# Patient Record
Sex: FEMALE | Race: WHITE | NOT HISPANIC OR LATINO
[De-identification: names, ages, dates, MRNs, and addresses within clinical notes are randomized per-mention and may not be internally consistent; named-entity substitution may affect disease eponyms.]

---

## 2017-01-19 ENCOUNTER — APPOINTMENT (OUTPATIENT)
Dept: ORTHOPEDIC SURGERY | Facility: CLINIC | Age: 72
End: 2017-01-19

## 2017-01-31 ENCOUNTER — APPOINTMENT (OUTPATIENT)
Dept: ORTHOPEDIC SURGERY | Facility: CLINIC | Age: 72
End: 2017-01-31

## 2017-01-31 VITALS — HEIGHT: 67 IN | BODY MASS INDEX: 37.2 KG/M2 | WEIGHT: 237 LBS

## 2017-01-31 DIAGNOSIS — M54.16 RADICULOPATHY, LUMBAR REGION: ICD-10-CM

## 2017-01-31 DIAGNOSIS — M48.07 SPINAL STENOSIS, LUMBOSACRAL REGION: ICD-10-CM

## 2017-01-31 DIAGNOSIS — M47.816 SPONDYLOSIS W/OUT MYELOPATHY OR RADICULOPATHY, LUMBAR REGION: ICD-10-CM

## 2017-02-02 ENCOUNTER — MEDICATION RENEWAL (OUTPATIENT)
Age: 72
End: 2017-02-02

## 2017-03-13 ENCOUNTER — NON-APPOINTMENT (OUTPATIENT)
Age: 72
End: 2017-03-13

## 2017-03-13 ENCOUNTER — APPOINTMENT (OUTPATIENT)
Dept: CARDIOLOGY | Facility: CLINIC | Age: 72
End: 2017-03-13

## 2017-03-13 VITALS
WEIGHT: 235 LBS | OXYGEN SATURATION: 94 % | DIASTOLIC BLOOD PRESSURE: 66 MMHG | SYSTOLIC BLOOD PRESSURE: 122 MMHG | HEART RATE: 65 BPM | HEIGHT: 67 IN | BODY MASS INDEX: 36.88 KG/M2 | TEMPERATURE: 98.2 F

## 2017-06-14 ENCOUNTER — APPOINTMENT (OUTPATIENT)
Dept: CARDIOLOGY | Facility: CLINIC | Age: 72
End: 2017-06-14

## 2017-06-14 ENCOUNTER — NON-APPOINTMENT (OUTPATIENT)
Age: 72
End: 2017-06-14

## 2017-06-14 VITALS
BODY MASS INDEX: 36.34 KG/M2 | SYSTOLIC BLOOD PRESSURE: 148 MMHG | WEIGHT: 232 LBS | DIASTOLIC BLOOD PRESSURE: 64 MMHG | HEART RATE: 68 BPM | OXYGEN SATURATION: 96 %

## 2017-06-18 LAB
ALBUMIN SERPL ELPH-MCNC: 3.7 G/DL
ALP BLD-CCNC: 85 U/L
ALT SERPL-CCNC: 7 U/L
ANION GAP SERPL CALC-SCNC: 14 MMOL/L
AST SERPL-CCNC: 14 U/L
BASOPHILS # BLD AUTO: 0.03 K/UL
BASOPHILS NFR BLD AUTO: 0.4 %
BILIRUB SERPL-MCNC: 0.5 MG/DL
BUN SERPL-MCNC: 14 MG/DL
CALCIUM SERPL-MCNC: 9.2 MG/DL
CHLORIDE SERPL-SCNC: 104 MMOL/L
CHOLEST SERPL-MCNC: 118 MG/DL
CHOLEST/HDLC SERPL: 2.1 RATIO
CO2 SERPL-SCNC: 21 MMOL/L
CREAT SERPL-MCNC: 0.82 MG/DL
EOSINOPHIL # BLD AUTO: 0.27 K/UL
EOSINOPHIL NFR BLD AUTO: 3.2 %
GLUCOSE SERPL-MCNC: 130 MG/DL
HCT VFR BLD CALC: 35.2 %
HDLC SERPL-MCNC: 57 MG/DL
HGB BLD-MCNC: 11.4 G/DL
IMM GRANULOCYTES NFR BLD AUTO: 0.1 %
LDLC SERPL CALC-MCNC: 50 MG/DL
LYMPHOCYTES # BLD AUTO: 2.22 K/UL
LYMPHOCYTES NFR BLD AUTO: 26.1 %
MAN DIFF?: NORMAL
MCHC RBC-ENTMCNC: 27.7 PG
MCHC RBC-ENTMCNC: 32.4 GM/DL
MCV RBC AUTO: 85.6 FL
MONOCYTES # BLD AUTO: 0.67 K/UL
MONOCYTES NFR BLD AUTO: 7.9 %
NEUTROPHILS # BLD AUTO: 5.3 K/UL
NEUTROPHILS NFR BLD AUTO: 62.3 %
PLATELET # BLD AUTO: 229 K/UL
POTASSIUM SERPL-SCNC: 4.5 MMOL/L
PROT SERPL-MCNC: 6.7 G/DL
RBC # BLD: 4.11 M/UL
RBC # FLD: 18.5 %
SODIUM SERPL-SCNC: 139 MMOL/L
TRIGL SERPL-MCNC: 56 MG/DL
WBC # FLD AUTO: 8.5 K/UL

## 2017-07-27 ENCOUNTER — APPOINTMENT (OUTPATIENT)
Dept: ORTHOPEDIC SURGERY | Facility: CLINIC | Age: 72
End: 2017-07-27
Payer: MEDICARE

## 2017-07-27 ENCOUNTER — RX RENEWAL (OUTPATIENT)
Age: 72
End: 2017-07-27

## 2017-07-27 VITALS
BODY MASS INDEX: 36.41 KG/M2 | HEIGHT: 67 IN | HEART RATE: 57 BPM | DIASTOLIC BLOOD PRESSURE: 66 MMHG | WEIGHT: 232 LBS | SYSTOLIC BLOOD PRESSURE: 152 MMHG

## 2017-07-27 DIAGNOSIS — M21.42 FLAT FOOT [PES PLANUS] (ACQUIRED), RIGHT FOOT: ICD-10-CM

## 2017-07-27 DIAGNOSIS — M19.072 PRIMARY OSTEOARTHRITIS, LEFT ANKLE AND FOOT: ICD-10-CM

## 2017-07-27 DIAGNOSIS — M21.41 FLAT FOOT [PES PLANUS] (ACQUIRED), RIGHT FOOT: ICD-10-CM

## 2017-07-27 PROCEDURE — 73564 X-RAY EXAM KNEE 4 OR MORE: CPT | Mod: RT

## 2017-07-27 PROCEDURE — 73630 X-RAY EXAM OF FOOT: CPT | Mod: LT

## 2017-07-27 PROCEDURE — 99213 OFFICE O/P EST LOW 20 MIN: CPT

## 2017-09-22 ENCOUNTER — APPOINTMENT (OUTPATIENT)
Dept: ORTHOPEDIC SURGERY | Facility: CLINIC | Age: 72
End: 2017-09-22
Payer: MEDICARE

## 2017-09-22 VITALS
BODY MASS INDEX: 36.1 KG/M2 | WEIGHT: 230 LBS | DIASTOLIC BLOOD PRESSURE: 43 MMHG | SYSTOLIC BLOOD PRESSURE: 78 MMHG | HEIGHT: 67 IN

## 2017-09-22 PROCEDURE — 73562 X-RAY EXAM OF KNEE 3: CPT | Mod: LT

## 2017-09-22 PROCEDURE — 20610 DRAIN/INJ JOINT/BURSA W/O US: CPT | Mod: LT

## 2017-09-22 PROCEDURE — 99214 OFFICE O/P EST MOD 30 MIN: CPT | Mod: 25

## 2017-11-03 ENCOUNTER — NON-APPOINTMENT (OUTPATIENT)
Age: 72
End: 2017-11-03

## 2017-11-03 ENCOUNTER — APPOINTMENT (OUTPATIENT)
Dept: CARDIOLOGY | Facility: CLINIC | Age: 72
End: 2017-11-03
Payer: MEDICARE

## 2017-11-03 VITALS
WEIGHT: 226 LBS | BODY MASS INDEX: 35.47 KG/M2 | HEART RATE: 62 BPM | OXYGEN SATURATION: 99 % | TEMPERATURE: 97.6 F | DIASTOLIC BLOOD PRESSURE: 86 MMHG | SYSTOLIC BLOOD PRESSURE: 122 MMHG | HEIGHT: 67 IN

## 2017-11-03 DIAGNOSIS — R61 GENERALIZED HYPERHIDROSIS: ICD-10-CM

## 2017-11-03 PROCEDURE — 93000 ELECTROCARDIOGRAM COMPLETE: CPT

## 2017-11-03 PROCEDURE — 99215 OFFICE O/P EST HI 40 MIN: CPT

## 2017-11-03 RX ORDER — LEVOTHYROXINE SODIUM 0.15 MG/1
150 TABLET ORAL
Qty: 90 | Refills: 0 | Status: DISCONTINUED | COMMUNITY
Start: 2017-10-31

## 2017-11-29 ENCOUNTER — MEDICATION RENEWAL (OUTPATIENT)
Age: 72
End: 2017-11-29

## 2017-12-15 ENCOUNTER — RX RENEWAL (OUTPATIENT)
Age: 72
End: 2017-12-15

## 2018-01-15 ENCOUNTER — RX RENEWAL (OUTPATIENT)
Age: 73
End: 2018-01-15

## 2018-03-05 ENCOUNTER — NON-APPOINTMENT (OUTPATIENT)
Age: 73
End: 2018-03-05

## 2018-03-05 ENCOUNTER — APPOINTMENT (OUTPATIENT)
Dept: CARDIOLOGY | Facility: CLINIC | Age: 73
End: 2018-03-05
Payer: MEDICARE

## 2018-03-05 VITALS
TEMPERATURE: 98.3 F | DIASTOLIC BLOOD PRESSURE: 76 MMHG | BODY MASS INDEX: 35.47 KG/M2 | OXYGEN SATURATION: 95 % | HEART RATE: 71 BPM | SYSTOLIC BLOOD PRESSURE: 165 MMHG | WEIGHT: 226 LBS | HEIGHT: 67 IN

## 2018-03-05 PROCEDURE — 93000 ELECTROCARDIOGRAM COMPLETE: CPT

## 2018-03-05 PROCEDURE — 99215 OFFICE O/P EST HI 40 MIN: CPT

## 2018-03-23 ENCOUNTER — APPOINTMENT (OUTPATIENT)
Dept: ORTHOPEDIC SURGERY | Facility: CLINIC | Age: 73
End: 2018-03-23
Payer: MEDICARE

## 2018-03-23 VITALS — WEIGHT: 225 LBS | HEIGHT: 67 IN | BODY MASS INDEX: 35.31 KG/M2

## 2018-03-23 DIAGNOSIS — M25.561 PAIN IN RIGHT KNEE: ICD-10-CM

## 2018-03-23 DIAGNOSIS — M79.672 PAIN IN LEFT FOOT: ICD-10-CM

## 2018-03-23 DIAGNOSIS — M25.562 PAIN IN RIGHT KNEE: ICD-10-CM

## 2018-03-23 PROCEDURE — 73630 X-RAY EXAM OF FOOT: CPT | Mod: LT

## 2018-03-23 PROCEDURE — 73562 X-RAY EXAM OF KNEE 3: CPT | Mod: RT

## 2018-03-23 PROCEDURE — 99214 OFFICE O/P EST MOD 30 MIN: CPT | Mod: 25

## 2018-03-23 PROCEDURE — 20610 DRAIN/INJ JOINT/BURSA W/O US: CPT | Mod: RT

## 2018-03-28 PROBLEM — M79.672 ACUTE FOOT PAIN, LEFT: Status: ACTIVE | Noted: 2018-03-28

## 2018-07-02 ENCOUNTER — NON-APPOINTMENT (OUTPATIENT)
Age: 73
End: 2018-07-02

## 2018-07-02 ENCOUNTER — APPOINTMENT (OUTPATIENT)
Dept: CARDIOLOGY | Facility: CLINIC | Age: 73
End: 2018-07-02
Payer: MEDICARE

## 2018-07-02 VITALS
WEIGHT: 205 LBS | HEIGHT: 67 IN | SYSTOLIC BLOOD PRESSURE: 138 MMHG | HEART RATE: 54 BPM | DIASTOLIC BLOOD PRESSURE: 72 MMHG | TEMPERATURE: 97.8 F | OXYGEN SATURATION: 96 % | BODY MASS INDEX: 32.18 KG/M2

## 2018-07-02 DIAGNOSIS — R00.2 PALPITATIONS: ICD-10-CM

## 2018-07-02 PROCEDURE — 99215 OFFICE O/P EST HI 40 MIN: CPT

## 2018-07-02 PROCEDURE — 93000 ELECTROCARDIOGRAM COMPLETE: CPT

## 2018-07-02 RX ORDER — CLARITHROMYCIN 500 MG/1
500 TABLET, FILM COATED ORAL
Qty: 10 | Refills: 0 | Status: DISCONTINUED | COMMUNITY
Start: 2018-03-19

## 2018-07-02 RX ORDER — LOSARTAN POTASSIUM 50 MG/1
50 TABLET, FILM COATED ORAL DAILY
Qty: 90 | Refills: 3 | Status: COMPLETED | COMMUNITY
Start: 2018-07-02

## 2018-07-02 RX ORDER — CLINDAMYCIN HYDROCHLORIDE 150 MG/1
150 CAPSULE ORAL
Qty: 28 | Refills: 0 | Status: DISCONTINUED | COMMUNITY
Start: 2018-03-30

## 2018-08-14 ENCOUNTER — MEDICATION RENEWAL (OUTPATIENT)
Age: 73
End: 2018-08-14

## 2018-08-14 ENCOUNTER — RX RENEWAL (OUTPATIENT)
Age: 73
End: 2018-08-14

## 2018-08-16 ENCOUNTER — MEDICATION RENEWAL (OUTPATIENT)
Age: 73
End: 2018-08-16

## 2018-10-18 ENCOUNTER — APPOINTMENT (OUTPATIENT)
Dept: ORTHOPEDIC SURGERY | Facility: CLINIC | Age: 73
End: 2018-10-18
Payer: MEDICARE

## 2018-10-18 VITALS
WEIGHT: 205 LBS | BODY MASS INDEX: 32.18 KG/M2 | DIASTOLIC BLOOD PRESSURE: 75 MMHG | HEART RATE: 60 BPM | SYSTOLIC BLOOD PRESSURE: 188 MMHG | HEIGHT: 67 IN

## 2018-10-18 PROCEDURE — 99215 OFFICE O/P EST HI 40 MIN: CPT

## 2018-10-18 PROCEDURE — 73562 X-RAY EXAM OF KNEE 3: CPT | Mod: RT

## 2018-11-06 ENCOUNTER — NON-APPOINTMENT (OUTPATIENT)
Age: 73
End: 2018-11-06

## 2018-11-06 ENCOUNTER — APPOINTMENT (OUTPATIENT)
Dept: CARDIOLOGY | Facility: CLINIC | Age: 73
End: 2018-11-06
Payer: MEDICARE

## 2018-11-06 VITALS
SYSTOLIC BLOOD PRESSURE: 162 MMHG | OXYGEN SATURATION: 93 % | HEART RATE: 68 BPM | WEIGHT: 204 LBS | BODY MASS INDEX: 31.95 KG/M2 | DIASTOLIC BLOOD PRESSURE: 72 MMHG

## 2018-11-06 PROCEDURE — 93000 ELECTROCARDIOGRAM COMPLETE: CPT

## 2018-11-06 PROCEDURE — 99215 OFFICE O/P EST HI 40 MIN: CPT

## 2018-11-06 NOTE — DISCUSSION/SUMMARY
[FreeTextEntry1] : She is a 73-year-old, obese woman, with chronic hypertension, coronary artery disease with PCI in 2004, who now is trying to quit smoking, with worsening dyspnea.\par Plan for echo and nuclear stress to define the source of her dyspnea.\par No anginal symptoms.\par Continue asa and plavix and Statin for secondary prevention. Plavix while smoking.\par Encouraged  smoke free lifestyle.\par

## 2018-11-06 NOTE — PHYSICAL EXAM
[General Appearance - Well Developed] : well developed [General Appearance - In No Acute Distress] : no acute distress [Normal Conjunctiva] : the conjunctiva exhibited no abnormalities [No Oral Pallor] : no oral pallor [No Oral Cyanosis] : no oral cyanosis [Normal Jugular Venous A Waves Present] : normal jugular venous A waves present [Normal Jugular Venous V Waves Present] : normal jugular venous V waves present [No Jugular Venous Ling A Waves] : no jugular venous ling A waves [Respiration, Rhythm And Depth] : normal respiratory rhythm and effort [Auscultation Breath Sounds / Voice Sounds] : lungs were clear to auscultation bilaterally [Heart Sounds] : normal S1 and S2 [Murmurs] : no murmurs present [Arterial Pulses Normal] : the arterial pulses were normal [Regular] : the rhythm was regular [Abdomen Soft] : soft [Abdomen Mass (___ Cm)] : no abdominal mass palpated [FreeTextEntry1] : walks with cane [Nail Clubbing] : no clubbing of the fingernails [Cyanosis, Localized] : no localized cyanosis [Petechial Hemorrhages (___cm)] : no petechial hemorrhages [Skin Color & Pigmentation] : normal skin color and pigmentation [] : no rash [No Venous Stasis] : no venous stasis [Skin Lesions] : no skin lesions [No Skin Ulcers] : no skin ulcer [No Xanthoma] : no  xanthoma was observed [Oriented To Time, Place, And Person] : oriented to person, place, and time [Affect] : the affect was normal [Mood] : the mood was normal [No Anxiety] : not feeling anxious

## 2018-11-06 NOTE — REVIEW OF SYSTEMS
[Recent Weight Gain (___ Lbs)] : recent [unfilled] ~Ulb weight gain [Shortness Of Breath] : shortness of breath [Dyspnea on exertion] : dyspnea during exertion [Chest Pain] : no chest pain [Lower Ext Edema] : lower extremity edema [Leg Claudication] : no intermittent leg claudication [Palpitations] : no palpitations [Wheezing] : wheezing [Muscle Cramps] : muscle cramps [Negative] : Heme/Lymph

## 2018-11-06 NOTE — HISTORY OF PRESENT ILLNESS
[FreeTextEntry1] : Reports exertional dyspnea after 2 blocks or 2 flights of stairs.\par No chest pain.\par  Exertion is limited by arthritis in her right knee. \par

## 2018-11-06 NOTE — REASON FOR VISIT
[Coronary Artery Disease] : coronary artery disease [Hyperlipidemia] : hyperlipidemia [Hypertension] : hypertension

## 2018-11-16 ENCOUNTER — APPOINTMENT (OUTPATIENT)
Dept: CARDIOLOGY | Facility: CLINIC | Age: 73
End: 2018-11-16
Payer: MEDICARE

## 2018-11-16 PROCEDURE — 93306 TTE W/DOPPLER COMPLETE: CPT

## 2018-11-19 ENCOUNTER — OUTPATIENT (OUTPATIENT)
Dept: OUTPATIENT SERVICES | Facility: HOSPITAL | Age: 73
LOS: 1 days | End: 2018-11-19
Payer: MEDICARE

## 2018-11-19 VITALS
DIASTOLIC BLOOD PRESSURE: 73 MMHG | HEART RATE: 43 BPM | HEIGHT: 67 IN | OXYGEN SATURATION: 98 % | WEIGHT: 201.06 LBS | SYSTOLIC BLOOD PRESSURE: 124 MMHG | RESPIRATION RATE: 18 BRPM | TEMPERATURE: 97 F

## 2018-11-19 DIAGNOSIS — Z96.659 PRESENCE OF UNSPECIFIED ARTIFICIAL KNEE JOINT: Chronic | ICD-10-CM

## 2018-11-19 DIAGNOSIS — M17.11 UNILATERAL PRIMARY OSTEOARTHRITIS, RIGHT KNEE: ICD-10-CM

## 2018-11-19 DIAGNOSIS — Z95.5 PRESENCE OF CORONARY ANGIOPLASTY IMPLANT AND GRAFT: ICD-10-CM

## 2018-11-19 DIAGNOSIS — M19.90 UNSPECIFIED OSTEOARTHRITIS, UNSPECIFIED SITE: ICD-10-CM

## 2018-11-19 DIAGNOSIS — Z96.7 PRESENCE OF OTHER BONE AND TENDON IMPLANTS: Chronic | ICD-10-CM

## 2018-11-19 DIAGNOSIS — G47.33 OBSTRUCTIVE SLEEP APNEA (ADULT) (PEDIATRIC): ICD-10-CM

## 2018-11-19 DIAGNOSIS — Z98.890 OTHER SPECIFIED POSTPROCEDURAL STATES: Chronic | ICD-10-CM

## 2018-11-19 DIAGNOSIS — Z01.818 ENCOUNTER FOR OTHER PREPROCEDURAL EXAMINATION: ICD-10-CM

## 2018-11-19 DIAGNOSIS — Z90.49 ACQUIRED ABSENCE OF OTHER SPECIFIED PARTS OF DIGESTIVE TRACT: Chronic | ICD-10-CM

## 2018-11-19 DIAGNOSIS — Z95.5 PRESENCE OF CORONARY ANGIOPLASTY IMPLANT AND GRAFT: Chronic | ICD-10-CM

## 2018-11-19 LAB
ANION GAP SERPL CALC-SCNC: 11 MMOL/L — SIGNIFICANT CHANGE UP (ref 5–17)
BLD GP AB SCN SERPL QL: NEGATIVE — SIGNIFICANT CHANGE UP
BUN SERPL-MCNC: 15 MG/DL — SIGNIFICANT CHANGE UP (ref 7–23)
CALCIUM SERPL-MCNC: 9 MG/DL — SIGNIFICANT CHANGE UP (ref 8.4–10.5)
CHLORIDE SERPL-SCNC: 105 MMOL/L — SIGNIFICANT CHANGE UP (ref 96–108)
CO2 SERPL-SCNC: 25 MMOL/L — SIGNIFICANT CHANGE UP (ref 22–31)
CREAT SERPL-MCNC: 0.76 MG/DL — SIGNIFICANT CHANGE UP (ref 0.5–1.3)
GLUCOSE SERPL-MCNC: 95 MG/DL — SIGNIFICANT CHANGE UP (ref 70–99)
HBA1C BLD-MCNC: 5.7 % — HIGH (ref 4–5.6)
HCT VFR BLD CALC: 40.1 % — SIGNIFICANT CHANGE UP (ref 34.5–45)
HGB BLD-MCNC: 12.9 G/DL — SIGNIFICANT CHANGE UP (ref 11.5–15.5)
MCHC RBC-ENTMCNC: 30.1 PG — SIGNIFICANT CHANGE UP (ref 27–34)
MCHC RBC-ENTMCNC: 32.2 GM/DL — SIGNIFICANT CHANGE UP (ref 32–36)
MCV RBC AUTO: 93.7 FL — SIGNIFICANT CHANGE UP (ref 80–100)
MRSA PCR RESULT.: SIGNIFICANT CHANGE UP
PLATELET # BLD AUTO: 273 K/UL — SIGNIFICANT CHANGE UP (ref 150–400)
POTASSIUM SERPL-MCNC: 4.5 MMOL/L — SIGNIFICANT CHANGE UP (ref 3.5–5.3)
POTASSIUM SERPL-SCNC: 4.5 MMOL/L — SIGNIFICANT CHANGE UP (ref 3.5–5.3)
RBC # BLD: 4.28 M/UL — SIGNIFICANT CHANGE UP (ref 3.8–5.2)
RBC # FLD: 14.9 % — HIGH (ref 10.3–14.5)
RH IG SCN BLD-IMP: POSITIVE — SIGNIFICANT CHANGE UP
S AUREUS DNA NOSE QL NAA+PROBE: SIGNIFICANT CHANGE UP
SODIUM SERPL-SCNC: 141 MMOL/L — SIGNIFICANT CHANGE UP (ref 135–145)
WBC # BLD: 10.33 K/UL — SIGNIFICANT CHANGE UP (ref 3.8–10.5)
WBC # FLD AUTO: 10.33 K/UL — SIGNIFICANT CHANGE UP (ref 3.8–10.5)

## 2018-11-19 PROCEDURE — 83036 HEMOGLOBIN GLYCOSYLATED A1C: CPT

## 2018-11-19 PROCEDURE — G0463: CPT

## 2018-11-19 PROCEDURE — 86900 BLOOD TYPING SEROLOGIC ABO: CPT

## 2018-11-19 PROCEDURE — 87640 STAPH A DNA AMP PROBE: CPT

## 2018-11-19 PROCEDURE — 86850 RBC ANTIBODY SCREEN: CPT

## 2018-11-19 PROCEDURE — 85027 COMPLETE CBC AUTOMATED: CPT

## 2018-11-19 PROCEDURE — 86901 BLOOD TYPING SEROLOGIC RH(D): CPT

## 2018-11-19 PROCEDURE — 87641 MR-STAPH DNA AMP PROBE: CPT

## 2018-11-19 PROCEDURE — 80048 BASIC METABOLIC PNL TOTAL CA: CPT

## 2018-11-19 NOTE — H&P PST ADULT - HISTORY OF PRESENT ILLNESS
72 y/o F PMH CAD, S/P PCI coronary artery stents 2005, osteoarthritis, S/P 74 y/o F PMH CAD, S/P PCI coronary artery stents 2004, EF 60% on echo 11/2018,  osteoarthritis, S/P left total knee replacement, c/o right knee pain for the past 5 years.  Presents today for right total knee replacement.

## 2018-11-19 NOTE — H&P PST ADULT - PSH
No significant past surgical history S/P appendectomy    S/P knee replacement  left  S/P laparotomy  due to adhesions  S/P ORIF (open reduction internal fixation) fracture  left hip  Stented coronary artery  2004

## 2018-11-19 NOTE — H&P PST ADULT - PMH
Hypertension    Hypothyroid CAD (coronary artery disease)    Hypertension    Hypothyroid    CROW (obstructive sleep apnea)    Osteoarthritis

## 2018-11-20 ENCOUNTER — APPOINTMENT (OUTPATIENT)
Dept: CARDIOLOGY | Facility: CLINIC | Age: 73
End: 2018-11-20
Payer: MEDICARE

## 2018-11-20 PROCEDURE — A9500: CPT

## 2018-11-20 PROCEDURE — 93015 CV STRESS TEST SUPVJ I&R: CPT

## 2018-11-20 PROCEDURE — 78452 HT MUSCLE IMAGE SPECT MULT: CPT

## 2018-12-04 PROBLEM — I25.10 ATHEROSCLEROTIC HEART DISEASE OF NATIVE CORONARY ARTERY WITHOUT ANGINA PECTORIS: Chronic | Status: ACTIVE | Noted: 2018-11-19

## 2018-12-24 ENCOUNTER — OUTPATIENT (OUTPATIENT)
Dept: OUTPATIENT SERVICES | Facility: HOSPITAL | Age: 73
LOS: 1 days | End: 2018-12-24
Payer: MEDICARE

## 2018-12-24 VITALS
HEIGHT: 67 IN | WEIGHT: 210.1 LBS | HEART RATE: 57 BPM | TEMPERATURE: 98 F | DIASTOLIC BLOOD PRESSURE: 78 MMHG | OXYGEN SATURATION: 99 % | SYSTOLIC BLOOD PRESSURE: 133 MMHG | RESPIRATION RATE: 18 BRPM

## 2018-12-24 DIAGNOSIS — Z29.9 ENCOUNTER FOR PROPHYLACTIC MEASURES, UNSPECIFIED: ICD-10-CM

## 2018-12-24 DIAGNOSIS — M17.11 UNILATERAL PRIMARY OSTEOARTHRITIS, RIGHT KNEE: ICD-10-CM

## 2018-12-24 DIAGNOSIS — G47.33 OBSTRUCTIVE SLEEP APNEA (ADULT) (PEDIATRIC): ICD-10-CM

## 2018-12-24 DIAGNOSIS — Z90.49 ACQUIRED ABSENCE OF OTHER SPECIFIED PARTS OF DIGESTIVE TRACT: Chronic | ICD-10-CM

## 2018-12-24 DIAGNOSIS — Z98.890 OTHER SPECIFIED POSTPROCEDURAL STATES: Chronic | ICD-10-CM

## 2018-12-24 DIAGNOSIS — Z96.7 PRESENCE OF OTHER BONE AND TENDON IMPLANTS: Chronic | ICD-10-CM

## 2018-12-24 DIAGNOSIS — I25.10 ATHEROSCLEROTIC HEART DISEASE OF NATIVE CORONARY ARTERY WITHOUT ANGINA PECTORIS: ICD-10-CM

## 2018-12-24 DIAGNOSIS — Z01.818 ENCOUNTER FOR OTHER PREPROCEDURAL EXAMINATION: ICD-10-CM

## 2018-12-24 DIAGNOSIS — Z95.5 PRESENCE OF CORONARY ANGIOPLASTY IMPLANT AND GRAFT: Chronic | ICD-10-CM

## 2018-12-24 DIAGNOSIS — Z96.659 PRESENCE OF UNSPECIFIED ARTIFICIAL KNEE JOINT: Chronic | ICD-10-CM

## 2018-12-24 DIAGNOSIS — M19.90 UNSPECIFIED OSTEOARTHRITIS, UNSPECIFIED SITE: ICD-10-CM

## 2018-12-24 DIAGNOSIS — I10 ESSENTIAL (PRIMARY) HYPERTENSION: ICD-10-CM

## 2018-12-24 DIAGNOSIS — N84.0 POLYP OF CORPUS UTERI: ICD-10-CM

## 2018-12-24 LAB
ANION GAP SERPL CALC-SCNC: 12 MMOL/L — SIGNIFICANT CHANGE UP (ref 5–17)
BLD GP AB SCN SERPL QL: NEGATIVE — SIGNIFICANT CHANGE UP
BUN SERPL-MCNC: 15 MG/DL — SIGNIFICANT CHANGE UP (ref 7–23)
CALCIUM SERPL-MCNC: 9.2 MG/DL — SIGNIFICANT CHANGE UP (ref 8.4–10.5)
CHLORIDE SERPL-SCNC: 103 MMOL/L — SIGNIFICANT CHANGE UP (ref 96–108)
CO2 SERPL-SCNC: 26 MMOL/L — SIGNIFICANT CHANGE UP (ref 22–31)
CREAT SERPL-MCNC: 0.74 MG/DL — SIGNIFICANT CHANGE UP (ref 0.5–1.3)
GLUCOSE SERPL-MCNC: 96 MG/DL — SIGNIFICANT CHANGE UP (ref 70–99)
HBA1C BLD-MCNC: 5.6 % — SIGNIFICANT CHANGE UP (ref 4–5.6)
HCT VFR BLD CALC: 40.3 % — SIGNIFICANT CHANGE UP (ref 34.5–45)
HGB BLD-MCNC: 12.8 G/DL — SIGNIFICANT CHANGE UP (ref 11.5–15.5)
MCHC RBC-ENTMCNC: 30.2 PG — SIGNIFICANT CHANGE UP (ref 27–34)
MCHC RBC-ENTMCNC: 31.8 GM/DL — LOW (ref 32–36)
MCV RBC AUTO: 95 FL — SIGNIFICANT CHANGE UP (ref 80–100)
PLATELET # BLD AUTO: 233 K/UL — SIGNIFICANT CHANGE UP (ref 150–400)
POTASSIUM SERPL-MCNC: 4.6 MMOL/L — SIGNIFICANT CHANGE UP (ref 3.5–5.3)
POTASSIUM SERPL-SCNC: 4.6 MMOL/L — SIGNIFICANT CHANGE UP (ref 3.5–5.3)
RBC # BLD: 4.24 M/UL — SIGNIFICANT CHANGE UP (ref 3.8–5.2)
RBC # FLD: 15.4 % — HIGH (ref 10.3–14.5)
RH IG SCN BLD-IMP: POSITIVE — SIGNIFICANT CHANGE UP
SODIUM SERPL-SCNC: 141 MMOL/L — SIGNIFICANT CHANGE UP (ref 135–145)
WBC # BLD: 9.24 K/UL — SIGNIFICANT CHANGE UP (ref 3.8–10.5)
WBC # FLD AUTO: 9.24 K/UL — SIGNIFICANT CHANGE UP (ref 3.8–10.5)

## 2018-12-24 PROCEDURE — 83036 HEMOGLOBIN GLYCOSYLATED A1C: CPT

## 2018-12-24 PROCEDURE — 80048 BASIC METABOLIC PNL TOTAL CA: CPT

## 2018-12-24 PROCEDURE — 86901 BLOOD TYPING SEROLOGIC RH(D): CPT

## 2018-12-24 PROCEDURE — 86850 RBC ANTIBODY SCREEN: CPT

## 2018-12-24 PROCEDURE — G0463: CPT

## 2018-12-24 PROCEDURE — 85027 COMPLETE CBC AUTOMATED: CPT

## 2018-12-24 PROCEDURE — 86900 BLOOD TYPING SEROLOGIC ABO: CPT

## 2018-12-24 RX ORDER — SODIUM CHLORIDE 9 MG/ML
3 INJECTION INTRAMUSCULAR; INTRAVENOUS; SUBCUTANEOUS EVERY 8 HOURS
Qty: 0 | Refills: 0 | Status: DISCONTINUED | OUTPATIENT
Start: 2019-01-04 | End: 2019-01-19

## 2018-12-24 RX ORDER — CELECOXIB 200 MG/1
200 CAPSULE ORAL ONCE
Qty: 0 | Refills: 0 | Status: DISCONTINUED | OUTPATIENT
Start: 2019-01-04 | End: 2019-01-19

## 2018-12-24 RX ORDER — LIDOCAINE HCL 20 MG/ML
0.2 VIAL (ML) INJECTION ONCE
Qty: 0 | Refills: 0 | Status: DISCONTINUED | OUTPATIENT
Start: 2019-01-04 | End: 2019-01-19

## 2018-12-24 NOTE — H&P PST ADULT - PSH
S/P appendectomy    S/P knee replacement  left 2000  S/P laparotomy  due to adhesions  S/P ORIF (open reduction internal fixation) fracture  left hip 1962  Stented coronary artery  2004

## 2018-12-24 NOTE — H&P PST ADULT - PMH
CAD (coronary artery disease)    History of MI (myocardial infarction)  h/o previous Mi in 2004 prompted PTCA  with stenting x 2 vessels   last stress/ echo 2004  Hypertension    Hypothyroid    CROW (obstructive sleep apnea)  CPAP  Osteoarthritis CAD (coronary artery disease)    History of MI (myocardial infarction)  h/o previous Mi in 2004 prompted PTCA  with stenting x 2 vessels   last stress/ echo 2004  Hypertension    Hypothyroid    CROW (obstructive sleep apnea)  CPAP  Osteoarthritis  knees, back CAD (coronary artery disease)    History of MI (myocardial infarction)  h/o previous Mi in 2004 prompted PTCA  with stenting x 2 vessels   last stress/ echo 2004  Hypertension    Hypothyroid    CROW (obstructive sleep apnea)  CPAP  Osteoarthritis  knees, back  Polyp of corpus uteri

## 2018-12-24 NOTE — H&P PST ADULT - PROBLEM SELECTOR PLAN 2
on plavix., will hold x 7 days prior to surgery   will continue aspirin rosario-op  will be evaluated by her cardiologist on 12/27/2018 for upcoming surgery  recent stress/echo report on chart

## 2018-12-24 NOTE — H&P PST ADULT - ACTIVITY
able to go up four steps to get inside the house ADL's, able to walk up four steps to house, cooking, arthritis

## 2018-12-24 NOTE — H&P PST ADULT - ASSESSMENT
CAPRINI SCORE [CLOT]    AGE RELATED RISK FACTORS                                                       MOBILITY RELATED FACTORS  [ ] Age 41-60 years                                            (1 Point)                  [ ] Bed rest                                                        (1 Point)  [2 ] Age: 61-74 years                                           (2 Points)                 [ ] Plaster cast                                                   (2 Points)  [ ] Age= 75 years                                              (3 Points)                 [ ] Bed bound for more than 72 hours                 (2 Points)    DISEASE RELATED RISK FACTORS                                               GENDER SPECIFIC FACTORS  [ ] Edema in the lower extremities                       (1 Point)                  [ ] Pregnancy                                                     (1 Point)  [ ] Varicose veins                                               (1 Point)                  [ ] Post-partum < 6 weeks                                   (1 Point)             [1 ] BMI > 25 Kg/m2                                            (1 Point)                  [ ] Hormonal therapy  or oral contraception          (1 Point)                 [ ] Sepsis (in the previous month)                        (1 Point)                  [ ] History of pregnancy complications                 (1 point)  [ ] Pneumonia or serious lung disease                                               [ ] Unexplained or recurrent                     (1 Point)           (in the previous month)                               (1 Point)  [ ] Abnormal pulmonary function test                     (1 Point)                 SURGERY RELATED RISK FACTORS  [ ] Acute myocardial infarction                              (1 Point)                 [ ]  Section                                             (1 Point)  [ ] Congestive heart failure (in the previous month)  (1 Point)               [ ] Minor surgery                                                  (1 Point)   [ ] Inflammatory bowel disease                             (1 Point)                 [ ] Arthroscopic surgery                                        (2 Points)  [ ] Central venous access                                      (2 Points)                [ ] General surgery lasting more than 45 minutes   (2 Points)       [ ] Stroke (in the previous month)                          (5 Points)               [ 5] Elective arthroplasty                                         (5 Points)                                                                                                                                               HEMATOLOGY RELATED FACTORS                                                 TRAUMA RELATED RISK FACTORS  [ ] Prior episodes of VTE                                     (3 Points)                [ ] Fracture of the hip, pelvis, or leg                       (5 Points)  [ ] Positive family history for VTE                         (3 Points)                 [ ] Acute spinal cord injury (in the previous month)  (5 Points)  [ ] Prothrombin 34132 A                                     (3 Points)                 [ ] Paralysis  (less than 1 month)                             (5 Points)  [ ] Factor V Leiden                                             (3 Points)                  [ ] Multiple Trauma within 1 month                        (5 Points)  [ ] Lupus anticoagulants                                     (3 Points)                                                           [ ] Anticardiolipin antibodies                               (3 Points)                                                       [ ] High homocysteine in the blood                      (3 Points)                                             [ ] Other congenital or acquired thrombophilia      (3 Points)                                                [ ] Heparin induced thrombocytopenia                  (3 Points)                                          Total Score [ 8 ]

## 2018-12-24 NOTE — H&P PST ADULT - HISTORY OF PRESENT ILLNESS
73 year old female with h/o CAD s/p PCI with coronary stenting x2 (2004), HTN, Hyperlipidemia, Hypothyroidism, CROW (CPAP), obesity, is being seen for scheduled for right total knee replacement on 1/14/2019. Patient was originally scheduled for December 14,2018 and cancelled for family emergency.  Patient is also scheduled for D&C on 1/4/2019.  patient is on Plavix and was advised by the surgeon and cardiologist to hold it for 7 days prior to knee surgery, last dose on 1/7/2019, but  also scheduled for D&c on 1/4/2019, will talk to her cardiologist regarding holding Plavix.

## 2018-12-24 NOTE — H&P PST ADULT - ATTENDING COMMENTS
1/4/19 Gyn Att  72 yo with cervical dysplasia, NAINA 2.  In for treatment via LEEP/Cone Bx.  R/B of surgical excision of abnormal cells reviewed and pt elected to proceed.  Plan hold Plavix prior to this surgery and then prior to her upcoming knee surgery.   JEMAL Hendrix MD

## 2018-12-25 PROBLEM — M19.90 UNSPECIFIED OSTEOARTHRITIS, UNSPECIFIED SITE: Chronic | Status: ACTIVE | Noted: 2018-11-19

## 2018-12-31 ENCOUNTER — NON-APPOINTMENT (OUTPATIENT)
Age: 73
End: 2018-12-31

## 2018-12-31 ENCOUNTER — APPOINTMENT (OUTPATIENT)
Dept: CARDIOLOGY | Facility: CLINIC | Age: 73
End: 2018-12-31
Payer: MEDICARE

## 2018-12-31 VITALS
WEIGHT: 210 LBS | HEIGHT: 67 IN | SYSTOLIC BLOOD PRESSURE: 148 MMHG | OXYGEN SATURATION: 96 % | HEART RATE: 55 BPM | TEMPERATURE: 97.6 F | DIASTOLIC BLOOD PRESSURE: 72 MMHG | BODY MASS INDEX: 32.96 KG/M2

## 2018-12-31 PROCEDURE — 93000 ELECTROCARDIOGRAM COMPLETE: CPT

## 2018-12-31 PROCEDURE — 99215 OFFICE O/P EST HI 40 MIN: CPT

## 2018-12-31 NOTE — HISTORY OF PRESENT ILLNESS
[FreeTextEntry1] : She was seen today for preoperative cardiovascular evaluation.\par She is a 73-year-old with a history of atherosclerosis based on abnormal stress test that showed prior infarction without any ischemia. She is a social smoker and is scheduled for a D&C for uterine polyps.\par She has stable exertional dyspnea without angina.\par She has no orthopnea PND or change in her leg edema.\par Her medications have been stable.\par She is planning to undergo treatment for her knee arthritis in the near future as well.\par recent stress test showed an old inferior wall infarction without ischemia.Echocardiogram showed a normal ejction fraction.\par

## 2018-12-31 NOTE — DISCUSSION/SUMMARY
[FreeTextEntry1] : She is a 73-year-old, obese woman, with chronic hypertension, coronary artery disease with PCI in 2004, who now is Scheduled to undergo a D&C.\par She has no new anginal symptoms or signs of ischemia on her recent stress test.\par Her ECG shows stable bradycardia that is unchanged from prior.\par Her ejection fraction is normal.\par She may discontinue Plavix for 7 days prior to her planned surgery and restart thereafter.\par She should continue her current dose of aspirin and statin.\par She may proceed with the planned surgery.\par

## 2018-12-31 NOTE — PHYSICAL EXAM
[General Appearance - Well Developed] : well developed [General Appearance - In No Acute Distress] : no acute distress [Normal Conjunctiva] : the conjunctiva exhibited no abnormalities [No Oral Pallor] : no oral pallor [No Oral Cyanosis] : no oral cyanosis [Normal Jugular Venous A Waves Present] : normal jugular venous A waves present [Normal Jugular Venous V Waves Present] : normal jugular venous V waves present [No Jugular Venous Ling A Waves] : no jugular venous ling A waves [Respiration, Rhythm And Depth] : normal respiratory rhythm and effort [Auscultation Breath Sounds / Voice Sounds] : lungs were clear to auscultation bilaterally [Heart Sounds] : normal S1 and S2 [Murmurs] : no murmurs present [Arterial Pulses Normal] : the arterial pulses were normal [Regular] : the rhythm was regular [Abdomen Soft] : soft [Abdomen Mass (___ Cm)] : no abdominal mass palpated [Nail Clubbing] : no clubbing of the fingernails [Cyanosis, Localized] : no localized cyanosis [Petechial Hemorrhages (___cm)] : no petechial hemorrhages [Skin Color & Pigmentation] : normal skin color and pigmentation [] : no rash [No Venous Stasis] : no venous stasis [Skin Lesions] : no skin lesions [No Skin Ulcers] : no skin ulcer [No Xanthoma] : no  xanthoma was observed [Oriented To Time, Place, And Person] : oriented to person, place, and time [Affect] : the affect was normal [Mood] : the mood was normal [No Anxiety] : not feeling anxious [FreeTextEntry1] : walks with cane

## 2018-12-31 NOTE — REVIEW OF SYSTEMS
[Recent Weight Gain (___ Lbs)] : recent [unfilled] ~Ulb weight gain [Shortness Of Breath] : shortness of breath [Dyspnea on exertion] : dyspnea during exertion [Lower Ext Edema] : lower extremity edema [Wheezing] : wheezing [Muscle Cramps] : muscle cramps [Negative] : Heme/Lymph [Chest Pain] : no chest pain [Leg Claudication] : no intermittent leg claudication [Palpitations] : no palpitations

## 2019-01-03 ENCOUNTER — TRANSCRIPTION ENCOUNTER (OUTPATIENT)
Age: 74
End: 2019-01-03

## 2019-01-04 ENCOUNTER — OUTPATIENT (OUTPATIENT)
Dept: OUTPATIENT SERVICES | Facility: HOSPITAL | Age: 74
LOS: 1 days | End: 2019-01-04
Payer: MEDICARE

## 2019-01-04 ENCOUNTER — RESULT REVIEW (OUTPATIENT)
Age: 74
End: 2019-01-04

## 2019-01-04 VITALS
OXYGEN SATURATION: 96 % | DIASTOLIC BLOOD PRESSURE: 74 MMHG | RESPIRATION RATE: 16 BRPM | SYSTOLIC BLOOD PRESSURE: 166 MMHG | HEART RATE: 60 BPM

## 2019-01-04 VITALS
SYSTOLIC BLOOD PRESSURE: 174 MMHG | WEIGHT: 210.1 LBS | HEIGHT: 67 IN | OXYGEN SATURATION: 99 % | HEART RATE: 57 BPM | TEMPERATURE: 98 F | DIASTOLIC BLOOD PRESSURE: 78 MMHG | RESPIRATION RATE: 18 BRPM

## 2019-01-04 DIAGNOSIS — Z96.7 PRESENCE OF OTHER BONE AND TENDON IMPLANTS: Chronic | ICD-10-CM

## 2019-01-04 DIAGNOSIS — Z95.5 PRESENCE OF CORONARY ANGIOPLASTY IMPLANT AND GRAFT: Chronic | ICD-10-CM

## 2019-01-04 DIAGNOSIS — Z90.49 ACQUIRED ABSENCE OF OTHER SPECIFIED PARTS OF DIGESTIVE TRACT: Chronic | ICD-10-CM

## 2019-01-04 DIAGNOSIS — Z96.659 PRESENCE OF UNSPECIFIED ARTIFICIAL KNEE JOINT: Chronic | ICD-10-CM

## 2019-01-04 DIAGNOSIS — Z98.890 OTHER SPECIFIED POSTPROCEDURAL STATES: Chronic | ICD-10-CM

## 2019-01-04 DIAGNOSIS — N87.9 DYSPLASIA OF CERVIX UTERI, UNSPECIFIED: ICD-10-CM

## 2019-01-04 PROCEDURE — 57522 CONIZATION OF CERVIX: CPT

## 2019-01-04 PROCEDURE — 88305 TISSUE EXAM BY PATHOLOGIST: CPT | Mod: 26

## 2019-01-04 PROCEDURE — 88305 TISSUE EXAM BY PATHOLOGIST: CPT

## 2019-01-04 PROCEDURE — 88360 TUMOR IMMUNOHISTOCHEM/MANUAL: CPT

## 2019-01-04 PROCEDURE — 88360 TUMOR IMMUNOHISTOCHEM/MANUAL: CPT | Mod: 26

## 2019-01-04 PROCEDURE — 88341 IMHCHEM/IMCYTCHM EA ADD ANTB: CPT

## 2019-01-04 PROCEDURE — 88341 IMHCHEM/IMCYTCHM EA ADD ANTB: CPT | Mod: 26,59

## 2019-01-04 PROCEDURE — 88342 IMHCHEM/IMCYTCHM 1ST ANTB: CPT | Mod: 26,59

## 2019-01-04 PROCEDURE — 88307 TISSUE EXAM BY PATHOLOGIST: CPT | Mod: 26

## 2019-01-04 PROCEDURE — 88307 TISSUE EXAM BY PATHOLOGIST: CPT

## 2019-01-04 PROCEDURE — 88342 IMHCHEM/IMCYTCHM 1ST ANTB: CPT

## 2019-01-04 RX ORDER — CELECOXIB 200 MG/1
200 CAPSULE ORAL ONCE
Qty: 0 | Refills: 0 | Status: COMPLETED | OUTPATIENT
Start: 2019-01-04 | End: 2019-01-04

## 2019-01-04 RX ORDER — OXYCODONE HYDROCHLORIDE 5 MG/1
5 TABLET ORAL ONCE
Qty: 0 | Refills: 0 | Status: DISCONTINUED | OUTPATIENT
Start: 2019-01-04 | End: 2019-01-04

## 2019-01-04 RX ORDER — ASPIRIN/CALCIUM CARB/MAGNESIUM 324 MG
81 TABLET ORAL
Qty: 0 | Refills: 0 | COMMUNITY

## 2019-01-04 RX ORDER — CLOPIDOGREL BISULFATE 75 MG/1
75 TABLET, FILM COATED ORAL
Qty: 0 | Refills: 0 | COMMUNITY

## 2019-01-04 RX ORDER — METOPROLOL TARTRATE 50 MG
1 TABLET ORAL
Qty: 0 | Refills: 0 | COMMUNITY

## 2019-01-04 RX ORDER — ONDANSETRON 8 MG/1
4 TABLET, FILM COATED ORAL ONCE
Qty: 0 | Refills: 0 | Status: DISCONTINUED | OUTPATIENT
Start: 2019-01-04 | End: 2019-01-19

## 2019-01-04 RX ORDER — SODIUM CHLORIDE 9 MG/ML
1000 INJECTION, SOLUTION INTRAVENOUS
Qty: 0 | Refills: 0 | Status: DISCONTINUED | OUTPATIENT
Start: 2019-01-04 | End: 2019-01-19

## 2019-01-04 RX ORDER — LOSARTAN POTASSIUM 100 MG/1
50 TABLET, FILM COATED ORAL
Qty: 0 | Refills: 0 | COMMUNITY

## 2019-01-04 RX ORDER — DICLOFENAC SODIUM 30 MG/G
1 GEL TOPICAL
Qty: 0 | Refills: 0 | COMMUNITY

## 2019-01-04 RX ORDER — CELECOXIB 200 MG/1
1 CAPSULE ORAL
Qty: 0 | Refills: 0 | COMMUNITY

## 2019-01-04 RX ORDER — ACETAMINOPHEN 500 MG
1000 TABLET ORAL ONCE
Qty: 0 | Refills: 0 | Status: COMPLETED | OUTPATIENT
Start: 2019-01-04 | End: 2019-01-04

## 2019-01-04 RX ORDER — LEVOTHYROXINE SODIUM 125 MCG
150 TABLET ORAL
Qty: 0 | Refills: 0 | COMMUNITY

## 2019-01-04 RX ORDER — ROSUVASTATIN CALCIUM 5 MG/1
1 TABLET ORAL
Qty: 0 | Refills: 0 | COMMUNITY

## 2019-01-04 RX ADMIN — Medication 1000 MILLIGRAM(S): at 09:43

## 2019-01-04 NOTE — ASU DISCHARGE PLAN (ADULT/PEDIATRIC). - MEDICATION SUMMARY - MEDICATIONS TO TAKE
I will START or STAY ON the medications listed below when I get home from the hospital:    ibuprofen 200 mg oral tablet  -- 2 tab(s) by mouth every 6 hours  -- Indication: For postop pain    Tylenol Extra Strength  -- 2 tab(s) by mouth every 6 hours  -- Indication: For postop pain

## 2019-01-04 NOTE — BRIEF OPERATIVE NOTE - PROCEDURE
<<-----Click on this checkbox to enter Procedure LEEP conization, cervix  01/04/2019    Active  LGAINEY

## 2019-01-04 NOTE — ASU DISCHARGE PLAN (ADULT/PEDIATRIC). - NOTIFY
Unable to Urinate/Persistent Nausea and Vomiting/GYN Fever>100.4/Inability to Tolerate Liquids or Foods/Numbness, color, or temperature change to extremity/Bleeding that does not stop/Pain not relieved by Medications

## 2019-01-04 NOTE — ASU PATIENT PROFILE, ADULT - ABILITY TO HEAR (WITH HEARING AID OR HEARING APPLIANCE IF NORMALLY USED):
Mildly to Moderately Impaired: difficulty hearing in some environments or speaker may need to increase volume or speak distinctly right/Mildly to Moderately Impaired: difficulty hearing in some environments or speaker may need to increase volume or speak distinctly

## 2019-01-04 NOTE — BRIEF OPERATIVE NOTE - OPERATION/FINDINGS
EUA: stenotic vaginal vault, small anteverted uterus, small cervix flush with vaginal wall, no adnexal masses palpable b/l  After application of acetic acid a erythematous discoloration was noted at 11o'clock extending into the os

## 2019-01-04 NOTE — ASU PREOP CHECKLIST - MEDICAL/PEDIATRIC CLEARANCE ON MEDICAL RECORD
Spoke with patient and looking for pump for erectile dysfunction. Pt transferred to urology to make an appointment.    on chart

## 2019-01-04 NOTE — ASU PATIENT PROFILE, ADULT - PMH
CAD (coronary artery disease)    History of MI (myocardial infarction)  h/o previous Mi in 2004 prompted PTCA  with stenting x 2 vessels   last stress/ echo 2004  Hypertension    Hypothyroid    CROW (obstructive sleep apnea)  CPAP  Osteoarthritis  knees, back  Polyp of corpus uteri

## 2019-01-10 LAB — SURGICAL PATHOLOGY STUDY: SIGNIFICANT CHANGE UP

## 2019-01-21 ENCOUNTER — RX RENEWAL (OUTPATIENT)
Age: 74
End: 2019-01-21

## 2019-02-22 ENCOUNTER — APPOINTMENT (OUTPATIENT)
Dept: CARDIOLOGY | Facility: CLINIC | Age: 74
End: 2019-02-22

## 2019-02-28 ENCOUNTER — RESULT REVIEW (OUTPATIENT)
Age: 74
End: 2019-02-28

## 2019-02-28 ENCOUNTER — OUTPATIENT (OUTPATIENT)
Dept: OUTPATIENT SERVICES | Facility: HOSPITAL | Age: 74
LOS: 1 days | End: 2019-02-28
Payer: MEDICARE

## 2019-02-28 DIAGNOSIS — Z98.890 OTHER SPECIFIED POSTPROCEDURAL STATES: Chronic | ICD-10-CM

## 2019-02-28 DIAGNOSIS — Z96.659 PRESENCE OF UNSPECIFIED ARTIFICIAL KNEE JOINT: Chronic | ICD-10-CM

## 2019-02-28 DIAGNOSIS — Z90.49 ACQUIRED ABSENCE OF OTHER SPECIFIED PARTS OF DIGESTIVE TRACT: Chronic | ICD-10-CM

## 2019-02-28 DIAGNOSIS — R87.611 ATYPICAL SQUAMOUS CELLS CANNOT EXCLUDE HIGH GRADE SQUAMOUS INTRAEPITHELIAL LESION ON CYTOLOGIC SMEAR OF CERVIX (ASC-H): ICD-10-CM

## 2019-02-28 DIAGNOSIS — Z95.5 PRESENCE OF CORONARY ANGIOPLASTY IMPLANT AND GRAFT: Chronic | ICD-10-CM

## 2019-02-28 DIAGNOSIS — Z96.7 PRESENCE OF OTHER BONE AND TENDON IMPLANTS: Chronic | ICD-10-CM

## 2019-02-28 PROCEDURE — 88321 CONSLTJ&REPRT SLD PREP ELSWR: CPT

## 2019-03-05 LAB — SURGICAL PATHOLOGY STUDY: SIGNIFICANT CHANGE UP

## 2019-03-11 ENCOUNTER — OUTPATIENT (OUTPATIENT)
Dept: OUTPATIENT SERVICES | Facility: HOSPITAL | Age: 74
LOS: 1 days | End: 2019-03-11
Payer: MEDICARE

## 2019-03-11 VITALS
DIASTOLIC BLOOD PRESSURE: 56 MMHG | TEMPERATURE: 97 F | HEART RATE: 58 BPM | HEIGHT: 63 IN | WEIGHT: 203.05 LBS | SYSTOLIC BLOOD PRESSURE: 125 MMHG | RESPIRATION RATE: 17 BRPM

## 2019-03-11 DIAGNOSIS — Z96.659 PRESENCE OF UNSPECIFIED ARTIFICIAL KNEE JOINT: Chronic | ICD-10-CM

## 2019-03-11 DIAGNOSIS — I25.10 ATHEROSCLEROTIC HEART DISEASE OF NATIVE CORONARY ARTERY WITHOUT ANGINA PECTORIS: ICD-10-CM

## 2019-03-11 DIAGNOSIS — M17.11 UNILATERAL PRIMARY OSTEOARTHRITIS, RIGHT KNEE: ICD-10-CM

## 2019-03-11 DIAGNOSIS — Z98.890 OTHER SPECIFIED POSTPROCEDURAL STATES: Chronic | ICD-10-CM

## 2019-03-11 DIAGNOSIS — Z90.49 ACQUIRED ABSENCE OF OTHER SPECIFIED PARTS OF DIGESTIVE TRACT: Chronic | ICD-10-CM

## 2019-03-11 DIAGNOSIS — G47.33 OBSTRUCTIVE SLEEP APNEA (ADULT) (PEDIATRIC): ICD-10-CM

## 2019-03-11 DIAGNOSIS — Z95.5 PRESENCE OF CORONARY ANGIOPLASTY IMPLANT AND GRAFT: Chronic | ICD-10-CM

## 2019-03-11 DIAGNOSIS — Z01.84 ENCOUNTER FOR ANTIBODY RESPONSE EXAMINATION: ICD-10-CM

## 2019-03-11 DIAGNOSIS — Z96.7 PRESENCE OF OTHER BONE AND TENDON IMPLANTS: Chronic | ICD-10-CM

## 2019-03-11 PROCEDURE — G0463: CPT

## 2019-03-11 PROCEDURE — 86901 BLOOD TYPING SEROLOGIC RH(D): CPT

## 2019-03-11 PROCEDURE — 80048 BASIC METABOLIC PNL TOTAL CA: CPT

## 2019-03-11 PROCEDURE — 85027 COMPLETE CBC AUTOMATED: CPT

## 2019-03-11 PROCEDURE — 83036 HEMOGLOBIN GLYCOSYLATED A1C: CPT

## 2019-03-11 PROCEDURE — 87640 STAPH A DNA AMP PROBE: CPT

## 2019-03-11 PROCEDURE — 86850 RBC ANTIBODY SCREEN: CPT

## 2019-03-11 PROCEDURE — 86900 BLOOD TYPING SEROLOGIC ABO: CPT

## 2019-03-11 RX ORDER — IBUPROFEN 200 MG
2 TABLET ORAL
Qty: 0 | Refills: 0 | COMMUNITY

## 2019-03-11 RX ORDER — ACETAMINOPHEN 500 MG
2 TABLET ORAL
Qty: 0 | Refills: 0 | COMMUNITY

## 2019-03-11 NOTE — H&P PST ADULT - PROBLEM SELECTOR PLAN 3
hx of cardiac  stents 2004 on Plavix and Aspirin as per protocol to stop Plavix 7 days prior stay on aspirin. Pt and daughter instructed to confirm plan with surgeon and cardiologist.

## 2019-03-11 NOTE — H&P PST ADULT - ASSESSMENT
CAPRINI SCORE [CLOT]    AGE RELATED RISK FACTORS                                                       MOBILITY RELATED FACTORS  [ ] Age 41-60 years                                            (1 Point)                  [ ] Bed rest                                                        (1 Point)  [ x] Age: 61-74 years                                           (2 Points)                 [ ] Plaster cast                                                   (2 Points)  [ ] Age= 75 years                                              (3 Points)                 [ ] Bed bound for more than 72 hours                 (2 Points)    DISEASE RELATED RISK FACTORS                                               GENDER SPECIFIC FACTORS  [ ] Edema in the lower extremities                       (1 Point)                  [ ] Pregnancy                                                     (1 Point)  [ ] Varicose veins                                               (1 Point)                  [ ] Post-partum < 6 weeks                                   (1 Point)             [x ] BMI > 25 Kg/m2                                            (1 Point)                  [ ] Hormonal therapy  or oral contraception          (1 Point)                 [ ] Sepsis (in the previous month)                        (1 Point)                  [ ] History of pregnancy complications                 (1 point)  [ ] Pneumonia or serious lung disease                                               [ ] Unexplained or recurrent                     (1 Point)           (in the previous month)                               (1 Point)  [ ] Abnormal pulmonary function test                     (1 Point)                 SURGERY RELATED RISK FACTORS  [ ] Acute myocardial infarction                              (1 Point)                 [ ]  Section                                             (1 Point)  [ ] Congestive heart failure (in the previous month)  (1 Point)               [ ] Minor surgery                                                  (1 Point)   [ ] Inflammatory bowel disease                             (1 Point)                 [ ] Arthroscopic surgery                                        (2 Points)  [ ] Central venous access                                      (2 Points)                [ ] General surgery lasting more than 45 minutes   (2 Points)       [ ] Stroke (in the previous month)                          (5 Points)               [ x] Elective arthroplasty                                         (5 Points)                                                                                                                                               HEMATOLOGY RELATED FACTORS                                                 TRAUMA RELATED RISK FACTORS  [ ] Prior episodes of VTE                                     (3 Points)                 [ ] Fracture of the hip, pelvis, or leg                       (5 Points)  [ ] Positive family history for VTE                         (3 Points)                 [ ] Acute spinal cord injury (in the previous month)  (5 Points)  [ ] Prothrombin 89021 A                                     (3 Points)                 [ ] Paralysis  (less than 1 month)                             (5 Points)  [ ] Factor V Leiden                                             (3 Points)                  [ ] Multiple Trauma within 1 month                        (5 Points)  [ ] Lupus anticoagulants                                     (3 Points)                                                           [ ] Anticardiolipin antibodies                               (3 Points)                                                       [ ] High homocysteine in the blood                      (3 Points)                                             [ ] Other congenital or acquired thrombophilia      (3 Points)                                                [ ] Heparin induced thrombocytopenia                  (3 Points)                                          Total Score [       8   ]  The Caprini score indicates that this patient is at high risk for a VTE event (score = 6).    Ssurgical patients in this group will benefit from both pharmacologic prophylaxis and intermittent compression devices.    The surgical team will determine the balance between VTE risk and bleeding risk, and other clinical considerations.

## 2019-03-11 NOTE — H&P PST ADULT - PSH
H/O dilation and curettage  2/2019  benign polyp  S/P appendectomy  30 plus years  S/P knee replacement  left 2000  S/P laparotomy  due to adhesions  30 years ago  S/P ORIF (open reduction internal fixation) fracture  left hip 1962  Stented coronary artery  2004

## 2019-03-11 NOTE — H&P PST ADULT - PMH
Bilateral hearing loss, unspecified hearing loss type  bilateral aids  CAD (coronary artery disease)    Heart murmur  dx in childhood  History of MI (myocardial infarction)  h/o previous Mi in 2004 prompted PTCA  with stenting x 2 vessels   last stress/ echo 2019  Hypertension    Hypothyroid    CROW (obstructive sleep apnea)  CPAP  Osteoarthritis  knees, back  Polyp of corpus uteri Bilateral hearing loss, unspecified hearing loss type  bilateral aids  CAD (coronary artery disease)    Heart murmur  dx in childhood  History of MI (myocardial infarction)  h/o previous Mi in 2004 prompted PTCA  with stenting x 2 vessels   last stress/ echo 2019  Hypertension    Hypothyroid    Obesity    CROW (obstructive sleep apnea)  CPAP  Osteoarthritis  knees, back  Polyp of corpus uteri Bilateral hearing loss, unspecified hearing loss type  bilateral aids  CAD (coronary artery disease)    Heart murmur  dx in childhood  History of MI (myocardial infarction)  h/o previous MI in 2004 prompted PTCA  with stenting x 2 vessels   last stress/ echo 2019  Hypertension    Hypothyroid    Obesity    CROW (obstructive sleep apnea)  CPAP  Osteoarthritis  knees, back  Polyp of corpus uteri

## 2019-03-11 NOTE — H&P PST ADULT - HISTORY OF PRESENT ILLNESS
73 year old female with hx or right knee pain work up need surgery scheduled 3/26  CAD hx of cardiac stents 2004 on Plavix and Aspirin  Pt instructed to stop Plavix 7 days prior and stay on aspirin.  Pt and daughter instructed to confirm plan with surgeon and cardiologist (both offices closed )

## 2019-03-11 NOTE — H&P PST ADULT - OCCUPATION
Problem: Patient Care Overview  Goal: Plan of Care/Patient Progress Review  Outcome: No Change  D: Tmax 99.5. Sinus rhythm. Normotensive. CMV settings unchanged at 30%.  I/A: Versed weaned and turned off at 2200 last evening. Did open eyes briefly this morning to voice and stimuli. Withdraws to pain. Not following commands. Mag replaced this am. 500cc watery stool out of rectal tube. Adequate UOP. CVP 6-8. 1/2 NS maintenance at 50cc/hr.   P: Continue to monitor and assess pt. Consult Neuro crit with any new changes/concerns.        retired

## 2019-03-12 PROBLEM — I25.2 OLD MYOCARDIAL INFARCTION: Chronic | Status: ACTIVE | Noted: 2018-12-24

## 2019-03-13 ENCOUNTER — NON-APPOINTMENT (OUTPATIENT)
Age: 74
End: 2019-03-13

## 2019-03-13 ENCOUNTER — APPOINTMENT (OUTPATIENT)
Dept: CARDIOLOGY | Facility: CLINIC | Age: 74
End: 2019-03-13
Payer: MEDICARE

## 2019-03-13 VITALS
HEART RATE: 54 BPM | SYSTOLIC BLOOD PRESSURE: 160 MMHG | WEIGHT: 204 LBS | DIASTOLIC BLOOD PRESSURE: 80 MMHG | OXYGEN SATURATION: 99 % | BODY MASS INDEX: 31.95 KG/M2

## 2019-03-13 DIAGNOSIS — M25.561 PAIN IN RIGHT KNEE: ICD-10-CM

## 2019-03-13 PROCEDURE — 99215 OFFICE O/P EST HI 40 MIN: CPT

## 2019-03-13 PROCEDURE — 93000 ELECTROCARDIOGRAM COMPLETE: CPT

## 2019-03-13 NOTE — DISCUSSION/SUMMARY
[FreeTextEntry1] : She is a 73-year-old, obese woman, with chronic hypertension, coronary artery disease with PCI in 2004, who now is Scheduled to undergo a right knee replacement.\par She has no new anginal symptoms or signs of ischemia on her recent stress test.\par Her ECG shows stable bradycardia that is unchanged from prior.\par Her ejection fraction is normal.\par BP likely elevated due to pain. continue Losartan, metoprolol, Imdur, lasix.\par She may discontinue Plavix for 7 days prior to her planned surgery and restart thereafter.\par She should continue her current dose of aspirin and statin.\par She may proceed with the planned surgery.\par

## 2019-03-13 NOTE — HISTORY OF PRESENT ILLNESS
[FreeTextEntry1] : She was seen today for preoperative cardiovascular evaluation prior to Right knee replacement.\par She is a 73-year-old with a history of atherosclerosis based on abnormal stress test that showed prior infarction without any ischemia. She is a social smoker and is scheduled for a right knee replacement.\par She has stable exertional dyspnea without angina.\par She has no orthopnea PND or change in her leg edema.\par Her medications have been stable.\par Stress test 11/2018: recent stress test showed an old inferior wall infarction without ischemia.Echocardiogram showed a normal ejection fraction.\par

## 2019-03-18 NOTE — H&P PST ADULT - RX
Outpatient Physical Therapy Ortho Treatment Note  [facility]     Patient Name: Carlie Brennan  : 1961  MRN: [id number]  Today's Date: 3/18/2019      Visit Date: 2019    Visit Dx:    ICD-10-CM ICD-9-CM   1. Left shoulder pain, unspecified chronicity M25.512 719.41       Patient Active Problem List   Diagnosis   • Essential hypertension   • Gastroesophageal reflux disease without esophagitis   • Mitral valve prolapse   • Rosacea   • Hyperlipidemia   • Health care maintenance   • History of adenomatous polyp of colon   • Meniere's disease of right ear   • Polycythemia        Past Medical History:   Diagnosis Date   • GERD (gastroesophageal reflux disease)    • Hypertension    • Iron deficiency anemia 2018   • Pregnancy        • Rosacea    • Vertigo         Past Surgical History:   Procedure Laterality Date   • ABDOMINOPLASTY     •  SECTION      x 2   • COLONOSCOPY N/A 3/12/2018    Procedure: COLONOSCOPY INTO CECUM AND NORMAL TI WITH HOT SNARE POLYPECTOMY;  Surgeon: Lincoln Chino MD;  Location: [facility] ENDOSCOPY;  Service: Gastroenterology   • LASIK                         PT Assessment/Plan     Row Name 19 1612          PT Assessment    Assessment Comments  Pt continues to report decreased pain levels, however continues with limitations with ROM and scapular strength. Pt requires cues for scap retraction at times, however improved mechanics noted with elevation activities.   -CN        PT Plan    PT Plan Comments  Contine with scapular girdle strengthening and consider PNF patterns next visit.   -CN       User Key  (r) = Recorded By, (t) = Taken By, (c) = Cosigned By    Initials Name Provider Type    Sumaya Irizarry, PT Physical Therapist              Exercises     Row Name 19 1500             Subjective Comments    Subjective Comments  The soreness is better overall, but I feel like it is taking a long time to heal.   -CN         Subjective Pain     Able to rate subjective pain?  yes  -CN      Pre-Treatment Pain Level  1  -CN         Total Minutes    51848 - PT Therapeutic Exercise Minutes  30  -CN      89631 - PT Manual Therapy Minutes  10  -CN         Exercise 1    Exercise Name 1  Supine B UE flexion with band at wrist  -CN      Cueing 1  Verbal;Demo  -CN      Reps 1  15  -CN      Additional Comments  RTB  -CN         Exercise 2    Exercise Name 2  SL ER  -CN      Cueing 2  Verbal;Tactile  -CN      Sets 2  2  -CN      Reps 2  10  -CN      Additional Comments  1#  -CN         Exercise 3    Exercise Name 3  SL abduction  -CN      Cueing 3  Demo  -CN      Reps 3  10  -CN      Additional Comments  cues to retract scapula  -CN         Exercise 4    Exercise Name 4  SL scapular clock  -CN      Cueing 4  Demo  -CN      Reps 4  10 ea  -CN      Additional Comments  --  -CN         Exercise 5    Exercise Name 5  pulleys flex, ER  -CN      Reps 5  10  -CN      Time 5  5 seconds  -CN      Additional Comments  ea  -CN         Exercise 7    Exercise Name 7  shoulder rows TB standing  -CN      Sets 7  2  -CN      Reps 7  10  -CN      Additional Comments  BTB  -CN         Exercise 8    Exercise Name 8  shoulder ext TB standing  -CN      Sets 8  2  -CN      Reps 8  10  -CN      Additional Comments  BTB  -CN         Exercise 9    Exercise Name 9  shoulder ER TB standing palms up  -CN      Reps 9  15  -CN      Additional Comments  RTB  -CN         Exercise 11    Exercise Name 11  wallslides flex B standing  -CN      Reps 11  10  -CN      Time 11  5 seconds  -CN         Exercise 14    Exercise Name 14  Supine punches  -CN      Cueing 14  Demo  -CN      Reps 14  15  -CN      Additional Comments  3#  -CN         Exercise 16    Exercise Name 16  Supine star  -CN      Cueing 16  Demo  -CN      Reps 16  10 ea  -CN      Additional Comments  RTB  -CN         Exercise 17    Exercise Name 17  Supine small circles  -CN      Cueing 17  Demo  -CN      Reps 17  10 CW and CCW  -CN         User Key  (r) = Recorded By, (t) = Taken By, (c) = Cosigned By    Initials Name Provider Type    Sumaya Irizarry, PT Physical Therapist                        Manual Rx (last 36 hours)      Manual Treatments     Row Name 03/18/19 1500             Total Minutes    01581 - PT Manual Therapy Minutes  10  -CN         Manual Rx 1    Manual Rx 1 Location  left shoulder  -CN      Manual Rx 1 Type  PROM all planes, gentle post and inf joint mobs  -CN        User Key  (r) = Recorded By, (t) = Taken By, (c) = Cosigned By    Initials Name Provider Type    Sumaya Irizarry, PT Physical Therapist          PT OP Goals     Row Name 03/18/19 1600          PT Short Term Goals    STG Date to Achieve  03/21/19  -CN     STG 1  Pt will be independent and verbalized good understanding of initial HEP to improve ability to manage pain, as well as improve strength and ROM  -CN     STG 1 Progress  Met  -CN     STG 2  Pt will improve L shoulder flexion to at least 145 deg to improve ability to reach overhead.   -CN     STG 2 Progress  Progressing  -CN     STG 2 Progress Comments  Continuing to progress ROM and scapular strengthening as tolerated.   -CN        Long Term Goals    LTG Date to Achieve  04/18/19  -CN     LTG 1  Pt will be independent and verbalized good understanding of advanced HEP to improve ability to manage pain, as well as improve strength and ROM.  -CN     LTG 1 Progress  Ongoing  -CN     LTG 2  Pt will improve L shoulder flexion to at least 160 deg to improve ability to reach overhead.   -CN     LTG 2 Progress  Ongoing  -CN     LTG 3  Pt will report </=3/10 pain in L shoulder with overhead activities to improve participation in ADLs.  -CN     LTG 3 Progress  Progressing  -CN     LTG 4  Pt will improve DASH score to at least </=20% perceived disability to show overall improved quality of life.  -CN     LTG 4 Progress  Ongoing  -CN       User Key  (r) = Recorded By, (t) = Taken By, (c) = Cosigned By     Initials Name Provider Type    Sumaya Irizarry, PT Physical Therapist          Therapy Education  Given: HEP, Symptoms/condition management, Pain management, Mobility training, Other (comment)  Program: Progressed  How Provided: Verbal, Demonstration  Provided to: Patient  Level of Understanding: Teach back education performed, Demonstrated, Verbalized              Time Calculation:   Start Time: 1530  Stop Time: 1610  Time Calculation (min): 40 min  Therapy Suggested Charges     Code   Minutes Charges    26841 (CPT®) Hc Pt Neuromusc Re Education Ea 15 Min      10668 (CPT®) Hc Pt Ther Proc Ea 15 Min 30 2    72484 (CPT®) Hc Gait Training Ea 15 Min      56400 (CPT®) Hc Pt Therapeutic Act Ea 15 Min      11503 (CPT®) Hc Pt Manual Therapy Ea 15 Min 10 1    44795 (CPT®) Hc Pt Ther Massage- Per 15 Min      32808 (CPT®) Hc Pt Iontophoresis Ea 15 Min      85663 (CPT®) Hc Pt Elec Stim Ea-Per 15 Min      86557 (CPT®) Hc Pt Ultrasound Ea 15 Min      25053 (CPT®) Hc Pt Self Care/Mgmt/Train Ea 15 Min      60053 (CPT®) Hc Pt Prosthetic (S) Train Initial Encounter, Each 15 Min      48533 (CPT®) Hc Orthotic(S) Mgmt/Train Initial Encounter, Each 15min      44758 (CPT®) Hc Pt Aquatic Therapy Ea 15 Min      85152 (CPT®) Hc Pt Orthotic(S)/Prosthetic(S) Encounter, Each 15 Min       (CPT®) Hc Pt Electrical Stim Unattended      Total  40 3        Therapy Charges for Today     Code Description Service Date Service Provider Modifiers Qty    80932898847 HC PT THER PROC EA 15 MIN 3/18/2019 Sumaya Hernandez, PT GP 2    98114007254 HC PT MANUAL THERAPY EA 15 MIN 3/18/2019 Sumaya Hernandez, PT GP 1                    Sumaya Hernandez PT  3/18/2019      CPAP does not use

## 2019-03-26 ENCOUNTER — APPOINTMENT (OUTPATIENT)
Dept: ORTHOPEDIC SURGERY | Facility: HOSPITAL | Age: 74
End: 2019-03-26

## 2019-05-01 ENCOUNTER — NON-APPOINTMENT (OUTPATIENT)
Age: 74
End: 2019-05-01

## 2019-05-01 ENCOUNTER — APPOINTMENT (OUTPATIENT)
Dept: CARDIOLOGY | Facility: CLINIC | Age: 74
End: 2019-05-01
Payer: MEDICARE

## 2019-05-01 VITALS
DIASTOLIC BLOOD PRESSURE: 60 MMHG | BODY MASS INDEX: 31.48 KG/M2 | OXYGEN SATURATION: 98 % | SYSTOLIC BLOOD PRESSURE: 140 MMHG | WEIGHT: 201 LBS | HEART RATE: 59 BPM

## 2019-05-01 DIAGNOSIS — E78.5 HYPERLIPIDEMIA, UNSPECIFIED: ICD-10-CM

## 2019-05-01 PROBLEM — R01.1 CARDIAC MURMUR, UNSPECIFIED: Chronic | Status: ACTIVE | Noted: 2019-03-11

## 2019-05-01 PROBLEM — H91.93 UNSPECIFIED HEARING LOSS, BILATERAL: Chronic | Status: ACTIVE | Noted: 2019-03-11

## 2019-05-01 PROBLEM — E66.9 OBESITY, UNSPECIFIED: Chronic | Status: ACTIVE | Noted: 2019-03-11

## 2019-05-01 PROCEDURE — 93000 ELECTROCARDIOGRAM COMPLETE: CPT

## 2019-05-01 PROCEDURE — 99215 OFFICE O/P EST HI 40 MIN: CPT

## 2019-05-01 NOTE — PHYSICAL EXAM
[General Appearance - Well Developed] : well developed [General Appearance - In No Acute Distress] : no acute distress [No Oral Pallor] : no oral pallor [Normal Conjunctiva] : the conjunctiva exhibited no abnormalities [No Oral Cyanosis] : no oral cyanosis [Normal Jugular Venous A Waves Present] : normal jugular venous A waves present [No Jugular Venous Ling A Waves] : no jugular venous ling A waves [Normal Jugular Venous V Waves Present] : normal jugular venous V waves present [Auscultation Breath Sounds / Voice Sounds] : lungs were clear to auscultation bilaterally [Respiration, Rhythm And Depth] : normal respiratory rhythm and effort [Heart Sounds] : normal S1 and S2 [Regular] : the rhythm was regular [Murmurs] : no murmurs present [Arterial Pulses Normal] : the arterial pulses were normal [Abdomen Soft] : soft [Nail Clubbing] : no clubbing of the fingernails [FreeTextEntry1] : walks with cane [Abdomen Mass (___ Cm)] : no abdominal mass palpated [Petechial Hemorrhages (___cm)] : no petechial hemorrhages [Cyanosis, Localized] : no localized cyanosis [Skin Color & Pigmentation] : normal skin color and pigmentation [No Venous Stasis] : no venous stasis [Skin Lesions] : no skin lesions [] : no rash [No Xanthoma] : no  xanthoma was observed [No Skin Ulcers] : no skin ulcer [Mood] : the mood was normal [Oriented To Time, Place, And Person] : oriented to person, place, and time [Affect] : the affect was normal [No Anxiety] : not feeling anxious

## 2019-05-01 NOTE — HISTORY OF PRESENT ILLNESS
[FreeTextEntry1] : She feels fine and has pushed off the knee surgery.\par Taking all meds as before.\par \par Prior:\par She is a 73-year-old with a history of atherosclerosis based on abnormal stress test that showed prior infarction without any ischemia. She is a social smoker and is scheduled for a right knee replacement.\par She has stable exertional dyspnea without angina.\par She has no orthopnea PND or change in her leg edema.\par Her medications have been stable.\par Stress test 11/2018: recent stress test showed an old inferior wall infarction without ischemia.Echocardiogram showed a normal ejection fraction.\par

## 2019-05-01 NOTE — DISCUSSION/SUMMARY
[FreeTextEntry1] : She is a 73-year-old, obese woman, with chronic hypertension, coronary artery disease with PCI in 2004,  \par She has no anginal symptoms or signs of ischemia on her recent stress test.\par Her ECG shows stable bradycardia that is unchanged from prior.\par Her ejection fraction is normal.\par BP much better. continue Losartan, metoprolol, Imdur, lasix.\par  \par She should continue her current dose of aspirin and statin.\par  \par

## 2019-05-01 NOTE — REVIEW OF SYSTEMS
[Recent Weight Gain (___ Lbs)] : recent [unfilled] ~Ulb weight gain [Shortness Of Breath] : shortness of breath [Dyspnea on exertion] : dyspnea during exertion [Chest Pain] : no chest pain [Lower Ext Edema] : lower extremity edema [Leg Claudication] : no intermittent leg claudication [Palpitations] : no palpitations [Muscle Cramps] : muscle cramps [Wheezing] : wheezing [Negative] : Heme/Lymph

## 2019-05-16 NOTE — H&P PST ADULT - PAIN SCALE PREFERRED, PROFILE
Post Acute Skilled Nursing Home Initial Visit Note     Date of Service: 5/9/2019  Location seen at: [facility]  Subacute / Skilled Need: Rehabilitation    PCP: Suhail Wilcox MD   Patient Care Team:  Suhail Wilcox MD as PCP - General (Internal Medicine)  Raghu Salazar MD as Pulmonary Medicine (Internal Medicine - Pulmonary Disease)  German Gomez MD as Vascular Surgeon (Vascular Surgery)  Raghu Arnold MD as ENT/Otolaryngolgy (ENT/Otolaryngology)  Seen by COOPER Knott today    Lela Lowry is a 66 year old female presenting to Post Acute Skilled Nursing for: weakness secondary to fall  History of Present Illness:   Patient admitted with moderate weakness x several weeks and fall. Patient tripped while trying to  Cheerios that was spilled while attending to her dog. Patient using walker for mobility and transfers and wheel chair for prolonged distances.     Diabetes, uncontrolled. Hypertension, stable. Major Depressive Disorder, stable.     HISTORY  Past Medical History:   Diagnosis Date   • Carotid artery disease (CMS/HCC) 2016   • Chronic pain     Back   • CVA (cerebral vascular accident) (CMS/HCC) 2000    occipital , visual    • Depressive disorder     seasonal affective disorder, [facility]   • Essential (primary) hypertension    • Fracture    • Hyperlipidemia    • Hypothyroidism    • Intermittent asthma    • Jaw fracture (CMS/HCC) 1975   • LVH (left ventricular hypertrophy)    • Meralgia paresthetica of right side 03/2019   • Obesity    • Obesity hypoventilation syndrome (CMS/HCC)    • Obsessive compulsive disorder     Primarily hoarding behaviour   • 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 She  has never used smokeless tobacco. She reports that she drinks alcohol. She reports that she does not use drugs.  Past Surgical History:   Procedure Laterality Date   • Abdomen surgery     • Appendectomy     • Exc parotid total preserv facial nerve Right 03/2018   • Fracture surgery Right     Jaw    • Joint replacement Left     Knee replacement   • Temporomandibular joint surgery Right 1990    x3   • Thromboendart w/w0 graft carotid  neck incs Left 6-27-16    left; bovine patch - kappes   • Total knee arthroplasty Left    • Vascular surgery      carotid endartectomy     Family History   Problem Relation Age of Onset   • Heart disease Father         CHF   • Macular degeneration Mother    • Heart disease Mother         CHF, pacer   • Stroke Paternal Grandfather    • Obesity Brother    • Sudden death Brother         ?sepsis   • Alcohol Abuse Brother    • Psychiatry Brother      History     Not marked as reviewed during this visit.        PROBLEM LIST:  Patient Active Problem List   Diagnosis   • Obesity, Class III, BMI 40-49.9 (morbid obesity) (CMS/HCC)   • Fatty liver   • Vitamin D deficiency   • Mass of parotid gland   • Chronic back pain   • PTSD (post-traumatic stress disorder)   • Primary insomnia   • MDD (major depressive disorder), recurrent episode, moderate (CMS/HCC)   • Obsessive-compulsive disorder   • Personal history of colonic polyps   • Generalized hyperhidrosis   • Fibroids   • Hyperlipemia   • Hypertension   • Hypothyroidism   • Essential hypertension with goal blood pressure less than 140/90   • Bilateral carotid artery stenosis   • Obesity hypoventilation syndrome, PCO2 51, PO2 63 on ra at rest   • Diabetes mellitus type 2 without retinopathy (CMS/HCC)   • MING, AHI 18 on CPAP auto 11-20      ADVANCE DIRECTIVES:  Lela Lowry     ADVANCE DIRECTIVE TYPE     Value   User Date/Time Recorded    Living Will (Declaration for Physicians)  Marybeth Monroy 5/11/2016 08:09:32          [facility] DECISION MAKER FOR  PATIENT     Value   User Date/Time Recorded    Self  Lydia Louis 3/10/2016 14:55:09          CUSTODY/VISITATION RESTRICTIONS?     Value   User Date/Time Recorded    No  Adilia Hernandez RN 3/7/2018 15:08:37          DO YOU HAVE AN AD?     Value   User Date/Time Recorded    Yes  Marybeth Monroy 5/11/2016 08:09:32    No  Lydia Louis 3/10/2016 14:55:09          INFO/ASSIST TO CREATE AD     Value   User Date/Time Recorded    <blank>  Marybeth Monroy 5/11/2016 08:09:32    Yes, information provided  Lydia Louis 3/10/2016 14:55:09          IS THE ADVANCE DIRECTIVE COMPLETE?     Value   User Date/Time Recorded    Advance Directive currently on file  Marybeth Monroy 5/11/2016 08:09:32          LIVING WILL DATE RECEIVED     Value   User Date/Time Recorded    5/11/2016  Marybeth Monroy 5/11/2016 08:09:32          LOCATION OF ADVANCE DIRECTIVES     Value   User Date/Time Recorded    Scanned into medical record  Marybeth Monroy 5/11/2016 08:09:32          LW DATE SIGNED     Value   User Date/Time Recorded    4/29/2016  Marybeth Monroy 5/11/2016 08:09:32              Power of  Status:  Not Activated  Code Status:  No resuscitation   Goals of Care: Receiving usual and customary care.   Advanced Directive Forms: discussed and on file       DEPRESSION SCREENING:  PHQ 2/9 Test Results  0: Not at all  1: Several days  2: More than half the days  3: Nearly every day      DEPRESSION ASSESSMENT/PLAN:  Start and/or continue medication.  See orders for details.     ALLERGIES:  Allergies as of 05/09/2019 - Reviewed 05/05/2019   Allergen Reaction Noted   • Mold   (environmental) ANAPHYLAXIS 03/07/2018   • Statins Other (See Comments) 02/14/2018   • Dust Other (See Comments) 03/07/2018   • Metformin hcl DIARRHEA 01/28/2011   • Niacin Other (See Comments) 04/25/2017   • Penicillin v RASH 09/26/2009   • Smoke no more Cough 03/07/2018       CURRENT MEDICATIONS:   Current Outpatient Medications   Medication Sig Dispense Refill   • insulin aspart  (NOVOLOG) 100 UNIT/ML sliding scale injection Inject 10 Units into the skin as directed. 10 units three times daily 10 mL 12   • insulin glargine (TOUJEO SOLOSTAR) 300 UNIT/ML pen-injector Inject 30 Units into the skin nightly. 72 mL 1   • lisinopril (ZESTRIL) 10 MG tablet Take 1 tablet by mouth daily. Do not start before May 8, 2019.     • acetaminophen (TYLENOL) 500 MG tablet Take 1,000 mg by mouth every 6 hours as needed for Pain. Dose: 2 tabs (=1000mg)     • metoPROLOL tartrate (LOPRESSOR) 50 MG tablet TAKE 1 TABLET BY MOUTH EVERY 12 HOURS 180 tablet 3   • apixaban (ELIQUIS) 5 MG Tab Take 1 tablet by mouth every 12 hours. 60 tablet 3   • venlafaxine XR (EFFEXOR XR) 150 MG 24 hr capsule Take 300 mg by mouth nightly. 2 caps (=300mg)     • aspirin 81 MG tablet Take 81 mg by mouth nightly.      • pregabalin (LYRICA) 150 MG capsule Take 1 capsule by mouth 2 times daily. 60 capsule 5   • atorvastatin (LIPITOR) 40 MG tablet Take 1 tablet by mouth daily. 90 tablet 3   • cyclobenzaprine (FLEXERIL) 10 MG tablet Take 1 tablet by mouth 3 times daily as needed for Muscle spasms. 30 tablet 5   • albuterol 108 (90 Base) MCG/ACT inhaler Inhale 2 puffs into the lungs every 4 hours as needed for Shortness of Breath or Wheezing. 1 Inhaler 11   • zolpidem (AMBIEN) 5 MG tablet Take 2.5 mg by mouth nightly as needed for Sleep. Dose: 1/2 tab (=2.5mg)     • levothyroxine (SYNTHROID, LEVOTHROID) 50 MCG tablet Take 1 tablet by mouth daily. 30 tablet 5   • acetaminophen (TYLENOL) 500 MG tablet Take 1,000 mg by mouth nightly. 2 tabs (=1,000mg)     • cyproheptadine (PERIACTIN) 4 MG tablet Take 8 mg by mouth nightly. Dose: 2 tabs (=8mg)     • cetirizine (ZYRTEC) 10 MG tablet Take 10 mg by mouth daily as needed for Allergies.        No current facility-administered medications for this visit.        BASELINE FUNCTIONAL STATUS:  Independent and Walker    CURRENT FUNCTIONAL STATUS:  Weight bearing as tolerated (WBAT)    DIET:  Consistency:  Mechanically altered   Type: DM diet  Appetite: Normal      REVIEW OF SYSTEMS: In addition to HPI  Constitutional: no fevers  Cardiovascular: Denies chest pain, no edema  Respiratory: Denies shortness of breath  GI: Denies abdominal pain, nausea, no constipation  : Denies urinary frequency   Musculoskeleton: Denies pain, reports weakness  SKIN: Denies rashes  NEURO: reports weakness  PSYCH: denies depression, no anxiety    PHYSICAL EXAM  Constitutional:   @VS@  Well developed, well groomed  Skin: Warm, dry, and intact. No lesions, no nodules on palpation  Head: Normocephalic. Atraumatic.   ENMT: on inspection no lesions on ears or nose and oral mucosa pink and moist  CV: RRR.  No edema of bilateral lower extremities.   Resp: Breath sounds clear in all fields. No accessory muscles.  GI: soft, nontender, nondistended. Normoactive bowel sounds. No hernia  MSK: muscle strength 3/5 bilateral upper extremities  Neuro:  Thoughts coherent. Gross sensory and motor system intact  PSYCH: Alert and oriented to person, place, and time, appropriate affect and mood    LABS:  5/5/2019  1:15 PM - Patrick, Lab In Misys     Component Value Ref Range & Units Status Collected Lab   Sodium 143  135 - 145 mmol/L Final 05/05/2019 12:20 PM ACL   Potassium 3.4  3.4 - 5.1 mmol/L Final 05/05/2019 12:20 PM ACL   Chloride 103  98 - 107 mmol/L Final 05/05/2019 12:20 PM ACL   Carbon Dioxide 33High   21 - 32 mmol/L Final 05/05/2019 12:20 PM ACL   Anion Gap 10  10 - 20 mmol/L Final 05/05/2019 12:20 PM ACL   Glucose 61Low   65 - 99 mg/dL Final 05/05/2019 12:20 PM ACL   BUN 29High   6 - 20 mg/dL Final 05/05/2019 12:20 PM ACL   Creatinine 0.81  0.51 - 0.95 mg/dL Final 05/05/2019 12:20 PM ACL   GFR Estimate,  88   Final 05/05/2019 12:20 PM ACL   Comment:   eGFR 60 - 89 mL/min/1.73m2 = Mild decrease in kidney function.   GFR Estimate, Non  76   Final 05/05/2019 12:20 PM ACL   Comment:   eGFR 60 - 89 mL/min/1.73m2 = Mild  decrease in kidney function.   BUN/Creatinine Ratio 36High   7 - 25 Final 05/05/2019 12:20 PM ACL   CALCIUM 9.6  8.4 - 10.2 mg/dL Final 05/05/2019 12:20 PM ACL   TOTAL BILIRUBIN 0.5  0.2 - 1.0 mg/dL Final 05/05/2019 12:20 PM ACL   AST/SGOT 15  <38 Units/L Final 05/05/2019 12:20 PM ACL   ALT/SGPT 24  <64 Units/L Final 05/05/2019 12:20 PM ACL   ALK PHOSPHATASE 96  45 - 117 Units/L Final 05/05/2019 12:20 PM ACL   TOTAL PROTEIN 8.6High   6.4 - 8.2 g/dL Final 05/05/2019 12:20 PM ACL   Albumin 3.6  3.6 - 5.1 g/dL Final 05/05/2019 12:20 PM ACL   GLOBULIN 5.0High   2.0 - 4.0 g/dL Final 05/05/2019 12:20 PM ACL   A/G Ratio, Serum 0.7Low   1.0 - 2.4 Final 05/05/2019 12:20 PM ACL     5/5/2019 12:35 PM - Patrick, Lab In Misys     Component Value Ref Range & Units Status Collected Lab   WBC 8.8  4.2 - 11.0 K/mcL Final 05/05/2019 12:20 PM ACL   RBC 5.02  4.00 - 5.20 mil/mcL Final 05/05/2019 12:20 PM ACL   HGB 14.3  12.0 - 15.5 g/dL Final 05/05/2019 12:20 PM ACL   HCT 45.6  36.0 - 46.5 % Final 05/05/2019 12:20 PM ACL   MCV 90.8  78.0 - 100.0 fl Final 05/05/2019 12:20 PM ACL   MCH 28.5  26.0 - 34.0 pg Final 05/05/2019 12:20 PM ACL   MCHC 31.4Low   32.0 - 36.5 g/dL Final 05/05/2019 12:20 PM ACL   RDW-CV 14.2  11.0 - 15.0 % Final 05/05/2019 12:20 PM ACL     140 - 450 K/mcL Final 05/05/2019 12:20 PM ACL   NRBC 0  0 /100 WBC Final 05/05/2019 12:20 PM ACL   DIFF TYPE   Final 05/05/2019 12:20 PM ACL   AUTOMATED DIFFERENTIAL    Neutrophil 63  % Final 05/05/2019 12:20 PM ACL   LYMPH 25  % Final 05/05/2019 12:20 PM ACL   MONO 8  % Final 05/05/2019 12:20 PM ACL   EOSIN 2  % Final 05/05/2019 12:20 PM ACL   BASO 1  % Final 05/05/2019 12:20 PM ACL   Percent Immature Granuloctyes 1  % Final 05/05/2019 12:20 PM ACL   Absolute Neutrophil 5.7  1.8 - 7.7 K/mcL Final 05/05/2019 12:20 PM ACL   Absolute Lymph 2.2  1.0 - 4.0 K/mcL Final 05/05/2019 12:20 PM ACL   Absolute Mono 0.7  0.3 - 0.9 K/mcL Final 05/05/2019 12:20 PM ACL   Absolute Eos 0.2  0.1 -  0.5 K/mcL Final 05/05/2019 12:20 PM ACL   Absolute Baso 0.1  0.0 - 0.3 K/mcL Final 05/05/2019 12:20 PM ACL   Absolute Immature Granulocytes 0.1  0 - 0.2 K/mcl Final 05/05/2019 12:20 PM ACL   Testing Performed By     Lab - Abbreviation Name Director Address Valid Date Range   [facility] [address]  [place] [address] 11/18/13 1312-Present         ASSESSMENT AND PLAN  1. Weakness generalized  Secondary to functional decline, fall, continue PT/OT    2. Diabetes mellitus type 2 without retinopathy (CMS/HCC)  Uncontrolled, A1C Hgb 8.3, blood sugars elevated, continue accu checks, joukdulce solostar, novolog    3. Essential hypertension  Controlled, continue metoprolol, lisinopril    4. MDD (major depressive disorder), recurrent episode, moderate (CMS/HCC)  Stable, no suicidal ideations, continue supportive measures, venlafaxine, lyrica    FOLLOW UP APPOINTMENTS:  [facility]     DISCHARGE PLANNING: Plan to return home with dog. Patient is very attached to dog who is currently in a shelter. Shelter informed patient that they can only keep animal for 1 week. Attempts made to get shelter for pet until patient is discharged home.     Discussed with: RN / Nursing, Patient and SW    Total time spent is more than 35 minutes, with more than 50% of the time spent in coordination of care, counseling, review of records and discussion of plan of care with the patient /staff /family.   numerical 0-10

## 2019-07-09 ENCOUNTER — RX RENEWAL (OUTPATIENT)
Age: 74
End: 2019-07-09

## 2019-07-18 ENCOUNTER — APPOINTMENT (OUTPATIENT)
Dept: UROGYNECOLOGY | Facility: CLINIC | Age: 74
End: 2019-07-18
Payer: MEDICARE

## 2019-07-18 VITALS
OXYGEN SATURATION: 99 % | BODY MASS INDEX: 33.32 KG/M2 | WEIGHT: 200 LBS | HEIGHT: 65 IN | HEART RATE: 62 BPM | DIASTOLIC BLOOD PRESSURE: 72 MMHG | SYSTOLIC BLOOD PRESSURE: 138 MMHG

## 2019-07-18 DIAGNOSIS — N95.2 POSTMENOPAUSAL ATROPHIC VAGINITIS: ICD-10-CM

## 2019-07-18 LAB
BILIRUB UR QL STRIP: NORMAL
CLARITY UR: CLEAR
COLLECTION METHOD: NORMAL
GLUCOSE UR-MCNC: NORMAL
HCG UR QL: 1 EU/DL
HGB UR QL STRIP.AUTO: NORMAL
KETONES UR-MCNC: NORMAL
LEUKOCYTE ESTERASE UR QL STRIP: NORMAL
NITRITE UR QL STRIP: NORMAL
PH UR STRIP: 7.5
PROT UR STRIP-MCNC: NORMAL
SP GR UR STRIP: 1.01

## 2019-07-18 PROCEDURE — 99204 OFFICE O/P NEW MOD 45 MIN: CPT | Mod: 25

## 2019-07-18 PROCEDURE — 81003 URINALYSIS AUTO W/O SCOPE: CPT | Mod: QW

## 2019-07-18 PROCEDURE — 51725 SIMPLE CYSTOMETROGRAM: CPT

## 2019-07-18 NOTE — HISTORY OF PRESENT ILLNESS
[Stress Incontinence] : frequent [Urge Incontinence Of Urine] : frequent [Unable To Restrain Bowel Movement] : frequent [] : years ago [FreeTextEntry1] : 72yo with mixed urinary incontinence. Incontinence is severe - using pull-up diappers. Planning for knee surgery \par \par PSH: cardiac stent, knee replacement, hip replacement, appendectomy \par \par REVIEW OF SYSTEMS  is negative except as indicated on questionnaire which was reviewed. \par \par Examination\par \par Constitutional:  No acute distress, well developed well nourished good hygiene\par Neuro/psych:  Orientated x2,   normal memory\par Respiratory:   no cough, no dypsnea\par Neck:    Normal appearance,   symmetrical\par Abdomen:NO MASSES, TENDERNESS, HERNIA\par Occult blood: Not performed\par Skin warm and dry,  No rash,  No lesions\par Musculoskeletal:  Normal gait, No involuntary movements\par \par EXTERNAL GENITALIA:  ATROPHY NOTED\par Urethra: normal \par VAGINA: atrophy \par Bladder: No tenderness / masses\par Adnexa: No abnormality\par Rectal Mucosa: normal\par Anal Sphincter :  no abnormality, mucosa smooth.\par Peroneal Body: Normal \par Cervix & Uterus: normal, uterine limited due to body habitus\par \par \par PROLAPSE ASSESSMENT   NO PROLAPSE\par \par Post Void Residual Volume 10-20mL\par \par Cough Stress test: supine empty, Supine reduced:   pos,   pos\par STRONG EMPTY COUGH TEST\par \par \par PROCEDURE: SIMPLE CYSTOMETRY\par \par In the supine position, the urethra was prepped with betadine. A 16 [place] Helen[place] catheter was gently passed into the bladder with sterile technique and secured in place. Sterile water was infused by gravity via an irrigation syringe. Patient sensation, change in flow rate, and capacity were observed: \par \par Catheterized PVR: 20mL\par \par First Sensation, First Urgency (cc): 50\par \par Increased Urgency, significant urgency (cc):  150, 250mL\par \par Pain will filling: none\par \par Sensory Urgency: Significant\par \par Maximal capacity (cc): 300mL\par \par \par Involuntary detrusor contractions: equivocal\par \par Specific diagnosis for etiology of incontinence id not distinctly clear from simple testing\par \par \par \par Mirna has significant YOLANDA. We discussed options for treatment including observation, PFT, pessary and sling. She would like to have a sling. She is undergoing surgery for her knee in the next few months. She will get back to us when she is able to schedule surgery.\par \par We discussed less invasive options than a sling but her urinary incontinence is in volume and the use with which she had a positive cough stress test. For that reason we are moving towards suburethral sling and has declined the less invasive options.\par \par She has numerous comorbidities and will obtain cardiac and medical clearance which is already seeking for her knee surgery. Her  is ill at present she has to work and cannot schedule the surgery. She will work on urodynamics cystoscopy and preoperative consents and let us know when she's ready to schedule surgery\par \par \par

## 2019-08-08 ENCOUNTER — APPOINTMENT (OUTPATIENT)
Dept: UROGYNECOLOGY | Facility: CLINIC | Age: 74
End: 2019-08-08
Payer: MEDICARE

## 2019-08-08 ENCOUNTER — OUTPATIENT (OUTPATIENT)
Dept: OUTPATIENT SERVICES | Facility: HOSPITAL | Age: 74
LOS: 1 days | End: 2019-08-08
Payer: MEDICARE

## 2019-08-08 DIAGNOSIS — N39.3 STRESS INCONTINENCE (FEMALE) (MALE): ICD-10-CM

## 2019-08-08 DIAGNOSIS — Z98.890 OTHER SPECIFIED POSTPROCEDURAL STATES: Chronic | ICD-10-CM

## 2019-08-08 DIAGNOSIS — Z95.5 PRESENCE OF CORONARY ANGIOPLASTY IMPLANT AND GRAFT: Chronic | ICD-10-CM

## 2019-08-08 DIAGNOSIS — R39.11 HESITANCY OF MICTURITION: ICD-10-CM

## 2019-08-08 DIAGNOSIS — Z01.818 ENCOUNTER FOR OTHER PREPROCEDURAL EXAMINATION: ICD-10-CM

## 2019-08-08 DIAGNOSIS — Z96.659 PRESENCE OF UNSPECIFIED ARTIFICIAL KNEE JOINT: Chronic | ICD-10-CM

## 2019-08-08 DIAGNOSIS — Z90.49 ACQUIRED ABSENCE OF OTHER SPECIFIED PARTS OF DIGESTIVE TRACT: Chronic | ICD-10-CM

## 2019-08-08 DIAGNOSIS — Z96.7 PRESENCE OF OTHER BONE AND TENDON IMPLANTS: Chronic | ICD-10-CM

## 2019-08-08 DIAGNOSIS — N32.81 OVERACTIVE BLADDER: ICD-10-CM

## 2019-08-08 PROCEDURE — 51797 INTRAABDOMINAL PRESSURE TEST: CPT

## 2019-08-08 PROCEDURE — 51729 CYSTOMETROGRAM W/VP&UP: CPT

## 2019-08-08 PROCEDURE — 51784 ANAL/URINARY MUSCLE STUDY: CPT

## 2019-08-08 PROCEDURE — 51797 INTRAABDOMINAL PRESSURE TEST: CPT | Mod: 26

## 2019-08-08 PROCEDURE — 51729 CYSTOMETROGRAM W/VP&UP: CPT | Mod: 26

## 2019-08-08 PROCEDURE — 51784 ANAL/URINARY MUSCLE STUDY: CPT | Mod: 26

## 2019-08-14 ENCOUNTER — OUTPATIENT (OUTPATIENT)
Dept: OUTPATIENT SERVICES | Facility: HOSPITAL | Age: 74
LOS: 1 days | End: 2019-08-14
Payer: MEDICARE

## 2019-08-14 ENCOUNTER — APPOINTMENT (OUTPATIENT)
Dept: UROGYNECOLOGY | Facility: CLINIC | Age: 74
End: 2019-08-14
Payer: MEDICARE

## 2019-08-14 DIAGNOSIS — Z96.659 PRESENCE OF UNSPECIFIED ARTIFICIAL KNEE JOINT: Chronic | ICD-10-CM

## 2019-08-14 DIAGNOSIS — Z95.5 PRESENCE OF CORONARY ANGIOPLASTY IMPLANT AND GRAFT: Chronic | ICD-10-CM

## 2019-08-14 DIAGNOSIS — Z96.7 PRESENCE OF OTHER BONE AND TENDON IMPLANTS: Chronic | ICD-10-CM

## 2019-08-14 DIAGNOSIS — Z90.49 ACQUIRED ABSENCE OF OTHER SPECIFIED PARTS OF DIGESTIVE TRACT: Chronic | ICD-10-CM

## 2019-08-14 DIAGNOSIS — N39.46 MIXED INCONTINENCE: ICD-10-CM

## 2019-08-14 DIAGNOSIS — Z01.818 ENCOUNTER FOR OTHER PREPROCEDURAL EXAMINATION: ICD-10-CM

## 2019-08-14 DIAGNOSIS — Z87.898 PERSONAL HISTORY OF OTHER SPECIFIED CONDITIONS: ICD-10-CM

## 2019-08-14 DIAGNOSIS — Z98.890 OTHER SPECIFIED POSTPROCEDURAL STATES: Chronic | ICD-10-CM

## 2019-08-14 PROCEDURE — 52000 CYSTOURETHROSCOPY: CPT

## 2019-08-14 NOTE — HISTORY OF PRESENT ILLNESS
[FreeTextEntry1] : 73-year-old woman with mixed urinary incontinence. This is been demonstrated urodynamically. She came today to discuss surgery versus bladder surgery first in July 2 of cord better control of her bladder. We did discuss the overactive bladder component and it may be necessary to treat prior to surgery she would like to begin the surgical booking process. This benefits adverse reactions of the sling were reviewed and the overactive bladder considerations which may not be improved and can be exacerbated were also reviewed.\par \par She returned for further preoperative discussion and for management of overactive bladder. We'll begin the surgical scheduling process. Cystoscopy and urodynamics already been completed\par \par Counseling was greater than 50 percent of encounter which included  26   minute face to face time for direct counseling.\par

## 2019-09-03 ENCOUNTER — RX RENEWAL (OUTPATIENT)
Age: 74
End: 2019-09-03

## 2019-09-04 ENCOUNTER — NON-APPOINTMENT (OUTPATIENT)
Age: 74
End: 2019-09-04

## 2019-09-04 ENCOUNTER — APPOINTMENT (OUTPATIENT)
Dept: CARDIOLOGY | Facility: CLINIC | Age: 74
End: 2019-09-04
Payer: MEDICARE

## 2019-09-04 VITALS
BODY MASS INDEX: 31.78 KG/M2 | DIASTOLIC BLOOD PRESSURE: 70 MMHG | OXYGEN SATURATION: 96 % | SYSTOLIC BLOOD PRESSURE: 140 MMHG | WEIGHT: 191 LBS | HEART RATE: 58 BPM

## 2019-09-04 DIAGNOSIS — R35.0 FREQUENCY OF MICTURITION: ICD-10-CM

## 2019-09-04 DIAGNOSIS — Z01.810 ENCOUNTER FOR PREPROCEDURAL CARDIOVASCULAR EXAMINATION: ICD-10-CM

## 2019-09-04 PROCEDURE — 99215 OFFICE O/P EST HI 40 MIN: CPT

## 2019-09-04 PROCEDURE — 93000 ELECTROCARDIOGRAM COMPLETE: CPT

## 2019-09-04 NOTE — DISCUSSION/SUMMARY
[FreeTextEntry1] : She is a 73-year-old, obese woman, with chronic hypertension, coronary artery disease with PCI in 2004, She has no anginal symptoms or signs of ischemia on her recent stress test.\par Her ECG shows stable bradycardia that is unchanged from prior.\par Her ejection fraction is normal.\par BP well controlled. Continue Losartan, metoprolol, Imdur, lasix.\par She is a good candidate for the planned surgery and no further testing is required.\par She should continue her current dose of aspirin and statin.\par She may stop her Plavix for one week prior to the surgery.\par Care plan was reviewed with her and her daughter.\par

## 2019-09-04 NOTE — HISTORY OF PRESENT ILLNESS
[FreeTextEntry1] : She is planning a  surgery for incontinence.\par She is a 73-year-old with a history of atherosclerosis based on abnormal stress test that showed prior infarction without any ischemia. She is a social smoker and is scheduled for a  surgery for incontinence.\par She has stable exertional dyspnea without angina.\par She has no orthopnea PND or change in her leg edema.\par Her medications have been stable.\par Stress test 11/2018: recent stress test showed an old inferior wall infarction without ischemia. Echocardiogram showed a normal ejection fraction.\par

## 2019-09-04 NOTE — REVIEW OF SYSTEMS
[Recent Weight Gain (___ Lbs)] : recent [unfilled] ~Ulb weight gain [Shortness Of Breath] : shortness of breath [Dyspnea on exertion] : dyspnea during exertion [Lower Ext Edema] : lower extremity edema [Wheezing] : wheezing [Muscle Cramps] : muscle cramps [Negative] : Heme/Lymph [Leg Claudication] : no intermittent leg claudication [Chest Pain] : no chest pain [Palpitations] : no palpitations

## 2019-09-05 ENCOUNTER — OUTPATIENT (OUTPATIENT)
Dept: OUTPATIENT SERVICES | Facility: HOSPITAL | Age: 74
LOS: 1 days | End: 2019-09-05
Payer: MEDICARE

## 2019-09-05 ENCOUNTER — APPOINTMENT (OUTPATIENT)
Dept: UROGYNECOLOGY | Facility: CLINIC | Age: 74
End: 2019-09-05
Payer: MEDICARE

## 2019-09-05 VITALS
SYSTOLIC BLOOD PRESSURE: 127 MMHG | RESPIRATION RATE: 20 BRPM | HEART RATE: 54 BPM | DIASTOLIC BLOOD PRESSURE: 67 MMHG | HEIGHT: 67 IN | OXYGEN SATURATION: 98 % | WEIGHT: 194.01 LBS | TEMPERATURE: 98 F

## 2019-09-05 DIAGNOSIS — Z98.890 OTHER SPECIFIED POSTPROCEDURAL STATES: Chronic | ICD-10-CM

## 2019-09-05 DIAGNOSIS — Z95.5 PRESENCE OF CORONARY ANGIOPLASTY IMPLANT AND GRAFT: Chronic | ICD-10-CM

## 2019-09-05 DIAGNOSIS — N39.46 MIXED INCONTINENCE: ICD-10-CM

## 2019-09-05 DIAGNOSIS — Z96.7 PRESENCE OF OTHER BONE AND TENDON IMPLANTS: Chronic | ICD-10-CM

## 2019-09-05 DIAGNOSIS — Z90.49 ACQUIRED ABSENCE OF OTHER SPECIFIED PARTS OF DIGESTIVE TRACT: Chronic | ICD-10-CM

## 2019-09-05 DIAGNOSIS — Z01.818 ENCOUNTER FOR OTHER PREPROCEDURAL EXAMINATION: ICD-10-CM

## 2019-09-05 DIAGNOSIS — N39.3 STRESS INCONTINENCE (FEMALE) (MALE): ICD-10-CM

## 2019-09-05 DIAGNOSIS — Z95.5 PRESENCE OF CORONARY ANGIOPLASTY IMPLANT AND GRAFT: ICD-10-CM

## 2019-09-05 DIAGNOSIS — N81.11 CYSTOCELE, MIDLINE: ICD-10-CM

## 2019-09-05 DIAGNOSIS — Z96.659 PRESENCE OF UNSPECIFIED ARTIFICIAL KNEE JOINT: Chronic | ICD-10-CM

## 2019-09-05 LAB
ANION GAP SERPL CALC-SCNC: 13 MMOL/L — SIGNIFICANT CHANGE UP (ref 5–17)
BUN SERPL-MCNC: 12 MG/DL — SIGNIFICANT CHANGE UP (ref 7–23)
CALCIUM SERPL-MCNC: 9.3 MG/DL — SIGNIFICANT CHANGE UP (ref 8.4–10.5)
CHLORIDE SERPL-SCNC: 100 MMOL/L — SIGNIFICANT CHANGE UP (ref 96–108)
CO2 SERPL-SCNC: 23 MMOL/L — SIGNIFICANT CHANGE UP (ref 22–31)
CREAT SERPL-MCNC: 0.69 MG/DL — SIGNIFICANT CHANGE UP (ref 0.5–1.3)
GLUCOSE SERPL-MCNC: 122 MG/DL — HIGH (ref 70–99)
HCT VFR BLD CALC: 41.2 % — SIGNIFICANT CHANGE UP (ref 34.5–45)
HGB BLD-MCNC: 13.2 G/DL — SIGNIFICANT CHANGE UP (ref 11.5–15.5)
MCHC RBC-ENTMCNC: 30.6 PG — SIGNIFICANT CHANGE UP (ref 27–34)
MCHC RBC-ENTMCNC: 32 GM/DL — SIGNIFICANT CHANGE UP (ref 32–36)
MCV RBC AUTO: 95.4 FL — SIGNIFICANT CHANGE UP (ref 80–100)
PLATELET # BLD AUTO: 264 K/UL — SIGNIFICANT CHANGE UP (ref 150–400)
POTASSIUM SERPL-MCNC: 4.4 MMOL/L — SIGNIFICANT CHANGE UP (ref 3.5–5.3)
POTASSIUM SERPL-SCNC: 4.4 MMOL/L — SIGNIFICANT CHANGE UP (ref 3.5–5.3)
RBC # BLD: 4.32 M/UL — SIGNIFICANT CHANGE UP (ref 3.8–5.2)
RBC # FLD: 14.3 % — SIGNIFICANT CHANGE UP (ref 10.3–14.5)
SODIUM SERPL-SCNC: 136 MMOL/L — SIGNIFICANT CHANGE UP (ref 135–145)
WBC # BLD: 9.87 K/UL — SIGNIFICANT CHANGE UP (ref 3.8–10.5)
WBC # FLD AUTO: 9.87 K/UL — SIGNIFICANT CHANGE UP (ref 3.8–10.5)

## 2019-09-05 PROCEDURE — 99214 OFFICE O/P EST MOD 30 MIN: CPT

## 2019-09-05 PROCEDURE — 85027 COMPLETE CBC AUTOMATED: CPT

## 2019-09-05 PROCEDURE — 80048 BASIC METABOLIC PNL TOTAL CA: CPT

## 2019-09-05 PROCEDURE — G0463: CPT

## 2019-09-05 RX ORDER — SODIUM CHLORIDE 9 MG/ML
3 INJECTION INTRAMUSCULAR; INTRAVENOUS; SUBCUTANEOUS EVERY 8 HOURS
Refills: 0 | Status: DISCONTINUED | OUTPATIENT
Start: 2019-09-18 | End: 2019-10-05

## 2019-09-05 RX ORDER — LIDOCAINE HCL 20 MG/ML
0.2 VIAL (ML) INJECTION ONCE
Refills: 0 | Status: DISCONTINUED | OUTPATIENT
Start: 2019-09-18 | End: 2019-10-05

## 2019-09-05 NOTE — H&P PST ADULT - NSICDXPASTMEDICALHX_GEN_ALL_CORE_FT
PAST MEDICAL HISTORY:  Bilateral hearing loss, unspecified hearing loss type bilateral aids    CAD (coronary artery disease)     Heart murmur dx in childhood    History of MI (myocardial infarction) h/o previous MI in 2004 prompted PTCA  with stenting x 2 vessels   last stress/ echo 2019    Hypertension     Hypothyroid     Obesity     CROW (obstructive sleep apnea) non complaint on CPAP    Osteoarthritis knees, back PAST MEDICAL HISTORY:  Bilateral hearing loss, unspecified hearing loss type bilateral aids    CAD (coronary artery disease)     Heart murmur dx in childhood    History of MI (myocardial infarction) h/o previous MI in 2004 prompted PTCA  with stenting x 2 vessels   last stress/ echo 2019    Hypertension     Hypothyroid     Mixed stress and urge urinary incontinence     Obesity     CROW (obstructive sleep apnea) non complaint on CPAP    Osteoarthritis knees, back    Overactive bladder

## 2019-09-05 NOTE — H&P PST ADULT - NSICDXPROBLEM_GEN_ALL_CORE_FT
PROBLEM DIAGNOSES  Problem: Stress incontinence  Assessment and Plan: Midurethral sling , Cystoscopy, Anterior Colporrhaphy on 9/18/19.       Problem: Stented coronary artery  Assessment and Plan: Stented coronary artery - to stay on aspirin until DOS, Last dose of Plavix - 9/11/19.

## 2019-09-05 NOTE — HISTORY OF PRESENT ILLNESS
[FreeTextEntry1] : This patient has mixed incontinence and is here today for her final decision for surgery and preoperative counseling. She has mixed urinary incontinence and we have discussed the overactive bladder component and she is aware this can remain the same exacerbated or a slight chance of improvement. She understands the procedure is not directed to take care of that symptom.\par \par After extensive counseling regarding surgical and non surgical options, the patient has elected for a sling with possible anterior vaginal repair.\par \par We reviewed risks to the procedure including, but not limited to: bleeding, infection, pain, urinary retention requiring an indwelling catheter, persistence or recurrence of stress incontinence, worsening of stress incontinence symptoms, failure of procedure to alleviate symptoms, development or worsening of overactive bladder or urge incontinence, voiding dysfunction, dyspareunia. We also extensively reviewed the risk of  injury to the bladder, ureters, urethra, vagina, rectum, and bowel. We also discussed the risk of sling mesh exposure, fistula formation, neuropathy, erosion, pain, and need for reoperation.  Risks were explained in layman's terms. She expressed understanding of these risks and continues to desire the planned surgical procedure. We reviewed her hospital stay as well as preoperative and postoperative instructions. She is aware that she must be NPO after midnight prior to the surgery. Consent was signed in the office and all questions were answered. \par \par

## 2019-09-05 NOTE — H&P PST ADULT - HISTORY OF PRESENT ILLNESS
73 year old female with h/o CAD s/p PCI with coronary stenting x2 (2004), HTN, Hyperlipidemia, Hypothyroidism, CROW (CPAP), obesity, is being seen for scheduled for right total knee replacement on 1/14/2019. Patient was originally scheduled for December 14,2018 and cancelled for family emergency.  Patient is also scheduled for D&C on 1/4/2019.  patient is on Plavix and was advised by the surgeon and cardiologist to hold it for 7 days prior to knee surgery, last dose on 1/7/2019, but  also scheduled for D&c on 1/4/2019, will talk to her cardiologist regarding holding Plavix. 73 yr old female with history of CAD , Stented coronary artery (2004) x 2, Hypothyroidism, CROW non complaint on CPAP, Severe OA especially right knee, with urinary incontinence & urgency for several years, Now coming in for Midurethral sling , Cystoscopy, Anterior Colporrhaphy on 9/18/19.

## 2019-09-16 PROBLEM — N39.46 MIXED INCONTINENCE: Chronic | Status: ACTIVE | Noted: 2019-09-05

## 2019-09-16 PROBLEM — G47.33 OBSTRUCTIVE SLEEP APNEA (ADULT) (PEDIATRIC): Chronic | Status: ACTIVE | Noted: 2018-11-19

## 2019-09-16 PROBLEM — N32.81 OVERACTIVE BLADDER: Chronic | Status: ACTIVE | Noted: 2019-09-05

## 2019-09-17 ENCOUNTER — TRANSCRIPTION ENCOUNTER (OUTPATIENT)
Age: 74
End: 2019-09-17

## 2019-09-18 ENCOUNTER — OUTPATIENT (OUTPATIENT)
Dept: OUTPATIENT SERVICES | Facility: HOSPITAL | Age: 74
LOS: 1 days | End: 2019-09-18
Payer: MEDICARE

## 2019-09-18 ENCOUNTER — APPOINTMENT (OUTPATIENT)
Dept: UROGYNECOLOGY | Facility: HOSPITAL | Age: 74
End: 2019-09-18

## 2019-09-18 ENCOUNTER — MOBILE ON CALL (OUTPATIENT)
Age: 74
End: 2019-09-18

## 2019-09-18 VITALS
TEMPERATURE: 98 F | SYSTOLIC BLOOD PRESSURE: 111 MMHG | RESPIRATION RATE: 16 BRPM | HEIGHT: 67 IN | WEIGHT: 194.01 LBS | DIASTOLIC BLOOD PRESSURE: 67 MMHG | HEART RATE: 49 BPM | OXYGEN SATURATION: 97 %

## 2019-09-18 VITALS
SYSTOLIC BLOOD PRESSURE: 122 MMHG | OXYGEN SATURATION: 99 % | HEART RATE: 56 BPM | DIASTOLIC BLOOD PRESSURE: 70 MMHG | RESPIRATION RATE: 14 BRPM

## 2019-09-18 DIAGNOSIS — Z98.890 OTHER SPECIFIED POSTPROCEDURAL STATES: Chronic | ICD-10-CM

## 2019-09-18 DIAGNOSIS — Z90.49 ACQUIRED ABSENCE OF OTHER SPECIFIED PARTS OF DIGESTIVE TRACT: Chronic | ICD-10-CM

## 2019-09-18 DIAGNOSIS — Z96.659 PRESENCE OF UNSPECIFIED ARTIFICIAL KNEE JOINT: Chronic | ICD-10-CM

## 2019-09-18 DIAGNOSIS — Z96.7 PRESENCE OF OTHER BONE AND TENDON IMPLANTS: Chronic | ICD-10-CM

## 2019-09-18 DIAGNOSIS — Z95.5 PRESENCE OF CORONARY ANGIOPLASTY IMPLANT AND GRAFT: Chronic | ICD-10-CM

## 2019-09-18 DIAGNOSIS — N81.11 CYSTOCELE, MIDLINE: ICD-10-CM

## 2019-09-18 DIAGNOSIS — N39.3 STRESS INCONTINENCE (FEMALE) (MALE): ICD-10-CM

## 2019-09-18 PROCEDURE — 57240 ANTERIOR COLPORRHAPHY: CPT

## 2019-09-18 PROCEDURE — 57288 REPAIR BLADDER DEFECT: CPT

## 2019-09-18 PROCEDURE — C1771: CPT

## 2019-09-18 RX ORDER — OXYCODONE HYDROCHLORIDE 5 MG/1
5 TABLET ORAL ONCE
Refills: 0 | Status: DISCONTINUED | OUTPATIENT
Start: 2019-09-18 | End: 2019-09-18

## 2019-09-18 RX ORDER — METRONIDAZOLE 500 MG
500 TABLET ORAL ONCE
Refills: 0 | Status: COMPLETED | OUTPATIENT
Start: 2019-09-18 | End: 2019-09-18

## 2019-09-18 RX ORDER — FAMOTIDINE 10 MG/ML
20 INJECTION INTRAVENOUS ONCE
Refills: 0 | Status: COMPLETED | OUTPATIENT
Start: 2019-09-18 | End: 2019-09-18

## 2019-09-18 RX ORDER — SODIUM CHLORIDE 9 MG/ML
1000 INJECTION, SOLUTION INTRAVENOUS
Refills: 0 | Status: DISCONTINUED | OUTPATIENT
Start: 2019-09-18 | End: 2019-10-05

## 2019-09-18 RX ORDER — GENTAMICIN SULFATE 40 MG/ML
440 VIAL (ML) INJECTION ONCE
Refills: 0 | Status: COMPLETED | OUTPATIENT
Start: 2019-09-18 | End: 2019-09-18

## 2019-09-18 RX ORDER — ONDANSETRON 8 MG/1
4 TABLET, FILM COATED ORAL ONCE
Refills: 0 | Status: DISCONTINUED | OUTPATIENT
Start: 2019-09-18 | End: 2019-10-05

## 2019-09-18 RX ORDER — HYDROMORPHONE HYDROCHLORIDE 2 MG/ML
0.25 INJECTION INTRAMUSCULAR; INTRAVENOUS; SUBCUTANEOUS
Refills: 0 | Status: DISCONTINUED | OUTPATIENT
Start: 2019-09-18 | End: 2019-09-18

## 2019-09-18 RX ADMIN — FAMOTIDINE 20 MILLIGRAM(S): 10 INJECTION INTRAVENOUS at 08:55

## 2019-09-18 NOTE — BRIEF OPERATIVE NOTE - OPERATION/FINDINGS
On cystoscopy, normal bladder mucosa. No injury, mesh, foreign material seen in bladder. Bilateral ureteral orifices patent and efflux of clear yellow urine visualized.

## 2019-09-18 NOTE — ASU PATIENT PROFILE, ADULT - PMH
Bilateral hearing loss, unspecified hearing loss type  bilateral aids  CAD (coronary artery disease)    Heart murmur  dx in childhood  History of MI (myocardial infarction)  h/o previous MI in 2004 prompted PTCA  with stenting x 2 vessels   last stress/ echo 2019  Hypertension    Hypothyroid    Mixed stress and urge urinary incontinence    Obesity    CROW (obstructive sleep apnea)  non complaint on CPAP  Osteoarthritis  knees, back  Overactive bladder

## 2019-09-18 NOTE — ASU DISCHARGE PLAN (ADULT/PEDIATRIC) - CALL YOUR DOCTOR IF YOU HAVE ANY OF THE FOLLOWING:
Pain not relieved by Medications/Fever greater than (need to indicate Fahrenheit or Celsius)/Wound/Surgical Site with redness, or foul smelling discharge or pus/Unable to urinate/Bleeding that does not stop/Inability to tolerate liquids or foods

## 2019-09-18 NOTE — ASU PREOP CHECKLIST - HEIGHT IN INCHES
[facility] [place] IMAGING - DIAGNOSTIC RADIOLOGY  [address] [address]  [phone]    08/22/17    Regarding Patient:  Royal Hightower  [address] [id number]    To Whom It Concerns:    This is to certify Royal Hightower was evaluated at [facility] IMAGING - DIAGNOSTIC RADIOLOGY on 8/22/2017.  He was provided with the following work recommendations:     May return to work on 8/24/2017 without restrictions       Please call us at [phone]  if you have any questions.  We will be happy to assist you.        TIANA Milligan    
7

## 2019-09-18 NOTE — ASU DISCHARGE PLAN (ADULT/PEDIATRIC) - CARE PROVIDER_API CALL
Deacon Bell (MD)  Female Pelvic MedReconst Surg; Obstetrics and Gynecology  [address]  [place] [address]  Phone: [phone]  Fax: [phone]  Follow Up Time:

## 2019-09-18 NOTE — BRIEF OPERATIVE NOTE - NSICDXBRIEFPROCEDURE_GEN_ALL_CORE_FT
PROCEDURES:  Cystoscopy 18-Sep-2019 12:45:14  Viji Florez  Creation, midurethral sling, retropubic approach 18-Sep-2019 12:45:06  Viji Florez

## 2019-09-27 ENCOUNTER — MEDICATION RENEWAL (OUTPATIENT)
Age: 74
End: 2019-09-27

## 2019-10-03 ENCOUNTER — APPOINTMENT (OUTPATIENT)
Dept: UROGYNECOLOGY | Facility: CLINIC | Age: 74
End: 2019-10-03
Payer: MEDICARE

## 2019-10-03 PROCEDURE — 99024 POSTOP FOLLOW-UP VISIT: CPT

## 2019-10-04 NOTE — DISCUSSION/SUMMARY
[Post-Op instructions given. Pt/family verbalizes understanding] : post-operative instructions were provided to the patient/family who verbalize understanding [Risks/Benefits discussed. Pt/family verbalizes understanding] : risks and benefits of the procedure were discussed with the patient/family who verbalize understanding [FreeTextEntry1] : \par x2 weeks post op exam -Benign, healing well. s/p 9/18/19 BAL Sling and Anterior repair\par f/u x8-10weeks POA or sooner PRN\par Instructed pt to call the office if any problems or concerns and she verbalized understanding.\par

## 2019-10-04 NOTE — OBJECTIVE
[Soft and Nontender] : soft and nontender [Clean, Dry, Intact] : Clean, Dry, Intact [Good Support] : Good support [Healing well] : healing well [No Masses or Tenderness] : no masses or tenderness [FreeTextEntry3] : sutures intact in vault, scant yellow discharge, no erthema, no bleeding, no mesh noted

## 2019-10-04 NOTE — SUBJECTIVE
[FreeTextEntry1] : WN/WD, NAD, A&Ox3, Pt is happy with surgery results [FreeTextEntry8] : Tylenol and Motrin PRN [FreeTextEntry7] : lower back pain, but admits she had cleaned & moved "20+ rifles" out of recently  brother's house [FreeTextEntry6] : no c/o changes [FreeTextEntry3] : no c/o changes; Nl gait [FreeTextEntry4] : REgular daily BM's, denies constipation [FreeTextEntry5] : Feels empty after voids, Denies any urinary incontinence [FreeTextEntry2] : no c/o changes

## 2019-11-18 ENCOUNTER — CLINICAL ADVICE (OUTPATIENT)
Age: 74
End: 2019-11-18

## 2019-11-26 LAB — BACTERIA UR CULT: NORMAL

## 2019-11-27 ENCOUNTER — APPOINTMENT (OUTPATIENT)
Dept: UROGYNECOLOGY | Facility: CLINIC | Age: 74
End: 2019-11-27
Payer: MEDICARE

## 2019-11-27 ENCOUNTER — APPOINTMENT (OUTPATIENT)
Dept: CARDIOLOGY | Facility: CLINIC | Age: 74
End: 2019-11-27
Payer: MEDICARE

## 2019-11-27 ENCOUNTER — NON-APPOINTMENT (OUTPATIENT)
Age: 74
End: 2019-11-27

## 2019-11-27 VITALS
BODY MASS INDEX: 32.12 KG/M2 | DIASTOLIC BLOOD PRESSURE: 70 MMHG | SYSTOLIC BLOOD PRESSURE: 122 MMHG | HEART RATE: 97 BPM | OXYGEN SATURATION: 98 % | WEIGHT: 193 LBS

## 2019-11-27 DIAGNOSIS — Z98.890 OTHER SPECIFIED POSTPROCEDURAL STATES: ICD-10-CM

## 2019-11-27 PROCEDURE — 93000 ELECTROCARDIOGRAM COMPLETE: CPT

## 2019-11-27 PROCEDURE — 99024 POSTOP FOLLOW-UP VISIT: CPT

## 2019-11-27 PROCEDURE — 93306 TTE W/DOPPLER COMPLETE: CPT

## 2019-11-27 PROCEDURE — 99214 OFFICE O/P EST MOD 30 MIN: CPT

## 2019-11-27 RX ORDER — CELECOXIB 100 MG/1
100 CAPSULE ORAL TWICE DAILY
Qty: 30 | Refills: 0 | Status: DISCONTINUED | COMMUNITY
Start: 2018-10-18 | End: 2019-11-27

## 2019-11-27 NOTE — SUBJECTIVE
[FreeTextEntry1] : overall doing very well [FreeTextEntry8] : none new [FreeTextEntry7] : none [FreeTextEntry6] : normal [FreeTextEntry5] : denies any dysuria, incomplete emptying or YOLANDA [FreeTextEntry4] : occasional constipation, taking coalce [FreeTextEntry3] : normal [FreeTextEntry2] : normal

## 2019-11-27 NOTE — OBJECTIVE
[Soft and Nontender] : soft and nontender [Clean, Dry, Intact] : Clean, Dry, Intact [Good Support] : Good support [Healing well] : healing well [No Masses or Tenderness] : no masses or tenderness [FreeTextEntry3] : no evidence of mesh erosion noted

## 2019-11-27 NOTE — HISTORY OF PRESENT ILLNESS
[FreeTextEntry1] : She was seen in her internist office and noted to be in atrial fibrillation.  We discussed this and given her risk factor profile suggested Eliquis 5 mg twice daily.\par She continues to be sad regarding the death of her brother.  She reports taking her medications as directed and has no chest pains or shortness of breath.\par She continues to smoke socially.\par \par She has stable exertional dyspnea without angina.\par She has no orthopnea PND or change in her leg edema.\par Her medications have been stable.\par Stress test 11/2018: recent stress test showed an old inferior wall infarction without ischemia. Echocardiogram showed a normal ejection fraction.\par

## 2019-11-27 NOTE — DISCUSSION/SUMMARY
[FreeTextEntry1] : She is a 74-year-old, obese woman, with chronic hypertension, coronary artery disease with PCI in 2004, now with atrial fibrillation with a moderate ventricular response.  Her heart rate is well controlled on metoprolol and I have suggested and reviewed the indication for Eliquis with her.\par She appears to be tolerating the medication and no changes have been made.\par She was instructed to discontinue aspirin and clopidogrel.\par \par

## 2019-11-27 NOTE — DISCUSSION/SUMMARY
[Post-Op instructions given. Pt/family verbalizes understanding] : post-operative instructions were provided to the patient/family who verbalize understanding [FreeTextEntry1] : Activity clearance discussed.  Resume care with primary GYN and f/u here as needed.  Instructed to call with any questions or concerns and she verbalizes understanding.\par

## 2020-01-01 NOTE — ASU PREOP CHECKLIST - TYPE OF SOLUTION
Interval HPI / Overnight events:   Male Single liveborn infant delivered vaginally born at 36 weeks gestation, now 1d old. No acute events overnight. Feeding and voiding appropriately; no BM yet after 24HOL. Parents unsure about flatus but states tolerating PO with just minimal spit-ups of milk/formula.    Physical Exam:     Current Weight: Daily     Daily Weight Gm: 2380 (19 Jun 2020 21:00)  Birth Weight: 2550 grams  Percent Change From Birth: -3%    Vital signs stable    Physical exam  General: swaddled, quiet in crib, AGA  Head: Anterior and posterior fontanels open and flat  Eyes:  Orbits+ b/l; no scleral icterus  Ears: patent bilaterally, no deformities  Nose: nares clinically patent  Mouth/Throat: no cleft lip or palate, no lesions  Neck: no masses, intact clavicles  Cardiovascular: +S1,S2, no murmurs, 2+ femoral pulses bilaterally  Respiratory: no retractions, Lungs clear to auscultation bilaterally  Abdomen: soft, non-distended, + BS, no masses, no organomegaly, umbilical cord stump attached  Genitourinary: normal external male genitalia, uncircumcised; testes descended bilaterally; anus clinically patent  Back: spine straight, no sacral dimple or tags  Extremities: moving all extremities, negative Ortolani/Shabazz, +post-axial 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   Risk zone: low risk  Tx threshold: 14.1mg/dL due to GA
heplock

## 2020-01-09 ENCOUNTER — APPOINTMENT (OUTPATIENT)
Dept: CARDIOLOGY | Facility: CLINIC | Age: 75
End: 2020-01-09
Payer: MEDICARE

## 2020-01-09 ENCOUNTER — NON-APPOINTMENT (OUTPATIENT)
Age: 75
End: 2020-01-09

## 2020-01-09 VITALS
HEART RATE: 87 BPM | DIASTOLIC BLOOD PRESSURE: 76 MMHG | SYSTOLIC BLOOD PRESSURE: 128 MMHG | BODY MASS INDEX: 31.65 KG/M2 | OXYGEN SATURATION: 97 % | HEIGHT: 65 IN | WEIGHT: 190 LBS

## 2020-01-09 PROCEDURE — 93000 ELECTROCARDIOGRAM COMPLETE: CPT

## 2020-01-09 PROCEDURE — 99214 OFFICE O/P EST MOD 30 MIN: CPT

## 2020-01-09 NOTE — DISCUSSION/SUMMARY
[FreeTextEntry1] : She is a 74-year-old, obese woman, with chronic hypertension, coronary artery disease with PCI in 2004, now with atrial fibrillation with a moderate ventricular response.  Her heart rate is well controlled on metoprolol.\par Continue Eliquis for stroke risk reduction.\par She appears to be tolerating the medication and no changes have been made.\par BP is perfect.\par Stop smoking.\par

## 2020-01-09 NOTE — HISTORY OF PRESENT ILLNESS
[FreeTextEntry1] : Tolerating Eliquis.\par No bleeding or bruising.\par Still smokes.\par Recent URI.\par

## 2020-01-09 NOTE — PHYSICAL EXAM
[General Appearance - Well Developed] : well developed [General Appearance - In No Acute Distress] : no acute distress [Normal Conjunctiva] : the conjunctiva exhibited no abnormalities [No Oral Pallor] : no oral pallor [Normal Jugular Venous A Waves Present] : normal jugular venous A waves present [No Oral Cyanosis] : no oral cyanosis [Normal Jugular Venous V Waves Present] : normal jugular venous V waves present [No Jugular Venous Ling A Waves] : no jugular venous ling A waves [Auscultation Breath Sounds / Voice Sounds] : lungs were clear to auscultation bilaterally [Respiration, Rhythm And Depth] : normal respiratory rhythm and effort [Heart Sounds] : normal S1 and S2 [Murmurs] : no murmurs present [Regular] : the rhythm was regular [Arterial Pulses Normal] : the arterial pulses were normal [Abdomen Soft] : soft [Abdomen Mass (___ Cm)] : no abdominal mass palpated [FreeTextEntry1] : walks with cane [Petechial Hemorrhages (___cm)] : no petechial hemorrhages [Cyanosis, Localized] : no localized cyanosis [Nail Clubbing] : no clubbing of the fingernails [Skin Color & Pigmentation] : normal skin color and pigmentation [No Venous Stasis] : no venous stasis [] : no rash [No Skin Ulcers] : no skin ulcer [Skin Lesions] : no skin lesions [Oriented To Time, Place, And Person] : oriented to person, place, and time [No Xanthoma] : no  xanthoma was observed [Mood] : the mood was normal [Affect] : the affect was normal [No Anxiety] : not feeling anxious

## 2020-01-09 NOTE — REVIEW OF SYSTEMS
[Recent Weight Gain (___ Lbs)] : recent [unfilled] ~Ulb weight gain [Dyspnea on exertion] : dyspnea during exertion [Shortness Of Breath] : shortness of breath [Lower Ext Edema] : lower extremity edema [Chest Pain] : no chest pain [Palpitations] : no palpitations [Leg Claudication] : no intermittent leg claudication [Muscle Cramps] : muscle cramps [Wheezing] : wheezing [Negative] : Endocrine

## 2020-04-07 ENCOUNTER — APPOINTMENT (OUTPATIENT)
Dept: CARDIOLOGY | Facility: CLINIC | Age: 75
End: 2020-04-07
Payer: MEDICARE

## 2020-04-07 ENCOUNTER — NON-APPOINTMENT (OUTPATIENT)
Age: 75
End: 2020-04-07

## 2020-04-07 VITALS
SYSTOLIC BLOOD PRESSURE: 118 MMHG | RESPIRATION RATE: 17 BRPM | DIASTOLIC BLOOD PRESSURE: 84 MMHG | HEIGHT: 65 IN | BODY MASS INDEX: 31.32 KG/M2 | TEMPERATURE: 98.1 F | HEART RATE: 90 BPM | WEIGHT: 188 LBS | OXYGEN SATURATION: 97 %

## 2020-04-07 PROCEDURE — 99214 OFFICE O/P EST MOD 30 MIN: CPT

## 2020-04-07 PROCEDURE — 93000 ELECTROCARDIOGRAM COMPLETE: CPT

## 2020-04-07 NOTE — HISTORY OF PRESENT ILLNESS
[FreeTextEntry1] : SHe has been staying home due to COVID.\par Occasional shopping.\par Feels well. No fever or chills.\par Tolerating Eliquis. No bleeding. Occasionally forgets at night dose.\par Still smokes, but less.\par

## 2020-04-07 NOTE — DISCUSSION/SUMMARY
[FreeTextEntry1] : She is a 74-year-old, obese woman, with chronic hypertension, coronary artery disease with PCI in 2004, now with atrial fibrillation with a moderate ventricular response.  \par Her heart rate is not well controlled now. will increase Metoprolol to 25 mg BID.\par Reduce Losartan to 25 mg daily.\par Continue Eliquis for stroke risk reduction.\par Stop smoking.\par Continue weight loss efforts.\par COVID safety reviewed.\par See me in ~ 1 month.

## 2020-05-12 ENCOUNTER — NON-APPOINTMENT (OUTPATIENT)
Age: 75
End: 2020-05-12

## 2020-05-12 ENCOUNTER — APPOINTMENT (OUTPATIENT)
Dept: CARDIOLOGY | Facility: CLINIC | Age: 75
End: 2020-05-12
Payer: MEDICARE

## 2020-05-12 VITALS
WEIGHT: 189 LBS | BODY MASS INDEX: 31.45 KG/M2 | OXYGEN SATURATION: 99 % | SYSTOLIC BLOOD PRESSURE: 140 MMHG | TEMPERATURE: 97.4 F | HEART RATE: 91 BPM | DIASTOLIC BLOOD PRESSURE: 94 MMHG

## 2020-05-12 PROCEDURE — 99214 OFFICE O/P EST MOD 30 MIN: CPT

## 2020-05-12 PROCEDURE — 93000 ELECTROCARDIOGRAM COMPLETE: CPT

## 2020-05-12 NOTE — PHYSICAL EXAM
[General Appearance - Well Developed] : well developed [General Appearance - In No Acute Distress] : no acute distress [Normal Conjunctiva] : the conjunctiva exhibited no abnormalities [No Oral Pallor] : no oral pallor [No Oral Cyanosis] : no oral cyanosis [Normal Jugular Venous A Waves Present] : normal jugular venous A waves present [Normal Jugular Venous V Waves Present] : normal jugular venous V waves present [No Jugular Venous Ling A Waves] : no jugular venous ling A waves [Auscultation Breath Sounds / Voice Sounds] : lungs were clear to auscultation bilaterally [Respiration, Rhythm And Depth] : normal respiratory rhythm and effort [Heart Sounds] : normal S1 and S2 [Murmurs] : no murmurs present [Regular] : the rhythm was regular [Arterial Pulses Normal] : the arterial pulses were normal [Abdomen Soft] : soft [Abdomen Mass (___ Cm)] : no abdominal mass palpated [FreeTextEntry1] : walks with cane [Cyanosis, Localized] : no localized cyanosis [Petechial Hemorrhages (___cm)] : no petechial hemorrhages [Nail Clubbing] : no clubbing of the fingernails [Skin Color & Pigmentation] : normal skin color and pigmentation [] : no rash [No Venous Stasis] : no venous stasis [Skin Lesions] : no skin lesions [No Skin Ulcers] : no skin ulcer [No Xanthoma] : no  xanthoma was observed [Oriented To Time, Place, And Person] : oriented to person, place, and time [Mood] : the mood was normal [Affect] : the affect was normal [No Anxiety] : not feeling anxious

## 2020-05-12 NOTE — DISCUSSION/SUMMARY
[FreeTextEntry1] : She is a 74-year-old, obese woman, with chronic hypertension, coronary artery disease with PCI in 2004, With chronic atrial fibrillation with a moderate ventricular response.  \par Her heart rate is now well controlled now. \par Continue Metoprolol 25 mg BID.\par Reduce Losartan to 25 mg daily.\par Continue Eliquis for stroke risk reduction.\par Continue smoking cessation.\par Continue weight loss efforts.\par COVID safety reviewed again.\par See me in 2 month.

## 2020-05-12 NOTE — HISTORY OF PRESENT ILLNESS
[FreeTextEntry1] : SHe has been staying home due to COVID.\par Occasional shopping. No pain.\par Feels well. No fever or chills.\par Tolerating Eliquis. No bleeding. \par  smokes much less.\par

## 2020-05-12 NOTE — REVIEW OF SYSTEMS
[Recent Weight Gain (___ Lbs)] : recent [unfilled] ~Ulb weight gain [Shortness Of Breath] : shortness of breath [Dyspnea on exertion] : dyspnea during exertion [Chest Pain] : no chest pain [Lower Ext Edema] : lower extremity edema [Leg Claudication] : no intermittent leg claudication [Wheezing] : wheezing [Muscle Cramps] : muscle cramps [Palpitations] : no palpitations [Negative] : Heme/Lymph

## 2020-05-20 ENCOUNTER — APPOINTMENT (OUTPATIENT)
Dept: ORTHOPEDIC SURGERY | Facility: CLINIC | Age: 75
End: 2020-05-20
Payer: MEDICARE

## 2020-05-20 VITALS — TEMPERATURE: 96.3 F

## 2020-05-20 PROCEDURE — 99214 OFFICE O/P EST MOD 30 MIN: CPT | Mod: 25

## 2020-05-20 PROCEDURE — 20610 DRAIN/INJ JOINT/BURSA W/O US: CPT | Mod: RT

## 2020-05-20 PROCEDURE — 73564 X-RAY EXAM KNEE 4 OR MORE: CPT | Mod: 50

## 2020-05-20 NOTE — PHYSICAL EXAM
[de-identified] : Patient is well nourished, well-developed, in no acute distress, with appropriate mood and affect. The patient is oriented to time, place, and person. Respirations are even and unlabored. Gait evaluation does reveal a limp. There is no inguinal adenopathy. Bilateral limbs are well-perfused, without skin lesions, shows a grossly normal motor and sensory examination. The right knee motion is significantly reduced and does cause significant pain. The right knee moves from 20-95 degrees. The knee is stable within that range-of-motion to AP and ML stress. The alignment of the knee is 10 degrees valgus. Muscle strength is normal. Pedal pulses are palpable. Hip examination was negative. The left knee motion is significantly reduced and is painless and the left knee moves from 0-110 degrees. The knee is stable within that range-of-motion to AP and ML stress. The alignment of the knee is neutral.  Well-healed midline surgical scar.  Muscle strength is normal. Pedal pulses are palpable. Hip examination was negative. [de-identified] : Long standing knee, AP knee, lateral knee, and patellar views of the right knee were ordered and taken in the office and demonstrate degenerative joint disease of the knee with joint space narrowing, osteophyte formation, and subchondral sclerosis.\par \par AP, lateral, sunrise knee x-rays of the left knee were ordered and obtained in the office and demonstrate satisfactory position and alignment of the components are present. No signs of loosening are seen.

## 2020-05-20 NOTE — HISTORY OF PRESENT ILLNESS
[de-identified] : This is very nice 74-year-old female experiencing chronic right knee pain, which is severe in intensity. The pain substantially limits activities of daily living. Walking tolerance is reduced. Medication and activity modification have been minimally effective for a period lasting greater than three months in duration.  She previously tried in right knee intra-articular cortisone injection which did not help.  She ambulates with an assistive device like a cane and walker.  Due to the severity of osteoarthritis and level of pain, physical therapy is contraindicated. Pain and restriction of function are intolerable at this time. The patient denies any radiation of the pain to the feet and it is not associated with numbness, tingling, or weakness.  Of note she has a history of a left total knee arthroplasty by Dr. Brady in 2001.  This was complicated by staph infection which required irrigation debridement.

## 2020-05-20 NOTE — DISCUSSION/SUMMARY
[de-identified] : Doing well approximately 19 years status post left total knee arthroplasty.  Primary complaint today is right knee osteoarthritis.  The patient is not an appropriate candidate for surgical intervention at this time. An extensive discussion was conducted on the natural history of the disease and the variety of surgical and non-surgical options available to the patient including, but not limited to non-steroidal anti-inflammatory medications, steroid injections, physical therapy, maintenance of ideal body weight, and reduction of activity.  She was also not interested in surgery.  Today we performed a first Euflexxa injection of the series at her request.  \par \par Recommendation: Trial of Visco supplementation. We'll obtain preauthorization prior to injection therapy.\par \par **NB: Medication was Issued under circumstances where the provider (Dr. Jonathan Kay) reasonably determined that it would be impractical for the patient to obtain substances prescribed by electronic prescription (e-prescribe) given need for pre-authorization, and such delay would adversely impact the patient's medical condition. An exception letter will be mailed to the [facility] during the authorization process.**\par \par Injection: Right knee joint.\par Indication: Osteoarthritis.\par \par A discussion was had with the patient regarding this procedure and all questions were answered. All risks, benefits and alternatives were discussed. These included but were not limited to bleeding, infection, and allergic reaction. Alcohol was used to clean the skin, and betadine was used to sterilize and prep the area in the anteromedial aspect of the knee. Ethyl chloride spray was then used as a topical anesthetic. A 22-gauge needle was used to inject 2 cc of Euflexxa into the knee with ease. A sterile bandage was then applied. The patient tolerated the procedure well and there were no complications. \par \par Lot #: [id number]\par Exp: 2021-01-04\par \par The first of three Euflexxa injections was given today under sterile conditions into the right knee joint without complication (see procedure note). I again discussed the role of activity modification/icing following the injection to treat any local irritation from the injection. \par \par I will have the patient followup for the next injection in approximately 1 week.\par \par \par \par 
- - -

## 2020-05-20 NOTE — REASON FOR VISIT
[Initial Visit] : an initial visit for [Artificial Knee Joint] : artificial knee joint [Osteoarthritis, Knee] : osteoarthritis, knee [Family Member] : family member

## 2020-05-20 NOTE — REVIEW OF SYSTEMS
[Joint Pain] : joint pain [Arthralgia] : arthralgia [Joint Stiffness] : joint stiffness [Negative] : Psychiatric

## 2020-05-27 ENCOUNTER — APPOINTMENT (OUTPATIENT)
Dept: ORTHOPEDIC SURGERY | Facility: CLINIC | Age: 75
End: 2020-05-27
Payer: MEDICARE

## 2020-05-27 VITALS — TEMPERATURE: 97.1 F

## 2020-05-27 PROCEDURE — 20610 DRAIN/INJ JOINT/BURSA W/O US: CPT | Mod: RT

## 2020-05-27 NOTE — PROCEDURE
[de-identified] : Primary complaint today is right knee osteoarthritis. \par \par Injection: Right knee joint.\par Indication: Osteoarthritis.\par \par A discussion was had with the patient regarding this procedure and all questions were answered. All risks, benefits and alternatives were discussed. These included but were not limited to bleeding, infection, and allergic reaction. Alcohol was used to clean the skin, and betadine was used to sterilize and prep the area in the anteromedial aspect of the knee. Ethyl chloride spray was then used as a topical anesthetic. A 22-gauge needle was used to inject 2 cc of Euflexxa into the knee with ease. A sterile bandage was then applied. The patient tolerated the procedure well and there were no complications. \par \par Lot #: [id number]\par Exp: 2021-01-04\par \par The 2nd of three Euflexxa injections was given today under sterile conditions into the right knee joint without complication (see procedure note). I again discussed the role of activity modification/icing following the injection to treat any local irritation from the injection. \par \par I will have the patient followup for the next injection in approximately 1 week.\par \par

## 2020-06-03 ENCOUNTER — APPOINTMENT (OUTPATIENT)
Dept: ORTHOPEDIC SURGERY | Facility: CLINIC | Age: 75
End: 2020-06-03
Payer: MEDICARE

## 2020-06-03 VITALS — TEMPERATURE: 96.9 F

## 2020-06-03 PROCEDURE — 20610 DRAIN/INJ JOINT/BURSA W/O US: CPT | Mod: RT

## 2020-06-03 NOTE — PROCEDURE
[de-identified] : Primary complaint today is right knee osteoarthritis. \par \par Injection: Right knee joint.\par Indication: Osteoarthritis.\par \par A discussion was had with the patient regarding this procedure and all questions were answered. All risks, benefits and alternatives were discussed. These included but were not limited to bleeding, infection, and allergic reaction. Alcohol was used to clean the skin, and betadine was used to sterilize and prep the area in the anteromedial aspect of the knee. Ethyl chloride spray was then used as a topical anesthetic. A 22-gauge needle was used to inject 2 cc of Euflexxa into the knee with ease. A sterile bandage was then applied. The patient tolerated the procedure well and there were no complications. \par \par Lot #: [id number]\par Exp: 2021-01-04\par \par The last of three Euflexxa injections was given today under sterile conditions into the right knee joint without complication (see procedure note). I again discussed the role of activity modification/icing following the injection to treat any local irritation from the injection. \par \par \par

## 2020-07-05 ENCOUNTER — RX RENEWAL (OUTPATIENT)
Age: 75
End: 2020-07-05

## 2020-07-13 ENCOUNTER — APPOINTMENT (OUTPATIENT)
Dept: CARDIOLOGY | Facility: CLINIC | Age: 75
End: 2020-07-13
Payer: MEDICARE

## 2020-07-13 ENCOUNTER — NON-APPOINTMENT (OUTPATIENT)
Age: 75
End: 2020-07-13

## 2020-07-13 VITALS
TEMPERATURE: 98 F | BODY MASS INDEX: 31.78 KG/M2 | WEIGHT: 191 LBS | OXYGEN SATURATION: 97 % | SYSTOLIC BLOOD PRESSURE: 128 MMHG | HEART RATE: 88 BPM | DIASTOLIC BLOOD PRESSURE: 74 MMHG

## 2020-07-13 PROCEDURE — 93000 ELECTROCARDIOGRAM COMPLETE: CPT

## 2020-07-13 PROCEDURE — 99214 OFFICE O/P EST MOD 30 MIN: CPT

## 2020-07-13 NOTE — HISTORY OF PRESENT ILLNESS
[FreeTextEntry1] : SHe has been staying home due to COVID.\par Still not going out much.\par Planning to sell her home in [facility].\par Feels well. No fever or chills.\par Tolerating Eliquis. No bleeding (except for her tooth extraction)\par  smokes, but less.\par

## 2020-07-13 NOTE — DISCUSSION/SUMMARY
[FreeTextEntry1] : She is a 74-year-old, obese woman, with chronic hypertension, coronary artery disease with PCI in 2004, With chronic atrial fibrillation with a moderate ventricular response.  \par Her heart rate is now well controlled now. Continue Metoprolol 25 mg BID.\par BP is excellent continue Losartan to 25 mg daily.\par Continue Eliquis for stroke risk reduction.\par Continue smoking cessation if possible.\par Continue weight loss efforts.\par Labs as above.\par See me in 3 month.

## 2020-07-13 NOTE — PHYSICAL EXAM
[General Appearance - Well Developed] : well developed [General Appearance - In No Acute Distress] : no acute distress [Normal Conjunctiva] : the conjunctiva exhibited no abnormalities [No Oral Pallor] : no oral pallor [No Oral Cyanosis] : no oral cyanosis [Normal Jugular Venous A Waves Present] : normal jugular venous A waves present [Normal Jugular Venous V Waves Present] : normal jugular venous V waves present [No Jugular Venous Ling A Waves] : no jugular venous ling A waves [Respiration, Rhythm And Depth] : normal respiratory rhythm and effort [Auscultation Breath Sounds / Voice Sounds] : lungs were clear to auscultation bilaterally [Heart Sounds] : normal S1 and S2 [Murmurs] : no murmurs present [Arterial Pulses Normal] : the arterial pulses were normal [Regular] : the rhythm was regular [Abdomen Soft] : soft [Abdomen Mass (___ Cm)] : no abdominal mass palpated [FreeTextEntry1] : walks with cane [Nail Clubbing] : no clubbing of the fingernails [Cyanosis, Localized] : no localized cyanosis [Petechial Hemorrhages (___cm)] : no petechial hemorrhages [] : no rash [Skin Color & Pigmentation] : normal skin color and pigmentation [No Venous Stasis] : no venous stasis [Skin Lesions] : no skin lesions [No Skin Ulcers] : no skin ulcer [No Xanthoma] : no  xanthoma was observed [Oriented To Time, Place, And Person] : oriented to person, place, and time [Affect] : the affect was normal [Mood] : the mood was normal [No Anxiety] : not feeling anxious

## 2020-07-15 NOTE — PRE-ANESTHESIA EVALUATION ADULT - NSPREOPDXFT_GEN_ALL_CORE
Condition:: Unwanted fat
N87.9, cervical dysplasia
Please Describe Your Condition:: is being seen for unwanted superficial fat, abdomen and lower back. Patient here for TruSculptID treatment #2. There are no contraindications to treatment.

## 2020-07-16 LAB
ALBUMIN SERPL ELPH-MCNC: 4.1 G/DL
ALP BLD-CCNC: 97 U/L
ALT SERPL-CCNC: 12 U/L
ANION GAP SERPL CALC-SCNC: 11 MMOL/L
AST SERPL-CCNC: 20 U/L
BASOPHILS # BLD AUTO: 0.06 K/UL
BASOPHILS NFR BLD AUTO: 0.6 %
BILIRUB SERPL-MCNC: 0.7 MG/DL
BUN SERPL-MCNC: 15 MG/DL
CALCIUM SERPL-MCNC: 9.2 MG/DL
CHLORIDE SERPL-SCNC: 105 MMOL/L
CHOLEST SERPL-MCNC: 115 MG/DL
CHOLEST/HDLC SERPL: 2.1 RATIO
CO2 SERPL-SCNC: 27 MMOL/L
CREAT SERPL-MCNC: 1.04 MG/DL
EOSINOPHIL # BLD AUTO: 0.06 K/UL
EOSINOPHIL NFR BLD AUTO: 0.6 %
GLUCOSE SERPL-MCNC: 88 MG/DL
HCT VFR BLD CALC: 45.6 %
HDLC SERPL-MCNC: 55 MG/DL
HGB BLD-MCNC: 14.2 G/DL
IMM GRANULOCYTES NFR BLD AUTO: 0.3 %
LDLC SERPL CALC-MCNC: 49 MG/DL
LYMPHOCYTES # BLD AUTO: 1.73 K/UL
LYMPHOCYTES NFR BLD AUTO: 17.7 %
MAN DIFF?: NORMAL
MCHC RBC-ENTMCNC: 30.6 PG
MCHC RBC-ENTMCNC: 31.1 GM/DL
MCV RBC AUTO: 98.3 FL
MONOCYTES # BLD AUTO: 0.66 K/UL
MONOCYTES NFR BLD AUTO: 6.7 %
NEUTROPHILS # BLD AUTO: 7.25 K/UL
NEUTROPHILS NFR BLD AUTO: 74.1 %
NT-PROBNP SERPL-MCNC: 1331 PG/ML
PLATELET # BLD AUTO: 284 K/UL
POTASSIUM SERPL-SCNC: 4.5 MMOL/L
PROT SERPL-MCNC: 7.1 G/DL
RBC # BLD: 4.64 M/UL
RBC # FLD: 14.7 %
SODIUM SERPL-SCNC: 142 MMOL/L
TRIGL SERPL-MCNC: 58 MG/DL
TSH SERPL-ACNC: 1.12 UIU/ML
WBC # FLD AUTO: 9.79 K/UL

## 2020-10-15 ENCOUNTER — APPOINTMENT (OUTPATIENT)
Dept: CARDIOLOGY | Facility: CLINIC | Age: 75
End: 2020-10-15
Payer: MEDICARE

## 2020-10-15 ENCOUNTER — NON-APPOINTMENT (OUTPATIENT)
Age: 75
End: 2020-10-15

## 2020-10-15 ENCOUNTER — TRANSCRIPTION ENCOUNTER (OUTPATIENT)
Age: 75
End: 2020-10-15

## 2020-10-15 VITALS
OXYGEN SATURATION: 98 % | TEMPERATURE: 97.1 F | WEIGHT: 209 LBS | SYSTOLIC BLOOD PRESSURE: 140 MMHG | BODY MASS INDEX: 34.78 KG/M2 | DIASTOLIC BLOOD PRESSURE: 80 MMHG | HEART RATE: 81 BPM

## 2020-10-15 PROCEDURE — 99214 OFFICE O/P EST MOD 30 MIN: CPT

## 2020-10-15 PROCEDURE — 93000 ELECTROCARDIOGRAM COMPLETE: CPT

## 2020-10-15 NOTE — PHYSICAL EXAM
[General Appearance - In No Acute Distress] : no acute distress [General Appearance - Well Developed] : well developed [No Oral Pallor] : no oral pallor [Normal Conjunctiva] : the conjunctiva exhibited no abnormalities [Normal Jugular Venous A Waves Present] : normal jugular venous A waves present [No Oral Cyanosis] : no oral cyanosis [Normal Jugular Venous V Waves Present] : normal jugular venous V waves present [No Jugular Venous Ling A Waves] : no jugular venous ling A waves [Respiration, Rhythm And Depth] : normal respiratory rhythm and effort [Auscultation Breath Sounds / Voice Sounds] : lungs were clear to auscultation bilaterally [Murmurs] : no murmurs present [Heart Sounds] : normal S1 and S2 [Arterial Pulses Normal] : the arterial pulses were normal [Regular] : the rhythm was regular [Abdomen Soft] : soft [Abdomen Mass (___ Cm)] : no abdominal mass palpated [FreeTextEntry1] : walks with cane [Nail Clubbing] : no clubbing of the fingernails [Cyanosis, Localized] : no localized cyanosis [Petechial Hemorrhages (___cm)] : no petechial hemorrhages [] : no rash [No Venous Stasis] : no venous stasis [Skin Color & Pigmentation] : normal skin color and pigmentation [Skin Lesions] : no skin lesions [No Skin Ulcers] : no skin ulcer [No Xanthoma] : no  xanthoma was observed [Oriented To Time, Place, And Person] : oriented to person, place, and time [Affect] : the affect was normal [Mood] : the mood was normal [No Anxiety] : not feeling anxious

## 2020-10-15 NOTE — REVIEW OF SYSTEMS
[Recent Weight Gain (___ Lbs)] : recent [unfilled] ~Ulb weight gain [Shortness Of Breath] : shortness of breath [Dyspnea on exertion] : dyspnea during exertion [Chest Pain] : no chest pain [Lower Ext Edema] : lower extremity edema [Leg Claudication] : no intermittent leg claudication [Wheezing] : wheezing [Palpitations] : no palpitations [Muscle Cramps] : muscle cramps [Negative] : Heme/Lymph

## 2020-11-03 ENCOUNTER — APPOINTMENT (OUTPATIENT)
Dept: CARDIOLOGY | Facility: CLINIC | Age: 75
End: 2020-11-03
Payer: MEDICARE

## 2020-11-03 PROCEDURE — 93306 TTE W/DOPPLER COMPLETE: CPT

## 2020-11-24 DIAGNOSIS — R07.89 OTHER CHEST PAIN: ICD-10-CM

## 2020-11-30 ENCOUNTER — APPOINTMENT (OUTPATIENT)
Dept: CARDIOLOGY | Facility: CLINIC | Age: 75
End: 2020-11-30
Payer: MEDICARE

## 2020-11-30 PROCEDURE — 93015 CV STRESS TEST SUPVJ I&R: CPT

## 2020-11-30 PROCEDURE — A9500: CPT

## 2020-11-30 PROCEDURE — 78452 HT MUSCLE IMAGE SPECT MULT: CPT

## 2020-12-15 ENCOUNTER — APPOINTMENT (OUTPATIENT)
Dept: ORTHOPEDIC SURGERY | Facility: CLINIC | Age: 75
End: 2020-12-15
Payer: MEDICARE

## 2020-12-15 VITALS — TEMPERATURE: 96 F

## 2020-12-15 PROCEDURE — 99214 OFFICE O/P EST MOD 30 MIN: CPT | Mod: 25

## 2020-12-15 PROCEDURE — 20610 DRAIN/INJ JOINT/BURSA W/O US: CPT | Mod: RT

## 2020-12-15 NOTE — HISTORY OF PRESENT ILLNESS
[de-identified] : This is a  75 year old  female experiencing pain in the right knee which is severe in intensity and has been going on for years . The pain substantially limits activities of daily living. Walking tolerance is reduced. Medication and activity modification have been minimally effective for a period lasting greater than three months in duration. Assistive devices and external support were not deemed by the patient to be helpful in improving their function. Due to the severity of osteoarthritis and level of pain, physical therapy is contraindicated. Pain and restriction of function are intolerable at this time. The patient denies any radiation of the pain to the feet and it is not associated with numbness, tingling, or weakness.  Prior hyaluronic acid injections were helpful improving the patient's pain.

## 2020-12-15 NOTE — DISCUSSION/SUMMARY
[de-identified] : This patient is right knee osteoarthritis.  The patient is not an appropriate candidate for surgical intervention at this time. An extensive discussion was conducted on the natural history of the disease and the variety of surgical and non-surgical options available to the patient including, but not limited to non-steroidal anti-inflammatory medications, steroid injections, physical therapy, maintenance of ideal body weight, and reduction of activity.  She would like to initiate another course of Euflexxa injections.  The patient will schedule an appointment as needed.\par \par Recommendation: Trial of Visco supplementation. We'll obtain preauthorization prior to injection therapy.\par \par **NB: Medication was Issued under circumstances where the provider (Dr. Jonathan Kay) reasonably determined that it would be impractical for the patient to obtain substances prescribed by electronic prescription (e-prescribe) given need for pre-authorization, and such delay would adversely impact the patient's medical condition. An exception letter will be mailed to the [facility] during the authorization process.**\par \par Injection: right knee joint. \par Indication: Osteoarthritis. \par \par A discussion was had with the patient regarding this procedure and all questions were answered. All risks, benefits and alternatives were discussed. These included but were not limited to bleeding, infection, and allergic reaction. Alcohol was used to clean the skin, and betadine was used to sterilize and prep the area in the anteromedial aspect of the knee. Ethyl chloride spray was then used as a topical anesthetic. A 22-gauge needle was used to inject 2 cc of Euflexxa into the knee with ease. A sterile bandage was then applied. The patient tolerated the procedure well and there were no complications. \par \par Lot #:  [id number]\par Exp: 05-\par \par The first of three Euflexxa injections was given today under sterile conditions into the right knee joint without complication (see procedure note). I again discussed the role of activity modification/icing following the injection to treat any local irritation from the injection. \par \par

## 2020-12-15 NOTE — PHYSICAL EXAM
[de-identified] : Well developed, well nourished in no apparent distress, awake, alert and orientated to person, place and time. with appropriate mood and affect. \par Respirations are even and unlabored. Gait evaluation does reveal a limp. There is no inguinal adenopathy. \par The affected limb is well-perfused, without skin lesions, shows a grossly normal motor and sensory examination. \par Knee motion is significantly reduced and does cause significant pain. ROM of the knee is  degrees. \par The knee is stable within that range-of-motion to AP and ML stress. \par The alignment of the knee is varus. \par Muscle strength is normal. Pedal pulses are palpable.\par

## 2020-12-22 ENCOUNTER — APPOINTMENT (OUTPATIENT)
Dept: ORTHOPEDIC SURGERY | Facility: CLINIC | Age: 75
End: 2020-12-22
Payer: MEDICARE

## 2020-12-22 VITALS — TEMPERATURE: 96.2 F

## 2020-12-22 PROCEDURE — 20610 DRAIN/INJ JOINT/BURSA W/O US: CPT | Mod: RT

## 2020-12-22 NOTE — PROCEDURE
[de-identified] : Injection: right knee joint. \par Indication: Osteoarthritis. \par \par A discussion was had with the patient regarding this procedure and all questions were answered. All risks, benefits and alternatives were discussed. These included but were not limited to bleeding, infection, and allergic reaction. Alcohol was used to clean the skin, and betadine was used to sterilize and prep the area in the anteromedial aspect of the knee. Ethyl chloride spray was then used as a topical anesthetic. A 22-gauge needle was used to inject 2 cc of Euflexxa into the knee with ease. A sterile bandage was then applied. The patient tolerated the procedure well and there were no complications. \par \par Lot #: [id number]\par Exp: 05-\par \par The 2nd of three Euflexxa injections was given today under sterile conditions into the right knee joint without complication (see procedure note). I again discussed the role of activity modification/icing following the injection to treat any local irritation from the injection. \par \par

## 2020-12-29 ENCOUNTER — APPOINTMENT (OUTPATIENT)
Dept: ORTHOPEDIC SURGERY | Facility: CLINIC | Age: 75
End: 2020-12-29
Payer: MEDICARE

## 2020-12-29 VITALS — TEMPERATURE: 95 F

## 2020-12-29 PROCEDURE — 20610 DRAIN/INJ JOINT/BURSA W/O US: CPT | Mod: RT

## 2020-12-29 NOTE — PROCEDURE
[de-identified] : Injection: right knee joint. \par Indication: Osteoarthritis. \par \par A discussion was had with the patient regarding this procedure and all questions were answered. All risks, benefits and alternatives were discussed. These included but were not limited to bleeding, infection, and allergic reaction. Alcohol was used to clean the skin, and betadine was used to sterilize and prep the area in the anteromedial aspect of the knee. Ethyl chloride spray was then used as a topical anesthetic. A 22-gauge needle was used to inject 2 cc of Euflexxa into the knee with ease. A sterile bandage was then applied. The patient tolerated the procedure well and there were no complications. \par \par Lot #: [id number]\par Exp: 05-\par \par The 3rd of three Euflexxa injections was given today under sterile conditions into the right knee joint without complication (see procedure note). I again discussed the role of activity modification/icing following the injection to treat any local irritation from the injection. \par \par Follow-up in 6 months.

## 2021-01-15 ENCOUNTER — NON-APPOINTMENT (OUTPATIENT)
Age: 76
End: 2021-01-15

## 2021-01-15 ENCOUNTER — APPOINTMENT (OUTPATIENT)
Dept: CARDIOLOGY | Facility: CLINIC | Age: 76
End: 2021-01-15
Payer: MEDICARE

## 2021-01-15 VITALS
DIASTOLIC BLOOD PRESSURE: 84 MMHG | OXYGEN SATURATION: 96 % | BODY MASS INDEX: 36.44 KG/M2 | TEMPERATURE: 97.3 F | WEIGHT: 219 LBS | HEART RATE: 99 BPM | SYSTOLIC BLOOD PRESSURE: 150 MMHG

## 2021-01-15 PROCEDURE — 99214 OFFICE O/P EST MOD 30 MIN: CPT

## 2021-01-15 PROCEDURE — 93000 ELECTROCARDIOGRAM COMPLETE: CPT

## 2021-01-15 NOTE — DISCUSSION/SUMMARY
[FreeTextEntry1] : She is a 75 year-old, obese woman, with chronic hypertension, coronary artery disease with PCI in 2004, With chronic atrial fibrillation with a moderate ventricular response.  \par Her heart rate is now well controlled now. \par Increase Metoprolol 50 mg BID for HR control\par BP is excellent continue Losartan to 25 mg daily.\par Continue Eliquis for stroke risk reduction.\par Continue smoking cessation if possible.\par Echo was consistent with worsening diastolic HF... Weight gain isnt helping either.\par \par See me in 1 month.

## 2021-01-15 NOTE — HISTORY OF PRESENT ILLNESS
[FreeTextEntry1] : She continues to have knee pain. Had a shot.\par Some scratching on her skin.\par Some finger tinghin.\par Dyspnea worse, using the pump more.\par Still smoking.\par \par Tolerating Eliquis. No bleeding.\par Gaining weight.\par

## 2021-01-22 NOTE — DISCUSSION/SUMMARY
Requested medication(s) are due for refill today: Yes  Patient has already received a courtesy refill: No  Other reason request has been forwarded to provider:
[FreeTextEntry1] : She is a 74-year-old, obese woman, with chronic hypertension, coronary artery disease with PCI in 2004, With chronic atrial fibrillation with a moderate ventricular response.  \par Her heart rate is now well controlled now. Continue Metoprolol 25 mg BID.\par BP is excellent continue Losartan to 25 mg daily.\par Continue Eliquis for stroke risk reduction.\par Continue smoking cessation if possible.\par Continue weight loss efforts to improve dyspnea.\par Echo in Nov.\par See me in 3 month.

## 2021-02-09 ENCOUNTER — RX RENEWAL (OUTPATIENT)
Age: 76
End: 2021-02-09

## 2021-03-08 ENCOUNTER — APPOINTMENT (OUTPATIENT)
Dept: CARDIOLOGY | Facility: CLINIC | Age: 76
End: 2021-03-08
Payer: MEDICARE

## 2021-03-08 ENCOUNTER — NON-APPOINTMENT (OUTPATIENT)
Age: 76
End: 2021-03-08

## 2021-03-08 VITALS
RESPIRATION RATE: 16 BRPM | BODY MASS INDEX: 36.49 KG/M2 | HEIGHT: 65 IN | OXYGEN SATURATION: 98 % | DIASTOLIC BLOOD PRESSURE: 84 MMHG | WEIGHT: 219 LBS | HEART RATE: 98 BPM | TEMPERATURE: 97 F | SYSTOLIC BLOOD PRESSURE: 140 MMHG

## 2021-03-08 PROCEDURE — 99214 OFFICE O/P EST MOD 30 MIN: CPT

## 2021-03-08 PROCEDURE — 93000 ELECTROCARDIOGRAM COMPLETE: CPT

## 2021-03-08 NOTE — DISCUSSION/SUMMARY
[FreeTextEntry1] : She is a 75 year-old, obese woman, with chronic hypertension, coronary artery disease with PCI in 2004, With chronic atrial fibrillation with a moderate ventricular response.  \par Stable HF with preserved EF.\par Her heart rate is now well controlled now. \par Continue Metoprolol 50 mg BID for HR control\par BP is okay, continue Losartan to 25 mg daily.\par Continue Eliquis bid for stroke risk reduction.\par Continue smoking cessation if possible.\par  \par See me in 2 month.

## 2021-03-08 NOTE — HISTORY OF PRESENT ILLNESS
[FreeTextEntry1] : She notes some fatigue.\par GUTHRIE after about thirty feet. Also told she is wheezing. Improved with albuterol pump.\par s/p colonoscopy. Some polyps.\par restarted Eliquis 2 days after.\par \par Still smoking, but less....\par \par Tolerating Eliquis. No bleeding.\par second dose of vaccine this week.

## 2021-03-22 NOTE — H&P PST ADULT - WEIGHT IN LBS
GI Daily Progress Note    Pt seen in follow up for GI bleed.     Subjective:  He is sitting up in bed in no acute distress.   Denies nausea, vomiting, abdominal pain  Feeds turned off yesterday due to abdominal distention, currently resolved.  Recv'd 1 bottle mag citrate overnight due to \"maroon\" stool.  2 units PRBCs transfused.       Objective:  Vital signs in last 24 hours:  Temp:  [96.8 °F (36 °C)-98.1 °F (36.7 °C)] 97 °F (36.1 °C)  Heart Rate:  [] 98  Resp:  [10-34] 25  BP: ()/(33-69) 164/58  FiO2 (%):  [30 %] 30 %    Physical Exam:   Physical Exam  Vitals signs reviewed.   Constitutional:       General: He is not in acute distress.     Appearance: He is ill-appearing.   HENT:      Head: Normocephalic.      Mouth/Throat:      Mouth: Mucous membranes are moist.      Pharynx: Oropharynx is clear.   Eyes:      General: No scleral icterus.  Neck:      Musculoskeletal: Neck supple.   Cardiovascular:      Rate and Rhythm: Normal rate and regular rhythm.      Heart sounds: Normal heart sounds.   Pulmonary:      Breath sounds: Wheezing present.      Comments: Trach to vent  Abdominal:      General: Bowel sounds are normal. There is no distension.      Palpations: Abdomen is soft. There is no mass.      Tenderness: There is no abdominal tenderness. There is no guarding.      Hernia: No hernia is present.      Comments: PEG flushed and aspirated. No blood or coffee ground return.  Flexiseal with dark brown stool   Musculoskeletal: Normal range of motion.   Skin:     General: Skin is warm and dry.      Coloration: Skin is pale. Skin is not jaundiced.   Neurological:      General: No focal deficit present.      Mental Status: He is alert.   Psychiatric:         Mood and Affect: Mood normal.         Behavior: Behavior normal.            Results:  Recent Results (from the past 24 hour(s))   GLUCOSE, BEDSIDE - POINT OF CARE    Collection Time: 03/21/21 11:22 AM   Result Value Ref Range    GLUCOSE, BEDSIDE - POINT  OF CARE 153 (H) 70 - 99 mg/dL   Hemoglobin    Collection Time: 03/21/21  2:55 PM   Result Value Ref Range    HGB 8.0 (L) 13.0 - 17.0 g/dL   SPUTUM, BACTERIAL CULTURE WITH GRAM STAIN    Collection Time: 03/21/21  4:59 PM    Specimen: Sputum   Result Value Ref Range    CULTURE WITH GRAM STAIN, SPUTUM Culture in progress.     Gram Stain       Adequate quality specimen. Acute inflammation with moderate/many neutrophils. Mixed bacteria with no predominant type.   GLUCOSE, BEDSIDE - POINT OF CARE    Collection Time: 03/21/21  5:01 PM   Result Value Ref Range    GLUCOSE, BEDSIDE - POINT OF CARE 134 (H) 70 - 99 mg/dL   Hemoglobin    Collection Time: 03/21/21  9:56 PM   Result Value Ref Range    HGB 6.0 (LL) 13.0 - 17.0 g/dL   Hemoglobin and Hematocrit    Collection Time: 03/21/21 10:56 PM   Result Value Ref Range    HGB 5.6 (LL) 13.0 - 17.0 g/dL    HCT 18.8 (L) 39.0 - 51.0 %   Prepare Red Blood Cells: 1 Units    Collection Time: 03/21/21 11:20 PM   Result Value Ref Range    UNIT BLOOD TYPE O Pos     ISBT BLOOD TYPE 5100     BLOOD EXPIRATION DATE 20210426235900     UNIT NUMBER [id number]     DISPENSE STATUS Issued     PRODUCT ID Red Blood Cells     PRODUCT CODE [id number]     PRODUCT DESCRIPTION RBC AS-1 LR     CROSSMATCH RESULT Compatible     ISSUE DATE/TIME [id number]    GLUCOSE, BEDSIDE - POINT OF CARE    Collection Time: 03/22/21 12:15 AM   Result Value Ref Range    GLUCOSE, BEDSIDE - POINT OF CARE 226 (H) 70 - 99 mg/dL   Prepare Red Blood Cells: 1 Units    Collection Time: 03/22/21  2:56 AM   Result Value Ref Range    UNIT BLOOD TYPE O Pos     ISBT BLOOD TYPE 5100     BLOOD EXPIRATION DATE 20210325235900     UNIT NUMBER [id number]     DISPENSE STATUS Issued     PRODUCT ID Red Blood Cells     PRODUCT CODE [id number]     PRODUCT DESCRIPTION RBC AS-3 LR     CROSSMATCH RESULT Compatible     ISSUE DATE/TIME [id number]    GLUCOSE, BEDSIDE - POINT OF CARE    Collection Time: 03/22/21  6:14 AM   Result Value Ref Range     GLUCOSE, BEDSIDE - POINT OF CARE 153 (H) 70 - 99 mg/dL   Comprehensive Metabolic Panel    Collection Time: 03/22/21  6:33 AM   Result Value Ref Range    Fasting Status      Sodium 150 (H) 135 - 145 mmol/L    Potassium 4.6 3.4 - 5.1 mmol/L    Chloride 119 (H) 98 - 107 mmol/L    Carbon Dioxide 26 21 - 32 mmol/L    Anion Gap 10 10 - 20 mmol/L    Glucose 149 (H) 65 - 99 mg/dL    BUN 76 (H) 6 - 20 mg/dL    Creatinine 1.54 (H) 0.67 - 1.17 mg/dL    Glomerular Filtration Rate 41 (L) >90 mL/min/1.73m2    BUN/ Creatinine Ratio 49 (H) 7 - 25    Calcium 8.9 8.4 - 10.2 mg/dL    Bilirubin, Total 0.5 0.2 - 1.0 mg/dL    GOT/AST 24 <=37 Units/L    GPT/ALT 20 <64 Units/L    Alkaline Phosphatase 63 45 - 117 Units/L    Albumin 2.3 (L) 3.6 - 5.1 g/dL    Protein, Total 5.5 (L) 6.4 - 8.2 g/dL    Globulin 3.2 2.0 - 4.0 g/dL    A/G Ratio 0.7 (L) 1.0 - 2.4   CBC with Automated Differential (performable only)    Collection Time: 03/22/21  6:33 AM   Result Value Ref Range    WBC 14.7 (H) 4.2 - 11.0 K/mcL    RBC 2.86 (L) 4.50 - 5.90 mil/mcL    HGB 8.6 (L) 13.0 - 17.0 g/dL    HCT 26.7 (L) 39.0 - 51.0 %    MCV 93.4 78.0 - 100.0 fl    MCH 30.1 26.0 - 34.0 pg    MCHC 32.2 32.0 - 36.5 g/dL    RDW-CV 16.0 (H) 11.0 - 15.0 %    RDW-SD 54.1 (H) 39.0 - 50.0 fL     140 - 450 K/mcL    NRBC 0 <=0 /100 WBC    Neutrophil, Percent 87 %    Lymphocytes, Percent 5 %    Mono, Percent 5 %    Eosinophils, Percent 0 %    Basophils, Percent 0 %    Immature Granulocytes 3 %    Absolute Neutrophils 12.8 (H) 1.8 - 7.7 K/mcL    Absolute Lymphocytes 0.7 (L) 1.0 - 4.0 K/mcL    Absolute Monocytes 0.8 0.3 - 0.9 K/mcL    Absolute Eosinophils  0.0 0.0 - 0.5 K/mcL    Absolute Basophils 0.1 0.0 - 0.3 K/mcL    Absolute Immmature Granulocytes 0.4 (H) 0.0 - 0.2 K/mcL       Imaging:  XR CHEST AP OR PA 1 VIEW   Final Result   Impression:    No significant interval change. Support devices as above.      Electronically Signed by: WHITLEY MCDONOUGH MD    Signed on: 3/21/2021  10:03 AM          XR CHEST AP OR PA 1 VIEW   Final Result   FINDINGS/IMPRESSION:        Tracheostomy tube, PICC line catheter, cardiomegaly, vascular stent,   prosthetic valve, evidence of prior CABG and mild pulmonary vascular   congestion are grossly stable. Small left pleural effusion and left basilar   opacity. No gross pneumothorax. Surgical clips in the lower neck.   Degenerative changes in the shoulder joints and thoracic spine.      Electronically Signed by: MELIZA VASQUEZ M.D.    Signed on: 3/21/2021 6:37 AM          XR CHEST AP OR PA 1 VIEW   Final Result   FINDINGS/IMPRESSION:        A right-sided PICC line catheter is terminating in the SVC in place.  The   tracheostomy tube is in place.  A right vascular stent is again noted in   place.  Cardiomegaly, evidence of prior CABG and prosthetic valve again   noted.  Mild pulmonary vascular congestion.  Probable small left pleural   effusion and left basilar opacity is stable.  No gross pneumothorax.    Surgical clips in the lower chest.  EKG leads overlying the chest.    Degenerative changes in the shoulder joints.      Electronically Signed by: MELIZA VASQUEZ M.D.    Signed on: 3/20/2021 10:08 AM          CTA HEAD AND NECK W CONTRAST LEVEL 1   Final Result      1.   No large vessel occlusion.      2.   Severe short segment stenosis of the proximal right inferior M2 middle   cerebral artery segment and inferior M2 insular branch.  In comparison to   prior CTA examination 03/04/2021, the degree of stenosis has increased   which may be technical in etiology.      3.   Perfusion study demonstrates elevated Tmax within the left   temporoparietal lobe which correlates to area of grey-white differentiation   loss on noncontrast CT, suggestive of completed infarct with   underestimation of core infarct by RAPID perfusion calculation.  No   definite evidence of perfusion mismatch abnormality.      4.   Left carotid endarterectomy with luminal irregularity and severe    stenosis (80-90%) of the proximal left internal carotid artery.  Irregular   0.3 x 0.2 cm outpouching at the proximal left internal carotid artery,   suspicious for pseudoaneurysm.      5.   Right carotid endarterectomy with irregular 0.5 x 0.4 cm outpouching   at the distal common carotid artery, suspicious for pseudoaneurysm.  Right   internal carotid artery remains patent.      6.   Severe stenosis at the origins of the vertebral arteries.      7.   Moderate to severe stenosis of the right paraclinoid internal carotid   artery.      Findings were communicated by telephone to Dr. NIKOLAI MILLAN by Dr. Inman (3/19/2021 7:32 AM).         Electronically Signed by: GERALDO INMAN MD    Signed on: 3/19/2021 8:05 AM          CT CEREBRAL PERFUSION W CONTRAST LEVEL 1   Final Result      1.   No large vessel occlusion.      2.   Severe short segment stenosis of the proximal right inferior M2 middle   cerebral artery segment and inferior M2 insular branch.  In comparison to   prior CTA examination 03/04/2021, the degree of stenosis has increased   which may be technical in etiology.      3.   Perfusion study demonstrates elevated Tmax within the left   temporoparietal lobe which correlates to area of grey-white differentiation   loss on noncontrast CT, suggestive of completed infarct with   underestimation of core infarct by RAPID perfusion calculation.  No   definite evidence of perfusion mismatch abnormality.      4.   Left carotid endarterectomy with luminal irregularity and severe   stenosis (80-90%) of the proximal left internal carotid artery.  Irregular   0.3 x 0.2 cm outpouching at the proximal left internal carotid artery,   suspicious for pseudoaneurysm.      5.   Right carotid endarterectomy with irregular 0.5 x 0.4 cm outpouching   at the distal common carotid artery, suspicious for pseudoaneurysm.  Right   internal carotid artery remains patent.      6.   Severe stenosis at the origins of the  vertebral arteries.      7.   Moderate to severe stenosis of the right paraclinoid internal carotid   artery.      Findings were communicated by telephone to Dr. NIKOLAI MILLAN by Dr. Nguyen (3/19/2021 7:32 AM).         Electronically Signed by: GERALDO NGUYEN MD    Signed on: 3/19/2021 8:05 AM          CT HEAD LEVEL 1   Final Result      1.   No acute intracranial hemorrhage.      2.   Gray-white differentiation loss in the right temporal lobe and right   frontal lobe at the level of the precentral gyrus, compatible with evolving   subacute infarcts which were demonstrated on prior CT 03/05/2021.  No new   areas of grey-white differentiation loss are identified.      Findings were communicated by telephone to KIRAN MILLAN by Dr. Nguyen (3/19/2021 7:15 AM).      Electronically Signed by: GERALDO NGUYEN MD    Signed on: 3/19/2021 7:18 AM          EGD   Final Result      XR CHEST AP OR PA 1 VIEW   Final Result   Impression:    Right PICC line tip terminates in the lower SVC.   Stable small left pleural effusion with left lower lobe patchy airspace   opacities likely representing atelectasis.      Electronically Signed by: WHITLEY MCDONOUGH MD    Signed on: 3/18/2021 9:27 AM          XR CHEST AP OR PA 1 VIEW   Final Result      1.   Status post left chest tube removal. No pneumothorax.   2.   Unchanged small left pleural effusion with associated   atelectasis/airspace disease.   3.   Additional lines and tubes are stable.      Electronically Signed by: SHELLEY TENA MD    Signed on: 3/17/2021 1:54 PM          XR CHEST AP OR PA 1 VIEW   Final Result   Impression:        Support lines and tubes are in unchanged position.        Small left pleural effusion and bibasilar atelectasis has not significantly   changed.      Electronically Signed by: JESUS MANUEL WALLACE M.D.    Signed on: 3/17/2021 10:24 AM          XR ABDOMEN 1 VIEW   Final Result   NG tube in appropriate position.      Electronically  Signed by: JALEN GORDON M.D.   Signed on: 03/17/2021 01:09 AM      XR CHEST AP OR PA 1 VIEW   Final Result      1.   Support lines and tubes in stable position.      2.   Small left pleural effusion and left basilar airspace passively,   stable from prior examination.      Electronically Signed by: GERALDO INMAN MD    Signed on: 3/16/2021 2:48 PM          XR CHEST AP OR PA 1 VIEW   Final Result   FINDINGS/IMPRESSION:      Since the examination of the preceding day, there has been apparent distal   repositioning of a feeding tube, the distal end of which is identified in   the projection of the expected location of the stomach.      Also noted has been the suspected interval development of peripheral left   mid lung infiltrate.      There is a tracheostomy tube.  There is a peripheral right-sided central   venous catheter.  There are mid-sternal sutures and mediastinal clips from   a previous aortocoronary bypass.  There is evidence of a previous TAVR.    There are metallic surgical clips in the projections of the bilateral   cervical regions.  There are degenerative changes of the thoracic spine.    The heart size is normal.  There is tortuosity and calcification of the   thoracic aorta.      There is no demonstrable pneumothorax.      Electronically Signed by: BISI VELÁZQUEZ MD    Signed on: 3/15/2021 1:04 PM          XR ABDOMEN 1 VIEW   Final Result   FINDINGS/IMPRESSION:      The feeding tube tip projects in the gastric fundus.  It is advanced   several centimeters from its prior position at the GE junction.          Electronically Signed by: VIVIENNE GODINEZ    Signed on: 3/14/2021 3:03 PM          XR CHEST PA OR AP 1 VIEW   Final Result   FINDINGS/IMPRESSION:      There is blunting of the left costophrenic angle, suggestive of the   presence of a small residual left pleural effusion, which appears similar   to the findings observed on the examination of the previous day.      There are tracheostomy,  feeding and left chest tubes.  There is a   peripheral right-sided central venous catheter.  There is an apparent   metallic right subclavian and brachiocephalic vascular stent.  There is   evidence of previous TAVR.  There are mid-sternal sutures and mediastinal   clips from a previous aortocoronary bypass.  There are degenerative changes   of the thoracic spine and bilateral acromioclavicular joints.  The heart   size is normal.  There is tortuosity and calcification of the thoracic   aorta.      There is no definite demonstrable pneumothorax.      Electronically Signed by: BISI VELÁZQUEZ MD    Signed on: 3/14/2021 9:03 AM          XR ABDOMEN 1 VIEW   Final Result   1.   Enteric feeding tube is at a minimally past the gastroesophageal   junction, serially withdrawing.  Advancement is recommended.   2.   Borderline size loops of bowel upper abdomen.  Possible mild ileus.   3.   Chest findings as detailed.      Electronically Signed by: SUNITA RASCON MD    Signed on: 3/14/2021 8:52 AM          XR CHEST AP OR PA 1 VIEW   Final Result   1.   Support tubes as detailed.     2.   Postop change at the cardiac silhouette is detailed.  No cardiac   decompensation.     3.   Minimal left pleural effusion, stable.  Mild bibasilar haziness versus   stranding.        Electronically Signed by: SUNITA RASCON MD    Signed on: 3/13/2021 12:52 PM          XR ABDOMEN 1 VIEW   Final Result   Dobbhoff tube in satisfactory position with nonobstructive   bowel gas pattern.      Electronically Signed by: AYESHA VARMA M.D    Signed on: 3/12/2021 6:31 PM          US VASC EXTREMITY UPPER VENOUS DUPLEX   Final Result   No evidence of acute DVT involving the submitted images of the bilateral   upper extremities with nonvisualization of the left mid cephalic vein.        Electronically Signed by: PATRICK MOTA DO    Signed on: 3/12/2021 4:01 PM          XR CHEST PA OR AP 1 VIEW   Final Result   Impression:    No  significant interval change.     Dobbhoff tube tip terminates at the GE junction.  Recommend advancing.      Electronically Signed by: WHITLEY MCDONOUGH MD    Signed on: 3/12/2021 9:28 AM          XR CHEST PA OR AP 1 VIEW   Final Result   Impression:    Removal of the endotracheal tube and placement of a tracheostomy cannula   which overlies the upper trachea.      Unchanged bibasilar patchy consolidations with a small left pleural   effusion.  Findings may be due to infection and/or aspiration.      Electronically Signed by: WHITLEY MCDONOUGH MD    Signed on: 3/11/2021 3:56 PM          XR CHEST PA OR AP 1 VIEW   Final Result   No pneumothorax is seen.      Electronically Signed by: DOMINGO FRANCO M.D.   Signed on: 03/11/2021 04:34 AM      XR CHEST PA OR AP 1 VIEW   Final Result   Mild central congestion.  Perihilar opacities.  Bibasilar opacity.  Left   pleural effusion.  No pneumothorax is seen.      Electronically Signed by: ANAIS ROTH MD    Signed on: 3/10/2021 8:56 AM          XR CHEST PA OR AP 1 VIEW   Final Result   Impression:    Stable exam as above. Support devices as above.      Electronically Signed by: NOLA BARRIOS MD    Signed on: 3/9/2021 10:22 AM          XR ABDOMEN 1 VIEW   Final Result   FINDINGS/IMPRESSION: Limited single view of the abdomen obtained.   Esophogastric tube in place midline in the chest and projecting below the   diaphragm, with tip projecting over the body of the stomach. No bowel   dilatation or evidence of obstruction.  Consolidative opacity at the lower   lungs bilaterally.  Probable small left pleural effusion.      Electronically Signed by: FRANK HERNANDEZ MD    Signed on: 3/9/2021 6:21 AM          XR ABDOMEN 1 VIEW   Final Result   Enteric catheter tip is 6 cm below the gastroesophageal junction.      Electronically Signed by: BENITEZ GARCIA M.D.   Signed on: 03/09/2021 01:28 AM      XR CHEST AP OR PA 1 VIEW   Final Result   FINDINGS/IMPRESSION:        The previously  noted large left pneumothorax has near completely resolved   with reinflation of the left lung. There is a left chest tube with the tip   in the left apex. Small left pleural effusion and left basilar opacity   likely infiltrate or atelectasis.       The previously noted Dobbhoff tube has been removed. The endotracheal tube   is 3 cm above lindsay in place. There is a vascular stent in place.       Cardiomegaly, evidence of prior CABG and prosthetic valve again noted.   Minimal linear opacities in the right lower lobe likely atelectasis.   Degenerative changes in the shoulder joints and thoracic spine. Post   surgical changes in the lower neck.      Electronically Signed by: MELIZA VASQUZE M.D.    Signed on: 3/8/2021 5:51 PM          XR CHEST AP OR PA 1 VIEW   Final Result   1.    100% LEFT PNEUMOTHORAX   2.    MALPOSITION DOBBHOFF FEEDING TUBE WITHIN THE LEFT LUNG.  FEEDING TUBE   SHOULD BE REMOVED   3.    ANTON BRAUN NOTIFIED OF THE FINDINGS AT 4:35 PM BY DR. HADLEY   RADIOLOGIST          Electronically Signed by: ALLYSON HADLEY MD    Signed on: 3/8/2021 4:43 PM          XR ABDOMEN 1 VIEW   Final Result   1.    MALPOSITION OF FEEDING TUBE IN THE LEFT LUNG.  FEEDING TUBE SHOULD BE   REMOVED   2.    LEFT PNEUMOTHORAX   3.    GASEOUS DISTENTION OF THE LARGE BOWEL   4.    NOTIFICATION OF THE FINDINGS TO ANTON BRAUN UPON REVIEW OF THE CHEST   RADIOGRAPH AT 4:35 PM BY DR. HADLEY RADIOLOGIST      Electronically Signed by: ALLYSON HADLEY MD    Signed on: 3/8/2021 4:50 PM          XR CHEST AP OR PA 1 VIEW   Final Result   Mild central pulmonary venous congestion.  Improved aeration of the lung bases bilaterally.      Electronically Signed by: BLU COLON M.D.   Signed on: 03/08/2021 05:59 AM      XR CHEST PA OR AP 1 VIEW   Final Result   Postoperative changes as discussed above.      Electronically Signed by: ROXANE BURNS M.D.   Signed on: 03/07/2021 06:28 AM      XR CHEST PA OR AP 1 VIEW   Final Result   Grossly  stable chest radiograph.      Electronically Signed by: ANAIS ROTH MD    Signed on: 3/6/2021 10:30 AM          XR CHEST PA OR AP 1 VIEW   Final Result   FINDINGS / IMPRESSION:       The endotracheal tube ends in the mid thoracic trachea and appears   appropriately positioned. The feeding tube can be followed to the stomach.   Post cardiac surgery changes are again noted. There are hazy opacities in   bilateral lower lungs which do not appear significantly changed since the   prior study. There may be a tiny left pleural effusion. No significant   pneumothorax is seen.      Electronically Signed by: TOMMY WOOTEN MD    Signed on: 3/5/2021 1:20 PM          XR ABDOMEN 1 VIEW   Final Result      Interval advancement of the nasogastric tube is now ends in the gastric   pylorus or gastroduodenal junction.      Electronically Signed by: TOMMY WOOTEN MD    Signed on: 3/5/2021 1:22 PM          XR CHEST PA OR AP 1 VIEW   Final Result   Impression:   1.  Interval placement of nasogastric tube.  Stable other support lines and   tubes.   2.  Stable chest findings as above      Electronically Signed by: GERALDO WOODRUFF MD    Signed on: 3/5/2021 11:00 AM          XR ABDOMEN 1 VIEW   Final Result   FINDINGS/IMPRESSION:        The distal portion of the feeding tube is in the left upper abdomen within   the stomach in place.  Nonspecific bowel gas pattern.      Electronically Signed by: MELIZA VASQUEZ M.D.    Signed on: 3/5/2021 11:58 AM          CT HEAD WO CONTRAST   Final Result      1.   Increasing areas of grey-white differentiation loss within the right   frontoparietal lobe with involvement of the perirolandic cortex, most   compatible with an evolving acute infarct.  No evidence of hemorrhagic   conversion.      2.   Stable subtle asymmetric cortical hyperattenuation within the right   cerebral hemisphere which likely represents contrast staining related to   prior procedure.  Mild petechial hemorrhage or underlying subarachnoid    hemorrhage is not excluded but less likely.      Electronically Signed by: GERALDO INMAN MD    Signed on: 3/5/2021 9:43 AM          XR CHEST PA OR AP 1 VIEW   Final Result   FINDINGS/IMPRESSION:      There is relative accentuation of the upper lobe pulmonary vascularity,   suggestive of element of slight residual cardiac decompensation, volume   overload, etc., which appears similar to the findings observed on the   examination of the previous day.      Also again noted are residual superimposed bibasilar   infiltrative/atelectatic changes.      There is an endotracheal tube.  There is a temporary left-sided transvenous   pacemaker.  There is metallic right brachiocephalic vascular stent.  There   are mid-sternal sutures and mediastinal clips from a previous aortocoronary   bypass.  There is evidence of a TAVR.  There are degenerative changes of   the thoracic spine and bilateral acromioclavicular joints.  The heart size   is normal.  There is tortuosity and calcification of the thoracic aorta      There is a suggestion of slight bilateral hyperaeration.      There is no demonstrable pneumothorax.      Electronically Signed by: BISI VELÁZQUEZ MD    Signed on: 3/5/2021 8:54 AM          CT HEAD WO CONTRAST   Final Result      1.   Unchanged ill-defined hyperattenuation with loss of gray-white matter   differentiation involving the right frontal, parietal and temporal lobes,   favored to represent contrast staining of an acute to subacute infarct in   the right middle cerebral artery territory. Superimposed petechial and/or   subarachnoid hemorrhage cannot be excluded. Persistent right cerebral   sulcal effacement. Follow-up is recommended.   2.   Unchanged focal hypoattenuation in the right periatrial white matter.      Electronically Signed by: SHELLEY TENA MD    Signed on: 3/4/2021 2:34 PM          XR CHEST PA OR AP 1 VIEW   Final Result   FINDINGS/IMPRESSION:      There is relative accentuation of the upper  lobe pulmonary vascularity,   consistent with the appearance of slight residual cardiac decompensation,   volume overload, etc., which appears similar to the findings observed on   the examination of the previous day.      Also again noted are suspected residual superimposed left basilar   infiltrative/atelectatic changes.        There is a temporary left-sided transvenous pacemaker.  There is evidence   of previous TAVR.  There is a metallic right brachiocephalic vascular   stent.  There are midsternal sutures and mediastinal clips from a previous   aortocoronary bypass.  There are metallic surgical clips in the projections   of the bilateral cervical and right axillary regions.  There are   degenerative change the thoracic spine and bilateral clavicular joints.    The heart size is normal.  There is calcification of the thoracic aorta.      There is apparent slight bilateral hyperaeration.      There is no demonstrable pneumothorax.      Electronically Signed by: BISI VELÁZQUEZ MD    Signed on: 3/4/2021 9:01 AM          CT CEREBRAL PERFUSION W CONTRAST LEVEL 1   Final Result   Addendum 1 of 1   Findings were discussed with Herman Harrington RN at 3/4/2021 7:04 AM CST.      Electronically Signed by: YUNIOR GARDNER M.D.   Signed on: 03/04/2021 07:04 AM      Final      CTA HEAD AND NECK W CONTRAST LEVEL 1   Final Result   Addendum 1 of 1   Findings were discussed with Herman Harrington RN at 3/4/2021 7:03 AM CST.      Electronically Signed by: YUNIOR GARDNER M.D.   Signed on: 03/04/2021 07:03 AM      Final      CT HEAD LEVEL 1   Final Result   Addendum 1 of 1   THIS REPORT CONTAINS FINDINGS THAT MAY BE CRITICAL TO PATIENT CARE. The    findings were verbally communicated via telephone with BRIANNA NEWTON    at 4:46 AM CST on 3/4/2021. The findings were acknowledged and understood.      Electronically Signed by: YUNIOR GARDNER M.D.   Signed on: 03/04/2021 04:46 AM      Final      CTA CHEST ABDOMEN PELVIS W  CONTRAST   Final Result       1.  No evidence of arterial injury or rupture involving the brachiocephalic   artery, right common carotid artery or right subclavian artery.  No   evidence of active arterial hemorrhage.   2.  Bilateral small hemothoraces are less dense compared with 0840 hours   today but their volume is stable.  No pneumothorax.  Small left pleural   lipoma is stable.   3.  Mild mediastinal adenopathy, etiology uncertain.  Reactive or   neoplastic etiologies are possible.   4.  3.4 cm x 3.6 cm infrarenal aortic aneurysm.      Electronically Signed by: VIVIENNE GODINEZ    Signed on: 3/3/2021 1:29 PM          CT CHEST ABDOMEN PELVIS WO CONTRAST   Final Result   1.    No large hematoma or volume of hemorrhage is identified.   2.    Small hemopericardium status post recent TAVR replacement.     3.    Increased retrocarinal lymphadenopathy with similar appearance of   other mediastinal lymphadenopathy which is nonspecific.  PET/CT is advised   as cannot exclude malignancy.   4.   Stable thickening of the left adrenal gland which previously measured   4 Hounsfield units, possibly underlying adrenal adenoma.   5.   Contrast were obtained of the kidneys suggestive of nephropathy,   possibly contrast-induced nephropathy.   6.   Similar appearance of a heavily calcified 3.6 cm fusiform infrarenal   abdominal aortic aneurysm.   7.   5 mm right lower lobe subpleural nodule similar to recent prior exam.    Follow-up is advised in six months to assess for stability.   8.   Other findings discussed above.         Electronically Signed by: SEKOU GIBBS MD    Signed on: 3/3/2021 10:29 AM          XR CHEST PA OR AP 1 VIEW   Final Result   FINDINGS/IMPRESSION:   Normal heart size status post TAVR.  ET tube tip projects at T4.  Prior   CABG.  New stents placed from the aorta into the innominate and right   subclavian arteries.  Subsegmental atelectasis and small effusion are   present along left diaphragm.  Elsewhere  the lungs are clear.  No   pneumothorax.  Intact osseous structures for age.      Electronically Signed by: VIVIENNE GODINEZ    Signed on: 3/3/2021 8:54 AM          Cath/PV Case   Final Result      XR CHEST AP OR PA 1 VIEW   Final Result   No acute cardiopulmonary abnormality.    Radiographic findings of obstructive lung disease.      Electronically Signed by: MELISSA FRENCH M.D.    Signed on: 3/2/2021 8:19 AM          XR CHEST PA OR AP 1 VIEW    (Results Pending)   XR CHEST PA OR AP 1 VIEW    (Results Pending)       Assessment & Plan:  1. GI bleed  - S/p EGD with PEG placement 3/18    - Hgb 5.6 > 8.6 s/p 2u PRBCs  - Platelets 169  - WBC 8.9 > 14.7  - BUN:Creatinine Ratio 26 > 49     - On plavix, restarted yesterday  - On pepcid    - Dark brown stool via flexi  - No return on aspiration of g-tube    - Keep NPO x meds  - Mag citrate 300ml via peg x 1  - EGD today, followed by colonoscopy if EGD negative - pending negative rapid covid test  - Continue pepcid  - Further recs to follow endoscopy    Case discussed with Dr. Sue who is my collaborating physician for this encounter.    Kena Montes CNP  3/22/2021 8:38 AM   210.1

## 2021-05-03 ENCOUNTER — APPOINTMENT (OUTPATIENT)
Dept: ORTHOPEDIC SURGERY | Facility: CLINIC | Age: 76
End: 2021-05-03
Payer: MEDICARE

## 2021-05-03 DIAGNOSIS — G56.02 CARPAL TUNNEL SYNDROME, LEFT UPPER LIMB: ICD-10-CM

## 2021-05-03 PROCEDURE — 99214 OFFICE O/P EST MOD 30 MIN: CPT | Mod: 25

## 2021-05-03 PROCEDURE — 20526 THER INJECTION CARP TUNNEL: CPT | Mod: LT

## 2021-05-03 PROCEDURE — 73110 X-RAY EXAM OF WRIST: CPT | Mod: LT

## 2021-05-03 NOTE — ADDENDUM
[FreeTextEntry1] : I, Lidia Martinez wrote this note acting as a scribe for Dr. John Hopper on May 03, 2021.\par \par

## 2021-05-03 NOTE — HISTORY OF PRESENT ILLNESS
[Right] : right hand dominant [FreeTextEntry1] : Pt is a 74 y/o female c/o left wrist pain x 4 weeks.  She hurt her wrist when she was lifting a heavy window approximately 1 month ago.  She had swelling at that time.  She went to [facility] where she was told that she has arthritis in the wrist.  She does not have the xrays available today.  She continues to have pain in the wrist with "certain movements".  The pain is in the ulnar portion.  She also c/o numbness and tingling in her left hand.  She states that it was not present prior to the injury 1 month ago.  It is intermittent.  She cannot identify exacerbating factors.  It does not wake her from sleep.

## 2021-05-03 NOTE — PHYSICAL EXAM
[de-identified] : Patient is WDWN, alert, and in no acute distress. Breathing is unlabored. She is grossly oriented to person, place, \par and time.\par \par Left Hand/Wrist:\par Edema present\par ROM is full\par Phalen's Test: Positive\par Tinel's Test: Positive [de-identified] : AP, lateral and oblique views of the left wrist were obtained and revealed minor calcification of the radial artery. Additionally, the XR revealed arthritis at the basal joint.

## 2021-05-03 NOTE — DISCUSSION/SUMMARY
[FreeTextEntry1] : Discussed a length the nature of the patient's condition with the patient and her daughter. They were advised that the nature of her injury as detailed above, exacerbated possible carpal tunnel symptoms.\par \par The patient wishes to proceed with a cortisone injection at this time (1). The skin was prepped with alcohol and sprayed with  Ethyl Chloride. An injection of 0.5 cc 1% Lidocaine without epinephrine, 0.25 cc Kenalog 40 mg, and 0.25 cc  Dexamethasone was administered into the left carpal tunnel. The patient tolerated the procedure well. Apply ice.   \par \par Advised to wear carpal tunnel brace. \par \par Patient can continue activities as tolerated. All questions answered, understanding verbalized. Patient in agreement with plan of care.

## 2021-05-03 NOTE — END OF VISIT
[FreeTextEntry3] : All medical record entries made by the Scribe were at my,  Dr. John Hopper MD., direction and personally dictated by me on 05/03/2021. I have personally reviewed the chart and agree that the record accurately reflects my personal performance of the history, physical exam, assessment and plan.\par \par

## 2021-05-10 ENCOUNTER — APPOINTMENT (OUTPATIENT)
Dept: CARDIOLOGY | Facility: CLINIC | Age: 76
End: 2021-05-10
Payer: MEDICARE

## 2021-05-11 ENCOUNTER — APPOINTMENT (OUTPATIENT)
Dept: CARDIOLOGY | Facility: CLINIC | Age: 76
End: 2021-05-11
Payer: MEDICARE

## 2021-05-11 ENCOUNTER — NON-APPOINTMENT (OUTPATIENT)
Age: 76
End: 2021-05-11

## 2021-05-11 VITALS
TEMPERATURE: 96.5 F | BODY MASS INDEX: 37.11 KG/M2 | OXYGEN SATURATION: 95 % | HEART RATE: 117 BPM | SYSTOLIC BLOOD PRESSURE: 130 MMHG | DIASTOLIC BLOOD PRESSURE: 84 MMHG | WEIGHT: 223 LBS

## 2021-05-11 PROCEDURE — 99214 OFFICE O/P EST MOD 30 MIN: CPT

## 2021-05-11 PROCEDURE — 93000 ELECTROCARDIOGRAM COMPLETE: CPT

## 2021-06-14 ENCOUNTER — RX RENEWAL (OUTPATIENT)
Age: 76
End: 2021-06-14

## 2021-06-14 ENCOUNTER — APPOINTMENT (OUTPATIENT)
Dept: ORTHOPEDIC SURGERY | Facility: CLINIC | Age: 76
End: 2021-06-14
Payer: MEDICARE

## 2021-06-14 VITALS — WEIGHT: 223 LBS | BODY MASS INDEX: 37.15 KG/M2 | HEIGHT: 65 IN

## 2021-06-14 DIAGNOSIS — Z00.00 ENCOUNTER FOR GENERAL ADULT MEDICAL EXAMINATION W/OUT ABNORMAL FINDINGS: ICD-10-CM

## 2021-06-14 PROCEDURE — 99213 OFFICE O/P EST LOW 20 MIN: CPT

## 2021-06-14 PROCEDURE — 73564 X-RAY EXAM KNEE 4 OR MORE: CPT | Mod: RT

## 2021-06-14 RX ORDER — UMECLIDINIUM BROMIDE AND VILANTEROL TRIFENATATE 62.5; 25 UG/1; UG/1
62.5-25 POWDER RESPIRATORY (INHALATION)
Qty: 60 | Refills: 0 | Status: ACTIVE | COMMUNITY
Start: 2020-06-04

## 2021-06-14 RX ORDER — ALBUTEROL SULFATE 90 UG/1
108 (90 BASE) AEROSOL, METERED RESPIRATORY (INHALATION)
Qty: 18 | Refills: 0 | Status: ACTIVE | COMMUNITY
Start: 2021-01-13

## 2021-06-14 NOTE — DISCUSSION/SUMMARY
[de-identified] : The patient has severe right knee osteoarthritis. They are not an appropriate candidate for surgical intervention at this time due to her smoking Hx and BMI.  An extensive discussion was conducted on the natural history of the disease and the variety of surgical and non-surgical options available to the patient including, but not limited to non-steroidal anti-inflammatory medications, steroid injections, physical therapy, maintenance of ideal body weight, and reduction of activity. The patient will schedule an appointment as needed. \par \par She will see us at the [facility] office to start gel injections again. She is [employer] and will not need pre-auth.

## 2021-06-14 NOTE — PHYSICAL EXAM
[de-identified] : Well developed, well nourished in no apparent distress, awake, alert and orientated to person, place and time. with appropriate mood and affect. \par Respirations are even and unlabored. Gait evaluation does reveal a limp. There is no inguinal adenopathy. \par The affected limb is well-perfused, without skin lesions, shows a grossly normal motor and sensory examination. \par Knee motion is significantly reduced and does cause significant pain. ROM of the knee is  degrees. 5-10 degrees of varus\par The knee is stable within that range-of-motion to AP and ML stress. \par Muscle strength is normal. Pedal pulses are palpable.  [de-identified] : Long standing  knee, AP knee, lateral knee, and patellar views of the right knee were ordered and taken in the office and demonstrate severe degenerative joint disease of the knee with joint space narrowing, osteophyte formation, and subchondral sclerosis.

## 2021-06-14 NOTE — HISTORY OF PRESENT ILLNESS
[de-identified] : This is a very nice  75 year old  female experiencing worsening pain in the right knee which is severe in intensity and has been going on for at least 3 months now. She has tried cortisone and that only works for 2 weeks, She does get good relief form gel, She is a smoker.  The pain substantially limits activities of daily living. Walking tolerance is reduced. Medication and activity modification have been minimally effective for a period lasting greater than three months in duration. Assistive devices and external support were not deemed by the patient to be helpful in improving their function. Due to the severity of osteoarthritis and level of pain, physical therapy is contraindicated. Pain and restriction of function are intolerable at this time. The patient denies any radiation of the pain to the feet and it is not associated with numbness, tingling, or weakness.

## 2021-06-29 ENCOUNTER — APPOINTMENT (OUTPATIENT)
Dept: ORTHOPEDIC SURGERY | Facility: CLINIC | Age: 76
End: 2021-06-29
Payer: MEDICARE

## 2021-06-29 PROCEDURE — 20610 DRAIN/INJ JOINT/BURSA W/O US: CPT | Mod: RT

## 2021-06-29 NOTE — PROCEDURE
[de-identified] : Injection: right knee joint. \par \par Indication: Osteoarthritis. \par \par A discussion was had with the patient regarding this procedure and all questions were answered. All risks, benefits and alternatives were discussed. These included but were not limited to bleeding, infection, and allergic reaction. Alcohol was used to clean the skin, and betadine was used to sterilize and prep the area in the anterior-medial aspect of the knee. Ethyl chloride spray was then used as a topical anesthetic. A 22-gauge needle was used to inject 2 cc of Euflexxa into the knee with ease. A sterile bandage was then applied. The patient tolerated the procedure well and there were no complications. \par \par Lot #:  [id number]\par Exp: 04-\par \par The INJECTION # one of three Euflexxa injections was given today under sterile conditions into the knee joint without complication (see procedure note). I again discussed the role of activity modification/icing following the injection to treat any local irritation from the injection. \par \par

## 2021-07-06 ENCOUNTER — APPOINTMENT (OUTPATIENT)
Dept: ORTHOPEDIC SURGERY | Facility: CLINIC | Age: 76
End: 2021-07-06
Payer: MEDICARE

## 2021-07-06 PROCEDURE — 20610 DRAIN/INJ JOINT/BURSA W/O US: CPT | Mod: RT

## 2021-07-06 NOTE — PROCEDURE
[de-identified] : Injection: right knee joint. \par \par Indication: Osteoarthritis. \par \par A discussion was had with the patient regarding this procedure and all questions were answered. All risks, benefits and alternatives were discussed. These included but were not limited to bleeding, infection, and allergic reaction. Alcohol was used to clean the skin, and betadine was used to sterilize and prep the area in the anterior-medial aspect of the knee. Ethyl chloride spray was then used as a topical anesthetic. A 22-gauge needle was used to inject 2 cc of Euflexxa into the knee with ease. A sterile bandage was then applied. The patient tolerated the procedure well and there were no complications. \par \par Lot #:  [id number]\par Exp: 04-\par \par The INJECTION # two of three Euflexxa injections was given today under sterile conditions into the knee joint without complication (see procedure note). I again discussed the role of activity modification/icing following the injection to treat any local irritation from the injection. \par \par

## 2021-07-07 ENCOUNTER — RESULT CHARGE (OUTPATIENT)
Age: 76
End: 2021-07-07

## 2021-07-08 NOTE — DISCUSSION/SUMMARY
[FreeTextEntry1] : She is a 75 year old, obese woman, with chronic hypertension, coronary artery disease with PCI in 2004, and chronic atrial fibrillation. Today her heart rate is elevated, 140s per ECG.\par Advised to restart Metoprolol 50mg BID today for rate control. \par Stable HF with preserved EF.\par \par BP is okay, continue Losartan to 25 mg daily.\par Restart Eliquis BID for stroke risk reduction today.\par Advised to restart furosemide today, refilled. \par Education about current medications, indications and directions for proper use provided and reinforced. \par Continue smoking cessation if possible.\par \par Follow up in 3 months.

## 2021-07-08 NOTE — CARDIOLOGY SUMMARY
[___] : [unfilled] [Inf Wall Defect] : inferior wall defect [LVEF ___%] : LVEF [unfilled]% [de-identified] : 5/11/2021. atrial fibrillation rate 140s.

## 2021-07-08 NOTE — PHYSICAL EXAM
[General Appearance - Well Developed] : well developed [General Appearance - In No Acute Distress] : no acute distress [Normal Conjunctiva] : the conjunctiva exhibited no abnormalities [No Oral Pallor] : no oral pallor [No Oral Cyanosis] : no oral cyanosis [Normal Jugular Venous A Waves Present] : normal jugular venous A waves present [Normal Jugular Venous V Waves Present] : normal jugular venous V waves present [No Jugular Venous Ling A Waves] : no jugular venous ling A waves [Respiration, Rhythm And Depth] : normal respiratory rhythm and effort [Heart Sounds] : normal S1 and S2 [Murmurs] : no murmurs present [Arterial Pulses Normal] : the arterial pulses were normal [Regular] : the rhythm was regular [Abdomen Soft] : soft [Abdomen Mass (___ Cm)] : no abdominal mass palpated [Nail Clubbing] : no clubbing of the fingernails [Cyanosis, Localized] : no localized cyanosis [Petechial Hemorrhages (___cm)] : no petechial hemorrhages [Skin Color & Pigmentation] : normal skin color and pigmentation [] : no rash [No Venous Stasis] : no venous stasis [Skin Lesions] : no skin lesions [No Skin Ulcers] : no skin ulcer [No Xanthoma] : no  xanthoma was observed [Oriented To Time, Place, And Person] : oriented to person, place, and time [Affect] : the affect was normal [Mood] : the mood was normal [No Anxiety] : not feeling anxious [FreeTextEntry1] : walks with cane

## 2021-07-13 ENCOUNTER — APPOINTMENT (OUTPATIENT)
Dept: ORTHOPEDIC SURGERY | Facility: CLINIC | Age: 76
End: 2021-07-13
Payer: MEDICARE

## 2021-07-13 PROCEDURE — 20610 DRAIN/INJ JOINT/BURSA W/O US: CPT | Mod: RT

## 2021-07-13 NOTE — PROCEDURE
[de-identified] : Injection: right knee joint. \par \par Indication: Osteoarthritis. \par \par A discussion was had with the patient regarding this procedure and all questions were answered. All risks, benefits and alternatives were discussed. These included but were not limited to bleeding, infection, and allergic reaction. Alcohol was used to clean the skin, and betadine was used to sterilize and prep the area in the anterior-medial aspect of the knee. Ethyl chloride spray was then used as a topical anesthetic. A 22-gauge needle was used to inject 2 cc of Euflexxa into the knee with ease. A sterile bandage was then applied. The patient tolerated the procedure well and there were no complications. \par \par Lot #:  [id number]\par Exp: 04-\par \par The INJECTION # 3 of three Euflexxa injections was given today under sterile conditions into the knee joint without complication (see procedure note). I again discussed the role of activity modification/icing following the injection to treat any local irritation from the injection. \par \par

## 2021-08-30 ENCOUNTER — APPOINTMENT (OUTPATIENT)
Dept: CARDIOLOGY | Facility: CLINIC | Age: 76
End: 2021-08-30

## 2021-09-03 ENCOUNTER — APPOINTMENT (OUTPATIENT)
Dept: CARDIOLOGY | Facility: CLINIC | Age: 76
End: 2021-09-03
Payer: MEDICARE

## 2021-09-03 ENCOUNTER — NON-APPOINTMENT (OUTPATIENT)
Age: 76
End: 2021-09-03

## 2021-09-03 VITALS
HEART RATE: 125 BPM | DIASTOLIC BLOOD PRESSURE: 88 MMHG | SYSTOLIC BLOOD PRESSURE: 140 MMHG | WEIGHT: 231 LBS | OXYGEN SATURATION: 93 % | BODY MASS INDEX: 38.44 KG/M2

## 2021-09-03 VITALS — SYSTOLIC BLOOD PRESSURE: 136 MMHG | DIASTOLIC BLOOD PRESSURE: 82 MMHG

## 2021-09-03 PROCEDURE — 99214 OFFICE O/P EST MOD 30 MIN: CPT

## 2021-09-03 PROCEDURE — 93000 ELECTROCARDIOGRAM COMPLETE: CPT

## 2021-09-03 NOTE — DISCUSSION/SUMMARY
[FreeTextEntry1] : She is a 75 year old, obese woman, with chronic hypertension, coronary artery disease with PCI in 2004, and chronic atrial fibrillation. Today her heart rate is slightly elevated, 100s per ECG.\par Continue Metoprolol but at the 50mg BID dose. for rate control. \par Stable HF with preserved EF.\par BP is okay, continue Losartan to 25 mg daily.\par Continue Eliquis BID for stroke risk reduction today.\par \par Continue smoking cessation if possible.\par \par Follow up in 3 months.

## 2021-09-03 NOTE — CARDIOLOGY SUMMARY
[___] : [unfilled] [Inf Wall Defect] : inferior wall defect [LVEF ___%] : LVEF [unfilled]% [de-identified] : 5/11/2021. atrial fibrillation rate 140s.

## 2021-09-03 NOTE — HISTORY OF PRESENT ILLNESS
[FreeTextEntry1] : Feeling well in general. Did not yet have dental implants. Waiting on dentist...\par GUTHRIE slightly improved. \par Having some dizziness at night while in bed. Feels better after sitting up for a few minutes. Happens 3 x month.\par Fell and hurt her right knee. Saw ortho. Brace to right ankle. Some swelling, but she says its unchanged. 40 Lasix qd. \par On Eliquis- no bruising or bleeding.\par \par \par Prior:\par Feeling ok. Had tooth extraction yesterday, off Eliquis for procedure. \par Some GUTHRIE. Has fatigue. Has gained some weight, likely water as she is out of her furosemide pills.\par She had stopped her metoprolol 2 days ago because she was concerned it would make her bleed during the dental procedure. \par \par Covid vaccinated. \par Still smoking.\par \par

## 2021-09-20 RX ORDER — CLINDAMYCIN HYDROCHLORIDE 300 MG/1
300 CAPSULE ORAL
Qty: 21 | Refills: 0 | Status: DISCONTINUED | COMMUNITY
Start: 2021-05-10 | End: 2021-09-20

## 2021-12-06 ENCOUNTER — APPOINTMENT (OUTPATIENT)
Dept: CARDIOLOGY | Facility: CLINIC | Age: 76
End: 2021-12-06
Payer: MEDICARE

## 2021-12-06 ENCOUNTER — NON-APPOINTMENT (OUTPATIENT)
Age: 76
End: 2021-12-06

## 2021-12-06 VITALS
HEART RATE: 99 BPM | OXYGEN SATURATION: 96 % | WEIGHT: 233 LBS | BODY MASS INDEX: 38.77 KG/M2 | SYSTOLIC BLOOD PRESSURE: 130 MMHG | DIASTOLIC BLOOD PRESSURE: 100 MMHG

## 2021-12-06 DIAGNOSIS — R60.0 LOCALIZED EDEMA: ICD-10-CM

## 2021-12-06 DIAGNOSIS — R06.89 OTHER ABNORMALITIES OF BREATHING: ICD-10-CM

## 2021-12-06 PROCEDURE — 99215 OFFICE O/P EST HI 40 MIN: CPT

## 2021-12-06 PROCEDURE — 93000 ELECTROCARDIOGRAM COMPLETE: CPT

## 2021-12-06 NOTE — HISTORY OF PRESENT ILLNESS
[FreeTextEntry1] : Feel about a month ago. Left knee pain with swelling. Has not yet seen a physician for her injury.\par Having some morning shortness of breath and increased GUTHRIE. Now has new inhalers per Dr. Butler. \par Not sleeping well. No orthopnea or PND.  \par Accompanied by her daughter. Now using a wheelchair.\par \par \par Prior:\par Feeling well in general. Did not yet have dental implants. Waiting on dentist...\par GUTHRIE slightly improved. \par Having some dizziness at night while in bed. Feels better after sitting up for a few minutes. Happens 3 x month.\par Fell and hurt her right knee. Saw ortho. Brace to right ankle. Some swelling, but she says its unchanged. 40 Lasix qd. \par On Eliquis- no bruising or bleeding.\par \par \par Prior:\par Feeling ok. Had tooth extraction yesterday, off Eliquis for procedure. \par Some GUTHRIE. Has fatigue. Has gained some weight, likely water as she is out of her furosemide pills.\par She had stopped her metoprolol 2 days ago because she was concerned it would make her bleed during the dental procedure. \par \par Covid vaccinated. \par Still smoking.\par \par

## 2021-12-06 NOTE — CARDIOLOGY SUMMARY
[___] : [unfilled] [Inf Wall Defect] : inferior wall defect [LVEF ___%] : LVEF [unfilled]% [de-identified] : 5/11/2021. atrial fibrillation rate 140s.

## 2021-12-06 NOTE — DISCUSSION/SUMMARY
[FreeTextEntry1] : She is a 75 year old, obese woman, with chronic hypertension, coronary artery disease with PCI in 2004, and chronic atrial fibrillation.  \par Bilateral leg edema likeley related to being sedentary and not being mobile.\par DAILY lasix !!!! Labs to r/o CHF.\par Continue Metoprolol but at the 50mg BID dose. for rate control.  BP is okay, continue Losartan to 25 mg daily.\par Continue Eliquis BID for stroke risk reduction today. Doubt DVT, but will get an ultrasound to exclude.\par \par Continue smoking cessation if possible.\par \par Follow up in 1 months.
(0) understands/communicates without difficulty

## 2021-12-07 LAB
ALBUMIN SERPL ELPH-MCNC: 3.6 G/DL
ALP BLD-CCNC: 99 U/L
ALT SERPL-CCNC: 19 U/L
ANION GAP SERPL CALC-SCNC: 14 MMOL/L
AST SERPL-CCNC: 21 U/L
BASOPHILS # BLD AUTO: 0.06 K/UL
BASOPHILS NFR BLD AUTO: 0.6 %
BILIRUB SERPL-MCNC: 0.6 MG/DL
BUN SERPL-MCNC: 19 MG/DL
CALCIUM SERPL-MCNC: 8.5 MG/DL
CHLORIDE SERPL-SCNC: 104 MMOL/L
CO2 SERPL-SCNC: 22 MMOL/L
CREAT SERPL-MCNC: 0.85 MG/DL
DEPRECATED D DIMER PPP IA-ACNC: <150 NG/ML DDU
EOSINOPHIL # BLD AUTO: 0.25 K/UL
EOSINOPHIL NFR BLD AUTO: 2.5 %
GLUCOSE SERPL-MCNC: 96 MG/DL
HCT VFR BLD CALC: 44.6 %
HGB BLD-MCNC: 14.2 G/DL
IMM GRANULOCYTES NFR BLD AUTO: 0.4 %
LYMPHOCYTES # BLD AUTO: 2.19 K/UL
LYMPHOCYTES NFR BLD AUTO: 22 %
MAN DIFF?: NORMAL
MCHC RBC-ENTMCNC: 31.7 PG
MCHC RBC-ENTMCNC: 31.8 GM/DL
MCV RBC AUTO: 99.6 FL
MONOCYTES # BLD AUTO: 1.01 K/UL
MONOCYTES NFR BLD AUTO: 10.2 %
NEUTROPHILS # BLD AUTO: 6.39 K/UL
NEUTROPHILS NFR BLD AUTO: 64.3 %
NT-PROBNP SERPL-MCNC: 1584 PG/ML
PLATELET # BLD AUTO: 203 K/UL
POTASSIUM SERPL-SCNC: 4.3 MMOL/L
PROT SERPL-MCNC: 6.4 G/DL
RBC # BLD: 4.48 M/UL
RBC # FLD: 16.4 %
SODIUM SERPL-SCNC: 139 MMOL/L
WBC # FLD AUTO: 9.94 K/UL

## 2021-12-10 ENCOUNTER — RX RENEWAL (OUTPATIENT)
Age: 76
End: 2021-12-10

## 2021-12-18 ENCOUNTER — TRANSCRIPTION ENCOUNTER (OUTPATIENT)
Age: 76
End: 2021-12-18

## 2021-12-18 ENCOUNTER — RX RENEWAL (OUTPATIENT)
Age: 76
End: 2021-12-18

## 2021-12-20 ENCOUNTER — OUTPATIENT (OUTPATIENT)
Dept: OUTPATIENT SERVICES | Facility: HOSPITAL | Age: 76
LOS: 1 days | End: 2021-12-20
Payer: MEDICARE

## 2021-12-20 ENCOUNTER — APPOINTMENT (OUTPATIENT)
Dept: ULTRASOUND IMAGING | Facility: HOSPITAL | Age: 76
End: 2021-12-20

## 2021-12-20 DIAGNOSIS — Z00.8 ENCOUNTER FOR OTHER GENERAL EXAMINATION: ICD-10-CM

## 2021-12-20 DIAGNOSIS — Z95.5 PRESENCE OF CORONARY ANGIOPLASTY IMPLANT AND GRAFT: Chronic | ICD-10-CM

## 2021-12-20 DIAGNOSIS — Z96.659 PRESENCE OF UNSPECIFIED ARTIFICIAL KNEE JOINT: Chronic | ICD-10-CM

## 2021-12-20 DIAGNOSIS — Z98.890 OTHER SPECIFIED POSTPROCEDURAL STATES: Chronic | ICD-10-CM

## 2021-12-20 DIAGNOSIS — Z96.7 PRESENCE OF OTHER BONE AND TENDON IMPLANTS: Chronic | ICD-10-CM

## 2021-12-20 DIAGNOSIS — Z90.49 ACQUIRED ABSENCE OF OTHER SPECIFIED PARTS OF DIGESTIVE TRACT: Chronic | ICD-10-CM

## 2021-12-20 PROCEDURE — 93970 EXTREMITY STUDY: CPT

## 2021-12-20 PROCEDURE — 93970 EXTREMITY STUDY: CPT | Mod: 26

## 2021-12-28 ENCOUNTER — APPOINTMENT (OUTPATIENT)
Dept: ORTHOPEDIC SURGERY | Facility: CLINIC | Age: 76
End: 2021-12-28
Payer: MEDICARE

## 2021-12-28 DIAGNOSIS — Z96.652 PRESENCE OF LEFT ARTIFICIAL KNEE JOINT: ICD-10-CM

## 2021-12-28 PROCEDURE — 20610 DRAIN/INJ JOINT/BURSA W/O US: CPT | Mod: RT

## 2021-12-28 PROCEDURE — 73564 X-RAY EXAM KNEE 4 OR MORE: CPT | Mod: 50

## 2021-12-28 PROCEDURE — 99214 OFFICE O/P EST MOD 30 MIN: CPT | Mod: 25

## 2021-12-28 RX ORDER — OXYCODONE AND ACETAMINOPHEN 5; 325 MG/1; MG/1
5-325 TABLET ORAL
Qty: 10 | Refills: 0 | Status: ACTIVE | COMMUNITY
Start: 2021-09-29

## 2021-12-28 NOTE — DISCUSSION/SUMMARY
[de-identified] : The patient has severe right knee osteoarthritis.  She also has a well-functioning left total knee arthroplasty.  There is edema in the left lower extremity which she is following up with her primary care doctor for.  They are not an appropriate candidate for surgical intervention at this time due to her smoking Hx and BMI.  An extensive discussion was conducted on the natural history of the disease and the variety of surgical and non-surgical options available to the patient including, but not limited to non-steroidal anti-inflammatory medications, steroid injections, physical therapy, maintenance of ideal body weight, and reduction of activity.  Today we performed a right knee intra-articular cortisone injection.  She can follow-up in 1 month to initiate another course of hyaluronic acid injections at is too soon today to initiate that course again.  The patient will schedule an appointment as needed. \par \par Informed consent for the right knee injection was obtained. All questions were answered. A time out was performed. The right knee was prepped and draped in sterile fashion. Using sterile technique, the right knee was injected with 2cc of Kenalog, 4cc of 1% lidocaine, 4cc of 0.25% marcaine using a 21-gauge needle. A sterile dressing was applied. Post injection instructions were reviewed. The patient tolerated the procedure well.\par

## 2021-12-28 NOTE — PHYSICAL EXAM
[de-identified] : Patient is well nourished, well-developed, in no acute distress, with appropriate mood and affect. The patient is oriented to time, place, and person. Respirations are even and unlabored. Gait evaluation does reveal a limp. There is no inguinal adenopathy. Bilateral limbs are well-perfused, without skin lesions, shows a grossly normal motor and sensory examination. The right knee motion is significantly reduced and does cause significant pain. The right knee moves from 20-95 degrees. The knee is stable within that range-of-motion to AP and ML stress. The alignment of the knee is 5 degrees varus. Muscle strength is normal. Pedal pulses are palpable. Hip examination was negative. The left knee motion is painless and the left knee moves from 0-110 degrees. The knee is stable within that range-of-motion to AP and ML stress. The alignment of the knee is neutral.  Well-healed midline surgical scar.  1+ edema noted in the left lower extremity.  Muscle strength is normal. Pedal pulses are palpable. Hip examination was negative. [de-identified] : Long standing  knee, AP knee, lateral knee, and patellar views of the right knee were ordered and taken in the office and demonstrate severe degenerative joint disease of the knee with joint space narrowing, osteophyte formation, and subchondral sclerosis. \par \par AP, lateral, sunrise knee x-rays of the left knee were ordered and obtained in the office and demonstrate satisfactory position and alignment of the components are present. No signs of loosening are seen.

## 2022-01-17 ENCOUNTER — APPOINTMENT (OUTPATIENT)
Dept: CARDIOLOGY | Facility: CLINIC | Age: 77
End: 2022-01-17
Payer: MEDICARE

## 2022-01-17 ENCOUNTER — NON-APPOINTMENT (OUTPATIENT)
Age: 77
End: 2022-01-17

## 2022-01-17 VITALS — HEART RATE: 116 BPM | OXYGEN SATURATION: 92 % | DIASTOLIC BLOOD PRESSURE: 100 MMHG | SYSTOLIC BLOOD PRESSURE: 130 MMHG

## 2022-01-17 DIAGNOSIS — R05.9 COUGH, UNSPECIFIED: ICD-10-CM

## 2022-01-17 PROCEDURE — 99213 OFFICE O/P EST LOW 20 MIN: CPT

## 2022-01-17 NOTE — CARDIOLOGY SUMMARY
[de-identified] : 5/11/2021. atrial fibrillation rate 140s. [___] : [unfilled] [Inf Wall Defect] : inferior wall defect [LVEF ___%] : LVEF [unfilled]%

## 2022-01-17 NOTE — HISTORY OF PRESENT ILLNESS
[FreeTextEntry1] : She is accompanied by her daughter. She has been coughing for a few days.\par Her daughter had a cough since last Tuesday. Her COVID tests were negative.\par The  patient continues coughing. No changes to he medications.\par \par \par Prior:\par Feel about a month ago. Left knee pain with swelling. Has not yet seen a physician for her injury.\par Having some morning shortness of breath and increased GUTHRIE. Now has new inhalers per Dr. Butler. \par Not sleeping well. No orthopnea or PND.  \par Accompanied by her daughter. Now using a wheelchair.\par \par \par Prior:\par Feeling well in general. Did not yet have dental implants. Waiting on dentist...\par GUTHRIE slightly improved. \par Having some dizziness at night while in bed. Feels better after sitting up for a few minutes. Happens 3 x month.\par Fell and hurt her right knee. Saw ortho. Brace to right ankle. Some swelling, but she says its unchanged. 40 Lasix qd. \par On Eliquis- no bruising or bleeding.\par \par \par Prior:\par Feeling ok. Had tooth extraction yesterday, off Eliquis for procedure. \par Some GUTHRIE. Has fatigue. Has gained some weight, likely water as she is out of her furosemide pills.\par She had stopped her metoprolol 2 days ago because she was concerned it would make her bleed during the dental procedure. \par \par Covid vaccinated. \par Still smoking.\par \par

## 2022-01-17 NOTE — PHYSICAL EXAM
[General Appearance - Well Developed] : well developed [General Appearance - In No Acute Distress] : no acute distress [Normal Conjunctiva] : the conjunctiva exhibited no abnormalities [No Oral Pallor] : no oral pallor [No Oral Cyanosis] : no oral cyanosis [Normal Jugular Venous A Waves Present] : normal jugular venous A waves present [Normal Jugular Venous V Waves Present] : normal jugular venous V waves present [No Jugular Venous Ling A Waves] : no jugular venous ling A waves [Respiration, Rhythm And Depth] : normal respiratory rhythm and effort [Heart Sounds] : normal S1 and S2 [Murmurs] : no murmurs present [Arterial Pulses Normal] : the arterial pulses were normal [Regular] : the rhythm was regular [Abdomen Soft] : soft [Abdomen Mass (___ Cm)] : no abdominal mass palpated [FreeTextEntry1] : walks with cane [Nail Clubbing] : no clubbing of the fingernails [Cyanosis, Localized] : no localized cyanosis [Petechial Hemorrhages (___cm)] : no petechial hemorrhages [Skin Color & Pigmentation] : normal skin color and pigmentation [] : no rash [No Venous Stasis] : no venous stasis [Skin Lesions] : no skin lesions [No Skin Ulcers] : no skin ulcer [No Xanthoma] : no  xanthoma was observed [Oriented To Time, Place, And Person] : oriented to person, place, and time [Affect] : the affect was normal [Mood] : the mood was normal [No Anxiety] : not feeling anxious

## 2022-01-17 NOTE — DISCUSSION/SUMMARY
[FreeTextEntry1] : She is a 76 year old, obese woman, with chronic hypertension, coronary artery disease with PCI in 2004, and chronic atrial fibrillation.  Now with a hacking cough.\par Concern for viral bronchitis. Called Dr. Butler. He will swab her and see her today or tomorrow.\par BP today poorly measured due to coughing.\par For now: Continue Metoprolol but at the 50mg BID dose. for rate control.  BP is okay, continue Losartan to 25 mg daily.\par Continue Eliquis BID for stroke risk reduction today.  \par  \par \par Follow up in 1.5 months.

## 2022-01-23 ENCOUNTER — TRANSCRIPTION ENCOUNTER (OUTPATIENT)
Age: 77
End: 2022-01-23

## 2022-01-27 ENCOUNTER — APPOINTMENT (OUTPATIENT)
Dept: SURGERY | Facility: CLINIC | Age: 77
End: 2022-01-27
Payer: MEDICARE

## 2022-01-27 VITALS
BODY MASS INDEX: 37.67 KG/M2 | RESPIRATION RATE: 17 BRPM | SYSTOLIC BLOOD PRESSURE: 130 MMHG | DIASTOLIC BLOOD PRESSURE: 81 MMHG | TEMPERATURE: 98 F | WEIGHT: 240 LBS | HEIGHT: 67 IN | HEART RATE: 121 BPM | OXYGEN SATURATION: 98 %

## 2022-01-27 DIAGNOSIS — F17.200 NICOTINE DEPENDENCE, UNSPECIFIED, UNCOMPLICATED: ICD-10-CM

## 2022-01-27 DIAGNOSIS — S30.1XXA CONTUSION OF ABDOMINAL WALL, INITIAL ENCOUNTER: ICD-10-CM

## 2022-01-27 PROCEDURE — 99204 OFFICE O/P NEW MOD 45 MIN: CPT

## 2022-01-27 RX ORDER — DOXYCYCLINE HYCLATE 100 MG/1
100 CAPSULE ORAL
Qty: 20 | Refills: 0 | Status: DISCONTINUED | COMMUNITY
Start: 2021-12-18 | End: 2022-01-27

## 2022-01-27 RX ORDER — DOXYCYCLINE 100 MG/1
100 CAPSULE ORAL
Qty: 20 | Refills: 0 | Status: DISCONTINUED | COMMUNITY
Start: 2021-03-27 | End: 2022-01-27

## 2022-01-27 RX ORDER — METHYLPREDNISOLONE 4 MG/1
4 TABLET ORAL
Qty: 21 | Refills: 0 | Status: DISCONTINUED | COMMUNITY
Start: 2021-01-27 | End: 2022-01-27

## 2022-01-27 RX ORDER — CELECOXIB 100 MG/1
100 CAPSULE ORAL TWICE DAILY
Qty: 28 | Refills: 0 | Status: DISCONTINUED | COMMUNITY
Start: 2018-12-10 | End: 2022-01-27

## 2022-01-27 RX ORDER — HYALURONATE SODIUM 20 MG/2 ML
20 SYRINGE (ML) INTRAARTICULAR
Qty: 1 | Refills: 0 | Status: DISCONTINUED | COMMUNITY
Start: 2020-05-20 | End: 2022-01-27

## 2022-01-27 RX ORDER — PREDNISONE 10 MG/1
10 TABLET ORAL
Qty: 100 | Refills: 0 | Status: DISCONTINUED | COMMUNITY
Start: 2021-11-23 | End: 2022-01-27

## 2022-01-27 RX ORDER — CLARITHROMYCIN 500 MG/1
500 TABLET, FILM COATED ORAL
Qty: 10 | Refills: 0 | Status: DISCONTINUED | COMMUNITY
Start: 2019-09-27 | End: 2022-01-27

## 2022-01-27 RX ORDER — CHLORHEXIDINE GLUCONATE, 0.12% ORAL RINSE 1.2 MG/ML
0.12 SOLUTION DENTAL
Qty: 473 | Refills: 0 | Status: DISCONTINUED | COMMUNITY
Start: 2021-09-29 | End: 2022-01-27

## 2022-01-27 RX ORDER — HYALURONATE SODIUM 20 MG/2 ML
20 SYRINGE (ML) INTRAARTICULAR DAILY
Qty: 1 | Refills: 0 | Status: DISCONTINUED | COMMUNITY
Start: 2020-12-15 | End: 2022-01-27

## 2022-01-27 RX ORDER — SODIUM SULFATE, POTASSIUM SULFATE, MAGNESIUM SULFATE 17.5; 3.13; 1.6 G/ML; G/ML; G/ML
17.5-3.13-1.6 SOLUTION, CONCENTRATE ORAL
Qty: 354 | Refills: 0 | Status: DISCONTINUED | COMMUNITY
Start: 2021-03-01 | End: 2022-01-27

## 2022-01-27 NOTE — PHYSICAL EXAM
[Normal Breath Sounds] : Normal breath sounds [Normal Heart Sounds] : normal heart sounds [No Rash or Lesion] : No rash or lesion [Alert] : alert [Oriented to Person] : oriented to person [Oriented to Place] : oriented to place [Oriented to Time] : oriented to time [Calm] : calm [No HSM] : no hepatosplenomegaly [Abdomen Tenderness] : ~T ~M No abdominal tenderness [de-identified] : WNL-in no acute distress [de-identified] : WNL- cranial nerves 2-12 intact [de-identified] : There is an obvious abdominal wall hematoma in the right lower quadrant just to the right of the midline with extensive ecchymosis extending inferiorly down to the pubis and around to the right flank. [de-identified] : ambulates with cane

## 2022-01-27 NOTE — ASSESSMENT
[FreeTextEntry1] : I discussed with the patient and her daughter that this hematoma is secondary to the falls that the patient has had and that she is on Eliquis.  I have also told the patient and the daughter that I am unable to open the CD from [facility] but that I will take it to [facility] and have them loaded for me so I can evaluate exactly where this hematoma is.  I told the family that in view of its been 3 weeks that this is the normal hematoma and ecchymosis 1 would expect and that it may take months for this to get better.  Once I have reviewed the images I will call the family and let them know if there is anything new.  In any event I want to see the patient back here in approximately 1 month.

## 2022-01-27 NOTE — HISTORY OF PRESENT ILLNESS
[de-identified] : Mirna is a 76 year old female here for consultation. Today pt reports RLQ pain 5/10 when she is examined but if left alone has no discomofrt.  . 3 hard BMs weekly. Small appetite, no nausea, no vomiting. Denies fever and chills. Pt reports fall 3 months ago, tripped over object and another fall 3 weeks ago. Pt on Eliquis for cardiac stent placed in 2004. Pt ambulates with cane, lives with her daughter, she is here today for evaluation of an extensive ecchymosis from an abdominal wall hematoma which has been documented on a CT scan done at [facility].  I have tried to load those images on my computer here in the office but I am unable to.

## 2022-01-27 NOTE — CONSULT LETTER
[Dear  ___] : Dear  [unfilled], [Consult Letter:] : I had the pleasure of evaluating your patient, [unfilled]. [Please see my note below.] : Please see my note below. [Consult Closing:] : Thank you very much for allowing me to participate in the care of this patient.  If you have any questions, please do not hesitate to contact me. [Sincerely,] : Sincerely, [FreeTextEntry3] : I have reviewed all the documentation for this encounter with the patient and have edited where appropriate\par \par Dr. Rohit Centeno

## 2022-01-31 ENCOUNTER — NON-APPOINTMENT (OUTPATIENT)
Age: 77
End: 2022-01-31

## 2022-02-17 ENCOUNTER — APPOINTMENT (OUTPATIENT)
Dept: ORTHOPEDIC SURGERY | Facility: CLINIC | Age: 77
End: 2022-02-17
Payer: MEDICARE

## 2022-02-17 PROCEDURE — 99214 OFFICE O/P EST MOD 30 MIN: CPT | Mod: 25

## 2022-02-17 PROCEDURE — 20610 DRAIN/INJ JOINT/BURSA W/O US: CPT | Mod: RT

## 2022-02-17 RX ORDER — HYALURONATE SODIUM 20 MG/2 ML
20 SYRINGE (ML) INTRAARTICULAR
Qty: 1 | Refills: 0 | Status: ACTIVE | COMMUNITY
Start: 2022-02-17

## 2022-02-17 NOTE — DISCUSSION/SUMMARY
[de-identified] : The patient has severe right knee osteoarthritis.  She also has a well-functioning left total knee arthroplasty.  There is edema in the left lower extremity which she is following up with her primary care doctor for.  They are not an appropriate candidate for surgical intervention at this time due to her smoking Hx and BMI.  An extensive discussion was conducted on the natural history of the disease and the variety of surgical and non-surgical options available to the patient including, but not limited to non-steroidal anti-inflammatory medications, steroid injections, physical therapy, maintenance of ideal body weight, and reduction of activity.  Today we performed a right knee intra-articular Euflexxa injection.   The patient will schedule an appointment as needed. \par \par Recommendation: Trial of Visco supplementation. We'll obtain preauthorization prior to injection therapy.\par \par **NB: Medication was Issued under circumstances where the provider (Dr. Jonathan Kay) reasonably determined that it would be impractical for the patient to obtain substances prescribed by electronic prescription (e-prescribe) given need for pre-authorization, and such delay would adversely impact the patient's medical condition. An exception letter will be mailed to the [facility] during the authorization process.**\par \par \par \par Injection: Right knee joint.\par Indication: Osteoarthritis.\par \par A discussion was had with the patient regarding this procedure and all questions were answered. All risks, benefits and alternatives were discussed. These included but were not limited to bleeding, infection, and allergic reaction. Alcohol was used to clean the skin, and betadine was used to sterilize and prep the area in the anteromedial aspect of the knee. Ethyl chloride spray was then used as a topical anesthetic. A 22-gauge needle was used to inject 2 cc of Euflexxa into the knee with ease. A sterile bandage was then applied. The patient tolerated the procedure well and there were no complications. \par \par Lot #: [id number]\par Exp: 2022-11-13\par \par The first of three Euflexxa injections was given today under sterile conditions into the right knee joint without complication (see procedure note). I again discussed the role of activity modification/icing following the injection to treat any local irritation from the injection. \par \par I will have the patient followup for the next injection in approximately 1 week.\par

## 2022-02-17 NOTE — PHYSICAL EXAM
[de-identified] : Patient is well nourished, well-developed, in no acute distress, with appropriate mood and affect. The patient is oriented to time, place, and person. Respirations are even and unlabored. Gait evaluation does reveal a limp. There is no inguinal adenopathy. Bilateral limbs are well-perfused, without skin lesions, shows a grossly normal motor and sensory examination. The right knee motion is significantly reduced and does cause significant pain. The right knee moves from 20-95 degrees. The knee is stable within that range-of-motion to AP and ML stress. The alignment of the knee is 5 degrees varus. Muscle strength is normal. Pedal pulses are palpable. Hip examination was negative. The left knee motion is painless and the left knee moves from 0-110 degrees. The knee is stable within that range-of-motion to AP and ML stress. The alignment of the knee is neutral.  Well-healed midline surgical scar.  1+ edema noted in the left lower extremity.  Muscle strength is normal. Pedal pulses are palpable. Hip examination was negative.

## 2022-02-17 NOTE — HISTORY OF PRESENT ILLNESS
[de-identified] : This is a very nice 76 year old  female experiencing worsening pain in the right knee which is severe in intensity and has been going on for at least 1 year now.  She has known right knee osteoarthritis.  She has tried cortisone which has worked well in the past as does Euflexxa.  She does get good relief form gel, She is a smoker.  The pain substantially limits activities of daily living. Walking tolerance is reduced. Medication and activity modification have been minimally effective for a period lasting greater than three months in duration. Assistive devices and external support were not deemed by the patient to be helpful in improving their function. Due to the severity of osteoarthritis and level of pain, physical therapy is contraindicated. Pain and restriction of function are intolerable at this time. The patient denies any radiation of the pain to the feet and it is not associated with numbness, tingling, or weakness. \par Also here because she fell on her left knee replacement approximately 1 month ago and has had persistent swelling of the ankle since.  She is seeing her medical doctor for this.  She was already ruled out for DVT she tells me.  No fevers or chills.

## 2022-02-22 ENCOUNTER — APPOINTMENT (OUTPATIENT)
Dept: SURGERY | Facility: CLINIC | Age: 77
End: 2022-02-22

## 2022-02-22 ENCOUNTER — APPOINTMENT (OUTPATIENT)
Dept: ORTHOPEDIC SURGERY | Facility: CLINIC | Age: 77
End: 2022-02-22

## 2022-02-28 ENCOUNTER — APPOINTMENT (OUTPATIENT)
Dept: CARDIOLOGY | Facility: CLINIC | Age: 77
End: 2022-02-28
Payer: MEDICARE

## 2022-02-28 ENCOUNTER — NON-APPOINTMENT (OUTPATIENT)
Age: 77
End: 2022-02-28

## 2022-02-28 VITALS — WEIGHT: 229 LBS | BODY MASS INDEX: 35.87 KG/M2 | HEART RATE: 95 BPM | OXYGEN SATURATION: 98 %

## 2022-02-28 VITALS — SYSTOLIC BLOOD PRESSURE: 130 MMHG | DIASTOLIC BLOOD PRESSURE: 90 MMHG

## 2022-02-28 PROCEDURE — 93000 ELECTROCARDIOGRAM COMPLETE: CPT

## 2022-02-28 PROCEDURE — 99214 OFFICE O/P EST MOD 30 MIN: CPT

## 2022-02-28 NOTE — PHYSICAL EXAM
[General Appearance - Well Developed] : well developed [General Appearance - In No Acute Distress] : no acute distress [Normal Conjunctiva] : the conjunctiva exhibited no abnormalities [No Oral Pallor] : no oral pallor [No Oral Cyanosis] : no oral cyanosis [Normal Jugular Venous A Waves Present] : normal jugular venous A waves present [Normal Jugular Venous V Waves Present] : normal jugular venous V waves present [No Jugular Venous Ling A Waves] : no jugular venous ling A waves [Respiration, Rhythm And Depth] : normal respiratory rhythm and effort [Heart Sounds] : normal S1 and S2 [Murmurs] : no murmurs present [Arterial Pulses Normal] : the arterial pulses were normal [Regular] : the rhythm was regular [Abdomen Soft] : soft [Abdomen Mass (___ Cm)] : no abdominal mass palpated [FreeTextEntry1] : walks with cane [Nail Clubbing] : no clubbing of the fingernails [Cyanosis, Localized] : no localized cyanosis [Petechial Hemorrhages (___cm)] : no petechial hemorrhages [Skin Color & Pigmentation] : normal skin color and pigmentation [No Venous Stasis] : no venous stasis [] : no rash [Skin Lesions] : no skin lesions [No Skin Ulcers] : no skin ulcer [No Xanthoma] : no  xanthoma was observed [Oriented To Time, Place, And Person] : oriented to person, place, and time [Affect] : the affect was normal [Mood] : the mood was normal [No Anxiety] : not feeling anxious

## 2022-02-28 NOTE — CARDIOLOGY SUMMARY
[de-identified] : 5/11/2021. atrial fibrillation rate 140s. [___] : [unfilled] [Inf Wall Defect] : inferior wall defect [LVEF ___%] : LVEF [unfilled]%

## 2022-02-28 NOTE — HISTORY OF PRESENT ILLNESS
[FreeTextEntry1] : Took lasix 40 bid for 10 days. Now 20 qd\par \par \par \par Prior;\par She is accompanied by her daughter. She has been coughing for a few days.\par Her daughter had a cough since last Tuesday. Her COVID tests were negative.\par The  patient continues coughing. No changes to he medications.\par \par \par Prior:\par Feel about a month ago. Left knee pain with swelling. Has not yet seen a physician for her injury.\par Having some morning shortness of breath and increased GUTHRIE. Now has new inhalers per Dr. Butler. \par Not sleeping well. No orthopnea or PND.  \par Accompanied by her daughter. Now using a wheelchair.\par \par \par Prior:\par Feeling well in general. Did not yet have dental implants. Waiting on dentist...\par GUTHRIE slightly improved. \par Having some dizziness at night while in bed. Feels better after sitting up for a few minutes. Happens 3 x month.\par Fell and hurt her right knee. Saw ortho. Brace to right ankle. Some swelling, but she says its unchanged. 40 Lasix qd. \par On Eliquis- no bruising or bleeding.\par \par \par Prior:\par Feeling ok. Had tooth extraction yesterday, off Eliquis for procedure. \par Some GUTHRIE. Has fatigue. Has gained some weight, likely water as she is out of her furosemide pills.\par She had stopped her metoprolol 2 days ago because she was concerned it would make her bleed during the dental procedure. \par \par Covid vaccinated. \par Still smoking.\par \par

## 2022-02-28 NOTE — DISCUSSION/SUMMARY
[FreeTextEntry1] : She is a 76 year old, obese woman, with chronic hypertension, coronary artery disease with PCI in 2004, and chronic atrial fibrillation.  Now with HFpEF improving on lasix\par Go back to lasix 40 bid. discussed orthostatic protection with her.\par \par Labs in 2 weeks.\par no change to other meds.\par  \par For now: Continue Metoprolol but at the 50mg BID dose. for rate control.  BP is okay, continue Losartan to 25 mg daily.\par Continue Eliquis BID for stroke risk reduction today.  \par  \par \par Follow up in 6 weeks.

## 2022-03-02 ENCOUNTER — APPOINTMENT (OUTPATIENT)
Dept: ORTHOPEDIC SURGERY | Facility: CLINIC | Age: 77
End: 2022-03-02
Payer: MEDICARE

## 2022-03-02 PROCEDURE — 20610 DRAIN/INJ JOINT/BURSA W/O US: CPT | Mod: RT

## 2022-03-02 NOTE — PROCEDURE
[de-identified] : Injection: Right knee joint.\par Indication: Osteoarthritis.\par \par A discussion was had with the patient regarding this procedure and all questions were answered. All risks, benefits and alternatives were discussed. These included but were not limited to bleeding, infection, and allergic reaction. Alcohol was used to clean the skin, and betadine was used to sterilize and prep the area in the anteromedial aspect of the knee. Ethyl chloride spray was then used as a topical anesthetic. A 22-gauge needle was used to inject 2 cc of Euflexxa into the knee with ease. A sterile bandage was then applied. The patient tolerated the procedure well and there were no complications. \par \par Lot #: [id number]\par Exp: 2022-11-13\par \par The 2  of three Euflexxa injections was given today under sterile conditions into the right knee joint without complication (see procedure note). I again discussed the role of activity modification/icing following the injection to treat any local irritation from the injection. \par \par I will have the patient followup for the next injection in approximately 1 week.

## 2022-03-10 ENCOUNTER — APPOINTMENT (OUTPATIENT)
Dept: ORTHOPEDIC SURGERY | Facility: CLINIC | Age: 77
End: 2022-03-10
Payer: MEDICARE

## 2022-03-10 PROCEDURE — 20610 DRAIN/INJ JOINT/BURSA W/O US: CPT | Mod: RT

## 2022-03-10 NOTE — PROCEDURE

## 2022-03-14 ENCOUNTER — LABORATORY RESULT (OUTPATIENT)
Age: 77
End: 2022-03-14

## 2022-03-23 ENCOUNTER — NON-APPOINTMENT (OUTPATIENT)
Age: 77
End: 2022-03-23

## 2022-04-25 ENCOUNTER — APPOINTMENT (OUTPATIENT)
Dept: CARDIOLOGY | Facility: CLINIC | Age: 77
End: 2022-04-25
Payer: MEDICARE

## 2022-04-25 ENCOUNTER — NON-APPOINTMENT (OUTPATIENT)
Age: 77
End: 2022-04-25

## 2022-04-25 VITALS
SYSTOLIC BLOOD PRESSURE: 128 MMHG | BODY MASS INDEX: 34.77 KG/M2 | DIASTOLIC BLOOD PRESSURE: 90 MMHG | OXYGEN SATURATION: 98 % | HEART RATE: 92 BPM | WEIGHT: 222 LBS

## 2022-04-25 PROCEDURE — 93000 ELECTROCARDIOGRAM COMPLETE: CPT

## 2022-04-25 PROCEDURE — 99214 OFFICE O/P EST MOD 30 MIN: CPT

## 2022-04-25 NOTE — CARDIOLOGY SUMMARY
[de-identified] : 5/11/2021. atrial fibrillation rate 140s. [___] : [unfilled] [Inf Wall Defect] : inferior wall defect [LVEF ___%] : LVEF [unfilled]%

## 2022-04-25 NOTE — HISTORY OF PRESENT ILLNESS
[FreeTextEntry1] : Has been taking lasix inconsistently.\par Less dizziness when getting out of bed.\par \par Prior:\par Took lasix 40 bid for 10 days. Now 20 qd\par \par Prior;\par She is accompanied by her daughter. She has been coughing for a few days.\par Her daughter had a cough since last Tuesday. Her COVID tests were negative.\par The  patient continues coughing. No changes to he medications.\par \par \par Prior:\par Feel about a month ago. Left knee pain with swelling. Has not yet seen a physician for her injury.\par Having some morning shortness of breath and increased GUTHRIE. Now has new inhalers per Dr. Butler. \par Not sleeping well. No orthopnea or PND.  \par Accompanied by her daughter. Now using a wheelchair.\par \par \par Prior:\par Feeling well in general. Did not yet have dental implants. Waiting on dentist...\par GUTHRIE slightly improved. \par Having some dizziness at night while in bed. Feels better after sitting up for a few minutes. Happens 3 x month.\par Fell and hurt her right knee. Saw ortho. Brace to right ankle. Some swelling, but she says its unchanged. 40 Lasix qd. \par On Eliquis- no bruising or bleeding.\par \par \par Prior:\par Feeling ok. Had tooth extraction yesterday, off Eliquis for procedure. \par Some GUTHRIE. Has fatigue. Has gained some weight, likely water as she is out of her furosemide pills.\par She had stopped her metoprolol 2 days ago because she was concerned it would make her bleed during the dental procedure. \par \par Covid vaccinated. \par Still smoking.\par \par

## 2022-04-25 NOTE — DISCUSSION/SUMMARY
[FreeTextEntry1] : She is a 76 year old, obese woman, with chronic hypertension, coronary artery disease with PCI in 2004, and chronic atrial fibrillation.  Now with HFpEF improving on lasix, but not consistently.\par Torsemide 20 mg qd for consistency sake.\par Labs in 2 weeks.\par no change to other meds.\par  \par AF:  Continue Metoprolol but at the 50mg BID dose. for rate control.  Continue Eliquis BID for stroke risk reduction today.  \par HTN:  BP is okay, continue Losartan to 25 mg daily.\par \par Follow up in 6 weeks.

## 2022-05-05 NOTE — PRE-ANESTHESIA EVALUATION ADULT - RX
no lesions,  no deformities,  no traumatic injuries,  no significant scars are present,  chest wall non-tender,  no masses present, breathing is unlabored without accessory muscle use, normal breath sounds cpap

## 2022-05-11 LAB
ANION GAP SERPL CALC-SCNC: 13 MMOL/L
BUN SERPL-MCNC: 19 MG/DL
CALCIUM SERPL-MCNC: 9.6 MG/DL
CHLORIDE SERPL-SCNC: 104 MMOL/L
CO2 SERPL-SCNC: 27 MMOL/L
CREAT SERPL-MCNC: 0.93 MG/DL
EGFR: 64 ML/MIN/1.73M2
GLUCOSE SERPL-MCNC: 99 MG/DL
NT-PROBNP SERPL-MCNC: 1099 PG/ML
POTASSIUM SERPL-SCNC: 4.4 MMOL/L
SODIUM SERPL-SCNC: 145 MMOL/L

## 2022-05-18 ENCOUNTER — RX RENEWAL (OUTPATIENT)
Age: 77
End: 2022-05-18

## 2022-06-09 ENCOUNTER — RX RENEWAL (OUTPATIENT)
Age: 77
End: 2022-06-09

## 2022-06-27 ENCOUNTER — NON-APPOINTMENT (OUTPATIENT)
Age: 77
End: 2022-06-27

## 2022-06-27 ENCOUNTER — APPOINTMENT (OUTPATIENT)
Dept: CARDIOLOGY | Facility: CLINIC | Age: 77
End: 2022-06-27

## 2022-06-27 VITALS
BODY MASS INDEX: 35.31 KG/M2 | SYSTOLIC BLOOD PRESSURE: 122 MMHG | OXYGEN SATURATION: 95 % | DIASTOLIC BLOOD PRESSURE: 80 MMHG | WEIGHT: 225 LBS | HEART RATE: 95 BPM | HEIGHT: 67 IN

## 2022-06-27 DIAGNOSIS — I11.9 HYPERTENSIVE HEART DISEASE W/OUT HEART FAILURE: ICD-10-CM

## 2022-06-27 PROCEDURE — 99214 OFFICE O/P EST MOD 30 MIN: CPT

## 2022-06-27 PROCEDURE — 93000 ELECTROCARDIOGRAM COMPLETE: CPT

## 2022-06-27 NOTE — HISTORY OF PRESENT ILLNESS
[FreeTextEntry1] : Still not taking lasix consistently.\par \par Prior:\par Has been taking lasix inconsistently.\par Less dizziness when getting out of bed.\par \par Prior:\par Took lasix 40 bid for 10 days. Now 20 qd\par \par Prior;\par She is accompanied by her daughter. She has been coughing for a few days.\par Her daughter had a cough since last Tuesday. Her COVID tests were negative.\par The  patient continues coughing. No changes to he medications.\par \par \par Prior:\par Feel about a month ago. Left knee pain with swelling. Has not yet seen a physician for her injury.\par Having some morning shortness of breath and increased GUTHRIE. Now has new inhalers per Dr. Butler. \par Not sleeping well. No orthopnea or PND.  \par Accompanied by her daughter. Now using a wheelchair.\par \par \par Prior:\par Feeling well in general. Did not yet have dental implants. Waiting on dentist...\par GUTHRIE slightly improved. \par Having some dizziness at night while in bed. Feels better after sitting up for a few minutes. Happens 3 x month.\par Fell and hurt her right knee. Saw ortho. Brace to right ankle. Some swelling, but she says its unchanged. 40 Lasix qd. \par On Eliquis- no bruising or bleeding.\par \par \par Prior:\par Feeling ok. Had tooth extraction yesterday, off Eliquis for procedure. \par Some GUTHRIE. Has fatigue. Has gained some weight, likely water as she is out of her furosemide pills.\par She had stopped her metoprolol 2 days ago because she was concerned it would make her bleed during the dental procedure. \par \par Covid vaccinated. \par Still smoking.\par \par

## 2022-06-27 NOTE — REASON FOR VISIT
[FreeTextEntry1] : She is here for follow up for her atrial fibrillation. Pt reports sore throat since this AM, states she has environmental allergies. Pt reports trouble swallowing, currently eating with no difficulty. No SOB present. In no apparent distress. Pt with itchy rash to L upper arm.

## 2022-06-27 NOTE — CARDIOLOGY SUMMARY
[de-identified] : 5/11/2021. atrial fibrillation rate 140s. [___] : [unfilled] [Inf Wall Defect] : inferior wall defect [LVEF ___%] : LVEF [unfilled]%

## 2022-06-27 NOTE — DISCUSSION/SUMMARY
[FreeTextEntry1] : She is a 76 year old, obese woman, with chronic hypertension, coronary artery disease with PCI in 2004, and chronic atrial fibrillation.  Now with HFpEF improving on Torsemide 20 mg qd. \par Stable HF on exam.\par  \par AF:  Continue Metoprolol but at the 50mg BID dose. for rate control.  Continue Eliquis BID for stroke risk reduction today.  \par HTN:  BP is okay, continue Losartan to 25 mg daily.\par \par Follow up in 12 weeks.

## 2022-06-27 NOTE — CARDIOLOGY SUMMARY
[de-identified] : 5/11/2021. atrial fibrillation rate 140s. [___] : [unfilled] [Inf Wall Defect] : inferior wall defect [LVEF ___%] : LVEF [unfilled]%

## 2022-09-13 NOTE — H&P PST ADULT - BP NONINVASIVE DIASTOLIC (MM HG)
This note was not shared with the patient due to this is a psychotherapy note   Assessment/Plan:      Diagnoses and all orders for this visit:    Adjustment disorder, unspecified type          Subjective:     Patient ID: Brett Guerra is a [de-identified] y o  male who presented for his follow-up outpatient counseling session with undersigned therapist   Meena Noel reports Kayleigh Og was recently in the hospital due to another UTI  Meena Noel discussed his struggles being the only care giver for Kayleigh Og but continues to refuse the idea of a home health aid  Meena Noel has been struggling to sleep but won't take medication for it  Meena Noel has been feeling overall stable and continues to care for Kayleigh Og  The undersigned therapist assisted Meena Noel process her feelings about caring for Kayleigh Og  Meena Noel denies suicidal intent, gesture or ideation at this time  The undersigned therapist provided Meena Noel with 45 minutes of psychotherapy  Review of Systems   Psychiatric/Behavioral: Negative  Objective:     Physical Exam  Psychiatric:         Attention and Perception: Attention and perception normal          Mood and Affect: Mood and affect normal          Speech: Speech normal          Behavior: Behavior normal  Behavior is cooperative  Thought Content:  Thought content normal          Cognition and Memory: Cognition and memory normal          Judgment: Judgment normal 
73

## 2022-11-07 NOTE — BRIEF OPERATIVE NOTE - ASSISTANT(S)
[facility] - THORACIC SURGERY      Patient:  Laura Cedeno  YOB: 1946  Date of Visit: 22    Dear Dr. Soto,    Thank you for the opportunity to follow-up Laura Cedeno. The patient was seen by me on 2022: Follow-up lung cancer screening. Attached you will find a copy of my note from the patient's visit.    If you have specific questions, please do not hesitate to call me.      Sincerely,          Jeremy Victor MD            Consultation Note:    Jeremy Victor MD  2022  9:09 AM  Sign when Signing Visit  [facility] - Thoracic Surgery   Follow Up Note         2022 9:07 AM   Patient: Laura Cedeno   MRN: [id number]   : 1946     Vitals:  Blood pressure (!) 146/83, pulse 86, temperature 98.8 °F (37.1 °C), temperature source Temporal, height 5' 1\" (1.549 m), weight 63.2 kg (139 lb 6.4 oz), SpO2 98 %.    Reason for visit  Follow-up lung cancer screen    Referring Provider:  Patient Care Team:  Damaris Gibson DO as PCP - General (Family Practice)  Farhan Snow MD (Cardiovascular Disease)  Tom Chang MD as Referring Provider (Specialist)    Allergies:  ALLERGIES:   Allergen Reactions   • Codeine CARDIAC DISTURBANCES and SHORTNESS OF BREATH   • No Name Available Other (See Comments)     Codeine Sulfate TABS  got shallow breathing, no rash, no er visit and no trouble breathing  states breathing affected       Presenting Meds:  Current Outpatient Medications   Medication Sig Dispense Refill   • ezetimibe (ZETIA) 10 MG tablet TAKE 1 TABLET AT BEDTIME 90 tablet 2   • desvenlafaxine (PRISTIQ) 100 MG 24 hr tablet Take 200 mg by mouth daily. 180 tablet 1   • traZODone (DESYREL) 100 MG tablet Take 0.5-1 tablets by mouth nightly. 90 tablet 1   • gabapentin (NEURONTIN) 300 MG capsule Take 1 tablet twice a day as needed for anxiety, insomnia. 180 capsule 1   • losartan (COZAAR) 50 MG tablet TAKE 1 TABLET  Joy Mcbride TWICE A  tablet 3   • sumatriptan (IMITREX) 100 MG tablet Take 1 tablet by mouth as needed for Migraine. Take 1 tablet by mouth at onset of migraine. May repeat after 2 hours if needed. 12 tablet 1   • aspirin (Aspirin Childrens) 81 MG chewable tablet Chew 1 tablet by mouth daily. 100 tablet 3   • amLODIPine (NORVASC) 2.5 MG tablet Take 1 tablet by mouth daily. 90 tablet 3   • Calcium Carbonate Antacid (CALCIUM CARBONATE PO) Take 1 tablet by mouth daily.     • atorvastatin (LIPITOR) 10 MG tablet Take 1 tablet by mouth daily. 90 tablet 3   • ZINC SULFATE PO Take by mouth as needed.     • SUMAtriptan (IMITREX STATDOSE) 6 MG/0.5ML auto-injector INJECT 0.5ML SUBCUTANEOUSLYAS NEEDED FOR MIGRAINE 3 mL 3   • Multiple Vitamins-Minerals (MULTIVITAL) tablet Take by mouth daily.     • cholecalciferol (VITAMIN D3) 1000 UNITS tablet Take 1,000 Units by mouth daily.       No current facility-administered medications for this visit.       History of present illness:  Ms. Cedeno is a 76-year-old woman who recently found that her ex- has lung cancer that was found late. She therefore with a long tobacco history of her own stopping 10 years ago decided to proceed with lung cancer screening after discussion with Dr. Mccall.      Formal tobacco history is 40 pack years she quit February 2011     Patient was evaluated by me for lung cancer screening initially in April 2014. Left upper lobe nodule was identified at that visit and therefore a followup CT scan was recommended in 6 months. In November of 2014 the left upper lobe nodule was stable, however there was a new cluster of nodules in the right middle lobe. She therefore was recommended to proceed with a follow up CT scan in another 6 months. This CT scan in June 2015 revealed that the small cluster of nodules in the right midlung had resolved however there was a small area of nodularity in the anterior medial aspect of the right middle lobe. The left upper  lobe nodule had remained stable. After discussing her CT scan at tumor conference, it was decided to follow up with her in one year with a repeat CT scan of the chest.     Her repeat CT scan in June 2016 revealed some small nodules but the recommendation at that time was to follow-up in 1 year. She underwent a follow-up CT scan in late July 2017 which revealed a new right upper lobe nodularity. The other nodule in the left lung was fairly stable in light of this increased size and its configuration was felt that this may represent an inflammatory nodularity. She was recommended to undergo follow-up CAT scan in 6 weeks this was accomplished the first week of September 2017 and revealed persistence of the right upper lobe nodule.      PET scan was recommended at that time and showed mild uptake in the nodule and some uptake in his psoas muscle     Subsequent MRI of the lumbar spine shows a small psoas abscess which is clearly not coming from the lumbar spine she has little associated lumbar disc disease.     In light of the PET uptake and she was recommended to undergo a CT-guided biopsy of the right upper lobe nodule which was accomplished in October 2017. Pathology revealed blastomycosis. Patient has since undergone 1 year of treatment with itraconazole per infectious disease she has recently completed her antibiotic course     MRI of the lumbar spine in May 2018 showed resolution of the psoas abscess     Patient is seeing me today for continued follow-up lung cancer screening and reevaluation of the right upper lobe nodule     Current symptoms:+++++++++++++    IMAGING: Recent follow-up low-dose chest CT again shows the right upper lobe nodule with calcification this has been stable, she also has 2 small nodules in the left upper lobe which are also stable.  She has mild emphysema    She has moderate coronary calcification    Formal CT report      Patient Name: KATELYNN SILVEIRA  Patient ID: [id number]  Birth Date:  1946 Sex: F    Exam Date: 2022 07:39 AM Accession Number: [id number]    Procedure Code/Description: [id number] / CT LUNG CANCER SCREENING LOW DOSE WO CONTRAST  Ordering Physician: PRABHAKAR DOLL    Reason For Exam : Follow-up lung cancer screen      LOW DOSE SCREENING CT LUNG SCREENING WITHOUT INTRAVENOUS CONTRAST    HISTORY: Follow-up lung cancer screen  Lung screen smoker. 40 pack year smoking history    COMPARISON: 11/3/2021.    TECHNIQUE: Low radiation dose axial CT images of the chest were obtained.  Multi-planer reformatted images were included. The standard [facility] LDCT lung screening protocol was followed. 3-D  axial MIPS reformatted images were generated at the CT scanner, as more  sensitive sequence for detection of pulmonary nodule.    FINDINGS:    1. LUNG NODULES: Stable partially calcified 13 mm right upper lobe nodule.  Stable 4 mm left upper lobe nodule. Stable 2 mm nodule in the left upper  lobe (series 4 image 44). Stable endobronchial plugging and bronchiectasis  in the right middle lobe.    2. EMPHYSEMA: Mild.    3. CORONARY ARTERY CALCIFICATION:    *0 = None, 1 = Mild, 2 = Moderate, 3 = Severe*    Left Main: 0  Left Anterior Descendin  Left Circumflex: 2  Right Coronary: One  Semi-quantitative total calcium score 0-12 scale = 5    Due to low dose screening technique this study is not considered a  dedicated calcium score exam. However, per convention, a semi-quantitative  score of 4 or greater should be viewed as a possible risk factor of  coronary artery disease.    4. LYMPH NODES: Evaluation for adenopathy is slightly limited without IV  contrast. No lymphadenopathy is noted.    5. PLEURAL EFFUSION: There are no pleural effusions.    6. OTHER: Slightly increased linear opacities in the right middle lobe.  This is likely due to endobronchial plugging/atelectasis. There is no  pneumothorax. Mediastinal tracheobronchial structures and other  great  vessels appear within normal limits, Evaluation is limited without  contrast. No pericardial effusion or thickening is identified. Left renal  cortical cyst. Gallbladder surgically absent. No osseous destructive lesion  is noted.    IMPRESSION:    Lung RADS category: 2-benign appearance or behavior. Recommend continued  annual screening with low-dose CT in 12 months      Electronically Signed by: DEBORAH BARROW M.D.  Signed on: 2022 8:55 AM           Review of Systems:  Review of Systems   Constitutional: Negative for chills and unexpected weight change.        Occasional night sweats   HENT:        Mild rhinorrhea   Eyes: Negative.    Respiratory: Negative.         Occasional shortness of breath with activity   Cardiovascular: Negative.    Gastrointestinal: Negative.    Endocrine: Negative.    Musculoskeletal: Positive for myalgias.        Occasional midthoracic back pain which radiates to the front    Lower extremity myalgias episodic   Neurological: Negative.    Hematological: Negative.        History:    Past Medical History:   Diagnosis Date   • Anxiety    • Back pain    • Depressive disorder    • Essential (primary) hypertension    • Hyperlipoproteinemia        Family History   Problem Relation Age of Onset   • COPD Mother    • Diabetes Mother    • Migraine Mother    • Heart disease Father    • Thyroid Father    • Cancer, Skin Father    • Stroke/TIA Father    • Other Brother         pulmonary embolism        Social History     Tobacco Use   • Smoking status: Former Smoker     Packs/day: 1.00     Years: 40.00     Pack years: 40.00     Quit date: 2/15/2011     Years since quittin.7   • Smokeless tobacco: Former User   Vaping Use   • Vaping Use: never used   Substance Use Topics   • Alcohol use: Yes     Comment: occasional   • Drug use: Never        Past Surgical History:   Procedure Laterality Date   • Breast biopsy     • Cholecystectomy     • Colonoscopy     • Hysterectomy     • Removal  gallbladder            Physical Exam:  Physical Exam  Constitutional:       Appearance: She is well-developed.   HENT:      Head: Normocephalic and atraumatic.   Neck:      Thyroid: No thyromegaly.      Trachea: No tracheal deviation.   Cardiovascular:      Rate and Rhythm: Normal rate and regular rhythm.      Heart sounds: Normal heart sounds. No murmur heard.    No friction rub.   Pulmonary:      Effort: Pulmonary effort is normal. No respiratory distress.      Breath sounds: Normal breath sounds. No wheezing or rales.   Chest:      Chest wall: No tenderness.   Abdominal:      General: There is no distension.      Tenderness: There is no abdominal tenderness.   Musculoskeletal:         General: No tenderness or deformity. Normal range of motion.   Lymphadenopathy:      Cervical: No cervical adenopathy.   Skin:     General: Skin is warm and dry.      Findings: No rash.   Neurological:      Mental Status: She is alert and oriented to person, place, and time.      Coordination: Coordination normal.         Assessment / Plan  Diagnoses and all orders for this visit:  Cigarette nicotine dependence in remission  -     CT LUNG CANCER SCREENING LOW DOSE WO CONTRAST; Future  Lung nodules  -     CT LUNG CANCER SCREENING LOW DOSE WO CONTRAST; Future  Encounter for screening for lung cancer  -     CT LUNG CANCER SCREENING LOW DOSE WO CONTRAST; Future  History of blastomycosis  -     CT LUNG CANCER SCREENING LOW DOSE WO CONTRAST; Future     Patient is a 76-year-old woman with a significant tobacco history who quit smoking 11 years ago    She returns for routine follow-up low-dose CAT scan which again shows no new concerning nodules    The right upper lobe 13 mm nodule is clearly the sequela of her blastomycosis infection since there is central calcification    I reviewed the CAT scan with her in detail    Patient has moderate coronary calcification and follows with cardiology    Current plan is 2 more years of low-dose CAT  scans for lung cancer screening that will be more than 13 years since discontinuing smoking    Thank you for the opportunity to help care for her      Jeremy Victor MD   Main Office:    [facility]  [address] [address]    P: [phone]  F: [phone]    Avinash Alvarado MD [facility]  MD Adam Butts MD, PA-C Hector Martinez, PA-C Megan Jung-Zimmerman, RN Isabel Reyes, MA Gabriella Castillo, PSR    Nurse Navigators  Ceci Garcia RN    Surgical Scheduler  Madai Copeland    Clinical Supervisor  Marcela Menjivar    Director   Shavonne Del Rosario    Practice Manager:  Russell Long    [facility] Locations:  [facility]

## 2022-11-18 ENCOUNTER — APPOINTMENT (OUTPATIENT)
Dept: CARDIOLOGY | Facility: CLINIC | Age: 77
End: 2022-11-18

## 2022-11-18 ENCOUNTER — NON-APPOINTMENT (OUTPATIENT)
Age: 77
End: 2022-11-18

## 2022-11-18 VITALS
WEIGHT: 224 LBS | HEIGHT: 67 IN | BODY MASS INDEX: 35.16 KG/M2 | DIASTOLIC BLOOD PRESSURE: 88 MMHG | HEART RATE: 91 BPM | SYSTOLIC BLOOD PRESSURE: 128 MMHG | OXYGEN SATURATION: 96 % | RESPIRATION RATE: 16 BRPM

## 2022-11-18 PROCEDURE — 93000 ELECTROCARDIOGRAM COMPLETE: CPT

## 2022-11-18 PROCEDURE — 99214 OFFICE O/P EST MOD 30 MIN: CPT

## 2022-11-27 NOTE — HISTORY OF PRESENT ILLNESS
[FreeTextEntry1] : smoking 6 cigs/day.\par Not taking lasix consistently. Accompanied by her daughter.\par notes waking up with wheezing....somewhat better with albuterol. GUTHRIE is prominent too.\par \par Prior:\par Still not taking lasix consistently.\par \par Prior:\par Has been taking lasix inconsistently.\par Less dizziness when getting out of bed.\par \par Prior:\par Took lasix 40 bid for 10 days. Now 20 qd\par \par Prior;\par She is accompanied by her daughter. She has been coughing for a few days.\par Her daughter had a cough since last Tuesday. Her COVID tests were negative.\par The  patient continues coughing. No changes to he medications.\par \par \par Prior:\par Feel about a month ago. Left knee pain with swelling. Has not yet seen a physician for her injury.\par Having some morning shortness of breath and increased GUTHRIE. Now has new inhalers per Dr. Butler. \par Not sleeping well. No orthopnea or PND.  \par Accompanied by her daughter. Now using a wheelchair.\par \par \par Prior:\par Feeling well in general. Did not yet have dental implants. Waiting on dentist...\par GUTHRIE slightly improved. \par Having some dizziness at night while in bed. Feels better after sitting up for a few minutes. Happens 3 x month.\par Fell and hurt her right knee. Saw ortho. Brace to right ankle. Some swelling, but she says its unchanged. 40 Lasix qd. \par On Eliquis- no bruising or bleeding.\par \par \par Prior:\par Feeling ok. Had tooth extraction yesterday, off Eliquis for procedure. \par Some GUTHRIE. Has fatigue. Has gained some weight, likely water as she is out of her furosemide pills.\par She had stopped her metoprolol 2 days ago because she was concerned it would make her bleed during the dental procedure. \par \par Covid vaccinated. \par Still smoking.\par \par

## 2022-11-27 NOTE — CARDIOLOGY SUMMARY
[___] : [unfilled] [Inf Wall Defect] : inferior wall defect [LVEF ___%] : LVEF [unfilled]% [de-identified] : 5/11/2021. atrial fibrillation rate 140s.

## 2022-11-27 NOTE — DISCUSSION/SUMMARY
[EKG obtained to assist in diagnosis and management of assessed problem(s)] : EKG obtained to assist in diagnosis and management of assessed problem(s) [FreeTextEntry1] : She is a 77 year old, obese smoker with chronic hypertension, coronary artery disease with PCI in 2004, and chronic atrial fibrillation.  \par Chronic HFpEF Will improve with daily diuretics.  \par Dyspnea likely related to COPD as well.\par AF: Unchanged. Continue Metoprolol but at the 50mg BID dose. for rate control.  Continue Eliquis BID for stroke risk reduction today.  \par HTN:  BP is okay, continue Losartan to 25 mg daily.\par Smoking cessation reviewed.\par Labs in 2 weeks and call with daily weights.

## 2022-12-06 ENCOUNTER — RX RENEWAL (OUTPATIENT)
Age: 77
End: 2022-12-06

## 2022-12-30 ENCOUNTER — APPOINTMENT (OUTPATIENT)
Dept: CARDIOLOGY | Facility: CLINIC | Age: 77
End: 2022-12-30
Payer: MEDICARE

## 2022-12-30 ENCOUNTER — NON-APPOINTMENT (OUTPATIENT)
Age: 77
End: 2022-12-30

## 2022-12-30 ENCOUNTER — LABORATORY RESULT (OUTPATIENT)
Age: 77
End: 2022-12-30

## 2022-12-30 VITALS
OXYGEN SATURATION: 92 % | DIASTOLIC BLOOD PRESSURE: 88 MMHG | HEIGHT: 67 IN | HEART RATE: 84 BPM | WEIGHT: 232 LBS | SYSTOLIC BLOOD PRESSURE: 136 MMHG | BODY MASS INDEX: 36.41 KG/M2

## 2022-12-30 PROCEDURE — 93000 ELECTROCARDIOGRAM COMPLETE: CPT

## 2022-12-30 PROCEDURE — 99214 OFFICE O/P EST MOD 30 MIN: CPT

## 2022-12-30 NOTE — CARDIOLOGY SUMMARY
[___] : [unfilled] [Inf Wall Defect] : inferior wall defect [LVEF ___%] : LVEF [unfilled]% [de-identified] : 5/11/2021. atrial fibrillation rate 140s.

## 2022-12-30 NOTE — HISTORY OF PRESENT ILLNESS
[FreeTextEntry1] : Dear Ekaterina,\par She sought urgent evaluation because of worsening breathing over the past few weeks.\par She is accompanied by her daughter who describes that she notices significant wheezing, particular when getting up in the morning that happens with any amount of walking.  She denies orthopnea or PND.\par Her leg swelling is persistent.\par It is unclear how often she is taking the diuretic, though she reports 2 or 3 times a week.\par Smoking persists.\par \par Prior:\par Smoking 6 cigs/day.\par Not taking lasix consistently. Accompanied by her daughter.\par notes waking up with wheezing....somewhat better with albuterol. GUTHRIE is prominent too.\par \par Prior:\par Still not taking lasix consistently.\par \par Prior:\par Has been taking lasix inconsistently.\par Less dizziness when getting out of bed.\par \par Prior:\par Took lasix 40 bid for 10 days. Now 20 qd\par \par Prior;\par She is accompanied by her daughter. She has been coughing for a few days.\par Her daughter had a cough since last Tuesday. Her COVID tests were negative.\par The  patient continues coughing. No changes to he medications.\par \par \par Prior:\par Feel about a month ago. Left knee pain with swelling. Has not yet seen a physician for her injury.\par Having some morning shortness of breath and increased GUTHRIE. Now has new inhalers per Dr. Butler. \par Not sleeping well. No orthopnea or PND.  \par Accompanied by her daughter. Now using a wheelchair.\par \par \par Prior:\par Feeling well in general. Did not yet have dental implants. Waiting on dentist...\par GUTHRIE slightly improved. \par Having some dizziness at night while in bed. Feels better after sitting up for a few minutes. Happens 3 x month.\par Fell and hurt her right knee. Saw ortho. Brace to right ankle. Some swelling, but she says its unchanged. 40 Lasix qd. \par On Eliquis- no bruising or bleeding.\par \par \par Prior:\par Feeling ok. Had tooth extraction yesterday, off Eliquis for procedure. \par Some GUTHRIE. Has fatigue. Has gained some weight, likely water as she is out of her furosemide pills.\par She had stopped her metoprolol 2 days ago because she was concerned it would make her bleed during the dental procedure. \par \par Covid vaccinated. \par Still smoking.\par \par

## 2022-12-30 NOTE — DISCUSSION/SUMMARY
[FreeTextEntry1] : She is a 77 year old, obese smoker with chronic hypertension, coronary artery disease with PCI in 2004, and chronic atrial fibrillation.\par Her symptoms of wheezing with minimal exertion are either COPD versus acute decompensated heart failure.\par Her symptoms are more consistent with COPD, but but 8 pound weight gain over the last month and a half is suspicious for acute on chronic heart failure.  Therefore I have instructed her to take her diuretic daily through the weekend to see if this improves her symptoms.\par I will review her case with her pulmonologist to see if further treatment of her asthmatic bronchitis is appropriate.\par AF: Heart rate is at the upper limits of normal.  Continue Metoprolol but at the 50mg BID dose. for rate control.  I stressed the need to continue Eliquis twice daily for stroke risk reduction.\par HTN:  BP is okay, continue Losartan to 25 mg daily.\par Smoking cessation suggested again\par Labs today.  Delete that routine follow-up or if new symptoms arise [EKG obtained to assist in diagnosis and management of assessed problem(s)] : EKG obtained to assist in diagnosis and management of assessed problem(s)

## 2023-01-03 LAB
ALBUMIN SERPL ELPH-MCNC: 3.9 G/DL
ALP BLD-CCNC: 104 U/L
ALT SERPL-CCNC: 12 U/L
ANION GAP SERPL CALC-SCNC: 12 MMOL/L
AST SERPL-CCNC: 18 U/L
BASOPHILS # BLD AUTO: 0.08 K/UL
BASOPHILS NFR BLD AUTO: 1.2 %
BILIRUB SERPL-MCNC: 1 MG/DL
BUN SERPL-MCNC: 12 MG/DL
CALCIUM SERPL-MCNC: 8.9 MG/DL
CHLORIDE SERPL-SCNC: 106 MMOL/L
CHOLEST SERPL-MCNC: 112 MG/DL
CO2 SERPL-SCNC: 22 MMOL/L
CREAT SERPL-MCNC: 0.87 MG/DL
EGFR: 69 ML/MIN/1.73M2
EOSINOPHIL # BLD AUTO: 0.18 K/UL
EOSINOPHIL NFR BLD AUTO: 2.7 %
GLUCOSE SERPL-MCNC: 88 MG/DL
HCT VFR BLD CALC: 38.5 %
HDLC SERPL-MCNC: 57 MG/DL
HGB BLD-MCNC: 12 G/DL
IMM GRANULOCYTES NFR BLD AUTO: 0.3 %
LDLC SERPL CALC-MCNC: 44 MG/DL
LYMPHOCYTES # BLD AUTO: 1.44 K/UL
LYMPHOCYTES NFR BLD AUTO: 21.3 %
MAN DIFF?: NORMAL
MCHC RBC-ENTMCNC: 31.2 GM/DL
MCHC RBC-ENTMCNC: 31.5 PG
MCV RBC AUTO: 101 FL
MONOCYTES # BLD AUTO: 0.75 K/UL
MONOCYTES NFR BLD AUTO: 11.1 %
NEUTROPHILS # BLD AUTO: 4.29 K/UL
NEUTROPHILS NFR BLD AUTO: 63.4 %
NONHDLC SERPL-MCNC: 55 MG/DL
NT-PROBNP SERPL-MCNC: 2197 PG/ML
PLATELET # BLD AUTO: 168 K/UL
POTASSIUM SERPL-SCNC: 4 MMOL/L
PROT SERPL-MCNC: 6.7 G/DL
RBC # BLD: 3.81 M/UL
RBC # FLD: 16.1 %
SODIUM SERPL-SCNC: 140 MMOL/L
TRIGL SERPL-MCNC: 53 MG/DL
TSH SERPL-ACNC: 5.87 UIU/ML
WBC # FLD AUTO: 6.76 K/UL

## 2023-01-05 RX ORDER — PREDNISONE 10 MG/1
10 TABLET ORAL
Qty: 1 | Refills: 0 | Status: ACTIVE | COMMUNITY
Start: 2023-01-05 | End: 1900-01-01

## 2023-03-31 ENCOUNTER — NON-APPOINTMENT (OUTPATIENT)
Age: 78
End: 2023-03-31

## 2023-03-31 ENCOUNTER — APPOINTMENT (OUTPATIENT)
Dept: CARDIOLOGY | Facility: CLINIC | Age: 78
End: 2023-03-31
Payer: MEDICARE

## 2023-03-31 VITALS
SYSTOLIC BLOOD PRESSURE: 140 MMHG | DIASTOLIC BLOOD PRESSURE: 98 MMHG | WEIGHT: 218 LBS | HEART RATE: 74 BPM | BODY MASS INDEX: 34.14 KG/M2 | OXYGEN SATURATION: 95 %

## 2023-03-31 PROCEDURE — 93000 ELECTROCARDIOGRAM COMPLETE: CPT

## 2023-03-31 PROCEDURE — 99214 OFFICE O/P EST MOD 30 MIN: CPT

## 2023-04-04 NOTE — DISCUSSION/SUMMARY
[FreeTextEntry1] : She is a 77 year old, obese smoker with chronic hypertension, coronary artery disease with PCI in 2004, and chronic atrial fibrillation.\par COPD improved. Continue your medications per Dr. Butler.\par AF: Heart rate is normal.  Continue Metoprolol but at the 50mg BID dose for rate control. Continue Eliquis twice daily for stroke risk reduction.\par HTN:  BP is a bit elevated, but her weight loss was applauded. continue Losartan to 25 mg daily.\par Smoking cessation again suggested.\par   [EKG obtained to assist in diagnosis and management of assessed problem(s)] : EKG obtained to assist in diagnosis and management of assessed problem(s)

## 2023-04-04 NOTE — HISTORY OF PRESENT ILLNESS
[FreeTextEntry1] : She is feeling better now, off Prednisone\par No orthopnea. Leg edema improved. \par Still smoking.\par Accompanied by her daughter.\par \par Prior:\par Dear Ekaterina,\par She sought urgent evaluation because of worsening breathing over the past few weeks.\par She is accompanied by her daughter who describes that she notices significant wheezing, particular when getting up in the morning that happens with any amount of walking.  She denies orthopnea or PND.\par Her leg swelling is persistent.\par It is unclear how often she is taking the diuretic, though she reports 2 or 3 times a week.\par Smoking persists.\par \par Prior:\par Smoking 6 cigs/day.\par Not taking lasix consistently. Accompanied by her daughter.\par notes waking up with wheezing....somewhat better with albuterol. GUTHRIE is prominent too.\par \par Prior:\par Still not taking lasix consistently.\par \par Prior:\par Has been taking lasix inconsistently.\par Less dizziness when getting out of bed.\par \par Prior:\par Took lasix 40 bid for 10 days. Now 20 qd\par \par Prior;\par She is accompanied by her daughter. She has been coughing for a few days.\par Her daughter had a cough since last Tuesday. Her COVID tests were negative.\par The  patient continues coughing. No changes to he medications.\par \par \par Prior:\par Feel about a month ago. Left knee pain with swelling. Has not yet seen a physician for her injury.\par Having some morning shortness of breath and increased GUTHRIE. Now has new inhalers per Dr. Butler. \par Not sleeping well. No orthopnea or PND.  \par Accompanied by her daughter. Now using a wheelchair.\par \par \par Prior:\par Feeling well in general. Did not yet have dental implants. Waiting on dentist...\par GUTHRIE slightly improved. \par Having some dizziness at night while in bed. Feels better after sitting up for a few minutes. Happens 3 x month.\par Fell and hurt her right knee. Saw ortho. Brace to right ankle. Some swelling, but she says its unchanged. 40 Lasix qd. \par On Eliquis- no bruising or bleeding.\par \par \par Prior:\par Feeling ok. Had tooth extraction yesterday, off Eliquis for procedure. \par Some GUTHRIE. Has fatigue. Has gained some weight, likely water as she is out of her furosemide pills.\par She had stopped her metoprolol 2 days ago because she was concerned it would make her bleed during the dental procedure. \par \par Covid vaccinated. \par Still smoking.\par \par

## 2023-04-04 NOTE — CARDIOLOGY SUMMARY
[___] : [unfilled] [Inf Wall Defect] : inferior wall defect [LVEF ___%] : LVEF [unfilled]% [de-identified] : 5/11/2021. atrial fibrillation rate 140s.

## 2023-06-23 ENCOUNTER — APPOINTMENT (OUTPATIENT)
Dept: ORTHOPEDIC SURGERY | Facility: CLINIC | Age: 78
End: 2023-06-23
Payer: MEDICARE

## 2023-06-23 VITALS — WEIGHT: 210.63 LBS | BODY MASS INDEX: 33.06 KG/M2 | HEIGHT: 67 IN

## 2023-06-23 DIAGNOSIS — M17.11 UNILATERAL PRIMARY OSTEOARTHRITIS, RIGHT KNEE: ICD-10-CM

## 2023-06-23 PROCEDURE — 99214 OFFICE O/P EST MOD 30 MIN: CPT | Mod: 25

## 2023-06-23 PROCEDURE — 73564 X-RAY EXAM KNEE 4 OR MORE: CPT | Mod: RT

## 2023-06-23 PROCEDURE — 20610 DRAIN/INJ JOINT/BURSA W/O US: CPT | Mod: RT

## 2023-06-23 NOTE — PHYSICAL EXAM
[de-identified] : Well developed, well nourished in no apparent distress, awake, alert and orientated to person, place and time. with appropriate mood and affect. \par Respirations are even and unlabored. Gait evaluation does reveal a limp. There is no inguinal adenopathy. \par The affected limb is well-perfused, without skin lesions, shows a grossly normal motor and sensory examination. \par Knee motion does cause pain. ROM of the knee is 0-115 degrees.  5 degrees varus\par The knee is stable within that range-of-motion to AP and ML stress. \par Muscle strength is normal. Pedal pulses are palpable.  [de-identified] : Long standing  knee, AP knee, lateral knee, tunnel and patellar views of the right knee were ordered and taken in the office and demonstrate degenerative joint disease of the knee with joint space narrowing, osteophyte formation, and subchondral sclerosis.

## 2023-06-23 NOTE — DISCUSSION/SUMMARY
[de-identified] : The patient has severe right knee osteoarthritis and well functioning left TKA.. They are not an appropriate candidate for surgical intervention at this time. An extensive discussion was conducted on the natural history of the disease and the variety of surgical and non-surgical options available to the patient including, but not limited to non-steroidal anti-inflammatory medications, steroid injections, physical therapy, maintenance of ideal body weight, and reduction of activity. I recommended a prescription of Mobic but the patient would prefer to use OTC NSAIDs at this time. The patient will schedule an appointment as needed. \par \par Informed consent for the right knee injection was obtained. All questions were answered. A time out was performed. The  knee was prepped and draped in sterile fashion. Using sterile technique, 80mg of Kenalog, 4cc of 1% lidocaine, 4cc of 0.25% marcaine using a 21-gauge needle. A sterile dressing was applied. Post injection instructions were reviewed. The patient tolerated the procedure well.

## 2023-06-23 NOTE — HISTORY OF PRESENT ILLNESS
[de-identified] : This is a very nice  77 year old  female experiencing worsening pain in the right knee which is severe in intensity and has been going on for at least 3 months now. The pain substantially limits activities of daily living. Walking tolerance is reduced. Medication and activity modification have been minimally effective for a period lasting greater than three months in duration. Assistive devices and external support were not deemed by the patient to be helpful in improving their function.  The patient denies any radiation of the pain to the feet and it is not associated with numbness, tingling, or weakness. \par \par She is doing well with her left TKA

## 2023-07-21 ENCOUNTER — LABORATORY RESULT (OUTPATIENT)
Age: 78
End: 2023-07-21

## 2023-07-21 ENCOUNTER — NON-APPOINTMENT (OUTPATIENT)
Age: 78
End: 2023-07-21

## 2023-07-21 ENCOUNTER — APPOINTMENT (OUTPATIENT)
Dept: CARDIOLOGY | Facility: CLINIC | Age: 78
End: 2023-07-21
Payer: MEDICARE

## 2023-07-21 VITALS
HEART RATE: 73 BPM | HEIGHT: 67 IN | WEIGHT: 208 LBS | DIASTOLIC BLOOD PRESSURE: 90 MMHG | OXYGEN SATURATION: 98 % | SYSTOLIC BLOOD PRESSURE: 130 MMHG | BODY MASS INDEX: 32.65 KG/M2

## 2023-07-21 PROCEDURE — 99214 OFFICE O/P EST MOD 30 MIN: CPT

## 2023-07-21 PROCEDURE — 93000 ELECTROCARDIOGRAM COMPLETE: CPT

## 2023-07-21 NOTE — CARDIOLOGY SUMMARY
[de-identified] : 5/11/2021. atrial fibrillation rate 140s. [___] : [unfilled] [Inf Wall Defect] : inferior wall defect [LVEF ___%] : LVEF [unfilled]%

## 2023-07-21 NOTE — DISCUSSION/SUMMARY
[FreeTextEntry1] : She is a 77 year old, obese smoker with chronic hypertension, coronary artery disease with PCI in 2004, and chronic atrial fibrillation.\par ECG: AF with moderate ventricular response.\par COPD improving. Continue your medications per Dr. Butler.\par AF: In AF with a normal heart rate.  Continue Metoprolol but at the 50mg BID dose for rate control. Continue Eliquis twice daily for stroke risk reduction.\par HTN:  BP is borderline. Again,  her weight loss was applauded. continue Losartan to 25 mg daily.\par Exercise suggested.\par   [EKG obtained to assist in diagnosis and management of assessed problem(s)] : EKG obtained to assist in diagnosis and management of assessed problem(s)

## 2023-07-21 NOTE — HISTORY OF PRESENT ILLNESS
[FreeTextEntry1] : Accompanied by her daughter.\par Some worsening confusion. Knee pain is prominent.\par leg edema has improved.\par \par Prior:\par She is feeling better now, off Prednisone\par No orthopnea. Leg edema improved. \par Still smoking.\par Accompanied by her daughter.\par \par Prior:\par Dear Ekaterina,\par She sought urgent evaluation because of worsening breathing over the past few weeks.\par She is accompanied by her daughter who describes that she notices significant wheezing, particular when getting up in the morning that happens with any amount of walking.  She denies orthopnea or PND.\par Her leg swelling is persistent.\par It is unclear how often she is taking the diuretic, though she reports 2 or 3 times a week.\par Smoking persists.\par \par Prior:\par Smoking 6 cigs/day.\par Not taking lasix consistently. Accompanied by her daughter.\par notes waking up with wheezing....somewhat better with albuterol. GUTHRIE is prominent too.\par \par Prior:\par Still not taking lasix consistently.\par \par Prior:\par Has been taking lasix inconsistently.\par Less dizziness when getting out of bed.\par \par Prior:\par Took lasix 40 bid for 10 days. Now 20 qd\par \par Prior;\par She is accompanied by her daughter. She has been coughing for a few days.\par Her daughter had a cough since last Tuesday. Her COVID tests were negative.\par The  patient continues coughing. No changes to he medications.\par \par \par Prior:\par Feel about a month ago. Left knee pain with swelling. Has not yet seen a physician for her injury.\par Having some morning shortness of breath and increased GUTHRIE. Now has new inhalers per Dr. Butler. \par Not sleeping well. No orthopnea or PND.  \par Accompanied by her daughter. Now using a wheelchair.\par \par \par Prior:\par Feeling well in general. Did not yet have dental implants. Waiting on dentist...\par GUTHRIE slightly improved. \par Having some dizziness at night while in bed. Feels better after sitting up for a few minutes. Happens 3 x month.\par Fell and hurt her right knee. Saw ortho. Brace to right ankle. Some swelling, but she says its unchanged. 40 Lasix qd. \par On Eliquis- no bruising or bleeding.\par \par \par Prior:\par Feeling ok. Had tooth extraction yesterday, off Eliquis for procedure. \par Some GUTHRIE. Has fatigue. Has gained some weight, likely water as she is out of her furosemide pills.\par She had stopped her metoprolol 2 days ago because she was concerned it would make her bleed during the dental procedure. \par \par Covid vaccinated. \par Still smoking.\par \par

## 2023-07-25 LAB
ALBUMIN SERPL ELPH-MCNC: 4.3 G/DL
ALP BLD-CCNC: 81 U/L
ALT SERPL-CCNC: 20 U/L
ANION GAP SERPL CALC-SCNC: 14 MMOL/L
AST SERPL-CCNC: 25 U/L
BILIRUB SERPL-MCNC: 0.8 MG/DL
BUN SERPL-MCNC: 18 MG/DL
CALCIUM SERPL-MCNC: 9.3 MG/DL
CHLORIDE SERPL-SCNC: 99 MMOL/L
CHOLEST SERPL-MCNC: 169 MG/DL
CO2 SERPL-SCNC: 25 MMOL/L
CREAT SERPL-MCNC: 1.35 MG/DL
EGFR: 41 ML/MIN/1.73M2
ESTIMATED AVERAGE GLUCOSE: 126 MG/DL
GLUCOSE SERPL-MCNC: 81 MG/DL
HBA1C MFR BLD HPLC: 6 %
HDLC SERPL-MCNC: 67 MG/DL
LDLC SERPL CALC-MCNC: 86 MG/DL
NONHDLC SERPL-MCNC: 102 MG/DL
NT-PROBNP SERPL-MCNC: 1996 PG/ML
POTASSIUM SERPL-SCNC: 4.9 MMOL/L
PROT SERPL-MCNC: 7.1 G/DL
SODIUM SERPL-SCNC: 138 MMOL/L
TRIGL SERPL-MCNC: 87 MG/DL
TSH SERPL-ACNC: 36.4 UIU/ML

## 2023-08-13 ENCOUNTER — NON-APPOINTMENT (OUTPATIENT)
Age: 78
End: 2023-08-13

## 2023-09-22 ENCOUNTER — APPOINTMENT (OUTPATIENT)
Dept: ORTHOPEDIC SURGERY | Facility: CLINIC | Age: 78
End: 2023-09-22
Payer: MEDICARE

## 2023-09-22 VITALS — HEIGHT: 67 IN | WEIGHT: 213 LBS | BODY MASS INDEX: 33.43 KG/M2

## 2023-09-22 PROCEDURE — 99214 OFFICE O/P EST MOD 30 MIN: CPT | Mod: 25

## 2023-09-22 PROCEDURE — 73564 X-RAY EXAM KNEE 4 OR MORE: CPT | Mod: RT

## 2023-09-22 PROCEDURE — 20610 DRAIN/INJ JOINT/BURSA W/O US: CPT | Mod: RT

## 2023-11-10 ENCOUNTER — NON-APPOINTMENT (OUTPATIENT)
Age: 78
End: 2023-11-10

## 2023-11-10 ENCOUNTER — APPOINTMENT (OUTPATIENT)
Dept: CARDIOLOGY | Facility: CLINIC | Age: 78
End: 2023-11-10
Payer: MEDICARE

## 2023-11-10 ENCOUNTER — LABORATORY RESULT (OUTPATIENT)
Age: 78
End: 2023-11-10

## 2023-11-10 VITALS — HEART RATE: 85 BPM | OXYGEN SATURATION: 97 % | SYSTOLIC BLOOD PRESSURE: 140 MMHG | DIASTOLIC BLOOD PRESSURE: 80 MMHG

## 2023-11-10 DIAGNOSIS — I25.10 ATHEROSCLEROTIC HEART DISEASE OF NATIVE CORONARY ARTERY W/OUT ANGINA PECTORIS: ICD-10-CM

## 2023-11-10 DIAGNOSIS — I50.30 UNSPECIFIED DIASTOLIC (CONGESTIVE) HEART FAILURE: ICD-10-CM

## 2023-11-10 DIAGNOSIS — I48.91 UNSPECIFIED ATRIAL FIBRILLATION: ICD-10-CM

## 2023-11-10 PROCEDURE — 99214 OFFICE O/P EST MOD 30 MIN: CPT

## 2023-11-10 PROCEDURE — 93000 ELECTROCARDIOGRAM COMPLETE: CPT

## 2023-11-13 LAB
ALBUMIN SERPL ELPH-MCNC: 3.9 G/DL
ALP BLD-CCNC: 110 U/L
ALT SERPL-CCNC: 14 U/L
ANION GAP SERPL CALC-SCNC: 13 MMOL/L
AST SERPL-CCNC: 24 U/L
BILIRUB SERPL-MCNC: 0.6 MG/DL
BUN SERPL-MCNC: 12 MG/DL
CALCIUM SERPL-MCNC: 9.2 MG/DL
CHLORIDE SERPL-SCNC: 104 MMOL/L
CHOLEST SERPL-MCNC: 193 MG/DL
CO2 SERPL-SCNC: 23 MMOL/L
CREAT SERPL-MCNC: 0.72 MG/DL
EGFR: 86 ML/MIN/1.73M2
ESTIMATED AVERAGE GLUCOSE: 120 MG/DL
GLUCOSE SERPL-MCNC: 81 MG/DL
HBA1C MFR BLD HPLC: 5.8 %
HCT VFR BLD CALC: 43.4 %
HDLC SERPL-MCNC: 61 MG/DL
HGB BLD-MCNC: 14 G/DL
LDLC SERPL CALC-MCNC: 116 MG/DL
MCHC RBC-ENTMCNC: 32.3 GM/DL
MCHC RBC-ENTMCNC: 32.6 PG
MCV RBC AUTO: 101.2 FL
NONHDLC SERPL-MCNC: 131 MG/DL
NT-PROBNP SERPL-MCNC: 1486 PG/ML
PLATELET # BLD AUTO: 260 K/UL
POTASSIUM SERPL-SCNC: 4.2 MMOL/L
PROT SERPL-MCNC: 6.9 G/DL
RBC # BLD: 4.29 M/UL
RBC # FLD: 14.3 %
SODIUM SERPL-SCNC: 141 MMOL/L
TRIGL SERPL-MCNC: 87 MG/DL
TSH SERPL-ACNC: 29.3 UIU/ML
WBC # FLD AUTO: 7.71 K/UL

## 2023-11-28 RX ORDER — TORSEMIDE 20 MG/1
20 TABLET ORAL DAILY
Qty: 90 | Refills: 2 | Status: ACTIVE | COMMUNITY
Start: 2022-04-25 | End: 1900-01-01

## 2023-11-28 RX ORDER — APIXABAN 5 MG/1
5 TABLET, FILM COATED ORAL
Qty: 180 | Refills: 3 | Status: ACTIVE | COMMUNITY
Start: 1900-01-01 | End: 1900-01-01

## 2024-01-05 ENCOUNTER — APPOINTMENT (OUTPATIENT)
Dept: ORTHOPEDIC SURGERY | Facility: CLINIC | Age: 79
End: 2024-01-05
Payer: MEDICARE

## 2024-01-05 VITALS — WEIGHT: 213 LBS | BODY MASS INDEX: 33.43 KG/M2 | HEIGHT: 67 IN

## 2024-01-05 DIAGNOSIS — M17.11 UNILATERAL PRIMARY OSTEOARTHRITIS, RIGHT KNEE: ICD-10-CM

## 2024-01-05 PROCEDURE — 20610 DRAIN/INJ JOINT/BURSA W/O US: CPT | Mod: RT

## 2024-01-05 PROCEDURE — 73564 X-RAY EXAM KNEE 4 OR MORE: CPT | Mod: RT

## 2024-01-05 PROCEDURE — 99214 OFFICE O/P EST MOD 30 MIN: CPT | Mod: 25

## 2024-01-05 NOTE — HISTORY OF PRESENT ILLNESS
[de-identified] : This is a very nice 78 year old  female experiencing worsening pain in the right knee which is severe in intensity and has been going on for at least 6 months now. She has knokwn right knee oa. The pain substantially limits activities of daily living. Walking tolerance is reduced. Medication and activity modification have been minimally effective for a period lasting greater than three months in duration. Assistive devices and external support were not deemed by the patient to be helpful in improving their function.  The patient denies any radiation of the pain to the feet and it is not associated with numbness, tingling, or weakness.   She is doing well with her left TKA

## 2024-01-05 NOTE — PHYSICAL EXAM
[de-identified] : Well developed, well nourished in no apparent distress, awake, alert and orientated to person, place and time. with appropriate mood and affect. \par  Respirations are even and unlabored. Gait evaluation does reveal a limp. There is no inguinal adenopathy. \par  The affected limb is well-perfused, without skin lesions, shows a grossly normal motor and sensory examination. \par  Knee motion does cause pain. ROM of the knee is 0-115 degrees.  5 degrees varus\par  The knee is stable within that range-of-motion to AP and ML stress. \par  Muscle strength is normal. Pedal pulses are palpable.  [de-identified] : Long standing knee, AP knee, lateral knee, and patellar views of the right knee were ordered and taken in the office and demonstrate degenerative joint disease of the knee with joint space narrowing, osteophyte formation, and subchondral sclerosis.

## 2024-01-05 NOTE — DISCUSSION/SUMMARY
[de-identified] : The patient has severe right knee osteoarthritis. An extensive discussion was conducted on the natural history of the disease and the variety of surgical and non-surgical options available to the patient including, but not limited to non-steroidal anti-inflammatory medications, steroid injections, physical therapy, maintenance of ideal body weight, and reduction of activity. I recommended a prescription of Mobic but the patient would prefer to use OTC NSAIDs at this time. The patient will schedule an appointment as needed.   Informed consent for the right knee injection was obtained. All questions were answered. A time out was performed. The  knee was prepped and draped in sterile fashion. Using sterile technique, 80mg of Kenalog, 4cc of 1% lidocaine, 4cc of 0.25% marcaine using a 21-gauge needle. A sterile dressing was applied. Post injection instructions were reviewed. The patient tolerated the procedure well.

## 2024-02-15 ENCOUNTER — INPATIENT (INPATIENT)
Facility: HOSPITAL | Age: 79
LOS: 22 days | Discharge: NURSING, REHAB OR LONG-TERM CARE | DRG: 329 | End: 2024-03-09
Attending: SURGERY | Admitting: GENERAL ACUTE CARE HOSPITAL
Payer: MEDICARE

## 2024-02-15 VITALS
RESPIRATION RATE: 16 BRPM | OXYGEN SATURATION: 99 % | HEIGHT: 64 IN | SYSTOLIC BLOOD PRESSURE: 137 MMHG | HEART RATE: 95 BPM | TEMPERATURE: 97 F | DIASTOLIC BLOOD PRESSURE: 84 MMHG | WEIGHT: 197.98 LBS

## 2024-02-15 DIAGNOSIS — Z98.890 OTHER SPECIFIED POSTPROCEDURAL STATES: Chronic | ICD-10-CM

## 2024-02-15 DIAGNOSIS — Z90.49 ACQUIRED ABSENCE OF OTHER SPECIFIED PARTS OF DIGESTIVE TRACT: Chronic | ICD-10-CM

## 2024-02-15 DIAGNOSIS — Z95.5 PRESENCE OF CORONARY ANGIOPLASTY IMPLANT AND GRAFT: Chronic | ICD-10-CM

## 2024-02-15 DIAGNOSIS — Z96.7 PRESENCE OF OTHER BONE AND TENDON IMPLANTS: Chronic | ICD-10-CM

## 2024-02-15 DIAGNOSIS — Z96.659 PRESENCE OF UNSPECIFIED ARTIFICIAL KNEE JOINT: Chronic | ICD-10-CM

## 2024-02-15 LAB
ALBUMIN SERPL ELPH-MCNC: 4 G/DL — SIGNIFICANT CHANGE UP (ref 3.3–5)
ALP SERPL-CCNC: 84 U/L — SIGNIFICANT CHANGE UP (ref 40–120)
ALT FLD-CCNC: 9 U/L — LOW (ref 10–45)
ANION GAP SERPL CALC-SCNC: 17 MMOL/L — SIGNIFICANT CHANGE UP (ref 5–17)
APTT BLD: 41.6 SEC — HIGH (ref 24.5–35.6)
AST SERPL-CCNC: 18 U/L — SIGNIFICANT CHANGE UP (ref 10–40)
BASE EXCESS BLDV CALC-SCNC: 7.5 MMOL/L — HIGH (ref -2–3)
BASOPHILS # BLD AUTO: 0.03 K/UL — SIGNIFICANT CHANGE UP (ref 0–0.2)
BASOPHILS NFR BLD AUTO: 0.2 % — SIGNIFICANT CHANGE UP (ref 0–2)
BILIRUB SERPL-MCNC: 0.9 MG/DL — SIGNIFICANT CHANGE UP (ref 0.2–1.2)
BUN SERPL-MCNC: 22 MG/DL — SIGNIFICANT CHANGE UP (ref 7–23)
CA-I SERPL-SCNC: 1.13 MMOL/L — LOW (ref 1.15–1.33)
CALCIUM SERPL-MCNC: 9.7 MG/DL — SIGNIFICANT CHANGE UP (ref 8.4–10.5)
CHLORIDE BLDV-SCNC: 93 MMOL/L — LOW (ref 96–108)
CHLORIDE SERPL-SCNC: 91 MMOL/L — LOW (ref 96–108)
CO2 BLDV-SCNC: 34 MMOL/L — HIGH (ref 22–26)
CO2 SERPL-SCNC: 27 MMOL/L — SIGNIFICANT CHANGE UP (ref 22–31)
CREAT SERPL-MCNC: 1.29 MG/DL — SIGNIFICANT CHANGE UP (ref 0.5–1.3)
EGFR: 43 ML/MIN/1.73M2 — LOW
EOSINOPHIL # BLD AUTO: 0.06 K/UL — SIGNIFICANT CHANGE UP (ref 0–0.5)
EOSINOPHIL NFR BLD AUTO: 0.5 % — SIGNIFICANT CHANGE UP (ref 0–6)
FLUAV AG NPH QL: SIGNIFICANT CHANGE UP
FLUBV AG NPH QL: SIGNIFICANT CHANGE UP
GAS PNL BLDV: 132 MMOL/L — LOW (ref 136–145)
GAS PNL BLDV: SIGNIFICANT CHANGE UP
GLUCOSE BLDV-MCNC: 122 MG/DL — HIGH (ref 70–99)
GLUCOSE SERPL-MCNC: 103 MG/DL — HIGH (ref 70–99)
HCO3 BLDV-SCNC: 33 MMOL/L — HIGH (ref 22–29)
HCT VFR BLD CALC: 43.7 % — SIGNIFICANT CHANGE UP (ref 34.5–45)
HCT VFR BLDA CALC: 42 % — SIGNIFICANT CHANGE UP (ref 34.5–46.5)
HGB BLD CALC-MCNC: 13.9 G/DL — SIGNIFICANT CHANGE UP (ref 11.7–16.1)
HGB BLD-MCNC: 14.8 G/DL — SIGNIFICANT CHANGE UP (ref 11.5–15.5)
IMM GRANULOCYTES NFR BLD AUTO: 0.6 % — SIGNIFICANT CHANGE UP (ref 0–0.9)
INR BLD: 2.28 RATIO — HIGH (ref 0.85–1.18)
LACTATE BLDV-MCNC: 1.5 MMOL/L — SIGNIFICANT CHANGE UP (ref 0.5–2)
LYMPHOCYTES # BLD AUTO: 1.52 K/UL — SIGNIFICANT CHANGE UP (ref 1–3.3)
LYMPHOCYTES # BLD AUTO: 12 % — LOW (ref 13–44)
MAGNESIUM SERPL-MCNC: 1.6 MG/DL — SIGNIFICANT CHANGE UP (ref 1.6–2.6)
MCHC RBC-ENTMCNC: 32.2 PG — SIGNIFICANT CHANGE UP (ref 27–34)
MCHC RBC-ENTMCNC: 33.9 GM/DL — SIGNIFICANT CHANGE UP (ref 32–36)
MCV RBC AUTO: 95 FL — SIGNIFICANT CHANGE UP (ref 80–100)
MONOCYTES # BLD AUTO: 1.4 K/UL — HIGH (ref 0–0.9)
MONOCYTES NFR BLD AUTO: 11 % — SIGNIFICANT CHANGE UP (ref 2–14)
NEUTROPHILS # BLD AUTO: 9.62 K/UL — HIGH (ref 1.8–7.4)
NEUTROPHILS NFR BLD AUTO: 75.7 % — SIGNIFICANT CHANGE UP (ref 43–77)
NRBC # BLD: 0 /100 WBCS — SIGNIFICANT CHANGE UP (ref 0–0)
NT-PROBNP SERPL-SCNC: 1686 PG/ML — HIGH (ref 0–300)
PCO2 BLDV: 47 MMHG — HIGH (ref 39–42)
PH BLDV: 7.45 — HIGH (ref 7.32–7.43)
PLATELET # BLD AUTO: 220 K/UL — SIGNIFICANT CHANGE UP (ref 150–400)
PO2 BLDV: 30 MMHG — SIGNIFICANT CHANGE UP (ref 25–45)
POTASSIUM BLDV-SCNC: 2.8 MMOL/L — CRITICAL LOW (ref 3.5–5.1)
POTASSIUM SERPL-MCNC: 2.8 MMOL/L — CRITICAL LOW (ref 3.5–5.3)
POTASSIUM SERPL-SCNC: 2.8 MMOL/L — CRITICAL LOW (ref 3.5–5.3)
PROT SERPL-MCNC: 7.2 G/DL — SIGNIFICANT CHANGE UP (ref 6–8.3)
PROTHROM AB SERPL-ACNC: 24.5 SEC — HIGH (ref 9.5–13)
RBC # BLD: 4.6 M/UL — SIGNIFICANT CHANGE UP (ref 3.8–5.2)
RBC # FLD: 13.4 % — SIGNIFICANT CHANGE UP (ref 10.3–14.5)
RSV RNA NPH QL NAA+NON-PROBE: SIGNIFICANT CHANGE UP
SAO2 % BLDV: 51.7 % — LOW (ref 67–88)
SARS-COV-2 RNA SPEC QL NAA+PROBE: SIGNIFICANT CHANGE UP
SODIUM SERPL-SCNC: 135 MMOL/L — SIGNIFICANT CHANGE UP (ref 135–145)
TROPONIN T, HIGH SENSITIVITY RESULT: 41 NG/L — SIGNIFICANT CHANGE UP (ref 0–51)
WBC # BLD: 12.7 K/UL — HIGH (ref 3.8–10.5)
WBC # FLD AUTO: 12.7 K/UL — HIGH (ref 3.8–10.5)

## 2024-02-15 PROCEDURE — 99285 EMERGENCY DEPT VISIT HI MDM: CPT | Mod: GC

## 2024-02-15 PROCEDURE — 71045 X-RAY EXAM CHEST 1 VIEW: CPT | Mod: 26

## 2024-02-15 RX ORDER — SODIUM CHLORIDE 9 MG/ML
1000 INJECTION INTRAMUSCULAR; INTRAVENOUS; SUBCUTANEOUS ONCE
Refills: 0 | Status: COMPLETED | OUTPATIENT
Start: 2024-02-15 | End: 2024-02-15

## 2024-02-15 RX ORDER — POTASSIUM CHLORIDE 20 MEQ
10 PACKET (EA) ORAL
Refills: 0 | Status: COMPLETED | OUTPATIENT
Start: 2024-02-15 | End: 2024-02-16

## 2024-02-15 RX ORDER — POTASSIUM CHLORIDE 20 MEQ
40 PACKET (EA) ORAL ONCE
Refills: 0 | Status: COMPLETED | OUTPATIENT
Start: 2024-02-15 | End: 2024-02-15

## 2024-02-15 RX ADMIN — Medication 40 MILLIEQUIVALENT(S): at 23:03

## 2024-02-15 RX ADMIN — SODIUM CHLORIDE 1000 MILLILITER(S): 9 INJECTION INTRAMUSCULAR; INTRAVENOUS; SUBCUTANEOUS at 20:46

## 2024-02-15 RX ADMIN — Medication 100 MILLIEQUIVALENT(S): at 23:03

## 2024-02-15 NOTE — ED PROVIDER NOTE - CARE PLAN
1 Principal Discharge DX:	Syncope   Principal Discharge DX:	Syncope  Secondary Diagnosis:	Hypokalemia

## 2024-02-15 NOTE — ED ADULT NURSE NOTE - NSFALLUNIVINTERV_ED_ALL_ED
Bed/Stretcher in lowest position, wheels locked, appropriate side rails in place/Call bell, personal items and telephone in reach/Instruct patient to call for assistance before getting out of bed/chair/stretcher/Non-slip footwear applied when patient is off stretcher/[place] to call system/Physically safe environment - no spills, clutter or unnecessary equipment/Purposeful proactive rounding/Room/bathroom lighting operational, light cord in reach

## 2024-02-15 NOTE — ED ADULT TRIAGE NOTE - HEIGHT IN FEET
Pt arrives to ED from [facility] with a chief complaint of medical screening exam. Pt states she was about to have her surgery done to amputate the right second toe due to ulcer and infection in the bone when she was telling the nurse she hasn't been feeling well and her blood sugar has been elevated. Pt states its due to the infection but staff stated she needed to come to the ED and probably be given IV antibiotics and be admitted. Pt rates her pain as a out of 10.    5

## 2024-02-15 NOTE — ED PROVIDER NOTE - NSICDXPASTMEDICALHX_GEN_ALL_CORE_FT
PAST MEDICAL HISTORY:  Bilateral hearing loss, unspecified hearing loss type bilateral aids    CAD (coronary artery disease)     Heart murmur dx in childhood    History of MI (myocardial infarction) h/o previous MI in 2004 prompted PTCA  with stenting x 2 vessels   last stress/ echo 2019    Hypertension     Hypothyroid     Mixed stress and urge urinary incontinence     Obesity     CROW (obstructive sleep apnea) non complaint on CPAP    Osteoarthritis knees, back    Overactive bladder

## 2024-02-15 NOTE — ED PROVIDER NOTE - PROGRESS NOTE DETAILS
Felice Mccain MD PGY-2: ebenezer no answer, left message Felice Mccain MD PGY-2: hospitalist called back stated to admit to [facility] Felice Mccain MD PGY-2: hospitalist paged

## 2024-02-15 NOTE — ED ADULT NURSE REASSESSMENT NOTE - NS ED NURSE REASSESS COMMENT FT1
Received report from DANIEL Swift RN. Patient presenting following syncopal episode at PCP. Patient without complaints at this time. Notes no pain at present. Denies head strike. AOx4 and speaking coherently. Pt placed in position of comfort. Pt educated on call bell system and provided call bell. Bed in lowest position, wheels locked, appropriate side rails raised. Pt denies needs at this time.

## 2024-02-15 NOTE — ED PROVIDER NOTE - ATTENDING CONTRIBUTION TO CARE
Patient is a 78-year-old female with a history of hypertension, hyperlipidemia, COPD, atrial fibrillation on Eliquis who is here with her daughter today after having a syncopal episode while at her doctor's office earlier today.  Per daughter, patient was sitting on the examination table and daughter went to help her lay down when patient suddenly went limp and her eyes rolled back and her tongue went to 1 side and patient had a little bit of shaking for a few seconds.  Patient immediately regained consciousness.  She was not confused afterwards.  Per daughter, patient was sweaty. There was no tongue biting or urinary incontinence.  Patient does not remember the event.  Per daughter, patient has had decreased p.o. intake and has been generally very weak in the past few days.  She states that her mother is not sleeping.  No known sick contacts.  No recent fevers, chills, cough.  Patient has no complaints of chest pain or shortness of breath.    PCP: Dr. Ken Butler  Cardiology: Dr. Jay Lisker    VS noted  Gen. elderly, non-toxic  HEENT: EOMI, mmm  Lungs: CTAB/L no C/ W /R   CVS: RRR   Abd; Soft non tender, non distended   Ext: no edema  Skin: no rash  Neuro: awake, alert, face symmetrical, strength equal, clear speech  a/p: syncope - EKG reviewed and is afib with RVR. Plan for labs for metabolic derangement, urinalysis to r/o UTI. Patient will likely need admission.   - Cat FLOOD

## 2024-02-15 NOTE — ED PROVIDER NOTE - CLINICAL SUMMARY MEDICAL DECISION MAKING FREE TEXT BOX
78-year-old female with PMH A-fib, COPD, HTN, HLD presents for syncope.  Patient has been eating or drinking or sleeping as much in the past day.  Exam shows normal neuroexam, no focal neurologic deficits, cranial nerves II to XII grossly intact, normal S1-S2 and A-fib, lungs clear to auscultation bilaterally, mild epigastric tenderness.  5 out of 5 strength in upper and lower extremities bilaterally.,  No tongue lacerations or bites.  Will obtain labs, EKG, troponin, CK, lactate, give IV fluids, check orthostatics.  Dispo pending results.  Patient is medium risk syncope score pending Trop.

## 2024-02-15 NOTE — ED PROVIDER NOTE - OBJECTIVE STATEMENT
78-year-old female with PMH COPD, A-fib  On Eliquis and Plavix, COPD, HTN, HLD presents for syncope from PCPs office earlier today.  Patient was at normal checkup for blood work and was sitting on the table.  Daughter at bedside states that patient suddenly went limp while she was sitting on the table, her eyes rolled back, and her tongue turned to 1 side and this lasted for couple of seconds and then the patient regained consciousness.  Patient had no postictal period.  No tongue biting.  Patient does not remember these events.  Daughter at bedside states that patient has not been eating as much for the past day or 2 and has not been sleeping at all.  Denies recent fevers, chills, cough, congestion, rhinorrhea, chest pain, shortness of breath, abdominal pain, nausea, vomiting, dysuria, constipation, diarrhea.  Does not know if patient has had prior episodes of syncope.    PCP/pulm [place] [facility]  Cards - Jay Lisker

## 2024-02-15 NOTE — ED ADULT NURSE NOTE - OBJECTIVE STATEMENT
Pt is a 78 yr old female with pmh of afib on eliquis, copd coming from doctor's office for syncope. Per patient's daughter pt was at primary/pulmonologist office when she had a syncopal episode- lasted a few seconds only and was witnessed by the daughter. Daughter Caity states her mother has not been eating well over the past few days/has been experiencing insomnia at night. Pt arrived to the ED without complaints- denies ever having chest pain even prior the syncopal episode. Pt states she has been feeling generally well- but daughter has noticed a change in her general health. Pt is a/ox 3- vitals stable- pt ambulatory.

## 2024-02-15 NOTE — ED PROVIDER NOTE - NSICDXPASTSURGICALHX_GEN_ALL_CORE_FT
PAST SURGICAL HISTORY:  H/O dilation and curettage 2/2019 Benign polyp    S/P appendectomy 30 plus years    S/P knee replacement left 2000    S/P laparotomy due to adhesions, 30 years ago    S/P ORIF (open reduction internal fixation) fracture left hip 1962    Stented coronary artery 2004 X 2 STENTS

## 2024-02-16 DIAGNOSIS — R55 SYNCOPE AND COLLAPSE: ICD-10-CM

## 2024-02-16 LAB
ANION GAP SERPL CALC-SCNC: 12 MMOL/L — SIGNIFICANT CHANGE UP (ref 5–17)
ANION GAP SERPL CALC-SCNC: 17 MMOL/L — SIGNIFICANT CHANGE UP (ref 5–17)
APPEARANCE UR: CLEAR — SIGNIFICANT CHANGE UP
BACTERIA # UR AUTO: NEGATIVE /HPF — SIGNIFICANT CHANGE UP
BILIRUB UR-MCNC: NEGATIVE — SIGNIFICANT CHANGE UP
BUN SERPL-MCNC: 22 MG/DL — SIGNIFICANT CHANGE UP (ref 7–23)
BUN SERPL-MCNC: 22 MG/DL — SIGNIFICANT CHANGE UP (ref 7–23)
CALCIUM SERPL-MCNC: 8.7 MG/DL — SIGNIFICANT CHANGE UP (ref 8.4–10.5)
CALCIUM SERPL-MCNC: 9.3 MG/DL — SIGNIFICANT CHANGE UP (ref 8.4–10.5)
CAST: 2 /LPF — SIGNIFICANT CHANGE UP (ref 0–4)
CHLORIDE SERPL-SCNC: 95 MMOL/L — LOW (ref 96–108)
CHLORIDE SERPL-SCNC: 99 MMOL/L — SIGNIFICANT CHANGE UP (ref 96–108)
CO2 SERPL-SCNC: 22 MMOL/L — SIGNIFICANT CHANGE UP (ref 22–31)
CO2 SERPL-SCNC: 28 MMOL/L — SIGNIFICANT CHANGE UP (ref 22–31)
COLOR SPEC: YELLOW — SIGNIFICANT CHANGE UP
CREAT SERPL-MCNC: 1.21 MG/DL — SIGNIFICANT CHANGE UP (ref 0.5–1.3)
CREAT SERPL-MCNC: 1.33 MG/DL — HIGH (ref 0.5–1.3)
DIFF PNL FLD: ABNORMAL
EGFR: 41 ML/MIN/1.73M2 — LOW
EGFR: 46 ML/MIN/1.73M2 — LOW
GLUCOSE BLDC GLUCOMTR-MCNC: 116 MG/DL — HIGH (ref 70–99)
GLUCOSE BLDC GLUCOMTR-MCNC: 82 MG/DL — SIGNIFICANT CHANGE UP (ref 70–99)
GLUCOSE SERPL-MCNC: 101 MG/DL — HIGH (ref 70–99)
GLUCOSE SERPL-MCNC: 89 MG/DL — SIGNIFICANT CHANGE UP (ref 70–99)
GLUCOSE UR QL: NEGATIVE MG/DL — SIGNIFICANT CHANGE UP
HCT VFR BLD CALC: 40.3 % — SIGNIFICANT CHANGE UP (ref 34.5–45)
HGB BLD-MCNC: 13.3 G/DL — SIGNIFICANT CHANGE UP (ref 11.5–15.5)
INR BLD: 2.18 RATIO — HIGH (ref 0.85–1.18)
KETONES UR-MCNC: NEGATIVE MG/DL — SIGNIFICANT CHANGE UP
LEUKOCYTE ESTERASE UR-ACNC: NEGATIVE — SIGNIFICANT CHANGE UP
MAGNESIUM SERPL-MCNC: 1.6 MG/DL — SIGNIFICANT CHANGE UP (ref 1.6–2.6)
MCHC RBC-ENTMCNC: 32 PG — SIGNIFICANT CHANGE UP (ref 27–34)
MCHC RBC-ENTMCNC: 33 GM/DL — SIGNIFICANT CHANGE UP (ref 32–36)
MCV RBC AUTO: 96.9 FL — SIGNIFICANT CHANGE UP (ref 80–100)
NITRITE UR-MCNC: NEGATIVE — SIGNIFICANT CHANGE UP
NRBC # BLD: 0 /100 WBCS — SIGNIFICANT CHANGE UP (ref 0–0)
PH UR: 5.5 — SIGNIFICANT CHANGE UP (ref 5–8)
PLATELET # BLD AUTO: 196 K/UL — SIGNIFICANT CHANGE UP (ref 150–400)
POTASSIUM SERPL-MCNC: 3.7 MMOL/L — SIGNIFICANT CHANGE UP (ref 3.5–5.3)
POTASSIUM SERPL-MCNC: 3.8 MMOL/L — SIGNIFICANT CHANGE UP (ref 3.5–5.3)
POTASSIUM SERPL-SCNC: 3.7 MMOL/L — SIGNIFICANT CHANGE UP (ref 3.5–5.3)
POTASSIUM SERPL-SCNC: 3.8 MMOL/L — SIGNIFICANT CHANGE UP (ref 3.5–5.3)
PROT UR-MCNC: NEGATIVE MG/DL — SIGNIFICANT CHANGE UP
PROTHROM AB SERPL-ACNC: 22.4 SEC — HIGH (ref 9.5–13)
RBC # BLD: 4.16 M/UL — SIGNIFICANT CHANGE UP (ref 3.8–5.2)
RBC # FLD: 13.4 % — SIGNIFICANT CHANGE UP (ref 10.3–14.5)
RBC CASTS # UR COMP ASSIST: 3 /HPF — SIGNIFICANT CHANGE UP (ref 0–4)
REVIEW: SIGNIFICANT CHANGE UP
SODIUM SERPL-SCNC: 135 MMOL/L — SIGNIFICANT CHANGE UP (ref 135–145)
SODIUM SERPL-SCNC: 138 MMOL/L — SIGNIFICANT CHANGE UP (ref 135–145)
SP GR SPEC: 1.01 — SIGNIFICANT CHANGE UP (ref 1–1.03)
SQUAMOUS # UR AUTO: 2 /HPF — SIGNIFICANT CHANGE UP (ref 0–5)
TROPONIN T, HIGH SENSITIVITY RESULT: 37 NG/L — SIGNIFICANT CHANGE UP (ref 0–51)
UROBILINOGEN FLD QL: 0.2 MG/DL — SIGNIFICANT CHANGE UP (ref 0.2–1)
WBC # BLD: 10.35 K/UL — SIGNIFICANT CHANGE UP (ref 3.8–10.5)
WBC # FLD AUTO: 10.35 K/UL — SIGNIFICANT CHANGE UP (ref 3.8–10.5)
WBC UR QL: 2 /HPF — SIGNIFICANT CHANGE UP (ref 0–5)

## 2024-02-16 PROCEDURE — 70450 CT HEAD/BRAIN W/O DYE: CPT | Mod: 26

## 2024-02-16 PROCEDURE — 74019 RADEX ABDOMEN 2 VIEWS: CPT | Mod: 26

## 2024-02-16 RX ORDER — METOPROLOL TARTRATE 50 MG
1 TABLET ORAL
Qty: 0 | Refills: 0 | DISCHARGE

## 2024-02-16 RX ORDER — ISOSORBIDE MONONITRATE 60 MG/1
1 TABLET, EXTENDED RELEASE ORAL
Qty: 0 | Refills: 0 | DISCHARGE

## 2024-02-16 RX ORDER — APIXABAN 2.5 MG/1
5 TABLET, FILM COATED ORAL EVERY 12 HOURS
Refills: 0 | Status: DISCONTINUED | OUTPATIENT
Start: 2024-02-16 | End: 2024-02-16

## 2024-02-16 RX ORDER — LEVOTHYROXINE SODIUM 125 MCG
1 TABLET ORAL
Qty: 0 | Refills: 0 | DISCHARGE

## 2024-02-16 RX ORDER — LOSARTAN POTASSIUM 100 MG/1
1 TABLET, FILM COATED ORAL
Qty: 0 | Refills: 0 | DISCHARGE

## 2024-02-16 RX ORDER — ASPIRIN/CALCIUM CARB/MAGNESIUM 324 MG
1 TABLET ORAL
Qty: 0 | Refills: 0 | DISCHARGE

## 2024-02-16 RX ORDER — MAGNESIUM SULFATE 500 MG/ML
1 VIAL (ML) INJECTION ONCE
Refills: 0 | Status: COMPLETED | OUTPATIENT
Start: 2024-02-16 | End: 2024-02-16

## 2024-02-16 RX ORDER — NICOTINE POLACRILEX 2 MG
1 GUM BUCCAL DAILY
Refills: 0 | Status: DISCONTINUED | OUTPATIENT
Start: 2024-02-16 | End: 2024-02-22

## 2024-02-16 RX ORDER — LEVOTHYROXINE SODIUM 125 MCG
175 TABLET ORAL DAILY
Refills: 0 | Status: DISCONTINUED | OUTPATIENT
Start: 2024-02-16 | End: 2024-02-22

## 2024-02-16 RX ORDER — CLOPIDOGREL BISULFATE 75 MG/1
1 TABLET, FILM COATED ORAL
Qty: 0 | Refills: 0 | DISCHARGE

## 2024-02-16 RX ORDER — ATORVASTATIN CALCIUM 80 MG/1
80 TABLET, FILM COATED ORAL AT BEDTIME
Refills: 0 | Status: DISCONTINUED | OUTPATIENT
Start: 2024-02-16 | End: 2024-02-22

## 2024-02-16 RX ORDER — DEXTROSE 50 % IN WATER 50 %
25 SYRINGE (ML) INTRAVENOUS ONCE
Refills: 0 | Status: COMPLETED | OUTPATIENT
Start: 2024-02-16 | End: 2024-02-16

## 2024-02-16 RX ORDER — SODIUM CHLORIDE 9 MG/ML
500 INJECTION INTRAMUSCULAR; INTRAVENOUS; SUBCUTANEOUS
Refills: 0 | Status: DISCONTINUED | OUTPATIENT
Start: 2024-02-16 | End: 2024-02-16

## 2024-02-16 RX ORDER — SODIUM CHLORIDE 9 MG/ML
500 INJECTION, SOLUTION INTRAVENOUS
Refills: 0 | Status: DISCONTINUED | OUTPATIENT
Start: 2024-02-16 | End: 2024-02-17

## 2024-02-16 RX ADMIN — Medication 100 MILLIEQUIVALENT(S): at 01:19

## 2024-02-16 RX ADMIN — SODIUM CHLORIDE 65 MILLILITER(S): 9 INJECTION, SOLUTION INTRAVENOUS at 16:09

## 2024-02-16 RX ADMIN — Medication 10 MILLIEQUIVALENT(S): at 00:33

## 2024-02-16 RX ADMIN — ATORVASTATIN CALCIUM 80 MILLIGRAM(S): 80 TABLET, FILM COATED ORAL at 23:24

## 2024-02-16 RX ADMIN — Medication 100 GRAM(S): at 12:15

## 2024-02-16 RX ADMIN — Medication 10 MILLIEQUIVALENT(S): at 02:20

## 2024-02-16 RX ADMIN — Medication 25 MILLILITER(S): at 15:52

## 2024-02-16 RX ADMIN — Medication 1 PATCH: at 12:13

## 2024-02-16 RX ADMIN — SODIUM CHLORIDE 1000 MILLILITER(S): 9 INJECTION INTRAMUSCULAR; INTRAVENOUS; SUBCUTANEOUS at 00:33

## 2024-02-16 RX ADMIN — Medication 100 MILLIEQUIVALENT(S): at 00:12

## 2024-02-16 RX ADMIN — Medication 1 PATCH: at 21:05

## 2024-02-16 NOTE — H&P ADULT - HISTORY OF PRESENT ILLNESS
[de-identified] : Reviewed x-rays Explained the importance of ice and rest.    Stretching exercises shown, Explained the importance of Range of Motion before strength.  discussed with patient to procced with walking to exercise the muscles.    f/u PNR  78-year-old female with PMH COPD, A-fib  On Eliquis and Plavix, COPD, HTN, HLD presents for syncope from PCPs office earlier today.  Patient was at normal checkup for blood work and was sitting on the table.  Daughter at bedside states that patient suddenly went limp while she was sitting on the table, her eyes rolled back, and her tongue turned to 1 side and this lasted for couple of seconds and then the patient regained consciousness.  Patient had no postictal period.  No tongue biting.  Patient does not remember these events.  Daughter at bedside states that patient has not been eating as much for the past day or 2 and has not been sleeping at all.  Denies recent fevers, chills, cough, congestion, rhinorrhea, chest pain, shortness of breath, abdominal pain, nausea, vomiting, dysuria, constipation, diarrhea.  Does not know if patient has had prior episodes of syncope 78-year-old female with PMH current smoker , COPD, A-fib  On Eliquis and Plavix, HTN, HLD presents for syncope from PCPs office yesterday.     Patient was at normal checkup for blood work and was sitting on the table.  Daughter at bedside states that patient suddenly went limp while she was sitting on the table, her eyes rolled back, and her tongue turned to 1 side and this lasted for couple of seconds and then the patient regained consciousness.  Patient had no postictal period.  No tongue biting.  Patient does not remember these events.   pt too lethargic  , though [place]   knows she is in NS   denies any sx   but tenderneness on abd exam     called daughter : she thinks she might have taken double dose   pt seems to go few days without sleep and then sleeps all day   oldest sister just passed away in oct   questionable underlying dementia   vomitting episode two days ago    c/o abd pain   no fever   no chills   no urinary complaints

## 2024-02-16 NOTE — CONSULT NOTE ADULT - SUBJECTIVE AND OBJECTIVE BOX
[facility]), [facility]        Patient is a 78y old  Female who presents with a chief complaint of   Excerpt from H&P,"     78-year-old female with PMH COPD, A-fib  On Eliquis and Plavix, COPD, HTN, HLD presents for syncope from PCPs office earlier today.  Patient was at normal checkup for blood work and was sitting on the table.  Daughter at bedside states that patient suddenly went limp while she was sitting on the table, her eyes rolled back, and her tongue turned to 1 side and this lasted for couple of seconds and then the patient regained consciousness.  Patient had no postictal period.  No tongue biting.  Patient does not remember these events.  Daughter at bedside states that patient has not been eating as much for the past day or 2 and has not been sleeping at all.  Denies recent fevers, chills, cough, congestion, rhinorrhea, chest pain, shortness of breath, abdominal pain, nausea, vomiting, dysuria, constipation, diarrhea.  Does not know if patient has had prior episodes of syncope (2024 09:19)           *****PAST MEDICAL / Surgical  HISTORY:  PAST MEDICAL & SURGICAL HISTORY:  Hypertension      Hypothyroid      Osteoarthritis  knees, back      CAD (coronary artery disease)      CROW (obstructive sleep apnea)  non complaint on CPAP      History of MI (myocardial infarction)  h/o previous MI in  prompted PTCA  with stenting x 2 vessels   last stress/ echo       Heart murmur  dx in childhood      Bilateral hearing loss, unspecified hearing loss type  bilateral aids      Obesity      Mixed stress and urge urinary incontinence      Overactive bladder      S/P ORIF (open reduction internal fixation) fracture  left hip       S/P appendectomy  30 plus years      S/P knee replacement  left       Stented coronary artery  2004 X 2 STENTS      S/P laparotomy  due to adhesions, 30 years ago      H/O dilation and curettage  2019 Benign polyp               *****FAMILY HISTORY:  FAMILY HISTORY:           *****SOCIAL HISTORY:  Alcohol: None  Smoking: None         *****ALLERGIES:   Allergies    penicillin (Hives)    Intolerances             *****MEDICATIONS: current medication reviewed and documented.   MEDICATIONS  (STANDING):    MEDICATIONS  (PRN):           *****REVIEW OF SYSTEM:  GEN: no fever, no chills, no pain  RESP: no SOB, no cough, no sputum  CVS: no chest pain, no palpitations, no edema  GI: no abdominal pain, no nausea, no vomiting, no constipation, no diarrhea  : no dysurea, no frequency, no hematurea  Neuro: no headache, no dizziness  PSYCH: no anxiety, no depression  Derm : no itching, no rash         *****VITAL SIGNS:  T(C): 36.7 (24 @ 08:51), Max: 36.7 (24 @ 08:51)  HR: 95 (24 @ 08:51) (95 - 127)  BP: 92/53 (24 @ 08:51) (92/53 - 137/84)  RR: 18 (24 @ 08:51) (13 - 18)  SpO2: 96% (24 @ 08:51) (94% - 100%)  Wt(kg): --           *****PHYSICAL EXAM:   Alert oriented x 3   Attention comprehension are fair. Able to name, repeat, read without any difficulty.   Able to follow 3 step commands.     EOMI fundi not visualized,  VFF to confrontration  No facial asymmetry   Tongue is midline   Palate elevates symmetrically   Moving all 4 ext symmetrically no pronator drift   Reflexes are symmetric throughout   sensation is grossly symmetric  Gait : not assessed.  B/L down going toes               *****LAB AND IMAGIN.3   10.35 )-----------( 196      ( 2024 07:58 )             40.3               02-    138  |  99  |  22  ----------------------------<  89  3.8   |  22  |  1.21    Ca    8.7      2024 07:58  Mg     1.6     -    TPro  7.2  /  Alb  4.0  /  TBili  0.9  /  DBili  x   /  AST  18  /  ALT  9<L>  /  AlkPhos  84  02-15    PT/INR - ( 2024 07:58 )   PT: 22.4 sec;   INR: 2.18 ratio         PTT - ( 15 Feb 2024 20:48 )  PTT:41.6 sec            CARDIAC MARKERS ( 15 Feb 2024 21:52 )  x     / x     / 107 U/L / x     / x                  Urinalysis Basic - ( 2024 07:58 )    Color: x / Appearance: x / SG: x / pH: x  Gluc: 89 mg/dL / Ketone: x  / Bili: x / Urobili: x   Blood: x / Protein: x / Nitrite: x   Leuk Esterase: x / RBC: x / WBC x   Sq Epi: x / Non Sq Epi: x / Bacteria: x        [All pertinent recent Imaging reports reviewed]         *****A S S E S S M E N T   A N D   P L A N :   Excerpt from H&P,"     78-year-old female with PMH COPD, A-fib  On Eliquis and Plavix, COPD, HTN, HLD presents for syncope from PCPs office earlier today.  Patient was at normal checkup for blood work and was sitting on the table.  Daughter at bedside states that patient suddenly went limp while she was sitting on the table, her eyes rolled back, and her tongue turned to 1 side and this lasted for couple of seconds and then the patient regained consciousness.  Patient had no postictal period.  No tongue biting.  Patient does not remember these events.  Daughter at bedside states that patient has not been eating as much for the past day or 2 and has not been sleeping at all.  Denies recent fevers, chills, cough, congestion, rhinorrhea, chest pain, shortness of breath, abdominal pain, nausea, vomiting, dysuria, constipation, diarrhea.  Does not know if patient has had prior episodes of syncope (2024 09:19)         Problem/Recommendations 1:        Problem/Recommendations 2:         pt at risk for developing delirium, therefore please institute the following preventative measures if possible          - initiating early mobilization          -minimizing "tethers" - IV, oxygen, catheters, etc          -avoiding   sedatives          -maintaining hydration/nutrition          -avoid anticholinergics - diphenhydramine, etc          -pain control          -sleep wake cycle regulation; avoid day time somnolence           -supportive environment    ___________________________  Will follow with you.  Thank you,  Elodia Mackey MD  Diplomate of the American Board of Neurology and Psychiatry.  Diplomate of the American Board of Vascular Neurology.   [facility]   Ph: [phone]    Differential diagnosis and plan of care discussed with patient after the evaluation.   Advanced care planning options discussed.   Pain assessed and judicious use of narcotics when appropriate was discussed.  Importance of Fall prevention discussed.  Counseling on Smoking and Alcohol cessation was offered when appropriate.  Counseling on Diet, exercise, and medication compliance was done.   83 minutes spent on the total encounter;  more than 50 % of the visit was spent on counseling  and or coordinating care by the attending physician.    Thank you for allowing me to participate in the care of this valerio patient. Please do not hesitate to call me if you have any questions.     This and subsequent notes  will  inherently be subject to errors including those of syntax and substitutions which may escape proofreading. In such instances original meaning may be extrapolated by contextual derivation.    [facility]), [facility]        Patient is a 78y old  Female who presents with a chief complaint of   Excerpt from H&P,"     78-year-old female with PMH COPD, A-fib  On Eliquis and Plavix, COPD, HTN, HLD presents for syncope from PCPs office earlier today.  Patient was at normal checkup for blood work and was sitting on the table.  Daughter at bedside states that patient suddenly went limp while she was sitting on the table, her eyes rolled back, and her tongue turned to 1 side and this lasted for couple of seconds and then the patient regained consciousness.  Patient had no postictal period.  No tongue biting.  Patient does not remember these events.  Daughter at bedside states that patient has not been eating as much for the past day or 2 and has not been sleeping at all.  Denies recent fevers, chills, cough, congestion, rhinorrhea, chest pain, shortness of breath, abdominal pain, nausea, vomiting, dysuria, constipation, diarrhea.  Does not know if patient has had prior episodes of syncope            *****PAST MEDICAL / Surgical  HISTORY:  PAST MEDICAL & SURGICAL HISTORY:  Hypertension      Hypothyroid      Osteoarthritis  knees, back      CAD (coronary artery disease)      CROW (obstructive sleep apnea)  non complaint on CPAP      History of MI (myocardial infarction)  h/o previous MI in  prompted PTCA  with stenting x 2 vessels   last stress/ echo       Heart murmur  dx in childhood      Bilateral hearing loss, unspecified hearing loss type  bilateral aids      Obesity      Mixed stress and urge urinary incontinence      Overactive bladder      S/P ORIF (open reduction internal fixation) fracture  left hip       S/P appendectomy  30 plus years      S/P knee replacement  left       Stented coronary artery  2004 X 2 STENTS      S/P laparotomy  due to adhesions, 30 years ago      H/O dilation and curettage  2019 Benign polyp               *****FAMILY HISTORY:  FAMILY HISTORY:           *****SOCIAL HISTORY:  Alcohol: None  Smoking: None         *****ALLERGIES:   Allergies    penicillin (Hives)    Intolerances             *****MEDICATIONS: current medication reviewed and documented.   MEDICATIONS  (STANDING):    MEDICATIONS  (PRN):           *****REVIEW OF SYSTEM:  GEN: no fever, no chills, no pain  RESP: no SOB, no cough, no sputum  CVS: no chest pain, no palpitations, no edema  GI: no abdominal pain, no nausea, no vomiting, no constipation, no diarrhea  : no dysurea, no frequency, no hematurea  Neuro: no headache, no dizziness  PSYCH: no anxiety, no depression  Derm : no itching, no rash         *****VITAL SIGNS:  T(C): 36.7 (24 @ 08:51), Max: 36.7 (24 @ 08:51)  HR: 95 (24 @ 08:51) (95 - 127)  BP: 92/53 (24 @ 08:51) (92/53 - 137/84)  RR: 18 (24 @ 08:51) (13 - 18)  SpO2: 96% (24 @ 08:51) (94% - 100%)  Wt(kg): --           *****PHYSICAL EXAM:   Alert not cooperative im cold leave me alone   Able to follow 1  step commands intermittently      EOMI fundi not visualized,   blinks to threat   No facial asymmetry       Moving all 4 ext symmetrically no pronator drift      sensation is grossly symmetric  Gait : not assessed.  B/L down going toes               *****LAB AND IMAGIN.3   10.35 )-----------( 196      ( 2024 07:58 )             40.3               02-16    138  |  99  |  22  ----------------------------<  89  3.8   |  22  |  1.21    Ca    8.7      2024 07:58  Mg     1.6     02-16    TPro  7.2  /  Alb  4.0  /  TBili  0.9  /  DBili  x   /  AST  18  /  ALT  9<L>  /  AlkPhos  84  02-15    PT/INR - ( 2024 07:58 )   PT: 22.4 sec;   INR: 2.18 ratio         PTT - ( 15 Feb 2024 20:48 )  PTT:41.6 sec            CARDIAC MARKERS ( 15 Feb 2024 21:52 )  x     / x     / 107 U/L / x     / x                  Urinalysis Basic - ( 2024 07:58 )    Color: x / Appearance: x / SG: x / pH: x  Gluc: 89 mg/dL / Ketone: x  / Bili: x / Urobili: x   Blood: x / Protein: x / Nitrite: x   Leuk Esterase: x / RBC: x / WBC x   Sq Epi: x / Non Sq Epi: x / Bacteria: x        [All pertinent recent Imaging reports reviewed]         *****A S S E S S M E N T   A N D   P L A N :   Excerpt from H&P,"     78-year-old female with PMH COPD, A-fib  On Eliquis and Plavix, COPD, HTN, HLD presents for syncope from PCPs office earlier today.  Patient was at normal checkup for blood work and was sitting on the table.  Daughter at bedside states that patient suddenly went limp while she was sitting on the table, her eyes rolled back, and her tongue turned to 1 side and this lasted for couple of seconds and then the patient regained consciousness.  Patient had no postictal period.  No tongue biting.  Patient does not remember these events.  Daughter at bedside states that patient has not been eating as much for the past day or 2 and has not been sleeping at all.  Denies recent fevers, chills, cough, congestion, rhinorrhea, chest pain, shortness of breath, abdominal pain, nausea, vomiting, dysuria, constipation, diarrhea.  Does not know if patient has had prior episodes of syncope (2024 09:19)         Problem/Recommendations 1:ams likely dementia delirium exacerbated by acute med illness   ct head neg   reg sleep wake cycle melatonin   thiamine   depakote bid for agitation                 pt at risk for developing delirium, therefore please institute the following preventative measures if possible          - initiating early mobilization          -minimizing "tethers" - IV, oxygen, catheters, etc          -avoiding   sedatives          -maintaining hydration/nutrition          -avoid anticholinergics - diphenhydramine, etc          -pain control          -sleep wake cycle regulation; avoid day time somnolence           -supportive environment    ___________________________  Will follow with you.  Thank you,  Elodia Mackey MD  Diplomate of the American Board of Neurology and Psychiatry.  Diplomate of the American Board of Vascular Neurology.   [facility] ([facility]), [facility]   Ph: [phone]    Differential diagnosis and plan of care discussed with patient after the evaluation.   Advanced care planning options discussed.   Pain assessed and judicious use of narcotics when appropriate was discussed.  Importance of Fall prevention discussed.  Counseling on Smoking and Alcohol cessation was offered when appropriate.  Counseling on Diet, exercise, and medication compliance was done.   83 minutes spent on the total encounter;  more than 50 % of the visit was spent on counseling  and or coordinating care by the attending physician.    Thank you for allowing me to participate in the care of this valerio patient. Please do not hesitate to call me if you have any questions.     This and subsequent notes  will  inherently be subject to errors including those of syntax and substitutions which may escape proofreading. In such instances original meaning may be extrapolated by contextual derivation.

## 2024-02-16 NOTE — H&P ADULT - ASSESSMENT
78-year-old female with PMH current smoker , COPD, A-fib  On Eliquis and Plavix, HTN, HLD presents for syncope from PCPs office yesterday.     Patient was at normal checkup for blood work and was sitting on the table.  Daughter at bedside states that patient suddenly went limp while she was sitting on the table, her eyes rolled back, and her tongue turned to 1 side and this lasted for couple of seconds and then the patient regained consciousness.  Patient had no postictal period.  No tongue biting.  Patient does not remember these events.   pt too lethargic  , though [place]   knows she is in NS   denies any sx   but tenderneness on abd exam     called daughter : she thinks she might have taken double dose   pt seems to go few days without sleep and then sleeps all day   oldest sister just passed away in oct   questionable underlying dementia   vomitting episode two days ago    c/o abd pain   no fever   no chills   no urinary complaints     Sycnope :  neuro checks   ct head   EEG  neuro input   tte   if ct head neg for bleed then cont eliquis     afib cardio input     lehtargy : multifactorial   check orthostatics     abd pain : check UA   npo for now   check Xray and consider     HTN:  hypotensive in ed   IVF     discussed with derek ACP :   she said she never got a "straight answer from mother " i n past   at this time full code

## 2024-02-16 NOTE — ED ADULT NURSE REASSESSMENT NOTE - NS ED NURSE REASSESS COMMENT FT1
Pt on CCM (afib). Pt had 6 beats wide complex on tele around 3:40am.  Pt denies cp, sob, medical complaints. Pt AOx3. MAR contacted. No further RN interventions needed at this time.

## 2024-02-17 LAB
ALBUMIN SERPL ELPH-MCNC: 3.2 G/DL — LOW (ref 3.3–5)
ALP SERPL-CCNC: 70 U/L — SIGNIFICANT CHANGE UP (ref 40–120)
ALT FLD-CCNC: 9 U/L — LOW (ref 10–45)
ANION GAP SERPL CALC-SCNC: 12 MMOL/L — SIGNIFICANT CHANGE UP (ref 5–17)
AST SERPL-CCNC: 21 U/L — SIGNIFICANT CHANGE UP (ref 10–40)
BASOPHILS # BLD AUTO: 0.04 K/UL — SIGNIFICANT CHANGE UP (ref 0–0.2)
BASOPHILS NFR BLD AUTO: 0.4 % — SIGNIFICANT CHANGE UP (ref 0–2)
BILIRUB SERPL-MCNC: 0.8 MG/DL — SIGNIFICANT CHANGE UP (ref 0.2–1.2)
BUN SERPL-MCNC: 15 MG/DL — SIGNIFICANT CHANGE UP (ref 7–23)
CALCIUM SERPL-MCNC: 9 MG/DL — SIGNIFICANT CHANGE UP (ref 8.4–10.5)
CHLORIDE SERPL-SCNC: 103 MMOL/L — SIGNIFICANT CHANGE UP (ref 96–108)
CO2 SERPL-SCNC: 25 MMOL/L — SIGNIFICANT CHANGE UP (ref 22–31)
CREAT SERPL-MCNC: 0.98 MG/DL — SIGNIFICANT CHANGE UP (ref 0.5–1.3)
CULTURE RESULTS: NO GROWTH — SIGNIFICANT CHANGE UP
EGFR: 59 ML/MIN/1.73M2 — LOW
EOSINOPHIL # BLD AUTO: 0.02 K/UL — SIGNIFICANT CHANGE UP (ref 0–0.5)
EOSINOPHIL NFR BLD AUTO: 0.2 % — SIGNIFICANT CHANGE UP (ref 0–6)
FOLATE SERPL-MCNC: 16.5 NG/ML — SIGNIFICANT CHANGE UP
GLUCOSE BLDC GLUCOMTR-MCNC: 122 MG/DL — HIGH (ref 70–99)
GLUCOSE BLDC GLUCOMTR-MCNC: 131 MG/DL — HIGH (ref 70–99)
GLUCOSE SERPL-MCNC: 126 MG/DL — HIGH (ref 70–99)
HCT VFR BLD CALC: 41.5 % — SIGNIFICANT CHANGE UP (ref 34.5–45)
HCV AB S/CO SERPL IA: 0.14 S/CO — SIGNIFICANT CHANGE UP (ref 0–0.99)
HCV AB SERPL-IMP: SIGNIFICANT CHANGE UP
HGB BLD-MCNC: 13.6 G/DL — SIGNIFICANT CHANGE UP (ref 11.5–15.5)
IMM GRANULOCYTES NFR BLD AUTO: 0.3 % — SIGNIFICANT CHANGE UP (ref 0–0.9)
LYMPHOCYTES # BLD AUTO: 0.82 K/UL — LOW (ref 1–3.3)
LYMPHOCYTES # BLD AUTO: 8.4 % — LOW (ref 13–44)
MCHC RBC-ENTMCNC: 31.3 PG — SIGNIFICANT CHANGE UP (ref 27–34)
MCHC RBC-ENTMCNC: 32.8 GM/DL — SIGNIFICANT CHANGE UP (ref 32–36)
MCV RBC AUTO: 95.6 FL — SIGNIFICANT CHANGE UP (ref 80–100)
MONOCYTES # BLD AUTO: 0.78 K/UL — SIGNIFICANT CHANGE UP (ref 0–0.9)
MONOCYTES NFR BLD AUTO: 8 % — SIGNIFICANT CHANGE UP (ref 2–14)
NEUTROPHILS # BLD AUTO: 8.1 K/UL — HIGH (ref 1.8–7.4)
NEUTROPHILS NFR BLD AUTO: 82.7 % — HIGH (ref 43–77)
NRBC # BLD: 0 /100 WBCS — SIGNIFICANT CHANGE UP (ref 0–0)
PLATELET # BLD AUTO: 193 K/UL — SIGNIFICANT CHANGE UP (ref 150–400)
POTASSIUM SERPL-MCNC: 3.3 MMOL/L — LOW (ref 3.5–5.3)
POTASSIUM SERPL-SCNC: 3.3 MMOL/L — LOW (ref 3.5–5.3)
PROT SERPL-MCNC: 6.1 G/DL — SIGNIFICANT CHANGE UP (ref 6–8.3)
RBC # BLD: 4.34 M/UL — SIGNIFICANT CHANGE UP (ref 3.8–5.2)
RBC # FLD: 13.2 % — SIGNIFICANT CHANGE UP (ref 10.3–14.5)
SODIUM SERPL-SCNC: 140 MMOL/L — SIGNIFICANT CHANGE UP (ref 135–145)
SPECIMEN SOURCE: SIGNIFICANT CHANGE UP
TSH SERPL-MCNC: 0.14 UIU/ML — LOW (ref 0.27–4.2)
VIT B12 SERPL-MCNC: 546 PG/ML — SIGNIFICANT CHANGE UP (ref 232–1245)
WBC # BLD: 9.79 K/UL — SIGNIFICANT CHANGE UP (ref 3.8–10.5)
WBC # FLD AUTO: 9.79 K/UL — SIGNIFICANT CHANGE UP (ref 3.8–10.5)

## 2024-02-17 RX ORDER — APIXABAN 2.5 MG/1
5 TABLET, FILM COATED ORAL
Refills: 0 | Status: DISCONTINUED | OUTPATIENT
Start: 2024-02-17 | End: 2024-02-22

## 2024-02-17 RX ORDER — CHLORHEXIDINE GLUCONATE 213 G/1000ML
1 SOLUTION TOPICAL DAILY
Refills: 0 | Status: DISCONTINUED | OUTPATIENT
Start: 2024-02-17 | End: 2024-02-22

## 2024-02-17 RX ORDER — POTASSIUM CHLORIDE 20 MEQ
40 PACKET (EA) ORAL ONCE
Refills: 0 | Status: COMPLETED | OUTPATIENT
Start: 2024-02-17 | End: 2024-02-17

## 2024-02-17 RX ORDER — LIDOCAINE 4 G/100G
1 CREAM TOPICAL EVERY 24 HOURS
Refills: 0 | Status: DISCONTINUED | OUTPATIENT
Start: 2024-02-17 | End: 2024-02-22

## 2024-02-17 RX ADMIN — CHLORHEXIDINE GLUCONATE 1 APPLICATION(S): 213 SOLUTION TOPICAL at 11:10

## 2024-02-17 RX ADMIN — LIDOCAINE 1 PATCH: 4 CREAM TOPICAL at 19:21

## 2024-02-17 RX ADMIN — APIXABAN 5 MILLIGRAM(S): 2.5 TABLET, FILM COATED ORAL at 21:37

## 2024-02-17 RX ADMIN — ATORVASTATIN CALCIUM 80 MILLIGRAM(S): 80 TABLET, FILM COATED ORAL at 21:37

## 2024-02-17 RX ADMIN — SODIUM CHLORIDE 65 MILLILITER(S): 9 INJECTION, SOLUTION INTRAVENOUS at 06:33

## 2024-02-17 RX ADMIN — Medication 1 PATCH: at 11:10

## 2024-02-17 RX ADMIN — Medication 40 MILLIEQUIVALENT(S): at 08:35

## 2024-02-17 RX ADMIN — Medication 1 PATCH: at 06:30

## 2024-02-17 RX ADMIN — LIDOCAINE 1 PATCH: 4 CREAM TOPICAL at 15:42

## 2024-02-17 RX ADMIN — Medication 1 PATCH: at 11:05

## 2024-02-17 RX ADMIN — Medication 1 PATCH: at 19:21

## 2024-02-17 RX ADMIN — Medication 175 MICROGRAM(S): at 06:30

## 2024-02-17 RX ADMIN — APIXABAN 5 MILLIGRAM(S): 2.5 TABLET, FILM COATED ORAL at 10:09

## 2024-02-17 NOTE — PATIENT PROFILE ADULT - FALL HARM RISK - HARM RISK INTERVENTIONS

## 2024-02-17 NOTE — PROGRESS NOTE ADULT - ASSESSMENT
78-year-old female with PMH current smoker , COPD, A-fib  On Eliquis and Plavix, HTN, HLD presents for syncope from PCPs office yesterday.     Patient was at normal checkup for blood work and was sitting on the table.  Daughter at bedside states that patient suddenly went limp while she was sitting on the table, her eyes rolled back, and her tongue turned to 1 side and this lasted for couple of seconds and then the patient regained consciousness.  Patient had no postictal period.  No tongue biting.  Patient does not remember these events.   pt too lethargic  , though [place]   knows she is in NS   denies any sx   but tenderneness on abd exam     called daughter : she thinks she might have taken double dose   pt seems to go few days without sleep and then sleeps all day   oldest sister just passed away in oct   questionable underlying dementia   vomitting episode two days ago    c/o abd pain   no fever   no chills   no urinary complaints     Sycnope :  neuro checks   ct head : negative   EEG: P  neuro input  noted .. no agitation   TTE       afib on AC   cont tele       lehtargy /encephalopathy : CT head negative   improved and seems at baseline   EEG pending       abd pain : check UA : negative   asx at this time     HTN:  hypotensive in ed..brandee sec to medication error ( pt might have taken double dose)   imrpoved     discussed with derek PENN :   she said she never got a "straight answer from mother " i n past   at this time full code

## 2024-02-17 NOTE — PROGRESS NOTE ADULT - SUBJECTIVE AND OBJECTIVE BOX
Date of service: 02-17-24 @ 23:57      Patient is a 78y old  Female who presents with a chief complaint of                                                              INTERVAL HPI/OVERNIGHT EVENTS:    REVIEW OF SYSTEMS:     CONSTITUTIONAL: No weakness, fevers or chills  EYES/ENT: No visual changes , no ear ache   NECK: No pain or stiffness  RESPIRATORY: No cough, wheezing,  No shortness of breath  CARDIOVASCULAR: No chest pain or palpitations  GASTROINTESTINAL: No abdominal pain  . No nausea, vomiting, or hematemesis; No diarrhea or constipation. No melena or hematochezia.  GENITOURINARY: No dysuria, frequency or hematuria  NEUROLOGICAL: No numbness or weakness  SKIN: No itching, burning, rashes, or lesions                                                                                                                                                                                                                                                                                 Medications:  MEDICATIONS  (STANDING):  apixaban 5 milliGRAM(s) Oral two times a day  atorvastatin 80 milliGRAM(s) Oral at bedtime  chlorhexidine 2% Cloths 1 Application(s) Topical daily  levothyroxine 175 MICROGram(s) Oral daily  nicotine -  14 mG/24Hr(s) Patch 1 Patch Transdermal daily    MEDICATIONS  (PRN):  lidocaine   4% Patch 1 Patch Transdermal every 24 hours PRN pain       Allergies    penicillin (Hives)    Intolerances      Vital Signs Last 24 Hrs  T(C): 36.7 (17 Feb 2024 21:04), Max: 37.1 (17 Feb 2024 04:31)  T(F): 98.1 (17 Feb 2024 21:04), Max: 98.8 (17 Feb 2024 04:31)  HR: 88 (17 Feb 2024 21:04) (71 - 101)  BP: 112/73 (17 Feb 2024 21:04) (112/73 - 129/80)  BP(mean): --  RR: 18 (17 Feb 2024 21:04) (18 - 18)  SpO2: 95% (17 Feb 2024 21:04) (93% - 96%)    Parameters below as of 17 Feb 2024 21:04  Patient On (Oxygen Delivery Method): room air      CAPILLARY BLOOD GLUCOSE      POCT Blood Glucose.: 122 mg/dL (17 Feb 2024 05:34)  POCT Blood Glucose.: 131 mg/dL (17 Feb 2024 00:27)      02-16 @ 07:01  -  02-17 @ 07:00  --------------------------------------------------------  IN: 815 mL / OUT: 850 mL / NET: -35 mL    02-17 @ 07:01  - 02-17 @ 23:57  --------------------------------------------------------  IN: 0 mL / OUT: 300 mL / NET: -300 mL      Physical Exam:    Daily     Daily   General:  Well appearing, NAD, not cachetic  HEENT:  Nonicteric, PERRLA  CV:  RRR, S1S2   Lungs:  CTA B/L, no wheezes, rales, rhonchi  Abdomen:  Soft, non-tender, no distended, positive BS  Extremities:  2+ pulses, no c/c, no edema  Skin:  Warm and dry, no rashes  :  No amaya  Neuro:  AAOx3, non-focal, grossly intact                                                                                                                                                                                                                                                                                                LABS:                               13.6   9.79  )-----------( 193      ( 17 Feb 2024 06:21 )             41.5                      02-17    140  |  103  |  15  ----------------------------<  126<H>  3.3<L>   |  25  |  0.98    Ca    9.0      17 Feb 2024 06:21  Mg     1.6     02-16    TPro  6.1  /  Alb  3.2<L>  /  TBili  0.8  /  DBili  x   /  AST  21  /  ALT  9<L>  /  AlkPhos  70  02-17                       RADIOLOGY & ADDITIONAL TESTS         I personally reviewed: [  ]EKG   [  ]CXR    [  ] CT      A/P:         Discussed with :     Simon consultants' Notes   Time spent :

## 2024-02-17 NOTE — CHART NOTE - NSCHARTNOTEFT_GEN_A_CORE
Patient's home medication (documented as Eliquis 5mg BID) on hold until Head CT resulted - r/o bleed.  Head CT resulted: "IMPRESSION: No acute intracranial hemorrhage, mass effect, or shift of   the midline structures.  Mild chronic white matter microvascular type changes."  Patient seen and examined.  Awake and alert - orientated x 3  No complaints.  Remembers events ("I fainted at my doctor's office")  RN reports bedside swallow eval with no signs/symptoms of aspiration.  - Dr Duran updated on Head CT report - Eliquis resumed as directed at this time  - Neuro checks ordered  - NPO discontinued - Regular diet ordered  - Orthostatic Vital signs ordered  - Remains on telemetry

## 2024-02-17 NOTE — PATIENT PROFILE ADULT - NSPROHMSYMPCOND_GEN_A_NUR
For Your Well Being: Upper Respiratory Infections - Adult    Colds and upper respiratory infections often last 7 to 14 days. They are caused by viruses. Antibiotics are not an effective treatment for viruses.    Symptoms of upper respiratory infections or colds include:  Sneezing  Cough  Runny nose  Sore throat  Loss of appetite  General body aches  Fatigue  Fever    Your symptoms may be managed at home in this way:  Rest at home if there is a fever.  Drink plenty of liquids.  For fever, headache, sore throat and body aches, take acetaminophen (Tylenol) or ibuprofen (Advil/Nuprin) as directed.  For sore throat, try warm salt water gargles, throat lozenges and cold fluids.  For stuffy nose, a room humidifier or an over-the-counter nasal decongestant (pseudoephedrine 30mg, 1 tablet every 6 hours as needed) may be helpful. Follow label directions.  Do not smoke or be exposed to cigarette/pipe smoke.  If cough becomes bothersome, begin Mucinex DM, 1-2 tablets every 12 hours as needed.  Cover coughs and wash hands frequently for infection control.  Tea, honey, humidifier    Fever  Take your temperature every 4 hours during the fever.  Normal temperatures are:   -oral 98.6   -rectal 99.6   -underarm 97.6 degrees F (Fahrenheit)(least accurate)  You can treat your fever with Tylenol or ibuprofen (Advil/Nuprin/Motrin) when your temperature is:   - oral 101 degrees F or higher   - rectal 102 degrees F or higher   - underarm 100 degrees or higher  Follow the instructions on the label.  Acetaminophen (Tylenol) rectal suppositories can be used for people who are vomiting.  Dress lightly to allow body heat to escape. If shivering, cover with a light blanket until shivering stops. Maintain normal room temperature.  Take frequent amounts of cool liquids.           internal medicine/pulmonologist/cardiovascular

## 2024-02-18 DIAGNOSIS — I48.19 OTHER PERSISTENT ATRIAL FIBRILLATION: ICD-10-CM

## 2024-02-18 DIAGNOSIS — I10 ESSENTIAL (PRIMARY) HYPERTENSION: ICD-10-CM

## 2024-02-18 DIAGNOSIS — R55 SYNCOPE AND COLLAPSE: ICD-10-CM

## 2024-02-18 DIAGNOSIS — I25.10 ATHEROSCLEROTIC HEART DISEASE OF NATIVE CORONARY ARTERY WITHOUT ANGINA PECTORIS: ICD-10-CM

## 2024-02-18 LAB
ANION GAP SERPL CALC-SCNC: 13 MMOL/L — SIGNIFICANT CHANGE UP (ref 5–17)
BUN SERPL-MCNC: 12 MG/DL — SIGNIFICANT CHANGE UP (ref 7–23)
CALCIUM SERPL-MCNC: 9.2 MG/DL — SIGNIFICANT CHANGE UP (ref 8.4–10.5)
CHLORIDE SERPL-SCNC: 102 MMOL/L — SIGNIFICANT CHANGE UP (ref 96–108)
CO2 SERPL-SCNC: 24 MMOL/L — SIGNIFICANT CHANGE UP (ref 22–31)
CREAT SERPL-MCNC: 0.93 MG/DL — SIGNIFICANT CHANGE UP (ref 0.5–1.3)
CULTURE RESULTS: ABNORMAL
EGFR: 63 ML/MIN/1.73M2 — SIGNIFICANT CHANGE UP
GLUCOSE SERPL-MCNC: 90 MG/DL — SIGNIFICANT CHANGE UP (ref 70–99)
MRSA PCR RESULT.: SIGNIFICANT CHANGE UP
POTASSIUM SERPL-MCNC: 3.6 MMOL/L — SIGNIFICANT CHANGE UP (ref 3.5–5.3)
POTASSIUM SERPL-SCNC: 3.6 MMOL/L — SIGNIFICANT CHANGE UP (ref 3.5–5.3)
S AUREUS DNA NOSE QL NAA+PROBE: SIGNIFICANT CHANGE UP
SODIUM SERPL-SCNC: 139 MMOL/L — SIGNIFICANT CHANGE UP (ref 135–145)
SPECIMEN SOURCE: SIGNIFICANT CHANGE UP

## 2024-02-18 RX ORDER — METOPROLOL TARTRATE 50 MG
2.5 TABLET ORAL ONCE
Refills: 0 | Status: COMPLETED | OUTPATIENT
Start: 2024-02-18 | End: 2024-02-18

## 2024-02-18 RX ORDER — ACETAMINOPHEN 500 MG
1000 TABLET ORAL ONCE
Refills: 0 | Status: COMPLETED | OUTPATIENT
Start: 2024-02-18 | End: 2024-02-18

## 2024-02-18 RX ADMIN — ATORVASTATIN CALCIUM 80 MILLIGRAM(S): 80 TABLET, FILM COATED ORAL at 21:08

## 2024-02-18 RX ADMIN — CHLORHEXIDINE GLUCONATE 1 APPLICATION(S): 213 SOLUTION TOPICAL at 11:25

## 2024-02-18 RX ADMIN — LIDOCAINE 1 PATCH: 4 CREAM TOPICAL at 03:45

## 2024-02-18 RX ADMIN — Medication 1 PATCH: at 11:35

## 2024-02-18 RX ADMIN — Medication 400 MILLIGRAM(S): at 07:03

## 2024-02-18 RX ADMIN — Medication 1 PATCH: at 19:30

## 2024-02-18 RX ADMIN — Medication 1000 MILLIGRAM(S): at 08:03

## 2024-02-18 RX ADMIN — Medication 175 MICROGRAM(S): at 05:19

## 2024-02-18 RX ADMIN — Medication 2.5 MILLIGRAM(S): at 07:03

## 2024-02-18 RX ADMIN — APIXABAN 5 MILLIGRAM(S): 2.5 TABLET, FILM COATED ORAL at 17:29

## 2024-02-18 RX ADMIN — Medication 1 PATCH: at 07:57

## 2024-02-18 RX ADMIN — Medication 1 PATCH: at 11:24

## 2024-02-18 RX ADMIN — APIXABAN 5 MILLIGRAM(S): 2.5 TABLET, FILM COATED ORAL at 05:19

## 2024-02-18 NOTE — CONSULT NOTE ADULT - ASSESSMENT
78-year-old female with PMH current smoker , COPD, A-fib  On Eliquis and Plavix, HTN, HLD presents for syncope from PCPs office   Patient was at normal checkup for blood work and was sitting on the table.  Daughter at bedside states that patient suddenly went limp while she was sitting on the table, her eyes rolled back, and her tongue turned to 1 side and this lasted for couple of seconds and then the patient regained consciousness.  Patient had no postictal period.  No tongue biting.  Patient does not remember these events.

## 2024-02-18 NOTE — CONSULT NOTE ADULT - SUBJECTIVE AND OBJECTIVE BOX
CHIEF COMPLAINT:  Sees Dr. Eliu Butler for cardiology care   HISTORY OF PRESENT ILLNESS:  78-year-old female with PMH current smoker , COPD, A-fib  On Eliquis and Plavix, HTN, HLD presents for syncope from PCPs office yesterday.     Patient was at normal checkup for blood work and was sitting on the table.  Daughter at bedside states that patient suddenly went limp while she was sitting on the table, her eyes rolled back, and her tongue turned to 1 side and this lasted for couple of seconds and then the patient regained consciousness.  Patient had no postictal period.  No tongue biting.  Patient does not remember these events.   pt too lethargic  , though ar[place]   knows she is in NS   denies any sx   but tenderneness on abd exam     called daughter : she thinks she might have taken double dose   pt seems to go few days without sleep and then sleeps all day   oldest sister just passed away in oct   questionable underlying dementia   vomitting episode two days ago    c/o abd pain   no fever   no chills   no urinary complaints   Afib on Tele   NO chest pain or shortness of breath       PAST MEDICAL & SURGICAL HISTORY:  Hypertension      Hypothyroid      Osteoarthritis  knees, back      CAD (coronary artery disease)      CROW (obstructive sleep apnea)  non complaint on CPAP      History of MI (myocardial infarction)  h/o previous MI in 2004 prompted PTCA  with stenting x 2 vessels   last stress/ echo 2019      Heart murmur  dx in childhood      Bilateral hearing loss, unspecified hearing loss type  bilateral aids      Obesity      Mixed stress and urge urinary incontinence      Overactive bladder      S/P ORIF (open reduction internal fixation) fracture  left hip 1962      S/P appendectomy  30 plus years      S/P knee replacement  left 2000      Stented coronary artery  2004 X 2 STENTS      S/P laparotomy  due to adhesions, 30 years ago      H/O dilation and curettage  2/2019 Benign polyp              MEDICATIONS:  apixaban 5 milliGRAM(s) Oral two times a day            atorvastatin 80 milliGRAM(s) Oral at bedtime  levothyroxine 175 MICROGram(s) Oral daily    chlorhexidine 2% Cloths 1 Application(s) Topical daily  lidocaine   4% Patch 1 Patch Transdermal every 24 hours PRN      FAMILY HISTORY:      SOCIAL HISTORY:    [ ] Non-smoker  [ ] Smoker  [ ] Alcohol    Allergies    penicillin (Hives)    Intolerances    	    REVIEW OF SYSTEMS:  CONSTITUTIONAL: No fever, weight loss, or fatigue  EYES: No eye pain, visual disturbances, or discharge  ENMT:  No difficulty hearing, tinnitus, vertigo; No sinus or throat pain  NECK: No pain or stiffness  RESPIRATORY: No cough, wheezing, chills or hemoptysis; No Shortness of Breath  CARDIOVASCULAR: No chest pain, palpitations, passing out, dizziness, or leg swelling  GASTROINTESTINAL: No abdominal or epigastric pain. No nausea, vomiting, or hematemesis; No diarrhea or constipation. No melena or hematochezia.  GENITOURINARY: No dysuria, frequency, hematuria, or incontinence  NEUROLOGICAL: No headaches, memory loss, loss of strength, numbness, or tremors  SKIN: No itching, burning, rashes, or lesions   LYMPH Nodes: No enlarged glands  ENDOCRINE: No heat or cold intolerance; No hair loss  MUSCULOSKELETAL: No joint pain or swelling; No muscle, back, or extremity pain  PSYCHIATRIC: No depression, anxiety, mood swings, or difficulty sleeping  HEME/LYMPH: No easy bruising, or bleeding gums  ALLERY AND IMMUNOLOGIC: No hives or eczema	    [ ] All others negative	  [ ] Unable to obtain    PHYSICAL EXAM:  T(C): 36.9 (02-18-24 @ 05:15), Max: 36.9 (02-18-24 @ 05:15)  HR: 112 (02-18-24 @ 07:01) (88 - 112)  BP: 138/80 (02-18-24 @ 07:01) (112/73 - 157/96)  RR: 18 (02-18-24 @ 05:15) (18 - 18)  SpO2: 95% (02-18-24 @ 05:15) (93% - 95%)  Wt(kg): --  I&O's Summary    17 Feb 2024 07:01  -  18 Feb 2024 07:00  --------------------------------------------------------  IN: 0 mL / OUT: 300 mL / NET: -300 mL        Appearance: Normal	  HEENT:   Normal oral mucosa, PERRL, EOMI	  Lymphatic: No lymphadenopathy  Cardiovascular: Irregular S1 S2, No JVD, No murmurs, No edema  Respiratory: Lungs clear to auscultation	  Psychiatry: A & O x 3, Mood & affect appropriate  Gastrointestinal:  Soft, Non-tender, + BS	  Skin: No rashes, No ecchymoses, No cyanosis	  Neurologic: Non-focal  Extremities: Normal range of motion, No clubbing, cyanosis or edema  Vascular: Peripheral pulses palpable 2+ bilaterally    TELEMETRY: 	Afib 70s, PACs    ECG:  	  RADIOLOGY:  c< from: CT Head No Cont (02.16.24 @ 21:35) >    ACC: [id number] EXAM:  CT BRAIN   ORDERED BY: JOÃO ZEPEDA     PROCEDURE DATE:  02/16/2024          INTERPRETATION:  .    CLINICAL INFORMATION: Confusion for the past 4 months. Syncope.    TECHNIQUE: Multiple axial CT images of the head were obtained without   contrast. Sagittal and coronal reconstructed images were acquired from   the source data.    COMPARISON: No prior CT studies of the brain are available for comparison   at this institution.    FINDINGS: There is no acute intracranial hemorrhage, mass effect, shift   of the midline structures, herniation, extra-axial fluid collection, or   hydrocephalus.    There is diffuse cerebral volume loss with prominence of the sulci,   fissures, and cisternal spaces which is normal for the patient's age.   Ventriculomegaly is seen. There is mild deep and periventricular white   matter hypoattenuation statistically compatible with microvascular   changes given calcific atherosclerotic disease of the intracranial   arteries.    The paranasalsinuses and tympanomastoid cavities are clear. The   calvarium is intact. The imaged orbits are unremarkable.    IMPRESSION: No acute intracranial hemorrhage, mass effect, or shift of   the midline structures.    Mild chronic white matter microvascular type changes.    --- End of Report ---            CARMENCITA DOHERTY MD; Attending Radiologist  This document has been electronically signed. Feb 16 2024  9:52PM    < end of copied text >    OTHER: 	  	  LABS:	 	    CARDIAC MARKERS:          Troponin T, High Sensitivity Result: 41: * Troponin T, High Sensitivity Result: 37: *                         13.6   9.79  )-----------( 193      ( 17 Feb 2024 06:21 )             41.5     02-18    139  |  102  |  12  ----------------------------<  90  3.6   |  24  |  0.93    Ca    9.2      18 Feb 2024 07:30    TPro  6.1  /  Alb  3.2<L>  /  TBili  0.8  /  DBili  x   /  AST  21  /  ALT  9<L>  /  AlkPhos  70  02-17    proBNP:   Lipid Profile:   HgA1c:   TSH:

## 2024-02-18 NOTE — PROGRESS NOTE ADULT - SUBJECTIVE AND OBJECTIVE BOX
covering for dr sol    Patient is a 78y old  Female who presents with a chief complaint of                                                              INTERVAL HPI/OVERNIGHT EVENTS:    REVIEW OF SYSTEMS:     CONSTITUTIONAL: No weakness, fevers or chills  EYES/ENT: No visual changes , no ear ache   NECK: No pain or stiffness  RESPIRATORY: No cough, wheezing,  No shortness of breath  CARDIOVASCULAR: No chest pain or palpitations  GASTROINTESTINAL: No abdominal pain  . No nausea, vomiting, or hematemesis; No diarrhea or constipation. No melena or hematochezia.  GENITOURINARY: No dysuria, frequency or hematuria  NEUROLOGICAL: No numbness or weakness  SKIN: No itching, burning, rashes, or lesions                                                                                                                                                                                                                                                                                 Medications:  MEDICATIONS  (STANDING):  apixaban 5 milliGRAM(s) Oral two times a day  atorvastatin 80 milliGRAM(s) Oral at bedtime  chlorhexidine 2% Cloths 1 Application(s) Topical daily  levothyroxine 175 MICROGram(s) Oral daily  nicotine -  14 mG/24Hr(s) Patch 1 Patch Transdermal daily    MEDICATIONS  (PRN):  lidocaine   4% Patch 1 Patch Transdermal every 24 hours PRN pain       Allergies    penicillin (Hives)    Intolerances       Vital Signs Last 24 Hrs  T(C): 36.7 (18 Feb 2024 12:06), Max: 36.9 (18 Feb 2024 05:15)  T(F): 98 (18 Feb 2024 12:06), Max: 98.4 (18 Feb 2024 05:15)  HR: 98 (18 Feb 2024 12:06) (88 - 112)  BP: 135/82 (18 Feb 2024 12:06) (112/73 - 157/96)  BP(mean): --  RR: 18 (18 Feb 2024 12:06) (18 - 18)  SpO2: 95% (18 Feb 2024 12:06) (95% - 95%)    Parameters below as of 18 Feb 2024 12:06  Patient On (Oxygen Delivery Method): room air        Physical Exam:            General:  Well appearing, NAD, not cachetic  HEENT:  Nonicteric, PERRLA  CV:  RRR, S1S2   Lungs:  CTA B/L, no wheezes, rales, rhonchi  Abdomen:  Soft, non-tender, no distended, positive BS  Extremities:  2+ pulses, no c/c, no edema  Skin:  Warm and dry, no rashes  :  No amaya  Neuro:  AAOx3, non-focal, grossly intact                                                                                                                                                                                                                                                                                                LABS:                                                    13.6   9.79  )-----------( 193      ( 17 Feb 2024 06:21 )             41.5   02-18    139  |  102  |  12  ----------------------------<  90  3.6   |  24  |  0.93    Ca    9.2      18 Feb 2024 07:30    TPro  6.1  /  Alb  3.2<L>  /  TBili  0.8  /  DBili  x   /  AST  21  /  ALT  9<L>  /  AlkPhos  70  02-17       RADIOLOGY & ADDITIONAL TESTS         I personally reviewed: [  ]EKG   [  ]CXR    [  ] CT      A/P:         Discussed with :     Simon consultants' Notes   Time spent :

## 2024-02-18 NOTE — PROGRESS NOTE ADULT - ASSESSMENT
78-year-old female with PMH current smoker , COPD, A-fib  On Eliquis and Plavix, HTN, HLD presents for syncope from PCPs office yesterday.     Patient was at normal checkup for blood work and was sitting on the table.  Daughter at bedside states that patient suddenly went limp while she was sitting on the table, her eyes rolled back, and her tongue turned to 1 side and this lasted for couple of seconds and then the patient regained consciousness.  Patient had no postictal period.  No tongue biting.  Patient does not remember these events.   pt too lethargic  , though arousable  knows she is in NS   denies any sx   but tenderneness on abd exam     called daughter : she thinks she might have taken double dose   pt seems to go few days without sleep and then sleeps all day   oldest sister just passed away in oct   questionable underlying dementia   vomitting episode two days ago    c/o abd pain   no fever   no chills   no urinary complaints     Sycnope :  neuro checks   ct head : negative   EEG: P  neuro input  noted .. no agitation   cards consulted  TTE  pending  check orthostatics      afib on AC   cont tele       lehtargy /encephalopathy : CT head negative   improved and seems at baseline   EEG pending       abd pain : check UA : negative   asx at this time     HTN:  hypotensive in ed..brandee sec to medication error ( pt might have taken double dose)   imrpoved     discussed with derek PENN :   she said she never got a "straight answer from mother " i n past   at this time full code

## 2024-02-18 NOTE — CHART NOTE - NSCHARTNOTEFT_GEN_A_CORE
HPI:  78-year-old female with PMH current smoker , COPD, A-fib  On Eliquis and Plavix, HTN, HLD presents for syncope from PCPs office yesterday.   Afib RVR -150, asymptomatic     >  >  >  >    Emelin Reyes Monegro, NP   Medicine Department   Spectralink [id number] HPI:  78-year-old female with PMH current smoker , COPD, A-fib  On Eliquis and Plavix, HTN, HLD presents for syncope from PCPs office yesterday.   Afib RVR -150 on telemetry, pt asymptomatic   >Metoprolol 2.5mg X1 dose given   >IV Tylenol x 1 dose given for LE pain   >Will c/w telemetry   >F/u am labs   >Consider low dose BB   >C/w AC   Case discussed w/ Dr. Duran.  Pt endorse to day ACP.     Vital Signs Last 24 Hrs  T(C): 36.9 (18 Feb 2024 05:15), Max: 36.9 (18 Feb 2024 05:15)  T(F): 98.4 (18 Feb 2024 05:15), Max: 98.4 (18 Feb 2024 05:15)  HR: 112 (18 Feb 2024 07:01) (88 - 112)  BP: 138/80 (18 Feb 2024 07:01) (112/73 - 157/96)  BP(mean): --  RR: 18 (18 Feb 2024 05:15) (18 - 18)  SpO2: 95% (18 Feb 2024 05:15) (93% - 95%)    Parameters below as of 18 Feb 2024 05:15  Patient On (Oxygen Delivery Method): room air      Emelin Reyes Monegro, NP   Medicine Department   [facility] [id number]

## 2024-02-19 LAB
ANION GAP SERPL CALC-SCNC: 13 MMOL/L — SIGNIFICANT CHANGE UP (ref 5–17)
BUN SERPL-MCNC: 11 MG/DL — SIGNIFICANT CHANGE UP (ref 7–23)
CALCIUM SERPL-MCNC: 9.1 MG/DL — SIGNIFICANT CHANGE UP (ref 8.4–10.5)
CHLORIDE SERPL-SCNC: 101 MMOL/L — SIGNIFICANT CHANGE UP (ref 96–108)
CO2 SERPL-SCNC: 21 MMOL/L — LOW (ref 22–31)
CREAT SERPL-MCNC: 0.87 MG/DL — SIGNIFICANT CHANGE UP (ref 0.5–1.3)
EGFR: 69 ML/MIN/1.73M2 — SIGNIFICANT CHANGE UP
GLUCOSE SERPL-MCNC: 99 MG/DL — SIGNIFICANT CHANGE UP (ref 70–99)
HCT VFR BLD CALC: 40.7 % — SIGNIFICANT CHANGE UP (ref 34.5–45)
HGB BLD-MCNC: 13.4 G/DL — SIGNIFICANT CHANGE UP (ref 11.5–15.5)
MAGNESIUM SERPL-MCNC: 1.8 MG/DL — SIGNIFICANT CHANGE UP (ref 1.6–2.6)
MCHC RBC-ENTMCNC: 31.7 PG — SIGNIFICANT CHANGE UP (ref 27–34)
MCHC RBC-ENTMCNC: 32.9 GM/DL — SIGNIFICANT CHANGE UP (ref 32–36)
MCV RBC AUTO: 96.2 FL — SIGNIFICANT CHANGE UP (ref 80–100)
NRBC # BLD: 0 /100 WBCS — SIGNIFICANT CHANGE UP (ref 0–0)
PLATELET # BLD AUTO: 179 K/UL — SIGNIFICANT CHANGE UP (ref 150–400)
POTASSIUM SERPL-MCNC: 3.4 MMOL/L — LOW (ref 3.5–5.3)
POTASSIUM SERPL-SCNC: 3.4 MMOL/L — LOW (ref 3.5–5.3)
RBC # BLD: 4.23 M/UL — SIGNIFICANT CHANGE UP (ref 3.8–5.2)
RBC # FLD: 13.1 % — SIGNIFICANT CHANGE UP (ref 10.3–14.5)
SODIUM SERPL-SCNC: 135 MMOL/L — SIGNIFICANT CHANGE UP (ref 135–145)
WBC # BLD: 11.16 K/UL — HIGH (ref 3.8–10.5)
WBC # FLD AUTO: 11.16 K/UL — HIGH (ref 3.8–10.5)

## 2024-02-19 RX ORDER — METOPROLOL TARTRATE 50 MG
25 TABLET ORAL EVERY 8 HOURS
Refills: 0 | Status: DISCONTINUED | OUTPATIENT
Start: 2024-02-19 | End: 2024-02-22

## 2024-02-19 RX ORDER — ACETAMINOPHEN 500 MG
1000 TABLET ORAL ONCE
Refills: 0 | Status: COMPLETED | OUTPATIENT
Start: 2024-02-19 | End: 2024-02-19

## 2024-02-19 RX ORDER — METOPROLOL TARTRATE 50 MG
2.5 TABLET ORAL ONCE
Refills: 0 | Status: COMPLETED | OUTPATIENT
Start: 2024-02-19 | End: 2024-02-19

## 2024-02-19 RX ORDER — METOPROLOL TARTRATE 50 MG
5 TABLET ORAL ONCE
Refills: 0 | Status: COMPLETED | OUTPATIENT
Start: 2024-02-19 | End: 2024-02-19

## 2024-02-19 RX ADMIN — Medication 25 MILLIGRAM(S): at 14:22

## 2024-02-19 RX ADMIN — Medication 2.5 MILLIGRAM(S): at 02:58

## 2024-02-19 RX ADMIN — Medication 1 PATCH: at 12:27

## 2024-02-19 RX ADMIN — Medication 5 MILLIGRAM(S): at 05:36

## 2024-02-19 RX ADMIN — Medication 1 PATCH: at 11:20

## 2024-02-19 RX ADMIN — APIXABAN 5 MILLIGRAM(S): 2.5 TABLET, FILM COATED ORAL at 05:37

## 2024-02-19 RX ADMIN — Medication 25 MILLIGRAM(S): at 21:50

## 2024-02-19 RX ADMIN — CHLORHEXIDINE GLUCONATE 1 APPLICATION(S): 213 SOLUTION TOPICAL at 12:38

## 2024-02-19 RX ADMIN — APIXABAN 5 MILLIGRAM(S): 2.5 TABLET, FILM COATED ORAL at 21:50

## 2024-02-19 RX ADMIN — Medication 1 PATCH: at 12:35

## 2024-02-19 RX ADMIN — Medication 400 MILLIGRAM(S): at 02:50

## 2024-02-19 RX ADMIN — Medication 1000 MILLIGRAM(S): at 03:17

## 2024-02-19 RX ADMIN — ATORVASTATIN CALCIUM 80 MILLIGRAM(S): 80 TABLET, FILM COATED ORAL at 21:50

## 2024-02-19 RX ADMIN — Medication 2.5 MILLIGRAM(S): at 00:19

## 2024-02-19 RX ADMIN — Medication 175 MICROGRAM(S): at 05:37

## 2024-02-19 RX ADMIN — Medication 25 MILLIGRAM(S): at 08:33

## 2024-02-19 NOTE — PROGRESS NOTE ADULT - ASSESSMENT
78-year-old female with PMH current smoker , COPD, A-fib  On Eliquis and Plavix, HTN, HLD presents for syncope from PCPs office yesterday.     Patient was at normal checkup for blood work and was sitting on the table.  Daughter at bedside states that patient suddenly went limp while she was sitting on the table, her eyes rolled back, and her tongue turned to 1 side and this lasted for couple of seconds and then the patient regained consciousness.  Patient had no postictal period.  No tongue biting.  Patient does not remember these events.   pt too lethargic  , though arousable  knows she is in NS   denies any sx   but tenderneness on abd exam     called daughter : she thinks she might have taken double dose   pt seems to go few days without sleep and then sleeps all day   oldest sister just passed away in oct   questionable underlying dementia   vomitting episode two days ago    c/o abd pain   no fever   no chills   no urinary complaints     Sycnope :  neuro checks   ct head : negative   EEG: P  neuro input  noted .. no agitation   TTE  pending  pt with intermittent chest discomfort.. no recent ischemic ballesteros .. d.w  op cardiology ..will fu echo and consider   d/w    check orthostatics: negative   pt intermittely lethargic : check ABG and overnight O2 sat      afib on AC   cont tele   afib with rVR   was on op BB toprol xl 50   will cont current bb          lehtargy /encephalopathy : CT head negative   improved and seems at baseline   EEG pending       abd pain : check UA : negative   asx at this time     HTN:  hypotensive in ed..brandee sec to medication error ( pt might have taken double dose)   imrpoved     discussed with derek ACP :   she said she never got a "straight answer from mother " i n past   at this time full code

## 2024-02-19 NOTE — PROGRESS NOTE ADULT - SUBJECTIVE AND OBJECTIVE BOX
Subjective: Patient seen and examined. No new events except as noted.   PAF RVR s/p IV lopressor   REVIEW OF SYSTEMS:    CONSTITUTIONAL: +weakness, fevers or chills  EYES/ENT: No visual changes;  No vertigo or throat pain   NECK: No pain or stiffness  RESPIRATORY: No cough, wheezing, hemoptysis; No shortness of breath  CARDIOVASCULAR: No chest pain or palpitations  GASTROINTESTINAL: No abdominal or epigastric pain. No nausea, vomiting, or hematemesis; No diarrhea or constipation. No melena or hematochezia.  GENITOURINARY: No dysuria, frequency or hematuria  NEUROLOGICAL: No numbness or weakness  SKIN: No itching, burning, rashes, or lesions   All other review of systems is negative unless indicated above.    MEDICATIONS:  MEDICATIONS  (STANDING):  apixaban 5 milliGRAM(s) Oral two times a day  atorvastatin 80 milliGRAM(s) Oral at bedtime  chlorhexidine 2% Cloths 1 Application(s) Topical daily  levothyroxine 175 MICROGram(s) Oral daily  metoprolol tartrate 25 milliGRAM(s) Oral every 8 hours  nicotine -  14 mG/24Hr(s) Patch 1 Patch Transdermal daily      PHYSICAL EXAM:  T(C): 36.6 (02-19-24 @ 05:18), Max: 36.7 (02-18-24 @ 12:06)  HR: 114 (02-19-24 @ 08:25) (85 - 125)  BP: 137/79 (02-19-24 @ 08:25) (126/85 - 142/70)  RR: 20 (02-19-24 @ 08:25) (16 - 20)  SpO2: 98% (02-19-24 @ 08:25) (95% - 98%)  Wt(kg): --  I&O's Summary        Appearance: Normal	  HEENT:   Normal oral mucosa, PERRL, EOMI	  Lymphatic: No lymphadenopathy , no edema  Cardiovascular: Irregular S1 S2, No JVD, No murmurs , Peripheral pulses palpable 2+ bilaterally  Respiratory: Lungs clear to auscultation, normal effort 	  Gastrointestinal:  Soft, Non-tender, + BS	  Skin: No rashes, No ecchymoses, No cyanosis, warm to touch  Musculoskeletal: Normal range of motion, normal strength  Psychiatry:  Mood & affect appropriate  Ext: No edema      LABS:    CARDIAC MARKERS:                                13.4   11.16 )-----------( 179      ( 19 Feb 2024 07:29 )             40.7     02-19    135  |  101  |  11  ----------------------------<  99  3.4<L>   |  21<L>  |  0.87    Ca    9.1      19 Feb 2024 07:29  Mg     1.8     02-19      proBNP:   Lipid Profile:   HgA1c:   TSH:     Negative          TELEMETRY: 	PAF RVR    ECG:  	  RADIOLOGY:   DIAGNOSTIC TESTING:  [ ] Echocardiogram:  [ ]  Catheterization:  [ ] Stress Test:    OTHER:

## 2024-02-19 NOTE — PROGRESS NOTE ADULT - SUBJECTIVE AND OBJECTIVE BOX
[facility]      Seen earlier today [Please note that date of entry above  is the actual  DATE OF SERVICE] No new neurological symptoms reported. Remains stable neurologically.   - Today, patient is without complaints.          *****MEDICATIONS: Current medication reviewed and documented.    MEDICATIONS  (STANDING):  apixaban 5 milliGRAM(s) Oral two times a day  atorvastatin 80 milliGRAM(s) Oral at bedtime  chlorhexidine 2% Cloths 1 Application(s) Topical daily  levothyroxine 175 MICROGram(s) Oral daily  metoprolol tartrate 25 milliGRAM(s) Oral every 8 hours  nicotine -  14 mG/24Hr(s) Patch 1 Patch Transdermal daily    MEDICATIONS  (PRN):  lidocaine   4% Patch 1 Patch Transdermal every 24 hours PRN pain          ***** VITAL SIGNS:   Vital Signs Last 24 Hrs      I&O's Summary    2024 07:01  -  2024 14:21  --------------------------------------------------------  IN: 120 mL / OUT: 0 mL / NET: 120 mL             *****PHYSICAL EXAM:   alert oriented 1 leave me alone not cooeprative   attention comprehension are limited a  Able to name, repeat.   EOmi fundi not visualized   no nystagmus VFF to confrontation  Tongue is midline  Palate elevates symmetrically   Moving all 4 ext spontaneously no drift appreciated    Gait not assessed.            *****LAB AND IMAGIN.4   11.16 )-----------( 179      ( 2024 07:29 )             40.7                   135  |  101  |  11  ----------------------------<  99  3.4<L>   |  21<L>  |  0.87    Ca    9.1      2024 07:29  Mg     1.8                              Urinalysis Basic - ( 2024 07:29 )    Color: x / Appearance: x / SG: x / pH: x  Gluc: 99 mg/dL / Ketone: x  / Bili: x / Urobili: x   Blood: x / Protein: x / Nitrite: x   Leuk Esterase: x / RBC: x / WBC x   Sq Epi: x / Non Sq Epi: x / Bacteria: x      [All pertinent recent Imaging/Reports reviewed]            *****A S S E S S M E N T   A N D   P L A N :   78-year-old female with PMH COPD, A-fib  On Eliquis and Plavix, COPD, HTN, HLD presents for syncope from PCPs office earlier today.  Patient was at normal checkup for blood work and was sitting on the table.  Daughter at bedside states that patient suddenly went limp while she was sitting on the table, her eyes rolled back, and her tongue turned to 1 side and this lasted for couple of seconds and then the patient regained consciousness.  Patient had no postictal period.  No tongue biting.  Patient does not remember these events.  Daughter at bedside states that patient has not been eating as much for the past day or 2 and has not been sleeping at all.  Denies recent fevers, chills, cough, congestion, rhinorrhea, chest pain, shortness of breath, abdominal pain, nausea, vomiting, dysuria, constipation, diarrhea.  Does not know if patient has had prior episodes of syncope (2024 09:19)         Problem/Recommendations 1:ams likely dementia delirium exacerbated by acute med illness   ct head neg   reg sleep wake cycle melatonin   thiamine   depakote bid for agitation prn                 pt at risk for developing delirium, therefore please institute the following preventative measures if possible          - initiating early mobilization          -minimizing "tethers" - IV, oxygen, catheters, etc          -avoiding   sedatives          -maintaining hydration/nutrition          -avoid anticholinergics - diphenhydramine, etc          -pain control          -sleep wake cycle regulation; avoid day time somnolence           -supportive environment       Thank you for allowing me to participate in the care of this patient. Will continue to follow patient periodically. Please do not hesitate to call me if you have any  questions or if there has been a change in patients neurological status     ________________  Elodia Mackey MD  [facility]  [phone]      33 minutes spent on total encounter; more than 50 % of the visit was  spent counseling about plan of care, compliance to diet/exercise and medication regimen and or  coordinating care by the attending physician.      It is advised that stroke patients follow up with NP Jenna Cherelle @ [phone] in 1- 2 weeks.   Others please follow up with Dr. Michael Nissenbaum [phone]

## 2024-02-19 NOTE — PROGRESS NOTE ADULT - SUBJECTIVE AND OBJECTIVE BOX
Date of service: 02-19-24 @ 16:17      Patient is a 78y old  Female who presents with a chief complaint of                                                              INTERVAL HPI/OVERNIGHT EVENTS:    REVIEW OF SYSTEMS:     CONSTITUTIONAL: No weakness, fevers or chills  EYES/ENT: No visual changes , no ear ache   NECK: No pain or stiffness  RESPIRATORY: No cough, wheezing,  No shortness of breath  CARDIOVASCULAR: chest discomfort yesterday  GASTROINTESTINAL: No abdominal pain  . No nausea, vomiting, or hematemesis; No diarrhea or constipation. No melena or hematochezia.  GENITOURINARY: No dysuria, frequency or hematuria  NEUROLOGICAL: No numbness or weakness                                                                                                                                                                                                                                                                                Medications:  MEDICATIONS  (STANDING):  apixaban 5 milliGRAM(s) Oral two times a day  atorvastatin 80 milliGRAM(s) Oral at bedtime  chlorhexidine 2% Cloths 1 Application(s) Topical daily  levothyroxine 175 MICROGram(s) Oral daily  metoprolol tartrate 25 milliGRAM(s) Oral every 8 hours  nicotine -  14 mG/24Hr(s) Patch 1 Patch Transdermal daily    MEDICATIONS  (PRN):  lidocaine   4% Patch 1 Patch Transdermal every 24 hours PRN pain       Allergies    penicillin (Hives)    Intolerances      Vital Signs Last 24 Hrs  T(C): 36.3 (19 Feb 2024 11:20), Max: 36.6 (18 Feb 2024 22:16)  T(F): 97.3 (19 Feb 2024 11:20), Max: 97.9 (18 Feb 2024 22:16)  HR: 93 (19 Feb 2024 14:20) (85 - 125)  BP: 126/82 (19 Feb 2024 14:20) (126/82 - 142/70)  BP(mean): 104 (19 Feb 2024 02:58) (104 - 104)  RR: 18 (19 Feb 2024 11:20) (16 - 20)  SpO2: 99% (19 Feb 2024 11:20) (95% - 99%)    Parameters below as of 19 Feb 2024 11:20  Patient On (Oxygen Delivery Method): room air      CAPILLARY BLOOD GLUCOSE          02-19 @ 07:01  -  02-19 @ 16:17  --------------------------------------------------------  IN: 120 mL / OUT: 0 mL / NET: 120 mL      Physical Exam:    Daily     Daily   General:  Well appearing, NAD, not cachetic  HEENT:  Nonicteric, PERRLA  CV:  RRR, S1S2   Lungs:  CTA B/L, no wheezes, rales, rhonchi  Abdomen:  Soft, non-tender, no distended, positive BS  Extremities: no edema                                                                                                                                                                                                                                                                                         LABS:                               13.4   11.16 )-----------( 179      ( 19 Feb 2024 07:29 )             40.7                      02-19    135  |  101  |  11  ----------------------------<  99  3.4<L>   |  21<L>  |  0.87    Ca    9.1      19 Feb 2024 07:29  Mg     1.8     02-19                         RADIOLOGY & ADDITIONAL TESTS         I personally reviewed: [  ]EKG   [  ]CXR    [  ] CT      A/P:         Discussed with :     Simon consultants' Notes   Time spent :

## 2024-02-19 NOTE — CHART NOTE - NSCHARTNOTEFT_GEN_A_CORE
HPI:  78-year-old female with PMH current smoker , COPD, A-fib  On Eliquis and Plavix, HTN, HLD presents for syncope from PCPs office.      Pt w/ multiple episodes of Afib RVR, -150, asymptomatic.  S/p Metoprolol IVP.  This am HR .  Case discussed with Dr. Mendiola, recs to start Metoprolol 25mg BID.  Will c/w AC and telemetry.   Pt endorsed to day ACP this am.    Vital Signs Last 24 Hrs  T(C): 36.6 (19 Feb 2024 05:18), Max: 36.7 (18 Feb 2024 12:06)  T(F): 97.9 (19 Feb 2024 05:18), Max: 98 (18 Feb 2024 12:06)  HR: 109 (19 Feb 2024 05:18) (85 - 125)  BP: 126/85 (19 Feb 2024 05:18) (126/85 - 142/70)  BP(mean): 104 (19 Feb 2024 02:58) (104 - 104)  RR: 18 (19 Feb 2024 05:18) (16 - 18)  SpO2: 95% (19 Feb 2024 05:18) (95% - 97%)    Parameters below as of 19 Feb 2024 05:18  Patient On (Oxygen Delivery Method): room air    Emelin Reyes Monegro, NP   Medicine Department   [facility] [id number]

## 2024-02-20 LAB
ANION GAP SERPL CALC-SCNC: 11 MMOL/L — SIGNIFICANT CHANGE UP (ref 5–17)
BASE EXCESS BLDA CALC-SCNC: 1.7 MMOL/L — SIGNIFICANT CHANGE UP (ref -2–3)
BUN SERPL-MCNC: 10 MG/DL — SIGNIFICANT CHANGE UP (ref 7–23)
CALCIUM SERPL-MCNC: 9.4 MG/DL — SIGNIFICANT CHANGE UP (ref 8.4–10.5)
CHLORIDE SERPL-SCNC: 105 MMOL/L — SIGNIFICANT CHANGE UP (ref 96–108)
CO2 BLDA-SCNC: 29 MMOL/L — HIGH (ref 19–24)
CO2 SERPL-SCNC: 25 MMOL/L — SIGNIFICANT CHANGE UP (ref 22–31)
CREAT SERPL-MCNC: 0.87 MG/DL — SIGNIFICANT CHANGE UP (ref 0.5–1.3)
EGFR: 69 ML/MIN/1.73M2 — SIGNIFICANT CHANGE UP
GLUCOSE SERPL-MCNC: 82 MG/DL — SIGNIFICANT CHANGE UP (ref 70–99)
HCO3 BLDA-SCNC: 28 MMOL/L — SIGNIFICANT CHANGE UP (ref 21–28)
HCT VFR BLD CALC: 42 % — SIGNIFICANT CHANGE UP (ref 34.5–45)
HGB BLD-MCNC: 13.9 G/DL — SIGNIFICANT CHANGE UP (ref 11.5–15.5)
MCHC RBC-ENTMCNC: 32.1 PG — SIGNIFICANT CHANGE UP (ref 27–34)
MCHC RBC-ENTMCNC: 33.1 GM/DL — SIGNIFICANT CHANGE UP (ref 32–36)
MCV RBC AUTO: 97 FL — SIGNIFICANT CHANGE UP (ref 80–100)
NRBC # BLD: 0 /100 WBCS — SIGNIFICANT CHANGE UP (ref 0–0)
PCO2 BLDA: 48 MMHG — HIGH (ref 32–45)
PH BLDA: 7.37 — SIGNIFICANT CHANGE UP (ref 7.35–7.45)
PLATELET # BLD AUTO: 185 K/UL — SIGNIFICANT CHANGE UP (ref 150–400)
PO2 BLDA: 40 MMHG — CRITICAL LOW (ref 83–108)
POTASSIUM SERPL-MCNC: 3.4 MMOL/L — LOW (ref 3.5–5.3)
POTASSIUM SERPL-SCNC: 3.4 MMOL/L — LOW (ref 3.5–5.3)
RBC # BLD: 4.33 M/UL — SIGNIFICANT CHANGE UP (ref 3.8–5.2)
RBC # FLD: 13.1 % — SIGNIFICANT CHANGE UP (ref 10.3–14.5)
SAO2 % BLDA: 71.5 % — LOW (ref 94–98)
SODIUM SERPL-SCNC: 141 MMOL/L — SIGNIFICANT CHANGE UP (ref 135–145)
WBC # BLD: 8.77 K/UL — SIGNIFICANT CHANGE UP (ref 3.8–10.5)
WBC # FLD AUTO: 8.77 K/UL — SIGNIFICANT CHANGE UP (ref 3.8–10.5)

## 2024-02-20 PROCEDURE — 95816 EEG AWAKE AND DROWSY: CPT | Mod: 26

## 2024-02-20 RX ORDER — POTASSIUM CHLORIDE 20 MEQ
40 PACKET (EA) ORAL DAILY
Refills: 0 | Status: DISCONTINUED | OUTPATIENT
Start: 2024-02-20 | End: 2024-02-22

## 2024-02-20 RX ADMIN — APIXABAN 5 MILLIGRAM(S): 2.5 TABLET, FILM COATED ORAL at 16:11

## 2024-02-20 RX ADMIN — Medication 1 PATCH: at 12:38

## 2024-02-20 RX ADMIN — Medication 1 PATCH: at 13:24

## 2024-02-20 RX ADMIN — Medication 25 MILLIGRAM(S): at 21:45

## 2024-02-20 RX ADMIN — Medication 175 MICROGRAM(S): at 05:20

## 2024-02-20 RX ADMIN — ATORVASTATIN CALCIUM 80 MILLIGRAM(S): 80 TABLET, FILM COATED ORAL at 21:45

## 2024-02-20 RX ADMIN — Medication 25 MILLIGRAM(S): at 05:20

## 2024-02-20 RX ADMIN — APIXABAN 5 MILLIGRAM(S): 2.5 TABLET, FILM COATED ORAL at 05:19

## 2024-02-20 RX ADMIN — Medication 40 MILLIEQUIVALENT(S): at 14:33

## 2024-02-20 RX ADMIN — Medication 25 MILLIGRAM(S): at 13:24

## 2024-02-20 RX ADMIN — CHLORHEXIDINE GLUCONATE 1 APPLICATION(S): 213 SOLUTION TOPICAL at 13:23

## 2024-02-20 RX ADMIN — Medication 1 PATCH: at 07:55

## 2024-02-20 NOTE — PROGRESS NOTE ADULT - SUBJECTIVE AND OBJECTIVE BOX
Subjective: Patient seen and examined. No new events except as noted.     REVIEW OF SYSTEMS:    CONSTITUTIONAL: No weakness, fevers or chills  EYES/ENT: No visual changes;  No vertigo or throat pain   NECK: No pain or stiffness  RESPIRATORY: No cough, wheezing, hemoptysis; No shortness of breath  CARDIOVASCULAR: No chest pain or palpitations  GASTROINTESTINAL: No abdominal or epigastric pain. No nausea, vomiting, or hematemesis; No diarrhea or constipation. No melena or hematochezia.  GENITOURINARY: No dysuria, frequency or hematuria  NEUROLOGICAL: No numbness or weakness  SKIN: No itching, burning, rashes, or lesions   All other review of systems is negative unless indicated above.    MEDICATIONS:  MEDICATIONS  (STANDING):  apixaban 5 milliGRAM(s) Oral two times a day  atorvastatin 80 milliGRAM(s) Oral at bedtime  chlorhexidine 2% Cloths 1 Application(s) Topical daily  levothyroxine 175 MICROGram(s) Oral daily  metoprolol tartrate 25 milliGRAM(s) Oral every 8 hours  nicotine -  14 mG/24Hr(s) Patch 1 Patch Transdermal daily      PHYSICAL EXAM:  T(C): 36.8 (02-20-24 @ 07:00), Max: 36.8 (02-20-24 @ 07:00)  HR: 89 (02-20-24 @ 07:00) (82 - 95)  BP: 120/78 (02-20-24 @ 07:00) (119/76 - 135/87)  RR: 18 (02-20-24 @ 07:00) (18 - 18)  SpO2: 98% (02-20-24 @ 07:00) (97% - 99%)  Wt(kg): --  I&O's Summary    19 Feb 2024 07:01  -  20 Feb 2024 07:00  --------------------------------------------------------  IN: 120 mL / OUT: 0 mL / NET: 120 mL    20 Feb 2024 07:01  -  20 Feb 2024 10:40  --------------------------------------------------------  IN: 240 mL / OUT: 0 mL / NET: 240 mL          Appearance: Normal	  HEENT:   Normal oral mucosa, PERRL, EOMI	  Lymphatic: No lymphadenopathy , no edema  Cardiovascular: Irregular  S1 S2, No JVD, No murmurs , Peripheral pulses palpable 2+ bilaterally  Respiratory: Lungs clear to auscultation, normal effort 	  Gastrointestinal:  Soft, Non-tender, + BS	  Skin: No rashes, No ecchymoses, No cyanosis, warm to touch  Musculoskeletal: Normal range of motion, normal strength  Psychiatry:  Mood & affect appropriate  Ext: No edema      LABS:    CARDIAC MARKERS:                                13.9   8.77  )-----------( 185      ( 20 Feb 2024 06:35 )             42.0     02-20    141  |  105  |  10  ----------------------------<  82  3.4<L>   |  25  |  0.87    Ca    9.4      20 Feb 2024 06:35  Mg     1.8     02-19          TELEMETRY: AF	    ECG:  	  RADIOLOGY:   DIAGNOSTIC TESTING:  [ ] Echocardiogram:    [ ]  Catheterization:  [ ] Stress Test:    OTHER:

## 2024-02-20 NOTE — PROGRESS NOTE ADULT - SUBJECTIVE AND OBJECTIVE BOX
[facility]      Seen earlier today [Please note that date of entry above  is the actual  DATE OF SERVICE] No new neurological symptoms reported. Remains stable neurologically.   - Today, patient is without complaints.          *****MEDICATIONS: Current medication reviewed and documented.    MEDICATIONS  (STANDING):  apixaban 5 milliGRAM(s) Oral two times a day  atorvastatin 80 milliGRAM(s) Oral at bedtime  chlorhexidine 2% Cloths 1 Application(s) Topical daily  levothyroxine 175 MICROGram(s) Oral daily  metoprolol tartrate 25 milliGRAM(s) Oral every 8 hours  nicotine -  14 mG/24Hr(s) Patch 1 Patch Transdermal daily    MEDICATIONS  (PRN):  lidocaine   4% Patch 1 Patch Transdermal every 24 hours PRN pain          ***** VITAL SIGNS:   Vital Signs Last 24 Hrs      I&O's Summary    2024 07:01  -  2024 07:00  --------------------------------------------------------  IN: 120 mL / OUT: 0 mL / NET: 120 mL    2024 07:01  -  2024 13:07  --------------------------------------------------------  IN: 240 mL / OUT: 0 mL / NET: 240 mL             *****PHYSICAL EXAM:   alert oriented x 3 attention comprehension are fair.  Able to name, repeat.   EOmi fundi not visualized   no nystagmus VFF to confrontation  Tongue is midline  Palate elevates symmetrically   Moving all 4 ext spontaneously no drift appreciated    Gait not assessed.            *****LAB AND IMAGIN.9   8.77  )-----------( 185      ( 2024 06:35 )             42.0               02-20    141  |  105  |  10  ----------------------------<  82  3.4<L>   |  25  |  0.87    Ca    9.4      2024 06:35  Mg     1.8     02-                       ABG - ( 2024 06:40 )  pH, Arterial: 7.37  pH, Blood: x     /  pCO2: 48    /  pO2: 40    / HCO3: 28    / Base Excess: 1.7   /  SaO2: 71.5              Urinalysis Basic - ( 2024 06:35 )    Color: x / Appearance: x / SG: x / pH: x  Gluc: 82 mg/dL / Ketone: x  / Bili: x / Urobili: x   Blood: x / Protein: x / Nitrite: x   Leuk Esterase: x / RBC: x / WBC x   Sq Epi: x / Non Sq Epi: x / Bacteria: x      [All pertinent recent Imaging/Reports reviewed]            *****A S S E S S M E N T   A N D   P L A N :  78-year-old female with PMH COPD, A-fib  On Eliquis and Plavix, COPD, HTN, HLD presents for syncope from PCPs office earlier today.  Patient was at normal checkup for blood work and was sitting on the table.  Daughter at bedside states that patient suddenly went limp while she was sitting on the table, her eyes rolled back, and her tongue turned to 1 side and this lasted for couple of seconds and then the patient regained consciousness.  Patient had no postictal period.  No tongue biting.  Patient does not remember these events.  Daughter at bedside states that patient has not been eating as much for the past day or 2 and has not been sleeping at all.  Denies recent fevers, chills, cough, congestion, rhinorrhea, chest pain, shortness of breath, abdominal pain, nausea, vomiting, dysuria, constipation, diarrhea.  Does not know if patient has had prior episodes of syncope (2024 09:19)         Problem/Recommendations 1:ams likely dementia delirium exacerbated by acute med illness   ct head neg   reg sleep wake cycle melatonin   thiamine   depakote bid for agitation prn                 pt at risk for developing delirium, therefore please institute the following preventative measures if possible          - initiating early mobilization          -minimizing "tethers" - IV, oxygen, catheters, etc          -avoiding   sedatives          -maintaining hydration/nutrition          -avoid anticholinergics - diphenhydramine, etc          -pain control          -sleep wake cycle regulation; avoid day time somnolence           -supportive environment     Thank you for allowing me to participate in the care of this patient. Will continue to follow patient periodically. Please do not hesitate to call me if you have any  questions or if there has been a change in patients neurological status     ________________  Elodia Mackey MD  [facility] ([facility]  [phone]      33 minutes spent on total encounter; more than 50 % of the visit was  spent counseling about plan of care, compliance to diet/exercise and medication regimen and or  coordinating care by the attending physician.      It is advised that stroke patients follow up with ARNOLDO Villarreal @ [phone] in 1- 2 weeks.   Others please follow up with Dr. Michael Nissenbaum [phone]

## 2024-02-20 NOTE — PROGRESS NOTE ADULT - SUBJECTIVE AND OBJECTIVE BOX
Date of service: 02-20-24 @ 22:20      Patient is a 78y old  Female who presents with a chief complaint of                                                              INTERVAL HPI/OVERNIGHT EVENTS:    REVIEW OF SYSTEMS:     CONSTITUTIONAL: No weakness, fevers or chills  EYES/ENT: No visual changes , no ear ache   NECK: No pain or stiffness  RESPIRATORY: No cough, wheezing,  No shortness of breath  CARDIOVASCULAR: No chest pain or palpitations  GASTROINTESTINAL: No abdominal pain  . No nausea, vomiting, or hematemesis; No diarrhea or constipation. No melena or hematochezia.  GENITOURINARY: No dysuria, frequency or hematuria  NEUROLOGICAL: No numbness or weakness  SKIN: No itching, burning, rashes, or lesions                                                                                                                                                                                                                                                                                 Medications:  MEDICATIONS  (STANDING):  apixaban 5 milliGRAM(s) Oral two times a day  atorvastatin 80 milliGRAM(s) Oral at bedtime  chlorhexidine 2% Cloths 1 Application(s) Topical daily  levothyroxine 175 MICROGram(s) Oral daily  metoprolol tartrate 25 milliGRAM(s) Oral every 8 hours  nicotine -  14 mG/24Hr(s) Patch 1 Patch Transdermal daily  potassium chloride    Tablet ER 40 milliEquivalent(s) Oral daily    MEDICATIONS  (PRN):  lidocaine   4% Patch 1 Patch Transdermal every 24 hours PRN pain       Allergies    penicillin (Hives)    Intolerances      Vital Signs Last 24 Hrs  T(C): 36.4 (20 Feb 2024 21:15), Max: 36.8 (20 Feb 2024 07:00)  T(F): 97.6 (20 Feb 2024 21:15), Max: 98.2 (20 Feb 2024 07:00)  HR: 88 (20 Feb 2024 21:15) (82 - 89)  BP: 109/70 (20 Feb 2024 21:15) (109/70 - 141/91)  BP(mean): --  RR: 18 (20 Feb 2024 21:15) (18 - 18)  SpO2: 97% (20 Feb 2024 21:15) (97% - 98%)    Parameters below as of 20 Feb 2024 21:15  Patient On (Oxygen Delivery Method): room air      CAPILLARY BLOOD GLUCOSE          02-19 @ 07:01  -  02-20 @ 07:00  --------------------------------------------------------  IN: 120 mL / OUT: 0 mL / NET: 120 mL    02-20 @ 07:01  -  02-20 @ 22:20  --------------------------------------------------------  IN: 1220 mL / OUT: 800 mL / NET: 420 mL      Physical Exam:    Daily     Daily   General:  NAD   HEENT:  Nonicteric, PERRLA  CV:  RRR, S1S2   Lungs:  CTA B/L, no wheezes, rales, rhonchi  Abdomen:  Soft, non-tender, no distended, positive BS  Extremities: no edema   Neuro: NF                                                                                                                                                                                                                                                                                                LABS:                               13.9   8.77  )-----------( 185      ( 20 Feb 2024 06:35 )             42.0                      02-20    141  |  105  |  10  ----------------------------<  82  3.4<L>   |  25  |  0.87    Ca    9.4      20 Feb 2024 06:35  Mg     1.8     02-19                         RADIOLOGY & ADDITIONAL TESTS         I personally reviewed: [  ]EKG   [  ]CXR    [  ] CT      A/P:         Discussed with :     [facility] consultants' Notes   Time spent :

## 2024-02-20 NOTE — PROVIDER CONTACT NOTE (CRITICAL VALUE NOTIFICATION) - DATE AND TIME:
[facility] per patient.     GYN Exam: January 2016 with  [facility] - [place] 20-Feb-2024 06:50 20-Feb-2024 06:56

## 2024-02-20 NOTE — PROGRESS NOTE ADULT - ASSESSMENT
78-year-old female with PMH current smoker , COPD, A-fib  On Eliquis and Plavix, HTN, HLD presents for syncope from PCPs office yesterday.     Patient was at normal checkup for blood work and was sitting on the table.  Daughter at bedside states that patient suddenly went limp while she was sitting on the table, her eyes rolled back, and her tongue turned to 1 side and this lasted for couple of seconds and then the patient regained consciousness.  Patient had no postictal period.  No tongue biting.  Patient does not remember these events.   pt too lethargic  , though arousable  knows she is in NS   denies any sx   but tenderneness on abd exam     called daughter : she thinks she might have taken double dose   pt seems to go few days without sleep and then sleeps all day   oldest sister just passed away in oct   questionable underlying dementia   vomitting episode two days ago    c/o abd pain   no fever   no chills   no urinary complaints     Sycnope :  neuro checks   ct head : negative   EEG: P  neuro input  noted .. no agitation   TTE  pending  pt with intermittent chest discomfort.. no recent ischemic ballesteros .. d.w  op cardiology ..will fu echo and consider   d/w    check orthostatics: negative   pt intermittently lethargic : check ABG : noted ..? venous ..    and overnight O2 sat  pul input     afib on AC   cont tele   afib with rVR   was on op BB toprol xl 50   will cont current bb      lehtargy /encephalopathy : CT head negative   improved and seems at baseline   EEG pending       abd pain : check UA : negative   asx at this time     HTN:  hypotensive in ed..brandee sec to medication error ( pt might have taken double dose)   imrpoved     PT /OOB     discussed with derek PENN :   she said she never got a "straight answer from mother " i n past   at this time full code

## 2024-02-21 ENCOUNTER — TRANSCRIPTION ENCOUNTER (OUTPATIENT)
Age: 79
End: 2024-02-21

## 2024-02-21 ENCOUNTER — RESULT REVIEW (OUTPATIENT)
Age: 79
End: 2024-02-21

## 2024-02-21 LAB
ALBUMIN SERPL ELPH-MCNC: 3.9 G/DL — SIGNIFICANT CHANGE UP (ref 3.3–5)
ALP SERPL-CCNC: 88 U/L — SIGNIFICANT CHANGE UP (ref 40–120)
ALT FLD-CCNC: 10 U/L — SIGNIFICANT CHANGE UP (ref 10–45)
ANION GAP SERPL CALC-SCNC: 10 MMOL/L — SIGNIFICANT CHANGE UP (ref 5–17)
ANION GAP SERPL CALC-SCNC: 12 MMOL/L — SIGNIFICANT CHANGE UP (ref 5–17)
AST SERPL-CCNC: 17 U/L — SIGNIFICANT CHANGE UP (ref 10–40)
BASE EXCESS BLDA CALC-SCNC: 2.3 MMOL/L — SIGNIFICANT CHANGE UP (ref -2–3)
BASOPHILS # BLD AUTO: 0.06 K/UL — SIGNIFICANT CHANGE UP (ref 0–0.2)
BASOPHILS NFR BLD AUTO: 0.4 % — SIGNIFICANT CHANGE UP (ref 0–2)
BILIRUB SERPL-MCNC: 0.7 MG/DL — SIGNIFICANT CHANGE UP (ref 0.2–1.2)
BUN SERPL-MCNC: 11 MG/DL — SIGNIFICANT CHANGE UP (ref 7–23)
BUN SERPL-MCNC: 12 MG/DL — SIGNIFICANT CHANGE UP (ref 7–23)
CALCIUM SERPL-MCNC: 9.2 MG/DL — SIGNIFICANT CHANGE UP (ref 8.4–10.5)
CALCIUM SERPL-MCNC: 9.5 MG/DL — SIGNIFICANT CHANGE UP (ref 8.4–10.5)
CHLORIDE SERPL-SCNC: 102 MMOL/L — SIGNIFICANT CHANGE UP (ref 96–108)
CHLORIDE SERPL-SCNC: 107 MMOL/L — SIGNIFICANT CHANGE UP (ref 96–108)
CK MB CFR SERPL CALC: 2 NG/ML — SIGNIFICANT CHANGE UP (ref 0–3.8)
CK SERPL-CCNC: 78 U/L — SIGNIFICANT CHANGE UP (ref 25–170)
CO2 BLDA-SCNC: 28 MMOL/L — HIGH (ref 19–24)
CO2 SERPL-SCNC: 25 MMOL/L — SIGNIFICANT CHANGE UP (ref 22–31)
CO2 SERPL-SCNC: 26 MMOL/L — SIGNIFICANT CHANGE UP (ref 22–31)
CREAT SERPL-MCNC: 0.91 MG/DL — SIGNIFICANT CHANGE UP (ref 0.5–1.3)
CREAT SERPL-MCNC: 0.97 MG/DL — SIGNIFICANT CHANGE UP (ref 0.5–1.3)
EGFR: 60 ML/MIN/1.73M2 — SIGNIFICANT CHANGE UP
EGFR: 65 ML/MIN/1.73M2 — SIGNIFICANT CHANGE UP
EOSINOPHIL # BLD AUTO: 0.14 K/UL — SIGNIFICANT CHANGE UP (ref 0–0.5)
EOSINOPHIL NFR BLD AUTO: 1 % — SIGNIFICANT CHANGE UP (ref 0–6)
GAS PNL BLDA: SIGNIFICANT CHANGE UP
GLUCOSE BLDC GLUCOMTR-MCNC: 137 MG/DL — HIGH (ref 70–99)
GLUCOSE SERPL-MCNC: 101 MG/DL — HIGH (ref 70–99)
GLUCOSE SERPL-MCNC: 147 MG/DL — HIGH (ref 70–99)
HCO3 BLDA-SCNC: 26 MMOL/L — SIGNIFICANT CHANGE UP (ref 21–28)
HCT VFR BLD CALC: 46.9 % — HIGH (ref 34.5–45)
HGB BLD-MCNC: 15.3 G/DL — SIGNIFICANT CHANGE UP (ref 11.5–15.5)
IMM GRANULOCYTES NFR BLD AUTO: 0.4 % — SIGNIFICANT CHANGE UP (ref 0–0.9)
LACTATE SERPL-SCNC: 3 MMOL/L — HIGH (ref 0.5–2)
LYMPHOCYTES # BLD AUTO: 1.44 K/UL — SIGNIFICANT CHANGE UP (ref 1–3.3)
LYMPHOCYTES # BLD AUTO: 10.8 % — LOW (ref 13–44)
MAGNESIUM SERPL-MCNC: 1.8 MG/DL — SIGNIFICANT CHANGE UP (ref 1.6–2.6)
MCHC RBC-ENTMCNC: 31.9 PG — SIGNIFICANT CHANGE UP (ref 27–34)
MCHC RBC-ENTMCNC: 32.6 GM/DL — SIGNIFICANT CHANGE UP (ref 32–36)
MCV RBC AUTO: 97.7 FL — SIGNIFICANT CHANGE UP (ref 80–100)
MONOCYTES # BLD AUTO: 1.08 K/UL — HIGH (ref 0–0.9)
MONOCYTES NFR BLD AUTO: 8.1 % — SIGNIFICANT CHANGE UP (ref 2–14)
NEUTROPHILS # BLD AUTO: 10.58 K/UL — HIGH (ref 1.8–7.4)
NEUTROPHILS NFR BLD AUTO: 79.3 % — HIGH (ref 43–77)
NRBC # BLD: 0 /100 WBCS — SIGNIFICANT CHANGE UP (ref 0–0)
PCO2 BLDA: 39 MMHG — SIGNIFICANT CHANGE UP (ref 32–45)
PH BLDA: 7.44 — SIGNIFICANT CHANGE UP (ref 7.35–7.45)
PHOSPHATE SERPL-MCNC: 3.3 MG/DL — SIGNIFICANT CHANGE UP (ref 2.5–4.5)
PLATELET # BLD AUTO: 245 K/UL — SIGNIFICANT CHANGE UP (ref 150–400)
PO2 BLDA: 91 MMHG — SIGNIFICANT CHANGE UP (ref 83–108)
POTASSIUM SERPL-MCNC: 4.1 MMOL/L — SIGNIFICANT CHANGE UP (ref 3.5–5.3)
POTASSIUM SERPL-MCNC: 4.6 MMOL/L — SIGNIFICANT CHANGE UP (ref 3.5–5.3)
POTASSIUM SERPL-SCNC: 4.1 MMOL/L — SIGNIFICANT CHANGE UP (ref 3.5–5.3)
POTASSIUM SERPL-SCNC: 4.6 MMOL/L — SIGNIFICANT CHANGE UP (ref 3.5–5.3)
PROT SERPL-MCNC: 7.3 G/DL — SIGNIFICANT CHANGE UP (ref 6–8.3)
RBC # BLD: 4.8 M/UL — SIGNIFICANT CHANGE UP (ref 3.8–5.2)
RBC # FLD: 13.1 % — SIGNIFICANT CHANGE UP (ref 10.3–14.5)
SAO2 % BLDA: 98.6 % — HIGH (ref 94–98)
SODIUM SERPL-SCNC: 140 MMOL/L — SIGNIFICANT CHANGE UP (ref 135–145)
SODIUM SERPL-SCNC: 142 MMOL/L — SIGNIFICANT CHANGE UP (ref 135–145)
TROPONIN T, HIGH SENSITIVITY RESULT: 25 NG/L — SIGNIFICANT CHANGE UP (ref 0–51)
WBC # BLD: 13.36 K/UL — HIGH (ref 3.8–10.5)
WBC # FLD AUTO: 13.36 K/UL — HIGH (ref 3.8–10.5)

## 2024-02-21 PROCEDURE — 93306 TTE W/DOPPLER COMPLETE: CPT | Mod: 26

## 2024-02-21 PROCEDURE — 74018 RADEX ABDOMEN 1 VIEW: CPT | Mod: 26

## 2024-02-21 PROCEDURE — 93010 ELECTROCARDIOGRAM REPORT: CPT

## 2024-02-21 RX ORDER — SODIUM CHLORIDE 9 MG/ML
1000 INJECTION INTRAMUSCULAR; INTRAVENOUS; SUBCUTANEOUS
Refills: 0 | Status: DISCONTINUED | OUTPATIENT
Start: 2024-02-21 | End: 2024-02-22

## 2024-02-21 RX ORDER — SODIUM CHLORIDE 9 MG/ML
1000 INJECTION INTRAMUSCULAR; INTRAVENOUS; SUBCUTANEOUS
Refills: 0 | Status: DISCONTINUED | OUTPATIENT
Start: 2024-02-21 | End: 2024-02-21

## 2024-02-21 RX ORDER — METOCLOPRAMIDE HCL 10 MG
10 TABLET ORAL ONCE
Refills: 0 | Status: COMPLETED | OUTPATIENT
Start: 2024-02-21 | End: 2024-02-21

## 2024-02-21 RX ORDER — ONDANSETRON 8 MG/1
4 TABLET, FILM COATED ORAL ONCE
Refills: 0 | Status: COMPLETED | OUTPATIENT
Start: 2024-02-21 | End: 2024-02-21

## 2024-02-21 RX ADMIN — Medication 175 MICROGRAM(S): at 05:48

## 2024-02-21 RX ADMIN — Medication 40 MILLIEQUIVALENT(S): at 13:19

## 2024-02-21 RX ADMIN — Medication 1 PATCH: at 13:20

## 2024-02-21 RX ADMIN — Medication 25 MILLIGRAM(S): at 05:48

## 2024-02-21 RX ADMIN — Medication 1 PATCH: at 19:35

## 2024-02-21 RX ADMIN — SODIUM CHLORIDE 100 MILLILITER(S): 9 INJECTION INTRAMUSCULAR; INTRAVENOUS; SUBCUTANEOUS at 22:17

## 2024-02-21 RX ADMIN — CHLORHEXIDINE GLUCONATE 1 APPLICATION(S): 213 SOLUTION TOPICAL at 13:18

## 2024-02-21 RX ADMIN — Medication 1 PATCH: at 13:18

## 2024-02-21 RX ADMIN — Medication 25 MILLIGRAM(S): at 13:21

## 2024-02-21 RX ADMIN — ONDANSETRON 4 MILLIGRAM(S): 8 TABLET, FILM COATED ORAL at 18:14

## 2024-02-21 RX ADMIN — APIXABAN 5 MILLIGRAM(S): 2.5 TABLET, FILM COATED ORAL at 05:48

## 2024-02-21 NOTE — PHYSICAL THERAPY INITIAL EVALUATION ADULT - ADDITIONAL COMMENTS
Pt lives with daughter in an apt with 3 steps to enter and then first floor set up. PTA pt independent with functional mobility inside house and uses a cane for community ambulation. Pt owns cane.

## 2024-02-21 NOTE — PROGRESS NOTE ADULT - ASSESSMENT
78-year-old female with PMH current smoker , COPD, A-fib  On Eliquis and Plavix, HTN, HLD presents for syncope from PCPs office yesterday.     Patient was at normal checkup for blood work and was sitting on the table.  Daughter at bedside states that patient suddenly went limp while she was sitting on the table, her eyes rolled back, and her tongue turned to 1 side and this lasted for couple of seconds and then the patient regained consciousness.  Patient had no postictal period.  No tongue biting.  Patient does not remember these events.   pt too lethargic  , though arousable  knows she is in NS   denies any sx   but tenderneness on abd exam     called daughter : she thinks she might have taken double dose   pt seems to go few days without sleep and then sleeps all day   oldest sister just passed away in oct   questionable underlying dementia   vomitting episode two days ago    c/o abd pain   no fever   no chills   no urinary complaints     Sycnope :  neuro checks   ct head : negative   EEG: P  neuro input  noted .. no agitation   TTE  pending  pt with intermittent chest discomfort.. no recent ischemic ballesteros .. d.w   op cardiology ..will fu echo and consider ischemia favian   d/w    check orthostatics: negative    and overnight O2 sat      Nausea /vomitting /abd discomoft :   Xray no obstrutctive pattern   check CT   check lft   will call GI   npo except meds     afib on AC   cont tele   afib with rVR   was on op BB toprol xl 50   will cont current bb      lehtargy /encephalopathy : CT head negative   improved and seems at baseline   EEG pending : negative         HTN:  hypotensive in ed..brandee sec to medication error ( pt might have taken double dose)   imrpoved     PT /OOB     discussed with derek PENN :   she said she never got a "straight answer from mother " i n past   at this time full code

## 2024-02-21 NOTE — PHYSICAL THERAPY INITIAL EVALUATION ADULT - PERTINENT HX OF CURRENT PROBLEM, REHAB EVAL
78-year-old female with PMH current smoker , COPD, A-fib  On Eliquis and Plavix, HTN, HLD presents for syncope from PCPs office yesterday. Patient was at normal checkup for blood work and was sitting on the table.  Daughter at bedside states that patient suddenly went limp while she was sitting on the table, her eyes rolled back, and her tongue turned to 1 side and this lasted for couple of seconds and then the patient regained consciousness.  Patient had no postictal period.  No tongue biting.  Patient does not remember these events. pt too lethargic  , though ar[place] knows she is in NS, denies any sx but tenderness on abd exam. Called daughter : she thinks she might have taken double dose pt seems to go few days without sleep and then sleeps all day oldest sister just passed away in oct questionable underlying dementia vomiting episode two days ago.  Hosp Course: 2/15 XR CHEST: Clear lungs. 2/16 XR ABDOMEN: Nonobstructive bowel gas pattern. 2/16 CT BRAIN:  No acute intracranial hemorrhage, mass effect, or shift of the midline structures. Mild chronic white matter microvascular type changes.

## 2024-02-21 NOTE — PROGRESS NOTE ADULT - SUBJECTIVE AND OBJECTIVE BOX
Date of service: 02-21-24 @ 18:57      Patient is a 78y old  Female who presents with a chief complaint of                                                              INTERVAL HPI/OVERNIGHT EVENTS:    REVIEW OF SYSTEMS:     CONSTITUTIONAL: No weakness, fevers or chills  RESPIRATORY: No cough, wheezing,  No shortness of breath  CARDIOVASCULAR: No chest pain or palpitations  GASTROINTESTINAL: No abdominal pain  . No nausea, vomiting, or hematemesis; No diarrhea or constipation. No melena or hematochezia.  GENITOURINARY: No dysuria, frequency or hematuria  NEUROLOGICAL: No numbness or weakness                                                                                                                                                                                                                                                                                  Medications:  MEDICATIONS  (STANDING):  apixaban 5 milliGRAM(s) Oral two times a day  atorvastatin 80 milliGRAM(s) Oral at bedtime  chlorhexidine 2% Cloths 1 Application(s) Topical daily  levothyroxine 175 MICROGram(s) Oral daily  metoprolol tartrate 25 milliGRAM(s) Oral every 8 hours  nicotine -  14 mG/24Hr(s) Patch 1 Patch Transdermal daily  potassium chloride    Tablet ER 40 milliEquivalent(s) Oral daily    MEDICATIONS  (PRN):  lidocaine   4% Patch 1 Patch Transdermal every 24 hours PRN pain       Allergies    penicillin (Hives)    Intolerances      Vital Signs Last 24 Hrs  T(C): 36.3 (21 Feb 2024 18:08), Max: 36.7 (21 Feb 2024 05:44)  T(F): 97.4 (21 Feb 2024 18:08), Max: 98 (21 Feb 2024 05:44)  HR: 96 (21 Feb 2024 18:08) (75 - 96)  BP: 143/80 (21 Feb 2024 18:08) (100/67 - 145/84)  BP(mean): --  RR: 18 (21 Feb 2024 18:08) (18 - 18)  SpO2: 100% (21 Feb 2024 18:08) (96% - 100%)    Parameters below as of 21 Feb 2024 18:08  Patient On (Oxygen Delivery Method): room air      CAPILLARY BLOOD GLUCOSE      POCT Blood Glucose.: 137 mg/dL (21 Feb 2024 18:41)      02-20 @ 07:01  -  02-21 @ 07:00  --------------------------------------------------------  IN: 1220 mL / OUT: 800 mL / NET: 420 mL    02-21 @ 07:01  -  02-21 @ 18:57  --------------------------------------------------------  IN: 480 mL / OUT: 0 mL / NET: 480 mL      Physical Exam:    Daily     Daily   General:  Well appearing, NAD, not cachetic  HEENT:  Nonicteric, PERRLA  CV:  RRR, S1S2   Lungs:  CTA B/L, no wheezes, rales, rhonchi  Abdomen:  Soft, mild tenderness   Extremities:  No edema                                                                                                                                                                                                                                                                                        LABS:                               13.9   8.77  )-----------( 185      ( 20 Feb 2024 06:35 )             42.0                      02-21    142  |  107  |  11  ----------------------------<  101<H>  4.1   |  25  |  0.91    Ca    9.2      21 Feb 2024 06:18                         RADIOLOGY & ADDITIONAL TESTS         I personally reviewed: [  ]EKG   [  ]CXR    [  ] CT      A/P:         Discussed with :     [facility] consultants' Notes   Time spent :

## 2024-02-21 NOTE — PHYSICAL THERAPY INITIAL EVALUATION ADULT - DIAGNOSIS, PT EVAL
5/21/21 Called pt daughter and left voicemail stating next available appointment. Contact number and name left.   Decreased functional mobility/capacity secondary to impairments listed below

## 2024-02-21 NOTE — CHART NOTE - NSCHARTNOTEFT_GEN_A_CORE
Medicine PA Note     Notified by RN pt's lactate 3.0.   Labs reviewed, leukocytosis noted, pt afebrile, VSS.   Pt seen and examined at bedside, non-toxic appearing, NAD, A&O x1-2. RN reports pt w/1 episode of non-bloody, emesis w/ food, but pt refuses she vomited. Pt states abdominal pain-unchanged from her admission, denies chills, headache, dizziness, SOB and CP.       Vital Signs Last 24 Hrs  T(C): 36.3 (02-21-24 @ 20:08), Max: 36.7 (02-21-24 @ 05:44)  T(F): 97.3 (02-21-24 @ 20:08), Max: 98 (02-21-24 @ 05:44)  HR: 90 (02-21-24 @ 20:08) (75 - 96)  BP: 131/84 (02-21-24 @ 20:08) (100/67 - 145/84)  BP(mean): --  RR: 18 (02-21-24 @ 20:08) (18 - 18)  SpO2: 100% (02-21-24 @ 20:08) (96% - 100%)  Daily     Daily   I&O's Summary    20 Feb 2024 07:01  -  21 Feb 2024 07:00  --------------------------------------------------------  IN: 1220 mL / OUT: 800 mL / NET: 420 mL    21 Feb 2024 07:01  -  21 Feb 2024 23:20  --------------------------------------------------------  IN: 720 mL / OUT: 0 mL / NET: 720 mL      CAPILLARY BLOOD GLUCOSE                          15.3   13.36 )-----------( 245      ( 21 Feb 2024 19:42 )             46.9     02-21    140  |  102  |  12  ----------------------------<  147<H>  4.6   |  26  |  0.97    Ca    9.5      21 Feb 2024 19:42  Phos  3.3     02-21  Mg     1.8     02-21    TPro  7.3  /  Alb  3.9  /  TBili  0.7  /  DBili  x   /  AST  17  /  ALT  10  /  AlkPhos  88  02-21      CARDIAC MARKERS ( 21 Feb 2024 19:42 )  x     / x     / 78 U/L / x     / 2.0 ng/mL      ABG - ( 21 Feb 2024 05:44 )  pH, Arterial: 7.44  pH, Blood: x     /  pCO2: 39    /  pO2: 91    / HCO3: 26    / Base Excess: 2.3   /  SaO2: 98.6        Radiology:  < from: Xray Abdomen 2 View PORTABLE -Urgent (Xray Abdomen 2 View PORTABLE -Urgent .) (02.16.24 @ 13:15) >    FINDINGS:  The bowel gas pattern is nondilated. There is no obstruction. There is no   fecal impaction. There is no free intraperitoneal air.  IMPRESSION:  Nonobstructive bowel gas pattern.      PHYSICAL EXAM:  GENERAL: NAD, well-developed  HEAD:  Atraumatic, Normocephalic  EYES: EOMI, PERRLA, conjunctiva and sclera clear  NECK: Supple, No JVD  CHEST/LUNG: Clear to auscultation bilaterally; No wheeze  HEART: Regular rate and rhythm;   ABDOMEN: Soft, Nontender, Nondistended; Bowel sounds present.   EXTREMITIES:  2+ Peripheral Pulses, No clubbing, cyanosis, or edema  PSYCH: AAOx1-2  NEUROLOGY: non-focal  SKIN: No rashes or lesions    Assessment/Plan: HPI:  78-year-old female with PMH current smoker , COPD, A-fib  On Eliquis and Plavix, HTN, HLD presents for syncope from PCPs office yesterday.     Patient was at normal checkup for blood work and was sitting on the table.  Daughter at bedside states that patient suddenly went limp while she was sitting on the table, her eyes rolled back, and her tongue turned to 1 side and this lasted for couple of seconds and then the patient regained consciousness.  Patient had no postictal period.  No tongue biting.  Patient does not remember these events.   pt too lethargic  , though arouab[place]abe   knows she is in NS   denies any sx   but tenderneness on abd exam     called daughter : she thinks she might have taken double dose   pt seems to go few days without sleep and then sleeps all day   oldest sister just passed away in oct   questionable underlying dementia   vomitting episode two days ago    c/o abd pain   no fever   no chills   no urinary complaints  (16 Feb 2024 09:19) Medicine PA Note     Notified by RN pt's lactate 3.0.   Labs reviewed, leukocytosis noted, pt afebrile, VSS.   Pt seen and examined at bedside, non-toxic appearing, NAD, A&O x1-2. RN reports pt w/1 episode of non-bloody, non-bilious emesis w/ food, but pt refuses she vomited. Pt states abdominal pain-unchanged from her admission, denies chills, headache, dizziness, SOB and CP.       Vital Signs Last 24 Hrs  T(C): 36.3 (02-21-24 @ 20:08), Max: 36.7 (02-21-24 @ 05:44)  T(F): 97.3 (02-21-24 @ 20:08), Max: 98 (02-21-24 @ 05:44)  HR: 90 (02-21-24 @ 20:08) (75 - 96)  BP: 131/84 (02-21-24 @ 20:08) (100/67 - 145/84)  BP(mean): --  RR: 18 (02-21-24 @ 20:08) (18 - 18)  SpO2: 100% (02-21-24 @ 20:08) (96% - 100%)  Daily     Daily   I&O's Summary    20 Feb 2024 07:01  -  21 Feb 2024 07:00  --------------------------------------------------------  IN: 1220 mL / OUT: 800 mL / NET: 420 mL    21 Feb 2024 07:01  -  21 Feb 2024 23:20  --------------------------------------------------------  IN: 720 mL / OUT: 0 mL / NET: 720 mL      CAPILLARY BLOOD GLUCOSE                          15.3   13.36 )-----------( 245      ( 21 Feb 2024 19:42 )             46.9     02-21    140  |  102  |  12  ----------------------------<  147<H>  4.6   |  26  |  0.97    Ca    9.5      21 Feb 2024 19:42  Phos  3.3     02-21  Mg     1.8     02-21    TPro  7.3  /  Alb  3.9  /  TBili  0.7  /  DBili  x   /  AST  17  /  ALT  10  /  AlkPhos  88  02-21      CARDIAC MARKERS ( 21 Feb 2024 19:42 )  x     / x     / 78 U/L / x     / 2.0 ng/mL      ABG - ( 21 Feb 2024 05:44 )  pH, Arterial: 7.44  pH, Blood: x     /  pCO2: 39    /  pO2: 91    / HCO3: 26    / Base Excess: 2.3   /  SaO2: 98.6        Radiology:  < from: Xray Abdomen 2 View PORTABLE -Urgent (Xray Abdomen 2 View PORTABLE -Urgent .) (02.16.24 @ 13:15) >    FINDINGS:  The bowel gas pattern is nondilated. There is no obstruction. There is no   fecal impaction. There is no free intraperitoneal air.  IMPRESSION:  Nonobstructive bowel gas pattern.      PHYSICAL EXAM:  GENERAL: NAD, well-developed  HEAD:  Atraumatic, Normocephalic  EYES: EOMI, PERRLA, conjunctiva and sclera clear  NECK: Supple, No JVD  CHEST/LUNG: Clear to auscultation bilaterally; No wheeze  HEART: Regular rate and rhythm;   ABDOMEN: Soft, Nontender, Nondistended; Bowel sounds present.   EXTREMITIES:  2+ Peripheral Pulses, No clubbing, cyanosis, or edema  PSYCH: AAOx1-2  NEUROLOGY: non-focal  SKIN: No rashes or lesions    Assessment/Plan: HPI:  78-year-old female with PMH current smoker , COPD, A-fib  On Eliquis and Plavix, HTN, HLD presents for syncope from PCPs office yesterday.   Patient was at normal checkup for blood work and was sitting on the table.  Daughter at bedside states that patient suddenly went limp while she was sitting on the table, her eyes rolled back, and her tongue turned to 1 side and this lasted for couple of seconds and then the patient regained consciousness.  Patient had no postictal period.  No tongue biting.  Patient does not remember these events.     Now pt with lactate 3.0 and 1/episode of non-bloody, non-bilious emesis. Labs reviewed, leukocytosis noted, pt afebrile, VSS. Pt states abdominal pain-unchanged from her admission, denies chills, headache, dizziness, SOB and CP.   Per discussion with , if pt develops a fever overnight, culture and place on Zosyn empirically.     Plan:  -continue to monitor  -Reglan IV x1 ordered  -IVF over 10 hours ordered  -Bladder Scan q6 hrs ordered  -CT Abd/Pelvis called x2 to expedite scan. Medicine PA Note     Notified by RN pt's lactate 3.0.   Labs reviewed, leukocytosis noted, pt afebrile, VSS.   Pt seen and examined at bedside, non-toxic appearing, NAD, A&O x1-2. RN reports pt w/1 episode of non-bloody, non-bilious emesis w/ food, but pt refuses she vomited. Pt states abdominal pain-unchanged from her admission, denies chills, headache, dizziness, SOB and CP, dysuria.       Vital Signs Last 24 Hrs  T(C): 36.3 (02-21-24 @ 20:08), Max: 36.7 (02-21-24 @ 05:44)  T(F): 97.3 (02-21-24 @ 20:08), Max: 98 (02-21-24 @ 05:44)  HR: 90 (02-21-24 @ 20:08) (75 - 96)  BP: 131/84 (02-21-24 @ 20:08) (100/67 - 145/84)  BP(mean): --  RR: 18 (02-21-24 @ 20:08) (18 - 18)  SpO2: 100% (02-21-24 @ 20:08) (96% - 100%)  Daily     Daily   I&O's Summary    20 Feb 2024 07:01  -  21 Feb 2024 07:00  --------------------------------------------------------  IN: 1220 mL / OUT: 800 mL / NET: 420 mL    21 Feb 2024 07:01  -  21 Feb 2024 23:20  --------------------------------------------------------  IN: 720 mL / OUT: 0 mL / NET: 720 mL      CAPILLARY BLOOD GLUCOSE                          15.3   13.36 )-----------( 245      ( 21 Feb 2024 19:42 )             46.9     02-21    140  |  102  |  12  ----------------------------<  147<H>  4.6   |  26  |  0.97    Ca    9.5      21 Feb 2024 19:42  Phos  3.3     02-21  Mg     1.8     02-21    TPro  7.3  /  Alb  3.9  /  TBili  0.7  /  DBili  x   /  AST  17  /  ALT  10  /  AlkPhos  88  02-21      CARDIAC MARKERS ( 21 Feb 2024 19:42 )  x     / x     / 78 U/L / x     / 2.0 ng/mL      ABG - ( 21 Feb 2024 05:44 )  pH, Arterial: 7.44  pH, Blood: x     /  pCO2: 39    /  pO2: 91    / HCO3: 26    / Base Excess: 2.3   /  SaO2: 98.6        Radiology:  < from: Xray Abdomen 2 View PORTABLE -Urgent (Xray Abdomen 2 View PORTABLE -Urgent .) (02.16.24 @ 13:15) >    FINDINGS:  The bowel gas pattern is nondilated. There is no obstruction. There is no   fecal impaction. There is no free intraperitoneal air.  IMPRESSION:  Nonobstructive bowel gas pattern.      PHYSICAL EXAM:  GENERAL: NAD, well-developed  HEAD:  Atraumatic, Normocephalic  EYES: EOMI, PERRLA, conjunctiva and sclera clear  NECK: Supple, No JVD  CHEST/LUNG: Clear to auscultation bilaterally; No wheeze  HEART: Regular rate and rhythm;   ABDOMEN: Soft, Nontender, Nondistended; Bowel sounds present.   EXTREMITIES:  2+ Peripheral Pulses, No clubbing, cyanosis, or edema  PSYCH: AAOx1-2  NEUROLOGY: non-focal  SKIN: No rashes or lesions    Assessment/Plan: HPI:  78-year-old female with PMH current smoker , COPD, A-fib  On Eliquis and Plavix, HTN, HLD presents for syncope from PCPs office yesterday.   Patient was at normal checkup for blood work and was sitting on the table.  Daughter at bedside states that patient suddenly went limp while she was sitting on the table, her eyes rolled back, and her tongue turned to 1 side and this lasted for couple of seconds and then the patient regained consciousness.  Patient had no postictal period.  No tongue biting.  Patient does not remember these events.     Now pt with lactate 3.0 and 1/episode of non-bloody, non-bilious emesis. Labs reviewed, leukocytosis noted, pt afebrile, VSS. Pt states abdominal pain-unchanged from her admission, denies chills, headache, dizziness, SOB, CP, dysuria.   Per discussion with , if pt develops a fever overnight, culture and place on Zosyn empirically.     Plan:  -continue to monitor  -Reglan IV x1 ordered  -IVF over 10 hours ordered  -Bladder Scan q6 hrs ordered  -CT Abd/Pelvis called x2 to expedite scan.    [facility]   #[id number]

## 2024-02-21 NOTE — PROGRESS NOTE ADULT - SUBJECTIVE AND OBJECTIVE BOX
[facility]      Seen earlier today [Please note that date of entry above  is the actual  DATE OF SERVICE] No new neurological symptoms reported. Remains stable neurologically.   - Today, patient is without complaints.          *****MEDICATIONS: Current medication reviewed and documented.    MEDICATIONS  (STANDING):  apixaban 5 milliGRAM(s) Oral two times a day  atorvastatin 80 milliGRAM(s) Oral at bedtime  chlorhexidine 2% Cloths 1 Application(s) Topical daily  levothyroxine 175 MICROGram(s) Oral daily  metoprolol tartrate 25 milliGRAM(s) Oral every 8 hours  nicotine -  14 mG/24Hr(s) Patch 1 Patch Transdermal daily  potassium chloride    Tablet ER 40 milliEquivalent(s) Oral daily    MEDICATIONS  (PRN):  lidocaine   4% Patch 1 Patch Transdermal every 24 hours PRN pain          ***** VITAL SIGNS:   Vital Signs Last 24 Hrs      I&O's Summary    2024 07:01  -  2024 07:00  --------------------------------------------------------  IN: 1220 mL / OUT: 800 mL / NET: 420 mL    2024 07:01  -  2024 12:14  --------------------------------------------------------  IN: 240 mL / OUT: 0 mL / NET: 240 mL             *****PHYSICAL EXAM:   alert oriented x 2  attention comprehension are limited   EOmi fundi not visualized   no nystagmus VFF to confrontation  Tongue is midline  Palate elevates symmetrically   Moving all 4 ext spontaneously no drift appreciated    Gait not assessed.            *****LAB AND IMAGIN.9   8.77  )-----------( 185      ( 2024 06:35 )             42.0               02-21    142  |  107  |  11  ----------------------------<  101<H>  4.1   |  25  |  0.91    Ca    9.2      2024 06:18                       ABG - ( 2024 05:44 )  pH, Arterial: 7.44  pH, Blood: x     /  pCO2: 39    /  pO2: 91    / HCO3: 26    / Base Excess: 2.3   /  SaO2: 98.6              Urinalysis Basic - ( 2024 06:18 )    Color: x / Appearance: x / SG: x / pH: x  Gluc: 101 mg/dL / Ketone: x  / Bili: x / Urobili: x   Blood: x / Protein: x / Nitrite: x   Leuk Esterase: x / RBC: x / WBC x   Sq Epi: x / Non Sq Epi: x / Bacteria: x      [All pertinent recent Imaging/Reports reviewed]            *****A S S E S S M E N T   A N D   P L A N :  78-year-old female with PMH COPD, A-fib  On Eliquis and Plavix, COPD, HTN, HLD presents for syncope from PCPs office earlier today.  Patient was at normal checkup for blood work and was sitting on the table.  Daughter at bedside states that patient suddenly went limp while she was sitting on the table, her eyes rolled back, and her tongue turned to 1 side and this lasted for couple of seconds and then the patient regained consciousness.  Patient had no postictal period.  No tongue biting.  Patient does not remember these events.  Daughter at bedside states that patient has not been eating as much for the past day or 2 and has not been sleeping at all.  Denies recent fevers, chills, cough, congestion, rhinorrhea, chest pain, shortness of breath, abdominal pain, nausea, vomiting, dysuria, constipation, diarrhea.  Does not know if patient has had prior episodes of syncope (2024 09:19)         Problem/Recommendations 1:ams likely dementia delirium exacerbated by acute med illness   ct head neg   reg sleep wake cycle melatonin   thiamine   depakote bid for agitation prn                 pt at risk for developing delirium, therefore please institute the following preventative measures if possible          - initiating early mobilization          -minimizing "tethers" - IV, oxygen, catheters, etc          -avoiding   sedatives          -maintaining hydration/nutrition          -avoid anticholinergics - diphenhydramine, etc          -pain control          -sleep wake cycle regulation; avoid day time somnolence           -supportive environment     Thank you for allowing me to participate in the care of this patient. Will continue to follow patient periodically. Please do not hesitate to call me if you have any  questions or if there has been a change in patients neurological status        Thank you for allowing me to participate in the care of this patient. Will continue to follow patient periodically. Please do not hesitate to call me if you have any  questions or if there has been a change in patients neurological status     ________________  Elodia Mackey MD  [facility] [place] [facility] ([place])[facility]  [phone]      33 minutes spent on total encounter; more than 50 % of the visit was  spent counseling about plan of care, compliance to diet/exercise and medication regimen and or  coordinating care by the attending physician.      It is advised that stroke patients follow up with ARNOLDO Villarreal @ [phone] in 1- 2 weeks.   Others please follow up with Dr. Michael Nissenbaum [phone]

## 2024-02-21 NOTE — PHYSICAL THERAPY INITIAL EVALUATION ADULT - NSPTDISCHREC_GEN_A_CORE
If pt returns home, pt will need home PT, rolling walker, transport chair, assistance for all functional mobility/ADLs at all time/Sub-acute Rehab

## 2024-02-21 NOTE — CHART NOTE - NSCHARTNOTEFT_GEN_A_CORE
Patient with new onset nausea and vomiting. Patient seen and examined, appears uncomfortable reports new onset nausea, emesis x 3. States associated lower abdominal pain, sharp in quality, and new onset lightheadedness Last BM yesterday, reports flatulence today. States PMHx appendectomy and ? h/o SBO. Denies fever/chills, CP, SOB, diarrhea, headache, visual changes or any other complaints at this time.     Vital Signs Last 24 Hrs  T(C): 36.3 (21 Feb 2024 18:08), Max: 36.7 (21 Feb 2024 05:44)  T(F): 97.4 (21 Feb 2024 18:08), Max: 98 (21 Feb 2024 05:44)  HR: 96 (21 Feb 2024 18:08) (75 - 96)  BP: 143/80 (21 Feb 2024 18:08) (100/67 - 145/84)  BP(mean): --  RR: 18 (21 Feb 2024 18:08) (18 - 18)  SpO2: 100% (21 Feb 2024 18:08) (96% - 100%)    Parameters below as of 21 Feb 2024 18:08  Patient On (Oxygen Delivery Method): room air    A/P: 78-year-old female with PMH current smoker , COPD, A-fib  On Eliquis and Plavix, HTN, HLD presents for syncope from PCPs office. Patient was at normal checkup for blood work and was sitting on the table.  Daughter at bedside states that patient suddenly went limp while she was sitting on the table, her eyes rolled back, and her tongue turned to 1 side and this lasted for couple of seconds and then the patient regained consciousness.  Patient had no postictal period.  No tongue biting.  Patient does not remember these events. EEG negative. Now with new onset nausea, vomiting and bilateral lower abdominal pain. Abdomen soft, + R > L tenderness lower quadrants. Otherwise exam unremarkable. VSS. Discussed with Dr. Duran, plan as below.     > ECG ordered, reviewed by attending. No ST changes, low suspicion for cardiac etiology. QTc < 500, Zofran ordered.  > Labs ordered, including cardiac enzymes.  > Abdominal XR ordered, expedited.   > Continue to monitor closely.     Keyla Rehan PA-C    Addendum #1: XR images in PACS reviewed with attending. Patient states unable to tolerate PO contrast at this time, plan CT A/P ordered. Patient status and events signed out to night ACP for continued management and care. Patient with new onset nausea and vomiting. Patient seen and examined, appears uncomfortable reports new onset nausea, emesis x 3. States associated lower abdominal pain, sharp in quality, and new onset lightheadedness Last BM yesterday, reports flatulence today. States PMHx appendectomy and ? h/o SBO. Denies fever/chills, CP, SOB, diarrhea, headache, visual changes or any other complaints at this time.     Vital Signs Last 24 Hrs  T(C): 36.3 (21 Feb 2024 18:08), Max: 36.7 (21 Feb 2024 05:44)  T(F): 97.4 (21 Feb 2024 18:08), Max: 98 (21 Feb 2024 05:44)  HR: 96 (21 Feb 2024 18:08) (75 - 96)  BP: 143/80 (21 Feb 2024 18:08) (100/67 - 145/84)  BP(mean): --  RR: 18 (21 Feb 2024 18:08) (18 - 18)  SpO2: 100% (21 Feb 2024 18:08) (96% - 100%)    Parameters below as of 21 Feb 2024 18:08  Patient On (Oxygen Delivery Method): room air    A/P: 78-year-old female with PMH current smoker , COPD, A-fib  On Eliquis and Plavix, HTN, HLD presents for syncope from PCPs office. Patient was at normal checkup for blood work and was sitting on the table.  Daughter at bedside states that patient suddenly went limp while she was sitting on the table, her eyes rolled back, and her tongue turned to 1 side and this lasted for couple of seconds and then the patient regained consciousness.  Patient had no postictal period.  No tongue biting.  Patient does not remember these events. EEG negative. Now with new onset nausea, vomiting and bilateral lower abdominal pain. Abdomen soft, + R > L tenderness lower quadrants. Otherwise exam unremarkable. VSS. Discussed with Dr. Duran, plan as below.     > ECG ordered, reviewed by attending. No ST changes, low suspicion for cardiac etiology. QTc < 500, Zofran ordered.  > Labs ordered, including cardiac enzymes.  > Abdominal XR ordered, expedited.   > Continue to monitor closely.     Keyla Rehan PA-C    Addendum #1: XR images in PACS reviewed with attending, patient states unable to tolerate PO contrast at this time, CT A/P non contrast ordered. Patient status and events signed out to night ACP for continued management and care. Patient with new onset nausea and vomiting. Patient seen and examined, appears uncomfortable reports new onset nausea, emesis x 3. States associated lower abdominal pain, sharp in quality, and new onset lightheadedness Last BM yesterday, reports flatulence today. States PMHx appendectomy and ? h/o SBO. Denies fever/chills, CP, SOB, diarrhea, headache, visual changes or any other complaints at this time.     Vital Signs Last 24 Hrs  T(C): 36.3 (21 Feb 2024 18:08), Max: 36.7 (21 Feb 2024 05:44)  T(F): 97.4 (21 Feb 2024 18:08), Max: 98 (21 Feb 2024 05:44)  HR: 96 (21 Feb 2024 18:08) (75 - 96)  BP: 143/80 (21 Feb 2024 18:08) (100/67 - 145/84)  BP(mean): --  RR: 18 (21 Feb 2024 18:08) (18 - 18)  SpO2: 100% (21 Feb 2024 18:08) (96% - 100%)    Parameters below as of 21 Feb 2024 18:08  Patient On (Oxygen Delivery Method): room air    A/P: 78-year-old female with PMH current smoker , COPD, A-fib  On Eliquis and Plavix, HTN, HLD presents for syncope from PCPs office. Patient was at normal checkup for blood work and was sitting on the table.  Daughter at bedside states that patient suddenly went limp while she was sitting on the table, her eyes rolled back, and her tongue turned to 1 side and this lasted for couple of seconds and then the patient regained consciousness.  Patient had no postictal period.  No tongue biting.  Patient does not remember these events. EEG negative. Now with new onset nausea, vomiting and bilateral lower abdominal pain. Abdomen soft, + R > L tenderness lower quadrants. Otherwise exam unremarkable. VSS. Discussed with Dr. Duran, plan as below.     > ECG ordered, reviewed by attending. No ST changes, low suspicion for cardiac etiology. QTc < 500, Zofran ordered.  > POCT blood glucose WNL.  > Labs ordered, including cardiac enzymes.  > Abdominal XR ordered, expedited.   > Continue to monitor closely.     Keyla Van PA-C    Addendum #1: XR images in PACS reviewed with attending, patient states unable to tolerate PO contrast at this time, CT A/P IV contrast ordered. Patient status and events signed out to night ACP for continued management and care.

## 2024-02-21 NOTE — PROGRESS NOTE ADULT - SUBJECTIVE AND OBJECTIVE BOX
Subjective: Patient seen and examined. No new events except as noted.     REVIEW OF SYSTEMS:    CONSTITUTIONAL: + weakness, fevers or chills  EYES/ENT: No visual changes;  No vertigo or throat pain   NECK: No pain or stiffness  RESPIRATORY: No cough, wheezing, hemoptysis; No shortness of breath  CARDIOVASCULAR: No chest pain or palpitations  GASTROINTESTINAL: No abdominal or epigastric pain. No nausea, vomiting, or hematemesis; No diarrhea or constipation. No melena or hematochezia.  GENITOURINARY: No dysuria, frequency or hematuria  NEUROLOGICAL: No numbness or weakness  SKIN: No itching, burning, rashes, or lesions   All other review of systems is negative unless indicated above.    MEDICATIONS:  MEDICATIONS  (STANDING):  apixaban 5 milliGRAM(s) Oral two times a day  atorvastatin 80 milliGRAM(s) Oral at bedtime  chlorhexidine 2% Cloths 1 Application(s) Topical daily  levothyroxine 175 MICROGram(s) Oral daily  metoprolol tartrate 25 milliGRAM(s) Oral every 8 hours  nicotine -  14 mG/24Hr(s) Patch 1 Patch Transdermal daily  potassium chloride    Tablet ER 40 milliEquivalent(s) Oral daily      PHYSICAL EXAM:  T(C): 36.7 (24 @ 05:44), Max: 36.7 (24 @ 15:00)  HR: 89 (24 @ 05:44) (75 - 89)  BP: 132/83 (24 @ 05:44) (109/70 - 141/91)  RR: 18 (24 @ 05:44) (18 - 18)  SpO2: 97% (24 @ 05:44) (97% - 98%)  Wt(kg): --  I&O's Summary    2024 07:  -  2024 07:00  --------------------------------------------------------  IN: 1220 mL / OUT: 800 mL / NET: 420 mL    2024 07:  -  2024 09:44  --------------------------------------------------------  IN: 240 mL / OUT: 0 mL / NET: 240 mL          Appearance: Normal	  HEENT:   Normal oral mucosa, PERRL, EOMI	  Lymphatic: No lymphadenopathy , no edema  Cardiovascular: Irregular S1 S2, No JVD, No murmurs , Peripheral pulses palpable 2+ bilaterally  Respiratory: Lungs clear to auscultation, normal effort 	  Gastrointestinal:  Soft, Non-tender, + BS	  Skin: No rashes, No ecchymoses, No cyanosis, warm to touch  Musculoskeletal: Normal range of motion, normal strength  Psychiatry:  Mood & affect appropriate  Ext: No edema      LABS:    CARDIAC MARKERS:                                13.9   8.77  )-----------( 185      ( 2024 06:35 )             42.0     02-21    142  |  107  |  11  ----------------------------<  101<H>  4.1   |  25  |  0.91    Ca    9.2      2024 06:18      proBNP:   Lipid Profile:   HgA1c:   TSH:             TELEMETRY: 	  AF  ECG:  	  RADIOLOGY:   DIAGNOSTIC TESTING:  [ ] Echocardiogram:    [ ]  Catheterization:  [ ] Stress Test:    OTHER: 	  eEEG REPORT:   EEG Report:  · EEG Report	     REPORT OF ROUTINE EEG WITH VIDEO    [facility]: [address] [facility], [address] [place] [address], Phone: [phone]  [facility]: [address] [place] [address], Phone: [phone]  Office: [address] [place] [address], Phone: [phone]    Patient Name: Mirna Gooden    Age: 78 year  : 1945    EEG #: [id number]  Study Date: 2024   Start Time: 9:48:04 AM     Study Duration: 21.8  		    Technical Information:					  On Instrument: [id number]  Placement and Labeling of Electrodes:  The EEG was performed utilizing 20 channels referential EEG connections (coronal over temporal over parasagittal montage) using all standard 10-20 electrode placements with EKG.  Recording was at a sampling rate of 256 samples per second per channel.  Time synchronized digital video recording was done simultaneously with EEG recording.  A low light infrared camera was used for low light recording.  Kee and seizure detection algorithms were utilized.  CSA Technical Component:  Quantitative EEG analysis using a separate Compressed Spectral Array (CSA) software package was conducted in real-time and run at bedside after set up by the technician, digitally displaying the power of electrographic frequencies included in the 1-30Hz band using a graded color map.  This data was reviewed and interpreted independently, and is reported in a separate section below.    History:  78 year old Female with history of syncope is here to rule out seizures      Medication  Synthroid    Lipitor    Lopressor      Study Interpretation:    FINDINGS:  The background was continuous, spontaneously variable and reactive.  During wakefulness, the posteriorly dominant rhythm consisted of symmetric, well modulated up to 10-11 Hz activity, with an amplitude to 30 uV, that attenuated to eye opening.  Low amplitude central beta was noted in wakefulness.    Background Slowing:  Generalized slowing: none was present.  Focal slowing: none was present.    Sleep Background:  -Drowsiness was characterized by fragmentation, attenuation, and slowing of the background activity.    -Stage II sleep transients were not recorded.    Non-epileptiform activity:  None.    Epileptiform Activity:   No epileptiform discharges were present.    Events:  No clinical events were recorded.  No seizures were recorded.    Activation Procedures:   -Hyperventilation was not performed.    -Photic stimulation was performed and did not elicit any abnormalities.      Artifacts:  Intermittent myogenic and movement artifacts were noted.    ECG:  The heart rate on single channel ECG was predominantly irregular rhythm.    EEG Classification / Summary:    Normal EEG in the awake, and drowsy states.    Clinical Impression:    Normal EEG in the awake, and drowsy states.    No epileptiform pattern or seizure recorded.    Irregular heart rhythm noted on EKG    ________________________________________         ________________________	  ELICEO Garcia  Attending Physician, [facility]    ------------------------------------  EEG Reading Room: [phone]  On Call Service After Hours: [phone]      		            Electronic Signatures:  Bart Horne)  (Signed 2024 10:54)  	Authored: EEG REPORT      Last Updated: 2024 10:54 by Bart Horne)

## 2024-02-22 ENCOUNTER — RESULT REVIEW (OUTPATIENT)
Age: 79
End: 2024-02-22

## 2024-02-22 DIAGNOSIS — K55.9 VASCULAR DISORDER OF INTESTINE, UNSPECIFIED: ICD-10-CM

## 2024-02-22 LAB
ALBUMIN SERPL ELPH-MCNC: 3.2 G/DL — LOW (ref 3.3–5)
ALBUMIN SERPL ELPH-MCNC: 3.5 G/DL — SIGNIFICANT CHANGE UP (ref 3.3–5)
ALBUMIN SERPL ELPH-MCNC: 4.1 G/DL — SIGNIFICANT CHANGE UP (ref 3.3–5)
ALP SERPL-CCNC: 69 U/L — SIGNIFICANT CHANGE UP (ref 40–120)
ALP SERPL-CCNC: 77 U/L — SIGNIFICANT CHANGE UP (ref 40–120)
ALP SERPL-CCNC: 90 U/L — SIGNIFICANT CHANGE UP (ref 40–120)
ALT FLD-CCNC: 10 U/L — SIGNIFICANT CHANGE UP (ref 10–45)
ALT FLD-CCNC: 7 U/L — LOW (ref 10–45)
ALT FLD-CCNC: 8 U/L — LOW (ref 10–45)
ANION GAP SERPL CALC-SCNC: 12 MMOL/L — SIGNIFICANT CHANGE UP (ref 5–17)
ANION GAP SERPL CALC-SCNC: 14 MMOL/L — SIGNIFICANT CHANGE UP (ref 5–17)
ANION GAP SERPL CALC-SCNC: 15 MMOL/L — SIGNIFICANT CHANGE UP (ref 5–17)
APTT BLD: 29.6 SEC — SIGNIFICANT CHANGE UP (ref 24.5–35.6)
APTT BLD: 34.2 SEC — SIGNIFICANT CHANGE UP (ref 24.5–35.6)
AST SERPL-CCNC: 13 U/L — SIGNIFICANT CHANGE UP (ref 10–40)
AST SERPL-CCNC: 15 U/L — SIGNIFICANT CHANGE UP (ref 10–40)
AST SERPL-CCNC: 17 U/L — SIGNIFICANT CHANGE UP (ref 10–40)
BASE EXCESS BLDV CALC-SCNC: -0.9 MMOL/L — SIGNIFICANT CHANGE UP (ref -2–3)
BILIRUB SERPL-MCNC: 0.8 MG/DL — SIGNIFICANT CHANGE UP (ref 0.2–1.2)
BILIRUB SERPL-MCNC: 0.8 MG/DL — SIGNIFICANT CHANGE UP (ref 0.2–1.2)
BILIRUB SERPL-MCNC: 1 MG/DL — SIGNIFICANT CHANGE UP (ref 0.2–1.2)
BLD GP AB SCN SERPL QL: NEGATIVE — SIGNIFICANT CHANGE UP
BUN SERPL-MCNC: 13 MG/DL — SIGNIFICANT CHANGE UP (ref 7–23)
BUN SERPL-MCNC: 13 MG/DL — SIGNIFICANT CHANGE UP (ref 7–23)
BUN SERPL-MCNC: 14 MG/DL — SIGNIFICANT CHANGE UP (ref 7–23)
CA-I SERPL-SCNC: 1.19 MMOL/L — SIGNIFICANT CHANGE UP (ref 1.15–1.33)
CALCIUM SERPL-MCNC: 8.5 MG/DL — SIGNIFICANT CHANGE UP (ref 8.4–10.5)
CALCIUM SERPL-MCNC: 9.4 MG/DL — SIGNIFICANT CHANGE UP (ref 8.4–10.5)
CALCIUM SERPL-MCNC: 9.8 MG/DL — SIGNIFICANT CHANGE UP (ref 8.4–10.5)
CHLORIDE BLDV-SCNC: 103 MMOL/L — SIGNIFICANT CHANGE UP (ref 96–108)
CHLORIDE SERPL-SCNC: 102 MMOL/L — SIGNIFICANT CHANGE UP (ref 96–108)
CHLORIDE SERPL-SCNC: 105 MMOL/L — SIGNIFICANT CHANGE UP (ref 96–108)
CHLORIDE SERPL-SCNC: 106 MMOL/L — SIGNIFICANT CHANGE UP (ref 96–108)
CO2 BLDV-SCNC: 27 MMOL/L — HIGH (ref 22–26)
CO2 SERPL-SCNC: 21 MMOL/L — LOW (ref 22–31)
CO2 SERPL-SCNC: 22 MMOL/L — SIGNIFICANT CHANGE UP (ref 22–31)
CO2 SERPL-SCNC: 22 MMOL/L — SIGNIFICANT CHANGE UP (ref 22–31)
CREAT SERPL-MCNC: 0.78 MG/DL — SIGNIFICANT CHANGE UP (ref 0.5–1.3)
CREAT SERPL-MCNC: 0.86 MG/DL — SIGNIFICANT CHANGE UP (ref 0.5–1.3)
CREAT SERPL-MCNC: 0.94 MG/DL — SIGNIFICANT CHANGE UP (ref 0.5–1.3)
EGFR: 62 ML/MIN/1.73M2 — SIGNIFICANT CHANGE UP
EGFR: 70 ML/MIN/1.73M2 — SIGNIFICANT CHANGE UP
EGFR: 78 ML/MIN/1.73M2 — SIGNIFICANT CHANGE UP
GAS PNL BLDA: SIGNIFICANT CHANGE UP
GAS PNL BLDV: 138 MMOL/L — SIGNIFICANT CHANGE UP (ref 136–145)
GAS PNL BLDV: SIGNIFICANT CHANGE UP
GLUCOSE BLDV-MCNC: 120 MG/DL — HIGH (ref 70–99)
GLUCOSE SERPL-MCNC: 139 MG/DL — HIGH (ref 70–99)
GLUCOSE SERPL-MCNC: 152 MG/DL — HIGH (ref 70–99)
GLUCOSE SERPL-MCNC: 161 MG/DL — HIGH (ref 70–99)
HCO3 BLDV-SCNC: 25 MMOL/L — SIGNIFICANT CHANGE UP (ref 22–29)
HCT VFR BLD CALC: 41.9 % — SIGNIFICANT CHANGE UP (ref 34.5–45)
HCT VFR BLD CALC: 44 % — SIGNIFICANT CHANGE UP (ref 34.5–45)
HCT VFR BLD CALC: 49.2 % — HIGH (ref 34.5–45)
HCT VFR BLDA CALC: 47 % — HIGH (ref 34.5–46.5)
HGB BLD CALC-MCNC: 15.6 G/DL — SIGNIFICANT CHANGE UP (ref 11.7–16.1)
HGB BLD-MCNC: 13.8 G/DL — SIGNIFICANT CHANGE UP (ref 11.5–15.5)
HGB BLD-MCNC: 14.7 G/DL — SIGNIFICANT CHANGE UP (ref 11.5–15.5)
HGB BLD-MCNC: 15.8 G/DL — HIGH (ref 11.5–15.5)
INR BLD: 1.87 RATIO — HIGH (ref 0.85–1.18)
INR BLD: 2.06 RATIO — HIGH (ref 0.85–1.18)
LACTATE BLDV-MCNC: 2.9 MMOL/L — HIGH (ref 0.5–2)
LACTATE SERPL-SCNC: 3.4 MMOL/L — HIGH (ref 0.5–2)
LACTATE SERPL-SCNC: 3.6 MMOL/L — HIGH (ref 0.5–2)
MAGNESIUM SERPL-MCNC: 1.6 MG/DL — SIGNIFICANT CHANGE UP (ref 1.6–2.6)
MAGNESIUM SERPL-MCNC: 1.8 MG/DL — SIGNIFICANT CHANGE UP (ref 1.6–2.6)
MAGNESIUM SERPL-MCNC: 1.8 MG/DL — SIGNIFICANT CHANGE UP (ref 1.6–2.6)
MCHC RBC-ENTMCNC: 31.2 PG — SIGNIFICANT CHANGE UP (ref 27–34)
MCHC RBC-ENTMCNC: 31.7 PG — SIGNIFICANT CHANGE UP (ref 27–34)
MCHC RBC-ENTMCNC: 32.1 GM/DL — SIGNIFICANT CHANGE UP (ref 32–36)
MCHC RBC-ENTMCNC: 32.1 PG — SIGNIFICANT CHANGE UP (ref 27–34)
MCHC RBC-ENTMCNC: 32.9 GM/DL — SIGNIFICANT CHANGE UP (ref 32–36)
MCHC RBC-ENTMCNC: 33.4 GM/DL — SIGNIFICANT CHANGE UP (ref 32–36)
MCV RBC AUTO: 96.1 FL — SIGNIFICANT CHANGE UP (ref 80–100)
MCV RBC AUTO: 96.3 FL — SIGNIFICANT CHANGE UP (ref 80–100)
MCV RBC AUTO: 97.2 FL — SIGNIFICANT CHANGE UP (ref 80–100)
NRBC # BLD: 0 /100 WBCS — SIGNIFICANT CHANGE UP (ref 0–0)
PCO2 BLDV: 47 MMHG — HIGH (ref 39–42)
PH BLDV: 7.34 — SIGNIFICANT CHANGE UP (ref 7.32–7.43)
PHOSPHATE SERPL-MCNC: 3.1 MG/DL — SIGNIFICANT CHANGE UP (ref 2.5–4.5)
PHOSPHATE SERPL-MCNC: 3.8 MG/DL — SIGNIFICANT CHANGE UP (ref 2.5–4.5)
PLATELET # BLD AUTO: 215 K/UL — SIGNIFICANT CHANGE UP (ref 150–400)
PLATELET # BLD AUTO: 236 K/UL — SIGNIFICANT CHANGE UP (ref 150–400)
PLATELET # BLD AUTO: 241 K/UL — SIGNIFICANT CHANGE UP (ref 150–400)
PO2 BLDV: 36 MMHG — SIGNIFICANT CHANGE UP (ref 25–45)
POTASSIUM BLDV-SCNC: 4.4 MMOL/L — SIGNIFICANT CHANGE UP (ref 3.5–5.1)
POTASSIUM SERPL-MCNC: 4.3 MMOL/L — SIGNIFICANT CHANGE UP (ref 3.5–5.3)
POTASSIUM SERPL-MCNC: 4.5 MMOL/L — SIGNIFICANT CHANGE UP (ref 3.5–5.3)
POTASSIUM SERPL-MCNC: 5 MMOL/L — SIGNIFICANT CHANGE UP (ref 3.5–5.3)
POTASSIUM SERPL-SCNC: 4.3 MMOL/L — SIGNIFICANT CHANGE UP (ref 3.5–5.3)
POTASSIUM SERPL-SCNC: 4.5 MMOL/L — SIGNIFICANT CHANGE UP (ref 3.5–5.3)
POTASSIUM SERPL-SCNC: 5 MMOL/L — SIGNIFICANT CHANGE UP (ref 3.5–5.3)
PROT SERPL-MCNC: 6.2 G/DL — SIGNIFICANT CHANGE UP (ref 6–8.3)
PROT SERPL-MCNC: 6.9 G/DL — SIGNIFICANT CHANGE UP (ref 6–8.3)
PROT SERPL-MCNC: 7.8 G/DL — SIGNIFICANT CHANGE UP (ref 6–8.3)
PROTHROM AB SERPL-ACNC: 19.3 SEC — HIGH (ref 9.5–13)
PROTHROM AB SERPL-ACNC: 21.2 SEC — HIGH (ref 9.5–13)
RBC # BLD: 4.35 M/UL — SIGNIFICANT CHANGE UP (ref 3.8–5.2)
RBC # BLD: 4.58 M/UL — SIGNIFICANT CHANGE UP (ref 3.8–5.2)
RBC # BLD: 5.06 M/UL — SIGNIFICANT CHANGE UP (ref 3.8–5.2)
RBC # FLD: 13.2 % — SIGNIFICANT CHANGE UP (ref 10.3–14.5)
RBC # FLD: 13.2 % — SIGNIFICANT CHANGE UP (ref 10.3–14.5)
RBC # FLD: 13.3 % — SIGNIFICANT CHANGE UP (ref 10.3–14.5)
RH IG SCN BLD-IMP: POSITIVE — SIGNIFICANT CHANGE UP
SAO2 % BLDV: 59.1 % — LOW (ref 67–88)
SODIUM SERPL-SCNC: 139 MMOL/L — SIGNIFICANT CHANGE UP (ref 135–145)
SODIUM SERPL-SCNC: 139 MMOL/L — SIGNIFICANT CHANGE UP (ref 135–145)
SODIUM SERPL-SCNC: 141 MMOL/L — SIGNIFICANT CHANGE UP (ref 135–145)
WBC # BLD: 17.62 K/UL — HIGH (ref 3.8–10.5)
WBC # BLD: 22.22 K/UL — HIGH (ref 3.8–10.5)
WBC # BLD: 22.53 K/UL — HIGH (ref 3.8–10.5)
WBC # FLD AUTO: 17.62 K/UL — HIGH (ref 3.8–10.5)
WBC # FLD AUTO: 22.22 K/UL — HIGH (ref 3.8–10.5)
WBC # FLD AUTO: 22.53 K/UL — HIGH (ref 3.8–10.5)

## 2024-02-22 PROCEDURE — 44125 REMOVAL OF SMALL INTESTINE: CPT

## 2024-02-22 PROCEDURE — 88307 TISSUE EXAM BY PATHOLOGIST: CPT | Mod: 26

## 2024-02-22 PROCEDURE — 76937 US GUIDE VASCULAR ACCESS: CPT | Mod: 26

## 2024-02-22 PROCEDURE — 97606 NEG PRS WND THER DME>50 SQCM: CPT

## 2024-02-22 PROCEDURE — 99291 CRITICAL CARE FIRST HOUR: CPT | Mod: 24

## 2024-02-22 PROCEDURE — 74177 CT ABD & PELVIS W/CONTRAST: CPT | Mod: 26

## 2024-02-22 PROCEDURE — 71045 X-RAY EXAM CHEST 1 VIEW: CPT | Mod: 26

## 2024-02-22 PROCEDURE — 75625 CONTRAST EXAM ABDOMINL AORTA: CPT | Mod: 26

## 2024-02-22 PROCEDURE — 99223 1ST HOSP IP/OBS HIGH 75: CPT | Mod: 57

## 2024-02-22 PROCEDURE — 36200 PLACE CATHETER IN AORTA: CPT | Mod: RT

## 2024-02-22 DEVICE — DEVICE CLOSURE 5F MYNX GRIP MUST ORDER MIN OF 10 EA: Type: IMPLANTABLE DEVICE | Status: FUNCTIONAL

## 2024-02-22 DEVICE — STAPLER COVIDIEN TRI-STAPLE 60MM PURPLE RELOAD: Type: IMPLANTABLE DEVICE | Status: FUNCTIONAL

## 2024-02-22 DEVICE — CATH OMNI FLSH 0.035IN 5FRX65: Type: IMPLANTABLE DEVICE | Status: FUNCTIONAL

## 2024-02-22 DEVICE — SHEATH INTRODUCER TERUMO PINNACLE PERIPHERAL 5FR X 10CM: Type: IMPLANTABLE DEVICE | Status: FUNCTIONAL

## 2024-02-22 DEVICE — INTRODUCER SET MICROPUNCTURE ACCESS 21G X 7CM: Type: IMPLANTABLE DEVICE | Status: FUNCTIONAL

## 2024-02-22 DEVICE — KIT A-LINE 1LUM 20G X 12CM SAFE KIT: Type: IMPLANTABLE DEVICE | Status: FUNCTIONAL

## 2024-02-22 RX ORDER — METOPROLOL TARTRATE 50 MG
5 TABLET ORAL EVERY 6 HOURS
Refills: 0 | Status: DISCONTINUED | OUTPATIENT
Start: 2024-02-22 | End: 2024-02-22

## 2024-02-22 RX ORDER — HYDROMORPHONE HYDROCHLORIDE 2 MG/ML
0.5 INJECTION INTRAMUSCULAR; INTRAVENOUS; SUBCUTANEOUS
Refills: 0 | Status: DISCONTINUED | OUTPATIENT
Start: 2024-02-22 | End: 2024-02-23

## 2024-02-22 RX ORDER — CHLORHEXIDINE GLUCONATE 213 G/1000ML
1 SOLUTION TOPICAL
Refills: 0 | Status: DISCONTINUED | OUTPATIENT
Start: 2024-02-22 | End: 2024-03-09

## 2024-02-22 RX ORDER — METOPROLOL TARTRATE 50 MG
5 TABLET ORAL EVERY 6 HOURS
Refills: 0 | Status: DISCONTINUED | OUTPATIENT
Start: 2024-02-23 | End: 2024-02-23

## 2024-02-22 RX ORDER — PIPERACILLIN AND TAZOBACTAM 4; .5 G/20ML; G/20ML
3.38 INJECTION, POWDER, LYOPHILIZED, FOR SOLUTION INTRAVENOUS EVERY 8 HOURS
Refills: 0 | Status: DISCONTINUED | OUTPATIENT
Start: 2024-02-23 | End: 2024-02-23

## 2024-02-22 RX ORDER — DIGOXIN 250 MCG
250 TABLET ORAL ONCE
Refills: 0 | Status: COMPLETED | OUTPATIENT
Start: 2024-02-22 | End: 2024-02-22

## 2024-02-22 RX ORDER — PIPERACILLIN AND TAZOBACTAM 4; .5 G/20ML; G/20ML
3.38 INJECTION, POWDER, LYOPHILIZED, FOR SOLUTION INTRAVENOUS ONCE
Refills: 0 | Status: DISCONTINUED | OUTPATIENT
Start: 2024-02-22 | End: 2024-02-22

## 2024-02-22 RX ORDER — PIPERACILLIN AND TAZOBACTAM 4; .5 G/20ML; G/20ML
3.38 INJECTION, POWDER, LYOPHILIZED, FOR SOLUTION INTRAVENOUS EVERY 8 HOURS
Refills: 0 | Status: DISCONTINUED | OUTPATIENT
Start: 2024-02-22 | End: 2024-02-22

## 2024-02-22 RX ORDER — SODIUM CHLORIDE 9 MG/ML
1000 INJECTION, SOLUTION INTRAVENOUS
Refills: 0 | Status: DISCONTINUED | OUTPATIENT
Start: 2024-02-22 | End: 2024-02-23

## 2024-02-22 RX ORDER — LEVOTHYROXINE SODIUM 125 MCG
120 TABLET ORAL AT BEDTIME
Refills: 0 | Status: DISCONTINUED | OUTPATIENT
Start: 2024-02-22 | End: 2024-02-24

## 2024-02-22 RX ORDER — DEXMEDETOMIDINE HYDROCHLORIDE IN 0.9% SODIUM CHLORIDE 4 UG/ML
0.3 INJECTION INTRAVENOUS
Qty: 200 | Refills: 0 | Status: DISCONTINUED | OUTPATIENT
Start: 2024-02-22 | End: 2024-02-22

## 2024-02-22 RX ORDER — ACETAMINOPHEN 500 MG
1000 TABLET ORAL EVERY 6 HOURS
Refills: 0 | Status: DISCONTINUED | OUTPATIENT
Start: 2024-02-22 | End: 2024-02-24

## 2024-02-22 RX ORDER — SODIUM CHLORIDE 9 MG/ML
1000 INJECTION INTRAMUSCULAR; INTRAVENOUS; SUBCUTANEOUS ONCE
Refills: 0 | Status: COMPLETED | OUTPATIENT
Start: 2024-02-22 | End: 2024-02-22

## 2024-02-22 RX ORDER — DIGOXIN 250 MCG
250 TABLET ORAL ONCE
Refills: 0 | Status: DISCONTINUED | OUTPATIENT
Start: 2024-02-22 | End: 2024-02-22

## 2024-02-22 RX ORDER — MAGNESIUM SULFATE 500 MG/ML
2 VIAL (ML) INJECTION ONCE
Refills: 0 | Status: COMPLETED | OUTPATIENT
Start: 2024-02-22 | End: 2024-02-22

## 2024-02-22 RX ORDER — INSULIN LISPRO 100/ML
VIAL (ML) SUBCUTANEOUS EVERY 6 HOURS
Refills: 0 | Status: DISCONTINUED | OUTPATIENT
Start: 2024-02-22 | End: 2024-02-24

## 2024-02-22 RX ORDER — METOPROLOL TARTRATE 50 MG
5 TABLET ORAL ONCE
Refills: 0 | Status: COMPLETED | OUTPATIENT
Start: 2024-02-22 | End: 2024-02-22

## 2024-02-22 RX ORDER — PIPERACILLIN AND TAZOBACTAM 4; .5 G/20ML; G/20ML
3.38 INJECTION, POWDER, LYOPHILIZED, FOR SOLUTION INTRAVENOUS ONCE
Refills: 0 | Status: COMPLETED | OUTPATIENT
Start: 2024-02-22 | End: 2024-02-22

## 2024-02-22 RX ORDER — PANTOPRAZOLE SODIUM 20 MG/1
40 TABLET, DELAYED RELEASE ORAL EVERY 24 HOURS
Refills: 0 | Status: DISCONTINUED | OUTPATIENT
Start: 2024-02-22 | End: 2024-02-23

## 2024-02-22 RX ORDER — PIPERACILLIN AND TAZOBACTAM 4; .5 G/20ML; G/20ML
3.38 INJECTION, POWDER, LYOPHILIZED, FOR SOLUTION INTRAVENOUS ONCE
Refills: 0 | Status: COMPLETED | OUTPATIENT
Start: 2024-02-23 | End: 2024-02-23

## 2024-02-22 RX ORDER — CHLORHEXIDINE GLUCONATE 213 G/1000ML
15 SOLUTION TOPICAL EVERY 12 HOURS
Refills: 0 | Status: DISCONTINUED | OUTPATIENT
Start: 2024-02-22 | End: 2024-02-23

## 2024-02-22 RX ORDER — HEPARIN SODIUM 5000 [USP'U]/ML
1600 INJECTION INTRAVENOUS; SUBCUTANEOUS
Qty: 25000 | Refills: 0 | Status: DISCONTINUED | OUTPATIENT
Start: 2024-02-22 | End: 2024-02-23

## 2024-02-22 RX ADMIN — HYDROMORPHONE HYDROCHLORIDE 0.5 MILLIGRAM(S): 2 INJECTION INTRAMUSCULAR; INTRAVENOUS; SUBCUTANEOUS at 22:25

## 2024-02-22 RX ADMIN — Medication 25 MILLIGRAM(S): at 06:14

## 2024-02-22 RX ADMIN — Medication 250 MICROGRAM(S): at 12:13

## 2024-02-22 RX ADMIN — Medication 5 MILLIGRAM(S): at 12:06

## 2024-02-22 RX ADMIN — SODIUM CHLORIDE 1000 MILLILITER(S): 9 INJECTION INTRAMUSCULAR; INTRAVENOUS; SUBCUTANEOUS at 10:50

## 2024-02-22 RX ADMIN — SODIUM CHLORIDE 100 MILLILITER(S): 9 INJECTION, SOLUTION INTRAVENOUS at 20:28

## 2024-02-22 RX ADMIN — Medication 1 PATCH: at 12:31

## 2024-02-22 RX ADMIN — Medication 175 MICROGRAM(S): at 06:13

## 2024-02-22 RX ADMIN — Medication 25 GRAM(S): at 22:06

## 2024-02-22 RX ADMIN — CHLORHEXIDINE GLUCONATE 1 APPLICATION(S): 213 SOLUTION TOPICAL at 12:37

## 2024-02-22 RX ADMIN — PIPERACILLIN AND TAZOBACTAM 200 GRAM(S): 4; .5 INJECTION, POWDER, LYOPHILIZED, FOR SOLUTION INTRAVENOUS at 22:06

## 2024-02-22 RX ADMIN — Medication 1 PATCH: at 12:35

## 2024-02-22 RX ADMIN — HYDROMORPHONE HYDROCHLORIDE 0.5 MILLIGRAM(S): 2 INJECTION INTRAMUSCULAR; INTRAVENOUS; SUBCUTANEOUS at 22:10

## 2024-02-22 RX ADMIN — DEXMEDETOMIDINE HYDROCHLORIDE IN 0.9% SODIUM CHLORIDE 6.66 MICROGRAM(S)/KG/HR: 4 INJECTION INTRAVENOUS at 20:29

## 2024-02-22 RX ADMIN — APIXABAN 5 MILLIGRAM(S): 2.5 TABLET, FILM COATED ORAL at 06:14

## 2024-02-22 RX ADMIN — Medication 5 MILLIGRAM(S): at 20:10

## 2024-02-22 RX ADMIN — Medication 1 PATCH: at 19:15

## 2024-02-22 RX ADMIN — Medication 400 MILLIGRAM(S): at 20:23

## 2024-02-22 RX ADMIN — Medication 120 MICROGRAM(S): at 22:07

## 2024-02-22 RX ADMIN — PIPERACILLIN AND TAZOBACTAM 200 GRAM(S): 4; .5 INJECTION, POWDER, LYOPHILIZED, FOR SOLUTION INTRAVENOUS at 12:10

## 2024-02-22 RX ADMIN — HEPARIN SODIUM 16 UNIT(S)/HR: 5000 INJECTION INTRAVENOUS; SUBCUTANEOUS at 20:23

## 2024-02-22 RX ADMIN — Medication 10 MILLIGRAM(S): at 00:09

## 2024-02-22 RX ADMIN — Medication 1 PATCH: at 08:31

## 2024-02-22 RX ADMIN — SODIUM CHLORIDE 100 MILLILITER(S): 9 INJECTION INTRAMUSCULAR; INTRAVENOUS; SUBCUTANEOUS at 00:00

## 2024-02-22 RX ADMIN — Medication 1000 MILLIGRAM(S): at 20:53

## 2024-02-22 RX ADMIN — Medication 5 MILLIGRAM(S): at 22:25

## 2024-02-22 NOTE — CONSULT NOTE ADULT - SUBJECTIVE AND OBJECTIVE BOX
HISTORY OF PRESENT ILLNESS:  LEFTY AKBAR is a 78y Female pmh of current smoker, COPD, afib on eliquis/plavix, HTN, HLD who presented to [facility] 2/15/2024 for syncope. Patient reportedly went limp while having blood drawn at PCP office, eyes rolled back, and tongue deviated to the side for a few seconds before regaining consciousness. Patient arrived in ED lethargic but was easily arousable and was AOx4. Patient endorsed abd on exam, no other complaints.  CTH neg, admitted to medicine for EEG/syncope w/u. Had episodes of afib RVR on floor 2/19, responded to IV lopressor. GI consulted for abdominal pain 2/22. There were reports of episodes of abdominal pain and vomiting a few days ago according to the family. CT AP notable for ischemic/necrotic small bowel within the pelvis and severe atherosclerotic disesase of SMA. Patient taken to OR for laparoscopic abd exploration, converted to open exlap for likely dead bowel. 20cm terminal ileum removed, left in discont w/ abthera. Plan to RTOR in 48hrs. SICU c/s for ventilator management and for hemodynamic monitoring.    PAST MEDICAL HISTORY: Hypertension    Hypothyroid    Osteoarthritis    CAD (coronary artery disease)    CROW (obstructive sleep apnea)    History of MI (myocardial infarction)    Polyp of corpus uteri    Heart murmur    Bilateral hearing loss, unspecified hearing loss type    Obesity (BMI 35.0-39.9 without comorbidity)    Obesity    Mixed stress and urge urinary incontinence    Overactive bladder        PAST SURGICAL HISTORY: No significant past surgical history    S/P ORIF (open reduction internal fixation) fracture    S/P appendectomy    S/P knee replacement    Stented coronary artery    S/P laparotomy    H/O dilation and curettage        FAMILY HISTORY:     SOCIAL HISTORY:    CODE STATUS:     HOME MEDICATIONS:    ALLERGIES: penicillin (Hives)      VITAL SIGNS:  ICU Vital Signs Last 24 Hrs  T(C): 35.9 (22 Feb 2024 18:55), Max: 37.1 (22 Feb 2024 08:13)  T(F): 96.6 (22 Feb 2024 18:55), Max: 98.8 (22 Feb 2024 08:13)  HR: 83 (22 Feb 2024 18:55) (75 - 112)  BP: 139/67 (22 Feb 2024 18:55) (127/65 - 151/83)  BP(mean): 96 (22 Feb 2024 18:55) (96 - 104)  ABP: 128/65 (22 Feb 2024 18:55) (128/65 - 128/65)  ABP(mean): 92 (22 Feb 2024 18:55) (92 - 92)  RR: 14 (22 Feb 2024 18:55) (14 - 20)  SpO2: 100% (22 Feb 2024 18:55) (94% - 100%)    O2 Parameters below as of 22 Feb 2024 18:55  Patient On (Oxygen Delivery Method): ventilator    O2 Concentration (%): 40        NEURO  Exam: sedated on precedex  acetaminophen   IVPB .. 1000 milliGRAM(s) IV Intermittent every 6 hours PRN Mild Pain (1 - 3)  dexMEDEtomidine Infusion 0.3 MICROgram(s)/kG/Hr IV Continuous <Continuous>  HYDROmorphone  Injectable 0.5 milliGRAM(s) IV Push every 3 hours PRN Breakthrough pain      RESPIRATORY  Mechanical Ventilation: Mode: AC/ CMV (Assist Control/ Continuous Mandatory Ventilation), RR (machine): 14, RR (patient): 13, TV (machine): 450, FiO2: 40, PEEP: 5, ITime: 0.9, MAP: 7.7, PIP: 19  ABG - ( 22 Feb 2024 19:30 )  pH: 7.36  /  pCO2: 42    /  pO2: 133   / HCO3: 24    / Base Excess: -1.8  /  SaO2: 99.2    Lactate: x                Exam: lungs CTA b/l      CARDIOVASCULAR  VBG - ( 22 Feb 2024 11:45 )  pH: 7.34  /  pCO2: 47    /  pO2: 36    / HCO3: 25    / Base Excess: -0.9  /  SaO2: 59.1   Lactate: 2.9              Exam: normal S1/S2 w/o murmurs or rubs  Cardiac Rhythm: afib, rate 100-110      GI/NUTRITION  Exam: midline incision open/covered abthera placement, sealed with tegaderm, draining serosang  Diet: NPO w/ OGT  pantoprazole  Injectable 40 milliGRAM(s) IV Push every 24 hours      GENITOURINARY/RENAL  lactated ringers. 1000 milliLiter(s) IV Continuous <Continuous>      02-21 @ 07:01  -  02-22 @ 07:00  --------------------------------------------------------  IN:    Oral Fluid: 720 mL  Total IN: 720 mL    OUT:  Total OUT: 0 mL    Total NET: 720 mL        Weight (kg): 88.8 (02-22 @ 18:55)  02-22    141  |  105  |  14  ----------------------------<  152<H>  4.5   |  22  |  0.86    Ca    9.4      22 Feb 2024 13:19  Phos  3.1     02-22  Mg     1.6     02-22    TPro  6.9  /  Alb  3.5  /  TBili  0.8  /  DBili  x   /  AST  13  /  ALT  8<L>  /  AlkPhos  77  02-22    [ ] Melvin catheter, indication: urine output monitoring in critically ill patient    HEMATOLOGIC  [ ] VTE Prophylaxis:  heparin  Infusion 1600 Unit(s)/Hr IV Continuous <Continuous>                          13.8   22.22 )-----------( 215      ( 22 Feb 2024 19:43 )             41.9     PT/INR - ( 22 Feb 2024 11:45 )   PT: 19.3 sec;   INR: 1.87 ratio         PTT - ( 22 Feb 2024 11:45 )  PTT:29.6 sec  Transfusion: [ ] PRBC	[ ] Platelets	[ ] FFP	[ ] Cryoprecipitate      INFECTIOUS DISEASES    RECENT CULTURES:      ENDOCRINE    CAPILLARY BLOOD GLUCOSE          PATIENT CARE ACCESS DEVICES:  [x ] Peripheral IV  [ ] Central Venous Line	[ ] R	[ ] L	[ ] IJ	[ ] Fem	[ ] SC	Placed:   [ x] Arterial Line		[x ] R brachial	[ ] L	[ ] Fem	[ ] Rad	[ ] Ax	Placed: 2/22 OR  [ ] PICC:					[ ] Mediport  [x ] Urinary Catheter, Date Placed:   [x] Necessity of urinary, arterial, and venous catheters discussed    OTHER MEDICATIONS: chlorhexidine 0.12% Liquid 15 milliLiter(s) Oral Mucosa every 12 hours  chlorhexidine 2% Cloths 1 Application(s) Topical <User Schedule>      IMAGING STUDIES: HISTORY OF PRESENT ILLNESS:  LEFTY AKBAR is a 78y Female pmh of current smoker, COPD, afib on eliquis/plavix, HTN, HLD who presented to [facility] 2/15/2024 for syncope. Patient reportedly went limp while having blood drawn at PCP office, eyes rolled back, and tongue deviated to the side for a few seconds before regaining consciousness. Patient arrived in ED lethargic but was easily arousable and was AOx4. Patient endorsed abd on exam, no other complaints.  CTH neg, admitted to medicine for EEG/syncope w/u. Had episodes of afib RVR on floor 2/19, responded to IV lopressor. GI consulted for abdominal pain 2/22. There were reports of episodes of abdominal pain and vomiting a few days ago according to the family. CT AP notable for ischemic/necrotic small bowel within the pelvis and severe atherosclerotic disesase of SMA. Patient taken to OR for laparoscopic abd exploration, converted to open exlap for likely dead bowel. 20cm terminal ileum removed, left in discont w/ abthera. Angiogram R fem done intraop w/ findings of celiac, SMA, SWATHI w/o flow, collateral perfusion confirmed. Plan to RTOR in 48hrs. SICU c/s for ventilator management and for hemodynamic monitoring.    PAST MEDICAL HISTORY: Hypertension    Hypothyroid    Osteoarthritis    CAD (coronary artery disease)    CROW (obstructive sleep apnea)    History of MI (myocardial infarction)    Polyp of corpus uteri    Heart murmur    Bilateral hearing loss, unspecified hearing loss type    Obesity (BMI 35.0-39.9 without comorbidity)    Obesity    Mixed stress and urge urinary incontinence    Overactive bladder        PAST SURGICAL HISTORY: No significant past surgical history    S/P ORIF (open reduction internal fixation) fracture    S/P appendectomy    S/P knee replacement    Stented coronary artery    S/P laparotomy    H/O dilation and curettage        FAMILY HISTORY:     SOCIAL HISTORY:    CODE STATUS:     HOME MEDICATIONS:    ALLERGIES: penicillin (Hives)      VITAL SIGNS:  ICU Vital Signs Last 24 Hrs  T(C): 35.9 (22 Feb 2024 18:55), Max: 37.1 (22 Feb 2024 08:13)  T(F): 96.6 (22 Feb 2024 18:55), Max: 98.8 (22 Feb 2024 08:13)  HR: 83 (22 Feb 2024 18:55) (75 - 112)  BP: 139/67 (22 Feb 2024 18:55) (127/65 - 151/83)  BP(mean): 96 (22 Feb 2024 18:55) (96 - 104)  ABP: 128/65 (22 Feb 2024 18:55) (128/65 - 128/65)  ABP(mean): 92 (22 Feb 2024 18:55) (92 - 92)  RR: 14 (22 Feb 2024 18:55) (14 - 20)  SpO2: 100% (22 Feb 2024 18:55) (94% - 100%)    O2 Parameters below as of 22 Feb 2024 18:55  Patient On (Oxygen Delivery Method): ventilator    O2 Concentration (%): 40        NEURO  Exam: sedated on precedex  acetaminophen   IVPB .. 1000 milliGRAM(s) IV Intermittent every 6 hours PRN Mild Pain (1 - 3)  dexMEDEtomidine Infusion 0.3 MICROgram(s)/kG/Hr IV Continuous <Continuous>  HYDROmorphone  Injectable 0.5 milliGRAM(s) IV Push every 3 hours PRN Breakthrough pain      RESPIRATORY  Mechanical Ventilation: Mode: AC/ CMV (Assist Control/ Continuous Mandatory Ventilation), RR (machine): 14, RR (patient): 13, TV (machine): 450, FiO2: 40, PEEP: 5, ITime: 0.9, MAP: 7.7, PIP: 19  ABG - ( 22 Feb 2024 19:30 )  pH: 7.36  /  pCO2: 42    /  pO2: 133   / HCO3: 24    / Base Excess: -1.8  /  SaO2: 99.2    Lactate: x                Exam: lungs CTA b/l      CARDIOVASCULAR  VBG - ( 22 Feb 2024 11:45 )  pH: 7.34  /  pCO2: 47    /  pO2: 36    / HCO3: 25    / Base Excess: -0.9  /  SaO2: 59.1   Lactate: 2.9              Exam: normal S1/S2 w/o murmurs or rubs  Cardiac Rhythm: afib, rate 100-110      GI/NUTRITION  Exam: midline incision open/covered abthera placement, sealed with tegaderm, draining serosang  Diet: NPO w/ OGT  pantoprazole  Injectable 40 milliGRAM(s) IV Push every 24 hours      GENITOURINARY/RENAL  lactated ringers. 1000 milliLiter(s) IV Continuous <Continuous>      02-21 @ 07:01  -  02-22 @ 07:00  --------------------------------------------------------  IN:    Oral Fluid: 720 mL  Total IN: 720 mL    OUT:  Total OUT: 0 mL    Total NET: 720 mL        Weight (kg): 88.8 (02-22 @ 18:55)  02-22    141  |  105  |  14  ----------------------------<  152<H>  4.5   |  22  |  0.86    Ca    9.4      22 Feb 2024 13:19  Phos  3.1     02-22  Mg     1.6     02-22    TPro  6.9  /  Alb  3.5  /  TBili  0.8  /  DBili  x   /  AST  13  /  ALT  8<L>  /  AlkPhos  77  02-22    [ ] Melvin catheter, indication: urine output monitoring in critically ill patient    HEMATOLOGIC  [ ] VTE Prophylaxis:  heparin  Infusion 1600 Unit(s)/Hr IV Continuous <Continuous>                          13.8   22.22 )-----------( 215      ( 22 Feb 2024 19:43 )             41.9     PT/INR - ( 22 Feb 2024 11:45 )   PT: 19.3 sec;   INR: 1.87 ratio         PTT - ( 22 Feb 2024 11:45 )  PTT:29.6 sec  Transfusion: [ ] PRBC	[ ] Platelets	[ ] FFP	[ ] Cryoprecipitate      INFECTIOUS DISEASES    RECENT CULTURES:      ENDOCRINE    CAPILLARY BLOOD GLUCOSE          PATIENT CARE ACCESS DEVICES:  [x ] Peripheral IV  [ ] Central Venous Line	[ ] R	[ ] L	[ ] IJ	[ ] Fem	[ ] SC	Placed:   [ x] Arterial Line		[x ] R brachial	[ ] L	[ ] Fem	[ ] Rad	[ ] Ax	Placed: 2/22 OR  [ ] PICC:					[ ] Mediport  [x ] Urinary Catheter, Date Placed:   [x] Necessity of urinary, arterial, and venous catheters discussed    OTHER MEDICATIONS: chlorhexidine 0.12% Liquid 15 milliLiter(s) Oral Mucosa every 12 hours  chlorhexidine 2% Cloths 1 Application(s) Topical <User Schedule>      IMAGING STUDIES:

## 2024-02-22 NOTE — CONSULT NOTE ADULT - CRITICAL CARE ATTENDING COMMENT
Patient arrived intubated with acute respiratory insufficiency after laparotomy for mesenteric ischemia and bowel resection  maintained ventilatory support  initially very sedate post operatively  mental status starting to clear  has temporary abdominal closure  arrived on heparin drip

## 2024-02-22 NOTE — PRE-ANESTHESIA EVALUATION ADULT - NSANTHPMHFT_GEN_ALL_CORE
COPD not on home O2  Current every day snoker (0.5 pack/day  Afib with history of RVR  CAD s/p 2 cardiac stents  HTN   CROW not on CPAP  Vomiting in past 24 hours

## 2024-02-22 NOTE — PRE-ANESTHESIA EVALUATION ADULT - NSANTHADDINFOFT_GEN_ALL_CORE
Explained to patient and daughter of possible risk of aspiration on induction of anesthesia given recent episode of emesis. Due to emergent nature of case, decided to proceed despite vomiting.

## 2024-02-22 NOTE — BRIEF OPERATIVE NOTE - OPERATION/FINDINGS
Diagnostic laparoscopy, ischemic bowel noted in RLQ just proximal to TI. Converted to laparotomy. 20cm of terminal ileum resected and bowel left in discontinuity Diagnostic laparoscopy, ischemic bowel noted in RLQ just proximal to TI. Converted to laparotomy. 20cm of terminal ileum resected and bowel left in discontinuity. Abthera placed

## 2024-02-22 NOTE — CONSULT NOTE ADULT - SUBJECTIVE AND OBJECTIVE BOX
VASCULAR SURGERY CONSULT NOTE    HPI:    In the ED, patient was afebrile, VSS, WBC   CT shows     PMH/PSH: Hypertension    Hypothyroid    Osteoarthritis    CAD (coronary artery disease)    CROW (obstructive sleep apnea)    History of MI (myocardial infarction)    Heart murmur    Bilateral hearing loss, unspecified hearing loss type    Obesity    Mixed stress and urge urinary incontinence    Overactive bladder    S/P ORIF (open reduction internal fixation) fracture    S/P appendectomy    S/P knee replacement    Stented coronary artery    S/P laparotomy    H/O dilation and curettage        MEDS:    ALLERGIES: NKDA    REVIEW OF SYSTEMS: All ROS negative except as per HPI.  ____________________________________________    VITALS:T(C): 37.1, Max: 37.1 (02-22)  T(F): 98.8, Max: 98.8 (02-22)  HR: 106 (75 - 112)  BP: 127/65 (127/65 - 151/83)  BP(mean): 104 (104 - 104)  ABP: --  ABP(mean): --  RR: 20 (18 - 20)  SpO2: 95% (94% - 100%)  CVP(mm Hg): --  CVP(cm H2O): --  room air            PHYSICAL EXAM:  General: AAOx3, no acute distress.  Respiratory: breathing comfortably, no increased WOB   Abdomen: soft, nontender, nondistended, no rebound, no guarding  Extremities: Moves all four.   ____________________________________________    LABS:                      14.7  22.53 )-----------( 236    ( 22 Feb 2024 14:39 )             44.0  141  |  105  |  14  ----------------------------<  152<H>    (02-22)  4.5   |  22  |  0.86          Ca    9.4      02-22  Mg    1.6  Phos  3.1        LIVER FUNCTIONS - ( 22 Feb 2024 13:19 )  Alb: 3.5 g/dL / Pro: 6.9 g/dL / ALK PHOS: 77 U/L / ALT: 8 U/L / AST: 13 U/L / GGT: x      PT/INR - ( 22 Feb 2024 11:45 )   PT: 19.3 sec;   INR: 1.87 ratio         PTT - ( 22 Feb 2024 11:45 )  PTT:29.6 secCARDIAC MARKERS ( 21 Feb 2024 19:42 )  x / x / 78 U/L / x / 2.0 ng/mL  Urinalysis Basic - ( 22 Feb 2024 13:19 )    Color: x / Appearance: x / SG: x / pH: x  Gluc: 152 mg/dL / Ketone: x  / Bili: x / Urobili: x   Blood: x / Protein: x / Nitrite: x   Leuk Esterase: x / RBC: x / WBC x   Sq Epi: x / Non Sq Epi: x / Bacteria: x      ____________________________________________    RADIOLOGY & ADDITIONAL STUDIES:    < from: CT Abdomen and Pelvis w/ IV Cont (02.22.24 @ 09:33) >  PROCEDURE:  CT of the Abdomen and Pelvis was performed.  Sagittal and coronal reformats were performed.    FINDINGS:  LOWER CHEST: Cardiomegaly. Coronary artery calcifications.    LIVER: Within normal limits.  BILE DUCTS: Normal caliber.  GALLBLADDER: Within normal limits.  SPLEEN: Within normal limits.  PANCREAS: Within normal limits.  ADRENALS: A 4 cm left adrenal mass is unchanged.  KIDNEYS/URETERS: No hydronephrosis. Left renal cyst.    BLADDER: Within normal limits.  REPRODUCTIVE ORGANS: Uterus and adnexa within normal limits.    BOWEL: Nonenhancement of a few small bowel loops within the pelvis,   consistent with ischemic/necrotic bowel. Diffuse small bowel dilatation,   compatible with ileus.  PERITONEUM: No ascites.  VESSELS: Severe atherosclerotic disease. Severe calcified plaque of the   SMA which is either occluded or severely stenosed. Severe stenosis versus   occlusion of the proximal SWATHI. Severe stenosis of the celiac artery.   Suspicion for tiny portal venous gas within the liver.  RETROPERITONEUM/LYMPH NODES: No lymphadenopathy.  ABDOMINAL WALL: Within normal limits.  BONES: Degenerative changes of the spine.    IMPRESSION:  *  Ischemic/necrotic small bowel within the pelvis. Stat surgical   consultation recommended.  *  Severe atherosclerotic disease of mesenteric vasculature as above.    < end of copied text >   VASCULAR SURGERY CONSULT NOTE    HPI: 78-year-old female with PMH current smoker , COPD, A-fib  On Eliquis and Plavix, HTN, HLD admitted for syncope work-up. Patient had increase abdominal pain, associated with nausea and multiple episodes of emesis. Labs significant for rising lactate, 3 to 3.6. CT imaging shows portal venous gas, pneumatosis of SB, with significant calcifications of SMA, celiac plexus and proximal SWATHI.     Vascular surgery consulted for evaluation of mesenteric stent vs. bypass options i/s/o mesenteric ischemia      PMH/PSH: Hypertension    Hypothyroid    Osteoarthritis    CAD (coronary artery disease)    CROW (obstructive sleep apnea)    History of MI (myocardial infarction)    Heart murmur    Bilateral hearing loss, unspecified hearing loss type    Obesity    Mixed stress and urge urinary incontinence    Overactive bladder    S/P ORIF (open reduction internal fixation) fracture    S/P appendectomy    S/P knee replacement    Stented coronary artery    S/P laparotomy    H/O dilation and curettage        MEDS:    ALLERGIES: NKDA    REVIEW OF SYSTEMS: All ROS negative except as per HPI.  ____________________________________________    VITALS:T(C): 37.1, Max: 37.1 (02-22)  T(F): 98.8, Max: 98.8 (02-22)  HR: 106 (75 - 112)  BP: 127/65 (127/65 - 151/83)  BP(mean): 104 (104 - 104)  ABP: --  ABP(mean): --  RR: 20 (18 - 20)  SpO2: 95% (94% - 100%)  CVP(mm Hg): --  CVP(cm H2O): --  room air            PHYSICAL EXAM:  General: AAOx3, no acute distress.  Respiratory: breathing comfortably, no increased WOB   Abdomen: soft, TTP out of proportion to exam, nondistended, no rebound, no guarding  Extremities: Moves all four.   ____________________________________________    LABS:                      14.7  22.53 )-----------( 236    ( 22 Feb 2024 14:39 )             44.0  141  |  105  |  14  ----------------------------<  152<H>    (02-22)  4.5   |  22  |  0.86          Ca    9.4      02-22  Mg    1.6  Phos  3.1        LIVER FUNCTIONS - ( 22 Feb 2024 13:19 )  Alb: 3.5 g/dL / Pro: 6.9 g/dL / ALK PHOS: 77 U/L / ALT: 8 U/L / AST: 13 U/L / GGT: x      PT/INR - ( 22 Feb 2024 11:45 )   PT: 19.3 sec;   INR: 1.87 ratio         PTT - ( 22 Feb 2024 11:45 )  PTT:29.6 secCARDIAC MARKERS ( 21 Feb 2024 19:42 )  x / x / 78 U/L / x / 2.0 ng/mL  Urinalysis Basic - ( 22 Feb 2024 13:19 )    Color: x / Appearance: x / SG: x / pH: x  Gluc: 152 mg/dL / Ketone: x  / Bili: x / Urobili: x   Blood: x / Protein: x / Nitrite: x   Leuk Esterase: x / RBC: x / WBC x   Sq Epi: x / Non Sq Epi: x / Bacteria: x      ____________________________________________    RADIOLOGY & ADDITIONAL STUDIES:    < from: CT Abdomen and Pelvis w/ IV Cont (02.22.24 @ 09:33) >  PROCEDURE:  CT of the Abdomen and Pelvis was performed.  Sagittal and coronal reformats were performed.    FINDINGS:  LOWER CHEST: Cardiomegaly. Coronary artery calcifications.    LIVER: Within normal limits.  BILE DUCTS: Normal caliber.  GALLBLADDER: Within normal limits.  SPLEEN: Within normal limits.  PANCREAS: Within normal limits.  ADRENALS: A 4 cm left adrenal mass is unchanged.  KIDNEYS/URETERS: No hydronephrosis. Left renal cyst.    BLADDER: Within normal limits.  REPRODUCTIVE ORGANS: Uterus and adnexa within normal limits.    BOWEL: Nonenhancement of a few small bowel loops within the pelvis,   consistent with ischemic/necrotic bowel. Diffuse small bowel dilatation,   compatible with ileus.  PERITONEUM: No ascites.  VESSELS: Severe atherosclerotic disease. Severe calcified plaque of the   SMA which is either occluded or severely stenosed. Severe stenosis versus   occlusion of the proximal SWATHI. Severe stenosis of the celiac artery.   Suspicion for tiny portal venous gas within the liver.  RETROPERITONEUM/LYMPH NODES: No lymphadenopathy.  ABDOMINAL WALL: Within normal limits.  BONES: Degenerative changes of the spine.    IMPRESSION:  *  Ischemic/necrotic small bowel within the pelvis. Stat surgical   consultation recommended.  *  Severe atherosclerotic disease of mesenteric vasculature as above.    < end of copied text >

## 2024-02-22 NOTE — PRE-ANESTHESIA EVALUATION ADULT - NSANTHOSAYNRD_GEN_A_CORE
No. CROW screening performed.  STOP BANG Legend: 0-2 = LOW Risk; 3-4 = INTERMEDIATE Risk; 5-8 = HIGH Risk Yes

## 2024-02-22 NOTE — CONSULT NOTE ADULT - ATTENDING COMMENTS
Pt is a 78 year old male with a medical history significant for CAD (s/p stent x2), HTN, Afib (on eliquis and plavix) and COPD (active smoker) who presented to [facility] with syncope (work up in progress). Overnight, she developed abdominal pain. Associated with it was a rising lactate (3.0 --> 3.6). CT imaging revealed a small amount of portal venous gas and pneumatosis of a segment of small intestine. When seen by the consult resident, she was reported to have pain out of proportion to the exam. When seen by me, she reports no pain and has 1/10 tenderness in the LUQ.     WBC  17.6  Hgb   15.8  INR      1.87  Creat     0.94  Lactate  3.0-->3.6-->3.4/2.9    CT abd: Ileus. Calcified celiac axis and SMA. Small amount of portal venous gas. Segment of small intestine with decreased enhancement and pneumatosis. Rule out intestinal necrosis    A/p  Rule out intestinal necrosis  Rule out mesenteric angina  CAD/HTN/Afib  Recent syncope  Pt is on AC and reversal is not advised.   Pt is at high risk for bleeding during surgery.  Risks of surgery discussed with daughter in presence of patient  Pt added on for emergent diagnostic laparoscopy, possible laparotomy  Vascular consult called for mesenteric angiogram and related procedures.  OR aware C-arm and vascular table are necessary for this case.  Case d/w radiology and [facility] surgery. Pt is a 78 year old male with a medical history significant for CAD (s/p stent x2), HTN, Afib (on eliquis and plavix) and COPD (active smoker) who presented to [facility] with syncope (work up in progress). Overnight, she developed abdominal pain. Associated with it was a rising lactate (3.0 --> 3.6). CT imaging revealed a small amount of portal venous gas and pneumatosis of a segment of small intestine. When seen by the consult resident, she was reported to have pain out of proportion to the exam. When seen by me, she reports no pain and has 1/10 tenderness in the LUQ.     WBC  17.6  Hgb   15.8  INR      1.87  Creat     0.94  Lactate  3.0-->3.6-->3.4/2.9    CT abd: Ileus. Calcified celiac axis and SMA. Small amount of portal venous gas. Segment of small intestine with decreased enhancement and pneumatosis. Rule out intestinal necrosis    A/p  Rule out intestinal necrosis  Rule out mesenteric angina  CAD/HTN/Afib  Recent syncope  Pt is on AC and reversal is not advised.   Pt is at high risk for bleeding during surgery.  Risks of surgery discussed with daughter in presence of patient  Pt added on for emergent diagnostic laparoscopy, possible laparotomy  Vascular consult called for mesenteric angiogram and related procedures.  OR aware C-arm and vascular table are necessary for this case.  Case d/w radiology and [facility] [place].    I spent 75 min in the E/M of this patient.

## 2024-02-22 NOTE — PROGRESS NOTE ADULT - ASSESSMENT
78-year-old female with PMH current smoker , COPD, A-fib  On Eliquis and Plavix, HTN, HLD presents for syncope from PCPs office yesterday.     Patient was at normal checkup for blood work and was sitting on the table.  Daughter at bedside states that patient suddenly went limp while she was sitting on the table, her eyes rolled back, and her tongue turned to 1 side and this lasted for couple of seconds and then the patient regained consciousness.  Patient had no postictal period.  No tongue biting.  Patient does not remember these events.   pt too lethargic  , though arousable  knows she is in NS   denies any sx   but tenderneness on abd exam     called daughter : she thinks she might have taken double dose   pt seems to go few days without sleep and then sleeps all day   oldest sister just passed away in oct   questionable underlying dementia   vomitting episode two days ago    c/o abd pain   no fever   no chills   no urinary complaints     Sycnope :  neuro checks   ct head : negative   EEG: NL  neuro input  noted .. no agitation   TTE  : noted : NL EF   pt with intermittent chest discomfort.. no recent ischemic ballesteros .. d.w   op cardiology ..  d/w    check orthostatics: negative    and overnight O2 sat      Nausea /vomitting /abd discomoft :  CT noted for ischemic bowel : ???? etiology ?   surgery consulted: OR planned   d/c AC for now     afib :  AC : on hold  .. start heparin post op if clearedby surgery   cont tele   afib with rVR   was on op BB toprol xl 50   will cont current bb      lehtargy /encephalopathy : CT head negative   improved and seems at baseline   EEG pending : negative         HTN:  hypotensive in ed..brandee sec to medication error ( pt might have taken double dose)   imrpoved     PT /OOB     discussed at length with daughter at bedside : adressed all concerns     discussed with derek PENN :   she said she never got a "straight answer from mother " i n past   at this time full code

## 2024-02-22 NOTE — CONSULT NOTE ADULT - ASSESSMENT
78F PMH current smoker , COPD, A-fib  On Eliquis and Plavix, HTN, HLD, consulted for mesenteric ischemia    PLAN  - Plan for emergent diagnostic laparoscopy, ex-lap, possible SB resection, possible ostomy, possible abthera  - NPO/IVF  - Consented  - Repeat labs    Discussed and seen with Dr. Camarena    ACS/Trauma  [phone] (pager)

## 2024-02-22 NOTE — PROGRESS NOTE ADULT - SUBJECTIVE AND OBJECTIVE BOX
Subjective: Patient seen and examined. No new events except as noted.   seen this am   tachycardic   + vomiting   REVIEW OF SYSTEMS:    CONSTITUTIONAL: +weakness, fevers or chills  EYES/ENT: No visual changes;  No vertigo or throat pain   NECK: No pain or stiffness  RESPIRATORY: No cough, wheezing, hemoptysis; No shortness of breath  CARDIOVASCULAR: No chest pain or palpitations  GASTROINTESTINAL: No abdominal or epigastric pain.+nausea, vomiting, or hematemesis; No diarrhea or constipation. No melena or hematochezia.  GENITOURINARY: No dysuria, frequency or hematuria  NEUROLOGICAL: No numbness or weakness  SKIN: No itching, burning, rashes, or lesions   All other review of systems is negative unless indicated above.    MEDICATIONS:  MEDICATIONS  (STANDING):      PHYSICAL EXAM:  T(C): 35.9 (02-22-24 @ 18:55), Max: 37.1 (02-22-24 @ 08:13)  HR: 83 (02-22-24 @ 18:55) (75 - 112)  BP: 139/67 (02-22-24 @ 18:55) (127/65 - 151/83)  RR: 14 (02-22-24 @ 18:55) (14 - 20)  SpO2: 100% (02-22-24 @ 18:55) (94% - 100%)  Wt(kg): --  I&O's Summary    21 Feb 2024 07:01  -  22 Feb 2024 07:00  --------------------------------------------------------  IN: 720 mL / OUT: 0 mL / NET: 720 mL      Height (cm): 162.6 (02-22 @ 18:55)  Weight (kg): 88.8 (02-22 @ 18:55)  BMI (kg/m2): 33.6 (02-22 @ 18:55)  BSA (m2): 1.94 (02-22 @ 18:55)    Appearance: NAD   HEENT:   Normal oral mucosa, PERRL, EOMI	  Lymphatic: No lymphadenopathy , no edema  Cardiovascular: Irregular S1 S2, No JVD, No murmurs , Peripheral pulses palpable 2+ bilaterally  Respiratory: decreased bs   Gastrointestinal:  Soft, +tender, + BS	  Skin: No rashes, No ecchymoses, No cyanosis, warm to touch  Musculoskeletal: Normal range of motion, normal strength  Psychiatry:  Mood & affect appropriate  Ext: No edema      LABS:    CARDIAC MARKERS:  CARDIAC MARKERS ( 21 Feb 2024 19:42 )  x     / x     / 78 U/L / x     / 2.0 ng/mL                                14.7   22.53 )-----------( 236      ( 22 Feb 2024 14:39 )             44.0     02-22    141  |  105  |  14  ----------------------------<  152<H>  4.5   |  22  |  0.86    Ca    9.4      22 Feb 2024 13:19  Phos  3.1     02-22  Mg     1.6     02-22    TPro  6.9  /  Alb  3.5  /  TBili  0.8  /  DBili  x   /  AST  13  /  ALT  8<L>  /  AlkPhos  77  02-22    proBNP:   Lipid Profile:   HgA1c:   TSH:             TELEMETRY: 	  AF RVR   ECG:  	  RADIOLOGY: < from: CT Abdomen and Pelvis w/ IV Cont (02.22.24 @ 09:33) >    ACC: [id number] EXAM:  CT ABDOMEN AND PELVIS IC   ORDERED BY: CHANTE JEONG     PROCEDURE DATE:  02/22/2024          INTERPRETATION:  CLINICAL INFORMATION: Nausea and vomiting. Bilateral   lower abdominal pain. Evaluate for pathology.    COMPARISON: CT 1/25/2022    CONTRAST/COMPLICATIONS:  IV Contrast: Omnipaque 300  90 cc administered   10 cc discarded  Oral Contrast: NONE  Complications: None reported at time of study completion    PROCEDURE:  CT of the Abdomen and Pelvis was performed.  Sagittal and coronal reformats were performed.    FINDINGS:  LOWER CHEST: Cardiomegaly. Coronary artery calcifications.    LIVER: Within normal limits.  BILE DUCTS: Normal caliber.  GALLBLADDER: Within normal limits.  SPLEEN: Within normal limits.  PANCREAS: Within normal limits.  ADRENALS: A 4 cm left adrenal mass is unchanged.  KIDNEYS/URETERS: No hydronephrosis. Left renal cyst.    BLADDER: Within normal limits.  REPRODUCTIVE ORGANS: Uterus and adnexa within normal limits.    BOWEL: Nonenhancement of a few small bowel loops within the pelvis,   consistent with ischemic/necrotic bowel. Diffuse small bowel dilatation,   compatible with ileus.  PERITONEUM: No ascites.  VESSELS: Severe atherosclerotic disease. Severe calcified plaque of the   SMA which is either occluded or severely stenosed. Severe stenosis versus   occlusion of the proximal SWATHI. Severe stenosis of the celiac artery.   Suspicion for tiny portal venous gas within the liver.  RETROPERITONEUM/LYMPH NODES: No lymphadenopathy.  ABDOMINAL WALL: Within normal limits.  BONES: Degenerative changes of the spine.    IMPRESSION:  *  Ischemic/necrotic small bowel within the pelvis. Stat surgical   consultation recommended.  *  Severe atherosclerotic disease of mesenteric vasculature as above.    Findings were discussed with provider JEAN-PAUL on 2/22/2024 at 9:40 AM by   Dr. SAINZ with read back confirmation.    --- End of Report ---             ROMÁN SAINZ DO; Attending Radiologist  This document has bee    < end of copied text >    DIAGNOSTIC TESTING:  [ ] Echocardiogram:  [ ]  Catheterization:  [ ] Stress Test:    OTHER:

## 2024-02-22 NOTE — CONSULT NOTE ADULT - SUBJECTIVE AND OBJECTIVE BOX
GENERAL SURGERY CONSULT NOTE    HPI: 78M PMH smoking, COPD, afib on eliquis and plavix, HTN, HLD, admitted for syncope work-up.     PMH/PSH: Hypertension    Hypothyroid    Osteoarthritis    CAD (coronary artery disease)    CROW (obstructive sleep apnea)    History of MI (myocardial infarction)    Heart murmur    Bilateral hearing loss, unspecified hearing loss type    Obesity    Mixed stress and urge urinary incontinence    Overactive bladder    S/P ORIF (open reduction internal fixation) fracture    S/P appendectomy    S/P knee replacement    Stented coronary artery    S/P laparotomy    H/O dilation and curettage        MEDS:atorvastatin 80 milliGRAM(s) Oral at bedtime  chlorhexidine 2% Cloths 1 Application(s) Topical daily  levothyroxine 175 MICROGram(s) Oral daily  metoprolol tartrate 25 milliGRAM(s) Oral every 8 hours  nicotine -  14 mG/24Hr(s) Patch 1 Patch Transdermal daily  piperacillin/tazobactam IVPB. 3.375 Gram(s) IV Intermittent once  piperacillin/tazobactam IVPB.- 3.375 Gram(s) IV Intermittent once  piperacillin/tazobactam IVPB.. 3.375 Gram(s) IV Intermittent every 8 hours  potassium chloride    Tablet ER 40 milliEquivalent(s) Oral daily  sodium chloride 0.9% Bolus 1000 milliLiter(s) IV Bolus once  lidocaine   4% Patch 1 Patch Transdermal every 24 hours PRN pain      ALLERGIES: NKDA    REVIEW OF SYSTEMS: All ROS negative except as per HPI.  ____________________________________________    VITALS:T(C): 37.1, Max: 37.1 (02-22)  T(F): 98.8, Max: 98.8 (02-22)  HR: 112 (75 - 112)  BP: 130/79 (130/79 - 151/83)  BP(mean): --  ABP: --  ABP(mean): --  RR: 18 (18 - 18)  SpO2: 97% (96% - 100%)  CVP(mm Hg): --  CVP(cm H2O): --  room air            PHYSICAL EXAM:  General: AAOx3, no acute distress.  Respiratory: breathing comfortably, no increased WOB   Abdomen: soft, nontender, nondistended, no rebound, no guarding  Extremities: Moves all four.   ____________________________________________    LABS:                      15.8  17.62 )-----------( 241    ( 22 Feb 2024 06:25 )             49.2  139  |  102  |  13  ----------------------------<  139<H>    (02-22)  5.0   |  22  |  0.94          Ca    9.8      02-22  Mg    1.8  Phos  x        LIVER FUNCTIONS - ( 22 Feb 2024 06:25 )  Alb: 4.1 g/dL / Pro: 7.8 g/dL / ALK PHOS: 90 U/L / ALT: 10 U/L / AST: 17 U/L / GGT: x      CARDIAC MARKERS ( 21 Feb 2024 19:42 )  x / x / 78 U/L / x / 2.0 ng/mL  Urinalysis Basic - ( 22 Feb 2024 06:25 )    Color: x / Appearance: x / SG: x / pH: x  Gluc: 139 mg/dL / Ketone: x  / Bili: x / Urobili: x   Blood: x / Protein: x / Nitrite: x   Leuk Esterase: x / RBC: x / WBC x   Sq Epi: x / Non Sq Epi: x / Bacteria: x      ____________________________________________    RADIOLOGY & ADDITIONAL STUDIES: GENERAL SURGERY CONSULT NOTE    HPI: 78-year-old female with PMH current smoker , COPD, A-fib  On Eliquis and Plavix, HTN, HLD admitted for syncope work-up. Patient had increase abdominal pain, associated with nausea and multiple episodes of emesis. Labs significant for rising lactate, 3 to 3.6. CT imaging shows portal venous gas, pneumatosis of SB, with significant calcifications of SMA, celiac plexus and proximal SWATHI.     General surgery consulted for acute mesenteric ischemia    PMH/PSH: Hypertension    Hypothyroid    Osteoarthritis    CAD (coronary artery disease)    CROW (obstructive sleep apnea)    History of MI (myocardial infarction)    Heart murmur    Bilateral hearing loss, unspecified hearing loss type    Obesity    Mixed stress and urge urinary incontinence    Overactive bladder    S/P ORIF (open reduction internal fixation) fracture    S/P appendectomy    S/P knee replacement    Stented coronary artery    S/P laparotomy    H/O dilation and curettage        MEDS:atorvastatin 80 milliGRAM(s) Oral at bedtime  chlorhexidine 2% Cloths 1 Application(s) Topical daily  levothyroxine 175 MICROGram(s) Oral daily  metoprolol tartrate 25 milliGRAM(s) Oral every 8 hours  nicotine -  14 mG/24Hr(s) Patch 1 Patch Transdermal daily  piperacillin/tazobactam IVPB. 3.375 Gram(s) IV Intermittent once  piperacillin/tazobactam IVPB.- 3.375 Gram(s) IV Intermittent once  piperacillin/tazobactam IVPB.. 3.375 Gram(s) IV Intermittent every 8 hours  potassium chloride    Tablet ER 40 milliEquivalent(s) Oral daily  sodium chloride 0.9% Bolus 1000 milliLiter(s) IV Bolus once  lidocaine   4% Patch 1 Patch Transdermal every 24 hours PRN pain      ALLERGIES: NKDA    REVIEW OF SYSTEMS: All ROS negative except as per HPI.  ____________________________________________    VITALS:T(C): 37.1, Max: 37.1 (02-22)  T(F): 98.8, Max: 98.8 (02-22)  HR: 112 (75 - 112)  BP: 130/79 (130/79 - 151/83)  BP(mean): --  ABP: --  ABP(mean): --  RR: 18 (18 - 18)  SpO2: 97% (96% - 100%)  CVP(mm Hg): --  CVP(cm H2O): --  room air            PHYSICAL EXAM:  General: AAOx3, no acute distress.  Respiratory: breathing comfortably, no increased WOB   Abdomen: soft, diffuse tenderness out of proportion to exam, nondistended, no rebound, no guarding  Extremities: Moves all four.   ____________________________________________    LABS:                      15.8  17.62 )-----------( 241    ( 22 Feb 2024 06:25 )             49.2  139  |  102  |  13  ----------------------------<  139<H>    (02-22)  5.0   |  22  |  0.94          Ca    9.8      02-22  Mg    1.8  Phos  x        LIVER FUNCTIONS - ( 22 Feb 2024 06:25 )  Alb: 4.1 g/dL / Pro: 7.8 g/dL / ALK PHOS: 90 U/L / ALT: 10 U/L / AST: 17 U/L / GGT: x      CARDIAC MARKERS ( 21 Feb 2024 19:42 )  x / x / 78 U/L / x / 2.0 ng/mL  Urinalysis Basic - ( 22 Feb 2024 06:25 )    Color: x / Appearance: x / SG: x / pH: x  Gluc: 139 mg/dL / Ketone: x  / Bili: x / Urobili: x   Blood: x / Protein: x / Nitrite: x   Leuk Esterase: x / RBC: x / WBC x   Sq Epi: x / Non Sq Epi: x / Bacteria: x    < from: CT Abdomen and Pelvis w/ IV Cont (02.22.24 @ 09:33) >  FINDINGS:  LOWER CHEST: Cardiomegaly. Coronary artery calcifications.    LIVER: Within normal limits.  BILE DUCTS: Normal caliber.  GALLBLADDER: Within normal limits.  SPLEEN: Within normal limits.  PANCREAS: Within normal limits.  ADRENALS: A 4 cm left adrenal mass is unchanged.  KIDNEYS/URETERS: No hydronephrosis. Left renal cyst.    BLADDER: Within normal limits.  REPRODUCTIVE ORGANS: Uterus and adnexa within normal limits.    BOWEL: Nonenhancement of a few small bowel loops within the pelvis,   consistent with ischemic/necrotic bowel. Diffuse small bowel dilatation,   compatible with ileus.  PERITONEUM: No ascites.  VESSELS: Severe atherosclerotic disease. Severe calcified plaque of the   SMA which is either occluded or severely stenosed. Severe stenosis versus   occlusion of the proximal SWATHI. Severe stenosis of the celiac artery.   Suspicion for tiny portal venous gas within the liver.  RETROPERITONEUM/LYMPH NODES: No lymphadenopathy.  ABDOMINAL WALL: Within normal limits.  BONES: Degenerative changes of the spine.    IMPRESSION:  *  Ischemic/necrotic small bowel within the pelvis. Stat surgical   consultation recommended.  *  Severe atherosclerotic disease of mesenteric vasculature as above.    < end of copied text >    ____________________________________________    RADIOLOGY & ADDITIONAL STUDIES:

## 2024-02-22 NOTE — CHART NOTE - NSCHARTNOTEFT_GEN_A_CORE
Pt called from work and feeling right side shaking with feelings of fainting. Was in ER on Friday, 3/18    Right eye drooping. Vital Signs Last 24 Hrs  T(C): 37.1 (22 Feb 2024 08:13), Max: 37.1 (22 Feb 2024 08:13)  T(F): 98.8 (22 Feb 2024 08:13), Max: 98.8 (22 Feb 2024 08:13)  HR: 112 (22 Feb 2024 08:13) (75 - 112)  BP: 130/79 (22 Feb 2024 08:13) (100/67 - 151/83)  BP(mean): --  RR: 18 (22 Feb 2024 08:13) (18 - 18)  SpO2: 97% (22 Feb 2024 08:13) (96% - 100%)    Parameters below as of 22 Feb 2024 08:13  Patient On (Oxygen Delivery Method): room air AM labs reviewed, patient with uptrending lactate and WBC. Additional episode of emesis reported overnight by covering ACP, no hematemesis or hematochezia reported. Pending CT A/P for further evaluation. Patient resting in bed with eyes closed, awake and alert to verbal stimuli. States nausea with improvement, denies abdominal pain at rest. HR noted on tele with Afib 120s-130s.     Vital Signs Last 24 Hrs  T(C): 37.1 (22 Feb 2024 08:13), Max: 37.1 (22 Feb 2024 08:13)  T(F): 98.8 (22 Feb 2024 08:13), Max: 98.8 (22 Feb 2024 08:13)  HR: 112 (22 Feb 2024 08:13) (75 - 112)  BP: 130/79 (22 Feb 2024 08:13) (100/67 - 151/83)  BP(mean): --  RR: 18 (22 Feb 2024 08:13) (18 - 18)  SpO2: 97% (22 Feb 2024 08:13) (96% - 100%)    Parameters below as of 22 Feb 2024 08:13  Patient On (Oxygen Delivery Method): room air AM labs reviewed, patient with uptrending lactate and WBC. Additional episode of emesis reported overnight by covering ACP, no hematemesis or hematochezia reported. Pending CT A/P for further evaluation. Patient resting in bed with eyes closed, awake and alert to verbal stimuli. States nausea with improvement, denies abdominal pain at rest. Persistent b/l lower abdominal tenderness to palpation, no peritoneal signs. HR noted on tele with Afib 120s-130s. Discussed with Dr. Duran, plan as below.     Vital Signs Last 24 Hrs  T(C): 37.1 (22 Feb 2024 08:13), Max: 37.1 (22 Feb 2024 08:13)  T(F): 98.8 (22 Feb 2024 08:13), Max: 98.8 (22 Feb 2024 08:13)  HR: 112 (22 Feb 2024 08:13) (75 - 112)  BP: 130/79 (22 Feb 2024 08:13) (100/67 - 151/83)  BP(mean): --  RR: 18 (22 Feb 2024 08:13) (18 - 18)  SpO2: 97% (22 Feb 2024 08:13) (96% - 100%)    Parameters below as of 22 Feb 2024 08:13  Patient On (Oxygen Delivery Method): room air    > 1L NS bolus ordered, maintain telemetry and monitor for improvement. Cleared by attending to transfer for CT imaging with elevated HR.   > CT A/P expedited x 2 overnight. CT called, relayed patient's status and urgency of study. Was advised CT service sending for transport.   > Continue to monitor closely.     Keyla Van PA-C     Addendum #1: Notified by Radiologist, patient with evidence of bowel ischemia on CT imaging. Urgent surgery consult placed. Patient on Eliquis for Afib, last dose this AM, surgery aware. Maintain NPO status. Dr. Duran made aware. Daughter updated via phone.     Addendum #2: Patient with documented allergy to penicillin. Daughter reports h/o hives/rash with penicillin, no h/o respiratory compromise. Discussed with Dr. Duran, cleared to trial empiric zosyn under clinical monitoring. Zosyn order placed.     Addendum #3: Patient s/p IV fluid bolus with continued elevated HR Afib 120s-140s. Discussed with Dr. Duran and Dr. Mendiola (cardiology), IV digoxin 250 mcg x 1 and IV metoprolol 5 mg q 6 hours with hold parameters ordered. Patient's status relayed to surgery team, plan for OR with possible transition to SICU care.

## 2024-02-22 NOTE — CONSULT NOTE ADULT - SUBJECTIVE AND OBJECTIVE BOX
incomplete note, pt seen and examined  CT reviewed  STAT Surgery eval for possible OR   full note to follow    Chief Complaint:  Patient is a 78y old  Female who presents with a chief complaint of abdominal pain    Hypertension  Hypothyroid  Osteoarthritis  CAD (coronary artery disease)  CROW (obstructive sleep apnea)  History of MI (myocardial infarction)  Polyp of corpus uteri  Heart murmur  Bilateral hearing loss, unspecified hearing loss type  Obesity (BMI 35.0-39.9 without comorbidity)  Obesity  Mixed stress and urge urinary incontinence  Overactive bladder  S/P ORIF (open reduction internal fixation) fracture  S/P appendectomy  S/P knee replacement  Stented coronary artery  S/P laparotomy  H/O dilation and curettage     HPI:  78-year-old female with PMH current smoker , COPD, A-fib  On Eliquis and Plavix, HTN, HLD presents for syncope from PCPs office yesterday.   Patient was at normal checkup for blood work and was sitting on the table.  Daughter at bedside states that patient suddenly went limp while she was sitting on the table, her eyes rolled back, and her tongue turned to 1 side and this lasted for couple of seconds and then the patient regained consciousness.  Patient had no postictal period.  No tongue biting.  Patient does not remember these events. pt too lethargic  , though arousalable, knows she is in NS   denies any sx but tenderneness on abd exam. called daughter : she thinks she might have taken double dose   pt seems to go few days without sleep and then sleeps all day   oldest sister just passed away in oct   questionable underlying dementia   vomitting episode two days ago    c/o abd pain   no fever   no chills   no urinary complaints  (16 Feb 2024 09:19)    GI consulted for abdominal pain. The patient was not able to participate in exam, blood was being drawn at time of visit. Chart reviewed and collateral taken from covering team who report patient has been with abdominal complaints and groaning on and off. There were reports of episodes of abdominal pain and vomiting a few days ago according to the family. CT notable for ischemic/necrotic small bowel within the pelvis and severe atherosclerotic disesase of SMA      penicillin (Hives)      atorvastatin 80 milliGRAM(s) Oral at bedtime  chlorhexidine 2% Cloths 1 Application(s) Topical daily  levothyroxine 175 MICROGram(s) Oral daily  lidocaine   4% Patch 1 Patch Transdermal every 24 hours PRN  metoprolol tartrate Injectable 5 milliGRAM(s) IV Push every 6 hours  nicotine -  14 mG/24Hr(s) Patch 1 Patch Transdermal daily  piperacillin/tazobactam IVPB.- 3.375 Gram(s) IV Intermittent once  piperacillin/tazobactam IVPB.. 3.375 Gram(s) IV Intermittent every 8 hours  potassium chloride    Tablet ER 40 milliEquivalent(s) Oral daily        FAMILY HISTORY:            Relevant Family History:   n/c    Relevant Social History: n/c      Physical Exam:    Vital Signs:  Vital Signs Last 24 Hrs  T(C): 37.1 (22 Feb 2024 11:43), Max: 37.1 (22 Feb 2024 08:13)  T(F): 98.8 (22 Feb 2024 11:43), Max: 98.8 (22 Feb 2024 08:13)  HR: 111 (22 Feb 2024 11:43) (75 - 112)  BP: 128/67 (22 Feb 2024 11:43) (128/67 - 151/83)  BP(mean): --  RR: 18 (22 Feb 2024 11:43) (18 - 18)  SpO2: 94% (22 Feb 2024 11:43) (94% - 100%)    Parameters below as of 22 Feb 2024 11:43  Patient On (Oxygen Delivery Method): room air      Daily Height in cm: 162.6 (22 Feb 2024 12:44)    Daily     General:  Appears stated age, well-groomed, nad  HEENT:  NC/AT,  conjunctivae clear and pink, no thyromegaly, nodules, adenopathy, no JVD  Chest:  Full & symmetric excursion, no increased effort, breath sounds clear  Cardiovascular:  Regular rhythm, S1, S2, no murmur/rub/S3/S4, no abdominal bruit, no edema  Abdomen:  Soft, +ttp +distention,  no masses ,no hepatosplenomeagaly, no signs of chronic liver disease  Extremities:  no cyanosis,clubbing or edema  Skin:  No rash/erythema/ecchymoses/petechiae/wounds/abscess/warm/dry  Neuro/Psych:  A&O, no asterixis, no tremor, no encephalopathy    Laboratory:                            15.8   17.62 )-----------( 241      ( 22 Feb 2024 06:25 )             49.2     02-22    139  |  102  |  13  ----------------------------<  139<H>  5.0   |  22  |  0.94    Ca    9.8      22 Feb 2024 06:25  Phos  3.3     02-21  Mg     1.8     02-22    TPro  7.8  /  Alb  4.1  /  TBili  0.8  /  DBili  x   /  AST  17  /  ALT  10  /  AlkPhos  90  02-22    LIVER FUNCTIONS - ( 22 Feb 2024 06:25 )  Alb: 4.1 g/dL / Pro: 7.8 g/dL / ALK PHOS: 90 U/L / ALT: 10 U/L / AST: 17 U/L / GGT: x           PT/INR - ( 22 Feb 2024 11:45 )   PT: 19.3 sec;   INR: 1.87 ratio         PTT - ( 22 Feb 2024 11:45 )  PTT:29.6 sec  Urinalysis Basic - ( 22 Feb 2024 06:25 )    Color: x / Appearance: x / SG: x / pH: x  Gluc: 139 mg/dL / Ketone: x  / Bili: x / Urobili: x   Blood: x / Protein: x / Nitrite: x   Leuk Esterase: x / RBC: x / WBC x   Sq Epi: x / Non Sq Epi: x / Bacteria: x        Imaging:  < from: CT Abdomen and Pelvis w/ IV Cont (02.22.24 @ 09:33) >    ACC: [id number] EXAM:  CT ABDOMEN AND PELVIS IC   ORDERED BY: CHANTE JEONG     PROCEDURE DATE:  02/22/2024          INTERPRETATION:  CLINICAL INFORMATION: Nausea and vomiting. Bilateral   lower abdominal pain. Evaluate for pathology.    COMPARISON: CT 1/25/2022    CONTRAST/COMPLICATIONS:  IV Contrast: Omnipaque 300  90 cc administered   10 cc discarded  Oral Contrast: NONE  Complications: None reported at time of study completion    PROCEDURE:  CT of the Abdomen and Pelvis was performed.  Sagittal and coronal reformats were performed.    FINDINGS:  LOWER CHEST: Cardiomegaly. Coronary artery calcifications.    LIVER: Within normal limits.  BILE DUCTS: Normal caliber.  GALLBLADDER: Within normal limits.  SPLEEN: Within normal limits.  PANCREAS: Within normal limits.  ADRENALS: A 4 cm left adrenal mass is unchanged.  KIDNEYS/URETERS: No hydronephrosis. Left renal cyst.    BLADDER: Within normal limits.  REPRODUCTIVE ORGANS: Uterus and adnexa within normal limits.    BOWEL: Nonenhancement of a few small bowel loops within the pelvis,   consistent with ischemic/necrotic bowel. Diffuse small bowel dilatation,   compatible with ileus.  PERITONEUM: No ascites.  VESSELS: Severe atherosclerotic disease. Severe calcified plaque of the   SMA which is either occluded or severely stenosed. Severe stenosis versus   occlusion of the proximal SWATHI. Severe stenosis of the celiac artery.   Suspicion for tiny portal venous gas within the liver.  RETROPERITONEUM/LYMPH NODES: No lymphadenopathy.  ABDOMINAL WALL: Within normal limits.  BONES: Degenerative changes of the spine.    IMPRESSION:  *  Ischemic/necrotic small bowel within the pelvis. Stat surgical   consultation recommended.  *  Severe atherosclerotic disease of mesenteric vasculature as above.    Findings were discussed with provider JEAN-PAUL on 2/22/2024 at 9:40 AM by   Dr. SAINZ with read back confirmation.    --- End of Report ---             ROMÁN SAINZ DO; Attending Radiologist  This document has been electronically signed. Feb 22 2024 10:00AM    < end of copied text >

## 2024-02-22 NOTE — BRIEF OPERATIVE NOTE - OPERATION/FINDINGS
Messenteric angiogram via R fem 5 F sheath. No filling of SMA, SWATHI, or Celiac. No endo treatment modalities available.   5 Mynx  Palp fem  R DP, PT signal

## 2024-02-22 NOTE — CONSULT NOTE ADULT - ATTENDING COMMENTS
acute on chronic mesenteric ischemia, w evidence of ischemia  CT scan has extensive, diffuse sma disease, occluded v near occluded celiac/SWATHI.    I do not see any good revascularization options, will plan angio.

## 2024-02-22 NOTE — CONSULT NOTE ADULT - ASSESSMENT
ASSESSMENT:    PLAN:    Neuro:  - Assess neurological neurovascular status every  hours in the setting of  - Sedation while intubated w/  - Multimodal pain control w/    Resp:  - Out of bed to chair, ambulate as tolerated, and incentive spirometry to prevent atelectasis  - Mechanical ventilation for    CV:  - Will wean vasopressor support w/ goal MAP > 65 mmHg    GI:   - Diet:   - Bowel regimen with senna & Miralax  - Protonix for stress ulcer prophylaxis    Renal:  - Monitor I&Os and electrolytes w/ repletions as necessary    Heme:  - Monitor CBC and coags  - Lovenox for VTE prophylaxis    ID:   - Monitor for clinical evidence of active infection  - Monitor WBC, temperature, and procalcitonin  - Empiric antibiotics    Endo:   - Monitor glucose ; HgbA1C  - for HLD  - for hypothyroidism    Code Status:    Disposition:    The above case discussed with SICU attending physician    Bird Jansen PA-C     [id number] ASSESSMENT: LEFTY AKBAR is a 78y Female pmh of current smoker, COPD, afib on eliquis/plavix, HTN, HLD who presented to [facility] 2/15/2024 for syncope. Patient reportedly went limp while having blood drawn at PCP office, eyes rolled back, and tongue deviated to the side for a few seconds before regaining consciousness. Patient arrived in ED lethargic but was easily arousable and was AOx4. Patient endorsed abd on exam, no other complaints.  CTH neg, admitted to medicine for EEG/syncope w/u. Had episodes of afib RVR on floor 2/19, responded to IV lopressor. GI consulted for abdominal pain 2/22. There were reports of episodes of abdominal pain and vomiting a few days ago according to the family. CT AP notable for ischemic/necrotic small bowel within the pelvis and severe atherosclerotic disesase of SMA. Patient taken to OR for laparoscopic abd exploration, converted to open exlap for likely dead bowel. 20cm terminal ileum removed, left in discont w/ abthera. Plan to RTOR in 48hrs. SICU c/s for ventilator management and for hemodynamic monitoring.    PLAN:    Neuro:  - Sedation while intubated w/ precedex  - Multimodal pain control w/ dilaudid, IV tylenol    Resp: pmh COPD  - Mechanical ventilation PRVC 450/14/5/40  - plan to SBT as tolerated    CV:  - off pressors, none required intraop    GI:   - Diet:   - Bowel regimen with senna & Miralax  - Protonix for stress ulcer prophylaxis    Renal:  - Monitor I&Os and electrolytes w/ repletions as necessary    Heme:  - Monitor CBC and coags  - Lovenox for VTE prophylaxis    ID:   - Monitor for clinical evidence of active infection  - Monitor WBC, temperature, and procalcitonin  - Empiric antibiotics    Endo:   - Monitor glucose ; HgbA1C  - for HLD  - for hypothyroidism    Code Status:    Disposition:    The above case discussed with SICU attending physician    Bird Jansen PA-C     [id number] ASSESSMENT: LEFTY AKBAR is a 78y Female pmh of current smoker, COPD, afib on eliquis/plavix, HTN, HLD who presented to [facility] 2/15/2024 for syncope. Patient reportedly went limp while having blood drawn at PCP office, eyes rolled back, and tongue deviated to the side for a few seconds before regaining consciousness. Patient arrived in ED lethargic but was easily arousable and was AOx4. Patient endorsed abd on exam, no other complaints.  CTH neg, admitted to medicine for EEG/syncope w/u. Had episodes of afib RVR on floor 2/19, responded to IV lopressor. GI consulted for abdominal pain 2/22. There were reports of episodes of abdominal pain and vomiting a few days ago according to the family. CT AP notable for ischemic/necrotic small bowel within the pelvis and severe atherosclerotic disesase of SMA. Patient taken to OR for laparoscopic abd exploration, converted to open exlap for likely dead bowel. 20cm terminal ileum removed, left in discont w/ abthera. Angiogram R fem done intraop w/ findings of celiac, SMA, SWATHI w/o flow, collateral perfusion confirmed. Plan to RTOR in 48hrs. SICU c/s for ventilator management and for hemodynamic monitoring.    PLAN:    Neuro:  - Sedation while intubated w/ precedex  - Multimodal pain control w/ dilaudid, IV tylenol    Resp: pmh COPD  - Mechanical ventilation PRVC 450/14/5/40  - plan to SBT as tolerated    CV:  - off pressors, none required intraop  - developed afib RVR HR 150s, BP 160s/90s, 5 IV lopressor given in SICU    GI: s/p 20cm TI SBR w/ abthera, abd open  - plan to RTOR ~48hrs  - celiac, SMA, SWATHI w/ no flow demonstrated on angiogram  - trend lactate to assess for additional ischemia  - Diet: NPO, OGT  - Protonix for stress ulcer prophylaxis    Renal:  - Monitor I&Os and electrolytes w/ repletions as necessary  - LR @ 100  - amaya in place, monitor UOP    Heme:  - Monitor CBC and coags  - heparin ggt initiated per surgical team at 16/hr iso angiogram findings  - ptt q6hrs on heparin ggt    ID:   - Monitor for clinical evidence of active infection  - Monitor WBC, temperature, and procalcitonin  - Empiric antibiotics w/ zosyn    Endo:   - Monitor glucose    Code Status: Full    Disposition: SICU    The above case discussed with SICU attending physician Nato Bray MD.    Bird Jansen PA-C     [id number] ASSESSMENT: LEFTY AKBAR is a 78y Female pmh of current smoker, COPD, afib on eliquis/plavix, HTN, HLD who presented to [facility] 2/15/2024 for syncope. Patient reportedly went limp while having blood drawn at PCP office, eyes rolled back, and tongue deviated to the side for a few seconds before regaining consciousness. Patient arrived in ED lethargic but was easily arousable and was AOx4. Patient endorsed abd on exam, no other complaints.  CTH neg, admitted to medicine for EEG/syncope w/u. Had episodes of afib RVR on floor 2/19, responded to IV lopressor. GI consulted for abdominal pain 2/22. There were reports of episodes of abdominal pain and vomiting a few days ago according to the family. CT AP notable for ischemic/necrotic small bowel within the pelvis and severe atherosclerotic disesase of SMA. Patient taken to OR for laparoscopic abd exploration, converted to open exlap for likely dead bowel. 20cm terminal ileum removed, left in discont w/ abthera. Angiogram R fem done intraop w/ findings of celiac, SMA, SWATHI w/o flow, collateral perfusion confirmed. Plan to RTOR in 48hrs. SICU c/s for ventilator management and for hemodynamic monitoring.    PLAN:    Neuro:  - Sedation while intubated w/ precedex  - Multimodal pain control w/ dilaudid, IV tylenol    Resp: pmh COPD  - Mechanical ventilation PRVC 450/14/5/40  - plan to SBT as tolerated    CV:  - off pressors, none required intraop  - developed afib RVR HR 150s, BP 160s/90s, 5 IV lopressor given in SICU    GI: s/p 20cm TI SBR w/ abthera, abd open  - plan to RTOR ~48hrs  - celiac, SMA, SWATHI w/ no flow demonstrated on angiogram  - trend lactate to assess for additional ischemia  - Diet: NPO, OGT  - Protonix for stress ulcer prophylaxis    Renal:  - Monitor I&Os and electrolytes w/ repletions as necessary  - LR @ 100  - amaya in place, monitor UOP    Heme:  - Monitor CBC and coags  - heparin ggt initiated per surgical team at 16/hr iso angiogram findings  - ptt q6hrs on heparin ggt    ID:   - Monitor for clinical evidence of active infection  - Monitor WBC, temperature, and procalcitonin  - Empiric antibiotics w/ zosyn    Endo:   - Monitor glucose  - home synthroid    Code Status: Full    Disposition: SICU    The above case discussed with SICU attending physician Nato Bray MD.    Bird Jansen PA-C     [id number]

## 2024-02-22 NOTE — CONSULT NOTE ADULT - ASSESSMENT
78F PMH current smoker , COPD, A-fib  On Eliquis and Plavix, HTN, HLD, consulted for mesenteric ischemia    PLAN  - Plan for OR with general surgery  for mesenteric stent vs. bypass, angiogram  - Consent in chart    Discussed and seen with Dr. Angel Harry on behalf of Dr. Diamond    Vascular Surgery  [phone] (pager)

## 2024-02-22 NOTE — PROGRESS NOTE ADULT - SUBJECTIVE AND OBJECTIVE BOX
Date of service: 02-22-24 @ 18:07      Patient is a 78y old  Female who presents with a chief complaint of                                                              INTERVAL HPI/OVERNIGHT EVENTS:    CT noted   surgery called   REVIEW OF SYSTEMS:   N/V                                                                                                                                                                                                                                                                              Medications:  MEDICATIONS  (STANDING):    MEDICATIONS  (PRN):       Allergies    penicillin (Hives)    Intolerances      Vital Signs Last 24 Hrs  T(C): 37.1 (22 Feb 2024 14:44), Max: 37.1 (22 Feb 2024 08:13)  T(F): 98.8 (22 Feb 2024 12:44), Max: 98.8 (22 Feb 2024 08:13)  HR: 106 (22 Feb 2024 14:44) (75 - 112)  BP: 127/65 (22 Feb 2024 14:44) (127/65 - 151/83)  BP(mean): 104 (22 Feb 2024 14:44) (104 - 104)  RR: 20 (22 Feb 2024 14:44) (18 - 20)  SpO2: 95% (22 Feb 2024 14:44) (94% - 100%)    Parameters below as of 22 Feb 2024 11:43  Patient On (Oxygen Delivery Method): room air      CAPILLARY BLOOD GLUCOSE      POCT Blood Glucose.: 137 mg/dL (21 Feb 2024 18:41)      02-21 @ 07:01  -  02-22 @ 07:00  --------------------------------------------------------  IN: 720 mL / OUT: 0 mL / NET: 720 mL      Physical Exam:    Daily Height in cm: 162.56 (22 Feb 2024 14:44)    Daily   General:  Well appearing, NAD, not cachetic  HEENT:  Nonicteric, PERRLA  CV:  RRR, S1S2   Lungs:  CTA B/L, no wheezes, rales, rhonchi  Abdomen:  Soft, tenderness   Extremities:  no edmea                                                                                                                                                                                                                                                                                             LABS:                               14.7   22.53 )-----------( 236      ( 22 Feb 2024 14:39 )             44.0                      02-22    141  |  105  |  14  ----------------------------<  152<H>  4.5   |  22  |  0.86    Ca    9.4      22 Feb 2024 13:19  Phos  3.1     02-22  Mg     1.6     02-22    TPro  6.9  /  Alb  3.5  /  TBili  0.8  /  DBili  x   /  AST  13  /  ALT  8<L>  /  AlkPhos  77  02-22                       RADIOLOGY & ADDITIONAL TESTS         I personally reviewed: [  ]EKG   [  ]CXR    [  ] CT      A/P:         Discussed with :     Simon consultants' Notes   Time spent :

## 2024-02-22 NOTE — CONSULT NOTE ADULT - ASSESSMENT
77yo female h/o current smoker, COPD, A-fib on Eliquis and Plavix, HTN, HLD presents for syncope from PCPs office yesterday    Abdominal Pain   CT w/ischemic/necrotic small bowel and severe SMA disease  IVF/Abx  pain control   STAT Surgery eval for OR   NPO   discussed above with team    AFib   ppi ppx while on a/c (on hold for OR)    HTN/HLD  per medicine     The plan of care was discussed with the physician assistant and modifications were made to the notation where appropriate.   Differential diagnosis and plan of care discussed with patient after the evaluation  35 minutes spent on total encounter of which more than fifty percent of the encounter was spent counseling and/or coordinating care by the attending physician.    [facility]  Shane Sprague M.D.   [address] [place] [address]  Office: [phone]  Cell: [phone]

## 2024-02-22 NOTE — CHART NOTE - NSCHARTNOTEFT_GEN_A_CORE
POST-OPERATIVE CHECK    SUBJECTIVE  Patient is s/p diagnostic laparoscopy converted to exploratory laparotomy with 20 cm of terminal ileum resection with bowel left in discontinuity and temporary abdominal closure with abthera with mesenteric angiogram via R fem access.  Pt intubated, on ventilator in SICU. Not requiring pressors.    Vital Signs Last 24 Hrs  T(C): 36 (23 Feb 2024 00:00), Max: 43 (22 Feb 2024 19:00)  T(F): 100.4 (23 Feb 2024 00:00), Max: 109.4 (22 Feb 2024 19:00)  HR: 89 (23 Feb 2024 01:00) (79 - 135)  BP: 139/67 (22 Feb 2024 19:00) (127/65 - 151/83)  BP(mean): 96 (22 Feb 2024 19:00) (96 - 104)  RR: 15 (23 Feb 2024 01:00) (14 - 20)  SpO2: 97% (23 Feb 2024 01:00) (94% - 100%)    Parameters below as of 22 Feb 2024 19:00  Patient On (Oxygen Delivery Method): ventilator    O2 Concentration (%): 40  I&O's Detail    21 Feb 2024 07:01  -  22 Feb 2024 07:00  --------------------------------------------------------  IN:    Oral Fluid: 720 mL  Total IN: 720 mL    OUT:  Total OUT: 0 mL    Total NET: 720 mL      22 Feb 2024 07:01  -  23 Feb 2024 02:13  --------------------------------------------------------  IN:    Heparin: 80 mL    IV PiggyBack: 100 mL    IV PiggyBack: 150 mL    Lactated Ringers: 500 mL  Total IN: 830 mL    OUT:    Indwelling Catheter - Urethral (mL): 327 mL  Total OUT: 327 mL    Total NET: 503 mL        piperacillin/tazobactam IVPB.- 3.375  piperacillin/tazobactam IVPB.. 3.375  heparin  Infusion 1600  metoprolol tartrate Injectable 5  piperacillin/tazobactam IVPB.- 3.375  piperacillin/tazobactam IVPB.. 3.375    PAST MEDICAL & SURGICAL HISTORY:  Hypertension      Hypothyroid      Osteoarthritis  knees, back      CAD (coronary artery disease)      CROW (obstructive sleep apnea)  non complaint on CPAP      History of MI (myocardial infarction)  h/o previous MI in 2004 prompted PTCA  with stenting x 2 vessels   last stress/ echo 2019      Heart murmur  dx in childhood      Bilateral hearing loss, unspecified hearing loss type  bilateral aids      Obesity      Mixed stress and urge urinary incontinence      Overactive bladder      S/P ORIF (open reduction internal fixation) fracture  left hip 1962      S/P appendectomy  30 plus years      S/P knee replacement  left 2000      Stented coronary artery  2004 X 2 STENTS      S/P laparotomy  due to adhesions, 30 years ago      H/O dilation and curettage  2/2019 Benign polyp    PHYSICAL EXAM  General: Intubated and sleeping in SICU bed. Not on sedation at time of exam  Pulmonary: PRVC O2 - 40%, PEEP 5, RR 14,   Cardiovascular: Afib HR 70s-90s, SBP 80s-90s, off pressors   Abdominal: soft, abthera vac in place with no strikethrough or saturation. Low volume SSF in vac cannister, Groin access site c/d/i. No hematoma.   Extremities: WWP    LABS                        13.8   22.22 )-----------( 215      ( 22 Feb 2024 19:43 )             41.9     02-22    139  |  106  |  13  ----------------------------<  161<H>  4.3   |  21<L>  |  0.78    Ca    8.5      22 Feb 2024 19:43  Phos  3.8     02-22  Mg     1.8     02-22    TPro  6.2  /  Alb  3.2<L>  /  TBili  1.0  /  DBili  x   /  AST  15  /  ALT  7<L>  /  AlkPhos  69  02-22    PT/INR - ( 22 Feb 2024 19:43 )   PT: 21.2 sec;   INR: 2.06 ratio         PTT - ( 22 Feb 2024 19:43 )  PTT:34.2 sec    ASSESSMENT  78yFemale PMHx COPD, A-fib  On Eliquis and Plavix, HTN, HLD with signs of acute mesenteric ischemia now s/p diagnostic laparoscopy converted to exploratory laparotomy with 20 cm of terminal ileum resection with bowel left in discontinuity and temporary abdominal closure with abthera with mesenteric angiogram via R fem access 02-22.     PLAN  - Diet - NPO/NGT/IVF  - Heparin gtt  - Zosyn  - Metoprolol for rate control  - Plan for RTOR for washout and anastomosis pending clinical course  - Care per SICU

## 2024-02-23 ENCOUNTER — TRANSCRIPTION ENCOUNTER (OUTPATIENT)
Age: 79
End: 2024-02-23

## 2024-02-23 LAB
ALBUMIN SERPL ELPH-MCNC: 3 G/DL — LOW (ref 3.3–5)
ALBUMIN SERPL ELPH-MCNC: 3.2 G/DL — LOW (ref 3.3–5)
ALP SERPL-CCNC: 66 U/L — SIGNIFICANT CHANGE UP (ref 40–120)
ALP SERPL-CCNC: 67 U/L — SIGNIFICANT CHANGE UP (ref 40–120)
ALT FLD-CCNC: 7 U/L — LOW (ref 10–45)
ALT FLD-CCNC: 9 U/L — LOW (ref 10–45)
ANION GAP SERPL CALC-SCNC: 11 MMOL/L — SIGNIFICANT CHANGE UP (ref 5–17)
ANION GAP SERPL CALC-SCNC: 14 MMOL/L — SIGNIFICANT CHANGE UP (ref 5–17)
APTT BLD: 138.8 SEC — CRITICAL HIGH (ref 24.5–35.6)
APTT BLD: 198.5 SEC — CRITICAL HIGH (ref 24.5–35.6)
APTT BLD: >200 SEC — CRITICAL HIGH (ref 24.5–35.6)
AST SERPL-CCNC: 16 U/L — SIGNIFICANT CHANGE UP (ref 10–40)
AST SERPL-CCNC: 22 U/L — SIGNIFICANT CHANGE UP (ref 10–40)
BASE EXCESS BLDV CALC-SCNC: 2.5 MMOL/L — SIGNIFICANT CHANGE UP (ref -2–3)
BILIRUB SERPL-MCNC: 0.8 MG/DL — SIGNIFICANT CHANGE UP (ref 0.2–1.2)
BILIRUB SERPL-MCNC: 0.9 MG/DL — SIGNIFICANT CHANGE UP (ref 0.2–1.2)
BUN SERPL-MCNC: 15 MG/DL — SIGNIFICANT CHANGE UP (ref 7–23)
BUN SERPL-MCNC: 19 MG/DL — SIGNIFICANT CHANGE UP (ref 7–23)
CA-I SERPL-SCNC: 1.21 MMOL/L — SIGNIFICANT CHANGE UP (ref 1.15–1.33)
CALCIUM SERPL-MCNC: 8.8 MG/DL — SIGNIFICANT CHANGE UP (ref 8.4–10.5)
CALCIUM SERPL-MCNC: 8.8 MG/DL — SIGNIFICANT CHANGE UP (ref 8.4–10.5)
CHLORIDE BLDV-SCNC: 104 MMOL/L — SIGNIFICANT CHANGE UP (ref 96–108)
CHLORIDE SERPL-SCNC: 104 MMOL/L — SIGNIFICANT CHANGE UP (ref 96–108)
CHLORIDE SERPL-SCNC: 104 MMOL/L — SIGNIFICANT CHANGE UP (ref 96–108)
CO2 BLDV-SCNC: 30 MMOL/L — HIGH (ref 22–26)
CO2 SERPL-SCNC: 20 MMOL/L — LOW (ref 22–31)
CO2 SERPL-SCNC: 24 MMOL/L — SIGNIFICANT CHANGE UP (ref 22–31)
CREAT SERPL-MCNC: 0.94 MG/DL — SIGNIFICANT CHANGE UP (ref 0.5–1.3)
CREAT SERPL-MCNC: 1.07 MG/DL — SIGNIFICANT CHANGE UP (ref 0.5–1.3)
EGFR: 54 ML/MIN/1.73M2 — LOW
EGFR: 62 ML/MIN/1.73M2 — SIGNIFICANT CHANGE UP
GAS PNL BLDA: SIGNIFICANT CHANGE UP
GAS PNL BLDA: SIGNIFICANT CHANGE UP
GAS PNL BLDV: 135 MMOL/L — LOW (ref 136–145)
GAS PNL BLDV: SIGNIFICANT CHANGE UP
GLUCOSE BLDC GLUCOMTR-MCNC: 115 MG/DL — HIGH (ref 70–99)
GLUCOSE BLDC GLUCOMTR-MCNC: 121 MG/DL — HIGH (ref 70–99)
GLUCOSE BLDC GLUCOMTR-MCNC: 135 MG/DL — HIGH (ref 70–99)
GLUCOSE BLDC GLUCOMTR-MCNC: 195 MG/DL — HIGH (ref 70–99)
GLUCOSE BLDV-MCNC: 136 MG/DL — HIGH (ref 70–99)
GLUCOSE SERPL-MCNC: 135 MG/DL — HIGH (ref 70–99)
GLUCOSE SERPL-MCNC: 193 MG/DL — HIGH (ref 70–99)
HCO3 BLDV-SCNC: 28 MMOL/L — SIGNIFICANT CHANGE UP (ref 22–29)
HCT VFR BLD CALC: 41.6 % — SIGNIFICANT CHANGE UP (ref 34.5–45)
HCT VFR BLDA CALC: 41 % — SIGNIFICANT CHANGE UP (ref 34.5–46.5)
HGB BLD CALC-MCNC: 13.8 G/DL — SIGNIFICANT CHANGE UP (ref 11.7–16.1)
HGB BLD-MCNC: 14 G/DL — SIGNIFICANT CHANGE UP (ref 11.5–15.5)
HOROWITZ INDEX BLDV+IHG-RTO: 21 — SIGNIFICANT CHANGE UP
INR BLD: 2.39 RATIO — HIGH (ref 0.85–1.18)
INR BLD: 2.88 RATIO — HIGH (ref 0.85–1.18)
INR BLD: 2.9 RATIO — HIGH (ref 0.85–1.18)
LACTATE BLDV-MCNC: 1.7 MMOL/L — SIGNIFICANT CHANGE UP (ref 0.5–2)
MAGNESIUM SERPL-MCNC: 2.3 MG/DL — SIGNIFICANT CHANGE UP (ref 1.6–2.6)
MAGNESIUM SERPL-MCNC: 2.3 MG/DL — SIGNIFICANT CHANGE UP (ref 1.6–2.6)
MCHC RBC-ENTMCNC: 32.4 PG — SIGNIFICANT CHANGE UP (ref 27–34)
MCHC RBC-ENTMCNC: 33.7 GM/DL — SIGNIFICANT CHANGE UP (ref 32–36)
MCV RBC AUTO: 96.3 FL — SIGNIFICANT CHANGE UP (ref 80–100)
NRBC # BLD: 0 /100 WBCS — SIGNIFICANT CHANGE UP (ref 0–0)
PCO2 BLDV: 48 MMHG — HIGH (ref 39–42)
PH BLDV: 7.38 — SIGNIFICANT CHANGE UP (ref 7.32–7.43)
PHOSPHATE SERPL-MCNC: 3.9 MG/DL — SIGNIFICANT CHANGE UP (ref 2.5–4.5)
PHOSPHATE SERPL-MCNC: 4.8 MG/DL — HIGH (ref 2.5–4.5)
PLATELET # BLD AUTO: 212 K/UL — SIGNIFICANT CHANGE UP (ref 150–400)
PO2 BLDV: 30 MMHG — SIGNIFICANT CHANGE UP (ref 25–45)
POTASSIUM BLDV-SCNC: 4.3 MMOL/L — SIGNIFICANT CHANGE UP (ref 3.5–5.1)
POTASSIUM SERPL-MCNC: 4.2 MMOL/L — SIGNIFICANT CHANGE UP (ref 3.5–5.3)
POTASSIUM SERPL-MCNC: 4.4 MMOL/L — SIGNIFICANT CHANGE UP (ref 3.5–5.3)
POTASSIUM SERPL-SCNC: 4.2 MMOL/L — SIGNIFICANT CHANGE UP (ref 3.5–5.3)
POTASSIUM SERPL-SCNC: 4.4 MMOL/L — SIGNIFICANT CHANGE UP (ref 3.5–5.3)
PROT SERPL-MCNC: 6.2 G/DL — SIGNIFICANT CHANGE UP (ref 6–8.3)
PROT SERPL-MCNC: 6.2 G/DL — SIGNIFICANT CHANGE UP (ref 6–8.3)
PROTHROM AB SERPL-ACNC: 25.6 SEC — HIGH (ref 9.5–13)
PROTHROM AB SERPL-ACNC: 29.3 SEC — HIGH (ref 9.5–13)
PROTHROM AB SERPL-ACNC: 30.9 SEC — HIGH (ref 9.5–13)
RBC # BLD: 4.32 M/UL — SIGNIFICANT CHANGE UP (ref 3.8–5.2)
RBC # FLD: 13.3 % — SIGNIFICANT CHANGE UP (ref 10.3–14.5)
SAO2 % BLDV: 46.9 % — LOW (ref 67–88)
SODIUM SERPL-SCNC: 138 MMOL/L — SIGNIFICANT CHANGE UP (ref 135–145)
SODIUM SERPL-SCNC: 139 MMOL/L — SIGNIFICANT CHANGE UP (ref 135–145)
WBC # BLD: 25.8 K/UL — HIGH (ref 3.8–10.5)
WBC # FLD AUTO: 25.8 K/UL — HIGH (ref 3.8–10.5)

## 2024-02-23 PROCEDURE — 93010 ELECTROCARDIOGRAM REPORT: CPT

## 2024-02-23 PROCEDURE — 71045 X-RAY EXAM CHEST 1 VIEW: CPT | Mod: 26

## 2024-02-23 PROCEDURE — 99232 SBSQ HOSP IP/OBS MODERATE 35: CPT

## 2024-02-23 PROCEDURE — 99291 CRITICAL CARE FIRST HOUR: CPT | Mod: 57

## 2024-02-23 PROCEDURE — 99232 SBSQ HOSP IP/OBS MODERATE 35: CPT | Mod: 24

## 2024-02-23 RX ORDER — SODIUM CHLORIDE 9 MG/ML
1000 INJECTION, SOLUTION INTRAVENOUS
Refills: 0 | Status: DISCONTINUED | OUTPATIENT
Start: 2024-02-23 | End: 2024-02-26

## 2024-02-23 RX ORDER — PIPERACILLIN AND TAZOBACTAM 4; .5 G/20ML; G/20ML
3.38 INJECTION, POWDER, LYOPHILIZED, FOR SOLUTION INTRAVENOUS EVERY 8 HOURS
Refills: 0 | Status: COMPLETED | OUTPATIENT
Start: 2024-02-23 | End: 2024-02-28

## 2024-02-23 RX ORDER — HYDROMORPHONE HYDROCHLORIDE 2 MG/ML
0.25 INJECTION INTRAMUSCULAR; INTRAVENOUS; SUBCUTANEOUS
Refills: 0 | Status: DISCONTINUED | OUTPATIENT
Start: 2024-02-23 | End: 2024-02-25

## 2024-02-23 RX ORDER — METOPROLOL TARTRATE 50 MG
2.5 TABLET ORAL EVERY 6 HOURS
Refills: 0 | Status: DISCONTINUED | OUTPATIENT
Start: 2024-02-23 | End: 2024-02-23

## 2024-02-23 RX ORDER — METOPROLOL TARTRATE 50 MG
5 TABLET ORAL ONCE
Refills: 0 | Status: COMPLETED | OUTPATIENT
Start: 2024-02-23 | End: 2024-02-23

## 2024-02-23 RX ORDER — SODIUM CHLORIDE 9 MG/ML
500 INJECTION, SOLUTION INTRAVENOUS ONCE
Refills: 0 | Status: COMPLETED | OUTPATIENT
Start: 2024-02-23 | End: 2024-02-23

## 2024-02-23 RX ORDER — HEPARIN SODIUM 5000 [USP'U]/ML
1000 INJECTION INTRAVENOUS; SUBCUTANEOUS
Qty: 25000 | Refills: 0 | Status: DISCONTINUED | OUTPATIENT
Start: 2024-02-23 | End: 2024-02-24

## 2024-02-23 RX ORDER — HEPARIN SODIUM 5000 [USP'U]/ML
1300 INJECTION INTRAVENOUS; SUBCUTANEOUS
Qty: 25000 | Refills: 0 | Status: DISCONTINUED | OUTPATIENT
Start: 2024-02-23 | End: 2024-02-23

## 2024-02-23 RX ORDER — NICOTINE POLACRILEX 2 MG
1 GUM BUCCAL DAILY
Refills: 0 | Status: DISCONTINUED | OUTPATIENT
Start: 2024-02-23 | End: 2024-02-27

## 2024-02-23 RX ORDER — METOPROLOL TARTRATE 50 MG
5 TABLET ORAL EVERY 6 HOURS
Refills: 0 | Status: DISCONTINUED | OUTPATIENT
Start: 2024-02-23 | End: 2024-02-23

## 2024-02-23 RX ORDER — SODIUM CHLORIDE 9 MG/ML
1000 INJECTION, SOLUTION INTRAVENOUS
Refills: 0 | Status: DISCONTINUED | OUTPATIENT
Start: 2024-02-23 | End: 2024-02-23

## 2024-02-23 RX ORDER — FUROSEMIDE 40 MG
40 TABLET ORAL ONCE
Refills: 0 | Status: COMPLETED | OUTPATIENT
Start: 2024-02-23 | End: 2024-02-23

## 2024-02-23 RX ORDER — METOPROLOL TARTRATE 50 MG
5 TABLET ORAL EVERY 4 HOURS
Refills: 0 | Status: DISCONTINUED | OUTPATIENT
Start: 2024-02-23 | End: 2024-02-25

## 2024-02-23 RX ADMIN — Medication 1 PATCH: at 12:04

## 2024-02-23 RX ADMIN — HEPARIN SODIUM 13 UNIT(S)/HR: 5000 INJECTION INTRAVENOUS; SUBCUTANEOUS at 04:47

## 2024-02-23 RX ADMIN — Medication 120 MICROGRAM(S): at 21:24

## 2024-02-23 RX ADMIN — HEPARIN SODIUM 8 UNIT(S)/HR: 5000 INJECTION INTRAVENOUS; SUBCUTANEOUS at 19:00

## 2024-02-23 RX ADMIN — HYDROMORPHONE HYDROCHLORIDE 0.5 MILLIGRAM(S): 2 INJECTION INTRAMUSCULAR; INTRAVENOUS; SUBCUTANEOUS at 09:30

## 2024-02-23 RX ADMIN — HEPARIN SODIUM 8 UNIT(S)/HR: 5000 INJECTION INTRAVENOUS; SUBCUTANEOUS at 19:36

## 2024-02-23 RX ADMIN — Medication 2.5 MILLIGRAM(S): at 09:45

## 2024-02-23 RX ADMIN — Medication 1 PATCH: at 19:47

## 2024-02-23 RX ADMIN — PANTOPRAZOLE SODIUM 40 MILLIGRAM(S): 20 TABLET, DELAYED RELEASE ORAL at 05:10

## 2024-02-23 RX ADMIN — SODIUM CHLORIDE 100 MILLILITER(S): 9 INJECTION, SOLUTION INTRAVENOUS at 07:39

## 2024-02-23 RX ADMIN — SODIUM CHLORIDE 2000 MILLILITER(S): 9 INJECTION, SOLUTION INTRAVENOUS at 05:08

## 2024-02-23 RX ADMIN — SODIUM CHLORIDE 2000 MILLILITER(S): 9 INJECTION, SOLUTION INTRAVENOUS at 04:10

## 2024-02-23 RX ADMIN — HYDROMORPHONE HYDROCHLORIDE 0.5 MILLIGRAM(S): 2 INJECTION INTRAMUSCULAR; INTRAVENOUS; SUBCUTANEOUS at 09:45

## 2024-02-23 RX ADMIN — CHLORHEXIDINE GLUCONATE 1 APPLICATION(S): 213 SOLUTION TOPICAL at 05:11

## 2024-02-23 RX ADMIN — PIPERACILLIN AND TAZOBACTAM 25 GRAM(S): 4; .5 INJECTION, POWDER, LYOPHILIZED, FOR SOLUTION INTRAVENOUS at 07:39

## 2024-02-23 RX ADMIN — Medication 40 MILLIGRAM(S): at 16:05

## 2024-02-23 RX ADMIN — PIPERACILLIN AND TAZOBACTAM 25 GRAM(S): 4; .5 INJECTION, POWDER, LYOPHILIZED, FOR SOLUTION INTRAVENOUS at 02:11

## 2024-02-23 RX ADMIN — Medication 1 PATCH: at 11:42

## 2024-02-23 RX ADMIN — SODIUM CHLORIDE 30 MILLILITER(S): 9 INJECTION, SOLUTION INTRAVENOUS at 23:21

## 2024-02-23 RX ADMIN — Medication 1 PATCH: at 07:16

## 2024-02-23 RX ADMIN — Medication 5 MILLIGRAM(S): at 17:53

## 2024-02-23 RX ADMIN — Medication 2: at 05:09

## 2024-02-23 RX ADMIN — HEPARIN SODIUM 10 UNIT(S)/HR: 5000 INJECTION INTRAVENOUS; SUBCUTANEOUS at 15:56

## 2024-02-23 RX ADMIN — HYDROMORPHONE HYDROCHLORIDE 0.5 MILLIGRAM(S): 2 INJECTION INTRAMUSCULAR; INTRAVENOUS; SUBCUTANEOUS at 02:12

## 2024-02-23 RX ADMIN — SODIUM CHLORIDE 30 MILLILITER(S): 9 INJECTION, SOLUTION INTRAVENOUS at 15:55

## 2024-02-23 RX ADMIN — PIPERACILLIN AND TAZOBACTAM 25 GRAM(S): 4; .5 INJECTION, POWDER, LYOPHILIZED, FOR SOLUTION INTRAVENOUS at 23:15

## 2024-02-23 RX ADMIN — PIPERACILLIN AND TAZOBACTAM 25 GRAM(S): 4; .5 INJECTION, POWDER, LYOPHILIZED, FOR SOLUTION INTRAVENOUS at 15:55

## 2024-02-23 RX ADMIN — Medication 1000 MILLIGRAM(S): at 03:39

## 2024-02-23 RX ADMIN — CHLORHEXIDINE GLUCONATE 15 MILLILITER(S): 213 SOLUTION TOPICAL at 05:11

## 2024-02-23 RX ADMIN — HYDROMORPHONE HYDROCHLORIDE 0.5 MILLIGRAM(S): 2 INJECTION INTRAMUSCULAR; INTRAVENOUS; SUBCUTANEOUS at 02:27

## 2024-02-23 RX ADMIN — Medication 5 MILLIGRAM(S): at 20:16

## 2024-02-23 RX ADMIN — SODIUM CHLORIDE 30 MILLILITER(S): 9 INJECTION, SOLUTION INTRAVENOUS at 19:35

## 2024-02-23 RX ADMIN — HEPARIN SODIUM 13 UNIT(S)/HR: 5000 INJECTION INTRAVENOUS; SUBCUTANEOUS at 07:39

## 2024-02-23 RX ADMIN — Medication 2: at 02:21

## 2024-02-23 RX ADMIN — Medication 400 MILLIGRAM(S): at 03:09

## 2024-02-23 NOTE — CONSULT NOTE ADULT - SUBJECTIVE AND OBJECTIVE BOX
HPI:  78y Female pmh of current smoker, COPD, afib on eliquis/plavix, HTN, HLD who presented to [facility] 2/15/2024 for syncope. Patient reportedly went limp while having blood drawn at PCP office, eyes rolled back, and tongue deviated to the side for a few seconds before regaining consciousness. Patient arrived in ED lethargic but was easily arousable and was AOx4. Patient endorsed abd on exam, no other complaints.      CTH neg, admitted to medicine for EEG/syncope w/u. Had episodes of afib RVR on floor 2/19, responded to IV lopressor. GI consulted for abdominal pain 2/22. There were reports of episodes of abdominal pain and vomiting a few days ago according to the family. CT AP notable for ischemic/necrotic small bowel within the pelvis and severe atherosclerotic disesase of SMA.     On 2-22, patient taken to OR for laparoscopic abd exploration, converted to open exlap for likely dead bowel. 20cm terminal ileum removed, left in discont w/ abthera. Angiogram R fem done intraop w/ findings of celiac, SMA, SWATHI w/o flow, collateral perfusion confirmed. Plan to RTOR in 48hrs. SICU c/s for ventilator management and for hemodynamic monitoring.    Dermatology was consulted for a linear vesicular rash on the patient’s b/l thigh noticed yesterday during a dressing change.    Skin exam: pink vesicular rash on the R buttock      PAST MEDICAL & SURGICAL HISTORY:  Hypertension      Hypothyroid      Osteoarthritis  knees, back      CAD (coronary artery disease)      CROW (obstructive sleep apnea)  non complaint on CPAP      History of MI (myocardial infarction)  h/o previous MI in 2004 prompted PTCA  with stenting x 2 vessels   last stress/ echo 2019      Heart murmur  dx in childhood      Bilateral hearing loss, unspecified hearing loss type  bilateral aids      Obesity      Mixed stress and urge urinary incontinence      Overactive bladder      S/P ORIF (open reduction internal fixation) fracture  left hip 1962      S/P appendectomy  30 plus years      S/P knee replacement  left 2000      Stented coronary artery  2004 X 2 STENTS      S/P laparotomy  due to adhesions, 30 years ago      H/O dilation and curettage  2/2019 Benign polyp          REVIEW OF SYSTEMS: unable to assess due to waxing, waning mental status      MEDICATIONS  (STANDING):  chlorhexidine 2% Cloths 1 Application(s) Topical <User Schedule>  heparin  Infusion 1000 Unit(s)/Hr IV Continuous <Continuous>  insulin lispro (ADMELOG) corrective regimen sliding scale   SubCutaneous every 6 hours  lactated ringers. 1000 milliLiter(s) IV Continuous <Continuous>  levothyroxine Injectable 120 MICROGram(s) IV Push at bedtime  metoprolol tartrate Injectable 5 milliGRAM(s) IV Push every 6 hours  nicotine -  14 mG/24Hr(s) Patch 1 Patch Transdermal daily  piperacillin/tazobactam IVPB.. 3.375 Gram(s) IV Intermittent every 8 hours    ALLERGIES: penicillin      Social History:  smoking   no ETOH    FAMILY HISTORY:        VITAL SIGNS LAST 24 HOURS:  T(F): 97.8 (02-23 @ 15:00), Max: 109.4 (02-22 @ 19:00)  HR: 120 (02-23 @ 16:00) (79 - 135)  BP: 125/68 (02-23 @ 16:00) (121/79 - 142/61)  RR: 19 (02-23 @ 16:00) (10 - 19)    ___________________________________  Physical Exam:     The patient was alert and oriented X 1-2, and in no  apparent distress.  OP showed no ulcerations  There was no visible lymphadenopathy.  Conjunctiva were non injected  There was no clubbing or edema of extremities.  The scalp, hair, face, eyebrows, lips, OP, neck, chest, back,   extremities X 4, nails were examined.  There was no hyperhidrosis or bromhidrosis.    Of note on skin exam:   see HPI    LABS:                        14.0   25.80 )-----------( 212      ( 23 Feb 2024 02:17 )             41.6     02-23    139  |  104  |  19  ----------------------------<  135<H>  4.2   |  24  |  1.07    Ca    8.8      23 Feb 2024 16:05  Phos  3.9     02-23  Mg     2.3     02-23    TPro  6.2  /  Alb  3.0<L>  /  TBili  0.8  /  DBili  x   /  AST  22  /  ALT  9<L>  /  AlkPhos  66  02-23    PT/INR - ( 23 Feb 2024 10:31 )   PT: 30.9 sec;   INR: 2.90 ratio         PTT - ( 23 Feb 2024 10:31 )  PTT:198.5 sec  Urinalysis Basic - ( 23 Feb 2024 16:05 )    Color: x / Appearance: x / SG: x / pH: x  Gluc: 135 mg/dL / Ketone: x  / Bili: x / Urobili: x   Blood: x / Protein: x / Nitrite: x   Leuk Esterase: x / RBC: x / WBC x   Sq Epi: x / Non Sq Epi: x / Bacteria: x

## 2024-02-23 NOTE — PROGRESS NOTE ADULT - SUBJECTIVE AND OBJECTIVE BOX
ACS SURGERY DAILY PROGRESS NOTE:       24 SICU EVENTS:  24 HOUR EVENTS:  - post op afib RVR to 150s x2, 5m IV lopressor x2 w/ response  - bcx sent  - heparin ggt initiated for intraop angiogram findings      SUBJECTIVE/ROS: Patient seen and examined at bedside.  Intubated.          MEDICATIONS  (STANDING):  chlorhexidine 0.12% Liquid 15 milliLiter(s) Oral Mucosa every 12 hours  chlorhexidine 2% Cloths 1 Application(s) Topical <User Schedule>  heparin  Infusion 1300 Unit(s)/Hr (13 mL/Hr) IV Continuous <Continuous>  insulin lispro (ADMELOG) corrective regimen sliding scale   SubCutaneous every 6 hours  lactated ringers. 1000 milliLiter(s) (100 mL/Hr) IV Continuous <Continuous>  levothyroxine Injectable 120 MICROGram(s) IV Push at bedtime  metoprolol tartrate Injectable 5 milliGRAM(s) IV Push every 6 hours  nicotine -  14 mG/24Hr(s) Patch 1 Patch Transdermal daily  pantoprazole  Injectable 40 milliGRAM(s) IV Push every 24 hours  piperacillin/tazobactam IVPB.. 3.375 Gram(s) IV Intermittent every 8 hours    MEDICATIONS  (PRN):  acetaminophen   IVPB .. 1000 milliGRAM(s) IV Intermittent every 6 hours PRN Mild Pain (1 - 3)  HYDROmorphone  Injectable 0.5 milliGRAM(s) IV Push every 3 hours PRN Breakthrough pain      OBJECTIVE:    Vital Signs Last 24 Hrs  T(C): 32 (23 Feb 2024 07:00), Max: 43 (22 Feb 2024 19:00)  T(F): 89.6 (23 Feb 2024 07:00), Max: 109.4 (22 Feb 2024 19:00)  HR: 102 (23 Feb 2024 07:30) (79 - 135)  BP: 135/87 (23 Feb 2024 07:30) (127/65 - 139/67)  BP(mean): 105 (23 Feb 2024 07:30) (96 - 105)  RR: 12 (23 Feb 2024 07:30) (10 - 20)  SpO2: 98% (23 Feb 2024 07:30) (94% - 100%)    Parameters below as of 23 Feb 2024 07:00  Patient On (Oxygen Delivery Method): ventilator    O2 Concentration (%): 40        I&O's Detail    22 Feb 2024 07:01  -  23 Feb 2024 07:00  --------------------------------------------------------  IN:    Enteral Tube Flush: 40 mL    Heparin: 112 mL    Heparin: 32.5 mL    IV PiggyBack: 200 mL    IV PiggyBack: 250 mL    Lactated Ringers: 1100 mL    Lactated Ringers Bolus: 1000 mL  Total IN: 2734.5 mL    OUT:    Indwelling Catheter - Urethral (mL): 437 mL    Nasogastric/Oral tube (mL): 260 mL    VAC (Vacuum Assisted Closure) System (mL): 200 mL  Total OUT: 897 mL    Total NET: 1837.5 mL      23 Feb 2024 07:01  -  23 Feb 2024 09:11  --------------------------------------------------------  IN:    IV PiggyBack: 25 mL  Total IN: 25 mL    OUT:    Indwelling Catheter - Urethral (mL): 10 mL  Total OUT: 10 mL    Total NET: 15 mL        PHYSICAL EXAM  General: Intubated, NGT  Respiratory: mechanical ventilation  Abdominal: soft, abthera vac in place with no strikethrough or saturation. Low volume SSF in vac cannister, Groin access site c/d/i. No hematoma.   Extremities: no gross deformities      LABS:                        14.0   25.80 )-----------( 212      ( 23 Feb 2024 02:17 )             41.6     02-23    138  |  104  |  15  ----------------------------<  193<H>  4.4   |  20<L>  |  0.94    Ca    8.8      23 Feb 2024 02:17  Phos  4.8     02-23  Mg     2.3     02-23    TPro  6.2  /  Alb  3.2<L>  /  TBili  0.9  /  DBili  x   /  AST  16  /  ALT  7<L>  /  AlkPhos  67  02-23    PT/INR - ( 23 Feb 2024 02:17 )   PT: 29.3 sec;   INR: 2.88 ratio         PTT - ( 23 Feb 2024 02:17 )  PTT:>200.0 sec  Urinalysis Basic - ( 23 Feb 2024 02:17 )    Color: x / Appearance: x / SG: x / pH: x  Gluc: 193 mg/dL / Ketone: x  / Bili: x / Urobili: x   Blood: x / Protein: x / Nitrite: x   Leuk Esterase: x / RBC: x / WBC x   Sq Epi: x / Non Sq Epi: x / Bacteria: x

## 2024-02-23 NOTE — AIRWAY REMOVAL NOTE  ADULT & PEDS - ARTIFICAL AIRWAY REMOVAL COMMENTS
Written order for extubation verified. The patient was identified by full name and birth date compared to the identification band. Present during the procedure was Dulce Maria CABRERA. Statement Selected

## 2024-02-23 NOTE — PROGRESS NOTE ADULT - ASSESSMENT
78yFemale PMHx COPD, A-fib  On Eliquis and Plavix, HTN, HLD with signs of acute mesenteric ischemia now s/p diagnostic laparoscopy converted to exploratory laparotomy with 20 cm of terminal ileum resection with bowel left in discontinuity and temporary abdominal closure with abthera with mesenteric angiogram via R fem access 02-22 findings of celiac, SMA, SWATHI w/o flow, collateral perfusion confirmed.    PLAN  - Diet - NPO/NGT/IVF  - Heparin gtt  - Zosyn  - Metoprolol for rate control  - Plan for eventual RTOR pending clinical course  - Care per SICU      ACS/Trauma   [phone]

## 2024-02-23 NOTE — PROGRESS NOTE ADULT - ASSESSMENT
78-year-old female with PMH current smoker , COPD, A-fib  On Eliquis and Plavix, HTN, HLD presents for syncope from PCPs office yesterday.     Patient was at normal checkup for blood work and was sitting on the table.  Daughter at bedside states that patient suddenly went limp while she was sitting on the table, her eyes rolled back, and her tongue turned to 1 side and this lasted for couple of seconds and then the patient regained consciousness.  Patient had no postictal period.  No tongue biting.  Patient does not remember these events.   pt too lethargic  , though arousable  knows she is in NS   denies any sx   but tenderneness on abd exam     called daughter : she thinks she might have taken double dose   pt seems to go few days without sleep and then sleeps all day   oldest sister just passed away in oct   questionable underlying dementia   vomitting episode two days ago    c/o abd pain   no fever   no chills   no urinary complaints       ischemia Bowel :  s/p OR   intubated   appreciate SICU care       Sycnope :  neuro checks   ct head : negative   EEG: NL  neuro input  noted .. no agitation   TTE  : noted : NL EF   pt with intermittent chest discomfort.. no recent ischemic ballesteros .. d.w   op cardiology ..  d/w    check orthostatics: negative    and overnight O2 sat    afib :  AC : on heparin   cont tele   afib with rVR   was on op BB toprol xl 50   will cont current bb      lehtargy /encephalopathy : CT head negative   improved and seems at baseline   EEG pending : negative       HTN:  hypotensive in ed..brandee sec to medication error ( pt might have taken double dose)   imrpoved     PT /OOB         discussed with derek ACP :   she said she never got a "straight answer from mother " i n past   at this time full code

## 2024-02-23 NOTE — PROGRESS NOTE ADULT - SUBJECTIVE AND OBJECTIVE BOX
Date of service: 02-23-24 @ 14:56      Patient is a 78y old  Female who presents with a chief complaint of "78y Female pmh of current smoker, COPD, afib on eliquis/plavix, HTN, HLD who presented to [facility] 2/15/[facility] for syncope.  GI consulted for abdominal pain 2/22. There were reports of episodes of abdominal pain and vomiting a few days ago according to the family. CT AP notable for ischemic/necrotic small bowel within the pelvis and severe atherosclerotic disesase of SMA. Patient taken to OR for laparoscopic abd exploration, converted to open exlap for likely dead bowel. 20cm terminal ileum removed, left in discont w/ abthera. Angiogram R fem done intraop w/ findings of celiac, SMA, SWATHI w/o flow, collateral perfusion confirmed. Plan to RTOR in 48hrs. SICU c/s for ventilator management and for hemodynamic monitoring."       (23 Feb 2024 10:40)                                                               INTERVAL HPI/OVERNIGHT EVENTS:    REVIEW OF SYSTEMS:     intubated   s/p OR                                                                                                                                                                                                                                                                          Medications:  MEDICATIONS  (STANDING):  chlorhexidine 2% Cloths 1 Application(s) Topical <User Schedule>  heparin  Infusion 1000 Unit(s)/Hr (10 mL/Hr) IV Continuous <Continuous>  insulin lispro (ADMELOG) corrective regimen sliding scale   SubCutaneous every 6 hours  lactated ringers. 1000 milliLiter(s) (100 mL/Hr) IV Continuous <Continuous>  levothyroxine Injectable 120 MICROGram(s) IV Push at bedtime  metoprolol tartrate Injectable 2.5 milliGRAM(s) IV Push every 6 hours  nicotine -  14 mG/24Hr(s) Patch 1 Patch Transdermal daily  pantoprazole  Injectable 40 milliGRAM(s) IV Push every 24 hours  piperacillin/tazobactam IVPB.. 3.375 Gram(s) IV Intermittent every 8 hours    MEDICATIONS  (PRN):  acetaminophen   IVPB .. 1000 milliGRAM(s) IV Intermittent every 6 hours PRN Mild Pain (1 - 3)  HYDROmorphone  Injectable 0.25 milliGRAM(s) IV Push every 3 hours PRN Breakthrough pain       Allergies    penicillin (Hives)    Intolerances      Vital Signs Last 24 Hrs  T(C): 36.8 (23 Feb 2024 13:00), Max: 43 (22 Feb 2024 19:00)  T(F): 98.3 (23 Feb 2024 13:00), Max: 109.4 (22 Feb 2024 19:00)  HR: 115 (23 Feb 2024 13:00) (79 - 135)  BP: 121/79 (23 Feb 2024 13:00) (121/79 - 139/67)  BP(mean): 93 (23 Feb 2024 13:00) (93 - 105)  RR: 17 (23 Feb 2024 13:00) (10 - 17)  SpO2: 98% (23 Feb 2024 13:00) (95% - 100%)    Parameters below as of 23 Feb 2024 13:00  Patient On (Oxygen Delivery Method): nasal cannula  O2 Flow (L/min): 2    CAPILLARY BLOOD GLUCOSE      POCT Blood Glucose.: 135 mg/dL (23 Feb 2024 11:37)  POCT Blood Glucose.: 195 mg/dL (23 Feb 2024 05:06)      02-22 @ 07:01  - 02-23 @ 07:00  --------------------------------------------------------  IN: 2734.5 mL / OUT: 897 mL / NET: 1837.5 mL    02-23 @ 07:01  - 02-23 @ 14:56  --------------------------------------------------------  IN: 922 mL / OUT: 120 mL / NET: 802 mL      Physical Exam:    Daily Height in cm: 162.6 (22 Feb 2024 18:55)    Daily   General:  intubated   HEENT:  Nonicteric, PERRLA  CV:  RRR, S1S2   Lungs:  CTA B/L, no wheezes, rales, rhonchi  Abdomen:  Soft, non-tender, no distended, positive BS  Extremitiesedema                                                                                                                                                                                                                                                                                 LABS:                               14.0   25.80 )-----------( 212      ( 23 Feb 2024 02:17 )             41.6                      02-23    138  |  104  |  15  ----------------------------<  193<H>  4.4   |  20<L>  |  0.94    Ca    8.8      23 Feb 2024 02:17  Phos  4.8     02-23  Mg     2.3     02-23    TPro  6.2  /  Alb  3.2<L>  /  TBili  0.9  /  DBili  x   /  AST  16  /  ALT  7<L>  /  AlkPhos  67  02-23                       RADIOLOGY & ADDITIONAL TESTS         I personally reviewed: [  ]EKG   [  ]CXR    [  ] CT      A/P:         Discussed with :     Simon consultants' Notes   Time spent :

## 2024-02-23 NOTE — PROGRESS NOTE ADULT - ASSESSMENT
79yo female h/o current smoker, COPD, A-fib on Eliquis and Plavix, HTN, HLD presents for syncope from PCPs office yesterday    1. acute mesenteric ischemia, s/p exploratory laparotomy with SB resection   plan for RTOR today  on Zosyn   care per SICU    2. SMA stenosis   on heparin ggt  vacular surgery following     3. AFib   PPI for ppx while on AC      I had a prolonged conversation with the patient regarding the hospital course, differential diagnosis, results of diagnostic tests this far, and therapeutic modalities available. Plan of care discussed with the patient after the evaluation. Patient expresses a clear understanding of the plan of care.      Shane Enriquez D.O.  Gastroenterology and Hepatology  [address] [place] [address], [address]  [place], [place]  Office: [phone]

## 2024-02-23 NOTE — PROGRESS NOTE ADULT - SUBJECTIVE AND OBJECTIVE BOX
Subjective: Patient seen and examined. No new events except as noted.   remains in ICU   Diagnostic laparoscopy, ischemic bowel noted in RLQ just proximal to TI. Converted to laparotomy. 20cm of terminal ileum resected and bowel left in discontinuity. Abthera placed  - post op afib RVR to 150s x2, 5m IV lopressor x2 w/ response  - bcx sent  - heparin ggt initiated for intraop angiogram findings  REVIEW OF SYSTEMS:    Unable to obtain      MEDICATIONS:  MEDICATIONS  (STANDING):  chlorhexidine 0.12% Liquid 15 milliLiter(s) Oral Mucosa every 12 hours  chlorhexidine 2% Cloths 1 Application(s) Topical <User Schedule>  insulin lispro (ADMELOG) corrective regimen sliding scale   SubCutaneous every 6 hours  lactated ringers. 1000 milliLiter(s) (100 mL/Hr) IV Continuous <Continuous>  levothyroxine Injectable 120 MICROGram(s) IV Push at bedtime  metoprolol tartrate Injectable 2.5 milliGRAM(s) IV Push every 6 hours  nicotine -  14 mG/24Hr(s) Patch 1 Patch Transdermal daily  pantoprazole  Injectable 40 milliGRAM(s) IV Push every 24 hours  piperacillin/tazobactam IVPB.. 3.375 Gram(s) IV Intermittent every 8 hours      PHYSICAL EXAM:  T(C): 32 (02-23-24 @ 07:00), Max: 43 (02-22-24 @ 19:00)  HR: 110 (02-23-24 @ 10:00) (79 - 135)  BP: 135/87 (02-23-24 @ 07:30) (127/65 - 139/67)  RR: 12 (02-23-24 @ 10:00) (10 - 20)  SpO2: 96% (02-23-24 @ 10:00) (94% - 100%)  Wt(kg): --  I&O's Summary    22 Feb 2024 07:01  -  23 Feb 2024 07:00  --------------------------------------------------------  IN: 2734.5 mL / OUT: 897 mL / NET: 1837.5 mL    23 Feb 2024 07:01  -  23 Feb 2024 11:05  --------------------------------------------------------  IN: 552 mL / OUT: 55 mL / NET: 497 mL      Height (cm): 162.6 (02-22 @ 18:55)  Weight (kg): 88.8 (02-22 @ 18:55)  BMI (kg/m2): 33.6 (02-22 @ 18:55)  BSA (m2): 1.94 (02-22 @ 18:55)    Appearance: NAD Intubated NGT  HEENT:   Dry oral mucosa  Lymphatic: No lymphadenopathy , no edema  Cardiovascular: Irregular S1 S2, No JVD, No murmurs , Peripheral pulses palpable 2+ bilaterally  Respiratory: Ventilated  Gastrointestinal: soft, abthera vac in place with no strikethrough or saturation. Low volume SSF in vac cannister, Groin access site c/d/i. No hematoma.   Skin: No rashes, No ecchymoses, No cyanosis, warm to touch  Musculoskeletal: Normal range of motion, normal strength  Psychiatry:  Mood & affect appropriate  Ext: No edema    Mechanical Ventilation: Mode: AC/ CMV (Assist Control/ Continuous Mandatory Ventilation), RR (machine): 14, RR (patient): 14, TV (machine): 450, FiO2: 40, PEEP: 5, ITime: 0.9, MAP: 7.9, PIP: 20  ABG - ( 22 Feb 2024 19:30 )  pH: 7.36  /  pCO2: 42    /  pO2: 133   / HCO3: 24    / Base Excess: -1.8  /  SaO2: 99.2    Lactate: x          Recent Cultures:  Specimen Source: Clean Catch Clean Catch (Midstream), 02-17 @ 06:00; Results   10,000 - 49,000 CFU/mL Streptococcus agalactiae (Group B) Streptococcus  agalactiae (Group B) isolated  Group B streptococci are susceptible to ampicillin,  penicillin and cefazolin, but may be resistant to  erythromycin and clindamycin.  10,000 - 49,000 CFU/mL Coag Negative Staphylococcus "Susceptibilities not  performed"<!>; Gram Stain: --; Organism: --  Specimen Source: Clean Catch Clean Catch (Midstream), 02-16 @ 12:06; Results   No growth; Gram Stain: --; Organism: --      LABS:    CARDIAC MARKERS:  CARDIAC MARKERS ( 21 Feb 2024 19:42 )  x     / x     / 78 U/L / x     / 2.0 ng/mL      Blood Gas Profile - Arterial (02.23.24 @ 10:21)   pH, Arterial: 7.38  pCO2, Arterial: 42 mmHg  pO2, Arterial: 105 mmHg  HCO3, Arterial: 25 mmol/L  Base Excess, Arterial: -0.4 mmol/L  Oxygen Saturation, Arterial: 98.5 %  Total CO2, Arterial: 26 mmol/L  FIO2, Arterial: 30  Blood Gas Source Arterial: Arterial                          14.0   25.80 )-----------( 212      ( 23 Feb 2024 02:17 )             41.6     02-23    138  |  104  |  15  ----------------------------<  193<H>  4.4   |  20<L>  |  0.94    Ca    8.8      23 Feb 2024 02:17  Phos  4.8     02-23  Mg     2.3     02-23    TPro  6.2  /  Alb  3.2<L>  /  TBili  0.9  /  DBili  x   /  AST  16  /  ALT  7<L>  /  AlkPhos  67  02-23      TELEMETRY: 	AF  ECG:  	  RADIOLOGY:   DIAGNOSTIC TESTING:  [ ] Echocardiogram:  [ ]  Catheterization:  [ ] Stress Test:    OTHER:

## 2024-02-23 NOTE — PROGRESS NOTE ADULT - SUBJECTIVE AND OBJECTIVE BOX
Chief Complaint:  Patient is a 78y old  Female who presents with a chief complaint of Mesenteric ischemia (23 Feb 2024 00:31)      Date of service 02-23-24 @ 09:32      Interval Events:   s/p OR, remains intubated    Hospital Medications:  acetaminophen   IVPB .. 1000 milliGRAM(s) IV Intermittent every 6 hours PRN  chlorhexidine 0.12% Liquid 15 milliLiter(s) Oral Mucosa every 12 hours  chlorhexidine 2% Cloths 1 Application(s) Topical <User Schedule>  heparin  Infusion 1300 Unit(s)/Hr IV Continuous <Continuous>  HYDROmorphone  Injectable 0.5 milliGRAM(s) IV Push every 3 hours PRN  insulin lispro (ADMELOG) corrective regimen sliding scale   SubCutaneous every 6 hours  lactated ringers. 1000 milliLiter(s) IV Continuous <Continuous>  levothyroxine Injectable 120 MICROGram(s) IV Push at bedtime  metoprolol tartrate Injectable 5 milliGRAM(s) IV Push every 6 hours  nicotine -  14 mG/24Hr(s) Patch 1 Patch Transdermal daily  pantoprazole  Injectable 40 milliGRAM(s) IV Push every 24 hours  piperacillin/tazobactam IVPB.. 3.375 Gram(s) IV Intermittent every 8 hours        Review of Systems:  General:  No wt loss, fevers, chills, night sweats, fatigue,   Eyes:  Good vision, no reported pain  ENT:  No sore throat, pain, runny nose, dysphagia  CV:  No pain, palpitations, hypo/hypertension  Resp:  No dyspnea, cough, tachypnea, wheezing  GI:  See HPI  :  No pain, bleeding, incontinence, nocturia  Muscle:  No pain, weakness  Neuro:  No weakness, tingling, memory problems  Psych:  No fatigue, insomnia, mood problems, depression  Endocrine:  No polyuria, polydipsia, cold/heat intolerance  Heme:  No petechiae, ecchymosis, easy bruisability  Integumentary:  No rash, edema    PHYSICAL EXAM:   Vital Signs:  Vital Signs Last 24 Hrs  T(C): 32 (23 Feb 2024 07:00), Max: 43 (22 Feb 2024 19:00)  T(F): 89.6 (23 Feb 2024 07:00), Max: 109.4 (22 Feb 2024 19:00)  HR: 102 (23 Feb 2024 07:30) (79 - 135)  BP: 135/87 (23 Feb 2024 07:30) (127/65 - 139/67)  BP(mean): 105 (23 Feb 2024 07:30) (96 - 105)  RR: 12 (23 Feb 2024 07:30) (10 - 20)  SpO2: 98% (23 Feb 2024 07:30) (94% - 100%)    Parameters below as of 23 Feb 2024 07:00  Patient On (Oxygen Delivery Method): ventilator    O2 Concentration (%): 40  Daily Height in cm: 162.6 (22 Feb 2024 18:55)    Daily       PHYSICAL EXAM:     GENERAL:  Appears stated age, well-groomed, well-nourished, no distress  HEENT:  NC/AT,  conjunctivae anicteric, clear and pink,   NECK: supple, trachea midline  CHEST:  Full & symmetric excursion, no increased effort, breath sounds clear  HEART:  Regular rhythm, no JVD  ABDOMEN:  Soft, non-tender, non-distended, normoactive bowel sounds,  no masses , no hepatosplenomegaly  EXTREMITIES:  no cyanosis,clubbing or edema  SKIN:  No rash, erythema, or, ecchymoses, no jaundice  NEURO:  Alert, non-focal, no asterixis  PSYCH: Appropriate affect, oriented to place and time  RECTAL: Deferred      LABS Personally reviewed by me:                        14.0   25.80 )-----------( 212      ( 23 Feb 2024 02:17 )             41.6     Mean Cell Volume: 96.3 fl (02-23-24 @ 02:17)    02-23    138  |  104  |  15  ----------------------------<  193<H>  4.4   |  20<L>  |  0.94    Ca    8.8      23 Feb 2024 02:17  Phos  4.8     02-23  Mg     2.3     02-23    TPro  6.2  /  Alb  3.2<L>  /  TBili  0.9  /  DBili  x   /  AST  16  /  ALT  7<L>  /  AlkPhos  67  02-23    LIVER FUNCTIONS - ( 23 Feb 2024 02:17 )  Alb: 3.2 g/dL / Pro: 6.2 g/dL / ALK PHOS: 67 U/L / ALT: 7 U/L / AST: 16 U/L / GGT: x           PT/INR - ( 23 Feb 2024 02:17 )   PT: 29.3 sec;   INR: 2.88 ratio         PTT - ( 23 Feb 2024 02:17 )  PTT:>200.0 sec  Urinalysis Basic - ( 23 Feb 2024 02:17 )    Color: x / Appearance: x / SG: x / pH: x  Gluc: 193 mg/dL / Ketone: x  / Bili: x / Urobili: x   Blood: x / Protein: x / Nitrite: x   Leuk Esterase: x / RBC: x / WBC x   Sq Epi: x / Non Sq Epi: x / Bacteria: x                              14.0   25.80 )-----------( 212      ( 23 Feb 2024 02:17 )             41.6                         13.8   22.22 )-----------( 215      ( 22 Feb 2024 19:43 )             41.9                         14.7   22.53 )-----------( 236      ( 22 Feb 2024 14:39 )             44.0                         15.8   17.62 )-----------( 241      ( 22 Feb 2024 06:25 )             49.2                         15.3   13.36 )-----------( 245      ( 21 Feb 2024 19:42 )             46.9       Imaging personally reviewed by me:

## 2024-02-23 NOTE — PRE PROCEDURE NOTE - PRE PROCEDURE EVALUATION
SURGERY PRE-OP NOTE    Preop Diagnosis:  ischemic bowel   Planned Procedure: Re-exploration, possible bowel resection, possible ostomy, possible anastomosis, possible closure, possible abthera  Surgeon: Dr. Keith Jefferson       Pertinent Labs:                            14.0   25.80 )-----------( 212      ( 23 Feb 2024 02:17 )             41.6       02-23    138  |  104  |  15  ----------------------------<  193<H>  4.4   |  20<L>  |  0.94    Ca    8.8      23 Feb 2024 02:17  Phos  4.8     02-23  Mg     2.3     02-23    TPro  6.2  /  Alb  3.2<L>  /  TBili  0.9  /  DBili  x   /  AST  16  /  ALT  7<L>  /  AlkPhos  67  02-23      PT/INR - ( 23 Feb 2024 10:31 )   PT: 30.9 sec;   INR: 2.90 ratio         PTT - ( 23 Feb 2024 10:31 )  PTT:198.5 sec    -----------------------------------------------------------------------------------------------------------------------------------  Type and Screen:  Type + Screen (02.22.24 @ 12:29)   ABO Interpretation: O  Rh Interpretation: Positive  Antibody Screen: Negative  -----------------------------------------------------------------------------------------------------------------------------------  CXR:  < from: Xray Chest 1 View- PORTABLE-Urgent (Xray Chest 1 View- PORTABLE-Urgent .) (02.23.24 @ 11:58) >  FINDINGS:  Enteric tube coursing below the diaphragm with side port in the stomach.  Endotracheal tube terminating above the fernando.  Esophageal temperature probe in the upper esophagus.  The heart is not accurately assessed in this AP projection.  No focal consolidations.  There is no pneumothorax or pleural effusion.  No acute bony abnormality.    IMPRESSION:  Enteric tube coursing below the diaphragm with side port in the stomach.    --- End of Report ---    < end of copied text >  -----------------------------------------------------------------------------------------------------------------------------------  EKG:  < from: 12 Lead ECG (02.21.24 @ 17:28) >  Ventricular Rate 100 BPM    QRS Duration 90 ms    Q-T Interval 362 ms    QTC Calculation(Bazett) 466 ms    R Axis 52 degrees    T Axis 13 degrees    Diagnosis Line ATRIAL FIBRILLATION  LOW VOLTAGE QRS  ABNORMAL ECG  WHEN COMPARED WITH ECG OF 27-DEC-2013 13:21,  SIGNIFICANT CHANGES HAVE OCCURRED  Confirmed by DAKOTAH BLANC, OLGA JOAQUIN ([id number]) on 2/22/2024 3:43:33 PM    < end of copied text >      -----------------------------------------------------------------------------------------------------------------------------------  Echo:  < from: TTE Limited W or WO Ultrasound Enhancing Agent (02.21.24 @ 11:54) >  CONCLUSIONS:      1. Left ventricular cavity is normal in size. Left ventricular wall thickness is normal. There are no regional wall motion abnormalities seen.   2. Unable to assess left ventricular diastolic function due to insufficient data. Analysis of left ventricular diastolic function and filling pressure is made challenging by the presence of atrial fibrillation.   3. Normal right ventricular cavity size, with wall thickness, and normal systolic function.   4. The left atrium is severely dilated.   5. The right atrium is dilated.   6. Mild mitral regurgitation at a blood pressure of 128/79 mmHg.   7. Estimated pulmonary artery systolic pressure is 12 mmHg.   8. No pericardial effusion seen.   9. No prior echocardiogram is available for comparison.    < end of copied text >    -----------------------------------------------------------------------------------------------------------------------------------  Assessment:  78yFemale PMHx COPD, A-fib  On Eliquis and Plavix, HTN, HLD with signs of acute mesenteric ischemia now s/p diagnostic laparoscopy converted to exploratory laparotomy with 20 cm of terminal ileum resection with bowel left in discontinuity and temporary abdominal closure with abthera with mesenteric angiogram via R fem access 02-22 findings of celiac, SMA, SWATHI w/o flow, collateral perfusion confirmed.  plan for RTOR tomorrow.       Plan:  - NPO   - consent to be obtained  - Preop for Re-exploration, possible bowel resection, possible ostomy, possible anastomosis, possible closure, possible abthera with Dr. Jefferson  - pre op labs 4:00 am of 2/24: cbc, bmp, mg, phos, PTT/INR    ACS/Trauma   [phone]

## 2024-02-23 NOTE — DIETITIAN INITIAL EVALUATION ADULT - REASON FOR ADMISSION
"78y Female pmh of current smoker, COPD, afib on eliquis/plavix, HTN, HLD who presented to [facility] 2/15/2024 for syncope.  GI consulted for abdominal pain 2/22. There were reports of episodes of abdominal pain and vomiting a few days ago according to the family. CT AP notable for ischemic/necrotic small bowel within the pelvis and severe atherosclerotic disesase of SMA. Patient taken to OR for laparoscopic abd exploration, converted to open exlap for likely dead bowel. 20cm terminal ileum removed, left in discont w/ abthera. Angiogram R fem done intraop w/ findings of celiac, SMA, SWATHI w/o flow, collateral perfusion confirmed. Plan to RTOR in 48hrs. SICU c/s for ventilator management and for hemodynamic monitoring."

## 2024-02-23 NOTE — PROGRESS NOTE ADULT - ASSESSMENT
ASSESSMENT: LEFTY AKBAR is a 78y Female pmh of current smoker, COPD, afib on eliquis/plavix, HTN, HLD who presented to [facility] 2/15/2024 for syncope. Patient reportedly went limp while having blood drawn at PCP office, eyes rolled back, and tongue deviated to the side for a few seconds before regaining consciousness. Patient arrived in ED lethargic but was easily arousable and was AOx4. Patient endorsed abd on exam, no other complaints.  CTH neg, admitted to medicine for EEG/syncope w/u. Had episodes of afib RVR on floor 2/19, responded to IV lopressor. GI consulted for abdominal pain 2/22. There were reports of episodes of abdominal pain and vomiting a few days ago according to the family. CT AP notable for ischemic/necrotic small bowel within the pelvis and severe atherosclerotic disesase of SMA. Patient taken to OR for laparoscopic abd exploration, converted to open exlap for likely dead bowel. 20cm terminal ileum removed, left in discont w/ abthera. Angiogram R fem done intraop w/ findings of celiac, SMA, SWATHI w/o flow, collateral perfusion confirmed. Plan to RTOR in 48hrs. SICU c/s for ventilator management and for hemodynamic monitoring.    PLAN:    Neuro:  - Sedation while intubated w/ precedex  - Multimodal pain control w/ dilaudid, IV tylenol    Resp: pmh COPD  - Mechanical ventilation /14/5/40  - plan to SBT as tolerated in AM?    CV:  - off pressors, none required intraop  - developed afib RVR HR 150s, BP 160s/90s, 5 IV lopressor given in SICU x2  - 5mg IV lopressor q6 w/ hold parameters  - lact 1.2    GI: s/p 20cm TI SBR w/ abthera, abd open  - plan to RTOR ~48hrs  - celiac, SMA, SWATHI w/ no flow demonstrated on angiogram  - trend lactate  to assess for additional ischemia  - Diet: NPO, OGT  - Protonix for stress ulcer prophylaxis    Renal:  - Monitor I&Os and electrolytes w/ repletions as necessary  - LR @ 100  - amaya in place, monitor UOP    Heme:  - Monitor CBC and coags  - heparin ggt initiated per surgical team at 16/hr iso angiogram findings  - ptt q6hrs on heparin ggt    ID:   - Monitor for clinical evidence of active infection  - Monitor WBC, temperature, and procalcitonin  - Empiric antibiotics w/ zosyn    Endo:   - Monitor glucose  - home synthroid    Code Status: Full    Disposition: SICU      Bird Jansen PA-C     [id number]

## 2024-02-23 NOTE — DIETITIAN INITIAL EVALUATION ADULT - OTHER CALCULATIONS
Defer fluids to Team   [place]: 1461kcal/day (increase estimated energy needs upon extubation)

## 2024-02-23 NOTE — ADVANCED PRACTICE NURSE CONSULT - ASSESSMENT
The pt was encountered in the SICU- Mrs Gooden was intubated but awake. She is on a Progress support surface and is being assisted with T&P using the charlotte-form positioner. There is a [place]-Lock pillow at bedside to float the heels.  As the pt presents with a skin injury, will request a nutrition consult for further evaluation.  Upon assessment, the pt presents with a wound on the right buttocks- it presents in a horseshoe-like pattern and measures approximately 19cmx 3cm q0ku-pd has the  appearance of small lesions that have formed into clusters. the loss of epidermal tissue appears to be superficial at this time. there is a small similar patch noted on the left buttocks. suggest that this may be a dermatological issue and not a pressure injury  Discussed with RN that a Dermatology consult may be beneficial RN to discuss with SICU team.

## 2024-02-23 NOTE — PROGRESS NOTE ADULT - SUBJECTIVE AND OBJECTIVE BOX
STATUS POST:  mesenteric angiogram    POST OPERATIVE DAY #: 1    Subjective: patient seen and examined bedside on morning rounds. Patient is intubated, but awake and responsive to verbal instruction. Nursing staff denies any melena, vomiting, fever, or other overnight event.     Vital Signs Last 24 Hrs  T(C): 36.8 (23 Feb 2024 13:00), Max: 43 (22 Feb 2024 19:00)  T(F): 98.3 (23 Feb 2024 13:00), Max: 109.4 (22 Feb 2024 19:00)  HR: 115 (23 Feb 2024 13:00) (79 - 135)  BP: 121/79 (23 Feb 2024 13:00) (121/79 - 139/67)  BP(mean): 93 (23 Feb 2024 13:00) (93 - 105)  RR: 17 (23 Feb 2024 13:00) (10 - 20)  SpO2: 98% (23 Feb 2024 13:00) (95% - 100%)    Parameters below as of 23 Feb 2024 13:00  Patient On (Oxygen Delivery Method): nasal cannula  O2 Flow (L/min): 2    General: Intubated, NGT  Respiratory: mechanical ventilation  Abdominal: soft, abthera vac in place with no strikethrough or saturation. Low volume SSF in vac cannister, Groin access site c/d/i. No hematoma.   Extremities: no gross deformities      I&O's Summary    22 Feb 2024 07:01  -  23 Feb 2024 07:00  --------------------------------------------------------  IN: 2734.5 mL / OUT: 897 mL / NET: 1837.5 mL    23 Feb 2024 07:01  -  23 Feb 2024 13:06  --------------------------------------------------------  IN: 812 mL / OUT: 110 mL / NET: 702 mL      I&O's Detail    22 Feb 2024 07:01  -  23 Feb 2024 07:00  --------------------------------------------------------  IN:    Enteral Tube Flush: 40 mL    Heparin: 112 mL    Heparin: 32.5 mL    IV PiggyBack: 200 mL    IV PiggyBack: 250 mL    Lactated Ringers: 1100 mL    Lactated Ringers Bolus: 1000 mL  Total IN: 2734.5 mL    OUT:    Indwelling Catheter - Urethral (mL): 437 mL    Nasogastric/Oral tube (mL): 260 mL    VAC (Vacuum Assisted Closure) System (mL): 200 mL  Total OUT: 897 mL    Total NET: 1837.5 mL      23 Feb 2024 07:01  -  23 Feb 2024 13:06  --------------------------------------------------------  IN:    Enteral Tube Flush: 40 mL    Heparin: 52 mL    Heparin: 20 mL    IV PiggyBack: 100 mL    Lactated Ringers: 600 mL  Total IN: 812 mL    OUT:    Indwelling Catheter - Urethral (mL): 110 mL  Total OUT: 110 mL    Total NET: 702 mL          MEDICATIONS  (STANDING):  chlorhexidine 2% Cloths 1 Application(s) Topical <User Schedule>  heparin  Infusion 1000 Unit(s)/Hr (10 mL/Hr) IV Continuous <Continuous>  insulin lispro (ADMELOG) corrective regimen sliding scale   SubCutaneous every 6 hours  lactated ringers. 1000 milliLiter(s) (100 mL/Hr) IV Continuous <Continuous>  levothyroxine Injectable 120 MICROGram(s) IV Push at bedtime  metoprolol tartrate Injectable 2.5 milliGRAM(s) IV Push every 6 hours  nicotine -  14 mG/24Hr(s) Patch 1 Patch Transdermal daily  pantoprazole  Injectable 40 milliGRAM(s) IV Push every 24 hours  piperacillin/tazobactam IVPB.. 3.375 Gram(s) IV Intermittent every 8 hours    MEDICATIONS  (PRN):  acetaminophen   IVPB .. 1000 milliGRAM(s) IV Intermittent every 6 hours PRN Mild Pain (1 - 3)  HYDROmorphone  Injectable 0.25 milliGRAM(s) IV Push every 3 hours PRN Breakthrough pain      LABS:                        14.0   25.80 )-----------( 212      ( 23 Feb 2024 02:17 )             41.6     02-23    138  |  104  |  15  ----------------------------<  193<H>  4.4   |  20<L>  |  0.94    Ca    8.8      23 Feb 2024 02:17  Phos  4.8     02-23  Mg     2.3     02-23    TPro  6.2  /  Alb  3.2<L>  /  TBili  0.9  /  DBili  x   /  AST  16  /  ALT  7<L>  /  AlkPhos  67  02-23    PT/INR - ( 23 Feb 2024 10:31 )   PT: 30.9 sec;   INR: 2.90 ratio         PTT - ( 23 Feb 2024 10:31 )  PTT:198.5 sec

## 2024-02-23 NOTE — PROGRESS NOTE ADULT - ATTENDING COMMENTS
s/p mesenteric angio, no options for reconstruction given chronic, extensive atherosclerotic burden  supportive care, optimize intravascular volume, cardiac output.  avoid pressors as possible

## 2024-02-23 NOTE — PROGRESS NOTE ADULT - SUBJECTIVE AND OBJECTIVE BOX
24 HOUR EVENTS:  - post op afib RVR to 150s x2, 5m IV lopressor x2 w/ response  - bcx sent  - heparin ggt initiated for intraop angiogram findings    NEURO  RASS (if intubated): -1		CAM ICU (if concern for delirium):  Exam: sedated while intubated, withdraws to pain  Meds: acetaminophen   IVPB .. 1000 milliGRAM(s) IV Intermittent every 6 hours PRN Mild Pain (1 - 3)  HYDROmorphone  Injectable 0.5 milliGRAM(s) IV Push every 3 hours PRN Breakthrough pain      RESPIRATORY  RR: 14 (02-23-24 @ 00:00) (14 - 20)  SpO2: 97% (02-23-24 @ 00:00) (94% - 100%)  Wt(kg): --  Exam: lungs CTA b/l  Mechanical Ventilation: Mode: AC/ CMV (Assist Control/ Continuous Mandatory Ventilation), RR (machine): 14, RR (patient): 14, TV (machine): 450, FiO2: 40, PEEP: 5, ITime: 0.9, MAP: 7.9, PIP: 20  ABG - ( 22 Feb 2024 19:30 )  pH: 7.36  /  pCO2: 42    /  pO2: 133   / HCO3: 24    / Base Excess: -1.8  /  SaO2: 99.2    Lactate: x                Meds:     CARDIOVASCULAR  HR: 81 (02-23-24 @ 00:00) (79 - 135)  BP: 139/67 (02-22-24 @ 19:00) (127/65 - 151/83)  BP(mean): 96 (02-22-24 @ 19:00) (96 - 104)  ABP: 97/51 (02-23-24 @ 00:00) (96/49 - 158/86)  ABP(mean): 68 (02-23-24 @ 00:00) (67 - 115)  Wt(kg): --  CVP(cm H2O): --  VBG - ( 22 Feb 2024 11:45 )  pH: 7.34  /  pCO2: 47    /  pO2: 36    / HCO3: 25    / Base Excess: -0.9  /  SaO2: 59.1   Lactate: 2.9              Lactate, Blood: 3.4 mmol/L (02-22 @ 11:45)    Exam: normal S1/S2 w/o murmurs or rubs  Cardiac Rhythm: afib, rate 100-120  Perfusion     [x ]Adequate   [ ]Inadequate  Mentation   [x ]Normal       [ ]Reduced  Extremities  [x ]Warm         [ ]Cool  Volume Status [ ]Hypervolemic [x ]Euvolemic [ ]Hypovolemic  Meds: metoprolol tartrate Injectable 5 milliGRAM(s) IV Push every 6 hours      GI/NUTRITION  Exam: abd soft, non distended, open midline exlap incision covered iso abthera vac w/ serosang OP  Diet: NPO  Meds: pantoprazole  Injectable 40 milliGRAM(s) IV Push every 24 hours      GENITOURINARY  I&O's Detail    02-21 @ 07:01  -  02-22 @ 07:00  --------------------------------------------------------  IN:    Oral Fluid: 720 mL  Total IN: 720 mL    OUT:  Total OUT: 0 mL    Total NET: 720 mL      02-22 @ 07:01  -  02-23 @ 00:31  --------------------------------------------------------  IN:    Heparin: 64 mL    IV PiggyBack: 100 mL    IV PiggyBack: 150 mL    Lactated Ringers: 400 mL  Total IN: 714 mL    OUT:    Indwelling Catheter - Urethral (mL): 312 mL  Total OUT: 312 mL    Total NET: 402 mL        Weight (kg): 88.8 (02-22 @ 18:55)  02-22    139  |  106  |  13  ----------------------------<  161<H>  4.3   |  21<L>  |  0.78    Ca    8.5      22 Feb 2024 19:43  Phos  3.8     02-22  Mg     1.8     02-22    TPro  6.2  /  Alb  3.2<L>  /  TBili  1.0  /  DBili  x   /  AST  15  /  ALT  7<L>  /  AlkPhos  69  02-22    Meds: lactated ringers. 1000 milliLiter(s) IV Continuous <Continuous>      HEMATOLOGIC  Meds: heparin  Infusion 1600 Unit(s)/Hr IV Continuous <Continuous>                          13.8   22.22 )-----------( 215      ( 22 Feb 2024 19:43 )             41.9     PT/INR - ( 22 Feb 2024 19:43 )   PT: 21.2 sec;   INR: 2.06 ratio         PTT - ( 22 Feb 2024 19:43 )  PTT:34.2 sec    INFECTIOUS DISEASES  T(C): 36 (02-23-24 @ 00:00), Max: 43 (02-22-24 @ 19:00)  Wt(kg): --  WBC Count: 22.22 K/uL (02-22 @ 19:43)  WBC Count: 22.53 K/uL (02-22 @ 14:39)  WBC Count: 17.62 K/uL (02-22 @ 06:25)    Recent Cultures:  Specimen Source: Clean Catch Clean Catch (Midstream), 02-17 @ 06:00; Results   10,000 - 49,000 CFU/mL Streptococcus agalactiae (Group B) Streptococcus  agalactiae (Group B) isolated  Group B streptococci are susceptible to ampicillin,  penicillin and cefazolin, but may be resistant to  erythromycin and clindamycin.  10,000 - 49,000 CFU/mL Coag Negative Staphylococcus "Susceptibilities not  performed"<!>; Gram Stain: --; Organism: --  Specimen Source: Clean Catch Clean Catch (Midstream), 02-16 @ 12:06; Results   No growth; Gram Stain: --; Organism: --    Meds: piperacillin/tazobactam IVPB.- 3.375 Gram(s) IV Intermittent once  piperacillin/tazobactam IVPB.. 3.375 Gram(s) IV Intermittent every 8 hours      ENDOCRINE  Capillary Blood Glucose    Meds: insulin lispro (ADMELOG) corrective regimen sliding scale   SubCutaneous every 6 hours  levothyroxine Injectable 120 MICROGram(s) IV Push at bedtime      ACCESS DEVICES:  [ ] Peripheral IV  [ ] Central Venous Line		[ ] R	[ ] L	[ ] IJ	[ ] Fem	[ ] SC	Placed:   [ ] Arterial Line			[ ] R	[ ] L	[ ] Fem	[ ] Rad	[ ] Ax	Placed:   [ ] PICC:					[ ] Mediport  [ ] Urinary Catheter, Date Placed:   [ ] Necessity of urinary, arterial, and venous catheters discussed    OTHER MEDICATIONS:  chlorhexidine 0.12% Liquid 15 milliLiter(s) Oral Mucosa every 12 hours  chlorhexidine 2% Cloths 1 Application(s) Topical <User Schedule>      IMAGING:

## 2024-02-23 NOTE — PROGRESS NOTE ADULT - ATTENDING COMMENTS
ATTENDING ATTESTATION:    78F history of tobacco use, COPD, afib on eliquis, CAD, HTN, HLD with mesenteric ischemia now s/p ex-lap, small bowel resection, open abdomen with mesenteric angiogram (2/22/24).     N: Acute postoperative pain.   - dilaudid prn    C: Chronic afib. CAD. Not on pressors.  - metoprolol 2.5mg IV q6h    P: Left intubated postoperatively.   - SBT to extubate    G: Mesenteric ischemia s/p resection. In discontinuity. Occluded celiac, SMA, SWATHI with no revascularization options.  - NPO, NGT to suction  - return to OR tomorrow    R: Oliguria not responsive to fluid. Likely developing CHANELL  - monitor UOP, no further fluid boluses    H: Not actively bleeding. Elevated INR likely in setting of eliquis.   - heparin drip  - hold eliquis    I: Leukocytosis.   - empiric zosyn for bacterial translocation in setting of ischemic bowel     E: History of hypothyroidism  - home levothyroxine    LINES: d/c right brachial jose luis ozuna PIVs  DISPO: SICU, full code       Total time spent in the critical care of this patient today (excluding teaching & procedures): 35 minutes    Over 50% of the total time was spent in discussion and coordination of care with consulting services, dietary and rehab services.    iLnda Prakash MD  Surgical Critical Care ATTENDING ATTESTATION:    78F history of tobacco use, COPD, afib on eliquis, CAD, HTN, HLD with mesenteric ischemia now s/p ex-lap, small bowel resection, open abdomen with mesenteric angiogram (2/22/24).     N: Acute postoperative pain.   - dilaudid prn    C: Chronic afib. CAD. Not on pressors.  - metoprolol 2.5mg IV q6h    P: Left intubated postoperatively.   - SBT to extubate    G: Mesenteric ischemia s/p resection. In discontinuity. Occluded celiac, SMA, SWATHI with no revascularization options.  - NPO, NGT to suction  - return to OR tomorrow    R: Oliguria not responsive to fluid. Possibly loop diuretic dependent as takes torsemide at home.   - trial lasix  - decrease mIVF    H: Not actively bleeding. Elevated INR likely in setting of eliquis.   - heparin drip  - hold eliquis    I: Leukocytosis.   - empiric zosyn for bacterial translocation in setting of ischemic bowel     E: History of hypothyroidism  - home levothyroxine    LINES: d/c right brachial jose luis ozuna, Soledad  DISPO: SICU, full code       Total time spent in the critical care of this patient today (excluding teaching & procedures): 35 minutes    Over 50% of the total time was spent in discussion and coordination of care with consulting services, dietary and rehab services.    Linda Prakash MD  Surgical Critical Care

## 2024-02-23 NOTE — DIETITIAN INITIAL EVALUATION ADULT - PERTINENT MEDS FT
MEDICATIONS  (STANDING):  chlorhexidine 0.12% Liquid 15 milliLiter(s) Oral Mucosa every 12 hours  chlorhexidine 2% Cloths 1 Application(s) Topical <User Schedule>  heparin  Infusion 1300 Unit(s)/Hr (13 mL/Hr) IV Continuous <Continuous>  insulin lispro (ADMELOG) corrective regimen sliding scale   SubCutaneous every 6 hours  lactated ringers. 1000 milliLiter(s) (100 mL/Hr) IV Continuous <Continuous>  levothyroxine Injectable 120 MICROGram(s) IV Push at bedtime  metoprolol tartrate Injectable 2.5 milliGRAM(s) IV Push every 6 hours  nicotine -  14 mG/24Hr(s) Patch 1 Patch Transdermal daily  pantoprazole  Injectable 40 milliGRAM(s) IV Push every 24 hours  piperacillin/tazobactam IVPB.. 3.375 Gram(s) IV Intermittent every 8 hours    MEDICATIONS  (PRN):  acetaminophen   IVPB .. 1000 milliGRAM(s) IV Intermittent every 6 hours PRN Mild Pain (1 - 3)  HYDROmorphone  Injectable 0.25 milliGRAM(s) IV Push every 3 hours PRN Breakthrough pain  
11.45

## 2024-02-23 NOTE — ADVANCED PRACTICE NURSE CONSULT - RECOMMEDATIONS
Will recommend the followin. b/l buttocks: continue to monitor, Consider a Dermatology consult for further evaluation, routine pericare with Feliberto  2. Continue with T&P, heel off-loading  3. Seat cushion when OOB to chair  4. Nutrition consult  Tx plan discussed with RN

## 2024-02-23 NOTE — DIETITIAN INITIAL EVALUATION ADULT - PERTINENT LABORATORY DATA
02-23    138  |  104  |  15  ----------------------------<  193<H>  4.4   |  20<L>  |  0.94    Ca    8.8      23 Feb 2024 02:17  Phos  4.8     02-23  Mg     2.3     02-23    TPro  6.2  /  Alb  3.2<L>  /  TBili  0.9  /  DBili  x   /  AST  16  /  ALT  7<L>  /  AlkPhos  67  02-23  POCT Blood Glucose.: 195 mg/dL (02-23-24 @ 05:06)

## 2024-02-23 NOTE — DIETITIAN INITIAL EVALUATION ADULT - SIGNS/SYMPTOMS
as evidenced by pressure injury >/2; s/p bowel resection  as evidenced by ex-lap resulting in resection

## 2024-02-23 NOTE — PRE PROCEDURE NOTE - GENERAL PROCEDURE NAME
Re-exploration, possible bowel resection, possible ostomy, possible anastomosis, possible closure, possible abthera

## 2024-02-23 NOTE — DIETITIAN INITIAL EVALUATION ADULT - OTHER INFO
GI/Intake:   -Previously noted on regular diet (-); per flowsheet, 0-100% intake of meals; mostly consuming >50%   -Now NPO with NGT to suction --> 260cc out ( thus far)   -Last BM documented ; Protonix ordered   -Presenting with ischemic bowel; s/p ex-lap with terminal ileum resection, left in discontinuity ()     Endo:   -SSI   -No hx of DM noted   -Order A1c     Renal:   -Hyperphosphatemic   -LR ordered for maintenance     Resp:   -Intubated    -Currently undergoing SBT     Weight Hx:   -Current dosin pounds   -[facility] HIE: 210 pounds (23)

## 2024-02-23 NOTE — PROGRESS NOTE ADULT - ASSESSMENT
78yFemale PMHx COPD, A-fib  On Eliquis and Plavix, HTN, HLD with signs of acute mesenteric ischemia now s/p diagnostic laparoscopy converted to exploratory laparotomy with 20 cm of terminal ileum resection with bowel left in discontinuity and temporary abdominal closure with abthera with mesenteric angiogram via R fem access 02-22 findings of celiac, SMA, SWATHI w/o flow, collateral perfusion confirmed.    PLAN  - Diet - NPO/NGT/IVF  - Heparin gtt  - Zosyn  - IVF  - Care per SICU  - RTOR planning

## 2024-02-23 NOTE — DIETITIAN INITIAL EVALUATION ADULT - ADD RECOMMEND
1) Defer diet advancement of diet   -RD to remain available for EN/PO diet recommendations   2) Add multivitamin and vitamin C daily   3) Monitor for acute malnutrition, diet tolerance, weight trends, labs, GI function, and skin integrity    Kimberly Gibbons MS, RDN, CDN ([employer])

## 2024-02-23 NOTE — ADVANCED PRACTICE NURSE CONSULT - REASON FOR CONSULT
PATIENT OFF UNIT TO OR   WILL REMAIN AVAILABLE FOR CONSULT 
Requested by staff to assess skin status: right buttocks. PMH is noted:  78yFemale PMHx COPD, A-fib  On Eliquis and Plavix, HTN, HLD with signs of acute mesenteric ischemia now s/p diagnostic laparoscopy converted to exploratory laparotomy with 20 cm of terminal ileum resection with bowel left in discontinuity and temporary abdominal closure with abthera with mesenteric angiogram via R fem access 02-22 findings of celiac, SMA, SWATHI w/o flow, collateral perfusion confirmed.

## 2024-02-23 NOTE — CONSULT NOTE ADULT - ASSESSMENT
#Allergic contact dermatitis  Likely in setting of contact with particular material used in recent procedure. Unclear trigger, however appears to be resolving. Recommend supportive care w/ plain petroleum jelly    --Daily to twice daily liberal application of plain petroleum jelly    Thank you for allowing us to participate in the care of this pt. Dermatology to sign off at this time. Please notify us and re-consult as needed for new or concerning changes to skin exam.    The patient's chart was reviewed in addition to being seen and examined at bedside with the dermatology attending Dr. Villa .  Recommendations were communicated with the primary team.  Please page [phone] with a 10-digit call-back number for further related questions.      _______________ Thank you for allowing us to participate in the care of this patient.  Please re-consult Dermatology for any new or worsening developments.    Damaris Messina MD  Resident Physician, PGY-2  [facility] Dermatology  Pager: [phone]  Office: [phone] #Favor contact dermatitis   Unclear trigger, but likely related to recent procedure. Appears to be resolving. Recommend supportive care w/ plain petroleum jelly    --Daily to twice daily liberal application of plain petroleum jelly    Thank you for allowing us to participate in the care of this pt. Dermatology to sign off at this time. Please notify us and re-consult as needed for new or concerning changes to skin exam.    The patient's chart was reviewed in addition to being seen and examined at bedside with the dermatology attending Dr. Villa .  Recommendations were communicated with the primary team.  Please page [phone] with a 10-digit call-back number for further related questions.      _______________ Thank you for allowing us to participate in the care of this patient.  Please re-consult Dermatology for any new or worsening developments.    Damaris Messina MD  Resident Physician, PGY-2  [facility] Dermatology  Pager: [phone]  Office: [phone]

## 2024-02-23 NOTE — DIETITIAN INITIAL EVALUATION ADULT - NSFNSGIIOFT_GEN_A_CORE
02-22-24 @ 07:01  -  02-23-24 @ 07:00  --------------------------------------------------------  OUT:    Nasogastric/Oral tube (mL): 260 mL  Total OUT: 260 mL    Total NET: -260 mL

## 2024-02-24 LAB
ALBUMIN SERPL ELPH-MCNC: 2.6 G/DL — LOW (ref 3.3–5)
ALBUMIN SERPL ELPH-MCNC: 2.7 G/DL — LOW (ref 3.3–5)
ALP SERPL-CCNC: 58 U/L — SIGNIFICANT CHANGE UP (ref 40–120)
ALP SERPL-CCNC: 61 U/L — SIGNIFICANT CHANGE UP (ref 40–120)
ALT FLD-CCNC: 10 U/L — SIGNIFICANT CHANGE UP (ref 10–45)
ALT FLD-CCNC: 10 U/L — SIGNIFICANT CHANGE UP (ref 10–45)
ANION GAP SERPL CALC-SCNC: 11 MMOL/L — SIGNIFICANT CHANGE UP (ref 5–17)
ANION GAP SERPL CALC-SCNC: 13 MMOL/L — SIGNIFICANT CHANGE UP (ref 5–17)
APTT BLD: 29.1 SEC — SIGNIFICANT CHANGE UP (ref 24.5–35.6)
APTT BLD: 29.6 SEC — SIGNIFICANT CHANGE UP (ref 24.5–35.6)
APTT BLD: 49.3 SEC — HIGH (ref 24.5–35.6)
APTT BLD: 73.4 SEC — HIGH (ref 24.5–35.6)
AST SERPL-CCNC: 21 U/L — SIGNIFICANT CHANGE UP (ref 10–40)
AST SERPL-CCNC: 23 U/L — SIGNIFICANT CHANGE UP (ref 10–40)
BASE EXCESS BLDV CALC-SCNC: 3 MMOL/L — SIGNIFICANT CHANGE UP (ref -2–3)
BILIRUB SERPL-MCNC: 0.6 MG/DL — SIGNIFICANT CHANGE UP (ref 0.2–1.2)
BILIRUB SERPL-MCNC: 0.8 MG/DL — SIGNIFICANT CHANGE UP (ref 0.2–1.2)
BUN SERPL-MCNC: 20 MG/DL — SIGNIFICANT CHANGE UP (ref 7–23)
BUN SERPL-MCNC: 20 MG/DL — SIGNIFICANT CHANGE UP (ref 7–23)
CA-I SERPL-SCNC: 1.16 MMOL/L — SIGNIFICANT CHANGE UP (ref 1.15–1.33)
CALCIUM SERPL-MCNC: 8.1 MG/DL — LOW (ref 8.4–10.5)
CALCIUM SERPL-MCNC: 8.3 MG/DL — LOW (ref 8.4–10.5)
CHLORIDE BLDV-SCNC: 104 MMOL/L — SIGNIFICANT CHANGE UP (ref 96–108)
CHLORIDE SERPL-SCNC: 103 MMOL/L — SIGNIFICANT CHANGE UP (ref 96–108)
CHLORIDE SERPL-SCNC: 104 MMOL/L — SIGNIFICANT CHANGE UP (ref 96–108)
CO2 BLDV-SCNC: 29 MMOL/L — HIGH (ref 22–26)
CO2 SERPL-SCNC: 21 MMOL/L — LOW (ref 22–31)
CO2 SERPL-SCNC: 25 MMOL/L — SIGNIFICANT CHANGE UP (ref 22–31)
CREAT SERPL-MCNC: 0.98 MG/DL — SIGNIFICANT CHANGE UP (ref 0.5–1.3)
CREAT SERPL-MCNC: 1.07 MG/DL — SIGNIFICANT CHANGE UP (ref 0.5–1.3)
EGFR: 54 ML/MIN/1.73M2 — LOW
EGFR: 59 ML/MIN/1.73M2 — LOW
GAS PNL BLDA: SIGNIFICANT CHANGE UP
GAS PNL BLDA: SIGNIFICANT CHANGE UP
GAS PNL BLDV: 136 MMOL/L — SIGNIFICANT CHANGE UP (ref 136–145)
GAS PNL BLDV: SIGNIFICANT CHANGE UP
GLUCOSE BLDC GLUCOMTR-MCNC: 118 MG/DL — HIGH (ref 70–99)
GLUCOSE BLDC GLUCOMTR-MCNC: 122 MG/DL — HIGH (ref 70–99)
GLUCOSE BLDC GLUCOMTR-MCNC: 122 MG/DL — HIGH (ref 70–99)
GLUCOSE BLDC GLUCOMTR-MCNC: 146 MG/DL — HIGH (ref 70–99)
GLUCOSE BLDV-MCNC: 117 MG/DL — HIGH (ref 70–99)
GLUCOSE SERPL-MCNC: 128 MG/DL — HIGH (ref 70–99)
GLUCOSE SERPL-MCNC: 147 MG/DL — HIGH (ref 70–99)
HCO3 BLDV-SCNC: 28 MMOL/L — SIGNIFICANT CHANGE UP (ref 22–29)
HCT VFR BLD CALC: 36.1 % — SIGNIFICANT CHANGE UP (ref 34.5–45)
HCT VFR BLD CALC: 37.1 % — SIGNIFICANT CHANGE UP (ref 34.5–45)
HCT VFR BLDA CALC: 37 % — SIGNIFICANT CHANGE UP (ref 34.5–46.5)
HGB BLD CALC-MCNC: 12.2 G/DL — SIGNIFICANT CHANGE UP (ref 11.7–16.1)
HGB BLD-MCNC: 11.8 G/DL — SIGNIFICANT CHANGE UP (ref 11.5–15.5)
HGB BLD-MCNC: 12.3 G/DL — SIGNIFICANT CHANGE UP (ref 11.5–15.5)
HOROWITZ INDEX BLDV+IHG-RTO: 21 — SIGNIFICANT CHANGE UP
INR BLD: 1.71 RATIO — HIGH (ref 0.85–1.18)
INR BLD: 1.86 RATIO — HIGH (ref 0.85–1.18)
INR BLD: 1.87 RATIO — HIGH (ref 0.85–1.18)
INR BLD: 2 RATIO — HIGH (ref 0.85–1.18)
LACTATE BLDV-MCNC: 1.5 MMOL/L — SIGNIFICANT CHANGE UP (ref 0.5–2)
MAGNESIUM SERPL-MCNC: 2.1 MG/DL — SIGNIFICANT CHANGE UP (ref 1.6–2.6)
MAGNESIUM SERPL-MCNC: 2.4 MG/DL — SIGNIFICANT CHANGE UP (ref 1.6–2.6)
MCHC RBC-ENTMCNC: 31.9 PG — SIGNIFICANT CHANGE UP (ref 27–34)
MCHC RBC-ENTMCNC: 31.9 PG — SIGNIFICANT CHANGE UP (ref 27–34)
MCHC RBC-ENTMCNC: 32.7 GM/DL — SIGNIFICANT CHANGE UP (ref 32–36)
MCHC RBC-ENTMCNC: 33.2 GM/DL — SIGNIFICANT CHANGE UP (ref 32–36)
MCV RBC AUTO: 96.1 FL — SIGNIFICANT CHANGE UP (ref 80–100)
MCV RBC AUTO: 97.6 FL — SIGNIFICANT CHANGE UP (ref 80–100)
NRBC # BLD: 0 /100 WBCS — SIGNIFICANT CHANGE UP (ref 0–0)
NRBC # BLD: 0 /100 WBCS — SIGNIFICANT CHANGE UP (ref 0–0)
PCO2 BLDV: 43 MMHG — HIGH (ref 39–42)
PH BLDV: 7.42 — SIGNIFICANT CHANGE UP (ref 7.32–7.43)
PHOSPHATE SERPL-MCNC: 3.7 MG/DL — SIGNIFICANT CHANGE UP (ref 2.5–4.5)
PHOSPHATE SERPL-MCNC: 3.7 MG/DL — SIGNIFICANT CHANGE UP (ref 2.5–4.5)
PLATELET # BLD AUTO: 179 K/UL — SIGNIFICANT CHANGE UP (ref 150–400)
PLATELET # BLD AUTO: 223 K/UL — SIGNIFICANT CHANGE UP (ref 150–400)
PO2 BLDV: 53 MMHG — HIGH (ref 25–45)
POTASSIUM BLDV-SCNC: 3.4 MMOL/L — LOW (ref 3.5–5.1)
POTASSIUM SERPL-MCNC: 3.6 MMOL/L — SIGNIFICANT CHANGE UP (ref 3.5–5.3)
POTASSIUM SERPL-MCNC: 4 MMOL/L — SIGNIFICANT CHANGE UP (ref 3.5–5.3)
POTASSIUM SERPL-SCNC: 3.6 MMOL/L — SIGNIFICANT CHANGE UP (ref 3.5–5.3)
POTASSIUM SERPL-SCNC: 4 MMOL/L — SIGNIFICANT CHANGE UP (ref 3.5–5.3)
PROT SERPL-MCNC: 5.5 G/DL — LOW (ref 6–8.3)
PROT SERPL-MCNC: 5.6 G/DL — LOW (ref 6–8.3)
PROTHROM AB SERPL-ACNC: 17.7 SEC — HIGH (ref 9.5–13)
PROTHROM AB SERPL-ACNC: 19.3 SEC — HIGH (ref 9.5–13)
PROTHROM AB SERPL-ACNC: 20.1 SEC — HIGH (ref 9.5–13)
PROTHROM AB SERPL-ACNC: 21.6 SEC — HIGH (ref 9.5–13)
RBC # BLD: 3.7 M/UL — LOW (ref 3.8–5.2)
RBC # BLD: 3.86 M/UL — SIGNIFICANT CHANGE UP (ref 3.8–5.2)
RBC # FLD: 13.5 % — SIGNIFICANT CHANGE UP (ref 10.3–14.5)
RBC # FLD: 13.6 % — SIGNIFICANT CHANGE UP (ref 10.3–14.5)
SAO2 % BLDV: 84.9 % — SIGNIFICANT CHANGE UP (ref 67–88)
SODIUM SERPL-SCNC: 137 MMOL/L — SIGNIFICANT CHANGE UP (ref 135–145)
SODIUM SERPL-SCNC: 140 MMOL/L — SIGNIFICANT CHANGE UP (ref 135–145)
WBC # BLD: 17.58 K/UL — HIGH (ref 3.8–10.5)
WBC # BLD: 19.3 K/UL — HIGH (ref 3.8–10.5)
WBC # FLD AUTO: 17.58 K/UL — HIGH (ref 3.8–10.5)
WBC # FLD AUTO: 19.3 K/UL — HIGH (ref 3.8–10.5)

## 2024-02-24 PROCEDURE — 71045 X-RAY EXAM CHEST 1 VIEW: CPT | Mod: 26

## 2024-02-24 PROCEDURE — 99291 CRITICAL CARE FIRST HOUR: CPT | Mod: FS

## 2024-02-24 PROCEDURE — 44310 ILEOSTOMY/JEJUNOSTOMY: CPT | Mod: GC

## 2024-02-24 DEVICE — SURGICEL POWDER 3 GRAMS: Type: IMPLANTABLE DEVICE | Status: FUNCTIONAL

## 2024-02-24 RX ORDER — POTASSIUM CHLORIDE 20 MEQ
10 PACKET (EA) ORAL
Refills: 0 | Status: DISCONTINUED | OUTPATIENT
Start: 2024-02-24 | End: 2024-02-24

## 2024-02-24 RX ORDER — MAGNESIUM SULFATE 500 MG/ML
2 VIAL (ML) INJECTION ONCE
Refills: 0 | Status: COMPLETED | OUTPATIENT
Start: 2024-02-24 | End: 2024-02-24

## 2024-02-24 RX ORDER — FUROSEMIDE 40 MG
40 TABLET ORAL ONCE
Refills: 0 | Status: COMPLETED | OUTPATIENT
Start: 2024-02-24 | End: 2024-02-24

## 2024-02-24 RX ORDER — ACETAMINOPHEN 500 MG
1000 TABLET ORAL EVERY 6 HOURS
Refills: 0 | Status: COMPLETED | OUTPATIENT
Start: 2024-02-24 | End: 2024-02-25

## 2024-02-24 RX ORDER — HEPARIN SODIUM 5000 [USP'U]/ML
800 INJECTION INTRAVENOUS; SUBCUTANEOUS
Qty: 25000 | Refills: 0 | Status: DISCONTINUED | OUTPATIENT
Start: 2024-02-24 | End: 2024-02-29

## 2024-02-24 RX ORDER — CALCIUM GLUCONATE 100 MG/ML
2 VIAL (ML) INTRAVENOUS ONCE
Refills: 0 | Status: COMPLETED | OUTPATIENT
Start: 2024-02-24 | End: 2024-02-24

## 2024-02-24 RX ORDER — DILTIAZEM HCL 120 MG
15 CAPSULE, EXT RELEASE 24 HR ORAL ONCE
Refills: 0 | Status: COMPLETED | OUTPATIENT
Start: 2024-02-24 | End: 2024-02-24

## 2024-02-24 RX ORDER — DILTIAZEM HCL 120 MG
5 CAPSULE, EXT RELEASE 24 HR ORAL
Qty: 125 | Refills: 0 | Status: DISCONTINUED | OUTPATIENT
Start: 2024-02-24 | End: 2024-03-01

## 2024-02-24 RX ORDER — DILTIAZEM HCL 120 MG
10 CAPSULE, EXT RELEASE 24 HR ORAL ONCE
Refills: 0 | Status: COMPLETED | OUTPATIENT
Start: 2024-02-24 | End: 2024-02-24

## 2024-02-24 RX ORDER — LEVOTHYROXINE SODIUM 125 MCG
130 TABLET ORAL AT BEDTIME
Refills: 0 | Status: DISCONTINUED | OUTPATIENT
Start: 2024-02-24 | End: 2024-03-01

## 2024-02-24 RX ORDER — POTASSIUM CHLORIDE 20 MEQ
10 PACKET (EA) ORAL
Refills: 0 | Status: COMPLETED | OUTPATIENT
Start: 2024-02-24 | End: 2024-02-24

## 2024-02-24 RX ADMIN — Medication 100 MILLIEQUIVALENT(S): at 03:45

## 2024-02-24 RX ADMIN — Medication 1 PATCH: at 11:28

## 2024-02-24 RX ADMIN — Medication 100 MILLIEQUIVALENT(S): at 05:55

## 2024-02-24 RX ADMIN — Medication 400 MILLIGRAM(S): at 23:54

## 2024-02-24 RX ADMIN — CHLORHEXIDINE GLUCONATE 1 APPLICATION(S): 213 SOLUTION TOPICAL at 05:09

## 2024-02-24 RX ADMIN — Medication 100 MILLIEQUIVALENT(S): at 02:38

## 2024-02-24 RX ADMIN — Medication 15 MILLIGRAM(S): at 09:59

## 2024-02-24 RX ADMIN — Medication 25 GRAM(S): at 01:38

## 2024-02-24 RX ADMIN — Medication 400 MILLIGRAM(S): at 11:28

## 2024-02-24 RX ADMIN — Medication 100 MILLIEQUIVALENT(S): at 04:52

## 2024-02-24 RX ADMIN — PIPERACILLIN AND TAZOBACTAM 25 GRAM(S): 4; .5 INJECTION, POWDER, LYOPHILIZED, FOR SOLUTION INTRAVENOUS at 07:42

## 2024-02-24 RX ADMIN — Medication 5 MILLIGRAM(S): at 12:37

## 2024-02-24 RX ADMIN — Medication 1 PATCH: at 07:14

## 2024-02-24 RX ADMIN — SODIUM CHLORIDE 30 MILLILITER(S): 9 INJECTION, SOLUTION INTRAVENOUS at 20:11

## 2024-02-24 RX ADMIN — PIPERACILLIN AND TAZOBACTAM 25 GRAM(S): 4; .5 INJECTION, POWDER, LYOPHILIZED, FOR SOLUTION INTRAVENOUS at 23:54

## 2024-02-24 RX ADMIN — Medication 5 MILLIGRAM(S): at 21:09

## 2024-02-24 RX ADMIN — Medication 1 PATCH: at 11:00

## 2024-02-24 RX ADMIN — Medication 100 MILLIEQUIVALENT(S): at 06:52

## 2024-02-24 RX ADMIN — Medication 1 PATCH: at 21:06

## 2024-02-24 RX ADMIN — Medication 5 MILLIGRAM(S): at 00:01

## 2024-02-24 RX ADMIN — Medication 5 MILLIGRAM(S): at 05:11

## 2024-02-24 RX ADMIN — Medication 5 MG/HR: at 20:10

## 2024-02-24 RX ADMIN — Medication 5 MILLIGRAM(S): at 07:42

## 2024-02-24 RX ADMIN — Medication 40 MILLIGRAM(S): at 11:28

## 2024-02-24 RX ADMIN — HEPARIN SODIUM 8 UNIT(S)/HR: 5000 INJECTION INTRAVENOUS; SUBCUTANEOUS at 22:33

## 2024-02-24 RX ADMIN — Medication 130 MICROGRAM(S): at 21:50

## 2024-02-24 RX ADMIN — Medication 15 MILLIGRAM(S): at 10:44

## 2024-02-24 RX ADMIN — Medication 400 MILLIGRAM(S): at 18:17

## 2024-02-24 RX ADMIN — SODIUM CHLORIDE 30 MILLILITER(S): 9 INJECTION, SOLUTION INTRAVENOUS at 07:43

## 2024-02-24 RX ADMIN — Medication 10 MILLIGRAM(S): at 01:38

## 2024-02-24 RX ADMIN — PIPERACILLIN AND TAZOBACTAM 25 GRAM(S): 4; .5 INJECTION, POWDER, LYOPHILIZED, FOR SOLUTION INTRAVENOUS at 17:16

## 2024-02-24 RX ADMIN — Medication 200 GRAM(S): at 18:17

## 2024-02-24 RX ADMIN — Medication 5 MG/HR: at 11:29

## 2024-02-24 NOTE — PROGRESS NOTE ADULT - SUBJECTIVE AND OBJECTIVE BOX
Subjective: Patient seen and examined. No new events except as noted.   remains in ICU   s/p RTOR for ABThera taken down. SB run from LOT to staple line, all viable. Cecum mobilized. Surgicel powder applied to RP after colon mobilization, hemostatic. End ileostomy created in R rectus sheath and ilium externalized. Fascia closed w/ running looped #1 PDS and distal staple line brought up to fascia w/ 0 Vicryl. Skin closed w/ staples. End ileostomy matured in a Nelda fashion w/ 2-0 chromic.    REVIEW OF SYSTEMS:  Unable to obtain    MEDICATIONS:  MEDICATIONS  (STANDING):  acetaminophen   IVPB .. 1000 milliGRAM(s) IV Intermittent every 6 hours  chlorhexidine 2% Cloths 1 Application(s) Topical <User Schedule>  diltiazem Infusion 5 mG/Hr (5 mL/Hr) IV Continuous <Continuous>  lactated ringers. 1000 milliLiter(s) (30 mL/Hr) IV Continuous <Continuous>  levothyroxine Injectable 130 MICROGram(s) IV Push at bedtime  metoprolol tartrate Injectable 5 milliGRAM(s) IV Push every 4 hours  nicotine -  14 mG/24Hr(s) Patch 1 Patch Transdermal daily  piperacillin/tazobactam IVPB.. 3.375 Gram(s) IV Intermittent every 8 hours      PHYSICAL EXAM:  T(C): 36.4 (02-24-24 @ 16:15), Max: 36.7 (02-23-24 @ 23:00)  HR: 110 (02-24-24 @ 19:00) (88 - 135)  BP: 114/62 (02-24-24 @ 19:00) (94/53 - 131/74)  RR: 10 (02-24-24 @ 19:00) (10 - 28)  SpO2: 98% (02-24-24 @ 19:00) (90% - 98%)  Wt(kg): --  I&O's Summary    23 Feb 2024 07:01  -  24 Feb 2024 07:00  --------------------------------------------------------  IN: 2334 mL / OUT: 1995 mL / NET: 339 mL    24 Feb 2024 07:01  -  24 Feb 2024 20:10  --------------------------------------------------------  IN: 775 mL / OUT: 740 mL / NET: 35 mL          Appearance: NAD	  HEENT:  Dry  oral mucosa, +NGT   Lymphatic: No lymphadenopathy , no edema  Cardiovascular: irregular S1 S2, No JVD, No murmurs , Peripheral pulses palpable 2+ bilaterally  Respiratory: decreased bs   Gastrointestinal:  soft, mildly distended, appropriately tender near midline incision, dressing c/d/i,  ileostomy leaking stool  Skin: No rashes, No ecchymoses, No cyanosis, warm to touch  Musculoskeletal: Normal range of motion, normal strength  Psychiatry:  sleepy   Ext: No edema      LABS:    CARDIAC MARKERS:                                12.3   17.58 )-----------( 223      ( 24 Feb 2024 17:04 )             37.1     02-24    137  |  103  |  20  ----------------------------<  147<H>  4.0   |  21<L>  |  0.98    Ca    8.1<L>      24 Feb 2024 17:04  Phos  3.7     02-24  Mg     2.4     02-24    TPro  5.5<L>  /  Alb  2.6<L>  /  TBili  0.8  /  DBili  x   /  AST  23  /  ALT  10  /  AlkPhos  61  02-24    proBNP:   Lipid Profile:   HgA1c:   TSH:             TELEMETRY: 	  ECG:  	  RADIOLOGY: < from: Xray Chest 1 View- PORTABLE-Routine (Xray Chest 1 View- PORTABLE-Routine in AM.) (02.24.24 @ 07:28) >    ACC: [id number] EXAM:  XR CHEST PORTABLE ROUTINE 1V   ORDERED BY: CARLIN ROCHE     PROCEDURE DATE:  02/24/2024          INTERPRETATION:  CLINICAL INDICATION: routine follow-up    EXAM:  Single frontal chest from 2/24/2024 at 0652. Compared to prior study from   2/23/2024.    Slightly rotated left.    IMPRESSION:  Interval extubation. Enteric tube remains in place with distal end looped   in left upper quadrant.    Clear visualized lungs. No pleural effusions or pneumothorax.    Stable cardiac and mediastinal silhouettes.    --- End of Report ---        < end of copied text >    DIAGNOSTIC TESTING:  [ ] Echocardiogram:  [ ]  Catheterization:  [ ] Stress Test:    OTHER:

## 2024-02-24 NOTE — PROGRESS NOTE ADULT - ASSESSMENT
78F history of tobacco use, COPD, afib on eliquis, CAD, HTN, HLD with mesenteric ischemia now s/p ex-lap, small bowel resection, open abdomen with mesenteric angiogram (2/22/24). Plan to RTOR in 48hrs.     plan:  Neuro:  - off sedation  - Multimodal pain control w/ dilaudid, IV tylenol    Resp: pmh COPD  - extubated  -RA    CV:  - off pressors, none required intraop  - developed afib RVR HR 150s, BP 160s/90s,   - 5mg IV lopressor q4 w/ hold parameters  - trend lact    GI: s/p 20cm TI SBR w/ abthera, abd open  - plan to RTOR 2/24  - celiac, SMA, SWATHI w/ no flow demonstrated on angiogram  - trend lactate  to assess for additional ischemia  - Diet: NPO, OGT  - Protonix for stress ulcer prophylaxis    Renal:  - Monitor I&Os and electrolytes w/ repletions as necessary  - LR @ 30  - amaya in place, monitor UOP    Heme:  - Monitor CBC and coags  - heparin ggt initiated per surgical team at 16/hr iso angiogram findings  - ptt q6hrs on heparin ggt- will hold at 4am for RTOR    ID:   - Monitor for clinical evidence of active infection  - Monitor WBC, temperature, and procalcitonin  - Empiric antibiotics w/ zosyn    Endo:   - Monitor glucose  - home synthroid

## 2024-02-24 NOTE — PROGRESS NOTE ADULT - ATTENDING COMMENTS
ATTENDING ATTESTATION:    78F history of tobacco use, COPD, afib on eliquis, CAD, HTN, HLD with mesenteric ischemia now s/p ex-lap, small bowel resection, open abdomen with mesenteric angiogram (2/22/24).     N: Acute postoperative pain.   - dilaudid prn    C: Chronic afib with intermittent RVR. CAD. Not on pressors.  - switch to diltiazem drip, not controlled on metoprolol    P: No acute issues. Extubated 2/23.    G: Mesenteric ischemia s/p resection. In discontinuity. Occluded celiac, SMA, SWATHI with no revascularization options.  - NPO, NGT to suction  - return to OR today    R: Oliguria improved with lasix. No CHANELL.    - lasix 40mg x1    H: Not actively bleeding. Elevated INR likely in setting of eliquis, downtrending.   - heparin drip on hold for OR  - hold eliquis    I: Leukocytosis.   - empiric zosyn for bacterial translocation in setting of ischemic bowel     E: History of hypothyroidism  - home levothyroxine    LINES: Soledad amaya  DISPO: SICU, full code       Total time spent in the critical care of this patient today (excluding teaching & procedures): 35 minutes    Over 50% of the total time was spent in discussion and coordination of care with consulting services, dietary and rehab services.    Linda Prakash MD  Surgical Critical Care.

## 2024-02-24 NOTE — PROGRESS NOTE ADULT - SUBJECTIVE AND OBJECTIVE BOX
HISTORY  78y Female    24 HOUR EVENTS:  - dec prn dilaudid 0.25   - follow INR (last eliquis 2/22)  - metoprolol 5q4  - monitor PTT  - derm consult, recommends plain plain petroleum jelly for allergic contact dermatitis   - dc'd a line  - LR 30cc/hr  - 40 lasix  -extubated      SUBJECTIVE/ROS:  [X ] A ten-point review of systems was otherwise negative except as noted.  [ ] Due to altered mental status/intubation, subjective information were not able to be obtained from the patient. History was obtained, to the extent possible, from review of the chart and collateral sources of information.      NEURO  Exam: AOx 2-3  Meds: acetaminophen   IVPB .. 1000 milliGRAM(s) IV Intermittent every 6 hours PRN Mild Pain (1 - 3)  HYDROmorphone  Injectable 0.25 milliGRAM(s) IV Push every 3 hours PRN Breakthrough pain    [x] Adequacy of sedation and pain control has been assessed and adjusted      RESPIRATORY  RR: 19 (02-24-24 @ 03:00) (10 - 20)  SpO2: 93% (02-24-24 @ 03:00) (92% - 99%)  Wt(kg): --  Exam: CTA b/l. No murmurs, rubs, gallops appreciated.   Mechanical Ventilation: Mode: CPAP with PS, RR (patient): 10, FiO2: 30, PEEP: 5, PS: 5, MAP: 6.8, PIP: 11  ABG - ( 23 Feb 2024 10:21 )  pH: 7.38  /  pCO2: 42    /  pO2: 105   / HCO3: 25    / Base Excess: -0.4  /  SaO2: 98.5    Lactate: x                [ ] Extubation Readiness Assessed  Meds:       CARDIOVASCULAR  HR: 124 (02-24-24 @ 03:00) (94 - 131)  BP: 117/60 (02-24-24 @ 03:00) (110/59 - 142/61)  BP(mean): 82 (02-24-24 @ 03:00) (79 - 105)  ABP: 104/54 (02-23-24 @ 12:00) (93/47 - 135/69)  ABP(mean): 73 (02-23-24 @ 12:00) (64 - 95)  Wt(kg): --  CVP(cm H2O): --  VBG - ( 24 Feb 2024 00:46 )  pH: 7.42  /  pCO2: 43    /  pO2: 53    / HCO3: 28    / Base Excess: 3.0   /  SaO2: 84.9   Lactate: 1.5                Exam: S1S2. No murmurs, rubs, gallops appreciated.  Cardiac Rhythm: NSR  Meds: metoprolol tartrate Injectable 5 milliGRAM(s) IV Push every 4 hours        GI/NUTRITION  Exam: Soft, non-distended, non-tender.   Diet: NPO  Meds:     GENITOURINARY  I&O's Detail    02-22 @ 07:01  -  02-23 @ 07:00  --------------------------------------------------------  IN:    Enteral Tube Flush: 40 mL    Heparin: 112 mL    Heparin: 32.5 mL    IV PiggyBack: 200 mL    IV PiggyBack: 250 mL    Lactated Ringers: 1100 mL    Lactated Ringers Bolus: 1000 mL  Total IN: 2734.5 mL    OUT:    Indwelling Catheter - Urethral (mL): 437 mL    Nasogastric/Oral tube (mL): 260 mL    VAC (Vacuum Assisted Closure) System (mL): 200 mL  Total OUT: 897 mL    Total NET: 1837.5 mL      02-23 @ 07:01 - 02-24 @ 03:27  --------------------------------------------------------  IN:    Enteral Tube Flush: 40 mL    Heparin: 52 mL    Heparin: 142 mL    IV PiggyBack: 300 mL    IV PiggyBack: 50 mL    IV PiggyBack: 100 mL    Lactated Ringers: 800 mL    Lactated Ringers: 210 mL    Lactated Ringers: 150 mL  Total IN: 1844 mL    OUT:    Indwelling Catheter - Urethral (mL): 1445 mL    Nasogastric/Oral tube (mL): 100 mL    VAC (Vacuum Assisted Closure) System (mL): 200 mL  Total OUT: 1745 mL    Total NET: 99 mL          02-24    140  |  104  |  20  ----------------------------<  128<H>  3.6   |  25  |  1.07    Ca    8.3<L>      24 Feb 2024 00:56  Phos  3.7     02-24  Mg     2.1     02-24    TPro  5.6<L>  /  Alb  2.7<L>  /  TBili  0.6  /  DBili  x   /  AST  21  /  ALT  10  /  AlkPhos  58  02-24    [ ] Melvin catheter, indication: N/A  Meds: lactated ringers. 1000 milliLiter(s) IV Continuous <Continuous>  potassium chloride  10 mEq/100 mL IVPB 10 milliEquivalent(s) IV Intermittent every 1 hour        HEMATOLOGIC  Meds: heparin  Infusion 1000 Unit(s)/Hr IV Continuous <Continuous>    [x] VTE Prophylaxis                        11.8   19.30 )-----------( 179      ( 24 Feb 2024 00:56 )             36.1     PT/INR - ( 24 Feb 2024 00:56 )   PT: 21.6 sec;   INR: 2.00 ratio         PTT - ( 24 Feb 2024 00:56 )  PTT:73.4 sec  Transfusion     [ ] PRBC   [ ] Platelets   [ ] FFP   [ ] Cryoprecipitate      INFECTIOUS DISEASES  T(C): 36.6 (02-24-24 @ 03:00), Max: 36.8 (02-23-24 @ 13:00)  Wt(kg): --  WBC Count: 19.30 K/uL (02-24 @ 00:56)    Recent Cultures:  Specimen Source: Clean Catch Clean Catch (Midstream), 02-17 @ 06:00; Results   10,000 - 49,000 CFU/mL Streptococcus agalactiae (Group B) Streptococcus  agalactiae (Group B) isolated  Group B streptococci are susceptible to ampicillin,  penicillin and cefazolin, but may be resistant to  erythromycin and clindamycin.  10,000 - 49,000 CFU/mL Coag Negative Staphylococcus "Susceptibilities not  performed"<!>; Gram Stain: --; Organism: --    Meds: piperacillin/tazobactam IVPB.. 3.375 Gram(s) IV Intermittent every 8 hours        ENDOCRINE  Capillary Blood Glucose    Meds: insulin lispro (ADMELOG) corrective regimen sliding scale   SubCutaneous every 6 hours  levothyroxine Injectable 120 MICROGram(s) IV Push at bedtime        ACCESS DEVICES:  [X ] Peripheral IV  [ ] Central Venous Line	[ ] R	[ ] L	[ ] IJ	[ ] Fem	[ ] SC	Placed:   [ ] Arterial Line		[ ] R	[ ] L	[ ] Fem	[ ] Rad	[ ] Ax	Placed:   [ ] PICC:					[ ] Mediport  [X] Urinary Catheter, Date Placed: 2/22  [ ] Necessity of urinary, arterial, and venous catheters discussed    OTHER MEDICATIONS:  chlorhexidine 2% Cloths 1 Application(s) Topical <User Schedule>  nicotine -  14 mG/24Hr(s) Patch 1 Patch Transdermal daily      CODE STATUS:     IMAGING:

## 2024-02-24 NOTE — BRIEF OPERATIVE NOTE - NSICDXBRIEFPREOP_GEN_ALL_CORE_FT
PRE-OP DIAGNOSIS:  Ischemia, bowel 22-Feb-2024 17:36:48  Tiffanie Narvaez  

## 2024-02-24 NOTE — PROGRESS NOTE ADULT - ASSESSMENT
78yFemale PMHx COPD, A-fib  On Eliquis and Plavix, HTN, HLD with signs of acute mesenteric ischemia now s/p diagnostic laparoscopy converted to exploratory laparotomy with 20 cm of terminal ileum resection with bowel left in discontinuity and temporary abdominal closure with abthera with mesenteric angiogram via R fem access 02-22 findings of celiac, SMA, SWATHI w/o flow, collateral perfusion confirmed.    PLAN  - Diet - NPO/NGT/IVF  - Heparin gtt  - Zosyn  - Metoprolol for rate control  - RTOR today for re-exploration, possible ostomy; consented and preoped  - Care per SICU      ACS/Trauma   [phone]

## 2024-02-24 NOTE — PROGRESS NOTE ADULT - ASSESSMENT
78-year-old female with PMH current smoker , COPD, A-fib  On Eliquis and Plavix, HTN, HLD presents for syncope from PCPs office yesterday.     Patient was at normal checkup for blood work and was sitting on the table.  Daughter at bedside states that patient suddenly went limp while she was sitting on the table, her eyes rolled back, and her tongue turned to 1 side and this lasted for couple of seconds and then the patient regained consciousness.  Patient had no postictal period.  No tongue biting.  Patient does not remember these events.   pt too lethargic  , though arousable  knows she is in NS   denies any sx   but tenderneness on abd exam     called daughter : she thinks she might have taken double dose   pt seems to go few days without sleep and then sleeps all day   oldest sister just passed away in oct   questionable underlying dementia   vomitting episode two days ago    c/o abd pain   no fever   no chills   no urinary complaints       ischemia Bowel :  s/p OR   appreciate SICU care       Sycnope :  neuro checks   ct head : negative   EEG: NL  neuro input  noted .. no agitation   TTE  : noted : NL EF   pt with intermittent chest discomfort.. no recent ischemic ballesteros .. d.w   op cardiology ..  d/w    check orthostatics: negative    and overnight O2 sat    afib :  AC : on heparin   cont tele   afib with rVR   agree with IV drip       lehtargy /encephalopathy : CT head negative   improved and seems at baseline   EEG pending : negative       HTN:  hypotensive in ed..brandee sec to medication error ( pt might have taken double dose)   imrpoved     PT /OOB         discussed with derek ACP :   she said she never got a "straight answer from mother " i n past   at this time full code

## 2024-02-24 NOTE — BRIEF OPERATIVE NOTE - NSICDXBRIEFPROCEDURE_GEN_ALL_CORE_FT
PROCEDURES:  Exploratory laparotomy 22-Feb-2024 17:36:38  Tiffanie Narvaez  Angiography, mesenteric 22-Feb-2024 19:03:33  Raudel Tony  
PROCEDURES:  Exploratory laparotomy 22-Feb-2024 17:36:38  Tiffanie Narvaez  
PROCEDURES:  Creation, end ileostomy 24-Feb-2024 16:23:50  Lee Dawkins  Closure of abdominal wound 24-Feb-2024 16:24:00  Lee Dawkins

## 2024-02-24 NOTE — PROGRESS NOTE ADULT - ASSESSMENT
78yFemale PMHx COPD, A-fib  On Eliquis and Plavix, HTN, HLD with signs of acute mesenteric ischemia now s/p diagnostic laparoscopy converted to exploratory laparotomy with 20 cm of terminal ileum resection with bowel left in discontinuity and temporary abdominal closure with abthera with mesenteric angiogram via R fem access 02-22 findings of celiac, SMA, SWATHI w/o flow, collateral perfusion confirmed.    PLAN  - Diet - NPO/NGT/IVF  - Heparin gtt  - Zosyn  - IVF  - Care per SICU  - RTOR     Vascular surgery   [id number]

## 2024-02-24 NOTE — BRIEF OPERATIVE NOTE - ESTIMATED BLOOD LOSS
Patient called with discharge. She has discomfort associated with this. She would like to see any provider ASAP. Appointment given for this afternoon in open spot.   
10
25
10

## 2024-02-24 NOTE — BRIEF OPERATIVE NOTE - OPERATION/FINDINGS
Operation: Take down of ABThera, closure of abdominal wall, creation of end ileostomy.    Description: ABThera taken down. SB run from LOT to staple line, all viable. Cecum mobilized. Surgicel powder applied to RP after colon mobilization, hemostatic. End ileostomy created in R rectus sheath and ilium externalized. Fascia closed w/ running looped #1 PDS and distal staple line brought up to fascia w/ 0 Vicryl. Skin closed w/ staples. End ileostomy matured in a Nelda fashion w/ 2-0 chromic.

## 2024-02-24 NOTE — PROGRESS NOTE ADULT - SUBJECTIVE AND OBJECTIVE BOX
STATUS POST:      POST OPERATIVE DAY #:     Vital Signs Last 24 Hrs  T(C): 36.6 (24 Feb 2024 11:00), Max: 36.8 (23 Feb 2024 19:00)  T(F): 97.8 (24 Feb 2024 11:00), Max: 98.2 (23 Feb 2024 19:00)  HR: 118 (24 Feb 2024 13:30) (110 - 135)  BP: 113/55 (24 Feb 2024 13:15) (94/53 - 142/61)  BP(mean): 79 (24 Feb 2024 13:15) (70 - 97)  RR: 18 (24 Feb 2024 13:30) (11 - 20)  SpO2: 91% (24 Feb 2024 13:30) (91% - 99%)    Parameters below as of 24 Feb 2024 07:00  Patient On (Oxygen Delivery Method): room air      SUBJECTIVE: Pt seen and examined bedside during morning rounds. Patient states that she is feeling okay. She is aware of the plan to RTOR later today. Denies any fever, chills, vomiting, diarrhea, chest pain, or SOB.    General: NAD, resting comfortably in bed  Respiratory: Normal work of breathing.  Abdominal: soft, abthera vac in place with no strikethrough or saturation. Low volume SSF in vac cannister, Groin access site c/d/i. No hematoma.   Extremities: no gross deformities    I&O's Summary    23 Feb 2024 07:01  -  24 Feb 2024 07:00  --------------------------------------------------------  IN: 2334 mL / OUT: 1995 mL / NET: 339 mL    24 Feb 2024 07:01  -  24 Feb 2024 13:55  --------------------------------------------------------  IN: 430 mL / OUT: 360 mL / NET: 70 mL      I&O's Detail    23 Feb 2024 07:01  -  24 Feb 2024 07:00  --------------------------------------------------------  IN:    Enteral Tube Flush: 40 mL    Heparin: 52 mL    Heparin: 142 mL    IV PiggyBack: 300 mL    IV PiggyBack: 50 mL    IV PiggyBack: 500 mL    Lactated Ringers: 800 mL    Lactated Ringers: 210 mL    Lactated Ringers: 240 mL  Total IN: 2334 mL    OUT:    Indwelling Catheter - Urethral (mL): 1550 mL    Nasogastric/Oral tube (mL): 120 mL    VAC (Vacuum Assisted Closure) System (mL): 325 mL  Total OUT: 1995 mL    Total NET: 339 mL      24 Feb 2024 07:01  -  24 Feb 2024 13:55  --------------------------------------------------------  IN:    Diltiazem: 20 mL    Enteral Tube Flush: 30 mL    IV PiggyBack: 100 mL    IV PiggyBack: 100 mL    Lactated Ringers: 180 mL  Total IN: 430 mL    OUT:    Indwelling Catheter - Urethral (mL): 360 mL  Total OUT: 360 mL    Total NET: 70 mL    MEDICATIONS  (STANDING):  acetaminophen   IVPB .. 1000 milliGRAM(s) IV Intermittent every 6 hours  chlorhexidine 2% Cloths 1 Application(s) Topical <User Schedule>  diltiazem Infusion 5 mG/Hr (5 mL/Hr) IV Continuous <Continuous>  lactated ringers. 1000 milliLiter(s) (30 mL/Hr) IV Continuous <Continuous>  levothyroxine Injectable 130 MICROGram(s) IV Push at bedtime  metoprolol tartrate Injectable 5 milliGRAM(s) IV Push every 4 hours  nicotine -  14 mG/24Hr(s) Patch 1 Patch Transdermal daily  piperacillin/tazobactam IVPB.. 3.375 Gram(s) IV Intermittent every 8 hours    MEDICATIONS  (PRN):  HYDROmorphone  Injectable 0.25 milliGRAM(s) IV Push every 3 hours PRN Breakthrough pain      LABS:                        11.8   19.30 )-----------( 179      ( 24 Feb 2024 00:56 )             36.1     02-24    140  |  104  |  20  ----------------------------<  128<H>  3.6   |  25  |  1.07    Ca    8.3<L>      24 Feb 2024 00:56  Phos  3.7     02-24  Mg     2.1     02-24    TPro  5.6<L>  /  Alb  2.7<L>  /  TBili  0.6  /  DBili  x   /  AST  21  /  ALT  10  /  AlkPhos  58  02-24    PT/INR - ( 24 Feb 2024 12:28 )   PT: 19.3 sec;   INR: 1.87 ratio         PTT - ( 24 Feb 2024 12:28 )  PTT:29.1 sec

## 2024-02-24 NOTE — OCCUPATIONAL THERAPY INITIAL EVALUATION ADULT - PERTINENT HX OF CURRENT PROBLEM, REHAB EVAL
78-year-old female with PMH current smoker , COPD, A-fib  On Eliquis and Plavix, HTN, HLD presents for syncope from PCPs office yesterday. Patient was at normal checkup for blood work and was sitting on the table.  Daughter at bedside states that patient suddenly went limp while she was sitting on the table, her eyes rolled back, and her tongue turned to 1 side and this lasted for couple of seconds and then the patient regained consciousness.  Patient had no postictal period.  No tongue biting.  Patient does not remember these events. pt too lethargic  , though ar[place] knows she is in NS, denies any sx but tenderness on abd exam. Called daughter : she thinks she might have taken double dose pt seems to go few days without sleep and then sleeps all day oldest sister just passed away in oct questionable underlying dementia vomiting episode two days ago.  Hosp Course: 2/15 XR CHEST: Clear lungs. 2/16 XR ABDOMEN: Nonobstructive bowel gas pattern. 2/16 CT BRAIN:  No acute intracranial hemorrhage, mass effect, or shift of the midline structures. Mild chronic white matter microvascular type changes.    *now s/p diagnostic laparoscopy converted to exploratory laparotomy with 20 cm of terminal ileum resection with bowel left in discontinuity and temporary abdominal closure with abthera with mesenteric angiogram via R fem access 02-22 findings of celiac, SMA, SWATHI w/o flow, collateral perfusion confirmed.

## 2024-02-24 NOTE — PROGRESS NOTE ADULT - SUBJECTIVE AND OBJECTIVE BOX
Date of service: 02-24-24 @ 22:30      Patient is a 78y old  Female who presents with a chief complaint of "78y Female pmh of current smoker, COPD, afib on eliquis/plavix, HTN, HLD who presented to [facility] 2/15/2024 for syncope.  GI consulted for abdominal pain 2/22. There were reports of episodes of abdominal pain and vomiting a few days ago according to the family. CT AP notable for ischemic/necrotic small bowel within the pelvis and severe atherosclerotic disesase of SMA. Patient taken to OR for laparoscopic abd exploration, converted to open exlap for likely dead bowel. 20cm terminal ileum removed, left in discont w/ abthera. Angiogram R fem done intraop w/ findings of celiac, SMA, SWATHI w/o flow, collateral perfusion confirmed. Plan to RTOR in 48hrs. SICU c/s for ventilator management and for hemodynamic monitoring."       (23 Feb 2024 10:40)                                                               INTERVAL HPI/OVERNIGHT EVENTS:    REVIEW OF SYSTEMS:     sedated                                                                                                                                                                                                                                                                            Medications:  MEDICATIONS  (STANDING):  acetaminophen   IVPB .. 1000 milliGRAM(s) IV Intermittent every 6 hours  chlorhexidine 2% Cloths 1 Application(s) Topical <User Schedule>  diltiazem Infusion 5 mG/Hr (5 mL/Hr) IV Continuous <Continuous>  heparin  Infusion 800 Unit(s)/Hr (8 mL/Hr) IV Continuous <Continuous>  lactated ringers. 1000 milliLiter(s) (30 mL/Hr) IV Continuous <Continuous>  levothyroxine Injectable 130 MICROGram(s) IV Push at bedtime  metoprolol tartrate Injectable 5 milliGRAM(s) IV Push every 4 hours  nicotine -  14 mG/24Hr(s) Patch 1 Patch Transdermal daily  piperacillin/tazobactam IVPB.. 3.375 Gram(s) IV Intermittent every 8 hours    MEDICATIONS  (PRN):  HYDROmorphone  Injectable 0.25 milliGRAM(s) IV Push every 3 hours PRN Breakthrough pain       Allergies    penicillin (Hives)    Intolerances      Vital Signs Last 24 Hrs  T(C): 36.1 (24 Feb 2024 19:00), Max: 36.7 (23 Feb 2024 23:00)  T(F): 97 (24 Feb 2024 19:00), Max: 98 (23 Feb 2024 23:00)  HR: 124 (24 Feb 2024 22:15) (88 - 135)  BP: 125/61 (24 Feb 2024 22:00) (94/53 - 134/59)  BP(mean): 86 (24 Feb 2024 22:00) (70 - 97)  RR: 15 (24 Feb 2024 22:15) (10 - 28)  SpO2: 96% (24 Feb 2024 22:15) (90% - 99%)    Parameters below as of 24 Feb 2024 22:00  Patient On (Oxygen Delivery Method): nasal cannula  O2 Flow (L/min): 3    CAPILLARY BLOOD GLUCOSE      POCT Blood Glucose.: 122 mg/dL (24 Feb 2024 18:52)  POCT Blood Glucose.: 122 mg/dL (24 Feb 2024 12:36)  POCT Blood Glucose.: 118 mg/dL (24 Feb 2024 05:14)  POCT Blood Glucose.: 121 mg/dL (23 Feb 2024 23:19)      02-23 @ 07:01  -  02-24 @ 07:00  --------------------------------------------------------  IN: 2334 mL / OUT: 1995 mL / NET: 339 mL    02-24 @ 07:01  -  02-24 @ 22:30  --------------------------------------------------------  IN: 950 mL / OUT: 1000 mL / NET: -50 mL      Physical Exam:    Daily     Daily   General:  ngt in place   CV:  RRR, S1S2   Lungs:  CTA B/L, no wheezes, rales, rhonchi  Abdomen:  Soft, vac in place   Extremities: edema  amaya in place                                                                                                                                                                                                                                                                                                   LABS:                               12.3   17.58 )-----------( 223      ( 24 Feb 2024 17:04 )             37.1                      02-24    137  |  103  |  20  ----------------------------<  147<H>  4.0   |  21<L>  |  0.98    Ca    8.1<L>      24 Feb 2024 17:04  Phos  3.7     02-24  Mg     2.4     02-24    TPro  5.5<L>  /  Alb  2.6<L>  /  TBili  0.8  /  DBili  x   /  AST  23  /  ALT  10  /  AlkPhos  61  02-24                       RADIOLOGY & ADDITIONAL TESTS         I personally reviewed: [  ]EKG   [  ]CXR    [  ] CT      A/P:         Discussed with :     Simon consultants' Notes   Time spent :

## 2024-02-24 NOTE — BRIEF OPERATIVE NOTE - NSICDXBRIEFPOSTOP_GEN_ALL_CORE_FT
POST-OP DIAGNOSIS:  Ischemia, bowel 22-Feb-2024 17:37:00  Tiffanie Narvaez  

## 2024-02-24 NOTE — CHART NOTE - NSCHARTNOTEFT_GEN_A_CORE
POST-OPERATIVE NOTE    Subjective:  Patient is s/p re-exploration, end ileostomy, and closure. Patient denies any n/v, chest pain, or SOB. Pain is currently well controlled. Recovering appropriately.    Objective:  Physical Exam:  General: NAD, resting comfortably in bed  Pulmonary: Nonlabored breathing, no respiratory distress  Cardiovascular: RRR  Abdominal: soft, mildly distended, appropriately tender near midline incision, dressing c/d/i, ileostomy leaking stool  Extremities: WWP      Vital Signs Last 24 Hrs  T(C): 36.4 (24 Feb 2024 16:15), Max: 36.7 (23 Feb 2024 23:00)  T(F): 97.6 (24 Feb 2024 16:15), Max: 98 (23 Feb 2024 23:00)  HR: 119 (24 Feb 2024 18:30) (88 - 135)  BP: 118/62 (24 Feb 2024 18:30) (94/53 - 131/74)  BP(mean): 84 (24 Feb 2024 18:30) (70 - 97)  RR: 11 (24 Feb 2024 18:30) (11 - 28)  SpO2: 97% (24 Feb 2024 18:30) (90% - 97%)    Parameters below as of 24 Feb 2024 16:15  Patient On (Oxygen Delivery Method): nasal cannula  O2 Flow (L/min): 3    I&O's Detail    23 Feb 2024 07:01  -  24 Feb 2024 07:00  --------------------------------------------------------  IN:    Enteral Tube Flush: 40 mL    Heparin: 52 mL    Heparin: 142 mL    IV PiggyBack: 300 mL    IV PiggyBack: 50 mL    IV PiggyBack: 500 mL    Lactated Ringers: 800 mL    Lactated Ringers: 210 mL    Lactated Ringers: 240 mL  Total IN: 2334 mL    OUT:    Indwelling Catheter - Urethral (mL): 1550 mL    Nasogastric/Oral tube (mL): 120 mL    VAC (Vacuum Assisted Closure) System (mL): 325 mL  Total OUT: 1995 mL    Total NET: 339 mL      24 Feb 2024 07:01  -  24 Feb 2024 19:49  --------------------------------------------------------  IN:    Diltiazem: 20 mL    Enteral Tube Flush: 30 mL    IV PiggyBack: 100 mL    IV PiggyBack: 100 mL    Lactated Ringers: 180 mL  Total IN: 430 mL    OUT:    Indwelling Catheter - Urethral (mL): 585 mL  Total OUT: 585 mL    Total NET: -155 mL        piperacillin/tazobactam IVPB.. 3.375  diltiazem Infusion 5  metoprolol tartrate Injectable 5  piperacillin/tazobactam IVPB.. 3.375    PAST MEDICAL & SURGICAL HISTORY:  Hypertension      Hypothyroid      Osteoarthritis  knees, back      CAD (coronary artery disease)      CROW (obstructive sleep apnea)  non complaint on CPAP      History of MI (myocardial infarction)  h/o previous MI in 2004 prompted PTCA  with stenting x 2 vessels   last stress/ echo 2019      Heart murmur  dx in childhood      Bilateral hearing loss, unspecified hearing loss type  bilateral aids      Obesity      Mixed stress and urge urinary incontinence      Overactive bladder      S/P ORIF (open reduction internal fixation) fracture  left hip 1962      S/P appendectomy  30 plus years      S/P knee replacement  left 2000      Stented coronary artery  2004 X 2 STENTS      S/P laparotomy  due to adhesions, 30 years ago      H/O dilation and curettage  2/2019 Benign polyp              LABS:                        12.3   17.58 )-----------( 223      ( 24 Feb 2024 17:04 )             37.1     02-24    137  |  103  |  20  ----------------------------<  147<H>  4.0   |  21<L>  |  0.98    Ca    8.1<L>      24 Feb 2024 17:04  Phos  3.7     02-24  Mg     2.4     02-24    TPro  5.5<L>  /  Alb  2.6<L>  /  TBili  0.8  /  DBili  x   /  AST  23  /  ALT  10  /  AlkPhos  61  02-24    PT/INR - ( 24 Feb 2024 17:04 )   PT: 17.7 sec;   INR: 1.71 ratio         PTT - ( 24 Feb 2024 17:04 )  PTT:29.6 sec  CAPILLARY BLOOD GLUCOSE      POCT Blood Glucose.: 122 mg/dL (24 Feb 2024 18:52)  POCT Blood Glucose.: 122 mg/dL (24 Feb 2024 12:36)  POCT Blood Glucose.: 118 mg/dL (24 Feb 2024 05:14)  POCT Blood Glucose.: 121 mg/dL (23 Feb 2024 23:19)      Radiology and Additional Studies:    Assessment:  The patient is a 78y Female who is now several hours post-op from a re-exploration, ileostomy, and closure.     Plan:  - Pain control as needed  - Monitor ileostomy function  - Care per SICU

## 2024-02-25 LAB
ALBUMIN SERPL ELPH-MCNC: 2.8 G/DL — LOW (ref 3.3–5)
ALBUMIN SERPL ELPH-MCNC: 2.9 G/DL — LOW (ref 3.3–5)
ALBUMIN SERPL ELPH-MCNC: 3.3 G/DL — SIGNIFICANT CHANGE UP (ref 3.3–5)
ALP SERPL-CCNC: 55 U/L — SIGNIFICANT CHANGE UP (ref 40–120)
ALP SERPL-CCNC: 62 U/L — SIGNIFICANT CHANGE UP (ref 40–120)
ALP SERPL-CCNC: 70 U/L — SIGNIFICANT CHANGE UP (ref 40–120)
ALT FLD-CCNC: 11 U/L — SIGNIFICANT CHANGE UP (ref 10–45)
ALT FLD-CCNC: 13 U/L — SIGNIFICANT CHANGE UP (ref 10–45)
ALT FLD-CCNC: 14 U/L — SIGNIFICANT CHANGE UP (ref 10–45)
ANION GAP SERPL CALC-SCNC: 11 MMOL/L — SIGNIFICANT CHANGE UP (ref 5–17)
ANION GAP SERPL CALC-SCNC: 12 MMOL/L — SIGNIFICANT CHANGE UP (ref 5–17)
ANION GAP SERPL CALC-SCNC: 15 MMOL/L — SIGNIFICANT CHANGE UP (ref 5–17)
APTT BLD: 46.3 SEC — HIGH (ref 24.5–35.6)
APTT BLD: 53.2 SEC — HIGH (ref 24.5–35.6)
APTT BLD: 54.2 SEC — HIGH (ref 24.5–35.6)
APTT BLD: 68.4 SEC — HIGH (ref 24.5–35.6)
AST SERPL-CCNC: 21 U/L — SIGNIFICANT CHANGE UP (ref 10–40)
AST SERPL-CCNC: 21 U/L — SIGNIFICANT CHANGE UP (ref 10–40)
AST SERPL-CCNC: 27 U/L — SIGNIFICANT CHANGE UP (ref 10–40)
BILIRUB SERPL-MCNC: 0.8 MG/DL — SIGNIFICANT CHANGE UP (ref 0.2–1.2)
BILIRUB SERPL-MCNC: 0.8 MG/DL — SIGNIFICANT CHANGE UP (ref 0.2–1.2)
BILIRUB SERPL-MCNC: 0.9 MG/DL — SIGNIFICANT CHANGE UP (ref 0.2–1.2)
BUN SERPL-MCNC: 18 MG/DL — SIGNIFICANT CHANGE UP (ref 7–23)
BUN SERPL-MCNC: 19 MG/DL — SIGNIFICANT CHANGE UP (ref 7–23)
BUN SERPL-MCNC: 20 MG/DL — SIGNIFICANT CHANGE UP (ref 7–23)
CALCIUM SERPL-MCNC: 8.5 MG/DL — SIGNIFICANT CHANGE UP (ref 8.4–10.5)
CALCIUM SERPL-MCNC: 8.7 MG/DL — SIGNIFICANT CHANGE UP (ref 8.4–10.5)
CALCIUM SERPL-MCNC: 8.8 MG/DL — SIGNIFICANT CHANGE UP (ref 8.4–10.5)
CHLORIDE SERPL-SCNC: 100 MMOL/L — SIGNIFICANT CHANGE UP (ref 96–108)
CHLORIDE SERPL-SCNC: 101 MMOL/L — SIGNIFICANT CHANGE UP (ref 96–108)
CHLORIDE SERPL-SCNC: 99 MMOL/L — SIGNIFICANT CHANGE UP (ref 96–108)
CO2 SERPL-SCNC: 24 MMOL/L — SIGNIFICANT CHANGE UP (ref 22–31)
CO2 SERPL-SCNC: 26 MMOL/L — SIGNIFICANT CHANGE UP (ref 22–31)
CO2 SERPL-SCNC: 27 MMOL/L — SIGNIFICANT CHANGE UP (ref 22–31)
CREAT SERPL-MCNC: 0.96 MG/DL — SIGNIFICANT CHANGE UP (ref 0.5–1.3)
CREAT SERPL-MCNC: 0.98 MG/DL — SIGNIFICANT CHANGE UP (ref 0.5–1.3)
CREAT SERPL-MCNC: 1.02 MG/DL — SIGNIFICANT CHANGE UP (ref 0.5–1.3)
EGFR: 57 ML/MIN/1.73M2 — LOW
EGFR: 59 ML/MIN/1.73M2 — LOW
EGFR: 61 ML/MIN/1.73M2 — SIGNIFICANT CHANGE UP
GLUCOSE BLDC GLUCOMTR-MCNC: 128 MG/DL — HIGH (ref 70–99)
GLUCOSE BLDC GLUCOMTR-MCNC: 137 MG/DL — HIGH (ref 70–99)
GLUCOSE BLDC GLUCOMTR-MCNC: 158 MG/DL — HIGH (ref 70–99)
GLUCOSE SERPL-MCNC: 144 MG/DL — HIGH (ref 70–99)
GLUCOSE SERPL-MCNC: 154 MG/DL — HIGH (ref 70–99)
GLUCOSE SERPL-MCNC: 160 MG/DL — HIGH (ref 70–99)
HCT VFR BLD CALC: 41.6 % — SIGNIFICANT CHANGE UP (ref 34.5–45)
HGB BLD-MCNC: 13.9 G/DL — SIGNIFICANT CHANGE UP (ref 11.5–15.5)
INR BLD: 1.61 RATIO — HIGH (ref 0.85–1.18)
INR BLD: 1.74 RATIO — HIGH (ref 0.85–1.18)
INR BLD: 1.77 RATIO — HIGH (ref 0.85–1.18)
INR BLD: 1.94 RATIO — HIGH (ref 0.85–1.18)
MAGNESIUM SERPL-MCNC: 2.2 MG/DL — SIGNIFICANT CHANGE UP (ref 1.6–2.6)
MAGNESIUM SERPL-MCNC: 2.3 MG/DL — SIGNIFICANT CHANGE UP (ref 1.6–2.6)
MAGNESIUM SERPL-MCNC: 2.4 MG/DL — SIGNIFICANT CHANGE UP (ref 1.6–2.6)
MCHC RBC-ENTMCNC: 32.6 PG — SIGNIFICANT CHANGE UP (ref 27–34)
MCHC RBC-ENTMCNC: 33.4 GM/DL — SIGNIFICANT CHANGE UP (ref 32–36)
MCV RBC AUTO: 97.7 FL — SIGNIFICANT CHANGE UP (ref 80–100)
NRBC # BLD: 0 /100 WBCS — SIGNIFICANT CHANGE UP (ref 0–0)
PHOSPHATE SERPL-MCNC: 4 MG/DL — SIGNIFICANT CHANGE UP (ref 2.5–4.5)
PHOSPHATE SERPL-MCNC: 4 MG/DL — SIGNIFICANT CHANGE UP (ref 2.5–4.5)
PHOSPHATE SERPL-MCNC: 4.2 MG/DL — SIGNIFICANT CHANGE UP (ref 2.5–4.5)
PLATELET # BLD AUTO: 211 K/UL — SIGNIFICANT CHANGE UP (ref 150–400)
POTASSIUM SERPL-MCNC: 3.6 MMOL/L — SIGNIFICANT CHANGE UP (ref 3.5–5.3)
POTASSIUM SERPL-MCNC: 3.7 MMOL/L — SIGNIFICANT CHANGE UP (ref 3.5–5.3)
POTASSIUM SERPL-MCNC: 3.9 MMOL/L — SIGNIFICANT CHANGE UP (ref 3.5–5.3)
POTASSIUM SERPL-SCNC: 3.6 MMOL/L — SIGNIFICANT CHANGE UP (ref 3.5–5.3)
POTASSIUM SERPL-SCNC: 3.7 MMOL/L — SIGNIFICANT CHANGE UP (ref 3.5–5.3)
POTASSIUM SERPL-SCNC: 3.9 MMOL/L — SIGNIFICANT CHANGE UP (ref 3.5–5.3)
PROT SERPL-MCNC: 5.7 G/DL — LOW (ref 6–8.3)
PROT SERPL-MCNC: 6 G/DL — SIGNIFICANT CHANGE UP (ref 6–8.3)
PROT SERPL-MCNC: 6.7 G/DL — SIGNIFICANT CHANGE UP (ref 6–8.3)
PROTHROM AB SERPL-ACNC: 16.7 SEC — HIGH (ref 9.5–13)
PROTHROM AB SERPL-ACNC: 18 SEC — HIGH (ref 9.5–13)
PROTHROM AB SERPL-ACNC: 19.2 SEC — HIGH (ref 9.5–13)
PROTHROM AB SERPL-ACNC: 20.9 SEC — HIGH (ref 9.5–13)
RBC # BLD: 4.26 M/UL — SIGNIFICANT CHANGE UP (ref 3.8–5.2)
RBC # FLD: 13.5 % — SIGNIFICANT CHANGE UP (ref 10.3–14.5)
SODIUM SERPL-SCNC: 138 MMOL/L — SIGNIFICANT CHANGE UP (ref 135–145)
SODIUM SERPL-SCNC: 138 MMOL/L — SIGNIFICANT CHANGE UP (ref 135–145)
SODIUM SERPL-SCNC: 139 MMOL/L — SIGNIFICANT CHANGE UP (ref 135–145)
T3 SERPL-MCNC: 47 NG/DL — LOW (ref 80–200)
T4 AB SER-ACNC: 10.3 UG/DL — SIGNIFICANT CHANGE UP (ref 4.6–12)
TSH SERPL-MCNC: 0.06 UIU/ML — LOW (ref 0.27–4.2)
WBC # BLD: 15.83 K/UL — HIGH (ref 3.8–10.5)
WBC # FLD AUTO: 15.83 K/UL — HIGH (ref 3.8–10.5)

## 2024-02-25 PROCEDURE — 71045 X-RAY EXAM CHEST 1 VIEW: CPT | Mod: 26

## 2024-02-25 RX ORDER — POTASSIUM CHLORIDE 20 MEQ
10 PACKET (EA) ORAL
Refills: 0 | Status: COMPLETED | OUTPATIENT
Start: 2024-02-25 | End: 2024-02-25

## 2024-02-25 RX ORDER — METOPROLOL TARTRATE 50 MG
25 TABLET ORAL
Refills: 0 | Status: DISCONTINUED | OUTPATIENT
Start: 2024-02-25 | End: 2024-02-26

## 2024-02-25 RX ORDER — ACETAMINOPHEN 500 MG
1000 TABLET ORAL ONCE
Refills: 0 | Status: COMPLETED | OUTPATIENT
Start: 2024-02-25 | End: 2024-02-25

## 2024-02-25 RX ORDER — ACETAMINOPHEN 500 MG
650 TABLET ORAL EVERY 6 HOURS
Refills: 0 | Status: DISCONTINUED | OUTPATIENT
Start: 2024-02-25 | End: 2024-02-26

## 2024-02-25 RX ORDER — OXYCODONE HYDROCHLORIDE 5 MG/1
2.5 TABLET ORAL EVERY 4 HOURS
Refills: 0 | Status: DISCONTINUED | OUTPATIENT
Start: 2024-02-25 | End: 2024-02-26

## 2024-02-25 RX ORDER — POTASSIUM CHLORIDE 20 MEQ
40 PACKET (EA) ORAL ONCE
Refills: 0 | Status: DISCONTINUED | OUTPATIENT
Start: 2024-02-25 | End: 2024-02-25

## 2024-02-25 RX ORDER — FUROSEMIDE 40 MG
40 TABLET ORAL DAILY
Refills: 0 | Status: DISCONTINUED | OUTPATIENT
Start: 2024-02-25 | End: 2024-03-02

## 2024-02-25 RX ADMIN — Medication 5 MG/HR: at 10:43

## 2024-02-25 RX ADMIN — Medication 5 MG/HR: at 20:35

## 2024-02-25 RX ADMIN — Medication 40 MILLIGRAM(S): at 11:01

## 2024-02-25 RX ADMIN — HYDROMORPHONE HYDROCHLORIDE 0.25 MILLIGRAM(S): 2 INJECTION INTRAMUSCULAR; INTRAVENOUS; SUBCUTANEOUS at 13:57

## 2024-02-25 RX ADMIN — Medication 400 MILLIGRAM(S): at 05:00

## 2024-02-25 RX ADMIN — Medication 100 MILLIEQUIVALENT(S): at 23:35

## 2024-02-25 RX ADMIN — Medication 1 PATCH: at 07:58

## 2024-02-25 RX ADMIN — Medication 1 PATCH: at 11:01

## 2024-02-25 RX ADMIN — Medication 130 MICROGRAM(S): at 22:23

## 2024-02-25 RX ADMIN — Medication 1000 MILLIGRAM(S): at 19:20

## 2024-02-25 RX ADMIN — SODIUM CHLORIDE 30 MILLILITER(S): 9 INJECTION, SOLUTION INTRAVENOUS at 20:35

## 2024-02-25 RX ADMIN — Medication 1000 MILLIGRAM(S): at 05:30

## 2024-02-25 RX ADMIN — Medication 400 MILLIGRAM(S): at 18:50

## 2024-02-25 RX ADMIN — Medication 100 MILLIEQUIVALENT(S): at 02:36

## 2024-02-25 RX ADMIN — Medication 100 MILLIEQUIVALENT(S): at 22:23

## 2024-02-25 RX ADMIN — Medication 1 PATCH: at 18:23

## 2024-02-25 RX ADMIN — SODIUM CHLORIDE 30 MILLILITER(S): 9 INJECTION, SOLUTION INTRAVENOUS at 10:42

## 2024-02-25 RX ADMIN — Medication 100 MILLIEQUIVALENT(S): at 21:14

## 2024-02-25 RX ADMIN — HEPARIN SODIUM 10 UNIT(S)/HR: 5000 INJECTION INTRAVENOUS; SUBCUTANEOUS at 20:35

## 2024-02-25 RX ADMIN — Medication 100 MILLIEQUIVALENT(S): at 01:30

## 2024-02-25 RX ADMIN — HYDROMORPHONE HYDROCHLORIDE 0.25 MILLIGRAM(S): 2 INJECTION INTRAMUSCULAR; INTRAVENOUS; SUBCUTANEOUS at 13:27

## 2024-02-25 RX ADMIN — Medication 5 MILLIGRAM(S): at 11:00

## 2024-02-25 RX ADMIN — PIPERACILLIN AND TAZOBACTAM 25 GRAM(S): 4; .5 INJECTION, POWDER, LYOPHILIZED, FOR SOLUTION INTRAVENOUS at 10:02

## 2024-02-25 RX ADMIN — Medication 5 MILLIGRAM(S): at 18:08

## 2024-02-25 RX ADMIN — PIPERACILLIN AND TAZOBACTAM 25 GRAM(S): 4; .5 INJECTION, POWDER, LYOPHILIZED, FOR SOLUTION INTRAVENOUS at 18:09

## 2024-02-25 RX ADMIN — HEPARIN SODIUM 9 UNIT(S)/HR: 5000 INJECTION INTRAVENOUS; SUBCUTANEOUS at 06:35

## 2024-02-25 RX ADMIN — Medication 5 MILLIGRAM(S): at 16:00

## 2024-02-25 RX ADMIN — Medication 1000 MILLIGRAM(S): at 00:24

## 2024-02-25 RX ADMIN — Medication 100 MILLIEQUIVALENT(S): at 04:37

## 2024-02-25 RX ADMIN — CHLORHEXIDINE GLUCONATE 1 APPLICATION(S): 213 SOLUTION TOPICAL at 05:00

## 2024-02-25 RX ADMIN — Medication 1 PATCH: at 11:00

## 2024-02-25 RX ADMIN — HEPARIN SODIUM 9 UNIT(S)/HR: 5000 INJECTION INTRAVENOUS; SUBCUTANEOUS at 10:43

## 2024-02-25 RX ADMIN — Medication 5 MILLIGRAM(S): at 01:11

## 2024-02-25 NOTE — PROGRESS NOTE ADULT - SUBJECTIVE AND OBJECTIVE BOX
24 hr  - 40 lasix given  - RTOR 2/24. End ileostomy, closed  - Afib wtih RVR, metoprolol 5q4. add dilt gtt  - derm consult, recommends plain plain petroleum jelly for allergic contact dermatitis   - heparin gtt resumed 6hrs post-op    SUBJECTIVE/ROS:  [ ] A ten-point review of systems was otherwise negative except as noted.  [ ] Due to altered mental status/intubation, subjective information were not able to be obtained from the patient. History was obtained, to the extent possible, from review of the chart and collateral sources of information.      NEURO  Exam: A&O x1  Meds: acetaminophen   IVPB .. 1000 milliGRAM(s) IV Intermittent every 6 hours  HYDROmorphone  Injectable 0.25 milliGRAM(s) IV Push every 3 hours PRN Breakthrough pain  [x] Adequacy of sedation and pain control has been assessed and adjusted      RESPIRATORY  RR: 11 (02-25-24 @ 01:15) (10 - 28)  SpO2: 97% (02-25-24 @ 01:15) (90% - 99%)  Exam: unlabored, clear to auscultation bilaterally  Mechanical Ventilation:   ABG - ( 24 Feb 2024 16:53 )  pH: 7.43  /  pCO2: 39    /  pO2: 90    / HCO3: 26    / Base Excess: 1.5   /  SaO2: 98.1        CARDIOVASCULAR  HR: 109 (02-25-24 @ 01:15) (88 - 135)  BP: 106/55 (02-25-24 @ 01:15) (94/53 - 134/59)  BP(mean): 77 (02-25-24 @ 01:15) (69 - 95)  VBG - ( 24 Feb 2024 00:46 )  pH: 7.42  /  pCO2: 43    /  pO2: 53    / HCO3: 28    / Base Excess: 3.0   /  SaO2: 84.9   Lactate: 1.5        Exam: regular rate and rhythm  Cardiac Rhythm: sinus  Perfusion     [x]Adequate   [ ]Inadequate  Mentation   [x]Normal       [ ]Reduced  Extremities  [x]Warm         [ ]Cool  Volume Status [ ]Hypervolemic [x]Euvolemic [ ]Hypovolemic  Meds: diltiazem Infusion 5 mG/Hr IV Continuous <Continuous>  metoprolol tartrate Injectable 5 milliGRAM(s) IV Push every 4 hours      GI/NUTRITION  Exam: soft, nontender, nondistended, incision C/D/I, ileostomy in place  Diet: npo    GENITOURINARY  I&O's Detail    02-23 @ 07:01 - 02-24 @ 07:00  --------------------------------------------------------  IN:    Enteral Tube Flush: 40 mL    Heparin: 52 mL    Heparin: 142 mL    IV PiggyBack: 300 mL    IV PiggyBack: 50 mL    IV PiggyBack: 500 mL    Lactated Ringers: 800 mL    Lactated Ringers: 210 mL    Lactated Ringers: 240 mL  Total IN: 2334 mL    OUT:    Indwelling Catheter - Urethral (mL): 1550 mL    Nasogastric/Oral tube (mL): 120 mL    VAC (Vacuum Assisted Closure) System (mL): 325 mL  Total OUT: 1995 mL    Total NET: 339 mL      02-24 @ 07:01 - 02-25 @ 01:28  --------------------------------------------------------  IN:    Diltiazem: 110 mL    Enteral Tube Flush: 60 mL    Heparin: 24 mL    IV PiggyBack: 100 mL    IV PiggyBack: 300 mL    IV PiggyBack: 200 mL    Lactated Ringers: 450 mL  Total IN: 1244 mL    OUT:    Ileostomy (mL): 50 mL    Indwelling Catheter - Urethral (mL): 980 mL    Nasogastric/Oral tube (mL): 50 mL  Total OUT: 1080 mL    Total NET: 164 mL          02-25    139  |  100  |  20  ----------------------------<  144<H>  3.7   |  24  |  1.02    Ca    8.8      25 Feb 2024 00:35  Phos  4.2     02-25  Mg     2.4     02-25    TPro  6.7  /  Alb  3.3  /  TBili  0.9  /  DBili  x   /  AST  27  /  ALT  13  /  AlkPhos  70  02-25    [ ] Melvin catheter, indication: N/A  Meds: lactated ringers. 1000 milliLiter(s) IV Continuous <Continuous>  potassium chloride  10 mEq/100 mL IVPB 10 milliEquivalent(s) IV Intermittent every 1 hour        HEMATOLOGIC  Meds: heparin  Infusion 800 Unit(s)/Hr IV Continuous <Continuous>    [x] VTE Prophylaxis                        13.9   15.83 )-----------( 211      ( 25 Feb 2024 00:35 )             41.6     PT/INR - ( 25 Feb 2024 00:36 )   PT: 16.7 sec;   INR: 1.61 ratio         PTT - ( 25 Feb 2024 00:36 )  PTT:46.3 sec  Transfusion     [ ] PRBC   [ ] Platelets   [ ] FFP   [ ] Cryoprecipitate      INFECTIOUS DISEASES  WBC Count: 15.83 K/uL (02-25 @ 00:35)  WBC Count: 17.58 K/uL (02-24 @ 17:04)    RECENT CULTURES:  Specimen Source: .Blood Blood  Date/Time: 02-23 @ 03:14  Culture Results:   No growth at 24 hours  Gram Stain: --  Organism: --    Meds: piperacillin/tazobactam IVPB.. 3.375 Gram(s) IV Intermittent every 8 hours      ENDOCRINE  CAPILLARY BLOOD GLUCOSE  POCT Blood Glucose.: 146 mg/dL (24 Feb 2024 23:53)  POCT Blood Glucose.: 122 mg/dL (24 Feb 2024 18:52)  POCT Blood Glucose.: 122 mg/dL (24 Feb 2024 12:36)  POCT Blood Glucose.: 118 mg/dL (24 Feb 2024 05:14)    Meds: levothyroxine Injectable 130 MICROGram(s) IV Push at bedtime      OTHER MEDICATIONS:  chlorhexidine 2% Cloths 1 Application(s) Topical <User Schedule>  nicotine -  14 mG/24Hr(s) Patch 1 Patch Transdermal daily      CODE STATUS: full code

## 2024-02-25 NOTE — PROGRESS NOTE ADULT - ASSESSMENT
78yFemale PMHx COPD, A-fib  On Eliquis and Plavix, HTN, HLD with signs of acute mesenteric ischemia now s/p diagnostic laparoscopy converted to exploratory laparotomy with 20 cm of terminal ileum resection with bowel left in discontinuity and temporary abdominal closure with abthera with mesenteric angiogram via R fem access 02-22 findings of celiac, SMA, SWATHI w/o flow, collateral perfusion confirmed, s/p RTOR 2/24 showing all bowel viable, end ileostomy. No further planned vascular interventions as patient has no revascularization options.    PLAN  - Diet - NPO/NGT/IVF  - Continue AC, heparin gtt  - Zosyn  - IVF  - Care per SICU, ACS    Please call back with any questions or concerns.    Vascular surgery   [id number]

## 2024-02-25 NOTE — PROGRESS NOTE ADULT - SUBJECTIVE AND OBJECTIVE BOX
Subjective: Patient seen and examined. No new events except as noted.   remains in ICU   -110s on Cardizem gtt     REVIEW OF SYSTEMS:    CONSTITUTIONAL: +weakness, fevers or chills  EYES/ENT: No visual changes;  No vertigo or throat pain   NECK: No pain or stiffness  RESPIRATORY: No cough, wheezing, hemoptysis; No shortness of breath  CARDIOVASCULAR: No chest pain or palpitations  GASTROINTESTINAL: No abdominal or epigastric pain. No nausea, vomiting, or hematemesis; No diarrhea or constipation. No melena or hematochezia.  GENITOURINARY: No dysuria, frequency or hematuria  NEUROLOGICAL: No numbness or weakness  SKIN: No itching, burning, rashes, or lesions   All other review of systems is negative unless indicated above.    MEDICATIONS:  MEDICATIONS  (STANDING):  chlorhexidine 2% Cloths 1 Application(s) Topical <User Schedule>  diltiazem Infusion 5 mG/Hr (5 mL/Hr) IV Continuous <Continuous>  furosemide   Injectable 40 milliGRAM(s) IV Push daily  heparin  Infusion 800 Unit(s)/Hr (9 mL/Hr) IV Continuous <Continuous>  lactated ringers. 1000 milliLiter(s) (30 mL/Hr) IV Continuous <Continuous>  levothyroxine Injectable 130 MICROGram(s) IV Push at bedtime  metoprolol tartrate Injectable 5 milliGRAM(s) IV Push every 4 hours  nicotine -  14 mG/24Hr(s) Patch 1 Patch Transdermal daily  piperacillin/tazobactam IVPB.. 3.375 Gram(s) IV Intermittent every 8 hours      PHYSICAL EXAM:  T(C): 36.1 (02-25-24 @ 03:00), Max: 36.6 (02-24-24 @ 11:00)  HR: 108 (02-25-24 @ 07:00) (88 - 135)  BP: 111/67 (02-25-24 @ 07:00) (85/50 - 134/59)  RR: 11 (02-25-24 @ 07:00) (10 - 28)  SpO2: 96% (02-25-24 @ 07:00) (90% - 99%)  Wt(kg): --  I&O's Summary    24 Feb 2024 07:01  -  25 Feb 2024 07:00  --------------------------------------------------------  IN: 1988 mL / OUT: 1325 mL / NET: 663 mL            Appearance: NAD	  HEENT:  Dry  oral mucosa, +NGT   Lymphatic: No lymphadenopathy , no edema  Cardiovascular: irregular S1 S2, No JVD, No murmurs , Peripheral pulses palpable 2+ bilaterally  Respiratory: decreased bs   Gastrointestinal:  soft, mildly distended, appropriately tender near midline incision, dressing c/d/i,  ileostomy leaking stool  Skin: No rashes, No ecchymoses, No cyanosis, warm to touch  Musculoskeletal: Normal range of motion, normal strength  Psychiatry:  sleepy   Ext: No edema    LABS:    CARDIAC MARKERS:                                13.9   15.83 )-----------( 211      ( 25 Feb 2024 00:35 )             41.6     02-25    139  |  100  |  20  ----------------------------<  144<H>  3.7   |  24  |  1.02    Ca    8.8      25 Feb 2024 00:35  Phos  4.2     02-25  Mg     2.4     02-25    TPro  6.7  /  Alb  3.3  /  TBili  0.9  /  DBili  x   /  AST  27  /  ALT  13  /  AlkPhos  70  02-25            TELEMETRY: 	AF    ECG:  	  RADIOLOGY: < from: Xray Chest 1 View- PORTABLE-Routine (Xray Chest 1 View- PORTABLE-Routine in AM.) (02.24.24 @ 07:28) >    ACC: [id number] EXAM:  XR CHEST PORTABLE ROUTINE 1V   ORDERED BY: CARLIN ROCHE     PROCEDURE DATE:  02/24/2024          INTERPRETATION:  CLINICAL INDICATION: routine follow-up    EXAM:  Single frontal chest from 2/24/2024 at 0652. Compared to prior study from   2/23/2024.    Slightly rotated left.    IMPRESSION:  Interval extubation. Enteric tube remains in place with distal end looped   in left upper quadrant.    Clear visualized lungs. No pleural effusions or pneumothorax.    Stable cardiac and mediastinal silhouettes.    --- End of Report ---        < end of copied text >    DIAGNOSTIC TESTING:  [ ] Echocardiogram:  [ ]  Catheterization:  [ ] Stress Test:    OTHER:

## 2024-02-25 NOTE — PROGRESS NOTE ADULT - SUBJECTIVE AND OBJECTIVE BOX
VASCULAR SURGERY PROGRESS NOTE    S: S/p RTOR w/end ileostomy all bowel viable, extubated post-op, resumed on hep gtt, no acute events overnight.    O: Vital Signs  T(C): 36.1 (02-25 @ 03:00), Max: 36.6 (02-24 @ 11:00)  HR: 108 (02-25 @ 07:00) (88 - 135)  BP: 111/67 (02-25 @ 07:00) (85/50 - 134/59)  RR: 11 (02-25 @ 07:00) (10 - 28)  SpO2: 96% (02-25 @ 07:00) (90% - 99%)  02-24-24 @ 07:01  -  02-25-24 @ 07:00  --------------------------------------------------------  IN:  Total IN: 0 mL    OUT:    Ileostomy (mL): 50 mL    Indwelling Catheter - Urethral (mL): 1150 mL    Nasogastric/Oral tube (mL): 125 mL  Total OUT: 1325 mL    Total NET: -1325 mL        General: NAD  Resp: airway patent, respirations unlabored on RA  CVS: AFib  Abdomen: incision c/d/i, stoma pink and viable  Vascular: palp femoral b/l, DP signal b/l  Extremities: no edema  Skin: warm, dry, appropriate color                          13.9   15.83 )-----------( 211      ( 25 Feb 2024 00:35 )             41.6   02-25    139  |  100  |  20  ----------------------------<  144<H>  3.7   |  24  |  1.02    Ca    8.8      25 Feb 2024 00:35  Phos  4.2     02-25  Mg     2.4     02-25    TPro  6.7  /  Alb  3.3  /  TBili  0.9  /  DBili  x   /  AST  27  /  ALT  13  /  AlkPhos  70  02-25

## 2024-02-25 NOTE — PROGRESS NOTE ADULT - ATTENDING COMMENTS
ATTENDING ATTESTATION:    78F history of tobacco use, COPD, afib on eliquis, CAD, HTN, HLD with mesenteric ischemia now s/p ex-lap, small bowel resection, open abdomen with mesenteric angiogram (2/22/24) and re-exploration, end ileostomy, abdominal closure (2/24/24).    N: Acute postoperative pain.   - oxycodone prn    C: Chronic afib with intermittent RVR. CAD. Not on pressors.  - diltiazem drip and metoprolol IV  - check TSH    P: No acute issues.    G: Mesenteric ischemia s/p resection. In discontinuity. Occluded celiac, SMA, SWATHI with no revascularization options.  - D/C NGT, advance to clear liquid diet    R: Oliguria improved with lasix. No CHANELL.    - lasix 40mg daily    H: Not actively bleeding. Elevated INR likely in setting of eliquis, downtrending.   - continue heparin drip  - hold eliquis    I: Leukocytosis.   - empiric zosyn for bacterial translocation in setting of ischemic bowel     E: History of hypothyroidism  - home levothyroxine    LINES: ELLIE amayas  DISPO: SICU, full code       Total time spent in the critical care of this patient today (excluding teaching & procedures): 35 minutes    Over 50% of the total time was spent in discussion and coordination of care with consulting services, dietary and rehab services.    Linda Prakash MD  Surgical Critical Care.

## 2024-02-25 NOTE — PROGRESS NOTE ADULT - SUBJECTIVE AND OBJECTIVE BOX
ACS Progress Note    S: Patient seen and examined. Ostomy with liquid output.   24 hr  - 40 lasix given  - RTOR 2/24. End ileostomy, closed  - Afib wtih RVR, metoprolol 5q4. add dilt gtt  - derm consult, recommends plain plain petroleum jelly for allergic contact dermatitis   - heparin gtt resumed 6hrs post-op    O:  Vital Signs Last 24 Hrs  T(C): 36.1 (25 Feb 2024 03:00), Max: 36.4 (24 Feb 2024 16:15)  T(F): 97 (25 Feb 2024 03:00), Max: 97.6 (24 Feb 2024 16:15)  HR: 108 (25 Feb 2024 07:00) (88 - 135)  BP: 111/67 (25 Feb 2024 07:00) (85/50 - 134/59)  BP(mean): 84 (25 Feb 2024 07:00) (62 - 95)  RR: 11 (25 Feb 2024 07:00) (10 - 28)  SpO2: 96% (25 Feb 2024 07:00) (90% - 99%)    Parameters below as of 24 Feb 2024 22:00  Patient On (Oxygen Delivery Method): nasal cannula  O2 Flow (L/min): 2      I&O's Detail    24 Feb 2024 07:01  -  25 Feb 2024 07:00  --------------------------------------------------------  IN:    Diltiazem: 175 mL    Enteral Tube Flush: 60 mL    Heparin: 73 mL    IV PiggyBack: 100 mL    IV PiggyBack: 350 mL    IV PiggyBack: 600 mL    Lactated Ringers: 630 mL  Total IN: 1988 mL    OUT:    Ileostomy (mL): 50 mL    Indwelling Catheter - Urethral (mL): 1150 mL    Nasogastric/Oral tube (mL): 125 mL  Total OUT: 1325 mL    Total NET: 663 mL      25 Feb 2024 07:01  -  25 Feb 2024 11:15  --------------------------------------------------------  IN:    Diltiazem: 30 mL    Heparin: 27 mL    IV PiggyBack: 25 mL    Lactated Ringers: 90 mL  Total IN: 172 mL    OUT:    Indwelling Catheter - Urethral (mL): 65 mL    Nasogastric/Oral tube (mL): 25 mL  Total OUT: 90 mL    Total NET: 82 mL          MEDICATIONS  (STANDING):  chlorhexidine 2% Cloths 1 Application(s) Topical <User Schedule>  diltiazem Infusion 5 mG/Hr (5 mL/Hr) IV Continuous <Continuous>  furosemide   Injectable 40 milliGRAM(s) IV Push daily  heparin  Infusion 800 Unit(s)/Hr (9 mL/Hr) IV Continuous <Continuous>  lactated ringers. 1000 milliLiter(s) (30 mL/Hr) IV Continuous <Continuous>  levothyroxine Injectable 130 MICROGram(s) IV Push at bedtime  metoprolol tartrate Injectable 5 milliGRAM(s) IV Push every 4 hours  nicotine -  14 mG/24Hr(s) Patch 1 Patch Transdermal daily  piperacillin/tazobactam IVPB.. 3.375 Gram(s) IV Intermittent every 8 hours    MEDICATIONS  (PRN):  HYDROmorphone  Injectable 0.25 milliGRAM(s) IV Push every 3 hours PRN Breakthrough pain                            13.9   15.83 )-----------( 211      ( 25 Feb 2024 00:35 )             41.6       02-25    139  |  100  |  20  ----------------------------<  144<H>  3.7   |  24  |  1.02    Ca    8.8      25 Feb 2024 00:35  Phos  4.2     02-25  Mg     2.4     02-25    TPro  6.7  /  Alb  3.3  /  TBili  0.9  /  DBili  x   /  AST  27  /  ALT  13  /  AlkPhos  70  02-25      Physical Exam:  General: NAD, resting comfortably in bed  Pulmonary: Nonlabored breathing, no respiratory distress  Cardiovascular: RRR  Abdominal: soft, mildly distended, appropriately tender near midline incision, dressing c/d/i, ileostomy with liquid stool   Extremities: [facility]

## 2024-02-25 NOTE — PROGRESS NOTE ADULT - ASSESSMENT
78-year-old female with PMH current smoker , COPD, A-fib  On Eliquis and Plavix, HTN, HLD presents for syncope from PCPs office yesterday.     Patient was at normal checkup for blood work and was sitting on the table.  Daughter at bedside states that patient suddenly went limp while she was sitting on the table, her eyes rolled back, and her tongue turned to 1 side and this lasted for couple of seconds and then the patient regained consciousness.  Patient had no postictal period.  No tongue biting.  Patient does not remember these events.   pt too lethargic  , though arousable  knows she is in NS   denies any sx   but tenderneness on abd exam     called daughter : she thinks she might have taken double dose   pt seems to go few days without sleep and then sleeps all day   oldest sister just passed away in oct   questionable underlying dementia   vomitting episode two days ago    c/o abd pain   no fever   no chills   no urinary complaints       ischemia Bowel :  s/p OR   appreciate SICU care       Sycnope :  neuro checks   ct head : negative   EEG: NL  neuro input  noted .. no agitation   TTE  : noted : NL EF   pt with intermittent chest discomfort.. no recent ischemic ballesteros .. d.w   op cardiology ..  d/w    check orthostatics: negative    and overnight O2 sat    afib :  AC : on heparin   cont tele   afib with rVR   agree with IV cardizem  drip : still with AFib with RVR reportedely : would consider Digoxin : defer to cardio and SICU team       lehtargy /encephalopathy : CT head negative   improved and seems at baseline   EEG pending : negative       HTN:  hypotensive in ed..brandee sec to medication error ( pt might have taken double dose)   imrpoved     PT /OOB         discussed with derek ACP :   she said she never got a "straight answer from mother " i n past   at this time full code

## 2024-02-25 NOTE — PROGRESS NOTE ADULT - ASSESSMENT
78yFemale PMHx COPD, A-fib  On Eliquis and Plavix, HTN, HLD with signs of acute mesenteric ischemia now s/p diagnostic laparoscopy converted to exploratory laparotomy with 20 cm of terminal ileum resection with bowel left in discontinuity and temporary abdominal closure with abthera with mesenteric angiogram via R fem access 02-22 findings of celiac, SMA, SWATHI w/o flow, collateral perfusion confirmed. S/p Closure and ileostomy creation on 2/24.     PLAN  - OK to advance diet  - Heparin gtt  - cont abx  - Metoprolol for rate control. On dilt gtt. Cardiology following.   - Care per SICU      ACS/Trauma   [phone]

## 2024-02-25 NOTE — PROGRESS NOTE ADULT - SUBJECTIVE AND OBJECTIVE BOX
Date of service: 02-25-24 @ 11:42      Patient is a 78y old  Female who presents with a chief complaint of "78y Female pmh of current smoker, COPD, afib on eliquis/plavix, HTN, HLD who presented to [facility] 2/15/2024 for syncope.  GI consulted for abdominal pain 2/22. There were reports of episodes of abdominal pain and vomiting a few days ago according to the family. CT AP notable for ischemic/necrotic small bowel within the pelvis and severe atherosclerotic disesase of SMA. Patient taken to OR for laparoscopic abd exploration, converted to open exlap for likely dead bowel. 20cm terminal ileum removed, left in discont w/ abthera. Angiogram R fem done intraop w/ findings of celiac, SMA, SWATHI w/o flow, collateral perfusion confirmed. Plan to RTOR in 48hrs. SICU c/s for ventilator management and for hemodynamic monitoring."       (23 Feb 2024 10:40)                                                               INTERVAL HPI/OVERNIGHT EVENTS:    REVIEW OF SYSTEMS:     off sedation   denies pain                                                                                                                                                                                                                                                                                Medications:  MEDICATIONS  (STANDING):  chlorhexidine 2% Cloths 1 Application(s) Topical <User Schedule>  diltiazem Infusion 5 mG/Hr (5 mL/Hr) IV Continuous <Continuous>  furosemide   Injectable 40 milliGRAM(s) IV Push daily  heparin  Infusion 800 Unit(s)/Hr (9 mL/Hr) IV Continuous <Continuous>  lactated ringers. 1000 milliLiter(s) (30 mL/Hr) IV Continuous <Continuous>  levothyroxine Injectable 130 MICROGram(s) IV Push at bedtime  metoprolol tartrate Injectable 5 milliGRAM(s) IV Push every 4 hours  nicotine -  14 mG/24Hr(s) Patch 1 Patch Transdermal daily  piperacillin/tazobactam IVPB.. 3.375 Gram(s) IV Intermittent every 8 hours    MEDICATIONS  (PRN):  HYDROmorphone  Injectable 0.25 milliGRAM(s) IV Push every 3 hours PRN Breakthrough pain       Allergies    penicillin (Hives)    Intolerances      Vital Signs Last 24 Hrs  T(C): 36.1 (25 Feb 2024 03:00), Max: 36.4 (24 Feb 2024 16:15)  T(F): 97 (25 Feb 2024 03:00), Max: 97.6 (24 Feb 2024 16:15)  HR: 108 (25 Feb 2024 07:00) (88 - 135)  BP: 111/67 (25 Feb 2024 07:00) (85/50 - 134/59)  BP(mean): 84 (25 Feb 2024 07:00) (62 - 95)  RR: 11 (25 Feb 2024 07:00) (10 - 28)  SpO2: 96% (25 Feb 2024 07:00) (90% - 99%)    Parameters below as of 24 Feb 2024 22:00  Patient On (Oxygen Delivery Method): nasal cannula  O2 Flow (L/min): 2    CAPILLARY BLOOD GLUCOSE      POCT Blood Glucose.: 137 mg/dL (25 Feb 2024 11:15)  POCT Blood Glucose.: 128 mg/dL (25 Feb 2024 05:49)  POCT Blood Glucose.: 146 mg/dL (24 Feb 2024 23:53)  POCT Blood Glucose.: 122 mg/dL (24 Feb 2024 18:52)  POCT Blood Glucose.: 122 mg/dL (24 Feb 2024 12:36)      02-24 @ 07:01  -  02-25 @ 07:00  --------------------------------------------------------  IN: 1988 mL / OUT: 1325 mL / NET: 663 mL    02-25 @ 07:01  -  02-25 @ 11:42  --------------------------------------------------------  IN: 172 mL / OUT: 90 mL / NET: 82 mL      Physical Exam:    Daily     Daily   General:  NAD   HEENT:  Nonicteric, PERRLA  CV:  RRR, S1S2   Lungs:  CTA B/L, no wheezes, rales, rhonchi  Abdomen:  Soft, ostomy in place   Extremities:  edema     Neuro:  NF         LABS:                               13.9   15.83 )-----------( 211      ( 25 Feb 2024 00:35 )             41.6                      02-25    139  |  100  |  20  ----------------------------<  144<H>  3.7   |  24  |  1.02    Ca    8.8      25 Feb 2024 00:35  Phos  4.2     02-25  Mg     2.4     02-25    TPro  6.7  /  Alb  3.3  /  TBili  0.9  /  DBili  x   /  AST  27  /  ALT  13  /  AlkPhos  70  02-25                       RADIOLOGY & ADDITIONAL TESTS         I personally reviewed: [  ]EKG   [  ]CXR    [  ] CT      A/P:         Discussed with :     Simon consultants' Notes   Time spent :

## 2024-02-25 NOTE — PROGRESS NOTE ADULT - ASSESSMENT
78F history of tobacco use, COPD, afib on eliquis, CAD, HTN, HLD with mesenteric ischemia now s/p ex-lap, small bowel resection, open abdomen with mesenteric angiogram (2/22/24). Plan to RTOR in 48hrs.    Neuro:  - Multimodal pain control w/ dilaudid, IV tylenol    Resp: pmh COPD  - extubated  - RA  - OOB to chair, IS to prevent atelectasis    CV:  - off pressors, none required intraop  - developed afib RVR HR 150s, BP 160s/90s,   - 5mg IV lopressor q4 w/ hold parameters  - dilt gtt  - trend lact    GI: s/p 20cm TI SBR, end ileostomy  - celiac, SMA, SWATHI w/ no flow demonstrated on angiogram  - trend lactate  to assess for additional ischemia  - Diet: NPO, OGT  - Protonix for stress ulcer prophylaxis  - ileostomy, monitor output    Renal:  - Monitor I&Os and electrolytes w/ repletions as necessary  - LR @ 30  - amaya in place, monitor UOP    Heme:  - Monitor CBC and coags  - heparin gtt    ID:   - Monitor for clinical evidence of active infection  - Monitor WBC, temperature, and procalcitonin  - Empiric antibiotics w/ zosyn    Endo:   - Monitor glucose  - home synthroid    Skin:  # contact dermatitis, on buttock area  - derm consult, recommends plain plain petroleum jelly

## 2024-02-26 LAB
ALBUMIN SERPL ELPH-MCNC: 2.9 G/DL — LOW (ref 3.3–5)
ALBUMIN SERPL ELPH-MCNC: 3 G/DL — LOW (ref 3.3–5)
ALP SERPL-CCNC: 59 U/L — SIGNIFICANT CHANGE UP (ref 40–120)
ALP SERPL-CCNC: 61 U/L — SIGNIFICANT CHANGE UP (ref 40–120)
ALT FLD-CCNC: 12 U/L — SIGNIFICANT CHANGE UP (ref 10–45)
ALT FLD-CCNC: 15 U/L — SIGNIFICANT CHANGE UP (ref 10–45)
ANION GAP SERPL CALC-SCNC: 11 MMOL/L — SIGNIFICANT CHANGE UP (ref 5–17)
ANION GAP SERPL CALC-SCNC: 13 MMOL/L — SIGNIFICANT CHANGE UP (ref 5–17)
APTT BLD: 53.8 SEC — HIGH (ref 24.5–35.6)
APTT BLD: 70.7 SEC — HIGH (ref 24.5–35.6)
APTT BLD: 87.7 SEC — HIGH (ref 24.5–35.6)
AST SERPL-CCNC: 18 U/L — SIGNIFICANT CHANGE UP (ref 10–40)
AST SERPL-CCNC: 20 U/L — SIGNIFICANT CHANGE UP (ref 10–40)
BILIRUB SERPL-MCNC: 0.8 MG/DL — SIGNIFICANT CHANGE UP (ref 0.2–1.2)
BILIRUB SERPL-MCNC: 0.9 MG/DL — SIGNIFICANT CHANGE UP (ref 0.2–1.2)
BLD GP AB SCN SERPL QL: NEGATIVE — SIGNIFICANT CHANGE UP
BUN SERPL-MCNC: 17 MG/DL — SIGNIFICANT CHANGE UP (ref 7–23)
BUN SERPL-MCNC: 19 MG/DL — SIGNIFICANT CHANGE UP (ref 7–23)
CALCIUM SERPL-MCNC: 8.6 MG/DL — SIGNIFICANT CHANGE UP (ref 8.4–10.5)
CALCIUM SERPL-MCNC: 8.8 MG/DL — SIGNIFICANT CHANGE UP (ref 8.4–10.5)
CHLORIDE SERPL-SCNC: 96 MMOL/L — SIGNIFICANT CHANGE UP (ref 96–108)
CHLORIDE SERPL-SCNC: 96 MMOL/L — SIGNIFICANT CHANGE UP (ref 96–108)
CO2 SERPL-SCNC: 28 MMOL/L — SIGNIFICANT CHANGE UP (ref 22–31)
CO2 SERPL-SCNC: 31 MMOL/L — SIGNIFICANT CHANGE UP (ref 22–31)
CREAT SERPL-MCNC: 0.98 MG/DL — SIGNIFICANT CHANGE UP (ref 0.5–1.3)
CREAT SERPL-MCNC: 0.98 MG/DL — SIGNIFICANT CHANGE UP (ref 0.5–1.3)
EGFR: 59 ML/MIN/1.73M2 — LOW
EGFR: 59 ML/MIN/1.73M2 — LOW
GLUCOSE SERPL-MCNC: 149 MG/DL — HIGH (ref 70–99)
GLUCOSE SERPL-MCNC: 162 MG/DL — HIGH (ref 70–99)
HCT VFR BLD CALC: 37.8 % — SIGNIFICANT CHANGE UP (ref 34.5–45)
HGB BLD-MCNC: 12.8 G/DL — SIGNIFICANT CHANGE UP (ref 11.5–15.5)
INR BLD: 1.83 RATIO — HIGH (ref 0.85–1.18)
INR BLD: 1.85 RATIO — HIGH (ref 0.85–1.18)
MAGNESIUM SERPL-MCNC: 2.4 MG/DL — SIGNIFICANT CHANGE UP (ref 1.6–2.6)
MAGNESIUM SERPL-MCNC: 2.5 MG/DL — SIGNIFICANT CHANGE UP (ref 1.6–2.6)
MCHC RBC-ENTMCNC: 32.4 PG — SIGNIFICANT CHANGE UP (ref 27–34)
MCHC RBC-ENTMCNC: 33.9 GM/DL — SIGNIFICANT CHANGE UP (ref 32–36)
MCV RBC AUTO: 95.7 FL — SIGNIFICANT CHANGE UP (ref 80–100)
NRBC # BLD: 0 /100 WBCS — SIGNIFICANT CHANGE UP (ref 0–0)
PHOSPHATE SERPL-MCNC: 3.1 MG/DL — SIGNIFICANT CHANGE UP (ref 2.5–4.5)
PHOSPHATE SERPL-MCNC: 3.4 MG/DL — SIGNIFICANT CHANGE UP (ref 2.5–4.5)
PLATELET # BLD AUTO: 248 K/UL — SIGNIFICANT CHANGE UP (ref 150–400)
POTASSIUM SERPL-MCNC: 3.3 MMOL/L — LOW (ref 3.5–5.3)
POTASSIUM SERPL-MCNC: 3.5 MMOL/L — SIGNIFICANT CHANGE UP (ref 3.5–5.3)
POTASSIUM SERPL-SCNC: 3.3 MMOL/L — LOW (ref 3.5–5.3)
POTASSIUM SERPL-SCNC: 3.5 MMOL/L — SIGNIFICANT CHANGE UP (ref 3.5–5.3)
PROT SERPL-MCNC: 5.9 G/DL — LOW (ref 6–8.3)
PROT SERPL-MCNC: 5.9 G/DL — LOW (ref 6–8.3)
PROTHROM AB SERPL-ACNC: 19.8 SEC — HIGH (ref 9.5–13)
PROTHROM AB SERPL-ACNC: 20 SEC — HIGH (ref 9.5–13)
RBC # BLD: 3.95 M/UL — SIGNIFICANT CHANGE UP (ref 3.8–5.2)
RBC # FLD: 13.2 % — SIGNIFICANT CHANGE UP (ref 10.3–14.5)
RH IG SCN BLD-IMP: POSITIVE — SIGNIFICANT CHANGE UP
SODIUM SERPL-SCNC: 137 MMOL/L — SIGNIFICANT CHANGE UP (ref 135–145)
SODIUM SERPL-SCNC: 138 MMOL/L — SIGNIFICANT CHANGE UP (ref 135–145)
WBC # BLD: 17.12 K/UL — HIGH (ref 3.8–10.5)
WBC # FLD AUTO: 17.12 K/UL — HIGH (ref 3.8–10.5)

## 2024-02-26 PROCEDURE — 74018 RADEX ABDOMEN 1 VIEW: CPT | Mod: 26

## 2024-02-26 PROCEDURE — 71045 X-RAY EXAM CHEST 1 VIEW: CPT | Mod: 26

## 2024-02-26 RX ORDER — POTASSIUM CHLORIDE 20 MEQ
10 PACKET (EA) ORAL
Refills: 0 | Status: COMPLETED | OUTPATIENT
Start: 2024-02-26 | End: 2024-02-26

## 2024-02-26 RX ORDER — METOPROLOL TARTRATE 50 MG
25 TABLET ORAL ONCE
Refills: 0 | Status: COMPLETED | OUTPATIENT
Start: 2024-02-26 | End: 2024-02-26

## 2024-02-26 RX ORDER — ALBUMIN HUMAN 25 %
250 VIAL (ML) INTRAVENOUS ONCE
Refills: 0 | Status: COMPLETED | OUTPATIENT
Start: 2024-02-26 | End: 2024-02-26

## 2024-02-26 RX ORDER — ONDANSETRON 8 MG/1
4 TABLET, FILM COATED ORAL ONCE
Refills: 0 | Status: COMPLETED | OUTPATIENT
Start: 2024-02-26 | End: 2024-02-26

## 2024-02-26 RX ORDER — MAGNESIUM SULFATE 500 MG/ML
2 VIAL (ML) INJECTION ONCE
Refills: 0 | Status: COMPLETED | OUTPATIENT
Start: 2024-02-26 | End: 2024-02-26

## 2024-02-26 RX ORDER — POTASSIUM CHLORIDE 20 MEQ
10 PACKET (EA) ORAL ONCE
Refills: 0 | Status: COMPLETED | OUTPATIENT
Start: 2024-02-26 | End: 2024-02-26

## 2024-02-26 RX ORDER — METOPROLOL TARTRATE 50 MG
5 TABLET ORAL EVERY 4 HOURS
Refills: 0 | Status: DISCONTINUED | OUTPATIENT
Start: 2024-02-26 | End: 2024-03-01

## 2024-02-26 RX ORDER — ACETAMINOPHEN 500 MG
1000 TABLET ORAL EVERY 6 HOURS
Refills: 0 | Status: DISCONTINUED | OUTPATIENT
Start: 2024-02-26 | End: 2024-03-02

## 2024-02-26 RX ORDER — METOCLOPRAMIDE HCL 10 MG
10 TABLET ORAL ONCE
Refills: 0 | Status: COMPLETED | OUTPATIENT
Start: 2024-02-26 | End: 2024-02-26

## 2024-02-26 RX ORDER — METOPROLOL TARTRATE 50 MG
25 TABLET ORAL
Refills: 0 | Status: DISCONTINUED | OUTPATIENT
Start: 2024-02-26 | End: 2024-02-26

## 2024-02-26 RX ORDER — METOPROLOL TARTRATE 50 MG
5 TABLET ORAL ONCE
Refills: 0 | Status: COMPLETED | OUTPATIENT
Start: 2024-02-26 | End: 2024-02-26

## 2024-02-26 RX ORDER — SODIUM CHLORIDE 9 MG/ML
1000 INJECTION, SOLUTION INTRAVENOUS
Refills: 0 | Status: DISCONTINUED | OUTPATIENT
Start: 2024-02-26 | End: 2024-03-01

## 2024-02-26 RX ADMIN — Medication 25 MILLIGRAM(S): at 00:55

## 2024-02-26 RX ADMIN — Medication 40 MILLIGRAM(S): at 05:05

## 2024-02-26 RX ADMIN — PIPERACILLIN AND TAZOBACTAM 25 GRAM(S): 4; .5 INJECTION, POWDER, LYOPHILIZED, FOR SOLUTION INTRAVENOUS at 09:14

## 2024-02-26 RX ADMIN — HEPARIN SODIUM 11 UNIT(S)/HR: 5000 INJECTION INTRAVENOUS; SUBCUTANEOUS at 01:47

## 2024-02-26 RX ADMIN — HEPARIN SODIUM 11 UNIT(S)/HR: 5000 INJECTION INTRAVENOUS; SUBCUTANEOUS at 07:33

## 2024-02-26 RX ADMIN — Medication 1 PATCH: at 11:02

## 2024-02-26 RX ADMIN — Medication 5 MILLIGRAM(S): at 06:07

## 2024-02-26 RX ADMIN — Medication 5 MG/HR: at 07:33

## 2024-02-26 RX ADMIN — Medication 50 GRAM(S): at 05:04

## 2024-02-26 RX ADMIN — HEPARIN SODIUM 11 UNIT(S)/HR: 5000 INJECTION INTRAVENOUS; SUBCUTANEOUS at 19:16

## 2024-02-26 RX ADMIN — PIPERACILLIN AND TAZOBACTAM 25 GRAM(S): 4; .5 INJECTION, POWDER, LYOPHILIZED, FOR SOLUTION INTRAVENOUS at 02:33

## 2024-02-26 RX ADMIN — SODIUM CHLORIDE 50 MILLILITER(S): 9 INJECTION, SOLUTION INTRAVENOUS at 19:55

## 2024-02-26 RX ADMIN — Medication 100 MILLIEQUIVALENT(S): at 19:17

## 2024-02-26 RX ADMIN — Medication 5 MILLIGRAM(S): at 17:00

## 2024-02-26 RX ADMIN — Medication 5 MILLIGRAM(S): at 01:46

## 2024-02-26 RX ADMIN — Medication 5 MG/HR: at 19:15

## 2024-02-26 RX ADMIN — Medication 1 PATCH: at 20:27

## 2024-02-26 RX ADMIN — Medication 100 MILLIEQUIVALENT(S): at 11:28

## 2024-02-26 RX ADMIN — Medication 100 MILLIEQUIVALENT(S): at 10:26

## 2024-02-26 RX ADMIN — SODIUM CHLORIDE 30 MILLILITER(S): 9 INJECTION, SOLUTION INTRAVENOUS at 07:33

## 2024-02-26 RX ADMIN — Medication 100 MILLIEQUIVALENT(S): at 13:15

## 2024-02-26 RX ADMIN — Medication 130 MICROGRAM(S): at 22:49

## 2024-02-26 RX ADMIN — Medication 100 MILLIEQUIVALENT(S): at 12:13

## 2024-02-26 RX ADMIN — Medication 1 PATCH: at 11:27

## 2024-02-26 RX ADMIN — Medication 5 MILLIGRAM(S): at 22:49

## 2024-02-26 RX ADMIN — Medication 5 MILLIGRAM(S): at 13:14

## 2024-02-26 RX ADMIN — Medication 100 MILLIEQUIVALENT(S): at 21:38

## 2024-02-26 RX ADMIN — Medication 100 MILLIEQUIVALENT(S): at 17:01

## 2024-02-26 RX ADMIN — Medication 100 MILLIEQUIVALENT(S): at 16:06

## 2024-02-26 RX ADMIN — Medication 1 PATCH: at 08:07

## 2024-02-26 RX ADMIN — Medication 100 MILLIEQUIVALENT(S): at 07:32

## 2024-02-26 RX ADMIN — PIPERACILLIN AND TAZOBACTAM 25 GRAM(S): 4; .5 INJECTION, POWDER, LYOPHILIZED, FOR SOLUTION INTRAVENOUS at 17:01

## 2024-02-26 RX ADMIN — Medication 100 MILLIEQUIVALENT(S): at 20:24

## 2024-02-26 RX ADMIN — ONDANSETRON 4 MILLIGRAM(S): 8 TABLET, FILM COATED ORAL at 01:46

## 2024-02-26 RX ADMIN — Medication 100 MILLIEQUIVALENT(S): at 17:58

## 2024-02-26 RX ADMIN — CHLORHEXIDINE GLUCONATE 1 APPLICATION(S): 213 SOLUTION TOPICAL at 05:05

## 2024-02-26 RX ADMIN — Medication 10 MILLIGRAM(S): at 02:17

## 2024-02-26 RX ADMIN — Medication 5 MILLIGRAM(S): at 09:14

## 2024-02-26 RX ADMIN — Medication 1000 MILLILITER(S): at 06:07

## 2024-02-26 NOTE — PROGRESS NOTE ADULT - SUBJECTIVE AND OBJECTIVE BOX
Date of service: 02-26-24 @ 17:25      Patient is a 78y old  Female who presents with a chief complaint of "78y Female pmh of current smoker, COPD, afib on eliquis/plavix, HTN, HLD who presented to [facility] 2/15/2024 for syncope.  GI consulted for abdominal pain 2/22. There were reports of episodes of abdominal pain and vomiting a few days ago according to the family. CT AP notable for ischemic/necrotic small bowel within the pelvis and severe atherosclerotic disesase of SMA. Patient taken to OR for laparoscopic abd exploration, converted to open exlap for likely dead bowel. 20cm terminal ileum removed, left in discont w/ abthera. Angiogram R fem done intraop w/ findings of celiac, SMA, SWATHI w/o flow, collateral perfusion confirmed. Plan to RTOR in 48hrs. SICU c/s for ventilator management and for hemodynamic monitoring."       (23 Feb 2024 10:40)                                                               INTERVAL HPI/OVERNIGHT EVENTS:    REVIEW OF SYSTEMS:    lethargic but opens eyes   does not answer questions                                                                                                                                                                                                                                                                   Medications:  MEDICATIONS  (STANDING):  chlorhexidine 2% Cloths 1 Application(s) Topical <User Schedule>  diltiazem Infusion 5 mG/Hr (5 mL/Hr) IV Continuous <Continuous>  furosemide   Injectable 40 milliGRAM(s) IV Push daily  heparin  Infusion 800 Unit(s)/Hr (11 mL/Hr) IV Continuous <Continuous>  lactated ringers. 1000 milliLiter(s) (30 mL/Hr) IV Continuous <Continuous>  levothyroxine Injectable 130 MICROGram(s) IV Push at bedtime  metoprolol tartrate Injectable 5 milliGRAM(s) IV Push every 4 hours  nicotine -  14 mG/24Hr(s) Patch 1 Patch Transdermal daily  piperacillin/tazobactam IVPB.. 3.375 Gram(s) IV Intermittent every 8 hours  potassium chloride  10 mEq/100 mL IVPB 10 milliEquivalent(s) IV Intermittent every 1 hour    MEDICATIONS  (PRN):  acetaminophen   IVPB .. 1000 milliGRAM(s) IV Intermittent every 6 hours PRN Moderate Pain (4 - 6)       Allergies    penicillin (Hives)    Intolerances      Vital Signs Last 24 Hrs  T(C): 36.6 (26 Feb 2024 15:00), Max: 36.6 (25 Feb 2024 23:00)  T(F): 97.8 (26 Feb 2024 15:00), Max: 97.9 (25 Feb 2024 23:00)  HR: 94 (26 Feb 2024 17:15) (93 - 134)  BP: 138/60 (26 Feb 2024 17:00) (98/54 - 159/69)  BP(mean): 86 (26 Feb 2024 17:00) (71 - 110)  RR: 20 (26 Feb 2024 17:15) (12 - 28)  SpO2: 94% (26 Feb 2024 17:15) (80% - 100%)    Parameters below as of 26 Feb 2024 15:00  Patient On (Oxygen Delivery Method): nasal cannula  O2 Flow (L/min): 0.5    CAPILLARY BLOOD GLUCOSE      POCT Blood Glucose.: 158 mg/dL (25 Feb 2024 18:19)      02-25 @ 07:01  -  02-26 @ 07:00  --------------------------------------------------------  IN: 2446 mL / OUT: 2415 mL / NET: 31 mL    02-26 @ 07:01  -  02-26 @ 17:25  --------------------------------------------------------  IN: 1435 mL / OUT: 1575 mL / NET: -140 mL      Physical Exam:    Daily     Daily   General:  NAD   HEENT:  Nonicteric, PERRLA NGT inplace   CV:  RRR, S1S2   Lungs:  CTA B/L, no wheezes, rales, rhonchi  Abdomen:  Soft, non-tender, no distended, positive BS  Extremities:  edema   Neuro:  NF    lethargic                                                                                                                                                                                                                                                                                        LABS:                               12.8   17.12 )-----------( 248      ( 26 Feb 2024 00:43 )             37.8                      02-26    138  |  96  |  17  ----------------------------<  149<H>  3.5   |  31  |  0.98    Ca    8.6      26 Feb 2024 15:08  Phos  3.1     02-26  Mg     2.4     02-26    TPro  5.9<L>  /  Alb  3.0<L>  /  TBili  0.9  /  DBili  x   /  AST  18  /  ALT  12  /  AlkPhos  59  02-26                       RADIOLOGY & ADDITIONAL TESTS         I personally reviewed: [  ]EKG   [  ]CXR    [  ] CT      A/P:         Discussed with :     Simon consultants' Notes   Time spent :

## 2024-02-26 NOTE — PROGRESS NOTE ADULT - ASSESSMENT
Is This A New Presentation, Or A Follow-Up?: Follow Up Isotretinoin 78yFemale PMHx COPD, A-fib  On Eliquis and Plavix, HTN, HLD with signs of acute mesenteric ischemia now s/p diagnostic laparoscopy converted to exploratory laparotomy with 20 cm of terminal ileum resection with bowel left in discontinuity and temporary abdominal closure with abthera with mesenteric angiogram via R fem access 02-22 findings of celiac, SMA, SWATHI w/o flow, collateral perfusion confirmed. S/p Closure and ileostomy creation on 2/24.     PLAN  - Given patient prognosis, no re-vascularization options, consult Palliative   - NPO  - Heparin gtt  - cont abx  - Metoprolol for rate control. On dilt gtt. Cardiology following.   - Care per SICU      ACS/Trauma   [phone]

## 2024-02-26 NOTE — PROGRESS NOTE ADULT - ASSESSMENT
78-year-old female with PMH COPD, A-fib  On Eliquis and Plavix, COPD, HTN, HLD presents for syncope from PCPs office earlier today.  Patient was at normal checkup for blood work and was sitting on the table.  Daughter at bedside states that patient suddenly went limp while she was sitting on the table, her eyes rolled back, and her tongue turned to 1 side and this lasted for couple of seconds and then the patient regained consciousness.  Patient had no postictal period.  No tongue biting.  Patient does not remember these events.  Daughter at bedside states that patient has not been eating as much for the past day or 2 and has not been sleeping at all.                        2/23: OR: Diagnostic laparoscopy, ischemic bowel noted in RLQ just proximal to TI. Converted to laparotomy. 20cm of terminal ileum resected and bowel left in discontinuity.  celiac, SMA, SWATHI w/ no flow demonstrated on angiogram    2/24:  RTOR  End ileostomy, closed                      24 hr  - 40 lasix given, adequate response  - Afib wtih RVR, metoprolol 5q4. add dilt gtt  - heparin gtt resumed 6hrs post-op goal ptt 58-99  - CLD  - switch to metoprolol tartrate 25 BID  - amaya in the AM    Neuro:  - Multimodal pain control w/ dilaudid, IV tylenol, oxy    Resp: pmh COPD  - extubated 2/23  - OOB to chair, IS to prevent atelectasis  - satting appropriately on RA    CV:  - Map >65  - developed afib RVR HR 150s, BP 160s/90s,   - metoprolol tartrate 20 BID  - dilt gtt at 20  - lactate cleared    GI: 2/23, 2/24 s/p 20cm TI SBR, end ileostomy  - celiac, SMA, SWATHI w/ no flow demonstrated on angiogram  - Diet: CLD  - Protonix for stress ulcer prophylaxis  - ileostomy, monitor output    Renal:  - Monitor I&Os and electrolytes w/ repletions as necessary  - LR @ 30  - 40 lasix daily, goal for net even to -500cc  - amaya in place, monitor UOP    Heme:  - Monitor CBC and coags  - heparin gtt    ID:   - Monitor for clinical evidence of active infection  - Monitor WBC, temperature, and procalcitonin  - Empiric antibiotics w/ zosyn ( 2/23-3/1)    Endo:   - Monitor glucose  - home synthroid    Skin:  # contact dermatitis, on buttock area  - derm consult, recommends plain petroleum jelly     Lines:  Olegario (2/23-)    DISPO: SICU 78-year-old female with PMH COPD, A-fib  On Eliquis and Plavix, COPD, HTN, HLD presents for syncope from PCPs office earlier today.  Patient was at normal checkup for blood work and was sitting on the table.  Daughter at bedside states that patient suddenly went limp while she was sitting on the table, her eyes rolled back, and her tongue turned to 1 side and this lasted for couple of seconds and then the patient regained consciousness.  Patient had no postictal period.  No tongue biting.  Patient does not remember these events.  Daughter at bedside states that patient has not been eating as much for the past day or 2 and has not been sleeping at all.                        2/23: OR: Diagnostic laparoscopy, ischemic bowel noted in RLQ just proximal to TI. Converted to laparotomy. 20cm of terminal ileum resected and bowel left in discontinuity.  celiac, SMA, SWATHI w/ no flow demonstrated on angiogram    2/24:  RTOR  End ileostomy, closed                        Neuro:  - Multimodal pain control w/ dilaudid, IV tylenol, oxy    Resp: pmh COPD  - extubated 2/23  - OOB to chair, IS to prevent atelectasis  - satting appropriately on RA    CV:  - Map >65  - developed afib RVR HR 150s, BP 160s/90s,   - metoprolol tartrate 20 BID  - dilt gtt at 20  - lactate cleared    GI: 2/23, 2/24 s/p 20cm TI SBR, end ileostomy  - celiac, SMA, SWATHI w/ no flow demonstrated on angiogram  - Diet: CLD  - Protonix for stress ulcer prophylaxis  - ileostomy, monitor output    Renal:  - Monitor I&Os and electrolytes w/ repletions as necessary  - LR @ 30  - 40 lasix daily, goal for net even to -500cc  - amaya in place, monitor UOP    Heme:  - Monitor CBC and coags  - heparin gtt    ID:   - Monitor for clinical evidence of active infection  - Monitor WBC, temperature, and procalcitonin  - Empiric antibiotics w/ zosyn ( 2/23-3/1)    Endo:   - Monitor glucose  - home synthroid    Skin:  # contact dermatitis, on buttock area  - derm consult, recommends plain petroleum jelly     Lines:  Olegario (2/23-)    DISPO: SICU

## 2024-02-26 NOTE — PROGRESS NOTE ADULT - SUBJECTIVE AND OBJECTIVE BOX
Subjective: Patient seen and examined. No new events except as noted.   remains in ICU   started IV lasix   REVIEW OF SYSTEMS:    CONSTITUTIONAL: + weakness, fevers or chills  EYES/ENT: No visual changes;  No vertigo or throat pain   NECK: No pain or stiffness  RESPIRATORY: No cough, wheezing, hemoptysis; No shortness of breath  CARDIOVASCULAR: No chest pain or palpitations  GASTROINTESTINAL: No abdominal or epigastric pain. No nausea, vomiting, or hematemesis; No diarrhea or constipation. No melena or hematochezia.  GENITOURINARY: No dysuria, frequency or hematuria  NEUROLOGICAL: No numbness or weakness  SKIN: No itching, burning, rashes, or lesions   All other review of systems is negative unless indicated above.    MEDICATIONS:  MEDICATIONS  (STANDING):  chlorhexidine 2% Cloths 1 Application(s) Topical <User Schedule>  diltiazem Infusion 5 mG/Hr (5 mL/Hr) IV Continuous <Continuous>  furosemide   Injectable 40 milliGRAM(s) IV Push daily  heparin  Infusion 800 Unit(s)/Hr (11 mL/Hr) IV Continuous <Continuous>  lactated ringers. 1000 milliLiter(s) (30 mL/Hr) IV Continuous <Continuous>  levothyroxine Injectable 130 MICROGram(s) IV Push at bedtime  metoprolol tartrate Injectable 5 milliGRAM(s) IV Push every 4 hours  nicotine -  14 mG/24Hr(s) Patch 1 Patch Transdermal daily  piperacillin/tazobactam IVPB.. 3.375 Gram(s) IV Intermittent every 8 hours      PHYSICAL EXAM:  T(C): 36.4 (02-26-24 @ 08:00), Max: 36.7 (02-25-24 @ 11:00)  HR: 98 (02-26-24 @ 09:30) (95 - 145)  BP: 126/67 (02-26-24 @ 09:30) (98/54 - 159/69)  RR: 17 (02-26-24 @ 09:30) (12 - 28)  SpO2: 96% (02-26-24 @ 09:30) (80% - 100%)  Wt(kg): --  I&O's Summary    25 Feb 2024 07:01  -  26 Feb 2024 07:00  --------------------------------------------------------  IN: 2446 mL / OUT: 2415 mL / NET: 31 mL    26 Feb 2024 07:01  -  26 Feb 2024 09:39  --------------------------------------------------------  IN: 247 mL / OUT: 250 mL / NET: -3 mL          Appearance: NAD	  HEENT:  Dry  oral mucosa, +NGT   Lymphatic: No lymphadenopathy , no edema  Cardiovascular: irregular S1 S2, No JVD, No murmurs , Peripheral pulses palpable 2+ bilaterally  Respiratory: decreased bs   Gastrointestinal:  soft, mildly distended, appropriately tender near midline incision, dressing c/d/i,  ileostomy leaking stool  Skin: No rashes, No ecchymoses, No cyanosis, warm to touch  Musculoskeletal: Normal range of motion, normal strength  Psychiatry:  sleepy   Ext: No edema  +amaya      LABS:    CARDIAC MARKERS:                                12.8   17.12 )-----------( 248      ( 26 Feb 2024 00:43 )             37.8     02-26    137  |  96  |  19  ----------------------------<  162<H>  3.3<L>   |  28  |  0.98    Ca    8.8      26 Feb 2024 06:09  Phos  3.4     02-26  Mg     2.5     02-26    TPro  5.9<L>  /  Alb  2.9<L>  /  TBili  0.8  /  DBili  x   /  AST  20  /  ALT  15  /  AlkPhos  61  02-26    proBNP:   Lipid Profile:   HgA1c:   TSH: Thyroid Stimulating Hormone, Serum: 0.06 uIU/mL (02-25 @ 11:30)      Blood Gas Profile - Arterial (02.24.24 @ 16:53)   pH, Arterial: 7.43  pCO2, Arterial: 39 mmHg  pO2, Arterial: 90 mmHg  HCO3, Arterial: 26 mmol/L  Base Excess, Arterial: 1.5 mmol/L  Oxygen Saturation, Arterial: 98.1 %  Total CO2, Arterial: 27 mmol/L  FIO2, Arterial: 32        TELEMETRY: 	  AF  ECG:  	  RADIOLOGY:   DIAGNOSTIC TESTING:  [ ] Echocardiogram:  [ ]  Catheterization:  [ ] Stress Test:    OTHER:

## 2024-02-26 NOTE — PROGRESS NOTE ADULT - ATTENDING COMMENTS
Patient seen and examined and agree with above.   Patient is currently experiencing hallucinations.     Current management includes:  Neuro- pain currently controlled with current regiment  CV- improved hemodynamics; BP stable at this time   -will continue with MAP > 65 mmHg  -chronic atrial fibrillation- currently on diltizem drip at 5 mg/hour   -will replete electrolytles   Pulm - acute respiratory insufficiency - on 2 liters nasal cannula   - will encourage and assist with incentive spirometer  CXR - reviewed and appears improved today.      -goal SpO2 92%  GI- ileus with no output from end ileostomy  ID- on zosyn; will continue today as WBC elevated today but will monitor and if it starts trending down will discontinued.   Continued elevated leukocytosis.   Heme- continue heparin drip at 1100 units/hour; will continue at this time.   Renal- hypokalemia- repleted and will recheck  -will continue furosemide  Endocrine - hyperglycemia - will continue to monitor     Will continue to monitor patient closely. Patient seen and examined and agree with above.   Patient is currently experiencing hallucinations.     Current management includes:  Neuro- pain currently controlled with current regiment  CV- improved hemodynamics; BP stable at this time   -will continue with MAP > 65 mmHg  -chronic atrial fibrillation- currently on diltizem drip at 20 mg/hour   -will replete electrolytles   Pulm - acute respiratory insufficiency - on 2 liters nasal cannula   - will encourage and assist with incentive spirometer  CXR - reviewed and appears improved today.      -goal SpO2 92%  GI- ileus with no output from end ileostomy  ID- on zosyn; will continue today as WBC elevated today but will monitor and if it starts trending down will discontinued.   Continued elevated leukocytosis.   Heme- continue heparin drip at 1100 units/hour; will continue at this time.   Renal- hypokalemia- repleted and will recheck  -will continue furosemide  Endocrine - hyperglycemia - will continue to monitor     Will continue to monitor patient closely.

## 2024-02-26 NOTE — OCCUPATIONAL THERAPY INITIAL EVALUATION ADULT - ADL RETRAINING, OT EVAL
Pt will perform toileting with independence within 4 weeks. Pt will perform grooming with independence while standing at sink within 4 weeks.

## 2024-02-26 NOTE — OCCUPATIONAL THERAPY INITIAL EVALUATION ADULT - PERTINENT HX OF CURRENT PROBLEM, REHAB EVAL
78-year-old female with PMH current smoker , COPD, A-fib  On Eliquis and Plavix, HTN, HLD presents for syncope from PCPs office yesterday. Patient was at normal checkup for blood work and was sitting on the table.  Daughter at bedside states that patient suddenly went limp while she was sitting on the table, her eyes rolled back, and her tongue turned to 1 side and this lasted for couple of seconds and then the patient regained consciousness.  Patient had no postictal period.  No tongue biting.  Patient does not remember these events. pt too lethargic  , though arousable knows she is in NS, denies any sx but tenderness on abd exam. Called daughter : she thinks she might have taken double dose pt seems to go few days without sleep and then sleeps all day oldest sister just passed away in oct questionable underlying dementia vomiting episode two days ago. 78-year-old female with PMH current smoker , COPD, A-fib  On Eliquis and Plavix, HTN, HLD presents for syncope from PCPs office yesterday. Patient was at normal checkup for blood work and was sitting on the table.  Daughter at bedside states that patient suddenly went limp while she was sitting on the table, her eyes rolled back, and her tongue turned to 1 side and this lasted for couple of seconds and then the patient regained consciousness.  Patient had no postictal period.  No tongue biting.  Patient does not remember these events. pt too lethargic  , though arousable knows she is in NS, denies any sx but tenderness on abd exam. Called daughter : she thinks she might have taken double dose pt seems to go few days without sleep and then sleeps all day oldest sister just passed away in oct questionable underlying dementia vomiting episode two days ago. Pt s/p 2/23: OR: Diagnostic laparoscopy, ischemic bowel noted in RLQ just proximal to TI. Converted to laparotomy. 20cm of terminal ileum resected and bowel left in discontinuity.  celiac, SMA, SWATHI w/ no flow demonstrated on angiogram. On 2/24:  RTOR  End ileostomy, closed

## 2024-02-26 NOTE — PROGRESS NOTE ADULT - SUBJECTIVE AND OBJECTIVE BOX
HISTORY  78y Female    24 HOUR EVENTS:  - 40 lasix given, adequate response  - Afib wtih RVR, metoprolol 5q4. add dilt gtt  - heparin gtt resumed 6hrs post-op goal ptt 58-99  - CLD  - switch to metoprolol tartrate 25 BID  - jose luis in the AM    SUBJECTIVE/ROS:  [ ] A ten-point review of systems was otherwise negative except as noted.  [X] Due to altered mental status/intubation, subjective information were not able to be obtained from the patient. History was obtained, to the extent possible, from review of the chart and collateral sources of information.      NEURO  GCS: 14  CAM ICU:    Exam: awake, alert, oriented x2  Meds: acetaminophen     Tablet .. 650 milliGRAM(s) Oral every 6 hours PRN Mild Pain (1 - 3)  oxyCODONE    IR 2.5 milliGRAM(s) Oral every 4 hours PRN Moderate Pain (4 - 6)    [x] Adequacy of sedation and pain control has been assessed and adjusted      RESPIRATORY  RR: 21 (02-25-24 @ 23:15) (10 - 24)  SpO2: 91% (02-25-24 @ 23:15) (90% - 97%)  Exam: unlabored, clear to auscultation bilaterally    pH: 7.43  /  pCO2: 39    /  pO2: 90    / HCO3: 26    / Base Excess: 1.5   /  SaO2: 98.1    Lactate: x          CARDIOVASCULAR  HR: 116 (02-25-24 @ 23:15) (99 - 145)  BP: 123/58 (02-25-24 @ 23:15) (85/50 - 131/60)  BP(mean): 83 (02-25-24 @ 23:15) (62 - 98)  VBG - ( 24 Feb 2024 00:46 )  pH: 7.42  /  pCO2: 43    /  pO2: 53    / HCO3: 28    / Base Excess: 3.0   /  SaO2: 84.9   Lactate: 1.5                Exam: Rapid rate and irregular rhythm  Cardiac Rhythm: Atrial fibrillation  Perfusion     [x]Adequate   [ ]Inadequate  Mentation   [x]Normal       [ ]Reduced  Extremities  [x]Warm         [ ]Cool  Volume Status [ ]Hypervolemic [x]Euvolemic [ ]Hypovolemic  Meds: diltiazem Infusion 5 mG/Hr IV Continuous <Continuous>  furosemide   Injectable 40 milliGRAM(s) IV Push daily  metoprolol tartrate 25 milliGRAM(s) Oral two times a day        GI/NUTRITION  Exam: soft, nontender, nondistended, incision C/D/I, ostomy stoma pink  Diet: CLD  Meds:     GENITOURINARY  I&O's Detail    02-24 @ 07:01 - 02-25 @ 07:00  --------------------------------------------------------  IN:    Diltiazem: 175 mL    Enteral Tube Flush: 60 mL    Heparin: 73 mL    IV PiggyBack: 100 mL    IV PiggyBack: 350 mL    IV PiggyBack: 600 mL    Lactated Ringers: 630 mL  Total IN: 1988 mL    OUT:    Ileostomy (mL): 50 mL    Indwelling Catheter - Urethral (mL): 1150 mL    Nasogastric/Oral tube (mL): 125 mL  Total OUT: 1325 mL    Total NET: 663 mL      02-25 @ 07:01 - 02-26 @ 00:17  --------------------------------------------------------  IN:    Diltiazem: 270 mL    Heparin: 149 mL    IV PiggyBack: 75 mL    IV PiggyBack: 500 mL    Lactated Ringers: 480 mL  Total IN: 1474 mL    OUT:    Indwelling Catheter - Urethral (mL): 840 mL    Nasogastric/Oral tube (mL): 25 mL  Total OUT: 865 mL    Total NET: 609 mL          02-25    138  |  99  |  18  ----------------------------<  160<H>  3.6   |  27  |  0.98    Ca    8.7      25 Feb 2024 18:33  Phos  4.0     02-25  Mg     2.2     02-25    TPro  6.0  /  Alb  2.9<L>  /  TBili  0.8  /  DBili  x   /  AST  21  /  ALT  14  /  AlkPhos  62  02-25    [ ] Melvin catheter, indication: N/A  Meds: lactated ringers. 1000 milliLiter(s) IV Continuous <Continuous>        HEMATOLOGIC  Meds: heparin  Infusion 800 Unit(s)/Hr IV Continuous <Continuous>    [x] VTE Prophylaxis                        13.9   15.83 )-----------( 211      ( 25 Feb 2024 00:35 )             41.6     PT/INR - ( 25 Feb 2024 18:33 )   PT: 20.9 sec;   INR: 1.94 ratio         PTT - ( 25 Feb 2024 18:33 )  PTT:54.2 sec  Transfusion     [ ] PRBC   [ ] Platelets   [ ] FFP   [ ] Cryoprecipitate      INFECTIOUS DISEASES  WBC Count: 15.83 K/uL (02-25 @ 00:35)    RECENT CULTURES:  Specimen Source: .Blood Blood  Date/Time: 02-23 @ 03:14  Culture Results:   No growth at 48 Hours  Gram Stain: --  Organism: --    Meds: piperacillin/tazobactam IVPB.. 3.375 Gram(s) IV Intermittent every 8 hours        ENDOCRINE  CAPILLARY BLOOD GLUCOSE      POCT Blood Glucose.: 158 mg/dL (25 Feb 2024 18:19)  POCT Blood Glucose.: 137 mg/dL (25 Feb 2024 11:15)  POCT Blood Glucose.: 128 mg/dL (25 Feb 2024 05:49)    Meds: levothyroxine Injectable 130 MICROGram(s) IV Push at bedtime        ACCESS DEVICES:  [X ] Peripheral IV  [X ] Urinary Catheter, Date Placed: 2/23  [x] Necessity of urinary, arterial, and venous catheters discussed    OTHER MEDICATIONS:  chlorhexidine 2% Cloths 1 Application(s) Topical <User Schedule>  nicotine -  14 mG/24Hr(s) Patch 1 Patch Transdermal daily      CODE STATUS: FULL   24 HOUR EVENTS:  - 40 lasix given, adequate response  - Afib wtih RVR, metoprolol 5q4. add dilt gtt  - heparin gtt resumed 6hrs post-op goal ptt 58-99  - CLD  - switch to metoprolol tartrate 25 BID  - jose luis in the AM    SUBJECTIVE/ROS:  [ ] A ten-point review of systems was otherwise negative except as noted.  [X] Due to altered mental status/intubation, subjective information were not able to be obtained from the patient. History was obtained, to the extent possible, from review of the chart and collateral sources of information.      NEURO  GCS: 14  CAM ICU:    Exam: awake, alert, oriented x2  Meds: acetaminophen     Tablet .. 650 milliGRAM(s) Oral every 6 hours PRN Mild Pain (1 - 3)  oxyCODONE    IR 2.5 milliGRAM(s) Oral every 4 hours PRN Moderate Pain (4 - 6)    [x] Adequacy of sedation and pain control has been assessed and adjusted      RESPIRATORY  RR: 21 (02-25-24 @ 23:15) (10 - 24)  SpO2: 91% (02-25-24 @ 23:15) (90% - 97%)  Exam: unlabored, clear to auscultation bilaterally    pH: 7.43  /  pCO2: 39    /  pO2: 90    / HCO3: 26    / Base Excess: 1.5   /  SaO2: 98.1    Lactate: x          CARDIOVASCULAR  HR: 116 (02-25-24 @ 23:15) (99 - 145)  BP: 123/58 (02-25-24 @ 23:15) (85/50 - 131/60)  BP(mean): 83 (02-25-24 @ 23:15) (62 - 98)  VBG - ( 24 Feb 2024 00:46 )  pH: 7.42  /  pCO2: 43    /  pO2: 53    / HCO3: 28    / Base Excess: 3.0   /  SaO2: 84.9   Lactate: 1.5                Exam: Rapid rate and irregular rhythm  Cardiac Rhythm: Atrial fibrillation  Perfusion     [x]Adequate   [ ]Inadequate  Mentation   [x]Normal       [ ]Reduced  Extremities  [x]Warm         [ ]Cool  Volume Status [ ]Hypervolemic [x]Euvolemic [ ]Hypovolemic  Meds: diltiazem Infusion 5 mG/Hr IV Continuous <Continuous>  furosemide   Injectable 40 milliGRAM(s) IV Push daily  metoprolol tartrate 25 milliGRAM(s) Oral two times a day        GI/NUTRITION  Exam: soft, nontender, nondistended, incision C/D/I, ostomy stoma pink  Diet: CLD  Meds:     GENITOURINARY  I&O's Detail    02-24 @ 07:01 - 02-25 @ 07:00  --------------------------------------------------------  IN:    Diltiazem: 175 mL    Enteral Tube Flush: 60 mL    Heparin: 73 mL    IV PiggyBack: 100 mL    IV PiggyBack: 350 mL    IV PiggyBack: 600 mL    Lactated Ringers: 630 mL  Total IN: 1988 mL    OUT:    Ileostomy (mL): 50 mL    Indwelling Catheter - Urethral (mL): 1150 mL    Nasogastric/Oral tube (mL): 125 mL  Total OUT: 1325 mL    Total NET: 663 mL      02-25 @ 07:01 - 02-26 @ 00:17  --------------------------------------------------------  IN:    Diltiazem: 270 mL    Heparin: 149 mL    IV PiggyBack: 75 mL    IV PiggyBack: 500 mL    Lactated Ringers: 480 mL  Total IN: 1474 mL    OUT:    Indwelling Catheter - Urethral (mL): 840 mL    Nasogastric/Oral tube (mL): 25 mL  Total OUT: 865 mL    Total NET: 609 mL          02-25    138  |  99  |  18  ----------------------------<  160<H>  3.6   |  27  |  0.98    Ca    8.7      25 Feb 2024 18:33  Phos  4.0     02-25  Mg     2.2     02-25    TPro  6.0  /  Alb  2.9<L>  /  TBili  0.8  /  DBili  x   /  AST  21  /  ALT  14  /  AlkPhos  62  02-25    [ ] Melvin catheter, indication: N/A  Meds: lactated ringers. 1000 milliLiter(s) IV Continuous <Continuous>        HEMATOLOGIC  Meds: heparin  Infusion 800 Unit(s)/Hr IV Continuous <Continuous>    [x] VTE Prophylaxis                        13.9   15.83 )-----------( 211      ( 25 Feb 2024 00:35 )             41.6     PT/INR - ( 25 Feb 2024 18:33 )   PT: 20.9 sec;   INR: 1.94 ratio         PTT - ( 25 Feb 2024 18:33 )  PTT:54.2 sec  Transfusion     [ ] PRBC   [ ] Platelets   [ ] FFP   [ ] Cryoprecipitate      INFECTIOUS DISEASES  WBC Count: 15.83 K/uL (02-25 @ 00:35)    RECENT CULTURES:  Specimen Source: .Blood Blood  Date/Time: 02-23 @ 03:14  Culture Results:   No growth at 48 Hours  Gram Stain: --  Organism: --    Meds: piperacillin/tazobactam IVPB.. 3.375 Gram(s) IV Intermittent every 8 hours        ENDOCRINE  CAPILLARY BLOOD GLUCOSE      POCT Blood Glucose.: 158 mg/dL (25 Feb 2024 18:19)  POCT Blood Glucose.: 137 mg/dL (25 Feb 2024 11:15)  POCT Blood Glucose.: 128 mg/dL (25 Feb 2024 05:49)    Meds: levothyroxine Injectable 130 MICROGram(s) IV Push at bedtime        ACCESS DEVICES:  [X ] Peripheral IV  [X ] Urinary Catheter, Date Placed: 2/23  [x] Necessity of urinary, arterial, and venous catheters discussed    OTHER MEDICATIONS:  chlorhexidine 2% Cloths 1 Application(s) Topical <User Schedule>  nicotine -  14 mG/24Hr(s) Patch 1 Patch Transdermal daily      CODE STATUS: FULL

## 2024-02-26 NOTE — PROGRESS NOTE ADULT - ATTENDING COMMENTS
Pt seen and examined  Chart reviewed  Resident note confirmed  Plan of care discussed with Dr. Mgiuel  Management per SICU team

## 2024-02-26 NOTE — PROGRESS NOTE ADULT - ASSESSMENT
78-year-old female with PMH current smoker , COPD, A-fib  On Eliquis and Plavix, HTN, HLD presents for syncope from PCPs office yesterday.     Patient was at normal checkup for blood work and was sitting on the table.  Daughter at bedside states that patient suddenly went limp while she was sitting on the table, her eyes rolled back, and her tongue turned to 1 side and this lasted for couple of seconds and then the patient regained consciousness.  Patient had no postictal period.  No tongue biting.  Patient does not remember these events.   pt too lethargic  , though arousable  knows she is in NS   denies any sx   but tenderneness on abd exam     called daughter : she thinks she might have taken double dose   pt seems to go few days without sleep and then sleeps all day   oldest sister just passed away in oct   questionable underlying dementia   vomitting episode two days ago    c/o abd pain   no fever   no chills   no urinary complaints       ischemia Bowel :  s/p OR   appreciate SICU care   now with iliues : NGT in place      Sycnope :  neuro checks   ct head : negative   EEG: NL  neuro input  noted .. no agitation   TTE  : noted : NL EF   pt with intermittent chest discomfort.. no recent ischemic ballesteros .. d.w   op cardiology ..  d/w    check orthostatics: negative    and overnight O2 sat    afib :  AC : on heparin   cont tele   afib with rVR   agree with IV cardizem  drip  BB       lehtargy /encephalopathy : CT head negative   improved and seems at baseline   EEG pending : negative       HTN:  hypotensive in ed..brandee sec to medication error ( pt might have taken double dose)   imrpoved     PT /OOB         discussed with derek ACP :   she said she never got a "straight answer from mother " i n past   at this time full code

## 2024-02-26 NOTE — PROGRESS NOTE ADULT - SUBJECTIVE AND OBJECTIVE BOX
SURGERY DAILY PROGRESS NOTE    SUBJECTIVE: Patient seen and evaluated on AM rounds. Had an episode of emesis 1L overnight.     24 HOUR EVENTS:  - 40 lasix given, adequate response  - Afib wtih RVR, metoprolol 5q4. add dilt gtt  - heparin gtt resumed 6hrs post-op goal ptt 58-99  - CLD  - switch to metoprolol tartrate 25 BID  - amaya in the AM  -----------------------------------------------------------------------------------------------------------------------------------------------------------------------------------------------------------  OBJECTIVE:  Vital Signs Last 24 Hrs  T(C): 36.4 (26 Feb 2024 08:00), Max: 36.7 (25 Feb 2024 11:00)  T(F): 97.6 (26 Feb 2024 08:00), Max: 98.1 (25 Feb 2024 15:00)  HR: 99 (26 Feb 2024 10:15) (94 - 145)  BP: 136/71 (26 Feb 2024 10:15) (98/54 - 159/69)  BP(mean): 98 (26 Feb 2024 10:15) (71 - 110)  RR: 15 (26 Feb 2024 10:15) (12 - 28)  SpO2: 96% (26 Feb 2024 10:15) (80% - 100%)    Parameters below as of 26 Feb 2024 08:00  Patient On (Oxygen Delivery Method): nasal cannula  O2 Flow (L/min): 2    I&O's Detail    25 Feb 2024 07:01  -  26 Feb 2024 07:00  --------------------------------------------------------  IN:    Albumin 5%  - 250 mL: 250 mL    Diltiazem: 425 mL    Heparin: 236 mL    IV PiggyBack: 600 mL    IV PiggyBack: 125 mL    Lactated Ringers: 720 mL    Oral Fluid: 90 mL  Total IN: 2446 mL    OUT:    Emesis (mL): 1000 mL    Ileostomy (mL): 0 mL    Indwelling Catheter - Urethral (mL): 1390 mL    Nasogastric/Oral tube (mL): 25 mL  Total OUT: 2415 mL    Total NET: 31 mL      26 Feb 2024 07:01  -  26 Feb 2024 10:31  --------------------------------------------------------  IN:    Diltiazem: 60 mL    Heparin: 33 mL    IV PiggyBack: 150 mL    IV PiggyBack: 100 mL    Lactated Ringers: 90 mL  Total IN: 433 mL    OUT:    Indwelling Catheter - Urethral (mL): 280 mL  Total OUT: 280 mL    Total NET: 153 mL        Daily     Daily   MEDICATIONS  (STANDING):  chlorhexidine 2% Cloths 1 Application(s) Topical <User Schedule>  diltiazem Infusion 5 mG/Hr (5 mL/Hr) IV Continuous <Continuous>  furosemide   Injectable 40 milliGRAM(s) IV Push daily  heparin  Infusion 800 Unit(s)/Hr (11 mL/Hr) IV Continuous <Continuous>  lactated ringers. 1000 milliLiter(s) (30 mL/Hr) IV Continuous <Continuous>  levothyroxine Injectable 130 MICROGram(s) IV Push at bedtime  metoprolol tartrate Injectable 5 milliGRAM(s) IV Push every 4 hours  nicotine -  14 mG/24Hr(s) Patch 1 Patch Transdermal daily  piperacillin/tazobactam IVPB.. 3.375 Gram(s) IV Intermittent every 8 hours  potassium chloride  10 mEq/100 mL IVPB 10 milliEquivalent(s) IV Intermittent every 1 hour    MEDICATIONS  (PRN):  acetaminophen   IVPB .. 1000 milliGRAM(s) IV Intermittent every 6 hours PRN Moderate Pain (4 - 6)      LABS:                        12.8   17.12 )-----------( 248      ( 26 Feb 2024 00:43 )             37.8     02-26    137  |  96  |  19  ----------------------------<  162<H>  3.3<L>   |  28  |  0.98    Ca    8.8      26 Feb 2024 06:09  Phos  3.4     02-26  Mg     2.5     02-26    TPro  5.9<L>  /  Alb  2.9<L>  /  TBili  0.8  /  DBili  x   /  AST  20  /  ALT  15  /  AlkPhos  61  02-26    PT/INR - ( 26 Feb 2024 06:13 )   PT: 20.0 sec;   INR: 1.85 ratio         PTT - ( 26 Feb 2024 06:13 )  PTT:70.7 sec  Urinalysis Basic - ( 26 Feb 2024 06:09 )    Color: x / Appearance: x / SG: x / pH: x  Gluc: 162 mg/dL / Ketone: x  / Bili: x / Urobili: x   Blood: x / Protein: x / Nitrite: x   Leuk Esterase: x / RBC: x / WBC x   Sq Epi: x / Non Sq Epi: x / Bacteria: x      Physical Exam:  General: NAD, resting comfortably in bed  Pulmonary: Nonlabored breathing, no respiratory distress  Cardiovascular: RRR  Abdominal: soft, mildly distended, appropriately tender near midline incision, dressing c/d/i, ileostomy with liquid stool   Extremities: WWP

## 2024-02-27 LAB
ALBUMIN SERPL ELPH-MCNC: 2.8 G/DL — LOW (ref 3.3–5)
ALBUMIN SERPL ELPH-MCNC: 2.9 G/DL — LOW (ref 3.3–5)
ALBUMIN SERPL ELPH-MCNC: 3 G/DL — LOW (ref 3.3–5)
ALP SERPL-CCNC: 57 U/L — SIGNIFICANT CHANGE UP (ref 40–120)
ALP SERPL-CCNC: 58 U/L — SIGNIFICANT CHANGE UP (ref 40–120)
ALP SERPL-CCNC: 64 U/L — SIGNIFICANT CHANGE UP (ref 40–120)
ALT FLD-CCNC: 11 U/L — SIGNIFICANT CHANGE UP (ref 10–45)
ALT FLD-CCNC: 12 U/L — SIGNIFICANT CHANGE UP (ref 10–45)
ALT FLD-CCNC: 13 U/L — SIGNIFICANT CHANGE UP (ref 10–45)
ANION GAP SERPL CALC-SCNC: 10 MMOL/L — SIGNIFICANT CHANGE UP (ref 5–17)
APTT BLD: 80.8 SEC — HIGH (ref 24.5–35.6)
AST SERPL-CCNC: 17 U/L — SIGNIFICANT CHANGE UP (ref 10–40)
AST SERPL-CCNC: 17 U/L — SIGNIFICANT CHANGE UP (ref 10–40)
AST SERPL-CCNC: 21 U/L — SIGNIFICANT CHANGE UP (ref 10–40)
BILIRUB SERPL-MCNC: 0.8 MG/DL — SIGNIFICANT CHANGE UP (ref 0.2–1.2)
BILIRUB SERPL-MCNC: 0.8 MG/DL — SIGNIFICANT CHANGE UP (ref 0.2–1.2)
BILIRUB SERPL-MCNC: 0.9 MG/DL — SIGNIFICANT CHANGE UP (ref 0.2–1.2)
BUN SERPL-MCNC: 15 MG/DL — SIGNIFICANT CHANGE UP (ref 7–23)
BUN SERPL-MCNC: 16 MG/DL — SIGNIFICANT CHANGE UP (ref 7–23)
BUN SERPL-MCNC: 16 MG/DL — SIGNIFICANT CHANGE UP (ref 7–23)
CALCIUM SERPL-MCNC: 8.4 MG/DL — SIGNIFICANT CHANGE UP (ref 8.4–10.5)
CALCIUM SERPL-MCNC: 8.6 MG/DL — SIGNIFICANT CHANGE UP (ref 8.4–10.5)
CALCIUM SERPL-MCNC: 8.8 MG/DL — SIGNIFICANT CHANGE UP (ref 8.4–10.5)
CHLORIDE SERPL-SCNC: 96 MMOL/L — SIGNIFICANT CHANGE UP (ref 96–108)
CHLORIDE SERPL-SCNC: 96 MMOL/L — SIGNIFICANT CHANGE UP (ref 96–108)
CHLORIDE SERPL-SCNC: 97 MMOL/L — SIGNIFICANT CHANGE UP (ref 96–108)
CO2 SERPL-SCNC: 31 MMOL/L — SIGNIFICANT CHANGE UP (ref 22–31)
CO2 SERPL-SCNC: 31 MMOL/L — SIGNIFICANT CHANGE UP (ref 22–31)
CO2 SERPL-SCNC: 32 MMOL/L — HIGH (ref 22–31)
CREAT SERPL-MCNC: 0.88 MG/DL — SIGNIFICANT CHANGE UP (ref 0.5–1.3)
CREAT SERPL-MCNC: 0.9 MG/DL — SIGNIFICANT CHANGE UP (ref 0.5–1.3)
CREAT SERPL-MCNC: 0.93 MG/DL — SIGNIFICANT CHANGE UP (ref 0.5–1.3)
EGFR: 63 ML/MIN/1.73M2 — SIGNIFICANT CHANGE UP
EGFR: 66 ML/MIN/1.73M2 — SIGNIFICANT CHANGE UP
EGFR: 68 ML/MIN/1.73M2 — SIGNIFICANT CHANGE UP
GLUCOSE SERPL-MCNC: 115 MG/DL — HIGH (ref 70–99)
GLUCOSE SERPL-MCNC: 128 MG/DL — HIGH (ref 70–99)
GLUCOSE SERPL-MCNC: 131 MG/DL — HIGH (ref 70–99)
HCT VFR BLD CALC: 33.9 % — LOW (ref 34.5–45)
HGB BLD-MCNC: 11.5 G/DL — SIGNIFICANT CHANGE UP (ref 11.5–15.5)
INR BLD: 1.86 RATIO — HIGH (ref 0.85–1.18)
MAGNESIUM SERPL-MCNC: 2 MG/DL — SIGNIFICANT CHANGE UP (ref 1.6–2.6)
MAGNESIUM SERPL-MCNC: 2.2 MG/DL — SIGNIFICANT CHANGE UP (ref 1.6–2.6)
MAGNESIUM SERPL-MCNC: 2.3 MG/DL — SIGNIFICANT CHANGE UP (ref 1.6–2.6)
MCHC RBC-ENTMCNC: 32.4 PG — SIGNIFICANT CHANGE UP (ref 27–34)
MCHC RBC-ENTMCNC: 33.9 GM/DL — SIGNIFICANT CHANGE UP (ref 32–36)
MCV RBC AUTO: 95.5 FL — SIGNIFICANT CHANGE UP (ref 80–100)
NRBC # BLD: 0 /100 WBCS — SIGNIFICANT CHANGE UP (ref 0–0)
PHOSPHATE SERPL-MCNC: 2.9 MG/DL — SIGNIFICANT CHANGE UP (ref 2.5–4.5)
PHOSPHATE SERPL-MCNC: 2.9 MG/DL — SIGNIFICANT CHANGE UP (ref 2.5–4.5)
PHOSPHATE SERPL-MCNC: 4.1 MG/DL — SIGNIFICANT CHANGE UP (ref 2.5–4.5)
PLATELET # BLD AUTO: 215 K/UL — SIGNIFICANT CHANGE UP (ref 150–400)
POTASSIUM SERPL-MCNC: 3.4 MMOL/L — LOW (ref 3.5–5.3)
POTASSIUM SERPL-MCNC: 3.9 MMOL/L — SIGNIFICANT CHANGE UP (ref 3.5–5.3)
POTASSIUM SERPL-MCNC: 4.5 MMOL/L — SIGNIFICANT CHANGE UP (ref 3.5–5.3)
POTASSIUM SERPL-SCNC: 3.4 MMOL/L — LOW (ref 3.5–5.3)
POTASSIUM SERPL-SCNC: 3.9 MMOL/L — SIGNIFICANT CHANGE UP (ref 3.5–5.3)
POTASSIUM SERPL-SCNC: 4.5 MMOL/L — SIGNIFICANT CHANGE UP (ref 3.5–5.3)
PROT SERPL-MCNC: 5.8 G/DL — LOW (ref 6–8.3)
PROT SERPL-MCNC: 5.9 G/DL — LOW (ref 6–8.3)
PROT SERPL-MCNC: 6.3 G/DL — SIGNIFICANT CHANGE UP (ref 6–8.3)
PROTHROM AB SERPL-ACNC: 19.2 SEC — HIGH (ref 9.5–13)
RBC # BLD: 3.55 M/UL — LOW (ref 3.8–5.2)
RBC # FLD: 13.3 % — SIGNIFICANT CHANGE UP (ref 10.3–14.5)
SODIUM SERPL-SCNC: 137 MMOL/L — SIGNIFICANT CHANGE UP (ref 135–145)
SODIUM SERPL-SCNC: 138 MMOL/L — SIGNIFICANT CHANGE UP (ref 135–145)
SODIUM SERPL-SCNC: 138 MMOL/L — SIGNIFICANT CHANGE UP (ref 135–145)
WBC # BLD: 15.68 K/UL — HIGH (ref 3.8–10.5)
WBC # FLD AUTO: 15.68 K/UL — HIGH (ref 3.8–10.5)

## 2024-02-27 PROCEDURE — 71045 X-RAY EXAM CHEST 1 VIEW: CPT | Mod: 26

## 2024-02-27 RX ORDER — POTASSIUM CHLORIDE 20 MEQ
10 PACKET (EA) ORAL
Refills: 0 | Status: COMPLETED | OUTPATIENT
Start: 2024-02-27 | End: 2024-02-27

## 2024-02-27 RX ORDER — POTASSIUM PHOSPHATE, MONOBASIC POTASSIUM PHOSPHATE, DIBASIC 236; 224 MG/ML; MG/ML
15 INJECTION, SOLUTION INTRAVENOUS ONCE
Refills: 0 | Status: COMPLETED | OUTPATIENT
Start: 2024-02-27 | End: 2024-02-27

## 2024-02-27 RX ADMIN — HEPARIN SODIUM 11 UNIT(S)/HR: 5000 INJECTION INTRAVENOUS; SUBCUTANEOUS at 07:47

## 2024-02-27 RX ADMIN — PIPERACILLIN AND TAZOBACTAM 25 GRAM(S): 4; .5 INJECTION, POWDER, LYOPHILIZED, FOR SOLUTION INTRAVENOUS at 09:53

## 2024-02-27 RX ADMIN — Medication 130 MICROGRAM(S): at 22:00

## 2024-02-27 RX ADMIN — Medication 100 MILLIEQUIVALENT(S): at 11:22

## 2024-02-27 RX ADMIN — Medication 5 MILLIGRAM(S): at 05:59

## 2024-02-27 RX ADMIN — Medication 100 MILLIEQUIVALENT(S): at 12:21

## 2024-02-27 RX ADMIN — Medication 40 MILLIGRAM(S): at 05:58

## 2024-02-27 RX ADMIN — SODIUM CHLORIDE 50 MILLILITER(S): 9 INJECTION, SOLUTION INTRAVENOUS at 07:46

## 2024-02-27 RX ADMIN — Medication 5 MILLIGRAM(S): at 13:02

## 2024-02-27 RX ADMIN — Medication 5 MILLIGRAM(S): at 09:53

## 2024-02-27 RX ADMIN — Medication 100 MILLIEQUIVALENT(S): at 10:18

## 2024-02-27 RX ADMIN — Medication 5 MILLIGRAM(S): at 01:00

## 2024-02-27 RX ADMIN — PIPERACILLIN AND TAZOBACTAM 25 GRAM(S): 4; .5 INJECTION, POWDER, LYOPHILIZED, FOR SOLUTION INTRAVENOUS at 17:01

## 2024-02-27 RX ADMIN — SODIUM CHLORIDE 50 MILLILITER(S): 9 INJECTION, SOLUTION INTRAVENOUS at 21:08

## 2024-02-27 RX ADMIN — CHLORHEXIDINE GLUCONATE 1 APPLICATION(S): 213 SOLUTION TOPICAL at 05:58

## 2024-02-27 RX ADMIN — POTASSIUM PHOSPHATE, MONOBASIC POTASSIUM PHOSPHATE, DIBASIC 62.5 MILLIMOLE(S): 236; 224 INJECTION, SOLUTION INTRAVENOUS at 13:02

## 2024-02-27 RX ADMIN — Medication 5 MILLIGRAM(S): at 17:01

## 2024-02-27 RX ADMIN — PIPERACILLIN AND TAZOBACTAM 25 GRAM(S): 4; .5 INJECTION, POWDER, LYOPHILIZED, FOR SOLUTION INTRAVENOUS at 01:00

## 2024-02-27 RX ADMIN — Medication 5 MG/HR: at 07:47

## 2024-02-27 RX ADMIN — Medication 5 MILLIGRAM(S): at 22:00

## 2024-02-27 RX ADMIN — HEPARIN SODIUM 11 UNIT(S)/HR: 5000 INJECTION INTRAVENOUS; SUBCUTANEOUS at 21:08

## 2024-02-27 NOTE — PROGRESS NOTE ADULT - ATTENDING COMMENTS
Patient seen and examined and agree with above.   Patient pulled out NGT overnight and it was replaced.   NGT placed yesterday.    Current management includes:  Neuro- dementia has worsened significantly; Oriented intermittently to self and location.   CV- improved hemodynamics; BP stable at this time   -will continue with MAP > 65 mmHg  -chronic atrial fibrillation- currently on diltizem drip at 5 mg/hour and metoprolol  -will replete electrolytles   Pulm - acute respiratory insufficiency - on 2 liters nasal cannula   - will encourage and assist with incentive spirometer  CXR - reviewed and appears improved today.      -goal SpO2 92%  GI-  end ileostomy-minimal output; but started to function today;  NGT 1300 cc  -ileus- will continue NPO at this time   ID- on zosyn; Improvement in leukocytosis.   Heme- continue heparin drip at 1100 units/hour; will continue at this time.   Renal- hypokalemia- continue to be repleted and will recheck  Endocrine - hyperglycemia - will continue to monitor   -continue home synthroid     WIll call palliative care consult for future planning and shared decision making.     Will continue to monitor patient closely.

## 2024-02-27 NOTE — PROGRESS NOTE ADULT - SUBJECTIVE AND OBJECTIVE BOX
Subjective: Patient seen and examined. No new events except as noted.   remains in ICU     REVIEW OF SYSTEMS:    Unable  to obtained   MEDICATIONS:  MEDICATIONS  (STANDING):  chlorhexidine 2% Cloths 1 Application(s) Topical <User Schedule>  diltiazem Infusion 5 mG/Hr (5 mL/Hr) IV Continuous <Continuous>  furosemide   Injectable 40 milliGRAM(s) IV Push daily  heparin  Infusion 800 Unit(s)/Hr (11 mL/Hr) IV Continuous <Continuous>  lactated ringers 1000 milliLiter(s) (50 mL/Hr) IV Continuous <Continuous>  levothyroxine Injectable 130 MICROGram(s) IV Push at bedtime  metoprolol tartrate Injectable 5 milliGRAM(s) IV Push every 4 hours  piperacillin/tazobactam IVPB.. 3.375 Gram(s) IV Intermittent every 8 hours  potassium chloride  10 mEq/100 mL IVPB 10 milliEquivalent(s) IV Intermittent every 1 hour  potassium phosphate IVPB 15 milliMole(s) IV Intermittent once      PHYSICAL EXAM:  T(C): 36.5 (02-27-24 @ 07:00), Max: 36.8 (02-27-24 @ 03:00)  HR: 107 (02-27-24 @ 10:15) (90 - 125)  BP: 133/58 (02-27-24 @ 10:15) (100/59 - 144/60)  RR: 17 (02-27-24 @ 10:15) (12 - 28)  SpO2: 93% (02-27-24 @ 10:15) (87% - 96%)  Wt(kg): --  I&O's Summary    26 Feb 2024 07:01  -  27 Feb 2024 07:00  --------------------------------------------------------  IN: 2969 mL / OUT: 2920 mL / NET: 49 mL    27 Feb 2024 07:01  -  27 Feb 2024 10:32  --------------------------------------------------------  IN: 323 mL / OUT: 1660 mL / NET: -1337 mL            Appearance: NAD	  HEENT:  Dry  oral mucosa, +NGT   Lymphatic: No lymphadenopathy , no edema  Cardiovascular: irregular S1 S2, No JVD, No murmurs , Peripheral pulses palpable 2+ bilaterally  Respiratory: decreased bs   Gastrointestinal:  soft, mildly distended, appropriately tender near midline incision, dressing c/d/i,  ileostomy leaking stool  Skin: No rashes, No ecchymoses, No cyanosis, warm to touch  Musculoskeletal: Normal range of motion, normal strength  Psychiatry:  sleepy   Ext: No edema  +amaya    LABS:    CARDIAC MARKERS:                                11.5   15.68 )-----------( 215      ( 27 Feb 2024 00:22 )             33.9     02-27    138  |  96  |  16  ----------------------------<  128<H>  3.4<L>   |  32<H>  |  0.88    Ca    8.4      27 Feb 2024 09:04  Phos  2.9     02-27  Mg     2.3     02-27    TPro  5.8<L>  /  Alb  2.9<L>  /  TBili  0.8  /  DBili  x   /  AST  17  /  ALT  12  /  AlkPhos  58  02-27    proBNP:   Lipid Profile:   HgA1c:   TSH:             TELEMETRY: AF	    ECG:  	  RADIOLOGY: < from: Xray Chest 1 View- PORTABLE-Urgent (Xray Chest 1 View- PORTABLE-Urgent .) (02.27.24 @ 07:41) >  ACC: [id number] EXAM:  XR CHEST PORTABLE ROUTINE 1V   ORDERED BY: LACHO CLIFTON     ACC: [id number] EXAM:  XR CHEST PORTABLE ROUTINE 1V   ORDERED BY: CARLIN ROCHE     ACC: [id number] EXAM:  XR CHEST PORTABLE URGENT 1V   ORDERED BY: APARNA GRIGSBY     ACC: [id number] EXAM:  XR CHEST PORTABLE URGENT 1V   ORDERED BY: RHONDA CALLAWAY     PROCEDURE DATE:  02/26/2024          INTERPRETATION:  EXAMINATION: XR CHEST URGENT, XR CHEST URGENT, XR CHEST,   XR CHEST    CLINICAL INDICATION: ngt placement    TECHNIQUE: Multiple frontal, portable views of the chest were obtained at   time stamps included below in the findings.    COMPARISON: Chest x-ray 2/24/2023 6:52 AM    FINDINGS:    2/25/2024 6:42 AM  LINES/TUBES: Enteric tube, coursing below the diaphragm, with tip in the   stomach.    LUNGS/PLEURA: No focal consolidations. No pleural effusion. No   pneumothorax.    HEART AND MEDIASTINUM: The heart size is not accurately measured in this   projection.    OTHER: No acute osseous abnormalities.    2/26/20246:55 AM  Interval removal of enteric tube    2/26/2024 1:12 PM  Enteric tube, coursing below the diaphragm, with tip in the stomach.    2/27/2024 6:48 AM  No interval change.    IMPRESSION:    On the x-ray obtained 2/27/2024 6:48 AM, enteric tube terminates in the   stomach.    --- End of Report ---          KATEY CALI MD; Resident Radiologist  This document has been electronically signed.  CARLOS MANUEL RODRIGUEZ MD; Attending Interventional Radiologist  This document has been electronically signed.Feb 27 2024  9:32AM    < end of copied text >    DIAGNOSTIC TESTING:  [ ] Echocardiogram:  [ ]  Catheterization:  [ ] Stress Test:    OTHER:

## 2024-02-27 NOTE — PROGRESS NOTE ADULT - SUBJECTIVE AND OBJECTIVE BOX
HISTORY  24 HOUR EVENTS:  - if WBC decreased tomorrow 2/27 will d/c ABx    SUBJECTIVE/ROS:  [ ] A ten-point review of systems was otherwise negative except as noted.  [X] Due to altered mental status/intubation, subjective information were not able to be obtained from the patient. History was obtained, to the extent possible, from review of the chart and collateral sources of information.      NEURO  RASS:     GCS:     CAM ICU:  Exam: awake, alert, oriented  Meds: acetaminophen   IVPB .. 1000 milliGRAM(s) IV Intermittent every 6 hours PRN Moderate Pain (4 - 6)    [x] Adequacy of sedation and pain control has been assessed and adjusted      RESPIRATORY  RR: 17 (02-27-24 @ 00:00) (12 - 28)  SpO2: 95% (02-27-24 @ 00:00) (80% - 100%)  Wt(kg): --  Exam: unlabored, clear to auscultation bilaterally  Mechanical Ventilation:     [N/A] Extubation Readiness Assessed  Meds:       CARDIOVASCULAR  HR: 96 (02-27-24 @ 00:00) (90 - 134)  BP: 122/67 (02-27-24 @ 00:00) (108/55 - 159/69)  BP(mean): 89 (02-27-24 @ 00:00) (76 - 110)  ABP: --  ABP(mean): --  Wt(kg): --  CVP(cm H2O): --      Exam: regular rate and rhythm  Cardiac Rhythm: sinus  Perfusion     [x]Adequate   [ ]Inadequate  Mentation   [x]Normal       [ ]Reduced  Extremities  [x]Warm         [ ]Cool  Volume Status [ ]Hypervolemic [x]Euvolemic [ ]Hypovolemic  Meds: diltiazem Infusion 5 mG/Hr IV Continuous <Continuous>  furosemide   Injectable 40 milliGRAM(s) IV Push daily  metoprolol tartrate Injectable 5 milliGRAM(s) IV Push every 4 hours        GI/NUTRITION  Exam: soft, nontender, nondistended, incision C/D/I  Diet:  Meds:     GENITOURINARY  I&O's Detail    02-25 @ 07:01  -  02-26 @ 07:00  --------------------------------------------------------  IN:    Albumin 5%  - 250 mL: 250 mL    Diltiazem: 425 mL    Heparin: 236 mL    IV PiggyBack: 600 mL    IV PiggyBack: 125 mL    Lactated Ringers: 720 mL    Oral Fluid: 90 mL  Total IN: 2446 mL    OUT:    Emesis (mL): 1000 mL    Ileostomy (mL): 0 mL    Indwelling Catheter - Urethral (mL): 1390 mL    Nasogastric/Oral tube (mL): 25 mL  Total OUT: 2415 mL    Total NET: 31 mL      02-26 @ 07:01  - 02-27 @ 00:48  --------------------------------------------------------  IN:    Diltiazem: 300 mL    Enteral Tube Flush: 10 mL    Heparin: 187 mL    IV PiggyBack: 500 mL    IV PiggyBack: 800 mL    Lactated Ringers: 360 mL    Lactated Ringers w/ Additives: 250 mL  Total IN: 2407 mL    OUT:    Emesis (mL): 150 mL    Indwelling Catheter - Urethral (mL): 860 mL    Nasogastric/Oral tube (mL): 950 mL  Total OUT: 1960 mL    Total NET: 447 mL          02-26    138  |  96  |  17  ----------------------------<  149<H>  3.5   |  31  |  0.98    Ca    8.6      26 Feb 2024 15:08  Phos  3.1     02-26  Mg     2.4     02-26    TPro  5.9<L>  /  Alb  3.0<L>  /  TBili  0.9  /  DBili  x   /  AST  18  /  ALT  12  /  AlkPhos  59  02-26    [ ] Melvin catheter, indication: N/A  Meds: lactated ringers 1000 milliLiter(s) IV Continuous <Continuous>        HEMATOLOGIC  Meds: heparin  Infusion 800 Unit(s)/Hr IV Continuous <Continuous>    [x] VTE Prophylaxis                        11.5   15.68 )-----------( 215      ( 27 Feb 2024 00:22 )             33.9     PT/INR - ( 26 Feb 2024 06:13 )   PT: 20.0 sec;   INR: 1.85 ratio         PTT - ( 26 Feb 2024 11:32 )  PTT:87.7 sec  Transfusion     [ ] PRBC   [ ] Platelets   [ ] FFP   [ ] Cryoprecipitate      INFECTIOUS DISEASES  WBC Count: 15.68 K/uL (02-27 @ 00:22)    RECENT CULTURES:  Specimen Source: .Blood Blood  Date/Time: 02-23 @ 03:14  Culture Results:   No growth at 72 Hours  Gram Stain: --  Organism: --    Meds: piperacillin/tazobactam IVPB.. 3.375 Gram(s) IV Intermittent every 8 hours        ENDOCRINE  CAPILLARY BLOOD GLUCOSE        Meds: levothyroxine Injectable 130 MICROGram(s) IV Push at bedtime        ACCESS DEVICES:  [ ] Peripheral IV  [ ] Central Venous Line	[ ] R	[ ] L	[ ] IJ	[ ] Fem	[ ] SC	Placed:   [ ] Arterial Line		[ ] R	[ ] L	[ ] Fem	[ ] Rad	[ ] Ax	Placed:   [ ] PICC:					[ ] Mediport  [ ] Urinary Catheter, Date Placed:   [x] Necessity of urinary, arterial, and venous catheters discussed    OTHER MEDICATIONS:  chlorhexidine 2% Cloths 1 Application(s) Topical <User Schedule>      CODE STATUS:      IMAGING: HISTORY  24 HOUR EVENTS:  - if WBC decreased tomorrow 2/27 will d/c ABx    SUBJECTIVE/ROS:  [ ] A ten-point review of systems was otherwise negative except as noted.  [X] Due to altered mental status/intubation, subjective information were not able to be obtained from the patient. History was obtained, to the extent possible, from review of the chart and collateral sources of information.      NEURO  GCS:  14     Exam: awake, alert, oriented x1  Meds: acetaminophen   IVPB .. 1000 milliGRAM(s) IV Intermittent every 6 hours PRN Moderate Pain (4 - 6)    [x] Adequacy of sedation and pain control has been assessed and adjusted      RESPIRATORY  RR: 17 (02-27-24 @ 00:00) (12 - 28)  SpO2: 95% (02-27-24 @ 00:00) (80% - 100%)  Exam: unlabored, clear to auscultation bilaterally      [N/A] Extubation Readiness Assessed  Meds:       CARDIOVASCULAR  HR: 96 (02-27-24 @ 00:00) (90 - 134)  BP: 122/67 (02-27-24 @ 00:00) (108/55 - 159/69)  BP(mean): 89 (02-27-24 @ 00:00) (76 - 110)        Exam: regular rate and rhythm  Cardiac Rhythm: sinus  Perfusion     [x]Adequate   [ ]Inadequate  Mentation   [x]Normal       [ ]Reduced  Extremities  [x]Warm         [ ]Cool  Volume Status [ ]Hypervolemic [x]Euvolemic [ ]Hypovolemic  Meds: diltiazem Infusion 5 mG/Hr IV Continuous <Continuous>  furosemide   Injectable 40 milliGRAM(s) IV Push daily  metoprolol tartrate Injectable 5 milliGRAM(s) IV Push every 4 hours        GI/NUTRITION  Exam: soft, nontender, nondistended, incision C/D/I  Diet: NPO  Meds:     GENITOURINARY  I&O's Detail    02-25 @ 07:01  -  02-26 @ 07:00  --------------------------------------------------------  IN:    Albumin 5%  - 250 mL: 250 mL    Diltiazem: 425 mL    Heparin: 236 mL    IV PiggyBack: 600 mL    IV PiggyBack: 125 mL    Lactated Ringers: 720 mL    Oral Fluid: 90 mL  Total IN: 2446 mL    OUT:    Emesis (mL): 1000 mL    Ileostomy (mL): 0 mL    Indwelling Catheter - Urethral (mL): 1390 mL    Nasogastric/Oral tube (mL): 25 mL  Total OUT: 2415 mL    Total NET: 31 mL      02-26 @ 07:01  -  02-27 @ 00:48  --------------------------------------------------------  IN:    Diltiazem: 300 mL    Enteral Tube Flush: 10 mL    Heparin: 187 mL    IV PiggyBack: 500 mL    IV PiggyBack: 800 mL    Lactated Ringers: 360 mL    Lactated Ringers w/ Additives: 250 mL  Total IN: 2407 mL    OUT:    Emesis (mL): 150 mL    Indwelling Catheter - Urethral (mL): 860 mL    Nasogastric/Oral tube (mL): 950 mL  Total OUT: 1960 mL    Total NET: 447 mL          02-26    138  |  96  |  17  ----------------------------<  149<H>  3.5   |  31  |  0.98    Ca    8.6      26 Feb 2024 15:08  Phos  3.1     02-26  Mg     2.4     02-26    TPro  5.9<L>  /  Alb  3.0<L>  /  TBili  0.9  /  DBili  x   /  AST  18  /  ALT  12  /  AlkPhos  59  02-26    Meds: lactated ringers 1000 milliLiter(s) IV Continuous <Continuous>        HEMATOLOGIC  Meds: heparin  Infusion 800 Unit(s)/Hr IV Continuous <Continuous>    [x] VTE Prophylaxis                        11.5   15.68 )-----------( 215      ( 27 Feb 2024 00:22 )             33.9     PT/INR - ( 26 Feb 2024 06:13 )   PT: 20.0 sec;   INR: 1.85 ratio         PTT - ( 26 Feb 2024 11:32 )  PTT:87.7 sec      INFECTIOUS DISEASES  WBC Count: 15.68 K/uL (02-27 @ 00:22)    RECENT CULTURES:  Specimen Source: .Blood Blood  Date/Time: 02-23 @ 03:14  Culture Results:   No growth at 72 Hours    Meds: piperacillin/tazobactam IVPB.. 3.375 Gram(s) IV Intermittent every 8 hours        ENDOCRINE  CAPILLARY BLOOD GLUCOSE        Meds: levothyroxine Injectable 130 MICROGram(s) IV Push at bedtime        ACCESS DEVICES:  [X] Peripheral IV    [x] Necessity of urinary, arterial, and venous catheters discussed    OTHER MEDICATIONS:  chlorhexidine 2% Cloths 1 Application(s) Topical <User Schedule>

## 2024-02-27 NOTE — PROGRESS NOTE ADULT - ASSESSMENT
78-year-old female with PMH current smoker , COPD, A-fib  On Eliquis and Plavix, HTN, HLD presents for syncope from PCPs office yesterday.     # ischemia Bowel :  s/p OR   appreciate SICU care   now with iliues : NGT in place    -IV abx     afib :  AC : on heparin   cont tele   afib with RVR   IV cardizem  drip  BB       lehtargy /encephalopathy :   CT head negative   improved and seems at baseline   EEG pending : negative

## 2024-02-27 NOTE — PROGRESS NOTE ADULT - SUBJECTIVE AND OBJECTIVE BOX
Patient is a 78y old  Female who presents with a chief complaint of "78y Female pmh of current smoker, COPD, afib on eliquis/plavix, HTN, HLD who presented [facility]/15/[facility] for syncope.  GI consulted for abdominal pain 2/22. There were reports of episodes of abdominal pain and vomiting a few days ago according to the family. CT AP notable for ischemic/necrotic small bowel within the pelvis and severe atherosclerotic disesase of SMA. Patient taken to OR for laparoscopic abd exploration, converted to open exlap for likely dead bowel. 20cm terminal ileum removed, left in discont w/ abthera. Angiogram R fem done intraop w/ findings of celiac, SMA, SWATHI w/o flow, collateral perfusion confirmed. Plan to RTOR in 48hrs. SICU c/s for ventilator management and for hemodynamic monitoring."       (23 Feb 2024 10:40)      INTERVAL HPI/OVERNIGHT EVENTS: pt. seen and examined in SICU   T(C): 36.4 (02-27-24 @ 19:00), Max: 36.8 (02-27-24 @ 03:00)  HR: 100 (02-27-24 @ 21:00) (91 - 108)  BP: 134/60 (02-27-24 @ 21:00) (100/59 - 150/63)  RR: 13 (02-27-24 @ 21:00) (12 - 26)  SpO2: 93% (02-27-24 @ 21:00) (88% - 97%)  Wt(kg): --  I&O's Summary    26 Feb 2024 07:01  -  27 Feb 2024 07:00  --------------------------------------------------------  IN: 2969 mL / OUT: 2920 mL / NET: 49 mL    27 Feb 2024 07:01  -  27 Feb 2024 21:19  --------------------------------------------------------  IN: 1567.9 mL / OUT: 2730 mL / NET: -1162.1 mL        PAST MEDICAL & SURGICAL HISTORY:  Hypertension      Hypothyroid      Osteoarthritis  knees, back      CAD (coronary artery disease)      CROW (obstructive sleep apnea)  non complaint on CPAP      History of MI (myocardial infarction)  h/o previous MI in 2004 prompted PTCA  with stenting x 2 vessels   last stress/ echo 2019      Heart murmur  dx in childhood      Bilateral hearing loss, unspecified hearing loss type  bilateral aids      Obesity      Mixed stress and urge urinary incontinence      Overactive bladder      S/P ORIF (open reduction internal fixation) fracture  left hip 1962      S/P appendectomy  30 plus years      S/P knee replacement  left 2000      Stented coronary artery  2004 X 2 STENTS      S/P laparotomy  due to adhesions, 30 years ago      H/O dilation and curettage  2/2019 Benign polyp          SOCIAL HISTORY  Alcohol:  Tobacco:  Illicit substance use:    FAMILY HISTORY:    REVIEW OF SYSTEMS:  CONSTITUTIONAL: No fever, weight loss, or fatigue  EYES: No eye pain, visual disturbances, or discharge  ENMT:  No difficulty hearing, tinnitus, vertigo; No sinus or throat pain  NECK: No pain or stiffness  RESPIRATORY: No cough, wheezing, chills or hemoptysis; No shortness of breath  CARDIOVASCULAR: No chest pain, palpitations, dizziness, or leg swelling  GASTROINTESTINAL: No abdominal or epigastric pain. No nausea, vomiting, or hematemesis; No diarrhea or constipation. No melena or hematochezia.  GENITOURINARY: No dysuria, frequency, hematuria, or incontinence  NEUROLOGICAL: No headaches, memory loss, loss of strength, numbness, or tremors  SKIN: No itching, burning, rashes, or lesions   LYMPH NODES: No enlarged glands  ENDOCRINE: No heat or cold intolerance; No hair loss  MUSCULOSKELETAL: No joint pain or swelling; No muscle, back, or extremity pain  PSYCHIATRIC: No depression, anxiety, mood swings, or difficulty sleeping  HEME/LYMPH: No easy bruising, or bleeding gums  ALLERY AND IMMUNOLOGIC: No hives or eczema    RADIOLOGY & ADDITIONAL TESTS:    Imaging Personally Reviewed:  [ ] YES  [ ] NO    Consultant(s) Notes Reviewed:  [ ] YES  [ ] NO    PHYSICAL EXAM:  GENERAL: NAD, well-groomed, well-developed  HEAD:  Atraumatic, Normocephalic  EYES: EOMI, PERRLA, conjunctiva and sclera clear  ENMT: No tonsillar erythema, exudates, or enlargement; Moist mucous membranes, Good dentition, No lesions  NECK: Supple, No JVD, Normal thyroid  NERVOUS SYSTEM:  Alert & Oriented X3, Good concentration; Motor Strength 5/5 B/L upper and lower extremities; DTRs 2+ intact and symmetric  CHEST/LUNG: Clear to percussion bilaterally; No rales, rhonchi, wheezing, or rubs  HEART: Regular rate and rhythm; No murmurs, rubs, or gallops  ABDOMEN: Soft, Nontender, Nondistended; Bowel sounds present  EXTREMITIES:  2+ Peripheral Pulses, No clubbing, cyanosis, or edema  LYMPH: No lymphadenopathy noted  SKIN: No rashes or lesions    LABS:                        11.5   15.68 )-----------( 215      ( 27 Feb 2024 00:22 )             33.9     02-27    137  |  96  |  15  ----------------------------<  115<H>  4.5   |  31  |  0.90    Ca    8.8      27 Feb 2024 16:39  Phos  4.1     02-27  Mg     2.0     02-27    TPro  6.3  /  Alb  3.0<L>  /  TBili  0.9  /  DBili  x   /  AST  21  /  ALT  13  /  AlkPhos  64  02-27    PT/INR - ( 27 Feb 2024 00:22 )   PT: 19.2 sec;   INR: 1.86 ratio         PTT - ( 27 Feb 2024 00:22 )  PTT:80.8 sec  Urinalysis Basic - ( 27 Feb 2024 16:39 )    Color: x / Appearance: x / SG: x / pH: x  Gluc: 115 mg/dL / Ketone: x  / Bili: x / Urobili: x   Blood: x / Protein: x / Nitrite: x   Leuk Esterase: x / RBC: x / WBC x   Sq Epi: x / Non Sq Epi: x / Bacteria: x      CAPILLARY BLOOD GLUCOSE            Urinalysis Basic - ( 27 Feb 2024 16:39 )    Color: x / Appearance: x / SG: x / pH: x  Gluc: 115 mg/dL / Ketone: x  / Bili: x / Urobili: x   Blood: x / Protein: x / Nitrite: x   Leuk Esterase: x / RBC: x / WBC x   Sq Epi: x / Non Sq Epi: x / Bacteria: x        MEDICATIONS  (STANDING):  chlorhexidine 2% Cloths 1 Application(s) Topical <User Schedule>  diltiazem Infusion 5 mG/Hr (5 mL/Hr) IV Continuous <Continuous>  furosemide   Injectable 40 milliGRAM(s) IV Push daily  heparin  Infusion 800 Unit(s)/Hr (11 mL/Hr) IV Continuous <Continuous>  lactated ringers 1000 milliLiter(s) (50 mL/Hr) IV Continuous <Continuous>  levothyroxine Injectable 130 MICROGram(s) IV Push at bedtime  metoprolol tartrate Injectable 5 milliGRAM(s) IV Push every 4 hours  piperacillin/tazobactam IVPB.. 3.375 Gram(s) IV Intermittent every 8 hours    MEDICATIONS  (PRN):  acetaminophen   IVPB .. 1000 milliGRAM(s) IV Intermittent every 6 hours PRN Moderate Pain (4 - 6)      Care Discussed with Consultants/Other Providers [ ] YES  [ ] NO

## 2024-02-27 NOTE — PROGRESS NOTE ADULT - ASSESSMENT
78yFemale PMHx COPD, A-fib  On Eliquis and Plavix, HTN, HLD with signs of acute mesenteric ischemia now s/p diagnostic laparoscopy converted to exploratory laparotomy with 20 cm of terminal ileum resection with bowel left in discontinuity and temporary abdominal closure with abthera with mesenteric angiogram via R fem access 02-22 findings of celiac, SMA, SWATHI w/o flow, collateral perfusion confirmed. S/p Closure and ileostomy creation on 2/24.     PLAN  - Given patient prognosis, no re-vascularization options, consult Palliative   - NPO  - Heparin gtt  - cont abx  - Metoprolol for rate control. On dilt gtt. Cardiology following.   - Care per SICU      ACS/Trauma   [phone]

## 2024-02-27 NOTE — PROGRESS NOTE ADULT - SUBJECTIVE AND OBJECTIVE BOX
SURGERY DAILY PROGRESS NOTE    24 Hour/Overnight Events: No acute events overnight    SUBJECTIVE: Patient seen and evaluated on AM rounds. Pt is resting comfortably in bed with no complaints.    ------------------------------------------------------------------------------------------------------------  OBJECTIVE:  Vital Signs Last 24 Hrs  T(C): 36.5 (27 Feb 2024 07:00), Max: 36.8 (27 Feb 2024 03:00)  T(F): 97.7 (27 Feb 2024 07:00), Max: 98.2 (27 Feb 2024 03:00)  HR: 100 (27 Feb 2024 08:00) (90 - 125)  BP: 117/55 (27 Feb 2024 08:00) (100/59 - 144/60)  BP(mean): 77 (27 Feb 2024 08:00) (76 - 99)  RR: 16 (27 Feb 2024 08:00) (12 - 28)  SpO2: 92% (27 Feb 2024 08:00) (87% - 97%)    Parameters below as of 27 Feb 2024 07:00  Patient On (Oxygen Delivery Method): nasal cannula  O2 Flow (L/min): 0.5    I&O's Detail    26 Feb 2024 07:01  -  27 Feb 2024 07:00  --------------------------------------------------------  IN:    Diltiazem: 335 mL    Enteral Tube Flush: 10 mL    Heparin: 264 mL    IV PiggyBack: 800 mL    IV PiggyBack: 600 mL    Lactated Ringers: 360 mL    Lactated Ringers w/ Additives: 600 mL  Total IN: 2969 mL    OUT:    Emesis (mL): 150 mL    Indwelling Catheter - Urethral (mL): 1470 mL    Nasogastric/Oral tube (mL): 1300 mL  Total OUT: 2920 mL    Total NET: 49 mL      27 Feb 2024 07:01  -  27 Feb 2024 09:19  --------------------------------------------------------  IN:    Diltiazem: 5 mL    Heparin: 11 mL    Lactated Ringers w/ Additives: 50 mL  Total IN: 66 mL    OUT:    Indwelling Catheter - Urethral (mL): 360 mL    Nasogastric/Oral tube (mL): 800 mL  Total OUT: 1160 mL    Total NET: -1094 mL      LABS:                        11.5   15.68 )-----------( 215      ( 27 Feb 2024 00:22 )             33.9     02-27    138  |  97  |  16  ----------------------------<  131<H>  3.9   |  31  |  0.93    Ca    8.6      27 Feb 2024 00:22  Phos  2.9     02-27  Mg     2.2     02-27    TPro  5.9<L>  /  Alb  2.8<L>  /  TBili  0.8  /  DBili  x   /  AST  17  /  ALT  11  /  AlkPhos  57  02-27    LIVER FUNCTIONS - ( 27 Feb 2024 00:22 )  Alb: 2.8 g/dL / Pro: 5.9 g/dL / ALK PHOS: 57 U/L / ALT: 11 U/L / AST: 17 U/L / GGT: x           PT/INR - ( 27 Feb 2024 00:22 )   PT: 19.2 sec;   INR: 1.86 ratio    PTT - ( 27 Feb 2024 00:22 )  PTT:80.8 sec    Urinalysis Basic - ( 27 Feb 2024 00:22 )  Color: x / Appearance: x / SG: x / pH: x  Gluc: 131 mg/dL / Ketone: x  / Bili: x / Urobili: x   Blood: x / Protein: x / Nitrite: x   Leuk Esterase: x / RBC: x / WBC x   Sq Epi: x / Non Sq Epi: x / Bacteria: x      PHYSICAL EXAM:  Constitutional: Well developed, well nourished, NAD  Pulmonary: Symmetric chest rise bilaterally, no increased WOB  Gastrointestinal: Abdomen soft, nondistended, appropriate sx incisional tenderness with dressings c/d/i. Ileostomy pink with gas and solid stool in ostomy bag. Gravity bag with minimal bilious drainage. Midline wound vac holding suction.   Extremities: Warm to touch, soft, normal strength, sensory and motor intact. No cyanosis/erythema/edema SURGERY DAILY PROGRESS NOTE    24 Hour/Overnight Events: No acute events overnight    SUBJECTIVE: Patient seen and evaluated on AM rounds. Pt is resting comfortably in bed with no complaints.    ------------------------------------------------------------------------------------------------------------  OBJECTIVE:  Vital Signs Last 24 Hrs  T(C): 36.5 (27 Feb 2024 07:00), Max: 36.8 (27 Feb 2024 03:00)  T(F): 97.7 (27 Feb 2024 07:00), Max: 98.2 (27 Feb 2024 03:00)  HR: 100 (27 Feb 2024 08:00) (90 - 125)  BP: 117/55 (27 Feb 2024 08:00) (100/59 - 144/60)  BP(mean): 77 (27 Feb 2024 08:00) (76 - 99)  RR: 16 (27 Feb 2024 08:00) (12 - 28)  SpO2: 92% (27 Feb 2024 08:00) (87% - 97%)    Parameters below as of 27 Feb 2024 07:00  Patient On (Oxygen Delivery Method): nasal cannula  O2 Flow (L/min): 0.5    I&O's Detail    26 Feb 2024 07:01  -  27 Feb 2024 07:00  --------------------------------------------------------  IN:    Diltiazem: 335 mL    Enteral Tube Flush: 10 mL    Heparin: 264 mL    IV PiggyBack: 800 mL    IV PiggyBack: 600 mL    Lactated Ringers: 360 mL    Lactated Ringers w/ Additives: 600 mL  Total IN: 2969 mL    OUT:    Emesis (mL): 150 mL    Indwelling Catheter - Urethral (mL): 1470 mL    Nasogastric/Oral tube (mL): 1300 mL  Total OUT: 2920 mL    Total NET: 49 mL      27 Feb 2024 07:01  -  27 Feb 2024 09:19  --------------------------------------------------------  IN:    Diltiazem: 5 mL    Heparin: 11 mL    Lactated Ringers w/ Additives: 50 mL  Total IN: 66 mL    OUT:    Indwelling Catheter - Urethral (mL): 360 mL    Nasogastric/Oral tube (mL): 800 mL  Total OUT: 1160 mL    Total NET: -1094 mL      LABS:                        11.5   15.68 )-----------( 215      ( 27 Feb 2024 00:22 )             33.9     02-27    138  |  97  |  16  ----------------------------<  131<H>  3.9   |  31  |  0.93    Ca    8.6      27 Feb 2024 00:22  Phos  2.9     02-27  Mg     2.2     02-27    TPro  5.9<L>  /  Alb  2.8<L>  /  TBili  0.8  /  DBili  x   /  AST  17  /  ALT  11  /  AlkPhos  57  02-27    LIVER FUNCTIONS - ( 27 Feb 2024 00:22 )  Alb: 2.8 g/dL / Pro: 5.9 g/dL / ALK PHOS: 57 U/L / ALT: 11 U/L / AST: 17 U/L / GGT: x           PT/INR - ( 27 Feb 2024 00:22 )   PT: 19.2 sec;   INR: 1.86 ratio    PTT - ( 27 Feb 2024 00:22 )  PTT:80.8 sec    Urinalysis Basic - ( 27 Feb 2024 00:22 )  Color: x / Appearance: x / SG: x / pH: x  Gluc: 131 mg/dL / Ketone: x  / Bili: x / Urobili: x   Blood: x / Protein: x / Nitrite: x   Leuk Esterase: x / RBC: x / WBC x   Sq Epi: x / Non Sq Epi: x / Bacteria: x      PHYSICAL EXAM:  Constitutional: Well developed, well nourished, NAD  Pulmonary: Symmetric chest rise bilaterally, no increased WOB  Gastrointestinal: Abdomen soft, mildly distended, appropriate sx incisional tenderness with dressings c/d/i. Ileostomy pink with gas and liquid stool in ostomy bag.   Extremities: Warm to touch, soft. No cyanosis/erythema/edema

## 2024-02-27 NOTE — PROGRESS NOTE ADULT - ATTENDING COMMENTS
Pt seen and examined  Chart reviewed  Resident note confirmed  Plan of care discussed with Dr. Miguel  Management per SICU team

## 2024-02-27 NOTE — PROGRESS NOTE ADULT - ASSESSMENT
78-year-old female with PMH COPD, A-fib  On Eliquis and Plavix, COPD, HTN, HLD presents for syncope from PCPs office earlier today.  Patient was at normal checkup for blood work and was sitting on the table.  Daughter at bedside states that patient suddenly went limp while she was sitting on the table, her eyes rolled back, and her tongue turned to 1 side and this lasted for couple of seconds and then the patient regained consciousness.  Patient had no postictal period.  No tongue biting.  Patient does not remember these events.  Daughter at bedside states that patient has not been eating as much for the past day or 2 and has not been sleeping at all.                        2/23: OR: Diagnostic laparoscopy, ischemic bowel noted in RLQ just proximal to TI. Converted to laparotomy. 20cm of terminal ileum resected and bowel left in discontinuity.  celiac, SMA, SWATHI w/ no flow demonstrated on angiogram    2/24:  RTOR  End ileostomy, closed                      Neuro:  - Alert and oriented, confused at times  - Acetaminophen PRN    Resp: pmh COPD  - extubated 2/23  - OOB to chair, IS to prevent atelectasis  - satting appropriately on RA    CV:  - Afib RVR HR 150s, BP 160s/90s,   - metoprolol 5mg q4h  - dilt gtt at 5    GI: 2/23, 2/24 s/p 20cm TI SBR, end ileostomy  - celiac, SMA, SWATHI w/ no flow demonstrated on angiogram  - NPO, ileus  - ileostomy, monitor output  - Protonix for stress ulcer prophylaxis    Renal:  - Monitor I&Os and electrolytes w/ repletions as necessary  - 40 lasix daily, goal for net even to -500cc  - amaya in place, monitor UOP  - LR 50    Heme:  - Monitor CBC and coags  - heparin gtt    ID:   - Monitor for clinical evidence of active infection  - Empiric antibiotics w/ zosyn ( 2/23-3/1), plan to d/c if WBC downtrends 2/27 AM labs    Endo:   - Monitor glucose  - home synthroid    Skin:  # contact dermatitis, on buttock area  - derm consult, recommends plain petroleum jelly     Lines:  Amaya (2/23-)

## 2024-02-28 LAB
ALBUMIN SERPL ELPH-MCNC: 2.8 G/DL — LOW (ref 3.3–5)
ALBUMIN SERPL ELPH-MCNC: 2.8 G/DL — LOW (ref 3.3–5)
ALP SERPL-CCNC: 61 U/L — SIGNIFICANT CHANGE UP (ref 40–120)
ALP SERPL-CCNC: 63 U/L — SIGNIFICANT CHANGE UP (ref 40–120)
ALT FLD-CCNC: 10 U/L — SIGNIFICANT CHANGE UP (ref 10–45)
ALT FLD-CCNC: 11 U/L — SIGNIFICANT CHANGE UP (ref 10–45)
ANION GAP SERPL CALC-SCNC: 10 MMOL/L — SIGNIFICANT CHANGE UP (ref 5–17)
ANION GAP SERPL CALC-SCNC: 9 MMOL/L — SIGNIFICANT CHANGE UP (ref 5–17)
APTT BLD: 98.2 SEC — HIGH (ref 24.5–35.6)
AST SERPL-CCNC: 16 U/L — SIGNIFICANT CHANGE UP (ref 10–40)
AST SERPL-CCNC: 18 U/L — SIGNIFICANT CHANGE UP (ref 10–40)
BILIRUB SERPL-MCNC: 0.8 MG/DL — SIGNIFICANT CHANGE UP (ref 0.2–1.2)
BILIRUB SERPL-MCNC: 0.8 MG/DL — SIGNIFICANT CHANGE UP (ref 0.2–1.2)
BUN SERPL-MCNC: 11 MG/DL — SIGNIFICANT CHANGE UP (ref 7–23)
BUN SERPL-MCNC: 14 MG/DL — SIGNIFICANT CHANGE UP (ref 7–23)
CALCIUM SERPL-MCNC: 8.3 MG/DL — LOW (ref 8.4–10.5)
CALCIUM SERPL-MCNC: 8.6 MG/DL — SIGNIFICANT CHANGE UP (ref 8.4–10.5)
CHLORIDE SERPL-SCNC: 96 MMOL/L — SIGNIFICANT CHANGE UP (ref 96–108)
CHLORIDE SERPL-SCNC: 98 MMOL/L — SIGNIFICANT CHANGE UP (ref 96–108)
CO2 SERPL-SCNC: 31 MMOL/L — SIGNIFICANT CHANGE UP (ref 22–31)
CO2 SERPL-SCNC: 32 MMOL/L — HIGH (ref 22–31)
CREAT SERPL-MCNC: 0.74 MG/DL — SIGNIFICANT CHANGE UP (ref 0.5–1.3)
CREAT SERPL-MCNC: 0.82 MG/DL — SIGNIFICANT CHANGE UP (ref 0.5–1.3)
CULTURE RESULTS: SIGNIFICANT CHANGE UP
CULTURE RESULTS: SIGNIFICANT CHANGE UP
EGFR: 74 ML/MIN/1.73M2 — SIGNIFICANT CHANGE UP
EGFR: 83 ML/MIN/1.73M2 — SIGNIFICANT CHANGE UP
GLUCOSE SERPL-MCNC: 101 MG/DL — HIGH (ref 70–99)
GLUCOSE SERPL-MCNC: 115 MG/DL — HIGH (ref 70–99)
HCT VFR BLD CALC: 32.3 % — LOW (ref 34.5–45)
HGB BLD-MCNC: 10.7 G/DL — LOW (ref 11.5–15.5)
INR BLD: 1.65 RATIO — HIGH (ref 0.85–1.18)
MAGNESIUM SERPL-MCNC: 1.9 MG/DL — SIGNIFICANT CHANGE UP (ref 1.6–2.6)
MAGNESIUM SERPL-MCNC: 2.1 MG/DL — SIGNIFICANT CHANGE UP (ref 1.6–2.6)
MCHC RBC-ENTMCNC: 31.6 PG — SIGNIFICANT CHANGE UP (ref 27–34)
MCHC RBC-ENTMCNC: 33.1 GM/DL — SIGNIFICANT CHANGE UP (ref 32–36)
MCV RBC AUTO: 95.3 FL — SIGNIFICANT CHANGE UP (ref 80–100)
NRBC # BLD: 0 /100 WBCS — SIGNIFICANT CHANGE UP (ref 0–0)
PHOSPHATE SERPL-MCNC: 3.3 MG/DL — SIGNIFICANT CHANGE UP (ref 2.5–4.5)
PHOSPHATE SERPL-MCNC: 3.5 MG/DL — SIGNIFICANT CHANGE UP (ref 2.5–4.5)
PLATELET # BLD AUTO: 190 K/UL — SIGNIFICANT CHANGE UP (ref 150–400)
POTASSIUM SERPL-MCNC: 3.4 MMOL/L — LOW (ref 3.5–5.3)
POTASSIUM SERPL-MCNC: 3.5 MMOL/L — SIGNIFICANT CHANGE UP (ref 3.5–5.3)
POTASSIUM SERPL-SCNC: 3.4 MMOL/L — LOW (ref 3.5–5.3)
POTASSIUM SERPL-SCNC: 3.5 MMOL/L — SIGNIFICANT CHANGE UP (ref 3.5–5.3)
PROT SERPL-MCNC: 5.4 G/DL — LOW (ref 6–8.3)
PROT SERPL-MCNC: 5.5 G/DL — LOW (ref 6–8.3)
PROTHROM AB SERPL-ACNC: 17.9 SEC — HIGH (ref 9.5–13)
RBC # BLD: 3.39 M/UL — LOW (ref 3.8–5.2)
RBC # FLD: 13.3 % — SIGNIFICANT CHANGE UP (ref 10.3–14.5)
SODIUM SERPL-SCNC: 137 MMOL/L — SIGNIFICANT CHANGE UP (ref 135–145)
SODIUM SERPL-SCNC: 139 MMOL/L — SIGNIFICANT CHANGE UP (ref 135–145)
SPECIMEN SOURCE: SIGNIFICANT CHANGE UP
SPECIMEN SOURCE: SIGNIFICANT CHANGE UP
WBC # BLD: 14.72 K/UL — HIGH (ref 3.8–10.5)
WBC # FLD AUTO: 14.72 K/UL — HIGH (ref 3.8–10.5)

## 2024-02-28 RX ORDER — MAGNESIUM SULFATE 500 MG/ML
2 VIAL (ML) INJECTION ONCE
Refills: 0 | Status: COMPLETED | OUTPATIENT
Start: 2024-02-28 | End: 2024-02-28

## 2024-02-28 RX ORDER — POTASSIUM CHLORIDE 20 MEQ
10 PACKET (EA) ORAL
Refills: 0 | Status: COMPLETED | OUTPATIENT
Start: 2024-02-28 | End: 2024-02-28

## 2024-02-28 RX ORDER — FUROSEMIDE 40 MG
40 TABLET ORAL ONCE
Refills: 0 | Status: COMPLETED | OUTPATIENT
Start: 2024-02-28 | End: 2024-02-28

## 2024-02-28 RX ADMIN — Medication 5 MILLIGRAM(S): at 17:03

## 2024-02-28 RX ADMIN — SODIUM CHLORIDE 50 MILLILITER(S): 9 INJECTION, SOLUTION INTRAVENOUS at 21:10

## 2024-02-28 RX ADMIN — PIPERACILLIN AND TAZOBACTAM 25 GRAM(S): 4; .5 INJECTION, POWDER, LYOPHILIZED, FOR SOLUTION INTRAVENOUS at 17:03

## 2024-02-28 RX ADMIN — HEPARIN SODIUM 11 UNIT(S)/HR: 5000 INJECTION INTRAVENOUS; SUBCUTANEOUS at 21:11

## 2024-02-28 RX ADMIN — SODIUM CHLORIDE 50 MILLILITER(S): 9 INJECTION, SOLUTION INTRAVENOUS at 07:45

## 2024-02-28 RX ADMIN — Medication 100 MILLIEQUIVALENT(S): at 14:33

## 2024-02-28 RX ADMIN — Medication 5 MILLIGRAM(S): at 01:02

## 2024-02-28 RX ADMIN — HEPARIN SODIUM 11 UNIT(S)/HR: 5000 INJECTION INTRAVENOUS; SUBCUTANEOUS at 07:44

## 2024-02-28 RX ADMIN — Medication 100 MILLIEQUIVALENT(S): at 20:44

## 2024-02-28 RX ADMIN — SODIUM CHLORIDE 50 MILLILITER(S): 9 INJECTION, SOLUTION INTRAVENOUS at 05:11

## 2024-02-28 RX ADMIN — CHLORHEXIDINE GLUCONATE 1 APPLICATION(S): 213 SOLUTION TOPICAL at 05:12

## 2024-02-28 RX ADMIN — HEPARIN SODIUM 11 UNIT(S)/HR: 5000 INJECTION INTRAVENOUS; SUBCUTANEOUS at 01:02

## 2024-02-28 RX ADMIN — Medication 130 MICROGRAM(S): at 21:10

## 2024-02-28 RX ADMIN — Medication 5 MG/HR: at 07:51

## 2024-02-28 RX ADMIN — Medication 25 GRAM(S): at 15:52

## 2024-02-28 RX ADMIN — Medication 100 MILLIEQUIVALENT(S): at 12:41

## 2024-02-28 RX ADMIN — Medication 100 MILLIEQUIVALENT(S): at 21:53

## 2024-02-28 RX ADMIN — Medication 5 MILLIGRAM(S): at 09:33

## 2024-02-28 RX ADMIN — Medication 100 MILLIEQUIVALENT(S): at 19:35

## 2024-02-28 RX ADMIN — Medication 40 MILLIGRAM(S): at 17:47

## 2024-02-28 RX ADMIN — Medication 40 MILLIGRAM(S): at 05:11

## 2024-02-28 RX ADMIN — Medication 5 MILLIGRAM(S): at 21:10

## 2024-02-28 RX ADMIN — SODIUM CHLORIDE 50 MILLILITER(S): 9 INJECTION, SOLUTION INTRAVENOUS at 19:35

## 2024-02-28 RX ADMIN — HEPARIN SODIUM 11 UNIT(S)/HR: 5000 INJECTION INTRAVENOUS; SUBCUTANEOUS at 19:36

## 2024-02-28 RX ADMIN — Medication 5 MILLIGRAM(S): at 13:00

## 2024-02-28 RX ADMIN — HEPARIN SODIUM 11 UNIT(S)/HR: 5000 INJECTION INTRAVENOUS; SUBCUTANEOUS at 12:43

## 2024-02-28 RX ADMIN — Medication 5 MILLIGRAM(S): at 05:11

## 2024-02-28 RX ADMIN — Medication 5 MG/HR: at 13:00

## 2024-02-28 RX ADMIN — PIPERACILLIN AND TAZOBACTAM 25 GRAM(S): 4; .5 INJECTION, POWDER, LYOPHILIZED, FOR SOLUTION INTRAVENOUS at 09:32

## 2024-02-28 RX ADMIN — PIPERACILLIN AND TAZOBACTAM 25 GRAM(S): 4; .5 INJECTION, POWDER, LYOPHILIZED, FOR SOLUTION INTRAVENOUS at 01:02

## 2024-02-28 RX ADMIN — SODIUM CHLORIDE 50 MILLILITER(S): 9 INJECTION, SOLUTION INTRAVENOUS at 01:02

## 2024-02-28 RX ADMIN — Medication 100 MILLIEQUIVALENT(S): at 11:35

## 2024-02-28 RX ADMIN — HEPARIN SODIUM 11 UNIT(S)/HR: 5000 INJECTION INTRAVENOUS; SUBCUTANEOUS at 05:11

## 2024-02-28 NOTE — CHART NOTE - NSCHARTNOTEFT_GEN_A_CORE
Nutrition Follow Up Note  Patient seen for: nutrition follow up on SICU    Chart reviewed, events noted.    Interim Events:    Source: [] Patient       [x] EMR        [] RN        [] Family at bedside       [x] Other: 8ICU Interdisciplinary Rounds     -If unable to interview patient: [] Trach/Vent/BiPAP  [x] Disoriented/confused/inappropriate to interview    Diet Order:   Diet, NPO:   Except Medications (02-26-24)    - Is current order appropriate/adequate?   [x]  No: NPO since 2/21; NGT to LCS    PO intake :  not applicable    Nutrition-related concerns:    GI:    Ileostomy output x 24-hours: 400ml  NGT output x 24-hours: 1800ml    Weights:  Height (cm): 162.6 (22 Feb 2024 18:55)  Weight (kg): 88.8 (22 Feb 2024 18:55)  BMI (kg/m2): 33.6 (22 Feb 2024 18:55)  IBW: 61.2kg  Daily: no new weights to review    Nutritionally Pertinent Medications:  MEDICATIONS  (STANDING):  chlorhexidine 2% Cloths 1 Application(s) Topical <User Schedule>  diltiazem Infusion 5 mG/Hr (5 mL/Hr) IV Continuous <Continuous>  furosemide   Injectable 40 milliGRAM(s) IV Push daily  heparin  Infusion 800 Unit(s)/Hr (11 mL/Hr) IV Continuous <Continuous>  lactated ringers 1000 milliLiter(s) (50 mL/Hr) IV Continuous <Continuous>  levothyroxine Injectable 130 MICROGram(s) IV Push at bedtime  metoprolol tartrate Injectable 5 milliGRAM(s) IV Push every 4 hours  piperacillin/tazobactam IVPB.. 3.375 Gram(s) IV Intermittent every 8 hours    MEDICATIONS  (PRN):  acetaminophen   IVPB .. 1000 milliGRAM(s) IV Intermittent every 6 hours PRN Moderate Pain (4 - 6)    Nutritionally Pertinent Labs:  02-28 @ 05:12: Sodium 139, Potassium 3.4<L>, Calcium 8.6, Magnesium 1.9, Phosphorus 3.5, BUN 14, Creatinine 0.82, Glucose 115<H>  02-27 @ 16:39: Sodium 137, Potassium 4.5, Calcium 8.8, Magnesium 2.0, Phosphorus 4.1, BUN 15, Creatinine 0.90, Glucose 115<H>  Hemoglobin : 10.7 g/dL  Hematocrit : 32.3 %     LIVER FUNCTIONS - ( 28 Feb 2024 05:12 )  Alb: 2.8 g/dL / Pro: 5.5 g/dL / ALK PHOS: 61 U/L / ALT: 10 U/L / AST: 16 U/L / GGT: x           Skin per nursing documentation: stage II right buttocks  Edema: 1+ dependent, 2+left/right arm    Estimated Needs: based on _____wt xx kg, with consideration for   Energy: xx calories (xx kcal/kg)  Protein: xx grams (xx g/kg)    Previous Nutrition Diagnosis: 1. Increased Nutrient Needs 2. Altered GI  [] resolved [] not applicable     New Nutrition Diagnosis: Severe Malnutrition in context of acute illness    Nutrition Care Plan:  [x] In Progress  [] Achieved  [] Not applicable    Nutrition Interventions:     Education Provided:       [] Yes:  [x] No: not applicable       Recommendations:      1. Monitor GOC  2.   3.     Monitoring and Evaluation:   Continue to monitor nutritional intake, tolerance to diet prescription, weights, labs, skin integrity.  RD remains available upon request and will follow up per protocol,    Meenu Bryant, MS RD CDN  CNSC  Available on [facility] TEAMS Nutrition Follow Up Note  Patient seen for: nutrition follow up on SICU    Chart reviewed, events noted.    Interim Events:  - s/p end ileostomy, abdominal closure 2/24  - NPO as of 2/21 (8 days) in setting of ileus, with high NGT output  - GOC/Palliative discussion pending    Source: [] Patient       [x] EMR        [] RN        [] Family at bedside       [x] Other: 8ICU Interdisciplinary Rounds     -If unable to interview patient: [] Trach/Vent/BiPAP  [x] Disoriented/confused/inappropriate to interview    Diet Order:   Diet, NPO:   Except Medications (02-26-24)    - Is current order appropriate/adequate?   [x]  No: NPO since 2/21; NGT to LCS    PO intake :  not applicable    Nutrition-related concerns:  - GI: "2/23: OR: Diagnostic laparoscopy, ischemic bowel noted in RLQ just proximal to TI. Converted to laparotomy. 20cm of terminal ileum resected and bowel left in discontinuity. 2/24:  RTOR  End ileostomy, closed  - Renal: Lasix and IVF  - Hypokalemia; currently ordered for D5 with KCL @ 50ml/hr  - IV synthroid ordered  - Pressure injury noted    GI:    Ileostomy output x 24-hours: 400ml  NGT output x 24-hours: 1800ml    Weights:  Height (cm): 162.6 (22 Feb 2024 18:55)  Weight (kg): 88.8 (22 Feb 2024 18:55)  BMI (kg/m2): 33.6 (22 Feb 2024 18:55)  IBW: 61.2kg  Daily: no new weights to review    Nutritionally Pertinent Medications:  MEDICATIONS  (STANDING):  chlorhexidine 2% Cloths 1 Application(s) Topical <User Schedule>  diltiazem Infusion 5 mG/Hr (5 mL/Hr) IV Continuous <Continuous>  furosemide   Injectable 40 milliGRAM(s) IV Push daily  heparin  Infusion 800 Unit(s)/Hr (11 mL/Hr) IV Continuous <Continuous>  lactated ringers 1000 milliLiter(s) (50 mL/Hr) IV Continuous <Continuous>  levothyroxine Injectable 130 MICROGram(s) IV Push at bedtime  metoprolol tartrate Injectable 5 milliGRAM(s) IV Push every 4 hours  piperacillin/tazobactam IVPB.. 3.375 Gram(s) IV Intermittent every 8 hours    MEDICATIONS  (PRN):  acetaminophen   IVPB .. 1000 milliGRAM(s) IV Intermittent every 6 hours PRN Moderate Pain (4 - 6)    Nutritionally Pertinent Labs:  02-28 @ 05:12: Sodium 139, Potassium 3.4<L>, Calcium 8.6, Magnesium 1.9, Phosphorus 3.5, BUN 14, Creatinine 0.82, Glucose 115<H>  02-27 @ 16:39: Sodium 137, Potassium 4.5, Calcium 8.8, Magnesium 2.0, Phosphorus 4.1, BUN 15, Creatinine 0.90, Glucose 115<H>  Hemoglobin : 10.7 g/dL  Hematocrit : 32.3 %     LIVER FUNCTIONS - ( 28 Feb 2024 05:12 )  Alb: 2.8 g/dL / Pro: 5.5 g/dL / ALK PHOS: 61 U/L / ALT: 10 U/L / AST: 16 U/L / GGT: x           Skin per nursing documentation: stage II right buttocks  Edema: 1+ dependent, 2+left/right arm    Estimated Needs: based on IBW 61.2 kg, with consideration for BMI>30  Energy: 9596-4397 calories (25-30 kcal/kg)  Protein: 86-98 grams (1.4-1.6g/kg)    Previous Nutrition Diagnosis: 1. Increased Nutrient Needs 2. Altered GI Function  Nutrition Diagnosis is:  [x] ongoing; intervention to be determined pending GOC/plan    New Nutrition Diagnosis: Severe Malnutrition in context of acute illness related to altered GI function as evidenced by pt meeting <50% of nutrition needs >5 days, mild/moderate fluid accumulation    Nutrition Care Plan:  [x] In Progress  [] Achieved  [] Not applicable    Nutrition Interventions:     Education Provided:       [] Yes:  [x] No: not applicable       Recommendations:      1. Monitor GOC  2. If NPO continues, consider parenteral nutrition support if within pt/family GOC.   3. Monitor GI function and ability to initiate nutrition via PO/EN route  4. If nutrition is advanced, recommend add multivitamin and vit C 500mg daily to promote wound healing.     Monitoring and Evaluation:   Continue to monitor nutritional intake, tolerance to diet prescription, weights, labs, skin integrity.  RD remains available upon request and will follow up per protocol,    Meenu Bryant, MS RD CDN  CNSC  Available on MS TEAMS

## 2024-02-28 NOTE — PROGRESS NOTE ADULT - SUBJECTIVE AND OBJECTIVE BOX
SURGERY DAILY PROGRESS NOTE    24 Hour/Overnight Events:   - can d/c zosyn on 2/28, 4 days post closure   - replaced NG, f/u CXR  - ongoing C discussion  - NGT output 2100 L   - Hypokalemic shift 10 mEq x3     SUBJECTIVE: Patient seen and evaluated on AM rounds. Pt is resting comfortably in bed with no complaints.    ------------------------------------------------------------------------------------------------------------  OBJECTIVE:  Vital Signs Last 24 Hrs  T(C): 36.2 (28 Feb 2024 07:00), Max: 36.5 (27 Feb 2024 11:00)  T(F): 97.2 (28 Feb 2024 07:00), Max: 97.7 (27 Feb 2024 11:00)  HR: 109 (28 Feb 2024 09:00) (96 - 129)  BP: 122/61 (28 Feb 2024 07:00) (101/56 - 150/63)  BP(mean): 80 (28 Feb 2024 07:00) (76 - 99)  RR: 17 (28 Feb 2024 09:00) (12 - 26)  SpO2: 92% (28 Feb 2024 09:00) (90% - 97%)    Parameters below as of 28 Feb 2024 07:00  Patient On (Oxygen Delivery Method): room air      I&O's Detail    27 Feb 2024 07:01  -  28 Feb 2024 07:00  --------------------------------------------------------  IN:    Diltiazem: 25 mL    Heparin: 264 mL    IV PiggyBack: 100 mL    IV PiggyBack: 749.9 mL    Lactated Ringers w/ Additives: 1200 mL  Total IN: 2338.9 mL    OUT:    Emesis (mL): 0 mL    Ileostomy (mL): 400 mL    Indwelling Catheter - Urethral (mL): 2200 mL    Nasogastric/Oral tube (mL): 1800 mL  Total OUT: 4400 mL    Total NET: -2061.1 mL      28 Feb 2024 07:01  -  28 Feb 2024 09:22  --------------------------------------------------------  IN:    Diltiazem: 15 mL    Heparin: 22 mL    Lactated Ringers w/ Additives: 100 mL  Total IN: 137 mL    OUT:    Indwelling Catheter - Urethral (mL): 425 mL  Total OUT: 425 mL    Total NET: -288 mL      LABS:                        10.7   14.72 )-----------( 190      ( 28 Feb 2024 05:12 )             32.3           02-28    139  |  98  |  14  ----------------------------<  115<H>  3.4<L>   |  31  |  0.82    Ca    8.6      28 Feb 2024 05:12  Phos  3.5     02-28  Mg     1.9     02-28    TPro  5.5<L>  /  Alb  2.8<L>  /  TBili  0.8  /  DBili  x   /  AST  16  /  ALT  10  /  AlkPhos  61  02-28    PT/INR - ( 28 Feb 2024 05:12 )   PT: 17.9 sec;   INR: 1.65 ratio    PTT - ( 28 Feb 2024 05:12 )  PTT:98.2 sec    Urinalysis Basic - ( 28 Feb 2024 05:12 )  Color: x / Appearance: x / SG: x / pH: x  Gluc: 115 mg/dL / Ketone: x  / Bili: x / Urobili: x   Blood: x / Protein: x / Nitrite: x   Leuk Esterase: x / RBC: x / WBC x   Sq Epi: x / Non Sq Epi: x / Bacteria: x      PHYSICAL EXAM:  Constitutional: Well developed, well nourished, NAD  Pulmonary: Symmetric chest rise bilaterally, no increased WOB  Gastrointestinal: Abdomen soft, mildly distended, appropriate sx incisional tenderness with dressings c/d/i. Ileostomy pink with gas and liquid stool in ostomy bag. (+) NGT in place  Extremities: Warm to touch, soft. No cyanosis/erythema/edema

## 2024-02-28 NOTE — PROGRESS NOTE ADULT - SUBJECTIVE AND OBJECTIVE BOX
Subjective: Patient seen and examined. No new events except as noted.   remains in ICU       REVIEW OF SYSTEMS:    CONSTITUTIONAL: + weakness, fevers or chills  EYES/ENT: No visual changes;  No vertigo or throat pain   NECK: No pain or stiffness  RESPIRATORY: No cough, wheezing, hemoptysis; No shortness of breath  CARDIOVASCULAR: No chest pain or palpitations  GASTROINTESTINAL: No abdominal or epigastric pain. No nausea, vomiting, or hematemesis; No diarrhea or constipation. No melena or hematochezia.  GENITOURINARY: No dysuria, frequency or hematuria  NEUROLOGICAL: No numbness or weakness  SKIN: No itching, burning, rashes, or lesions   All other review of systems is negative unless indicated above.    MEDICATIONS:  MEDICATIONS  (STANDING):  chlorhexidine 2% Cloths 1 Application(s) Topical <User Schedule>  diltiazem Infusion 5 mG/Hr (5 mL/Hr) IV Continuous <Continuous>  furosemide   Injectable 40 milliGRAM(s) IV Push daily  heparin  Infusion 800 Unit(s)/Hr (11 mL/Hr) IV Continuous <Continuous>  lactated ringers 1000 milliLiter(s) (50 mL/Hr) IV Continuous <Continuous>  levothyroxine Injectable 130 MICROGram(s) IV Push at bedtime  metoprolol tartrate Injectable 5 milliGRAM(s) IV Push every 4 hours  piperacillin/tazobactam IVPB.. 3.375 Gram(s) IV Intermittent every 8 hours      PHYSICAL EXAM:  T(C): 36.2 (02-28-24 @ 07:00), Max: 36.5 (02-27-24 @ 11:00)  HR: 109 (02-28-24 @ 09:00) (96 - 129)  BP: 122/61 (02-28-24 @ 07:00) (101/56 - 150/63)  RR: 17 (02-28-24 @ 09:00) (12 - 26)  SpO2: 92% (02-28-24 @ 09:00) (90% - 97%)  Wt(kg): --  I&O's Summary    27 Feb 2024 07:01  -  28 Feb 2024 07:00  --------------------------------------------------------  IN: 2338.9 mL / OUT: 4400 mL / NET: -2061.1 mL    28 Feb 2024 07:01  -  28 Feb 2024 09:23  --------------------------------------------------------  IN: 137 mL / OUT: 425 mL / NET: -288 mL            Appearance: NAD	  HEENT:  Dry  oral mucosa, +NGT   Lymphatic: No lymphadenopathy , no edema  Cardiovascular: irregular S1 S2, No JVD, No murmurs , Peripheral pulses palpable 2+ bilaterally  Respiratory: decreased bs   Gastrointestinal:  soft, mildly distended, appropriately tender near midline incision, dressing c/d/i,  ileostomy leaking stool  Skin: No rashes, No ecchymoses, No cyanosis, warm to touch  Musculoskeletal: Normal range of motion, normal strength  Psychiatry:  sleepy   Ext: No edema  +amaya          LABS:    CARDIAC MARKERS:                                10.7   14.72 )-----------( 190      ( 28 Feb 2024 05:12 )             32.3     02-28    139  |  98  |  14  ----------------------------<  115<H>  3.4<L>   |  31  |  0.82    Ca    8.6      28 Feb 2024 05:12  Phos  3.5     02-28  Mg     1.9     02-28    TPro  5.5<L>  /  Alb  2.8<L>  /  TBili  0.8  /  DBili  x   /  AST  16  /  ALT  10  /  AlkPhos  61  02-28            TELEMETRY: 	  SR  ECG:  	  RADIOLOGY: xr< from: Xray Chest 1 View- PORTABLE-Urgent (Xray Chest 1 View- PORTABLE-Urgent .) (02.27.24 @ 07:41) >  ACC: [id number] EXAM:  XR CHEST PORTABLE ROUTINE 1V   ORDERED BY: LACHO CLIFTON     ACC: [id number] EXAM:  XR CHEST PORTABLE ROUTINE 1V   ORDERED BY: CARLIN ROCHE     ACC: [id number] EXAM:  XR CHEST PORTABLE URGENT 1V   ORDERED BY: APARNA GRIGSBY     ACC: [id number] EXAM:  XR CHEST PORTABLE URGENT 1V   ORDERED BY: RHONDA CALLAWAY     PROCEDURE DATE:  02/26/2024          INTERPRETATION:  EXAMINATION: XR CHEST URGENT, XR CHEST URGENT, XR CHEST,   XR CHEST    CLINICAL INDICATION: ngt placement    TECHNIQUE: Multiple frontal, portable views of the chest were obtained at   time stamps included below in the findings.    COMPARISON: Chest x-ray 2/24/2023 6:52 AM    FINDINGS:    2/25/2024 6:42 AM  LINES/TUBES: Enteric tube, coursing below the diaphragm, with tip in the   stomach.    LUNGS/PLEURA: No focal consolidations. No pleural effusion. No   pneumothorax.    HEART AND MEDIASTINUM: The heart size is not accurately measured in this   projection.    OTHER: No acute osseous abnormalities.    2/26/20246:55 AM  Interval removal of enteric tube    2/26/2024 1:12 PM  Enteric tube, coursing below the diaphragm, with tip in the stomach.    2/27/2024 6:48 AM  No interval change.    IMPRESSION:    On the x-ray obtained 2/27/2024 6:48 AM, enteric tube terminates in the   stomach.    --- End of Report ---          KATEY CALI MD; Resident Radiologist    < end of copied text >    DIAGNOSTIC TESTING:  [ ] Echocardiogram:  [ ]  Catheterization:  [ ] Stress Test:    OTHER:

## 2024-02-28 NOTE — PROGRESS NOTE ADULT - SUBJECTIVE AND OBJECTIVE BOX
HISTORY  78y Female    24 HOUR EVENTS:  - can d/c zosyn on 2/28, 4 days post closure   - replaced NG, f/u CXR  - ongoing GOC discussion  - NGT output 2100 L   - Hypokalemic shift 10 mEq x3     SUBJECTIVE/ROS:  [x] A ten-point review of systems was otherwise negative except as noted.  [ ] Due to altered mental status/intubation, subjective information were not able to be obtained from the patient. History was obtained, to the extent possible, from review of the chart and collateral sources of information.      NEURO  Exam: AOx4  Meds: acetaminophen   IVPB .. 1000 milliGRAM(s) IV Intermittent every 6 hours PRN Moderate Pain (4 - 6)    [x] Adequacy of sedation and pain control has been assessed and adjusted      RESPIRATORY  RR: 13 (02-28-24 @ 00:00) (12 - 26)  SpO2: 91% (02-28-24 @ 00:00) (88% - 97%)  Wt(kg): --  Exam: CTA b/l. No murmurs, rubs, gallops appreciated.   Mechanical Ventilation:         CARDIOVASCULAR  HR: 108 (02-28-24 @ 00:00) (91 - 108)  BP: 127/59 (02-27-24 @ 22:00) (100/59 - 150/63)  BP(mean): 84 (02-27-24 @ 22:00) (76 - 99)  ABP: --  ABP(mean): --  Wt(kg): --  CVP(cm H2O): --      Exam: S1S2. No murmurs, rubs, gallops appreciated.  Cardiac Rhythm: NSR  Meds: diltiazem Infusion 5 mG/Hr IV Continuous <Continuous>  furosemide   Injectable 40 milliGRAM(s) IV Push daily  metoprolol tartrate Injectable 5 milliGRAM(s) IV Push every 4 hours        GI/NUTRITION  Exam: Soft, non-distended, non-tender.   Diet:  Meds:     GENITOURINARY  I&O's Detail    02-26 @ 07:01  -  02-27 @ 07:00  --------------------------------------------------------  IN:    Diltiazem: 335 mL    Enteral Tube Flush: 10 mL    Heparin: 264 mL    IV PiggyBack: 800 mL    IV PiggyBack: 600 mL    Lactated Ringers: 360 mL    Lactated Ringers w/ Additives: 600 mL  Total IN: 2969 mL    OUT:    Emesis (mL): 150 mL    Indwelling Catheter - Urethral (mL): 1470 mL    Nasogastric/Oral tube (mL): 1300 mL  Total OUT: 2920 mL    Total NET: 49 mL      02-27 @ 07:01  -  02-28 @ 00:09  --------------------------------------------------------  IN:    Diltiazem: 25 mL    Heparin: 187 mL    IV PiggyBack: 100 mL    IV PiggyBack: 649.9 mL    Lactated Ringers w/ Additives: 850 mL  Total IN: 1811.9 mL    OUT:    Ileostomy (mL): 50 mL    Indwelling Catheter - Urethral (mL): 1460 mL    Nasogastric/Oral tube (mL): 1600 mL  Total OUT: 3110 mL    Total NET: -1298.1 mL          02-27    137  |  96  |  15  ----------------------------<  115<H>  4.5   |  31  |  0.90    Ca    8.8      27 Feb 2024 16:39  Phos  4.1     02-27  Mg     2.0     02-27    TPro  6.3  /  Alb  3.0<L>  /  TBili  0.9  /  DBili  x   /  AST  21  /  ALT  13  /  AlkPhos  64  02-27    [ ] Melvin catheter, indication: N/A  Meds: lactated ringers 1000 milliLiter(s) IV Continuous <Continuous>        HEMATOLOGIC  Meds: heparin  Infusion 800 Unit(s)/Hr IV Continuous <Continuous>    [x] VTE Prophylaxis                        11.5   15.68 )-----------( 215      ( 27 Feb 2024 00:22 )             33.9     PT/INR - ( 27 Feb 2024 00:22 )   PT: 19.2 sec;   INR: 1.86 ratio         PTT - ( 27 Feb 2024 00:22 )  PTT:80.8 sec  Transfusion     [ ] PRBC   [ ] Platelets   [ ] FFP   [ ] Cryoprecipitate      INFECTIOUS DISEASES  T(C): 36.5 (02-27-24 @ 22:00), Max: 36.8 (02-27-24 @ 03:00)  Wt(kg): --  WBC Count: 15.68 K/uL (02-27 @ 00:22)    Recent Cultures:  Specimen Source: .Blood Blood, 02-23 @ 03:14; Results   No growth at 4 days; Gram Stain: --; Organism: --    Meds: piperacillin/tazobactam IVPB.. 3.375 Gram(s) IV Intermittent every 8 hours        ENDOCRINE  Capillary Blood Glucose    Meds: levothyroxine Injectable 130 MICROGram(s) IV Push at bedtime        ACCESS DEVICES:  [x] Peripheral IV  [ ] Central Venous Line	[ ] R	[ ] L	[ ] IJ	[ ] Fem	[ ] SC	Placed:   [ ] Arterial Line		[ ] R	[ ] L	[ ] Fem	[ ] Rad	[ ] Ax	Placed:   [ ] PICC:					[ ] Mediport  [ ] Urinary Catheter, Date Placed:   [x] Necessity of urinary, arterial, and venous catheters discussed    OTHER MEDICATIONS:  chlorhexidine 2% Cloths 1 Application(s) Topical <User Schedule>      CODE STATUS:     IMAGING:

## 2024-02-28 NOTE — PROGRESS NOTE ADULT - ASSESSMENT
78-year-old female with PMH COPD, A-fib  On Eliquis and Plavix, COPD, HTN, HLD presents for syncope from PCPs office earlier today.  Patient was at normal checkup for blood work and was sitting on the table.  Daughter at bedside states that patient suddenly went limp while she was sitting on the table, her eyes rolled back, and her tongue turned to 1 side and this lasted for couple of seconds and then the patient regained consciousness.  Patient had no postictal period.  No tongue biting.  Patient does not remember these events.  Daughter at bedside states that patient has not been eating as much for the past day or 2 and has not been sleeping at all.                        2/23: OR: Diagnostic laparoscopy, ischemic bowel noted in RLQ just proximal to TI. Converted to laparotomy. 20cm of terminal ileum resected and bowel left in discontinuity.  celiac, SMA, SWATHI w/ no flow demonstrated on angiogram    2/24:  RTOR  End ileostomy, closed                Neuro:  - Alert and oriented, confused at times  - Acetaminophen PRN    Resp: pmh COPD  - extubated 2/23  - OOB to chair, IS to prevent atelectasis  - RA    CV:  - Afib RVR HR 150s, BP 160s/90s,   - metoprolol 5mg q4h  - s/p diltiazem gtt    GI: 2/23, 2/24 s/p 20cm TI SBR, end ileostomy  - celiac, SMA, SWATHI w/ no flow demonstrated on angiogram  - NPO, ileus  - 2/27 continued to have signifcant output from NGT  - ileostomy, monitor output. 2/27, + stool, around 20 cc  - Protonix for stress ulcer prophylaxis    Renal:  - Monitor I&Os and electrolytes w/ repletions as necessary  - 40 lasix daily, goal for net even to -500cc  - amaya in place, monitor UOP  - LR @ 50 with K    Heme:  - Monitor CBC and coags  - Heparin gtt for occluded celiac, SMA, SWATHI    ID:   - Monitor for clinical evidence of active infection  - Empiric antibiotics w/ zosyn (2/23-2/28)    Endo:   - Monitor glucose  - home synthroid    Skin:  # contact dermatitis, on buttock area  - derm consult, recommends plain petroleum jelly     Lines:  Amaya (2/23-)

## 2024-02-28 NOTE — DIETITIAN NUTRITION RISK NOTIFICATION - ADDITIONAL COMMENTS/DIETITIAN RECOMMENDATIONS
1. Monitor GOC  2. If NPO continues, consider parenteral nutrition support if within pt/family GOC.   3. Monitor GI function and ability to initiate nutrition via PO/EN route  4. If nutrition is advanced, recommend add multivitamin and vit C 500mg daily to promote wound healing.

## 2024-02-28 NOTE — PROGRESS NOTE ADULT - ASSESSMENT
78yFemale PMHx COPD, A-fib  On Eliquis and Plavix, HTN, HLD with signs of acute mesenteric ischemia now s/p diagnostic laparoscopy converted to exploratory laparotomy with 20 cm of terminal ileum resection with bowel left in discontinuity and temporary abdominal closure with abthera with mesenteric angiogram via R fem access 02-22 findings of celiac, SMA, SWATHI w/o flow, collateral perfusion confirmed. S/p Closure and ileostomy creation on 2/24.     PLAN  - Given patient prognosis, no re-vascularization options, consult Palliative   - NPO  - Heparin gtt  - cont abx  - Metoprolol for rate control. On dilt gtt. Cardiology following.   - Care per SICU      ACS/Trauma   [phone]

## 2024-02-28 NOTE — PROGRESS NOTE ADULT - SUBJECTIVE AND OBJECTIVE BOX
Date of service: 02-28-24 @ 23:15      Patient is a 78y old  Female who presents with a chief complaint of "78y Female pmh of current smoker, COPD, afib on eliquis/plavix, HTN, HLD who presented to [facility] 2/15/2024 for syncope.  GI consulted for abdominal pain 2/22. There were reports of episodes of abdominal pain and vomiting a few days ago according to the family. CT AP notable for ischemic/necrotic small bowel within the pelvis and severe atherosclerotic disesase of SMA. Patient taken to OR for laparoscopic abd exploration, converted to open exlap for likely dead bowel. 20cm terminal ileum removed, left in discont w/ abthera. Angiogram R fem done intraop w/ findings of celiac, SMA, SWATHI w/o flow, collateral perfusion confirmed. Plan to RTOR in 48hrs. SICU c/s for ventilator management and for hemodynamic monitoring."       (23 Feb 2024 10:40)                                                               INTERVAL HPI/OVERNIGHT EVENTS:    REVIEW OF SYSTEMS:    lethargic but arousbale                                                                                                                                                                                                                                                                               Medications:  MEDICATIONS  (STANDING):  chlorhexidine 2% Cloths 1 Application(s) Topical <User Schedule>  diltiazem Infusion 5 mG/Hr (5 mL/Hr) IV Continuous <Continuous>  furosemide   Injectable 40 milliGRAM(s) IV Push daily  heparin  Infusion 800 Unit(s)/Hr (11 mL/Hr) IV Continuous <Continuous>  lactated ringers 1000 milliLiter(s) (50 mL/Hr) IV Continuous <Continuous>  levothyroxine Injectable 130 MICROGram(s) IV Push at bedtime  metoprolol tartrate Injectable 5 milliGRAM(s) IV Push every 4 hours    MEDICATIONS  (PRN):  acetaminophen   IVPB .. 1000 milliGRAM(s) IV Intermittent every 6 hours PRN Moderate Pain (4 - 6)       Allergies    penicillin (Hives)    Intolerances      Vital Signs Last 24 Hrs  T(C): 36.6 (28 Feb 2024 22:00), Max: 36.6 (28 Feb 2024 22:00)  T(F): 97.8 (28 Feb 2024 22:00), Max: 97.8 (28 Feb 2024 22:00)  HR: 97 (28 Feb 2024 22:00) (96 - 129)  BP: 138/65 (28 Feb 2024 22:00) (112/54 - 161/68)  BP(mean): 93 (28 Feb 2024 22:00) (78 - 98)  RR: 18 (28 Feb 2024 22:00) (11 - 35)  SpO2: 97% (28 Feb 2024 22:00) (86% - 99%)    Parameters below as of 28 Feb 2024 22:00  Patient On (Oxygen Delivery Method): room air      CAPILLARY BLOOD GLUCOSE          02-27 @ 07:01  -  02-28 @ 07:00  --------------------------------------------------------  IN: 2338.9 mL / OUT: 4400 mL / NET: -2061.1 mL    02-28 @ 07:01  -  02-28 @ 23:15  --------------------------------------------------------  IN: 1795 mL / OUT: 2690 mL / NET: -895 mL      Physical Exam:    Daily     Daily   General:  NAD  NGT   CV:  RRR, S1S2   Lungs:  CTA B/L, no wheezes, rales, rhonchi  Abdomen:  Soft, non-tender, no distended, positive BS  Extremities: edema   Neuro:  nf                                                                                                                                                                                                                                                                                            LABS:                               10.7   14.72 )-----------( 190      ( 28 Feb 2024 05:12 )             32.3                      02-28    137  |  96  |  11  ----------------------------<  101<H>  3.5   |  32<H>  |  0.74    Ca    8.3<L>      28 Feb 2024 18:10  Phos  3.3     02-28  Mg     2.1     02-28    TPro  5.4<L>  /  Alb  2.8<L>  /  TBili  0.8  /  DBili  x   /  AST  18  /  ALT  11  /  AlkPhos  63  02-28                       RADIOLOGY & ADDITIONAL TESTS         I personally reviewed: [  ]EKG   [  ]CXR    [  ] CT      A/P:         Discussed with :     Simon consultants' Notes   Time spent :

## 2024-02-28 NOTE — PROGRESS NOTE ADULT - ASSESSMENT
78-year-old female with PMH current smoker , COPD, A-fib  On Eliquis and Plavix, HTN, HLD presents for syncope from PCPs office yesterday.     # ischemia Bowel :  s/p OR   appreciate SICU care   now with iliues : NGT in place      afib :  AC : on heparin   cont tele   afib with RVR   IV cardizem  drip  BB       lehtargy /encephalopathy :   CT head negative   improved and seems at baseline   EEG pending : negative

## 2024-02-28 NOTE — PROGRESS NOTE ADULT - ATTENDING COMMENTS
Patient seen and examined and agree with above.   Overnight no new events.     Current management includes:  Neuro- dementia same   CV- hemodyanmically stable; BP stable at this time   -will continue with MAP > 65 mmHg  -chronic atrial fibrillation- dilt drip discontinued yesterday but atrial fibrillation had increased heart rate and diltiazem  drip started again now at 10 mg/hour.   -will replete electrolytles   Pulm - room air  - will encourage and assist with incentive spirometer   -goal SpO2 92%  GI-  end ileostomy-minimal output; but started to function today;  NGT 1800 cc  -ileus- will continue NPO at this time   ID- on zosyn; Improvement in leukocytosis.   Heme- continue heparin drip at 1100 units/hour; will continue at this time.   Renal- hypokalemia- continue to be repleted and will recheck  Endocrine - hyperglycemia - will continue to monitor   -continue home synthroid   Awaiting palliative consult.

## 2024-02-29 DIAGNOSIS — R53.2 FUNCTIONAL QUADRIPLEGIA: ICD-10-CM

## 2024-02-29 DIAGNOSIS — Z51.5 ENCOUNTER FOR PALLIATIVE CARE: ICD-10-CM

## 2024-02-29 LAB
ALBUMIN SERPL ELPH-MCNC: 2.5 G/DL — LOW (ref 3.3–5)
ALBUMIN SERPL ELPH-MCNC: 2.7 G/DL — LOW (ref 3.3–5)
ALBUMIN SERPL ELPH-MCNC: 2.8 G/DL — LOW (ref 3.3–5)
ALP SERPL-CCNC: 61 U/L — SIGNIFICANT CHANGE UP (ref 40–120)
ALP SERPL-CCNC: 61 U/L — SIGNIFICANT CHANGE UP (ref 40–120)
ALP SERPL-CCNC: 63 U/L — SIGNIFICANT CHANGE UP (ref 40–120)
ALT FLD-CCNC: 10 U/L — SIGNIFICANT CHANGE UP (ref 10–45)
ALT FLD-CCNC: 10 U/L — SIGNIFICANT CHANGE UP (ref 10–45)
ALT FLD-CCNC: 13 U/L — SIGNIFICANT CHANGE UP (ref 10–45)
ANION GAP SERPL CALC-SCNC: 11 MMOL/L — SIGNIFICANT CHANGE UP (ref 5–17)
ANION GAP SERPL CALC-SCNC: 9 MMOL/L — SIGNIFICANT CHANGE UP (ref 5–17)
ANION GAP SERPL CALC-SCNC: 9 MMOL/L — SIGNIFICANT CHANGE UP (ref 5–17)
APTT BLD: 28.8 SEC — SIGNIFICANT CHANGE UP (ref 24.5–35.6)
APTT BLD: 95.2 SEC — HIGH (ref 24.5–35.6)
AST SERPL-CCNC: 15 U/L — SIGNIFICANT CHANGE UP (ref 10–40)
AST SERPL-CCNC: 15 U/L — SIGNIFICANT CHANGE UP (ref 10–40)
AST SERPL-CCNC: 16 U/L — SIGNIFICANT CHANGE UP (ref 10–40)
BILIRUB SERPL-MCNC: 0.7 MG/DL — SIGNIFICANT CHANGE UP (ref 0.2–1.2)
BILIRUB SERPL-MCNC: 0.8 MG/DL — SIGNIFICANT CHANGE UP (ref 0.2–1.2)
BILIRUB SERPL-MCNC: 0.8 MG/DL — SIGNIFICANT CHANGE UP (ref 0.2–1.2)
BLD GP AB SCN SERPL QL: NEGATIVE — SIGNIFICANT CHANGE UP
BUN SERPL-MCNC: 10 MG/DL — SIGNIFICANT CHANGE UP (ref 7–23)
CALCIUM SERPL-MCNC: 8.3 MG/DL — LOW (ref 8.4–10.5)
CALCIUM SERPL-MCNC: 8.4 MG/DL — SIGNIFICANT CHANGE UP (ref 8.4–10.5)
CALCIUM SERPL-MCNC: 8.5 MG/DL — SIGNIFICANT CHANGE UP (ref 8.4–10.5)
CHLORIDE SERPL-SCNC: 96 MMOL/L — SIGNIFICANT CHANGE UP (ref 96–108)
CHLORIDE SERPL-SCNC: 96 MMOL/L — SIGNIFICANT CHANGE UP (ref 96–108)
CHLORIDE SERPL-SCNC: 97 MMOL/L — SIGNIFICANT CHANGE UP (ref 96–108)
CO2 SERPL-SCNC: 28 MMOL/L — SIGNIFICANT CHANGE UP (ref 22–31)
CO2 SERPL-SCNC: 28 MMOL/L — SIGNIFICANT CHANGE UP (ref 22–31)
CO2 SERPL-SCNC: 29 MMOL/L — SIGNIFICANT CHANGE UP (ref 22–31)
CREAT SERPL-MCNC: 0.67 MG/DL — SIGNIFICANT CHANGE UP (ref 0.5–1.3)
CREAT SERPL-MCNC: 0.68 MG/DL — SIGNIFICANT CHANGE UP (ref 0.5–1.3)
CREAT SERPL-MCNC: 0.74 MG/DL — SIGNIFICANT CHANGE UP (ref 0.5–1.3)
EGFR: 83 ML/MIN/1.73M2 — SIGNIFICANT CHANGE UP
EGFR: 90 ML/MIN/1.73M2 — SIGNIFICANT CHANGE UP
EGFR: 90 ML/MIN/1.73M2 — SIGNIFICANT CHANGE UP
GAS PNL BLDV: SIGNIFICANT CHANGE UP
GI PCR PANEL: SIGNIFICANT CHANGE UP
GLUCOSE SERPL-MCNC: 84 MG/DL — SIGNIFICANT CHANGE UP (ref 70–99)
GLUCOSE SERPL-MCNC: 87 MG/DL — SIGNIFICANT CHANGE UP (ref 70–99)
GLUCOSE SERPL-MCNC: 91 MG/DL — SIGNIFICANT CHANGE UP (ref 70–99)
HCT VFR BLD CALC: 29.2 % — LOW (ref 34.5–45)
HCT VFR BLD CALC: 31.6 % — LOW (ref 34.5–45)
HGB BLD-MCNC: 10.5 G/DL — LOW (ref 11.5–15.5)
HGB BLD-MCNC: 9.8 G/DL — LOW (ref 11.5–15.5)
INR BLD: 1.75 RATIO — HIGH (ref 0.85–1.18)
INR BLD: 2.05 RATIO — HIGH (ref 0.85–1.18)
MAGNESIUM SERPL-MCNC: 1.8 MG/DL — SIGNIFICANT CHANGE UP (ref 1.6–2.6)
MAGNESIUM SERPL-MCNC: 1.9 MG/DL — SIGNIFICANT CHANGE UP (ref 1.6–2.6)
MAGNESIUM SERPL-MCNC: 2 MG/DL — SIGNIFICANT CHANGE UP (ref 1.6–2.6)
MCHC RBC-ENTMCNC: 31.9 PG — SIGNIFICANT CHANGE UP (ref 27–34)
MCHC RBC-ENTMCNC: 32.1 PG — SIGNIFICANT CHANGE UP (ref 27–34)
MCHC RBC-ENTMCNC: 33.2 GM/DL — SIGNIFICANT CHANGE UP (ref 32–36)
MCHC RBC-ENTMCNC: 33.6 GM/DL — SIGNIFICANT CHANGE UP (ref 32–36)
MCV RBC AUTO: 95.1 FL — SIGNIFICANT CHANGE UP (ref 80–100)
MCV RBC AUTO: 96.6 FL — SIGNIFICANT CHANGE UP (ref 80–100)
NRBC # BLD: 0 /100 WBCS — SIGNIFICANT CHANGE UP (ref 0–0)
NRBC # BLD: 0 /100 WBCS — SIGNIFICANT CHANGE UP (ref 0–0)
PHOSPHATE SERPL-MCNC: 3.1 MG/DL — SIGNIFICANT CHANGE UP (ref 2.5–4.5)
PHOSPHATE SERPL-MCNC: 3.4 MG/DL — SIGNIFICANT CHANGE UP (ref 2.5–4.5)
PHOSPHATE SERPL-MCNC: 3.5 MG/DL — SIGNIFICANT CHANGE UP (ref 2.5–4.5)
PLATELET # BLD AUTO: 196 K/UL — SIGNIFICANT CHANGE UP (ref 150–400)
PLATELET # BLD AUTO: 199 K/UL — SIGNIFICANT CHANGE UP (ref 150–400)
POTASSIUM SERPL-MCNC: 3.8 MMOL/L — SIGNIFICANT CHANGE UP (ref 3.5–5.3)
POTASSIUM SERPL-MCNC: 4 MMOL/L — SIGNIFICANT CHANGE UP (ref 3.5–5.3)
POTASSIUM SERPL-MCNC: 4.5 MMOL/L — SIGNIFICANT CHANGE UP (ref 3.5–5.3)
POTASSIUM SERPL-SCNC: 3.8 MMOL/L — SIGNIFICANT CHANGE UP (ref 3.5–5.3)
POTASSIUM SERPL-SCNC: 4 MMOL/L — SIGNIFICANT CHANGE UP (ref 3.5–5.3)
POTASSIUM SERPL-SCNC: 4.5 MMOL/L — SIGNIFICANT CHANGE UP (ref 3.5–5.3)
PROT SERPL-MCNC: 5.2 G/DL — LOW (ref 6–8.3)
PROT SERPL-MCNC: 5.5 G/DL — LOW (ref 6–8.3)
PROT SERPL-MCNC: 5.5 G/DL — LOW (ref 6–8.3)
PROTHROM AB SERPL-ACNC: 18.1 SEC — HIGH (ref 9.5–13)
PROTHROM AB SERPL-ACNC: 22.1 SEC — HIGH (ref 9.5–13)
RBC # BLD: 3.07 M/UL — LOW (ref 3.8–5.2)
RBC # BLD: 3.27 M/UL — LOW (ref 3.8–5.2)
RBC # FLD: 13.2 % — SIGNIFICANT CHANGE UP (ref 10.3–14.5)
RBC # FLD: 13.3 % — SIGNIFICANT CHANGE UP (ref 10.3–14.5)
RH IG SCN BLD-IMP: POSITIVE — SIGNIFICANT CHANGE UP
SODIUM SERPL-SCNC: 134 MMOL/L — LOW (ref 135–145)
SODIUM SERPL-SCNC: 134 MMOL/L — LOW (ref 135–145)
SODIUM SERPL-SCNC: 135 MMOL/L — SIGNIFICANT CHANGE UP (ref 135–145)
WBC # BLD: 17.79 K/UL — HIGH (ref 3.8–10.5)
WBC # BLD: 18.72 K/UL — HIGH (ref 3.8–10.5)
WBC # FLD AUTO: 17.79 K/UL — HIGH (ref 3.8–10.5)
WBC # FLD AUTO: 18.72 K/UL — HIGH (ref 3.8–10.5)

## 2024-02-29 PROCEDURE — 99497 ADVNCD CARE PLAN 30 MIN: CPT | Mod: 25

## 2024-02-29 PROCEDURE — 99221 1ST HOSP IP/OBS SF/LOW 40: CPT

## 2024-02-29 PROCEDURE — 71045 X-RAY EXAM CHEST 1 VIEW: CPT | Mod: 26

## 2024-02-29 RX ORDER — MAGNESIUM SULFATE 500 MG/ML
1 VIAL (ML) INJECTION ONCE
Refills: 0 | Status: COMPLETED | OUTPATIENT
Start: 2024-02-29 | End: 2024-02-29

## 2024-02-29 RX ORDER — MAGNESIUM SULFATE 500 MG/ML
2 VIAL (ML) INJECTION ONCE
Refills: 0 | Status: COMPLETED | OUTPATIENT
Start: 2024-02-29 | End: 2024-02-29

## 2024-02-29 RX ORDER — DILTIAZEM HCL 120 MG
60 CAPSULE, EXT RELEASE 24 HR ORAL EVERY 8 HOURS
Refills: 0 | Status: DISCONTINUED | OUTPATIENT
Start: 2024-02-29 | End: 2024-03-01

## 2024-02-29 RX ORDER — APIXABAN 2.5 MG/1
5 TABLET, FILM COATED ORAL EVERY 12 HOURS
Refills: 0 | Status: DISCONTINUED | OUTPATIENT
Start: 2024-02-29 | End: 2024-03-09

## 2024-02-29 RX ADMIN — SODIUM CHLORIDE 50 MILLILITER(S): 9 INJECTION, SOLUTION INTRAVENOUS at 07:14

## 2024-02-29 RX ADMIN — Medication 100 GRAM(S): at 23:56

## 2024-02-29 RX ADMIN — Medication 5 MILLIGRAM(S): at 05:02

## 2024-02-29 RX ADMIN — Medication 130 MICROGRAM(S): at 21:47

## 2024-02-29 RX ADMIN — Medication 5 MILLIGRAM(S): at 21:47

## 2024-02-29 RX ADMIN — SODIUM CHLORIDE 50 MILLILITER(S): 9 INJECTION, SOLUTION INTRAVENOUS at 19:41

## 2024-02-29 RX ADMIN — HEPARIN SODIUM 11 UNIT(S)/HR: 5000 INJECTION INTRAVENOUS; SUBCUTANEOUS at 05:03

## 2024-02-29 RX ADMIN — Medication 5 MILLIGRAM(S): at 14:00

## 2024-02-29 RX ADMIN — Medication 5 MG/HR: at 19:41

## 2024-02-29 RX ADMIN — CHLORHEXIDINE GLUCONATE 1 APPLICATION(S): 213 SOLUTION TOPICAL at 05:03

## 2024-02-29 RX ADMIN — Medication 60 MILLIGRAM(S): at 21:47

## 2024-02-29 RX ADMIN — Medication 40 MILLIGRAM(S): at 05:03

## 2024-02-29 RX ADMIN — Medication 5 MILLIGRAM(S): at 10:06

## 2024-02-29 RX ADMIN — Medication 5 MILLIGRAM(S): at 01:01

## 2024-02-29 RX ADMIN — Medication 25 GRAM(S): at 07:44

## 2024-02-29 RX ADMIN — Medication 5 MILLIGRAM(S): at 17:40

## 2024-02-29 RX ADMIN — Medication 5 MG/HR: at 07:14

## 2024-02-29 RX ADMIN — SODIUM CHLORIDE 50 MILLILITER(S): 9 INJECTION, SOLUTION INTRAVENOUS at 05:03

## 2024-02-29 RX ADMIN — APIXABAN 5 MILLIGRAM(S): 2.5 TABLET, FILM COATED ORAL at 17:40

## 2024-02-29 NOTE — PROGRESS NOTE ADULT - SUBJECTIVE AND OBJECTIVE BOX
Subjective: Patient seen and examined. No new events except as noted.   remains in ICU     REVIEW OF SYSTEMS:    CONSTITUTIONAL: No weakness, fevers or chills  EYES/ENT: No visual changes;  No vertigo or throat pain   NECK: No pain or stiffness  RESPIRATORY: No cough, wheezing, hemoptysis; No shortness of breath  CARDIOVASCULAR: No chest pain or palpitations  GASTROINTESTINAL: No abdominal or epigastric pain. No nausea, vomiting, or hematemesis; No diarrhea or constipation. No melena or hematochezia.  GENITOURINARY: No dysuria, frequency or hematuria  NEUROLOGICAL: No numbness or weakness  SKIN: No itching, burning, rashes, or lesions   All other review of systems is negative unless indicated above.    MEDICATIONS:  MEDICATIONS  (STANDING):  chlorhexidine 2% Cloths 1 Application(s) Topical <User Schedule>  diltiazem Infusion 5 mG/Hr (5 mL/Hr) IV Continuous <Continuous>  furosemide   Injectable 40 milliGRAM(s) IV Push daily  heparin  Infusion 800 Unit(s)/Hr (11 mL/Hr) IV Continuous <Continuous>  lactated ringers 1000 milliLiter(s) (50 mL/Hr) IV Continuous <Continuous>  levothyroxine Injectable 130 MICROGram(s) IV Push at bedtime  metoprolol tartrate Injectable 5 milliGRAM(s) IV Push every 4 hours      PHYSICAL EXAM:  T(C): 36.3 (02-29-24 @ 07:00), Max: 36.7 (02-29-24 @ 01:00)  HR: 103 (02-29-24 @ 11:00) (96 - 126)  BP: 108/61 (02-29-24 @ 11:00) (96/55 - 161/68)  RR: 18 (02-29-24 @ 11:00) (11 - 35)  SpO2: 94% (02-29-24 @ 11:00) (91% - 99%)  Wt(kg): --  I&O's Summary    28 Feb 2024 07:01  -  29 Feb 2024 07:00  --------------------------------------------------------  IN: 2344 mL / OUT: 4160 mL / NET: -1816 mL    29 Feb 2024 07:01  -  29 Feb 2024 11:43  --------------------------------------------------------  IN: 389 mL / OUT: 195 mL / NET: 194 mL            Appearance: NAD	  HEENT:  Dry  oral mucosa, +NGT   Lymphatic: No lymphadenopathy , no edema  Cardiovascular: irregular S1 S2, No JVD, No murmurs , Peripheral pulses palpable 2+ bilaterally  Respiratory: decreased bs   Gastrointestinal:  soft, mildly distended, appropriately tender near midline incision, dressing c/d/i,  ileostomy leaking stool  Skin: No rashes, No ecchymoses, No cyanosis, warm to touch  Musculoskeletal: Normal range of motion, normal strength  Psychiatry:  sleepy   Ext: No edema  +amaya    LABS:    CARDIAC MARKERS:                                10.5   17.79 )-----------( 196      ( 29 Feb 2024 06:28 )             31.6     02-29    135  |  96  |  10  ----------------------------<  87  4.5   |  28  |  0.74    Ca    8.5      29 Feb 2024 06:28  Phos  3.1     02-29  Mg     1.8     02-29    TPro  5.5<L>  /  Alb  2.8<L>  /  TBili  0.8  /  DBili  x   /  AST  16  /  ALT  10  /  AlkPhos  63  02-29      TELEMETRY: 	  AF  ECG:  	  RADIOLOGY:   DIAGNOSTIC TESTING:  [ ] Echocardiogram:  [ ]  Catheterization:  [ ] Stress Test:    OTHER:

## 2024-02-29 NOTE — CONSULT NOTE ADULT - CONVERSATION DETAILS
Spoke with Caity via phone. Introduced myself and the role of palliative care. Discussed the role and definition of palliative care as Caity was hesitant to speak with me as she associates palliative with hospice. Education provided. Caity shared how overwhelming her mom's hospitalization has been and how fearful she is at the thought of losing her mom. She shared she lost her aunt in October and since then her mom has not been the same. She is fearful she may have missed signs of her mom's illness and cannot believe the situation they're in. We discussed a IDT meeting in order for her to ask questions to the team and discuss next steps in order for her to be prepared and aware of medical options moving forward. She is also requesting support from the social work team for caregiver burden. Family meeting arranged for tomorrow at 12

## 2024-02-29 NOTE — CONSULT NOTE ADULT - SUBJECTIVE AND OBJECTIVE BOX
Date of Service: 02-29-24 @ 16:09    HPI: 78yFemale PMHx COPD, A-fib  On Eliquis and Plavix, HTN, HLD with signs of acute mesenteric ischemia now s/p diagnostic laparoscopy converted to exploratory laparotomy with 20 cm of terminal ileum resection with bowel left in discontinuity and temporary abdominal closure with abthera with mesenteric angiogram via R fem access 02-22 findings of celiac, SMA, SWATHI w/o flow, collateral perfusion confirmed. S/p Closure and ileostomy creation on 2/24. (Taken from SICU note). Palliative consulted for assistance with GOC.       PERTINENT PM/SXH:   Hypertension    Hypothyroid    Osteoarthritis    CAD (coronary artery disease)    CROW (obstructive sleep apnea)    History of MI (myocardial infarction)    Polyp of corpus uteri    Heart murmur    Bilateral hearing loss, unspecified hearing loss type    Obesity (BMI 35.0-39.9 without comorbidity)    Obesity    Mixed stress and urge urinary incontinence    Overactive bladder      No significant past surgical history    S/P ORIF (open reduction internal fixation) fracture    S/P appendectomy    S/P knee replacement    Stented coronary artery    S/P laparotomy    H/O dilation and curettage      FAMILY HISTORY:      ITEMS NOT CHECKED ARE NOT PRESENT    SOCIAL HISTORY:   Significant other/partner[ ]  Children[x ]  Advent/Spirituality:  Substance hx:  [ ]   Tobacco hx:  [ ]   Alcohol hx: [ ]   Home Opioid hx:  [ ] I-Stop Reference No:  Living Situation: [ x]Home  [ ]Long term care  [ ]Rehab [ ]Other    ADVANCE DIRECTIVES:    DNR/MOLST  [ ]  Living Will  [ ]   DECISION MAKER(s):  [ ] Health Care Proxy(s)  [x ] Surrogate(s)  [ ] Guardian           Name(s): Phone Number(s): Caity Gooden     BASELINE (I)ADL(s) (prior to admission):  [place]: [ ]Total  [x ] Moderate [ ]Dependent    Allergies    penicillin (Hives)    Intolerances    MEDICATIONS  (STANDING):  chlorhexidine 2% Cloths 1 Application(s) Topical <User Schedule>  diltiazem Infusion 5 mG/Hr (5 mL/Hr) IV Continuous <Continuous>  furosemide   Injectable 40 milliGRAM(s) IV Push daily  heparin  Infusion 800 Unit(s)/Hr (11 mL/Hr) IV Continuous <Continuous>  lactated ringers 1000 milliLiter(s) (50 mL/Hr) IV Continuous <Continuous>  levothyroxine Injectable 130 MICROGram(s) IV Push at bedtime  metoprolol tartrate Injectable 5 milliGRAM(s) IV Push every 4 hours    MEDICATIONS  (PRN):  acetaminophen   IVPB .. 1000 milliGRAM(s) IV Intermittent every 6 hours PRN Moderate Pain (4 - 6)    PRESENT SYMPTOMS: [ ]Unable to self-report see CPOT, PAINADs, RDOS  Source if other than patient:  [ ]Family   [ ]Team     Pain: [ ]yes [x ]no  QOL impact -   Location -                    Aggravating factors -  Quality -  Radiation -  Timing-  Severity (0-10 scale):  Minimal acceptable level (0-10 scale):       Dyspnea:                           [ ]Mild [ ]Moderate [ ]Severe  Anxiety:                             [ ]Mild [ ]Moderate [ ]Severe  Fatigue:                             [ ]Mild [ x]Moderate [ ]Severe  Nausea:                             [ ]Mild [ ]Moderate [ ]Severe  Loss of appetite:              [ ]Mild [ ]Moderate [ ]Severe  Constipation:                    [ ]Mild [ ]Moderate [ ]Severe    PCSSQ [Palliative Care Spiritual Screening Question]   Severity (0-10):  Score of 4 or > indicate consideration of Chaplaincy referral.  Chaplaincy Referral: [ ] yes [ ] refused [ ] following    Caregiver [place]? : [x ] yes [ ] no [ ] deferred:  Social work referral [ x] Patient & Family Centered Care Referral [ ]     Anticipatory Grief Present?: [ ] yes [ ] no  [ ] deferred: Palliative Social work referral [ ]  Patient & Family Centered Care Referral [ ]       Other Symptoms:  [ ]All other review of systems negative   [x ] Unable to obtain due to poor mentation    PHYSICAL EXAM:  Vital Signs Last 24 Hrs  T(C): 36.7 (29 Feb 2024 15:00), Max: 36.7 (29 Feb 2024 01:00)  T(F): 98.1 (29 Feb 2024 15:00), Max: 98.1 (29 Feb 2024 01:00)  HR: 109 (29 Feb 2024 15:00) (96 - 126)  BP: 118/56 (29 Feb 2024 15:00) (96/55 - 161/68)  BP(mean): 81 (29 Feb 2024 15:00) (69 - 98)  RR: 16 (29 Feb 2024 15:00) (13 - 35)  SpO2: 94% (29 Feb 2024 15:00) (91% - 98%)    Parameters below as of 29 Feb 2024 15:00  Patient On (Oxygen Delivery Method): room air     I&O's Summary    28 Feb 2024 07:01  -  29 Feb 2024 07:00  --------------------------------------------------------  IN: 2344 mL / OUT: 4160 mL / NET: -1816 mL    29 Feb 2024 07:01  -  29 Feb 2024 16:09  --------------------------------------------------------  IN: 673 mL / OUT: 555 mL / NET: 118 mL        GENERAL:  [ ]Alert  [x]Oriented x 2  [x ]Lethargic  [ ]Cachexia  [ ]Unarousable  [x]Verbal  [ ]Non-Verbal  Behavioral:   [ ]Anxiety  [ ]Delirium [ ]Agitation [ ]Other  HEENT:  [ ]Normal   [ x]Dry mouth   [ ]ET Tube/Trach  [ ]Oral lesions  PULMONARY:   [ ]Clear [ ]Tachypnea  [ ]Audible excessive secretions   [ ]Rhonchi        [ ]Right [ ]Left [ ]Bilateral  [ ]Crackles        [ ]Right [ ]Left [ ]Bilateral  [ ]Wheezing     [ ]Right [ ]Left [ ]Bilateral  [ x]Diminished BS [ ] Right [ ]Left [ x]Bilateral  CARDIOVASCULAR:    [x]Regular [ ]Irregular [ ]Tachy  [ ]Gerson [ ]Murmur [ ]Other  GASTROINTESTINAL:  [ ]Soft  [ ]Distended   [x]+BS  [x]Non tender [ ]Tender  [ ]PEG [ c]OGT/ NGT   Last BM:    GENITOURINARY:  [x]Normal [ ]Incontinent   [ ]Oliguria/Anuria   [c ]Melvin  MUSCULOSKELETAL:   [ ]Normal   [x]Weakness  [ c]Bed/Wheelchair bound [ ]Edema  NEUROLOGIC:   [ ]No focal deficits [x ] Cognitive impairment  [ ] Dysphagia [ ]Dysarthria [ ] Paresis [ ]Other   SKIN:   [x]Normal  [ ]Rash   [ ]Pressure ulcer(s) [ ]y [ ]n present on admission    CRITICAL CARE:  [ ] Shock Present  [ ]Septic [ ]Cardiogenic [ ]Neurologic [ ]Hypovolemic  [ ]  Vasopressors [ ]  Inotropes   [ ]Respiratory failure present [ ]Mechanical ventilation [ ]Non-invasive ventilatory support [ ]High flow    [ ]Acute  [ ]Chronic [ ]Hypoxic  [ ]Hypercarbic [ ]Other  [ ]Other organ failure     LABS:                        10.5   17.79 )-----------( 196      ( 29 Feb 2024 06:28 )             31.6   02-29    135  |  96  |  10  ----------------------------<  87  4.5   |  28  |  0.74    Ca    8.5      29 Feb 2024 06:28  Phos  3.1     02-29  Mg     1.8     02-29    TPro  5.5<L>  /  Alb  2.8<L>  /  TBili  0.8  /  DBili  x   /  AST  16  /  ALT  10  /  AlkPhos  63  02-29  PT/INR - ( 29 Feb 2024 06:28 )   PT: 18.1 sec;   INR: 1.75 ratio         PTT - ( 29 Feb 2024 06:28 )  PTT:95.2 sec    Urinalysis Basic - ( 29 Feb 2024 06:28 )    Color: x / Appearance: x / SG: x / pH: x  Gluc: 87 mg/dL / Ketone: x  / Bili: x / Urobili: x   Blood: x / Protein: x / Nitrite: x   Leuk Esterase: x / RBC: x / WBC x   Sq Epi: x / Non Sq Epi: x / Bacteria: x      RADIOLOGY & ADDITIONAL STUDIES:  < from: Xray Chest 1 View- PORTABLE-Urgent (Xray Chest 1 View- PORTABLE-Urgent .) (02.27.24 @ 07:41) >  ACC: [id number] EXAM:  XR CHEST PORTABLE ROUTINE 1V   ORDERED BY: LACHO CLIFTON     ACC: [id number] EXAM:  XR CHEST PORTABLE ROUTINE 1V   ORDERED BY: CARLIN ROCHE     ACC: [id number] EXAM:  XR CHEST PORTABLE URGENT 1V   ORDERED BY: APARNA GRIGSBY     ACC: [id number] EXAM:  XR CHEST PORTABLE URGENT 1V   ORDERED BY: RHONDA CALLAWAY     PROCEDURE DATE:  02/26/2024          INTERPRETATION:  EXAMINATION: XR CHEST URGENT, XR CHEST URGENT, XR CHEST,   XR CHEST    CLINICAL INDICATION: ngt placement    TECHNIQUE: Multiple frontal, portable views of the chest were obtained at   time stamps included below in the findings.    COMPARISON: Chest x-ray 2/24/2023 6:52 AM    FINDINGS:    2/25/2024 6:42 AM  LINES/TUBES: Enteric tube, coursing below the diaphragm, with tip in the   stomach.    LUNGS/PLEURA: No focal consolidations. No pleural effusion. No   pneumothorax.    HEART AND MEDIASTINUM: The heart size is not accurately measured in this   projection.    OTHER: No acute osseous abnormalities.    2/26/20246:55 AM  Interval removal of enteric tube    2/26/2024 1:12 PM  Enteric tube, coursing below the diaphragm, with tip in the stomach.    2/27/2024 6:48 AM  No interval change.    IMPRESSION:    On the x-ray obtained 2/27/2024 6:48 AM, enteric tube terminates in the   stomach.    --- End of Report ---          KATEY CALI MD; Resident Radiologist  This document has been electronically signed.  CARLOS MANUEL RODRIGUEZ MD; Attending Interventional Radiologist  This document has been electronically signed.Feb 27 2024  9:32AM    < end of copied text >    PROTEIN CALORIE MALNUTRITION PRESENT: [ ]mild [ ]moderate [ ]severe [ ]underweight [ ]morbid obesity  https://www.andeal.org/vault/2440/web/files/ONC/Table_Clinical%20Characteristics%20to%20Document%20Malnutrition-White%20JV%20et%20al%202012.pdf    Height (cm): 162.6 (02-22-24 @ 18:55)  Weight (kg): 88.8 (02-22-24 @ 18:55)  BMI (kg/m2): 33.6 (02-22-24 @ 18:55)    [ x]PPSV2 < or = to 30% [ ]significant weight loss  [ ]poor nutritional intake  [ ]anasarca[ ]Artificial Nutrition      Other REFERRALS:  [ ]Hospice  [ ]Child Life  [ ]Social Work  [ ]Case management [ ]Holistic Therapy     Care Coordination Assessment 201 [C. Provider] (02-18-24 @ 15:26)      Palliative Performance Scale:  http://npcrc.org/files/news/palliative_performance_scale_ppsv2.pdf  (Ctrl +  left click to view)  Respiratory Distress Observation Tool:  https://homecareinformation.net/handouts/hen/Respiratory_Distress_Observation_Scale.pdf (Ctrl +  left click to view)  PAINAD Score:  http://geriatrictoolkit.[facility]/cog/painad.pdf (Ctrl +  left click to view)

## 2024-02-29 NOTE — CONSULT NOTE ADULT - PROBLEM SELECTOR RECOMMENDATION 9
ppsv 30%: pt requires assistance with all ADL
Unclear etiology   check orthostatics   monitor on teLe   check TTE

## 2024-02-29 NOTE — CONSULT NOTE ADULT - PROBLEM SELECTOR RECOMMENDATION 2
rate controlled   on eliquis   check thyroid panel
pt not a candidate for further revascularization   continued medical management- defer to SICU

## 2024-02-29 NOTE — CONSULT NOTE ADULT - ASSESSMENT
78yFemale PMHx COPD, A-fib  On Eliquis and Plavix, HTN, HLD with signs of acute mesenteric ischemia now s/p diagnostic laparoscopy converted to exploratory laparotomy with 20 cm of terminal ileum resection with bowel left in discontinuity and temporary abdominal closure with abthera with mesenteric angiogram via R fem access 02-22 findings of celiac, SMA, SWATHI w/o flow, collateral perfusion confirmed. S/p Closure and ileostomy creation on 2/24. (Taken from SICU note). Palliative consulted for assistance with GOC.

## 2024-02-29 NOTE — CONSULT NOTE ADULT - CONSULT REQUESTED DATE/TIME
22-Feb-2024 11:41
22-Feb-2024 11:25
22-Feb-2024 11:25
23-Feb-2024 12:46
17-Feb-2024 01:52
22-Feb-2024 19:01
18-Feb-2024 10:41
29-Feb-2024

## 2024-02-29 NOTE — PROGRESS NOTE ADULT - SUBJECTIVE AND OBJECTIVE BOX
24 HOUR EVENTS:  - NAEO    SUBJECTIVE/ROS:  [x] A ten-point review of systems was otherwise negative except as noted.  [ ] Due to altered mental status/intubation, subjective information were not able to be obtained from the patient. History was obtained, to the extent possible, from review of the chart and collateral sources of information.      NEURO  Exam: AOx4  Meds: acetaminophen   IVPB .. 1000 milliGRAM(s) IV Intermittent every 6 hours PRN Moderate Pain (4 - 6)    [x] Adequacy of sedation and pain control has been assessed and adjusted      RESPIRATORY  RR: 19 (02-29-24 @ 01:00) (11 - 35)  SpO2: 92% (02-29-24 @ 01:00) (86% - 99%)  Wt(kg): --  Exam: unlabored, clear to auscultation bilaterally  Mechanical Ventilation:     [ ] Extubation Readiness Assessed  Meds:       CARDIOVASCULAR  HR: 105 (02-29-24 @ 01:00) (96 - 129)  BP: 114/70 (02-29-24 @ 01:00) (112/54 - 161/68)  BP(mean): 88 (02-29-24 @ 01:00) (78 - 98)  ABP: --  ABP(mean): --  Wt(kg): --  CVP(cm H2O): --        Exam: S1S2. No murmurs, rubs, gallops appreciated.  Cardiac Rhythm: NSR  Meds: diltiazem Infusion 5 mG/Hr IV Continuous <Continuous>  furosemide   Injectable 40 milliGRAM(s) IV Push daily  metoprolol tartrate Injectable 5 milliGRAM(s) IV Push every 4 hours        GI/NUTRITION  Exam: Soft, non-distended, non-tender.   Diet: NPO  Meds:     GENITOURINARY  I&O's Detail    02-27 @ 07:01  -  02-28 @ 07:00  --------------------------------------------------------  IN:    Diltiazem: 25 mL    Heparin: 264 mL    IV PiggyBack: 100 mL    IV PiggyBack: 749.9 mL    Lactated Ringers w/ Additives: 1200 mL  Total IN: 2338.9 mL    OUT:    Emesis (mL): 0 mL    Ileostomy (mL): 400 mL    Indwelling Catheter - Urethral (mL): 2200 mL    Nasogastric/Oral tube (mL): 1800 mL  Total OUT: 4400 mL    Total NET: -2061.1 mL      02-28 @ 07:01  -  02-29 @ 01:26  --------------------------------------------------------  IN:    Diltiazem: 80 mL    Heparin: 198 mL    IV PiggyBack: 300 mL    IV PiggyBack: 500 mL    Lactated Ringers w/ Additives: 900 mL  Total IN: 1978 mL    OUT:    Ileostomy (mL): 200 mL    Indwelling Catheter - Urethral (mL): 2565 mL    Nasogastric/Oral tube (mL): 300 mL  Total OUT: 3065 mL    Total NET: -1087 mL          02-28    137  |  96  |  11  ----------------------------<  101<H>  3.5   |  32<H>  |  0.74    Ca    8.3<L>      28 Feb 2024 18:10  Phos  3.3     02-28  Mg     2.1     02-28    TPro  5.4<L>  /  Alb  2.8<L>  /  TBili  0.8  /  DBili  x   /  AST  18  /  ALT  11  /  AlkPhos  63  02-28    [ ] Melvin catheter, indication: N/A  Meds: lactated ringers 1000 milliLiter(s) IV Continuous <Continuous>        HEMATOLOGIC  Meds: heparin  Infusion 800 Unit(s)/Hr IV Continuous <Continuous>    [x] VTE Prophylaxis                        10.7   14.72 )-----------( 190      ( 28 Feb 2024 05:12 )             32.3     PT/INR - ( 28 Feb 2024 05:12 )   PT: 17.9 sec;   INR: 1.65 ratio         PTT - ( 28 Feb 2024 05:12 )  PTT:98.2 sec  Transfusion     [ ] PRBC   [ ] Platelets   [ ] FFP   [ ] Cryoprecipitate      INFECTIOUS DISEASES  T(C): 36.7 (02-29-24 @ 01:00), Max: 36.7 (02-29-24 @ 01:00)  Wt(kg): --  WBC Count: 14.72 K/uL (02-28 @ 05:12)    Recent Cultures:  Specimen Source: .Blood Blood, 02-23 @ 03:14; Results   No growth at 5 days; Gram Stain: --; Organism: --    Meds:       ENDOCRINE  Capillary Blood Glucose    Meds: levothyroxine Injectable 130 MICROGram(s) IV Push at bedtime        ACCESS DEVICES:  [x] Peripheral IV  [ ] Central Venous Line	[ ] R	[ ] L	[ ] IJ	[ ] Fem	[ ] SC	Placed:   [ ] Arterial Line		[ ] R	[ ] L	[ ] Fem	[ ] Rad	[ ] Ax	Placed:   [ ] PICC:					[ ] Mediport  [ ] Urinary Catheter, Date Placed:   [x] Necessity of urinary, arterial, and venous catheters discussed    OTHER MEDICATIONS:  chlorhexidine 2% Cloths 1 Application(s) Topical <User Schedule>      CODE STATUS: full    IMAGING:

## 2024-02-29 NOTE — CONSULT NOTE ADULT - CONSULT REASON
rash
s/p SBR for mes ischemia, intubated
r/o ischemic bowel
abdominal pain
Calcified celiac axis, SMA, SWATHI
Afib
ams
GOC

## 2024-02-29 NOTE — PROGRESS NOTE ADULT - SUBJECTIVE AND OBJECTIVE BOX
SURGERY DAILY PROGRESS NOTE:     24 HOUR EVENTS:  - NAEO    SUBJECTIVE: Patient seen and evaluated on AM rounds. Pt is resting comfortably in chair. Denies fever, chills, N/V, chest pain, or shortness of breath. Pain is adequately controlled on current regimen.    OBJECTIVE:  Vital Signs Last 24 Hrs  T(C): 36.3 (29 Feb 2024 07:00), Max: 36.7 (29 Feb 2024 01:00)  T(F): 97.4 (29 Feb 2024 07:00), Max: 98.1 (29 Feb 2024 01:00)  HR: 126 (29 Feb 2024 07:15) (96 - 129)  BP: 103/52 (29 Feb 2024 07:00) (103/52 - 161/68)  BP(mean): 75 (29 Feb 2024 07:00) (75 - 98)  RR: 17 (29 Feb 2024 07:15) (11 - 35)  SpO2: 93% (29 Feb 2024 07:15) (86% - 99%)    Parameters below as of 29 Feb 2024 07:00  Patient On (Oxygen Delivery Method): room air      I&O's Detail    28 Feb 2024 07:01  -  29 Feb 2024 07:00  --------------------------------------------------------  IN:    Diltiazem: 80 mL    Heparin: 264 mL    IV PiggyBack: 300 mL    IV PiggyBack: 500 mL    Lactated Ringers w/ Additives: 1200 mL  Total IN: 2344 mL    OUT:    Emesis (mL): 0 mL    Ileostomy (mL): 300 mL    Indwelling Catheter - Urethral (mL): 3360 mL    Nasogastric/Oral tube (mL): 500 mL  Total OUT: 4160 mL    Total NET: -1816 mL        Daily     Daily     LABS:                        10.5   17.79 )-----------( 196      ( 29 Feb 2024 06:28 )             31.6     02-29    135  |  96  |  10  ----------------------------<  87  4.5   |  28  |  0.74    Ca    8.5      29 Feb 2024 06:28  Phos  3.1     02-29  Mg     1.8     02-29    TPro  5.5<L>  /  Alb  2.8<L>  /  TBili  0.8  /  DBili  x   /  AST  16  /  ALT  10  /  AlkPhos  63  02-29    PT/INR - ( 29 Feb 2024 06:28 )   PT: 18.1 sec;   INR: 1.75 ratio         PTT - ( 29 Feb 2024 06:28 )  PTT:95.2 sec  Urinalysis Basic - ( 29 Feb 2024 06:28 )    Color: x / Appearance: x / SG: x / pH: x  Gluc: 87 mg/dL / Ketone: x  / Bili: x / Urobili: x   Blood: x / Protein: x / Nitrite: x   Leuk Esterase: x / RBC: x / WBC x   Sq Epi: x / Non Sq Epi: x / Bacteria: x        PHYSICAL EXAM:  Constitutional: Well developed, well nourished, NAD  Pulmonary: Symmetric chest rise bilaterally, no increased WOB  Gastrointestinal: Abdomen soft, mildly distended, appropriate sx incisional tenderness with dressings c/d/i. Ileostomy pink with gas and liquid stool in ostomy bag. (+) NGT in place  Groin: Melvin in place  Extremities: Warm to touch, soft. No cyanosis/erythema/edema

## 2024-02-29 NOTE — PROGRESS NOTE ADULT - SUBJECTIVE AND OBJECTIVE BOX
Date of service: 02-29-24 @ 22:20      Patient is a 78y old  Female who presents with a chief complaint of Syncope (29 Feb 2024 16:09)                                                               INTERVAL HPI/OVERNIGHT EVENTS:    REVIEW OF SYSTEMS:     CONSTITUTIONAL: No weakness, fevers or chills  EYES/ENT: No visual changes , no ear ache   NECK: No pain or stiffness  RESPIRATORY: No cough, wheezing,  No shortness of breath  CARDIOVASCULAR: No chest pain or palpitations  GASTROINTESTINAL: No abdominal pain  . No nausea, vomiting, or hematemesis; No diarrhea or constipation. No melena or hematochezia.  GENITOURINARY: No dysuria, frequency or hematuria  NEUROLOGICAL: No numbness or weakness  SKIN: No itching, burning, rashes, or lesions                                                                                                                                                                                                                                                                                 Medications:  MEDICATIONS  (STANDING):  apixaban 5 milliGRAM(s) Oral every 12 hours  chlorhexidine 2% Cloths 1 Application(s) Topical <User Schedule>  diltiazem    Tablet 60 milliGRAM(s) Oral every 8 hours  diltiazem Infusion 5 mG/Hr (5 mL/Hr) IV Continuous <Continuous>  furosemide   Injectable 40 milliGRAM(s) IV Push daily  lactated ringers 1000 milliLiter(s) (50 mL/Hr) IV Continuous <Continuous>  levothyroxine Injectable 130 MICROGram(s) IV Push at bedtime  metoprolol tartrate Injectable 5 milliGRAM(s) IV Push every 4 hours    MEDICATIONS  (PRN):  acetaminophen   IVPB .. 1000 milliGRAM(s) IV Intermittent every 6 hours PRN Moderate Pain (4 - 6)       Allergies    penicillin (Hives)    Intolerances      Vital Signs Last 24 Hrs  T(C): 36.5 (29 Feb 2024 19:00), Max: 36.7 (29 Feb 2024 01:00)  T(F): 97.7 (29 Feb 2024 19:00), Max: 98.1 (29 Feb 2024 01:00)  HR: 106 (29 Feb 2024 22:00) (99 - 126)  BP: 110/56 (29 Feb 2024 22:00) (92/71 - 123/55)  BP(mean): 80 (29 Feb 2024 22:00) (69 - 88)  RR: 17 (29 Feb 2024 22:00) (13 - 28)  SpO2: 93% (29 Feb 2024 22:00) (91% - 95%)    Parameters below as of 29 Feb 2024 19:00  Patient On (Oxygen Delivery Method): room air      CAPILLARY BLOOD GLUCOSE          02-28 @ 07:01 - 02-29 @ 07:00  --------------------------------------------------------  IN: 2344 mL / OUT: 4160 mL / NET: -1816 mL    02-29 @ 07:01 - 02-29 @ 22:20  --------------------------------------------------------  IN: 1205 mL / OUT: 925 mL / NET: 280 mL      Physical Exam:    Daily     Daily   General:  Well appearing, NAD, not cachetic  HEENT:  Nonicteric, PERRLA  CV:  RRR, S1S2   Lungs:  CTA B/L, no wheezes, rales, rhonchi  Abdomen:  Soft, non-tender, no distended, positive BS  Extremities:  2+ pulses, no c/c, no edema  Skin:  Warm and dry, no rashes  :  No amaya  Neuro:  AAOx3, non-focal, grossly intact                                                                                                                                                                                                                                                                                                LABS:                               10.5   17.79 )-----------( 196      ( 29 Feb 2024 06:28 )             31.6                      02-29    134<L>  |  96  |  10  ----------------------------<  84  3.8   |  29  |  0.67    Ca    8.3<L>      29 Feb 2024 17:38  Phos  3.4     02-29  Mg     2.0     02-29    TPro  5.2<L>  /  Alb  2.5<L>  /  TBili  0.7  /  DBili  x   /  AST  15  /  ALT  10  /  AlkPhos  61  02-29                       RADIOLOGY & ADDITIONAL TESTS         I personally reviewed: [  ]EKG   [  ]CXR    [  ] CT      A/P:         Discussed with :     Simon consultants' Notes   Time spent :

## 2024-02-29 NOTE — CONSULT NOTE ADULT - PROBLEM SELECTOR RECOMMENDATION 3
will continue to follow for goc   sw referral placed  case discussed with SICU   family meeting tomorrow at 12p at bedside   Can be reached by TEAMS M-F 9-5 Joanie Barrett Any other time please page [phone] if needed
Stable   will need to obtain exact history

## 2024-02-29 NOTE — PROGRESS NOTE ADULT - ASSESSMENT
78-year-old female with PMH COPD, A-fib  On Eliquis and Plavix, COPD, HTN, HLD presents for syncope from PCPs office earlier today.  Patient was at normal checkup for blood work and was sitting on the table.  Daughter at bedside states that patient suddenly went limp while she was sitting on the table, her eyes rolled back, and her tongue turned to 1 side and this lasted for couple of seconds and then the patient regained consciousness.  Patient had no postictal period.  No tongue biting.  Patient does not remember these events.  Daughter at bedside states that patient has not been eating as much for the past day or 2 and has not been sleeping at all.                        2/23: OR: Diagnostic laparoscopy, ischemic bowel noted in RLQ just proximal to TI. Converted to laparotomy. 20cm of terminal ileum resected and bowel left in discontinuity.  celiac, SMA, SWATHI w/ no flow demonstrated on angiogram    2/24:  RTOR  End ileostomy, closed    Neuro:  - Alert and oriented, confused at times  - Acetaminophen PRN    Resp: pmh COPD  - OOB to chair, IS to prevent atelectasis  - RA    CV:  - Afib RVR HR 150s, BP 160s/90s,   - metoprolol 5mg q4h  - resume cardizem gtt. 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 2/27, + stool, around 20 cc  - off protonix 2/23    Renal:  - Monitor I&Os and electrolytes w/ repletions as necessary  - 40 lasix daily, goal for net even to -500cc  - amaya in place, monitor UOP  - LR @ 50 with K  -Replete potassium     Heme:  - Monitor CBC and coags  - Heparin gtt for occluded celiac, SMA, SWATHI    ID:   - Monitor for clinical evidence of active infection  - Empiric antibiotics w/ zosyn (2/23-2/28)    Endo:   - Monitor glucose  - home synthroid    Skin:  - contact dermatitis, on buttock area  - derm consult, recommends plain petroleum jelly     Lines:  Amaya (2/23-)

## 2024-02-29 NOTE — PROGRESS NOTE ADULT - ASSESSMENT
78yFemale PMHx COPD, A-fib  On Eliquis and Plavix, HTN, HLD with signs of acute mesenteric ischemia now s/p diagnostic laparoscopy converted to exploratory laparotomy with 20 cm of terminal ileum resection with bowel left in discontinuity and temporary abdominal closure with abthera with mesenteric angiogram via R fem access 02-22 findings of celiac, SMA, SWATHI w/o flow, collateral perfusion confirmed. S/p Closure and ileostomy creation on 2/24.     PLAN  - Given patient prognosis, no re-vascularization options, consult Palliative   - NPO  - Heparin gtt  - cont abx  - Metoprolol for rate control. On dilt gtt. Cardiology following.   - Care per SICU      ACS/Trauma   [phone] 78yFemale PMHx COPD, A-fib  On Eliquis and Plavix, HTN, HLD with signs of acute mesenteric ischemia now s/p diagnostic laparoscopy converted to exploratory laparotomy with 20 cm of terminal ileum resection with bowel left in discontinuity and temporary abdominal closure with abthera with mesenteric angiogram via R fem access 02-22 findings of celiac, SMA, SWATHI w/o flow, collateral perfusion confirmed. S/p Closure and ileostomy creation on 2/24.     PLAN  - Given patient prognosis, no re-vascularization options, consult Palliative   - rec NGT clamp trial  - Diet- c/w NPO, advance diet if passes NGT clamp trial  - rec repeat CT abdomen  - Heparin gtt  - cont abx  - Metoprolol for rate control. On dilt gtt. Cardiology following.   - Palliative consult pending for [facility] conversation  - Care per SICU      ACS/Trauma   [phone]

## 2024-02-29 NOTE — CONSULT NOTE ADULT - TIME BILLING
Advanced care planning was discussed with patient and family.  Advanced care planning forms were reviewed and discussed as appropriate.  Differential diagnosis and plan of care discussed with patient after the evaluation.   Pain assessed and judicious use of narcotics when appropriate was discussed.  Importance of Fall prevention discussed.  Counseling on Smoking and Alcohol cessation was offered when appropriate.  Counseling on Diet, exercise, and medication compliance was done.
Symptom assessment and management, supportive counseling, coordination of care

## 2024-03-01 DIAGNOSIS — Z71.89 OTHER SPECIFIED COUNSELING: ICD-10-CM

## 2024-03-01 LAB
ADD ON TEST-SPECIMEN IN LAB: SIGNIFICANT CHANGE UP
ALBUMIN SERPL ELPH-MCNC: 2.7 G/DL — LOW (ref 3.3–5)
ALP SERPL-CCNC: 60 U/L — SIGNIFICANT CHANGE UP (ref 40–120)
ALT FLD-CCNC: 13 U/L — SIGNIFICANT CHANGE UP (ref 10–45)
ANION GAP SERPL CALC-SCNC: 8 MMOL/L — SIGNIFICANT CHANGE UP (ref 5–17)
AST SERPL-CCNC: 17 U/L — SIGNIFICANT CHANGE UP (ref 10–40)
BILIRUB SERPL-MCNC: 0.8 MG/DL — SIGNIFICANT CHANGE UP (ref 0.2–1.2)
BUN SERPL-MCNC: 10 MG/DL — SIGNIFICANT CHANGE UP (ref 7–23)
CALCIUM SERPL-MCNC: 8.6 MG/DL — SIGNIFICANT CHANGE UP (ref 8.4–10.5)
CHLORIDE SERPL-SCNC: 97 MMOL/L — SIGNIFICANT CHANGE UP (ref 96–108)
CO2 SERPL-SCNC: 30 MMOL/L — SIGNIFICANT CHANGE UP (ref 22–31)
CREAT SERPL-MCNC: 0.73 MG/DL — SIGNIFICANT CHANGE UP (ref 0.5–1.3)
EGFR: 85 ML/MIN/1.73M2 — SIGNIFICANT CHANGE UP
GLUCOSE SERPL-MCNC: 97 MG/DL — SIGNIFICANT CHANGE UP (ref 70–99)
MAGNESIUM SERPL-MCNC: 2 MG/DL — SIGNIFICANT CHANGE UP (ref 1.6–2.6)
PHOSPHATE SERPL-MCNC: 3 MG/DL — SIGNIFICANT CHANGE UP (ref 2.5–4.5)
POTASSIUM SERPL-MCNC: 3.3 MMOL/L — LOW (ref 3.5–5.3)
POTASSIUM SERPL-SCNC: 3.3 MMOL/L — LOW (ref 3.5–5.3)
PROCALCITONIN SERPL-MCNC: 0.09 NG/ML — SIGNIFICANT CHANGE UP (ref 0.02–0.1)
PROT SERPL-MCNC: 5.4 G/DL — LOW (ref 6–8.3)
SODIUM SERPL-SCNC: 135 MMOL/L — SIGNIFICANT CHANGE UP (ref 135–145)

## 2024-03-01 PROCEDURE — 99233 SBSQ HOSP IP/OBS HIGH 50: CPT

## 2024-03-01 PROCEDURE — 99497 ADVNCD CARE PLAN 30 MIN: CPT | Mod: 25

## 2024-03-01 PROCEDURE — 99498 ADVNCD CARE PLAN ADDL 30 MIN: CPT | Mod: 25

## 2024-03-01 RX ORDER — POTASSIUM CHLORIDE 20 MEQ
40 PACKET (EA) ORAL ONCE
Refills: 0 | Status: COMPLETED | OUTPATIENT
Start: 2024-03-01 | End: 2024-03-01

## 2024-03-01 RX ORDER — METOPROLOL TARTRATE 50 MG
50 TABLET ORAL EVERY 12 HOURS
Refills: 0 | Status: DISCONTINUED | OUTPATIENT
Start: 2024-03-02 | End: 2024-03-02

## 2024-03-01 RX ORDER — POTASSIUM CHLORIDE 20 MEQ
10 PACKET (EA) ORAL
Refills: 0 | Status: COMPLETED | OUTPATIENT
Start: 2024-03-01 | End: 2024-03-01

## 2024-03-01 RX ORDER — ATORVASTATIN CALCIUM 80 MG/1
80 TABLET, FILM COATED ORAL AT BEDTIME
Refills: 0 | Status: DISCONTINUED | OUTPATIENT
Start: 2024-03-01 | End: 2024-03-09

## 2024-03-01 RX ORDER — LEVOTHYROXINE SODIUM 125 MCG
175 TABLET ORAL ONCE
Refills: 0 | Status: COMPLETED | OUTPATIENT
Start: 2024-03-01 | End: 2024-03-01

## 2024-03-01 RX ORDER — METOPROLOL TARTRATE 50 MG
50 TABLET ORAL EVERY 12 HOURS
Refills: 0 | Status: DISCONTINUED | OUTPATIENT
Start: 2024-03-01 | End: 2024-03-01

## 2024-03-01 RX ORDER — LEVOTHYROXINE SODIUM 125 MCG
175 TABLET ORAL
Refills: 0 | Status: DISCONTINUED | OUTPATIENT
Start: 2024-03-01 | End: 2024-03-02

## 2024-03-01 RX ADMIN — Medication 100 MILLIEQUIVALENT(S): at 21:29

## 2024-03-01 RX ADMIN — Medication 50 MILLIGRAM(S): at 22:30

## 2024-03-01 RX ADMIN — Medication 60 MILLIGRAM(S): at 05:10

## 2024-03-01 RX ADMIN — Medication 100 MILLIEQUIVALENT(S): at 22:31

## 2024-03-01 RX ADMIN — ATORVASTATIN CALCIUM 80 MILLIGRAM(S): 80 TABLET, FILM COATED ORAL at 22:30

## 2024-03-01 RX ADMIN — Medication 5 MILLIGRAM(S): at 01:18

## 2024-03-01 RX ADMIN — Medication 40 MILLIGRAM(S): at 05:10

## 2024-03-01 RX ADMIN — Medication 5 MILLIGRAM(S): at 05:10

## 2024-03-01 RX ADMIN — Medication 40 MILLIEQUIVALENT(S): at 22:30

## 2024-03-01 RX ADMIN — Medication 50 MILLIGRAM(S): at 10:55

## 2024-03-01 RX ADMIN — Medication 175 MICROGRAM(S): at 10:55

## 2024-03-01 RX ADMIN — CHLORHEXIDINE GLUCONATE 1 APPLICATION(S): 213 SOLUTION TOPICAL at 05:10

## 2024-03-01 RX ADMIN — APIXABAN 5 MILLIGRAM(S): 2.5 TABLET, FILM COATED ORAL at 17:06

## 2024-03-01 RX ADMIN — Medication 100 MILLIEQUIVALENT(S): at 20:13

## 2024-03-01 RX ADMIN — APIXABAN 5 MILLIGRAM(S): 2.5 TABLET, FILM COATED ORAL at 05:10

## 2024-03-01 NOTE — PROGRESS NOTE ADULT - SUBJECTIVE AND OBJECTIVE BOX
SUBJECTIVE AND OBJECTIVE: Pt seen and examined at bedside. Awake and alert, able to participate in exam   Indication for Geriatrics and Palliative Care Services/INTERVAL HPI: GOC     OVERNIGHT EVENTS: No acute events o/n     DNR on chart:  Allergies    penicillin (Hives)    Intolerances    MEDICATIONS  (STANDING):  apixaban 5 milliGRAM(s) Oral every 12 hours  atorvastatin 80 milliGRAM(s) Oral at bedtime  chlorhexidine 2% Cloths 1 Application(s) Topical <User Schedule>  furosemide   Injectable 40 milliGRAM(s) IV Push daily  levothyroxine 175 MICROGram(s) Oral <User Schedule>  metoprolol tartrate 50 milliGRAM(s) Oral every 12 hours    MEDICATIONS  (PRN):  acetaminophen   IVPB .. 1000 milliGRAM(s) IV Intermittent every 6 hours PRN Moderate Pain (4 - 6)      ITEMS UNCHECKED ARE NOT PRESENT    PRESENT SYMPTOMS: [x]Unable to self-report - see [ ] CPOT [ ] PAINADS [ ] RDOS  Source if other than patient:  [ ]Family   [ ]Team     Pain:  [ ]yes [ x]no  QOL impact -   Location -                    Aggravating factors -  Quality -  Radiation -  Timing-  Severity (0-10 scale):  Minimal acceptable level (0-10 scale):     CPOT:    https://www.[facility].org/getattachment/[email or web address]/Critical-Care-Pain-Observation-Tool-(CPOT)    PAINAD Score: See PAINAD tool and score below       Dyspnea:                           [ ]Mild [ ]Moderate [ ]Severe    RDOS: See RDOS tool and score below   0 to 2  minimal or no respiratory distress   3  mild distress  4 to 6 moderate distress  >7 severe distress      Anxiety:                             [ ]Mild [ ]Moderate [ ]Severe  Fatigue:                             [ ]Mild [ ]Moderate [ ]Severe  Nausea:                             [ ]Mild [ ]Moderate [ ]Severe  Loss of appetite:              [ ]Mild [ ]Moderate [ ]Severe  Constipation:                    [ ]Mild [ ]Moderate [ ]Severe    PCSSQ[Palliative Care Spiritual Screening Question]   Severity (0-10):  Score of 4 or > indicate consideration of Chaplaincy referral.  Chaplaincy Referral: [ ] yes [ ] refused [ ] following [ ] Deferred     Caregiver [place]? : [x ] yes [ ] no [ ] Deferred [ ] Declined             Social work referral [x ] Patient & Family Centered Care Referral [ ]     Anticipatory Grief present?:  [ x] yes [ ] no  [ ] Deferred                  Social work referral [ x] Chaplaincy Referral [ ]    		  Other Symptoms:  [x ]All other review of systems negative     Palliative Performance Status Version 2:   See PPSv2 tool and score below         PHYSICAL EXAM:  Vital Signs Last 24 Hrs  T(C): 36.6 (01 Mar 2024 11:00), Max: 37.1 (01 Mar 2024 05:00)  T(F): 97.8 (01 Mar 2024 11:00), Max: 98.8 (01 Mar 2024 05:00)  HR: 98 (01 Mar 2024 15:00) (97 - 115)  BP: 116/58 (01 Mar 2024 15:00) (92/71 - 145/57)  BP(mean): 83 (01 Mar 2024 15:00) (71 - 100)  RR: 18 (01 Mar 2024 15:00) (13 - 28)  SpO2: 97% (01 Mar 2024 15:00) (92% - 97%)    Parameters below as of 01 Mar 2024 15:00  Patient On (Oxygen Delivery Method): room air     I&O's Summary    29 Feb 2024 07:01  -  01 Mar 2024 07:00  --------------------------------------------------------  IN: 1730 mL / OUT: 1290 mL / NET: 440 mL    01 Mar 2024 07:01  -  01 Mar 2024 15:43  --------------------------------------------------------  IN: 200 mL / OUT: 590 mL / NET: -390 mL       GENERAL: [ ]Cachexia    [x ]Alert  [ x]Oriented x 2 [ ]Lethargic  [ ]Unarousable  [ ]Verbal  [ ]Non-Verbal  Behavioral:   [ ]Anxiety  [ ]Delirium [ ]Agitation [ ]Other  HEENT:  [ ]Normal   [ ]Dry mouth   [ x]ET Tube/Trach  [ ]Oral lesions  PULMONARY:   [ ]Clear [ ]Tachypnea  [ ]Audible excessive secretions   [ ]Rhonchi        [ ]Right [ ]Left [ ]Bilateral  [ ]Crackles        [ ]Right [ ]Left [ ]Bilateral  [ ]Wheezing     [ ]Right [ ]Left [ ]Bilateral  [x ]Diminished BS [ ] Right [ ]Left [x ]Bilateral  CARDIOVASCULAR:    [x ]Regular [ ]Irregular [ ]Tachy  [ ]Gerson [ ]Murmur [ ]Other  GASTROINTESTINAL:  [x ]Soft  [ ]Distended   [ x]+BS  [ x]Non tender [ ]Tender  [ ]Other [ ]PEG [ ]OGT/ NGT   Last BM:   GENITOURINARY:  [ ]Normal [x ]Incontinent   [ ]Oliguria/Anuria   [ ]Melvin  MUSCULOSKELETAL:   [ ]Normal   [x ]Weakness  [x ]Bed/Wheelchair bound [ ]Edema  NEUROLOGIC:   [ ]No focal deficits  [x] Cognitive impairment  [ ] Dysphagia [ ]Dysarthria [ ] Paresis [ ]Other   SKIN:   [ ]Normal  [ ]Rash  [ ]Other  [ ]Pressure ulcer(s) [ ]y [ ]n present on admission    CRITICAL CARE:  [ ]Shock Present  [ ]Septic [ ]Cardiogenic [ ]Neurologic [ ]Hypovolemic  [ ]Vasopressors [ ]Inotropes  [ ]Respiratory failure present [ ]Mechanical Ventilation [ ]Non-invasive ventilatory support [ ]High-Flow   [ ]Acute  [ ]Chronic [ ]Hypoxic  [ ]Hypercarbic [ ]Other  [ ]Other organ failure     LABS:                        9.8    18.72 )-----------( 199      ( 29 Feb 2024 22:58 )             29.2   02-29    134<L>  |  97  |  10  ----------------------------<  91  4.0   |  28  |  0.68    Ca    8.4      29 Feb 2024 22:58  Phos  3.5     02-29  Mg     1.9     02-29    TPro  5.5<L>  /  Alb  2.7<L>  /  TBili  0.8  /  DBili  x   /  AST  15  /  ALT  13  /  AlkPhos  61  02-29  PT/INR - ( 29 Feb 2024 22:58 )   PT: 22.1 sec;   INR: 2.05 ratio         PTT - ( 29 Feb 2024 22:58 )  PTT:28.8 sec    Urinalysis Basic - ( 29 Feb 2024 22:58 )    Color: x / Appearance: x / SG: x / pH: x  Gluc: 91 mg/dL / Ketone: x  / Bili: x / Urobili: x   Blood: x / Protein: x / Nitrite: x   Leuk Esterase: x / RBC: x / WBC x   Sq Epi: x / Non Sq Epi: x / Bacteria: x      RADIOLOGY & ADDITIONAL STUDIES:  < from: Xray Chest 1 View- PORTABLE-Routine (Xray Chest 1 View- PORTABLE-Routine in AM.) (02.29.24 @ 07:19) >    ACC: [id number] EXAM:  XR CHEST PORTABLE ROUTINE 1V   ORDERED BY: RADHA AGUILERA     PROCEDURE DATE:  02/29/2024          INTERPRETATION:  TECHNIQUE: A single AP view of the chest was obtained.   Ordered time:   2/29/2024 7:19 AM    COMPARISON: 02/27/2024    CLINICAL INFORMATION: Status post SBR with end ileostomy.    FINDINGS:  An NG tube is seen in the stomach.  The heart is not well assessed on an AP film.  The right lung is clear.  There is minimal patchy opacity obscuring the left hemidiaphragm.  There is no pneumothorax.    IMPRESSION:    Left basilar patchy opacity likely representing small effusion and   atelectasis    --- End of Report ---            ANTOINETTE BETTS MD; Attending Radiologist  This document has been electronically signed. Feb 29 2024  5:00PM    < end of copied text >    Protein Calorie Malnutrition Present: [ ]mild [ ]moderate [ ]severe [ ]underweight [ ]morbid obesity  https://www.andeal.org/vault/2440/web/files/ONC/Table_Clinical%20Characteristics%20to%20Document%20Malnutrition-White%20JV%20et%20al%202012.pdf    Height (cm): 162.6 (02-22-24 @ 18:55)  Weight (kg): 88.8 (02-22-24 @ 18:55)  BMI (kg/m2): 33.6 (02-22-24 @ 18:55)    [x]PPSV2 < or = 30%  [ ]significant weight loss [ ]poor nutritional intake [ ]anasarca[ ]Artificial Nutrition    Other REFERRALS:  [ ]Hospice  [ ]Child Life  [ ]Social Work  [ ]Case management [ ]Holistic Therapy     Goals of Care Document:

## 2024-03-01 NOTE — PROGRESS NOTE ADULT - SUBJECTIVE AND OBJECTIVE BOX
HISTORY      Interval events  -passed clamp trial  -sips and chips and convert to PO meds  -titrating off of cardizem gtt    SUBJECTIVE/ROS:  [ ] A ten-point review of systems was otherwise negative except as noted.  [X] Due to altered mental status/intubation, subjective information were not able to be obtained from the patient. History was obtained, to the extent possible, from review of the chart and collateral sources of information.      NEURO  GCS: 14      Exam: awake, alert, oriented to self  Meds: acetaminophen   IVPB .. 1000 milliGRAM(s) IV Intermittent every 6 hours PRN Moderate Pain (4 - 6)    [x] Adequacy of sedation and pain control has been assessed and adjusted      RESPIRATORY  RR: 25 (02-29-24 @ 23:00) (13 - 28)  SpO2: 93% (02-29-24 @ 23:00) (91% - 95%)    Exam: unlabored, clear to auscultation bilaterally      CARDIOVASCULAR  HR: 111 (02-29-24 @ 23:00) (102 - 126)  BP: 106/59 (02-29-24 @ 23:00) (92/71 - 123/55)  BP(mean): 78 (02-29-24 @ 23:00) (69 - 88)    VBG - ( 29 Feb 2024 06:26 )  pH: 7.47  /  pCO2: 43    /  pO2: 36    / HCO3: 31    / Base Excess: 6.9   /  SaO2: 66.2   Lactate: 2.6                Exam: Irregular rate and rhythm  Cardiac Rhythm: atrial fibrillation  Perfusion     [x]Adequate   [ ]Inadequate  Mentation   [x]Normal       [ ]Reduced  Extremities  [x]Warm         [ ]Cool  Volume Status [ ]Hypervolemic [x]Euvolemic [ ]Hypovolemic  Meds: diltiazem    Tablet 60 milliGRAM(s) Oral every 8 hours  diltiazem Infusion 5 mG/Hr IV Continuous <Continuous>  furosemide   Injectable 40 milliGRAM(s) IV Push daily  metoprolol tartrate Injectable 5 milliGRAM(s) IV Push every 4 hours        GI/NUTRITION  Exam: soft, nontender, nondistended, incision C/D/I, stoma pink, some output  Diet: NPO with medications, sips and chips    GENITOURINARY  I&O's Detail    02-28 @ 07:01  -  02-29 @ 07:00  --------------------------------------------------------  IN:    Diltiazem: 80 mL    Heparin: 264 mL    IV PiggyBack: 300 mL    IV PiggyBack: 500 mL    Lactated Ringers w/ Additives: 1200 mL  Total IN: 2344 mL    OUT:    Emesis (mL): 0 mL    Ileostomy (mL): 300 mL    Indwelling Catheter - Urethral (mL): 3360 mL    Nasogastric/Oral tube (mL): 500 mL  Total OUT: 4160 mL    Total NET: -1816 mL      02-29 @ 07:01  -  03-01 @ 00:04  --------------------------------------------------------  IN:    Diltiazem: 175 mL    Heparin: 110 mL    IV PiggyBack: 100 mL    Lactated Ringers w/ Additives: 850 mL    Oral Fluid: 40 mL  Total IN: 1275 mL    OUT:    Ileostomy (mL): 55 mL    Indwelling Catheter - Urethral (mL): 710 mL    Nasogastric/Oral tube (mL): 200 mL  Total OUT: 965 mL    Total NET: 310 mL          02-29    134<L>  |  97  |  10  ----------------------------<  91  4.0   |  28  |  0.68    Ca    8.4      29 Feb 2024 22:58  Phos  3.5     02-29  Mg     1.9     02-29    TPro  5.5<L>  /  Alb  2.7<L>  /  TBili  0.8  /  DBili  x   /  AST  15  /  ALT  13  /  AlkPhos  61  02-29      Meds: lactated ringers 1000 milliLiter(s) IV Continuous <Continuous>        HEMATOLOGIC  Meds: apixaban 5 milliGRAM(s) Oral every 12 hours    [x] VTE Prophylaxis                        9.8    18.72 )-----------( 199      ( 29 Feb 2024 22:58 )             29.2     PT/INR - ( 29 Feb 2024 22:58 )   PT: 22.1 sec;   INR: 2.05 ratio         PTT - ( 29 Feb 2024 22:58 )  PTT:28.8 sec  Transfusion     [ ] PRBC   [ ] Platelets   [ ] FFP   [ ] Cryoprecipitate      INFECTIOUS DISEASES  WBC Count: 18.72 K/uL (02-29 @ 22:58)  WBC Count: 17.79 K/uL (02-29 @ 06:28)        ENDOCRINE  CAPILLARY BLOOD GLUCOSE        Meds: levothyroxine Injectable 130 MICROGram(s) IV Push at bedtime        ACCESS DEVICES:  [X] Peripheral IV    [x] Necessity of urinary, arterial, and venous catheters discussed    OTHER MEDICATIONS:  chlorhexidine 2% Cloths 1 Application(s) Topical <User Schedule>      CODE STATUS: FULL

## 2024-03-01 NOTE — PROGRESS NOTE ADULT - ASSESSMENT
78-year-old female with PMH current smoker , COPD, A-fib  On Eliquis and Plavix, HTN, HLD presents for syncope from PCPs office yesterday.     # ischemia Bowel :  s/p OR   appreciate SICU care   now with iliues : NGT in place : now off   toelrating po        afib :  AC : cont ac and rate control       lehtargy /encephalopathy :   CT head negative   improved and seems at baseline   EEG pending : negative

## 2024-03-01 NOTE — PROGRESS NOTE ADULT - SUBJECTIVE AND OBJECTIVE BOX
SURGERY DAILY PROGRESS NOTE    SUBJECTIVE: Patient seen and evaluated on AM rounds.     Overnight Events:  No acute events overnight  -----------------------------------------------------------------------------------------------------------------------------------------------------------------------------------------------------------  OBJECTIVE:  Vital Signs Last 24 Hrs  T(C): 36.7 (02 Mar 2024 05:00), Max: 36.7 (02 Mar 2024 05:00)  T(F): 98 (02 Mar 2024 05:00), Max: 98 (02 Mar 2024 05:00)  HR: 122 (02 Mar 2024 06:00) (97 - 122)  BP: 109/53 (02 Mar 2024 06:00) (94/50 - 145/57)  BP(mean): 77 (02 Mar 2024 06:00) (67 - 89)  RR: 18 (02 Mar 2024 06:00) (14 - 25)  SpO2: 97% (02 Mar 2024 06:00) (93% - 99%)    Parameters below as of 01 Mar 2024 19:00  Patient On (Oxygen Delivery Method): room air      I&O's Detail    29 Feb 2024 07:01  -  01 Mar 2024 07:00  --------------------------------------------------------  IN:    Diltiazem: 180 mL    Heparin: 110 mL    IV PiggyBack: 200 mL    Lactated Ringers w/ Additives: 1200 mL    Oral Fluid: 40 mL  Total IN: 1730 mL    OUT:    Ileostomy (mL): 70 mL    Indwelling Catheter - Urethral (mL): 1020 mL    Nasogastric/Oral tube (mL): 200 mL  Total OUT: 1290 mL    Total NET: 440 mL      01 Mar 2024 07:01  -  02 Mar 2024 06:27  --------------------------------------------------------  IN:    IV PiggyBack: 300 mL    Lactated Ringers w/ Additives: 50 mL    Oral Fluid: 370 mL  Total IN: 720 mL    OUT:    Diltiazem: 0 mL    Ileostomy (mL): 380 mL    Indwelling Catheter - Urethral (mL): 620 mL  Total OUT: 1000 mL    Total NET: -280 mL        Daily     Daily   MEDICATIONS  (STANDING):  apixaban 5 milliGRAM(s) Oral every 12 hours  atorvastatin 80 milliGRAM(s) Oral at bedtime  chlorhexidine 2% Cloths 1 Application(s) Topical <User Schedule>  furosemide   Injectable 40 milliGRAM(s) IV Push daily  levothyroxine 175 MICROGram(s) Oral <User Schedule>  metoprolol tartrate 50 milliGRAM(s) Oral every 12 hours  sodium phosphate 15 milliMole(s)/250 mL IVPB 15 milliMole(s) IV Intermittent once    MEDICATIONS  (PRN):  acetaminophen   IVPB .. 1000 milliGRAM(s) IV Intermittent every 6 hours PRN Moderate Pain (4 - 6)      LABS:                        9.6    16.80 )-----------( 224      ( 02 Mar 2024 05:37 )             28.8     03-02    135  |  99  |  10  ----------------------------<  84  4.0   |  28  |  0.68    Ca    8.6      02 Mar 2024 05:37  Phos  2.6     03-02  Mg     1.9     03-02    TPro  5.8<L>  /  Alb  2.8<L>  /  TBili  0.9  /  DBili  x   /  AST  19  /  ALT  14  /  AlkPhos  65  03-02    PT/INR - ( 02 Mar 2024 05:37 )   PT: 23.4 sec;   INR: 2.18 ratio         PTT - ( 02 Mar 2024 05:37 )  PTT:31.6 sec  Urinalysis Basic - ( 02 Mar 2024 05:37 )    Color: x / Appearance: x / SG: x / pH: x  Gluc: 84 mg/dL / Ketone: x  / Bili: x / Urobili: x   Blood: x / Protein: x / Nitrite: x   Leuk Esterase: x / RBC: x / WBC x   Sq Epi: x / Non Sq Epi: x / Bacteria: x      PHYSICAL EXAM:  Constitutional: Well developed, well nourished, NAD  Pulmonary: Symmetric chest rise bilaterally, no increased WOB  Gastrointestinal: Abdomen soft, mildly distended, appropriate sx incisional tenderness with dressings c/d/i. Ileostomy pink with gas and liquid stool in ostomy bag. Groin: Melvin in place  Extremities: Warm to touch, soft. No cyanosis/erythema/edema

## 2024-03-01 NOTE — PROGRESS NOTE ADULT - SUBJECTIVE AND OBJECTIVE BOX
Subjective: Patient seen and examined. No new events except as noted.   remains in ICU       REVIEW OF SYSTEMS:    CONSTITUTIONAL: + weakness, fevers or chills  EYES/ENT: No visual changes;  No vertigo or throat pain   NECK: No pain or stiffness  RESPIRATORY: No cough, wheezing, hemoptysis; No shortness of breath  CARDIOVASCULAR: No chest pain or palpitations  GASTROINTESTINAL: No abdominal or epigastric pain. No nausea, vomiting, or hematemesis; No diarrhea or constipation. No melena or hematochezia.  GENITOURINARY: No dysuria, frequency or hematuria  NEUROLOGICAL: No numbness or weakness  SKIN: No itching, burning, rashes, or lesions   All other review of systems is negative unless indicated above.    MEDICATIONS:  MEDICATIONS  (STANDING):  apixaban 5 milliGRAM(s) Oral every 12 hours  chlorhexidine 2% Cloths 1 Application(s) Topical <User Schedule>  diltiazem    Tablet 60 milliGRAM(s) Oral every 8 hours  diltiazem Infusion 5 mG/Hr (5 mL/Hr) IV Continuous <Continuous>  furosemide   Injectable 40 milliGRAM(s) IV Push daily  lactated ringers 1000 milliLiter(s) (50 mL/Hr) IV Continuous <Continuous>  levothyroxine Injectable 130 MICROGram(s) IV Push at bedtime      PHYSICAL EXAM:  T(C): 36.4 (03-01-24 @ 07:00), Max: 37.1 (03-01-24 @ 05:00)  HR: 110 (03-01-24 @ 08:00) (101 - 118)  BP: 101/51 (03-01-24 @ 08:00) (92/71 - 136/82)  RR: 18 (03-01-24 @ 08:00) (13 - 28)  SpO2: 96% (03-01-24 @ 08:00) (91% - 96%)  Wt(kg): --  I&O's Summary    29 Feb 2024 07:01  -  01 Mar 2024 07:00  --------------------------------------------------------  IN: 1730 mL / OUT: 1290 mL / NET: 440 mL    01 Mar 2024 07:01  -  01 Mar 2024 09:51  --------------------------------------------------------  IN: 50 mL / OUT: 30 mL / NET: 20 mL            Appearance: NAD	  HEENT:  Dry  oral mucosa   Lymphatic: No lymphadenopathy , no edema  Cardiovascular: irregular S1 S2, No JVD, No murmurs , Peripheral pulses palpable 2+ bilaterally  Respiratory: decreased bs   Gastrointestinal:  soft, mildly distended, appropriately tender near midline incision, dressing c/d/i,  ileostomy leaking stool  Skin: No rashes, No ecchymoses, No cyanosis, warm to touch  Musculoskeletal: Normal range of motion, normal strength  Psychiatry:  sleepy   Ext: No edema  +amaya      LABS:    CARDIAC MARKERS:                                9.8    18.72 )-----------( 199      ( 29 Feb 2024 22:58 )             29.2     02-29    134<L>  |  97  |  10  ----------------------------<  91  4.0   |  28  |  0.68    Ca    8.4      29 Feb 2024 22:58  Phos  3.5     02-29  Mg     1.9     02-29    TPro  5.5<L>  /  Alb  2.7<L>  /  TBili  0.8  /  DBili  x   /  AST  15  /  ALT  13  /  AlkPhos  61  02-29    proBNP:   Lipid Profile:   HgA1c:   TSH:             TELEMETRY: 	AF    ECG:  	  RADIOLOGY:  x< from: Xray Chest 1 View- PORTABLE-Routine (Xray Chest 1 View- PORTABLE-Routine in AM.) (02.29.24 @ 07:19) >    ACC: [id number] EXAM:  XR CHEST PORTABLE ROUTINE 1V   ORDERED BY: RADHA AGUILERA     PROCEDURE DATE:  02/29/2024          INTERPRETATION:  TECHNIQUE: A single AP view of the chest was obtained.   Ordered time:   2/29/2024 7:19 AM    COMPARISON: 02/27/2024    CLINICAL INFORMATION: Status post SBR with end ileostomy.    FINDINGS:  An NG tube is seen in the stomach.  The heart is not well assessed on an AP film.  The right lung is clear.  There is minimal patchy opacity obscuring the left hemidiaphragm.  There is no pneumothorax.    IMPRESSION:    Left basilar patchy opacity likely representing small effusion and   atelectasis    --- End of Report ---      < end of copied text >    DIAGNOSTIC TESTING:  [ ] Echocardiogram:  [ ]  Catheterization:  [ ] Stress Test:    OTHER:

## 2024-03-01 NOTE — PROGRESS NOTE ADULT - SUBJECTIVE AND OBJECTIVE BOX
Date of service: 03-01-24 @ 15:06      Patient is a 78y old  Female who presents with a chief complaint of Syncope (29 Feb 2024 16:09)                                                               INTERVAL HPI/OVERNIGHT EVENTS:    REVIEW OF SYSTEMS:     CONSTITUTIONAL: No weakness, fevers or chills  RESPIRATORY: No cough, wheezing,  No shortness of breath  CARDIOVASCULAR: No chest pain or palpitations  GASTROINTESTINAL: No abdominal pain  . No nausea, vomiting, or hematemesis; No diarrhea or constipation. No melena or hematochezia.  GENITOURINARY: No dysuria, frequency or hematuria  NEUROLOGICAL: No numbness or weakness                                                                                                                                                                                                                                                                              Medications:  MEDICATIONS  (STANDING):  apixaban 5 milliGRAM(s) Oral every 12 hours  atorvastatin 80 milliGRAM(s) Oral at bedtime  chlorhexidine 2% Cloths 1 Application(s) Topical <User Schedule>  furosemide   Injectable 40 milliGRAM(s) IV Push daily  levothyroxine 175 MICROGram(s) Oral <User Schedule>  metoprolol tartrate 50 milliGRAM(s) Oral every 12 hours    MEDICATIONS  (PRN):  acetaminophen   IVPB .. 1000 milliGRAM(s) IV Intermittent every 6 hours PRN Moderate Pain (4 - 6)       Allergies    penicillin (Hives)    Intolerances      Vital Signs Last 24 Hrs  T(C): 36.6 (01 Mar 2024 11:00), Max: 37.1 (01 Mar 2024 05:00)  T(F): 97.8 (01 Mar 2024 11:00), Max: 98.8 (01 Mar 2024 05:00)  HR: 97 (01 Mar 2024 14:00) (97 - 115)  BP: 145/57 (01 Mar 2024 14:00) (92/71 - 145/57)  BP(mean): 82 (01 Mar 2024 14:00) (71 - 100)  RR: 19 (01 Mar 2024 14:00) (13 - 28)  SpO2: 95% (01 Mar 2024 14:00) (92% - 96%)    Parameters below as of 01 Mar 2024 11:00  Patient On (Oxygen Delivery Method): room air      CAPILLARY BLOOD GLUCOSE          02-29 @ 07:01  -  03-01 @ 07:00  --------------------------------------------------------  IN: 1730 mL / OUT: 1290 mL / NET: 440 mL    03-01 @ 07:01  -  03-01 @ 15:06  --------------------------------------------------------  IN: 200 mL / OUT: 590 mL / NET: -390 mL      Physical Exam:    Daily     Daily   General:  nad     HEENT:  Nonicteric, PERRLA  CV:  RRR, S1S2   Lungs:  CTA B/L, no wheezes, rales, rhonchi  Abdomen:  Soft, non-tender, no distended, positive BS  Extremities:  no edema                                                                                                                                                                                                                                                                         LABS:                               9.8    18.72 )-----------( 199      ( 29 Feb 2024 22:58 )             29.2                      02-29    134<L>  |  97  |  10  ----------------------------<  91  4.0   |  28  |  0.68    Ca    8.4      29 Feb 2024 22:58  Phos  3.5     02-29  Mg     1.9     02-29    TPro  5.5<L>  /  Alb  2.7<L>  /  TBili  0.8  /  DBili  x   /  AST  15  /  ALT  13  /  AlkPhos  61  02-29                       RADIOLOGY & ADDITIONAL TESTS         I personally reviewed: [  ]EKG   [  ]CXR    [  ] CT      A/P:         Discussed with :     [facility] consultants' Notes   Time spent :

## 2024-03-01 NOTE — PROGRESS NOTE ADULT - ASSESSMENT
78yFemale PMHx COPD, A-fib  On Eliquis and Plavix, HTN, HLD with signs of acute mesenteric ischemia now s/p diagnostic laparoscopy converted to exploratory laparotomy with 20 cm of terminal ileum resection with bowel left in discontinuity and temporary abdominal closure with abthera with mesenteric angiogram via R fem access 02-22 findings of celiac, SMA, SWATHI w/o flow, collateral perfusion confirmed. S/p Closure and ileostomy creation on 2/24.     PLAN  - Given patient prognosis, no re-vascularization options, consult Palliative   - Diet- CLD  - rec repeat CT abdomen  - Heparin gtt  - cont abx  - Metoprolol for rate control. On dilt gtt. Cardiology following.   - Palliative consult pending for C conversation  - Care per SICU      ACS/Trauma   [phone]

## 2024-03-01 NOTE — PROGRESS NOTE ADULT - ASSESSMENT
78-year-old female with PMH COPD, A-fib  On Eliquis and Plavix, COPD, HTN, HLD presents for syncope from PCPs office earlier today.  Patient was at normal checkup for blood work and was sitting on the table.  Daughter at bedside states that patient suddenly went limp while she was sitting on the table, her eyes rolled back, and her tongue turned to 1 side and this lasted for couple of seconds and then the patient regained consciousness.  Patient had no postictal period.  No tongue biting.  Patient does not remember these events.  Daughter at bedside states that patient has not been eating as much for the past day or 2 and has not been sleeping at all.                        2/23: OR: Diagnostic laparoscopy, ischemic bowel noted in RLQ just proximal to TI. Converted to laparotomy. 20cm of terminal ileum resected and bowel left in discontinuity.  celiac, SMA, SWATHI w/ no flow demonstrated on angiogram    2/24:  RTOR  End ileostomy, closed      Neuro:  - Alert and oriented, confused at times  - Acetaminophen PRN    Resp: PMH COPD  - OOB to chair, IS to prevent atelectasis  - RA    CV:  - Afib RVR HR 150s, BP 160s/90s,   - Metoprolol 5mg q4h  - Titrating off Diltiazem gtt  -PO cardizem 60mg q8    GI: 2/23, 2/24 s/p 20cm TI SBR, end ileostomy  - celiac, SMA, SWATHI w/ no flow demonstrated on angiogram  - NPO, ileus improving  - Clamp trial passed 2/29       Renal:  - Monitor I&Os and electrolytes w/ repletions as necessary  - 40 lasix daily, goal for net even to -500cc  - amaya in place, monitor UOP  - LR @ 50 with K  -Replete potassium     Heme:  - Monitor CBC and coags  - Heparin gtt for occluded celiac, SMA, SWATHI now transitioned to eliquis      ID:   - Monitor for clinical evidence of active infection  - Empiric antibiotics w/ zosyn (2/23-2/28)    Endo:   - Monitor glucose  - home synthroid    Skin:  # contact dermatitis, on buttock area  - derm consult, recommends plain petroleum jelly     Lines:  Amaya (2/23-)

## 2024-03-02 LAB
ALBUMIN SERPL ELPH-MCNC: 2.8 G/DL — LOW (ref 3.3–5)
ALP SERPL-CCNC: 65 U/L — SIGNIFICANT CHANGE UP (ref 40–120)
ALT FLD-CCNC: 14 U/L — SIGNIFICANT CHANGE UP (ref 10–45)
ANION GAP SERPL CALC-SCNC: 8 MMOL/L — SIGNIFICANT CHANGE UP (ref 5–17)
APTT BLD: 31.6 SEC — SIGNIFICANT CHANGE UP (ref 24.5–35.6)
AST SERPL-CCNC: 19 U/L — SIGNIFICANT CHANGE UP (ref 10–40)
BILIRUB SERPL-MCNC: 0.9 MG/DL — SIGNIFICANT CHANGE UP (ref 0.2–1.2)
BUN SERPL-MCNC: 10 MG/DL — SIGNIFICANT CHANGE UP (ref 7–23)
CALCIUM SERPL-MCNC: 8.6 MG/DL — SIGNIFICANT CHANGE UP (ref 8.4–10.5)
CHLORIDE SERPL-SCNC: 99 MMOL/L — SIGNIFICANT CHANGE UP (ref 96–108)
CO2 SERPL-SCNC: 28 MMOL/L — SIGNIFICANT CHANGE UP (ref 22–31)
CREAT SERPL-MCNC: 0.68 MG/DL — SIGNIFICANT CHANGE UP (ref 0.5–1.3)
EGFR: 90 ML/MIN/1.73M2 — SIGNIFICANT CHANGE UP
GLUCOSE SERPL-MCNC: 84 MG/DL — SIGNIFICANT CHANGE UP (ref 70–99)
HCT VFR BLD CALC: 28.8 % — LOW (ref 34.5–45)
HGB BLD-MCNC: 9.6 G/DL — LOW (ref 11.5–15.5)
INR BLD: 2.18 RATIO — HIGH (ref 0.85–1.18)
MAGNESIUM SERPL-MCNC: 1.9 MG/DL — SIGNIFICANT CHANGE UP (ref 1.6–2.6)
MCHC RBC-ENTMCNC: 32 PG — SIGNIFICANT CHANGE UP (ref 27–34)
MCHC RBC-ENTMCNC: 33.3 GM/DL — SIGNIFICANT CHANGE UP (ref 32–36)
MCV RBC AUTO: 96 FL — SIGNIFICANT CHANGE UP (ref 80–100)
NRBC # BLD: 0 /100 WBCS — SIGNIFICANT CHANGE UP (ref 0–0)
PHOSPHATE SERPL-MCNC: 2.6 MG/DL — SIGNIFICANT CHANGE UP (ref 2.5–4.5)
PLATELET # BLD AUTO: 224 K/UL — SIGNIFICANT CHANGE UP (ref 150–400)
POTASSIUM SERPL-MCNC: 4 MMOL/L — SIGNIFICANT CHANGE UP (ref 3.5–5.3)
POTASSIUM SERPL-SCNC: 4 MMOL/L — SIGNIFICANT CHANGE UP (ref 3.5–5.3)
PROT SERPL-MCNC: 5.8 G/DL — LOW (ref 6–8.3)
PROTHROM AB SERPL-ACNC: 23.4 SEC — HIGH (ref 9.5–13)
RBC # BLD: 3 M/UL — LOW (ref 3.8–5.2)
RBC # FLD: 13.7 % — SIGNIFICANT CHANGE UP (ref 10.3–14.5)
SODIUM SERPL-SCNC: 135 MMOL/L — SIGNIFICANT CHANGE UP (ref 135–145)
T4 AB SER-ACNC: 9.5 UG/DL — SIGNIFICANT CHANGE UP (ref 4.6–12)
TSH SERPL-MCNC: 0.29 UIU/ML — SIGNIFICANT CHANGE UP (ref 0.27–4.2)
WBC # BLD: 16.8 K/UL — HIGH (ref 3.8–10.5)
WBC # FLD AUTO: 16.8 K/UL — HIGH (ref 3.8–10.5)

## 2024-03-02 RX ORDER — ACETAMINOPHEN 500 MG
650 TABLET ORAL EVERY 6 HOURS
Refills: 0 | Status: DISCONTINUED | OUTPATIENT
Start: 2024-03-02 | End: 2024-03-09

## 2024-03-02 RX ORDER — LEVOTHYROXINE SODIUM 125 MCG
175 TABLET ORAL DAILY
Refills: 0 | Status: DISCONTINUED | OUTPATIENT
Start: 2024-03-03 | End: 2024-03-09

## 2024-03-02 RX ORDER — NICOTINE POLACRILEX 2 MG
1 GUM BUCCAL DAILY
Refills: 0 | Status: DISCONTINUED | OUTPATIENT
Start: 2024-03-02 | End: 2024-03-09

## 2024-03-02 RX ORDER — METOPROLOL TARTRATE 50 MG
50 TABLET ORAL EVERY 8 HOURS
Refills: 0 | Status: DISCONTINUED | OUTPATIENT
Start: 2024-03-02 | End: 2024-03-05

## 2024-03-02 RX ORDER — SODIUM CHLORIDE 9 MG/ML
500 INJECTION, SOLUTION INTRAVENOUS ONCE
Refills: 0 | Status: COMPLETED | OUTPATIENT
Start: 2024-03-02 | End: 2024-03-02

## 2024-03-02 RX ORDER — SODIUM CHLORIDE 9 MG/ML
1000 INJECTION, SOLUTION INTRAVENOUS
Refills: 0 | Status: DISCONTINUED | OUTPATIENT
Start: 2024-03-02 | End: 2024-03-02

## 2024-03-02 RX ADMIN — Medication 40 MILLIGRAM(S): at 06:14

## 2024-03-02 RX ADMIN — Medication 50 MILLIGRAM(S): at 06:14

## 2024-03-02 RX ADMIN — Medication 255 MILLIMOLE(S): at 06:36

## 2024-03-02 RX ADMIN — APIXABAN 5 MILLIGRAM(S): 2.5 TABLET, FILM COATED ORAL at 17:40

## 2024-03-02 RX ADMIN — Medication 650 MILLIGRAM(S): at 16:24

## 2024-03-02 RX ADMIN — CHLORHEXIDINE GLUCONATE 1 APPLICATION(S): 213 SOLUTION TOPICAL at 06:14

## 2024-03-02 RX ADMIN — Medication 50 MILLIGRAM(S): at 13:14

## 2024-03-02 RX ADMIN — Medication 1 PATCH: at 17:39

## 2024-03-02 RX ADMIN — Medication 650 MILLIGRAM(S): at 15:24

## 2024-03-02 RX ADMIN — APIXABAN 5 MILLIGRAM(S): 2.5 TABLET, FILM COATED ORAL at 06:13

## 2024-03-02 RX ADMIN — ATORVASTATIN CALCIUM 80 MILLIGRAM(S): 80 TABLET, FILM COATED ORAL at 22:41

## 2024-03-02 RX ADMIN — Medication 175 MICROGRAM(S): at 06:13

## 2024-03-02 RX ADMIN — SODIUM CHLORIDE 500 MILLILITER(S): 9 INJECTION, SOLUTION INTRAVENOUS at 18:33

## 2024-03-02 RX ADMIN — Medication 1 PATCH: at 19:50

## 2024-03-02 NOTE — PROGRESS NOTE ADULT - ASSESSMENT
78yFemale PMHx COPD, A-fib  On Eliquis and Plavix, HTN, HLD with signs of acute mesenteric ischemia now s/p diagnostic laparoscopy converted to exploratory laparotomy with 20 cm of terminal ileum resection with bowel left in discontinuity and temporary abdominal closure with abthera with mesenteric angiogram via R fem access 02-22 findings of celiac, SMA, SWATHI w/o flow, collateral perfusion confirmed. S/p Closure and ileostomy creation on 2/24.     PLAN  - Given patient prognosis, no re-vascularization options, consult Palliative   - Diet- FLD  - Eliquis 5 mg BID  - cont abx  - Metoprolol for rate control   - Palliative consult pending for [facility] conversation  - PT> JOSE  - PM&R consult- f/u recs   - Appreciate excellent care per SICU      ACS/Trauma   [phone]

## 2024-03-02 NOTE — PROGRESS NOTE ADULT - SUBJECTIVE AND OBJECTIVE BOX
Date of service: 03-02-24 @ 23:11      Patient is a 78y old  Female who presents with a chief complaint of Syncope (01 Mar 2024 15:42)                                                               INTERVAL HPI/OVERNIGHT EVENTS:    REVIEW OF SYSTEMS:     CONSTITUTIONAL: No weakness, fevers or chills  RESPIRATORY: No cough, wheezing,  No shortness of breath  CARDIOVASCULAR: No chest pain or palpitations  GASTROINTESTINAL: No abdominal pain  . No nausea, vomiting, or hematemesis; No diarrhea or constipation. No melena or hematochezia.  GENITOURINARY: No dysuria, frequency or hematuria  NEUROLOGICAL: No numbness or weakness                                                                                                                                                                                                                                                                             Medications:  MEDICATIONS  (STANDING):  apixaban 5 milliGRAM(s) Oral every 12 hours  atorvastatin 80 milliGRAM(s) Oral at bedtime  chlorhexidine 2% Cloths 1 Application(s) Topical <User Schedule>  metoprolol tartrate 50 milliGRAM(s) Oral every 8 hours  nicotine -  14 mG/24Hr(s) Patch 1 Patch Transdermal daily    MEDICATIONS  (PRN):  acetaminophen     Tablet .. 650 milliGRAM(s) Oral every 6 hours PRN Mild Pain (1 - 3)       Allergies    penicillin (Hives)    Intolerances      Vital Signs Last 24 Hrs  T(C): 36.3 (02 Mar 2024 21:00), Max: 36.7 (02 Mar 2024 05:00)  T(F): 97.4 (02 Mar 2024 21:00), Max: 98 (02 Mar 2024 05:00)  HR: 88 (02 Mar 2024 22:30) (84 - 122)  BP: 97/67 (02 Mar 2024 22:30) (94/52 - 127/56)  BP(mean): 82 (02 Mar 2024 10:00) (67 - 82)  RR: 18 (02 Mar 2024 21:00) (17 - 23)  SpO2: 94% (02 Mar 2024 21:00) (92% - 99%)    Parameters below as of 02 Mar 2024 21:00  Patient On (Oxygen Delivery Method): room air      CAPILLARY BLOOD GLUCOSE          03-01 @ 07:01  -  03-02 @ 07:00  --------------------------------------------------------  IN: 970 mL / OUT: 1850 mL / NET: -880 mL    03-02 @ 07:01  -  03-02 @ 23:11  --------------------------------------------------------  IN: 620 mL / OUT: 675 mL / NET: -55 mL      Physical Exam:    Daily     Daily   General:  Well appearing, NAD, not cachetic  HEENT:  Nonicteric, PERRLA  CV:  RRR, S1S2   Lungs:  CTA B/L, no wheezes, rales, rhonchi  Abdomen:  Soft, iliestomy bag in place   Extremities:  2+ pulses, no c/c, no edema                                                                                                                                                                                                                                                                                            LABS:                               9.6    16.80 )-----------( 224      ( 02 Mar 2024 05:37 )             28.8                      03-02    135  |  99  |  10  ----------------------------<  84  4.0   |  28  |  0.68    Ca    8.6      02 Mar 2024 05:37  Phos  2.6     03-02  Mg     1.9     03-02    TPro  5.8<L>  /  Alb  2.8<L>  /  TBili  0.9  /  DBili  x   /  AST  19  /  ALT  14  /  AlkPhos  65  03-02                       RADIOLOGY & ADDITIONAL TESTS         I personally reviewed: [  ]EKG   [  ]CXR    [  ] CT      A/P:         Discussed with :     [facility] consultants' Notes   Time spent :

## 2024-03-02 NOTE — PROGRESS NOTE ADULT - ASSESSMENT
78-year-old female with PMH current smoker , COPD, A-fib  On Eliquis and Plavix, HTN, HLD presents for syncope from PCPs office   Patient was at normal checkup for blood work and was sitting on the table.  Daughter at bedside states that patient suddenly went limp while she was sitting on the table, her eyes rolled back, and her tongue turned to 1 side and this lasted for couple of seconds and then the patient regained consciousness.  Patient had no postictal period.  No tongue biting.  Patient does not remember these events.    [Negative] : Heme/Lymph

## 2024-03-02 NOTE — PROGRESS NOTE ADULT - SUBJECTIVE AND OBJECTIVE BOX
24 HOUR EVENTS:  - advanced to FLD    SUBJECTIVE/ROS:  [x] A ten-point review of systems was otherwise negative except as noted.  [ ] Due to altered mental status/intubation, subjective information were not able to be obtained from the patient. History was obtained, to the extent possible, from review of the chart and collateral sources of information.      NEURO  GCS: 14      Exam: awake, alert, oriented to self  Meds: acetaminophen   IVPB .. 1000 milliGRAM(s) IV Intermittent every 6 hours PRN Moderate Pain (4 - 6)    [x] Adequacy of sedation and pain control has been assessed and adjusted      RESPIRATORY  RR: 24 (03-01-24 @ 23:00) (14 - 25)  SpO2: 93% (03-01-24 @ 23:00) (93% - 97%)  Wt(kg): --  Exam: unlabored, clear to auscultation bilaterally  Mechanical Ventilation:     Meds:       CARDIOVASCULAR  HR: 105 (03-01-24 @ 23:00) (97 - 114)  BP: 114/59 (03-01-24 @ 23:00) (94/50 - 145/57)  BP(mean): 81 (03-01-24 @ 23:00) (68 - 100)  ABP: --  ABP(mean): --  Wt(kg): --  CVP(cm H2O): --  VBG - ( 29 Feb 2024 06:26 )  pH: 7.47  /  pCO2: 43    /  pO2: 36    / HCO3: 31    / Base Excess: 6.9   /  SaO2: 66.2   Lactate: 2.6              Exam: Irregular rate and rhythm  Cardiac Rhythm: atrial fibrillation  Perfusion     [x]Adequate   [ ]Inadequate  Mentation   [x]Normal       [ ]Reduced  Extremities  [x]Warm         [ ]Cool  Volume Status [ ]Hypervolemic [x]Euvolemic [ ]Hypovolemic  Meds: furosemide   Injectable 40 milliGRAM(s) IV Push daily  metoprolol tartrate 50 milliGRAM(s) Oral every 12 hours        GI/NUTRITION  Exam: soft, nontender, nondistended, incision C/D/I, stoma pink, some output  Diet: NPO with medications, sips and chips  Meds:     GENITOURINARY  I&O's Detail    02-29 @ 07:01  -  03-01 @ 07:00  --------------------------------------------------------  IN:    Diltiazem: 180 mL    Heparin: 110 mL    IV PiggyBack: 200 mL    Lactated Ringers w/ Additives: 1200 mL    Oral Fluid: 40 mL  Total IN: 1730 mL    OUT:    Ileostomy (mL): 70 mL    Indwelling Catheter - Urethral (mL): 1020 mL    Nasogastric/Oral tube (mL): 200 mL  Total OUT: 1290 mL    Total NET: 440 mL      03-01 @ 07:01  -  03-02 @ 00:35  --------------------------------------------------------  IN:    IV PiggyBack: 300 mL    Lactated Ringers w/ Additives: 50 mL    Oral Fluid: 370 mL  Total IN: 720 mL    OUT:    Diltiazem: 0 mL    Ileostomy (mL): 380 mL    Indwelling Catheter - Urethral (mL): 425 mL  Total OUT: 805 mL    Total NET: -85 mL          03-01    135  |  97  |  10  ----------------------------<  97  3.3<L>   |  30  |  0.73    Ca    8.6      01 Mar 2024 17:30  Phos  3.0     03-01  Mg     2.0     03-01    TPro  5.4<L>  /  Alb  2.7<L>  /  TBili  0.8  /  DBili  x   /  AST  17  /  ALT  13  /  AlkPhos  60  03-01    [ ] Melvin catheter, indication: N/A  Meds:       HEMATOLOGIC  Meds: apixaban 5 milliGRAM(s) Oral every 12 hours    [x] VTE Prophylaxis                        9.8    18.72 )-----------( 199      ( 29 Feb 2024 22:58 )             29.2     PT/INR - ( 29 Feb 2024 22:58 )   PT: 22.1 sec;   INR: 2.05 ratio         PTT - ( 29 Feb 2024 22:58 )  PTT:28.8 sec  Transfusion     [ ] PRBC   [ ] Platelets   [ ] FFP   [ ] Cryoprecipitate      INFECTIOUS DISEASES  T(C): 36.6 (03-01-24 @ 23:00), Max: 37.1 (03-01-24 @ 05:00)  Wt(kg): --    Recent Cultures:    Meds:       ENDOCRINE  Capillary Blood Glucose    Meds: atorvastatin 80 milliGRAM(s) Oral at bedtime  levothyroxine 175 MICROGram(s) Oral <User Schedule>        ACCESS DEVICES:  [X] Peripheral IV  [x] Necessity of urinary, arterial, and venous catheters discussed    OTHER MEDICATIONS:  chlorhexidine 2% Cloths 1 Application(s) Topical <User Schedule>      CODE STATUS: full code    IMAGING:

## 2024-03-02 NOTE — PROGRESS NOTE ADULT - ASSESSMENT
78-year-old female with PMH current smoker , COPD, A-fib  On Eliquis and Plavix, HTN, HLD presents for syncope from PCPs office yesterday.     # ischemia Bowel :  s/p OR   appreciate SICU care   now with iliues : NGT in place : now off   toelrating po        afib :  AC : cont ac and rate control       lehtargy /encephalopathy :   CT head negative   improved and seems at baseline   EEG pending : negative       OOB   IS   PT

## 2024-03-02 NOTE — PROGRESS NOTE ADULT - SUBJECTIVE AND OBJECTIVE BOX
SURGERY DAILY PROGRESS NOTE    SUBJECTIVE: Patient seen and evaluated on AM rounds. No acute distress. On RA. Tolerating FLD. Ostomy functional with gas/stool.       Overnight Events:  No acute events overnight  -----------------------------------------------------------------------------------------------------------------------------------------------------------------------------------------------------------  OBJECTIVE:  Vital Signs Last 24 Hrs  T(C): 36.3 (02 Mar 2024 11:15), Max: 36.7 (02 Mar 2024 05:00)  T(F): 97.4 (02 Mar 2024 11:15), Max: 98 (02 Mar 2024 05:00)  HR: 95 (02 Mar 2024 11:15) (95 - 122)  BP: 108/74 (02 Mar 2024 11:15) (94/50 - 145/57)  BP(mean): 82 (02 Mar 2024 10:00) (67 - 86)  RR: 18 (02 Mar 2024 11:15) (14 - 24)  SpO2: 97% (02 Mar 2024 11:15) (92% - 99%)    Parameters below as of 02 Mar 2024 11:15  Patient On (Oxygen Delivery Method): room air      I&O's Detail    01 Mar 2024 07:01  -  02 Mar 2024 07:00  --------------------------------------------------------  IN:    IV PiggyBack: 550 mL    Lactated Ringers w/ Additives: 50 mL    Oral Fluid: 370 mL  Total IN: 970 mL    OUT:    Diltiazem: 0 mL    Ileostomy (mL): 1130 mL    Indwelling Catheter - Urethral (mL): 720 mL  Total OUT: 1850 mL    Total NET: -880 mL      02 Mar 2024 07:01  -  02 Mar 2024 11:49  --------------------------------------------------------  IN:  Total IN: 0 mL    OUT:    Indwelling Catheter - Urethral (mL): 150 mL  Total OUT: 150 mL    Total NET: -150 mL        Daily     Daily   MEDICATIONS  (STANDING):  apixaban 5 milliGRAM(s) Oral every 12 hours  atorvastatin 80 milliGRAM(s) Oral at bedtime  chlorhexidine 2% Cloths 1 Application(s) Topical <User Schedule>  metoprolol tartrate 50 milliGRAM(s) Oral every 8 hours    MEDICATIONS  (PRN):  acetaminophen     Tablet .. 650 milliGRAM(s) Oral every 6 hours PRN Mild Pain (1 - 3)      LABS:                        9.6    16.80 )-----------( 224      ( 02 Mar 2024 05:37 )             28.8     03-02    135  |  99  |  10  ----------------------------<  84  4.0   |  28  |  0.68    Ca    8.6      02 Mar 2024 05:37  Phos  2.6     03-02  Mg     1.9     03-02    TPro  5.8<L>  /  Alb  2.8<L>  /  TBili  0.9  /  DBili  x   /  AST  19  /  ALT  14  /  AlkPhos  65  03-02    PT/INR - ( 02 Mar 2024 05:37 )   PT: 23.4 sec;   INR: 2.18 ratio         PTT - ( 02 Mar 2024 05:37 )  PTT:31.6 sec  Urinalysis Basic - ( 02 Mar 2024 05:37 )    Color: x / Appearance: x / SG: x / pH: x  Gluc: 84 mg/dL / Ketone: x  / Bili: x / Urobili: x   Blood: x / Protein: x / Nitrite: x   Leuk Esterase: x / RBC: x / WBC x   Sq Epi: x / Non Sq Epi: x / Bacteria: x      PHYSICAL EXAM:  Constitutional: Well developed, well nourished, NAD  Pulmonary: Symmetric chest rise bilaterally, no increased WOB  Gastrointestinal: Abdomen soft, mildly distended, appropriate sx incisional tenderness with dressings c/d/i. Ileostomy pink with gas and liquid stool in ostomy bag. Groin: Melvin in place  Extremities: Warm to touch, soft. No cyanosis/erythema/edema

## 2024-03-02 NOTE — CHART NOTE - NSCHARTNOTEFT_GEN_A_CORE
78yFemale PMHx COPD, A-fib  On Eliquis and Plavix, HTN, HLD with signs of acute mesenteric ischemia now s/p diagnostic laparoscopy converted to exploratory laparotomy with 20 cm of terminal ileum resection with bowel left in discontinuity and temporary abdominal closure with abthera with mesenteric angiogram via R fem access 02-22 findings of celiac, SMA, SWATHI w/o flow, collateral perfusion confirmed. S/p Closure and ileostomy creation on 2/24. Ostomy now functional. Tolerating FLD. Deemed stable for transfer to floor.    Plan   Melvin out, f/u TOV @ 6pm  Eliquis 5 mg BID  Afib RVR on Metoprolol 50 mg TID   Regular Diet  PT> JOSE  f/u PM&R recs   f/u Cardiology recs     ACS/Trauma   [id number]

## 2024-03-02 NOTE — PROGRESS NOTE ADULT - ASSESSMENT
78-year-old female with PMH COPD, A-fib  On Eliquis and Plavix, COPD, HTN, HLD presents for syncope from PCPs office earlier today.  Patient was at normal checkup for blood work and was sitting on the table.  Daughter at bedside states that patient suddenly went limp while she was sitting on the table, her eyes rolled back, and her tongue turned to 1 side and this lasted for couple of seconds and then the patient regained consciousness.  Patient had no postictal period.  No tongue biting.  Patient does not remember these events.  Daughter at bedside states that patient has not been eating as much for the past day or 2 and has not been sleeping at all.                        2/23: OR: Diagnostic laparoscopy, ischemic bowel noted in RLQ just proximal to TI. Converted to laparotomy. 20cm of terminal ileum resected and bowel left in discontinuity.  celiac, SMA, SWATHI w/ no flow demonstrated on angiogram    2/24:  RTOR  End ileostomy, closed      Neuro:  - AOx2-3  - Acetaminophen PRN    Resp: PMH COPD  - OOB to chair, IS to prevent atelectasis  - RA    CV:  - Afib RVR s/p diltiazem gtt  - Metoprolol 50mg BID  - atorvastatin 80 qD  - apixaban qD    GI: 2/23, 2/24 s/p 20cm TI SBR, end ileostomy  - Celiac, SMA, SWATHI w/ no flow demonstrated on angiogram  - Diet: FLD    Renal:  - Monitor I&Os and electrolytes w/ repletions as necessary  - 40 IV lasix daily, goal for net even to -500cc  - amaya in place, monitor UOP    Heme:  - Monitor CBC and coags  - s/p heparin gtt for occluded celiac, SMA, SWATHI  - apixaban qD      ID:   - Monitor for clinical evidence of active infection  - s/p empiric zosyn (2/23-2/28)    Endo:   - Monitor glucose  - home synthroid    Skin:  # contact dermatitis, on buttock area  - derm consult, recommends plain petroleum jelly     Lines:  Amaya (2/23-)

## 2024-03-02 NOTE — PROGRESS NOTE ADULT - SUBJECTIVE AND OBJECTIVE BOX
Subjective: Patient seen and examined. No new events except as noted.   Moved out of ICU     REVIEW OF SYSTEMS:    CONSTITUTIONAL: +weakness, fevers or chills  EYES/ENT: No visual changes;  No vertigo or throat pain   NECK: No pain or stiffness  RESPIRATORY: No cough, wheezing, hemoptysis; No shortness of breath  CARDIOVASCULAR: No chest pain or palpitations  GASTROINTESTINAL: No abdominal or epigastric pain. No nausea, vomiting, or hematemesis; No diarrhea or constipation. No melena or hematochezia.  GENITOURINARY: No dysuria, frequency or hematuria  NEUROLOGICAL: No numbness or weakness  SKIN: No itching, burning, rashes, or lesions   All other review of systems is negative unless indicated above.    MEDICATIONS:  MEDICATIONS  (STANDING):  apixaban 5 milliGRAM(s) Oral every 12 hours  atorvastatin 80 milliGRAM(s) Oral at bedtime  chlorhexidine 2% Cloths 1 Application(s) Topical <User Schedule>  metoprolol tartrate 50 milliGRAM(s) Oral every 8 hours  nicotine -  14 mG/24Hr(s) Patch 1 Patch Transdermal daily      PHYSICAL EXAM:  T(C): 36.4 (03-02-24 @ 17:08), Max: 36.7 (03-02-24 @ 05:00)  HR: 86 (03-02-24 @ 17:08) (84 - 122)  BP: 104/73 (03-02-24 @ 18:00) (94/52 - 127/56)  RR: 18 (03-02-24 @ 17:08) (16 - 24)  SpO2: 95% (03-02-24 @ 17:08) (92% - 99%)  Wt(kg): --  I&O's Summary    01 Mar 2024 07:01  -  02 Mar 2024 07:00  --------------------------------------------------------  IN: 970 mL / OUT: 1850 mL / NET: -880 mL    02 Mar 2024 07:01  -  02 Mar 2024 19:34  --------------------------------------------------------  IN: 620 mL / OUT: 600 mL / NET: 20 mL          Appearance: NAD	  HEENT:  Dry  oral mucosa   Lymphatic: No lymphadenopathy , no edema  Cardiovascular: irregular S1 S2, No JVD, No murmurs , Peripheral pulses palpable 2+ bilaterally  Respiratory: decreased bs   Gastrointestinal:  soft, mildly distended, appropriately tender near midline incision, dressing c/d/i,  ileostomy leaking stool  Skin: No rashes, No ecchymoses, No cyanosis, warm to touch  Musculoskeletal: Normal range of motion, normal strength  Psychiatry:  sleepy   Ext: No edema  +amaya        LABS:    CARDIAC MARKERS:                                9.6    16.80 )-----------( 224      ( 02 Mar 2024 05:37 )             28.8     03-02    135  |  99  |  10  ----------------------------<  84  4.0   |  28  |  0.68    Ca    8.6      02 Mar 2024 05:37  Phos  2.6     03-02  Mg     1.9     03-02    TPro  5.8<L>  /  Alb  2.8<L>  /  TBili  0.9  /  DBili  x   /  AST  19  /  ALT  14  /  AlkPhos  65  03-02    proBNP:   Lipid Profile:   HgA1c:   TSH:             TELEMETRY: 	AF    ECG:  	  RADIOLOGY:   DIAGNOSTIC TESTING:  [ ] Echocardiogram:  [ ]  Catheterization:  [ ] Stress Test:    OTHER:

## 2024-03-03 LAB
ALBUMIN SERPL ELPH-MCNC: 2.5 G/DL — LOW (ref 3.3–5)
ALP SERPL-CCNC: 65 U/L — SIGNIFICANT CHANGE UP (ref 40–120)
ALT FLD-CCNC: 12 U/L — SIGNIFICANT CHANGE UP (ref 10–45)
ANION GAP SERPL CALC-SCNC: 9 MMOL/L — SIGNIFICANT CHANGE UP (ref 5–17)
APTT BLD: 31.4 SEC — SIGNIFICANT CHANGE UP (ref 24.5–35.6)
AST SERPL-CCNC: 20 U/L — SIGNIFICANT CHANGE UP (ref 10–40)
BILIRUB SERPL-MCNC: 1 MG/DL — SIGNIFICANT CHANGE UP (ref 0.2–1.2)
BUN SERPL-MCNC: 9 MG/DL — SIGNIFICANT CHANGE UP (ref 7–23)
CALCIUM SERPL-MCNC: 8.1 MG/DL — LOW (ref 8.4–10.5)
CHLORIDE SERPL-SCNC: 98 MMOL/L — SIGNIFICANT CHANGE UP (ref 96–108)
CO2 SERPL-SCNC: 27 MMOL/L — SIGNIFICANT CHANGE UP (ref 22–31)
CREAT SERPL-MCNC: 0.6 MG/DL — SIGNIFICANT CHANGE UP (ref 0.5–1.3)
EGFR: 92 ML/MIN/1.73M2 — SIGNIFICANT CHANGE UP
GLUCOSE SERPL-MCNC: 65 MG/DL — LOW (ref 70–99)
HCT VFR BLD CALC: 27.1 % — LOW (ref 34.5–45)
HGB BLD-MCNC: 9 G/DL — LOW (ref 11.5–15.5)
INR BLD: 2.32 RATIO — HIGH (ref 0.85–1.18)
MAGNESIUM SERPL-MCNC: 1.7 MG/DL — SIGNIFICANT CHANGE UP (ref 1.6–2.6)
MCHC RBC-ENTMCNC: 31.6 PG — SIGNIFICANT CHANGE UP (ref 27–34)
MCHC RBC-ENTMCNC: 33.2 GM/DL — SIGNIFICANT CHANGE UP (ref 32–36)
MCV RBC AUTO: 95.1 FL — SIGNIFICANT CHANGE UP (ref 80–100)
NRBC # BLD: 0 /100 WBCS — SIGNIFICANT CHANGE UP (ref 0–0)
PHOSPHATE SERPL-MCNC: 3 MG/DL — SIGNIFICANT CHANGE UP (ref 2.5–4.5)
PLATELET # BLD AUTO: 201 K/UL — SIGNIFICANT CHANGE UP (ref 150–400)
POTASSIUM SERPL-MCNC: 3.4 MMOL/L — LOW (ref 3.5–5.3)
POTASSIUM SERPL-SCNC: 3.4 MMOL/L — LOW (ref 3.5–5.3)
PROT SERPL-MCNC: 5.3 G/DL — LOW (ref 6–8.3)
PROTHROM AB SERPL-ACNC: 24.9 SEC — HIGH (ref 9.5–13)
RBC # BLD: 2.85 M/UL — LOW (ref 3.8–5.2)
RBC # FLD: 13.8 % — SIGNIFICANT CHANGE UP (ref 10.3–14.5)
SODIUM SERPL-SCNC: 134 MMOL/L — LOW (ref 135–145)
WBC # BLD: 14.53 K/UL — HIGH (ref 3.8–10.5)
WBC # FLD AUTO: 14.53 K/UL — HIGH (ref 3.8–10.5)

## 2024-03-03 RX ORDER — POTASSIUM CHLORIDE 20 MEQ
40 PACKET (EA) ORAL ONCE
Refills: 0 | Status: COMPLETED | OUTPATIENT
Start: 2024-03-03 | End: 2024-03-03

## 2024-03-03 RX ORDER — CLOPIDOGREL BISULFATE 75 MG/1
75 TABLET, FILM COATED ORAL DAILY
Refills: 0 | Status: DISCONTINUED | OUTPATIENT
Start: 2024-03-03 | End: 2024-03-09

## 2024-03-03 RX ADMIN — Medication 175 MICROGRAM(S): at 05:37

## 2024-03-03 RX ADMIN — CLOPIDOGREL BISULFATE 75 MILLIGRAM(S): 75 TABLET, FILM COATED ORAL at 11:20

## 2024-03-03 RX ADMIN — ATORVASTATIN CALCIUM 80 MILLIGRAM(S): 80 TABLET, FILM COATED ORAL at 22:18

## 2024-03-03 RX ADMIN — Medication 50 MILLIGRAM(S): at 13:48

## 2024-03-03 RX ADMIN — Medication 1 PATCH: at 09:41

## 2024-03-03 RX ADMIN — Medication 1 PATCH: at 11:20

## 2024-03-03 RX ADMIN — Medication 50 MILLIGRAM(S): at 22:18

## 2024-03-03 RX ADMIN — APIXABAN 5 MILLIGRAM(S): 2.5 TABLET, FILM COATED ORAL at 05:37

## 2024-03-03 RX ADMIN — APIXABAN 5 MILLIGRAM(S): 2.5 TABLET, FILM COATED ORAL at 17:52

## 2024-03-03 RX ADMIN — Medication 50 MILLIGRAM(S): at 01:43

## 2024-03-03 RX ADMIN — Medication 40 MILLIEQUIVALENT(S): at 17:52

## 2024-03-03 RX ADMIN — Medication 20 MILLIGRAM(S): at 05:39

## 2024-03-03 RX ADMIN — CHLORHEXIDINE GLUCONATE 1 APPLICATION(S): 213 SOLUTION TOPICAL at 07:58

## 2024-03-03 RX ADMIN — Medication 1 PATCH: at 19:56

## 2024-03-03 RX ADMIN — Medication 50 MILLIGRAM(S): at 05:37

## 2024-03-03 NOTE — PROGRESS NOTE ADULT - SUBJECTIVE AND OBJECTIVE BOX
Subjective: Patient seen and examined. No new events except as noted.     REVIEW OF SYSTEMS:    CONSTITUTIONAL:+ weakness, fevers or chills  EYES/ENT: No visual changes;  No vertigo or throat pain   NECK: No pain or stiffness  RESPIRATORY: No cough, wheezing, hemoptysis; No shortness of breath  CARDIOVASCULAR: No chest pain or palpitations  GASTROINTESTINAL: No abdominal or epigastric pain. No nausea, vomiting, or hematemesis; No diarrhea or constipation. No melena or hematochezia.  GENITOURINARY: No dysuria, frequency or hematuria  NEUROLOGICAL: No numbness or weakness  SKIN: No itching, burning, rashes, or lesions   All other review of systems is negative unless indicated above.    MEDICATIONS:  MEDICATIONS  (STANDING):  apixaban 5 milliGRAM(s) Oral every 12 hours  atorvastatin 80 milliGRAM(s) Oral at bedtime  chlorhexidine 2% Cloths 1 Application(s) Topical <User Schedule>  levothyroxine 175 MICROGram(s) Oral daily  metoprolol tartrate 50 milliGRAM(s) Oral every 8 hours  nicotine -  14 mG/24Hr(s) Patch 1 Patch Transdermal daily  torsemide 20 milliGRAM(s) Oral daily      PHYSICAL EXAM:  T(C): 36.6 (03-03-24 @ 09:06), Max: 36.6 (03-03-24 @ 09:06)  HR: 72 (03-03-24 @ 09:06) (72 - 116)  BP: 115/68 (03-03-24 @ 09:06) (97/66 - 115/68)  RR: 18 (03-03-24 @ 09:06) (18 - 18)  SpO2: 96% (03-03-24 @ 09:06) (94% - 99%)  Wt(kg): --  I&O's Summary    02 Mar 2024 07:01  -  03 Mar 2024 07:00  --------------------------------------------------------  IN: 780 mL / OUT: 1075 mL / NET: -295 mL    03 Mar 2024 07:01  -  03 Mar 2024 09:13  --------------------------------------------------------  IN: 120 mL / OUT: 650 mL / NET: -530 mL            Appearance: NAD	  HEENT:  Dry  oral mucosa   Lymphatic: No lymphadenopathy , no edema  Cardiovascular: irregular S1 S2, No JVD, No murmurs , Peripheral pulses palpable 2+ bilaterally  Respiratory: decreased bs   Gastrointestinal:  soft, mildly distended, appropriately tender near midline incision, dressing c/d/i,  ileostomy leaking stool  Skin: No rashes, No ecchymoses, No cyanosis, warm to touch  Musculoskeletal: Normal range of motion, normal strength  Psychiatry:  sleepy   Ext: No edema  +amaya        LABS:    CARDIAC MARKERS:                                9.0    14.53 )-----------( 201      ( 03 Mar 2024 07:36 )             27.1     03-03    134<L>  |  98  |  9   ----------------------------<  65<L>  3.4<L>   |  27  |  0.60    Ca    8.1<L>      03 Mar 2024 07:36  Phos  3.0     03-03  Mg     1.7     03-03    TPro  5.3<L>  /  Alb  2.5<L>  /  TBili  1.0  /  DBili  x   /  AST  20  /  ALT  12  /  AlkPhos  65  03-03            TELEMETRY: 	    ECG:  	  RADIOLOGY:   DIAGNOSTIC TESTING:  [ ] Echocardiogram:  [ ]  Catheterization:  [ ] Stress Test:    OTHER:

## 2024-03-03 NOTE — PROGRESS NOTE ADULT - ASSESSMENT
78-year-old female with PMH current smoker , COPD, A-fib  On Eliquis and Plavix, HTN, HLD presents for syncope from PCPs office yesterday.     # ischemia Bowel :  s/p OR   appreciate SICU care   now with iliues : NGT in place : now off   toelrating po        afib :  AC : cont ac and rate control       lethargy /encephalopathy :   CT head negative   improved and seems at baseline   EEG pending : negative       OOB   IS   PT

## 2024-03-03 NOTE — PROGRESS NOTE ADULT - ASSESSMENT
78yFemale PMHx COPD, A-fib  On Eliquis and Plavix, HTN, HLD with signs of acute mesenteric ischemia now s/p diagnostic laparoscopy converted to exploratory laparotomy with 20 cm of terminal ileum resection with bowel left in discontinuity and temporary abdominal closure with abthera with mesenteric angiogram via R fem access 02-22 findings of celiac, SMA, SWATHI w/o flow, collateral perfusion confirmed. S/p Closure and ileostomy creation on 2/24.     PLAN  - Diet- FLD  - Eliquis 5 mg BID  - cont abx  - Metoprolol for rate control   - PT> [facility]  - PM&R consult- f/u recs       ACS/Trauma   [phone]   78yFemale PMHx COPD, A-fib  On Eliquis and Plavix, HTN, HLD with signs of acute mesenteric ischemia now s/p diagnostic laparoscopy converted to exploratory laparotomy with 20 cm of terminal ileum resection with bowel left in discontinuity and temporary abdominal closure with abthera with mesenteric angiogram via R fem access 02-22 findings of celiac, SMA, SWATHI w/o flow, collateral perfusion confirmed. S/p Closure and ileostomy creation on 2/24.     PLAN  - Diet- FLD  - Eliquis 5 mg BID  - cont abx  - Metoprolol for rate control   - PT> JOSE  - GOC/Code status discussion ongoing    ACS/Trauma   [phone]

## 2024-03-03 NOTE — PROGRESS NOTE ADULT - SUBJECTIVE AND OBJECTIVE BOX
SUBJECTIVE: Patient seen and examined on AM rounds. Patient reports that they're feeling well. Denies fever, chills. Reports pain as controlled. No complaints at this time.     Vital Signs Last 24 Hrs  T(C): 36.5 (03 Mar 2024 01:00), Max: 36.7 (02 Mar 2024 05:00)  T(F): 97.7 (03 Mar 2024 01:00), Max: 98 (02 Mar 2024 05:00)  HR: 116 (03 Mar 2024 01:00) (84 - 122)  BP: 102/64 (03 Mar 2024 01:00) (97/66 - 127/56)  BP(mean): 82 (02 Mar 2024 10:00) (72 - 82)  RR: 18 (03 Mar 2024 01:00) (17 - 23)  SpO2: 96% (03 Mar 2024 01:00) (92% - 99%)    Parameters below as of 03 Mar 2024 01:00  Patient On (Oxygen Delivery Method): room air        General Appearance: Appears well, NAD  Neck: Supple  Chest: Equal expansion bilaterally  CV: Pulse regular presently  Abdomen: Soft, nontense, appropriate incisional tenderness, dressings clean and dry and intact  Extremities: [facility]    I&O's Summary    01 Mar 2024 07:01  -  02 Mar 2024 07:00  --------------------------------------------------------  IN: 970 mL / OUT: 1850 mL / NET: -880 mL    02 Mar 2024 07:01  -  03 Mar 2024 04:56  --------------------------------------------------------  IN: 700 mL / OUT: 1075 mL / NET: -375 mL      I&O's Detail    01 Mar 2024 07:01  -  02 Mar 2024 07:00  --------------------------------------------------------  IN:    IV PiggyBack: 550 mL    Lactated Ringers w/ Additives: 50 mL    Oral Fluid: 370 mL  Total IN: 970 mL    OUT:    Diltiazem: 0 mL    Ileostomy (mL): 1130 mL    Indwelling Catheter - Urethral (mL): 720 mL  Total OUT: 1850 mL    Total NET: -880 mL      02 Mar 2024 07:01  -  03 Mar 2024 04:56  --------------------------------------------------------  IN:    Lactated Ringers Bolus: 500 mL    Oral Fluid: 200 mL  Total IN: 700 mL    OUT:    Ileostomy (mL): 625 mL    Indwelling Catheter - Urethral (mL): 150 mL    Voided (mL): 300 mL  Total OUT: 1075 mL    Total NET: -375 mL          LABS:                        9.6    16.80 )-----------( 224      ( 02 Mar 2024 05:37 )             28.8     03-02    135  |  99  |  10  ----------------------------<  84  4.0   |  28  |  0.68    Ca    8.6      02 Mar 2024 05:37  Phos  2.6     03-02  Mg     1.9     03-02    TPro  5.8<L>  /  Alb  2.8<L>  /  TBili  0.9  /  DBili  x   /  AST  19  /  ALT  14  /  AlkPhos  65  03-02    PT/INR - ( 02 Mar 2024 05:37 )   PT: 23.4 sec;   INR: 2.18 ratio         PTT - ( 02 Mar 2024 05:37 )  PTT:31.6 sec  Urinalysis Basic - ( 02 Mar 2024 05:37 )    Color: x / Appearance: x / SG: x / pH: x  Gluc: 84 mg/dL / Ketone: x  / Bili: x / Urobili: x   Blood: x / Protein: x / Nitrite: x   Leuk Esterase: x / RBC: x / WBC x   Sq Epi: x / Non Sq Epi: x / Bacteria: x        RADIOLOGY & ADDITIONAL STUDIES:

## 2024-03-03 NOTE — PROGRESS NOTE ADULT - SUBJECTIVE AND OBJECTIVE BOX
Date of service: 03-03-24 @ 13:16      Patient is a 78y old  Female who presents with a chief complaint of Syncope (01 Mar 2024 15:42)                                                               INTERVAL HPI/OVERNIGHT EVENTS:    REVIEW OF SYSTEMS:     CONSTITUTIONAL: No weakness, fevers or chills  EYES/ENT: No visual changes , no ear ache   NECK: No pain or stiffness  RESPIRATORY: No cough, wheezing,  No shortness of breath  CARDIOVASCULAR: No chest pain or palpitations  GASTROINTESTINAL: No abdominal pain  . No nausea, vomiting, or hematemesis; No diarrhea or constipation. No melena or hematochezia.  GENITOURINARY: No dysuria, frequency or hematuria  NEUROLOGICAL: No numbness or weakness  SKIN: No itching, burning, rashes, or lesions                                                                                                                                                                                                                                                                                 Medications:  MEDICATIONS  (STANDING):  apixaban 5 milliGRAM(s) Oral every 12 hours  atorvastatin 80 milliGRAM(s) Oral at bedtime  chlorhexidine 2% Cloths 1 Application(s) Topical <User Schedule>  clopidogrel Tablet 75 milliGRAM(s) Oral daily  levothyroxine 175 MICROGram(s) Oral daily  metoprolol tartrate 50 milliGRAM(s) Oral every 8 hours  nicotine -  14 mG/24Hr(s) Patch 1 Patch Transdermal daily    MEDICATIONS  (PRN):  acetaminophen     Tablet .. 650 milliGRAM(s) Oral every 6 hours PRN Mild Pain (1 - 3)       Allergies    penicillin (Hives)    Intolerances      Vital Signs Last 24 Hrs  T(C): 36.6 (03 Mar 2024 09:06), Max: 36.6 (03 Mar 2024 09:06)  T(F): 97.8 (03 Mar 2024 09:06), Max: 97.8 (03 Mar 2024 09:06)  HR: 72 (03 Mar 2024 09:06) (72 - 116)  BP: 115/68 (03 Mar 2024 09:06) (97/66 - 115/68)  BP(mean): --  RR: 18 (03 Mar 2024 09:06) (18 - 18)  SpO2: 96% (03 Mar 2024 09:06) (94% - 99%)    Parameters below as of 03 Mar 2024 09:06  Patient On (Oxygen Delivery Method): room air      CAPILLARY BLOOD GLUCOSE          03-02 @ 07:01  -  03-03 @ 07:00  --------------------------------------------------------  IN: 780 mL / OUT: 1075 mL / NET: -295 mL    03-03 @ 07:01  -  03-03 @ 13:16  --------------------------------------------------------  IN: 120 mL / OUT: 1350 mL / NET: -1230 mL      Physical Exam:    Daily     Daily   General:  Well appearing, NAD, not cachetic  HEENT:  Nonicteric, PERRLA  CV:  RRR, S1S2   Lungs:  CTA B/L, no wheezes, rales, rhonchi  Abdomen:  Soft, non-tender, no distended, positive BS  Extremities:  2+ pulses, no c/c, no edema  Skin:  Warm and dry, no rashes  :  No amaya  Neuro:  AAOx3, non-focal, grossly intact                                                                                                                                                                                                                                                                                                LABS:                               9.0    14.53 )-----------( 201      ( 03 Mar 2024 07:36 )             27.1                      03-03    134<L>  |  98  |  9   ----------------------------<  65<L>  3.4<L>   |  27  |  0.60    Ca    8.1<L>      03 Mar 2024 07:36  Phos  3.0     03-03  Mg     1.7     03-03    TPro  5.3<L>  /  Alb  2.5<L>  /  TBili  1.0  /  DBili  x   /  AST  20  /  ALT  12  /  AlkPhos  65  03-03                       RADIOLOGY & ADDITIONAL TESTS         I personally reviewed: [  ]EKG   [  ]CXR    [  ] CT      A/P:         Discussed with :     Simon consultants' Notes   Time spent :

## 2024-03-04 ENCOUNTER — TRANSCRIPTION ENCOUNTER (OUTPATIENT)
Age: 79
End: 2024-03-04

## 2024-03-04 LAB
ANION GAP SERPL CALC-SCNC: 14 MMOL/L — SIGNIFICANT CHANGE UP (ref 5–17)
BUN SERPL-MCNC: 10 MG/DL — SIGNIFICANT CHANGE UP (ref 7–23)
CALCIUM SERPL-MCNC: 8.6 MG/DL — SIGNIFICANT CHANGE UP (ref 8.4–10.5)
CHLORIDE SERPL-SCNC: 98 MMOL/L — SIGNIFICANT CHANGE UP (ref 96–108)
CO2 SERPL-SCNC: 25 MMOL/L — SIGNIFICANT CHANGE UP (ref 22–31)
CREAT SERPL-MCNC: 0.65 MG/DL — SIGNIFICANT CHANGE UP (ref 0.5–1.3)
EGFR: 91 ML/MIN/1.73M2 — SIGNIFICANT CHANGE UP
GLUCOSE SERPL-MCNC: 82 MG/DL — SIGNIFICANT CHANGE UP (ref 70–99)
HCT VFR BLD CALC: 29.2 % — LOW (ref 34.5–45)
HGB BLD-MCNC: 10 G/DL — LOW (ref 11.5–15.5)
MAGNESIUM SERPL-MCNC: 1.7 MG/DL — SIGNIFICANT CHANGE UP (ref 1.6–2.6)
MCHC RBC-ENTMCNC: 32.3 PG — SIGNIFICANT CHANGE UP (ref 27–34)
MCHC RBC-ENTMCNC: 34.2 GM/DL — SIGNIFICANT CHANGE UP (ref 32–36)
MCV RBC AUTO: 94.2 FL — SIGNIFICANT CHANGE UP (ref 80–100)
NRBC # BLD: 0 /100 WBCS — SIGNIFICANT CHANGE UP (ref 0–0)
PHOSPHATE SERPL-MCNC: 3 MG/DL — SIGNIFICANT CHANGE UP (ref 2.5–4.5)
PLATELET # BLD AUTO: 250 K/UL — SIGNIFICANT CHANGE UP (ref 150–400)
POTASSIUM SERPL-MCNC: 3.4 MMOL/L — LOW (ref 3.5–5.3)
POTASSIUM SERPL-SCNC: 3.4 MMOL/L — LOW (ref 3.5–5.3)
RBC # BLD: 3.1 M/UL — LOW (ref 3.8–5.2)
RBC # FLD: 14.2 % — SIGNIFICANT CHANGE UP (ref 10.3–14.5)
SODIUM SERPL-SCNC: 137 MMOL/L — SIGNIFICANT CHANGE UP (ref 135–145)
WBC # BLD: 15.63 K/UL — HIGH (ref 3.8–10.5)
WBC # FLD AUTO: 15.63 K/UL — HIGH (ref 3.8–10.5)

## 2024-03-04 PROCEDURE — 99233 SBSQ HOSP IP/OBS HIGH 50: CPT

## 2024-03-04 PROCEDURE — 99497 ADVNCD CARE PLAN 30 MIN: CPT | Mod: 25

## 2024-03-04 PROCEDURE — 99024 POSTOP FOLLOW-UP VISIT: CPT

## 2024-03-04 RX ORDER — MAGNESIUM SULFATE 500 MG/ML
2 VIAL (ML) INJECTION ONCE
Refills: 0 | Status: COMPLETED | OUTPATIENT
Start: 2024-03-04 | End: 2024-03-04

## 2024-03-04 RX ADMIN — Medication 1 PATCH: at 12:36

## 2024-03-04 RX ADMIN — Medication 1 PATCH: at 07:13

## 2024-03-04 RX ADMIN — Medication 50 MILLIGRAM(S): at 05:48

## 2024-03-04 RX ADMIN — CHLORHEXIDINE GLUCONATE 1 APPLICATION(S): 213 SOLUTION TOPICAL at 09:36

## 2024-03-04 RX ADMIN — Medication 1 PATCH: at 07:37

## 2024-03-04 RX ADMIN — Medication 50 MILLIGRAM(S): at 13:36

## 2024-03-04 RX ADMIN — APIXABAN 5 MILLIGRAM(S): 2.5 TABLET, FILM COATED ORAL at 17:10

## 2024-03-04 RX ADMIN — Medication 175 MICROGRAM(S): at 05:47

## 2024-03-04 RX ADMIN — ATORVASTATIN CALCIUM 80 MILLIGRAM(S): 80 TABLET, FILM COATED ORAL at 22:36

## 2024-03-04 RX ADMIN — APIXABAN 5 MILLIGRAM(S): 2.5 TABLET, FILM COATED ORAL at 05:48

## 2024-03-04 RX ADMIN — Medication 1 PATCH: at 11:13

## 2024-03-04 RX ADMIN — Medication 150 GRAM(S): at 12:37

## 2024-03-04 RX ADMIN — CLOPIDOGREL BISULFATE 75 MILLIGRAM(S): 75 TABLET, FILM COATED ORAL at 12:37

## 2024-03-04 RX ADMIN — Medication 1 PATCH: at 19:00

## 2024-03-04 NOTE — PROGRESS NOTE ADULT - SUBJECTIVE AND OBJECTIVE BOX
SURGERY DAILY PROGRESS NOTE:     SUBJECTIVE/ROS: No acute events overnight. Patient seen and examined bedside on AM rounds.     OBJECTIVE:  Vital Signs Last 24 Hrs  T(C): 36.6 (04 Mar 2024 05:37), Max: 36.8 (04 Mar 2024 01:18)  T(F): 97.9 (04 Mar 2024 05:37), Max: 98.2 (04 Mar 2024 01:18)  HR: 93 (04 Mar 2024 05:37) (72 - 93)  BP: 101/68 (04 Mar 2024 05:37) (93/62 - 115/68)  BP(mean): --  RR: 18 (04 Mar 2024 05:37) (18 - 18)  SpO2: 98% (04 Mar 2024 05:37) (96% - 98%)    Parameters below as of 04 Mar 2024 05:37  Patient On (Oxygen Delivery Method): room air        PHYSICAL EXAM:  Constitutional: NAD  Respiratory: non-labored breathing, patent airway  Gastrointestinal: abdomen soft, nontender, nondistended, ostomy with gas and liquid stool  Extremities: warm  Neurological: intact                          10.0   15.63 )-----------( 250      ( 04 Mar 2024 07:21 )             29.2     03-03    134<L>  |  98  |  9   ----------------------------<  65<L>  3.4<L>   |  27  |  0.60    Ca    8.1<L>      03 Mar 2024 07:36  Phos  3.0     03-03  Mg     1.7     03-03    TPro  5.3<L>  /  Alb  2.5<L>  /  TBili  1.0  /  DBili  x   /  AST  20  /  ALT  12  /  AlkPhos  65  03-03   PT/INR - ( 03 Mar 2024 07:36 )   PT: 24.9 sec;   INR: 2.32 ratio         PTT - ( 03 Mar 2024 07:36 )  PTT:31.4 sec  I&O's Detail    03 Mar 2024 07:01  -  04 Mar 2024 07:00  --------------------------------------------------------  IN:    Oral Fluid: 600 mL  Total IN: 600 mL    OUT:    Ileostomy (mL): 750 mL    Voided (mL): 1450 mL  Total OUT: 2200 mL    Total NET: -1600 mL

## 2024-03-04 NOTE — CHART NOTE - NSCHARTNOTEFT_GEN_A_CORE
Patient requires a patient lift for transfers between bed and (chair, wheelchair, or commode).  Without the use of a lift, the patient would be bed confined.    Ely Canela PA-C

## 2024-03-04 NOTE — PROGRESS NOTE ADULT - CONVERSATION DETAILS
Spoke with Caity outside of patient's room. Explored GOC discussion from Friday. At this time Caity feels her mom should remain in pursuit of aggressive medical interventions and would like her to remain full code. She feels she would like to visit with outpatient doctors her mom follows with in order to discuss advanced directives further. Discussed unclear prognosis as complications can arise at any time but in the event they do present they can be catastrophic. Discussed the importance of always weighing quality and quantity of life when making decisions on behalf of her mom, and making decisions the way her mom would make her own decisions. Caity verbalized understanding  Caity shared her disposition plan is [facility] and is currently working with case management to determine the best place for her mom.   Palliative contact number provided. Emotional support provided.
Met with Caity at bedside and other family over phone. Discussed hx of present illness and concerns for the future regarding prognosis since revascularization is not an option following OR procedure. All medical questions and updates provided by SICU. We discussed the high risk of complications to arise in the future, and for family to have an understanding that in the event a situation such as this arises again, there will be limited opportunity for intervention.   Discussed advanced directives including code status, and the limited benefit CPR and intubation can provide in a circumstance such as this. Answered all questions, family will discuss over the weekend. Discussed disposition and prognosis: shared that although the medical teams cannot prognosticate when a complication will arise the patient is not in the active dying process and if the goal is to continue medical management it is appropriate to have a disposition plan to [facility] or home with home PT pending PT evaluation. Answered questions regarding hospice vs palliative care. Family asking if hospice is appropriate at this time. Hospice education provided and not currently in line with GOC. Although hospice is not currently in line with GOC, in the event quality of life is so impacted that they would like to transition to comfort or in the event there are complications as a result of her recent bowel ischemia it may be appropriate to discuss hospice then.   Support provided to Caity for caregiver burden. Discussed assistance with GR[employer]Y and other supports. Emotional support provided throughout visit. Caity will take the weekend to think over what was discussed. Will f/u on Monday.

## 2024-03-04 NOTE — DISCHARGE NOTE PROVIDER - PROVIDER TOKENS
PROVIDER:[TOKEN:[[id number]],FOLLOWUP:[2 weeks]] PROVIDER:[TOKEN:[[id number]],FOLLOWUP:[2 weeks]],PROVIDER:[TOKEN:[[id number]]],PROVIDER:[TOKEN:[[id number]]]

## 2024-03-04 NOTE — PROGRESS NOTE ADULT - PROBLEM SELECTOR PLAN 4
Stable   check orthostatics.
will sign off as goals established   appreciate ongoing sw support  case discussed with SICU team  Can be reached by TEAMS M-F 9-5 Joanie Barrett Any other time please page [phone] if needed
will continue to follow for goc/support  appreciate ongoing sw support  case discussed with SICU team  Can be reached by TEAMS M-F 9-5 Joanie Barrett Any other time please page [phone] if needed

## 2024-03-04 NOTE — PROGRESS NOTE ADULT - SUBJECTIVE AND OBJECTIVE BOX
SUBJECTIVE AND OBJECTIVE: Pt seen and examined at bedside. Pt awake and alert, able to participate in exam   Indication for Geriatrics and Palliative Care Services/INTERVAL HPI: GOC     OVERNIGHT EVENTS: No acute events o/n     DNR on chart:  Allergies    penicillin (Hives)    Intolerances    MEDICATIONS  (STANDING):  apixaban 5 milliGRAM(s) Oral every 12 hours  atorvastatin 80 milliGRAM(s) Oral at bedtime  chlorhexidine 2% Cloths 1 Application(s) Topical <User Schedule>  clopidogrel Tablet 75 milliGRAM(s) Oral daily  levothyroxine 175 MICROGram(s) Oral daily  metoprolol tartrate 50 milliGRAM(s) Oral every 8 hours  nicotine -  14 mG/24Hr(s) Patch 1 Patch Transdermal daily    MEDICATIONS  (PRN):  acetaminophen     Tablet .. 650 milliGRAM(s) Oral every 6 hours PRN Mild Pain (1 - 3)      ITEMS UNCHECKED ARE NOT PRESENT    PRESENT SYMPTOMS: [ ]Unable to self-report - see [ ] CPOT [ ] PAINADS [ ] RDOS  Source if other than patient:  [ ]Family   [ ]Team     Pain:  [ ]yes [x ]no  QOL impact -   Location -                    Aggravating factors -  Quality -  Radiation -  Timing-  Severity (0-10 scale):  Minimal acceptable level (0-10 scale):     CPOT:    https://www.[facility].org/getattachment/cqi04s81-9w7o-1l1p-0o0d-3464y2583y5k/Critical-Care-Pain-Observation-Tool-(CPOT)    PAINAD Score: See PAINAD tool and score below       Dyspnea:                           [ ]Mild [ ]Moderate [ ]Severe    RDOS: See RDOS tool and score below   0 to 2  minimal or no respiratory distress   3  mild distress  4 to 6 moderate distress  >7 severe distress      Anxiety:                             [ ]Mild [ ]Moderate [ ]Severe  Fatigue:                             [x ]Mild [ ]Moderate [ ]Severe  Nausea:                             [ ]Mild [ ]Moderate [ ]Severe  Loss of appetite:              [ ]Mild [ ]Moderate [ ]Severe  Constipation:                    [ ]Mild [ ]Moderate [ ]Severe    PCSSQ[Palliative Care Spiritual Screening Question]   Severity (0-10):  Score of 4 or > indicate consideration of Chaplaincy referral.  Chaplaincy Referral: [ ] yes [ ] refused [ ] following [ ] Deferred     Caregiver [place]? : [ ] yes [ ] no [ ] Deferred [ ] Declined             Social work referral [ ] Patient & Family Centered Care Referral [ ]     Anticipatory Grief present?:  [ ] yes [ ] no  [ ] Deferred                  Social work referral [ ] Chaplaincy Referral [ ]    		  Other Symptoms:  [x ]All other review of systems negative     Palliative Performance Status Version 2:   See PPSv2 tool and score below         PHYSICAL EXAM:  Vital Signs Last 24 Hrs  T(C): 36.6 (04 Mar 2024 13:01), Max: 36.8 (04 Mar 2024 01:18)  T(F): 97.8 (04 Mar 2024 13:01), Max: 98.2 (04 Mar 2024 01:18)  HR: 100 (04 Mar 2024 13:01) (81 - 100)  BP: 149/74 (04 Mar 2024 13:01) (93/62 - 149/74)  BP(mean): --  RR: 18 (04 Mar 2024 13:01) (18 - 18)  SpO2: 99% (04 Mar 2024 13:01) (96% - 99%)    Parameters below as of 04 Mar 2024 13:01  Patient On (Oxygen Delivery Method): room air     I&O's Summary    03 Mar 2024 07:01  -  04 Mar 2024 07:00  --------------------------------------------------------  IN: 600 mL / OUT: 2200 mL / NET: -1600 mL    04 Mar 2024 07:01  -  04 Mar 2024 16:02  --------------------------------------------------------  IN: 310 mL / OUT: 660 mL / NET: -350 mL       GENERAL: [ ]Cachexia    [ x]Alert  [ x]Oriented x  2 [ ]Lethargic  [ ]Unarousable  [ ]Verbal  [ ]Non-Verbal  Behavioral:   [ ]Anxiety  [ ]Delirium [ ]Agitation [ ]Other  HEENT:  [ ]Normal   [ x]Dry mouth   [ ]ET Tube/Trach  [ ]Oral lesions  PULMONARY:   [ ]Clear [ ]Tachypnea  [ ]Audible excessive secretions   [ ]Rhonchi        [ ]Right [ ]Left [ ]Bilateral  [ ]Crackles        [ ]Right [ ]Left [ ]Bilateral  [ ]Wheezing     [ ]Right [ ]Left [ ]Bilateral  [ x]Diminished BS [ ] Right [ ]Left [x ]Bilateral  CARDIOVASCULAR:    [x ]Regular [ ]Irregular [ ]Tachy  [ ]Gerson [ ]Murmur [ ]Other  GASTROINTESTINAL:  [x ]Soft  [ ]Distended   [x ]+BS  [x ]Non tender [ ]Tender  [ ]Other [ ]PEG [ ]OGT/ NGT   Last BM:   GENITOURINARY:  [ ]Normal [ x]Incontinent   [ ]Oliguria/Anuria   [ ]Melvin  MUSCULOSKELETAL:   [ ]Normal   [x ]Weakness  [ x]Bed/Wheelchair bound [ ]Edema  NEUROLOGIC:   [ ]No focal deficits  [ x] Cognitive impairment  [ ] Dysphagia [ ]Dysarthria [ ] Paresis [ ]Other   SKIN:   [ ]Normal  [ ]Rash  [ ]Other  [ ]Pressure ulcer(s) [ ]y [ ]n present on admission    CRITICAL CARE:  [ ]Shock Present  [ ]Septic [ ]Cardiogenic [ ]Neurologic [ ]Hypovolemic  [ ]Vasopressors [ ]Inotropes  [ ]Respiratory failure present [ ]Mechanical Ventilation [ ]Non-invasive ventilatory support [ ]High-Flow   [ ]Acute  [ ]Chronic [ ]Hypoxic  [ ]Hypercarbic [ ]Other  [ ]Other organ failure     LABS:                        10.0   15.63 )-----------( 250      ( 04 Mar 2024 07:21 )             29.2   03-04    137  |  98  |  10  ----------------------------<  82  3.4<L>   |  25  |  0.65    Ca    8.6      04 Mar 2024 07:21  Phos  3.0     03-04  Mg     1.7     03-04    TPro  5.3<L>  /  Alb  2.5<L>  /  TBili  1.0  /  DBili  x   /  AST  20  /  ALT  12  /  AlkPhos  65  03-03  PT/INR - ( 03 Mar 2024 07:36 )   PT: 24.9 sec;   INR: 2.32 ratio         PTT - ( 03 Mar 2024 07:36 )  PTT:31.4 sec    Urinalysis Basic - ( 04 Mar 2024 07:21 )    Color: x / Appearance: x / SG: x / pH: x  Gluc: 82 mg/dL / Ketone: x  / Bili: x / Urobili: x   Blood: x / Protein: x / Nitrite: x   Leuk Esterase: x / RBC: x / WBC x   Sq Epi: x / Non Sq Epi: x / Bacteria: x      RADIOLOGY & ADDITIONAL STUDIES:  < from: Xray Chest 1 View- PORTABLE-Routine (Xray Chest 1 View- PORTABLE-Routine in AM.) (02.29.24 @ 07:19) >    ACC: [id number] EXAM:  XR CHEST PORTABLE ROUTINE 1V   ORDERED BY: RADHA AGUILERA     PROCEDURE DATE:  02/29/2024          INTERPRETATION:  TECHNIQUE: A single AP view of the chest was obtained.   Ordered time:   2/29/2024 7:19 AM    COMPARISON: 02/27/2024    CLINICAL INFORMATION: Status post SBR with end ileostomy.    FINDINGS:  An NG tube is seen in the stomach.  The heart is not well assessed on an AP film.  The right lung is clear.  There is minimal patchy opacity obscuring the left hemidiaphragm.  There is no pneumothorax.    IMPRESSION:    Left basilar patchy opacity likely representing small effusion and   atelectasis    --- End of Report ---            ANTOINETTE BETTS MD; Attending Radiologist  This document has been electronically signed. Feb 29 2024  5:00PM    < end of copied text >    Protein Calorie Malnutrition Present: [ ]mild [ ]moderate [ ]severe [ ]underweight [ ]morbid obesity  https://www.andeal.org/vault/2440/web/files/ONC/Table_Clinical%20Characteristics%20to%20Document%20Malnutrition-White%20JV%20et%20al%202012.pdf    Height (cm): 162.6 (02-22-24 @ 18:55)  Weight (kg): 88.8 (02-22-24 @ 18:55)  BMI (kg/m2): 33.6 (02-22-24 @ 18:55)    [x ]PPSV2 < or = 30%  [ ]significant weight loss [ ]poor nutritional intake [ ]anasarca[ ]Artificial Nutrition    Other REFERRALS:  [ ]Hospice  [ ]Child Life  [ ]Social Work  [ ]Case management [ ]Holistic Therapy     Goals of Care Document:

## 2024-03-04 NOTE — DISCHARGE NOTE PROVIDER - CARE PROVIDER_API CALL
Lloyd Camarena [facility]  Surgery  [address] [place] [address], [address]  [place] [address]  Phone: [phone]  Fax: [phone]  Follow Up Time: 2 weeks   Lloyd Camarenaef  Surgery  [address] [facility], [address]  [place] [address]  Phone: [phone]  Fax: [phone]  Follow Up Time: 2 weeks    Lakhwinder Diamond  Vascular Surgery  [address] [facility], [id number]  [place] [address]  Phone: [phone]  Fax: [phone]  Follow Up Time:     Obinna Mendiola  [address], [address]  [place] [address]  Phone: [phone]  Fax: [phone]  Follow Up Time:

## 2024-03-04 NOTE — DISCHARGE NOTE PROVIDER - CARE PROVIDERS DIRECT ADDRESSES
,[email or web address]

## 2024-03-04 NOTE — PROGRESS NOTE ADULT - PROBLEM SELECTOR PROBLEM 4
HTN (hypertension)
HTN (hypertension)
Encounter for palliative care
HTN (hypertension)
Encounter for palliative care
HTN (hypertension)

## 2024-03-04 NOTE — PROGRESS NOTE ADULT - SUBJECTIVE AND OBJECTIVE BOX
Date of service: 03-04-24 @ 10:30      Patient is a 78y old  Female who presents with a chief complaint of Syncope (01 Mar 2024 15:42)                                                               INTERVAL HPI/OVERNIGHT EVENTS:    REVIEW OF SYSTEMS:     CONSTITUTIONAL: No weakness, fevers or chills  RESPIRATORY: No cough, wheezing,  No shortness of breath  CARDIOVASCULAR: No chest pain or palpitations  GASTROINTESTINAL: No abdominal pain  . No nausea, vomiting, or hematemesis; No diarrhea or constipation. No melena or hematochezia.  GENITOURINARY: No dysuria, frequency or hematuria  NEUROLOGICAL: No numbness or weakness                                                                                                                                                                                                                                                                                 Medications:  MEDICATIONS  (STANDING):  apixaban 5 milliGRAM(s) Oral every 12 hours  atorvastatin 80 milliGRAM(s) Oral at bedtime  chlorhexidine 2% Cloths 1 Application(s) Topical <User Schedule>  clopidogrel Tablet 75 milliGRAM(s) Oral daily  levothyroxine 175 MICROGram(s) Oral daily  metoprolol tartrate 50 milliGRAM(s) Oral every 8 hours  nicotine -  14 mG/24Hr(s) Patch 1 Patch Transdermal daily    MEDICATIONS  (PRN):  acetaminophen     Tablet .. 650 milliGRAM(s) Oral every 6 hours PRN Mild Pain (1 - 3)       Allergies    penicillin (Hives)    Intolerances      Vital Signs Last 24 Hrs  T(C): 36.6 (04 Mar 2024 05:37), Max: 36.8 (04 Mar 2024 01:18)  T(F): 97.9 (04 Mar 2024 05:37), Max: 98.2 (04 Mar 2024 01:18)  HR: 93 (04 Mar 2024 05:37) (73 - 93)  BP: 101/68 (04 Mar 2024 05:37) (93/62 - 108/76)  BP(mean): --  RR: 18 (04 Mar 2024 05:37) (18 - 18)  SpO2: 98% (04 Mar 2024 05:37) (96% - 98%)    Parameters below as of 04 Mar 2024 05:37  Patient On (Oxygen Delivery Method): room air      CAPILLARY BLOOD GLUCOSE          03-03 @ 07:01  -  03-04 @ 07:00  --------------------------------------------------------  IN: 600 mL / OUT: 2200 mL / NET: -1600 mL      Physical Exam:    Daily     Daily   General:  NAD   HEENT:  Nonicteric, PERRLA  CV:  RRR, S1S2   Lungs:  CTA B/L, no wheezes, rales, rhonchi  Abdomen:  Soft, non-tender, no distended, positive BS  Extremities: no edema   Neuro:  AAOx3, non-focal, grossly intact                                                                                                                                                                                                                                                                                                LABS:                               10.0   15.63 )-----------( 250      ( 04 Mar 2024 07:21 )             29.2                      03-04    137  |  98  |  10  ----------------------------<  82  3.4<L>   |  25  |  0.65    Ca    8.6      04 Mar 2024 07:21  Phos  3.0     03-04  Mg     1.7     03-03    TPro  5.3<L>  /  Alb  2.5<L>  /  TBili  1.0  /  DBili  x   /  AST  20  /  ALT  12  /  AlkPhos  65  03-03                       RADIOLOGY & ADDITIONAL TESTS         I personally reviewed: [  ]EKG   [  ]CXR    [  ] CT      A/P:         Discussed with :     Simon consultants' Notes   Time spent :

## 2024-03-04 NOTE — DISCHARGE NOTE PROVIDER - NSDCMRMEDTOKEN_GEN_ALL_CORE_FT
Eliquis 5 mg oral tablet: 1 tab(s) orally once a day NOTE: AS PER PHARMACY AND DAUGHTER SUPPOSED TO TAKE TWICE DAILY, BUT PT TAKES ONCE A DAY.  levothyroxine 175 mcg (0.175 mg) oral tablet: 1 tab(s) orally once a day note: recently increased from 150mcg.  losartan 25 mg oral tablet: 1 tab(s) orally once a day  rosuvastatin 20 mg oral tablet: 1 tab(s) orally once a day (at bedtime)  torsemide 20 mg oral tablet: 1 tab(s) orally once a day   acetaminophen 325 mg oral tablet: 2 tab(s) orally every 6 hours As needed Mild Pain (1 - 3)  apixaban 5 mg oral tablet: 1 tab(s) orally every 12 hours  cefpodoxime 200 mg oral tablet: 1 tab(s) orally every 12 hours to be taken on 3/9 and 3/10 then course completed  clopidogrel 75 mg oral tablet: 1 tab(s) orally once a day  levothyroxine 175 mcg (0.175 mg) oral tablet: 1 tab(s) orally once a day note: recently increased from 150mcg.  metoprolol tartrate 75 mg oral tablet: 1 tab(s) orally 3 times a day  nicotine 14 mg/24 hr transdermal film, extended release: 1 patch transdermal every 24 hours  rosuvastatin 20 mg oral tablet: 1 tab(s) orally once a day (at bedtime)

## 2024-03-04 NOTE — PROGRESS NOTE ADULT - ATTENDING COMMENTS
sent seen and examined 03-04-24 @ 0930    tolerating regular diet w/o nausea or vomiting  ileostomy is healthy and covered with a well-fitting appliance  enteric contents in ileostomy bag    soft / NT / ND  midline laparotomy incision with staples intact and no evidence of wound infection, but the dressing is blood stained, so will keep staples in place    ileostomy - 750 mL enteric drainage / 24 hours  urine - 1450 mL / 24 hours    WBC = 15  Cr - stable @ 0.6 - 0.7 since 2/29 2/22/2024 - damage-control small bowel resection / diagnostic angiogram  2/24/2024 - end ileostomy creation  -her family is refusing JOSE, so will discharge home when DME is available

## 2024-03-04 NOTE — PROGRESS NOTE ADULT - ASSESSMENT
78-year-old female with PMH current smoker , COPD, A-fib  On Eliquis and Plavix, HTN, HLD presents for syncope from PCPs office yesterday.     # ischemia Bowel :  s/p OR   appreciate SICU care   now with ileus : NGT in place : now off   tolerating po        afib :  AC : cont ac and rate control       lethargy /encephalopathy :   CT head negative   improved and seems at baseline   EEG pending : negative       OOB   IS   PT     daughter wouold like to take pt home and not rehab

## 2024-03-04 NOTE — DISCHARGE NOTE PROVIDER - NSDCFUSCHEDAPPT_GEN_ALL_CORE_FT
Lisker, Jay J  [facility]  CARDIOLOGY [address] [facility]   Scheduled Appointment: 03/08/2024    Jonathan Kay  [facility]  ORTHOSURG [address] [facility]  Scheduled Appointment: 04/09/2024     [facility]  ORTHOSURG [address] [facility]  Scheduled Appointment: 04/09/2024

## 2024-03-04 NOTE — DISCHARGE NOTE PROVIDER - NSDCCPTREATMENT_GEN_ALL_CORE_FT
PRINCIPAL PROCEDURE  Procedure: Exploratory laparotomy  Findings and Treatment:       SECONDARY PROCEDURE  Procedure: Closure of abdominal wound  Findings and Treatment: WOUND CARE: Staples will be removed at follow up office visit.    BATHING: Please do not submerge wound underwater. You may shower and/or sponge bathe.  ACTIVITY: No heavy lifting anything more than 10-15lbs or straining. Otherwise, you may return to your usual level of physical activity. If you are taking narcotic pain medication (such as Percocet), do NOT drive a car, operate machinery or make important decisions.  DIET: Low fiber diet  NOTIFY YOUR SURGEON IF: You have any bleeding that does not stop, any pus draining from your wound, any fever (over 100.4 F) or chills, persistent nausea/vomiting with inability to tolerate food or liquids, persistent diarrhea, or if your pain is not controlled on your discharge pain medications.  FOLLOW-UP:  1. Please call to make a follow-up appointment within one week of discharge   2. Please follow up with your primary care physician in one week regarding your hospitalization.

## 2024-03-04 NOTE — PROGRESS NOTE ADULT - SUBJECTIVE AND OBJECTIVE BOX
Subjective: Patient seen and examined. No new events except as noted.     REVIEW OF SYSTEMS:    CONSTITUTIONAL: + weakness, fevers or chills  EYES/ENT: No visual changes;  No vertigo or throat pain   NECK: No pain or stiffness  RESPIRATORY: No cough, wheezing, hemoptysis; No shortness of breath  CARDIOVASCULAR: No chest pain or palpitations  GASTROINTESTINAL: No abdominal or epigastric pain. No nausea, vomiting, or hematemesis; No diarrhea or constipation. No melena or hematochezia.  GENITOURINARY: No dysuria, frequency or hematuria  NEUROLOGICAL: No numbness or weakness  SKIN: No itching, burning, rashes, or lesions   All other review of systems is negative unless indicated above.    MEDICATIONS:  MEDICATIONS  (STANDING):  apixaban 5 milliGRAM(s) Oral every 12 hours  atorvastatin 80 milliGRAM(s) Oral at bedtime  chlorhexidine 2% Cloths 1 Application(s) Topical <User Schedule>  clopidogrel Tablet 75 milliGRAM(s) Oral daily  levothyroxine 175 MICROGram(s) Oral daily  metoprolol tartrate 50 milliGRAM(s) Oral every 8 hours  nicotine -  14 mG/24Hr(s) Patch 1 Patch Transdermal daily      PHYSICAL EXAM:  T(C): 36.6 (03-04-24 @ 10:50), Max: 36.8 (03-04-24 @ 01:18)  HR: 81 (03-04-24 @ 10:50) (73 - 93)  BP: 109/65 (03-04-24 @ 10:50) (93/62 - 109/65)  RR: 18 (03-04-24 @ 10:50) (18 - 18)  SpO2: 98% (03-04-24 @ 10:50) (96% - 98%)  Wt(kg): --  I&O's Summary    03 Mar 2024 07:01  -  04 Mar 2024 07:00  --------------------------------------------------------  IN: 600 mL / OUT: 2200 mL / NET: -1600 mL    04 Mar 2024 07:01  -  04 Mar 2024 10:55  --------------------------------------------------------  IN: 160 mL / OUT: 650 mL / NET: -490 mL            Appearance: NAD	  HEENT:  Dry  oral mucosa   Lymphatic: No lymphadenopathy , no edema  Cardiovascular: irregular S1 S2, No JVD, No murmurs , Peripheral pulses palpable 2+ bilaterally  Respiratory: decreased bs   Gastrointestinal:  soft, mildly distended, appropriately tender near midline incision, dressing c/d/i,  ileostomy leaking stool  Skin: No rashes, No ecchymoses, No cyanosis, warm to touch  Musculoskeletal: Normal range of motion, normal strength  Psychiatry:  sleepy   Ext: No edema  +amaya          LABS:    CARDIAC MARKERS:                                10.0   15.63 )-----------( 250      ( 04 Mar 2024 07:21 )             29.2     03-04    137  |  98  |  10  ----------------------------<  82  3.4<L>   |  25  |  0.65    Ca    8.6      04 Mar 2024 07:21  Phos  3.0     03-04  Mg     1.7     03-03    TPro  5.3<L>  /  Alb  2.5<L>  /  TBili  1.0  /  DBili  x   /  AST  20  /  ALT  12  /  AlkPhos  65  03-03    proBNP:   Lipid Profile:   HgA1c:   TSH:             TELEMETRY: 	    ECG:  	  RADIOLOGY:   DIAGNOSTIC TESTING:  [ ] Echocardiogram:  [ ]  Catheterization:  [ ] Stress Test:    OTHER:

## 2024-03-04 NOTE — PROGRESS NOTE ADULT - ASSESSMENT
78F PMHx COPD, A-fib on Eliquis and Plavix, HTN, HLD with signs of acute mesenteric ischemia now s/p diagnostic laparoscopy converted to exploratory laparotomy with 20 cm of terminal ileum resection with bowel left in discontinuity and temporary abdominal closure with abthera with mesenteric angiogram via R fem access 02-22 findings of celiac, SMA, SWATHI w/o flow, collateral perfusion confirmed. S/p Closure and ileostomy creation on 2/24.     PLAN  - Diet - DASH  - Eliquis 5 mg BID  - ABx - d/c'd  - Metoprolol for rate control   - PT => JOSE - family prefers to bring pt home  - GOC/Code status discussion ongoing    ACS/Trauma   [phone]

## 2024-03-04 NOTE — DISCHARGE NOTE PROVIDER - NSDCCPCAREPLAN_GEN_ALL_CORE_FT
PRINCIPAL DISCHARGE DIAGNOSIS  Diagnosis: Ischemia, bowel  Assessment and Plan of Treatment: s/p diagnostic laparoscopy converted to exploratory laparotomy with 20 cm of terminal ileum resection with bowel left in discontinuity and temporary abdominal closure with abthera with mesenteric angiogram via R fem access 02-22 findings of celiac, SMA, SWATHI w/o flow, collateral perfusion confirmed. S/p Closure and ileostomy creation on 2/24.  Continue to take eliquis and plavix.  Follow up:  Please follow up outpatient in [facility] surgery clinic in 1 week.   Please follow up outpatient with vascular surgery in 1-2 weeks.   Please follow up with your PCP regarding your hospitalization.        SECONDARY DISCHARGE DIAGNOSES  Diagnosis: Other persistent atrial fibrillation  Assessment and Plan of Treatment: Please continue taking your rate controlling medications (metoprolol) and the blood thinner (eliquis).  Please follow up outpatient with cardiology.    Diagnosis: Urinary tract infection  Assessment and Plan of Treatment: S/p ceftriaxone while inpatient.  Continue cefpodoxime 200mg q12 hours for additional 2 days (last day of antibiotics 3/10/24.    Diagnosis: Urinary retention  Assessment and Plan of Treatment: Continue amaya care.   Please follow up outpatient with urology.   [facility]  [address] [place] [address], [address] [id number] [phone]

## 2024-03-04 NOTE — CHART NOTE - NSCHARTNOTEFT_GEN_A_CORE
Polyfly Wheelchair     Patient will require a polyfly wheelchair due to deconditioning due to diagnostic laparoscopy converted to exploratory laparotomy with 20 cm of terminal ileum resection with bowel left in discontinuity and temporary abdominal closure with abthera with mesenteric angiogram via R fem access 02-22 findings of celiac, SMA, SWATHI w/o flow, collateral perfusion confirmed. S/p Closure and ileostomy creation on 2/24. The beneficiary has a mobility limitation that significantly impairs his ability to participate in one or more MRADLs such as toileting, feeding, dressing, grooming, and bathing in customary locations in the home. The patient’s mobility limitation cannot be sufficiently resolved by the use of an appropriately fitted cane or walker. The patient is unable to ambulated with a walker. Use of a manual wheelchair will significantly improve the beneficiary’s ability to participate in MRADLs and the beneficiary will use it on a regular basis in the home. The beneficiary is able and willing to use the wheelchair in the home. The beneficiary cannot self-propel in a standard wheelchair. The patient can self-propel in a polyfly wheelchair. The beneficiary has sufficient upper extremity function and other physical and mental capabilities needed to safely self-propel the manual lightweight wheelchair that is provided in the home during a typical day.    Ely Canela PA-C

## 2024-03-05 LAB
ANION GAP SERPL CALC-SCNC: 14 MMOL/L — SIGNIFICANT CHANGE UP (ref 5–17)
BUN SERPL-MCNC: 10 MG/DL — SIGNIFICANT CHANGE UP (ref 7–23)
CALCIUM SERPL-MCNC: 8.9 MG/DL — SIGNIFICANT CHANGE UP (ref 8.4–10.5)
CHLORIDE SERPL-SCNC: 95 MMOL/L — LOW (ref 96–108)
CK MB BLD-MCNC: 5 % — HIGH (ref 0–3.5)
CK MB CFR SERPL CALC: 2.3 NG/ML — SIGNIFICANT CHANGE UP (ref 0–3.8)
CK SERPL-CCNC: 46 U/L — SIGNIFICANT CHANGE UP (ref 25–170)
CO2 SERPL-SCNC: 25 MMOL/L — SIGNIFICANT CHANGE UP (ref 22–31)
CREAT SERPL-MCNC: 0.62 MG/DL — SIGNIFICANT CHANGE UP (ref 0.5–1.3)
EGFR: 92 ML/MIN/1.73M2 — SIGNIFICANT CHANGE UP
GLUCOSE SERPL-MCNC: 94 MG/DL — SIGNIFICANT CHANGE UP (ref 70–99)
HCT VFR BLD CALC: 31.1 % — LOW (ref 34.5–45)
HGB BLD-MCNC: 10.8 G/DL — LOW (ref 11.5–15.5)
MAGNESIUM SERPL-MCNC: 2 MG/DL — SIGNIFICANT CHANGE UP (ref 1.6–2.6)
MCHC RBC-ENTMCNC: 32 PG — SIGNIFICANT CHANGE UP (ref 27–34)
MCHC RBC-ENTMCNC: 34.7 GM/DL — SIGNIFICANT CHANGE UP (ref 32–36)
MCV RBC AUTO: 92 FL — SIGNIFICANT CHANGE UP (ref 80–100)
NRBC # BLD: 0 /100 WBCS — SIGNIFICANT CHANGE UP (ref 0–0)
PHOSPHATE SERPL-MCNC: 2.7 MG/DL — SIGNIFICANT CHANGE UP (ref 2.5–4.5)
PLATELET # BLD AUTO: 269 K/UL — SIGNIFICANT CHANGE UP (ref 150–400)
POTASSIUM SERPL-MCNC: 3.5 MMOL/L — SIGNIFICANT CHANGE UP (ref 3.5–5.3)
POTASSIUM SERPL-SCNC: 3.5 MMOL/L — SIGNIFICANT CHANGE UP (ref 3.5–5.3)
RBC # BLD: 3.38 M/UL — LOW (ref 3.8–5.2)
RBC # FLD: 14.3 % — SIGNIFICANT CHANGE UP (ref 10.3–14.5)
SARS-COV-2 RNA SPEC QL NAA+PROBE: SIGNIFICANT CHANGE UP
SODIUM SERPL-SCNC: 134 MMOL/L — LOW (ref 135–145)
TROPONIN T, HIGH SENSITIVITY RESULT: 69 NG/L — HIGH (ref 0–51)
WBC # BLD: 18.56 K/UL — HIGH (ref 3.8–10.5)
WBC # FLD AUTO: 18.56 K/UL — HIGH (ref 3.8–10.5)

## 2024-03-05 PROCEDURE — 99024 POSTOP FOLLOW-UP VISIT: CPT

## 2024-03-05 PROCEDURE — 93010 ELECTROCARDIOGRAM REPORT: CPT

## 2024-03-05 RX ORDER — POTASSIUM CHLORIDE 20 MEQ
40 PACKET (EA) ORAL ONCE
Refills: 0 | Status: COMPLETED | OUTPATIENT
Start: 2024-03-05 | End: 2024-03-05

## 2024-03-05 RX ORDER — POTASSIUM CHLORIDE 20 MEQ
10 PACKET (EA) ORAL
Refills: 0 | Status: DISCONTINUED | OUTPATIENT
Start: 2024-03-05 | End: 2024-03-05

## 2024-03-05 RX ORDER — METOPROLOL TARTRATE 50 MG
5 TABLET ORAL ONCE
Refills: 0 | Status: COMPLETED | OUTPATIENT
Start: 2024-03-05 | End: 2024-03-05

## 2024-03-05 RX ORDER — DIGOXIN 250 MCG
250 TABLET ORAL ONCE
Refills: 0 | Status: COMPLETED | OUTPATIENT
Start: 2024-03-05 | End: 2024-03-05

## 2024-03-05 RX ORDER — METOPROLOL TARTRATE 50 MG
75 TABLET ORAL THREE TIMES A DAY
Refills: 0 | Status: DISCONTINUED | OUTPATIENT
Start: 2024-03-05 | End: 2024-03-06

## 2024-03-05 RX ADMIN — Medication 1 PATCH: at 07:59

## 2024-03-05 RX ADMIN — Medication 40 MILLIEQUIVALENT(S): at 08:33

## 2024-03-05 RX ADMIN — Medication 250 MICROGRAM(S): at 07:55

## 2024-03-05 RX ADMIN — Medication 1 PATCH: at 11:00

## 2024-03-05 RX ADMIN — Medication 50 MILLIGRAM(S): at 04:29

## 2024-03-05 RX ADMIN — Medication 75 MILLIGRAM(S): at 12:24

## 2024-03-05 RX ADMIN — Medication 1 PATCH: at 11:32

## 2024-03-05 RX ADMIN — ATORVASTATIN CALCIUM 80 MILLIGRAM(S): 80 TABLET, FILM COATED ORAL at 21:19

## 2024-03-05 RX ADMIN — Medication 5 MILLIGRAM(S): at 04:30

## 2024-03-05 RX ADMIN — Medication 75 MILLIGRAM(S): at 21:19

## 2024-03-05 RX ADMIN — Medication 175 MICROGRAM(S): at 06:08

## 2024-03-05 RX ADMIN — Medication 1 PATCH: at 19:30

## 2024-03-05 RX ADMIN — APIXABAN 5 MILLIGRAM(S): 2.5 TABLET, FILM COATED ORAL at 06:08

## 2024-03-05 RX ADMIN — CLOPIDOGREL BISULFATE 75 MILLIGRAM(S): 75 TABLET, FILM COATED ORAL at 11:32

## 2024-03-05 RX ADMIN — Medication 5 MILLIGRAM(S): at 06:02

## 2024-03-05 RX ADMIN — APIXABAN 5 MILLIGRAM(S): 2.5 TABLET, FILM COATED ORAL at 17:07

## 2024-03-05 NOTE — PROGRESS NOTE ADULT - SUBJECTIVE AND OBJECTIVE BOX
TRAUMA SURGERY DAILY PROGRESS NOTE:     24 hour events: afib with RVR up to 140s last night x 2, broke with IV metoprolol each time    SUBJECTIVE/ROS: Patient seen and examined No new complaints. Patient is tolerating diet. Ostomy functioning     MEDICATIONS  (STANDING):  apixaban 5 milliGRAM(s) Oral every 12 hours  atorvastatin 80 milliGRAM(s) Oral at bedtime  chlorhexidine 2% Cloths 1 Application(s) Topical <User Schedule>  clopidogrel Tablet 75 milliGRAM(s) Oral daily  levothyroxine 175 MICROGram(s) Oral daily  metoprolol tartrate 50 milliGRAM(s) Oral every 8 hours  nicotine -  14 mG/24Hr(s) Patch 1 Patch Transdermal daily    MEDICATIONS  (PRN):  acetaminophen     Tablet .. 650 milliGRAM(s) Oral every 6 hours PRN Mild Pain (1 - 3)      OBJECTIVE:    Vital Signs Last 24 Hrs  T(C): 36.4 (05 Mar 2024 03:44), Max: 36.7 (04 Mar 2024 17:00)  T(F): 97.6 (05 Mar 2024 03:44), Max: 98 (04 Mar 2024 17:00)  HR: 113 (05 Mar 2024 04:48) (81 - 113)  BP: 118/78 (05 Mar 2024 04:48) (97/57 - 149/74)  BP(mean): --  RR: 18 (05 Mar 2024 03:44) (18 - 18)  SpO2: 98% (05 Mar 2024 03:44) (98% - 99%)    Parameters below as of 05 Mar 2024 03:44  Patient On (Oxygen Delivery Method): room air            I&O's Detail    03 Mar 2024 07:01  -  04 Mar 2024 07:00  --------------------------------------------------------  IN:    Oral Fluid: 600 mL  Total IN: 600 mL    OUT:    Ileostomy (mL): 750 mL    Voided (mL): 1450 mL  Total OUT: 2200 mL    Total NET: -1600 mL      04 Mar 2024 07:01  -  05 Mar 2024 06:56  --------------------------------------------------------  IN:    Oral Fluid: 550 mL  Total IN: 550 mL    OUT:    Ileostomy (mL): 1060 mL    Voided (mL): 650 mL  Total OUT: 1710 mL    Total NET: -1160 mL          Daily     Daily     LABS:                        10.8   18.56 )-----------( 269      ( 05 Mar 2024 04:41 )             31.1     03-05    134<L>  |  95<L>  |  10  ----------------------------<  94  3.5   |  25  |  0.62    Ca    8.9      05 Mar 2024 04:41  Phos  2.7     03-05  Mg     2.0     03-05    TPro  5.3<L>  /  Alb  2.5<L>  /  TBili  1.0  /  DBili  x   /  AST  20  /  ALT  12  /  AlkPhos  65  03-03    PT/INR - ( 03 Mar 2024 07:36 )   PT: 24.9 sec;   INR: 2.32 ratio         PTT - ( 03 Mar 2024 07:36 )  PTT:31.4 sec  Urinalysis Basic - ( 05 Mar 2024 04:41 )    Color: x / Appearance: x / SG: x / pH: x  Gluc: 94 mg/dL / Ketone: x  / Bili: x / Urobili: x   Blood: x / Protein: x / Nitrite: x   Leuk Esterase: x / RBC: x / WBC x   Sq Epi: x / Non Sq Epi: x / Bacteria: x                PHYSICAL EXAM:  Constitutional: NAD  Respiratory: non-labored breathing, patent airway  Gastrointestinal: abdomen soft, nontender, nondistended, ostomy with gas and liquid stool  Extremities: warm  Neurological: intact

## 2024-03-05 NOTE — PROGRESS NOTE ADULT - NS ATTEND AMEND GEN_ALL_CORE FT
seen and examined 03-05-24 @ 0941    tolerating regular diet w/o nausea or vomiting  ileostomy is healthy and covered with a well-fitting appliance  enteric contents in ileostomy bag    soft / NT / ND  midline laparotomy incision with staples intact and no evidence of wound infection, but the dressing is blood stained  the drainage on the dressing is not malodorous  I sutured the terminal ileum stump to the fascia, so the drainage could be secondary to dehiscence of the ileal stump staple line with mucous from the defunctionalized segment of small bowel.  Will keep staples in place    ileostomy - 1310 mL enteric drainage / 24 hours  urine - 850 mL / 24 hours    WBC = 18  Cr - stable @ 0.6 - 0.7 since 2/29 2/22/2024 - damage-control small bowel resection / diagnostic angiogram  2/24/2024 - end ileostomy creation  -her family is refusing JOSE, so will discharge home when DME is available    recurrent afib w/ RVR  -overnight, she got metoprolol 5 mg IV x 2 and digoxin 250 mcg IV x 1  -increased metoprolol PO 50 mg TID -> 75 mg TID  -f/u cardiology      I reviewed her labs.

## 2024-03-05 NOTE — PROVIDER CONTACT NOTE (CRITICAL VALUE NOTIFICATION) - ACTION/TREATMENT ORDERED:
Pending orders
Moon Mckeon MD made aware. No further interventions at this time.
Provider made aware. continue to monitor

## 2024-03-05 NOTE — PROVIDER CONTACT NOTE (CRITICAL VALUE NOTIFICATION) - ASSESSMENT
/78  HR 89  02 98  temp 98.7
AxOx2-3 with confusion. Restless and agitated
A&Ox3. Patient in afib RVR. No complaints of chest pain, SOB, dizziness, or lightheadedness.

## 2024-03-05 NOTE — PROVIDER CONTACT NOTE (OTHER) - REASON
pt. vomited large amount of food & c/o Nausea
Tele event- 
Elevated heart rate
Pt. c/o nausea & Abdominal discomfort & dizziness.
Pt had 9 bts WTC up to 170 for 3 sec
Elevated heart rate
Tele event- 
Emesis

## 2024-03-05 NOTE — PROVIDER CONTACT NOTE (OTHER) - DATE AND TIME:
19-Feb-2024 03:04
21-Feb-2024 17:00
05-Mar-2024 03:59
18-Feb-2024 06:33
18-Feb-2024 23:44
21-Feb-2024 18:05
26-Feb-2024 00:30
05-Mar-2024 05:50

## 2024-03-05 NOTE — PROVIDER CONTACT NOTE (CRITICAL VALUE NOTIFICATION) - BACKGROUND
PMhx COPD, HTN, HLD
Diagnostic laparoscopy, ischemic bowel noted in RLQ just proximal to TI. Converted to laparotomy. 20cm of terminal ileum resected and bowel left in discontinuity, Ileostomy created.

## 2024-03-05 NOTE — PROGRESS NOTE ADULT - ASSESSMENT
78F PMHx COPD, A-fib on Eliquis and Plavix, HTN, HLD with signs of acute mesenteric ischemia now s/p diagnostic laparoscopy converted to exploratory laparotomy with 20 cm of terminal ileum resection with bowel left in discontinuity and temporary abdominal closure with abthera with mesenteric angiogram via R fem access 02-22 findings of celiac, SMA, SWATHI w/o flow, collateral perfusion confirmed. S/p Closure and ileostomy creation on 2/24.     PLAN  - Diet - DASH  - Eliquis 5 mg BID  - ABx - d/c'd  - Metoprolol for rate control   - PT => JOSE - family prefers to bring pt home  - GOC/Code status discussion ongoing    ACS/Trauma   [phone]

## 2024-03-05 NOTE — PROVIDER CONTACT NOTE (OTHER) - SITUATION
Patient in afib RVR, sustaining in the 140s for about 5 minutes
Pt with elevated Heart rate sustaining 120s-140s
Patient had an episode of emesis up to 1 liter.
Patient in afib RVR, sustaining in the 150s, HR has reached 170s.
Pt had 9 bts WTC up to 170 for 3 sec while pt was trying to sit at the side of the bed
Pt with elevated Heart rate sustaining 120s-140s despite previous dose of Lopressor 2.5 mg IVP that was given as ordered. See Adult flowsheet/Emar for further documentation.

## 2024-03-05 NOTE — PROGRESS NOTE ADULT - SUBJECTIVE AND OBJECTIVE BOX
Date of service: 03-05-24 @ 22:38      Patient is a 78y old  Female who presents with a chief complaint of Syncope (04 Mar 2024 16:01)                                                               INTERVAL HPI/OVERNIGHT EVENTS:    REVIEW OF SYSTEMS:     CONSTITUTIONAL: No weakness, fevers or chills  EYES/ENT: No visual changes , no ear ache   NECK: No pain or stiffness  RESPIRATORY: No cough, wheezing,  No shortness of breath  CARDIOVASCULAR: No chest pain or palpitations  GASTROINTESTINAL: No abdominal pain  . No nausea, vomiting, or hematemesis; No diarrhea or constipation. No melena or hematochezia.  GENITOURINARY: No dysuria, frequency or hematuria  NEUROLOGICAL: No numbness or weakness  SKIN: No itching, burning, rashes, or lesions                                                                                                                                                                                                                                                                                 Medications:  MEDICATIONS  (STANDING):  apixaban 5 milliGRAM(s) Oral every 12 hours  atorvastatin 80 milliGRAM(s) Oral at bedtime  chlorhexidine 2% Cloths 1 Application(s) Topical <User Schedule>  clopidogrel Tablet 75 milliGRAM(s) Oral daily  levothyroxine 175 MICROGram(s) Oral daily  metoprolol tartrate 75 milliGRAM(s) Oral three times a day  nicotine -  14 mG/24Hr(s) Patch 1 Patch Transdermal daily    MEDICATIONS  (PRN):  acetaminophen     Tablet .. 650 milliGRAM(s) Oral every 6 hours PRN Mild Pain (1 - 3)       Allergies    penicillin (Hives)    Intolerances      Vital Signs Last 24 Hrs  T(C): 36.4 (05 Mar 2024 21:06), Max: 36.7 (05 Mar 2024 08:25)  T(F): 97.5 (05 Mar 2024 21:06), Max: 98 (05 Mar 2024 08:25)  HR: 99 (05 Mar 2024 21:06) (89 - 121)  BP: 130/85 (05 Mar 2024 21:06) (98/58 - 130/85)  BP(mean): --  RR: 18 (05 Mar 2024 21:06) (16 - 18)  SpO2: 96% (05 Mar 2024 21:06) (95% - 98%)    Parameters below as of 05 Mar 2024 21:06  Patient On (Oxygen Delivery Method): room air      CAPILLARY BLOOD GLUCOSE          03-04 @ 07:01  -  03-05 @ 07:00  --------------------------------------------------------  IN: 750 mL / OUT: 2160 mL / NET: -1410 mL    03-05 @ 07:01  -  03-05 @ 22:38  --------------------------------------------------------  IN: 480 mL / OUT: 1525 mL / NET: -1045 mL      Physical Exam:    Daily     Daily   General: NAD   HEENT:  Nonicteric, PERRLA  CV:  RRR, S1S2   Lungs:  CTA B/L, no wheezes, rales, rhonchi  Abdomen:  Soft, non-tender, no distended, positive BS  Extremities:  no edema   Neuro:  AAOx3, non-focal, grossly intact                                                                                                                                                                                                                                                                                                LABS:                               10.8   18.56 )-----------( 269      ( 05 Mar 2024 04:41 )             31.1                      03-05    134<L>  |  95<L>  |  10  ----------------------------<  94  3.5   |  25  |  0.62    Ca    8.9      05 Mar 2024 04:41  Phos  2.7     03-05  Mg     2.0     03-05                         RADIOLOGY & ADDITIONAL TESTS         I personally reviewed: [  ]EKG   [  ]CXR    [  ] CT      A/P:         Discussed with :     [facility] consultants' Notes   Time spent :

## 2024-03-05 NOTE — PROVIDER CONTACT NOTE (OTHER) - ASSESSMENT
No c/o sob/ chest pain. V/S b/p 143/80, HR 96 A Fib, RR 18, % R/A. Pt. is birping.
Pt is aox3. HR stayed in the 120-130 range. Pt is complaining of leg pain but no other symptoms. Denies chest pain, palpations, and sob.
A&Ox3. VS as per flowsheet. Patient denies chest pain, SOB. Complains of some dizziness. No other changes from patient's baseline.
A&Ox3. VS per flowsheet. No complaints of chest pain, SOB, dizziness or lightheadedness. Patient resting comfortably in bed.
Patient had an episode of emesis up to 1 liter. Patient in Afib RVR with heart rate up to 160s.
No c/o sob/ chest pain. V/S b/p 145/84, HR 90 A Fib, RR 18, POX=99%.
Pt with elevated HR. Pt asymptomatic. Pt denies chest pain or discomfort. Pt denies shortness of breath, no palpitations.
Pt with elevated HR. Pt asymptomatic. Pt denies chest pain or discomfort. Pt denies shortness of breath, no palpitations.

## 2024-03-05 NOTE — PROGRESS NOTE ADULT - SUBJECTIVE AND OBJECTIVE BOX
[facility]      Seen earlier today [Please note that date of entry above  is the actual  DATE OF SERVICE] No new neurological symptoms reported. Remains stable neurologically.   - Today, patient is without complaints.          *****MEDICATIONS: Current medication reviewed and documented.    MEDICATIONS  (STANDING):  apixaban 5 milliGRAM(s) Oral every 12 hours  atorvastatin 80 milliGRAM(s) Oral at bedtime  chlorhexidine 2% Cloths 1 Application(s) Topical <User Schedule>  clopidogrel Tablet 75 milliGRAM(s) Oral daily  levothyroxine 175 MICROGram(s) Oral daily  metoprolol tartrate 75 milliGRAM(s) Oral three times a day  nicotine -  14 mG/24Hr(s) Patch 1 Patch Transdermal daily    MEDICATIONS  (PRN):  acetaminophen     Tablet .. 650 milliGRAM(s) Oral every 6 hours PRN Mild Pain (1 - 3)          ***** VITAL SIGNS:   Vital Signs Last 24 Hrs      I&O's Summary    04 Mar 2024 07:01  -  05 Mar 2024 07:00  --------------------------------------------------------  IN: 750 mL / OUT: 2160 mL / NET: -1410 mL    05 Mar 2024 07:01  -  05 Mar 2024 11:20  --------------------------------------------------------  IN: 240 mL / OUT: 350 mL / NET: -110 mL             *****PHYSICAL EXAM:   alert oriented x 1 attention comprehension are limited   Able to name, repeat.   EOmi fundi not visualized   no nystagmus VFF to confrontation  Tongue is midline   Moving all 4 ext spontaneously no drift appreciated    Gait not assessed.            *****LAB AND IMAGING:                        10.8   18.56 )-----------( 269      ( 05 Mar 2024 04:41 )             31.1               03-05    134<L>  |  95<L>  |  10  ----------------------------<  94  3.5   |  25  |  0.62    Ca    8.9      05 Mar 2024 04:41  Phos  2.7     03-05  Mg     2.0     03-05             CARDIAC MARKERS ( 05 Mar 2024 04:41 )  x     / x     / 46 U/L / x     / 2.3 ng/mL              Urinalysis Basic - ( 05 Mar 2024 04:41 )    Color: x / Appearance: x / SG: x / pH: x  Gluc: 94 mg/dL / Ketone: x  / Bili: x / Urobili: x   Blood: x / Protein: x / Nitrite: x   Leuk Esterase: x / RBC: x / WBC x   Sq Epi: x / Non Sq Epi: x / Bacteria: x      [All pertinent recent Imaging/Reports reviewed]            *****A S S E S S M E N T   A N D   P L A N :  78-year-old female with PMH COPD, A-fib  On Eliquis and Plavix, COPD, HTN, HLD presents for syncope from PCPs office earlier today.  Patient was at normal checkup for blood work and was sitting on the table.  Daughter at bedside states that patient suddenly went limp while she was sitting on the table, her eyes rolled back, and her tongue turned to 1 side and this lasted for couple of seconds and then the patient regained consciousness.  Patient had no postictal period.  No tongue biting.  Patient does not remember these events.  Daughter at bedside states that patient has not been eating as much for the past day or 2 and has not been sleeping at all.  Denies recent fevers, chills, cough, congestion, rhinorrhea, chest pain, shortness of breath, abdominal pain, nausea, vomiting, dysuria, constipation, diarrhea.  Does not know if patient has had prior episodes of syncope        Problem/Recommendations 1:ams likely dementia delirium exacerbated by acute med illness   ct head neg   reg sleep wake cycle melatonin   thiamine   depakote bid for agitation prn                         Thank you for allowing me to participate in the care of this patient. Will continue to follow patient periodically. Please do not hesitate to call me if you have any  questions or if there has been a change in patients neurological status     ________________  Elodia Mackey MD  [facility]  [phone]      33 minutes spent on total encounter; more than 50 % of the visit was  spent counseling about plan of care, compliance to diet/exercise and medication regimen and or  coordinating care by the attending physician.      It is advised that stroke patients follow up with ARNOLDO Villarreal @ [phone] in 1- 2 weeks.   Others please follow up with Dr. Michael Nissenbaum [phone]

## 2024-03-05 NOTE — PROGRESS NOTE ADULT - SUBJECTIVE AND OBJECTIVE BOX
Subjective: Patient seen and examined. No new events except as noted.    afib with RVR up to 140s last night x 2, broke with IV metoprolol each time  s/p Digoxin 0.25 IV x 1 as well     REVIEW OF SYSTEMS:    CONSTITUTIONAL: No weakness, fevers or chills  EYES/ENT: No visual changes;  No vertigo or throat pain   NECK: No pain or stiffness  RESPIRATORY: No cough, wheezing, hemoptysis; No shortness of breath  CARDIOVASCULAR: No chest pain or palpitations  GASTROINTESTINAL: No abdominal or epigastric pain. No nausea, vomiting, or hematemesis; No diarrhea or constipation. No melena or hematochezia.  GENITOURINARY: No dysuria, frequency or hematuria  NEUROLOGICAL: No numbness or weakness  SKIN: No itching, burning, rashes, or lesions   All other review of systems is negative unless indicated above.    MEDICATIONS:  MEDICATIONS  (STANDING):  apixaban 5 milliGRAM(s) Oral every 12 hours  atorvastatin 80 milliGRAM(s) Oral at bedtime  chlorhexidine 2% Cloths 1 Application(s) Topical <User Schedule>  clopidogrel Tablet 75 milliGRAM(s) Oral daily  levothyroxine 175 MICROGram(s) Oral daily  metoprolol tartrate 50 milliGRAM(s) Oral every 8 hours  nicotine -  14 mG/24Hr(s) Patch 1 Patch Transdermal daily      PHYSICAL EXAM:  T(C): 36.7 (03-05-24 @ 08:25), Max: 36.7 (03-04-24 @ 17:00)  HR: 114 (03-05-24 @ 08:25) (81 - 120)  BP: 118/68 (03-05-24 @ 08:25) (97/57 - 149/74)  RR: 16 (03-05-24 @ 08:25) (16 - 18)  SpO2: 98% (03-05-24 @ 08:25) (95% - 99%)  Wt(kg): --  I&O's Summary    04 Mar 2024 07:01  -  05 Mar 2024 07:00  --------------------------------------------------------  IN: 750 mL / OUT: 2160 mL / NET: -1410 mL                Appearance: NAD	  HEENT:  Dry  oral mucosa   Lymphatic: No lymphadenopathy , no edema  Cardiovascular: irregular S1 S2, No JVD, No murmurs , Peripheral pulses palpable 2+ bilaterally  Respiratory: decreased bs   Gastrointestinal:  soft, mildly distended, appropriately tender near midline incision, dressing c/d/i,  ileostomy leaking stool  Skin: No rashes, No ecchymoses, No cyanosis, warm to touch  Musculoskeletal: Normal range of motion, normal strength  Psychiatry:  sleepy   Ext: No edema  +amaya          LABS:    CARDIAC MARKERS:  CARDIAC MARKERS ( 05 Mar 2024 04:41 )  x     / x     / 46 U/L / x     / 2.3 ng/mL                                10.8   18.56 )-----------( 269      ( 05 Mar 2024 04:41 )             31.1     03-05    134<L>  |  95<L>  |  10  ----------------------------<  94  3.5   |  25  |  0.62    Ca    8.9      05 Mar 2024 04:41  Phos  2.7     03-05  Mg     2.0     03-05      proBNP:   Lipid Profile:   HgA1c:   TSH:             TELEMETRY: AF	    ECG:  	  RADIOLOGY:   DIAGNOSTIC TESTING:  [ ] Echocardiogram:  [ ]  Catheterization:  [ ] Stress Test:    OTHER:

## 2024-03-05 NOTE — PROVIDER CONTACT NOTE (OTHER) - BACKGROUND
Pt admitted for syncope and collapse with diagnosis of Afib
Pt admitted for syncope and collapse. Hx of COPD and HTN
Diagnostic laparoscopy, ischemic bowel noted in RLQ just proximal to TI. Converted to laparotomy. 20cm of terminal ileum resected and bowel left in discontinuity, Ileostomy created.
Pt admitted for syncope and collapse with diagnosis of Afib
Patient is status post ileostomy placement.
Diagnostic laparoscopy, ischemic bowel noted in RLQ just proximal to TI. Converted to laparotomy. 20cm of terminal ileum resected and bowel left in discontinuity, Ileostomy created.

## 2024-03-05 NOTE — PROVIDER CONTACT NOTE (OTHER) - ACTION/TREATMENT ORDERED:
EKG & Abdominal X Ray ordered by ACP. Cont. to monitor pt.
Moon Mckeon MD made aware. Trauma team to bedside for assessment. Metoprolol 5mg IV push given. No further interventions at this time.
4mg Zofran IVP & 5mg Metoprolol IVP given. Xray of abdomen ordered.
Dean Jansen MD made aware. 12 lead EKG done. Cardiac labs sent. Metoprolol 50mg PO given. Metoprolol 5mg IV push given (per EMAR). No further interventions at this time.
Given pt. zofran 4mgIVP/order. Portable ABD X-Ray done. Blood work ordered by ACP. cont. to monitor pt.
Lopressor 2.5mg IVP given as ordered. HR now 110s. Pt remains asymptomatic.
Provider made aware. IV tylenol  and iv metoprolol ordered and given.
Tylenol 1G IVPB given as ordered for pain. Lopressor 2.5mg IVP given as ordered. HR now 90s-110s. Pt remains asymptomatic.

## 2024-03-05 NOTE — PROGRESS NOTE ADULT - ASSESSMENT
78-year-old female with PMH current smoker , COPD, A-fib  On Eliquis and Plavix, HTN, HLD presents for syncope from PCPs office yesterday.     # ischemia Bowel :  s/p OR   appreciate SICU care   now with ileus : NGT in place : now off   tolerating po        afib :  AC : cont ac and rate control .  rate not well controlled   fu with cardio   ? consider Dig?      lethargy /encephalopathy :   CT head negative   improved and seems at baseline   EEG pending : negative       OOB   IS   PT     daughter wouold like to take pt home and not rehab

## 2024-03-05 NOTE — PROVIDER CONTACT NOTE (OTHER) - NAME OF MD/NP/PA/DO NOTIFIED:
Rehan,ACP
Reyes Monegro,E NP
NP Emelin Reyes Monegro
Reyes Monegro,E NP
Rehan, ACP
Dean Jansen MD
Moon Mckeon MD
GRISELDA Siegel

## 2024-03-06 LAB
ANION GAP SERPL CALC-SCNC: 13 MMOL/L — SIGNIFICANT CHANGE UP (ref 5–17)
BUN SERPL-MCNC: 9 MG/DL — SIGNIFICANT CHANGE UP (ref 7–23)
CALCIUM SERPL-MCNC: 8.6 MG/DL — SIGNIFICANT CHANGE UP (ref 8.4–10.5)
CHLORIDE SERPL-SCNC: 100 MMOL/L — SIGNIFICANT CHANGE UP (ref 96–108)
CO2 SERPL-SCNC: 18 MMOL/L — LOW (ref 22–31)
CREAT SERPL-MCNC: 0.59 MG/DL — SIGNIFICANT CHANGE UP (ref 0.5–1.3)
EGFR: 93 ML/MIN/1.73M2 — SIGNIFICANT CHANGE UP
GLUCOSE SERPL-MCNC: 71 MG/DL — SIGNIFICANT CHANGE UP (ref 70–99)
HCT VFR BLD CALC: 35 % — SIGNIFICANT CHANGE UP (ref 34.5–45)
HGB BLD-MCNC: 11.3 G/DL — LOW (ref 11.5–15.5)
MAGNESIUM SERPL-MCNC: 2 MG/DL — SIGNIFICANT CHANGE UP (ref 1.6–2.6)
MCHC RBC-ENTMCNC: 31.8 PG — SIGNIFICANT CHANGE UP (ref 27–34)
MCHC RBC-ENTMCNC: 32.3 GM/DL — SIGNIFICANT CHANGE UP (ref 32–36)
MCV RBC AUTO: 98.6 FL — SIGNIFICANT CHANGE UP (ref 80–100)
NRBC # BLD: 0 /100 WBCS — SIGNIFICANT CHANGE UP (ref 0–0)
PHOSPHATE SERPL-MCNC: 2.9 MG/DL — SIGNIFICANT CHANGE UP (ref 2.5–4.5)
PLATELET # BLD AUTO: 270 K/UL — SIGNIFICANT CHANGE UP (ref 150–400)
POTASSIUM SERPL-MCNC: 4.3 MMOL/L — SIGNIFICANT CHANGE UP (ref 3.5–5.3)
POTASSIUM SERPL-SCNC: 4.3 MMOL/L — SIGNIFICANT CHANGE UP (ref 3.5–5.3)
RBC # BLD: 3.55 M/UL — LOW (ref 3.8–5.2)
RBC # FLD: 14.7 % — HIGH (ref 10.3–14.5)
SODIUM SERPL-SCNC: 131 MMOL/L — LOW (ref 135–145)
WBC # BLD: 14.29 K/UL — HIGH (ref 3.8–10.5)
WBC # FLD AUTO: 14.29 K/UL — HIGH (ref 3.8–10.5)

## 2024-03-06 PROCEDURE — 99024 POSTOP FOLLOW-UP VISIT: CPT

## 2024-03-06 PROCEDURE — 74177 CT ABD & PELVIS W/CONTRAST: CPT | Mod: 26

## 2024-03-06 RX ORDER — METOPROLOL TARTRATE 50 MG
75 TABLET ORAL THREE TIMES A DAY
Refills: 0 | Status: DISCONTINUED | OUTPATIENT
Start: 2024-03-06 | End: 2024-03-08

## 2024-03-06 RX ORDER — METOPROLOL TARTRATE 50 MG
75 TABLET ORAL THREE TIMES A DAY
Refills: 0 | Status: DISCONTINUED | OUTPATIENT
Start: 2024-03-06 | End: 2024-03-06

## 2024-03-06 RX ORDER — METOPROLOL TARTRATE 50 MG
5 TABLET ORAL EVERY 6 HOURS
Refills: 0 | Status: DISCONTINUED | OUTPATIENT
Start: 2024-03-06 | End: 2024-03-06

## 2024-03-06 RX ADMIN — Medication 1 PATCH: at 11:30

## 2024-03-06 RX ADMIN — Medication 1 PATCH: at 19:00

## 2024-03-06 RX ADMIN — CLOPIDOGREL BISULFATE 75 MILLIGRAM(S): 75 TABLET, FILM COATED ORAL at 15:14

## 2024-03-06 RX ADMIN — APIXABAN 5 MILLIGRAM(S): 2.5 TABLET, FILM COATED ORAL at 05:21

## 2024-03-06 RX ADMIN — Medication 75 MILLIGRAM(S): at 15:14

## 2024-03-06 RX ADMIN — Medication 1 PATCH: at 07:52

## 2024-03-06 RX ADMIN — ATORVASTATIN CALCIUM 80 MILLIGRAM(S): 80 TABLET, FILM COATED ORAL at 21:54

## 2024-03-06 RX ADMIN — CHLORHEXIDINE GLUCONATE 1 APPLICATION(S): 213 SOLUTION TOPICAL at 05:21

## 2024-03-06 RX ADMIN — Medication 175 MICROGRAM(S): at 05:21

## 2024-03-06 RX ADMIN — Medication 75 MILLIGRAM(S): at 05:21

## 2024-03-06 RX ADMIN — Medication 1 PATCH: at 15:15

## 2024-03-06 RX ADMIN — APIXABAN 5 MILLIGRAM(S): 2.5 TABLET, FILM COATED ORAL at 17:17

## 2024-03-06 NOTE — CHART NOTE - NSCHARTNOTESELECT_GEN_ALL_CORE
Edd Lift
Event Note
Event Note
Nutrition Services
Post-Operative Check
Resume Eliquis/Event Note
Transfer Note
postop check
Afib RVR/Event Note
Afib RVR/Event Note
Event Note
Nutrition Services
polyfly wheelchair

## 2024-03-06 NOTE — PROGRESS NOTE ADULT - SUBJECTIVE AND OBJECTIVE BOX
Date of service: 03-06-24 @ 15:19      Patient is a 78y old  Female who presents with a chief complaint of Syncope (04 Mar 2024 16:01)                                                               INTERVAL HPI/OVERNIGHT EVENTS:    REVIEW OF SYSTEMS:     CONSTITUTIONAL: No weakness, fevers or chills  RESPIRATORY: No cough, wheezing,  No shortness of breath  CARDIOVASCULAR: No chest pain or palpitations  GASTROINTESTINAL: No abdominal pain  . No nausea, vomiting, or hematemesis; No diarrhea or constipation. No melena or hematochezia.  GENITOURINARY: No dysuria, frequency or hematuria  NEUROLOGICAL: No numbness or weakness                                                                                                                                                                                                                                                                               Medications:  MEDICATIONS  (STANDING):  apixaban 5 milliGRAM(s) Oral every 12 hours  atorvastatin 80 milliGRAM(s) Oral at bedtime  chlorhexidine 2% Cloths 1 Application(s) Topical <User Schedule>  clopidogrel Tablet 75 milliGRAM(s) Oral daily  levothyroxine 175 MICROGram(s) Oral daily  metoprolol tartrate 75 milliGRAM(s) Oral three times a day  nicotine -  14 mG/24Hr(s) Patch 1 Patch Transdermal daily    MEDICATIONS  (PRN):  acetaminophen     Tablet .. 650 milliGRAM(s) Oral every 6 hours PRN Mild Pain (1 - 3)       Allergies    penicillin (Hives)    Intolerances      Vital Signs Last 24 Hrs  T(C): 36.7 (06 Mar 2024 13:03), Max: 36.7 (06 Mar 2024 13:03)  T(F): 98 (06 Mar 2024 13:03), Max: 98 (06 Mar 2024 13:03)  HR: 98 (06 Mar 2024 13:03) (82 - 113)  BP: 105/68 (06 Mar 2024 13:03) (101/67 - 130/85)  BP(mean): --  RR: 18 (06 Mar 2024 13:03) (18 - 18)  SpO2: 95% (06 Mar 2024 13:03) (94% - 99%)    Parameters below as of 06 Mar 2024 13:03  Patient On (Oxygen Delivery Method): room air      CAPILLARY BLOOD GLUCOSE          03-05 @ 07:01  -  03-06 @ 07:00  --------------------------------------------------------  IN: 640 mL / OUT: 1825 mL / NET: -1185 mL    03-06 @ 07:01  -  03-06 @ 15:19  --------------------------------------------------------  IN: 260 mL / OUT: 0 mL / NET: 260 mL      Physical Exam:    Daily     Daily   General:  Well appearing, NAD, not cachetic  HEENT:  Nonicteric, PERRLA  CV:  RRR, S1S2   Lungs:  CTA B/L, no wheezes, rales, rhonchi  Abdomen:  Soft,ostomy in place   Extremities:  no edema     Neuro:  AAOx3, non-focal, grossly intact                                                                                                                                                                                                                                                                                                LABS:                               11.3   14.29 )-----------( 270      ( 06 Mar 2024 08:34 )             35.0                      03-06    131<L>  |  100  |  9   ----------------------------<  71  4.3   |  18<L>  |  0.59    Ca    8.6      06 Mar 2024 08:34  Phos  2.9     03-06  Mg     2.0     03-06                         RADIOLOGY & ADDITIONAL TESTS         I personally reviewed: [  ]EKG   [  ]CXR    [  ] CT      A/P:         Discussed with :     [facility] consultants' Notes   Time spent :

## 2024-03-06 NOTE — PROGRESS NOTE ADULT - SUBJECTIVE AND OBJECTIVE BOX
Subjective: Patient seen and examined. No new events except as noted.   HR overall stable   resting comfortably       REVIEW OF SYSTEMS:    CONSTITUTIONAL: +weakness, fevers or chills  EYES/ENT: No visual changes;  No vertigo or throat pain   NECK: No pain or stiffness  RESPIRATORY: No cough, wheezing, hemoptysis; No shortness of breath  CARDIOVASCULAR: No chest pain or palpitations  GASTROINTESTINAL: No abdominal or epigastric pain. No nausea, vomiting, or hematemesis; No diarrhea or constipation. No melena or hematochezia.  GENITOURINARY: No dysuria, frequency or hematuria  NEUROLOGICAL: No numbness or weakness  SKIN: No itching, burning, rashes, or lesions   All other review of systems is negative unless indicated above.    MEDICATIONS:  MEDICATIONS  (STANDING):  apixaban 5 milliGRAM(s) Oral every 12 hours  atorvastatin 80 milliGRAM(s) Oral at bedtime  chlorhexidine 2% Cloths 1 Application(s) Topical <User Schedule>  clopidogrel Tablet 75 milliGRAM(s) Oral daily  levothyroxine 175 MICROGram(s) Oral daily  metoprolol tartrate 75 milliGRAM(s) Oral three times a day  nicotine -  14 mG/24Hr(s) Patch 1 Patch Transdermal daily      PHYSICAL EXAM:  T(C): 36.3 (03-06-24 @ 05:05), Max: 36.6 (03-05-24 @ 09:56)  HR: 103 (03-06-24 @ 05:05) (82 - 121)  BP: 106/65 (03-06-24 @ 05:05) (98/58 - 130/85)  RR: 18 (03-06-24 @ 05:05) (17 - 18)  SpO2: 94% (03-06-24 @ 05:05) (94% - 99%)  Wt(kg): --  I&O's Summary    05 Mar 2024 07:01  -  06 Mar 2024 07:00  --------------------------------------------------------  IN: 640 mL / OUT: 1825 mL / NET: -1185 mL          Appearance: NAD	  HEENT:  Dry  oral mucosa   Lymphatic: No lymphadenopathy , no edema  Cardiovascular: irregular S1 S2, No JVD, No murmurs , Peripheral pulses palpable 2+ bilaterally  Respiratory: decreased bs   Gastrointestinal:  soft, mildly distended, appropriately tender near midline incision, dressing c/d/i,  ileostomy leaking stool  Skin: No rashes, No ecchymoses, No cyanosis, warm to touch  Musculoskeletal: Normal range of motion, normal strength  Psychiatry:  sleepy   Ext: No edema  +amaya      LABS:    CARDIAC MARKERS:  CARDIAC MARKERS ( 05 Mar 2024 04:41 )  x     / x     / 46 U/L / x     / 2.3 ng/mL                                10.8   18.56 )-----------( 269      ( 05 Mar 2024 04:41 )             31.1     03-05    134<L>  |  95<L>  |  10  ----------------------------<  94  3.5   |  25  |  0.62    Ca    8.9      05 Mar 2024 04:41  Phos  2.7     03-05  Mg     2.0     03-05      proBNP:   Lipid Profile:   HgA1c:   TSH:             TELEMETRY: 	  AF 90-120s   ECG:  	  RADIOLOGY:   DIAGNOSTIC TESTING:  [ ] Echocardiogram:  [ ]  Catheterization:  [ ] Stress Test:    OTHER:

## 2024-03-06 NOTE — PROGRESS NOTE ADULT - ASSESSMENT
78-year-old female with PMH current smoker , COPD, A-fib  On Eliquis and Plavix, HTN, HLD presents for syncope from PCPs office yesterday.     # ischemia Bowel :  s/p OR   appreciate SICU care   now with ileus : NGT in place : now off   tolerating po   CT ordered for elevated WBC : appreciate surgery input        Afib :  AC : cont ac and rate control .   fu with cardio   ? consider Dig?      lethargy /encephalopathy :   CT head negative   improved and seems at baseline   EEG pending : negative       OOB   IS   PT     daughter would like to take pt home and not rehab

## 2024-03-06 NOTE — CHART NOTE - NSCHARTNOTEFT_GEN_A_CORE
Nutrition Follow Up Note  Patient seen for: follow-up for acute severe malnutrition     Chart reviewed, events noted.    Source:    [x] EMR        [x] RN        [x] Daughter (Caity) at bedside          -If unable to interview patient:  [x] Disoriented/confused/inappropriate to interview    Diet Order:   Diet, DASH/TLC:   Sodium & Cholesterol Restricted (24)    - Is current order appropriate/adequate?   [x]  No: see below for recommendation     - PO intake :       [x] <50%  Poor  -- Daughter reports pt with poor PO intake, (history of poor PO intake prior to admission per daughter), amendable for pt to get Ensure (both vanilla and chocolate) now as pt was drinking Ensure at home.   -- Daughter made aware of menu ordering procedure in house with menu provided, encourage daughter to order preferred foods to optimize PO intake while in house.     - Nutrition-related concerns:  -- Ileostomy creation since , was NPO from  to , on PO diet since 3/1 (Clear Liquid diet on 3/1, full liquid diet on 3/2, advanced to solid foods since 3/2)  -- ordered for Synthroid   -- s/p repletions for abnormal electrolytes (KCl, MgSO4, Na3PO4, K3PO4)  -- s/p Torsemide and Lasix   -- GI: Daughter states pt with abdominal cramp upon visit. Ileostomy bag in place with output: 1250ml (3/6), 1310ml (3/5)    Weights:   Drug Dosing Weight  Height (cm): 162.6 (2024 18:55)  Weight (kg): 88.8 (2024 18:55)  BMI (kg/m2): 33.6 (2024 18:55)  Daily Weight in k.2 ()  -- wt can be screwed due to edema and fluid shift with diuretics use. Will continue to monitor weight trends as available/able.     Nutritionally Pertinent MEDICATIONS  (STANDING):  atorvastatin  levothyroxine  metoprolol tartrate    Pertinent Labs:  @ 08:34: Na 131<L>, BUN 9, Cr 0.59, BG 71, K+ 4.3, Phos 2.9, Mg 2.0, Alk Phos --, ALT/SGPT --, AST/SGOT --, HbA1c --    Skin per nursing documentation: stage II to left buttocks per flowsheet 3/6    Edema: Generalized +1 edema, edema +2 to left and right ankles per flowsheet 3/5    Estimated Needs based on 60kg IBW:   Calories: 1800-2100kcal (30-35 kcal/kg BW)   Proteins: 72-90g (1.2-1.5 g/kg BW)   Fluids: defer to team     Previous Nutrition Diagnosis: acute severe malnutrition, Increased nutrient needs, altered GI function   Nutrition Diagnosis is: [x] ongoing     New Nutrition Diagnosis: [x] Food & Nutrition Related Knowledge Deficit related to lack of exposure to diet education after ileostomy creation as evidenced by daughter asking for diet-related question and receptive to diet education on ileostomy nutrition therapy     Nutrition Care Plan:  [x] In Progress    Nutrition Interventions:     Education Provided:       [x] Yes:  Reviewed Ileostomy nutrition therapy. Discussed eating low-fiber foods, chewing foods well, small frequent meals high in protein & energy. Reviewed foods that may cause odors &/or gas, discussed foods that bulk stool and thin stool, and importance of adequate hydration.      Recommendations:      -- Recommend to change diet to low fiber diet as tolerated. RD remains available to adjust diet as needed.   -- Recommend to add Ensure plus high protein 2x daily (700kcal, 40g proteins) in both vanilla and chocolate flavors to optimize calories and nutrients intake.   -- Recommend MVI and Vitamin C, pending no medical contraindications, for micronutrient support and to aid wound healing.   -- Monitor PO intake, GI tolerance, skin integrity, labs, weight, and bowel movement regularity.   -- Will continue to honor food and beverage preferences within diet restriction of patient in an effort to maximize level of nutrient intake.  -- Assist with meals PRN and encourage PO intake.    Monitoring and Evaluation:   Continue to monitor nutritional intake, tolerance to diet prescription, weights, labs, skin integrity    RD remains available upon request and will follow up per protocol  Mackenzie Moore, MS, RDN, CDN (Teams)

## 2024-03-06 NOTE — PROGRESS NOTE ADULT - SUBJECTIVE AND OBJECTIVE BOX
SUBJECTIVE: Patient seen and examined on AM rounds. Patient reports that they're feeling well. Denies fever, chills. Reports pain as controlled. No complaints at this time.     Vital Signs Last 24 Hrs  T(C): 36.3 (06 Mar 2024 05:05), Max: 36.6 (05 Mar 2024 09:56)  T(F): 97.4 (06 Mar 2024 05:05), Max: 97.8 (05 Mar 2024 09:56)  HR: 103 (06 Mar 2024 05:05) (82 - 121)  BP: 106/65 (06 Mar 2024 05:05) (98/58 - 130/85)  BP(mean): --  RR: 18 (06 Mar 2024 05:05) (17 - 18)  SpO2: 94% (06 Mar 2024 05:05) (94% - 99%)    Parameters below as of 06 Mar 2024 05:05  Patient On (Oxygen Delivery Method): room air        General Appearance: Appears well, NAD  Neck: Supple  Chest: Equal expansion bilaterally  CV: Pulse regular presently  Abdomen: Soft, nontense, appropriate incisional tenderness, slight erythema on inferior incision; ileostomy with liquid output  Extremities: WWP    I&O's Summary    05 Mar 2024 07:01  -  06 Mar 2024 07:00  --------------------------------------------------------  IN: 640 mL / OUT: 1825 mL / NET: -1185 mL      I&O's Detail    05 Mar 2024 07:01  -  06 Mar 2024 07:00  --------------------------------------------------------  IN:    Oral Fluid: 640 mL  Total IN: 640 mL    OUT:    Ileostomy (mL): 1250 mL    Voided (mL): 575 mL  Total OUT: 1825 mL    Total NET: -1185 mL          LABS:                        10.8   18.56 )-----------( 269      ( 05 Mar 2024 04:41 )             31.1     03-05    134<L>  |  95<L>  |  10  ----------------------------<  94  3.5   |  25  |  0.62    Ca    8.9      05 Mar 2024 04:41  Phos  2.7     03-05  Mg     2.0     03-05        Urinalysis Basic - ( 05 Mar 2024 04:41 )    Color: x / Appearance: x / SG: x / pH: x  Gluc: 94 mg/dL / Ketone: x  / Bili: x / Urobili: x   Blood: x / Protein: x / Nitrite: x   Leuk Esterase: x / RBC: x / WBC x   Sq Epi: x / Non Sq Epi: x / Bacteria: x        RADIOLOGY & ADDITIONAL STUDIES:

## 2024-03-06 NOTE — PROGRESS NOTE ADULT - ATTENDING COMMENTS
seen and examined 03-06-24 @ 1120    tolerating regular diet w/o nausea or vomiting  ileostomy is healthy and covered with a well-fitting appliance  enteric contents in ileostomy bag    soft / NT / ND  midline laparotomy incision with staples intact and no evidence of wound infection, dressing is less blood stained  I sutured the terminal ileum stump to the fascia, so the drainage could be secondary to dehiscence of the ileal stump staple line with mucous from the defunctionalized segment of small bowel.  Will keep staples in place    ileostomy - 1250 mL enteric drainage / 24 hours  urine - 575 mL / 24 hours, but also incontinent of urine at times    WBC = 18 -> 14  Cr - 0.5      2/22/2024 - damage-control small bowel resection / diagnostic angiogram  2/24/2024 - end ileostomy creation  -plan CT abd/pelvis w/ IV and rectal contrast to evaluate for ileal stump dehiscence into midline laparotomy wound  -her family is refusing JOSE, so will discharge home when DME is available    recurrent afib w/ RVR  -controlled with metoprolol 75 mg PO TID      I reviewed her labs.

## 2024-03-06 NOTE — PROGRESS NOTE ADULT - ASSESSMENT
78F PMHx COPD, A-fib on Eliquis and Plavix, HTN, HLD with signs of acute mesenteric ischemia now s/p diagnostic laparoscopy converted to exploratory laparotomy with 20 cm of terminal ileum resection with bowel left in discontinuity and temporary abdominal closure with abthera with mesenteric angiogram via R fem access 02-22 findings of celiac, SMA, SWATHI w/o flow, collateral perfusion confirmed. S/p Closure and ileostomy creation on 2/24.     PLAN  - CT ABD/Pelvis today with IV and rectal contrast to r/o abscess  - Diet - DASH  - Eliquis 5 mg BID  - ABx - d/c'd  - Metoprolol for rate control   - PT => JOSE  - GOC/Code status discussion ongoing    ACS/Trauma   [phone]     78F PMHx COPD, A-fib on Eliquis and Plavix, HTN, HLD with signs of acute mesenteric ischemia now s/p diagnostic laparoscopy converted to exploratory laparotomy with 20 cm of terminal ileum resection with bowel left in discontinuity and temporary abdominal closure with abthera with mesenteric angiogram via R fem access 02-22 findings of celiac, SMA, SWATHI w/o flow, collateral perfusion confirmed. S/p Closure and ileostomy creation on 2/24.     PLAN  - CT ABD/Pelvis today with IV and rectal contrast to r/o abscess  - Diet - DASH  - Eliquis 5 mg BID  - ABx - d/c'd  - Metoprolol 75mg TID for rate control   - PT => JOSE  - GOC/Code status discussion - full code    ACS/Trauma   [phone]

## 2024-03-07 LAB
ANION GAP SERPL CALC-SCNC: 10 MMOL/L — SIGNIFICANT CHANGE UP (ref 5–17)
BUN SERPL-MCNC: 9 MG/DL — SIGNIFICANT CHANGE UP (ref 7–23)
CALCIUM SERPL-MCNC: 8.4 MG/DL — SIGNIFICANT CHANGE UP (ref 8.4–10.5)
CHLORIDE SERPL-SCNC: 98 MMOL/L — SIGNIFICANT CHANGE UP (ref 96–108)
CO2 SERPL-SCNC: 25 MMOL/L — SIGNIFICANT CHANGE UP (ref 22–31)
CREAT SERPL-MCNC: 0.63 MG/DL — SIGNIFICANT CHANGE UP (ref 0.5–1.3)
EGFR: 91 ML/MIN/1.73M2 — SIGNIFICANT CHANGE UP
GLUCOSE SERPL-MCNC: 117 MG/DL — HIGH (ref 70–99)
HCT VFR BLD CALC: 29.1 % — LOW (ref 34.5–45)
HGB BLD-MCNC: 10.2 G/DL — LOW (ref 11.5–15.5)
MAGNESIUM SERPL-MCNC: 1.9 MG/DL — SIGNIFICANT CHANGE UP (ref 1.6–2.6)
MCHC RBC-ENTMCNC: 33 PG — SIGNIFICANT CHANGE UP (ref 27–34)
MCHC RBC-ENTMCNC: 35.1 GM/DL — SIGNIFICANT CHANGE UP (ref 32–36)
MCV RBC AUTO: 94.2 FL — SIGNIFICANT CHANGE UP (ref 80–100)
NRBC # BLD: 0 /100 WBCS — SIGNIFICANT CHANGE UP (ref 0–0)
PHOSPHATE SERPL-MCNC: 2.9 MG/DL — SIGNIFICANT CHANGE UP (ref 2.5–4.5)
PLATELET # BLD AUTO: 293 K/UL — SIGNIFICANT CHANGE UP (ref 150–400)
POTASSIUM SERPL-MCNC: 3.7 MMOL/L — SIGNIFICANT CHANGE UP (ref 3.5–5.3)
POTASSIUM SERPL-SCNC: 3.7 MMOL/L — SIGNIFICANT CHANGE UP (ref 3.5–5.3)
RBC # BLD: 3.09 M/UL — LOW (ref 3.8–5.2)
RBC # FLD: 15 % — HIGH (ref 10.3–14.5)
SODIUM SERPL-SCNC: 133 MMOL/L — LOW (ref 135–145)
SURGICAL PATHOLOGY STUDY: SIGNIFICANT CHANGE UP
WBC # BLD: 15.02 K/UL — HIGH (ref 3.8–10.5)
WBC # FLD AUTO: 15.02 K/UL — HIGH (ref 3.8–10.5)

## 2024-03-07 PROCEDURE — 99024 POSTOP FOLLOW-UP VISIT: CPT

## 2024-03-07 RX ORDER — POTASSIUM CHLORIDE 20 MEQ
10 PACKET (EA) ORAL
Refills: 0 | Status: COMPLETED | OUTPATIENT
Start: 2024-03-07 | End: 2024-03-07

## 2024-03-07 RX ADMIN — Medication 100 MILLIEQUIVALENT(S): at 16:53

## 2024-03-07 RX ADMIN — CHLORHEXIDINE GLUCONATE 1 APPLICATION(S): 213 SOLUTION TOPICAL at 12:16

## 2024-03-07 RX ADMIN — APIXABAN 5 MILLIGRAM(S): 2.5 TABLET, FILM COATED ORAL at 17:36

## 2024-03-07 RX ADMIN — Medication 1 PATCH: at 13:00

## 2024-03-07 RX ADMIN — Medication 75 MILLIGRAM(S): at 21:42

## 2024-03-07 RX ADMIN — Medication 1 PATCH: at 13:10

## 2024-03-07 RX ADMIN — CLOPIDOGREL BISULFATE 75 MILLIGRAM(S): 75 TABLET, FILM COATED ORAL at 13:10

## 2024-03-07 RX ADMIN — Medication 1 PATCH: at 07:31

## 2024-03-07 RX ADMIN — ATORVASTATIN CALCIUM 80 MILLIGRAM(S): 80 TABLET, FILM COATED ORAL at 21:41

## 2024-03-07 RX ADMIN — Medication 100 MILLIEQUIVALENT(S): at 14:28

## 2024-03-07 RX ADMIN — Medication 1 PATCH: at 19:29

## 2024-03-07 RX ADMIN — Medication 75 MILLIGRAM(S): at 05:38

## 2024-03-07 RX ADMIN — Medication 175 MICROGRAM(S): at 05:38

## 2024-03-07 RX ADMIN — Medication 75 MILLIGRAM(S): at 13:10

## 2024-03-07 RX ADMIN — Medication 100 MILLIEQUIVALENT(S): at 15:33

## 2024-03-07 RX ADMIN — APIXABAN 5 MILLIGRAM(S): 2.5 TABLET, FILM COATED ORAL at 05:38

## 2024-03-07 NOTE — PROGRESS NOTE ADULT - SUBJECTIVE AND OBJECTIVE BOX
Date of service: 03-07-24 @ 18:27      Patient is a 78y old  Female who presents with a chief complaint of Syncope (04 Mar 2024 16:01)                                                               INTERVAL HPI/OVERNIGHT EVENTS:    REVIEW OF SYSTEMS:    no complaints                                                                                                                                                                                                                                                                                 Medications:  MEDICATIONS  (STANDING):  apixaban 5 milliGRAM(s) Oral every 12 hours  atorvastatin 80 milliGRAM(s) Oral at bedtime  chlorhexidine 2% Cloths 1 Application(s) Topical <User Schedule>  clopidogrel Tablet 75 milliGRAM(s) Oral daily  levothyroxine 175 MICROGram(s) Oral daily  metoprolol tartrate 75 milliGRAM(s) Oral three times a day  nicotine -  14 mG/24Hr(s) Patch 1 Patch Transdermal daily    MEDICATIONS  (PRN):  acetaminophen     Tablet .. 650 milliGRAM(s) Oral every 6 hours PRN Mild Pain (1 - 3)       Allergies    penicillin (Hives)    Intolerances      Vital Signs Last 24 Hrs  T(C): 36.4 (07 Mar 2024 17:07), Max: 36.8 (06 Mar 2024 21:06)  T(F): 97.5 (07 Mar 2024 17:07), Max: 98.2 (06 Mar 2024 21:06)  HR: 88 (07 Mar 2024 17:07) (56 - 103)  BP: 99/63 (07 Mar 2024 17:07) (93/61 - 119/78)  BP(mean): --  RR: 18 (07 Mar 2024 17:07) (18 - 18)  SpO2: 95% (07 Mar 2024 17:07) (91% - 100%)    Parameters below as of 07 Mar 2024 17:07  Patient On (Oxygen Delivery Method): room air      CAPILLARY BLOOD GLUCOSE          03-06 @ 07:01  -  03-07 @ 07:00  --------------------------------------------------------  IN: 830 mL / OUT: 875 mL / NET: -45 mL    03-07 @ 07:01  -  03-07 @ 18:27  --------------------------------------------------------  IN: 740 mL / OUT: 850 mL / NET: -110 mL      Physical Exam:    Daily     Daily   General:  Well appearing, NAD, not cachetic  HEENT:  Nonicteric, PERRLA  CV:  RRR, S1S2   Lungs:  CTA B/L, no wheezes, rales, rhonchi  Abdomen:  Soft, non-tender, no distended, positive BS  Extremities:  no edema                                                                                                                                                                                                                                                                                             LABS:                               10.2   15.02 )-----------( 293      ( 07 Mar 2024 10:56 )             29.1                      03-07    133<L>  |  98  |  9   ----------------------------<  117<H>  3.7   |  25  |  0.63    Ca    8.4      07 Mar 2024 10:56  Phos  2.9     03-07  Mg     1.9     03-07                         RADIOLOGY & ADDITIONAL TESTS         I personally reviewed: [  ]EKG   [  ]CXR    [  ] CT      A/P:         Discussed with :     Simon consultants' Notes   Time spent :

## 2024-03-07 NOTE — PROGRESS NOTE ADULT - ASSESSMENT
78-year-old female with PMH current smoker , COPD, A-fib  On Eliquis and Plavix, HTN, HLD presents for syncope from PCPs office yesterday.     ischemia Bowel :  s/p OR   appreciate SICU care   now with ileus : NGT in place : now off   tolerating po   CT ordered for elevated WBC : appreciate surgery input     RPB: discussed with Dr. Mendiola : would hold both plavix and eliquis at this time : d/w surgery     urinary retention :  straight cath and likley need foely        Afib :  hold AC       lethargy /encephalopathy :   CT head negative   improved and seems at baseline   EEG pending : negative       OOB   IS   PT     discussed with surgery   d/w daughter   d/w cardio

## 2024-03-07 NOTE — PROGRESS NOTE ADULT - TIME BILLING
Advanced care planning was discussed with patient and family.  Advanced care planning forms were reviewed and discussed as appropriate.  Differential diagnosis and plan of care discussed with patient after the evaluation.   Pain assessed and judicious use of narcotics when appropriate was discussed.  Importance of Fall prevention discussed.  Counseling on Smoking and Alcohol cessation was offered when appropriate.  Counseling on Diet, exercise, and medication compliance was done.
as above
pt , acp and cardio
pt, nurse and acp
Advanced care planning was discussed with patient and family.  Advanced care planning forms were reviewed and discussed as appropriate.  Differential diagnosis and plan of care discussed with patient after the evaluation.   Pain assessed and judicious use of narcotics when appropriate was discussed.  Importance of Fall prevention discussed.  Counseling on Smoking and Alcohol cessation was offered when appropriate.  Counseling on Diet, exercise, and medication compliance was done.
Advanced care planning was discussed with patient and family.  Advanced care planning forms were reviewed and discussed as appropriate.  Differential diagnosis and plan of care discussed with patient after the evaluation.   Pain assessed and judicious use of narcotics when appropriate was discussed.  Importance of Fall prevention discussed.  Counseling on Smoking and Alcohol cessation was offered when appropriate.  Counseling on Diet, exercise, and medication compliance was done.

## 2024-03-07 NOTE — PROGRESS NOTE ADULT - SUBJECTIVE AND OBJECTIVE BOX
Subjective: Patient seen and examined. No new events except as noted.   feeling good     REVIEW OF SYSTEMS:    CONSTITUTIONAL: No weakness, fevers or chills  EYES/ENT: No visual changes;  No vertigo or throat pain   NECK: No pain or stiffness  RESPIRATORY: No cough, wheezing, hemoptysis; No shortness of breath  CARDIOVASCULAR: No chest pain or palpitations  GASTROINTESTINAL: No abdominal or epigastric pain. No nausea, vomiting, or hematemesis; No diarrhea or constipation. No melena or hematochezia.  GENITOURINARY: No dysuria, frequency or hematuria  NEUROLOGICAL: No numbness or weakness  SKIN: No itching, burning, rashes, or lesions   All other review of systems is negative unless indicated above.    MEDICATIONS:  MEDICATIONS  (STANDING):  apixaban 5 milliGRAM(s) Oral every 12 hours  atorvastatin 80 milliGRAM(s) Oral at bedtime  chlorhexidine 2% Cloths 1 Application(s) Topical <User Schedule>  clopidogrel Tablet 75 milliGRAM(s) Oral daily  levothyroxine 175 MICROGram(s) Oral daily  metoprolol tartrate 75 milliGRAM(s) Oral three times a day  nicotine -  14 mG/24Hr(s) Patch 1 Patch Transdermal daily      PHYSICAL EXAM:  T(C): 36.6 (03-07-24 @ 05:35), Max: 36.8 (03-06-24 @ 21:06)  HR: 103 (03-07-24 @ 05:35) (56 - 103)  BP: 104/70 (03-07-24 @ 05:35) (103/70 - 111/76)  RR: 18 (03-07-24 @ 05:35) (18 - 18)  SpO2: 99% (03-07-24 @ 05:35) (95% - 99%)  Wt(kg): --  I&O's Summary    06 Mar 2024 07:01  -  07 Mar 2024 07:00  --------------------------------------------------------  IN: 830 mL / OUT: 875 mL / NET: -45 mL        Appearance: NAD	  HEENT:  Dry  oral mucosa   Lymphatic: No lymphadenopathy , no edema  Cardiovascular: irregular S1 S2, No JVD, No murmurs , Peripheral pulses palpable 2+ bilaterally  Respiratory: decreased bs   Gastrointestinal:  soft, mildly distended, appropriately tender near midline incision, dressing c/d/i,  ileostomy leaking stool  Skin: No rashes, No ecchymoses, No cyanosis, warm to touch  Musculoskeletal: Normal range of motion, normal strength  Psychiatry:  sleepy   Ext: No edema  +amaya        LABS:    CARDIAC MARKERS:  CARDIAC MARKERS ( 05 Mar 2024 04:41 )  x     / x     / 46 U/L / x     / 2.3 ng/mL                                11.3   14.29 )-----------( 270      ( 06 Mar 2024 08:34 )             35.0     03-06    131<L>  |  100  |  9   ----------------------------<  71  4.3   |  18<L>  |  0.59    Ca    8.6      06 Mar 2024 08:34  Phos  2.9     03-06  Mg     2.0     03-06            TELEMETRY: 	 AF    ECG:  	  RADIOLOGY:   DIAGNOSTIC TESTING:  [ ] Echocardiogram:  [ ]  Catheterization:  [ ] Stress Test:    OTHER:

## 2024-03-07 NOTE — PROGRESS NOTE ADULT - SUBJECTIVE AND OBJECTIVE BOX
SURGERY DAILY PROGRESS NOTE    SUBJECTIVE: Patient seen and evaluated. Doing well. Ostomy functional.     Overnight Events:  No acute events overnight. HR better controlled within 80-100s.   -----------------------------------------------------------------------------------------------------------------------------------------------------------------------------------------------------------  OBJECTIVE:  Vital Signs Last 24 Hrs  T(C): 36.6 (07 Mar 2024 05:35), Max: 36.8 (06 Mar 2024 21:06)  T(F): 97.9 (07 Mar 2024 05:35), Max: 98.2 (06 Mar 2024 21:06)  HR: 103 (07 Mar 2024 05:35) (56 - 103)  BP: 104/70 (07 Mar 2024 05:35) (103/70 - 111/76)  BP(mean): --  RR: 18 (07 Mar 2024 05:35) (18 - 18)  SpO2: 99% (07 Mar 2024 05:35) (95% - 99%)    Parameters below as of 07 Mar 2024 05:35  Patient On (Oxygen Delivery Method): room air      I&O's Detail    06 Mar 2024 07:01  -  07 Mar 2024 07:00  --------------------------------------------------------  IN:    Oral Fluid: 830 mL  Total IN: 830 mL    OUT:    Ileostomy (mL): 725 mL    Voided (mL): 150 mL  Total OUT: 875 mL    Total NET: -45 mL        Daily     Daily   MEDICATIONS  (STANDING):  apixaban 5 milliGRAM(s) Oral every 12 hours  atorvastatin 80 milliGRAM(s) Oral at bedtime  chlorhexidine 2% Cloths 1 Application(s) Topical <User Schedule>  clopidogrel Tablet 75 milliGRAM(s) Oral daily  levothyroxine 175 MICROGram(s) Oral daily  metoprolol tartrate 75 milliGRAM(s) Oral three times a day  nicotine -  14 mG/24Hr(s) Patch 1 Patch Transdermal daily    MEDICATIONS  (PRN):  acetaminophen     Tablet .. 650 milliGRAM(s) Oral every 6 hours PRN Mild Pain (1 - 3)      LABS:                        11.3   14.29 )-----------( 270      ( 06 Mar 2024 08:34 )             35.0     03-06    131<L>  |  100  |  9   ----------------------------<  71  4.3   |  18<L>  |  0.59    Ca    8.6      06 Mar 2024 08:34  Phos  2.9     03-06  Mg     2.0     03-06        Urinalysis Basic - ( 06 Mar 2024 08:34 )    Color: x / Appearance: x / SG: x / pH: x  Gluc: 71 mg/dL / Ketone: x  / Bili: x / Urobili: x   Blood: x / Protein: x / Nitrite: x   Leuk Esterase: x / RBC: x / WBC x   Sq Epi: x / Non Sq Epi: x / Bacteria: x        General Appearance: Appears well, NAD  Neck: Supple  Chest: Equal expansion bilaterally  CV: Pulse regular presently  Abdomen: Soft, nontense, appropriate incisional tenderness, slight erythema on inferior incision; ileostomy with liquid output  Extremities: WWP

## 2024-03-07 NOTE — PROGRESS NOTE ADULT - ATTENDING COMMENTS
seen and examined 03-07-24 @ 0940    tolerating regular diet w/o nausea or vomiting  ileostomy is healthy and covered with a well-fitting appliance  enteric contents in ileostomy bag    soft / NT / ND  midline laparotomy incision with staples intact and no evidence of wound infection, dressing has minimal blood stained  Will keep staples in place    ileostomy - 725 mL enteric drainage / 24 hours    WBC = 15  Cr - 0.6    CT abd/pelvis w/ contrast - no extravasation of rectal contrast from blind end of terminal ileum. 7 x 3 x 6 cm right retroperitoneal hematoma with hematocrit sign, and no evidence of wound infection. small hematoma in midline incision.      2/22/2024 - damage-control small bowel resection / diagnostic angiogram  2/24/2024 - end ileostomy creation  -no evidence of post-op infections on 3/6 CT scan    recurrent afib w/ RVR  -controlled with metoprolol 75 mg PO TID    acute urinary retention  -400 mL residual on 3/7 1229 straight cath  -replace Melvin if retention persists    dispo  -transfer to [facility] when available      I reviewed her labs and radiologic images. seen and examined 03-07-24 @ 0940    tolerating regular diet w/o nausea or vomiting  ileostomy is healthy and covered with a well-fitting appliance  enteric contents in ileostomy bag    soft / NT / ND  midline laparotomy incision with staples intact and no evidence of wound infection, dressing has minimal blood stained  Will keep staples in place    ileostomy - 725 mL enteric drainage / 24 hours    WBC = 15  Cr - 0.6    CT abd/pelvis w/ contrast - no extravasation of rectal contrast from blind end of terminal ileum. 7 x 3 x 6 cm right retroperitoneal hematoma with hematocrit sign, and no evidence of wound infection. small hematoma in midline incision.      2/22/2024 - damage-control small bowel resection / diagnostic angiogram  2/24/2024 - end ileostomy creation  -no evidence of post-op infections on 3/6 CT scan    recurrent afib w/ RVR  -controlled with metoprolol 75 mg PO TID    acute urinary retention  -400 mL residual on 3/7 1229 straight cath  -replace Melvin if retention persists    dispo  -transfer to [facility] when available      I reviewed her labs and radiologic images.  plan discussed with her daughter by phone (while she was at bedside visiting her mom).

## 2024-03-07 NOTE — PROGRESS NOTE ADULT - ASSESSMENT
78F PMHx COPD, A-fib on Eliquis and Plavix, HTN, HLD with signs of acute mesenteric ischemia now s/p diagnostic laparoscopy converted to exploratory laparotomy with 20 cm of terminal ileum resection with bowel left in discontinuity and temporary abdominal closure with abthera with mesenteric angiogram via R fem access 02-22 findings of celiac, SMA, SWATHI w/o flow, collateral perfusion confirmed. S/p Closure and ileostomy creation on 2/24. Rate better controlled now.     PLAN  - CT ABD/Pelvis 3/6: no evidence of leak, finding of RP hematoma extending to adjacent abdominal wall separate from ostomy site.   - f/u UOP  - Diet - DASH  - Eliquis 5 mg BID  - ABx - d/c'd  - Metoprolol 75mg TID for rate control   - PT => JOSE  - GOC/Code status discussion - full code    ACS/Trauma   [phone]

## 2024-03-08 ENCOUNTER — APPOINTMENT (OUTPATIENT)
Dept: CARDIOLOGY | Facility: CLINIC | Age: 79
End: 2024-03-08

## 2024-03-08 ENCOUNTER — TRANSCRIPTION ENCOUNTER (OUTPATIENT)
Age: 79
End: 2024-03-08

## 2024-03-08 LAB
ANION GAP SERPL CALC-SCNC: 13 MMOL/L — SIGNIFICANT CHANGE UP (ref 5–17)
APPEARANCE UR: ABNORMAL
BACTERIA # UR AUTO: ABNORMAL /HPF
BILIRUB UR-MCNC: NEGATIVE — SIGNIFICANT CHANGE UP
BUN SERPL-MCNC: 10 MG/DL — SIGNIFICANT CHANGE UP (ref 7–23)
CALCIUM SERPL-MCNC: 9 MG/DL — SIGNIFICANT CHANGE UP (ref 8.4–10.5)
CAST: 6 /LPF — HIGH (ref 0–4)
CHLORIDE SERPL-SCNC: 98 MMOL/L — SIGNIFICANT CHANGE UP (ref 96–108)
CO2 SERPL-SCNC: 22 MMOL/L — SIGNIFICANT CHANGE UP (ref 22–31)
COLOR SPEC: SIGNIFICANT CHANGE UP
CREAT SERPL-MCNC: 0.67 MG/DL — SIGNIFICANT CHANGE UP (ref 0.5–1.3)
DIFF PNL FLD: ABNORMAL
EGFR: 90 ML/MIN/1.73M2 — SIGNIFICANT CHANGE UP
GLUCOSE SERPL-MCNC: 93 MG/DL — SIGNIFICANT CHANGE UP (ref 70–99)
GLUCOSE UR QL: NEGATIVE MG/DL — SIGNIFICANT CHANGE UP
HCT VFR BLD CALC: 33.1 % — LOW (ref 34.5–45)
HGB BLD-MCNC: 11.4 G/DL — LOW (ref 11.5–15.5)
KETONES UR-MCNC: NEGATIVE MG/DL — SIGNIFICANT CHANGE UP
LEUKOCYTE ESTERASE UR-ACNC: ABNORMAL
MAGNESIUM SERPL-MCNC: 1.9 MG/DL — SIGNIFICANT CHANGE UP (ref 1.6–2.6)
MCHC RBC-ENTMCNC: 32.5 PG — SIGNIFICANT CHANGE UP (ref 27–34)
MCHC RBC-ENTMCNC: 34.4 GM/DL — SIGNIFICANT CHANGE UP (ref 32–36)
MCV RBC AUTO: 94.3 FL — SIGNIFICANT CHANGE UP (ref 80–100)
NITRITE UR-MCNC: NEGATIVE — SIGNIFICANT CHANGE UP
NRBC # BLD: 0 /100 WBCS — SIGNIFICANT CHANGE UP (ref 0–0)
PH UR: 6 — SIGNIFICANT CHANGE UP (ref 5–8)
PHOSPHATE SERPL-MCNC: 2.7 MG/DL — SIGNIFICANT CHANGE UP (ref 2.5–4.5)
PLATELET # BLD AUTO: 335 K/UL — SIGNIFICANT CHANGE UP (ref 150–400)
POTASSIUM SERPL-MCNC: 4.2 MMOL/L — SIGNIFICANT CHANGE UP (ref 3.5–5.3)
POTASSIUM SERPL-SCNC: 4.2 MMOL/L — SIGNIFICANT CHANGE UP (ref 3.5–5.3)
PROT UR-MCNC: SIGNIFICANT CHANGE UP MG/DL
RBC # BLD: 3.51 M/UL — LOW (ref 3.8–5.2)
RBC # FLD: 15.1 % — HIGH (ref 10.3–14.5)
RBC CASTS # UR COMP ASSIST: 4 /HPF — SIGNIFICANT CHANGE UP (ref 0–4)
REVIEW: SIGNIFICANT CHANGE UP
SARS-COV-2 RNA SPEC QL NAA+PROBE: SIGNIFICANT CHANGE UP
SODIUM SERPL-SCNC: 133 MMOL/L — LOW (ref 135–145)
SP GR SPEC: >1.03 — HIGH (ref 1–1.03)
SQUAMOUS # UR AUTO: 16 /HPF — HIGH (ref 0–5)
UROBILINOGEN FLD QL: 0.2 MG/DL — SIGNIFICANT CHANGE UP (ref 0.2–1)
WBC # BLD: 14.52 K/UL — HIGH (ref 3.8–10.5)
WBC # FLD AUTO: 14.52 K/UL — HIGH (ref 3.8–10.5)
WBC UR QL: 37 /HPF — HIGH (ref 0–5)

## 2024-03-08 PROCEDURE — 99024 POSTOP FOLLOW-UP VISIT: CPT

## 2024-03-08 PROCEDURE — 93010 ELECTROCARDIOGRAM REPORT: CPT

## 2024-03-08 RX ORDER — METOPROLOL TARTRATE 50 MG
100 TABLET ORAL THREE TIMES A DAY
Refills: 0 | Status: DISCONTINUED | OUTPATIENT
Start: 2024-03-08 | End: 2024-03-09

## 2024-03-08 RX ORDER — APIXABAN 2.5 MG/1
1 TABLET, FILM COATED ORAL
Qty: 0 | Refills: 0 | DISCHARGE
Start: 2024-03-08

## 2024-03-08 RX ORDER — METOPROLOL TARTRATE 50 MG
100 TABLET ORAL THREE TIMES A DAY
Refills: 0 | Status: DISCONTINUED | OUTPATIENT
Start: 2024-03-08 | End: 2024-03-08

## 2024-03-08 RX ORDER — METOPROLOL TARTRATE 50 MG
1 TABLET ORAL
Qty: 0 | Refills: 0 | DISCHARGE
Start: 2024-03-08

## 2024-03-08 RX ORDER — METOPROLOL TARTRATE 50 MG
5 TABLET ORAL ONCE
Refills: 0 | Status: COMPLETED | OUTPATIENT
Start: 2024-03-08 | End: 2024-03-08

## 2024-03-08 RX ORDER — ACETAMINOPHEN 500 MG
2 TABLET ORAL
Qty: 0 | Refills: 0 | DISCHARGE
Start: 2024-03-08

## 2024-03-08 RX ORDER — LOSARTAN POTASSIUM 100 MG/1
1 TABLET, FILM COATED ORAL
Refills: 0 | DISCHARGE

## 2024-03-08 RX ORDER — CLOPIDOGREL BISULFATE 75 MG/1
1 TABLET, FILM COATED ORAL
Qty: 0 | Refills: 0 | DISCHARGE
Start: 2024-03-08

## 2024-03-08 RX ORDER — APIXABAN 2.5 MG/1
1 TABLET, FILM COATED ORAL
Refills: 0 | DISCHARGE

## 2024-03-08 RX ORDER — CEFPODOXIME PROXETIL 100 MG
1 TABLET ORAL
Qty: 4 | Refills: 0
Start: 2024-03-08 | End: 2024-03-09

## 2024-03-08 RX ORDER — AMIODARONE HYDROCHLORIDE 400 MG/1
150 TABLET ORAL ONCE
Refills: 0 | Status: COMPLETED | OUTPATIENT
Start: 2024-03-08 | End: 2024-03-08

## 2024-03-08 RX ORDER — CEFTRIAXONE 500 MG/1
1000 INJECTION, POWDER, FOR SOLUTION INTRAMUSCULAR; INTRAVENOUS EVERY 24 HOURS
Refills: 0 | Status: DISCONTINUED | OUTPATIENT
Start: 2024-03-08 | End: 2024-03-09

## 2024-03-08 RX ORDER — NICOTINE POLACRILEX 2 MG
1 GUM BUCCAL
Qty: 0 | Refills: 0 | DISCHARGE
Start: 2024-03-08

## 2024-03-08 RX ADMIN — CEFTRIAXONE 100 MILLIGRAM(S): 500 INJECTION, POWDER, FOR SOLUTION INTRAMUSCULAR; INTRAVENOUS at 10:30

## 2024-03-08 RX ADMIN — APIXABAN 5 MILLIGRAM(S): 2.5 TABLET, FILM COATED ORAL at 05:45

## 2024-03-08 RX ADMIN — Medication 75 MILLIGRAM(S): at 05:45

## 2024-03-08 RX ADMIN — Medication 175 MICROGRAM(S): at 05:46

## 2024-03-08 RX ADMIN — APIXABAN 5 MILLIGRAM(S): 2.5 TABLET, FILM COATED ORAL at 17:19

## 2024-03-08 RX ADMIN — Medication 1 PATCH: at 07:20

## 2024-03-08 RX ADMIN — Medication 100 MILLIGRAM(S): at 15:11

## 2024-03-08 RX ADMIN — Medication 1 PATCH: at 13:04

## 2024-03-08 RX ADMIN — Medication 5 MILLIGRAM(S): at 13:11

## 2024-03-08 RX ADMIN — AMIODARONE HYDROCHLORIDE 600 MILLIGRAM(S): 400 TABLET ORAL at 14:18

## 2024-03-08 RX ADMIN — Medication 100 MILLIGRAM(S): at 22:57

## 2024-03-08 RX ADMIN — CHLORHEXIDINE GLUCONATE 1 APPLICATION(S): 213 SOLUTION TOPICAL at 06:27

## 2024-03-08 RX ADMIN — CLOPIDOGREL BISULFATE 75 MILLIGRAM(S): 75 TABLET, FILM COATED ORAL at 13:12

## 2024-03-08 RX ADMIN — Medication 1 PATCH: at 19:00

## 2024-03-08 RX ADMIN — Medication 1 PATCH: at 13:17

## 2024-03-08 RX ADMIN — ATORVASTATIN CALCIUM 80 MILLIGRAM(S): 80 TABLET, FILM COATED ORAL at 22:57

## 2024-03-08 NOTE — PROGRESS NOTE ADULT - PROBLEM SELECTOR PLAN 3
Stable
Stable   will need to obtain exact history.
Stable   will need to obtain exact history.
Stable
Stable   will need to obtain exact history.
Stable
Stable
see GOC note above  full code  surrogate Caity  disposition JOSE
Stable
Stable  would hold Plavix for now given RP bleed
Stable
Stable
Stable   will need to obtain exact history.
Stable   will need to obtain exact history.
see GOC note above  will discuss advanced directives over the weekend and will f/u on monday  surrogate is Caity

## 2024-03-08 NOTE — DISCHARGE NOTE NURSING/CASE MANAGEMENT/SOCIAL WORK - NSDCPEEMAIL_GEN_ALL_CORE
[facility] Tobacco Control email [email or web address]

## 2024-03-08 NOTE — PROGRESS NOTE ADULT - PROBLEM SELECTOR PROBLEM 3
CAD (coronary artery disease)
Advanced care planning/counseling discussion
Advanced care planning/counseling discussion

## 2024-03-08 NOTE — PROGRESS NOTE ADULT - PROBLEM SELECTOR PROBLEM 2
Other persistent atrial fibrillation
Ischemia, bowel
Other persistent atrial fibrillation
Ischemia, bowel

## 2024-03-08 NOTE — PROGRESS NOTE ADULT - ATTENDING COMMENTS
seen and examined 03-08-24 @ 0940    tolerating regular diet w/o nausea or vomiting  ileostomy is healthy and covered with a well-fitting appliance  enteric contents in ileostomy bag    soft / NT / ND  midline laparotomy incision with staples intact and no evidence of wound infection, dressing has minimal blood stained  Will keep staples in place    ileostomy - 750 mL enteric drainage / 24 hours    WBC = 14  Cr - 0.6    U/A (3/7) - 37 WBC, 4 RBC    CT abd/pelvis w/ contrast - no extravasation of rectal contrast from blind end of terminal ileum. 7 x 3 x 6 cm right retroperitoneal hematoma with hematocrit sign, and no evidence of wound infection. small hematoma in midline incision.      2/22/2024 - damage-control small bowel resection / diagnostic angiogram  2/24/2024 - end ileostomy creation  -no evidence of post-op infections on 3/6 CT scan    recurrent afib w/ RVR  -controlled with metoprolol 75 mg PO TID    acute urinary retention  -Melvin replaced on 3/8  -reattempt trial of void in [facility]    UTI secondary to acute urinary retention should resolve with ABx and Melvin  -check urine culture  -ceftriaxone (3/8 - 3/10)    dispo  -transfer to [facility] when available      I reviewed her labs.

## 2024-03-08 NOTE — DISCHARGE NOTE NURSING/CASE MANAGEMENT/SOCIAL WORK - NSDCPEWEB_GEN_ALL_CORE
[facility] website --- [email or web address] website --- [email or web address]

## 2024-03-08 NOTE — PROGRESS NOTE ADULT - ASSESSMENT
78F PMHx COPD, A-fib on Eliquis and Plavix, HTN, HLD with signs of acute mesenteric ischemia now s/p diagnostic laparoscopy converted to exploratory laparotomy with 20 cm of terminal ileum resection with bowel left in discontinuity and temporary abdominal closure with abthera with mesenteric angiogram via R fem access 02-22 findings of celiac, SMA, SWATHI w/o flow, collateral perfusion confirmed. S/p Closure and ileostomy creation on 2/24. Rate better controlled now.     PLAN  - CT ABD/Pelvis 3/6: no evidence of leak. RP hematoma with layering suggests chronicity, and patient has been hemodynamically stable with stable hematocrit.  - f/u UOP  - Diet - DASH  - Eliquis 5 mg BID  - ABx - d/c'd  - Metoprolol 75mg TID for rate control   - PT => JOSE  - GOC/Code status discussion - full code    ACS/Trauma   [phone]

## 2024-03-08 NOTE — DISCHARGE NOTE NURSING/CASE MANAGEMENT/SOCIAL WORK - NSDCVIVACCINE_GEN_ALL_CORE_FT
Tdap; 14-Jun-2015 22:07; Rosana Loaiza (RN); [employer]; [id number]; IntraMuscular; Deltoid Left.; 0.5 milliLiter(s); VIS (VIS Published: 09-May-2013, VIS Presented: 14-Jun-2015);

## 2024-03-08 NOTE — DISCHARGE NOTE NURSING/CASE MANAGEMENT/SOCIAL WORK - PATIENT PORTAL LINK FT
You can access the FollowMyHealth Patient Portal offered by [facility] by registering at the following website: [email or web address]. By joining [employer]’s FollowMyHealth portal, you will also be able to view your health information using other applications (apps) compatible with our system.

## 2024-03-08 NOTE — PROGRESS NOTE ADULT - PROBLEM SELECTOR PLAN 1
Unclear etiology   check orthostatics   monitor on teLe   check TTE.
s/p intubation, no extubated  s/p diagnostic laparoscopy, ischemic bowel noted in RLQ just proximal to TI.   Converted to laparotomy. 20cm of terminal ileum resected and bowel left in discontinuity. Abthera placed  s/p RTOR for re-exploration, ileostomy, and closure.  Orders per ICU team
s/p intubation, no extubated  s/p diagnostic laparoscopy, ischemic bowel noted in RLQ just proximal to TI.   Converted to laparotomy. 20cm of terminal ileum resected and bowel left in discontinuity. Abthera placed  s/p RTOR for re-exploration, ileostomy, and closure.  Orders per ICU team
Surgery and ICU consult
s/p intubation, no extubated  s/p diagnostic laparoscopy, ischemic bowel noted in RLQ just proximal to TI.   Converted to laparotomy. 20cm of terminal ileum resected and bowel left in discontinuity. Abthera placed  s/p RTOR for re-exploration, ileostomy, and closure.  Orders per surgery team
s/p intubation  s/p diagnostic laparoscopy, ischemic bowel noted in RLQ just proximal to TI.   Converted to laparotomy. 20cm of terminal ileum resected and bowel left in discontinuity. Abthera placed  Plan for eventual RTOR pending clinical course
s/p intubation, no extubated  s/p diagnostic laparoscopy, ischemic bowel noted in RLQ just proximal to TI.   Converted to laparotomy. 20cm of terminal ileum resected and bowel left in discontinuity. Abthera placed  s/p RTOR for re-exploration, ileostomy, and closure.  Orders per surgery team
s/p intubation, no extubated  s/p diagnostic laparoscopy, ischemic bowel noted in RLQ just proximal to TI.   Converted to laparotomy. 20cm of terminal ileum resected and bowel left in discontinuity. Abthera placed  s/p RTOR for re-exploration, ileostomy, and closure.  Orders per ICU team
s/p intubation, no extubated  s/p diagnostic laparoscopy, ischemic bowel noted in RLQ just proximal to TI.   Converted to laparotomy. 20cm of terminal ileum resected and bowel left in discontinuity. Abthera placed  s/p RTOR for re-exploration, ileostomy, and closure.  Orders per ICU team
s/p intubation, no extubated  s/p diagnostic laparoscopy, ischemic bowel noted in RLQ just proximal to TI.   Converted to laparotomy. 20cm of terminal ileum resected and bowel left in discontinuity. Abthera placed  s/p RTOR for re-exploration, ileostomy, and closure.  Orders per surgery team
Unclear etiology   check orthostatics   monitor on teLe   check TTE.
s/p intubation, no extubated  s/p diagnostic laparoscopy, ischemic bowel noted in RLQ just proximal to TI.   Converted to laparotomy. 20cm of terminal ileum resected and bowel left in discontinuity. Abthera placed  s/p RTOR for re-exploration, ileostomy, and closure.  Orders per ICU team
ppsv 30%: pt requires assistance with all ADL, care, turn and positioning
s/p intubation  s/p diagnostic laparoscopy, ischemic bowel noted in RLQ just proximal to TI.   Converted to laparotomy. 20cm of terminal ileum resected and bowel left in discontinuity. Abthera placed  s/p RTOR for re-exploration, ileostomy, and closure.
s/p intubation, no extubated  s/p diagnostic laparoscopy, ischemic bowel noted in RLQ just proximal to TI.   Converted to laparotomy. 20cm of terminal ileum resected and bowel left in discontinuity. Abthera placed  s/p RTOR for re-exploration, ileostomy, and closure.  Orders per ICU team
Unclear etiology   check orthostatics   monitor on teLe   check TTE.
s/p intubation, no extubated  s/p diagnostic laparoscopy, ischemic bowel noted in RLQ just proximal to TI.   Converted to laparotomy. 20cm of terminal ileum resected and bowel left in discontinuity. Abthera placed  s/p RTOR for re-exploration, ileostomy, and closure.  Orders per ICU team
s/p intubation, no extubated  s/p diagnostic laparoscopy, ischemic bowel noted in RLQ just proximal to TI.   Converted to laparotomy. 20cm of terminal ileum resected and bowel left in discontinuity. Abthera placed  s/p RTOR for re-exploration, ileostomy, and closure.  Orders per ICU team
ppsv 30%: pt requires assistance with all ADL, care, turn and positioning
s/p intubation, no extubated  s/p diagnostic laparoscopy, ischemic bowel noted in RLQ just proximal to TI.   Converted to laparotomy. 20cm of terminal ileum resected and bowel left in discontinuity. Abthera placed  s/p RTOR for re-exploration, ileostomy, and closure.  Orders per ICU team
s/p intubation, no extubated  s/p diagnostic laparoscopy, ischemic bowel noted in RLQ just proximal to TI.   Converted to laparotomy. 20cm of terminal ileum resected and bowel left in discontinuity. Abthera placed  s/p RTOR for re-exploration, ileostomy, and closure.  Orders per surgery team

## 2024-03-08 NOTE — PROVIDER CONTACT NOTE (CHANGE IN STATUS NOTIFICATION) - BACKGROUND
Patient admitted s/p syncope at pcp office admitted with CHANELL.
Pt is here for syncope and collapse/

## 2024-03-08 NOTE — PROVIDER CONTACT NOTE (CHANGE IN STATUS NOTIFICATION) - SITUATION
Notification received from mobile heart beat patient hr 148 once looked on airstrip noticed she is in AFIB RVR.

## 2024-03-08 NOTE — PROVIDER CONTACT NOTE (CHANGE IN STATUS NOTIFICATION) - ASSESSMENT
Patient alert and oriented x2. Denies any chest pain nor discomfort. No signs of distress noted.
Pt was orginally A & ox4 when she first came into ED [facility]. Now patient is even more lethargic, refusing to eat and follow instructions. Patient fingerstick is 116, and is not hypoglycemic.

## 2024-03-08 NOTE — PROVIDER CONTACT NOTE (CHANGE IN STATUS NOTIFICATION) - ACTION/TREATMENT ORDERED:
Provider notified, Instructed to given IV Metoprolol and amio.   Awaiting for pharmacy to acknowledge medications.

## 2024-03-08 NOTE — PROGRESS NOTE ADULT - SUBJECTIVE AND OBJECTIVE BOX
Subjective: Patient seen and examined. No new events except as noted.   incidental RP bleed on CT     REVIEW OF SYSTEMS:    CONSTITUTIONAL: + weakness, fevers or chills  EYES/ENT: No visual changes;  No vertigo or throat pain   NECK: No pain or stiffness  RESPIRATORY: No cough, wheezing, hemoptysis; No shortness of breath  CARDIOVASCULAR: No chest pain or palpitations  GASTROINTESTINAL: No abdominal or epigastric pain. No nausea, vomiting, or hematemesis; No diarrhea or constipation. No melena or hematochezia.  GENITOURINARY: No dysuria, frequency or hematuria  NEUROLOGICAL: No numbness or weakness  SKIN: No itching, burning, rashes, or lesions   All other review of systems is negative unless indicated above.    MEDICATIONS:  MEDICATIONS  (STANDING):  apixaban 5 milliGRAM(s) Oral every 12 hours  atorvastatin 80 milliGRAM(s) Oral at bedtime  cefTRIAXone   IVPB 1000 milliGRAM(s) IV Intermittent every 24 hours  chlorhexidine 2% Cloths 1 Application(s) Topical <User Schedule>  clopidogrel Tablet 75 milliGRAM(s) Oral daily  levothyroxine 175 MICROGram(s) Oral daily  metoprolol tartrate 75 milliGRAM(s) Oral three times a day  nicotine -  14 mG/24Hr(s) Patch 1 Patch Transdermal daily      PHYSICAL EXAM:  T(C): 36.5 (03-08-24 @ 10:27), Max: 36.8 (03-07-24 @ 21:24)  HR: 99 (03-08-24 @ 10:27) (73 - 99)  BP: 108/68 (03-08-24 @ 10:27) (93/61 - 119/78)  RR: 18 (03-08-24 @ 10:27) (18 - 18)  SpO2: 99% (03-08-24 @ 10:27) (95% - 100%)  Wt(kg): --  I&O's Summary    07 Mar 2024 07:01  -  08 Mar 2024 07:00  --------------------------------------------------------  IN: 1340 mL / OUT: 2230 mL / NET: -890 mL        Appearance: NAD	  HEENT:  Dry  oral mucosa   Lymphatic: No lymphadenopathy , no edema  Cardiovascular: irregular S1 S2, No JVD, No murmurs , Peripheral pulses palpable 2+ bilaterally  Respiratory: decreased bs   Gastrointestinal:  soft, mildly distended, appropriately tender near midline incision, dressing c/d/i,  ileostomy leaking stool  Skin: No rashes, No ecchymoses, No cyanosis, warm to touch  Musculoskeletal: Normal range of motion, normal strength  Psychiatry:  sleepy   Ext: No edema  +amaya        LABS:    CARDIAC MARKERS:                                11.4   14.52 )-----------( 335      ( 08 Mar 2024 07:15 )             33.1     03-08    133<L>  |  98  |  10  ----------------------------<  93  4.2   |  22  |  0.67    Ca    9.0      08 Mar 2024 07:23  Phos  2.7     03-08  Mg     1.9     03-08          TELEMETRY: 	  AF  ECG:  	  RADIOLOGY: < from: CT Abdomen and Pelvis w/ IV Cont (03.06.24 @ 16:39) >    ACC: [id number] EXAM:  CT ABDOMEN AND PELVIS IC   ORDERED BY:  MAXIMUS VELA     PROCEDURE DATE:  03/06/2024          INTERPRETATION:  CLINICAL INFORMATION: Leukocytosis. Status post small   bowel resection and end ileostomy.    COMPARISON: CT abdomen pelvis 2/22/2024    CONTRAST/COMPLICATIONS:  IV Contrast: Omnipaque 350  90 cc administered   10 cc discarded  Oral Contrast: NONE  Complications: None reported at time of study completion    PROCEDURE:  CT of the Abdomen and Pelvis was performed.  Sagittal and coronal reformats were performed.    FINDINGS:  LOWER CHEST: Left basilar subsegmental atelectasis. Coronary artery and   mitral annular calcifications. Partially imaged enlarged left atrium.    LIVER: Within normal limits.  BILE DUCTS: Normal caliber.  GALLBLADDER: Within normal limits.  SPLEEN: Within normal limits.  PANCREAS: Within normal limits.  ADRENALS: Stable 3.0 cm heterogeneous left adrenal mass, dating back to   1/25/2022.  KIDNEYS/URETERS: No hydronephrosis. Left renalcysts and additional   bilateral subcentimeter hypodense foci, too small to characterize.    BLADDER: Within normal limits.  REPRODUCTIVE ORGANS: Uterus and adnexa within normal limits.    BOWEL: Interval small bowel resection with right lower quadrant end   ileostomy. Rectal tube in place with rectally administered contrast   opacifying the colon to the level of the blind ending cecum. No   extravasation of contrast. No bowel obstruction.  PERITONEUM: Small volume ascites.  VESSELS: Atherosclerotic changes.  RETROPERITONEUM/LYMPH NODES: Heterogeneous collection with a fluid fluid   level in the right lower quadrant adjacent to the ascending colon,   extending from the fascial surface of the adjacent abdominal oblique   muscles, measuring 6.7x 6.0 x 6.8 cm, compatible with a retroperitoneal   hematoma. No lymphadenopathy.  ABDOMINAL WALL: Postsurgical changes. Hyperdensity along the midline   incision in the periumbilical region measuring 3.5 x 2.5 cm, likely a   small hematoma. Mild subcutaneous edema  BONES: Degenerative changes. Superior endplate irregularity of the L3 and   L4 vertebral bodies, associated with Schmorl's nodes.    IMPRESSION:  *  Interval small bowel resection with right lower quadrant end ileostomy.  *  Contrast opacification of the rectum and colon without evidence of   leak.  *  Retroperitoneal hematoma with extension to the adjacent abdominal wall   separate from the ostomy site.  *  Small ventral abdominal wall hematoma.  *  Small volume ascites.    --- End of Report ---      < end of copied text >    DIAGNOSTIC TESTING:  [ ] Echocardiogram:  [ ]  Catheterization:  [ ] Stress Test:    OTHER:

## 2024-03-08 NOTE — PROGRESS NOTE ADULT - PROBLEM SELECTOR PLAN 2
2/23: OR: Diagnostic laparoscopy, ischemic bowel noted in RLQ just proximal to TI. Converted to laparotomy. 20cm of terminal ileum resected and bowel left in discontinuity.  celiac, SMA, SWATHI w/ no flow demonstrated on angiogram    2/24:  RTOR  End ileostomy, closed  NGT discontinued  defer
Cardizem gtt   Lopressor IV as ordered  Started Heparin gtt
now off Cardizem gtt   PO Metoprolol and cardizem   Eliquis 5 bid   Lasix 40 iv daily for diuresis
now off Cardizem gtt   PO Metoprolol and cardizem   Eliquis 5 bid   Lasix 40 iv daily for diuresis
Increase Metoprolol 75 tid   Eliquis 5 bid
now off Cardizem gtt   PO Metoprolol and cardizem   Eliquis 5 bid   Lasix 40 iv daily for diuresis
Started Metoprolol 25 tid   on eliquis
Cardizem gtt   Lopressor IV as ordered  AC when ok with surgery
cont Metoprolol 75 tid   Eliquis 5 bid  PRN IV lopressor 5 q6 HR > 120
Cardizem gtt   Lopressor IV as ordered  AC when ok with surgery
Started Metoprolol 25 tid   on eliquis
Unable to tale PO   discussed with NP   lopressor 5 iv q6   likely to hold eliquis
Unable to tale PO   discussed with NP   lopressor 5 iv q6 withhold parameters  Heparin gtt
Cardizem gtt   Lopressor IV as ordered   Heparin gtt  Lasix 40 iv daily for diuresis
cont Metoprolol 75 tid   Eliquis 5 bid  PRN IV lopressor 5 q6 HR > 120
cont Metoprolol 75 tid   Eliquis 5 bid  PRN IV lopressor 5 q6 HR > 120
now off Cardizem gtt   PO Metoprolol and cardizem   Eliquis 5 bid   Lasix 40 iv daily for diuresis
Cardizem gtt   Lopressor IV as ordered   Heparin gtt  Lasix 40 iv daily for diuresis
Cardizem gtt   Lopressor IV as ordered   Heparin gtt  Lasix 40 iv daily for diuresis
2/23: OR: Diagnostic laparoscopy, ischemic bowel noted in RLQ just proximal to TI. Converted to laparotomy. 20cm of terminal ileum resected and bowel left in discontinuity.  celiac, SMA, SWATHI w/ no flow demonstrated on angiogram    2/24:  RTOR  End ileostomy, closed  NGT discontinued    ileus appears resolved   tolerating po
Started Metoprolol 25 tid   on eliquis

## 2024-03-08 NOTE — PROGRESS NOTE ADULT - PROBLEM SELECTOR PROBLEM 1
Ischemia, bowel
Syncope
Ischemia, bowel
Syncope
Ischemia, bowel
Ischemia, bowel
Syncope
Ischemia, bowel
Functional quadriplegia
Functional quadriplegia

## 2024-03-08 NOTE — PROGRESS NOTE ADULT - SUBJECTIVE AND OBJECTIVE BOX
SUBJECTIVE: Patient seen and examined on AM rounds. Patient reports that they're feeling well. Denies fever, chills. Reports pain as controlled. No complaints at this time.     Vital Signs Last 24 Hrs  T(C): 36.5 (08 Mar 2024 05:36), Max: 36.8 (07 Mar 2024 10:00)  T(F): 97.7 (08 Mar 2024 05:36), Max: 98.3 (07 Mar 2024 21:24)  HR: 97 (08 Mar 2024 05:36) (73 - 97)  BP: 117/81 (08 Mar 2024 05:36) (93/61 - 119/78)  BP(mean): --  RR: 18 (08 Mar 2024 05:36) (18 - 18)  SpO2: 98% (08 Mar 2024 05:36) (91% - 100%)    Parameters below as of 08 Mar 2024 05:36  Patient On (Oxygen Delivery Method): room air        General Appearance: Appears well, NAD  Neck: Supple  Chest: Equal expansion bilaterally  CV: Pulse regular presently  Abdomen: Soft, nontense; incision c/d/i; ostomy with liquid output  Extremities: WWP    I&O's Summary    07 Mar 2024 07:01  -  08 Mar 2024 07:00  --------------------------------------------------------  IN: 1340 mL / OUT: 2230 mL / NET: -890 mL      I&O's Detail    07 Mar 2024 07:01  -  08 Mar 2024 07:00  --------------------------------------------------------  IN:    IV PiggyBack: 300 mL    Oral Fluid: 1040 mL  Total IN: 1340 mL    OUT:    Emesis (mL): 0 mL    Ileostomy (mL): 750 mL    Indwelling Catheter - Urethral (mL): 1030 mL    Intermittent Catheterization - Urethral (mL): 400 mL    Voided (mL): 50 mL  Total OUT: 2230 mL    Total NET: -890 mL          LABS:                        11.4   14.52 )-----------( 335      ( 08 Mar 2024 07:15 )             33.1     03-08    133<L>  |  98  |  10  ----------------------------<  93  4.2   |  22  |  0.67    Ca    9.0      08 Mar 2024 07:23  Phos  2.7     03-08  Mg     1.9     03-08        Urinalysis Basic - ( 08 Mar 2024 07:23 )    Color: x / Appearance: x / SG: x / pH: x  Gluc: 93 mg/dL / Ketone: x  / Bili: x / Urobili: x   Blood: x / Protein: x / Nitrite: x   Leuk Esterase: x / RBC: x / WBC x   Sq Epi: x / Non Sq Epi: x / Bacteria: x        RADIOLOGY & ADDITIONAL STUDIES:

## 2024-03-09 VITALS
HEART RATE: 90 BPM | RESPIRATION RATE: 18 BRPM | DIASTOLIC BLOOD PRESSURE: 66 MMHG | OXYGEN SATURATION: 98 % | SYSTOLIC BLOOD PRESSURE: 104 MMHG | TEMPERATURE: 98 F

## 2024-03-09 LAB
ANION GAP SERPL CALC-SCNC: 12 MMOL/L — SIGNIFICANT CHANGE UP (ref 5–17)
BUN SERPL-MCNC: 9 MG/DL — SIGNIFICANT CHANGE UP (ref 7–23)
CALCIUM SERPL-MCNC: 8.8 MG/DL — SIGNIFICANT CHANGE UP (ref 8.4–10.5)
CHLORIDE SERPL-SCNC: 99 MMOL/L — SIGNIFICANT CHANGE UP (ref 96–108)
CO2 SERPL-SCNC: 18 MMOL/L — LOW (ref 22–31)
CREAT SERPL-MCNC: 0.69 MG/DL — SIGNIFICANT CHANGE UP (ref 0.5–1.3)
EGFR: 89 ML/MIN/1.73M2 — SIGNIFICANT CHANGE UP
GLUCOSE SERPL-MCNC: 106 MG/DL — HIGH (ref 70–99)
HCT VFR BLD CALC: 38.6 % — SIGNIFICANT CHANGE UP (ref 34.5–45)
HGB BLD-MCNC: 12.2 G/DL — SIGNIFICANT CHANGE UP (ref 11.5–15.5)
MAGNESIUM SERPL-MCNC: 1.8 MG/DL — SIGNIFICANT CHANGE UP (ref 1.6–2.6)
MCHC RBC-ENTMCNC: 31.6 GM/DL — LOW (ref 32–36)
MCHC RBC-ENTMCNC: 31.9 PG — SIGNIFICANT CHANGE UP (ref 27–34)
MCV RBC AUTO: 101 FL — HIGH (ref 80–100)
NRBC # BLD: 0 /100 WBCS — SIGNIFICANT CHANGE UP (ref 0–0)
PHOSPHATE SERPL-MCNC: 3.3 MG/DL — SIGNIFICANT CHANGE UP (ref 2.5–4.5)
PLATELET # BLD AUTO: 306 K/UL — SIGNIFICANT CHANGE UP (ref 150–400)
POTASSIUM SERPL-MCNC: 4.7 MMOL/L — SIGNIFICANT CHANGE UP (ref 3.5–5.3)
POTASSIUM SERPL-SCNC: 4.7 MMOL/L — SIGNIFICANT CHANGE UP (ref 3.5–5.3)
RBC # BLD: 3.82 M/UL — SIGNIFICANT CHANGE UP (ref 3.8–5.2)
RBC # FLD: 15.4 % — HIGH (ref 10.3–14.5)
SODIUM SERPL-SCNC: 129 MMOL/L — LOW (ref 135–145)
WBC # BLD: 14.23 K/UL — HIGH (ref 3.8–10.5)
WBC # FLD AUTO: 14.23 K/UL — HIGH (ref 3.8–10.5)

## 2024-03-09 PROCEDURE — P9045: CPT

## 2024-03-09 PROCEDURE — 85018 HEMOGLOBIN: CPT

## 2024-03-09 PROCEDURE — 82330 ASSAY OF CALCIUM: CPT

## 2024-03-09 PROCEDURE — 97110 THERAPEUTIC EXERCISES: CPT

## 2024-03-09 PROCEDURE — 84480 ASSAY TRIIODOTHYRONINE (T3): CPT

## 2024-03-09 PROCEDURE — 80048 BASIC METABOLIC PNL TOTAL CA: CPT

## 2024-03-09 PROCEDURE — 87186 SC STD MICRODIL/AGAR DIL: CPT

## 2024-03-09 PROCEDURE — 82803 BLOOD GASES ANY COMBINATION: CPT

## 2024-03-09 PROCEDURE — 74019 RADEX ABDOMEN 2 VIEWS: CPT

## 2024-03-09 PROCEDURE — 82607 VITAMIN B-12: CPT

## 2024-03-09 PROCEDURE — C1889: CPT

## 2024-03-09 PROCEDURE — 97116 GAIT TRAINING THERAPY: CPT

## 2024-03-09 PROCEDURE — 87077 CULTURE AEROBIC IDENTIFY: CPT

## 2024-03-09 PROCEDURE — 85014 HEMATOCRIT: CPT

## 2024-03-09 PROCEDURE — 70450 CT HEAD/BRAIN W/O DYE: CPT

## 2024-03-09 PROCEDURE — 85027 COMPLETE CBC AUTOMATED: CPT

## 2024-03-09 PROCEDURE — 94002 VENT MGMT INPAT INIT DAY: CPT

## 2024-03-09 PROCEDURE — 85610 PROTHROMBIN TIME: CPT

## 2024-03-09 PROCEDURE — 86900 BLOOD TYPING SEROLOGIC ABO: CPT

## 2024-03-09 PROCEDURE — 71045 X-RAY EXAM CHEST 1 VIEW: CPT

## 2024-03-09 PROCEDURE — 80053 COMPREHEN METABOLIC PANEL: CPT

## 2024-03-09 PROCEDURE — 74018 RADEX ABDOMEN 1 VIEW: CPT

## 2024-03-09 PROCEDURE — 82553 CREATINE MB FRACTION: CPT

## 2024-03-09 PROCEDURE — 87637 SARSCOV2&INF A&B&RSV AMP PRB: CPT

## 2024-03-09 PROCEDURE — 85730 THROMBOPLASTIN TIME PARTIAL: CPT

## 2024-03-09 PROCEDURE — 96365 THER/PROPH/DIAG IV INF INIT: CPT

## 2024-03-09 PROCEDURE — 99024 POSTOP FOLLOW-UP VISIT: CPT

## 2024-03-09 PROCEDURE — 96366 THER/PROPH/DIAG IV INF ADDON: CPT

## 2024-03-09 PROCEDURE — 87086 URINE CULTURE/COLONY COUNT: CPT

## 2024-03-09 PROCEDURE — 81001 URINALYSIS AUTO W/SCOPE: CPT

## 2024-03-09 PROCEDURE — 36600 WITHDRAWAL OF ARTERIAL BLOOD: CPT

## 2024-03-09 PROCEDURE — 82947 ASSAY GLUCOSE BLOOD QUANT: CPT

## 2024-03-09 PROCEDURE — 86850 RBC ANTIBODY SCREEN: CPT

## 2024-03-09 PROCEDURE — 84484 ASSAY OF TROPONIN QUANT: CPT

## 2024-03-09 PROCEDURE — 85025 COMPLETE CBC W/AUTO DIFF WBC: CPT

## 2024-03-09 PROCEDURE — 36415 COLL VENOUS BLD VENIPUNCTURE: CPT

## 2024-03-09 PROCEDURE — 88307 TISSUE EXAM BY PATHOLOGIST: CPT

## 2024-03-09 PROCEDURE — 86901 BLOOD TYPING SEROLOGIC RH(D): CPT

## 2024-03-09 PROCEDURE — 84436 ASSAY OF TOTAL THYROXINE: CPT

## 2024-03-09 PROCEDURE — 99285 EMERGENCY DEPT VISIT HI MDM: CPT | Mod: 25

## 2024-03-09 PROCEDURE — 87507 IADNA-DNA/RNA PROBE TQ 12-25: CPT

## 2024-03-09 PROCEDURE — 96376 TX/PRO/DX INJ SAME DRUG ADON: CPT

## 2024-03-09 PROCEDURE — 97530 THERAPEUTIC ACTIVITIES: CPT

## 2024-03-09 PROCEDURE — 94003 VENT MGMT INPAT SUBQ DAY: CPT

## 2024-03-09 PROCEDURE — 82962 GLUCOSE BLOOD TEST: CPT

## 2024-03-09 PROCEDURE — 82565 ASSAY OF CREATININE: CPT

## 2024-03-09 PROCEDURE — C1760: CPT

## 2024-03-09 PROCEDURE — 87641 MR-STAPH DNA AMP PROBE: CPT

## 2024-03-09 PROCEDURE — 76000 FLUOROSCOPY <1 HR PHYS/QHP: CPT

## 2024-03-09 PROCEDURE — 95816 EEG AWAKE AND DROWSY: CPT

## 2024-03-09 PROCEDURE — 86803 HEPATITIS C AB TEST: CPT

## 2024-03-09 PROCEDURE — 82746 ASSAY OF FOLIC ACID SERUM: CPT

## 2024-03-09 PROCEDURE — C1887: CPT

## 2024-03-09 PROCEDURE — 93306 TTE W/DOPPLER COMPLETE: CPT

## 2024-03-09 PROCEDURE — 84100 ASSAY OF PHOSPHORUS: CPT

## 2024-03-09 PROCEDURE — 74177 CT ABD & PELVIS W/CONTRAST: CPT

## 2024-03-09 PROCEDURE — 93005 ELECTROCARDIOGRAM TRACING: CPT

## 2024-03-09 PROCEDURE — 87040 BLOOD CULTURE FOR BACTERIA: CPT

## 2024-03-09 PROCEDURE — 83735 ASSAY OF MAGNESIUM: CPT

## 2024-03-09 PROCEDURE — 84295 ASSAY OF SERUM SODIUM: CPT

## 2024-03-09 PROCEDURE — 84132 ASSAY OF SERUM POTASSIUM: CPT

## 2024-03-09 PROCEDURE — 83605 ASSAY OF LACTIC ACID: CPT

## 2024-03-09 PROCEDURE — 83880 ASSAY OF NATRIURETIC PEPTIDE: CPT

## 2024-03-09 PROCEDURE — 87640 STAPH A DNA AMP PROBE: CPT

## 2024-03-09 PROCEDURE — 82435 ASSAY OF BLOOD CHLORIDE: CPT

## 2024-03-09 PROCEDURE — 87635 SARS-COV-2 COVID-19 AMP PRB: CPT

## 2024-03-09 PROCEDURE — 82550 ASSAY OF CK (CPK): CPT

## 2024-03-09 PROCEDURE — C9399: CPT

## 2024-03-09 PROCEDURE — 97166 OT EVAL MOD COMPLEX 45 MIN: CPT

## 2024-03-09 PROCEDURE — C1769: CPT

## 2024-03-09 PROCEDURE — 84145 PROCALCITONIN (PCT): CPT

## 2024-03-09 PROCEDURE — C1894: CPT

## 2024-03-09 PROCEDURE — 84443 ASSAY THYROID STIM HORMONE: CPT

## 2024-03-09 PROCEDURE — 97162 PT EVAL MOD COMPLEX 30 MIN: CPT

## 2024-03-09 RX ORDER — DEXTROSE MONOHYDRATE, SODIUM CHLORIDE, AND POTASSIUM CHLORIDE 50; .745; 4.5 G/1000ML; G/1000ML; G/1000ML
1000 INJECTION, SOLUTION INTRAVENOUS
Refills: 0 | Status: DISCONTINUED | OUTPATIENT
Start: 2024-03-09 | End: 2024-03-09

## 2024-03-09 RX ORDER — METOPROLOL TARTRATE 50 MG
1 TABLET ORAL
Qty: 0 | Refills: 0 | DISCHARGE
Start: 2024-03-09

## 2024-03-09 RX ADMIN — Medication 100 MILLIGRAM(S): at 13:44

## 2024-03-09 RX ADMIN — Medication 1 PATCH: at 13:30

## 2024-03-09 RX ADMIN — Medication 100 MILLIGRAM(S): at 06:17

## 2024-03-09 RX ADMIN — CEFTRIAXONE 100 MILLIGRAM(S): 500 INJECTION, POWDER, FOR SOLUTION INTRAMUSCULAR; INTRAVENOUS at 10:25

## 2024-03-09 RX ADMIN — DEXTROSE MONOHYDRATE, SODIUM CHLORIDE, AND POTASSIUM CHLORIDE 110 MILLILITER(S): 50; .745; 4.5 INJECTION, SOLUTION INTRAVENOUS at 02:54

## 2024-03-09 RX ADMIN — CHLORHEXIDINE GLUCONATE 1 APPLICATION(S): 213 SOLUTION TOPICAL at 10:26

## 2024-03-09 RX ADMIN — Medication 175 MICROGRAM(S): at 06:17

## 2024-03-09 RX ADMIN — CLOPIDOGREL BISULFATE 75 MILLIGRAM(S): 75 TABLET, FILM COATED ORAL at 13:44

## 2024-03-09 RX ADMIN — Medication 1 PATCH: at 13:39

## 2024-03-09 RX ADMIN — Medication 1 PATCH: at 06:54

## 2024-03-09 RX ADMIN — APIXABAN 5 MILLIGRAM(S): 2.5 TABLET, FILM COATED ORAL at 06:16

## 2024-03-09 NOTE — PROGRESS NOTE ADULT - PROVIDER SPECIALTY LIST ADULT
Cardiology
Internal Medicine
Neurology
SICU
SICU
Surgery
Trauma Surgery
Vascular Surgery
Cardiology
Internal Medicine
Neurology
SICU
Surgery
Cardiology
Gastroenterology
Internal Medicine
Internal Medicine
SICU
Surgery
Surgery
Trauma Surgery
Cardiology
Internal Medicine
Internal Medicine
SICU
Surgery
Trauma Surgery
Cardiology
Internal Medicine
Cardiology
Cardiology
Palliative Care
Cardiology
Palliative Care

## 2024-03-09 NOTE — PROGRESS NOTE ADULT - ASSESSMENT
78F PMHx COPD, A-fib on Eliquis and Plavix, HTN, HLD with signs of acute mesenteric ischemia now s/p diagnostic laparoscopy converted to exploratory laparotomy with 20 cm of terminal ileum resection with bowel left in discontinuity and temporary abdominal closure with abthera with mesenteric angiogram via R fem access 02-22 findings of celiac, SMA, SWATHI w/o flow, collateral perfusion confirmed. S/p Closure and ileostomy creation on 2/24. Rate better controlled now.     PLAN  - CT ABD/Pelvis 3/6: no evidence of leak. RP hematoma with layering suggests chronicity, and patient has been hemodynamically stable with stable hematocrit.  - f/u UOP  - Diet - DASH  - Eliquis 5 mg BID  - ABx - d/c'd  - Metoprolol 100mg TID for rate control   - PT => JOSE  - GOC/Code status discussion - full code    ACS/Trauma   [phone]

## 2024-03-09 NOTE — PROGRESS NOTE ADULT - SUBJECTIVE AND OBJECTIVE BOX
SUBJECTIVE: Patient seen and examined on AM rounds. Patient reports that they're feeling well. Denies fever, chills. Reports pain as controlled. No complaints at this time.     Vital Signs Last 24 Hrs  T(C): 36.3 (09 Mar 2024 08:57), Max: 36.6 (08 Mar 2024 16:45)  T(F): 97.4 (09 Mar 2024 08:57), Max: 97.9 (08 Mar 2024 16:45)  HR: 88 (09 Mar 2024 08:57) (76 - 123)  BP: 114/61 (09 Mar 2024 08:57) (98/66 - 125/84)  BP(mean): --  RR: 18 (09 Mar 2024 08:57) (18 - 20)  SpO2: 97% (09 Mar 2024 08:57) (96% - 99%)    Parameters below as of 09 Mar 2024 08:57  Patient On (Oxygen Delivery Method): room air        General Appearance: Appears well, NAD  Neck: Supple  Chest: Equal expansion bilaterally  CV: Pulse regular presently  Abdomen: Soft, nontense, appropriate incisional tenderness, dressings clean and dry and intact; ileostomy with liquid output  Extremities: WWP    I&O's Summary    08 Mar 2024 07:01  -  09 Mar 2024 07:00  --------------------------------------------------------  IN: 1000 mL / OUT: 2025 mL / NET: -1025 mL    09 Mar 2024 06:01  -  09 Mar 2024 11:28  --------------------------------------------------------  IN: 120 mL / OUT: 175 mL / NET: -55 mL      I&O's Detail    08 Mar 2024 07:01  -  09 Mar 2024 07:00  --------------------------------------------------------  IN:    dextrose 5% + sodium chloride 0.45% w/ Additives: 660 mL    Oral Fluid: 340 mL  Total IN: 1000 mL    OUT:    Ileostomy (mL): 1050 mL    Indwelling Catheter - Urethral (mL): 775 mL    Voided (mL): 200 mL  Total OUT: 2025 mL    Total NET: -1025 mL      09 Mar 2024 06:01  -  09 Mar 2024 11:28  --------------------------------------------------------  IN:    Oral Fluid: 120 mL  Total IN: 120 mL    OUT:    Ileostomy (mL): 175 mL    Indwelling Catheter - Urethral (mL): 0 mL  Total OUT: 175 mL    Total NET: -55 mL          LABS:                        12.2   14.23 )-----------( 306      ( 09 Mar 2024 07:43 )             38.6     03-09    129<L>  |  99  |  9   ----------------------------<  106<H>  4.7   |  18<L>  |  0.69    Ca    8.8      09 Mar 2024 07:43  Phos  3.3     03-09  Mg     1.8     03-09        Urinalysis Basic - ( 09 Mar 2024 07:43 )    Color: x / Appearance: x / SG: x / pH: x  Gluc: 106 mg/dL / Ketone: x  / Bili: x / Urobili: x   Blood: x / Protein: x / Nitrite: x   Leuk Esterase: x / RBC: x / WBC x   Sq Epi: x / Non Sq Epi: x / Bacteria: x        RADIOLOGY & ADDITIONAL STUDIES:

## 2024-03-09 NOTE — PROGRESS NOTE ADULT - ATTENDING COMMENTS
seen and examined 03-09-24 @ 1050    tolerating regular diet w/o nausea or vomiting  ileostomy is healthy and covered with a well-fitting appliance  enteric contents in ileostomy bag    soft / NT / ND  midline laparotomy incision with staples intact and no evidence of wound infection, dressing has minimal blood stained consistent with draining subcutaneous hematoma. drainage is non-malodorous.  Will keep staples in place    ileostomy - 1050 mL enteric drainage / 24 hours    WBC = 14  Cr - 0.6    U/A (3/7) - 37 WBC, 4 RBC    CT abd/pelvis w/ contrast - no extravasation of rectal contrast from blind end of terminal ileum. 7 x 3 x 6 cm right retroperitoneal hematoma with hematocrit sign, and no evidence of wound infection. small hematoma in midline incision.      2/22/2024 - damage-control small bowel resection / diagnostic angiogram  2/24/2024 - end ileostomy creation  -no evidence of post-op infections on 3/6 CT scan    recurrent afib w/ RVR  -controlled with metoprolol 75 mg PO TID    acute urinary retention  -Melvin replaced on 3/8  -reattempt trial of void in [facility]    UTI secondary to acute urinary retention should resolve with ABx and Melvin  -ceftriaxone (3/8 - 3/10)    dispo  -transfer to [facility] today      I reviewed her labs.

## 2024-03-09 NOTE — PROGRESS NOTE ADULT - NUTRITIONAL ASSESSMENT
This patient has been assessed with a concern for Malnutrition and has been determined to have a diagnosis/diagnoses of Severe protein-calorie malnutrition.    This patient is being managed with:   Diet DASH/TLC-  Sodium & Cholesterol Restricted  Entered: Mar  2 2024  8:58AM  
This patient has been assessed with a concern for Malnutrition and has been determined to have a diagnosis/diagnoses of Severe protein-calorie malnutrition.    This patient is being managed with:   Diet DASH/TLC-  Sodium & Cholesterol Restricted  Entered: Mar  2 2024  8:58AM  
This patient has been assessed with a concern for Malnutrition and has been determined to have a diagnosis/diagnoses of Severe protein-calorie malnutrition.    This patient is being managed with:   Diet Full Liquid-  Entered: Mar  1 2024  4:22PM  
This patient has been assessed with a concern for Malnutrition and has been determined to have a diagnosis/diagnoses of Severe protein-calorie malnutrition.    This patient is being managed with:   Diet DASH/TLC-  Sodium & Cholesterol Restricted  Entered: Mar  2 2024  8:58AM  
This patient has been assessed with a concern for Malnutrition and has been determined to have a diagnosis/diagnoses of Severe protein-calorie malnutrition.    This patient is being managed with:   Diet NPO-  Except Medications  With Ice Chips/Sips of Water  Entered: Feb 29 2024  4:55PM  
This patient has been assessed with a concern for Malnutrition and has been determined to have a diagnosis/diagnoses of Severe protein-calorie malnutrition.    This patient is being managed with:   Diet Regular-  Fiber/Residue Restricted (LOWFIBER)  Supplement Feeding Modality:  Oral  Ensure Surgery Cans or Servings Per Day:  2       Frequency:  Three Times a day  Entered: Mar  6 2024  6:03PM    The following pending diet order is being considered for treatment of Severe protein-calorie malnutrition:  Diet Low Fiber-  Supplement Feeding Modality:  Oral  Ensure Plus High Protein Cans or Servings Per Day:  2       Frequency:  Daily  Entered: Mar  6 2024  5:26PM  
This patient has been assessed with a concern for Malnutrition and has been determined to have a diagnosis/diagnoses of Severe protein-calorie malnutrition.    This patient is being managed with:   Diet DASH/TLC-  Sodium & Cholesterol Restricted  Entered: Mar  2 2024  8:58AM  
This patient has been assessed with a concern for Malnutrition and has been determined to have a diagnosis/diagnoses of Severe protein-calorie malnutrition.    This patient is being managed with:   Diet DASH/TLC-  Sodium & Cholesterol Restricted  Entered: Mar  2 2024  8:58AM  
This patient has been assessed with a concern for Malnutrition and has been determined to have a diagnosis/diagnoses of Severe protein-calorie malnutrition.    This patient is being managed with:   Diet NPO-  Except Medications  Entered: Feb 26 2024  2:10AM  
This patient has been assessed with a concern for Malnutrition and has been determined to have a diagnosis/diagnoses of Severe protein-calorie malnutrition.    This patient is being managed with:   Diet NPO-  Except Medications  Entered: Feb 26 2024  2:10AM  
This patient has been assessed with a concern for Malnutrition and has been determined to have a diagnosis/diagnoses of Severe protein-calorie malnutrition.    This patient is being managed with:   Diet NPO-  Except Medications  With Ice Chips/Sips of Water  Entered: Feb 29 2024  4:55PM  
This patient has been assessed with a concern for Malnutrition and has been determined to have a diagnosis/diagnoses of Severe protein-calorie malnutrition.    This patient is being managed with:   Diet Clear Liquid-  Entered: Mar  1 2024  9:54AM  
This patient has been assessed with a concern for Malnutrition and has been determined to have a diagnosis/diagnoses of Severe protein-calorie malnutrition.    This patient is being managed with:   Diet DASH/TLC-  Sodium & Cholesterol Restricted  Entered: Mar  2 2024  8:58AM  
This patient has been assessed with a concern for Malnutrition and has been determined to have a diagnosis/diagnoses of Severe protein-calorie malnutrition.    This patient is being managed with:   Diet Regular-  Fiber/Residue Restricted (LOWFIBER)  Supplement Feeding Modality:  Oral  Ensure Surgery Cans or Servings Per Day:  2       Frequency:  Three Times a day  Entered: Mar  6 2024  6:03PM    The following pending diet order is being considered for treatment of Severe protein-calorie malnutrition:  Diet Low Fiber-  Supplement Feeding Modality:  Oral  Ensure Plus High Protein Cans or Servings Per Day:  2       Frequency:  Daily  Entered: Mar  6 2024  5:26PM  

## 2024-03-11 LAB
-  AMOXICILLIN/CLAVULANIC ACID: SIGNIFICANT CHANGE UP
-  AMPICILLIN/SULBACTAM: SIGNIFICANT CHANGE UP
-  AMPICILLIN: SIGNIFICANT CHANGE UP
-  AZTREONAM: SIGNIFICANT CHANGE UP
-  CEFAZOLIN: SIGNIFICANT CHANGE UP
-  CEFEPIME: SIGNIFICANT CHANGE UP
-  CEFOXITIN: SIGNIFICANT CHANGE UP
-  CEFTRIAXONE: SIGNIFICANT CHANGE UP
-  CEFUROXIME: SIGNIFICANT CHANGE UP
-  CIPROFLOXACIN: SIGNIFICANT CHANGE UP
-  ERTAPENEM: SIGNIFICANT CHANGE UP
-  GENTAMICIN: SIGNIFICANT CHANGE UP
-  IMIPENEM: SIGNIFICANT CHANGE UP
-  LEVOFLOXACIN: SIGNIFICANT CHANGE UP
-  MEROPENEM: SIGNIFICANT CHANGE UP
-  NITROFURANTOIN: SIGNIFICANT CHANGE UP
-  PIPERACILLIN/TAZOBACTAM: SIGNIFICANT CHANGE UP
-  TOBRAMYCIN: SIGNIFICANT CHANGE UP
-  TRIMETHOPRIM/SULFAMETHOXAZOLE: SIGNIFICANT CHANGE UP
CULTURE RESULTS: ABNORMAL
METHOD TYPE: SIGNIFICANT CHANGE UP
ORGANISM # SPEC MICROSCOPIC CNT: ABNORMAL
ORGANISM # SPEC MICROSCOPIC CNT: ABNORMAL
SPECIMEN SOURCE: SIGNIFICANT CHANGE UP

## 2024-03-11 NOTE — ED PROVIDER NOTE - PHYSICAL EXAMINATION
normal appearance , without tenderness upon palpation , no deformities , trachea midline , Thyroid normal size , no masses , thyroid nontender GENERAL: well appearing in no acute distress, non-toxic appearing  HEAD: normocephalic, atraumatic  HEENT: normal conjunctiva, no tongue bites or lacerations  CARDIAC: irregular rate and rhythm, normal S1S2, no appreciable murmurs, 2+ pulses in UE b/l  PULM: normal breath sounds, clear to ascultation bilaterally, no rales, rhonchi, wheezing  GI: abdomen nondistended, soft, mild epigastric tenderness, no guarding, rebound tenderness  : no suprapubic tenderness  NEURO: no gross motor or sensory deficits, CN2-12 grossly intact, normal speech, PERRLA, EOMI, normal gait, AAOx3  MSK: no peripheral edema, 5/5 strength in UE and LE b/l  SKIN: well-perfused, extremities warm, no visible rashes  PSYCH: appropriate mood and affect

## 2024-04-01 ENCOUNTER — INPATIENT (INPATIENT)
Facility: HOSPITAL | Age: 79
LOS: 1 days | Discharge: NURSING, REHAB OR LONG-TERM CARE | DRG: 920 | End: 2024-04-03
Attending: TRANSPLANT SURGERY | Admitting: TRANSPLANT SURGERY
Payer: MEDICARE

## 2024-04-01 VITALS
SYSTOLIC BLOOD PRESSURE: 111 MMHG | TEMPERATURE: 98 F | DIASTOLIC BLOOD PRESSURE: 70 MMHG | HEIGHT: 64 IN | WEIGHT: 173.72 LBS | HEART RATE: 76 BPM | OXYGEN SATURATION: 96 % | RESPIRATION RATE: 16 BRPM

## 2024-04-01 DIAGNOSIS — Z95.5 PRESENCE OF CORONARY ANGIOPLASTY IMPLANT AND GRAFT: Chronic | ICD-10-CM

## 2024-04-01 DIAGNOSIS — Z96.659 PRESENCE OF UNSPECIFIED ARTIFICIAL KNEE JOINT: Chronic | ICD-10-CM

## 2024-04-01 DIAGNOSIS — T81.31XA DISRUPTION OF EXTERNAL OPERATION (SURGICAL) WOUND, NOT ELSEWHERE CLASSIFIED, INITIAL ENCOUNTER: ICD-10-CM

## 2024-04-01 DIAGNOSIS — Z90.49 ACQUIRED ABSENCE OF OTHER SPECIFIED PARTS OF DIGESTIVE TRACT: Chronic | ICD-10-CM

## 2024-04-01 DIAGNOSIS — Z98.890 OTHER SPECIFIED POSTPROCEDURAL STATES: Chronic | ICD-10-CM

## 2024-04-01 DIAGNOSIS — Z96.7 PRESENCE OF OTHER BONE AND TENDON IMPLANTS: Chronic | ICD-10-CM

## 2024-04-01 LAB
ALBUMIN SERPL ELPH-MCNC: 3.2 G/DL — LOW (ref 3.3–5)
ALP SERPL-CCNC: 86 U/L — SIGNIFICANT CHANGE UP (ref 40–120)
ALT FLD-CCNC: 12 U/L — SIGNIFICANT CHANGE UP (ref 10–45)
ANION GAP SERPL CALC-SCNC: 14 MMOL/L — SIGNIFICANT CHANGE UP (ref 5–17)
AST SERPL-CCNC: 24 U/L — SIGNIFICANT CHANGE UP (ref 10–40)
BASE EXCESS BLDV CALC-SCNC: -0.7 MMOL/L — SIGNIFICANT CHANGE UP (ref -2–3)
BASOPHILS # BLD AUTO: 0.03 K/UL — SIGNIFICANT CHANGE UP (ref 0–0.2)
BASOPHILS NFR BLD AUTO: 0.3 % — SIGNIFICANT CHANGE UP (ref 0–2)
BILIRUB SERPL-MCNC: 0.6 MG/DL — SIGNIFICANT CHANGE UP (ref 0.2–1.2)
BLD GP AB SCN SERPL QL: NEGATIVE — SIGNIFICANT CHANGE UP
BUN SERPL-MCNC: 14 MG/DL — SIGNIFICANT CHANGE UP (ref 7–23)
CA-I SERPL-SCNC: 1.21 MMOL/L — SIGNIFICANT CHANGE UP (ref 1.15–1.33)
CALCIUM SERPL-MCNC: 9.1 MG/DL — SIGNIFICANT CHANGE UP (ref 8.4–10.5)
CHLORIDE BLDV-SCNC: 101 MMOL/L — SIGNIFICANT CHANGE UP (ref 96–108)
CHLORIDE SERPL-SCNC: 100 MMOL/L — SIGNIFICANT CHANGE UP (ref 96–108)
CO2 BLDV-SCNC: 26 MMOL/L — SIGNIFICANT CHANGE UP (ref 22–26)
CO2 SERPL-SCNC: 21 MMOL/L — LOW (ref 22–31)
CREAT SERPL-MCNC: 0.84 MG/DL — SIGNIFICANT CHANGE UP (ref 0.5–1.3)
EGFR: 72 ML/MIN/1.73M2 — SIGNIFICANT CHANGE UP
EOSINOPHIL # BLD AUTO: 0.07 K/UL — SIGNIFICANT CHANGE UP (ref 0–0.5)
EOSINOPHIL NFR BLD AUTO: 0.8 % — SIGNIFICANT CHANGE UP (ref 0–6)
GAS PNL BLDV: 131 MMOL/L — LOW (ref 136–145)
GAS PNL BLDV: SIGNIFICANT CHANGE UP
GAS PNL BLDV: SIGNIFICANT CHANGE UP
GLUCOSE BLDV-MCNC: 106 MG/DL — HIGH (ref 70–99)
GLUCOSE SERPL-MCNC: 106 MG/DL — HIGH (ref 70–99)
HCO3 BLDV-SCNC: 25 MMOL/L — SIGNIFICANT CHANGE UP (ref 22–29)
HCT VFR BLD CALC: 33.5 % — LOW (ref 34.5–45)
HCT VFR BLDA CALC: 34 % — LOW (ref 34.5–46.5)
HGB BLD CALC-MCNC: 11.2 G/DL — LOW (ref 11.7–16.1)
HGB BLD-MCNC: 10.9 G/DL — LOW (ref 11.5–15.5)
IMM GRANULOCYTES NFR BLD AUTO: 0.3 % — SIGNIFICANT CHANGE UP (ref 0–0.9)
LACTATE BLDV-MCNC: 1.9 MMOL/L — SIGNIFICANT CHANGE UP (ref 0.5–2)
LIDOCAIN IGE QN: 31 U/L — SIGNIFICANT CHANGE UP (ref 7–60)
LYMPHOCYTES # BLD AUTO: 1.57 K/UL — SIGNIFICANT CHANGE UP (ref 1–3.3)
LYMPHOCYTES # BLD AUTO: 17.9 % — SIGNIFICANT CHANGE UP (ref 13–44)
MCHC RBC-ENTMCNC: 31.4 PG — SIGNIFICANT CHANGE UP (ref 27–34)
MCHC RBC-ENTMCNC: 32.5 GM/DL — SIGNIFICANT CHANGE UP (ref 32–36)
MCV RBC AUTO: 96.5 FL — SIGNIFICANT CHANGE UP (ref 80–100)
MONOCYTES # BLD AUTO: 1.06 K/UL — HIGH (ref 0–0.9)
MONOCYTES NFR BLD AUTO: 12.1 % — SIGNIFICANT CHANGE UP (ref 2–14)
NEUTROPHILS # BLD AUTO: 6.01 K/UL — SIGNIFICANT CHANGE UP (ref 1.8–7.4)
NEUTROPHILS NFR BLD AUTO: 68.6 % — SIGNIFICANT CHANGE UP (ref 43–77)
NRBC # BLD: 0 /100 WBCS — SIGNIFICANT CHANGE UP (ref 0–0)
PCO2 BLDV: 44 MMHG — HIGH (ref 39–42)
PH BLDV: 7.36 — SIGNIFICANT CHANGE UP (ref 7.32–7.43)
PLATELET # BLD AUTO: 189 K/UL — SIGNIFICANT CHANGE UP (ref 150–400)
PO2 BLDV: 22 MMHG — LOW (ref 25–45)
POTASSIUM BLDV-SCNC: 4.2 MMOL/L — SIGNIFICANT CHANGE UP (ref 3.5–5.1)
POTASSIUM SERPL-MCNC: 4.4 MMOL/L — SIGNIFICANT CHANGE UP (ref 3.5–5.3)
POTASSIUM SERPL-SCNC: 4.4 MMOL/L — SIGNIFICANT CHANGE UP (ref 3.5–5.3)
PROT SERPL-MCNC: 6.5 G/DL — SIGNIFICANT CHANGE UP (ref 6–8.3)
RBC # BLD: 3.47 M/UL — LOW (ref 3.8–5.2)
RBC # FLD: 15.9 % — HIGH (ref 10.3–14.5)
RH IG SCN BLD-IMP: POSITIVE — SIGNIFICANT CHANGE UP
SAO2 % BLDV: 26.7 % — LOW (ref 67–88)
SODIUM SERPL-SCNC: 135 MMOL/L — SIGNIFICANT CHANGE UP (ref 135–145)
WBC # BLD: 8.77 K/UL — SIGNIFICANT CHANGE UP (ref 3.8–10.5)
WBC # FLD AUTO: 8.77 K/UL — SIGNIFICANT CHANGE UP (ref 3.8–10.5)

## 2024-04-01 PROCEDURE — 99222 1ST HOSP IP/OBS MODERATE 55: CPT

## 2024-04-01 PROCEDURE — 93010 ELECTROCARDIOGRAM REPORT: CPT

## 2024-04-01 PROCEDURE — 99285 EMERGENCY DEPT VISIT HI MDM: CPT | Mod: GC

## 2024-04-01 PROCEDURE — 74177 CT ABD & PELVIS W/CONTRAST: CPT | Mod: 26,MC

## 2024-04-01 RX ORDER — SODIUM CHLORIDE 9 MG/ML
1000 INJECTION, SOLUTION INTRAVENOUS
Refills: 0 | Status: DISCONTINUED | OUTPATIENT
Start: 2024-04-01 | End: 2024-04-02

## 2024-04-01 RX ORDER — METOPROLOL TARTRATE 50 MG
100 TABLET ORAL THREE TIMES A DAY
Refills: 0 | Status: DISCONTINUED | OUTPATIENT
Start: 2024-04-01 | End: 2024-04-03

## 2024-04-01 RX ORDER — CLOPIDOGREL BISULFATE 75 MG/1
75 TABLET, FILM COATED ORAL DAILY
Refills: 0 | Status: DISCONTINUED | OUTPATIENT
Start: 2024-04-02 | End: 2024-04-03

## 2024-04-01 RX ORDER — APIXABAN 2.5 MG/1
5 TABLET, FILM COATED ORAL EVERY 12 HOURS
Refills: 0 | Status: DISCONTINUED | OUTPATIENT
Start: 2024-04-02 | End: 2024-04-03

## 2024-04-01 RX ORDER — SODIUM CHLORIDE 9 MG/ML
1000 INJECTION INTRAMUSCULAR; INTRAVENOUS; SUBCUTANEOUS ONCE
Refills: 0 | Status: COMPLETED | OUTPATIENT
Start: 2024-04-01 | End: 2024-04-01

## 2024-04-01 RX ORDER — NICOTINE POLACRILEX 2 MG
1 GUM BUCCAL EVERY 24 HOURS
Refills: 0 | Status: DISCONTINUED | OUTPATIENT
Start: 2024-04-01 | End: 2024-04-03

## 2024-04-01 RX ORDER — ATORVASTATIN CALCIUM 80 MG/1
80 TABLET, FILM COATED ORAL AT BEDTIME
Refills: 0 | Status: DISCONTINUED | OUTPATIENT
Start: 2024-04-01 | End: 2024-04-03

## 2024-04-01 RX ORDER — LEVOTHYROXINE SODIUM 125 MCG
175 TABLET ORAL DAILY
Refills: 0 | Status: DISCONTINUED | OUTPATIENT
Start: 2024-04-01 | End: 2024-04-03

## 2024-04-01 RX ADMIN — SODIUM CHLORIDE 1000 MILLILITER(S): 9 INJECTION INTRAMUSCULAR; INTRAVENOUS; SUBCUTANEOUS at 18:48

## 2024-04-01 NOTE — H&P ADULT - NSHPLABSRESULTS_GEN_ALL_CORE
10.9   8.77  )-----------( 189      ( 01 Apr 2024 18:47 )             33.5       04-01    135  |  100  |  14  ----------------------------<  106<H>  4.4   |  21<L>  |  0.84    Ca    9.1      01 Apr 2024 18:47    TPro  6.5  /  Alb  3.2<L>  /  TBili  0.6  /  DBili  x   /  AST  24  /  ALT  12  /  AlkPhos  86  04-01

## 2024-04-01 NOTE — ED PROVIDER NOTE - ATTENDING CONTRIBUTION TO CARE
Private Physician Camille  PCP, Zayra Parker Surg  78 y F PMH PMH ASHD/MI, Hypothryodism, [place], CROW on cpap, Osteoarthritis. W recent admit for mesenteric ischemia, w resection and ileostomy. Now comes to ED from [facility] for concerns for feces coming through incision. Private Physician Camille  PCP, Zayra Parker Surg  78 y F PMH PMH ASHD/MI, Hypothryodism, [place], CROW on cpap, Osteoarthritis. W recent admit for mesenteric ischemia, w resection and ileostomy. Now comes to ED from [facility] for concerns for feces coming through incision. Pt stated had some abd pain w/o vomiting. fever and chills, shortness of breath. PE Adult female awake alert heent normocephalic atraumatic neck supple chest clear AP cv no rubs, gallops or murmurs abd soft +ileostomy in place small amt of fluid in midline incision,   Obinna Kwon MD, Facep

## 2024-04-01 NOTE — ED PROVIDER NOTE - PHYSICAL EXAMINATION
General: Alert and Orientated x 3. No apparent distress.  Eyes: No scleral icterus.   ENT: Mucous membranes moist  Cardiac: No murmurs appreciated.   Pulmonary: CTA bilaterally. No increased WOB.   Abdominal: Soft, Non-distended. osteomy with stool in bag. linear incision left of umbilicus with mild clear drainage, small opening in skin superior to this with small amount of fecal matter   Neurologic: No focal sensory or motor deficits.  Musculoskeletal: No lower extremity edema, bruising, soreness   Skin: Color appropriate for race. Intact, warm, and well-perfused.  Psychiatric: Appropriate mood and affect. No apparent risk to self or others.

## 2024-04-01 NOTE — PATIENT PROFILE ADULT - NSPROHMSYMPCOND_GEN_A_NUR
[id number] [place]  [address]    NAME: ADDIS BOUCHER/AGE/SEX: 1935 - 83 - F  PHYSICIAN: Maria Schneider MD                                      ADMIT DATE: 19  UNIT #: [id number]                                                DIS DATE: 19  ACCT #: [id number]                                              LOC//BED: F013-B                                             REPORT # : [id number]  DATE OF CONSULTATION: 2019  REQUESTING PHYSICIAN:  Mirta Nash MD.    REASON FOR CONSULTATION  End-stage renal disease.    HISTORY OF PRESENT ILLNESS:  The patient is an 83-year-old female with a history of end-stage renal disease, chronic recurrent  gastrointestinal bleeding due to multiple AVMs, GAVE and also history of multiple blood  transfusions.    She goes to the [facility] every Tuesday, Thursday and Saturday for maintenance  dialysis.  She has a right upper arm fistula through which she is dialyzed.    She has had multiple endoscopic procedures including ablations.  She was recently at [facility] in [place] where she had endoscopies done.    She has also had frequent blood transfusions for chronic GI bleeding.    She goes to the [facility] every Tuesday, Thursday and Saturday.  During the last hour  of dialysis, she started having fever and chills.  Blood cultures were done and she was given 1  gram of vancomycin and subsequently advised to come to the ER.  She was evaluated in the ER and  temperature was 102.9 on presentation with a blood pressure of 131/82 and a heart rate of 124.  She  has chronic atrial fibrillation, not on anticoagulation due to the chronic GI bleeding.    Blood cultures have been done.  Nasopharyngeal swab for influenza is negative.   She has been  started on Zosyn 3.375 grams IV q.12 hours.    She has received 1 liter of IV fluids.    ALLERGIES:  AMLODIPINE, ATORVASTATIN, CLONIDINE, CORTISONE, EXENATIDE, FUROSEMIDE, MEPERIDINE, PHENOL,  PRAVACHOL AND SULFA ANTIBIOTICS.    CURRENT MEDICATIONS:  1.  Gabapentin 100 mg daily.  2.  Imdur 15 mg daily.  3.  Nephro-Cruz 1 tab daily.  4.  NovoLog insulin per sliding scale.  5.  Synthroid 0.1 mg on 5 days a week and 0.15 mg 2 days a week, Monday and Friday.  6.  Zosyn 3.375 grams IV q.12 hours.  7.  Carafate 1 gram p.o. q.i.d.  8.  Heparin 5000 subcu q.12 hours.  9.  Carvedilol 3.125 mg p.o. b.i.d.  10.  Levemir 10 units subcutaneous b.i.d.  11.  Protonix 40 mg b.i.d.  12.  Tylenol 1 gram at night p.r.n.  13.  Atarax 25 mg daily p.m.  14.  Robaxin 500 mg at night p.r.n.  15.  Glucagon 1 mg IM p.r.n.  16.  Tylenol 650 mg p.o. q.6 hours p.r.n.  17.  Zofran 4 mg IV q.8 hours p.r.n.    PAST MEDICAL HISTORY:  1.  Chronic gastrointestinal bleeding from multiple AVMs, GAVE and also history of multiple blood  transfusions.  She has had APC as well as radiofrequency ablation done.  2.  End-stage renal disease, on maintenance dialysis.  3.  T12 and T8 compression fracture, status post kyphoplasty, 05/18/2018.  4.  Left pyelonephritis due to obstructive nephrolithiasis, status post nephrostomy tube placement  on 04/19/2018 and status post antibiotic treatment with gentamicin for 4 weeks and removal of  nephrostomy tube on 05/18/2018.  She had a perinephric abscess with enterococcus bacteremia and  secondary pyelonephritis at that time.  5.  A 55% ejection fraction by echo in the past.  6.  ASHD, status post CABG in 10/2010.  7.  Hypertension.  8.  Hyperlipidemia.  9.  Secondary hyperparathyroidism of renal failure.  10.  Atrial fibrillation.  11.  Severe tricuspid regurgitation.  12.  HAMPTON cirrhosis.  13.  Diabetic nephropathy.  14.  Diabetic neuropathy.  15.  Breast cancer, status post right lumpectomy.  16.   Status post right total knee arthroplasty.  17.  Status post right hip open reduction and internal fixation in 2014.  18.  Status post hysterectomy and bilateral salpingo-oophorectomy.  19.  Deep venous thrombosis in 2014.  20.  Vitamin D deficiency.  21.  Chronic right pleural effusion, status post thoracoscopy and pleurodesis.  22.  Hypothyroidism.  23.  Status post L4-L5 microdiskectomy.  24.  Status post scalp cyst excision.  25.  Short segment Soriano's metaplasia and hiatal hernia.  26.  Sigmoid diverticulosis and internal hemorrhoids.  27.  History of adrenal mass.  28.  Iron deficiency anemia.  29.  Carotid atherosclerosis.  30.  History of paracentesis.    FAMILY AND SOCIAL HISTORY:  Mother with a history of heart disease and one brother as well.  One brother with history of liver  disease.  Mother  at the age of 88 of an MI.  Quit smoking in .  No alcohol abuse or drug  use.    REVIEW OF SYSTEMS:  At this time, feels weak.  Shortness of breath with exertion.  No chest pain.  No dizziness or  lightheadedness.  No abdominal pain, no nausea, vomiting.  No diarrhea.  Rest of the review of  systems is negative.    PHYSICAL EXAMINATION:  GENERAL:  She is awake and alert, in no acute distress at this time.  VITAL SIGNS:  Blood pressure 110/38, temperature of 97.9, pulse of 95, respiratory rate 20, O2  saturation of 91% on 3 liters nasal cannula.  HEENT:  EOMI, PERRLA.  No icterus.  No facial asymmetry.  Throat clear.  NECK:  No JVD, lymphadenopathy, thyromegaly.  No carotid bruit.  CARDIOVASCULAR:  S1, S2, irregularly irregular.  No S3.  No pericardial rub.  LUNGS:  Clear with no wheezing or rales.  Trachea midline.  ABDOMEN:  Obese, soft, nontender, distended.  Liver and spleen not palpable.  No masses palpable.  EXTREMITIES:  No cyanosis or clubbing.  1+ bilateral leg edema present.  Dorsalis pedis and  posterior tibialis felt bilaterally.  NEUROLOGIC:  Moving all extremities.  Muscle power and tone  normal.  SKIN:  No acute rash.  Right upper arm has a fistula with a good bruit and thrill.    LABORATORY DATA:  WBC 20,200 with hemoglobin of 11.3 and hematocrit 34.4, platelet count 284,000.  PT 16.1 seconds,  INR 1.3, PTT 35 seconds.  Sodium 136, potassium 2.8, chloride 97, CO2 of 25, BUN 13, creatinine  2.91 and glucose of 52.  Calcium 8.7, bilirubin 1.0, AST 24, ALT 11, alkaline phosphatase 193,  albumin 2.8.  Lactic acid initially was 6.94, it has come down to 3.98.  Urinalysis shows large  leukocyte esterase, 3-5 RBCs and greater than 100 WBCs.  CT of the abdomen and pelvis shows  findings consistent with cirrhosis and portal hypertension with moderate amount of ascites.  Multiple borderline prominent mesenteric nodes unchanged and likely inflammatory.  Possible wall  thickening involving the sigmoid colon.  Colitis cannot be excluded.  Chest x-ray shows findings  consistent with congestive heart failure.  Superimposed pneumonia in the right lung base cannot be  excluded.    ASSESSMENT AND PLAN:  1.  End-stage renal disease, on maintenance dialysis.  2.  Fever and chills,  sepsis.  Blood cultures have been done.  Continue Zosyn.  Source unclear at  this time.  3.  Nonalcoholic steatohepatitis cirrhosis with ascites.  4.  Chronic gastrointestinal bleeding.  5.  Anemia secondary to chronic gastrointestinal bleeding and chronic kidney disease.  6.  Chronic atrial fibrillation.  Not anticoagulated due to gastrointestinal bleeding.  7.  Hypokalemia.  This is a post-dialysis specimen.  8.  Lactic Acidosis    PLAN:  Continue current medications.  We will dialyze her as needed and transfuse as needed.  This is the  best hemoglobin in a long time that she has had and we will follow her along with you.  Blood p  ressure is okay at this time.  Confirmation #: [id number]  Dictation ID: [id number]    DICTATED: Kt Echeverria M.D.    <Electronically signed by Kt Echeverria M.D.>    Kt Echeverria  M.D.  04/22/19 2213    S: Signed  D: 03/28/19 2230  T: 03/28/19 2354 TRN    REPORT#: [id number]        CC: Kt Echeverria M.D.                                             REPORT STATUS: Signed                                                  of 4   internal medicine/pulmonologist/cardiovascular

## 2024-04-01 NOTE — H&P ADULT - HISTORY OF PRESENT ILLNESS
A 78 year old female with PMHx of HTN, hypothyroidism, CROW, MI, heart murmur, CAD, hearing difficulty with PSHx of ex-lap for mesenteric ischemia 02/2024 with intraoperative angiography by vascular surgery s/p resection of 20-cm of terminal ileum  and ostomy followed by wound closure.The patient was discharged to rehab. The patient endorses that 2-weeks ago she started having brown material expressed from the incision site that she wiped regularly. The patient denies abdominal pain, nausea, vomiting, shortness of breath or fever or chills.    In the ED, the patient is afebile, non-tachy, 96/65 and saturating well on RA.

## 2024-04-01 NOTE — ED ADULT NURSE NOTE - NSFALLHARMRISKINTERV_ED_ALL_ED
Assistance OOB with selected safe patient handling equipment if applicable/Assistance with ambulation/Communicate risk of Fall with Harm to all staff, patient, and family/Monitor gait and stability/Provide visual cue: red socks, yellow wristband, yellow gown, etc/Reinforce activity limits and safety measures with patient and family/Bed in lowest position, wheels locked, appropriate side rails in place/Call bell, personal items and telephone in reach/Instruct patient to call for assistance before getting out of bed/chair/stretcher/Non-slip footwear applied when patient is off stretcher/[place] to call system/Physically safe environment - no spills, clutter or unnecessary equipment/Purposeful Proactive Rounding/Room/bathroom lighting operational, light cord in reach

## 2024-04-01 NOTE — H&P ADULT - NSHPPHYSICALEXAM_GEN_ALL_CORE
General: Lying comfortably in bed  HEENT: No visible deformities. Supple neck  Neuro: AOx3  Resp: Saturating well on RA. Non-labored breathing  GI: Soft, ND/NT, ostomy bag in place with an output. There is a leakage of brown/fecal material. No palpable masses.  Extremities: no visible deformities.

## 2024-04-01 NOTE — H&P ADULT - ASSESSMENT
A 78 year old female with PMHx of MI, CAD, heart murmur, CROW, hearing difficulties, hypothyroidism s/p ex-lap with bowel resection of 20-cm of terminal ileum and ostomy presents with discharge of fecal material from the incision site. The CT scan shows findings suspicious for cutaneous fistula at site of surgical incision in the midline.    Plan:  - Admit the patient to surgery care of Dr. Howard.  - Wound care consult.  - NPO  - IVF  - Electrolyte repletion as necessary  - DVT PPx  - Med Rec done  - AM Labs    ACS  [id number]

## 2024-04-01 NOTE — PATIENT PROFILE ADULT - FALL HARM RISK - HARM RISK INTERVENTIONS

## 2024-04-01 NOTE — ED PROVIDER NOTE - CLINICAL SUMMARY MEDICAL DECISION MAKING FREE TEXT BOX
Adult female w hx as above pw concerns for dc from surg wound, Plan ct abd, check labs and cs surg  Obinna Kwon MD, Facep

## 2024-04-01 NOTE — ED PROVIDER NOTE - OBJECTIVE STATEMENT
79 y/o female hx 79 y/o female recent ex-lap for acute mesenteric ischemia w/ ileostomy presents with fecal leakage from incision site. 77 y/o female recent ex-lap for acute mesenteric ischemia w/ ileostomy presents with fecal leakage from incision site. She otherwise feels well. Nursing home staff report clear liquid from the linear incision and pasty dark colored fecal matter from a small hole superior to the incision. Denies headache, fever/chills, n/v/d, chest pain, shortness of breath, dysuria/hematuria

## 2024-04-01 NOTE — ED ADULT NURSE NOTE - OBJECTIVE STATEMENT
78 yr old female came in from [facility] as her daughter stated that some fecal matter is coming out of an incision site and her ostomy. has had the ostomy for 2 months. states she feels fine maybe slightly tender at surgical site. on assessment a and o x 3 lungs clear bad soft non tender ostomy looks well and working. small incision looks well healed with some small amount of clear fluid. no n/v normal diarrhea no swelling in extremities no fevers.

## 2024-04-02 LAB
ANION GAP SERPL CALC-SCNC: 12 MMOL/L — SIGNIFICANT CHANGE UP (ref 5–17)
BUN SERPL-MCNC: 12 MG/DL — SIGNIFICANT CHANGE UP (ref 7–23)
CALCIUM SERPL-MCNC: 8.6 MG/DL — SIGNIFICANT CHANGE UP (ref 8.4–10.5)
CHLORIDE SERPL-SCNC: 105 MMOL/L — SIGNIFICANT CHANGE UP (ref 96–108)
CO2 SERPL-SCNC: 20 MMOL/L — LOW (ref 22–31)
CREAT SERPL-MCNC: 0.76 MG/DL — SIGNIFICANT CHANGE UP (ref 0.5–1.3)
EGFR: 81 ML/MIN/1.73M2 — SIGNIFICANT CHANGE UP
FLUAV AG NPH QL: SIGNIFICANT CHANGE UP
FLUBV AG NPH QL: SIGNIFICANT CHANGE UP
GLUCOSE SERPL-MCNC: 86 MG/DL — SIGNIFICANT CHANGE UP (ref 70–99)
HCT VFR BLD CALC: 32.8 % — LOW (ref 34.5–45)
HGB BLD-MCNC: 10.5 G/DL — LOW (ref 11.5–15.5)
MAGNESIUM SERPL-MCNC: 1.7 MG/DL — SIGNIFICANT CHANGE UP (ref 1.6–2.6)
MCHC RBC-ENTMCNC: 31.2 PG — SIGNIFICANT CHANGE UP (ref 27–34)
MCHC RBC-ENTMCNC: 32 GM/DL — SIGNIFICANT CHANGE UP (ref 32–36)
MCV RBC AUTO: 97.3 FL — SIGNIFICANT CHANGE UP (ref 80–100)
NRBC # BLD: 0 /100 WBCS — SIGNIFICANT CHANGE UP (ref 0–0)
PHOSPHATE SERPL-MCNC: 3.4 MG/DL — SIGNIFICANT CHANGE UP (ref 2.5–4.5)
PLATELET # BLD AUTO: 175 K/UL — SIGNIFICANT CHANGE UP (ref 150–400)
POTASSIUM SERPL-MCNC: 4.3 MMOL/L — SIGNIFICANT CHANGE UP (ref 3.5–5.3)
POTASSIUM SERPL-SCNC: 4.3 MMOL/L — SIGNIFICANT CHANGE UP (ref 3.5–5.3)
RBC # BLD: 3.37 M/UL — LOW (ref 3.8–5.2)
RBC # FLD: 15.9 % — HIGH (ref 10.3–14.5)
RSV RNA NPH QL NAA+NON-PROBE: SIGNIFICANT CHANGE UP
SARS-COV-2 RNA SPEC QL NAA+PROBE: SIGNIFICANT CHANGE UP
SODIUM SERPL-SCNC: 137 MMOL/L — SIGNIFICANT CHANGE UP (ref 135–145)
WBC # BLD: 9.31 K/UL — SIGNIFICANT CHANGE UP (ref 3.8–10.5)
WBC # FLD AUTO: 9.31 K/UL — SIGNIFICANT CHANGE UP (ref 3.8–10.5)

## 2024-04-02 PROCEDURE — 71045 X-RAY EXAM CHEST 1 VIEW: CPT | Mod: 26

## 2024-04-02 PROCEDURE — 99233 SBSQ HOSP IP/OBS HIGH 50: CPT | Mod: FS,24

## 2024-04-02 RX ORDER — MAGNESIUM SULFATE 500 MG/ML
2 VIAL (ML) INJECTION ONCE
Refills: 0 | Status: COMPLETED | OUTPATIENT
Start: 2024-04-02 | End: 2024-04-02

## 2024-04-02 RX ADMIN — APIXABAN 5 MILLIGRAM(S): 2.5 TABLET, FILM COATED ORAL at 05:17

## 2024-04-02 RX ADMIN — CLOPIDOGREL BISULFATE 75 MILLIGRAM(S): 75 TABLET, FILM COATED ORAL at 12:37

## 2024-04-02 RX ADMIN — ATORVASTATIN CALCIUM 80 MILLIGRAM(S): 80 TABLET, FILM COATED ORAL at 21:59

## 2024-04-02 RX ADMIN — APIXABAN 5 MILLIGRAM(S): 2.5 TABLET, FILM COATED ORAL at 18:12

## 2024-04-02 RX ADMIN — SODIUM CHLORIDE 110 MILLILITER(S): 9 INJECTION, SOLUTION INTRAVENOUS at 00:16

## 2024-04-02 RX ADMIN — Medication 1 PATCH: at 12:39

## 2024-04-02 RX ADMIN — Medication 25 GRAM(S): at 12:42

## 2024-04-02 RX ADMIN — Medication 1 PATCH: at 13:14

## 2024-04-02 RX ADMIN — Medication 1 PATCH: at 19:10

## 2024-04-02 RX ADMIN — Medication 175 MICROGRAM(S): at 05:16

## 2024-04-02 NOTE — PROGRESS NOTE ADULT - NS ATTEND AMEND GEN_ALL_CORE FT
seen and examined 04-02-24 @ 1000    NPO w/o nausea or vomiting  ileostomy is healthy and covered with a well-fitting appliance  enteric contents in ileostomy bag    soft / NT / ND  midline laparotomy incision is well healed without evidence of infection, but there is a small opening at inferior aspect with nonmalodorous old blood draining      WBC = 9      2/22/2024 - damage-control small bowel resection / diagnostic angiogram  2/24/2024 - end ileostomy creation  -The drainage is most likely a hematoma in the laparotomy wound. It could be drainage of the intraperitoneal hematoma (Surgicel powder) seen on the 3/6 CT scan, but this was not adjacent to the midline incision. It could also be the terminal ileum defunctionalized stump which I tied to the midline, but I would expect malodorous drainage.  -regular diet  -wound care      I reviewed her labs and radiologic images.  plan discussed with her daughter at bedside.

## 2024-04-02 NOTE — CONSULT NOTE ADULT - SUBJECTIVE AND OBJECTIVE BOX
HPI:  A 78 year old female with PMHx of HTN, hypothyroidism, CROW, MI, heart murmur, CAD, hearing difficulty with PSHx of ex-lap for mesenteric ischemia 02/2024 with intraoperative angiography by vascular surgery s/p resection of 20-cm of terminal ileum  and ostomy followed by wound closure.The patient was discharged to rehab. The patient endorses that 2-weeks ago she started having brown material expressed from the incision site that she wiped regularly. The patient denies abdominal pain, nausea, vomiting, shortness of breath or fever or chills.    In the ED, the patient is afebile, non-tachy, 96/65 and saturating well on RA.  (01 Apr 2024 22:23)      REVIEW OF SYSTEMS:    CONSTITUTIONAL: No weakness, fevers or chills  EYES/ENT: No visual changes;  No vertigo or throat pain   NECK: No pain or stiffness  RESPIRATORY: cough   no cp /sob     CARDIOVASCULAR: No chest pain or palpitations  GASTROINTESTINAL: No abdominal or epigastric pain. No nausea, vomiting, or hematemesis; No diarrhea or constipation. No melena or hematochezia.  GENITOURINARY: No dysuria, frequency or hematuria  NEUROLOGICAL: No numbness or weakness  SKIN: No itching, burning, rashes, or lesions   All other review of systems is negative unless indicated above.      Medications:   MEDICATIONS  (STANDING):  apixaban 5 milliGRAM(s) Oral every 12 hours  atorvastatin 80 milliGRAM(s) Oral at bedtime  clopidogrel Tablet 75 milliGRAM(s) Oral daily  levothyroxine 175 MICROGram(s) Oral daily  metoprolol tartrate 100 milliGRAM(s) Oral three times a day  nicotine -  14 mG/24Hr(s) Patch 1 Patch Transdermal every 24 hours    MEDICATIONS  (PRN):      Allergies    penicillin (Hives)    Intolerances        PAST MEDICAL & SURGICAL HISTORY:  Hypertension      Hypothyroid      Osteoarthritis  knees, back      CAD (coronary artery disease)      CROW (obstructive sleep apnea)  non complaint on CPAP      History of MI (myocardial infarction)  h/o previous MI in 2004 prompted PTCA  with stenting x 2 vessels   last stress/ echo 2019      Heart murmur  dx in childhood      Bilateral hearing loss, unspecified hearing loss type  bilateral aids      Obesity      Mixed stress and urge urinary incontinence      Overactive bladder      S/P ORIF (open reduction internal fixation) fracture  left hip 1962      S/P appendectomy  30 plus years      S/P knee replacement  left 2000      Stented coronary artery  2004 X 2 STENTS      S/P laparotomy  due to adhesions, 30 years ago      H/O dilation and curettage  2/2019 Benign polyp          Social :    No smoking       No ETOH use            Vital Signs Last 24 Hrs  T(C): 36.5 (02 Apr 2024 12:18), Max: 36.7 (02 Apr 2024 04:30)  T(F): 97.7 (02 Apr 2024 12:18), Max: 98 (02 Apr 2024 04:30)  HR: 94 (02 Apr 2024 12:18) (76 - 99)  BP: 97/69 (02 Apr 2024 12:18) (96/65 - 122/84)  BP(mean): 75 (01 Apr 2024 20:57) (75 - 75)  RR: 16 (02 Apr 2024 12:18) (16 - 18)  SpO2: 96% (02 Apr 2024 12:18) (96% - 99%)    Parameters below as of 02 Apr 2024 12:18  Patient On (Oxygen Delivery Method): room air      CAPILLARY BLOOD GLUCOSE          04-01 @ 07:01 - 04-02 @ 07:00  --------------------------------------------------------  IN: 0 mL / OUT: 80 mL / NET: -80 mL    04-02 @ 07:01  -  04-02 @ 14:34  --------------------------------------------------------  IN: 280 mL / OUT: 600 mL / NET: -320 mL        Physical Exam:    Daily Height in cm: 162.56 (01 Apr 2024 17:36)    Daily   General:  NAD   HEENT:  Nonicteric, PERRLA  CV:  RRR, no murmur, no JVD  Lungs:  CTA B/L, no wheezes, rales, rhonchi  Abdomen:  Soft, ostomy in place   mid abd incision   no signs of infection   Extremities:  2+ pulses, no c/c, no edema  Skin:  Warm and dry, no rashes  :  No amaya  Neuro:  AAOx3, non-focal, CN II-XII grossly intact  No Restraints    LABS:                        10.5   9.31  )-----------( 175      ( 02 Apr 2024 06:46 )             32.8     04-02    137  |  105  |  12  ----------------------------<  86  4.3   |  20<L>  |  0.76    Ca    8.6      02 Apr 2024 06:45  Phos  3.4     04-02  Mg     1.7     04-02    TPro  6.5  /  Alb  3.2<L>  /  TBili  0.6  /  DBili  x   /  AST  24  /  ALT  12  /  AlkPhos  86  04-01      Urinalysis Basic - ( 02 Apr 2024 06:45 )    Color: x / Appearance: x / SG: x / pH: x  Gluc: 86 mg/dL / Ketone: x  / Bili: x / Urobili: x   Blood: x / Protein: x / Nitrite: x   Leuk Esterase: x / RBC: x / WBC x   Sq Epi: x / Non Sq Epi: x / Bacteria: x              RADIOLOGY & ADDITIONAL TESTS:    ---------------------------------------------------------------------------  I personally reviewed: [  ]EKG   [  ]CXR    [  ] CT    [  ]Other  ---------------------------------------------------------------------------

## 2024-04-02 NOTE — CONSULT NOTE ADULT - ASSESSMENT
78-year-old female with PMH current smoker , COPD, A-fib  On Eliquis and Plavix, HTN, HLD recently dced from [facility] where she was admitted for  syncope found to have ischemic bowel and underwent surgery for mesenteric ischemia 02/2024 with intraoperative angiography by vascular surgery s/p resection of 20-cm of terminal ileum  and ostomy followed by wound closure now presenting with discharge from abd wound     presumed infection at wound site : no obvious infection   CT findings reporting fistula   fu with surgery and wound care   monitor for infection   off abx       COPD : reporting cough   please check for influenza , RSV   check CXR   daughter reporting decreased po intake : r/o infection     Afib on AC with eliquis   hold if intervention needed   cont rater control     HTN : cont meds    d/w daughter and pt   will dw surgery

## 2024-04-03 ENCOUNTER — TRANSCRIPTION ENCOUNTER (OUTPATIENT)
Age: 79
End: 2024-04-03

## 2024-04-03 VITALS
HEART RATE: 86 BPM | DIASTOLIC BLOOD PRESSURE: 88 MMHG | TEMPERATURE: 98 F | OXYGEN SATURATION: 99 % | SYSTOLIC BLOOD PRESSURE: 119 MMHG | RESPIRATION RATE: 16 BRPM

## 2024-04-03 LAB
ANION GAP SERPL CALC-SCNC: 14 MMOL/L — SIGNIFICANT CHANGE UP (ref 5–17)
BUN SERPL-MCNC: 10 MG/DL — SIGNIFICANT CHANGE UP (ref 7–23)
CALCIUM SERPL-MCNC: 8.3 MG/DL — LOW (ref 8.4–10.5)
CHLORIDE SERPL-SCNC: 106 MMOL/L — SIGNIFICANT CHANGE UP (ref 96–108)
CO2 SERPL-SCNC: 19 MMOL/L — LOW (ref 22–31)
CREAT SERPL-MCNC: 0.69 MG/DL — SIGNIFICANT CHANGE UP (ref 0.5–1.3)
EGFR: 89 ML/MIN/1.73M2 — SIGNIFICANT CHANGE UP
GLUCOSE SERPL-MCNC: 81 MG/DL — SIGNIFICANT CHANGE UP (ref 70–99)
HCT VFR BLD CALC: 30.3 % — LOW (ref 34.5–45)
HGB BLD-MCNC: 10.1 G/DL — LOW (ref 11.5–15.5)
MAGNESIUM SERPL-MCNC: 2 MG/DL — SIGNIFICANT CHANGE UP (ref 1.6–2.6)
MCHC RBC-ENTMCNC: 32 PG — SIGNIFICANT CHANGE UP (ref 27–34)
MCHC RBC-ENTMCNC: 33.3 GM/DL — SIGNIFICANT CHANGE UP (ref 32–36)
MCV RBC AUTO: 95.9 FL — SIGNIFICANT CHANGE UP (ref 80–100)
NRBC # BLD: 0 /100 WBCS — SIGNIFICANT CHANGE UP (ref 0–0)
PHOSPHATE SERPL-MCNC: 3.2 MG/DL — SIGNIFICANT CHANGE UP (ref 2.5–4.5)
PLATELET # BLD AUTO: 177 K/UL — SIGNIFICANT CHANGE UP (ref 150–400)
POTASSIUM SERPL-MCNC: 3.7 MMOL/L — SIGNIFICANT CHANGE UP (ref 3.5–5.3)
POTASSIUM SERPL-SCNC: 3.7 MMOL/L — SIGNIFICANT CHANGE UP (ref 3.5–5.3)
RBC # BLD: 3.16 M/UL — LOW (ref 3.8–5.2)
RBC # FLD: 15.9 % — HIGH (ref 10.3–14.5)
SODIUM SERPL-SCNC: 139 MMOL/L — SIGNIFICANT CHANGE UP (ref 135–145)
WBC # BLD: 8.9 K/UL — SIGNIFICANT CHANGE UP (ref 3.8–10.5)
WBC # FLD AUTO: 8.9 K/UL — SIGNIFICANT CHANGE UP (ref 3.8–10.5)

## 2024-04-03 PROCEDURE — 82435 ASSAY OF BLOOD CHLORIDE: CPT

## 2024-04-03 PROCEDURE — 99285 EMERGENCY DEPT VISIT HI MDM: CPT

## 2024-04-03 PROCEDURE — 84295 ASSAY OF SERUM SODIUM: CPT

## 2024-04-03 PROCEDURE — 80053 COMPREHEN METABOLIC PANEL: CPT

## 2024-04-03 PROCEDURE — 83735 ASSAY OF MAGNESIUM: CPT

## 2024-04-03 PROCEDURE — 84100 ASSAY OF PHOSPHORUS: CPT

## 2024-04-03 PROCEDURE — 82947 ASSAY GLUCOSE BLOOD QUANT: CPT

## 2024-04-03 PROCEDURE — 85014 HEMATOCRIT: CPT

## 2024-04-03 PROCEDURE — 86850 RBC ANTIBODY SCREEN: CPT

## 2024-04-03 PROCEDURE — 97161 PT EVAL LOW COMPLEX 20 MIN: CPT

## 2024-04-03 PROCEDURE — 87637 SARSCOV2&INF A&B&RSV AMP PRB: CPT

## 2024-04-03 PROCEDURE — 83605 ASSAY OF LACTIC ACID: CPT

## 2024-04-03 PROCEDURE — 83690 ASSAY OF LIPASE: CPT

## 2024-04-03 PROCEDURE — 85027 COMPLETE CBC AUTOMATED: CPT

## 2024-04-03 PROCEDURE — 71045 X-RAY EXAM CHEST 1 VIEW: CPT

## 2024-04-03 PROCEDURE — 85018 HEMOGLOBIN: CPT

## 2024-04-03 PROCEDURE — 93005 ELECTROCARDIOGRAM TRACING: CPT

## 2024-04-03 PROCEDURE — 74177 CT ABD & PELVIS W/CONTRAST: CPT | Mod: MC

## 2024-04-03 PROCEDURE — 86900 BLOOD TYPING SEROLOGIC ABO: CPT

## 2024-04-03 PROCEDURE — 82803 BLOOD GASES ANY COMBINATION: CPT

## 2024-04-03 PROCEDURE — 36415 COLL VENOUS BLD VENIPUNCTURE: CPT

## 2024-04-03 PROCEDURE — 80048 BASIC METABOLIC PNL TOTAL CA: CPT

## 2024-04-03 PROCEDURE — 82330 ASSAY OF CALCIUM: CPT

## 2024-04-03 PROCEDURE — 86901 BLOOD TYPING SEROLOGIC RH(D): CPT

## 2024-04-03 PROCEDURE — 85025 COMPLETE CBC W/AUTO DIFF WBC: CPT

## 2024-04-03 PROCEDURE — 84132 ASSAY OF SERUM POTASSIUM: CPT

## 2024-04-03 RX ORDER — POTASSIUM CHLORIDE 20 MEQ
40 PACKET (EA) ORAL ONCE
Refills: 0 | Status: COMPLETED | OUTPATIENT
Start: 2024-04-03 | End: 2024-04-03

## 2024-04-03 RX ADMIN — Medication 1 PATCH: at 07:32

## 2024-04-03 RX ADMIN — Medication 175 MICROGRAM(S): at 06:16

## 2024-04-03 RX ADMIN — CLOPIDOGREL BISULFATE 75 MILLIGRAM(S): 75 TABLET, FILM COATED ORAL at 11:51

## 2024-04-03 RX ADMIN — Medication 1 PATCH: at 13:51

## 2024-04-03 RX ADMIN — Medication 40 MILLIEQUIVALENT(S): at 11:51

## 2024-04-03 RX ADMIN — APIXABAN 5 MILLIGRAM(S): 2.5 TABLET, FILM COATED ORAL at 06:16

## 2024-04-03 RX ADMIN — Medication 100 MILLIGRAM(S): at 13:51

## 2024-04-03 NOTE — DISCHARGE NOTE PROVIDER - NSDCCPCAREPLAN_GEN_ALL_CORE_FT
PRINCIPAL DISCHARGE DIAGNOSIS  Diagnosis: Surgical wound dehiscence  Assessment and Plan of Treatment: Abdominal wound was monitored daily, and covered with gauze and tape. Drainage is most likely a hematoma in the laparotomy wound. Your bloodwork remained stable, and you did not require any blood transfusions  BATHING: You may shower and/or sponge bathe. Remove outer dressing prior to shower. Let soap and water run over incision; do NOT scrub incision. Pat abdomen dry after, and replace with gauze with paper tape. Do no submerge the incision underwater for the next 2 weeks.   ACTIVITY: No heavy lifting anything more than 10-15lbs or straining. Otherwise, you may return to your usual level of physical activity. If you are taking narcotic pain medication (such as Percocet), do NOT drive a car, operate machinery or make important decisions.  PAIN: A prescription for oxycodone has been sent to the pharmacy. You should only take these for severe pain. For mild or moderate pain, you may take 975mg of tylenol every 6 hours. Please take tylenol every 6 hours, and stagger with ibuprofen every 6 hours. This will allow you to alternate medications for more consistent pain control. For example: take a dose of tylenol at 8 am, then take a dose of ibuprofen at 11 am, then take a dose of tylenol at 2pm, and continue as needed. Do not exceed more than 4G per day.   NOTIFY YOUR SURGEON IF: You have any bleeding that does not stop, any pus draining from your wound, any fever (over 100.4 F) or chills, persistent nausea/vomiting with inability to tolerate food or liquids, persistent diarrhea, or if severe abdominal pain is not controlled on your discharge pain medications.  FOLLOW-UP:  1. Please call to make a follow-up appointment within one to two weeks of discharge with Dr. Jefferson  2. Please follow up with your primary care physician in one week regarding your hospitalization.     PRINCIPAL DISCHARGE DIAGNOSIS  Diagnosis: Surgical wound dehiscence  Assessment and Plan of Treatment: Abdominal wound was monitored daily, and covered with gauze and tape. Drainage is most likely a hematoma in the laparotomy wound. Your bloodwork remained stable, and you did not require any blood transfusions  BATHING: You may shower and/or sponge bathe. Remove outer dressing prior to shower. Let soap and water run over incision; do NOT scrub incision. Pat abdomen dry after, and replace with gauze with paper tape. Do no submerge the incision underwater for the next 2 weeks.   ACTIVITY: No heavy lifting anything more than 10-15lbs or straining. Otherwise, you may return to your usual level of physical activity. If you are taking narcotic pain medication (such as Percocet), do NOT drive a car, operate machinery or make important decisions.  PAIN: For pain, you may take 975mg of tylenol every 6 hours. Do not exceed more than 4G per day.   NOTIFY YOUR SURGEON IF: You have any bleeding that does not stop, any pus draining from your wound, any fever (over 100.4 F) or chills, persistent nausea/vomiting with inability to tolerate food or liquids, persistent diarrhea, or if severe abdominal pain is not controlled on your discharge pain medications.  FOLLOW-UP:  1. Please call to make a follow-up appointment within one to two weeks of discharge with Dr. Jefferson  2. Please follow up with your primary care physician in one week regarding your hospitalization.

## 2024-04-03 NOTE — PROGRESS NOTE ADULT - SUBJECTIVE AND OBJECTIVE BOX
ACS SURGERY DAILY PROGRESS NOTE:     SUBJECTIVE/ROS: Patient seen and examined. daughter at bedside. wound examined at bedside and redressed.      MEDICATIONS  (STANDING):  apixaban 5 milliGRAM(s) Oral every 12 hours  atorvastatin 80 milliGRAM(s) Oral at bedtime  clopidogrel Tablet 75 milliGRAM(s) Oral daily  levothyroxine 175 MICROGram(s) Oral daily  metoprolol tartrate 100 milliGRAM(s) Oral three times a day  nicotine -  14 mG/24Hr(s) Patch 1 Patch Transdermal every 24 hours    MEDICATIONS  (PRN):      OBJECTIVE:    Vital Signs Last 24 Hrs  T(C): 36.3 (02 Apr 2024 08:14), Max: 36.7 (02 Apr 2024 04:30)  T(F): 97.4 (02 Apr 2024 08:14), Max: 98 (02 Apr 2024 04:30)  HR: 86 (02 Apr 2024 08:14) (76 - 99)  BP: 101/55 (02 Apr 2024 08:14) (96/65 - 122/84)  BP(mean): 75 (01 Apr 2024 20:57) (75 - 75)  RR: 16 (02 Apr 2024 08:14) (16 - 18)  SpO2: 98% (02 Apr 2024 08:14) (96% - 99%)    Parameters below as of 02 Apr 2024 08:14  Patient On (Oxygen Delivery Method): room air            I&O's Detail    01 Apr 2024 07:01  -  02 Apr 2024 07:00  --------------------------------------------------------  IN:  Total IN: 0 mL    OUT:    Ileostomy (mL): 80 mL  Total OUT: 80 mL    Total NET: -80 mL      02 Apr 2024 07:01  -  02 Apr 2024 11:28  --------------------------------------------------------  IN:  Total IN: 0 mL    OUT:    Intermittent Catheterization - Urethral (mL): 600 mL  Total OUT: 600 mL    Total NET: -600 mL          Daily Height in cm: 162.56 (01 Apr 2024 17:36)    Daily     LABS:                        10.5   9.31  )-----------( 175      ( 02 Apr 2024 06:46 )             32.8     04-02    137  |  105  |  12  ----------------------------<  86  4.3   |  20<L>  |  0.76    Ca    8.6      02 Apr 2024 06:45  Phos  3.4     04-02  Mg     1.7     04-02    TPro  6.5  /  Alb  3.2<L>  /  TBili  0.6  /  DBili  x   /  AST  24  /  ALT  12  /  AlkPhos  86  04-01      Urinalysis Basic - ( 02 Apr 2024 06:45 )    Color: x / Appearance: x / SG: x / pH: x  Gluc: 86 mg/dL / Ketone: x  / Bili: x / Urobili: x   Blood: x / Protein: x / Nitrite: x   Leuk Esterase: x / RBC: x / WBC x   Sq Epi: x / Non Sq Epi: x / Bacteria: x      PHYSICAL EXAM:  Lying comfortably in bed  HEENT: No visible deformities. Supple neck  Neuro: AOx3  Resp: Saturating well on RA. Non-labored breathing  GI: Soft, ND/NT, ostomy bag in place with an output. There is a leakage of brown/fecal material. No palpable masses.  Extremities: no visible deformities.         
GENERAL SURGERY PROGRESS NOTE    Patient: LEFTY AKBAR , 78y ([birth year])Female   MRN: [id number]  Location: [address]   Visit: 04-01-24 Inpatient  Date: 04-03-24 @ 07:55    Subjective: No acute events overnight. Patient resting comfortably in bed, no complaints. No N/V. Failed TOV.      PAST MEDICAL & SURGICAL HISTORY:  Hypertension      Hypothyroid      Osteoarthritis  knees, back      CAD (coronary artery disease)      CROW (obstructive sleep apnea)  non complaint on CPAP      History of MI (myocardial infarction)  h/o previous MI in 2004 prompted PTCA  with stenting x 2 vessels   last stress/ echo 2019      Heart murmur  dx in childhood      Bilateral hearing loss, unspecified hearing loss type  bilateral aids      Obesity      Mixed stress and urge urinary incontinence      Overactive bladder      S/P ORIF (open reduction internal fixation) fracture  left hip 1962      S/P appendectomy  30 plus years      S/P knee replacement  left 2000      Stented coronary artery  2004 X 2 STENTS      S/P laparotomy  due to adhesions, 30 years ago      H/O dilation and curettage  2/2019 Benign polyp          Vitals: T(F): 97.9 (04-03-24 @ 04:08), Max: 98 (04-02-24 @ 20:38)  HR: 72 (04-03-24 @ 06:18)  BP: 104/71 (04-03-24 @ 06:18)  RR: 18 (04-03-24 @ 04:08)  SpO2: 96% (04-03-24 @ 04:08)      Diet, Regular      04-02-24 @ 07:01  -  04-03-24 @ 07:00  --------------------------------------------------------  IN:    Oral Fluid: 930 mL  Total IN: 930 mL    OUT:    Ileostomy (mL): 300 mL    Intermittent Catheterization - Urethral (mL): 600 mL  Total OUT: 900 mL    Total NET: 30 mL    PHYSICAL EXAM  GEN: No acute distress   CV: RRR, no JVD  LUNGS: Respirations unlabored, no use of accessory muscles  ABD: Abdomen soft, ND, NT   EXT: No peripheral edema. Regular range of motion.   INCISION: midline incision with stool through. Ostomy with brown stool.     MEDICATIONS  (STANDING):  apixaban 5 milliGRAM(s) Oral every 12 hours  atorvastatin 80 milliGRAM(s) Oral at bedtime  clopidogrel Tablet 75 milliGRAM(s) Oral daily  levothyroxine 175 MICROGram(s) Oral daily  metoprolol tartrate 100 milliGRAM(s) Oral three times a day  nicotine -  14 mG/24Hr(s) Patch 1 Patch Transdermal every 24 hours    MEDICATIONS  (PRN):      DVT PROPHYLAXIS: SCDs,   GI PROPHYLAXIS:   ANTICOAGULATION:   ANTIBIOTICS:       LAB/STUDIES:  CAPILLARY BLOOD GLUCOSE                              10.1   8.90  )-----------( 177      ( 03 Apr 2024 06:28 )             30.3     04-03    139  |  106  |  10  ----------------------------<  81  3.7   |  19<L>  |  0.69    Ca    8.3<L>      03 Apr 2024 06:28  Phos  3.2     04-03  Mg     2.0     04-03    TPro  6.5  /  Alb  3.2<L>  /  TBili  0.6  /  DBili  x   /  AST  24  /  ALT  12  /  AlkPhos  86  04-01               6.5  | 0.6  | 24       ------------------[86      ( 01 Apr 2024 18:47 )  3.2  | x    | 12          Lipase:31     Amylase:x          Urinalysis Basic - ( 03 Apr 2024 06:28 )    Color: x / Appearance: x / SG: x / pH: x  Gluc: 81 mg/dL / Ketone: x  / Bili: x / Urobili: x   Blood: x / Protein: x / Nitrite: x   Leuk Esterase: x / RBC: x / WBC x   Sq Epi: x / Non Sq Epi: x / Bacteria: x                
Podiatry pager #: [phone]/ [phone]    Patient is a 78y old  Female who presents with a chief complaint of Fistula (03 Apr 2024 13:05). podiatry consult for evaluation of left heel wound      HPI:  A 78 year old female with PMHx of HTN, hypothyroidism, CROW, MI, heart murmur, CAD, hearing difficulty with PSHx of ex-lap for mesenteric ischemia 02/2024 with intraoperative angiography by vascular surgery s/p resection of 20-cm of terminal ileum  and ostomy followed by wound closure.The patient was discharged to rehab. The patient endorses that 2-weeks ago she started having brown material expressed from the incision site that she wiped regularly. The patient denies abdominal pain, nausea, vomiting, shortness of breath or fever or chills.    In the ED, the patient is afebile, non-tachy, 96/65 and saturating well on RA.  (01 Apr 2024 22:23)      PAST MEDICAL & SURGICAL HISTORY:  Hypertension      Hypothyroid      Osteoarthritis  knees, back      CAD (coronary artery disease)      CROW (obstructive sleep apnea)  non complaint on CPAP      History of MI (myocardial infarction)  h/o previous MI in 2004 prompted PTCA  with stenting x 2 vessels   last stress/ echo 2019      Heart murmur  dx in childhood      Bilateral hearing loss, unspecified hearing loss type  bilateral aids      Obesity      Mixed stress and urge urinary incontinence      Overactive bladder      S/P ORIF (open reduction internal fixation) fracture  left hip 1962      S/P appendectomy  30 plus years      S/P knee replacement  left 2000      Stented coronary artery  2004 X 2 STENTS      S/P laparotomy  due to adhesions, 30 years ago      H/O dilation and curettage  2/2019 Benign polyp          MEDICATIONS  (STANDING):  apixaban 5 milliGRAM(s) Oral every 12 hours  atorvastatin 80 milliGRAM(s) Oral at bedtime  clopidogrel Tablet 75 milliGRAM(s) Oral daily  levothyroxine 175 MICROGram(s) Oral daily  metoprolol tartrate 100 milliGRAM(s) Oral three times a day  nicotine -  14 mG/24Hr(s) Patch 1 Patch Transdermal every 24 hours    MEDICATIONS  (PRN):      Allergies    penicillin (Hives)    Intolerances        VITALS:    Vital Signs Last 24 Hrs  T(C): 36.5 (03 Apr 2024 13:20), Max: 36.7 (02 Apr 2024 20:38)  T(F): 97.7 (03 Apr 2024 13:20), Max: 98 (02 Apr 2024 20:38)  HR: 86 (03 Apr 2024 13:20) (72 - 101)  BP: 119/88 (03 Apr 2024 13:20) (97/58 - 127/84)  BP(mean): --  RR: 16 (03 Apr 2024 13:20) (16 - 18)  SpO2: 99% (03 Apr 2024 13:20) (96% - 99%)    Parameters below as of 03 Apr 2024 13:20  Patient On (Oxygen Delivery Method): room air        LABS:                          10.1   8.90  )-----------( 177      ( 03 Apr 2024 06:28 )             30.3       04-03    139  |  106  |  10  ----------------------------<  81  3.7   |  19<L>  |  0.69    Ca    8.3<L>      03 Apr 2024 06:28  Phos  3.2     04-03  Mg     2.0     04-03    TPro  6.5  /  Alb  3.2<L>  /  TBili  0.6  /  DBili  x   /  AST  24  /  ALT  12  /  AlkPhos  86  04-01      CAPILLARY BLOOD GLUCOSE              LOWER EXTREMITY PHYSICAL EXAM:    Vasular: DP/PT 1_/4, B/L, CFT <2_ seconds B/L, Temperature gradient _wnl, B/L.   Neuro: Epicritic sensation intact _ to the level of toes_, B/L.  Skin: left heel early DTI:  3cm diameter with blanchable erythema. No ecchymosis or bullae. No fluctuance/malodor or clinical signs of infection. No open ulcerations    RADIOLOGY & ADDITIONAL STUDIES:    
Date of service: 04-03-24 @ 11:15      Patient is a 78y old  Female who presents with a chief complaint of Fistula (03 Apr 2024 07:55)                                                               INTERVAL HPI/OVERNIGHT EVENTS:    REVIEW OF SYSTEMS:     CONSTITUTIONAL: No weakness, fevers or chills  EYES/ENT: No visual changes , no ear ache   NECK: No pain or stiffness  RESPIRATORY:productive  cough, wheezing,  No shortness of breath  CARDIOVASCULAR: No chest pa.. white sputum n or palpitations  GASTROINTESTINAL: No abdominal pain  . No nausea, vomiting, or hematemesis; No diarrhea or constipation. No melena or hematochezia.  GENITOURINARY: No dysuria, frequency or hematuria  NEUROLOGICAL: No numbness or weakness  SKIN: No itching, burning, rashes, or lesions                                                                                                                                                                                                                                                                                 Medications:  MEDICATIONS  (STANDING):  apixaban 5 milliGRAM(s) Oral every 12 hours  atorvastatin 80 milliGRAM(s) Oral at bedtime  clopidogrel Tablet 75 milliGRAM(s) Oral daily  levothyroxine 175 MICROGram(s) Oral daily  metoprolol tartrate 100 milliGRAM(s) Oral three times a day  nicotine -  14 mG/24Hr(s) Patch 1 Patch Transdermal every 24 hours  potassium chloride    Tablet ER 40 milliEquivalent(s) Oral once    MEDICATIONS  (PRN):       Allergies    penicillin (Hives)    Intolerances      Vital Signs Last 24 Hrs  T(C): 36.3 (03 Apr 2024 08:42), Max: 36.7 (02 Apr 2024 20:38)  T(F): 97.4 (03 Apr 2024 08:42), Max: 98 (02 Apr 2024 20:38)  HR: 77 (03 Apr 2024 08:42) (72 - 101)  BP: 127/84 (03 Apr 2024 08:42) (97/58 - 127/84)  BP(mean): --  RR: 17 (03 Apr 2024 08:42) (16 - 18)  SpO2: 96% (03 Apr 2024 08:42) (96% - 98%)    Parameters below as of 03 Apr 2024 08:42  Patient On (Oxygen Delivery Method): room air      CAPILLARY BLOOD GLUCOSE          04-02 @ 07:01  -  04-03 @ 07:00  --------------------------------------------------------  IN: 930 mL / OUT: 900 mL / NET: 30 mL    04-03 @ 07:01  -  04-03 @ 11:15  --------------------------------------------------------  IN: 120 mL / OUT: 250 mL / NET: -130 mL      Physical Exam:    Daily     Daily   General:  Well appearing, NAD, not cachetic  HEENT:  Nonicteric, PERRLA  CV:  RRR, S1S2   Lungs:  CTA B/L, no wheezes, rales, rhonchi  Abdomen:  Soft,mid abd incision   no sings of infectio   no discharge   ostomy in place   Extremities:  2+ pulses, no c/c, no edema  Skin:  Warm and dry, no rashes  :  No amaya  Neuro:  AAOx3, non-focal, grossly intact                                                                                                                                                                                                                                                                                                LABS:                               10.1   8.90  )-----------( 177      ( 03 Apr 2024 06:28 )             30.3                      04-03    139  |  106  |  10  ----------------------------<  81  3.7   |  19<L>  |  0.69    Ca    8.3<L>      03 Apr 2024 06:28  Phos  3.2     04-03  Mg     2.0     04-03    TPro  6.5  /  Alb  3.2<L>  /  TBili  0.6  /  DBili  x   /  AST  24  /  ALT  12  /  AlkPhos  86  04-01                       RADIOLOGY & ADDITIONAL TESTS         I personally reviewed: [  ]EKG   [  ]CXR    [  ] CT      A/P:         Discussed with :     Simon consultants' Notes   Time spent :

## 2024-04-03 NOTE — DISCHARGE NOTE NURSING/CASE MANAGEMENT/SOCIAL WORK - NSDCPEFALRISK_GEN_ALL_CORE
For information on Fall & Injury Prevention, visit: [email or web address] OR  [email or web address] OR  https://www.cdc.gov/steadi/patient.html

## 2024-04-03 NOTE — PHYSICAL THERAPY INITIAL EVALUATION ADULT - ADDITIONAL COMMENTS
Pt lives with daughter in an apt with 3 steps to enter and then first floor set up. PTA pt independent with functional mobility inside house and uses a cane for community ambulation. Pt owns cane. Prior to this admission, pt has been at [facility]  and ambulating short distances with RW.

## 2024-04-03 NOTE — PROVIDER CONTACT NOTE (OTHER) - ASSESSMENT
Bladder scan found to be 213. Pt A&Ox2 at baseline. VSS. Pt resting comfortably in bed. Pt denies pain and urge to void. Pt repositioned and encouraged to void.

## 2024-04-03 NOTE — DISCHARGE NOTE PROVIDER - CARE PROVIDER_API CALL
Keith Jefferson  Surgery  [address] [place] [address], [facility] [address]  [place] [address]  Phone: [phone]  Fax: [phone]  Follow Up Time: 1 week

## 2024-04-03 NOTE — PROGRESS NOTE ADULT - ASSESSMENT
78-year-old female with PMH current smoker , COPD, A-fib  On Eliquis and Plavix, HTN, HLD recently dced from [facility] where she was admitted for  syncope found to have ischemic bowel and underwent surgery for mesenteric ischemia 02/2024 with intraoperative angiography by vascular surgery s/p resection of 20-cm of terminal ileum  and ostomy followed by wound closure now presenting with discharge from abd wound     presumed infection at wound site : no obvious infection   CT findings reporting fistula   fu with surgery and wound care   monitor for infection   off abx       COPD : reporting cough   please check for influenza , RSV  COVID : negative   check CXR : no infiltrate noted .. fu official report   antitussive       Afib on AC with eliquis   hold if intervention needed   cont rater control     HTN : cont meds    d/w daughter and pt   will dw surgery   
Assessment/Plan:    Left Heel DTI: stable, non-infected, present on admission    Recommend decubitus precautions and use of z-flow boots while in house  Recommend cavilon daily and use of allevyne foam dressing  reconsult podiatry as needed
A 78 year old female with PMHx of MI, CAD, heart murmur, CROW, hearing difficulties, hypothyroidism s/p ex-lap with bowel resection of 20-cm of terminal ileum and ostomy presents with discharge of fecal material from the incision site. The CT scan shows findings suspicious for cutaneous fistula at site of surgical incision in the midline.    Plan:  - Home eliquis + plavix   - regular diet  - Electrolyte repletion as necessary  - DVT PPx  - AM Labs  - Patient may need chronic amaya if fails another TOV   - Dispo planning     ACS  [id number]  
A 78 year old female with PMHx of MI, CAD, heart murmur, CROW, hearing difficulties, hypothyroidism s/p ex-lap with bowel resection of 20-cm of terminal ileum and ostomy presents with discharge of fecal material from the incision site. The CT scan shows findings suspicious for cutaneous fistula at site of surgical incision in the midline.    Plan:  - Wound care consult.  - regular diet  - IVF  - Electrolyte repletion as necessary  - DVT PPx  - AM Labs    ACS  [id number]

## 2024-04-03 NOTE — DISCHARGE NOTE NURSING/CASE MANAGEMENT/SOCIAL WORK - NSDCPEWEB_GEN_ALL_CORE
[facility] for Tobacco Control website --- [email or web address] --- [email or web address]

## 2024-04-03 NOTE — DISCHARGE NOTE NURSING/CASE MANAGEMENT/SOCIAL WORK - NSDCVIVACCINE_GEN_ALL_CORE_FT
Tdap; 14-Jun-2015 22:07; Rosana Loaiza (RN); [employer]; [id number]; IntraMuscular; Deltoid Left.; 0.5 milliLiter(s); VIS (VIS Published: 09-May-2013, VIS Presented: 14-Jun-2015);

## 2024-04-03 NOTE — DISCHARGE NOTE PROVIDER - NSDCMRMEDTOKEN_GEN_ALL_CORE_FT
acetaminophen 325 mg oral tablet: 2 tab(s) orally every 6 hours As needed Mild Pain (1 - 3)  apixaban 5 mg oral tablet: 1 tab(s) orally every 12 hours  clopidogrel 75 mg oral tablet: 1 tab(s) orally once a day  levothyroxine 175 mcg (0.175 mg) oral tablet: 1 tab(s) orally once a day note: recently increased from 150mcg.  metoprolol tartrate 100 mg oral tablet: 1 tab(s) orally 3 times a day  nicotine 14 mg/24 hr transdermal film, extended release: 1 patch transdermal every 24 hours  rosuvastatin 20 mg oral tablet: 1 tab(s) orally once a day (at bedtime)

## 2024-04-03 NOTE — CHART NOTE - NSCHARTNOTEFT_GEN_A_CORE
Wound Care Team Note:    Request for wound care consult for foot/toe wound received and referred to Podiatry. Please refer to Podiatry for management. Will not follow.    Elena Middleton NP-C, CWOCN via TEAMS

## 2024-04-03 NOTE — DISCHARGE NOTE NURSING/CASE MANAGEMENT/SOCIAL WORK - NSDCPEEMAIL_GEN_ALL_CORE
[facility] for Tobacco Control email [email or web address]

## 2024-04-03 NOTE — DISCHARGE NOTE NURSING/CASE MANAGEMENT/SOCIAL WORK - NSDCPEELIQUIS_GEN_ALL_CORE
Per pts wife he is due for recheck on psa not sure if he is due for anything else.  [facility]
Pt had last psa on 8/31/19 and it was 3.26. he also had cmp,cbc,tsh,amylase and lipase at that time. His last appt was [id number].
Apixaban/Eliquis - Compliance/Apixaban/Eliquis - Dietary Advice/Apixaban/Eliquis - Follow up monitoring/Apixaban/Eliquis - Potential for adverse drug reactions and interactions

## 2024-04-03 NOTE — DISCHARGE NOTE NURSING/CASE MANAGEMENT/SOCIAL WORK - PATIENT PORTAL LINK FT
You can access the FollowMyHealth Patient Portal offered by [facility] by registering at the following website: [email or web address]. By joining [employer]’s FollowMyHealth portal, you will also be able to view your health information using other applications (apps) compatible with our system.

## 2024-04-03 NOTE — PROVIDER CONTACT NOTE (OTHER) - ACTION/TREATMENT ORDERED:
As per provider recheck bladder scan at 8AM. No further interventions at this time. Safety maintained. Nursing care ongoing.

## 2024-04-03 NOTE — DISCHARGE NOTE PROVIDER - HOSPITAL COURSE
78 year old female with PMHx of MI, CAD on plavix, A-fib on Eliquis, heart murmur, COPD, CROW, hearing difficulties, hypothyroidism, and recent admission (Feb-March 2024) for AMS with hospital stay c/b ischemic/necrotic small bowel s/p ex-lap with bowel resection of 20-cm of terminal ileum (2/22/24) and intraoperative angiography by vascular surgery; end ileostomy (2/24/24) presents with discharge of fecal material from the incision site. The patient endorses that 2-weeks ago she started having brown material expressed from the incision site that she wiped regularly. The patient denies abdominal pain, nausea, vomiting, shortness of breath or fever or chills.  In the ED, the patient is afebile, non-tachy, 96/65 and saturating well on RA. The CT scan shows findings suspicious for cutaneous fistula at site of surgical incision in the midline.    Pt was admitted under Acute Care Surgery for further evaluation and management. Abdominal wound was monitored daily, and covered with gauze and tape. Drainage is most likely a hematoma in the laparotomy wound. H/H remained stable  Labs were monitored daily, and electrolytes were repleted as necessary.     Internal medicine was consulted for medical co-management; recommendations were followed    Pt to be discharged with known ostomy    Physical therapy evaluated the patient and recommended JOSE. Pt to be discharged back to [facility]    On the day of discharge, the patient's vitals are within normal limits, pain is controlled, voiding urine, passing gas/stool via ileostomy, tolerating a PO diet, and ambulating well. Pt will f/u with Dr. Jefferson in 1-2 weeks. Pt will f/u with PCP in 1-2 weeks. 78 year old female with PMHx of MI, CAD on plavix, A-fib on Eliquis, heart murmur, COPD, CROW, hearing difficulties, hypothyroidism, and recent admission (Feb-March 2024) for AMS with hospital stay c/b ischemic/necrotic small bowel s/p ex-lap with bowel resection of 20-cm of terminal ileum (2/22/24) and intraoperative angiography by vascular surgery; end ileostomy (2/24/24) presents with discharge of fecal material from the incision site. The patient endorses that 2-weeks ago she started having brown material expressed from the incision site that she wiped regularly. The patient denies abdominal pain, nausea, vomiting, shortness of breath or fever or chills.  In the ED, the patient is afebile, non-tachy, 96/65 and saturating well on RA. The CT scan shows findings suspicious for cutaneous fistula at site of surgical incision in the midline.    Pt was admitted under Acute Care Surgery for further evaluation and management. Abdominal wound was monitored daily, and covered with gauze and tape. Drainage is most likely a hematoma in the laparotomy wound. H/H remained stable  Labs were monitored daily, and electrolytes were repleted as necessary.     Internal medicine was consulted for medical co-management; recommendations were followed    Pt to be discharged with known ileostomy    Physical therapy evaluated the patient and recommended JOSE. Pt to be discharged back to [facility]    On the day of discharge, the patient's vitals are within normal limits, pain is controlled, voiding urine, passing gas/stool via ileostomy, tolerating a PO diet, and ambulating well. Pt will f/u with Dr. Jefferson in 1-2 weeks. Pt will f/u with PCP in 1-2 weeks.

## 2024-04-03 NOTE — PHYSICAL THERAPY INITIAL EVALUATION ADULT - PERTINENT HX OF CURRENT PROBLEM, REHAB EVAL
Pt is a 79 y/o female admitted to [facility] on 4/1/24 PMHx of HTN, hypothyroidism, CROW, MI, heart murmur, CAD, hearing difficulty with PSHx of ex-lap for mesenteric ischemia 02/2024 with intraoperative angiography by vascular surgery s/p resection of 20-cm of terminal ileum  and ostomy followed by wound closure.The patient was discharged to rehab. The patient endorses that 2-weeks ago she started having brown material expressed from the incision site that she wiped regularly.  CT AP: Postoperative changes secondary to small bowel resection and ileostomy. Findings suspicious for cutaneous fistula at site of surgical incision in the midline. No CT evidence of bowel ischemia.

## 2024-04-05 ENCOUNTER — APPOINTMENT (OUTPATIENT)
Dept: VASCULAR SURGERY | Facility: CLINIC | Age: 79
End: 2024-04-05

## 2024-04-09 ENCOUNTER — APPOINTMENT (OUTPATIENT)
Dept: ORTHOPEDIC SURGERY | Facility: CLINIC | Age: 79
End: 2024-04-09

## 2024-04-28 ENCOUNTER — INPATIENT (INPATIENT)
Facility: HOSPITAL | Age: 79
LOS: 7 days | Discharge: NURSING, REHAB OR LONG-TERM CARE | DRG: 689 | End: 2024-05-06
Attending: GENERAL ACUTE CARE HOSPITAL | Admitting: INTERNAL MEDICINE
Payer: MEDICARE

## 2024-04-28 VITALS
DIASTOLIC BLOOD PRESSURE: 72 MMHG | WEIGHT: 160.06 LBS | TEMPERATURE: 98 F | HEIGHT: 64 IN | RESPIRATION RATE: 20 BRPM | HEART RATE: 109 BPM | SYSTOLIC BLOOD PRESSURE: 103 MMHG | OXYGEN SATURATION: 92 %

## 2024-04-28 DIAGNOSIS — N39.0 URINARY TRACT INFECTION, SITE NOT SPECIFIED: ICD-10-CM

## 2024-04-28 DIAGNOSIS — G93.40 ENCEPHALOPATHY, UNSPECIFIED: ICD-10-CM

## 2024-04-28 DIAGNOSIS — R09.89 OTHER SPECIFIED SYMPTOMS AND SIGNS INVOLVING THE CIRCULATORY AND RESPIRATORY SYSTEMS: ICD-10-CM

## 2024-04-28 DIAGNOSIS — Z96.7 PRESENCE OF OTHER BONE AND TENDON IMPLANTS: Chronic | ICD-10-CM

## 2024-04-28 DIAGNOSIS — Z96.659 PRESENCE OF UNSPECIFIED ARTIFICIAL KNEE JOINT: Chronic | ICD-10-CM

## 2024-04-28 DIAGNOSIS — Z93.2 ILEOSTOMY STATUS: ICD-10-CM

## 2024-04-28 DIAGNOSIS — I10 ESSENTIAL (PRIMARY) HYPERTENSION: ICD-10-CM

## 2024-04-28 DIAGNOSIS — J44.9 CHRONIC OBSTRUCTIVE PULMONARY DISEASE, UNSPECIFIED: ICD-10-CM

## 2024-04-28 DIAGNOSIS — Z95.5 PRESENCE OF CORONARY ANGIOPLASTY IMPLANT AND GRAFT: Chronic | ICD-10-CM

## 2024-04-28 DIAGNOSIS — I48.91 UNSPECIFIED ATRIAL FIBRILLATION: ICD-10-CM

## 2024-04-28 DIAGNOSIS — E03.9 HYPOTHYROIDISM, UNSPECIFIED: ICD-10-CM

## 2024-04-28 DIAGNOSIS — Z98.890 OTHER SPECIFIED POSTPROCEDURAL STATES: Chronic | ICD-10-CM

## 2024-04-28 DIAGNOSIS — Z90.49 ACQUIRED ABSENCE OF OTHER SPECIFIED PARTS OF DIGESTIVE TRACT: Chronic | ICD-10-CM

## 2024-04-28 DIAGNOSIS — I25.10 ATHEROSCLEROTIC HEART DISEASE OF NATIVE CORONARY ARTERY WITHOUT ANGINA PECTORIS: ICD-10-CM

## 2024-04-28 DIAGNOSIS — E78.5 HYPERLIPIDEMIA, UNSPECIFIED: ICD-10-CM

## 2024-04-28 LAB
ALBUMIN SERPL ELPH-MCNC: 3.2 G/DL — LOW (ref 3.3–5)
ALP SERPL-CCNC: 73 U/L — SIGNIFICANT CHANGE UP (ref 40–120)
ALT FLD-CCNC: 15 U/L — SIGNIFICANT CHANGE UP (ref 10–45)
ANION GAP SERPL CALC-SCNC: 11 MMOL/L — SIGNIFICANT CHANGE UP (ref 5–17)
APPEARANCE UR: ABNORMAL
APTT BLD: 30 SEC — SIGNIFICANT CHANGE UP (ref 24.5–35.6)
AST SERPL-CCNC: 18 U/L — SIGNIFICANT CHANGE UP (ref 10–40)
BACTERIA # UR AUTO: ABNORMAL /HPF
BASE EXCESS BLDV CALC-SCNC: -10.6 MMOL/L — LOW (ref -2–3)
BASOPHILS # BLD AUTO: 0.05 K/UL — SIGNIFICANT CHANGE UP (ref 0–0.2)
BASOPHILS NFR BLD AUTO: 0.5 % — SIGNIFICANT CHANGE UP (ref 0–2)
BILIRUB SERPL-MCNC: 0.5 MG/DL — SIGNIFICANT CHANGE UP (ref 0.2–1.2)
BILIRUB UR-MCNC: NEGATIVE — SIGNIFICANT CHANGE UP
BUN SERPL-MCNC: 19 MG/DL — SIGNIFICANT CHANGE UP (ref 7–23)
CA-I SERPL-SCNC: 1.12 MMOL/L — LOW (ref 1.15–1.33)
CALCIUM SERPL-MCNC: 8.8 MG/DL — SIGNIFICANT CHANGE UP (ref 8.4–10.5)
CAST: 8 /LPF — HIGH (ref 0–4)
CHLORIDE BLDV-SCNC: 112 MMOL/L — HIGH (ref 96–108)
CHLORIDE SERPL-SCNC: 109 MMOL/L — HIGH (ref 96–108)
CO2 BLDV-SCNC: 16 MMOL/L — LOW (ref 22–26)
CO2 SERPL-SCNC: 17 MMOL/L — LOW (ref 22–31)
COLOR SPEC: YELLOW — SIGNIFICANT CHANGE UP
CREAT SERPL-MCNC: 1.12 MG/DL — SIGNIFICANT CHANGE UP (ref 0.5–1.3)
DIFF PNL FLD: ABNORMAL
EGFR: 51 ML/MIN/1.73M2 — LOW
EOSINOPHIL # BLD AUTO: 0.18 K/UL — SIGNIFICANT CHANGE UP (ref 0–0.5)
EOSINOPHIL NFR BLD AUTO: 1.7 % — SIGNIFICANT CHANGE UP (ref 0–6)
GAS PNL BLDV: 134 MMOL/L — LOW (ref 136–145)
GAS PNL BLDV: SIGNIFICANT CHANGE UP
GLUCOSE BLDV-MCNC: 87 MG/DL — SIGNIFICANT CHANGE UP (ref 70–99)
GLUCOSE SERPL-MCNC: 104 MG/DL — HIGH (ref 70–99)
GLUCOSE UR QL: NEGATIVE MG/DL — SIGNIFICANT CHANGE UP
HCO3 BLDV-SCNC: 15 MMOL/L — LOW (ref 22–29)
HCT VFR BLD CALC: 33.9 % — LOW (ref 34.5–45)
HCT VFR BLDA CALC: 28 % — LOW (ref 34.5–46.5)
HGB BLD CALC-MCNC: 9.4 G/DL — LOW (ref 11.7–16.1)
HGB BLD-MCNC: 10.6 G/DL — LOW (ref 11.5–15.5)
IMM GRANULOCYTES NFR BLD AUTO: 0.5 % — SIGNIFICANT CHANGE UP (ref 0–0.9)
INR BLD: 1.88 RATIO — HIGH (ref 0.85–1.18)
KETONES UR-MCNC: NEGATIVE MG/DL — SIGNIFICANT CHANGE UP
LACTATE BLDV-MCNC: 1.3 MMOL/L — SIGNIFICANT CHANGE UP (ref 0.5–2)
LEUKOCYTE ESTERASE UR-ACNC: ABNORMAL
LYMPHOCYTES # BLD AUTO: 1.4 K/UL — SIGNIFICANT CHANGE UP (ref 1–3.3)
LYMPHOCYTES # BLD AUTO: 13.4 % — SIGNIFICANT CHANGE UP (ref 13–44)
MCHC RBC-ENTMCNC: 31.3 GM/DL — LOW (ref 32–36)
MCHC RBC-ENTMCNC: 31.5 PG — SIGNIFICANT CHANGE UP (ref 27–34)
MCV RBC AUTO: 100.9 FL — HIGH (ref 80–100)
MONOCYTES # BLD AUTO: 1.13 K/UL — HIGH (ref 0–0.9)
MONOCYTES NFR BLD AUTO: 10.8 % — SIGNIFICANT CHANGE UP (ref 2–14)
NEUTROPHILS # BLD AUTO: 7.64 K/UL — HIGH (ref 1.8–7.4)
NEUTROPHILS NFR BLD AUTO: 73.1 % — SIGNIFICANT CHANGE UP (ref 43–77)
NITRITE UR-MCNC: POSITIVE
NRBC # BLD: 0 /100 WBCS — SIGNIFICANT CHANGE UP (ref 0–0)
PCO2 BLDV: 31 MMHG — LOW (ref 39–42)
PH BLDV: 7.29 — LOW (ref 7.32–7.43)
PH UR: 5.5 — SIGNIFICANT CHANGE UP (ref 5–8)
PLATELET # BLD AUTO: 233 K/UL — SIGNIFICANT CHANGE UP (ref 150–400)
PO2 BLDV: 34 MMHG — SIGNIFICANT CHANGE UP (ref 25–45)
POTASSIUM BLDV-SCNC: 4.3 MMOL/L — SIGNIFICANT CHANGE UP (ref 3.5–5.1)
POTASSIUM SERPL-MCNC: 4.5 MMOL/L — SIGNIFICANT CHANGE UP (ref 3.5–5.3)
POTASSIUM SERPL-SCNC: 4.5 MMOL/L — SIGNIFICANT CHANGE UP (ref 3.5–5.3)
PROT SERPL-MCNC: 6.4 G/DL — SIGNIFICANT CHANGE UP (ref 6–8.3)
PROT UR-MCNC: SIGNIFICANT CHANGE UP MG/DL
PROTHROM AB SERPL-ACNC: 19.4 SEC — HIGH (ref 9.5–13)
RBC # BLD: 3.36 M/UL — LOW (ref 3.8–5.2)
RBC # FLD: 17.4 % — HIGH (ref 10.3–14.5)
RBC CASTS # UR COMP ASSIST: 1 /HPF — SIGNIFICANT CHANGE UP (ref 0–4)
REVIEW: SIGNIFICANT CHANGE UP
SAO2 % BLDV: 55 % — LOW (ref 67–88)
SODIUM SERPL-SCNC: 137 MMOL/L — SIGNIFICANT CHANGE UP (ref 135–145)
SP GR SPEC: 1.01 — SIGNIFICANT CHANGE UP (ref 1–1.03)
SQUAMOUS # UR AUTO: 1 /HPF — SIGNIFICANT CHANGE UP (ref 0–5)
TROPONIN T, HIGH SENSITIVITY RESULT: 68 NG/L — HIGH (ref 0–51)
TROPONIN T, HIGH SENSITIVITY RESULT: 70 NG/L — HIGH (ref 0–51)
UROBILINOGEN FLD QL: 0.2 MG/DL — SIGNIFICANT CHANGE UP (ref 0.2–1)
WBC # BLD: 10.45 K/UL — SIGNIFICANT CHANGE UP (ref 3.8–10.5)
WBC # FLD AUTO: 10.45 K/UL — SIGNIFICANT CHANGE UP (ref 3.8–10.5)
WBC UR QL: 447 /HPF — HIGH (ref 0–5)

## 2024-04-28 PROCEDURE — 72125 CT NECK SPINE W/O DYE: CPT | Mod: 26,MC

## 2024-04-28 PROCEDURE — 72170 X-RAY EXAM OF PELVIS: CPT | Mod: 26

## 2024-04-28 PROCEDURE — 99285 EMERGENCY DEPT VISIT HI MDM: CPT | Mod: GC

## 2024-04-28 PROCEDURE — 74177 CT ABD & PELVIS W/CONTRAST: CPT | Mod: 26,MC

## 2024-04-28 PROCEDURE — 99223 1ST HOSP IP/OBS HIGH 75: CPT

## 2024-04-28 PROCEDURE — 70450 CT HEAD/BRAIN W/O DYE: CPT | Mod: 26,MC

## 2024-04-28 PROCEDURE — 71260 CT THORAX DX C+: CPT | Mod: 26,MC

## 2024-04-28 PROCEDURE — 71045 X-RAY EXAM CHEST 1 VIEW: CPT | Mod: 26

## 2024-04-28 RX ORDER — ACETAMINOPHEN 500 MG
1000 TABLET ORAL ONCE
Refills: 0 | Status: COMPLETED | OUTPATIENT
Start: 2024-04-28 | End: 2024-04-28

## 2024-04-28 RX ORDER — METOPROLOL TARTRATE 50 MG
5 TABLET ORAL ONCE
Refills: 0 | Status: COMPLETED | OUTPATIENT
Start: 2024-04-28 | End: 2024-04-28

## 2024-04-28 RX ORDER — NALOXONE HYDROCHLORIDE 4 MG/.1ML
0.4 SPRAY NASAL ONCE
Refills: 0 | Status: DISCONTINUED | OUTPATIENT
Start: 2024-04-28 | End: 2024-05-06

## 2024-04-28 RX ORDER — METOPROLOL TARTRATE 50 MG
100 TABLET ORAL ONCE
Refills: 0 | Status: COMPLETED | OUTPATIENT
Start: 2024-04-28 | End: 2024-04-28

## 2024-04-28 RX ORDER — SODIUM CHLORIDE 9 MG/ML
500 INJECTION INTRAMUSCULAR; INTRAVENOUS; SUBCUTANEOUS ONCE
Refills: 0 | Status: COMPLETED | OUTPATIENT
Start: 2024-04-28 | End: 2024-04-28

## 2024-04-28 RX ORDER — LEVOTHYROXINE SODIUM 125 MCG
175 TABLET ORAL DAILY
Refills: 0 | Status: DISCONTINUED | OUTPATIENT
Start: 2024-04-28 | End: 2024-05-06

## 2024-04-28 RX ORDER — ONDANSETRON 8 MG/1
4 TABLET, FILM COATED ORAL EVERY 8 HOURS
Refills: 0 | Status: DISCONTINUED | OUTPATIENT
Start: 2024-04-28 | End: 2024-05-06

## 2024-04-28 RX ORDER — CLOPIDOGREL BISULFATE 75 MG/1
75 TABLET, FILM COATED ORAL DAILY
Refills: 0 | Status: DISCONTINUED | OUTPATIENT
Start: 2024-04-28 | End: 2024-05-06

## 2024-04-28 RX ORDER — MORPHINE SULFATE 50 MG/1
4 CAPSULE, EXTENDED RELEASE ORAL EVERY 4 HOURS
Refills: 0 | Status: DISCONTINUED | OUTPATIENT
Start: 2024-04-28 | End: 2024-04-28

## 2024-04-28 RX ORDER — CEFTRIAXONE 500 MG/1
1000 INJECTION, POWDER, FOR SOLUTION INTRAMUSCULAR; INTRAVENOUS ONCE
Refills: 0 | Status: COMPLETED | OUTPATIENT
Start: 2024-04-28 | End: 2024-04-28

## 2024-04-28 RX ORDER — SENNA PLUS 8.6 MG/1
2 TABLET ORAL AT BEDTIME
Refills: 0 | Status: DISCONTINUED | OUTPATIENT
Start: 2024-04-28 | End: 2024-05-06

## 2024-04-28 RX ORDER — SODIUM CHLORIDE 9 MG/ML
1000 INJECTION INTRAMUSCULAR; INTRAVENOUS; SUBCUTANEOUS
Refills: 0 | Status: DISCONTINUED | OUTPATIENT
Start: 2024-04-28 | End: 2024-05-06

## 2024-04-28 RX ORDER — ACETAMINOPHEN 500 MG
650 TABLET ORAL EVERY 6 HOURS
Refills: 0 | Status: DISCONTINUED | OUTPATIENT
Start: 2024-04-28 | End: 2024-05-06

## 2024-04-28 RX ORDER — ATORVASTATIN CALCIUM 80 MG/1
80 TABLET, FILM COATED ORAL AT BEDTIME
Refills: 0 | Status: DISCONTINUED | OUTPATIENT
Start: 2024-04-28 | End: 2024-05-06

## 2024-04-28 RX ORDER — LIDOCAINE 4 G/100G
1 CREAM TOPICAL DAILY
Refills: 0 | Status: DISCONTINUED | OUTPATIENT
Start: 2024-04-28 | End: 2024-05-06

## 2024-04-28 RX ORDER — LANOLIN ALCOHOL/MO/W.PET/CERES
3 CREAM (GRAM) TOPICAL AT BEDTIME
Refills: 0 | Status: DISCONTINUED | OUTPATIENT
Start: 2024-04-28 | End: 2024-05-06

## 2024-04-28 RX ORDER — METOPROLOL TARTRATE 50 MG
25 TABLET ORAL
Refills: 0 | Status: DISCONTINUED | OUTPATIENT
Start: 2024-04-28 | End: 2024-04-29

## 2024-04-28 RX ORDER — MORPHINE SULFATE 50 MG/1
2 CAPSULE, EXTENDED RELEASE ORAL EVERY 4 HOURS
Refills: 0 | Status: DISCONTINUED | OUTPATIENT
Start: 2024-04-28 | End: 2024-04-28

## 2024-04-28 RX ORDER — CEFTRIAXONE 500 MG/1
1000 INJECTION, POWDER, FOR SOLUTION INTRAMUSCULAR; INTRAVENOUS EVERY 24 HOURS
Refills: 0 | Status: COMPLETED | OUTPATIENT
Start: 2024-04-28 | End: 2024-05-05

## 2024-04-28 RX ORDER — POLYETHYLENE GLYCOL 3350 17 G/17G
17 POWDER, FOR SOLUTION ORAL DAILY
Refills: 0 | Status: DISCONTINUED | OUTPATIENT
Start: 2024-04-28 | End: 2024-05-06

## 2024-04-28 RX ORDER — APIXABAN 2.5 MG/1
5 TABLET, FILM COATED ORAL EVERY 12 HOURS
Refills: 0 | Status: DISCONTINUED | OUTPATIENT
Start: 2024-04-28 | End: 2024-05-06

## 2024-04-28 RX ADMIN — SODIUM CHLORIDE 500 MILLILITER(S): 9 INJECTION INTRAMUSCULAR; INTRAVENOUS; SUBCUTANEOUS at 17:06

## 2024-04-28 RX ADMIN — CEFTRIAXONE 1000 MILLIGRAM(S): 500 INJECTION, POWDER, FOR SOLUTION INTRAMUSCULAR; INTRAVENOUS at 20:13

## 2024-04-28 RX ADMIN — Medication 400 MILLIGRAM(S): at 19:37

## 2024-04-28 RX ADMIN — SODIUM CHLORIDE 500 MILLILITER(S): 9 INJECTION INTRAMUSCULAR; INTRAVENOUS; SUBCUTANEOUS at 22:24

## 2024-04-28 RX ADMIN — CEFTRIAXONE 100 MILLIGRAM(S): 500 INJECTION, POWDER, FOR SOLUTION INTRAMUSCULAR; INTRAVENOUS at 18:59

## 2024-04-28 RX ADMIN — Medication 1000 MILLIGRAM(S): at 20:13

## 2024-04-28 RX ADMIN — SODIUM CHLORIDE 500 MILLILITER(S): 9 INJECTION INTRAMUSCULAR; INTRAVENOUS; SUBCUTANEOUS at 12:30

## 2024-04-28 RX ADMIN — SODIUM CHLORIDE 500 MILLILITER(S): 9 INJECTION INTRAMUSCULAR; INTRAVENOUS; SUBCUTANEOUS at 21:05

## 2024-04-28 RX ADMIN — SODIUM CHLORIDE 500 MILLILITER(S): 9 INJECTION INTRAMUSCULAR; INTRAVENOUS; SUBCUTANEOUS at 19:37

## 2024-04-28 RX ADMIN — Medication 5 MILLIGRAM(S): at 17:03

## 2024-04-28 RX ADMIN — Medication 1000 MILLIGRAM(S): at 16:59

## 2024-04-28 RX ADMIN — Medication 400 MILLIGRAM(S): at 12:30

## 2024-04-28 RX ADMIN — Medication 100 MILLIGRAM(S): at 21:04

## 2024-04-28 RX ADMIN — Medication 5 MILLIGRAM(S): at 19:37

## 2024-04-28 RX ADMIN — SODIUM CHLORIDE 500 MILLILITER(S): 9 INJECTION INTRAMUSCULAR; INTRAVENOUS; SUBCUTANEOUS at 13:51

## 2024-04-28 NOTE — H&P ADULT - PROBLEM SELECTOR PLAN 5
h/o cad c/b mi s/p pci w stents  no clinft of acs  ekg shows afib w rvr; otherwise no st seg - t wave changes suggestive of ischemia  trop 70->68, likely demand iso afib rvr  imaging shows cardiomegaly  prior tte shows "lv cavity size wnl, lv wall thickness wnl, lv systolic function wnl, lvef ~50-55%. no rmwa. Unable to assess lv diastolic function due to insufficient data. Analysis of lv diastolic function and filling pressure is made challenging by the presence of atrial fibrillation."  follow up tte, bnp, tsh, lipid profile, a1c  monitor for chest pain, telemetry/ekg changes  cont home plavix, statin, bb  cards consult in am

## 2024-04-28 NOTE — H&P ADULT - PROBLEM SELECTOR PLAN 3
h/o [place], oa s/p l tkr + l hip orif  trauma work up shows r periorbital/mid+right frontal extra calvarial soft tissue swelling/hematoma  neuroimaging shows "Bilateral high lateral convexity low-density subdural collections measuring up to 1.4 cm in greatest depth on the right and 1.0 cm on the left...These may represent hygromas."  suspect 2/2 uti, afib rvr  follow up tsh, folate, b12, rpr; blood cultures  Monitor mental status with frequent neurochecks  maintain fall + frac, seizure, aspiration precautions; keep head end of bed elevated  delirium precautions (eg Minimize invasive lines + devices; avoid restraints; hearing aids if possible; maintain adequate hydration; promote normal circadian rhythm; prevent environmental isolation)  nutrition consult  dysphagia screen/slp eval  pt/ot eval + sw/cm consult for disposition

## 2024-04-28 NOTE — H&P ADULT - HISTORY OF PRESENT ILLNESS
79yo 73kg f, smoker, w pmh [place], htn, hld, afib, cad c/b mi s/p pci w stents, copd, little, urinary incontinence, hypothyroidism, oa s/p l tkr + l hip orif, and w recent hospitalization 2/16-3/9 for ischemic bowel s/p ex-lap w bowel resection + end ileostomy, 4/1-4/3 for drainage from incision site 2/2 hematoma in the laparotomy wound s/p conservative mgmt, p/w fall and lethargy; in er, found to be in afib rvr w ua suggestive of uti; admit to medicine for further mgmt

## 2024-04-28 NOTE — ED PROVIDER NOTE - PROGRESS NOTE DETAILS
D/W Dr. Murphy [phone] at [facility] - he saw pt 2-3 days ago and pt was more alert.  pt has AMS.  Sent to ER for CT.

## 2024-04-28 NOTE — H&P ADULT - PROBLEM SELECTOR PLAN 1
h/o urinary incontinence  prior urine culture from recent hospitalization grew pan sensitive klebsiella   afebrile, no leukocytosis; borderline low bp  Ua with nitrites, bacteria + pyuria with leukocyte esterase;   s/p rocephin in er  follow up urine cultures; renal/bladder us; freq bladder scan  monitor for fever, changes in white count  cont empirical ceftriaxone; adjust according to final c+s  antipyretics, antiemetics, analgesics as needed  encourage po intake; judicious ivf + lytes as needed in the mean time

## 2024-04-28 NOTE — H&P ADULT - NSHPPHYSICALEXAM_GEN_ALL_CORE
T(C): 36.6 (04-28-24 @ 22:35), Max: 36.9 (04-28-24 @ 12:05)  HR: 89 (04-28-24 @ 22:35) (89 - 157)  BP: 105/73 (04-28-24 @ 22:35) (76/44 - 109/79)  RR: 18 (04-28-24 @ 22:35) (12 - 20)  SpO2: 95% (04-28-24 @ 22:35) (92% - 100%)  GENERAL: NAD, lying in bed   EYES: EOMI, PERRLA; conjunctiva and sclera clear  ENMT: Moist oral mucosa, no pharyngeal injection or exudates   NECK: Supple, no palpable masses; no JVD  RESPIRATORY: Normal respiratory effort; lungs are clear to auscultation bilaterally  CARDIOVASCULAR: irregularly irregular rate and rhythm, normal S1 and S2, no murmur/rub/gallop; No lower extremity edema; Peripheral pulses are 2+ bilaterally  ABDOMEN: Nontender to palpation, normoactive bowel sounds, no rebound/guarding; rlq ostomy in place  MUSCULOSKELETAL:  no joint swelling or tenderness to palpation  PSYCH: A+O x1; affect appropriate  NEUROLOGY: CN 2-12 are intact and symmetric; no gross motor or sensory deficits   SKIN: No rashes; no palpable lesions T(C): 36.6 (04-28-24 @ 22:35), Max: 36.9 (04-28-24 @ 12:05)  HR: 89 (04-28-24 @ 22:35) (89 - 157)  BP: 105/73 (04-28-24 @ 22:35) (76/44 - 109/79)  RR: 18 (04-28-24 @ 22:35) (12 - 20)  SpO2: 95% (04-28-24 @ 22:35) (92% - 100%)  GENERAL: NAD, lying in bed   EYES: EOMI, PERRLA; conjunctiva and sclera clear; r periorbital swelling  HENMT: Moist oral mucosa, no pharyngeal injection or exudates; r frontal forehead swelling  NECK: Supple, no palpable masses; no JVD  RESPIRATORY: Normal respiratory effort; lungs are clear to auscultation bilaterally  CARDIOVASCULAR: irregularly irregular rate and rhythm, normal S1 and S2, no murmur/rub/gallop; No lower extremity edema; Peripheral pulses are 2+ bilaterally  ABDOMEN: Nontender to palpation, normoactive bowel sounds, no rebound/guarding; rlq ostomy in place  MUSCULOSKELETAL:  no joint swelling or tenderness to palpation  PSYCH: A+O x1; affect appropriate  NEUROLOGY: CN 2-12 are intact and symmetric; no gross motor or sensory deficits   SKIN: No rashes; no palpable lesions

## 2024-04-28 NOTE — H&P ADULT - PROBLEM SELECTOR PLAN 2
previous recent hospital course complicated by multiple episodes of afib rvr, requiring lopressor ivp + digoxin ivp  ekg showing afib rvr w hr ~127/min  chadsvasc ~5  s/p lopressor ivp 5 x2 + lopressor po 25 x1 in er  follow up tte, tsh  monitor on telemetry  cont home eliquis  lopressor 100 tid => lopressor 25 bid for now in lieu of borderline bp  ep consult in am previous recent hospital course complicated by multiple episodes of afib rvr, requiring lopressor ivp + digoxin ivp + amiodarone load + diltiazem ivp  ekg showing afib rvr w hr ~127/min  chadsvasc ~5  s/p lopressor ivp 5 x2 + lopressor po 25 x1 in er  follow up tte, tsh  monitor on telemetry  cont home eliquis  lopressor 100 tid => lopressor 25 bid for now in lieu of borderline bp + prn ivp for sustained hr >120; slowly titrate po lopressor back to home dose as bp tolerates  ep consult in am

## 2024-04-28 NOTE — ED ADULT NURSE NOTE - OBJECTIVE STATEMENT
Pt 78v year old female, A/O x1 (baseline as per ems, identifies name). Pt came in via EMS from [facility] due to unwitnessed fall. PMH- MI, AFIB, heart dz, hypothyroidism, COPD. Medications- Eliquis and Plavix. As per EMS, pt had fall at 0500. Pt told [facility] staff she fell out of bed and went back to bed herself. Upon assessment, pt has severe ecchymosis to right eye and right side of forehead. Swelling to right eye. Skin- multiple bruises of different stages of healing on upper and lower extremities. No neck or spine tenderness. Pt has full ROM. No signs of obvious distress. Ileostomy bag leaking upon arrival- changed by ED RN.

## 2024-04-28 NOTE — ED CLERICAL - NS ED CLERK NOTE PRE-ARRIVAL INFORMATION; ADDITIONAL PRE-ARRIVAL INFORMATION
CC/Reason For referral: Pt. from [facility], fell today. Needs urgent CT of head.   Preferred Consultant(if applicable):  Who admits for you (if needed):  Do you have documents you would like to fax over?  Would you still like to speak to an ED attending? Yes

## 2024-04-28 NOTE — ED ADULT NURSE NOTE - NS TRANSFER PATIENT BELONGINGS
Patient report to Neeta Pineda., RN at [facility] 
 Clothing

## 2024-04-28 NOTE — H&P ADULT - PROBLEM SELECTOR PLAN 4
recent hospitalization 2/16-3/9 for ischemic bowel s/p ex-lap w bowel resection + end ileostomy, 4/1-4/3 for drainage from incision site 2/2 hematoma in the laparotomy wound s/p conservative mgmt  imaging currently showing "Continued decrease in size of right lower quadrant extraperitoneal imaged/Decreased size of the fluid collection along the midline abdomen surgical scar, measuring up to 3.7 cm, previously 4.5 cm.....Stable postsurgical changes in the midline abdominal wall."  serial abdominal exams, serial abdominal imaging as needed,  adat; ivf + lytes as needed in the mean time  supportive care with pain control, antiemetics, antipyretics, probiotics  ostomy care in am  xgs consult in am

## 2024-04-28 NOTE — ED ADULT NURSE REASSESSMENT NOTE - NS ED NURSE REASSESS COMMENT FT1
Straight cath performed with two RN at the bedside. 300 cc yellow urine exited. Sterility maintained.
Pt resting, daughter at bedside. Safety and comfort maintained.

## 2024-04-28 NOTE — ED ADULT NURSE NOTE - NSFALLRISKINTERV_ED_ALL_ED
Assistance OOB with selected safe patient handling equipment if applicable/Assistance with ambulation/Communicate fall risk and risk factors to all staff, patient, and family/Monitor gait and stability/Monitor for mental status changes and reorient to person, place, and time, as needed/Provide visual cue: yellow wristband, yellow gown, etc/Reinforce activity limits and safety measures with patient and family/Toileting schedule using arm’s reach rule for commode and bathroom/Use of alarms - bed, stretcher, chair and/or video monitoring/Call bell, personal items and telephone in reach/Instruct patient to call for assistance before getting out of bed/chair/stretcher/Non-slip footwear applied when patient is off stretcher/[place] to call system/Physically safe environment - no spills, clutter or unnecessary equipment/Purposeful Proactive Rounding/Room/bathroom lighting operational, light cord in reach

## 2024-04-28 NOTE — ED PROVIDER NOTE - CLINICAL SUMMARY MEDICAL DECISION MAKING FREE TEXT BOX
Attending note.  Patient was seen in room #36 to the right.  Patient was brought in by EMS from [facility].  Patient had unwitnessed fall this morning sustained injuries to the right orbit and forehead.  Patient is not able to provide any reliable history.  Patient denies any complaints on ROS.  Patient has a past medical history of CROW, CAD with previous MI, hypertension, hypothyroidism, recent bowel resection for mesenteric ischemia.  Patient is taking Eliquis and Plavix.  Additional history from patient's daughter who is present in the ER.  EMS reports unremarkable vital signs.  Patient had a recent syncopal event.  ROS -limited due to possible change in mental status.  Patient unable to give reliable history.     P/E -patient is alert and oriented x 1.  There is a large hematoma in the right forehead and right orbit.  There is no subconjunctival hemorrhage.  Pupils are 2 mm equal and reactive.  Head is otherwise normocephalic and atraumatic.  There is no cervical spine tenderness.  Thoracic and lumbar spines nontender.  Lungs are clear and equal bilaterally.  Heart is irregularly irregular.  There is no chest or rib tenderness.  Abdomen is soft, obese and nontender.  Pelvis and hips are nontender.  Patient has full range of motion of both upper and lower extremities.  There is ecchymosis on the extremities.  Neurologic exam is limited but appears to be grossly intact.      A/P -unwitnessed fall this morning with obvious head trauma.  Patient appears altered.  Will confirm with patient's daughter who is present.  CT head, CT cervical spine, labs, troponin, EKG, chest x-ray, urinalysis, cardiac monitor and reassess.  If patient has change in mental status, patient will be admitted for further evaluation.

## 2024-04-29 LAB
A1C WITH ESTIMATED AVERAGE GLUCOSE RESULT: 5.3 % — SIGNIFICANT CHANGE UP (ref 4–5.6)
ALBUMIN SERPL ELPH-MCNC: 3.1 G/DL — LOW (ref 3.3–5)
ALP SERPL-CCNC: 66 U/L — SIGNIFICANT CHANGE UP (ref 40–120)
ALT FLD-CCNC: 15 U/L — SIGNIFICANT CHANGE UP (ref 10–45)
ANION GAP SERPL CALC-SCNC: 12 MMOL/L — SIGNIFICANT CHANGE UP (ref 5–17)
APTT BLD: 28 SEC — SIGNIFICANT CHANGE UP (ref 24.5–35.6)
AST SERPL-CCNC: 19 U/L — SIGNIFICANT CHANGE UP (ref 10–40)
BASOPHILS # BLD AUTO: 0.05 K/UL — SIGNIFICANT CHANGE UP (ref 0–0.2)
BASOPHILS NFR BLD AUTO: 0.6 % — SIGNIFICANT CHANGE UP (ref 0–2)
BILIRUB SERPL-MCNC: 0.5 MG/DL — SIGNIFICANT CHANGE UP (ref 0.2–1.2)
BUN SERPL-MCNC: 16 MG/DL — SIGNIFICANT CHANGE UP (ref 7–23)
CALCIUM SERPL-MCNC: 8.5 MG/DL — SIGNIFICANT CHANGE UP (ref 8.4–10.5)
CHLORIDE SERPL-SCNC: 112 MMOL/L — HIGH (ref 96–108)
CHOLEST SERPL-MCNC: 79 MG/DL — SIGNIFICANT CHANGE UP
CO2 SERPL-SCNC: 14 MMOL/L — LOW (ref 22–31)
CREAT SERPL-MCNC: 1.07 MG/DL — SIGNIFICANT CHANGE UP (ref 0.5–1.3)
D DIMER BLD IA.RAPID-MCNC: 505 NG/ML DDU — HIGH
EGFR: 54 ML/MIN/1.73M2 — LOW
EOSINOPHIL # BLD AUTO: 0.18 K/UL — SIGNIFICANT CHANGE UP (ref 0–0.5)
EOSINOPHIL NFR BLD AUTO: 2.3 % — SIGNIFICANT CHANGE UP (ref 0–6)
ESTIMATED AVERAGE GLUCOSE: 105 MG/DL — SIGNIFICANT CHANGE UP (ref 68–114)
FERRITIN SERPL-MCNC: 325 NG/ML — SIGNIFICANT CHANGE UP (ref 13–330)
FOLATE SERPL-MCNC: 9 NG/ML — SIGNIFICANT CHANGE UP
GLUCOSE SERPL-MCNC: 68 MG/DL — LOW (ref 70–99)
HAPTOGLOB SERPL-MCNC: 211 MG/DL — HIGH (ref 34–200)
HCT VFR BLD CALC: 32.9 % — LOW (ref 34.5–45)
HDLC SERPL-MCNC: 34 MG/DL — LOW
HGB BLD-MCNC: 10 G/DL — LOW (ref 11.5–15.5)
IMM GRANULOCYTES NFR BLD AUTO: 0.6 % — SIGNIFICANT CHANGE UP (ref 0–0.9)
INR BLD: 1.81 RATIO — HIGH (ref 0.85–1.18)
IRON SATN MFR SERPL: 17 % — SIGNIFICANT CHANGE UP (ref 14–50)
IRON SATN MFR SERPL: 48 UG/DL — SIGNIFICANT CHANGE UP (ref 30–160)
LDH SERPL L TO P-CCNC: 296 U/L — HIGH (ref 50–242)
LIPID PNL WITH DIRECT LDL SERPL: 31 MG/DL — SIGNIFICANT CHANGE UP
LYMPHOCYTES # BLD AUTO: 1.44 K/UL — SIGNIFICANT CHANGE UP (ref 1–3.3)
LYMPHOCYTES # BLD AUTO: 18.3 % — SIGNIFICANT CHANGE UP (ref 13–44)
MAGNESIUM SERPL-MCNC: 1.3 MG/DL — LOW (ref 1.6–2.6)
MCHC RBC-ENTMCNC: 30.4 GM/DL — LOW (ref 32–36)
MCHC RBC-ENTMCNC: 31.7 PG — SIGNIFICANT CHANGE UP (ref 27–34)
MCV RBC AUTO: 104.4 FL — HIGH (ref 80–100)
MONOCYTES # BLD AUTO: 0.8 K/UL — SIGNIFICANT CHANGE UP (ref 0–0.9)
MONOCYTES NFR BLD AUTO: 10.2 % — SIGNIFICANT CHANGE UP (ref 2–14)
NEUTROPHILS # BLD AUTO: 5.34 K/UL — SIGNIFICANT CHANGE UP (ref 1.8–7.4)
NEUTROPHILS NFR BLD AUTO: 68 % — SIGNIFICANT CHANGE UP (ref 43–77)
NON HDL CHOLESTEROL: 44 MG/DL — SIGNIFICANT CHANGE UP
NRBC # BLD: 0 /100 WBCS — SIGNIFICANT CHANGE UP (ref 0–0)
NT-PROBNP SERPL-SCNC: 5434 PG/ML — HIGH (ref 0–300)
PHOSPHATE SERPL-MCNC: 3.1 MG/DL — SIGNIFICANT CHANGE UP (ref 2.5–4.5)
PLATELET # BLD AUTO: 198 K/UL — SIGNIFICANT CHANGE UP (ref 150–400)
POTASSIUM SERPL-MCNC: 4.8 MMOL/L — SIGNIFICANT CHANGE UP (ref 3.5–5.3)
POTASSIUM SERPL-SCNC: 4.8 MMOL/L — SIGNIFICANT CHANGE UP (ref 3.5–5.3)
PROCALCITONIN SERPL-MCNC: 0.07 NG/ML — SIGNIFICANT CHANGE UP (ref 0.02–0.1)
PROT SERPL-MCNC: 5.8 G/DL — LOW (ref 6–8.3)
PROTHROM AB SERPL-ACNC: 18.7 SEC — HIGH (ref 9.5–13)
RBC # BLD: 3.15 M/UL — LOW (ref 3.8–5.2)
RBC # BLD: 3.15 M/UL — LOW (ref 3.8–5.2)
RBC # FLD: 17.8 % — HIGH (ref 10.3–14.5)
RETICS #: 102.1 K/UL — SIGNIFICANT CHANGE UP (ref 25–125)
RETICS/RBC NFR: 3.2 % — HIGH (ref 0.5–2.5)
SODIUM SERPL-SCNC: 138 MMOL/L — SIGNIFICANT CHANGE UP (ref 135–145)
TIBC SERPL-MCNC: 281 UG/DL — SIGNIFICANT CHANGE UP (ref 220–430)
TRIGL SERPL-MCNC: 56 MG/DL — SIGNIFICANT CHANGE UP
TSH SERPL-MCNC: 0.22 UIU/ML — LOW (ref 0.27–4.2)
TSH SERPL-MCNC: 0.3 UIU/ML — SIGNIFICANT CHANGE UP (ref 0.27–4.2)
UIBC SERPL-MCNC: 233 UG/DL — SIGNIFICANT CHANGE UP (ref 110–370)
VIT B12 SERPL-MCNC: 407 PG/ML — SIGNIFICANT CHANGE UP (ref 232–1245)
WBC # BLD: 7.86 K/UL — SIGNIFICANT CHANGE UP (ref 3.8–10.5)
WBC # FLD AUTO: 7.86 K/UL — SIGNIFICANT CHANGE UP (ref 3.8–10.5)

## 2024-04-29 PROCEDURE — 76770 US EXAM ABDO BACK WALL COMP: CPT | Mod: 26

## 2024-04-29 RX ORDER — CHLORHEXIDINE GLUCONATE 213 G/1000ML
1 SOLUTION TOPICAL DAILY
Refills: 0 | Status: DISCONTINUED | OUTPATIENT
Start: 2024-04-29 | End: 2024-05-06

## 2024-04-29 RX ORDER — NICOTINE POLACRILEX 2 MG
1 GUM BUCCAL DAILY
Refills: 0 | Status: DISCONTINUED | OUTPATIENT
Start: 2024-04-29 | End: 2024-05-06

## 2024-04-29 RX ORDER — METOPROLOL TARTRATE 50 MG
5 TABLET ORAL EVERY 6 HOURS
Refills: 0 | Status: DISCONTINUED | OUTPATIENT
Start: 2024-04-28 | End: 2024-05-06

## 2024-04-29 RX ORDER — METOPROLOL TARTRATE 50 MG
50 TABLET ORAL
Refills: 0 | Status: DISCONTINUED | OUTPATIENT
Start: 2024-04-29 | End: 2024-04-30

## 2024-04-29 RX ORDER — MAGNESIUM SULFATE 500 MG/ML
2 VIAL (ML) INJECTION ONCE
Refills: 0 | Status: COMPLETED | OUTPATIENT
Start: 2024-04-29 | End: 2024-04-29

## 2024-04-29 RX ADMIN — ATORVASTATIN CALCIUM 80 MILLIGRAM(S): 80 TABLET, FILM COATED ORAL at 21:07

## 2024-04-29 RX ADMIN — CHLORHEXIDINE GLUCONATE 1 APPLICATION(S): 213 SOLUTION TOPICAL at 12:12

## 2024-04-29 RX ADMIN — Medication 25 MILLIGRAM(S): at 05:27

## 2024-04-29 RX ADMIN — LIDOCAINE 1 PATCH: 4 CREAM TOPICAL at 18:27

## 2024-04-29 RX ADMIN — Medication 5 MILLIGRAM(S): at 00:56

## 2024-04-29 RX ADMIN — Medication 5 MILLIGRAM(S): at 18:05

## 2024-04-29 RX ADMIN — Medication 25 MILLIGRAM(S): at 18:06

## 2024-04-29 RX ADMIN — LIDOCAINE 1 PATCH: 4 CREAM TOPICAL at 12:16

## 2024-04-29 RX ADMIN — SENNA PLUS 2 TABLET(S): 8.6 TABLET ORAL at 21:07

## 2024-04-29 RX ADMIN — APIXABAN 5 MILLIGRAM(S): 2.5 TABLET, FILM COATED ORAL at 18:06

## 2024-04-29 RX ADMIN — Medication 175 MICROGRAM(S): at 05:27

## 2024-04-29 RX ADMIN — CEFTRIAXONE 100 MILLIGRAM(S): 500 INJECTION, POWDER, FOR SOLUTION INTRAMUSCULAR; INTRAVENOUS at 18:21

## 2024-04-29 RX ADMIN — Medication 1 PATCH: at 18:27

## 2024-04-29 RX ADMIN — Medication 1 PATCH: at 12:14

## 2024-04-29 RX ADMIN — Medication 25 GRAM(S): at 19:13

## 2024-04-29 RX ADMIN — APIXABAN 5 MILLIGRAM(S): 2.5 TABLET, FILM COATED ORAL at 05:27

## 2024-04-29 RX ADMIN — CLOPIDOGREL BISULFATE 75 MILLIGRAM(S): 75 TABLET, FILM COATED ORAL at 12:12

## 2024-04-29 NOTE — PHYSICAL THERAPY INITIAL EVALUATION ADULT - TRANSFER SAFETY CONCERNS NOTED: SIT/STAND, REHAB EVAL
"Subjective   History of Present Illness  History of Present Illness    Chief complaint: Patient feels \"lousy\", she feels woozy    Location: Home    Quality/Severity:  Patient complains of dizziness, no chest pain or shortness of breath    Timing/Onset/Duration: Noticed last night.    Modifying Factors: Nothing seems to make it better or worse    Associated Symptoms: The patient denies any headache.  No fever or chills.  The patient did get sweaty this morning.  No cough sore throat earache or nasal congestion.  No chest pain or shortness of breath.  No abdominal pain.  No numbness, tingling, weakness, or change in bladder or bowel function.  The patient had nausea and vomiting 4-5 times on Friday with diarrhea.  The diarrhea was nonbloody.  The emesis was nonbloody and nonbilious.    Narrative: This 78-year-old white female presents stating that she \"feels lousy\".  He states that she feels \"woozy\".  She had these symptoms this morning.  She woke and was hot and sweaty.  On Friday she vomited 4-5 times.  The emesis was nonbloody and nonbilious..  She had 4-5 episodes of nonbloody diarrhea.  The patient denies any chest pain or shortness breath.  No abdominal pain.  No burning when she urinates.  No numbness, tingling, weakness, or change in bladder or bowel function.  No difficulty swallowing or speaking.  The patient denies tinnitus.  He has never had this before.  There is been no new meds or dosage changes.    PCP:  Konstantin Del Valle      Review of Systems   Constitutional: Positive for diaphoresis. Negative for chills and fever.   HENT: Negative for congestion, ear pain and sore throat.    Eyes: Negative for pain, discharge and visual disturbance.   Respiratory: Negative for cough, chest tightness and shortness of breath.    Cardiovascular: Negative for chest pain, palpitations and leg swelling.   Gastrointestinal: Positive for diarrhea, nausea and vomiting. Negative for abdominal pain, blood in stool and " "constipation.   Endocrine: Negative for polydipsia, polyphagia and polyuria.   Genitourinary: Negative for dysuria, pelvic pain and vaginal bleeding.   Musculoskeletal: Negative for back pain.   Skin: Negative for color change, pallor, rash and wound.   Neurological: Positive for dizziness. Negative for facial asymmetry, speech difficulty, weakness, numbness and headaches.   Hematological: Negative for adenopathy.   Psychiatric/Behavioral: Negative for confusion.        Medication List      ASK your doctor about these medications          amLODIPine 10 MG tablet   Commonly known as:  NORVASC       CloNIDine 0.1 MG tablet   Commonly known as:  CATAPRES       valsartan 160 MG tablet   Commonly known as:  DIOVAN           Past Medical History:   Diagnosis Date   • Hypertension        Allergies   Allergen Reactions   • Penicillins Other (See Comments)     \"Makes me numb\"   • Sulfa Antibiotics Unknown (See Comments)     \"it's been years ago and I don't remember\"       No past surgical history on file.    No family history on file.    Social History     Social History   • Marital status:      Spouse name: N/A   • Number of children: N/A   • Years of education: N/A     Social History Main Topics   • Smoking status: Never Smoker   • Smokeless tobacco: Never Used   • Alcohol use No   • Drug use: No   • Sexual activity: Defer     Other Topics Concern   • Not on file     Social History Narrative   • No narrative on file           Objective   Physical Exam   Constitutional: She is oriented to person, place, and time. She appears well-developed and well-nourished. No distress.   ED Triage Vitals:  Temp: 97.7 °F (36.5 °C) (02/13/18 0805)  Heart Rate: 87 (02/13/18 0805)  Resp: 16 (02/13/18 0805)  BP: 212/92 (02/13/18 0805)  SpO2: 96 % (02/13/18 0805)  Temp src: Oral (02/13/18 0805)  Heart Rate Source: n/a  Patient Position: Lying (02/13/18 0825)  BP Location: Right arm (02/13/18 0805)  FiO2 (%): n/a    The patient's vitals " were reviewed by me.  Unless otherwise noted they are within normal limits.  The patient is hypertensive with a blood pressure 212/92.     HENT:   Head: Normocephalic and atraumatic.   Right Ear: External ear normal.   Left Ear: External ear normal.   Nose: Nose normal.   Mouth/Throat: Oropharynx is clear and moist.   Eyes: Conjunctivae and EOM are normal. Pupils are equal, round, and reactive to light. Right eye exhibits no discharge. Left eye exhibits no discharge. No scleral icterus.   Neck: Normal range of motion. Neck supple. No JVD present. No tracheal deviation present. No thyromegaly present.   Cardiovascular: Normal rate, regular rhythm and intact distal pulses.  Exam reveals no gallop and no friction rub.    Murmur (there is a 2/6 systolic ejection murmur heard loudest at the right upper sternal border.) heard.  Pulmonary/Chest: Effort normal and breath sounds normal. No stridor. No respiratory distress. She has no wheezes. She has no rales. She exhibits no tenderness.   Abdominal: Soft. Bowel sounds are normal. She exhibits no distension and no mass. There is no tenderness. There is no rebound and no guarding. No hernia.   Musculoskeletal: Normal range of motion. She exhibits no edema or deformity.   Lymphadenopathy:     She has no cervical adenopathy.   Neurological: She is alert and oriented to person, place, and time.   Skin: Skin is warm and dry. No rash noted. She is not diaphoretic. No erythema. No pallor.   Psychiatric: Her behavior is normal.   Nursing note and vitals reviewed.      Procedures         ED Course  ED Course   Comment By Time   The laboratory values were reviewed by me.  The glucose is 103.  The ALT is 65.  The AST is 57.  These are mildly elevated.  Urinalysis shows 0-2 RBCs, 6-12 WBCs, 1+ bacteria, 3-6 squamous epithelial cells, leukocyte esterase small.  The count is 93,000.  The laboratory values are otherwise unremarkable. Eliecer Marcelo MD 02/13 0959   8:29 AM,  02/13/18:  The EKG was obtained at 804.  EKG was read by me at 805.  EKG shows a normal sinus rhythm with rate of 82.  There is a left axis deviation.  There is left ventricular hypertrophy.,  QRS, and QT intervals are unremarkable.  There are anterior Q waves possibly due to LVH.  [id number].Thereisnoectopy.ThereisnoacuteSTelevationordepression.     10:04 AM, 02/13/18:  The patient was reassessed.  She feels better.  Her blood pressures improved.  Her vital signs were reviewed and are stable.  Neurological exam: Conscious alert and oriented ×4 with no focal deficits noted.  Abdominal exam: Soft nontender no masses positive bowel sounds.    10:12 AM, 02/13/18:  The orthostatics were negative.        10:09 AM, 02/13/18:  Patient's diagnosis of dizziness with hypertension was discussed with her.  Her blood pressure has improved after taking her morning medications.  The patient should follow-up with Dr. Del Valle within one week.  The patient should return if there is increasing dizziness, headache, chest pain, shortness of breath, abdominal pain, numbness, tingling, weakness, change in bladder or bowel function, worse in any way at all.  She does have evidence of urinary tract infection.  We will treat her with Ceftin for this.  All the patient's questions were answered the patient will be discharged in good condition.          MDM  XR Chest 1 View    (Results Pending)   CT Head Without Contrast    (Results Pending)     Labs Reviewed   COMPREHENSIVE METABOLIC PANEL   URINALYSIS W/ CULTURE IF INDICATED   TROPONIN (IN-HOUSE)   CBC WITH AUTO DIFFERENTIAL   CBC AND DIFFERENTIAL    Narrative:     The following orders were created for panel order CBC & Differential.  Procedure                               Abnormality         Status                     ---------                               -----------         ------                     CBC Auto Differential[[id number]]                                                           Please view results for these tests on the individual orders.     No results found.    Final diagnoses:   None         ED Medications:  Medications - No data to display    New Medications:     Medication List      ASK your doctor about these medications          amLODIPine 10 MG tablet   Commonly known as:  NORVASC       CloNIDine 0.1 MG tablet   Commonly known as:  CATAPRES       valsartan 160 MG tablet   Commonly known as:  DIOVAN           Stopped Medications:     Medication List      ASK your doctor about these medications          amLODIPine 10 MG tablet   Commonly known as:  NORVASC       CloNIDine 0.1 MG tablet   Commonly known as:  CATAPRES       valsartan 160 MG tablet   Commonly known as:  DIOVAN             Final diagnoses:   Dizzy   Essential hypertension   Acute UTI            Eliecer Marcelo MD  02/13/18 4084     decreased balance during turns/losing balance/decreased weight-shifting ability

## 2024-04-29 NOTE — PHYSICAL THERAPY INITIAL EVALUATION ADULT - ADDITIONAL COMMENTS
PER CHART/ CM note 4/29 (pt unable to provide): pt lives in private home with daughter, there are 4 entry stairs only to negotiate. Pre-hospitalziation, and stay a [facility], patient was independent in ADLs, owned and ambulated with a cane.

## 2024-04-29 NOTE — OCCUPATIONAL THERAPY INITIAL EVALUATION ADULT - PERTINENT HX OF CURRENT PROBLEM, REHAB EVAL
77yo 73kg f, smoker, w pmh [place], htn, hld, afib, cad c/b mi s/p pci w stents, copd, little, urinary incontinence, hypothyroidism, oa s/p l tkr + l hip orif, and w recent hospitalization 2/16-3/9 for ischemic bowel s/p ex-lap w bowel resection + end ileostomy, 4/1-4/3 for drainage from incision site 2/2 hematoma in the laparotomy wound s/p conservative mgmt, p/w fall and lethargy; in er, found to be in afib rvr w ua suggestive of uti; admit to medicine for further mgmt    CT brain 4/28:No acute intracranial hemorrhage, brain edema, or mass effect.No displaced calvarial fracture.Right periorbital and mid and right frontal extracalvarial soft tissue swelling/hematoma.  CT C spine (-) CT Abd/Pelvis/Chest (-) XR Pelvis (-)

## 2024-04-29 NOTE — PROGRESS NOTE ADULT - ASSESSMENT
77yo 73kg f, smoker, w pmh [place], htn, hld, afib, cad c/b mi s/p pci w stents, copd, little, urinary incontinence, hypothyroidism, oa s/p l tkr + l hip orif, and w recent hospitalization 2/16-3/9 for ischemic bowel s/p ex-lap w bowel resection + end ileostomy, 4/1-4/3 for drainage from incision site 2/2 hematoma in the laparotomy wound s/p conservative mgmt, p/w fall and lethargy; in er, found to be in afib rvr w ua suggestive of uti; admit to medicine for further mgmt      # Acute UTI.   ·  Plan: h/o urinary incontinence  started on IV abx   f/u urine c/s     # Atrial fibrillation with RVR.   now rate is controlled   she is on eliquis   consulted cardio dr. yip     # Encephalopathy / likely worsening dementia   pt. is at [facility] rehab , daughter is bedside   as per her she sees her mom is getting more confused and forgetful   -we d/w her regarding aricept / namenda , she is open for that   will start aricept 5 mg q HS on d/c     # s/p Mech fall :  -likely 2/2 UTI or afib with RVR   fall precaution   no fracture on imaging studies     # Ileostomy in place.   supportive care with pain control, antiemetics, antipyretics, probiotics  ostomy care in am    # CAD (coronary artery disease).   - h/o cad c/b mi s/p pci w stents  no clinft of acs  Dr. yip consulted     # HTN (hypertension).   ·  Plan: lopressor 100 tid => lopressor 25 bid for now.    # HLD (hyperlipidemia).   ·  Plan: crestor => lipitor.    # COPD (chronic obstructive pulmonary disease).   ·  Plan: h/o smoking, copd, little  stable     # Hypothyroidism.   ·  Plan: synthroid.    dispo: d/w daughter bedside in length regarding advance directive, worsening confusion and falls.   d/w her regarding advance directive. remains full code for now.

## 2024-04-29 NOTE — OCCUPATIONAL THERAPY INITIAL EVALUATION ADULT - LIVES WITH, PROFILE
Pt a poor historian, admitted from subacute rehab Pt a poor historian, admitted from subacute rehab, prior living with christine thrasher in a house

## 2024-04-29 NOTE — OCCUPATIONAL THERAPY INITIAL EVALUATION ADULT - DIAGNOSIS, OT EVAL
Patient presents with decreased cognition, balance, strength, endurance impacting ability to perform ADLs and functional mobility

## 2024-04-29 NOTE — CONSULT NOTE ADULT - SUBJECTIVE AND OBJECTIVE BOX
Patient is a 78y old  Female who presents with a chief complaint of fall and lethargy (29 Apr 2024 12:26)      HPI:  79yo 73kg f, smoker, w pmh [place], htn, hld, afib, cad c/b mi s/p pci w stents, copd, little, urinary incontinence, hypothyroidism, oa s/p l tkr + l hip orif, and w recent hospitalization 2/16-3/9 for ischemic bowel s/p ex-lap w bowel resection + end ileostomy, 4/1-4/3 for drainage from incision site 2/2 hematoma in the laparotomy wound s/p conservative mgmt, p/w fall and lethargy; in er, found to be in afib rvr w ua suggestive of uti; admit to medicine for further mgmt (28 Apr 2024 22:37)      PAST MEDICAL & SURGICAL HISTORY:  Hypertension      Hypothyroid      Osteoarthritis  knees, back      CAD (coronary artery disease)      LITTLE (obstructive sleep apnea)  non complaint on CPAP      History of MI (myocardial infarction)  h/o previous MI in 2004 prompted PTCA  with stenting x 2 vessels   last stress/ echo 2019      Heart murmur  dx in childhood      Bilateral hearing loss, unspecified hearing loss type  bilateral aids      Obesity      Mixed stress and urge urinary incontinence      Overactive bladder      S/P ORIF (open reduction internal fixation) fracture  left hip 1962      S/P appendectomy  30 plus years      S/P knee replacement  left 2000      Stented coronary artery  2004 X 2 STENTS      S/P laparotomy  due to adhesions, 30 years ago      H/O dilation and curettage  2/2019 Benign polyp          MEDICATIONS  (STANDING):  apixaban 5 milliGRAM(s) Oral every 12 hours  atorvastatin 80 milliGRAM(s) Oral at bedtime  cefTRIAXone   IVPB 1000 milliGRAM(s) IV Intermittent every 24 hours  chlorhexidine 2% Cloths 1 Application(s) Topical daily  clopidogrel Tablet 75 milliGRAM(s) Oral daily  levothyroxine 175 MICROGram(s) Oral daily  lidocaine   4% Patch 1 Patch Transdermal daily  metoprolol tartrate 25 milliGRAM(s) Oral two times a day  naloxone Injectable 0.4 milliGRAM(s) IV Push once  nicotine -  14 mG/24Hr(s) Patch 1 Patch Transdermal daily  polyethylene glycol 3350 17 Gram(s) Oral daily  senna 2 Tablet(s) Oral at bedtime  sodium chloride 0.9%. 1000 milliLiter(s) (75 mL/Hr) IV Continuous <Continuous>    MEDICATIONS  (PRN):  acetaminophen     Tablet .. 650 milliGRAM(s) Oral every 6 hours PRN Temp greater or equal to 38C (100.4F), Mild Pain (1 - 3)  aluminum hydroxide/magnesium hydroxide/simethicone Suspension 30 milliLiter(s) Oral every 4 hours PRN Dyspepsia  bisacodyl 5 milliGRAM(s) Oral daily PRN Constipation  melatonin 3 milliGRAM(s) Oral at bedtime PRN Insomnia  metoprolol tartrate Injectable 5 milliGRAM(s) IV Push every 6 hours PRN for sustained afib rvr >120  morphine  - Injectable 2 milliGRAM(s) IV Push every 4 hours PRN Moderate Pain (4 - 6)  morphine  - Injectable 4 milliGRAM(s) IV Push every 4 hours PRN Severe Pain (7 - 10)  ondansetron Injectable 4 milliGRAM(s) IV Push every 8 hours PRN Nausea and/or Vomiting      Allergies    penicillin (Hives)    Intolerances        VITALS:    Vital Signs Last 24 Hrs  T(C): 36.5 (29 Apr 2024 10:49), Max: 36.9 (28 Apr 2024 17:00)  T(F): 97.7 (29 Apr 2024 10:49), Max: 98.4 (28 Apr 2024 17:00)  HR: 95 (29 Apr 2024 12:20) (80 - 157)  BP: 102/65 (29 Apr 2024 12:20) (86/76 - 128/84)  BP(mean): 84 (28 Apr 2024 22:35) (67 - 84)  RR: 18 (29 Apr 2024 10:49) (12 - 18)  SpO2: 99% (29 Apr 2024 10:49) (95% - 100%)    Parameters below as of 29 Apr 2024 10:49  Patient On (Oxygen Delivery Method): room air        LABS:                          10.0   7.86  )-----------( 198      ( 29 Apr 2024 06:51 )             32.9       04-29    138  |  112<H>  |  16  ----------------------------<  68<L>  4.8   |  14<L>  |  1.07    Ca    8.5      29 Apr 2024 06:44  Phos  3.1     04-29  Mg     1.3     04-29    TPro  5.8<L>  /  Alb  3.1<L>  /  TBili  0.5  /  DBili  x   /  AST  19  /  ALT  15  /  AlkPhos  66  04-29      CAPILLARY BLOOD GLUCOSE          PT/INR - ( 29 Apr 2024 06:44 )   PT: 18.7 sec;   INR: 1.81 ratio         PTT - ( 29 Apr 2024 06:44 )  PTT:28.0 sec    LOWER EXTREMITY PHYSICAL EXAM:    Vascular: DP/PT 1/4, B/L, CFT <3 seconds B/L, Temperature gradient wnl, B/L.   Neuro: Epicritic sensation unable to assess to the level of feet b/L  Musculoskeletal/Ortho/skin: No signs of ulceration. No edema, erythema or drainage.   No deep tissue injuries. No pain on palpation or with range of motion both feet.   No signs of cellulitis or infection both feet. No podiatric care needed. Reconsult if any signs of DTI/Ulcerations.

## 2024-04-29 NOTE — PHYSICAL THERAPY INITIAL EVALUATION ADULT - NSPTDISCHREC_GEN_A_CORE
If DC to home, pt would require home PT to inc strength and balance; assist rec for all mobility./Sub-acute Rehab

## 2024-04-29 NOTE — OCCUPATIONAL THERAPY INITIAL EVALUATION ADULT - HOME MANAGEMENT SKILLS, PREVIOUS LEVEL OF FUNCTION, OT EVAL
Group Topic:  Group Psychotherapy    Date: 2023  Start Time:  2:00 PM  End Time:  3:00 PM  Facilitators: Derrick Akins LCSW; Cornel Ramirez LCSW    Focus: Relapse prevention:   Preventing Relapse 5: Developing and Implementing Healthy Coping Skills to Built a Positive Self-Image (i.e. helping others and self-empowerment)   Number in attendance: 13    Pt verified name, , current location.  Pt gave verbal consent for telehealth treatment today, Derrick Akins LCSW witnessed this.  Pt denies any changes or issues with medications.     The pt was an active participant in group.  Pt shared experiences and benefits of learning about relapse.  Pt was observed today nodding head and following the group discussion and hand-out.  The pt appeared receptive to the group discussion as evidenced by nodding head and demonstrating good eye contact.  The pt was able to answer questions when prompted by the group facilitator.  Services provided via remote technology.  60 minutes spent with the pt using this technology.       Method: Group  Attendance: Present  Participation: Moderate  Patient Response: Appropriate feedback and Attentive  Mood: Anxious and Depressed  Affect: Type: Anxious and Depressed   Range: Blunted/flat   Congruency: Congruent   Stability: Stable  Behavior/Socialization: Cooperative  Thought Process: Focused  Task Performance: Follows directions  Additional Information:  Psychosocial Stressors: Health  Symptom Notations: anxious, depressed, engaged.   Patient Evaluation: Encouragement - needs prompts       independent

## 2024-04-29 NOTE — PHYSICAL THERAPY INITIAL EVALUATION ADULT - PERTINENT HX OF CURRENT PROBLEM, REHAB EVAL
77yo f, smoker, w pmh [place], htn, hld, afib, cad c/b mi s/p pci w stents, copd, little, urinary incontinence, hypothyroidism, oa s/p l tkr + l hip orif, and w recent hospitalization 2/16-3/9 for ischemic bowel s/p ex-lap w bowel resection + end ileostomy, 4/1-4/3 for drainage from incision site 2/2 hematoma in the laparotomy wound s/p conservative mgmt. Pt now p/w fall and lethargy from [facility]; in er, found to be in afib rvr with UA suggestive of UTI. CT brain 4/28: No acute intracranial hemorrhage, brain edema, or mass effect. No displaced calvarial fracture. Right periorbital and mid and right frontal extracalvarial soft tissue swelling/hematoma. CT cervical spine 4/28: No acute fracture or traumatic subluxation. No prevertebral soft tissue swelling. CT Abd 4/28: neg. XRay Pelvis 4/28: neg. XRay Chest 4/28: Clear lungs. No displaced rib fractures. No pneumothorax.

## 2024-04-29 NOTE — PATIENT PROFILE ADULT - FALL HARM RISK - HARM RISK INTERVENTIONS
Assistance with ambulation/Assistance OOB with selected safe patient handling equipment/Communicate Risk of Fall with Harm to all staff/Discuss with provider need for PT consult/Monitor gait and stability/Provide patient with walking aids - walker, cane, crutches/Reinforce activity limits and safety measures with patient and family/Tailored Fall Risk Interventions/Use of alarms - bed, chair and/or voice tab/Visual Cue: Yellow wristband and red socks/Bed in lowest position, wheels locked, appropriate side rails in place/Call bell, personal items and telephone in reach/Instruct patient to call for assistance before getting out of bed or chair/Non-slip footwear when patient is out of bed/[place] to call system/Physically safe environment - no spills, clutter or unnecessary equipment/Purposeful Proactive Rounding/Room/bathroom lighting operational, light cord in reach

## 2024-04-30 LAB
ANION GAP SERPL CALC-SCNC: 13 MMOL/L — SIGNIFICANT CHANGE UP (ref 5–17)
BUN SERPL-MCNC: 13 MG/DL — SIGNIFICANT CHANGE UP (ref 7–23)
CALCIUM SERPL-MCNC: 8.5 MG/DL — SIGNIFICANT CHANGE UP (ref 8.4–10.5)
CHLORIDE SERPL-SCNC: 109 MMOL/L — HIGH (ref 96–108)
CO2 SERPL-SCNC: 17 MMOL/L — LOW (ref 22–31)
CREAT SERPL-MCNC: 1.02 MG/DL — SIGNIFICANT CHANGE UP (ref 0.5–1.3)
EGFR: 57 ML/MIN/1.73M2 — LOW
GLUCOSE SERPL-MCNC: 74 MG/DL — SIGNIFICANT CHANGE UP (ref 70–99)
MAGNESIUM SERPL-MCNC: 1.7 MG/DL — SIGNIFICANT CHANGE UP (ref 1.6–2.6)
MRSA PCR RESULT.: SIGNIFICANT CHANGE UP
POTASSIUM SERPL-MCNC: 4.1 MMOL/L — SIGNIFICANT CHANGE UP (ref 3.5–5.3)
POTASSIUM SERPL-SCNC: 4.1 MMOL/L — SIGNIFICANT CHANGE UP (ref 3.5–5.3)
S AUREUS DNA NOSE QL NAA+PROBE: SIGNIFICANT CHANGE UP
SODIUM SERPL-SCNC: 139 MMOL/L — SIGNIFICANT CHANGE UP (ref 135–145)

## 2024-04-30 RX ORDER — METOPROLOL TARTRATE 50 MG
5 TABLET ORAL ONCE
Refills: 0 | Status: DISCONTINUED | OUTPATIENT
Start: 2024-04-30 | End: 2024-04-30

## 2024-04-30 RX ORDER — METOPROLOL TARTRATE 50 MG
75 TABLET ORAL
Refills: 0 | Status: DISCONTINUED | OUTPATIENT
Start: 2024-04-30 | End: 2024-05-01

## 2024-04-30 RX ORDER — MIDODRINE HYDROCHLORIDE 2.5 MG/1
5 TABLET ORAL THREE TIMES A DAY
Refills: 0 | Status: DISCONTINUED | OUTPATIENT
Start: 2024-04-30 | End: 2024-05-06

## 2024-04-30 RX ORDER — MIDODRINE HYDROCHLORIDE 2.5 MG/1
10 TABLET ORAL ONCE
Refills: 0 | Status: COMPLETED | OUTPATIENT
Start: 2024-04-30 | End: 2024-04-30

## 2024-04-30 RX ORDER — ASCORBIC ACID 60 MG
500 TABLET,CHEWABLE ORAL DAILY
Refills: 0 | Status: DISCONTINUED | OUTPATIENT
Start: 2024-04-30 | End: 2024-05-06

## 2024-04-30 RX ADMIN — LIDOCAINE 1 PATCH: 4 CREAM TOPICAL at 23:30

## 2024-04-30 RX ADMIN — Medication 175 MICROGRAM(S): at 05:07

## 2024-04-30 RX ADMIN — CHLORHEXIDINE GLUCONATE 1 APPLICATION(S): 213 SOLUTION TOPICAL at 11:31

## 2024-04-30 RX ADMIN — SENNA PLUS 2 TABLET(S): 8.6 TABLET ORAL at 21:31

## 2024-04-30 RX ADMIN — LIDOCAINE 1 PATCH: 4 CREAM TOPICAL at 19:30

## 2024-04-30 RX ADMIN — Medication 1 PATCH: at 11:29

## 2024-04-30 RX ADMIN — Medication 5 MILLIGRAM(S): at 07:57

## 2024-04-30 RX ADMIN — Medication 50 MILLIGRAM(S): at 04:17

## 2024-04-30 RX ADMIN — CLOPIDOGREL BISULFATE 75 MILLIGRAM(S): 75 TABLET, FILM COATED ORAL at 11:32

## 2024-04-30 RX ADMIN — Medication 75 MILLIGRAM(S): at 16:44

## 2024-04-30 RX ADMIN — Medication 1 PATCH: at 19:30

## 2024-04-30 RX ADMIN — Medication 1 PATCH: at 12:00

## 2024-04-30 RX ADMIN — APIXABAN 5 MILLIGRAM(S): 2.5 TABLET, FILM COATED ORAL at 16:44

## 2024-04-30 RX ADMIN — MIDODRINE HYDROCHLORIDE 10 MILLIGRAM(S): 2.5 TABLET ORAL at 13:01

## 2024-04-30 RX ADMIN — Medication 1 PATCH: at 06:44

## 2024-04-30 RX ADMIN — Medication 650 MILLIGRAM(S): at 16:45

## 2024-04-30 RX ADMIN — MIDODRINE HYDROCHLORIDE 5 MILLIGRAM(S): 2.5 TABLET ORAL at 16:45

## 2024-04-30 RX ADMIN — Medication 1 TABLET(S): at 16:44

## 2024-04-30 RX ADMIN — LIDOCAINE 1 PATCH: 4 CREAM TOPICAL at 00:41

## 2024-04-30 RX ADMIN — APIXABAN 5 MILLIGRAM(S): 2.5 TABLET, FILM COATED ORAL at 05:07

## 2024-04-30 RX ADMIN — Medication 5 MILLIGRAM(S): at 00:09

## 2024-04-30 RX ADMIN — CEFTRIAXONE 100 MILLIGRAM(S): 500 INJECTION, POWDER, FOR SOLUTION INTRAMUSCULAR; INTRAVENOUS at 16:45

## 2024-04-30 RX ADMIN — ATORVASTATIN CALCIUM 80 MILLIGRAM(S): 80 TABLET, FILM COATED ORAL at 21:31

## 2024-04-30 RX ADMIN — LIDOCAINE 1 PATCH: 4 CREAM TOPICAL at 11:30

## 2024-04-30 RX ADMIN — Medication 500 MILLIGRAM(S): at 16:44

## 2024-04-30 RX ADMIN — Medication 650 MILLIGRAM(S): at 17:45

## 2024-04-30 NOTE — DIETITIAN INITIAL EVALUATION ADULT - NSFNSGIASSESSMENTFT_GEN_A_CORE
Pt denies N/V. Per RN, pt with 200-250+ mL of ostomy output this AM, noting output was fairly liquid. Bowel regimen includes: miralax, senna, dulcolax prn  - Ordered for zofran and aluminum hydroxide/mg hydroxide/simethicone suspension

## 2024-04-30 NOTE — DIETITIAN INITIAL EVALUATION ADULT - PERTINENT MEDS FT
MEDICATIONS  (STANDING):  apixaban 5 milliGRAM(s) Oral every 12 hours  atorvastatin 80 milliGRAM(s) Oral at bedtime  cefTRIAXone   IVPB 1000 milliGRAM(s) IV Intermittent every 24 hours  chlorhexidine 2% Cloths 1 Application(s) Topical daily  clopidogrel Tablet 75 milliGRAM(s) Oral daily  levothyroxine 175 MICROGram(s) Oral daily  lidocaine   4% Patch 1 Patch Transdermal daily  metoprolol tartrate 50 milliGRAM(s) Oral two times a day  naloxone Injectable 0.4 milliGRAM(s) IV Push once  nicotine -  14 mG/24Hr(s) Patch 1 Patch Transdermal daily  polyethylene glycol 3350 17 Gram(s) Oral daily  senna 2 Tablet(s) Oral at bedtime  sodium chloride 0.9%. 1000 milliLiter(s) (75 mL/Hr) IV Continuous <Continuous>    MEDICATIONS  (PRN):  acetaminophen     Tablet .. 650 milliGRAM(s) Oral every 6 hours PRN Temp greater or equal to 38C (100.4F), Mild Pain (1 - 3)  aluminum hydroxide/magnesium hydroxide/simethicone Suspension 30 milliLiter(s) Oral every 4 hours PRN Dyspepsia  bisacodyl 5 milliGRAM(s) Oral daily PRN Constipation  melatonin 3 milliGRAM(s) Oral at bedtime PRN Insomnia  metoprolol tartrate Injectable 5 milliGRAM(s) IV Push every 6 hours PRN for sustained afib rvr >120  morphine  - Injectable 2 milliGRAM(s) IV Push every 4 hours PRN Moderate Pain (4 - 6)  morphine  - Injectable 4 milliGRAM(s) IV Push every 4 hours PRN Severe Pain (7 - 10)  ondansetron Injectable 4 milliGRAM(s) IV Push every 8 hours PRN Nausea and/or Vomiting

## 2024-04-30 NOTE — DIETITIAN INITIAL EVALUATION ADULT - PROBLEM SELECTOR PLAN 2
previous recent hospital course complicated by multiple episodes of afib rvr, requiring lopressor ivp + digoxin ivp + amiodarone load + diltiazem ivp  ekg showing afib rvr w hr ~127/min  chadsvasc ~5  s/p lopressor ivp 5 x2 + lopressor po 25 x1 in er  follow up tte, tsh  monitor on telemetry  cont home eliquis  lopressor 100 tid => lopressor 25 bid for now in lieu of borderline bp + prn ivp for sustained hr >120; slowly titrate po lopressor back to home dose as bp tolerates  ep consult in am

## 2024-04-30 NOTE — SWALLOW BEDSIDE ASSESSMENT ADULT - SWALLOW EVAL: DIAGNOSIS
79yo female, smoker, p/w fall and lethargy; ua suggestive of uti. Pt presents with a functional oropharyngeal swallow mechanism. Mildly prolonged mastication of solids due to edentulous state, however, pt able to completely clear the bolus from the oral cavity. No overt signs or symptoms of penetration or aspiration across hard solids and thin liquids.

## 2024-04-30 NOTE — DIETITIAN INITIAL EVALUATION ADULT - ORAL INTAKE PTA/DIET HISTORY
PTA per pt  -Intake: good typical intake, eating 3 meals/day at home, does not follow any therapeutic diets at home   -Chewing/Swallowing: denies hx of difficulty, per swallow evaluation 4/30 pt with dentures at home, though endorses ability to tolerate regular textures without dentures  -Allergies/Intolerances: pt unsure if any food allergies, thinks maybe she has one though unable to name it, NFKA per chart  -Vitamins/Supplements: reporting multivitamin and vitamin D, noting there are 1-2 more but unable to name

## 2024-04-30 NOTE — DIETITIAN INITIAL EVALUATION ADULT - OTHER CALCULATIONS
Calculations based on upper IBW in setting of BMI > 30 with consideration for malnutrition, multiple pressure injuries   - Fluid needs deferred to team

## 2024-04-30 NOTE — DIETITIAN INITIAL EVALUATION ADULT - REASON INDICATOR FOR ASSESSMENT
Consult for nutrition assessment/education, pressure injury of 2 or >  Sources: Medical Record, RN, past RD notes, patient - A&Ox2-3 per chart, pt with some forgetfulness though able to complete interview  Chart reviewed, events noted.

## 2024-04-30 NOTE — DIETITIAN INITIAL EVALUATION ADULT - PERTINENT LABORATORY DATA
04-30    139  |  109<H>  |  13  ----------------------------<  74  4.1   |  17<L>  |  1.02    Ca    8.5      30 Apr 2024 07:14  Phos  3.1     04-29  Mg     1.7     04-30    TPro  5.8<L>  /  Alb  3.1<L>  /  TBili  0.5  /  DBili  x   /  AST  19  /  ALT  15  /  AlkPhos  66  04-29  A1C with Estimated Average Glucose Result: 5.3 % (04-29-24 @ 06:51)

## 2024-04-30 NOTE — DIETITIAN INITIAL EVALUATION ADULT - OTHER INFO
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

## 2024-04-30 NOTE — DIETITIAN INITIAL EVALUATION ADULT - PROBLEM SELECTOR PLAN 3
h/o [place], oa s/p l tkr + l hip orif  trauma work up shows r periorbital/mid+right frontal extra calvarial soft tissue swelling/hematoma  neuroimaging shows "Bilateral high lateral convexity low-density subdural collections measuring up to 1.4 cm in greatest depth on the right and 1.0 cm on the left...These may represent hygromas."  suspect 2/2 uti, afib rvr  follow up tsh, folate, b12, rpr; blood cultures  Monitor mental status with frequent neurochecks  maintain fall + frac, seizure, aspiration precautions; keep head end of bed elevated  delirium precautions (eg Minimize invasive lines + devices; avoid restraints; hearing aids if possible; maintain adequate hydration; promote normal circadian rhythm; prevent environmental isolation)  nutrition consult  dysphagia screen/slp eval  pt/ot eval + sw/cm consult for disposition

## 2024-04-30 NOTE — DIETITIAN INITIAL EVALUATION ADULT - ENERGY INTAKE
Per RN, pt with good intake in-house, completed breakfast tray this AM. Pt amenable to receiving high protein gelatin to optimize protein intake./Adequate (%)

## 2024-04-30 NOTE — DIETITIAN INITIAL EVALUATION ADULT - NSFNSGIIOFT_GEN_A_CORE
I&O's Detail    29 Apr 2024 07:01  -  30 Apr 2024 07:00  --------------------------------------------------------  IN:    Oral Fluid: 220 mL  Total IN: 220 mL    OUT:    Voided (mL): 200 mL  Total OUT: 200 mL    Total NET: 20 mL      30 Apr 2024 07:01  -  30 Apr 2024 11:00  --------------------------------------------------------  IN:    Oral Fluid: 350 mL  Total IN: 350 mL    OUT:  Total OUT: 0 mL    Total NET: 350 mL

## 2024-04-30 NOTE — SWALLOW BEDSIDE ASSESSMENT ADULT - SLP PERTINENT HISTORY OF CURRENT PROBLEM
79yo 73kg f, smoker, w pmh [place], htn, hld, afib, cad c/b mi s/p pci w stents, copd, little, urinary incontinence, hypothyroidism, oa s/p l tkr + l hip orif, and w recent hospitalization 2/16-3/9 for ischemic bowel s/p ex-lap w bowel resection + end ileostomy, 4/1-4/3 for drainage from incision site 2/2 hematoma in the laparotomy wound s/p conservative mgmt, p/w fall and lethargy; in er, found to be in afib rvr w ua suggestive of uti; admit to medicine for further mgmt

## 2024-04-30 NOTE — DIETITIAN INITIAL EVALUATION ADULT - PERSON TAUGHT/METHOD
Provided education on meeting adequate protein-energy needs, emphasized the importance of adequate calorie and protein intake to aid in healing proceses. Encouraged prioritizing protein foods and consuming adequate amounts of protein at each meal for preservation of lean muscle mass. Obtained food preferences, will honor as able. Made aware RD remains available./verbal instruction/patient instructed

## 2024-04-30 NOTE — CHART NOTE - NSCHARTNOTEFT_GEN_A_CORE
MEDICINE PA NOTE    Notified by RN patient's HR sustaining 130-150s (up to 170s), asymptomatic. Currently on Lopressor 50mg bid, received Lopressor 5mg IV overnight and again this AM.   BP soft- 101/77. Per cardiology, will add midodrine to give BP more room, and will then increase Lopressor to 75mg bid.   Continue to monitor on tele. MEDICINE PA NOTE    Notified by RN patient's HR sustaining 130-150s (up to 170s), asymptomatic. Currently on Lopressor 50mg bid, received Lopressor 5mg IV overnight and again this AM.   BP soft- 101/77. Per cardiology, will add midodrine to give BP more room, and will then increase Lopressor to 75mg bid; will also obtain EP consult for event monitoring.   Continue to monitor on tele.

## 2024-04-30 NOTE — DIETITIAN INITIAL EVALUATION ADULT - PROBLEM SELECTOR PLAN 8
h/o smoking, copd, little  in no respiratory distress w adequate spo2 at room air  in mild respiratory distress with adequate spo2 on 2 LPM via NC  ct iimaging reviewed; showing emphysema, rul ggo ~2.5 cm + l adrenal mass ~4 cm  follow up d-dimer, procalcitonin  Monitor SpO2, RR, for signs of respiratory distress; Goal SpO2 >88-92%, PaO2 >55-60 mmHg   bronchodilators + oxygen supplementation as needed to maintain goal  aggressive pulmonary toilet/hygiene    aspiration precautions with head end of bed elevated  symptomatic relief with antitussives, mucolytics, antipyretics as needed  pulm + medonc consult in am

## 2024-04-30 NOTE — DIETITIAN INITIAL EVALUATION ADULT - NS FNS REASON FOR WEIGHT CHANG
Pt believes CBW ~192 pounds, endorses she had some wt loss over the past few months in setting of recent hospitalizations.     Current dosing wt 160 pounds (4/28, ?accuracy)  Daily wt hx per chart in pounds: 184.5 (4/30), 190.6 (4/29)  Wt hx per [place] HIE in pounds: 174 (4/1, ?accuracy, outlier), 198 (2/22), 200 (2/15, 1/11)    Pt noted with ~7% wt loss x 2 months, clinically significant. RD to continue to monitor wt as able   IBW: 120 pounds/altered GI function (specify)

## 2024-04-30 NOTE — SWALLOW BEDSIDE ASSESSMENT ADULT - COMMENTS
+Acute UTI; started on IV abx. +Encephalopathy/likely worsening dementia; as per daughter, she sees her mom is getting more confused and forgetful; will start aricept 5 mg q HS on d/c. +Ileostomy in place; ostomy care    4/28 CXR - Clear lungs. No displaced rib fractures. No pneumothorax.  4/28 CT Brain - No acute intracranial hemorrhage, brain edema, or mass effect. No displaced calvarial fracture. Right periorbital and mid and right frontal extracalvarial soft tissue swelling/hematoma.     Pt unknown to this service

## 2024-04-30 NOTE — CONSULT NOTE ADULT - SUBJECTIVE AND OBJECTIVE BOX
CARDIOLOGY CONSULT - Dr. Juarez  Date of Service: 4/30/2024      HPI:  77yo 73kg f, smoker, w pmh [place], htn, hld, afib, cad c/b mi s/p pci w stents, copd, little, urinary incontinence, hypothyroidism, oa s/p l tkr + l hip orif, and w recent hospitalization 2/16-3/9 for ischemic bowel s/p ex-lap w bowel resection + end ileostomy, 4/1-4/3 for drainage from incision site 2/2 hematoma in the laparotomy wound s/p conservative mgmt, p/w fall and lethargy; in er, found to be in afib rvr w ua suggestive of uti; admit to medicine for further mgmt (28 Apr 2024 22:37)      PAST MEDICAL & SURGICAL HISTORY:  Hypertension      Hypothyroid      Osteoarthritis  knees, back      CAD (coronary artery disease)      LITTLE (obstructive sleep apnea)  non complaint on CPAP      History of MI (myocardial infarction)  h/o previous MI in 2004 prompted PTCA  with stenting x 2 vessels   last stress/ echo 2019      Heart murmur  dx in childhood      Bilateral hearing loss, unspecified hearing loss type  bilateral aids      Obesity      Mixed stress and urge urinary incontinence      Overactive bladder      S/P ORIF (open reduction internal fixation) fracture  left hip 1962      S/P appendectomy  30 plus years      S/P knee replacement  left 2000      Stented coronary artery  2004 X 2 STENTS      S/P laparotomy  due to adhesions, 30 years ago      H/O dilation and curettage  2/2019 Benign polyp              PREVIOUS DIAGNOSTIC TESTING:    [ ] Echocardiogram:  [ ]  Catheterization:  [ ] Stress Test:  	    MEDICATIONS:  MEDICATIONS  (STANDING):  apixaban 5 milliGRAM(s) Oral every 12 hours  atorvastatin 80 milliGRAM(s) Oral at bedtime  cefTRIAXone   IVPB 1000 milliGRAM(s) IV Intermittent every 24 hours  chlorhexidine 2% Cloths 1 Application(s) Topical daily  clopidogrel Tablet 75 milliGRAM(s) Oral daily  levothyroxine 175 MICROGram(s) Oral daily  lidocaine   4% Patch 1 Patch Transdermal daily  metoprolol tartrate 50 milliGRAM(s) Oral two times a day  naloxone Injectable 0.4 milliGRAM(s) IV Push once  nicotine -  14 mG/24Hr(s) Patch 1 Patch Transdermal daily  polyethylene glycol 3350 17 Gram(s) Oral daily  senna 2 Tablet(s) Oral at bedtime  sodium chloride 0.9%. 1000 milliLiter(s) (75 mL/Hr) IV Continuous <Continuous>      FAMILY HISTORY:      SOCIAL HISTORY:    [ ] Non-smoker  [ ] Smoker  [ ] Alcohol    Allergies    penicillin (Hives)    Intolerances    	    REVIEW OF SYSTEMS:  CONSTITUTIONAL: No fever, weight loss, or fatigue  EYES: No eye pain, visual disturbances, or discharge  ENMT:  No difficulty hearing, tinnitus, vertigo; No sinus or throat pain  NECK: No pain or stiffness  RESPIRATORY: No cough, wheezing, chills or hemoptysis; No Shortness of Breath  CARDIOVASCULAR: No chest pain, palpitations, passing out, dizziness, or leg swelling  GASTROINTESTINAL: No abdominal or epigastric pain. No nausea, vomiting, or hematemesis; No diarrhea or constipation. No melena or hematochezia.  GENITOURINARY: No dysuria, frequency, hematuria, or incontinence  NEUROLOGICAL: No headaches, memory loss, loss of strength, numbness, or tremors  SKIN: No itching, burning, rashes, or lesions   	    [ ] All others negative	  [ ] Unable to obtain    PHYSICAL EXAM:  T(C): 36.6 (04-30-24 @ 11:29), Max: 37.1 (04-30-24 @ 04:42)  HR: 100 (04-30-24 @ 11:29) (92 - 150)  BP: 101/77 (04-30-24 @ 11:29) (101/77 - 119/79)  RR: 18 (04-30-24 @ 11:29) (17 - 18)  SpO2: 100% (04-30-24 @ 11:29) (98% - 100%)  Wt(kg): --  I&O's Summary    29 Apr 2024 07:01  -  30 Apr 2024 07:00  --------------------------------------------------------  IN: 220 mL / OUT: 200 mL / NET: 20 mL    30 Apr 2024 07:01  -  30 Apr 2024 11:56  --------------------------------------------------------  IN: 350 mL / OUT: 0 mL / NET: 350 mL        Appearance: Normal	  Psychiatry: A & O x 3, Mood & affect appropriate  HEENT:   Normal oral mucosa, PERRL, EOMI	  Lymphatic: No lymphadenopathy  Cardiovascular: Normal S1 S2,RRR, No JVD, No murmurs  Respiratory: Lungs clear to auscultation	  Gastrointestinal:  Soft, Non-tender, + BS	  Skin: No rashes, No ecchymoses, No cyanosis	  Neurologic: Non-focal  Extremities: Normal range of motion, No clubbing, cyanosis or edema  Vascular: Peripheral pulses palpable 2+ bilaterally    TELEMETRY: 	    ECG:  	  RADIOLOGY:  OTHER: 	  	  LABS:	 	    CARDIAC MARKERS:                                  10.0   7.86  )-----------( 198      ( 29 Apr 2024 06:51 )             32.9     04-30    139  |  109<H>  |  13  ----------------------------<  74  4.1   |  17<L>  |  1.02    Ca    8.5      30 Apr 2024 07:14  Phos  3.1     04-29  Mg     1.7     04-30    TPro  5.8<L>  /  Alb  3.1<L>  /  TBili  0.5  /  DBili  x   /  AST  19  /  ALT  15  /  AlkPhos  66  04-29    PT/INR - ( 29 Apr 2024 06:44 )   PT: 18.7 sec;   INR: 1.81 ratio         PTT - ( 29 Apr 2024 06:44 )  PTT:28.0 sec  proBNP:   Lipid Profile:   HgA1c:   TSH:     ASSESSMENT/PLAN: 	              70 minutes spent on total encounter; more than 50% of the visit was spent counseling and/or coordinating care by the attending physician.

## 2024-04-30 NOTE — PROGRESS NOTE ADULT - ASSESSMENT
77yo 73kg f, smoker, w pmh [place], htn, hld, afib, cad c/b mi s/p pci w stents, copd, little, urinary incontinence, hypothyroidism, oa s/p l tkr + l hip orif, and w recent hospitalization 2/16-3/9 for ischemic bowel s/p ex-lap w bowel resection + end ileostomy, 4/1-4/3 for drainage from incision site 2/2 hematoma in the laparotomy wound s/p conservative mgmt, p/w fall and lethargy; in er, found to be in afib rvr w ua suggestive of uti; admit to medicine for further mgmt      # Acute UTI.   urine c/s growing Kliebsiella   f/u sensitivity  c/w Iv rocephine     # Atrial fibrillation with RVR.   rate not controlled   increase lopressor to 75 mg bid   cardio consulted : see by Dr. Juarez   EP consult Dr. Hart called     # Encephalopathy / likely worsening dementia   pt. is at [facility] rehab , daughter is bedside   as per her she sees her mom is getting more confused and forgetful   -we d/w her regarding aricept / namenda , she is open for that   will start aricept 5 mg q HS on d/c     # s/p Mech fall :  -likely 2/2 UTI or afib with RVR   fall precaution   no fracture on imaging studies     # Ileostomy in place.   supportive care with pain control, antiemetics, antipyretics, probiotics  ostomy care in am    # CAD (coronary artery disease).   - h/o cad c/b mi s/p pci w stents  no clinft of acs  Dr. yip consulted     # HTN (hypertension).   ·  Plan: lopressor 100 tid => lopressor 25 bid for now.    # HLD (hyperlipidemia).   ·  Plan: crestor => lipitor.    # COPD (chronic obstructive pulmonary disease).   ·  Plan: h/o smoking, copd, little  stable     # Hypothyroidism.   ·  Plan: synthroid.    dispo: EP consult Dr. Hart

## 2024-04-30 NOTE — SWALLOW BEDSIDE ASSESSMENT ADULT - ADDITIONAL RECOMMENDATIONS
Monitor for s/s aspiration/laryngeal penetration. If noted:  D/C p.o. intake, provide non-oral nutrition/hydration/meds, and contact this service @ [phone]  Maintain good oral hygiene  LTG: Pt will tolerate the least restrictive diet without overt signs or symptoms of penetration or aspiration

## 2024-04-30 NOTE — DIETITIAN INITIAL EVALUATION ADULT - ADD RECOMMEND
1) Malnutrition sticker placed in chart  2) Monitor nutritional intake, diet tolerance, labs, hydration, GI function, skin integrity and wt trends

## 2024-04-30 NOTE — SWALLOW BEDSIDE ASSESSMENT ADULT - SLP GENERAL OBSERVATIONS
Pt encountered bedside, AA&Ox2-3 (oriented to date, not year). Pt pleasant and cooperative, follows simple commands and answers simple yes/no questions. Pt reports that her u/l dentures are at home purposefully so she does not lose them in the hospital. She also reports the ability to manage a regular diet without dentures in place.

## 2024-05-01 LAB
-  AMOXICILLIN/CLAVULANIC ACID: SIGNIFICANT CHANGE UP
-  AMPICILLIN/SULBACTAM: SIGNIFICANT CHANGE UP
-  AMPICILLIN: SIGNIFICANT CHANGE UP
-  AZTREONAM: SIGNIFICANT CHANGE UP
-  CEFAZOLIN: SIGNIFICANT CHANGE UP
-  CEFEPIME: SIGNIFICANT CHANGE UP
-  CEFOXITIN: SIGNIFICANT CHANGE UP
-  CEFTRIAXONE: SIGNIFICANT CHANGE UP
-  CEFUROXIME: SIGNIFICANT CHANGE UP
-  CIPROFLOXACIN: SIGNIFICANT CHANGE UP
-  ERTAPENEM: SIGNIFICANT CHANGE UP
-  GENTAMICIN: SIGNIFICANT CHANGE UP
-  IMIPENEM: SIGNIFICANT CHANGE UP
-  LEVOFLOXACIN: SIGNIFICANT CHANGE UP
-  MEROPENEM: SIGNIFICANT CHANGE UP
-  NITROFURANTOIN: SIGNIFICANT CHANGE UP
-  PIPERACILLIN/TAZOBACTAM: SIGNIFICANT CHANGE UP
-  TOBRAMYCIN: SIGNIFICANT CHANGE UP
-  TRIMETHOPRIM/SULFAMETHOXAZOLE: SIGNIFICANT CHANGE UP
ANION GAP SERPL CALC-SCNC: 12 MMOL/L — SIGNIFICANT CHANGE UP (ref 5–17)
BUN SERPL-MCNC: 13 MG/DL — SIGNIFICANT CHANGE UP (ref 7–23)
CALCIUM SERPL-MCNC: 8.6 MG/DL — SIGNIFICANT CHANGE UP (ref 8.4–10.5)
CHLORIDE SERPL-SCNC: 109 MMOL/L — HIGH (ref 96–108)
CO2 SERPL-SCNC: 15 MMOL/L — LOW (ref 22–31)
CREAT SERPL-MCNC: 1.04 MG/DL — SIGNIFICANT CHANGE UP (ref 0.5–1.3)
CULTURE RESULTS: ABNORMAL
EGFR: 55 ML/MIN/1.73M2 — LOW
GLUCOSE SERPL-MCNC: 79 MG/DL — SIGNIFICANT CHANGE UP (ref 70–99)
HCT VFR BLD CALC: 33.9 % — LOW (ref 34.5–45)
HGB BLD-MCNC: 10.3 G/DL — LOW (ref 11.5–15.5)
MCHC RBC-ENTMCNC: 30.4 GM/DL — LOW (ref 32–36)
MCHC RBC-ENTMCNC: 31.5 PG — SIGNIFICANT CHANGE UP (ref 27–34)
MCV RBC AUTO: 103.7 FL — HIGH (ref 80–100)
METHOD TYPE: SIGNIFICANT CHANGE UP
NRBC # BLD: 0 /100 WBCS — SIGNIFICANT CHANGE UP (ref 0–0)
ORGANISM # SPEC MICROSCOPIC CNT: ABNORMAL
ORGANISM # SPEC MICROSCOPIC CNT: ABNORMAL
PLATELET # BLD AUTO: 186 K/UL — SIGNIFICANT CHANGE UP (ref 150–400)
POTASSIUM SERPL-MCNC: 4 MMOL/L — SIGNIFICANT CHANGE UP (ref 3.5–5.3)
POTASSIUM SERPL-SCNC: 4 MMOL/L — SIGNIFICANT CHANGE UP (ref 3.5–5.3)
RBC # BLD: 3.27 M/UL — LOW (ref 3.8–5.2)
RBC # FLD: 17.6 % — HIGH (ref 10.3–14.5)
SODIUM SERPL-SCNC: 136 MMOL/L — SIGNIFICANT CHANGE UP (ref 135–145)
SPECIMEN SOURCE: SIGNIFICANT CHANGE UP
WBC # BLD: 7.29 K/UL — SIGNIFICANT CHANGE UP (ref 3.8–10.5)
WBC # FLD AUTO: 7.29 K/UL — SIGNIFICANT CHANGE UP (ref 3.8–10.5)

## 2024-05-01 RX ORDER — DIGOXIN 250 MCG
250 TABLET ORAL EVERY 6 HOURS
Refills: 0 | Status: COMPLETED | OUTPATIENT
Start: 2024-05-01 | End: 2024-05-02

## 2024-05-01 RX ORDER — METOPROLOL TARTRATE 50 MG
100 TABLET ORAL
Refills: 0 | Status: DISCONTINUED | OUTPATIENT
Start: 2024-05-01 | End: 2024-05-06

## 2024-05-01 RX ADMIN — Medication 1 PATCH: at 08:00

## 2024-05-01 RX ADMIN — Medication 1 TABLET(S): at 11:14

## 2024-05-01 RX ADMIN — CLOPIDOGREL BISULFATE 75 MILLIGRAM(S): 75 TABLET, FILM COATED ORAL at 11:11

## 2024-05-01 RX ADMIN — Medication 1 PATCH: at 19:48

## 2024-05-01 RX ADMIN — CHLORHEXIDINE GLUCONATE 1 APPLICATION(S): 213 SOLUTION TOPICAL at 11:14

## 2024-05-01 RX ADMIN — Medication 650 MILLIGRAM(S): at 00:33

## 2024-05-01 RX ADMIN — Medication 1 PATCH: at 11:18

## 2024-05-01 RX ADMIN — Medication 1 PATCH: at 11:10

## 2024-05-01 RX ADMIN — Medication 175 MICROGRAM(S): at 05:07

## 2024-05-01 RX ADMIN — MIDODRINE HYDROCHLORIDE 5 MILLIGRAM(S): 2.5 TABLET ORAL at 17:43

## 2024-05-01 RX ADMIN — MIDODRINE HYDROCHLORIDE 5 MILLIGRAM(S): 2.5 TABLET ORAL at 05:07

## 2024-05-01 RX ADMIN — Medication 500 MILLIGRAM(S): at 11:14

## 2024-05-01 RX ADMIN — ATORVASTATIN CALCIUM 80 MILLIGRAM(S): 80 TABLET, FILM COATED ORAL at 21:35

## 2024-05-01 RX ADMIN — Medication 650 MILLIGRAM(S): at 00:03

## 2024-05-01 RX ADMIN — LIDOCAINE 1 PATCH: 4 CREAM TOPICAL at 11:11

## 2024-05-01 RX ADMIN — LIDOCAINE 1 PATCH: 4 CREAM TOPICAL at 23:00

## 2024-05-01 RX ADMIN — Medication 250 MICROGRAM(S): at 20:18

## 2024-05-01 RX ADMIN — CEFTRIAXONE 100 MILLIGRAM(S): 500 INJECTION, POWDER, FOR SOLUTION INTRAMUSCULAR; INTRAVENOUS at 17:42

## 2024-05-01 RX ADMIN — APIXABAN 5 MILLIGRAM(S): 2.5 TABLET, FILM COATED ORAL at 05:07

## 2024-05-01 RX ADMIN — APIXABAN 5 MILLIGRAM(S): 2.5 TABLET, FILM COATED ORAL at 17:42

## 2024-05-01 RX ADMIN — Medication 100 MILLIGRAM(S): at 17:43

## 2024-05-01 RX ADMIN — LIDOCAINE 1 PATCH: 4 CREAM TOPICAL at 19:49

## 2024-05-01 RX ADMIN — Medication 75 MILLIGRAM(S): at 05:08

## 2024-05-01 RX ADMIN — MIDODRINE HYDROCHLORIDE 5 MILLIGRAM(S): 2.5 TABLET ORAL at 11:11

## 2024-05-01 NOTE — PROVIDER CONTACT NOTE (OTHER) - ACTION/TREATMENT ORDERED:
Provider made aware.
PA aware. Orders to administer IV BB as per orders.
PA/MD aware. Awaiting new orders at this time. Cardiac monitoring ongoing.
Told DEBORAH Benton to recheck BP at 8PM.

## 2024-05-01 NOTE — PROVIDER CONTACT NOTE (OTHER) - ASSESSMENT
Pt alert and oriented x 2. Slept for most of the day with HR in n120's.  Awake in late afternoon and evening with -170's. Asymptomatic. BB dose increased and given as per order.
Pt alert and oriented x 2. VSS. Denies Cp/SOB/Palpitations.
Plz see skin assessment session for detail.
Pt A&Ox1, able to make needs known. Pt asymptomatic. BP elevated. No s/s of bleeding. Pt denies cp, denies SOB, denies pain.

## 2024-05-01 NOTE — PROVIDER CONTACT NOTE (OTHER) - BACKGROUND
DX: UTI
S/P Fall - CTH no bleed - Right periorbital and mid and right frontal extracalvarial soft tissue swelling /hematoma. Acute UTI- Ceftriaxone, Afib/RVR- Lopressor 100 bid, added midodrine for BP support
Pt was admitted for uti.
Pt admitted with  Fall - CTH no bleed - Right periorbital and mid and right frontal transcalvarial soft tissue swelling/hematoma., Acute UTI- Ceftriaxone, Afib/RVR- s/p Metoprolol IV

## 2024-05-01 NOTE — PROGRESS NOTE ADULT - ASSESSMENT
77yo 73kg f, smoker, w pmh [place], htn, hld, afib, cad c/b mi s/p pci w stents, copd, little, urinary incontinence, hypothyroidism, oa s/p l tkr + l hip orif, and w recent hospitalization 2/16-3/9 for ischemic bowel s/p ex-lap w bowel resection + end ileostomy, 4/1-4/3 for drainage from incision site 2/2 hematoma in the laparotomy wound s/p conservative mgmt, p/w fall and lethargy; in er, found to be in afib rvr w ua suggestive of uti;    #Fall possible syncope  -in setting of afib w RVR and UTI  -second episode of syncope this year  -repeat TTE  -monitor on tele. if no events prior to DC, plan for ILR     #Afib w RVR  -bp soft , may add Digoxin  -mido 5mg PO TID  -increase BB as bp allows      #CAD s/p PCI  -stable  -cont plavix  -cont statin    #UTI  -cont IV abx    75 minutes spent on total encounter; more than 50% of the visit was spent counseling and/or coordinating care by the attending physician.

## 2024-05-01 NOTE — PROGRESS NOTE ADULT - ASSESSMENT
79yo 73kg f, smoker, w pmh [place], htn, hld, afib, cad c/b mi s/p pci w stents, copd, little, urinary incontinence, hypothyroidism, oa s/p l tkr + l hip orif, and w recent hospitalization 2/16-3/9 for ischemic bowel s/p ex-lap w bowel resection + end ileostomy, 4/1-4/3 for drainage from incision site 2/2 hematoma in the laparotomy wound s/p conservative mgmt, p/w fall and lethargy; in er, found to be in afib rvr w ua suggestive of uti; admit to medicine for further mgmt      # Acute UTI.   urine c/s growing Kliebsiella   sensitive  c/w Iv rocephine     # Atrial fibrillation with RVR.   rate not controlled   increase lopressor to 75 mg bid   cardio consulted : see by Dr. Juarez   EP consult Dr. Hart called   - Continue metoprolol, at higher dose of 100mg BID.    # Encephalopathy / likely worsening dementia   pt. is at [facility] rehab , daughter is bedside   as per her she sees her mom is getting more confused and forgetful   -we d/w her regarding aricept / namenda , she is open for that   will start aricept 5 mg q HS on d/c     # s/p Mech fall :  -likely 2/2 UTI or afib with RVR   fall precaution   no fracture on imaging studies     # Ileostomy in place.   supportive care with pain control, antiemetics, antipyretics, probiotics  ostomy care in am    # CAD (coronary artery disease).   - h/o cad c/b mi s/p pci w stents  no clinft of acs  Dr. yip consulted     # HTN (hypertension).   ·  Plan: lopressor 100 tid => lopressor 25 bid for now.    # HLD (hyperlipidemia).   ·  Plan: crestor => lipitor.    # COPD (chronic obstructive pulmonary disease).   ·  Plan: h/o smoking, copd, little  stable     # Hypothyroidism.   ·  Plan: synthroid.    dispo: palliative care consult to clarify GOC

## 2024-05-01 NOTE — PROVIDER CONTACT NOTE (OTHER) - RECOMMENDATIONS
Tell night nurse to recheck BP at 8PM.
Provider notified. Wound consult ordered.
PRN IVP lopressor for HR >120
PA notified. Dr. Mendiola at the bedside and will add digoxin.

## 2024-05-01 NOTE — PROVIDER CONTACT NOTE (OTHER) - REASON
B/L heel DTI noted
Pt with Afib RVR with -170's, remains asymptomatic
recheck BP at 8PM
Pt noted to be in Afib RVR with -140's, asymptomatic

## 2024-05-01 NOTE — CONSULT NOTE ADULT - SUBJECTIVE AND OBJECTIVE BOX
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  Reports no angina, and no awareness of irregularity or rapidity of rhythm.  Is taking apixaban 5mg BID for AFib stroke prevention.  States no prior fainting.  A 10 pt ROS is otherwise negative.    PAST MEDICAL & SURGICAL HISTORY:  Hypertension  Hypothyroid  Osteoarthritis  knees, back  CAD (coronary artery disease)  LITTLE (obstructive sleep apnea)  non complaint on CPAP  History of MI (myocardial infarction)  h/o previous MI in 2004 prompted PTCA  with stenting x 2 vessels   last stress/ echo 2019  Heart murmur  dx in childhood  Bilateral hearing loss, unspecified hearing loss type  bilateral aids  Obesity  Mixed stress and urge urinary incontinence  Overactive bladder  S/P ORIF (open reduction internal fixation) fracture  left hip 1962  S/P appendectomy  30 plus years  S/P knee replacement  left 2000  Stented coronary artery  2004 X 2 STENTS  S/P laparotomy  due to adhesions, 30 years ago  H/O dilation and curettage  2/2019 Benign polyp    MEDICATIONS  (STANDING):  apixaban 5 milliGRAM(s) Oral every 12 hours  ascorbic acid 500 milliGRAM(s) Oral daily  atorvastatin 80 milliGRAM(s) Oral at bedtime  cefTRIAXone   IVPB 1000 milliGRAM(s) IV Intermittent every 24 hours  chlorhexidine 2% Cloths 1 Application(s) Topical daily  clopidogrel Tablet 75 milliGRAM(s) Oral daily  levothyroxine 175 MICROGram(s) Oral daily  lidocaine   4% Patch 1 Patch Transdermal daily  metoprolol tartrate 75 milliGRAM(s) Oral two times a day  midodrine. 5 milliGRAM(s) Oral three times a day  multivitamin 1 Tablet(s) Oral daily  naloxone Injectable 0.4 milliGRAM(s) IV Push once  nicotine -  14 mG/24Hr(s) Patch 1 Patch Transdermal daily  polyethylene glycol 3350 17 Gram(s) Oral daily  senna 2 Tablet(s) Oral at bedtime  sodium chloride 0.9%. 1000 milliLiter(s) (75 mL/Hr) IV Continuous <Continuous>      Allergies    penicillin (Hives)    Intolerances    FAMILY HISTORY:    Non-contributary for premature coronary disease or sudden cardiac death    SOCIAL HISTORY:    [ ] Non-smoker  [x ] Smoker  [ ] Alcohol    PHYSICAL EXAM:  T(C): 36.7 (05-01-24 @ 08:24), Max: 36.8 (05-01-24 @ 00:00)  HR: 110 (05-01-24 @ 08:24) (91 - 130)  BP: 108/78 (05-01-24 @ 08:24) (101/77 - 123/83)  RR: 18 (05-01-24 @ 08:24) (17 - 18)  SpO2: 94% (05-01-24 @ 08:24) (94% - 100%)  Wt(kg): --    Appearance: Normal appearing elderly woman in no acute distress	  HEENT:   Normal oral mucosa, PERRL, EOMI	  Lymphatic: No lymphadenopathy , no edema  Cardiovascular: rapid irregular S1 S2, No JVD, No murmurs , Peripheral pulses palpable 2+ bilaterally  Respiratory: Lungs clear to auscultation, normal effort 	  Gastrointestinal:  Soft, Non-tender, + BS	  Skin: No rashes, +facial/periorbital ecchymoses, No cyanosis, warm to touch  Musculoskeletal: Normal range of motion, normal strength  Psychiatry:  Mood & affect appropriate      TELEMETRY: AFib 120s 	    ECG: AFib, 120s, narrow QRS  Echo:  < from: TTE Limited W or WO Ultrasound Enhancing Agent (02.21.24 @ 11:54) >  CONCLUSIONS:      1. Left ventricular cavity is normal in size. Left ventricular wall thickness is normal. There are no regional wall motion abnormalities seen.   2. Unable to assess left ventricular diastolic function due to insufficient data. Analysis of left ventricular diastolic function and filling pressure is made challenging by the presence of atrial fibrillation.   3. Normal right ventricular cavity size, with wall thickness, and normal systolic function.   4. The left atrium is severely dilated.   5. The right atrium is dilated.   6. Mild mitral regurgitation at a blood pressure of 128/79 mmHg.   7. Estimated pulmonary artery systolic pressure is 12 mmHg.   8. No pericardial effusion seen.   9. No prior echocardiogram is available for comparison.  < from: TTE Limited W or WO Ultrasound Enhancing Agent (02.21.24 @ 11:54) >  Left Atrium:  The left atrium is severely dilated with an indexed volume of 49.03 ml/m².   Right Atrium:  The right atrium is dilated with an indexed volume of 43.97 ml/m².    < end of copied text >    	  LABS:	 	                          10.3   7.29  )-----------( 186      ( 01 May 2024 06:37 )             33.9     05-01    136  |  109<H>  |  13  ----------------------------<  79  4.0   |  15<L>  |  1.04    Ca    8.6      01 May 2024 06:39  Mg     1.7     04-30    ASSESSMENT/PLAN: Ms Gooden is a pleasant 78y Female sent in from [facility] after sustaining a fall - does not know how this occurred... fainting strongly suspected.  Significant facial bruising.  Has what is likely longstanding persistent AFib, with severe biatrial enlargement.  Continue Apixaban 5mg BID for stroke prevention.  Currently, heartrate is quite rapid, 120s bpm, consistent with her being systemically ill.  Question of urosepsis.  Continue metoprolol, at higher dose of 100mg BID.  Continue clopidogrel for hx of coronary stents.  Continue nicotine replacement therapy. Smoking cessation strongly encouraged.  Cause of her collapse may be hypotension due to sepsis, or intermittent AV block due to age-related weakening of the cardiac conduction system.  Will get antibiotics for UTI.  Will recommend an ILR re: syncope w/ trauma, to rule out heart block. OK to use continuous telemetry until closer to discharge.        Shane Frias M.D.  Cardiac Electrophysiology    office [phone]  pager [phone]

## 2024-05-01 NOTE — PROVIDER CONTACT NOTE (OTHER) - SITUATION
Pt noted to be in Afib RVR with -140's, asymptomatic
recheck BP at 8PM
Pt with Afib RVR with -170's, remains asymptomatic
Upon skin assessment, b/l dti noted.

## 2024-05-02 DIAGNOSIS — R53.81 OTHER MALAISE: ICD-10-CM
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
## 2024-05-02 DIAGNOSIS — Z51.5 ENCOUNTER FOR PALLIATIVE CARE: ICD-10-CM

## 2024-05-02 LAB
ANION GAP SERPL CALC-SCNC: 13 MMOL/L — SIGNIFICANT CHANGE UP (ref 5–17)
BUN SERPL-MCNC: 10 MG/DL — SIGNIFICANT CHANGE UP (ref 7–23)
CALCIUM SERPL-MCNC: 8.8 MG/DL — SIGNIFICANT CHANGE UP (ref 8.4–10.5)
CHLORIDE SERPL-SCNC: 109 MMOL/L — HIGH (ref 96–108)
CO2 SERPL-SCNC: 15 MMOL/L — LOW (ref 22–31)
CREAT SERPL-MCNC: 0.97 MG/DL — SIGNIFICANT CHANGE UP (ref 0.5–1.3)
EGFR: 60 ML/MIN/1.73M2 — SIGNIFICANT CHANGE UP
GLUCOSE SERPL-MCNC: 71 MG/DL — SIGNIFICANT CHANGE UP (ref 70–99)
HCT VFR BLD CALC: 35.7 % — SIGNIFICANT CHANGE UP (ref 34.5–45)
HGB BLD-MCNC: 11 G/DL — LOW (ref 11.5–15.5)
MCHC RBC-ENTMCNC: 30.8 GM/DL — LOW (ref 32–36)
MCHC RBC-ENTMCNC: 31.4 PG — SIGNIFICANT CHANGE UP (ref 27–34)
MCV RBC AUTO: 102 FL — HIGH (ref 80–100)
NRBC # BLD: 0 /100 WBCS — SIGNIFICANT CHANGE UP (ref 0–0)
PLATELET # BLD AUTO: 205 K/UL — SIGNIFICANT CHANGE UP (ref 150–400)
POTASSIUM SERPL-MCNC: 4.4 MMOL/L — SIGNIFICANT CHANGE UP (ref 3.5–5.3)
POTASSIUM SERPL-SCNC: 4.4 MMOL/L — SIGNIFICANT CHANGE UP (ref 3.5–5.3)
RBC # BLD: 3.5 M/UL — LOW (ref 3.8–5.2)
RBC # FLD: 17.8 % — HIGH (ref 10.3–14.5)
SODIUM SERPL-SCNC: 137 MMOL/L — SIGNIFICANT CHANGE UP (ref 135–145)
WBC # BLD: 8.91 K/UL — SIGNIFICANT CHANGE UP (ref 3.8–10.5)
WBC # FLD AUTO: 8.91 K/UL — SIGNIFICANT CHANGE UP (ref 3.8–10.5)

## 2024-05-02 PROCEDURE — 99221 1ST HOSP IP/OBS SF/LOW 40: CPT

## 2024-05-02 RX ORDER — DIGOXIN 250 MCG
250 TABLET ORAL DAILY
Refills: 0 | Status: DISCONTINUED | OUTPATIENT
Start: 2024-05-02 | End: 2024-05-06

## 2024-05-02 RX ADMIN — APIXABAN 5 MILLIGRAM(S): 2.5 TABLET, FILM COATED ORAL at 18:04

## 2024-05-02 RX ADMIN — MIDODRINE HYDROCHLORIDE 5 MILLIGRAM(S): 2.5 TABLET ORAL at 18:04

## 2024-05-02 RX ADMIN — CHLORHEXIDINE GLUCONATE 1 APPLICATION(S): 213 SOLUTION TOPICAL at 11:24

## 2024-05-02 RX ADMIN — Medication 1 TABLET(S): at 11:24

## 2024-05-02 RX ADMIN — Medication 175 MICROGRAM(S): at 05:24

## 2024-05-02 RX ADMIN — Medication 1 PATCH: at 08:43

## 2024-05-02 RX ADMIN — Medication 100 MILLIGRAM(S): at 05:24

## 2024-05-02 RX ADMIN — CLOPIDOGREL BISULFATE 75 MILLIGRAM(S): 75 TABLET, FILM COATED ORAL at 11:24

## 2024-05-02 RX ADMIN — MIDODRINE HYDROCHLORIDE 5 MILLIGRAM(S): 2.5 TABLET ORAL at 11:22

## 2024-05-02 RX ADMIN — Medication 500 MILLIGRAM(S): at 11:24

## 2024-05-02 RX ADMIN — Medication 100 MILLIGRAM(S): at 18:04

## 2024-05-02 RX ADMIN — LIDOCAINE 1 PATCH: 4 CREAM TOPICAL at 20:02

## 2024-05-02 RX ADMIN — APIXABAN 5 MILLIGRAM(S): 2.5 TABLET, FILM COATED ORAL at 05:24

## 2024-05-02 RX ADMIN — Medication 1 PATCH: at 20:02

## 2024-05-02 RX ADMIN — POLYETHYLENE GLYCOL 3350 17 GRAM(S): 17 POWDER, FOR SOLUTION ORAL at 11:24

## 2024-05-02 RX ADMIN — Medication 250 MICROGRAM(S): at 03:08

## 2024-05-02 RX ADMIN — Medication 1 PATCH: at 11:24

## 2024-05-02 RX ADMIN — Medication 250 MICROGRAM(S): at 11:21

## 2024-05-02 RX ADMIN — LIDOCAINE 1 PATCH: 4 CREAM TOPICAL at 11:22

## 2024-05-02 RX ADMIN — MIDODRINE HYDROCHLORIDE 5 MILLIGRAM(S): 2.5 TABLET ORAL at 05:24

## 2024-05-02 RX ADMIN — LIDOCAINE 1 PATCH: 4 CREAM TOPICAL at 23:39

## 2024-05-02 RX ADMIN — ATORVASTATIN CALCIUM 80 MILLIGRAM(S): 80 TABLET, FILM COATED ORAL at 21:19

## 2024-05-02 RX ADMIN — CEFTRIAXONE 100 MILLIGRAM(S): 500 INJECTION, POWDER, FOR SOLUTION INTRAMUSCULAR; INTRAVENOUS at 18:04

## 2024-05-02 NOTE — PROGRESS NOTE ADULT - ASSESSMENT
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 if no events prior to DC, plan for ILR     #Afib w RVR  -Add daily  Digoxin 0.25   -mido 5mg PO TID  -increase BB as bp allows      #CAD s/p PCI  -stable  -cont plavix  -cont statin    #UTI  -cont IV abx    75 minutes spent on total encounter; more than 50% of the visit was spent counseling and/or coordinating care by the attending physician.

## 2024-05-02 NOTE — CONSULT NOTE ADULT - SUBJECTIVE AND OBJECTIVE BOX
Date of Service: 05-02-24 @ 15:51    HPI: 77yo 73kg f, smoker, w pmh [place], htn, hld, afib, cad c/b mi s/p pci w stents, copd, little, urinary incontinence, hypothyroidism, oa s/p l tkr + l hip orif, and w recent hospitalization 2/16-3/9 for ischemic bowel s/p ex-lap w bowel resection + end ileostomy, 4/1-4/3 for drainage from incision site 2/2 hematoma in the laparotomy wound s/p conservative mgmt, p/w fall and lethargy; in er, found to be in afib rvr w ua suggestive of uti; admit to medicine for further mgmt (Taken from medicine note). Palliative care consulted for assistance with GOC.     PERTINENT PM/SXH:   Hypertension    Hypothyroid    Osteoarthritis    CAD (coronary artery disease)    LITTLE (obstructive sleep apnea)    History of MI (myocardial infarction)    Polyp of corpus uteri    Heart murmur    Bilateral hearing loss, unspecified hearing loss type    Obesity (BMI 35.0-39.9 without comorbidity)    Obesity    Mixed stress and urge urinary incontinence    Overactive bladder      No significant past surgical history    S/P ORIF (open reduction internal fixation) fracture    S/P appendectomy    S/P knee replacement    Stented coronary artery    S/P laparotomy    H/O dilation and curettage      FAMILY HISTORY:      ITEMS NOT CHECKED ARE NOT PRESENT    SOCIAL HISTORY:   Significant other/partner[ ]  Children[x ]  Sabianism/Spirituality:  Substance hx:  [ ]   Tobacco hx:  [ ]   Alcohol hx: [ ]   Home Opioid hx:  [ ] I-Stop Reference No:  Living Situation: [ ]Home  [ ]Long term care  [ x]Rehab [ ]Other    ADVANCE DIRECTIVES:    DNR/MOLST  [ ]  Living Will  [ ]   DECISION MAKER(s):  [ ] Health Care Proxy(s)  [x ] Surrogate(s)  [ ] Guardian           Name(s): Phone Number(s): Caity     BASELINE (I)ADL(s) (prior to admission):  [place]: [ ]Total  [x ] Moderate [ ]Dependent    Allergies    penicillin (Hives)    Intolerances    MEDICATIONS  (STANDING):  apixaban 5 milliGRAM(s) Oral every 12 hours  ascorbic acid 500 milliGRAM(s) Oral daily  atorvastatin 80 milliGRAM(s) Oral at bedtime  cefTRIAXone   IVPB 1000 milliGRAM(s) IV Intermittent every 24 hours  chlorhexidine 2% Cloths 1 Application(s) Topical daily  clopidogrel Tablet 75 milliGRAM(s) Oral daily  digoxin     Tablet 250 MICROGram(s) Oral daily  levothyroxine 175 MICROGram(s) Oral daily  lidocaine   4% Patch 1 Patch Transdermal daily  metoprolol tartrate 100 milliGRAM(s) Oral two times a day  midodrine. 5 milliGRAM(s) Oral three times a day  multivitamin 1 Tablet(s) Oral daily  naloxone Injectable 0.4 milliGRAM(s) IV Push once  nicotine -  14 mG/24Hr(s) Patch 1 Patch Transdermal daily  polyethylene glycol 3350 17 Gram(s) Oral daily  senna 2 Tablet(s) Oral at bedtime  sodium chloride 0.9%. 1000 milliLiter(s) (75 mL/Hr) IV Continuous <Continuous>    MEDICATIONS  (PRN):  acetaminophen     Tablet .. 650 milliGRAM(s) Oral every 6 hours PRN Temp greater or equal to 38C (100.4F), Mild Pain (1 - 3)  aluminum hydroxide/magnesium hydroxide/simethicone Suspension 30 milliLiter(s) Oral every 4 hours PRN Dyspepsia  bisacodyl 5 milliGRAM(s) Oral daily PRN Constipation  melatonin 3 milliGRAM(s) Oral at bedtime PRN Insomnia  metoprolol tartrate Injectable 5 milliGRAM(s) IV Push every 6 hours PRN for sustained afib rvr >120  morphine  - Injectable 4 milliGRAM(s) IV Push every 4 hours PRN Severe Pain (7 - 10)  morphine  - Injectable 2 milliGRAM(s) IV Push every 4 hours PRN Moderate Pain (4 - 6)  ondansetron Injectable 4 milliGRAM(s) IV Push every 8 hours PRN Nausea and/or Vomiting    PRESENT SYMPTOMS: [ ]Unable to self-report see CPOT, PAINADs, RDOS  Source if other than patient:  [ ]Family   [ ]Team     Pain: [ ]yes [ x]no  QOL impact -   Location -                    Aggravating factors -  Quality -  Radiation -  Timing-  Severity (0-10 scale):  Minimal acceptable level (0-10 scale):       Dyspnea:                           [ ]Mild [ ]Moderate [ ]Severe  Anxiety:                             [ ]Mild [ ]Moderate [ ]Severe  Fatigue:                             [x ]Mild [ ]Moderate [ ]Severe  Nausea:                             [ ]Mild [ ]Moderate [ ]Severe  Loss of appetite:              [ ]Mild [ ]Moderate [ ]Severe  Constipation:                    [ ]Mild [ ]Moderate [ ]Severe    PCSSQ [Palliative Care Spiritual Screening Question]   Severity (0-10):  Score of 4 or > indicate consideration of Chaplaincy referral.  Chaplaincy Referral: [ ] yes [ ] refused [ ] following    Caregiver [place]? : [ ] yes [ ] no [ ] deferred:  Social work referral [ ] Patient & Family Centered Care Referral [ ]     Anticipatory Grief Present?: [ ] yes [ ] no  [ ] deferred: Palliative Social work referral [ ]  Patient & Family Centered Care Referral [ ]       Other Symptoms:  [ ]All other review of systems negative   [ ] Unable to obtain due to poor mentation    PHYSICAL EXAM:  Vital Signs Last 24 Hrs  T(C): 36.6 (02 May 2024 11:16), Max: 36.8 (02 May 2024 00:38)  T(F): 97.9 (02 May 2024 11:16), Max: 98.2 (02 May 2024 00:38)  HR: 101 (02 May 2024 11:16) (72 - 146)  BP: 112/76 (02 May 2024 11:16) (106/86 - 129/76)  BP(mean): --  RR: 18 (02 May 2024 11:16) (18 - 18)  SpO2: 97% (02 May 2024 11:16) (97% - 100%)    Parameters below as of 02 May 2024 11:16  Patient On (Oxygen Delivery Method): room air     I&O's Summary    01 May 2024 07:01  -  02 May 2024 07:00  --------------------------------------------------------  IN: 1045 mL / OUT: 850 mL / NET: 195 mL    02 May 2024 07:01  -  02 May 2024 15:51  --------------------------------------------------------  IN: 640 mL / OUT: 1200 mL / NET: -560 mL        GENERAL:  [x]Alert  [x]Oriented x 3  [ ]Lethargic  [ ]Cachexia  [ ]Unarousable  [x]Verbal  [ ]Non-Verbal  Behavioral:   [ ]Anxiety  [ ]Delirium [ ]Agitation [ ]Other  HEENT:  [x]Normal   [ ]Dry mouth   [ ]ET Tube/Trach  [ ]Oral lesions  PULMONARY:   [x]Clear [ ]Tachypnea  [ ]Audible excessive secretions   [ ]Rhonchi        [ ]Right [ ]Left [ ]Bilateral  [ ]Crackles        [ ]Right [ ]Left [ ]Bilateral  [ ]Wheezing     [ ]Right [ ]Left [ ]Bilateral  [ ]Diminished BS [ ] Right [ ]Left [ ]Bilateral  CARDIOVASCULAR:    [x]Regular [ ]Irregular [ ]Tachy  [ ]Gerson [ ]Murmur [ ]Other  GASTROINTESTINAL:  [x]Soft  [ ]Distended   [x]+BS  [x]Non tender [ ]Tender  [ ]PEG [ ]OGT/ NGT   Last BM:    GENITOURINARY:  [x]Normal [ ]Incontinent   [ ]Oliguria/Anuria   [ ]Melvin  MUSCULOSKELETAL:   [ ]Normal   [x]Weakness  [ ]Bed/Wheelchair bound [ ]Edema  NEUROLOGIC:   [x]No focal deficits  [ ] Cognitive impairment  [ ] Dysphagia [ ]Dysarthria [ ] Paresis [ ]Other   SKIN:   [x]Normal  [ ]Rash   [ ]Pressure ulcer(s) [ ]y [ ]n present on admission    CRITICAL CARE:  [ ] Shock Present  [ ]Septic [ ]Cardiogenic [ ]Neurologic [ ]Hypovolemic  [ ]  Vasopressors [ ]  Inotropes   [ ]Respiratory failure present [ ]Mechanical ventilation [ ]Non-invasive ventilatory support [ ]High flow    [ ]Acute  [ ]Chronic [ ]Hypoxic  [ ]Hypercarbic [ ]Other  [ ]Other organ failure     LABS:                        11.0   8.91  )-----------( 205      ( 02 May 2024 07:03 )             35.7   05-02    137  |  109<H>  |  10  ----------------------------<  71  4.4   |  15<L>  |  0.97    Ca    8.8      02 May 2024 07:03        Urinalysis Basic - ( 02 May 2024 07:03 )    Color: x / Appearance: x / SG: x / pH: x  Gluc: 71 mg/dL / Ketone: x  / Bili: x / Urobili: x   Blood: x / Protein: x / Nitrite: x   Leuk Esterase: x / RBC: x / WBC x   Sq Epi: x / Non Sq Epi: x / Bacteria: x      RADIOLOGY & ADDITIONAL STUDIES:    PROTEIN CALORIE MALNUTRITION PRESENT: [ ]mild [ ]moderate [ ]severe [ ]underweight [ ]morbid obesity  https://www.andeal.org/vault/2440/web/files/ONC/Table_Clinical%20Characteristics%20to%20Document%20Malnutrition-White%20JV%20et%20al%202012.pdf    Height (cm): 162.6 (04-28-24 @ 11:46), 162.6 (04-01-24 @ 17:36), 162.6 (02-22-24 @ 18:55)  Weight (kg): 72.6 (04-28-24 @ 11:46), 78.8 (04-01-24 @ 17:36), 88.8 (02-22-24 @ 18:55)  BMI (kg/m2): 27.5 (04-28-24 @ 11:46), 29.8 (04-01-24 @ 17:36), 33.6 (02-22-24 @ 18:55)    [ ]PPSV2 < or = to 30% [ ]significant weight loss  [ ]poor nutritional intake  [ ]anasarca[ ]Artificial Nutrition      Other REFERRALS:  [ ]Hospice  [ ]Child Life  [ ]Social Work  [ ]Case management [ ]Holistic Therapy     Care Coordination Assessment 201 [C. Provider] (04-29-24 @ 11:41)      Palliative Performance Scale:  http://npcrc.org/files/news/palliative_performance_scale_ppsv2.pdf  (Ctrl +  left click to view)  Respiratory Distress Observation Tool:  https://homecareinformation.net/handouts/hen/Respiratory_Distress_Observation_Scale.pdf (Ctrl +  left click to view)  PAINAD Score:  http://geriatrictoolkit.[facility]/cog/painad.pdf (Ctrl +  left click to view)   Date of Service: 05-02-24 @ 15:51    HPI: 79yo 73kg f, smoker, w pmh [place], htn, hld, afib, cad c/b mi s/p pci w stents, copd, crow, urinary incontinence, hypothyroidism, oa s/p l tkr + l hip orif, and w recent hospitalization 2/16-3/9 for ischemic bowel s/p ex-lap w bowel resection + end ileostomy, 4/1-4/3 for drainage from incision site 2/2 hematoma in the laparotomy wound s/p conservative mgmt, p/w fall and lethargy; in er, found to be in afib rvr w ua suggestive of uti; admit to medicine for further mgmt (Taken from medicine note). Palliative care consulted for assistance with GOC.     PERTINENT PM/SXH:   Hypertension    Hypothyroid    Osteoarthritis    CAD (coronary artery disease)    CROW (obstructive sleep apnea)    History of MI (myocardial infarction)    Polyp of corpus uteri    Heart murmur    Bilateral hearing loss, unspecified hearing loss type    Obesity (BMI 35.0-39.9 without comorbidity)    Obesity    Mixed stress and urge urinary incontinence    Overactive bladder      No significant past surgical history    S/P ORIF (open reduction internal fixation) fracture    S/P appendectomy    S/P knee replacement    Stented coronary artery    S/P laparotomy    H/O dilation and curettage      FAMILY HISTORY:      ITEMS NOT CHECKED ARE NOT PRESENT    SOCIAL HISTORY:   Significant other/partner[ ]  Children[x ]  Confucianist/Spirituality:  Substance hx:  [ ]   Tobacco hx:  [ ]   Alcohol hx: [ ]   Home Opioid hx:  [ ] I-Stop Reference No:  Living Situation: [ ]Home  [ ]Long term care  [ x]Rehab [ ]Other    ADVANCE DIRECTIVES:    DNR/MOLST  [ ]  Living Will  [ ]   DECISION MAKER(s):  [ ] Health Care Proxy(s)  [x ] Surrogate(s)  [ ] Guardian           Name(s): Phone Number(s): Caity     BASELINE (I)ADL(s) (prior to admission):  [place]: [ ]Total  [x ] Moderate [ ]Dependent    Allergies    penicillin (Hives)    Intolerances    MEDICATIONS  (STANDING):  apixaban 5 milliGRAM(s) Oral every 12 hours  ascorbic acid 500 milliGRAM(s) Oral daily  atorvastatin 80 milliGRAM(s) Oral at bedtime  cefTRIAXone   IVPB 1000 milliGRAM(s) IV Intermittent every 24 hours  chlorhexidine 2% Cloths 1 Application(s) Topical daily  clopidogrel Tablet 75 milliGRAM(s) Oral daily  digoxin     Tablet 250 MICROGram(s) Oral daily  levothyroxine 175 MICROGram(s) Oral daily  lidocaine   4% Patch 1 Patch Transdermal daily  metoprolol tartrate 100 milliGRAM(s) Oral two times a day  midodrine. 5 milliGRAM(s) Oral three times a day  multivitamin 1 Tablet(s) Oral daily  naloxone Injectable 0.4 milliGRAM(s) IV Push once  nicotine -  14 mG/24Hr(s) Patch 1 Patch Transdermal daily  polyethylene glycol 3350 17 Gram(s) Oral daily  senna 2 Tablet(s) Oral at bedtime  sodium chloride 0.9%. 1000 milliLiter(s) (75 mL/Hr) IV Continuous <Continuous>    MEDICATIONS  (PRN):  acetaminophen     Tablet .. 650 milliGRAM(s) Oral every 6 hours PRN Temp greater or equal to 38C (100.4F), Mild Pain (1 - 3)  aluminum hydroxide/magnesium hydroxide/simethicone Suspension 30 milliLiter(s) Oral every 4 hours PRN Dyspepsia  bisacodyl 5 milliGRAM(s) Oral daily PRN Constipation  melatonin 3 milliGRAM(s) Oral at bedtime PRN Insomnia  metoprolol tartrate Injectable 5 milliGRAM(s) IV Push every 6 hours PRN for sustained afib rvr >120  morphine  - Injectable 4 milliGRAM(s) IV Push every 4 hours PRN Severe Pain (7 - 10)  morphine  - Injectable 2 milliGRAM(s) IV Push every 4 hours PRN Moderate Pain (4 - 6)  ondansetron Injectable 4 milliGRAM(s) IV Push every 8 hours PRN Nausea and/or Vomiting    PRESENT SYMPTOMS: [ ]Unable to self-report see CPOT, PAINADs, RDOS  Source if other than patient:  [ ]Family   [ ]Team     Pain: [ ]yes [ x]no  QOL impact -   Location -                    Aggravating factors -  Quality -  Radiation -  Timing-  Severity (0-10 scale):  Minimal acceptable level (0-10 scale):       Dyspnea:                           [ ]Mild [ ]Moderate [ ]Severe  Anxiety:                             [ ]Mild [ ]Moderate [ ]Severe  Fatigue:                             [x ]Mild [ ]Moderate [ ]Severe  Nausea:                             [ ]Mild [ ]Moderate [ ]Severe  Loss of appetite:              [ ]Mild [ ]Moderate [ ]Severe  Constipation:                    [ ]Mild [ ]Moderate [ ]Severe    PCSSQ [Palliative Care Spiritual Screening Question]   Severity (0-10):  Score of 4 or > indicate consideration of Chaplaincy referral.  Chaplaincy Referral: [ ] yes [ x] refused [ ] following    Caregiver [place]? : [ ] yes [ ] no [ ] deferred:  Social work referral [ ] Patient & Family Centered Care Referral [ ]     Anticipatory Grief Present?: [ ] yes [ ] no  [ ] deferred: Palliative Social work referral [ ]  Patient & Family Centered Care Referral [ ]       Other Symptoms:  [ ]All other review of systems negative   [x ] Unable to obtain due to poor mentation    PHYSICAL EXAM:  Vital Signs Last 24 Hrs  T(C): 36.6 (02 May 2024 11:16), Max: 36.8 (02 May 2024 00:38)  T(F): 97.9 (02 May 2024 11:16), Max: 98.2 (02 May 2024 00:38)  HR: 101 (02 May 2024 11:16) (72 - 146)  BP: 112/76 (02 May 2024 11:16) (106/86 - 129/76)  BP(mean): --  RR: 18 (02 May 2024 11:16) (18 - 18)  SpO2: 97% (02 May 2024 11:16) (97% - 100%)    Parameters below as of 02 May 2024 11:16  Patient On (Oxygen Delivery Method): room air     I&O's Summary    01 May 2024 07:01  -  02 May 2024 07:00  --------------------------------------------------------  IN: 1045 mL / OUT: 850 mL / NET: 195 mL    02 May 2024 07:01  -  02 May 2024 15:51  --------------------------------------------------------  IN: 640 mL / OUT: 1200 mL / NET: -560 mL        GENERAL:  [x]Alert  [x]Oriented x 2  [ ]Lethargic  [ ]Cachexia  [ ]Unarousable  [x]Verbal  [ ]Non-Verbal  Behavioral:   [ ]Anxiety  [ ]Delirium [ ]Agitation [ ]Other  HEENT:  [ ]Normal   [ x]Dry mouth   [ ]ET Tube/Trach  [ ]Oral lesions  PULMONARY:   [x]Clear [ ]Tachypnea  [ ]Audible excessive secretions   [ ]Rhonchi        [ ]Right [ ]Left [ ]Bilateral  [ ]Crackles        [ ]Right [ ]Left [ ]Bilateral  [ ]Wheezing     [ ]Right [ ]Left [ ]Bilateral  [ ]Diminished BS [ ] Right [ ]Left [ ]Bilateral  CARDIOVASCULAR:    [ ]Regular [x ]Irregular [ ]Tachy  [ ]Gerson [ ]Murmur [ ]Other  GASTROINTESTINAL:  [x]Soft  [ ]Distended   [x]+BS  [x]Non tender [ ]Tender  [ ]PEG [ ]OGT/ NGT   Last BM:    GENITOURINARY:  [ ]Normal [ ]Incontinent   [ ]Oliguria/Anuria   [ ]Melvin  MUSCULOSKELETAL:   [ ]Normal   [x]Weakness  [ x]Bed/Wheelchair bound [ ]Edema  NEUROLOGIC:   [ ]No focal deficits  [ x] Cognitive impairment  [ ] Dysphagia [ ]Dysarthria [ ] Paresis [ ]Other   SKIN:   [x]Normal  [ ]Rash   [ ]Pressure ulcer(s) [ ]y [ ]n present on admission    CRITICAL CARE:  [ ] Shock Present  [ ]Septic [ ]Cardiogenic [ ]Neurologic [ ]Hypovolemic  [ ]  Vasopressors [ ]  Inotropes   [ ]Respiratory failure present [ ]Mechanical ventilation [ ]Non-invasive ventilatory support [ ]High flow    [ ]Acute  [ ]Chronic [ ]Hypoxic  [ ]Hypercarbic [ ]Other  [ ]Other organ failure     LABS:                        11.0   8.91  )-----------( 205      ( 02 May 2024 07:03 )             35.7   05-02    137  |  109<H>  |  10  ----------------------------<  71  4.4   |  15<L>  |  0.97    Ca    8.8      02 May 2024 07:03        Urinalysis Basic - ( 02 May 2024 07:03 )    Color: x / Appearance: x / SG: x / pH: x  Gluc: 71 mg/dL / Ketone: x  / Bili: x / Urobili: x   Blood: x / Protein: x / Nitrite: x   Leuk Esterase: x / RBC: x / WBC x   Sq Epi: x / Non Sq Epi: x / Bacteria: x      RADIOLOGY & ADDITIONAL STUDIES:  < from: CT Abdomen and Pelvis w/ IV Cont (04.28.24 @ 18:47) >    ACC: [id number] EXAM:  CT CHEST IC   ORDERED BY: ODILON HAN     ACC: [id number] EXAM:  CT ABDOMEN AND PELVIS IC   ORDERED BY: ODILON HAN     *** ADDENDUM # 1 ***    2.5 cm right upper lobe groundglass nodule. Outpatient CT of follow-up   recommended in 6 months to 12 months.    --- End of Report ---    *** END OF ADDENDUM # 1 ***      PROCEDURE DATE:  04/28/2024          INTERPRETATION:  CLINICAL INFORMATION: Falls    COMPARISON: CT abdomen pelvis 4/1/2024.    CONTRAST/COMPLICATIONS:  IV Contrast: Omnipaque 350  90 cc administered   10 cc discarded  Oral Contrast: NONE  Complications: None reported at time of study completion    PROCEDURE:  CT of the Chest, Abdomen and Pelvis was performed.  Imaging was performed through the chest in the arterial phase followed by   imaging of the abdomen and pelvis in the portal venous phase.  Sagittal and coronal reformats were performed.    FINDINGS:  CHEST:  LUNGS AND LARGE AIRWAYS: Patent central airways. Emphysema. 2.5 cm   groundglassopacity in the right upper lobe.  PLEURA: No pleural effusion.  VESSELS: Within normal limits.  HEART: Cardiomegaly. No pericardial effusion.  MEDIASTINUM AND ARIAS: No lymphadenopathy.  CHEST WALL AND LOWER NECK: Within normal limits.    ABDOMEN AND PELVIS:  LIVER: Within normal limits.  BILE DUCTS: Normal caliber.  GALLBLADDER: Within normal limits.  SPLEEN: Within normal limits.  PANCREAS: Within normal limits.  ADRENALS: Redemonstrated 4 cm left adrenal mass stable from prior.  KIDNEYS/URETERS: Within normal limits. Left renal cyst.    BLADDER: Within normal limits.  REPRODUCTIVE ORGANS: Uterus and adnexa are within normal limits.    BOWEL: No bowel obstruction. Appendix is not visualized. Postsurgical   changes of the bowel with a right lower quadrant ileostomy..  PERITONEUM: No ascites.  VESSELS: Atherosclerotic changes.  RETROPERITONEUM/LYMPH NODES: No lymphadenopathy. Continued decrease in   size of right lower quadrant extraperitoneal imaged, measuring up to 3.7   cm, previously 4.5 cm.  ABDOMINAL WALL: Stable postsurgical changes in the midline abdominal   wall. Decreased size of the fluid collection along the midline abdomen   surgical scar.  BONES: Degenerative changes.    IMPRESSION:  No acute findings. Chronic findings asabove.        --- End of Report ---    ***Please see the addendum at the top of this report. It may contain   additional important information or changes.****        JANIE MCCRACKEN MD; Attending Radiologist  This document has been electronically signed. Apr 28 2024  8:15PM  1st Addendum: JANIE MCCRACKEN MD; Attending Radiologist  The first addendum was electronically signed on: Apr 28 2024  8:22PM.    < end of copied text >    PROTEIN CALORIE MALNUTRITION PRESENT: [ ]mild [ ]moderate [ ]severe [ ]underweight [ ]morbid obesity  https://www.andeal.org/vault/2440/web/files/ONC/Table_Clinical%20Characteristics%20to%20Document%20Malnutrition-White%20JV%20et%20al%202012.pdf    Height (cm): 162.6 (04-28-24 @ 11:46), 162.6 (04-01-24 @ 17:36), 162.6 (02-22-24 @ 18:55)  Weight (kg): 72.6 (04-28-24 @ 11:46), 78.8 (04-01-24 @ 17:36), 88.8 (02-22-24 @ 18:55)  BMI (kg/m2): 27.5 (04-28-24 @ 11:46), 29.8 (04-01-24 @ 17:36), 33.6 (02-22-24 @ 18:55)    [x]PPSV2 < or = to 30% [ ]significant weight loss  [ ]poor nutritional intake  [ ]anasarca[ ]Artificial Nutrition      Other REFERRALS:  [ ]Hospice  [ ]Child Life  [ ]Social Work  [ ]Case management [ ]Holistic Therapy     Care Coordination Assessment 201 [C. Provider] (04-29-24 @ 11:41)      Palliative Performance Scale:  http://npcrc.org/files/news/palliative_performance_scale_ppsv2.pdf  (Ctrl +  left click to view)  Respiratory Distress Observation Tool:  https://homecareinformation.net/handouts/hen/Respiratory_Distress_Observation_Scale.pdf (Ctrl +  left click to view)  PAINAD Score:  http://geriatrictoolkit.[facility]/cog/painad.pdf (Ctrl +  left click to view)

## 2024-05-02 NOTE — PROGRESS NOTE ADULT - ASSESSMENT
79yo 73kg f, smoker, w pmh [place], htn, hld, afib, cad c/b mi s/p pci w stents, copd, little, urinary incontinence, hypothyroidism, oa s/p l tkr + l hip orif, and w recent hospitalization 2/16-3/9 for ischemic bowel s/p ex-lap w bowel resection + end ileostomy, 4/1-4/3 for drainage from incision site 2/2 hematoma in the laparotomy wound s/p conservative mgmt, p/w fall and lethargy; in er, found to be in afib rvr w ua suggestive of uti; admit to medicine for further mgmt      # Acute UTI.   urine c/s growing Kliebsiella   sensitive  c/w Iv rocephine     # Atrial fibrillation with RVR.   rate not controlled   increase lopressor to 75 mg bid   cardio consulted : see by Dr. Mendiola  EP consult Dr. Hart   - Continue metoprolol, at higher dose of 100mg BID.  - needs ILR prior to discharge    # Encephalopathy / likely worsening dementia   pt. is at [facility] re[facility] , daughter is bedside   as per her she sees her mom is getting more confused and forgetful   -we d/w her regarding aricept / namenda , she is open for that   will start aricept 5 mg q HS on d/c     # s/p Mech fall :  -likely 2/2 UTI or afib with RVR   fall precaution   no fracture on imaging studies     # Ileostomy in place.   supportive care with pain control, antiemetics, antipyretics, probiotics  ostomy care     # CAD (coronary artery disease).   - h/o cad c/b mi s/p pci w stents  Dr. mendiola consulted     # HTN (hypertension).   ·  Plan: lopressor 100 tid => lopressor 25 bid for now.    # HLD (hyperlipidemia).   ·  Plan: crestor => lipitor.    # COPD (chronic obstructive pulmonary disease).   ·  Plan: h/o smoking, copd, little  - cont nicotine patch  stable     # Hypothyroidism.   ·  Plan: synthroid.    dispo: palliative care consult to clarify GOC

## 2024-05-03 ENCOUNTER — RESULT REVIEW (OUTPATIENT)
Age: 79
End: 2024-05-03

## 2024-05-03 DIAGNOSIS — I48.91 UNSPECIFIED ATRIAL FIBRILLATION: ICD-10-CM

## 2024-05-03 LAB
ANION GAP SERPL CALC-SCNC: 13 MMOL/L — SIGNIFICANT CHANGE UP (ref 5–17)
BUN SERPL-MCNC: 10 MG/DL — SIGNIFICANT CHANGE UP (ref 7–23)
CALCIUM SERPL-MCNC: 8.4 MG/DL — SIGNIFICANT CHANGE UP (ref 8.4–10.5)
CHLORIDE SERPL-SCNC: 108 MMOL/L — SIGNIFICANT CHANGE UP (ref 96–108)
CO2 SERPL-SCNC: 16 MMOL/L — LOW (ref 22–31)
CREAT SERPL-MCNC: 1.09 MG/DL — SIGNIFICANT CHANGE UP (ref 0.5–1.3)
CULTURE RESULTS: SIGNIFICANT CHANGE UP
CULTURE RESULTS: SIGNIFICANT CHANGE UP
EGFR: 52 ML/MIN/1.73M2 — LOW
GLUCOSE SERPL-MCNC: 72 MG/DL — SIGNIFICANT CHANGE UP (ref 70–99)
HCT VFR BLD CALC: 33 % — LOW (ref 34.5–45)
HGB BLD-MCNC: 10.8 G/DL — LOW (ref 11.5–15.5)
MCHC RBC-ENTMCNC: 32 PG — SIGNIFICANT CHANGE UP (ref 27–34)
MCHC RBC-ENTMCNC: 32.7 GM/DL — SIGNIFICANT CHANGE UP (ref 32–36)
MCV RBC AUTO: 97.9 FL — SIGNIFICANT CHANGE UP (ref 80–100)
NRBC # BLD: 0 /100 WBCS — SIGNIFICANT CHANGE UP (ref 0–0)
PLATELET # BLD AUTO: 217 K/UL — SIGNIFICANT CHANGE UP (ref 150–400)
POTASSIUM SERPL-MCNC: 3.9 MMOL/L — SIGNIFICANT CHANGE UP (ref 3.5–5.3)
POTASSIUM SERPL-SCNC: 3.9 MMOL/L — SIGNIFICANT CHANGE UP (ref 3.5–5.3)
RBC # BLD: 3.37 M/UL — LOW (ref 3.8–5.2)
RBC # FLD: 17.3 % — HIGH (ref 10.3–14.5)
SODIUM SERPL-SCNC: 137 MMOL/L — SIGNIFICANT CHANGE UP (ref 135–145)
SPECIMEN SOURCE: SIGNIFICANT CHANGE UP
SPECIMEN SOURCE: SIGNIFICANT CHANGE UP
WBC # BLD: 9.09 K/UL — SIGNIFICANT CHANGE UP (ref 3.8–10.5)
WBC # FLD AUTO: 9.09 K/UL — SIGNIFICANT CHANGE UP (ref 3.8–10.5)

## 2024-05-03 PROCEDURE — 93306 TTE W/DOPPLER COMPLETE: CPT | Mod: 26

## 2024-05-03 PROCEDURE — 99497 ADVNCD CARE PLAN 30 MIN: CPT | Mod: 25

## 2024-05-03 RX ADMIN — Medication 500 MILLIGRAM(S): at 11:05

## 2024-05-03 RX ADMIN — CHLORHEXIDINE GLUCONATE 1 APPLICATION(S): 213 SOLUTION TOPICAL at 11:05

## 2024-05-03 RX ADMIN — MIDODRINE HYDROCHLORIDE 5 MILLIGRAM(S): 2.5 TABLET ORAL at 11:06

## 2024-05-03 RX ADMIN — POLYETHYLENE GLYCOL 3350 17 GRAM(S): 17 POWDER, FOR SOLUTION ORAL at 11:07

## 2024-05-03 RX ADMIN — SENNA PLUS 2 TABLET(S): 8.6 TABLET ORAL at 21:35

## 2024-05-03 RX ADMIN — MIDODRINE HYDROCHLORIDE 5 MILLIGRAM(S): 2.5 TABLET ORAL at 17:01

## 2024-05-03 RX ADMIN — Medication 1 TABLET(S): at 11:06

## 2024-05-03 RX ADMIN — Medication 1 PATCH: at 19:38

## 2024-05-03 RX ADMIN — Medication 1 PATCH: at 10:01

## 2024-05-03 RX ADMIN — LIDOCAINE 1 PATCH: 4 CREAM TOPICAL at 19:38

## 2024-05-03 RX ADMIN — CEFTRIAXONE 100 MILLIGRAM(S): 500 INJECTION, POWDER, FOR SOLUTION INTRAMUSCULAR; INTRAVENOUS at 18:48

## 2024-05-03 RX ADMIN — CLOPIDOGREL BISULFATE 75 MILLIGRAM(S): 75 TABLET, FILM COATED ORAL at 11:06

## 2024-05-03 RX ADMIN — LIDOCAINE 1 PATCH: 4 CREAM TOPICAL at 11:06

## 2024-05-03 RX ADMIN — APIXABAN 5 MILLIGRAM(S): 2.5 TABLET, FILM COATED ORAL at 05:36

## 2024-05-03 RX ADMIN — MIDODRINE HYDROCHLORIDE 5 MILLIGRAM(S): 2.5 TABLET ORAL at 05:36

## 2024-05-03 RX ADMIN — Medication 100 MILLIGRAM(S): at 17:01

## 2024-05-03 RX ADMIN — LIDOCAINE 1 PATCH: 4 CREAM TOPICAL at 23:50

## 2024-05-03 RX ADMIN — Medication 175 MICROGRAM(S): at 05:36

## 2024-05-03 RX ADMIN — ATORVASTATIN CALCIUM 80 MILLIGRAM(S): 80 TABLET, FILM COATED ORAL at 21:34

## 2024-05-03 RX ADMIN — Medication 1 PATCH: at 11:06

## 2024-05-03 RX ADMIN — Medication 250 MICROGRAM(S): at 05:36

## 2024-05-03 RX ADMIN — APIXABAN 5 MILLIGRAM(S): 2.5 TABLET, FILM COATED ORAL at 17:01

## 2024-05-03 RX ADMIN — Medication 100 MILLIGRAM(S): at 05:36

## 2024-05-03 NOTE — ADVANCED PRACTICE NURSE CONSULT - REASON FOR CONSULT
Requested by medical team to f/u & evaluate ostomy care needs and request for wound care consult for b/l heels skin breakdown present on admission received from nursing. .  PMH is noted as obtained by chart review:  77yo 73kg f, smoker, w pmh [place], htn, hld, afib, cad c/b mi s/p pci w stents, copd, little, urinary incontinence, hypothyroidism, oa s/p l tkr + l hip orif, and w recent hospitalization 2/16-3/9 for ischemic bowel s/p ex-lap w bowel resection + end ileostomy, 4/1-4/3 for drainage from incision site 2/2 hematoma in the laparotomy wound s/p conservative mgmt, p/w fall and lethargy; in er, found to be in afib rvr w ua suggestive of uti; admit to medicine for further mgmt      
Ostomy Consult
Called to the bedside for Leaking ostomy Pouch

## 2024-05-03 NOTE — CONSULT NOTE ADULT - PROBLEM SELECTOR RECOMMENDATION 3
pt s/p fall  defer to primary team for management  education provided on fall safety provided to family
pt /sp mechanical fall at rehab   no fx   defer to primary team for care

## 2024-05-03 NOTE — CONSULT NOTE ADULT - PROBLEM SELECTOR RECOMMENDATION 9
ppsv 50%: pt requires assistance with ambulation and adls
ppsv 50%: pt requires assistance with ambulation and adls

## 2024-05-03 NOTE — CONSULT NOTE ADULT - SUBJECTIVE AND OBJECTIVE BOX
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, 4/1-4/3 for drainage from incision site 2/2 hematoma in the laparotomy wound s/p conservative mgmt, p/w fall and lethargy; in er, found to be in afib rvr w ua suggestive of uti; admit to medicine for further mgmt (28 Apr 2024 22:37) Palliative consulted for goc    PERTINENT PM/SXH:   Hypertension    Hypothyroid    Osteoarthritis    CAD (coronary artery disease)    LITTLE (obstructive sleep apnea)    History of MI (myocardial infarction)    Polyp of corpus uteri    Heart murmur    Bilateral hearing loss, unspecified hearing loss type    Obesity (BMI 35.0-39.9 without comorbidity)    Obesity    Mixed stress and urge urinary incontinence    Overactive bladder      No significant past surgical history    S/P ORIF (open reduction internal fixation) fracture    S/P appendectomy    S/P knee replacement    Stented coronary artery    S/P laparotomy    H/O dilation and curettage      FAMILY HISTORY:      ITEMS NOT CHECKED ARE NOT PRESENT    SOCIAL HISTORY:   Significant other/partner[ ]  Children[ x]  Advent/Spirituality:  Substance hx:  [ ]   Tobacco hx:  [ ]   Alcohol hx: [ ]   Home Opioid hx:  [ ] I-Stop Reference No:  Living Situation: [ ]Home  [ ]Long term care  [x ]Rehab [ ]Other    ADVANCE DIRECTIVES:    DNR/MOLST  [ ]  Living Will  [ ]   DECISION MAKER(s):  [ ] Health Care Proxy(s)  [ x] Surrogate(s)  [ ] Guardian           Name(s): Phone Number(s): Caity     BASELINE (I)ADL(s) (prior to admission):  [place]: [ ]Total  [x ] Moderate [ ]Dependent    Allergies    penicillin (Hives)    Intolerances    MEDICATIONS  (STANDING):  apixaban 5 milliGRAM(s) Oral every 12 hours  ascorbic acid 500 milliGRAM(s) Oral daily  atorvastatin 80 milliGRAM(s) Oral at bedtime  cefTRIAXone   IVPB 1000 milliGRAM(s) IV Intermittent every 24 hours  chlorhexidine 2% Cloths 1 Application(s) Topical daily  clopidogrel Tablet 75 milliGRAM(s) Oral daily  digoxin     Tablet 250 MICROGram(s) Oral daily  levothyroxine 175 MICROGram(s) Oral daily  lidocaine   4% Patch 1 Patch Transdermal daily  metoprolol tartrate 100 milliGRAM(s) Oral two times a day  midodrine. 5 milliGRAM(s) Oral three times a day  multivitamin 1 Tablet(s) Oral daily  naloxone Injectable 0.4 milliGRAM(s) IV Push once  nicotine -  14 mG/24Hr(s) Patch 1 Patch Transdermal daily  polyethylene glycol 3350 17 Gram(s) Oral daily  senna 2 Tablet(s) Oral at bedtime  sodium chloride 0.9%. 1000 milliLiter(s) (75 mL/Hr) IV Continuous <Continuous>    MEDICATIONS  (PRN):  acetaminophen     Tablet .. 650 milliGRAM(s) Oral every 6 hours PRN Temp greater or equal to 38C (100.4F), Mild Pain (1 - 3)  aluminum hydroxide/magnesium hydroxide/simethicone Suspension 30 milliLiter(s) Oral every 4 hours PRN Dyspepsia  bisacodyl 5 milliGRAM(s) Oral daily PRN Constipation  melatonin 3 milliGRAM(s) Oral at bedtime PRN Insomnia  metoprolol tartrate Injectable 5 milliGRAM(s) IV Push every 6 hours PRN for sustained afib rvr >120  morphine  - Injectable 4 milliGRAM(s) IV Push every 4 hours PRN Severe Pain (7 - 10)  morphine  - Injectable 2 milliGRAM(s) IV Push every 4 hours PRN Moderate Pain (4 - 6)  ondansetron Injectable 4 milliGRAM(s) IV Push every 8 hours PRN Nausea and/or Vomiting    PRESENT SYMPTOMS: [ ]Unable to self-report see CPOT, PAINADs, RDOS  Source if other than patient:  [ ]Family   [ ]Team     Pain: [ ]yes [x ]no  QOL impact -   Location -                    Aggravating factors -  Quality -  Radiation -  Timing-  Severity (0-10 scale):  Minimal acceptable level (0-10 scale):       Dyspnea:                           [ ]Mild [ ]Moderate [ ]Severe  Anxiety:                             [ ]Mild [ ]Moderate [ ]Severe  Fatigue:                             [ ]Mild [ ]Moderate [ ]Severe  Nausea:                             [ ]Mild [ ]Moderate [ ]Severe  Loss of appetite:              [ ]Mild [ ]Moderate [ ]Severe  Constipation:                    [ ]Mild [ ]Moderate [ ]Severe    PCSSQ [Palliative Care Spiritual Screening Question]   Severity (0-10):  Score of 4 or > indicate consideration of Chaplaincy referral.  Chaplaincy Referral: [ ] yes [ ] refused [ ] following    Caregiver [place]? : [ ] yes [ ] no [ ] deferred:  Social work referral [ ] Patient & Family Centered Care Referral [ ]     Anticipatory Grief Present?: [ ] yes [ ] no  [ ] deferred: Palliative Social work referral [ ]  Patient & Family Centered Care Referral [ ]       Other Symptoms:  [x ]All other review of systems negative   [ ] Unable to obtain due to poor mentation    PHYSICAL EXAM:  Vital Signs Last 24 Hrs  T(C): 36.7 (03 May 2024 12:18), Max: 36.8 (03 May 2024 00:00)  T(F): 98.1 (03 May 2024 12:18), Max: 98.3 (03 May 2024 00:00)  HR: 110 (03 May 2024 12:18) (89 - 113)  BP: 128/81 (03 May 2024 12:18) (102/62 - 128/81)  BP(mean): --  RR: 18 (03 May 2024 12:18) (17 - 18)  SpO2: 94% (03 May 2024 12:18) (94% - 99%)    Parameters below as of 03 May 2024 12:18  Patient On (Oxygen Delivery Method): room air     I&O's Summary    02 May 2024 07:01  -  03 May 2024 07:00  --------------------------------------------------------  IN: 640 mL / OUT: 1600 mL / NET: -960 mL    03 May 2024 07:01  -  03 May 2024 16:56  --------------------------------------------------------  IN: 565 mL / OUT: 450 mL / NET: 115 mL        GENERAL:  [x]Alert  [x]Oriented x 1  [ ]Lethargic  [ ]Cachexia  [ ]Unarousable  [x]Verbal  [ ]Non-Verbal  Behavioral:   [ ]Anxiety  [ ]Delirium [ ]Agitation [ ]Other  HEENT:  [x]Normal   [ ]Dry mouth   [ ]ET Tube/Trach  [ ]Oral lesions  PULMONARY:   [x]Clear [ ]Tachypnea  [ ]Audible excessive secretions   [ ]Rhonchi        [ ]Right [ ]Left [ ]Bilateral  [ ]Crackles        [ ]Right [ ]Left [ ]Bilateral  [ ]Wheezing     [ ]Right [ ]Left [ ]Bilateral  [ ]Diminished BS [ ] Right [ ]Left [ ]Bilateral  CARDIOVASCULAR:    []Regular [x ]Irregular [ ]Tachy  [ ]Gerson [ ]Murmur [ ]Other  GASTROINTESTINAL:  [x]Soft  [ ]Distended   [x]+BS  [x]Non tender [ ]Tender  [ ]PEG [ ]OGT/ NGT   Last BM:    GENITOURINARY:  [ ]Normal [ x]Incontinent   [ ]Oliguria/Anuria   [ ]Melvin  MUSCULOSKELETAL:   [ ]Normal   [x]Weakness  [ x]Bed/Wheelchair bound [ ]Edema  NEUROLOGIC:   [ ]No focal deficits  [ x] Cognitive impairment  [ ] Dysphagia [ ]Dysarthria [ ] Paresis [ ]Other   SKIN:   [x]Normal  [ ]Rash   [ ]Pressure ulcer(s) [ ]y [ ]n present on admission    CRITICAL CARE:  [ ] Shock Present  [ ]Septic [ ]Cardiogenic [ ]Neurologic [ ]Hypovolemic  [ ]  Vasopressors [ ]  Inotropes   [ ]Respiratory failure present [ ]Mechanical ventilation [ ]Non-invasive ventilatory support [ ]High flow    [ ]Acute  [ ]Chronic [ ]Hypoxic  [ ]Hypercarbic [ ]Other  [ ]Other organ failure     LABS:                        10.8   9.09  )-----------( 217      ( 03 May 2024 06:55 )             33.0   05-03    137  |  108  |  10  ----------------------------<  72  3.9   |  16<L>  |  1.09    Ca    8.4      03 May 2024 06:54        Urinalysis Basic - ( 03 May 2024 06:54 )    Color: x / Appearance: x / SG: x / pH: x  Gluc: 72 mg/dL / Ketone: x  / Bili: x / Urobili: x   Blood: x / Protein: x / Nitrite: x   Leuk Esterase: x / RBC: x / WBC x   Sq Epi: x / Non Sq Epi: x / Bacteria: x      RADIOLOGY & ADDITIONAL STUDIES:    PROTEIN CALORIE MALNUTRITION PRESENT: [ ]mild [ ]moderate [ ]severe [ ]underweight [ ]morbid obesity  https://www.andeal.org/vault/2440/web/files/ONC/Table_Clinical%20Characteristics%20to%20Document%20Malnutrition-White%20JV%20et%20al%202012.pdf    Height (cm): 162.6 (04-28-24 @ 11:46), 162.6 (04-01-24 @ 17:36), 162.6 (02-22-24 @ 18:55)  Weight (kg): 72.6 (04-28-24 @ 11:46), 78.8 (04-01-24 @ 17:36), 88.8 (02-22-24 @ 18:55)  BMI (kg/m2): 27.5 (04-28-24 @ 11:46), 29.8 (04-01-24 @ 17:36), 33.6 (02-22-24 @ 18:55)    [ ]PPSV2 < or = to 30% [ ]significant weight loss  [ ]poor nutritional intake  [ ]anasarca[ ]Artificial Nutrition      Other REFERRALS:  [ ]Hospice  [ ]Child Life  [ ]Social Work  [ ]Case management [ ]Holistic Therapy     Care Coordination Assessment 201 [C. Provider] (04-29-24 @ 11:41)      Palliative Performance Scale:  http://npcrc.org/files/news/palliative_performance_scale_ppsv2.pdf  (Ctrl +  left click to view)  Respiratory Distress Observation Tool:  https://homecareinformation.net/handouts/hen/Respiratory_Distress_Observation_Scale.pdf (Ctrl +  left click to view)  PAINAD Score:  http://geriatrictoolkit.[place].[facility]/cog/painad.pdf (Ctrl +  left click to view)

## 2024-05-03 NOTE — ADVANCED PRACTICE NURSE CONSULT - RECOMMEDATIONS
1. Wash & dry skin   2. Apply bead of paste to creases at 3 & 9 o'clock to level pouching system  5. Repouch w/Derick 1 3/4" convex skin barrier (cut to 1"), stoma paste to opening at back of skin barrier & high output pouch  6. Stoma belt applied for extra support @3 & 9o'clock    Continue to reinforce ostomy teaching; empty pouch when 1/3-1/2 full; change pouching system every 3-5 days  with Sperry 1 3/4" convex skin barrier #58408 & high output pouch #24463, stoma belt #7300, stoma paste #[id number], stoma powder #7906, 3M Cavilon No sting barrier film wipe #9391  Discussed treatment plan w pt's daughter, Caity & patient.    Elena Middleton, NP-C, CWOCN via TEAMS  
Recommendation:      1) Topical therapy   a. Sacral/bilateral buttocks- cleanse w/incontinent cleanser & pat dry. Apply Feliberto moisture barrier ointment twice daily & prn incontinent episodes.   Incontinent management - incontinent cleanser, pads, pericare BID and as needed, continue with external female catheter.  b. L knee- monitor for changes; apply 3 M No sting liquid barrier film wipe daily  2. Maintain on an alternating air with low air loss surface   3. Continue turning & repositioning every 2 hr  4. Complete Cair air fluidized boots; ensure that the soles of the feet are not resting on the foot board of the bed  5. Nutrition optimization  6. Seat cushion when OOB to ; limit time when seating in  (as able)  7. Continue to reinforce ostomy teaching; empty pouch when 1/3-1/2 full; change pouching system every 3 days  with Power 1 3/4" convex skin barrier #18948 & high output pouch #10846, stoma belt #7500, stoma paste #017506, stoma powder #7643, 3M Cavilon No sting barrier film wipe #7663  Discussed treatment plan w pt's daughter, Caity & patient.
Will recommend:  1) Topical therapy   a. Sacral/bilateral buttocks- cleanse w/incontinent cleanser & pat dry. Apply Feliberto moisture barrier ointment twice daily & prn incontinent episodes.   Incontinent management - incontinent cleanser, pads, pericare BID and as needed, continue with external female catheter.  b. L knee- monitor for changes; apply 3 M No sting liquid barrier film wipe daily  2. Maintain on an alternating air with low air loss surface   3. Continue turning & repositioning every 2 hr  4. Complete Cair air fluidized boots; ensure that the soles of the feet are not resting on the foot board of the bed  5. Nutrition optimization  6. Seat cushion when OOB to ; limit time when seating in  (as able)  7. Continue to reinforce ostomy teaching; empty pouch when 1/3-1/2 full; change pouching system every 3 days  with Derick 1 3/4" convex skin barrier #69732 & high output pouch #75254, stoma belt #5500, stoma paste #[id number], stoma powder #6954, 3M Cavilon No sting barrier film wipe #1515  Discussed treatment plan w/staff RN, Cat, & pt's daughter, Caity & patient.

## 2024-05-03 NOTE — CONSULT NOTE ADULT - ASSESSMENT
79yo 73kg f, smoker, w pmh [place], htn, hld, afib, cad c/b mi s/p pci w stents, copd, little, urinary incontinence, hypothyroidism, oa s/p l tkr + l hip orif, and w recent hospitalization 2/16-3/9 for ischemic bowel s/p ex-lap w bowel resection + end ileostomy, 4/1-4/3 for drainage from incision site 2/2 hematoma in the laparotomy wound s/p conservative mgmt, p/w fall and lethargy; in er, found to be in afib rvr w ua suggestive of uti;    #Fall possible syncope  -in setting of afib w RVR and UTI  -second episode of syncope this year  -repeat TTE  -monitor on tele    #Afib w RVR  -bp soft   -add mido 5mg PO TID  -increase BB as bp allows  -ep eval for mgmt of afib and possible MCOT on dc given two episode of syncope    #CAD s/p PCI  -stable  -cont plavix  -cont statin    #UTI  -cont IV abx    75 minutes spent on total encounter; more than 50% of the visit was spent counseling and/or coordinating care by the attending physician.  
Patient visited at bedside inad  LOWER EXTREMITY PHYSICAL EXAM:    Vascular: DP/PT 1/4, B/L, CFT <3 seconds B/L, Temperature gradient wnl, B/L.   Neuro: Epicritic sensation unable to assess to the level of feet b/L  Musculoskeletal/Ortho/skin: No signs of ulceration. No edema, erythema or drainage.   No deep tissue injuries. No pain on palpation or with range of motion both feet.   No signs of cellulitis or infection both feet. No further podiatric care needed.   Reconsult if any signs of DTI/Ulcerations.   
79yo 73kg f, smoker, w pmh [place], htn, hld, afib, cad c/b mi s/p pci w stents, copd, little, urinary incontinence, hypothyroidism, oa s/p l tkr + l hip orif, and w recent hospitalization 2/16-3/9 for ischemic bowel s/p ex-lap w bowel resection + end ileostomy, 4/1-4/3 for drainage from incision site 2/2 hematoma in the laparotomy wound s/p conservative mgmt, p/w fall and lethargy; in er, found to be in afib rvr w ua suggestive of uti; admit to medicine for further mgmt (28 Apr 2024 22:37) Palliative consulted for goc
77yo 73kg f, smoker, w pmh [place], htn, hld, afib, cad c/b mi s/p pci w stents, copd, little, urinary incontinence, hypothyroidism, oa s/p l tkr + l hip orif, and w recent hospitalization 2/16-3/9 for ischemic bowel s/p ex-lap w bowel resection + end ileostomy, 4/1-4/3 for drainage from incision site 2/2 hematoma in the laparotomy wound s/p conservative mgmt, p/w fall and lethargy; in er, found to be in afib rvr w ua suggestive of uti; admit to medicine for further mgmt (Taken from medicine note). Palliative care consulted for assistance with GOC.

## 2024-05-03 NOTE — CONSULT NOTE ADULT - CONVERSATION DETAILS
Spoke with Caity, she is familiar with myself and the palliative care team. We briefly discussed her mom's case and her clinical and disposition concerns. Caity shared she has been so overwhelmed with her mom's care and is very concerned about her quality of life. Discussed plan to meet tomorrow in order to discuss how the palliative team can best support her and her mom. Extensive emotional support provided
Goals of Care Conversation - Advanced Care Planning [Charted Location: [facility] 6TOW 610 D1] [Authored: 03-May-2024 16:37]- for Visit: [id number], Complete, Entered, Signed in Full, Available to Patient    Goals of Care Conversation:    Participants:  · Participants	Family; Staff  · Child(kianna)	Caity  · Provider	Joanie Barrett NP  · 	Eulogio Eastman LCSW     Advance Directives:  · Does patient have Advance Directive	No  · Do you want to complete the HCP and name a [facility] Care Agent	No  · Does Patient Have a Surrogate	Yes  · Surrogate's Name	daughter Caity  · Does the Patient have a Court Appointed Guardian ([id number])	No  · Caregiver:	information could not be obtained     Conversation Discussion:  · Conversation	Diagnosis; Prognosis; MOLST Discussed; Treatment Options; Hospice Referral  · Conversation Details	GAP consulted to assist in GOC discussion and support in the setting of patient with advanced illness. JOSEMANUELW and NP Joanie met with patient's daughter Caity today outside of patient's room.    Team first introduced and reintroduced selves and roles in patient care. Caity verbalized understanding and was receptive to visit today. Team next inquired into Caity's understanding of patient's current medical status, and Caity demonstrates good understanding. Caity discussed events leading to patient's current admission including [facility] stay and subsequent fall requiring readmission. Team validated this with Caity today and provided further overview of status. Team discussed patient with Afib on admission and note cardiology follow up for possible loop recorder placement. Team additionally notes that patient with UTI, and Caity verbalized understanding. Team next inquired into patient and family goals moving forward in care, noting patient already readmitted and at high risk of future readmissions as well given overall status. Caity notes plan for d/c to [facility] and eventually returning home. Team inquired into services in the home once patient is able to return including hospice if in line with goals, but Caity states that her plan would be to manage patient's care needs on her own and allow status to inform further steps. Team validated this and encouraged Caity to consider these options whilst at [facility].    Team then inquired into ACP and what Caity would find acceptable should patient's heart stop or respiratory status become compromised. Caity states that she is not ready to transition to DNR/I status at this time, noting that her mother is still independent and vivacious, wishing to revisit this decision as time continues. Team validated this but again encouraged further consideration given overall condition, and Caity verbalized understanding. Patient remains full code at this time.    Caity discussed her caregiver burden today and notes having taken FMLA from work to better support patient. Caity notes that her father has not been involved in her life since she was a child, and notes that she and the patient are extremely close. Caity was appropriately tearful today and notes that in addition to patient's decompensated status, patient's sister/Caity's aunt  in the fall and Caity continues to mourn this loss. Caity notes that the patient at times forgets that her sister  and will ask for her, which Caity notes further complicates her grief. Team validated this today as well as the close relationship shared by Caity and patient. Team encouraged much self-care during this time, and much emotional support provided today. Team assured ongoing availability and support, and referral to be made to the [facility] for further support as well. Caity verbalized appreciation.    GAP signing off, as goals are clearly defined for d/c to [facility] and full code status. GAP remains available for reconsult should status or goals change prior to d/c.     What Matters Most To Patient and Family:  · What matters most to patient and family	patient care, time spent with family    Personal Advance Directives Treatment Guidelines:    Treatment Guidelines:  · Decision Maker	Surrogate    Location of Discussion:    Time Spent on Advance Care Planning:  Attending or COTY Only.     I personally spent 46 minutes on advance care planning services with the patient. This time is separate and distinct from any other care management services provided on this date.     Location of Discussion:  · Location of discussion	Face to face      Electronic Signatures:  Eulogio Eastman ([facility] [place])  (Signed 03-May-2024 16:55)  	Authored: Goals of Care Conversation, Personal Advance Directives Treatment Guidelines, Location of Discussion      Last Updated: 03-May-2024 16:55 by Eulogio Eastman ([facility] [place])

## 2024-05-03 NOTE — DISCHARGE NOTE PROVIDER - NS AS DC PROVIDER CONTACT Y/N MULTI
Patient called for an appt today for upper respiratory symptoms and stated he's using his inhaler more due to them.  I informed patient that Dr. Mina is not in the ofc today and advised him to go to the [facility].  Patient was agreeable.    Last seen 11/8/2023  Next appt Visit date not found    
Yes

## 2024-05-03 NOTE — PROGRESS NOTE ADULT - ASSESSMENT
79yo 73kg f, smoker, w pmh [place], htn, hld, afib, cad c/b mi s/p pci w stents, copd, little, urinary incontinence, hypothyroidism, oa s/p l tkr + l hip orif, and w recent hospitalization 2/16-3/9 for ischemic bowel s/p ex-lap w bowel resection + end ileostomy, 4/1-4/3 for drainage from incision site 2/2 hematoma in the laparotomy wound s/p conservative mgmt, p/w fall and lethargy; in er, found to be in afib rvr w ua suggestive of uti;    #Fall possible syncope  -in setting of afib w RVR and UTI  -second episode of syncope this year  -repeat TTE  -monitor on tele. if no events prior to DC, plan for ILR     #Afib w RVR, improved rate   - daily  Digoxin 0.25   -mido 5mg PO TID  -increase BB as bp allows      #CAD s/p PCI  -stable  -cont plavix  -cont statin    #UTI  -cont IV abx    75 minutes spent on total encounter; more than 50% of the visit was spent counseling and/or coordinating care by the attending physician.

## 2024-05-03 NOTE — ADVANCED PRACTICE NURSE CONSULT - ASSESSMENT
Consult received & events noted to date. The pt was encountered on 6T-Mrs. Gooden is alert & pleasantly confused and cooperative. Pt is well known to Ostomy RNs from previous surgical admission & readmissions. Pt was last seen by my Ostomy RN colleague during recent admission on 4/2/24. Reintroduced self & role of CWOCN to pt.  Pt requires assistance x3 w/turning & repositioning as s/p mechanical fall (colleague & staff RN at bedside). Pt is on an alternating air with low air loss support surface. Pt is unable to endorse chain of events that led to admission to hospital . Pt is incontinent of urine and an external female catheter in place and known end ileostomy functioning for liquid stool.   On exam noted pouching system leaking & complete pouching system change performed. Noted patient in flat 2 piece pouching system. Upon removal of old pouching system- note end ileostomy red & viable, flush, 1", mucocutaneous junction & peristomal skin intact w/scattered areas of denuded skin. which appears 2/2 to irritant dermatitis from stool undermining under skin barrier. See steps for pouching below:  1. Wash & dry skin   2. Apply dusting of stoma powder to red, moist irritated skin (dust off excess)  3. Dab with No sting barrier film wipe (i.e Cavilon) & fan dry  4. Apply bead of paste to creases at 3 & 9 o'clock to level pouching system  5. Repouch w/Derick 1 3/4" convex skin barrier (cut to 1"), stoma paste to opening at back of skin barrier & high output pouch  6. Stoma belt applied for extra support @3 & 9o'clock  Provided staff RN w/PS # to obtain supplies from material management as well as "extra supplies" & pattern placed at bedside.    On exam noted pt s/p fall & with bruising over her body (facial bruising especially around b/l eyes, b/l arms, elbow, R lateral hip, b/l knees, and b/l legs especially b/l anterior shins) and following was noted:  1. Sacrum/bilateral buttock is a 10 cm L x 6 cm W x 0cm deep area of intact, hyperpigmented skin. Pt endorses pain to area. Given presentation, location & hx of fall with difficulty ambulating following the injury, findings suggest that there may be a component of deep tissue injury present on admission.   Superior to this area is also a "horseshoe" pattern with darkened intact hyperpigmented skin.  Will recommend to continue to monitor.  Pt endorses relief with Feliberto moisture barrier cream in place.  2. R lateral thigh- 10 cm L x 5 cm W x 0 cm deep; intact skin w/ bruising noted w/dusky maroon discoloration and cannot rule out deep tissue injury. Pt denies pain; no surrounding erythema. Will continue to monitor.  3. L knee- 4cm L x 3cm W x 0 cm deep; intact skin w/maroon discoloration noted & may be a component of deep tissue injury. Pt endorses some discomfort/tenderness; no surrounding erythema. Will recommend to monitor & apply Cavilon daily to lay down a protective coating.   4. B/l ankles- Wound care podiatry to f/u   As pt is incontinent of urine, will recommend to apply Feliberto moisture barrier ointment to sacrum/bilateral buttocks to lay down a protective coating and protect skin.  As pt was admitted with a pressure injury, a dietician consult is pending.  Education was provided to pt's daughter regarding ileostomy management and steps for pouching w/correct supplies.  Discussed w/pt and staff RN at beside regarding interventions to prevent pressure injuries including turning & repositioning every 2hr, use of seat cushion when OOB to ch, limiting time when in chair, mobility (as able), & nutrition.     
Reason for Consult:      Requested by medical team to f/u & evaluate ostomy care needs and request for wound care consult for b/l heels skin breakdown present on admission received from nursing. .  PMH is noted as obtained by chart review:  79yo 73kg f, smoker, w pmh [place], htn, hld, afib, cad c/b mi s/p pci w stents, copd, little, urinary incontinence, hypothyroidism, oa s/p l tkr + l hip orif, and w recent hospitalization 2/16-3/9 for ischemic bowel s/p ex-lap w bowel resection + end ileostomy, 4/1-4/3 for drainage from incision site 2/2 hematoma in the laparotomy wound s/p conservative mgmt, p/w fall and lethargy; in er, found to be in afib rvr w ua suggestive of uti; admit to medicine for further mgmt              Assessment:      Consult received & events noted to date. The pt was encountered on 6T-Mrs. Gooden is alert, pleasantly confused, and cooperative. Pt is well known to Ostomy RNs from previous surgical admissions. Pt was last seen during recent admission on 4/2/24. Introduced self & role of WOCN to pt. and patients daughter who is at bedside.   Pt requires assistance x2 w/turning & repositioning as s/p mechanical fall. Pt is on an alternating low air loss support surface. Pt is incontinent of urine and an external female catheter in place and known end ileostomy functioning for liquid stool.   Complete pouching system change performed by daughter under my guidance. Noted patient in convex 2 piece pouching system. Upon removal of old pouching system- note end ileostomy red & viable, flush, 1", mucocutaneous junction & peristomal skin intact.   See steps for pouching below:  1. Wash & dry skin   2. Apply bead of paste to creases at 3 & 9 o'clock to level pouching system  5. Repouch w/Derick 1 3/4" convex skin barrier (cut to 1"), stoma paste to opening at back of skin barrier & high output pouch  6. Stoma belt applied for extra support @3 & 9o'clock  Provided staff RN w/PS # to obtain supplies from material management as well as "extra supplies" & pattern placed at bedside.    On exam noted pt s/p fall & with bruising over her body (facial bruising especially around b/l eyes, b/l arms, elbow, R lateral hip, b/l knees, and b/l legs especially b/l anterior shins) and following was noted:  1. Sacrum/bilateral buttock is a 10 cm L x 6 cm W x 0cm deep area of intact, hyperpigmented skin resolving. Pt endorses pain to area. Given presentation, location & hx of fall with difficulty ambulating following the injury, findings suggest that there may be a component of deep tissue injury present on admission.   Superior to this area is also a "horseshoe" pattern with darkened intact hyperpigmented skin.  Will recommend to continue to monitor.  Pt endorses relief with Feliberto moisture barrier cream in place.  2. R lateral thigh- 10 cm L x 5 cm W x 0 cm deep; intact skin w/ bruising noted w/dusky maroon discoloration and cannot rule out deep tissue injury. Pt denies pain; no surrounding erythema. Will continue to monitor.  3. L knee- 4cm L x 3cm W x 0 cm deep; intact skin w/maroon discoloration noted & may be a component of deep tissue injury. Pt endorses some discomfort/tenderness; no surrounding erythema. Will recommend to monitor & apply Cavilon daily to lay down a protective coating. area is resolving   4. B/l ankles- See POD note  As pt is incontinent of urine, will recommend to apply Feliberto moisture barrier ointment to sacrum/bilateral buttocks to lay down a protective coating and protect skin.  As pt was admitted with a pressure injury, a dietician consult is pending.  Education was provided to pt's daughter regarding ileostomy management and Daughter was hands on in changing entire pouching system.  Discussed w/pt and daughter regarding interventions to prevent pressure injuries including turning & repositioning every 2hr, use of seat cushion when OOB to chair, limiting time when in chair, mobility (as able), & nutrition.               
Complete pouching system change performed by daughter under my guidance. Noted patient in convex 2 piece pouching system with leakage at the midline. RLQ end ileostomy red & viable, protruding stoma, 1", mucocutaneous junction & peristomal skin intact. Peristomal skin slightly irritated.  See steps for pouching below. Provided staff RN w/PS # to obtain supplies from material management as well as "extra supplies" & pattern placed at bedside.

## 2024-05-03 NOTE — PROGRESS NOTE ADULT - ASSESSMENT
77yo 73kg f, smoker, w pmh [place], htn, hld, afib, cad c/b mi s/p pci w stents, copd, little, urinary incontinence, hypothyroidism, oa s/p l tkr + l hip orif, and w recent hospitalization 2/16-3/9 for ischemic bowel s/p ex-lap w bowel resection + end ileostomy, 4/1-4/3 for drainage from incision site 2/2 hematoma in the laparotomy wound s/p conservative mgmt, p/w fall and lethargy; in er, found to be in afib rvr w ua suggestive of uti; admit to medicine for further mgmt      # Acute UTI.   c/w Iv rocephine     # Atrial fibrillation with RVR.   rate not controlled   increase lopressor to 75 mg bid   cardio consulted : see by Dr. Mendiola  EP consult Dr. Hart   - Continue metoprolol, at higher dose of 100mg BID.  - needs ILR prior to discharge    # Encephalopathy / likely worsening dementia   pt. is at [facility] rehab , daughter is bedside   as per her she sees her mom is getting more confused and forgetful   -we d/w her regarding aricept / namenda , she is open for that   will start aricept 5 mg q HS on d/c     # s/p Mech fall :  -likely 2/2 UTI or afib with RVR   fall precaution   no fracture on imaging studies     # Ileostomy in place.   supportive care with pain control, antiemetics, antipyretics, probiotics  ostomy care     # CAD (coronary artery disease).   - h/o cad c/b mi s/p pci w stents  Dr. mendiola consulted     # HTN (hypertension).   ·  Plan: lopressor 100 tid => lopressor 25 bid for now.    # HLD (hyperlipidemia).   ·  Plan: crestor => lipitor.    # COPD (chronic obstructive pulmonary disease).   ·  Plan: h/o smoking, copd, little  - cont nicotine patch  stable     # Hypothyroidism.   ·  Plan: synthroid.    dispo:  fu  palliative care consult to clarify GOC

## 2024-05-03 NOTE — CONSULT NOTE ADULT - PROBLEM SELECTOR RECOMMENDATION 2
pt with afib  as per cardiology ILR prior to d/c   defer to cardiology and EP
urine c/s growing Kliebsiella   sensitive  c/w Iv rocephine

## 2024-05-03 NOTE — GOALS OF CARE CONVERSATION - ADVANCED CARE PLANNING - CONVERSATION DETAILS
GAP consulted to assist in GOC discussion and support in the setting of patient with advanced illness. LCSW and NP Joanie met with patient's daughter Caity today outside of patient's room.    Team first introduced and reintroduced selves and roles in patient care. Caity verbalized understanding and was receptive to visit today. Team next inquired into Caity's understanding of patient's current medical status, and Caity demonstrates good understanding. Caity discussed events leading to patient's current admission including [facility] stay and subsequent fall requiring readmission. Team validated this with Caity today and provided further overview of status. Team discussed patient with Afib on admission and note cardiology follow up for possible loop recorder placement. Team additionally notes that patient with UTI, and Caity verbalized understanding. Team next inquired into patient and family goals moving forward in care, noting patient already readmitted and at high risk of future readmissions as well given overall status. Caity notes plan for d/c to [facility] and eventually returning home. Team inquired into services in the home once patient is able to return including hospice if in line with goals, but Caity states that her plan would be to manage patient's care needs on her own and allow status to inform further steps. Team validated this and encouraged Caity to consider these options whilst at [facility].    Team then inquired into ACP and what Caity would find acceptable should patient's heart stop or respiratory status become compromised. Caity states that she is not ready to transition to DNR/I status at this time, noting that her mother is still independent and vivacious, wishing to revisit this decision as time continues. Team validated this but again encouraged further consideration given overall condition, and Caity verbalized understanding. Patient remains full code at this time.    Caity discussed her caregiver burden today and notes having taken FMLA from work to better support patient. Caity notes that her father has not been involved in her life since she was a child, and notes that she and the patient are extremely close. Caity was appropriately tearful today and notes that in addition to patient's decompensated status, patient's sister/Caity's aunt  in the fall and Caity continues to mourn this loss. Caity notes that the patient at times forgets that her sister  and will ask for her, which Caity notes further complicates her grief. Team validated this today as well as the close relationship shared by Caity and patient. Team encouraged much self-care during this time, and much emotional support provided today. Team assured ongoing availability and support, and [facility] to be [facility] for further support as well. Caity verbalized appreciation.    GAP signing off, as goals are clearly defined for d/c to [facility] and full code status. GAP remains available for reconsult should status or goals change prior to d/c.

## 2024-05-03 NOTE — CONSULT NOTE ADULT - PROBLEM SELECTOR RECOMMENDATION 4
will sign off as goals are established  Can be reached by TEAMS M-F 9-5 Joanie Barrett Any other time please page [phone] if needed
will continue to follow for goc  plan to meet with patient's daughter tomorrow at 2p  will consult palliative care social work for further support  Can be reached by TEAMS M-F 9-5 Joanie Barrett Any other time please page [phone] if needed

## 2024-05-04 LAB
ANION GAP SERPL CALC-SCNC: 15 MMOL/L — SIGNIFICANT CHANGE UP (ref 5–17)
BUN SERPL-MCNC: 9 MG/DL — SIGNIFICANT CHANGE UP (ref 7–23)
CALCIUM SERPL-MCNC: 8.6 MG/DL — SIGNIFICANT CHANGE UP (ref 8.4–10.5)
CHLORIDE SERPL-SCNC: 106 MMOL/L — SIGNIFICANT CHANGE UP (ref 96–108)
CO2 SERPL-SCNC: 17 MMOL/L — LOW (ref 22–31)
CREAT SERPL-MCNC: 1 MG/DL — SIGNIFICANT CHANGE UP (ref 0.5–1.3)
EGFR: 58 ML/MIN/1.73M2 — LOW
GLUCOSE SERPL-MCNC: 86 MG/DL — SIGNIFICANT CHANGE UP (ref 70–99)
HCT VFR BLD CALC: 35 % — SIGNIFICANT CHANGE UP (ref 34.5–45)
HGB BLD-MCNC: 11.5 G/DL — SIGNIFICANT CHANGE UP (ref 11.5–15.5)
MCHC RBC-ENTMCNC: 32.3 PG — SIGNIFICANT CHANGE UP (ref 27–34)
MCHC RBC-ENTMCNC: 32.9 GM/DL — SIGNIFICANT CHANGE UP (ref 32–36)
MCV RBC AUTO: 98.3 FL — SIGNIFICANT CHANGE UP (ref 80–100)
NRBC # BLD: 0 /100 WBCS — SIGNIFICANT CHANGE UP (ref 0–0)
PLATELET # BLD AUTO: 215 K/UL — SIGNIFICANT CHANGE UP (ref 150–400)
POTASSIUM SERPL-MCNC: 4 MMOL/L — SIGNIFICANT CHANGE UP (ref 3.5–5.3)
POTASSIUM SERPL-SCNC: 4 MMOL/L — SIGNIFICANT CHANGE UP (ref 3.5–5.3)
RBC # BLD: 3.56 M/UL — LOW (ref 3.8–5.2)
RBC # FLD: 17.2 % — HIGH (ref 10.3–14.5)
SODIUM SERPL-SCNC: 138 MMOL/L — SIGNIFICANT CHANGE UP (ref 135–145)
WBC # BLD: 10.21 K/UL — SIGNIFICANT CHANGE UP (ref 3.8–10.5)
WBC # FLD AUTO: 10.21 K/UL — SIGNIFICANT CHANGE UP (ref 3.8–10.5)

## 2024-05-04 RX ADMIN — Medication 100 MILLIGRAM(S): at 17:54

## 2024-05-04 RX ADMIN — CLOPIDOGREL BISULFATE 75 MILLIGRAM(S): 75 TABLET, FILM COATED ORAL at 11:28

## 2024-05-04 RX ADMIN — CEFTRIAXONE 100 MILLIGRAM(S): 500 INJECTION, POWDER, FOR SOLUTION INTRAMUSCULAR; INTRAVENOUS at 18:49

## 2024-05-04 RX ADMIN — LIDOCAINE 1 PATCH: 4 CREAM TOPICAL at 23:55

## 2024-05-04 RX ADMIN — Medication 1 PATCH: at 11:27

## 2024-05-04 RX ADMIN — LIDOCAINE 1 PATCH: 4 CREAM TOPICAL at 19:48

## 2024-05-04 RX ADMIN — Medication 500 MILLIGRAM(S): at 11:28

## 2024-05-04 RX ADMIN — MIDODRINE HYDROCHLORIDE 5 MILLIGRAM(S): 2.5 TABLET ORAL at 05:16

## 2024-05-04 RX ADMIN — Medication 1 PATCH: at 08:31

## 2024-05-04 RX ADMIN — Medication 1 PATCH: at 11:25

## 2024-05-04 RX ADMIN — Medication 1 PATCH: at 19:49

## 2024-05-04 RX ADMIN — LIDOCAINE 1 PATCH: 4 CREAM TOPICAL at 11:27

## 2024-05-04 RX ADMIN — Medication 175 MICROGRAM(S): at 05:16

## 2024-05-04 RX ADMIN — MIDODRINE HYDROCHLORIDE 5 MILLIGRAM(S): 2.5 TABLET ORAL at 17:54

## 2024-05-04 RX ADMIN — CHLORHEXIDINE GLUCONATE 1 APPLICATION(S): 213 SOLUTION TOPICAL at 11:28

## 2024-05-04 RX ADMIN — Medication 250 MICROGRAM(S): at 05:16

## 2024-05-04 RX ADMIN — Medication 3 MILLIGRAM(S): at 21:20

## 2024-05-04 RX ADMIN — APIXABAN 5 MILLIGRAM(S): 2.5 TABLET, FILM COATED ORAL at 05:16

## 2024-05-04 RX ADMIN — Medication 1 TABLET(S): at 11:28

## 2024-05-04 RX ADMIN — APIXABAN 5 MILLIGRAM(S): 2.5 TABLET, FILM COATED ORAL at 17:58

## 2024-05-04 RX ADMIN — POLYETHYLENE GLYCOL 3350 17 GRAM(S): 17 POWDER, FOR SOLUTION ORAL at 11:28

## 2024-05-04 RX ADMIN — MIDODRINE HYDROCHLORIDE 5 MILLIGRAM(S): 2.5 TABLET ORAL at 11:28

## 2024-05-04 RX ADMIN — Medication 100 MILLIGRAM(S): at 05:16

## 2024-05-04 RX ADMIN — ATORVASTATIN CALCIUM 80 MILLIGRAM(S): 80 TABLET, FILM COATED ORAL at 21:20

## 2024-05-04 NOTE — PROGRESS NOTE ADULT - ASSESSMENT
77yo 73kg f, smoker, w pmh [place], htn, hld, afib, cad c/b mi s/p pci w stents, copd, little, urinary incontinence, hypothyroidism, oa s/p l tkr + l hip orif, and w recent hospitalization 2/16-3/9 for ischemic bowel s/p ex-lap w bowel resection + end ileostomy, 4/1-4/3 for drainage from incision site 2/2 hematoma in the laparotomy wound s/p conservative mgmt, p/w fall and lethargy; in er, found to be in afib rvr w ua suggestive of uti;    #Fall possible syncope  -in setting of afib w RVR and UTI  -second episode of syncope this year  -repeat TTE  -monitor on tele. if no events prior to DC, plan for ILR     #Afib w RVR, improved rate   - daily  Digoxin 0.25   -mido 5mg PO TID  -increase BB as bp allows      #CAD s/p PCI  -stable  -cont plavix  -cont statin    #UTI  -cont IV abx  35 minutes spent on total encounter; more than 50% of the visit was spent counseling and/or coordinating care by the attending physician.

## 2024-05-04 NOTE — PROGRESS NOTE ADULT - ASSESSMENT
79yo 73kg f, smoker, w pmh [place], htn, hld, afib, cad c/b mi s/p pci w stents, copd, little, urinary incontinence, hypothyroidism, oa s/p l tkr + l hip orif, and w recent hospitalization 2/16-3/9 for ischemic bowel s/p ex-lap w bowel resection + end ileostomy, 4/1-4/3 for drainage from incision site 2/2 hematoma in the laparotomy wound s/p conservative mgmt, p/w fall and lethargy; in er, found to be in afib rvr w ua suggestive of uti; admit to medicine for further mgmt      # Acute UTI.   c/w Iv rocephine     # Atrial fibrillation with RVR.   rate not controlled   increase lopressor to 75 mg bid   cardio consulted : see by Dr. Mendiola  EP consult Dr. Hart   - Continue metoprolol, at higher dose of 100mg BID.  - needs ILR prior to discharge    # Encephalopathy / likely worsening dementia   pt. is at [facility] rehab , daughter is bedside   as per her she sees her mom is getting more confused and forgetful   -we d/w her regarding aricept / namenda , she is open for that   will start aricept 5 mg q HS on d/c     # s/p Mech fall :  -likely 2/2 UTI or afib with RVR   fall precaution   no fracture on imaging studies     # Ileostomy in place.   supportive care with pain control, antiemetics, antipyretics, probiotics  ostomy care     # CAD (coronary artery disease).   - h/o cad c/b mi s/p pci w stents  Dr. mendiola consulted     # HTN (hypertension).   ·  Plan: lopressor 100 tid => lopressor 25 bid for now.    # HLD (hyperlipidemia).   ·  Plan: crestor => lipitor.    # COPD (chronic obstructive pulmonary disease).   ·  Plan: h/o smoking, copd, little  - cont nicotine patch  stable     # Hypothyroidism.   ·  Plan: synthroid.    dispo:  fu  palliative care consult to clarify GOC

## 2024-05-05 RX ADMIN — SENNA PLUS 2 TABLET(S): 8.6 TABLET ORAL at 21:14

## 2024-05-05 RX ADMIN — Medication 500 MILLIGRAM(S): at 13:00

## 2024-05-05 RX ADMIN — Medication 100 MILLIGRAM(S): at 18:08

## 2024-05-05 RX ADMIN — Medication 1 PATCH: at 21:12

## 2024-05-05 RX ADMIN — LIDOCAINE 1 PATCH: 4 CREAM TOPICAL at 21:13

## 2024-05-05 RX ADMIN — MIDODRINE HYDROCHLORIDE 5 MILLIGRAM(S): 2.5 TABLET ORAL at 18:08

## 2024-05-05 RX ADMIN — APIXABAN 5 MILLIGRAM(S): 2.5 TABLET, FILM COATED ORAL at 05:28

## 2024-05-05 RX ADMIN — Medication 1 PATCH: at 08:02

## 2024-05-05 RX ADMIN — MIDODRINE HYDROCHLORIDE 5 MILLIGRAM(S): 2.5 TABLET ORAL at 05:52

## 2024-05-05 RX ADMIN — LIDOCAINE 1 PATCH: 4 CREAM TOPICAL at 13:00

## 2024-05-05 RX ADMIN — MIDODRINE HYDROCHLORIDE 5 MILLIGRAM(S): 2.5 TABLET ORAL at 13:00

## 2024-05-05 RX ADMIN — APIXABAN 5 MILLIGRAM(S): 2.5 TABLET, FILM COATED ORAL at 18:08

## 2024-05-05 RX ADMIN — Medication 175 MICROGRAM(S): at 05:52

## 2024-05-05 RX ADMIN — Medication 250 MICROGRAM(S): at 05:53

## 2024-05-05 RX ADMIN — Medication 1 PATCH: at 13:01

## 2024-05-05 RX ADMIN — CEFTRIAXONE 100 MILLIGRAM(S): 500 INJECTION, POWDER, FOR SOLUTION INTRAMUSCULAR; INTRAVENOUS at 18:08

## 2024-05-05 RX ADMIN — Medication 1 PATCH: at 13:02

## 2024-05-05 RX ADMIN — CHLORHEXIDINE GLUCONATE 1 APPLICATION(S): 213 SOLUTION TOPICAL at 13:02

## 2024-05-05 RX ADMIN — Medication 1 TABLET(S): at 13:02

## 2024-05-05 RX ADMIN — CLOPIDOGREL BISULFATE 75 MILLIGRAM(S): 75 TABLET, FILM COATED ORAL at 13:01

## 2024-05-05 RX ADMIN — POLYETHYLENE GLYCOL 3350 17 GRAM(S): 17 POWDER, FOR SOLUTION ORAL at 13:01

## 2024-05-05 RX ADMIN — ATORVASTATIN CALCIUM 80 MILLIGRAM(S): 80 TABLET, FILM COATED ORAL at 21:14

## 2024-05-05 RX ADMIN — Medication 100 MILLIGRAM(S): at 05:28

## 2024-05-05 NOTE — PROGRESS NOTE ADULT - ASSESSMENT
79yo 73kg f, smoker, w pmh [place], htn, hld, afib, cad c/b mi s/p pci w stents, copd, little, urinary incontinence, hypothyroidism, oa s/p l tkr + l hip orif, and w recent hospitalization 2/16-3/9 for ischemic bowel s/p ex-lap w bowel resection + end ileostomy, 4/1-4/3 for drainage from incision site 2/2 hematoma in the laparotomy wound s/p conservative mgmt, p/w fall and lethargy; in er, found to be in afib rvr w ua suggestive of uti;    #Fall possible syncope  -in setting of afib w RVR and UTI  -second episode of syncope this year  -repeat TTE  -monitor on tele. if no events prior to DC, plan for ILR     #Afib w RVR, improved rate   - daily  Digoxin 0.25   -mido 5mg PO TID  -increase BB as bp allows      #CAD s/p PCI  -stable  -cont plavix  -cont statin    #UTI  -cont IV abx  35 minutes spent on total encounter; more than 50% of the visit was spent counseling and/or coordinating care by the attending physician.

## 2024-05-05 NOTE — PROGRESS NOTE ADULT - NS ATTEND AMEND GEN_ALL_CORE FT
recommending ILR before discharge, if in line with patient/family's goals of care.  continue AV felicia blockers for AFib rate control, and continue anticoagulation.  continue telemetry. as of yet, no firm indication for pacemaker insertion.
no change in above plan of care.    will discuss ILR prior to discharge.

## 2024-05-05 NOTE — PROGRESS NOTE ADULT - ASSESSMENT
79yo 73kg f, smoker, w pmh [place], htn, hld, afib, cad c/b mi s/p pci w stents, copd, little, urinary incontinence, hypothyroidism, oa s/p l tkr + l hip orif, and w recent hospitalization 2/16-3/9 for ischemic bowel s/p ex-lap w bowel resection + end ileostomy, 4/1-4/3 for drainage from incision site 2/2 hematoma in the laparotomy wound s/p conservative mgmt, p/w fall and lethargy; in er, found to be in afib rvr w ua suggestive of uti; admit to medicine for further mgmt      # Acute UTI.   c/w Iv rocephine : completing 7 days     # Atrial fibrillation with RVR.   rate not controlled   increase lopressor to 75 mg bid   cardio consulted : see by Dr. Mendiola  EP consult Dr. Hart   - Continue metoprolol, at higher dose of 100mg BID.  - needs ILR prior to discharge    # Encephalopathy / likely worsening dementia   pt. is at [facility] rehab , daughter is bedside   as per her she sees her mom is getting more confused and forgetful   -we d/w her regarding aricept / namenda , she is open for that   will start aricept 5 mg q HS on d/c     # s/p Mech fall :  -likely 2/2 UTI or afib with RVR   fall precaution   no fracture on imaging studies     # Ileostomy in place.   supportive care with pain control, antiemetics, antipyretics, probiotics  ostomy care     # CAD (coronary artery disease).   - h/o cad c/b mi s/p pci w stents  Dr. mendiola consulted     # HTN (hypertension).   ·  Plan: lopressor 100 tid => lopressor 25 bid for now.    # HLD (hyperlipidemia).   ·  Plan: crestor => lipitor.    # COPD (chronic obstructive pulmonary disease).   ·  Plan: h/o smoking, copd, little  - cont nicotine patch  stable     # Hypothyroidism.   ·  Plan: synthroid.    dispo:  fu  palliative care consult to clarify GOC

## 2024-05-06 ENCOUNTER — TRANSCRIPTION ENCOUNTER (OUTPATIENT)
Age: 79
End: 2024-05-06

## 2024-05-06 VITALS
DIASTOLIC BLOOD PRESSURE: 77 MMHG | HEART RATE: 108 BPM | SYSTOLIC BLOOD PRESSURE: 125 MMHG | OXYGEN SATURATION: 99 % | TEMPERATURE: 98 F | RESPIRATION RATE: 18 BRPM

## 2024-05-06 PROCEDURE — 84132 ASSAY OF SERUM POTASSIUM: CPT

## 2024-05-06 PROCEDURE — 84443 ASSAY THYROID STIM HORMONE: CPT

## 2024-05-06 PROCEDURE — 83880 ASSAY OF NATRIURETIC PEPTIDE: CPT

## 2024-05-06 PROCEDURE — 85379 FIBRIN DEGRADATION QUANT: CPT

## 2024-05-06 PROCEDURE — 82330 ASSAY OF CALCIUM: CPT

## 2024-05-06 PROCEDURE — 83550 IRON BINDING TEST: CPT

## 2024-05-06 PROCEDURE — 80048 BASIC METABOLIC PNL TOTAL CA: CPT

## 2024-05-06 PROCEDURE — 85018 HEMOGLOBIN: CPT

## 2024-05-06 PROCEDURE — 36415 COLL VENOUS BLD VENIPUNCTURE: CPT

## 2024-05-06 PROCEDURE — 85025 COMPLETE CBC W/AUTO DIFF WBC: CPT

## 2024-05-06 PROCEDURE — 97116 GAIT TRAINING THERAPY: CPT

## 2024-05-06 PROCEDURE — 85730 THROMBOPLASTIN TIME PARTIAL: CPT

## 2024-05-06 PROCEDURE — 83540 ASSAY OF IRON: CPT

## 2024-05-06 PROCEDURE — 72125 CT NECK SPINE W/O DYE: CPT | Mod: MC

## 2024-05-06 PROCEDURE — 84100 ASSAY OF PHOSPHORUS: CPT

## 2024-05-06 PROCEDURE — 92610 EVALUATE SWALLOWING FUNCTION: CPT

## 2024-05-06 PROCEDURE — 71045 X-RAY EXAM CHEST 1 VIEW: CPT

## 2024-05-06 PROCEDURE — 97110 THERAPEUTIC EXERCISES: CPT

## 2024-05-06 PROCEDURE — 97161 PT EVAL LOW COMPLEX 20 MIN: CPT

## 2024-05-06 PROCEDURE — 83735 ASSAY OF MAGNESIUM: CPT

## 2024-05-06 PROCEDURE — 87641 MR-STAPH DNA AMP PROBE: CPT

## 2024-05-06 PROCEDURE — 87040 BLOOD CULTURE FOR BACTERIA: CPT

## 2024-05-06 PROCEDURE — 83036 HEMOGLOBIN GLYCOSYLATED A1C: CPT

## 2024-05-06 PROCEDURE — 84295 ASSAY OF SERUM SODIUM: CPT

## 2024-05-06 PROCEDURE — 76770 US EXAM ABDO BACK WALL COMP: CPT

## 2024-05-06 PROCEDURE — 84484 ASSAY OF TROPONIN QUANT: CPT

## 2024-05-06 PROCEDURE — 87086 URINE CULTURE/COLONY COUNT: CPT

## 2024-05-06 PROCEDURE — 71260 CT THORAX DX C+: CPT | Mod: MC

## 2024-05-06 PROCEDURE — 85610 PROTHROMBIN TIME: CPT

## 2024-05-06 PROCEDURE — 96367 TX/PROPH/DG ADDL SEQ IV INF: CPT

## 2024-05-06 PROCEDURE — 96375 TX/PRO/DX INJ NEW DRUG ADDON: CPT

## 2024-05-06 PROCEDURE — 74177 CT ABD & PELVIS W/CONTRAST: CPT | Mod: MC

## 2024-05-06 PROCEDURE — 82607 VITAMIN B-12: CPT

## 2024-05-06 PROCEDURE — 93306 TTE W/DOPPLER COMPLETE: CPT

## 2024-05-06 PROCEDURE — 70450 CT HEAD/BRAIN W/O DYE: CPT | Mod: MC

## 2024-05-06 PROCEDURE — 96365 THER/PROPH/DIAG IV INF INIT: CPT

## 2024-05-06 PROCEDURE — 85045 AUTOMATED RETICULOCYTE COUNT: CPT

## 2024-05-06 PROCEDURE — 84145 PROCALCITONIN (PCT): CPT

## 2024-05-06 PROCEDURE — 80053 COMPREHEN METABOLIC PANEL: CPT

## 2024-05-06 PROCEDURE — 85014 HEMATOCRIT: CPT

## 2024-05-06 PROCEDURE — 82728 ASSAY OF FERRITIN: CPT

## 2024-05-06 PROCEDURE — 82435 ASSAY OF BLOOD CHLORIDE: CPT

## 2024-05-06 PROCEDURE — 83615 LACTATE (LD) (LDH) ENZYME: CPT

## 2024-05-06 PROCEDURE — 87077 CULTURE AEROBIC IDENTIFY: CPT

## 2024-05-06 PROCEDURE — 99285 EMERGENCY DEPT VISIT HI MDM: CPT | Mod: 25

## 2024-05-06 PROCEDURE — 96366 THER/PROPH/DIAG IV INF ADDON: CPT

## 2024-05-06 PROCEDURE — 72170 X-RAY EXAM OF PELVIS: CPT

## 2024-05-06 PROCEDURE — C1764: CPT

## 2024-05-06 PROCEDURE — 83605 ASSAY OF LACTIC ACID: CPT

## 2024-05-06 PROCEDURE — 87640 STAPH A DNA AMP PROBE: CPT

## 2024-05-06 PROCEDURE — 85027 COMPLETE CBC AUTOMATED: CPT

## 2024-05-06 PROCEDURE — 81001 URINALYSIS AUTO W/SCOPE: CPT

## 2024-05-06 PROCEDURE — 87186 SC STD MICRODIL/AGAR DIL: CPT

## 2024-05-06 PROCEDURE — 82947 ASSAY GLUCOSE BLOOD QUANT: CPT

## 2024-05-06 PROCEDURE — 82803 BLOOD GASES ANY COMBINATION: CPT

## 2024-05-06 PROCEDURE — 82746 ASSAY OF FOLIC ACID SERUM: CPT

## 2024-05-06 PROCEDURE — 80061 LIPID PANEL: CPT

## 2024-05-06 PROCEDURE — 83010 ASSAY OF HAPTOGLOBIN QUANT: CPT

## 2024-05-06 PROCEDURE — 97166 OT EVAL MOD COMPLEX 45 MIN: CPT

## 2024-05-06 PROCEDURE — 97530 THERAPEUTIC ACTIVITIES: CPT

## 2024-05-06 RX ORDER — METOPROLOL TARTRATE 50 MG
5 TABLET ORAL
Qty: 0 | Refills: 0 | DISCHARGE
Start: 2024-05-06

## 2024-05-06 RX ORDER — ASCORBIC ACID 60 MG
1 TABLET,CHEWABLE ORAL
Qty: 0 | Refills: 0 | DISCHARGE
Start: 2024-05-06

## 2024-05-06 RX ORDER — POLYETHYLENE GLYCOL 3350 17 G/17G
17 POWDER, FOR SOLUTION ORAL
Qty: 0 | Refills: 0 | DISCHARGE
Start: 2024-05-06

## 2024-05-06 RX ORDER — DIGOXIN 250 MCG
1 TABLET ORAL
Qty: 0 | Refills: 0 | DISCHARGE
Start: 2024-05-06

## 2024-05-06 RX ORDER — ROSUVASTATIN CALCIUM 5 MG/1
1 TABLET ORAL
Qty: 0 | Refills: 0 | DISCHARGE

## 2024-05-06 RX ORDER — METOPROLOL TARTRATE 50 MG
125 TABLET ORAL
Refills: 0 | Status: DISCONTINUED | OUTPATIENT
Start: 2024-05-06 | End: 2024-05-06

## 2024-05-06 RX ORDER — MIDODRINE HYDROCHLORIDE 2.5 MG/1
1 TABLET ORAL
Qty: 0 | Refills: 0 | DISCHARGE
Start: 2024-05-06

## 2024-05-06 RX ORDER — ACETAMINOPHEN 500 MG
2 TABLET ORAL
Qty: 0 | Refills: 0 | DISCHARGE
Start: 2024-05-06

## 2024-05-06 RX ORDER — ATORVASTATIN CALCIUM 80 MG/1
1 TABLET, FILM COATED ORAL
Qty: 0 | Refills: 0 | DISCHARGE
Start: 2024-05-06

## 2024-05-06 RX ORDER — LIDOCAINE 4 G/100G
1 CREAM TOPICAL
Qty: 0 | Refills: 0 | DISCHARGE
Start: 2024-05-06

## 2024-05-06 RX ORDER — SENNA PLUS 8.6 MG/1
2 TABLET ORAL
Qty: 0 | Refills: 0 | DISCHARGE
Start: 2024-05-06

## 2024-05-06 RX ADMIN — Medication 100 MILLIGRAM(S): at 05:22

## 2024-05-06 RX ADMIN — LIDOCAINE 1 PATCH: 4 CREAM TOPICAL at 01:39

## 2024-05-06 RX ADMIN — APIXABAN 5 MILLIGRAM(S): 2.5 TABLET, FILM COATED ORAL at 17:35

## 2024-05-06 RX ADMIN — APIXABAN 5 MILLIGRAM(S): 2.5 TABLET, FILM COATED ORAL at 05:21

## 2024-05-06 RX ADMIN — Medication 1 PATCH: at 12:11

## 2024-05-06 RX ADMIN — Medication 125 MILLIGRAM(S): at 17:35

## 2024-05-06 RX ADMIN — LIDOCAINE 1 PATCH: 4 CREAM TOPICAL at 12:07

## 2024-05-06 RX ADMIN — LIDOCAINE 1 PATCH: 4 CREAM TOPICAL at 17:39

## 2024-05-06 RX ADMIN — CLOPIDOGREL BISULFATE 75 MILLIGRAM(S): 75 TABLET, FILM COATED ORAL at 12:10

## 2024-05-06 RX ADMIN — MIDODRINE HYDROCHLORIDE 5 MILLIGRAM(S): 2.5 TABLET ORAL at 05:22

## 2024-05-06 RX ADMIN — Medication 500 MILLIGRAM(S): at 12:10

## 2024-05-06 RX ADMIN — Medication 1 TABLET(S): at 12:10

## 2024-05-06 RX ADMIN — MIDODRINE HYDROCHLORIDE 5 MILLIGRAM(S): 2.5 TABLET ORAL at 17:35

## 2024-05-06 RX ADMIN — CHLORHEXIDINE GLUCONATE 1 APPLICATION(S): 213 SOLUTION TOPICAL at 11:57

## 2024-05-06 RX ADMIN — MIDODRINE HYDROCHLORIDE 5 MILLIGRAM(S): 2.5 TABLET ORAL at 12:10

## 2024-05-06 RX ADMIN — Medication 175 MICROGRAM(S): at 05:21

## 2024-05-06 RX ADMIN — Medication 250 MICROGRAM(S): at 05:21

## 2024-05-06 NOTE — PROGRESS NOTE ADULT - PROVIDER SPECIALTY LIST ADULT
Cardiology
Cardiology
Electrophysiology
Internal Medicine
Cardiology
Electrophysiology
Internal Medicine
Electrophysiology
Internal Medicine
Cardiology

## 2024-05-06 NOTE — DISCHARGE NOTE PROVIDER - CARE PROVIDER_API CALL
Obinna Mendiola  Cardiology  [address] [place] [address], [address]  [place] [address]  Phone: [phone]  Fax: [phone]  Follow Up Time: 2 weeks    Anam Hart  Cardiovascular Disease  [address] [place] [address], [address]  [place] [address]  Phone: [phone]  Fax: [phone]  Follow Up Time: 2 weeks   Obinna Mendiola  Cardiology  [address] [place] [address], [address]  [place] [address]  Phone: [phone]  Fax: [phone]  Follow Up Time: 2 weeks    Anam Hart  Cardiovascular Disease  [address] [place] [address], [address]  [place] [address]  Phone: [phone]  Fax: [phone]  Follow Up Time: 2 weeks    Dagoberto Obrien  Internal Medicine  [address], [address]  [place] [address]  Phone: [phone]  Fax: [phone]  Follow Up Time:

## 2024-05-06 NOTE — DISCHARGE NOTE PROVIDER - PROVIDER TOKENS
PROVIDER:[TOKEN:[[id number]],FOLLOWUP:[2 weeks]],PROVIDER:[TOKEN:[[id number]],FOLLOWUP:[2 weeks]] PROVIDER:[TOKEN:[[id number]],FOLLOWUP:[2 weeks]],PROVIDER:[TOKEN:[[id number]],FOLLOWUP:[2 weeks]],PROVIDER:[TOKEN:[[id number]]]

## 2024-05-06 NOTE — DISCHARGE NOTE PROVIDER - NSDCCPCAREPLAN_GEN_ALL_CORE_FT
PRINCIPAL DISCHARGE DIAGNOSIS  Diagnosis: Syncope  Assessment and Plan of Treatment: you were admitted with syncope likely in the setting of hypotension in the presence of UTI Vs afib with rapid heart rate  you have ILR placed to monitor for arrythmias or bradycarda  follow up with EPS and cardiology      SECONDARY DISCHARGE DIAGNOSES  Diagnosis: Acute UTI  Assessment and Plan of Treatment: you were treated with 7 days of antibiotics    Diagnosis: Atrial fibrillation with RVR  Assessment and Plan of Treatment: continue current doses of Metoprolol and Digoxin  follow up with cardiology and EPS  continue ELiquis    Diagnosis: Encephalopathy  Assessment and Plan of Treatment: ARicept was added for ? Dementia  follow up with PCP     PRINCIPAL DISCHARGE DIAGNOSIS  Diagnosis: Syncope  Assessment and Plan of Treatment: you were admitted with syncope likely in the setting of hypotension in the presence of UTI Vs afib with rapid heart rate  you have ILR placed to monitor for arrythmias or bradycarda  follow up with EPS and cardiology  Followup of ILR data w/ Dr Mendiola.      SECONDARY DISCHARGE DIAGNOSES  Diagnosis: Acute UTI  Assessment and Plan of Treatment: you were treated with 7 days of antibiotics  if symptoms return or worsen, contact pcp    Diagnosis: Atrial fibrillation with RVR  Assessment and Plan of Treatment: continue current doses of Metoprolol and Digoxin  follow up with cardiology and EPS  continue ELiquis    Diagnosis: Encephalopathy  Assessment and Plan of Treatment: ARicept was added for ? Dementia  follow up with PCP

## 2024-05-06 NOTE — CHART NOTE - NSCHARTNOTEFT_GEN_A_CORE
NUTRITION FOLLOW UP NOTE    PATIENT SEEN FOR: first malnutrition follow up    SOURCE: [x] Patient  [x] Current Medical Record  [] RN  [] Family/support person at bedside  [] Patient unavailable/inappropriate  [] Other:    CHART REVIEWED/EVENTS NOTED.  [x] No changes to nutrition care plan to note  [] Nutrition Status:    DIET ORDER:   Diet, Regular:   Prosource Gelatein Plus     Qty per Day:  1 (24)    CURRENT DIET ORDER IS:  [x] Appropriate  [] Inadequate:  [] Other:    NUTRITION INTAKE/PROVISION:  [x] PO: appetite remains fair, Pt reports consuming at least 50% of most meals (RN flowsheets document Pt with 25-50% of meals); Pt likes prosource gelatein plus; no food preferences offered  [] Enteral Nutrition:  [] Parenteral Nutrition:    ANTHROPOMETRICS:  Drug Dosing Weight  Height (cm): 162.6 (2024 11:46)  Weight (kg): 72.6 (2024 11:46)  BMI (kg/m2): 27.5 (2024 11:46)  BSA (m2): 1.78 (2024 11:46)  Weights:   Daily Weight in k.6 (-06), Weight in k (-05), Weight in k.1 (-), Weight in k.1 (-03), Weight in k.5 (-), Weight in k.6 (-), Weight in k.7 (30)  *wt loss noted since admission, could be related to fluctuating fluid status (Pt edematous and also with ileostomy) coupled with fair PO intake - will continue to monitor trend closely    MEDICATIONS:  MEDICATIONS  (STANDING):  apixaban 5 milliGRAM(s) Oral every 12 hours  ascorbic acid 500 milliGRAM(s) Oral daily  atorvastatin 80 milliGRAM(s) Oral at bedtime  chlorhexidine 2% Cloths 1 Application(s) Topical daily  clopidogrel Tablet 75 milliGRAM(s) Oral daily  digoxin     Tablet 250 MICROGram(s) Oral daily  levothyroxine 175 MICROGram(s) Oral daily  lidocaine   4% Patch 1 Patch Transdermal daily  metoprolol tartrate 125 milliGRAM(s) Oral two times a day  midodrine. 5 milliGRAM(s) Oral three times a day  multivitamin 1 Tablet(s) Oral daily  naloxone Injectable 0.4 milliGRAM(s) IV Push once  nicotine -  14 mG/24Hr(s) Patch 1 Patch Transdermal daily  polyethylene glycol 3350 17 Gram(s) Oral daily  senna 2 Tablet(s) Oral at bedtime  sodium chloride 0.9%. 1000 milliLiter(s) (75 mL/Hr) IV Continuous <Continuous>    MEDICATIONS  (PRN):  acetaminophen     Tablet .. 650 milliGRAM(s) Oral every 6 hours PRN Temp greater or equal to 38C (100.4F), Mild Pain (1 - 3)  aluminum hydroxide/magnesium hydroxide/simethicone Suspension 30 milliLiter(s) Oral every 4 hours PRN Dyspepsia  bisacodyl 5 milliGRAM(s) Oral daily PRN Constipation  melatonin 3 milliGRAM(s) Oral at bedtime PRN Insomnia  metoprolol tartrate Injectable 5 milliGRAM(s) IV Push every 6 hours PRN for sustained afib rvr >120  ondansetron Injectable 4 milliGRAM(s) IV Push every 8 hours PRN Nausea and/or Vomiting    NUTRITIONALLY PERTINENT LABS:   Na138 mmol/L Glu 86 mg/dL K+ 4.0 mmol/L Cr  1.00 mg/dL BUN 9 mg/dL  Chol 79 mg/dL LDL --    HDL 34 mg/dL<L> Trig 56 mg/dL  24 @ 06:51 a1c 5.3    A1C with Estimated Average Glucose Result: 5.3 % (24 @ 06:51)    NUTRITIONALLY PERTINENT MEDICATIONS/LABS:  [x] Reviewed  [x] Relevant notes on medications/labs: Pt on bowel regimen and receiving micronutrient supplementation for pressure injuries    EDEMA:  [x] Reviewed  [] Relevant notes:    GI/ I&O:  [x] Reviewed  [x] Relevant notes: Pt reports ostomy output has been normal  [] Other:    SKIN:   [] No pressure injuries documented, per nursing flowsheet  [x] Pressure injury previously noted - b/l heel DTIs, left knee DTI, right lateral thigh DTI, b/l buttock and sacrum DTI  [] Change in pressure injury documentation:  [] Other:    ESTIMATED NEEDS:  [x] No change:  [] Updated:  Energy:  1674.4-1973.4 kcal/day (28-33 kcal/kg)  Protein:  77.7-89,7 g/day (1.3-1.5 g/kg)  Fluid:   ml/day or [x] defer to team  Based on: IBW 59.8 kg    NUTRITION DIAGNOSIS:  [x] Prior Dx: 1) acute moderate malnutrition and 2) increased nutrient needs  [] New Dx:    EDUCATION:  [x] Yes: adequate protein/energy intake for wound healing  [] Not appropriate/warranted    NUTRITION CARE PLAN:  1. Diet: continue regular diet  2. Supplements: continue prosource gelatein plus 1x/day (160 kcal, 20 g Pro/4oz)  3. Multivitamin/mineral supplementation: continue MVI and vit C for wound healing  4: obtain standing wt to verify wt status    [] Achieved - Continue current nutrition intervention(s)  [] Current medical condition precludes nutrition intervention at this time.    MONITORING AND EVALUATION:   RD remains available upon request and will follow up per protocol.    Astrid Mcmahan MPH, RD, CDN  Available on [facility] TEAMS

## 2024-05-06 NOTE — DISCHARGE NOTE PROVIDER - DETAILS OF MALNUTRITION DIAGNOSIS/DIAGNOSES
This patient has been assessed with a concern for Malnutrition and was treated during this hospitalization for the following Nutrition diagnosis/diagnoses:     -  04/30/2024: Moderate protein-calorie malnutrition

## 2024-05-06 NOTE — DISCHARGE NOTE NURSING/CASE MANAGEMENT/SOCIAL WORK - NSDCVIVACCINE_GEN_ALL_CORE_FT
Tdap; 14-Jun-2015 22:07; Rosana Loaiza (RN); [employer]; [id number]; IntraMuscular; Deltoid Left.; 0.5 milliLiter(s); VIS (VIS Published: 09-May-2013, VIS Presented: 14-Jun-2015);

## 2024-05-06 NOTE — DISCHARGE NOTE PROVIDER - NSDCMRMEDTOKEN_GEN_ALL_CORE_FT
apixaban 5 mg oral tablet: 1 tab(s) orally every 12 hours  clopidogrel 75 mg oral tablet: 1 tab(s) orally once a day  levothyroxine 175 mcg (0.175 mg) oral tablet: 1 tab(s) orally once a day note: recently increased from 150mcg.  metoprolol tartrate 100 mg oral tablet: 1 tab(s) orally 3 times a day  nicotine 14 mg/24 hr transdermal film, extended release: 1 patch transdermal every 24 hours  rosuvastatin 20 mg oral tablet: 1 tab(s) orally once a day (at bedtime)   acetaminophen 325 mg oral tablet: 2 tab(s) orally every 6 hours As needed Temp greater or equal to 38C (100.4F), Mild Pain (1 - 3)  apixaban 5 mg oral tablet: 1 tab(s) orally every 12 hours  ascorbic acid 500 mg oral tablet: 1 tab(s) orally once a day  atorvastatin 80 mg oral tablet: 1 tab(s) orally once a day (at bedtime)  clopidogrel 75 mg oral tablet: 1 tab(s) orally once a day  digoxin 250 mcg (0.25 mg) oral tablet: 1 tab(s) orally once a day  levothyroxine 175 mcg (0.175 mg) oral tablet: 1 tab(s) orally once a day note: recently increased from 150mcg.  lidocaine 4% topical film: Apply topically to affected area once a day apply to affected area  metoprolol tartrate 25 mg oral tablet: 5 tab(s) orally 2 times a day  midodrine 5 mg oral tablet: 1 tab(s) orally 3 times a day  Multiple Vitamins oral tablet: 1 tab(s) orally once a day  nicotine 14 mg/24 hr transdermal film, extended release: 1 patch transdermal every 24 hours  polyethylene glycol 3350 oral powder for reconstitution: 17 gram(s) orally once a day  senna leaf extract oral tablet: 2 tab(s) orally once a day (at bedtime)

## 2024-05-06 NOTE — DISCHARGE NOTE NURSING/CASE MANAGEMENT/SOCIAL WORK - NSDCFUADDAPPT_GEN_ALL_CORE_FT
APPTS ARE READY TO BE MADE: [x ] YES    Best Family or Patient Contact (if needed):    Additional Information about above appointments (if needed):    1: Follow up with PCP Dr. Obrien   2: Follow up with cardiology Dr. Mendiola   3: Follow up with EP Dr. Hart     Other comments or requests:

## 2024-05-06 NOTE — DISCHARGE NOTE NURSING/CASE MANAGEMENT/SOCIAL WORK - NSDCPEEMAIL_GEN_ALL_CORE
[facility] email [email or web address]

## 2024-05-06 NOTE — PROGRESS NOTE ADULT - ASSESSMENT
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   c/w Iv rocephine : completing 7 days     # Atrial fibrillation with RVR.   rate not controlled   increase lopressor to 75 mg bid   cardio consulted : see by Dr. Mendiola  EP consult Dr. Hart   - Continue metoprolol, at higher dose of 100mg BID.  - needs ILR prior to discharge: d/w  : will discuss with daughter     # Encephalopathy / likely worsening dementia   pt. is at [facility] rehab , daughter is bedside   as per her she sees her mom is getting more confused and forgetful   -we d/w her regarding aricept / namenda , she is open for that   will start aricept 5 mg q HS on d/c     # s/p [facility] fall :  -likely 2/2 UTI or afib with RVR   fall precaution   no fracture on imaging studies     # Ileostomy in place.   supportive care with pain control, antiemetics, antipyretics, probiotics  ostomy care     # CAD (coronary artery disease).   - h/o cad c/b mi s/p pci w stents  Dr. mendiola consulted     # HTN (hypertension).   ·  Plan: lopressor 100 tid => lopressor 25 bid for now.    # HLD (hyperlipidemia).   ·  Plan: crestor => lipitor.    # COPD (chronic obstructive pulmonary disease).   ·  Plan: h/o smoking, copd, little  - cont nicotine patch  stable     # Hypothyroidism.   ·  Plan: synthroid.    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   c/w Iv rocephine : completing 7 days     # Atrial fibrillation with RVR.   rate not controlled   increase lopressor to 75 mg bid   cardio consulted : see by Dr. Mendiola  EP consult Dr. Hart   - Continue metoprolol, at higher dose of 100mg BID.  - needs ILR prior to discharge: d/w  : will discuss with daughter     # Encephalopathy / likely worsening dementia   pt. is at [facility] rehab , daughter is bedside   as per her she sees her mom is getting more confused and forgetful   -we d/w her regarding aricept / namenda , she is open for that   will start aricept 5 mg q HS on d/c     # s/p [facility] fall :  -likely 2/2 UTI or afib with RVR   fall precaution   no fracture on imaging studies     # Ileostomy in place.   supportive care with pain control, antiemetics, antipyretics, probiotics  ostomy care     # CAD (coronary artery disease).   - h/o cad c/b mi s/p pci w stents  Dr. mendiola consulted     # HTN (hypertension).   ·  Plan: lopressor 100 tid => lopressor 25 bid for now.    # HLD (hyperlipidemia).   ·  Plan: crestor => lipitor.    # COPD (chronic obstructive pulmonary disease).   ·  Plan: h/o smoking, copd, little  - cont nicotine patch  stable     # Hypothyroidism.   ·  Plan: synthroid.    ACP :  full code

## 2024-05-06 NOTE — PROGRESS NOTE ADULT - NUTRITIONAL ASSESSMENT
This patient has been assessed with a concern for Malnutrition and has been determined to have a diagnosis/diagnoses of Moderate protein-calorie malnutrition.    This patient is being managed with:   Diet Regular-  Prosource Gelatein Plus     Qty per Day:  1  Entered: Apr 30 2024 11:50AM  

## 2024-05-06 NOTE — PROGRESS NOTE ADULT - TIME BILLING
Advanced care planning was discussed with patient and family.  Advanced care planning forms were reviewed and discussed as appropriate.  Differential diagnosis and plan of care discussed with patient after the evaluation.   Pain assessed and judicious use of narcotics when appropriate was discussed.  Importance of Fall prevention discussed.  Counseling on Smoking and Alcohol cessation was offered when appropriate.  Counseling on Diet, exercise, and medication compliance was done.
summer and ep
Advanced care planning was discussed with patient and family.  Advanced care planning forms were reviewed and discussed as appropriate.  Differential diagnosis and plan of care discussed with patient after the evaluation.   Pain assessed and judicious use of narcotics when appropriate was discussed.  Importance of Fall prevention discussed.  Counseling on Smoking and Alcohol cessation was offered when appropriate.  Counseling on Diet, exercise, and medication compliance was done.

## 2024-05-06 NOTE — DISCHARGE NOTE NURSING/CASE MANAGEMENT/SOCIAL WORK - NSDCPEFALRISK_GEN_ALL_CORE
For information on Fall & Injury Prevention, visit: [email or web address] OR  [email or web address] OR  https://www.cdc.gov/steadi/patient.html

## 2024-05-06 NOTE — DISCHARGE NOTE PROVIDER - NSDCFUADDAPPT_GEN_ALL_CORE_FT
APPTS ARE READY TO BE MADE: [x ] YES    Best Family or Patient Contact (if needed):    Additional Information about above appointments (if needed):    1:   2:   3:     Other comments or requests:    APPTS ARE READY TO BE MADE: [x ] YES    Best Family or Patient Contact (if needed):    Additional Information about above appointments (if needed):    1: Follow up with PCP Dr. Obrien   2: Follow up with cardiology Dr. Mendiola   3: Follow up with EP Dr. Hart     Other comments or requests:    APPTS ARE READY TO BE MADE: [x ] YES    Best Family or Patient Contact (if needed):    Additional Information about above appointments (if needed):    1: Follow up with PCP Dr. Obrien   2: Follow up with cardiology Dr. Mendiola   3: Follow up with EP Dr. Hart     Other comments or requests:     Patient is being discharged to [facility]. Caregiver will arrange follow up.

## 2024-05-06 NOTE — DISCHARGE NOTE NURSING/CASE MANAGEMENT/SOCIAL WORK - PATIENT PORTAL LINK FT
You can access the FollowMyHealth Patient Portal offered by [facility] by registering at the following website: [email or web address]. By joining [email or web address] FollowMyHealth portal, you will also be able to view your health information using other applications (apps) compatible with our system.

## 2024-05-06 NOTE — DISCHARGE NOTE PROVIDER - NSDCQMPCI_CARD_ALL_CORE
INTERVENTIONAL PULMONOLOGY       Maryse Pierson   MRN# [id number]   Age: 39 year old YOB: 1982     Date of Admission:  3/21/2022  Reason for Consultation:    Post trach follow up   Requesting Physician:         MOE Ye MD        Assessment and Plan:    Patient post percutaneous tracheostomy placement due to failure to wean from ventilator . Placed on 4/20/22 . Initial healing without complication .  Tracheal site with ulceration development . Patient has been on a prolonged high dose steroid course for OP in addition to baseline immunosuppressant medication for her lung transplant . Likely contributors to ulcer formation being mechanical irritation from trach itself , poor wound healing due to  poor nutritional status , immunosuppressed on chronic higher dose prednisone.    Will send trach site cultures , start systemic antibiotics , topical antibiotics TID and dry dressing .      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  Admitted on 3/21/22 for progressive dyspnea, fatigue, and hypoxia, requiring intubation (3/24), with concern for recurrent PsA pneumonia and ongoing CLAD.   Failed extubation 4/2 due to respiratory acidosis. Known to have elevated PIP.      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 Flank pain. No radiologic verification     Pancreatic insufficiencies      Patent ductus arteriosus 7/15/2015     Pneumonia 1/27/2021     Sinusitis, chronic      Very severe chronic obstructive pulmonary disease (H)            Past Surgical History:      Past Surgical History:   Procedure Laterality Date     BRONCHOSCOPY (RIGID OR FLEXIBLE), DIAGNOSTIC N/A 02/18/2021    Procedure: BRONCHOSCOPY, WITH BRONCHOALVEOLAR LAVAGE;  Surgeon: Vaughn Landaverde MD;  Location: [facility]     BRONCHOSCOPY FLEXIBLE N/A 10/27/2016    Procedure: BRONCHOSCOPY FLEXIBLE;  Surgeon: Vaughn Landaverde MD;  Location: [facility]     COLONOSCOPY N/A 02/04/2019    Procedure: Combined Colonoscopy, Single Or Multiple Biopsy/Polypectomy By Biopsy;  Surgeon: Vitaliy Hawkins MD;  Location: [facility]     COLONOSCOPY N/A 11/08/2021    Procedure: COLONOSCOPY, FLEXIBLE, WITH polypectomy USING SNARE;  Surgeon: Vitaliy Hawkins MD;  Location: [facility]     Failed Midline in left lateral brachial vein Left 03/23/2022    Failed Midline in left Lateral brachial vein     FESS  12/01/2010     IR ARM PORT PLACEMENT < 5 YRS OF AGE  03/01/2009     IR CVC TUNNEL PLACEMENT > 5 YRS OF AGE  02/15/2021     IR GASTRO JEJUNOSTOMY TUBE PLACEMENT  3/30/2022     IR LYMPH NODE BIOPSY  10/20/2020     IR PICC EXCHANGE RIGHT  12/27/2021     IR PICC EXCHANGE RIGHT  12/29/2021     MIDLINE DOUBLE LUMEN PLACEMENT Right 04/25/2021    5FR DL midline     MIDLINE INSERTION -  "DOUBLE LUMEN Right 12/02/2021    right basilic 15 cm midline     PICC SINGLE LUMEN PLACEMENT Right 02/09/2021    42 cm basilic     PICC TRIPLE LUMEN PLACEMENT Left 01/29/2021    6Fr TL PICC. Length 41cm (1cm out). Chronic right DVT.     TRANSPLANT LUNG RECIPIENT SINGLE X2 Bilateral 10/21/2016    Procedure: TRANSPLANT LUNG RECIPIENT SINGLE X2;  Surgeon: Kailyn Oliveros MD;  [facility]          Social History:     Social History     Tobacco Use     Smoking status: Never Smoker     Smokeless tobacco: Never Used   Substance Use Topics     Alcohol use: No     Alcohol/week: 0.0 standard drinks     Comment: none           Family History:     Family History   Problem Relation Age of Onset     Diabetes Mother      Diabetes Maternal Grandmother      Diabetes Maternal Grandfather      Diabetes Paternal Grandfather      Cancer No family hx of         No family history of skin cancer     Melanoma No family hx of      Skin Cancer No family hx of            Allergies:      Allergies   Allergen Reactions     Chlorhexidine Rash     Chloroprep skin prep     Benzoin Rash     Vancomycin Itching     Adhesive Tape Blisters and Dermatitis     Piperacillin-Tazobactam In D5w Hives     Seroquel [Quetiapine]      Per family report; goes \"psychotic\"     Sulfa Drugs Nausea and Vomiting     Sulfisoxazole Nausea     As child     Alcohol Swabs [Isopropyl Alcohol] Rash and Blisters     Ceftazidime Hives and Rash     Tolerated ceftazidime (2/2021)     Merrem [Meropenem] Rash     Underwent desensitization 9/2012 and again 5/2013     Sulfamethoxazole-Trimethoprim Nausea     Zosyn Rash          Medications:     Current Facility-Administered Medications   Medication     0.9% sodium chloride BOLUS     0.9% sodium chloride BOLUS     0.9% sodium chloride BOLUS     acetaminophen (TYLENOL) tablet 650 mg     acetylcysteine (MUCOMYST) 10 % nebulizer solution 4 mL     alteplase (CATHFLO ACTIVASE) injection 2 mg     alteplase (CATHFLO ACTIVASE) injection " 2 mg     ampicillin-sulbactam (UNASYN) 3 g vial to attach to  mL bag     amylase-lipase-protease (CREON 24) 15830-95807 units per EC capsule 3 capsule    And     sodium bicarbonate tablet 325 mg     azithromycin (ZITHROMAX) tablet 250 mg     benzocaine 20% (HURRICAINE/TOPEX) 20 % spray 0.5-1 mL     biotin tablet TABS 3,000 mcg     budesonide (PULMICORT) neb solution 1 mg     calcium carbonate (TUMS) chewable tablet 500 mg     carboxymethylcellulose PF (REFRESH PLUS) 0.5 % ophthalmic solution 1 drop     carvedilol (COREG) tablet 37.5 mg     colistimethate/colistin-base activity (COLYMYCIN) neb solution 150 mg     dapsone (ACZONE) tablet 50 mg     dextrose 10% infusion     glucose gel 15-30 g    Or     dextrose 50 % injection 25-50 mL    Or     glucagon injection 1 mg     [Held by provider] doxazosin (CARDURA) tablet 2 mg     [Held by provider] dronabinol (MARINOL) capsule 5 mg     epoetin laney-epbx (RETACRIT) injection 8,000 Units     heparin (porcine) injection     heparin 10,000 units/10 mL infusion (DIALYSIS USE)     heparin 100 UNIT/ML injection     sodium chloride 0.9% DIALYSIS Cath LOCK - RED Lumen    Followed by     heparin 1000 unit/mL DIALYSIS Cath LOCK - RED Lumen     sodium chloride 0.9% DIALYSIS Cath LOCK - BLUE Lumen    Followed by     heparin 1000 unit/mL DIALYSIS Cath LOCK -BLUE Lumen     hydrALAZINE (APRESOLINE) tablet 25 mg     HYDROmorphone (DILAUDID) injection 1 mg     hydrOXYzine (ATARAX) half-tab 5 mg     insulin aspart (NovoLOG) injection (RAPID ACTING)     ipratropium (ATROVENT) 0.02 % neb solution 0.5 mg     labetalol (NORMODYNE/TRANDATE) injection 20 mg     levalbuterol (XOPENEX) neb solution 0.31 mg     lidocaine (LMX4) cream     lidocaine (XYLOCAINE) 2 % external gel     lidocaine 1 % 0.1-1 mL     LORazepam (ATIVAN) injection 0.5 mg     melatonin tablet 6 mg     micafungin (MYCAMINE) 150 mg in sodium chloride 0.9 % 100 mL intermittent infusion     mirtazapine (REMERON) tablet 30 mg      montelukast (SINGULAIR) tablet 10 mg     mupirocin (BACTROBAN) 2 % ointment     mycophenolate (CELLCEPT BRAND) suspension 250 mg     naloxone (NARCAN) injection 0.2 mg     naloxone (NARCAN) injection 0.2 mg     naloxone (NARCAN) injection 0.4 mg     naloxone (NARCAN) injection 0.4 mg     [Held by provider] nystatin (MYCOSTATIN) suspension 1,000,000 Units     OLANZapine (zyPREXA) tablet 2.5 mg     OLANZapine (zyPREXA) tablet 2.5 mg     ondansetron (ZOFRAN-ODT) ODT tab 4 mg    Or     ondansetron (ZOFRAN) injection 4 mg     oxyCODONE IR (ROXICODONE) tablet 10-20 mg     pantoprazole (PROTONIX) IV push injection 40 mg     PARoxetine (PAXIL) tablet 40 mg     phytonadione (MEPHYTON/VITAMIN K) 1 MG/ML oral solution 1 mg     polyethylene glycol (MIRALAX) Packet 17 g     [Held by provider] potassium & sodium phosphates (NEUTRA-PHOS) Packet 1 packet     pramox-pe-glycerin-petrolatum (PREPARATION H) cream     predniSONE (DELTASONE) tablet 20 mg    Followed by     [START ON 5/14/2022] predniSONE (DELTASONE) tablet 15 mg    Followed by     [START ON 5/21/2022] predniSONE (DELTASONE) tablet 10 mg    Followed by     [START ON 5/28/2022] predniSONE (DELTASONE) tablet 5 mg     [Held by provider] predniSONE (DELTASONE) tablet 2.5 mg     [Held by provider] predniSONE (DELTASONE) tablet 5 mg     prenatal multivitamin w/iron per tablet 1 tablet     prochlorperazine (COMPAZINE) injection 10 mg    Or     prochlorperazine (COMPAZINE) tablet 10 mg     senna-docusate (SENOKOT-S/PERICOLACE) 8.6-50 MG per tablet 2 tablet     [Held by provider] sevelamer carbonate (RENVELA) Packet 0.8 g     silver nitrate (ARZOL) Misc 1-10 applicator     simethicone (MYLICON) suspension 40 mg     sodium chloride (PF) 0.9% PF flush 10 mL     sodium chloride (PF) 0.9% PF flush 10 mL     sodium chloride (PF) 0.9% PF flush 10 mL     sodium chloride (PF) 0.9% PF flush 10-20 mL     sodium chloride (PF) 0.9% PF flush 3 mL     sodium chloride (PF) 0.9% PF flush 3 mL      sodium chloride (PF) 0.9% PF flush 9 mL     sodium chloride (PF) 0.9% PF flush 9 mL     [START ON 5/12/2022] tacrolimus (GENERIC EQUIVALENT) suspension 7 mg    And     tacrolimus (GENERIC EQUIVALENT) suspension 7 mg     thiamine tablet 50 mg     vitamin C (ASCORBIC ACID) tablet 500 mg     vitamin E (TOCOPHEROL) 50 units/mL (22.5 mg/mL) drops 400 Units     warfarin ANTICOAGULANT (COUMADIN) tablet 1 mg     Warfarin Therapy Reminder (Check START DATE - warfarin may be starting in the FUTURE)          Review of Systems:     Rest of 12 point ROS negative asides from that mentioned in HPI          Physical Exam:     Temp:  [97.8  F (36.6  C)-98.5  F (36.9  C)] 98.5  F (36.9  C)  Pulse:  [66-89] 72  Resp:  [13-42] 28  BP: ()/(45-92) 121/73  FiO2 (%):  [50 %-60 %] 55 %  SpO2:  [96 %-100 %] 98 %  Wt Readings from Last 4 Encounters:   05/11/22 46.9 kg (103 lb 6.4 oz)   03/03/22 40.7 kg (89 lb 11.2 oz)   02/22/22 40.1 kg (88 lb 8 oz)   02/03/22 39 kg (86 lb)     Constitutional:   Awake, alert and in no apparent distress     Eyes:   Nonicteric, DEBI     ENT:    Trachea is midline. Trach w/ small ulceration at 6 oclock location , no purulence      Neck:   Supple      Lungs:   Coarse breath sounds bilaterally      Cardiovascular:   Normal S1 and S2.  RRR.  No murmur, gallop or rub.     Abdomen:   NABS, soft, nontender     Musculoskeletal:   edema.      Neurologic:   Alert      Skin:   Warm, dry.  No rash on limited exam.           Current Laboratory Data:   All laboratory and imaging data reviewed.    Results for orders placed or performed during the hospital encounter of 03/21/22 (from the past 24 hour(s))   Glucose by meter   Result Value Ref Range    GLUCOSE BY METER POCT 226 (H) 70 - 99 mg/dL   Hemoglobin   Result Value Ref Range    Hemoglobin 6.2 (LL) 11.7 - 15.7 g/dL   Prepare red blood cells (unit)   Result Value Ref Range    CROSSMATCH Compatible     UNIT ABO/RH O Pos     Unit Number [id number]     Unit  Status Issued     Blood Component Type Red Blood Cells     Product Code [id number]     CODING SYSTEM [id number]     UNIT TYPE ISBT 5100     ISSUE DATE AND TIME [id number]    Glucose by meter   Result Value Ref Range    GLUCOSE BY METER POCT 211 (H) 70 - 99 mg/dL   Hemoglobin   Result Value Ref Range    Hemoglobin 7.9 (L) 11.7 - 15.7 g/dL   Blood gas venous with oxyhemoglobin   Result Value Ref Range    pH Venous 7.24 (L) 7.32 - 7.43    pCO2 Venous 65 (H) 40 - 50 mm Hg    pO2 Venous 51 (H) 25 - 47 mm Hg    Bicarbonate Venous 28 21 - 28 mmol/L    FIO2 60     Oxyhemoglobin Venous 82 (H) 70 - 75 %    Base Excess/Deficit (+/-) -0.2 -7.7 - 1.9 mmol/L   INR   Result Value Ref Range    INR 1.77 (H) 0.85 - 1.15   Comprehensive metabolic panel   Result Value Ref Range    Sodium 131 (L) 133 - 144 mmol/L    Potassium 4.5 3.4 - 5.3 mmol/L    Chloride 93 (L) 94 - 109 mmol/L    Carbon Dioxide (CO2) 28 20 - 32 mmol/L    Anion Gap 10 3 - 14 mmol/L    Urea Nitrogen 88 (H) 7 - 30 mg/dL    Creatinine 2.88 (H) 0.52 - 1.04 mg/dL    Calcium 8.9 8.5 - 10.1 mg/dL    Glucose 146 (H) 70 - 99 mg/dL    Alkaline Phosphatase 423 (H) 40 - 150 U/L    AST 63 (H) 0 - 45 U/L     (H) 0 - 50 U/L    Protein Total 5.0 (L) 6.8 - 8.8 g/dL    Albumin 1.9 (L) 3.4 - 5.0 g/dL    Bilirubin Total 0.9 0.2 - 1.3 mg/dL    GFR Estimate 21 (L) >60 mL/min/1.73m2   CBC with Platelets & Differential    Narrative    The following orders were created for panel order CBC with Platelets & Differential.  Procedure                               Abnormality         Status                     ---------                               -----------         ------                     CBC with platelets and d...[[id number]]  Abnormal            Final result                 Please view results for these tests on the individual orders.   CBC with platelets and differential   Result Value Ref Range    WBC Count 21.5 (H) 4.0 - 11.0 10e3/uL    RBC Count 2.75 (L) 3.80 - 5.20 10e6/uL     Hemoglobin 8.6 (L) 11.7 - 15.7 g/dL    Hematocrit 25.8 (L) 35.0 - 47.0 %    MCV 94 78 - 100 fL    MCH 31.3 26.5 - 33.0 pg    MCHC 33.3 31.5 - 36.5 g/dL    RDW 19.0 (H) 10.0 - 15.0 %    Platelet Count 524 (H) 150 - 450 10e3/uL    % Neutrophils 75 %    % Lymphocytes 7 %    % Monocytes 13 %    % Eosinophils 4 %    % Basophils 0 %    % Immature Granulocytes 1 %    NRBCs per 100 WBC 0 <1 /100    Absolute Neutrophils 16.2 (H) 1.6 - 8.3 10e3/uL    Absolute Lymphocytes 1.4 0.8 - 5.3 10e3/uL    Absolute Monocytes 2.7 (H) 0.0 - 1.3 10e3/uL    Absolute Eosinophils 0.8 (H) 0.0 - 0.7 10e3/uL    Absolute Basophils 0.1 0.0 - 0.2 10e3/uL    Absolute Immature Granulocytes 0.2 <=0.4 10e3/uL    Absolute NRBCs 0.0 10e3/uL   Glucose by meter   Result Value Ref Range    GLUCOSE BY METER POCT 112 (H) 70 - 99 mg/dL   Glucose by meter   Result Value Ref Range    GLUCOSE BY METER POCT 172 (H) 70 - 99 mg/dL   XR Abdomen Port 1 View    Narrative    EXAM: XR ABDOMEN PORT 1 VIEWS  5/11/2022 2:25 PM      HISTORY: abdominal cramping, setting of loose stools, new leukocytosis    COMPARISON: CT 5/2/22    FINDINGS: Single supine radiograph of the abdomen. Enteric tube  projects in expected location of the jejunum. Belly ring.    Nonobstructive bowel gas pattern. No pneumatosis. No portal venous  gas. Bilateral renal stones. No visualized masses.    Partially visualized blunting of the costophrenic sulci and basilar  opacities similar in appearance to comparison CT. Soft tissues and  osseous structures are unremarkable.      Impression    IMPRESSION:   1. Nonobstructive bowel gas pattern.  2. Partially visualized costophrenic sulci blunting with basilar  opacities, similar in appearance to comparison CT.  3. Nephrolithiasis.    I have personally reviewed the examination and initial interpretation  and I agree with the findings.    PONCE AREVALO MD         SYSTEM ID:  [id number]     *Note: Due to a large number of results and/or encounters for the  requested time period, some results have not been displayed. A complete set of results can be found in Results Review.            Previous Pulmonary Function Testing     FVC-Pred   Date Value Ref Range Status   03/03/2022 3.85 L    02/22/2022 3.85 L    02/03/2022 3.85 L    01/25/2022 3.85 L    01/10/2022 3.85 L      FVC-Pre   Date Value Ref Range Status   03/03/2022 1.40 L    02/22/2022 1.48 L    02/03/2022 1.24 L    01/25/2022 1.22 L    01/10/2022 1.39 L      FVC-%Pred-Pre   Date Value Ref Range Status   03/03/2022 36 %    02/22/2022 38 %    02/03/2022 32 %    01/25/2022 31 %    01/10/2022 36 %      FEV1-Pre   Date Value Ref Range Status   03/03/2022 0.79 L    02/22/2022 0.86 L    02/03/2022 0.72 L    01/25/2022 0.72 L    01/10/2022 0.93 L      FEV1-%Pred-Pre   Date Value Ref Range Status   03/03/2022 24 %    02/22/2022 27 %    02/03/2022 22 %    01/25/2022 22 %    01/10/2022 29 %      FEV1FVC-Pred   Date Value Ref Range Status   03/03/2022 83 %    02/22/2022 83 %    02/03/2022 83 %    01/25/2022 83 %    01/10/2022 83 %      FEV1FVC-Pre   Date Value Ref Range Status   03/03/2022 56 %    02/22/2022 58 %    02/03/2022 58 %    01/25/2022 59 %    01/10/2022 67 %      [id number]-%Pred-Pre   Date Value Ref Range Status   10/21/2014 53 %    10/21/2014 53 %      FEFMax-Pred   Date Value Ref Range Status   03/03/2022 7.15 L/sec    02/22/2022 7.15 L/sec    02/03/2022 7.15 L/sec    01/25/2022 7.15 L/sec    01/10/2022 7.15 L/sec      FEFMax-Pre   Date Value Ref Range Status   03/03/2022 3.50 L/sec    02/22/2022 3.60 L/sec    02/03/2022 3.55 L/sec    01/25/2022 3.43 L/sec    01/10/2022 3.18 L/sec      FEFMax-%Pred-Pre   Date Value Ref Range Status   03/03/2022 48 %    02/22/2022 50 %    02/03/2022 49 %    01/25/2022 48 %    01/10/2022 44 %      ExpTime-Pre   Date Value Ref Range Status   03/03/2022 7.77 sec    02/22/2022 7.95 sec    02/03/2022 9.07 sec    01/25/2022 8.53 sec    01/10/2022 9.47 sec      FIFMax-Pre   Date Value Ref  Range Status   03/03/2022 2.88 L/sec    02/22/2022 2.42 L/sec    02/03/2022 2.52 L/sec    01/25/2022 2.45 L/sec    01/10/2022 2.44 L/sec      FEV1FEV6-Pred   Date Value Ref Range Status   03/03/2022 84 %    02/22/2022 84 %    02/03/2022 84 %    01/25/2022 84 %    01/10/2022 84 %      FEV1FEV6-Pre   Date Value Ref Range Status   03/03/2022 58 %    02/22/2022 60 %    02/03/2022 61 %    01/25/2022 62 %    01/10/2022 67 %      [id number]-%Pred-Pre   Date Value Ref Range Status   10/21/2014 60 %    10/21/2014 60 %             Previous Chest Imaging        Chest imaging reviewed personally   Bedside ultrasound - small vessels lateral to planned perc trach site       Interventional Pulmonary Consult Attending Note:    I saw and examined the patient with Interventional Pulmonary fellow, .   I reviewed the relevant labs, imaging studies and cultures. The case discussed with the floor nurse and the primary team.  Please see the fellow's note from today for detailed physical exam, assessment and plan which I agree and formulated together.  Briefly, Maryse Pierson is a 39 year old y/o consulted for trach site infection. Keep dry. Topical antibiotics and unasyn for 10 days    H. Sisi Aponte MD             No

## 2024-05-06 NOTE — DISCHARGE NOTE PROVIDER - HOSPITAL COURSE
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  CTH no bleed - Right periorbital and mid and right frontal extra calvarial soft tissue swelling/hematoma.  Cont Ceftriaxone for UTI. Urine clx grew sensitive Kleb. completed 5 day Rx.  For afib RVR slowly increased BB as BP soft. added mido 5mg PO TID for BP support. dig loaded and daily digoxin added for rate control. Cardiology  appreciated. ECHO with biatrial enlargement and normal EF.   ep eval called for mgmt of afib and possible MCOT on dc given two episode of syncope.  recommended an ILR re: syncope w/ trauma, to rule out heart block.   palliative consulted for [facility]. Family opting for [facility] and full code. Added Aricept for ? dementia.   Medically cleared for discharge to [facility] post ILR placement.     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  CTH no bleed - Right periorbital and mid and right frontal extra calvarial soft tissue swelling/hematoma.  Cont Ceftriaxone for UTI. Urine clx grew sensitive Kleb. completed 5 day Rx.  For afib RVR slowly increased BB as BP soft. added mido 5mg PO TID for BP support. dig loaded and daily digoxin added for rate control. Cardiology  appreciated. ECHO with biatrial enlargement and normal EF. EP eval called for mgmt of afib and possible MCOT on dc given two episode of syncope.  Recommended an ILR re: syncope w/ trauma, to rule out heart block, s/p ILR placement at bedside by EP on 5/6. Palliative consulted for [facility]. Family opting for JOSE and full code. Aricept added for ? dementia.     Important Medication Changes and Reason:  -     Active or Pending Issues Requiring Follow-up:  - Cardiology/ EPS follow up for AFib RVR an syncope  - Follow up with PCP    Advanced Directives:   [x ] Full code  [ ] DNR  [ ] Hospice    Discharge Diagnoses:  - s/p Fall  -UTI  - Afib with RVR      Discharge/Dispo/Med rec discussed with attending Dr. Duran. 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  CTH no bleed - Right periorbital and mid and right frontal extra calvarial soft tissue swelling/hematoma.  Completed Ceftriaxone for UTI. Urine clx grew sensitive Kleb. completed 5 day Rx.  For afib RVR slowly increased BB as BP soft. added on midodrine for BP support. S/p dig loaded and daily digoxin added for rate control. Cardiology  appreciated. ECHO with biatrial enlargement and normal EF. EP eval called for mgmt of afib  given two episode of syncope.  Recommended an ILR re: syncope w/ trauma, to rule out heart block, s/p ILR placement at bedside by EP on 5/6. Palliative consulted for [facility]. Family opting for JOSE and full code. Aricept added for ? dementia. To follow up with PCP.     Important Medication Changes and Reason:  - midodrine added  - metoprolol tartrate changed to 125 mg BID  - on atorvastatin 80 mg nightly   -on digoxin 250 mcg daily     Active or Pending Issues Requiring Follow-up:  - Cardiology/ EPS follow up for AFib RVR an syncope  - Follow up with PCP    Advanced Directives:   [x ] Full code  [ ] DNR  [ ] Hospice    Discharge Diagnoses:  - s/p Fall  -UTI  - Afib with RVR      Discharge/Dispo/Med rec discussed with attending Dr. Duran. Patient medically cleared for discharge [facility] with outpatient follow up with PCP, cardiology/EP

## 2024-05-06 NOTE — DISCHARGE NOTE NURSING/CASE MANAGEMENT/SOCIAL WORK - NSDCPEWEB_GEN_ALL_CORE
[facility] for Tobacco Control website --- [email or web address] website --- [email or web address]

## 2024-05-06 NOTE — PROGRESS NOTE ADULT - REASON FOR ADMISSION
fall and lethargy

## 2024-05-06 NOTE — PROGRESS NOTE ADULT - SUBJECTIVE AND OBJECTIVE BOX
DATE OF SERVICE: 05-02-24 @ 10:26    Patient is a 78y old  Female who presents with a chief complaint of fall and lethargy (02 May 2024 10:24)      SUBJECTIVE / OVERNIGHT EVENTS:  No chest pain. No shortness of breath. No complaints. No events overnight.     MEDICATIONS  (STANDING):  apixaban 5 milliGRAM(s) Oral every 12 hours  ascorbic acid 500 milliGRAM(s) Oral daily  atorvastatin 80 milliGRAM(s) Oral at bedtime  cefTRIAXone   IVPB 1000 milliGRAM(s) IV Intermittent every 24 hours  chlorhexidine 2% Cloths 1 Application(s) Topical daily  clopidogrel Tablet 75 milliGRAM(s) Oral daily  levothyroxine 175 MICROGram(s) Oral daily  lidocaine   4% Patch 1 Patch Transdermal daily  metoprolol tartrate 100 milliGRAM(s) Oral two times a day  midodrine. 5 milliGRAM(s) Oral three times a day  multivitamin 1 Tablet(s) Oral daily  naloxone Injectable 0.4 milliGRAM(s) IV Push once  nicotine -  14 mG/24Hr(s) Patch 1 Patch Transdermal daily  polyethylene glycol 3350 17 Gram(s) Oral daily  senna 2 Tablet(s) Oral at bedtime  sodium chloride 0.9%. 1000 milliLiter(s) (75 mL/Hr) IV Continuous <Continuous>    MEDICATIONS  (PRN):  acetaminophen     Tablet .. 650 milliGRAM(s) Oral every 6 hours PRN Temp greater or equal to 38C (100.4F), Mild Pain (1 - 3)  aluminum hydroxide/magnesium hydroxide/simethicone Suspension 30 milliLiter(s) Oral every 4 hours PRN Dyspepsia  bisacodyl 5 milliGRAM(s) Oral daily PRN Constipation  melatonin 3 milliGRAM(s) Oral at bedtime PRN Insomnia  metoprolol tartrate Injectable 5 milliGRAM(s) IV Push every 6 hours PRN for sustained afib rvr >120  morphine  - Injectable 2 milliGRAM(s) IV Push every 4 hours PRN Moderate Pain (4 - 6)  morphine  - Injectable 4 milliGRAM(s) IV Push every 4 hours PRN Severe Pain (7 - 10)  ondansetron Injectable 4 milliGRAM(s) IV Push every 8 hours PRN Nausea and/or Vomiting      Vital Signs Last 24 Hrs  T(C): 36.7 (02 May 2024 04:17), Max: 36.8 (02 May 2024 00:38)  T(F): 98 (02 May 2024 04:17), Max: 98.2 (02 May 2024 00:38)  HR: 97 (02 May 2024 09:50) (72 - 146)  BP: 129/76 (02 May 2024 09:50) (98/68 - 129/76)  BP(mean): --  RR: 18 (02 May 2024 04:17) (18 - 18)  SpO2: 100% (02 May 2024 04:17) (97% - 100%)    Parameters below as of 02 May 2024 04:17  Patient On (Oxygen Delivery Method): room air      CAPILLARY BLOOD GLUCOSE        I&O's Summary    01 May 2024 07:01  -  02 May 2024 07:00  --------------------------------------------------------  IN: 1045 mL / OUT: 850 mL / NET: 195 mL    02 May 2024 07:01  -  02 May 2024 10:26  --------------------------------------------------------  IN: 0 mL / OUT: 200 mL / NET: -200 mL        PHYSICAL EXAM:  GENERAL: NAD, well-developed  HEAD:  Atraumatic, Normocephalic  EYES: EOMI, PERRLA, conjunctiva and sclera clear  NECK: Supple, No JVD  CHEST/LUNG: Clear to auscultation bilaterally; No wheeze  HEART: Regular rate and rhythm; No murmurs, rubs, or gallops  ABDOMEN: Soft, Nontender, Nondistended; Bowel sounds present  EXTREMITIES:  2+ Peripheral Pulses, No clubbing, cyanosis, or edema  PSYCH: AAOx3  NEUROLOGY: non-focal  SKIN: No rashes or lesions    LABS:                        11.0   8.91  )-----------( 205      ( 02 May 2024 07:03 )             35.7     05-02    137  |  109<H>  |  10  ----------------------------<  71  4.4   |  15<L>  |  0.97    Ca    8.8      02 May 2024 07:03            Urinalysis Basic - ( 02 May 2024 07:03 )    Color: x / Appearance: x / SG: x / pH: x  Gluc: 71 mg/dL / Ketone: x  / Bili: x / Urobili: x   Blood: x / Protein: x / Nitrite: x   Leuk Esterase: x / RBC: x / WBC x   Sq Epi: x / Non Sq Epi: x / Bacteria: x        RADIOLOGY & ADDITIONAL TESTS:    Imaging Personally Reviewed:    Consultant(s) Notes Reviewed:      Care Discussed with Consultants/Other Providers:  
Subjective: Patient seen and examined. No new events except as noted.   remains tachy, soft BP   not symptomatic     REVIEW OF SYSTEMS:    CONSTITUTIONAL: No weakness, fevers or chills  EYES/ENT: No visual changes;  No vertigo or throat pain   NECK: No pain or stiffness  RESPIRATORY: No cough, wheezing, hemoptysis; No shortness of breath  CARDIOVASCULAR: No chest pain or palpitations  GASTROINTESTINAL: No abdominal or epigastric pain. No nausea, vomiting, or hematemesis; No diarrhea or constipation. No melena or hematochezia.  GENITOURINARY: No dysuria, frequency or hematuria  NEUROLOGICAL: No numbness or weakness  SKIN: No itching, burning, rashes, or lesions   All other review of systems is negative unless indicated above.    MEDICATIONS:  MEDICATIONS  (STANDING):  apixaban 5 milliGRAM(s) Oral every 12 hours  ascorbic acid 500 milliGRAM(s) Oral daily  atorvastatin 80 milliGRAM(s) Oral at bedtime  cefTRIAXone   IVPB 1000 milliGRAM(s) IV Intermittent every 24 hours  chlorhexidine 2% Cloths 1 Application(s) Topical daily  clopidogrel Tablet 75 milliGRAM(s) Oral daily  levothyroxine 175 MICROGram(s) Oral daily  lidocaine   4% Patch 1 Patch Transdermal daily  metoprolol tartrate 100 milliGRAM(s) Oral two times a day  midodrine. 5 milliGRAM(s) Oral three times a day  multivitamin 1 Tablet(s) Oral daily  naloxone Injectable 0.4 milliGRAM(s) IV Push once  nicotine -  14 mG/24Hr(s) Patch 1 Patch Transdermal daily  polyethylene glycol 3350 17 Gram(s) Oral daily  senna 2 Tablet(s) Oral at bedtime  sodium chloride 0.9%. 1000 milliLiter(s) (75 mL/Hr) IV Continuous <Continuous>      PHYSICAL EXAM:  T(C): 36.4 (05-01-24 @ 16:32), Max: 36.8 (05-01-24 @ 00:00)  HR: 96 (05-01-24 @ 18:03) (85 - 110)  BP: 116/80 (05-01-24 @ 18:03) (98/68 - 123/83)  RR: 18 (05-01-24 @ 16:32) (18 - 18)  SpO2: 98% (05-01-24 @ 16:32) (94% - 99%)  Wt(kg): --  I&O's Summary    30 Apr 2024 07:01  -  01 May 2024 07:00  --------------------------------------------------------  IN: 1060 mL / OUT: 950 mL / NET: 110 mL    01 May 2024 07:01  -  01 May 2024 18:56  --------------------------------------------------------  IN: 555 mL / OUT: 100 mL / NET: 455 mL          Appearance: Normal	  HEENT:   Normal oral mucosa, PERRL, EOMI	  Bilateral racoon eyes  Lymphatic: No lymphadenopathy , no edema  Cardiovascular: Irregular S1 S2, No JVD, No murmurs , Peripheral pulses palpable 2+ bilaterally  Respiratory: Lungs clear to auscultation, normal effort 	  Gastrointestinal:  Soft, Non-tender, + BS	  Skin: No rashes, No ecchymoses, No cyanosis, warm to touch  Musculoskeletal: Normal range of motion, normal strength  Psychiatry:  Mood & affect appropriate  Ext: No edema      LABS:    CARDIAC MARKERS:                                10.3   7.29  )-----------( 186      ( 01 May 2024 06:37 )             33.9     05-01    136  |  109<H>  |  13  ----------------------------<  79  4.0   |  15<L>  |  1.04    Ca    8.6      01 May 2024 06:39  Mg     1.7     04-30      proBNP:   Lipid Profile:   HgA1c:   TSH:             TELEMETRY: 	  AF 120s  ECG:  	  RADIOLOGY:   DIAGNOSTIC TESTING:  [ ] Echocardiogram:  [ ]  Catheterization:  [ ] Stress Test:    OTHER: 	          
Date of service: 24 @ 23:23      Patient is a 78y old  Female who presents with a chief complaint of fall and lethargy (04 May 2024 21:54)                                                               INTERVAL HPI/OVERNIGHT EVENTS:    REVIEW OF SYSTEMS:    sleeping   no complaints when awaken                                                                                                                                                                                                                                                                       Medications:  MEDICATIONS  (STANDING):  apixaban 5 milliGRAM(s) Oral every 12 hours  ascorbic acid 500 milliGRAM(s) Oral daily  atorvastatin 80 milliGRAM(s) Oral at bedtime  cefTRIAXone   IVPB 1000 milliGRAM(s) IV Intermittent every 24 hours  chlorhexidine 2% Cloths 1 Application(s) Topical daily  clopidogrel Tablet 75 milliGRAM(s) Oral daily  digoxin     Tablet 250 MICROGram(s) Oral daily  levothyroxine 175 MICROGram(s) Oral daily  lidocaine   4% Patch 1 Patch Transdermal daily  metoprolol tartrate 100 milliGRAM(s) Oral two times a day  midodrine. 5 milliGRAM(s) Oral three times a day  multivitamin 1 Tablet(s) Oral daily  naloxone Injectable 0.4 milliGRAM(s) IV Push once  nicotine -  14 mG/24Hr(s) Patch 1 Patch Transdermal daily  polyethylene glycol 3350 17 Gram(s) Oral daily  senna 2 Tablet(s) Oral at bedtime  sodium chloride 0.9%. 1000 milliLiter(s) (75 mL/Hr) IV Continuous <Continuous>    MEDICATIONS  (PRN):  acetaminophen     Tablet .. 650 milliGRAM(s) Oral every 6 hours PRN Temp greater or equal to 38C (100.4F), Mild Pain (1 - 3)  aluminum hydroxide/magnesium hydroxide/simethicone Suspension 30 milliLiter(s) Oral every 4 hours PRN Dyspepsia  bisacodyl 5 milliGRAM(s) Oral daily PRN Constipation  melatonin 3 milliGRAM(s) Oral at bedtime PRN Insomnia  metoprolol tartrate Injectable 5 milliGRAM(s) IV Push every 6 hours PRN for sustained afib rvr >120  morphine  - Injectable 2 milliGRAM(s) IV Push every 4 hours PRN Moderate Pain (4 - 6)  morphine  - Injectable 4 milliGRAM(s) IV Push every 4 hours PRN Severe Pain (7 - 10)  ondansetron Injectable 4 milliGRAM(s) IV Push every 8 hours PRN Nausea and/or Vomiting       Allergies    penicillin (Hives)    Intolerances      Vital Signs Last 24 Hrs  T(C): 36.9 (04 May 2024 20:30), Max: 36.9 (04 May 2024 20:30)  T(F): 98.5 (04 May 2024 20:30), Max: 98.5 (04 May 2024 20:30)  HR: 98 (04 May 2024 20:30) (93 - 111)  BP: 101/68 (04 May 2024 20:30) (100/59 - 128/84)  BP(mean): --  RR: 18 (04 May 2024 20:30) (18 - 18)  SpO2: 97% (04 May 2024 20:30) (97% - 99%)    Parameters below as of 04 May 2024 20:30  Patient On (Oxygen Delivery Method): room air      CAPILLARY BLOOD GLUCOSE          05-03 @ 07:01  -  04 @ 07:00  --------------------------------------------------------  IN: 565 mL / OUT: 1250 mL / NET: -685 mL    05-04 @ 07:01  -  05-04 @ 23:23  --------------------------------------------------------  IN: 240 mL / OUT: 550 mL / NET: -310 mL      Physical Exam:    Daily     Daily Weight in k.1 (04 May 2024 07:59)  General:  NAD  HEENT:  Nonicteric, PERRLA  CV:  RRR, S1S2   Lungs:  CTA B/L, no wheezes, rales, rhonchi  Abdomen:  Soft, non-tender, no distended, positive BS  Extremities:  noedema                                                                                                                                                                                                                                                                                  LABS:                               11.5   10.21 )-----------( 215      ( 04 May 2024 06:41 )             35.0                      05-04    138  |  106  |  9   ----------------------------<  86  4.0   |  17<L>  |  1.00    Ca    8.6      04 May 2024 06:41                         RADIOLOGY & ADDITIONAL TESTS         I personally reviewed: [  ]EKG   [  ]CXR    [  ] CT      A/P:         Discussed with :     Simon consultants' Notes   Time spent :  
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  Reports no angina, and no awareness of irregularity or rapidity of rhythm.  Is taking apixaban 5mg BID for AFib stroke prevention.  States no prior fainting.  A 10 pt ROS is otherwise negative.  5/2- per Dr Mendiola, patient has known diagnosis of AFib.  Per patient, she is not aware of most of her prior diagnoses or medications.  Started on higher dose metoprolol + Digoxin.  Awaiting ILR if no significant bradycardia on telemetry.   5/3- patient deferring to her daughter for her medical plan of care.  palliative care team meeting pending today.  5/4  no events overnight   Date of service  5/5 no tele events overnight     PAST MEDICAL & SURGICAL HISTORY:  Hypertension  Hypothyroid  Osteoarthritis  knees, back  CAD (coronary artery disease)  LITTLE (obstructive sleep apnea)  non complaint on CPAP  History of MI (myocardial infarction)  h/o previous MI in 2004 prompted PTCA  with stenting x 2 vessels   last stress/ echo 2019  Heart murmur  dx in childhood  Bilateral hearing loss, unspecified hearing loss type  bilateral aids  Obesity  Mixed stress and urge urinary incontinence  Overactive bladder  S/P ORIF (open reduction internal fixation) fracture  left hip 1962  S/P appendectomy  30 plus years  S/P knee replacement  left 2000  Stented coronary artery  2004 X 2 STENTS  S/P laparotomy  due to adhesions, 30 years ago  H/O dilation and curettage  2/2019 Benign polyp           acetaminophen     Tablet .. 650 milliGRAM(s) Oral every 6 hours PRN  aluminum hydroxide/magnesium hydroxide/simethicone Suspension 30 milliLiter(s) Oral every 4 hours PRN  apixaban 5 milliGRAM(s) Oral every 12 hours  ascorbic acid 500 milliGRAM(s) Oral daily  atorvastatin 80 milliGRAM(s) Oral at bedtime  bisacodyl 5 milliGRAM(s) Oral daily PRN  cefTRIAXone   IVPB 1000 milliGRAM(s) IV Intermittent every 24 hours  chlorhexidine 2% Cloths 1 Application(s) Topical daily  clopidogrel Tablet 75 milliGRAM(s) Oral daily  digoxin     Tablet 250 MICROGram(s) Oral daily  levothyroxine 175 MICROGram(s) Oral daily  lidocaine   4% Patch 1 Patch Transdermal daily  melatonin 3 milliGRAM(s) Oral at bedtime PRN  metoprolol tartrate 100 milliGRAM(s) Oral two times a day  metoprolol tartrate Injectable 5 milliGRAM(s) IV Push every 6 hours PRN  midodrine. 5 milliGRAM(s) Oral three times a day  morphine  - Injectable 2 milliGRAM(s) IV Push every 4 hours PRN  morphine  - Injectable 4 milliGRAM(s) IV Push every 4 hours PRN  multivitamin 1 Tablet(s) Oral daily  naloxone Injectable 0.4 milliGRAM(s) IV Push once  nicotine -  14 mG/24Hr(s) Patch 1 Patch Transdermal daily  ondansetron Injectable 4 milliGRAM(s) IV Push every 8 hours PRN  polyethylene glycol 3350 17 Gram(s) Oral daily  senna 2 Tablet(s) Oral at bedtime  sodium chloride 0.9%. 1000 milliLiter(s) IV Continuous <Continuous>                            11.5   10.21 )-----------( 215      ( 04 May 2024 06:41 )             35.0       Hemoglobin: 11.5 g/dL (05-04 @ 06:41)  Hemoglobin: 10.8 g/dL (05-03 @ 06:55)  Hemoglobin: 11.0 g/dL (05-02 @ 07:03)  Hemoglobin: 10.3 g/dL (05-01 @ 06:37)      05-04    138  |  106  |  9   ----------------------------<  86  4.0   |  17<L>  |  1.00    Ca    8.6      04 May 2024 06:41      Creatinine Trend: 1.00<--, 1.09<--, 0.97<--, 1.04<--, 1.02<--, 1.07<--    COAGS:           T(C): 36.6 (05-05-24 @ 08:54), Max: 36.9 (05-04-24 @ 20:30)  HR: 96 (05-05-24 @ 08:54) (96 - 111)  BP: 133/82 (05-05-24 @ 08:54) (100/59 - 133/82)  RR: 18 (05-05-24 @ 08:54) (18 - 18)  SpO2: 97% (05-05-24 @ 08:54) (97% - 100%)  Wt(kg): --    I&O's Summary    04 May 2024 07:01  -  05 May 2024 07:00  --------------------------------------------------------  IN: 240 mL / OUT: 550 mL / NET: -310 mL      Appearance: Normal appearing elderly woman in no acute distress	  HEENT:   Normal oral mucosa, PERRL, EOMI	  Lymphatic: No lymphadenopathy , no edema  Cardiovascular: rapid irregular S1 S2, No JVD, No murmurs , Peripheral pulses palpable 2+ bilaterally  Respiratory: Lungs clear to auscultation, normal effort 	  Gastrointestinal:  Soft, Non-tender, + BS	  Skin: No rashes, +facial/periorbital ecchymoses, No cyanosis, warm to touch  Musculoskeletal: Normal range of motion, normal strength  Psychiatry:  Mood & affect appropriate    TELEMETRY: AFib 100s	    ECG: AFib, 120s, narrow QRS  Echo:  < from: TTE Limited W or WO Ultrasound Enhancing Agent (02.21.24 @ 11:54) >  CONCLUSIONS:      1. Left ventricular cavity is normal in size. Left ventricular wall thickness is normal. There are no regional wall motion abnormalities seen.   2. Unable to assess left ventricular diastolic function due to insufficient data. Analysis of left ventricular diastolic function and filling pressure is made challenging by the presence of atrial fibrillation.   3. Normal right ventricular cavity size, with wall thickness, and normal systolic function.   4. The left atrium is severely dilated.   5. The right atrium is dilated.   6. Mild mitral regurgitation at a blood pressure of 128/79 mmHg.   7. Estimated pulmonary artery systolic pressure is 12 mmHg.   8. No pericardial effusion seen.   9. No prior echocardiogram is available for comparison.  < from: TTE Limited W or WO Ultrasound Enhancing Agent (02.21.24 @ 11:54) >  Left Atrium:  The left atrium is severely dilated with an indexed volume of 49.03 ml/m².   Right Atrium:  The right atrium is dilated with an indexed volume of 43.97 ml/m².    < end of copied text >      ASSESSMENT/PLAN: Ms Gooden is a pleasant 78y Female sent in from [facility] after sustaining a fall - does not know how this occurred... fainting strongly suspected.  Significant facial bruising.  Has persistent AFib, with severe biatrial enlargement.  (Probably longstanding persistent) Continue Apixaban 5mg BID for stroke prevention.  Continue high dose metoprolol, and Digoxin for AFib rate control.  Continue clopidogrel for hx of coronary stents.  Continue nicotine replacement therapy. Smoking cessation strongly encouraged.  Cause of her collapse may be hypotension due to sepsis, or intermittent AV block due to age-related weakening of the cardiac conduction system.  Will get antibiotics for UTI.  Will recommend an ILR re: syncope w/ trauma, to rule out heart block. OK to use continuous telemetry until closer to discharge.  (If aligned with patient/family's goals of care)  If no long pauses on telemetry (--> pacemaker implant), will implant an ILR before discharge.     
EP      HISTORY OF PRESENT ILLNESS: HPI:  79yo 73kg f, smoker, w pmh [place], htn, hld, afib, cad c/b mi s/p pci w stents, copd, little, urinary incontinence, hypothyroidism, oa s/p l tkr + l hip orif, and w recent hospitalization 2/16-3/9 for ischemic bowel s/p ex-lap w bowel resection + end ileostomy, 4/1-4/3 for drainage from incision site 2/2 hematoma in the laparotomy wound s/p conservative mgmt, p/w fall and lethargy; in er, found to be in afib rvr w ua suggestive of uti; admit to medicine for further mgmt (28 Apr 2024 22:37)    Here w/ fainting, resulting in facial/periorbital bruising bilaterally.  Reports no angina, and no awareness of irregularity or rapidity of rhythm.  Is taking apixaban 5mg BID for AFib stroke prevention.  States no prior fainting.  A 10 pt ROS is otherwise negative.  5/2- per Dr Mendiola, patient has known diagnosis of AFib.  Per patient, she is not aware of most of her prior diagnoses or medications.  Started on higher dose metoprolol + Digoxin.  Awaiting ILR if no significant bradycardia on telemetry.   5/3- patient deferring to her daughter for her medical plan of care.  palliative care team meeting pending today.  Date of service 5/4  no events overnight       PAST MEDICAL & SURGICAL HISTORY:  Hypertension  Hypothyroid  Osteoarthritis  knees, back  CAD (coronary artery disease)  LITTLE (obstructive sleep apnea)  non complaint on CPAP  History of MI (myocardial infarction)  h/o previous MI in 2004 prompted PTCA  with stenting x 2 vessels   last stress/ echo 2019  Heart murmur  dx in childhood  Bilateral hearing loss, unspecified hearing loss type  bilateral aids  Obesity  Mixed stress and urge urinary incontinence  Overactive bladder  S/P ORIF (open reduction internal fixation) fracture  left hip 1962  S/P appendectomy  30 plus years  S/P knee replacement  left 2000  Stented coronary artery  2004 X 2 STENTS  S/P laparotomy  due to adhesions, 30 years ago  H/O dilation and curettage  2/2019 Benign polyp           acetaminophen     Tablet .. 650 milliGRAM(s) Oral every 6 hours PRN  aluminum hydroxide/magnesium hydroxide/simethicone Suspension 30 milliLiter(s) Oral every 4 hours PRN  apixaban 5 milliGRAM(s) Oral every 12 hours  ascorbic acid 500 milliGRAM(s) Oral daily  atorvastatin 80 milliGRAM(s) Oral at bedtime  bisacodyl 5 milliGRAM(s) Oral daily PRN  cefTRIAXone   IVPB 1000 milliGRAM(s) IV Intermittent every 24 hours  chlorhexidine 2% Cloths 1 Application(s) Topical daily  clopidogrel Tablet 75 milliGRAM(s) Oral daily  digoxin     Tablet 250 MICROGram(s) Oral daily  levothyroxine 175 MICROGram(s) Oral daily  lidocaine   4% Patch 1 Patch Transdermal daily  melatonin 3 milliGRAM(s) Oral at bedtime PRN  metoprolol tartrate 100 milliGRAM(s) Oral two times a day  metoprolol tartrate Injectable 5 milliGRAM(s) IV Push every 6 hours PRN  midodrine. 5 milliGRAM(s) Oral three times a day  morphine  - Injectable 2 milliGRAM(s) IV Push every 4 hours PRN  morphine  - Injectable 4 milliGRAM(s) IV Push every 4 hours PRN  multivitamin 1 Tablet(s) Oral daily  naloxone Injectable 0.4 milliGRAM(s) IV Push once  nicotine -  14 mG/24Hr(s) Patch 1 Patch Transdermal daily  ondansetron Injectable 4 milliGRAM(s) IV Push every 8 hours PRN  polyethylene glycol 3350 17 Gram(s) Oral daily  senna 2 Tablet(s) Oral at bedtime  sodium chloride 0.9%. 1000 milliLiter(s) IV Continuous <Continuous>                            11.5   10.21 )-----------( 215      ( 04 May 2024 06:41 )             35.0       Hemoglobin: 11.5 g/dL (05-04 @ 06:41)  Hemoglobin: 10.8 g/dL (05-03 @ 06:55)  Hemoglobin: 11.0 g/dL (05-02 @ 07:03)  Hemoglobin: 10.3 g/dL (05-01 @ 06:37)      05-04    138  |  106  |  9   ----------------------------<  86  4.0   |  17<L>  |  1.00    Ca    8.6      04 May 2024 06:41      Creatinine Trend: 1.00<--, 1.09<--, 0.97<--, 1.04<--, 1.02<--, 1.07<--    COAGS:           T(C): 36.8 (05-04-24 @ 08:14), Max: 36.8 (05-03-24 @ 19:59)  HR: 105 (05-04-24 @ 08:14) (89 - 110)  BP: 128/84 (05-04-24 @ 08:14) (100/65 - 128/84)  RR: 18 (05-04-24 @ 08:14) (18 - 18)  SpO2: 98% (05-04-24 @ 08:14) (94% - 100%)  Wt(kg): --    I&O's Summary    03 May 2024 07:01  -  04 May 2024 07:00  --------------------------------------------------------  IN: 565 mL / OUT: 1250 mL / NET: -685 mL      Appearance: Normal appearing elderly woman in no acute distress	  HEENT:   Normal oral mucosa, PERRL, EOMI	  Lymphatic: No lymphadenopathy , no edema  Cardiovascular: rapid irregular S1 S2, No JVD, No murmurs , Peripheral pulses palpable 2+ bilaterally  Respiratory: Lungs clear to auscultation, normal effort 	  Gastrointestinal:  Soft, Non-tender, + BS	  Skin: No rashes, +facial/periorbital ecchymoses, No cyanosis, warm to touch  Musculoskeletal: Normal range of motion, normal strength  Psychiatry:  Mood & affect appropriate    TELEMETRY: AFib 100s	    ECG: AFib, 120s, narrow QRS  Echo:  < from: TTE Limited W or WO Ultrasound Enhancing Agent (02.21.24 @ 11:54) >  CONCLUSIONS:      1. Left ventricular cavity is normal in size. Left ventricular wall thickness is normal. There are no regional wall motion abnormalities seen.   2. Unable to assess left ventricular diastolic function due to insufficient data. Analysis of left ventricular diastolic function and filling pressure is made challenging by the presence of atrial fibrillation.   3. Normal right ventricular cavity size, with wall thickness, and normal systolic function.   4. The left atrium is severely dilated.   5. The right atrium is dilated.   6. Mild mitral regurgitation at a blood pressure of 128/79 mmHg.   7. Estimated pulmonary artery systolic pressure is 12 mmHg.   8. No pericardial effusion seen.   9. No prior echocardiogram is available for comparison.  < from: TTE Limited W or WO Ultrasound Enhancing Agent (02.21.24 @ 11:54) >  Left Atrium:  The left atrium is severely dilated with an indexed volume of 49.03 ml/m².   Right Atrium:  The right atrium is dilated with an indexed volume of 43.97 ml/m².    < end of copied text >      ASSESSMENT/PLAN: Ms Gooden is a pleasant 78y Female sent in from [facility] after sustaining a fall - does not know how this occurred... fainting strongly suspected.  Significant facial bruising.  Has persistent AFib, with severe biatrial enlargement.  (Probably longstanding persistent) Continue Apixaban 5mg BID for stroke prevention.  Continue high dose metoprolol, and Digoxin for AFib rate control.  Continue clopidogrel for hx of coronary stents.  Continue nicotine replacement therapy. Smoking cessation strongly encouraged.  Cause of her collapse may be hypotension due to sepsis, or intermittent AV block due to age-related weakening of the cardiac conduction system.  Will get antibiotics for UTI.  Will recommend an ILR re: syncope w/ trauma, to rule out heart block. OK to use continuous telemetry until closer to discharge.  (If aligned with patient/family's goals of care)  If no long pauses on telemetry (--> pacemaker implant), will implant an ILR before discharge.     
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  Reports no angina, and no awareness of irregularity or rapidity of rhythm.  Is taking apixaban 5mg BID for AFib stroke prevention.  States no prior fainting.  A 10 pt ROS is otherwise negative.  Date of service 5/2- per Dr Mendiola, patient has known diagnosis of AFib.  Per patient, she is not aware of most of her prior diagnoses or medications.  Started on higher dose metoprolol + Digoxin.  Awaiting ILR if no significant bradycardia on telemetry.    PAST MEDICAL & SURGICAL HISTORY:  Hypertension  Hypothyroid  Osteoarthritis  knees, back  CAD (coronary artery disease)  LITTLE (obstructive sleep apnea)  non complaint on CPAP  History of MI (myocardial infarction)  h/o previous MI in 2004 prompted PTCA  with stenting x 2 vessels   last stress/ echo 2019  Heart murmur  dx in childhood  Bilateral hearing loss, unspecified hearing loss type  bilateral aids  Obesity  Mixed stress and urge urinary incontinence  Overactive bladder  S/P ORIF (open reduction internal fixation) fracture  left hip 1962  S/P appendectomy  30 plus years  S/P knee replacement  left 2000  Stented coronary artery  2004 X 2 STENTS  S/P laparotomy  due to adhesions, 30 years ago  H/O dilation and curettage  2/2019 Benign polyp    acetaminophen     Tablet .. 650 milliGRAM(s) Oral every 6 hours PRN  aluminum hydroxide/magnesium hydroxide/simethicone Suspension 30 milliLiter(s) Oral every 4 hours PRN  apixaban 5 milliGRAM(s) Oral every 12 hours  ascorbic acid 500 milliGRAM(s) Oral daily  atorvastatin 80 milliGRAM(s) Oral at bedtime  bisacodyl 5 milliGRAM(s) Oral daily PRN  cefTRIAXone   IVPB 1000 milliGRAM(s) IV Intermittent every 24 hours  chlorhexidine 2% Cloths 1 Application(s) Topical daily  clopidogrel Tablet 75 milliGRAM(s) Oral daily  digoxin     Tablet 250 MICROGram(s) Oral daily  levothyroxine 175 MICROGram(s) Oral daily  lidocaine   4% Patch 1 Patch Transdermal daily  melatonin 3 milliGRAM(s) Oral at bedtime PRN  metoprolol tartrate 100 milliGRAM(s) Oral two times a day  metoprolol tartrate Injectable 5 milliGRAM(s) IV Push every 6 hours PRN  midodrine. 5 milliGRAM(s) Oral three times a day  morphine  - Injectable 2 milliGRAM(s) IV Push every 4 hours PRN  morphine  - Injectable 4 milliGRAM(s) IV Push every 4 hours PRN  multivitamin 1 Tablet(s) Oral daily  naloxone Injectable 0.4 milliGRAM(s) IV Push once  nicotine -  14 mG/24Hr(s) Patch 1 Patch Transdermal daily  ondansetron Injectable 4 milliGRAM(s) IV Push every 8 hours PRN  polyethylene glycol 3350 17 Gram(s) Oral daily  senna 2 Tablet(s) Oral at bedtime  sodium chloride 0.9%. 1000 milliLiter(s) IV Continuous <Continuous>                            11.0   8.91  )-----------( 205      ( 02 May 2024 07:03 )             35.7       05-02    137  |  109<H>  |  10  ----------------------------<  71  4.4   |  15<L>  |  0.97    Ca    8.8      02 May 2024 07:03    T(C): 36.7 (05-02-24 @ 04:17), Max: 36.8 (05-02-24 @ 00:38)  HR: 97 (05-02-24 @ 09:50) (72 - 146)  BP: 129/76 (05-02-24 @ 09:50) (98/68 - 129/76)  RR: 18 (05-02-24 @ 04:17) (18 - 18)  SpO2: 100% (05-02-24 @ 04:17) (97% - 100%)  Wt(kg): --    I&O's Summary    01 May 2024 07:01  -  02 May 2024 07:00  --------------------------------------------------------  IN: 1045 mL / OUT: 850 mL / NET: 195 mL    02 May 2024 07:01  -  02 May 2024 10:39  --------------------------------------------------------  IN: 0 mL / OUT: 200 mL / NET: -200 mL      Appearance: Normal appearing elderly woman in no acute distress	  HEENT:   Normal oral mucosa, PERRL, EOMI	  Lymphatic: No lymphadenopathy , no edema  Cardiovascular: rapid irregular S1 S2, No JVD, No murmurs , Peripheral pulses palpable 2+ bilaterally  Respiratory: Lungs clear to auscultation, normal effort 	  Gastrointestinal:  Soft, Non-tender, + BS	  Skin: No rashes, +facial/periorbital ecchymoses, No cyanosis, warm to touch  Musculoskeletal: Normal range of motion, normal strength  Psychiatry:  Mood & affect appropriate      TELEMETRY: AFib 100s	    ECG: AFib, 120s, narrow QRS  Echo:  < from: TTE Limited W or WO Ultrasound Enhancing Agent (02.21.24 @ 11:54) >  CONCLUSIONS:      1. Left ventricular cavity is normal in size. Left ventricular wall thickness is normal. There are no regional wall motion abnormalities seen.   2. Unable to assess left ventricular diastolic function due to insufficient data. Analysis of left ventricular diastolic function and filling pressure is made challenging by the presence of atrial fibrillation.   3. Normal right ventricular cavity size, with wall thickness, and normal systolic function.   4. The left atrium is severely dilated.   5. The right atrium is dilated.   6. Mild mitral regurgitation at a blood pressure of 128/79 mmHg.   7. Estimated pulmonary artery systolic pressure is 12 mmHg.   8. No pericardial effusion seen.   9. No prior echocardiogram is available for comparison.  < from: TTE Limited W or WO Ultrasound Enhancing Agent (02.21.24 @ 11:54) >  Left Atrium:  The left atrium is severely dilated with an indexed volume of 49.03 ml/m².   Right Atrium:  The right atrium is dilated with an indexed volume of 43.97 ml/m².    < end of copied text >      ASSESSMENT/PLAN: Ms Gooden is a pleasant 78y Female sent in from [facility] after sustaining a fall - does not know how this occurred... fainting strongly suspected.  Significant facial bruising.  Has persistent AFib, with severe biatrial enlargement.  (Probably longstanding persistent) Continue Apixaban 5mg BID for stroke prevention.  Continue high dose metoprolol, and Digoxin for AFib rate control.  Continue clopidogrel for hx of coronary stents.  Continue nicotine replacement therapy. Smoking cessation strongly encouraged.  Cause of her collapse may be hypotension due to sepsis, or intermittent AV block due to age-related weakening of the cardiac conduction system.  Will get antibiotics for UTI.  Will recommend an ILR re: syncope w/ trauma, to rule out heart block. OK to use continuous telemetry until closer to discharge.    If no long pauses on telemetry (--> pacemaker implant), will implant an ILR before discharge.      Shane Frias M.D.  Cardiac Electrophysiology    office [phone]  pager [phone]
Subjective: Patient seen and examined. No new events except as noted.     REVIEW OF SYSTEMS:    CONSTITUTIONAL: +weakness, fevers or chills  EYES/ENT: No visual changes;  No vertigo or throat pain   NECK: No pain or stiffness  RESPIRATORY: No cough, wheezing, hemoptysis; No shortness of breath  CARDIOVASCULAR: No chest pain or palpitations  GASTROINTESTINAL: No abdominal or epigastric pain. No nausea, vomiting, or hematemesis; No diarrhea or constipation. No melena or hematochezia.  GENITOURINARY: No dysuria, frequency or hematuria  NEUROLOGICAL: No numbness or weakness  SKIN: No itching, burning, rashes, or lesions   All other review of systems is negative unless indicated above.    MEDICATIONS:  MEDICATIONS  (STANDING):  apixaban 5 milliGRAM(s) Oral every 12 hours  ascorbic acid 500 milliGRAM(s) Oral daily  atorvastatin 80 milliGRAM(s) Oral at bedtime  chlorhexidine 2% Cloths 1 Application(s) Topical daily  clopidogrel Tablet 75 milliGRAM(s) Oral daily  digoxin     Tablet 250 MICROGram(s) Oral daily  levothyroxine 175 MICROGram(s) Oral daily  lidocaine   4% Patch 1 Patch Transdermal daily  metoprolol tartrate 100 milliGRAM(s) Oral two times a day  midodrine. 5 milliGRAM(s) Oral three times a day  multivitamin 1 Tablet(s) Oral daily  naloxone Injectable 0.4 milliGRAM(s) IV Push once  nicotine -  14 mG/24Hr(s) Patch 1 Patch Transdermal daily  polyethylene glycol 3350 17 Gram(s) Oral daily  senna 2 Tablet(s) Oral at bedtime  sodium chloride 0.9%. 1000 milliLiter(s) (75 mL/Hr) IV Continuous <Continuous>      PHYSICAL EXAM:  T(C): 36.7 (05-06-24 @ 08:24), Max: 36.7 (05-05-24 @ 11:47)  HR: 105 (05-06-24 @ 08:24) (79 - 105)  BP: 132/85 (05-06-24 @ 08:24) (105/62 - 132/85)  RR: 18 (05-06-24 @ 08:24) (18 - 18)  SpO2: 99% (05-06-24 @ 08:24) (93% - 100%)  Wt(kg): --  I&O's Summary    05 May 2024 07:01  -  06 May 2024 07:00  --------------------------------------------------------  IN: 480 mL / OUT: 750 mL / NET: -270 mL              Appearance: Normal	  HEENT:   Normal oral mucosa, PERRL, EOMI	  Bilateral racoon eyes  Lymphatic: No lymphadenopathy , no edema  Cardiovascular: Irregular S1 S2, No JVD, No murmurs , Peripheral pulses palpable 2+ bilaterally  Respiratory: Lungs clear to auscultation, normal effort 	  Gastrointestinal:  Soft, Non-tender, + BS	  Skin: No rashes, No ecchymoses, No cyanosis, warm to touch  Musculoskeletal: Normal range of motion, normal strength  Psychiatry:  Mood & affect appropriate  Ext: No edema    LABS:    CARDIAC MARKERS:                  proBNP:   Lipid Profile:   HgA1c:   TSH:             TELEMETRY: 	  AF  ECG:  	  RADIOLOGY:   DIAGNOSTIC TESTING:  [ ] Echocardiogram:  [ ]  Catheterization:  [ ] Stress Test:    OTHER: 	          
Date of service: 24 @ 12:49      Patient is a 78y old  Female who presents with a chief complaint of fall and lethargy (06 May 2024 12:40)                                                               INTERVAL HPI/OVERNIGHT EVENTS:    REVIEW OF SYSTEMS:     CONSTITUTIONAL: No weakness, fevers or chills  RESPIRATORY: No cough, wheezing,  No shortness of breath  CARDIOVASCULAR: No chest pain or palpitations  GASTROINTESTINAL: No abdominal pain  . No nausea, vomiting, or hematemesis; No diarrhea or constipation. No melena or hematochezia.  GENITOURINARY: No dysuria, frequency or hematuria  NEUROLOGICAL: No numbness or weakness                                                                                                                                                                                                                                                                            Medications:  MEDICATIONS  (STANDING):  apixaban 5 milliGRAM(s) Oral every 12 hours  ascorbic acid 500 milliGRAM(s) Oral daily  atorvastatin 80 milliGRAM(s) Oral at bedtime  chlorhexidine 2% Cloths 1 Application(s) Topical daily  clopidogrel Tablet 75 milliGRAM(s) Oral daily  digoxin     Tablet 250 MICROGram(s) Oral daily  levothyroxine 175 MICROGram(s) Oral daily  lidocaine   4% Patch 1 Patch Transdermal daily  metoprolol tartrate 125 milliGRAM(s) Oral two times a day  midodrine. 5 milliGRAM(s) Oral three times a day  multivitamin 1 Tablet(s) Oral daily  naloxone Injectable 0.4 milliGRAM(s) IV Push once  nicotine -  14 mG/24Hr(s) Patch 1 Patch Transdermal daily  polyethylene glycol 3350 17 Gram(s) Oral daily  senna 2 Tablet(s) Oral at bedtime  sodium chloride 0.9%. 1000 milliLiter(s) (75 mL/Hr) IV Continuous <Continuous>    MEDICATIONS  (PRN):  acetaminophen     Tablet .. 650 milliGRAM(s) Oral every 6 hours PRN Temp greater or equal to 38C (100.4F), Mild Pain (1 - 3)  aluminum hydroxide/magnesium hydroxide/simethicone Suspension 30 milliLiter(s) Oral every 4 hours PRN Dyspepsia  bisacodyl 5 milliGRAM(s) Oral daily PRN Constipation  melatonin 3 milliGRAM(s) Oral at bedtime PRN Insomnia  metoprolol tartrate Injectable 5 milliGRAM(s) IV Push every 6 hours PRN for sustained afib rvr >120  ondansetron Injectable 4 milliGRAM(s) IV Push every 8 hours PRN Nausea and/or Vomiting       Allergies    penicillin (Hives)    Intolerances      Vital Signs Last 24 Hrs  T(C): 36.5 (06 May 2024 11:16), Max: 36.7 (06 May 2024 08:24)  T(F): 97.7 (06 May 2024 11:16), Max: 98 (06 May 2024 08:24)  HR: 90 (06 May 2024 11:16) (79 - 105)  BP: 125/84 (06 May 2024 11:16) (115/68 - 132/85)  BP(mean): --  RR: 18 (06 May 2024 11:16) (18 - 18)  SpO2: 100% (06 May 2024 11:16) (93% - 100%)    Parameters below as of 06 May 2024 11:16  Patient On (Oxygen Delivery Method): room air      CAPILLARY BLOOD GLUCOSE          05-05 @ 07:01  -  05-06 @ 07:00  --------------------------------------------------------  IN: 480 mL / OUT: 750 mL / NET: -270 mL      Physical Exam:    Daily     Daily Weight in k.6 (06 May 2024 08:24)  General:  Well appearing, NAD, not cachetic  HEENT:  Nonicteric, PERRLA  CV:  RRR, S1S2   Lungs:  CTA B/L, no wheezes, rales, rhonchi  Abdomen:  Soft, non-tender, no distended, positive BS  Extremities:  no edema                                                                                                                                                                                                                                                                                LABS:                                                     RADIOLOGY & ADDITIONAL TESTS         I personally reviewed: [  ]EKG   [  ]CXR    [  ] CT      A/P:         Discussed with :     [facility] consultants' Notes   Time spent :  
EP      HISTORY OF PRESENT ILLNESS: HPI:  79yo 73kg f, smoker, w pmh [place], htn, hld, afib, cad c/b mi s/p pci w stents, copd, little, urinary incontinence, hypothyroidism, oa s/p l tkr + l hip orif, and w recent hospitalization 2/16-3/9 for ischemic bowel s/p ex-lap w bowel resection + end ileostomy, 4/1-4/3 for drainage from incision site 2/2 hematoma in the laparotomy wound s/p conservative mgmt, p/w fall and lethargy; in er, found to be in afib rvr w ua suggestive of uti; admit to medicine for further mgmt (28 Apr 2024 22:37)    Here w/ fainting, resulting in facial/periorbital bruising bilaterally.  Reports no angina, and no awareness of irregularity or rapidity of rhythm.  Is taking apixaban 5mg BID for AFib stroke prevention.  States no prior fainting.  A 10 pt ROS is otherwise negative.  5/2- per Dr Mendiola, patient has known diagnosis of AFib.  Per patient, she is not aware of most of her prior diagnoses or medications.  Started on higher dose metoprolol + Digoxin.  Awaiting ILR if no significant bradycardia on telemetry.   5/3- patient deferring to her daughter for her medical plan of care.  palliative care team meeting pending today.  5/4  no events overnight   5/5 no tele events overnight   Date of service 5/6- resting in bed.  family provided informed consent for ILR via telephone.  awaiting DC to rehab.    PAST MEDICAL & SURGICAL HISTORY:  Hypertension  Hypothyroid  Osteoarthritis  knees, back  CAD (coronary artery disease)  LITTLE (obstructive sleep apnea)  non complaint on CPAP  History of MI (myocardial infarction)  h/o previous MI in 2004 prompted PTCA  with stenting x 2 vessels   last stress/ echo 2019  Heart murmur  dx in childhood  Bilateral hearing loss, unspecified hearing loss type  bilateral aids  Obesity  Mixed stress and urge urinary incontinence  Overactive bladder  S/P ORIF (open reduction internal fixation) fracture  left hip 1962  S/P appendectomy  30 plus years  S/P knee replacement  left 2000  Stented coronary artery  2004 X 2 STENTS  S/P laparotomy  due to adhesions, 30 years ago  H/O dilation and curettage  2/2019 Benign polyp      acetaminophen     Tablet .. 650 milliGRAM(s) Oral every 6 hours PRN  aluminum hydroxide/magnesium hydroxide/simethicone Suspension 30 milliLiter(s) Oral every 4 hours PRN  apixaban 5 milliGRAM(s) Oral every 12 hours  ascorbic acid 500 milliGRAM(s) Oral daily  atorvastatin 80 milliGRAM(s) Oral at bedtime  bisacodyl 5 milliGRAM(s) Oral daily PRN  chlorhexidine 2% Cloths 1 Application(s) Topical daily  clopidogrel Tablet 75 milliGRAM(s) Oral daily  digoxin     Tablet 250 MICROGram(s) Oral daily  levothyroxine 175 MICROGram(s) Oral daily  lidocaine   4% Patch 1 Patch Transdermal daily  melatonin 3 milliGRAM(s) Oral at bedtime PRN  metoprolol tartrate 125 milliGRAM(s) Oral two times a day  metoprolol tartrate Injectable 5 milliGRAM(s) IV Push every 6 hours PRN  midodrine. 5 milliGRAM(s) Oral three times a day  multivitamin 1 Tablet(s) Oral daily  naloxone Injectable 0.4 milliGRAM(s) IV Push once  nicotine -  14 mG/24Hr(s) Patch 1 Patch Transdermal daily  ondansetron Injectable 4 milliGRAM(s) IV Push every 8 hours PRN  polyethylene glycol 3350 17 Gram(s) Oral daily  senna 2 Tablet(s) Oral at bedtime  sodium chloride 0.9%. 1000 milliLiter(s) IV Continuous <Continuous>    T(C): 36.5 (05-06-24 @ 11:16), Max: 36.7 (05-06-24 @ 08:24)  HR: 90 (05-06-24 @ 11:16) (79 - 105)  BP: 125/84 (05-06-24 @ 11:16) (115/68 - 132/85)  RR: 18 (05-06-24 @ 11:16) (18 - 18)  SpO2: 100% (05-06-24 @ 11:16) (93% - 100%)  Wt(kg): --    I&O's Summary    05 May 2024 07:01  -  06 May 2024 07:00  --------------------------------------------------------  IN: 480 mL / OUT: 750 mL / NET: -270 mL      Appearance: Normal appearing elderly woman in no acute distress	  HEENT:   Normal oral mucosa, PERRL, EOMI	  Lymphatic: No lymphadenopathy , no edema  Cardiovascular: rapid irregular S1 S2, No JVD, No murmurs , Peripheral pulses palpable 2+ bilaterally  Respiratory: Lungs clear to auscultation, normal effort 	  Gastrointestinal:  Soft, Non-tender, + BS	  Skin: No rashes, +facial/periorbital ecchymoses, No cyanosis, warm to touch  Musculoskeletal: Normal range of motion, normal strength  Psychiatry:  Mood & affect appropriate    TELEMETRY: AFib 80s-90s	    ECG: AFib, 120s, narrow QRS  Echo:  < from: TTE Limited W or WO Ultrasound Enhancing Agent (02.21.24 @ 11:54) >  CONCLUSIONS:      1. Left ventricular cavity is normal in size. Left ventricular wall thickness is normal. There are no regional wall motion abnormalities seen.   2. Unable to assess left ventricular diastolic function due to insufficient data. Analysis of left ventricular diastolic function and filling pressure is made challenging by the presence of atrial fibrillation.   3. Normal right ventricular cavity size, with wall thickness, and normal systolic function.   4. The left atrium is severely dilated.   5. The right atrium is dilated.   6. Mild mitral regurgitation at a blood pressure of 128/79 mmHg.   7. Estimated pulmonary artery systolic pressure is 12 mmHg.   8. No pericardial effusion seen.   9. No prior echocardiogram is available for comparison.  < from: TTE Limited W or WO Ultrasound Enhancing Agent (02.21.24 @ 11:54) >  Left Atrium:  The left atrium is severely dilated with an indexed volume of 49.03 ml/m².   Right Atrium:  The right atrium is dilated with an indexed volume of 43.97 ml/m².    < end of copied text >      ASSESSMENT/PLAN: Ms Gooden is a pleasant 78y Female sent in from [facility] after sustaining a fall - does not know how this occurred... fainting strongly suspected.  Significant facial bruising.  Has persistent AFib, with severe biatrial enlargement.  (Probably longstanding persistent) Continue Apixaban 5mg BID for stroke prevention.  Continue high dose metoprolol, and Digoxin for AFib rate control.  Continue clopidogrel for hx of coronary stents.  Continue nicotine replacement therapy. Smoking cessation strongly encouraged.  Cause of her collapse may be hypotension due to sepsis, or intermittent AV block due to age-related weakening of the cardiac conduction system.  Family and patient agreeable for ILR insertion (looking for intermittent AV block --> syncope).  Daughter provided informed consent by telephone.  Afterwards OK for discharge to rehab immediately.  Followup of ILR data w/ Dr Mendiola.    Shane Frias M.D.  Cardiac Electrophysiology  [phone]   
EP Attending  HISTORY OF PRESENT ILLNESS: HPI:  79yo 73kg f, smoker, w pmh [place], htn, hld, afib, cad c/b mi s/p pci w stents, copd, little, urinary incontinence, hypothyroidism, oa s/p l tkr + l hip orif, and w recent hospitalization 2/16-3/9 for ischemic bowel s/p ex-lap w bowel resection + end ileostomy, 4/1-4/3 for drainage from incision site 2/2 hematoma in the laparotomy wound s/p conservative mgmt, p/w fall and lethargy; in er, found to be in afib rvr w ua suggestive of uti; admit to medicine for further mgmt (28 Apr 2024 22:37)    Here w/ fainting, resulting in facial/periorbital bruising bilaterally.  Reports no angina, and no awareness of irregularity or rapidity of rhythm.  Is taking apixaban 5mg BID for AFib stroke prevention.  States no prior fainting.  A 10 pt ROS is otherwise negative.  5/2- per Dr Mendiola, patient has known diagnosis of AFib.  Per patient, she is not aware of most of her prior diagnoses or medications.  Started on higher dose metoprolol + Digoxin.  Awaiting ILR if no significant bradycardia on telemetry.  Date of service 5/3- patient deferring to her daughter for her medical plan of care.  palliative care team meeting pending today.    PAST MEDICAL & SURGICAL HISTORY:  Hypertension  Hypothyroid  Osteoarthritis  knees, back  CAD (coronary artery disease)  LITTLE (obstructive sleep apnea)  non complaint on CPAP  History of MI (myocardial infarction)  h/o previous MI in 2004 prompted PTCA  with stenting x 2 vessels   last stress/ echo 2019  Heart murmur  dx in childhood  Bilateral hearing loss, unspecified hearing loss type  bilateral aids  Obesity  Mixed stress and urge urinary incontinence  Overactive bladder  S/P ORIF (open reduction internal fixation) fracture  left hip 1962  S/P appendectomy  30 plus years  S/P knee replacement  left 2000  Stented coronary artery  2004 X 2 STENTS  S/P laparotomy  due to adhesions, 30 years ago  H/O dilation and curettage  2/2019 Benign polyp    acetaminophen     Tablet .. 650 milliGRAM(s) Oral every 6 hours PRN  aluminum hydroxide/magnesium hydroxide/simethicone Suspension 30 milliLiter(s) Oral every 4 hours PRN  apixaban 5 milliGRAM(s) Oral every 12 hours  ascorbic acid 500 milliGRAM(s) Oral daily  atorvastatin 80 milliGRAM(s) Oral at bedtime  bisacodyl 5 milliGRAM(s) Oral daily PRN  cefTRIAXone   IVPB 1000 milliGRAM(s) IV Intermittent every 24 hours  chlorhexidine 2% Cloths 1 Application(s) Topical daily  clopidogrel Tablet 75 milliGRAM(s) Oral daily  digoxin     Tablet 250 MICROGram(s) Oral daily  levothyroxine 175 MICROGram(s) Oral daily  lidocaine   4% Patch 1 Patch Transdermal daily  melatonin 3 milliGRAM(s) Oral at bedtime PRN  metoprolol tartrate 100 milliGRAM(s) Oral two times a day  metoprolol tartrate Injectable 5 milliGRAM(s) IV Push every 6 hours PRN  midodrine. 5 milliGRAM(s) Oral three times a day  morphine  - Injectable 4 milliGRAM(s) IV Push every 4 hours PRN  morphine  - Injectable 2 milliGRAM(s) IV Push every 4 hours PRN  multivitamin 1 Tablet(s) Oral daily  naloxone Injectable 0.4 milliGRAM(s) IV Push once  nicotine -  14 mG/24Hr(s) Patch 1 Patch Transdermal daily  ondansetron Injectable 4 milliGRAM(s) IV Push every 8 hours PRN  polyethylene glycol 3350 17 Gram(s) Oral daily  senna 2 Tablet(s) Oral at bedtime  sodium chloride 0.9%. 1000 milliLiter(s) IV Continuous <Continuous>                            10.8   9.09  )-----------( 217      ( 03 May 2024 06:55 )             33.0       05-03    137  |  108  |  10  ----------------------------<  72  3.9   |  16<L>  |  1.09    Ca    8.4      03 May 2024 06:54      T(C): 36.7 (05-03-24 @ 12:18), Max: 36.8 (05-03-24 @ 00:00)  HR: 110 (05-03-24 @ 12:18) (89 - 113)  BP: 128/81 (05-03-24 @ 12:18) (102/62 - 128/81)  RR: 18 (05-03-24 @ 12:18) (17 - 18)  SpO2: 94% (05-03-24 @ 12:18) (94% - 99%)  Wt(kg): --    I&O's Summary    02 May 2024 07:01  -  03 May 2024 07:00  --------------------------------------------------------  IN: 640 mL / OUT: 1600 mL / NET: -960 mL    03 May 2024 07:01  -  03 May 2024 13:02  --------------------------------------------------------  IN: 565 mL / OUT: 0 mL / NET: 565 mL    Appearance: Normal appearing elderly woman in no acute distress	  HEENT:   Normal oral mucosa, PERRL, EOMI	  Lymphatic: No lymphadenopathy , no edema  Cardiovascular: rapid irregular S1 S2, No JVD, No murmurs , Peripheral pulses palpable 2+ bilaterally  Respiratory: Lungs clear to auscultation, normal effort 	  Gastrointestinal:  Soft, Non-tender, + BS	  Skin: No rashes, +facial/periorbital ecchymoses, No cyanosis, warm to touch  Musculoskeletal: Normal range of motion, normal strength  Psychiatry:  Mood & affect appropriate    TELEMETRY: AFib 100s	    ECG: AFib, 120s, narrow QRS  Echo:  < from: TTE Limited W or WO Ultrasound Enhancing Agent (02.21.24 @ 11:54) >  CONCLUSIONS:      1. Left ventricular cavity is normal in size. Left ventricular wall thickness is normal. There are no regional wall motion abnormalities seen.   2. Unable to assess left ventricular diastolic function due to insufficient data. Analysis of left ventricular diastolic function and filling pressure is made challenging by the presence of atrial fibrillation.   3. Normal right ventricular cavity size, with wall thickness, and normal systolic function.   4. The left atrium is severely dilated.   5. The right atrium is dilated.   6. Mild mitral regurgitation at a blood pressure of 128/79 mmHg.   7. Estimated pulmonary artery systolic pressure is 12 mmHg.   8. No pericardial effusion seen.   9. No prior echocardiogram is available for comparison.  < from: TTE Limited W or WO Ultrasound Enhancing Agent (02.21.24 @ 11:54) >  Left Atrium:  The left atrium is severely dilated with an indexed volume of 49.03 ml/m².   Right Atrium:  The right atrium is dilated with an indexed volume of 43.97 ml/m².    < end of copied text >      ASSESSMENT/PLAN: Ms Gooden is a pleasant 78y Female sent in from [facility] after sustaining a fall - does not know how this occurred... fainting strongly suspected.  Significant facial bruising.  Has persistent AFib, with severe biatrial enlargement.  (Probably longstanding persistent) Continue Apixaban 5mg BID for stroke prevention.  Continue high dose metoprolol, and Digoxin for AFib rate control.  Continue clopidogrel for hx of coronary stents.  Continue nicotine replacement therapy. Smoking cessation strongly encouraged.  Cause of her collapse may be hypotension due to sepsis, or intermittent AV block due to age-related weakening of the cardiac conduction system.  Will get antibiotics for UTI.  Will recommend an ILR re: syncope w/ trauma, to rule out heart block. OK to use continuous telemetry until closer to discharge.  (If aligned with patient/family's goals of care)  If no long pauses on telemetry (--> pacemaker implant), will implant an ILR before discharge.      Shane Frias M.D.  Cardiac Electrophysiology    office [phone]  pager [phone]
Patient is a 78y old  Female who presents with a chief complaint of fall and lethargy (28 Apr 2024 22:37)      INTERVAL HPI/OVERNIGHT EVENTS: seen and examined , daughter bedside   HR is controlled today    T(C): 36.5 (04-29-24 @ 10:49), Max: 36.9 (04-28-24 @ 17:00)  HR: 80 (04-29-24 @ 10:49) (80 - 157)  BP: 128/84 (04-29-24 @ 10:49) (76/44 - 128/84)  RR: 18 (04-29-24 @ 10:49) (12 - 20)  SpO2: 99% (04-29-24 @ 10:49) (95% - 100%)  Wt(kg): --  I&O's Summary    28 Apr 2024 07:01  -  29 Apr 2024 07:00  --------------------------------------------------------  IN: 0 mL / OUT: 200 mL / NET: -200 mL        PAST MEDICAL & SURGICAL HISTORY:  Hypertension      Hypothyroid      Osteoarthritis  knees, back      CAD (coronary artery disease)      CROW (obstructive sleep apnea)  non complaint on CPAP      History of MI (myocardial infarction)  h/o previous MI in 2004 prompted PTCA  with stenting x 2 vessels   last stress/ echo 2019      Heart murmur  dx in childhood      Bilateral hearing loss, unspecified hearing loss type  bilateral aids      Obesity      Mixed stress and urge urinary incontinence      Overactive bladder      S/P ORIF (open reduction internal fixation) fracture  left hip 1962      S/P appendectomy  30 plus years      S/P knee replacement  left 2000      Stented coronary artery  2004 X 2 STENTS      S/P laparotomy  due to adhesions, 30 years ago      H/O dilation and curettage  2/2019 Benign polyp          SOCIAL HISTORY  Alcohol:  Tobacco:  Illicit substance use:    FAMILY HISTORY:    REVIEW OF SYSTEMS:  CONSTITUTIONAL: No fever, weight loss, or fatigue  EYES: No eye pain, visual disturbances, or discharge  ENMT:  No difficulty hearing, tinnitus, vertigo; No sinus or throat pain  NECK: No pain or stiffness  RESPIRATORY: No cough, wheezing, chills or hemoptysis; No shortness of breath  CARDIOVASCULAR: No chest pain, palpitations, dizziness, or leg swelling  GASTROINTESTINAL: No abdominal or epigastric pain. No nausea, vomiting, or hematemesis; No diarrhea or constipation. No melena or hematochezia.  GENITOURINARY: No dysuria, frequency, hematuria, or incontinence  NEUROLOGICAL: No headaches, memory loss, loss of strength, numbness, or tremors  SKIN: No itching, burning, rashes, or lesions   LYMPH NODES: No enlarged glands  ENDOCRINE: No heat or cold intolerance; No hair loss  MUSCULOSKELETAL: No joint pain or swelling; No muscle, back, or extremity pain  PSYCHIATRIC: No depression, anxiety, mood swings, or difficulty sleeping  HEME/LYMPH: No easy bruising, or bleeding gums  ALLERY AND IMMUNOLOGIC: No hives or eczema    RADIOLOGY & ADDITIONAL TESTS:    Imaging Personally Reviewed:  [ ] YES  [ ] NO    Consultant(s) Notes Reviewed:  [ ] YES  [ ] NO    PHYSICAL EXAM:  GENERAL: NAD, well-groomed, well-developed  HEAD:  Atraumatic, Normocephalic  EYES: EOMI, PERRLA, conjunctiva and sclera clear  ENMT: No tonsillar erythema, exudates, or enlargement; Moist mucous membranes, Good dentition, No lesions  NECK: Supple, No JVD, Normal thyroid  NERVOUS SYSTEM:  Alert & Oriented X3, Good concentration; Motor Strength 5/5 B/L upper and lower extremities; DTRs 2+ intact and symmetric  CHEST/LUNG: Clear to percussion bilaterally; No rales, rhonchi, wheezing, or rubs  HEART: Regular rate and rhythm; No murmurs, rubs, or gallops  ABDOMEN: Soft, Nontender, Nondistended; Bowel sounds present  EXTREMITIES:  2+ Peripheral Pulses, No clubbing, cyanosis, or edema  LYMPH: No lymphadenopathy noted  SKIN: No rashes or lesions    LABS:                        10.0   7.86  )-----------( 198      ( 29 Apr 2024 06:51 )             32.9     04-29    138  |  112<H>  |  16  ----------------------------<  68<L>  4.8   |  14<L>  |  1.07    Ca    8.5      29 Apr 2024 06:44  Phos  3.1     04-29  Mg     1.3     04-29    TPro  5.8<L>  /  Alb  3.1<L>  /  TBili  0.5  /  DBili  x   /  AST  19  /  ALT  15  /  AlkPhos  66  04-29    PT/INR - ( 29 Apr 2024 06:44 )   PT: 18.7 sec;   INR: 1.81 ratio         PTT - ( 29 Apr 2024 06:44 )  PTT:28.0 sec  Urinalysis Basic - ( 29 Apr 2024 06:44 )    Color: x / Appearance: x / SG: x / pH: x  Gluc: 68 mg/dL / Ketone: x  / Bili: x / Urobili: x   Blood: x / Protein: x / Nitrite: x   Leuk Esterase: x / RBC: x / WBC x   Sq Epi: x / Non Sq Epi: x / Bacteria: x      CAPILLARY BLOOD GLUCOSE            Urinalysis Basic - ( 29 Apr 2024 06:44 )    Color: x / Appearance: x / SG: x / pH: x  Gluc: 68 mg/dL / Ketone: x  / Bili: x / Urobili: x   Blood: x / Protein: x / Nitrite: x   Leuk Esterase: x / RBC: x / WBC x   Sq Epi: x / Non Sq Epi: x / Bacteria: x        MEDICATIONS  (STANDING):  apixaban 5 milliGRAM(s) Oral every 12 hours  atorvastatin 80 milliGRAM(s) Oral at bedtime  cefTRIAXone   IVPB 1000 milliGRAM(s) IV Intermittent every 24 hours  chlorhexidine 2% Cloths 1 Application(s) Topical daily  clopidogrel Tablet 75 milliGRAM(s) Oral daily  levothyroxine 175 MICROGram(s) Oral daily  lidocaine   4% Patch 1 Patch Transdermal daily  metoprolol tartrate 25 milliGRAM(s) Oral two times a day  naloxone Injectable 0.4 milliGRAM(s) IV Push once  nicotine -  14 mG/24Hr(s) Patch 1 Patch Transdermal daily  polyethylene glycol 3350 17 Gram(s) Oral daily  senna 2 Tablet(s) Oral at bedtime  sodium chloride 0.9%. 1000 milliLiter(s) (75 mL/Hr) IV Continuous <Continuous>    MEDICATIONS  (PRN):  acetaminophen     Tablet .. 650 milliGRAM(s) Oral every 6 hours PRN Temp greater or equal to 38C (100.4F), Mild Pain (1 - 3)  aluminum hydroxide/magnesium hydroxide/simethicone Suspension 30 milliLiter(s) Oral every 4 hours PRN Dyspepsia  bisacodyl 5 milliGRAM(s) Oral daily PRN Constipation  melatonin 3 milliGRAM(s) Oral at bedtime PRN Insomnia  metoprolol tartrate Injectable 5 milliGRAM(s) IV Push every 6 hours PRN for sustained afib rvr >120  morphine  - Injectable 4 milliGRAM(s) IV Push every 4 hours PRN Severe Pain (7 - 10)  morphine  - Injectable 2 milliGRAM(s) IV Push every 4 hours PRN Moderate Pain (4 - 6)  ondansetron Injectable 4 milliGRAM(s) IV Push every 8 hours PRN Nausea and/or Vomiting      Care Discussed with Consultants/Other Providers [ ] YES  [ ] NO
Patient is a 78y old  Female who presents with a chief complaint of fall and lethargy (30 Apr 2024 11:55)      INTERVAL HPI/OVERNIGHT EVENTS: afib with RVR   T(C): 36.7 (04-30-24 @ 20:20), Max: 37.1 (04-30-24 @ 04:42)  HR: 93 (04-30-24 @ 20:20) (92 - 130)  BP: 123/83 (04-30-24 @ 20:20) (101/77 - 123/83)  RR: 18 (04-30-24 @ 20:20) (17 - 18)  SpO2: 98% (04-30-24 @ 20:20) (97% - 100%)  Wt(kg): --  I&O's Summary    29 Apr 2024 07:01  -  30 Apr 2024 07:00  --------------------------------------------------------  IN: 220 mL / OUT: 200 mL / NET: 20 mL    30 Apr 2024 07:01  -  30 Apr 2024 23:50  --------------------------------------------------------  IN: 1060 mL / OUT: 600 mL / NET: 460 mL        PAST MEDICAL & SURGICAL HISTORY:  Hypertension      Hypothyroid      Osteoarthritis  knees, back      CAD (coronary artery disease)      CROW (obstructive sleep apnea)  non complaint on CPAP      History of MI (myocardial infarction)  h/o previous MI in 2004 prompted PTCA  with stenting x 2 vessels   last stress/ echo 2019      Heart murmur  dx in childhood      Bilateral hearing loss, unspecified hearing loss type  bilateral aids      Obesity      Mixed stress and urge urinary incontinence      Overactive bladder      S/P ORIF (open reduction internal fixation) fracture  left hip 1962      S/P appendectomy  30 plus years      S/P knee replacement  left 2000      Stented coronary artery  2004 X 2 STENTS      S/P laparotomy  due to adhesions, 30 years ago      H/O dilation and curettage  2/2019 Benign polyp          SOCIAL HISTORY  Alcohol:  Tobacco:  Illicit substance use:    FAMILY HISTORY:    REVIEW OF SYSTEMS:  CONSTITUTIONAL: No fever, weight loss, or fatigue  EYES: No eye pain, visual disturbances, or discharge  ENMT:  No difficulty hearing, tinnitus, vertigo; No sinus or throat pain  NECK: No pain or stiffness  RESPIRATORY: No cough, wheezing, chills or hemoptysis; No shortness of breath  CARDIOVASCULAR: No chest pain, palpitations, dizziness, or leg swelling  GASTROINTESTINAL: No abdominal or epigastric pain. No nausea, vomiting, or hematemesis; No diarrhea or constipation. No melena or hematochezia.  GENITOURINARY: No dysuria, frequency, hematuria, or incontinence  NEUROLOGICAL: No headaches, memory loss, loss of strength, numbness, or tremors  SKIN: No itching, burning, rashes, or lesions   LYMPH NODES: No enlarged glands  ENDOCRINE: No heat or cold intolerance; No hair loss  MUSCULOSKELETAL: No joint pain or swelling; No muscle, back, or extremity pain  PSYCHIATRIC: No depression, anxiety, mood swings, or difficulty sleeping  HEME/LYMPH: No easy bruising, or bleeding gums  ALLERY AND IMMUNOLOGIC: No hives or eczema    RADIOLOGY & ADDITIONAL TESTS:    Imaging Personally Reviewed:  [ ] YES  [ ] NO    Consultant(s) Notes Reviewed:  [ ] YES  [ ] NO    PHYSICAL EXAM:  GENERAL: NAD, well-groomed, well-developed  HEAD:  Atraumatic, Normocephalic  EYES: EOMI, PERRLA, conjunctiva and sclera clear  ENMT: No tonsillar erythema, exudates, or enlargement; Moist mucous membranes, Good dentition, No lesions  NECK: Supple, No JVD, Normal thyroid  NERVOUS SYSTEM:  Alert & Oriented X3, Good concentration; Motor Strength 5/5 B/L upper and lower extremities; DTRs 2+ intact and symmetric  CHEST/LUNG: Clear to percussion bilaterally; No rales, rhonchi, wheezing, or rubs  HEART: Regular rate and rhythm; No murmurs, rubs, or gallops  ABDOMEN: Soft, Nontender, Nondistended; Bowel sounds present  EXTREMITIES:  2+ Peripheral Pulses, No clubbing, cyanosis, or edema  LYMPH: No lymphadenopathy noted  SKIN: No rashes or lesions    LABS:                        10.0   7.86  )-----------( 198      ( 29 Apr 2024 06:51 )             32.9     04-30    139  |  109<H>  |  13  ----------------------------<  74  4.1   |  17<L>  |  1.02    Ca    8.5      30 Apr 2024 07:14  Phos  3.1     04-29  Mg     1.7     04-30    TPro  5.8<L>  /  Alb  3.1<L>  /  TBili  0.5  /  DBili  x   /  AST  19  /  ALT  15  /  AlkPhos  66  04-29    PT/INR - ( 29 Apr 2024 06:44 )   PT: 18.7 sec;   INR: 1.81 ratio         PTT - ( 29 Apr 2024 06:44 )  PTT:28.0 sec  Urinalysis Basic - ( 30 Apr 2024 07:14 )    Color: x / Appearance: x / SG: x / pH: x  Gluc: 74 mg/dL / Ketone: x  / Bili: x / Urobili: x   Blood: x / Protein: x / Nitrite: x   Leuk Esterase: x / RBC: x / WBC x   Sq Epi: x / Non Sq Epi: x / Bacteria: x      CAPILLARY BLOOD GLUCOSE            Urinalysis Basic - ( 30 Apr 2024 07:14 )    Color: x / Appearance: x / SG: x / pH: x  Gluc: 74 mg/dL / Ketone: x  / Bili: x / Urobili: x   Blood: x / Protein: x / Nitrite: x   Leuk Esterase: x / RBC: x / WBC x   Sq Epi: x / Non Sq Epi: x / Bacteria: x        MEDICATIONS  (STANDING):  apixaban 5 milliGRAM(s) Oral every 12 hours  ascorbic acid 500 milliGRAM(s) Oral daily  atorvastatin 80 milliGRAM(s) Oral at bedtime  cefTRIAXone   IVPB 1000 milliGRAM(s) IV Intermittent every 24 hours  chlorhexidine 2% Cloths 1 Application(s) Topical daily  clopidogrel Tablet 75 milliGRAM(s) Oral daily  levothyroxine 175 MICROGram(s) Oral daily  lidocaine   4% Patch 1 Patch Transdermal daily  metoprolol tartrate 75 milliGRAM(s) Oral two times a day  midodrine. 5 milliGRAM(s) Oral three times a day  multivitamin 1 Tablet(s) Oral daily  naloxone Injectable 0.4 milliGRAM(s) IV Push once  nicotine -  14 mG/24Hr(s) Patch 1 Patch Transdermal daily  polyethylene glycol 3350 17 Gram(s) Oral daily  senna 2 Tablet(s) Oral at bedtime  sodium chloride 0.9%. 1000 milliLiter(s) (75 mL/Hr) IV Continuous <Continuous>    MEDICATIONS  (PRN):  acetaminophen     Tablet .. 650 milliGRAM(s) Oral every 6 hours PRN Temp greater or equal to 38C (100.4F), Mild Pain (1 - 3)  aluminum hydroxide/magnesium hydroxide/simethicone Suspension 30 milliLiter(s) Oral every 4 hours PRN Dyspepsia  bisacodyl 5 milliGRAM(s) Oral daily PRN Constipation  melatonin 3 milliGRAM(s) Oral at bedtime PRN Insomnia  metoprolol tartrate Injectable 5 milliGRAM(s) IV Push every 6 hours PRN for sustained afib rvr >120  morphine  - Injectable 2 milliGRAM(s) IV Push every 4 hours PRN Moderate Pain (4 - 6)  morphine  - Injectable 4 milliGRAM(s) IV Push every 4 hours PRN Severe Pain (7 - 10)  ondansetron Injectable 4 milliGRAM(s) IV Push every 8 hours PRN Nausea and/or Vomiting      Care Discussed with Consultants/Other Providers [ ] YES  [ ] NO
Subjective: Patient seen and examined. No new events except as noted.     REVIEW OF SYSTEMS:    CONSTITUTIONAL:+ weakness, fevers or chills  EYES/ENT: No visual changes;  No vertigo or throat pain   NECK: No pain or stiffness  RESPIRATORY: No cough, wheezing, hemoptysis; No shortness of breath  CARDIOVASCULAR: No chest pain or palpitations  GASTROINTESTINAL: No abdominal or epigastric pain. No nausea, vomiting, or hematemesis; No diarrhea or constipation. No melena or hematochezia.  GENITOURINARY: No dysuria, frequency or hematuria  NEUROLOGICAL: No numbness or weakness  SKIN: No itching, burning, rashes, or lesions   All other review of systems is negative unless indicated above.    MEDICATIONS:  MEDICATIONS  (STANDING):  apixaban 5 milliGRAM(s) Oral every 12 hours  ascorbic acid 500 milliGRAM(s) Oral daily  atorvastatin 80 milliGRAM(s) Oral at bedtime  cefTRIAXone   IVPB 1000 milliGRAM(s) IV Intermittent every 24 hours  chlorhexidine 2% Cloths 1 Application(s) Topical daily  clopidogrel Tablet 75 milliGRAM(s) Oral daily  digoxin     Tablet 250 MICROGram(s) Oral daily  levothyroxine 175 MICROGram(s) Oral daily  lidocaine   4% Patch 1 Patch Transdermal daily  metoprolol tartrate 100 milliGRAM(s) Oral two times a day  midodrine. 5 milliGRAM(s) Oral three times a day  multivitamin 1 Tablet(s) Oral daily  naloxone Injectable 0.4 milliGRAM(s) IV Push once  nicotine -  14 mG/24Hr(s) Patch 1 Patch Transdermal daily  polyethylene glycol 3350 17 Gram(s) Oral daily  senna 2 Tablet(s) Oral at bedtime  sodium chloride 0.9%. 1000 milliLiter(s) (75 mL/Hr) IV Continuous <Continuous>      PHYSICAL EXAM:  T(C): 36.8 (05-03-24 @ 04:00), Max: 36.8 (05-03-24 @ 00:00)  HR: 96 (05-03-24 @ 04:00) (91 - 113)  BP: 102/62 (05-03-24 @ 04:00) (102/62 - 129/76)  RR: 18 (05-03-24 @ 04:00) (17 - 18)  SpO2: 99% (05-03-24 @ 04:00) (95% - 99%)  Wt(kg): --  I&O's Summary    02 May 2024 07:01  -  03 May 2024 07:00  --------------------------------------------------------  IN: 640 mL / OUT: 1600 mL / NET: -960 mL              Appearance: Normal	  HEENT:   Normal oral mucosa, PERRL, EOMI	  Bilateral racoon eyes  Lymphatic: No lymphadenopathy , no edema  Cardiovascular: Irregular S1 S2, No JVD, No murmurs , Peripheral pulses palpable 2+ bilaterally  Respiratory: Lungs clear to auscultation, normal effort 	  Gastrointestinal:  Soft, Non-tender, + BS	  Skin: No rashes, No ecchymoses, No cyanosis, warm to touch  Musculoskeletal: Normal range of motion, normal strength  Psychiatry:  Mood & affect appropriate  Ext: No edema        LABS:    CARDIAC MARKERS:                                10.8   9.09  )-----------( 217      ( 03 May 2024 06:55 )             33.0     05-03    137  |  108  |  10  ----------------------------<  72  3.9   |  16<L>  |  1.09    Ca    8.4      03 May 2024 06:54      proBNP:   Lipid Profile:   HgA1c:   TSH:             TELEMETRY: 	AF    ECG:  	  RADIOLOGY:   DIAGNOSTIC TESTING:  [ ] Echocardiogram:  [ ]  Catheterization:  [ ] Stress Test:    OTHER: 	          
DATE OF SERVICE: 05-01-24 @ 14:52    Patient is a 78y old  Female who presents with a chief complaint of fall and lethargy (01 May 2024 10:23)      SUBJECTIVE / OVERNIGHT EVENTS:  No chest pain. No shortness of breath. No complaints. No events overnight. poor po intake    MEDICATIONS  (STANDING):  apixaban 5 milliGRAM(s) Oral every 12 hours  ascorbic acid 500 milliGRAM(s) Oral daily  atorvastatin 80 milliGRAM(s) Oral at bedtime  cefTRIAXone   IVPB 1000 milliGRAM(s) IV Intermittent every 24 hours  chlorhexidine 2% Cloths 1 Application(s) Topical daily  clopidogrel Tablet 75 milliGRAM(s) Oral daily  levothyroxine 175 MICROGram(s) Oral daily  lidocaine   4% Patch 1 Patch Transdermal daily  metoprolol tartrate 100 milliGRAM(s) Oral two times a day  midodrine. 5 milliGRAM(s) Oral three times a day  multivitamin 1 Tablet(s) Oral daily  naloxone Injectable 0.4 milliGRAM(s) IV Push once  nicotine -  14 mG/24Hr(s) Patch 1 Patch Transdermal daily  polyethylene glycol 3350 17 Gram(s) Oral daily  senna 2 Tablet(s) Oral at bedtime  sodium chloride 0.9%. 1000 milliLiter(s) (75 mL/Hr) IV Continuous <Continuous>    MEDICATIONS  (PRN):  acetaminophen     Tablet .. 650 milliGRAM(s) Oral every 6 hours PRN Temp greater or equal to 38C (100.4F), Mild Pain (1 - 3)  aluminum hydroxide/magnesium hydroxide/simethicone Suspension 30 milliLiter(s) Oral every 4 hours PRN Dyspepsia  bisacodyl 5 milliGRAM(s) Oral daily PRN Constipation  melatonin 3 milliGRAM(s) Oral at bedtime PRN Insomnia  metoprolol tartrate Injectable 5 milliGRAM(s) IV Push every 6 hours PRN for sustained afib rvr >120  morphine  - Injectable 2 milliGRAM(s) IV Push every 4 hours PRN Moderate Pain (4 - 6)  morphine  - Injectable 4 milliGRAM(s) IV Push every 4 hours PRN Severe Pain (7 - 10)  ondansetron Injectable 4 milliGRAM(s) IV Push every 8 hours PRN Nausea and/or Vomiting      Vital Signs Last 24 Hrs  T(C): 36.6 (01 May 2024 11:05), Max: 36.8 (01 May 2024 00:00)  T(F): 97.9 (01 May 2024 11:05), Max: 98.2 (01 May 2024 00:00)  HR: 85 (01 May 2024 11:05) (85 - 130)  BP: 98/68 (01 May 2024 11:05) (98/68 - 123/83)  BP(mean): --  RR: 18 (01 May 2024 11:05) (17 - 18)  SpO2: 99% (01 May 2024 11:05) (94% - 99%)    Parameters below as of 01 May 2024 11:05  Patient On (Oxygen Delivery Method): room air      CAPILLARY BLOOD GLUCOSE        I&O's Summary    30 Apr 2024 07:01  -  01 May 2024 07:00  --------------------------------------------------------  IN: 1060 mL / OUT: 950 mL / NET: 110 mL    01 May 2024 07:01  -  01 May 2024 14:52  --------------------------------------------------------  IN: 555 mL / OUT: 100 mL / NET: 455 mL        PHYSICAL EXAM:  GENERAL: NAD, well-developed  HEAD:  Atraumatic, Normocephalic  EYES: EOMI, PERRLA, conjunctiva and sclera clear  NECK: Supple, No JVD  CHEST/LUNG: Clear to auscultation bilaterally; No wheeze  HEART: Regular rate and rhythm; No murmurs, rubs, or gallops  ABDOMEN: Soft, Nontender, Nondistended; Bowel sounds present  EXTREMITIES:  2+ Peripheral Pulses, No clubbing, cyanosis, or edema  PSYCH: AAOx3  NEUROLOGY: non-focal  SKIN: No rashes or lesions    LABS:                        10.3   7.29  )-----------( 186      ( 01 May 2024 06:37 )             33.9     05-01    136  |  109<H>  |  13  ----------------------------<  79  4.0   |  15<L>  |  1.04    Ca    8.6      01 May 2024 06:39  Mg     1.7     04-30            Urinalysis Basic - ( 01 May 2024 06:39 )    Color: x / Appearance: x / SG: x / pH: x  Gluc: 79 mg/dL / Ketone: x  / Bili: x / Urobili: x   Blood: x / Protein: x / Nitrite: x   Leuk Esterase: x / RBC: x / WBC x   Sq Epi: x / Non Sq Epi: x / Bacteria: x        RADIOLOGY & ADDITIONAL TESTS:    Imaging Personally Reviewed:    Consultant(s) Notes Reviewed:      Care Discussed with Consultants/Other Providers:  
Date of service: 24 @ 07:23      Patient is a 78y old  Female who presents with a chief complaint of fall and lethargy (05 May 2024 07:13)                                                               INTERVAL HPI/OVERNIGHT EVENTS:    REVIEW OF SYSTEMS:    no compalints                                                                                                                                                                                                                                                                      Medications:  MEDICATIONS  (STANDING):  apixaban 5 milliGRAM(s) Oral every 12 hours  ascorbic acid 500 milliGRAM(s) Oral daily  atorvastatin 80 milliGRAM(s) Oral at bedtime  cefTRIAXone   IVPB 1000 milliGRAM(s) IV Intermittent every 24 hours  chlorhexidine 2% Cloths 1 Application(s) Topical daily  clopidogrel Tablet 75 milliGRAM(s) Oral daily  digoxin     Tablet 250 MICROGram(s) Oral daily  levothyroxine 175 MICROGram(s) Oral daily  lidocaine   4% Patch 1 Patch Transdermal daily  metoprolol tartrate 100 milliGRAM(s) Oral two times a day  midodrine. 5 milliGRAM(s) Oral three times a day  multivitamin 1 Tablet(s) Oral daily  naloxone Injectable 0.4 milliGRAM(s) IV Push once  nicotine -  14 mG/24Hr(s) Patch 1 Patch Transdermal daily  polyethylene glycol 3350 17 Gram(s) Oral daily  senna 2 Tablet(s) Oral at bedtime  sodium chloride 0.9%. 1000 milliLiter(s) (75 mL/Hr) IV Continuous <Continuous>    MEDICATIONS  (PRN):  acetaminophen     Tablet .. 650 milliGRAM(s) Oral every 6 hours PRN Temp greater or equal to 38C (100.4F), Mild Pain (1 - 3)  aluminum hydroxide/magnesium hydroxide/simethicone Suspension 30 milliLiter(s) Oral every 4 hours PRN Dyspepsia  bisacodyl 5 milliGRAM(s) Oral daily PRN Constipation  melatonin 3 milliGRAM(s) Oral at bedtime PRN Insomnia  metoprolol tartrate Injectable 5 milliGRAM(s) IV Push every 6 hours PRN for sustained afib rvr >120  morphine  - Injectable 4 milliGRAM(s) IV Push every 4 hours PRN Severe Pain (7 - 10)  morphine  - Injectable 2 milliGRAM(s) IV Push every 4 hours PRN Moderate Pain (4 - 6)  ondansetron Injectable 4 milliGRAM(s) IV Push every 8 hours PRN Nausea and/or Vomiting       Allergies    penicillin (Hives)    Intolerances      Vital Signs Last 24 Hrs  T(C): 36.4 (05 May 2024 04:28), Max: 36.9 (04 May 2024 20:30)  T(F): 97.6 (05 May 2024 04:28), Max: 98.5 (04 May 2024 20:30)  HR: 105 (05 May 2024 04:) (98 - 111)  BP: 111/77 (05 May 2024 04:) (100/59 - 128/84)  BP(mean): --  RR: 18 (05 May 2024 04:) (18 - 18)  SpO2: 100% (05 May 2024 04:) (97% - 100%)    Parameters below as of 05 May 2024 04:28  Patient On (Oxygen Delivery Method): room air      CAPILLARY BLOOD GLUCOSE           @ 07:01  -   @ 07:00  --------------------------------------------------------  IN: 240 mL / OUT: 550 mL / NET: -310 mL      Physical Exam:    Daily     Daily Weight in k.1 (04 May 2024 07:59)  General:  NAD   HEENT:  Nonicteric, PERRLA  CV:  RRR, S1S2   Lungs:  CTA B/L, no wheezes, rales, rhonchi  Abdomen:  Soft, non-tender, no distended, positive BS  Extremities: no edema                                                                                                                                                                                                                                                                                      LABS:                               11.5   10.21 )-----------( 215      ( 04 May 2024 06:41 )             35.0                          138  |  106  |  9   ----------------------------<  86  4.0   |  17<L>  |  1.00    Ca    8.6      04 May 2024 06:41                         RADIOLOGY & ADDITIONAL TESTS         I personally reviewed: [  ]EKG   [  ]CXR    [  ] CT      A/P:         Discussed with :     Simon consultants' Notes   Time spent :  
Date of service: 24 @ 14:49      Patient is a 78y old  Female who presents with a chief complaint of fall and lethargy (03 May 2024 13:01)                                                               INTERVAL HPI/OVERNIGHT EVENTS:    REVIEW OF SYSTEMS:   deniees any complaints                                                                                                                                                                                                                                                                        Medications:  MEDICATIONS  (STANDING):  apixaban 5 milliGRAM(s) Oral every 12 hours  ascorbic acid 500 milliGRAM(s) Oral daily  atorvastatin 80 milliGRAM(s) Oral at bedtime  cefTRIAXone   IVPB 1000 milliGRAM(s) IV Intermittent every 24 hours  chlorhexidine 2% Cloths 1 Application(s) Topical daily  clopidogrel Tablet 75 milliGRAM(s) Oral daily  digoxin     Tablet 250 MICROGram(s) Oral daily  levothyroxine 175 MICROGram(s) Oral daily  lidocaine   4% Patch 1 Patch Transdermal daily  metoprolol tartrate 100 milliGRAM(s) Oral two times a day  midodrine. 5 milliGRAM(s) Oral three times a day  multivitamin 1 Tablet(s) Oral daily  naloxone Injectable 0.4 milliGRAM(s) IV Push once  nicotine -  14 mG/24Hr(s) Patch 1 Patch Transdermal daily  polyethylene glycol 3350 17 Gram(s) Oral daily  senna 2 Tablet(s) Oral at bedtime  sodium chloride 0.9%. 1000 milliLiter(s) (75 mL/Hr) IV Continuous <Continuous>    MEDICATIONS  (PRN):  acetaminophen     Tablet .. 650 milliGRAM(s) Oral every 6 hours PRN Temp greater or equal to 38C (100.4F), Mild Pain (1 - 3)  aluminum hydroxide/magnesium hydroxide/simethicone Suspension 30 milliLiter(s) Oral every 4 hours PRN Dyspepsia  bisacodyl 5 milliGRAM(s) Oral daily PRN Constipation  melatonin 3 milliGRAM(s) Oral at bedtime PRN Insomnia  metoprolol tartrate Injectable 5 milliGRAM(s) IV Push every 6 hours PRN for sustained afib rvr >120  morphine  - Injectable 2 milliGRAM(s) IV Push every 4 hours PRN Moderate Pain (4 - 6)  morphine  - Injectable 4 milliGRAM(s) IV Push every 4 hours PRN Severe Pain (7 - 10)  ondansetron Injectable 4 milliGRAM(s) IV Push every 8 hours PRN Nausea and/or Vomiting       Allergies    penicillin (Hives)    Intolerances      Vital Signs Last 24 Hrs  T(C): 36.7 (03 May 2024 12:18), Max: 36.8 (03 May 2024 00:00)  T(F): 98.1 (03 May 2024 12:18), Max: 98.3 (03 May 2024 00:00)  HR: 110 (03 May 2024 12:18) (89 - 113)  BP: 128/81 (03 May 2024 12:18) (102/62 - 128/81)  BP(mean): --  RR: 18 (03 May 2024 12:18) (17 - 18)  SpO2: 94% (03 May 2024 12:18) (94% - 99%)    Parameters below as of 03 May 2024 12:18  Patient On (Oxygen Delivery Method): room air      CAPILLARY BLOOD GLUCOSE           @ 07:01  -  03 @ 07:00  --------------------------------------------------------  IN: 640 mL / OUT: 1600 mL / NET: -960 mL    03 @ 07:01  -  05-03 @ 14:49  --------------------------------------------------------  IN: 565 mL / OUT: 450 mL / NET: 115 mL      Physical Exam:    Daily     Daily Weight in k.1 (03 May 2024 08:00)  General:  Well appearing, NAD, not cachetic  HEENT: ecchymosis periorobitla   CV:  RRR, S1S2   Lungs:  CTA B/L, no wheezes, rales, rhonchi  Abdomen:  Soft, non-tender, no distended, positive BS  Extremities: no edema                                                                                                                                                                                                                                                                                LABS:                               10.8   9.09  )-----------( 217      ( 03 May 2024 06:55 )             33.0                      05-03    137  |  108  |  10  ----------------------------<  72  3.9   |  16<L>  |  1.09    Ca    8.4      03 May 2024 06:54                         RADIOLOGY & ADDITIONAL TESTS         I personally reviewed: [  ]EKG   [  ]CXR    [  ] CT      A/P:         Discussed with :     Simon consultants' Notes   Time spent :  
Subjective: Patient seen and examined. No new events except as noted.     REVIEW OF SYSTEMS:    CONSTITUTIONAL: +weakness, fevers or chills  EYES/ENT: No visual changes;  No vertigo or throat pain   NECK: No pain or stiffness  RESPIRATORY: No cough, wheezing, hemoptysis; No shortness of breath  CARDIOVASCULAR: No chest pain or palpitations  GASTROINTESTINAL: No abdominal or epigastric pain. No nausea, vomiting, or hematemesis; No diarrhea or constipation. No melena or hematochezia.  GENITOURINARY: No dysuria, frequency or hematuria  NEUROLOGICAL: No numbness or weakness  SKIN: No itching, burning, rashes, or lesions   All other review of systems is negative unless indicated above.    MEDICATIONS:  MEDICATIONS  (STANDING):  apixaban 5 milliGRAM(s) Oral every 12 hours  ascorbic acid 500 milliGRAM(s) Oral daily  atorvastatin 80 milliGRAM(s) Oral at bedtime  cefTRIAXone   IVPB 1000 milliGRAM(s) IV Intermittent every 24 hours  chlorhexidine 2% Cloths 1 Application(s) Topical daily  clopidogrel Tablet 75 milliGRAM(s) Oral daily  digoxin     Tablet 250 MICROGram(s) Oral daily  levothyroxine 175 MICROGram(s) Oral daily  lidocaine   4% Patch 1 Patch Transdermal daily  metoprolol tartrate 100 milliGRAM(s) Oral two times a day  midodrine. 5 milliGRAM(s) Oral three times a day  multivitamin 1 Tablet(s) Oral daily  naloxone Injectable 0.4 milliGRAM(s) IV Push once  nicotine -  14 mG/24Hr(s) Patch 1 Patch Transdermal daily  polyethylene glycol 3350 17 Gram(s) Oral daily  senna 2 Tablet(s) Oral at bedtime  sodium chloride 0.9%. 1000 milliLiter(s) (75 mL/Hr) IV Continuous <Continuous>      PHYSICAL EXAM:  T(C): 36.9 (05-04-24 @ 20:30), Max: 36.9 (05-04-24 @ 20:30)  HR: 98 (05-04-24 @ 20:30) (93 - 111)  BP: 101/68 (05-04-24 @ 20:30) (100/59 - 128/84)  RR: 18 (05-04-24 @ 20:30) (18 - 18)  SpO2: 97% (05-04-24 @ 20:30) (97% - 99%)  Wt(kg): --  I&O's Summary    03 May 2024 07:01  -  04 May 2024 07:00  --------------------------------------------------------  IN: 565 mL / OUT: 1250 mL / NET: -685 mL    04 May 2024 07:01  -  04 May 2024 21:54  --------------------------------------------------------  IN: 240 mL / OUT: 550 mL / NET: -310 mL          Appearance: Normal	  HEENT:   Normal oral mucosa, PERRL, EOMI	  Bilateral racoon eyes  Lymphatic: No lymphadenopathy , no edema  Cardiovascular: Irregular S1 S2, No JVD, No murmurs , Peripheral pulses palpable 2+ bilaterally  Respiratory: Lungs clear to auscultation, normal effort 	  Gastrointestinal:  Soft, Non-tender, + BS	  Skin: No rashes, No ecchymoses, No cyanosis, warm to touch  Musculoskeletal: Normal range of motion, normal strength  Psychiatry:  Mood & affect appropriate  Ext: No edema      LABS:    CARDIAC MARKERS:                                11.5   10.21 )-----------( 215      ( 04 May 2024 06:41 )             35.0     05-04    138  |  106  |  9   ----------------------------<  86  4.0   |  17<L>  |  1.00    Ca    8.6      04 May 2024 06:41          TELEMETRY: 	  AF  ECG:  	  RADIOLOGY:   DIAGNOSTIC TESTING:  [ ] Echocardiogram:  [ ]  Catheterization:  [ ] Stress Test:    OTHER: 	          
Subjective: Patient seen and examined. No new events except as noted.   sitting in chair at bedside   feels ok   HR low 100s   REVIEW OF SYSTEMS:    CONSTITUTIONAL: No weakness, fevers or chills  EYES/ENT: No visual changes;  No vertigo or throat pain   NECK: No pain or stiffness  RESPIRATORY: No cough, wheezing, hemoptysis; No shortness of breath  CARDIOVASCULAR: No chest pain or palpitations  GASTROINTESTINAL: No abdominal or epigastric pain. No nausea, vomiting, or hematemesis; No diarrhea or constipation. No melena or hematochezia.  GENITOURINARY: No dysuria, frequency or hematuria  NEUROLOGICAL: No numbness or weakness  SKIN: No itching, burning, rashes, or lesions   All other review of systems is negative unless indicated above.    MEDICATIONS:  MEDICATIONS  (STANDING):  apixaban 5 milliGRAM(s) Oral every 12 hours  ascorbic acid 500 milliGRAM(s) Oral daily  atorvastatin 80 milliGRAM(s) Oral at bedtime  cefTRIAXone   IVPB 1000 milliGRAM(s) IV Intermittent every 24 hours  chlorhexidine 2% Cloths 1 Application(s) Topical daily  clopidogrel Tablet 75 milliGRAM(s) Oral daily  levothyroxine 175 MICROGram(s) Oral daily  lidocaine   4% Patch 1 Patch Transdermal daily  metoprolol tartrate 100 milliGRAM(s) Oral two times a day  midodrine. 5 milliGRAM(s) Oral three times a day  multivitamin 1 Tablet(s) Oral daily  naloxone Injectable 0.4 milliGRAM(s) IV Push once  nicotine -  14 mG/24Hr(s) Patch 1 Patch Transdermal daily  polyethylene glycol 3350 17 Gram(s) Oral daily  senna 2 Tablet(s) Oral at bedtime  sodium chloride 0.9%. 1000 milliLiter(s) (75 mL/Hr) IV Continuous <Continuous>      PHYSICAL EXAM:  T(C): 36.7 (05-02-24 @ 04:17), Max: 36.8 (05-02-24 @ 00:38)  HR: 97 (05-02-24 @ 09:50) (72 - 146)  BP: 129/76 (05-02-24 @ 09:50) (98/68 - 129/76)  RR: 18 (05-02-24 @ 04:17) (18 - 18)  SpO2: 100% (05-02-24 @ 04:17) (97% - 100%)  Wt(kg): --  I&O's Summary    01 May 2024 07:01  -  02 May 2024 07:00  --------------------------------------------------------  IN: 1045 mL / OUT: 850 mL / NET: 195 mL    02 May 2024 07:01  -  02 May 2024 10:24  --------------------------------------------------------  IN: 0 mL / OUT: 200 mL / NET: -200 mL          Appearance: Normal	  HEENT:   Normal oral mucosa, PERRL, EOMI	  Bilateral racoon eyes  Lymphatic: No lymphadenopathy , no edema  Cardiovascular: Irregular S1 S2, No JVD, No murmurs , Peripheral pulses palpable 2+ bilaterally  Respiratory: Lungs clear to auscultation, normal effort 	  Gastrointestinal:  Soft, Non-tender, + BS	  Skin: No rashes, No ecchymoses, No cyanosis, warm to touch  Musculoskeletal: Normal range of motion, normal strength  Psychiatry:  Mood & affect appropriate  Ext: No edema        LABS:    CARDIAC MARKERS:                                11.0   8.91  )-----------( 205      ( 02 May 2024 07:03 )             35.7     05-02    137  |  109<H>  |  10  ----------------------------<  71  4.4   |  15<L>  |  0.97    Ca    8.8      02 May 2024 07:03          TELEMETRY: 	-110    ECG:  	  RADIOLOGY:   DIAGNOSTIC TESTING:  [ ] Echocardiogram:  [ ]  Catheterization:  [ ] Stress Test:    OTHER: 	          
Subjective: Patient seen and examined. No new events except as noted.     REVIEW OF SYSTEMS:    CONSTITUTIONAL: + weakness, fevers or chills  EYES/ENT: No visual changes;  No vertigo or throat pain   NECK: No pain or stiffness  RESPIRATORY: No cough, wheezing, hemoptysis; No shortness of breath  CARDIOVASCULAR: No chest pain or palpitations  GASTROINTESTINAL: No abdominal or epigastric pain. No nausea, vomiting, or hematemesis; No diarrhea or constipation. No melena or hematochezia.  GENITOURINARY: No dysuria, frequency or hematuria  NEUROLOGICAL: No numbness or weakness  SKIN: No itching, burning, rashes, or lesions   All other review of systems is negative unless indicated above.    MEDICATIONS:  MEDICATIONS  (STANDING):  apixaban 5 milliGRAM(s) Oral every 12 hours  ascorbic acid 500 milliGRAM(s) Oral daily  atorvastatin 80 milliGRAM(s) Oral at bedtime  cefTRIAXone   IVPB 1000 milliGRAM(s) IV Intermittent every 24 hours  chlorhexidine 2% Cloths 1 Application(s) Topical daily  clopidogrel Tablet 75 milliGRAM(s) Oral daily  digoxin     Tablet 250 MICROGram(s) Oral daily  levothyroxine 175 MICROGram(s) Oral daily  lidocaine   4% Patch 1 Patch Transdermal daily  metoprolol tartrate 100 milliGRAM(s) Oral two times a day  midodrine. 5 milliGRAM(s) Oral three times a day  multivitamin 1 Tablet(s) Oral daily  naloxone Injectable 0.4 milliGRAM(s) IV Push once  nicotine -  14 mG/24Hr(s) Patch 1 Patch Transdermal daily  polyethylene glycol 3350 17 Gram(s) Oral daily  senna 2 Tablet(s) Oral at bedtime  sodium chloride 0.9%. 1000 milliLiter(s) (75 mL/Hr) IV Continuous <Continuous>      PHYSICAL EXAM:  T(C): 36.4 (05-05-24 @ 04:28), Max: 36.9 (05-04-24 @ 20:30)  HR: 105 (05-05-24 @ 04:28) (98 - 111)  BP: 111/77 (05-05-24 @ 04:28) (100/59 - 128/84)  RR: 18 (05-05-24 @ 04:28) (18 - 18)  SpO2: 100% (05-05-24 @ 04:28) (97% - 100%)  Wt(kg): --  I&O's Summary    04 May 2024 07:01  -  05 May 2024 07:00  --------------------------------------------------------  IN: 240 mL / OUT: 550 mL / NET: -310 mL          Appearance: Normal	  HEENT:   Normal oral mucosa, PERRL, EOMI	  Bilateral racoon eyes  Lymphatic: No lymphadenopathy , no edema  Cardiovascular: Irregular S1 S2, No JVD, No murmurs , Peripheral pulses palpable 2+ bilaterally  Respiratory: Lungs clear to auscultation, normal effort 	  Gastrointestinal:  Soft, Non-tender, + BS	  Skin: No rashes, No ecchymoses, No cyanosis, warm to touch  Musculoskeletal: Normal range of motion, normal strength  Psychiatry:  Mood & affect appropriate  Ext: No edema        LABS:    CARDIAC MARKERS:                                11.5   10.21 )-----------( 215      ( 04 May 2024 06:41 )             35.0     05-04    138  |  106  |  9   ----------------------------<  86  4.0   |  17<L>  |  1.00    Ca    8.6      04 May 2024 06:41      proBNP:   Lipid Profile:   HgA1c:   TSH:             TELEMETRY: AF	    ECG:  	  RADIOLOGY:   DIAGNOSTIC TESTING:  [ ] Echocardiogram:  [ ]  Catheterization:  [ ] Stress Test:    OTHER:

## 2024-05-06 NOTE — PROGRESS NOTE ADULT - ASSESSMENT
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 if no events prior to DC, plan for ILR     #Afib w RVR, improved rate   increase lopressor 125 bid  - daily  Digoxin 0.25   -mido 5mg PO TID        #CAD s/p PCI  -stable  -cont plavix  -cont statin    #UTI  -cont IV abx  35 minutes spent on total encounter; more than 50% of the visit was spent counseling and/or coordinating care by the attending physician.

## 2024-05-13 NOTE — ED ADULT TRIAGE NOTE - ACCOMPANIED BY
EMT/paramedic Emergency Medicine Attending MD Hay:   patient seen and evaluated with the NP.  I was present for key portions of the History and Physical, and I agree with the Impression and Plan.      Patient is a 72-year-old male brought in by son for evaluation of acute (days) on chronic (level years) lumbar back pain.  Patient suffers from chronic urinary retention; has to self cath himself 2-3 times a day, however he endorses that he has had to self cath himself every hour during the day because he does not feel like he can void completely.    Associated symptoms: No bowel or bladder incontinence but has chronic urinary issues as above.  No weakness, no numbness, but cannot ambulate secondary to pain.    VS: wnl  Gen: Uncomfortable appearing geriatric male, mild distress  Head: NC/AT  Neck: trachea midline  Resp:  No distress  CV: RRR, no RMG  Abd: Soft, suprapubic area distended  Ext: no deformities  Neuro:  A&Ox4, pupils equal round reactive to light, extraocular movement is intact, cranial nerves II through XII within normal limits, strength 5 out of 5 bilaterally throughout, reflexes 2+ bilaterally throughout.  unable to assess gait 2/2 pain  Spine: no midline pain, no step-offs, + lumbar paraspinal muscle spasm,  Strength in BLE 5/5. MSK: no pain in hip with axial load.    Skin:  Warm and dry as visualized  Psych:  Normal affect and mood    Medical Decision Making / Differential Diagnosis:  HPI more consistent with acute exacerbation of chronic degenerative lumbar disc disease without a serious etiology such as cord injury, epidural abscess, RELL, Ao pathology, ACS, or acute intraabdominal pathology.    Plan:  pain control with NSAIDs, +/-muscle relaxants, reassess

## 2024-05-15 ENCOUNTER — INPATIENT (INPATIENT)
Facility: HOSPITAL | Age: 79
LOS: 4 days | Discharge: NURSING, REHAB OR LONG-TERM CARE | DRG: 392 | End: 2024-05-20
Attending: GENERAL ACUTE CARE HOSPITAL | Admitting: GENERAL ACUTE CARE HOSPITAL
Payer: MEDICARE

## 2024-05-15 VITALS
TEMPERATURE: 98 F | WEIGHT: 160.06 LBS | DIASTOLIC BLOOD PRESSURE: 65 MMHG | HEART RATE: 69 BPM | RESPIRATION RATE: 16 BRPM | SYSTOLIC BLOOD PRESSURE: 93 MMHG | OXYGEN SATURATION: 98 % | HEIGHT: 64 IN

## 2024-05-15 DIAGNOSIS — Z98.890 OTHER SPECIFIED POSTPROCEDURAL STATES: Chronic | ICD-10-CM

## 2024-05-15 DIAGNOSIS — R19.8 OTHER SPECIFIED SYMPTOMS AND SIGNS INVOLVING THE DIGESTIVE SYSTEM AND ABDOMEN: ICD-10-CM

## 2024-05-15 DIAGNOSIS — Z90.49 ACQUIRED ABSENCE OF OTHER SPECIFIED PARTS OF DIGESTIVE TRACT: Chronic | ICD-10-CM

## 2024-05-15 DIAGNOSIS — Z96.7 PRESENCE OF OTHER BONE AND TENDON IMPLANTS: Chronic | ICD-10-CM

## 2024-05-15 DIAGNOSIS — Z95.5 PRESENCE OF CORONARY ANGIOPLASTY IMPLANT AND GRAFT: Chronic | ICD-10-CM

## 2024-05-15 DIAGNOSIS — Z96.659 PRESENCE OF UNSPECIFIED ARTIFICIAL KNEE JOINT: Chronic | ICD-10-CM

## 2024-05-15 LAB
ALBUMIN SERPL ELPH-MCNC: 3.1 G/DL — LOW (ref 3.3–5)
ALP SERPL-CCNC: 103 U/L — SIGNIFICANT CHANGE UP (ref 40–120)
ALT FLD-CCNC: 20 U/L — SIGNIFICANT CHANGE UP (ref 10–45)
ANION GAP SERPL CALC-SCNC: 11 MMOL/L — SIGNIFICANT CHANGE UP (ref 5–17)
AST SERPL-CCNC: 23 U/L — SIGNIFICANT CHANGE UP (ref 10–40)
BASE EXCESS BLDV CALC-SCNC: -4.4 MMOL/L — LOW (ref -2–3)
BASOPHILS # BLD AUTO: 0.06 K/UL — SIGNIFICANT CHANGE UP (ref 0–0.2)
BASOPHILS NFR BLD AUTO: 0.5 % — SIGNIFICANT CHANGE UP (ref 0–2)
BILIRUB SERPL-MCNC: 0.5 MG/DL — SIGNIFICANT CHANGE UP (ref 0.2–1.2)
BUN SERPL-MCNC: 15 MG/DL — SIGNIFICANT CHANGE UP (ref 7–23)
CA-I SERPL-SCNC: 1.21 MMOL/L — SIGNIFICANT CHANGE UP (ref 1.15–1.33)
CALCIUM SERPL-MCNC: 8.5 MG/DL — SIGNIFICANT CHANGE UP (ref 8.4–10.5)
CHLORIDE BLDV-SCNC: 103 MMOL/L — SIGNIFICANT CHANGE UP (ref 96–108)
CHLORIDE SERPL-SCNC: 107 MMOL/L — SIGNIFICANT CHANGE UP (ref 96–108)
CO2 BLDV-SCNC: 22 MMOL/L — SIGNIFICANT CHANGE UP (ref 22–26)
CO2 SERPL-SCNC: 19 MMOL/L — LOW (ref 22–31)
CREAT SERPL-MCNC: 0.86 MG/DL — SIGNIFICANT CHANGE UP (ref 0.5–1.3)
EGFR: 70 ML/MIN/1.73M2 — SIGNIFICANT CHANGE UP
EOSINOPHIL # BLD AUTO: 0.33 K/UL — SIGNIFICANT CHANGE UP (ref 0–0.5)
EOSINOPHIL NFR BLD AUTO: 3 % — SIGNIFICANT CHANGE UP (ref 0–6)
GAS PNL BLDV: 134 MMOL/L — LOW (ref 136–145)
GAS PNL BLDV: SIGNIFICANT CHANGE UP
GLUCOSE BLDV-MCNC: 105 MG/DL — HIGH (ref 70–99)
GLUCOSE SERPL-MCNC: 105 MG/DL — HIGH (ref 70–99)
HCO3 BLDV-SCNC: 21 MMOL/L — LOW (ref 22–29)
HCT VFR BLD CALC: 34.2 % — LOW (ref 34.5–45)
HCT VFR BLDA CALC: 35 % — SIGNIFICANT CHANGE UP (ref 34.5–46.5)
HGB BLD CALC-MCNC: 11.6 G/DL — LOW (ref 11.7–16.1)
HGB BLD-MCNC: 11 G/DL — LOW (ref 11.5–15.5)
IMM GRANULOCYTES NFR BLD AUTO: 0.5 % — SIGNIFICANT CHANGE UP (ref 0–0.9)
LACTATE BLDV-MCNC: 1.4 MMOL/L — SIGNIFICANT CHANGE UP (ref 0.5–2)
LYMPHOCYTES # BLD AUTO: 1.51 K/UL — SIGNIFICANT CHANGE UP (ref 1–3.3)
LYMPHOCYTES # BLD AUTO: 13.7 % — SIGNIFICANT CHANGE UP (ref 13–44)
MAGNESIUM SERPL-MCNC: 1.2 MG/DL — LOW (ref 1.6–2.6)
MCHC RBC-ENTMCNC: 31.1 PG — SIGNIFICANT CHANGE UP (ref 27–34)
MCHC RBC-ENTMCNC: 32.2 GM/DL — SIGNIFICANT CHANGE UP (ref 32–36)
MCV RBC AUTO: 96.6 FL — SIGNIFICANT CHANGE UP (ref 80–100)
MONOCYTES # BLD AUTO: 1.38 K/UL — HIGH (ref 0–0.9)
MONOCYTES NFR BLD AUTO: 12.5 % — SIGNIFICANT CHANGE UP (ref 2–14)
NEUTROPHILS # BLD AUTO: 7.69 K/UL — HIGH (ref 1.8–7.4)
NEUTROPHILS NFR BLD AUTO: 69.8 % — SIGNIFICANT CHANGE UP (ref 43–77)
NRBC # BLD: 0 /100 WBCS — SIGNIFICANT CHANGE UP (ref 0–0)
PCO2 BLDV: 39 MMHG — SIGNIFICANT CHANGE UP (ref 39–42)
PH BLDV: 7.34 — SIGNIFICANT CHANGE UP (ref 7.32–7.43)
PLATELET # BLD AUTO: 304 K/UL — SIGNIFICANT CHANGE UP (ref 150–400)
PO2 BLDV: 34 MMHG — SIGNIFICANT CHANGE UP (ref 25–45)
POTASSIUM BLDV-SCNC: 4.3 MMOL/L — SIGNIFICANT CHANGE UP (ref 3.5–5.1)
POTASSIUM SERPL-MCNC: 4.4 MMOL/L — SIGNIFICANT CHANGE UP (ref 3.5–5.3)
POTASSIUM SERPL-SCNC: 4.4 MMOL/L — SIGNIFICANT CHANGE UP (ref 3.5–5.3)
PROT SERPL-MCNC: 6.4 G/DL — SIGNIFICANT CHANGE UP (ref 6–8.3)
RBC # BLD: 3.54 M/UL — LOW (ref 3.8–5.2)
RBC # FLD: 15.8 % — HIGH (ref 10.3–14.5)
SAO2 % BLDV: 51 % — LOW (ref 67–88)
SODIUM SERPL-SCNC: 137 MMOL/L — SIGNIFICANT CHANGE UP (ref 135–145)
WBC # BLD: 11.02 K/UL — HIGH (ref 3.8–10.5)
WBC # FLD AUTO: 11.02 K/UL — HIGH (ref 3.8–10.5)

## 2024-05-15 PROCEDURE — 74177 CT ABD & PELVIS W/CONTRAST: CPT | Mod: 26,MC

## 2024-05-15 PROCEDURE — 99285 EMERGENCY DEPT VISIT HI MDM: CPT | Mod: FS,GC

## 2024-05-15 RX ORDER — MAGNESIUM SULFATE 500 MG/ML
2 VIAL (ML) INJECTION ONCE
Refills: 0 | Status: COMPLETED | OUTPATIENT
Start: 2024-05-15 | End: 2024-05-15

## 2024-05-15 RX ORDER — CEFTRIAXONE 500 MG/1
1000 INJECTION, POWDER, FOR SOLUTION INTRAMUSCULAR; INTRAVENOUS ONCE
Refills: 0 | Status: COMPLETED | OUTPATIENT
Start: 2024-05-15 | End: 2024-05-15

## 2024-05-15 RX ORDER — SODIUM CHLORIDE 9 MG/ML
500 INJECTION INTRAMUSCULAR; INTRAVENOUS; SUBCUTANEOUS ONCE
Refills: 0 | Status: COMPLETED | OUTPATIENT
Start: 2024-05-15 | End: 2024-05-15

## 2024-05-15 RX ADMIN — SODIUM CHLORIDE 500 MILLILITER(S): 9 INJECTION INTRAMUSCULAR; INTRAVENOUS; SUBCUTANEOUS at 19:38

## 2024-05-15 RX ADMIN — Medication 25 GRAM(S): at 19:21

## 2024-05-15 NOTE — ED PROVIDER NOTE - PROGRESS NOTE DETAILS
Patient to surgery for consult, awaiting return page at this time. - Salazar Pringle PA-C Case d/w surgery resident, will see pt. - Salazar Pringle PA-C Paged surgery for consult, awaiting return page at this time. - Salazar Pringle PA-C Luis [facility] PGY3: surgery discussed - no acute intervention, agree more irritation from drainage than cellulitis likely. Will give dose of empiric ctx with cultures in lab. Surg will f/u but med admit.

## 2024-05-15 NOTE — ED PROVIDER NOTE - PHYSICAL EXAMINATION
Gen: Chronically ill appearing, AAO x 2, NAD  Skin: Healing/old ecchymosis noted to head/face and RUE (per daughter from previous fall). Skin warm, well perfused.  HEENT: NC, healing ecchymosis to forehead and face. PERRLA, EOMI, MMM  Resp: unlabored CTAB  Cardiac: irregular, s1s2, no murmurs, rubs or gallops  GI: +ileostomy site with surround light pink skin irritation, +liquid as well as slightly formed stool on ileostomy bag.   Ext: no pedal edema, FROM in all extremities  Neuro: AOx2, follows simple commands, no focal motor or sensory deficits.

## 2024-05-15 NOTE — ED PROVIDER NOTE - ATTENDING APP SHARED VISIT CONTRIBUTION OF CARE
Attending MD Davidson;:   I personally have seen and examined this patient.  Physician assistant note reviewed and agree on plan of care and except where noted.  See MDM for details.

## 2024-05-15 NOTE — ED ADULT NURSE NOTE - NSFALLHARMRISKINTERV_ED_ALL_ED

## 2024-05-15 NOTE — ED PROVIDER NOTE - COVID-19 RESULT
How Severe Are Your Spot(S)?: mild Have Your Spot(S) Been Treated In The Past?: has not been treated Hpi Title: Evaluation of Skin Lesions NEGATIVE

## 2024-05-15 NOTE — ED ADULT NURSE NOTE - OBJECTIVE STATEMENT
77 y/o female PMH HTN, HLD, a-fib, CAD, MI s/p PCI with stents, COPD, CROW, ischemic bowel s/p ex-lap with bowel resection and ileostomy placement (February 2024), dementia, recent admission 1 month ago for fall (found to have a-fib with RVR) presents to ED via EMS from [facility] rehab for abdominal pain, r/o cellulitis to abdomen/ostomy site.   discharged to [facility] rehab BIBEMS from rehab today for concern for increasing drainage from ileostomy site with skin redness. Hx provided by daughter at bedside who states there has been increasing output from ileostomy site as well,, however they have had difficulty managing the secretions that seep through dressing onto the skin which is resulted in wound care following patient at [facility].  Patient has been having increasing pain and redness to skin overlying the area and wound care was having increasing difficulty managing the symptoms prompting visit to ED today.  Currently complaining of pain to skin of abdomen.  Denies chest pain, shortness of breath, vomiting, fever/chills, urinary symptoms. 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 Per EMS, staff was concerned for increased drainage from ileostomy site with skin redness. Pt's daughter at bedside providing history - she states wound care has been following pt though redness has continued as secretions leak through dressing onto surrounding skin. Difficulty managing secretions/redness is what prompted ED visit.  Pt reports pain to skin to abdomen. Denying chest pain, SOB, vomiting, fever, chills. Pt is A&O x 2, disoriented to time. Breathing even and unlabored. Redness noted to abdomen, ileostomy in place with stool output noted. Extensive bruising to face and b/l arms which daughter states pt sustained during prior fall a month ago and subsequent blood draws at [facility]. Safety and comfort provided. Daughter at bedside during evaluation.

## 2024-05-15 NOTE — ED PROVIDER NOTE - OBJECTIVE STATEMENT
77 yo female PMHx HTN, HLD, A-fib, CAD complicated by MI s/p PCI with stents, COPD, CROW, ischemic bowel s/p ex lap with bowel resection +end ileostomy (2/24), dementia, recent admission 1 month ago for fall, found to be in A-fib with RVR and UTI, discharged to [facility] rehab BIBEMS from rehab today for concern for increasing drainage from ileostomy site with skin redness. Hx provided by daughter at bedside who states there has been increasing output from ileostomy site as well,, however they have had difficulty managing the secretions that seep through dressing onto the skin which is resulted in wound care following patient at [facility].  Patient has been having increasing pain and redness to skin overlying the area and wound care was having increasing difficulty managing the symptoms prompting visit to ED today.  Currently complaining of pain to skin of abdomen.  Denies chest pain, shortness of breath, vomiting, fever/chills, urinary symptoms.

## 2024-05-16 DIAGNOSIS — R19.8 OTHER SPECIFIED SYMPTOMS AND SIGNS INVOLVING THE DIGESTIVE SYSTEM AND ABDOMEN: ICD-10-CM

## 2024-05-16 DIAGNOSIS — I48.11 LONGSTANDING PERSISTENT ATRIAL FIBRILLATION: ICD-10-CM

## 2024-05-16 DIAGNOSIS — E03.9 HYPOTHYROIDISM, UNSPECIFIED: ICD-10-CM

## 2024-05-16 DIAGNOSIS — E78.5 HYPERLIPIDEMIA, UNSPECIFIED: ICD-10-CM

## 2024-05-16 DIAGNOSIS — I10 ESSENTIAL (PRIMARY) HYPERTENSION: ICD-10-CM

## 2024-05-16 DIAGNOSIS — I25.10 ATHEROSCLEROTIC HEART DISEASE OF NATIVE CORONARY ARTERY WITHOUT ANGINA PECTORIS: ICD-10-CM

## 2024-05-16 DIAGNOSIS — J44.9 CHRONIC OBSTRUCTIVE PULMONARY DISEASE, UNSPECIFIED: ICD-10-CM

## 2024-05-16 LAB
A1C WITH ESTIMATED AVERAGE GLUCOSE RESULT: 5.3 % — SIGNIFICANT CHANGE UP (ref 4–5.6)
ALBUMIN SERPL ELPH-MCNC: 3 G/DL — LOW (ref 3.3–5)
ALP SERPL-CCNC: 93 U/L — SIGNIFICANT CHANGE UP (ref 40–120)
ALT FLD-CCNC: 18 U/L — SIGNIFICANT CHANGE UP (ref 10–45)
ANION GAP SERPL CALC-SCNC: 13 MMOL/L — SIGNIFICANT CHANGE UP (ref 5–17)
APTT BLD: 31.5 SEC — SIGNIFICANT CHANGE UP (ref 24.5–35.6)
AST SERPL-CCNC: 19 U/L — SIGNIFICANT CHANGE UP (ref 10–40)
BASOPHILS # BLD AUTO: 0.08 K/UL — SIGNIFICANT CHANGE UP (ref 0–0.2)
BASOPHILS NFR BLD AUTO: 0.8 % — SIGNIFICANT CHANGE UP (ref 0–2)
BILIRUB SERPL-MCNC: 0.6 MG/DL — SIGNIFICANT CHANGE UP (ref 0.2–1.2)
BUN SERPL-MCNC: 14 MG/DL — SIGNIFICANT CHANGE UP (ref 7–23)
C DIFF GDH STL QL: NEGATIVE — SIGNIFICANT CHANGE UP
C DIFF GDH STL QL: SIGNIFICANT CHANGE UP
CALCIUM SERPL-MCNC: 8.4 MG/DL — SIGNIFICANT CHANGE UP (ref 8.4–10.5)
CHLORIDE SERPL-SCNC: 106 MMOL/L — SIGNIFICANT CHANGE UP (ref 96–108)
CHOLEST SERPL-MCNC: 89 MG/DL — SIGNIFICANT CHANGE UP
CO2 SERPL-SCNC: 17 MMOL/L — LOW (ref 22–31)
CREAT SERPL-MCNC: 0.82 MG/DL — SIGNIFICANT CHANGE UP (ref 0.5–1.3)
EGFR: 74 ML/MIN/1.73M2 — SIGNIFICANT CHANGE UP
EOSINOPHIL # BLD AUTO: 0.29 K/UL — SIGNIFICANT CHANGE UP (ref 0–0.5)
EOSINOPHIL NFR BLD AUTO: 2.8 % — SIGNIFICANT CHANGE UP (ref 0–6)
ESTIMATED AVERAGE GLUCOSE: 105 MG/DL — SIGNIFICANT CHANGE UP (ref 68–114)
GI PCR PANEL: SIGNIFICANT CHANGE UP
GLUCOSE SERPL-MCNC: 66 MG/DL — LOW (ref 70–99)
HCT VFR BLD CALC: 33.3 % — LOW (ref 34.5–45)
HDLC SERPL-MCNC: 38 MG/DL — LOW
HGB BLD-MCNC: 10.3 G/DL — LOW (ref 11.5–15.5)
IMM GRANULOCYTES NFR BLD AUTO: 0.6 % — SIGNIFICANT CHANGE UP (ref 0–0.9)
INR BLD: 1.8 RATIO — HIGH (ref 0.85–1.18)
LIPID PNL WITH DIRECT LDL SERPL: 38 MG/DL — SIGNIFICANT CHANGE UP
LYMPHOCYTES # BLD AUTO: 1.75 K/UL — SIGNIFICANT CHANGE UP (ref 1–3.3)
LYMPHOCYTES # BLD AUTO: 17 % — SIGNIFICANT CHANGE UP (ref 13–44)
MAGNESIUM SERPL-MCNC: 1.6 MG/DL — SIGNIFICANT CHANGE UP (ref 1.6–2.6)
MCHC RBC-ENTMCNC: 30.9 GM/DL — LOW (ref 32–36)
MCHC RBC-ENTMCNC: 31.4 PG — SIGNIFICANT CHANGE UP (ref 27–34)
MCV RBC AUTO: 101.5 FL — HIGH (ref 80–100)
MONOCYTES # BLD AUTO: 1.33 K/UL — HIGH (ref 0–0.9)
MONOCYTES NFR BLD AUTO: 12.9 % — SIGNIFICANT CHANGE UP (ref 2–14)
NEUTROPHILS # BLD AUTO: 6.78 K/UL — SIGNIFICANT CHANGE UP (ref 1.8–7.4)
NEUTROPHILS NFR BLD AUTO: 65.9 % — SIGNIFICANT CHANGE UP (ref 43–77)
NON HDL CHOLESTEROL: 52 MG/DL — SIGNIFICANT CHANGE UP
NRBC # BLD: 0 /100 WBCS — SIGNIFICANT CHANGE UP (ref 0–0)
PHOSPHATE SERPL-MCNC: 3.3 MG/DL — SIGNIFICANT CHANGE UP (ref 2.5–4.5)
PLATELET # BLD AUTO: 285 K/UL — SIGNIFICANT CHANGE UP (ref 150–400)
POTASSIUM SERPL-MCNC: 4.2 MMOL/L — SIGNIFICANT CHANGE UP (ref 3.5–5.3)
POTASSIUM SERPL-SCNC: 4.2 MMOL/L — SIGNIFICANT CHANGE UP (ref 3.5–5.3)
PROT SERPL-MCNC: 6.3 G/DL — SIGNIFICANT CHANGE UP (ref 6–8.3)
PROTHROM AB SERPL-ACNC: 18.6 SEC — HIGH (ref 9.5–13)
RBC # BLD: 3.28 M/UL — LOW (ref 3.8–5.2)
RBC # FLD: 15.9 % — HIGH (ref 10.3–14.5)
SODIUM SERPL-SCNC: 136 MMOL/L — SIGNIFICANT CHANGE UP (ref 135–145)
TRIGL SERPL-MCNC: 62 MG/DL — SIGNIFICANT CHANGE UP
TSH SERPL-MCNC: 0.87 UIU/ML — SIGNIFICANT CHANGE UP (ref 0.27–4.2)
WBC # BLD: 10.29 K/UL — SIGNIFICANT CHANGE UP (ref 3.8–10.5)
WBC # FLD AUTO: 10.29 K/UL — SIGNIFICANT CHANGE UP (ref 3.8–10.5)

## 2024-05-16 PROCEDURE — 99223 1ST HOSP IP/OBS HIGH 75: CPT

## 2024-05-16 RX ORDER — SODIUM CHLORIDE 9 MG/ML
1000 INJECTION INTRAMUSCULAR; INTRAVENOUS; SUBCUTANEOUS
Refills: 0 | Status: DISCONTINUED | OUTPATIENT
Start: 2024-05-16 | End: 2024-05-20

## 2024-05-16 RX ORDER — LEVOTHYROXINE SODIUM 125 MCG
1 TABLET ORAL
Refills: 0 | DISCHARGE

## 2024-05-16 RX ORDER — ONDANSETRON 8 MG/1
4 TABLET, FILM COATED ORAL EVERY 8 HOURS
Refills: 0 | Status: DISCONTINUED | OUTPATIENT
Start: 2024-05-16 | End: 2024-05-20

## 2024-05-16 RX ORDER — MIDODRINE HYDROCHLORIDE 2.5 MG/1
5 TABLET ORAL THREE TIMES A DAY
Refills: 0 | Status: DISCONTINUED | OUTPATIENT
Start: 2024-05-16 | End: 2024-05-20

## 2024-05-16 RX ORDER — APIXABAN 2.5 MG/1
5 TABLET, FILM COATED ORAL EVERY 12 HOURS
Refills: 0 | Status: DISCONTINUED | OUTPATIENT
Start: 2024-05-16 | End: 2024-05-20

## 2024-05-16 RX ORDER — DIGOXIN 250 MCG
250 TABLET ORAL DAILY
Refills: 0 | Status: DISCONTINUED | OUTPATIENT
Start: 2024-05-16 | End: 2024-05-20

## 2024-05-16 RX ORDER — LEVOTHYROXINE SODIUM 125 MCG
175 TABLET ORAL DAILY
Refills: 0 | Status: DISCONTINUED | OUTPATIENT
Start: 2024-05-16 | End: 2024-05-20

## 2024-05-16 RX ORDER — METOPROLOL TARTRATE 50 MG
125 TABLET ORAL
Refills: 0 | Status: DISCONTINUED | OUTPATIENT
Start: 2024-05-16 | End: 2024-05-20

## 2024-05-16 RX ORDER — LOPERAMIDE HCL 2 MG
4 TABLET ORAL THREE TIMES A DAY
Refills: 0 | Status: DISCONTINUED | OUTPATIENT
Start: 2024-05-16 | End: 2024-05-20

## 2024-05-16 RX ORDER — ATORVASTATIN CALCIUM 80 MG/1
80 TABLET, FILM COATED ORAL AT BEDTIME
Refills: 0 | Status: DISCONTINUED | OUTPATIENT
Start: 2024-05-16 | End: 2024-05-20

## 2024-05-16 RX ORDER — CEFTRIAXONE 500 MG/1
1000 INJECTION, POWDER, FOR SOLUTION INTRAMUSCULAR; INTRAVENOUS EVERY 24 HOURS
Refills: 0 | Status: DISCONTINUED | OUTPATIENT
Start: 2024-05-16 | End: 2024-05-16

## 2024-05-16 RX ORDER — LANOLIN ALCOHOL/MO/W.PET/CERES
3 CREAM (GRAM) TOPICAL AT BEDTIME
Refills: 0 | Status: DISCONTINUED | OUTPATIENT
Start: 2024-05-16 | End: 2024-05-20

## 2024-05-16 RX ORDER — CLOPIDOGREL BISULFATE 75 MG/1
75 TABLET, FILM COATED ORAL DAILY
Refills: 0 | Status: DISCONTINUED | OUTPATIENT
Start: 2024-05-16 | End: 2024-05-20

## 2024-05-16 RX ORDER — NICOTINE POLACRILEX 2 MG
1 GUM BUCCAL EVERY 24 HOURS
Refills: 0 | Status: DISCONTINUED | OUTPATIENT
Start: 2024-05-16 | End: 2024-05-20

## 2024-05-16 RX ORDER — ACETAMINOPHEN 500 MG
650 TABLET ORAL EVERY 6 HOURS
Refills: 0 | Status: DISCONTINUED | OUTPATIENT
Start: 2024-05-16 | End: 2024-05-20

## 2024-05-16 RX ADMIN — ATORVASTATIN CALCIUM 80 MILLIGRAM(S): 80 TABLET, FILM COATED ORAL at 21:36

## 2024-05-16 RX ADMIN — MIDODRINE HYDROCHLORIDE 5 MILLIGRAM(S): 2.5 TABLET ORAL at 17:41

## 2024-05-16 RX ADMIN — APIXABAN 5 MILLIGRAM(S): 2.5 TABLET, FILM COATED ORAL at 17:41

## 2024-05-16 RX ADMIN — Medication 175 MICROGRAM(S): at 06:01

## 2024-05-16 RX ADMIN — Medication 1 PATCH: at 17:47

## 2024-05-16 RX ADMIN — Medication 125 MILLIGRAM(S): at 17:41

## 2024-05-16 RX ADMIN — CEFTRIAXONE 100 MILLIGRAM(S): 500 INJECTION, POWDER, FOR SOLUTION INTRAMUSCULAR; INTRAVENOUS at 00:06

## 2024-05-16 RX ADMIN — MIDODRINE HYDROCHLORIDE 5 MILLIGRAM(S): 2.5 TABLET ORAL at 06:01

## 2024-05-16 RX ADMIN — SODIUM CHLORIDE 75 MILLILITER(S): 9 INJECTION INTRAMUSCULAR; INTRAVENOUS; SUBCUTANEOUS at 06:32

## 2024-05-16 RX ADMIN — Medication 1 PATCH: at 06:01

## 2024-05-16 RX ADMIN — SODIUM CHLORIDE 75 MILLILITER(S): 9 INJECTION INTRAMUSCULAR; INTRAVENOUS; SUBCUTANEOUS at 12:45

## 2024-05-16 RX ADMIN — APIXABAN 5 MILLIGRAM(S): 2.5 TABLET, FILM COATED ORAL at 06:01

## 2024-05-16 RX ADMIN — MIDODRINE HYDROCHLORIDE 5 MILLIGRAM(S): 2.5 TABLET ORAL at 12:45

## 2024-05-16 RX ADMIN — CLOPIDOGREL BISULFATE 75 MILLIGRAM(S): 75 TABLET, FILM COATED ORAL at 12:45

## 2024-05-16 RX ADMIN — Medication 4 MILLIGRAM(S): at 21:36

## 2024-05-16 RX ADMIN — Medication 125 MILLIGRAM(S): at 06:00

## 2024-05-16 RX ADMIN — Medication 250 MICROGRAM(S): at 06:00

## 2024-05-16 NOTE — H&P ADULT - PROBLEM SELECTOR PLAN 3
h/o cad c/b mi s/p pci w stents  no clinft of acs  no ekg performed in er  imaging shows cardiomegaly  recent tte 5/3/24 shows  Left ventricular cavity is normal in size. Left ventricular systolic function is normal with an ejection fraction of 55 %  monitor for chest pain, telemetry/ekg changes  cont home plavix, statin, bb

## 2024-05-16 NOTE — ADVANCED PRACTICE NURSE CONSULT - RECOMMEDATIONS
Will recommend the followin. Encourage self-care and/or participation with ostomy care  2. Empty contents of pouch when 1/3-1/2 full   3. Change pouching system every 3-5 days and/or PRN leakage  4. Reinforce ostomy teaching with patient daily and address questions/concerns  5. Contact ostomy specialists if any issues encountered with patient’s questions/concerns     Supplies used: [place] 2 " Ceraplus skin barrier (#80160), Derick 2 " high output drainable pouch (#64105);   Accessory products:  stoma paste #[id number], stoma powder (#3406), Cavilon No-sting barrier film wipe (#2158), stoma belt (#0366), Flextend (extended wear) Skin barrier   Will recommend the followin. Encourage self-care and/or participation with ostomy care  2. Empty contents of pouch when 1/3-1/2 full   3. Change pouching system every 3-5 days and/or PRN leakage  4. Reinforce ostomy teaching with patient daily and address questions/concerns  5. Contact ostomy specialists if any issues encountered with patient’s questions/concerns     Supplies used: [place] 2 " Ceraplus skin barrier (#68951), Derick 2 " high output drainable pouch (#54376);   Accessory products:  stoma paste #051808, stoma powder (#8106), Cavilon No-sting barrier film wipe (#4395), stoma belt (#9238), Flextend (extended wear) Skin barrier   Will recommend the followin. Encourage self-care and/or participation with ostomy care  2. Empty contents of pouch when 1/3-1/2 full   3. Change pouching system every 3-5 days and/or PRN leakage  4. Reinforce ostomy teaching with patient daily and address questions/concerns  5. Contact ostomy specialists if any issues encountered with patient’s questions/concerns     Supplies used: [place] 1 3/4" Ceraplus convex wafer (#44306), Derick 1 3/4" high output drainable pouch (#44372);   Accessory products:  stoma paste #[id number], stoma powder (#5895), Cavilon No-sting barrier film wipe (#8153), medium stoma belt (#2385), Coloplast brava protective sheet (#23029)

## 2024-05-16 NOTE — OCCUPATIONAL THERAPY INITIAL EVALUATION ADULT - PERTINENT HX OF CURRENT PROBLEM, REHAB EVAL
78F, smoker, w pmh mci/dementia, [place], htn, hld, afib, cad c/b mi s/p pci w stents, copd, little, urinary incontinence, recurrent uti, hypothyroidism, oa s/p l tkr + l hip orif, and w recent hospitalization 2/16-3/9 for ischemic bowel s/p ex-lap w bowel resection + end ileostomy, 4/1-4/3 for drainage from incision site 2/2 hematoma in the laparotomy wound s/p conservative mgmt, 4/28-5/6 for afib w rvr iso urosepsis, p/w increased ostomy output; in er, found to ostomy output w spillage onto surrounding skin and resulting in macerated/erythematous skin; admit to medicine for further mgmt. IMAGING: CT ABDOMEN/PELVIS: No emergent findings.

## 2024-05-16 NOTE — H&P ADULT - PROBLEM SELECTOR PLAN 6
h/o smoking, copd, little  in no respiratory distress w adequate spo2 at room air  prior ct chest iimaging reviewed; showed emphysema, rul ggo ~2.5 cm + l adrenal mass ~4 cm  Monitor SpO2, RR, for signs of respiratory distress; Goal SpO2 >88-92%, PaO2 >55-60 mmHg   bronchodilators + oxygen supplementation as needed to maintain goal  aggressive pulmonary toilet/hygiene    aspiration precautions with head end of bed elevated  symptomatic relief with antitussives, mucolytics, antipyretics as needed  outpatient pulm + medonc follow up upon discharge

## 2024-05-16 NOTE — H&P ADULT - NSHPPHYSICALEXAM_GEN_ALL_CORE
T(C): 36.6 (05-15-24 @ 22:58), Max: 36.6 (05-15-24 @ 22:58)  HR: 82 (05-15-24 @ 22:58) (69 - 82)  BP: 115/78 (05-15-24 @ 22:58) (93/65 - 115/78)  RR: 16 (05-15-24 @ 22:58) (16 - 16)  SpO2: 98% (05-15-24 @ 22:58) (98% - 100%)  GENERAL: NAD, lying in bed    EYES: EOMI, PERRLA; conjunctiva and sclera clear  ENMT: Moist oral mucosa, no pharyngeal injection or exudates;   NECK: Supple, no palpable masses; no JVD  RESPIRATORY: Normal respiratory effort; lungs are clear to auscultation bilaterally  CARDIOVASCULAR: irRegularly irregular rhythm, normal S1 and S2, no murmur/rub/gallop; No lower extremity edema; Peripheral pulses are 2+ bilaterally  ABDOMEN:  increased ostomy output w spillage onto surrounding skin and resulting in macerated/erythematous skin, normoactive bowel sounds, no rebound/guarding   MUSCULOSKELETAL: no joint swelling or tenderness to palpation  PSYCH: A+O x2-3; affect appropriate  NEUROLOGY: CN 2-12 are intact and symmetric; no gross motor or sensory deficits   SKIN: No rashes; no palpable lesions T(C): 36.6 (05-15-24 @ 22:58), Max: 36.6 (05-15-24 @ 22:58)  HR: 82 (05-15-24 @ 22:58) (69 - 82)  BP: 115/78 (05-15-24 @ 22:58) (93/65 - 115/78)  RR: 16 (05-15-24 @ 22:58) (16 - 16)  SpO2: 98% (05-15-24 @ 22:58) (98% - 100%)  GENERAL: NAD, lying in bed    EYES: EOMI, PERRLA; conjunctiva and sclera clear  ENMT: Moist oral mucosa, no pharyngeal injection or exudates; recovering facial bruises from prior fall  NECK: Supple, no palpable masses; no JVD  RESPIRATORY: Normal respiratory effort; lungs are clear to auscultation bilaterally  CARDIOVASCULAR: irRegularly irregular rhythm, normal S1 and S2, no murmur/rub/gallop; No lower extremity edema; Peripheral pulses are 2+ bilaterally  ABDOMEN:  increased ostomy output w spillage onto surrounding skin and resulting in macerated/erythematous skin, normoactive bowel sounds, no rebound/guarding   MUSCULOSKELETAL: no joint swelling or tenderness to palpation  PSYCH: A+O x2-3; affect appropriate  NEUROLOGY: CN 2-12 are intact and symmetric; no gross motor or sensory deficits   SKIN: No rashes; no palpable lesions

## 2024-05-16 NOTE — ADVANCED PRACTICE NURSE CONSULT - ASSESSMENT
WON at bedside to continue ostomy education. Patient sleepy with daughter present at bedside. Stoma viable, beefy red, edematous, pouching system intact with dark brown liquid output. Daughter and patient observed in complete change of wafer and pouching system. Mucocutaneous junction and peristomal skin intact, crusting technique employed for educational purposes only. End colostomy is 1 3/4." Repouched with Derick 2 1/4" flat skin barrier, stoma paste applied to creases at 3+9 o'clock (to level surface) as well as back of wafer, then drainable pouch applied. Supplies used: #40773, # 18768. Supplies and pattern left at bedside. Patient reports that she is too sleepy to participate in complete change today but reports that she has been emptying pouch with staff and her daughter. All questions answered & emotional support provided. Safety maintained & call bell within reach. Visit discussed w/ staff RNElodia, on unit. Will continue to f/u as necessary. WON at bedside to complete ostomy pouch/wafer change. Patient with visible bruising on face related to a fall at rehab facility, pleasantly confused as to time/location/situation. Stoma viable, beefy red, pouching system leaking with green liquid output. Patient observed complete change of wafer and pouching system. Peristomal skin erythematous with scattered maceration, crusting technique employed in layers. End colostomy is 1" and is oval shaped. Patient repouched with Derick flextend extended wear skin barrier with 2 1/4" convex wafer. Stoma paste applied to creases at 3+9 o'clock (to level surface) as well as back of wafer, then drainable high-output pouch applied. Supplies used: #58393, # 10788. Extra supplies left at bedside. All questions answered & emotional support provided. Safety maintained during visit. Plan of care discussed w/ staff RNМария, on unit. Will continue to f/u as necessary. WON at bedside to complete ostomy pouch/wafer change. Patient with visible bruising on face related to a fall at rehab facility, pleasantly confused as to time/location/situation. Stoma viable, beefy red, pouching system leaking with green liquid output. Patient observed complete change of wafer and pouching system. Peristomal skin erythematous with scattered maceration, crusting technique employed in layers. End colostomy is 1" and is oval shaped. Coloplast 1 3/4" Brava protective sheet applied then, patient repouched with 1 3/4" Ceraplus convex wafer. Stoma paste applied to creases at 3+9 o'clock (to level surface) as well as back of wafer then [place] 1 3/4" high-output drainable pouch applied. Supplies used: #64700, #02837, #7300. Extra supplies left at bedside. All questions answered & emotional support provided. Safety maintained during visit. Plan of care discussed w/ staff RNМария, on unit. Will continue to f/u as necessary. WON at bedside to complete ostomy pouch/wafer change. Patient with visible bruising on face related to a fall at rehab facility, pleasantly confused as to time/location/situation. Stoma viable, beefy red, pouching system leaking with green liquid output. Patient observed complete change of wafer and pouching system. Peristomal skin erythematous with scattered maceration, crusting technique employed in layers. End ileostomy is 1" and is oval shaped. Coloplast 1 3/4" Brava protective sheet applied then, patient repouched with 1 3/4" Ceraplus convex wafer. Stoma paste applied to creases at 3+9 o'clock (to level surface) as well as back of wafer then [place] 1 3/4" high-output drainable pouch applied. Supplies used: #51215, #02936, #7300. Extra supplies left at bedside. All questions answered & emotional support provided. Safety maintained during visit. Plan of care discussed w/ staff RNМария, on unit. Will continue to f/u as necessary.

## 2024-05-16 NOTE — H&P ADULT - NSHPREVIEWOFSYSTEMS_GEN_ALL_CORE
CONSTITUTIONAL: No fever. no weakness  ENMT:  No sinus or throat pain  RESPIRATORY: No cough, wheezing, chills or hemoptysis; No shortness of breath  CARDIOVASCULAR: No chest pain, palpitations, dizziness, or leg swelling  GASTROINTESTINAL: No abdominal or epigastric pain. No nausea, vomiting, or hematemesis; increased ostomy output w spillage onto surrounding skin. No melena or hematochezia.  GENITOURINARY: No dysuria or incontinence  NEUROLOGICAL: No headaches, memory loss, loss of strength, numbness, or tremors  SKIN: No rashes,  No hives or eczema  ENDOCRINE: No heat or cold intolerance; No hair loss  MUSCULOSKELETAL: No joint pain or swelling; No muscle, back, or extremity pain  PSYCHIATRIC: No depression, anxiety, mood swings, or difficulty sleeping  HEME/LYMPH: No easy bruising, or bleeding gums; no enlarged LN

## 2024-05-16 NOTE — H&P ADULT - PROBLEM SELECTOR PLAN 1
recent hospitalization 2/16-3/9 for ischemic bowel s/p ex-lap w bowel resection + end ileostomy, 4/1-4/3 for drainage from incision site 2/2 hematoma in the laparotomy wound s/p conservative mgmt  imaging currently showing, "no emergent findings... Small area of fat stranding /inflammatory changes in the periumbilical region likely reflective of postsurgical changes, as are overall similar compared to previous CT"  xgs consulted by er, recommend "ostomy nurse consultation in AM for wound pouch placement"  monitor ostomy output  serial abdominal exams, serial abdominal imaging as needed,  adat; ivf + lytes in the mean time  supportive care with analgesics, antiemetics, antipyretics, probiotics  hold bowel regimen  ostomy care in am  xgs follow up in am

## 2024-05-16 NOTE — H&P ADULT - PROBLEM SELECTOR PLAN 5
Previously tried Ozempic and this was not covered. She can go to the [facility] and submit letter to her insurance benefits person to see if an exception can be made, but when then is no coverage there is nothing we can do. I suggest the Contrave as previously advised. There are other options and we need to move forward and try them.
Pt wants to try ozempic
lipitor

## 2024-05-16 NOTE — H&P ADULT - ASSESSMENT
79yo 73kg f, smoker, w pmh mci/dementia, [place], htn, hld, afib, cad c/b mi s/p pci w stents, copd, little, urinary incontinence, recurrent uti, hypothyroidism, oa s/p l tkr + l hip orif, and w recent hospitalization 2/16-3/9 for ischemic bowel s/p ex-lap w bowel resection + end ileostomy, 4/1-4/3 for drainage from incision site 2/2 hematoma in the laparotomy wound s/p conservative mgmt, 4/28-5/6 for afib w rvr iso urosepsis, p/w increased ostomy output; in er, found to ostomy output w spillage onto surrounding skin and resulting in macerated/erythematous skin; admit to medicine for further mgmt

## 2024-05-16 NOTE — H&P ADULT - HISTORY OF PRESENT ILLNESS
79yo 73kg f, smoker, w pmh mci/dementia, [place], htn, hld, afib, cad c/b mi s/p pci w stents, copd, little, urinary incontinence, recurrent uti, hypothyroidism, oa s/p l tkr + l hip orif, and w recent hospitalization 2/16-3/9 for ischemic bowel s/p ex-lap w bowel resection + end ileostomy, 4/1-4/3 for drainage from incision site 2/2 hematoma in the laparotomy wound s/p conservative mgmt, 4/28-5/6 for afib w rvr iso urosepsis, p/w increased ostomy output; in er, found to ostomy output w spillage onto surrounding skin and resulting in macerated/erythematous skin; admit to medicine for further mgmt

## 2024-05-16 NOTE — PHYSICAL THERAPY INITIAL EVALUATION ADULT - PERTINENT HX OF CURRENT PROBLEM, REHAB EVAL
Pt is a 79yo 73kg F adm to [facility] on 5/16/24. Pt with PMH sig for MCI/dementia, [place], HTN, HLD, Afib, CAD c/b MI, s/p PCI with stents, COPD, CROW, urinary incontinence, recurrent uti, hypothyroidism, OA-s/p L TKR + L Hip ORIF. Recent hospitalization 2/16-3/9 for ischemic bowel s/p ex-lap w bowel resection + end ileostomy, 4/1-4/3 for drainage from incision site 2/2 hematoma in the laparotomy wound s/p conservative mgmt. On 4/28-5/6 adm for afib with RVR iso urosepsis. Presents with increased ostomy output; In ER, found to ostomy output w spillage onto surrounding skin and resulting in macerated/erythematous skin; admit to medicine for further mgmt

## 2024-05-16 NOTE — PHYSICAL THERAPY INITIAL EVALUATION ADULT - ADDITIONAL COMMENTS
PER CHART/ CM note 4/29 (pt unable to provide): pt lives in private home with daughter, there are 4 entry stairs only to negotiate. Pre-hospitalziation, and stay a [facility], patient was independent in ADLs, owned and ambulated with a cane. From Dtr: pt lives in private home with daughter, there are 4 entry stairs only to negotiate. Pre-hospitalziation, and stay a [facility], patient was independent in ADLs, owned and ambulated with a cane. In Rehab, pt has been able to amb with RW with CG assist. About to work on stairs.

## 2024-05-16 NOTE — ADVANCED PRACTICE NURSE CONSULT - REASON FOR CONSULT
End ileostomy completed on 2/24/24     End ileostomy completed on 2/24/24    Education and complete pouch/wafer change provided

## 2024-05-17 DIAGNOSIS — E27.8 OTHER SPECIFIED DISORDERS OF ADRENAL GLAND: ICD-10-CM

## 2024-05-17 LAB
ALBUMIN SERPL ELPH-MCNC: 3.3 G/DL — SIGNIFICANT CHANGE UP (ref 3.3–5)
ALP SERPL-CCNC: 102 U/L — SIGNIFICANT CHANGE UP (ref 40–120)
ALT FLD-CCNC: 18 U/L — SIGNIFICANT CHANGE UP (ref 10–45)
ANION GAP SERPL CALC-SCNC: 14 MMOL/L — SIGNIFICANT CHANGE UP (ref 5–17)
AST SERPL-CCNC: 19 U/L — SIGNIFICANT CHANGE UP (ref 10–40)
BILIRUB SERPL-MCNC: 0.6 MG/DL — SIGNIFICANT CHANGE UP (ref 0.2–1.2)
BUN SERPL-MCNC: 12 MG/DL — SIGNIFICANT CHANGE UP (ref 7–23)
CALCIUM SERPL-MCNC: 9.3 MG/DL — SIGNIFICANT CHANGE UP (ref 8.4–10.5)
CHLORIDE SERPL-SCNC: 105 MMOL/L — SIGNIFICANT CHANGE UP (ref 96–108)
CO2 SERPL-SCNC: 18 MMOL/L — LOW (ref 22–31)
CREAT SERPL-MCNC: 0.91 MG/DL — SIGNIFICANT CHANGE UP (ref 0.5–1.3)
EGFR: 65 ML/MIN/1.73M2 — SIGNIFICANT CHANGE UP
GLUCOSE SERPL-MCNC: 83 MG/DL — SIGNIFICANT CHANGE UP (ref 70–99)
HCT VFR BLD CALC: 36.6 % — SIGNIFICANT CHANGE UP (ref 34.5–45)
HGB BLD-MCNC: 11.7 G/DL — SIGNIFICANT CHANGE UP (ref 11.5–15.5)
MCHC RBC-ENTMCNC: 31.7 PG — SIGNIFICANT CHANGE UP (ref 27–34)
MCHC RBC-ENTMCNC: 32 GM/DL — SIGNIFICANT CHANGE UP (ref 32–36)
MCV RBC AUTO: 99.2 FL — SIGNIFICANT CHANGE UP (ref 80–100)
NRBC # BLD: 0 /100 WBCS — SIGNIFICANT CHANGE UP (ref 0–0)
PLATELET # BLD AUTO: 284 K/UL — SIGNIFICANT CHANGE UP (ref 150–400)
POTASSIUM SERPL-MCNC: 4 MMOL/L — SIGNIFICANT CHANGE UP (ref 3.5–5.3)
POTASSIUM SERPL-SCNC: 4 MMOL/L — SIGNIFICANT CHANGE UP (ref 3.5–5.3)
PROT SERPL-MCNC: 6.7 G/DL — SIGNIFICANT CHANGE UP (ref 6–8.3)
RBC # BLD: 3.69 M/UL — LOW (ref 3.8–5.2)
RBC # FLD: 15.7 % — HIGH (ref 10.3–14.5)
SODIUM SERPL-SCNC: 137 MMOL/L — SIGNIFICANT CHANGE UP (ref 135–145)
WBC # BLD: 10.34 K/UL — SIGNIFICANT CHANGE UP (ref 3.8–10.5)
WBC # FLD AUTO: 10.34 K/UL — SIGNIFICANT CHANGE UP (ref 3.8–10.5)

## 2024-05-17 RX ADMIN — MIDODRINE HYDROCHLORIDE 5 MILLIGRAM(S): 2.5 TABLET ORAL at 11:07

## 2024-05-17 RX ADMIN — Medication 4 MILLIGRAM(S): at 21:08

## 2024-05-17 RX ADMIN — Medication 4 MILLIGRAM(S): at 05:02

## 2024-05-17 RX ADMIN — Medication 125 MILLIGRAM(S): at 18:09

## 2024-05-17 RX ADMIN — APIXABAN 5 MILLIGRAM(S): 2.5 TABLET, FILM COATED ORAL at 18:09

## 2024-05-17 RX ADMIN — CLOPIDOGREL BISULFATE 75 MILLIGRAM(S): 75 TABLET, FILM COATED ORAL at 11:07

## 2024-05-17 RX ADMIN — Medication 3 MILLIGRAM(S): at 21:08

## 2024-05-17 RX ADMIN — Medication 1 PATCH: at 07:30

## 2024-05-17 RX ADMIN — MIDODRINE HYDROCHLORIDE 5 MILLIGRAM(S): 2.5 TABLET ORAL at 05:03

## 2024-05-17 RX ADMIN — Medication 250 MICROGRAM(S): at 05:03

## 2024-05-17 RX ADMIN — Medication 175 MICROGRAM(S): at 05:03

## 2024-05-17 RX ADMIN — APIXABAN 5 MILLIGRAM(S): 2.5 TABLET, FILM COATED ORAL at 05:02

## 2024-05-17 RX ADMIN — Medication 1 PATCH: at 05:02

## 2024-05-17 RX ADMIN — ATORVASTATIN CALCIUM 80 MILLIGRAM(S): 80 TABLET, FILM COATED ORAL at 21:08

## 2024-05-17 RX ADMIN — Medication 1 PATCH: at 19:28

## 2024-05-17 RX ADMIN — Medication 125 MILLIGRAM(S): at 05:02

## 2024-05-17 RX ADMIN — MIDODRINE HYDROCHLORIDE 5 MILLIGRAM(S): 2.5 TABLET ORAL at 18:08

## 2024-05-17 RX ADMIN — Medication 4 MILLIGRAM(S): at 14:01

## 2024-05-17 NOTE — PROGRESS NOTE ADULT - SUBJECTIVE AND OBJECTIVE BOX
Date of service: 05-17-24 @ 12:52      Patient is a 78y old  Female who presents with a chief complaint of increased ostomy output (17 May 2024 10:09)                                                               INTERVAL HPI/OVERNIGHT EVENTS:    REVIEW OF SYSTEMS:     CONSTITUTIONAL: No weakness, fevers or chills  EYES/ENT: No visual changes , no ear ache   NECK: No pain or stiffness  RESPIRATORY: No cough, wheezing,  No shortness of breath  CARDIOVASCULAR: No chest pain or palpitations  GASTROINTESTINAL: No abdominal pain  . No nausea, vomiting, or hematemesis; No diarrhea or constipation. No melena or hematochezia.  GENITOURINARY: No dysuria, frequency or hematuria  NEUROLOGICAL: No numbness or weakness  SKIN: No itching, burning, rashes, or lesions                                                                                                                                                                                                                                                                                 Medications:  MEDICATIONS  (STANDING):  apixaban 5 milliGRAM(s) Oral every 12 hours  atorvastatin 80 milliGRAM(s) Oral at bedtime  clopidogrel Tablet 75 milliGRAM(s) Oral daily  digoxin     Tablet 250 MICROGram(s) Oral daily  levothyroxine 175 MICROGram(s) Oral daily  loperamide 4 milliGRAM(s) Oral three times a day  metoprolol tartrate 125 milliGRAM(s) Oral two times a day  midodrine. 5 milliGRAM(s) Oral three times a day  nicotine -  14 mG/24Hr(s) Patch 1 Patch Transdermal every 24 hours  sodium chloride 0.9%. 1000 milliLiter(s) (75 mL/Hr) IV Continuous <Continuous>    MEDICATIONS  (PRN):  acetaminophen     Tablet .. 650 milliGRAM(s) Oral every 6 hours PRN Temp greater or equal to 38C (100.4F), Mild Pain (1 - 3)  aluminum hydroxide/magnesium hydroxide/simethicone Suspension 30 milliLiter(s) Oral every 4 hours PRN Dyspepsia  melatonin 3 milliGRAM(s) Oral at bedtime PRN Insomnia  ondansetron Injectable 4 milliGRAM(s) IV Push every 8 hours PRN Nausea and/or Vomiting       Allergies    penicillin (Hives)    Intolerances      Vital Signs Last 24 Hrs  T(C): 36.5 (17 May 2024 12:03), Max: 36.6 (16 May 2024 17:38)  T(F): 97.7 (17 May 2024 12:03), Max: 97.9 (16 May 2024 19:15)  HR: 66 (17 May 2024 12:03) (66 - 90)  BP: 124/77 (17 May 2024 12:03) (117/76 - 136/86)  BP(mean): --  RR: 18 (17 May 2024 12:03) (18 - 18)  SpO2: 98% (17 May 2024 12:03) (97% - 99%)    Parameters below as of 17 May 2024 12:03  Patient On (Oxygen Delivery Method): room air      CAPILLARY BLOOD GLUCOSE          Physical Exam:    Daily     Daily   General:NAD   HEENT:  Nonicteric, PERRLA  CV:  RRR, S1S2   Lungs:  CTA B/L, no wheezes, rales, rhonchi  Abdomen:  Soft, ostomy in place  improving erythema aropund ostomy     Extremities:  2+ pulses, no c/c, no edema  Skin:  Warm and dry, no rashes  :  No amaya                                                                                                                                                                                                                                                                                          LABS:                               11.7   10.34 )-----------( 284      ( 17 May 2024 07:16 )             36.6                      05-17    137  |  105  |  12  ----------------------------<  83  4.0   |  18<L>  |  0.91    Ca    9.3      17 May 2024 07:15  Phos  3.3     05-16  Mg     1.6     05-16    TPro  6.7  /  Alb  3.3  /  TBili  0.6  /  DBili  x   /  AST  19  /  ALT  18  /  AlkPhos  102  05-17                       RADIOLOGY & ADDITIONAL TESTS         I personally reviewed: [  ]EKG   [  ]CXR    [  ] CT      A/P:         Discussed with :     Simon consultants' Notes   Time spent :

## 2024-05-17 NOTE — DIETITIAN INITIAL EVALUATION ADULT - ETIOLOGY
h/o ischemic bowel in 2/2024 s/p new ileostomy creation (with reported <PO/abdi afterward w/ wt loss)

## 2024-05-17 NOTE — CONSULT NOTE ADULT - PROBLEM SELECTOR RECOMMENDATION 4
Will get full thyroid panel, Will continue current Synthroid dose, will FU.  Suggest to repeat thyroid function test in 4-6 weeks. Outpatient follow up.
Stable   cont with meds

## 2024-05-17 NOTE — DIETITIAN INITIAL EVALUATION ADULT - PERTINENT MEDS FT
MEDICATIONS  (STANDING):  apixaban 5 milliGRAM(s) Oral every 12 hours  atorvastatin 80 milliGRAM(s) Oral at bedtime  clopidogrel Tablet 75 milliGRAM(s) Oral daily  digoxin     Tablet 250 MICROGram(s) Oral daily  levothyroxine 175 MICROGram(s) Oral daily  loperamide 4 milliGRAM(s) Oral three times a day  metoprolol tartrate 125 milliGRAM(s) Oral two times a day  midodrine. 5 milliGRAM(s) Oral three times a day  nicotine -  14 mG/24Hr(s) Patch 1 Patch Transdermal every 24 hours  sodium chloride 0.9%. 1000 milliLiter(s) (75 mL/Hr) IV Continuous <Continuous>    MEDICATIONS  (PRN):  acetaminophen     Tablet .. 650 milliGRAM(s) Oral every 6 hours PRN Temp greater or equal to 38C (100.4F), Mild Pain (1 - 3)  aluminum hydroxide/magnesium hydroxide/simethicone Suspension 30 milliLiter(s) Oral every 4 hours PRN Dyspepsia  melatonin 3 milliGRAM(s) Oral at bedtime PRN Insomnia  ondansetron Injectable 4 milliGRAM(s) IV Push every 8 hours PRN Nausea and/or Vomiting

## 2024-05-17 NOTE — DIETITIAN INITIAL EVALUATION ADULT - PERSON TAUGHT/METHOD
adequate caloric/protein intake w/ food sources reviewed, ileostomy nutrition therapy w/ literature provided as reference, all questions were answered/verbal instruction/written material/teach back - (Patient repeats in own words)/patient instructed/spouse instructed

## 2024-05-17 NOTE — DIETITIAN INITIAL EVALUATION ADULT - ENERGY INTAKE
Fair (50-75%) Patient currently w/ fair to good PO intake/appetite per d/w patient and patient's daughter. Patient's daughter amenable for patient to have food items chopped by kitchen for ease of feeding self and tolerating oral intake.

## 2024-05-17 NOTE — DIETITIAN INITIAL EVALUATION ADULT - REASON FOR ADMISSION
Other specified symptoms and signs involving the digestive system and abdomen    Per chart, patient is a 79 y/o female with PH including MCI/dementia, [place], HTN, HLD, AFib, CAD (c/b MI, s/p PCI w/ stents), COPD, CROW, urinary incontinence, recurrent UTIs, hypothyroidism, OA (s/p L TKR + L hip ORIF), h/o recent admission (2/16-3/19) for ischemic bowel (s/p ex-lap w/ bowel resection & new end ileostomy creation). Patient presents to [facility] w/ increased ileostomy output.

## 2024-05-17 NOTE — PATIENT PROFILE ADULT - FALL HARM RISK - HARM RISK INTERVENTIONS
Assistance with ambulation/Assistance OOB with selected safe patient handling equipment/Communicate Risk of Fall with Harm to all staff/Discuss with provider need for PT consult/Monitor gait and stability/Provide patient with walking aids - walker, cane, crutches/Reinforce activity limits and safety measures with patient and family/Tailored Fall Risk Interventions/Use of alarms - bed, chair and/or voice tab/Visual Cue: Yellow wristband and red socks/Bed in lowest position, wheels locked, appropriate side rails in place/Call bell, personal items and telephone in reach/Instruct patient to call for assistance before getting out of bed or chair/Non-slip footwear when patient is out of bed/[place] to call system/Physically safe environment - no spills, clutter or unnecessary equipment/Purposeful Proactive Rounding/Room/bathroom lighting operational, light cord in reach

## 2024-05-17 NOTE — PATIENT PROFILE ADULT - CAREGIVER RELATION TO PATIENT
Medication Management: Dr. Barron  Appointment:  [facility]  [address] [place] [address] [phone] ext [phone]      
daughter

## 2024-05-17 NOTE — DIETITIAN INITIAL EVALUATION ADULT - PROBLEM SELECTOR PLAN 2
previous recent hospitalizations complicated by multiple episodes of afib rvr, requiring lopressor ivp + digoxin ivp + amiodarone load + diltiazem ivp  no ekg done in er, but appears to be rate controlled  chadsvasc ~5   cont home eliquis  cont home lopressor + digoxin

## 2024-05-17 NOTE — DIETITIAN INITIAL EVALUATION ADULT - OTHER INFO
NKFA per patient and patient's daughter. Patient's daughter reports patient's prior UBW used to be 200lbs and had weight loss ever since creation of new ileostomy in 2/2024. Recent weight history per [facility] growth chart as follows: 160lbs (4/28/2024), 173lbs (4/1/2024), 198lbs (2/22/2024), 200lbs (2/15/2024), 202lbs (11/30/2023). Will monitor weight trend.    - Prior hypomagnesemia 5/15, WNL from 5/16.  - Ordered for NS IVF noted.  - Synthroid noted.

## 2024-05-17 NOTE — CONSULT NOTE ADULT - PROBLEM SELECTOR RECOMMENDATION 9
rate controlled   cont with current meds   On eliquis
will send adrenal hormone panel, will follow up  Can follow up and get further testing outpatient

## 2024-05-17 NOTE — DIETITIAN INITIAL EVALUATION ADULT - REASON INDICATOR FOR ASSESSMENT
Consult for "assessment, education"  Source: chart, patient, patient's daughter at bedside  Chart reviewed, events noted

## 2024-05-17 NOTE — DIETITIAN INITIAL EVALUATION ADULT - NSFNSGIIOFT_GEN_A_CORE
05-17-24 @ 07:01  -  05-17-24 @ 17:56  --------------------------------------------------------  OUT:    Ileostomy (mL): 400 mL  Total OUT: 400 mL    Total NET: -400 mL

## 2024-05-17 NOTE — DIETITIAN INITIAL EVALUATION ADULT - PERTINENT LABORATORY DATA
05-17    137  |  105  |  12  ----------------------------<  83  4.0   |  18<L>  |  0.91    Ca    9.3      17 May 2024 07:15  Phos  3.3     05-16  Mg     1.6     05-16    TPro  6.7  /  Alb  3.3  /  TBili  0.6  /  DBili  x   /  AST  19  /  ALT  18  /  AlkPhos  102  05-17  A1C with Estimated Average Glucose Result: 5.3 % (05-16-24 @ 07:35)  A1C with Estimated Average Glucose Result: 5.3 % (04-29-24 @ 06:51)

## 2024-05-17 NOTE — CONSULT NOTE ADULT - CONSULT REASON
Addended by: Omid Gutierres on: 6/21/2022 12:40 PM     Modules accepted: Orders
enterocutaneous fistula
Afib
Increased ostomy output
Adrenal nodule

## 2024-05-17 NOTE — DIETITIAN INITIAL EVALUATION ADULT - ADD RECOMMEND
1. continue current diet as tolerated of: regular diet  2. encourage PO intake small/frequent portions, protein source with each meal, adequate fluid intake as tolerated  3. patient amenable for food items to be chopped by kitchen for ease of oral intake  4. patient may have 2x protein source with meals for extra caloric/protein intake - oral nutrition supplement intake deferred at present 2/2 admitted w/ ^ostomy output  5. fluid management per team - NS IVF noted  6. follow electrolyte trends closely and replete levels as appropriate  7. monitor PO intake, weight trend, electrolytes, blood glucose levels, labs, BMs

## 2024-05-17 NOTE — CONSULT NOTE ADULT - PROBLEM SELECTOR RECOMMENDATION 2
Surgery and GI consulted
Suggest to continue medications, monitoring, FU primary team recommendations.

## 2024-05-17 NOTE — PROGRESS NOTE ADULT - SUBJECTIVE AND OBJECTIVE BOX
INTERVAL HPI/OVERNIGHT EVENTS:    green soft/loose stool noted in bag, pt without abd complaints       MEDICATIONS  (STANDING):  apixaban 5 milliGRAM(s) Oral every 12 hours  atorvastatin 80 milliGRAM(s) Oral at bedtime  clopidogrel Tablet 75 milliGRAM(s) Oral daily  digoxin     Tablet 250 MICROGram(s) Oral daily  levothyroxine 175 MICROGram(s) Oral daily  loperamide 4 milliGRAM(s) Oral three times a day  metoprolol tartrate 125 milliGRAM(s) Oral two times a day  midodrine. 5 milliGRAM(s) Oral three times a day  nicotine -  14 mG/24Hr(s) Patch 1 Patch Transdermal every 24 hours  sodium chloride 0.9%. 1000 milliLiter(s) (75 mL/Hr) IV Continuous <Continuous>    MEDICATIONS  (PRN):  acetaminophen     Tablet .. 650 milliGRAM(s) Oral every 6 hours PRN Temp greater or equal to 38C (100.4F), Mild Pain (1 - 3)  aluminum hydroxide/magnesium hydroxide/simethicone Suspension 30 milliLiter(s) Oral every 4 hours PRN Dyspepsia  melatonin 3 milliGRAM(s) Oral at bedtime PRN Insomnia  ondansetron Injectable 4 milliGRAM(s) IV Push every 8 hours PRN Nausea and/or Vomiting      Allergies    penicillin (Hives)    Intolerances        Review of Systems:    General:  No wt loss, fevers, chills, night sweats, fatigue   Eyes:  Good vision, no reported pain  ENT:  No sore throat, pain, runny nose, dysphagia  CV:  No pain, palpitations, hypo/hypertension  Resp:  No dyspnea, cough, tachypnea, wheezing  GI:  No pain, No nausea, No vomiting, No diarrhea, No constipation, No weight loss, No fever, No pruritis, No rectal bleeding, No melena, No dysphagia  :  No pain, bleeding, incontinence, nocturia  Muscle:  No pain, weakness  Neuro:  No weakness, tingling, memory problems  Psych:  No fatigue, insomnia, mood problems, depression  Endocrine:  No polyuria, polydypsia, cold/heat intolerance  Heme:  No petechiae, ecchymosis, easy bruisability  Skin:  No rash, tattoos, scars, edema      Vital Signs Last 24 Hrs  T(C): 36.3 (17 May 2024 05:16), Max: 36.7 (16 May 2024 11:16)  T(F): 97.4 (17 May 2024 05:16), Max: 98 (16 May 2024 11:16)  HR: 90 (17 May 2024 05:16) (66 - 90)  BP: 136/86 (17 May 2024 05:16) (115/73 - 136/86)  BP(mean): --  RR: 18 (17 May 2024 05:16) (18 - 18)  SpO2: 99% (17 May 2024 05:16) (97% - 99%)    Parameters below as of 17 May 2024 05:16  Patient On (Oxygen Delivery Method): room air        PHYSICAL EXAM:    Constitutional: NAD  HEENT: EOMI, throat clear  Neck: No LAD, supple  Respiratory: CTA and P  Cardiovascular: S1 and S2, RRR, no M  Gastrointestinal: BS+, soft, NT/ND, neg HSM,  Extremities: No peripheral edema, neg clubbing, cyanosis  Vascular: 2+ peripheral pulses  Neurological: A/O   Psychiatric: Normal mood, normal affect  Skin: No rashes      LABS:                        11.7   10.34 )-----------( 284      ( 17 May 2024 07:16 )             36.6     05-17    137  |  105  |  12  ----------------------------<  83  4.0   |  18<L>  |  0.91    Ca    9.3      17 May 2024 07:15  Phos  3.3     05-16  Mg     1.6     05-16    TPro  6.7  /  Alb  3.3  /  TBili  0.6  /  DBili  x   /  AST  19  /  ALT  18  /  AlkPhos  102  05-17    PT/INR - ( 16 May 2024 07:35 )   PT: 18.6 sec;   INR: 1.80 ratio         PTT - ( 16 May 2024 07:35 )  PTT:31.5 sec  Urinalysis Basic - ( 17 May 2024 07:15 )    Color: x / Appearance: x / SG: x / pH: x  Gluc: 83 mg/dL / Ketone: x  / Bili: x / Urobili: x   Blood: x / Protein: x / Nitrite: x   Leuk Esterase: x / RBC: x / WBC x   Sq Epi: x / Non Sq Epi: x / Bacteria: x        RADIOLOGY & ADDITIONAL TESTS:

## 2024-05-17 NOTE — CONSULT NOTE ADULT - SUBJECTIVE AND OBJECTIVE BOX
Chief Complaint:  Patient is a 78y old  Female who presents with a chief complaint of increased ostomy output (16 May 2024 02:26)      Date of service: 05-16-24 @ 20:38    HPI:    The patient is a 78 f with afib, history of bowel resection s/p ostomy presenting with skin breakdown in the abdominal wall. She has been having increased ostomy output.    The patient denies dysphagia, nausea and vomiting, abdominal pain, unintentional weight loss, change in bowel habits or NSAID use.        Allergies:  penicillin (Hives)      Home Medications:    Hospital Medications:  acetaminophen     Tablet .. 650 milliGRAM(s) Oral every 6 hours PRN  aluminum hydroxide/magnesium hydroxide/simethicone Suspension 30 milliLiter(s) Oral every 4 hours PRN  apixaban 5 milliGRAM(s) Oral every 12 hours  atorvastatin 80 milliGRAM(s) Oral at bedtime  clopidogrel Tablet 75 milliGRAM(s) Oral daily  digoxin     Tablet 250 MICROGram(s) Oral daily  levothyroxine 175 MICROGram(s) Oral daily  loperamide 4 milliGRAM(s) Oral three times a day  melatonin 3 milliGRAM(s) Oral at bedtime PRN  metoprolol tartrate 125 milliGRAM(s) Oral two times a day  midodrine. 5 milliGRAM(s) Oral three times a day  nicotine -  14 mG/24Hr(s) Patch 1 Patch Transdermal every 24 hours  ondansetron Injectable 4 milliGRAM(s) IV Push every 8 hours PRN  sodium chloride 0.9%. 1000 milliLiter(s) IV Continuous <Continuous>      PMHX/PSHX:  Hypertension    Hypothyroid    Osteoarthritis    CAD (coronary artery disease)    CROW (obstructive sleep apnea)    History of MI (myocardial infarction)    Polyp of corpus uteri    Heart murmur    Bilateral hearing loss, unspecified hearing loss type    Obesity (BMI 35.0-39.9 without comorbidity)    Obesity    Mixed stress and urge urinary incontinence    Overactive bladder    No significant past surgical history    S/P ORIF (open reduction internal fixation) fracture    S/P appendectomy    S/P knee replacement    Stented coronary artery    S/P laparotomy    H/O dilation and curettage        Family history:      Social History:   Denies ethanol use.  Denies illicit drug use.    ROS:     General:  No wt loss, fevers, chills, night sweats, fatigue,   Eyes:  Good vision, no reported pain  ENT:  No sore throat, pain, runny nose, dysphagia  CV:  No pain, palpitations, hypo/hypertension  Resp:  No dyspnea, cough, tachypnea, wheezing  GI:  See HPI  :  No pain, bleeding, incontinence, nocturia  Muscle:  No pain, weakness  Neuro:  No weakness, tingling, memory problems  Psych:  No fatigue, insomnia, mood problems, depression  Endocrine:  No polyuria, polydipsia, cold/heat intolerance  Heme:  No petechiae, ecchymosis, easy bruisability  Integumentary:  No rash, edema      PHYSICAL EXAM:     GENERAL:  Appears stated age, well-groomed, well-nourished, no distress  HEENT:  NC/AT,  conjunctivae anicteric, clear and pink,   NECK: supple, trachea midline  CHEST:  Full & symmetric excursion, no increased effort, breath sounds clear  HEART:  Regular rhythm, no JVD  ABDOMEN:  Soft, non-tender, non-distended, normoactive bowel sounds,  no masses , no hepatosplenomegaly, ostomy with skin breakdown  EXTREMITIES:  no cyanosis,clubbing or edema  SKIN:  No rash, erythema, or, ecchymoses, no jaundice  NEURO:  Alert, non-focal, no asterixis  PSYCH: Appropriate affect, oriented to place and time  RECTAL: Deferred      Vital Signs:  Vital Signs Last 24 Hrs  T(C): 36.6 (16 May 2024 17:38), Max: 36.7 (16 May 2024 03:24)  T(F): 97.8 (16 May 2024 17:38), Max: 98.1 (16 May 2024 03:24)  HR: 76 (16 May 2024 17:38) (66 - 102)  BP: 132/84 (16 May 2024 17:38) (103/70 - 132/84)  BP(mean): --  RR: 18 (16 May 2024 17:38) (16 - 20)  SpO2: 99% (16 May 2024 17:38) (96% - 99%)    Parameters below as of 16 May 2024 17:38  Patient On (Oxygen Delivery Method): room air      Daily     Daily     LABS: Labs personally reviewed by me:                        10.3   10.29 )-----------( 285      ( 16 May 2024 07:35 )             33.3     05-16    136  |  106  |  14  ----------------------------<  66<L>  4.2   |  17<L>  |  0.82    Ca    8.4      16 May 2024 07:35  Phos  3.3     05-16  Mg     1.6     05-16    TPro  6.3  /  Alb  3.0<L>  /  TBili  0.6  /  DBili  x   /  AST  19  /  ALT  18  /  AlkPhos  93  05-16    LIVER FUNCTIONS - ( 16 May 2024 07:35 )  Alb: 3.0 g/dL / Pro: 6.3 g/dL / ALK PHOS: 93 U/L / ALT: 18 U/L / AST: 19 U/L / GGT: x           PT/INR - ( 16 May 2024 07:35 )   PT: 18.6 sec;   INR: 1.80 ratio         PTT - ( 16 May 2024 07:35 )  PTT:31.5 sec  Urinalysis Basic - ( 16 May 2024 07:35 )    Color: x / Appearance: x / SG: x / pH: x  Gluc: 66 mg/dL / Ketone: x  / Bili: x / Urobili: x   Blood: x / Protein: x / Nitrite: x   Leuk Esterase: x / RBC: x / WBC x   Sq Epi: x / Non Sq Epi: x / Bacteria: x          Imaging personally reviewed by me:          
CHIEF COMPLAINT:    HISTORY OF PRESENT ILLNESS:  77yo 73kg f, smoker, w pmh mci/dementia, [place], htn, hld, afib, cad c/b mi s/p pci w stents, copd, crow, urinary incontinence, recurrent uti, hypothyroidism, oa s/p l tkr + l hip orif, and w recent hospitalization 2/16-3/9 for ischemic bowel s/p ex-lap w bowel resection + end ileostomy, 4/1-4/3 for drainage from incision site 2/2 hematoma in the laparotomy wound s/p conservative mgmt, 4/28-5/6 for afib w rvr iso urosepsis, p/w increased ostomy output; in er, found to ostomy output w spillage onto surrounding skin and resulting in macerated/erythematous skin; admit to medicine for further mgmt      PAST MEDICAL & SURGICAL HISTORY:  Hypertension      Hypothyroid      Osteoarthritis  knees, back      CAD (coronary artery disease)      CROW (obstructive sleep apnea)  non complaint on CPAP      History of MI (myocardial infarction)  h/o previous MI in 2004 prompted PTCA  with stenting x 2 vessels   last stress/ echo 2019      Heart murmur  dx in childhood      Bilateral hearing loss, unspecified hearing loss type  bilateral aids      Obesity      Mixed stress and urge urinary incontinence      Overactive bladder      S/P ORIF (open reduction internal fixation) fracture  left hip 1962      S/P appendectomy  30 plus years      S/P knee replacement  left 2000      Stented coronary artery  2004 X 2 STENTS      S/P laparotomy  due to adhesions, 30 years ago      H/O dilation and curettage  2/2019 Benign polyp              MEDICATIONS:  apixaban 5 milliGRAM(s) Oral every 12 hours  clopidogrel Tablet 75 milliGRAM(s) Oral daily  digoxin     Tablet 250 MICROGram(s) Oral daily  metoprolol tartrate 125 milliGRAM(s) Oral two times a day  midodrine. 5 milliGRAM(s) Oral three times a day        acetaminophen     Tablet .. 650 milliGRAM(s) Oral every 6 hours PRN  melatonin 3 milliGRAM(s) Oral at bedtime PRN  ondansetron Injectable 4 milliGRAM(s) IV Push every 8 hours PRN    aluminum hydroxide/magnesium hydroxide/simethicone Suspension 30 milliLiter(s) Oral every 4 hours PRN  loperamide 4 milliGRAM(s) Oral three times a day    atorvastatin 80 milliGRAM(s) Oral at bedtime  levothyroxine 175 MICROGram(s) Oral daily    sodium chloride 0.9%. 1000 milliLiter(s) IV Continuous <Continuous>      FAMILY HISTORY:      SOCIAL HISTORY:    [ ] Non-smoker  [ ] Smoker  [ ] Alcohol    Allergies    penicillin (Hives)    Intolerances    	    REVIEW OF SYSTEMS:  CONSTITUTIONAL: No fever, weight loss, + fatigue  EYES: No eye pain, visual disturbances, or discharge  ENMT:  No difficulty hearing, tinnitus, vertigo; No sinus or throat pain  NECK: No pain or stiffness  RESPIRATORY: No cough, wheezing, chills or hemoptysis; No Shortness of Breath  CARDIOVASCULAR: No chest pain, palpitations, passing out, dizziness, or leg swelling  GASTROINTESTINAL: No abdominal or epigastric pain. No nausea, vomiting, or hematemesis; No diarrhea or constipation. No melena or hematochezia.  GENITOURINARY: No dysuria, frequency, hematuria, or incontinence  NEUROLOGICAL: No headaches, memory loss, loss of strength, numbness, or tremors  SKIN: No itching, burning, rashes, or lesions   LYMPH Nodes: No enlarged glands  ENDOCRINE: No heat or cold intolerance; No hair loss  MUSCULOSKELETAL: No joint pain or swelling; No muscle, back, or extremity pain  PSYCHIATRIC: No depression, anxiety, mood swings, or difficulty sleeping  HEME/LYMPH: No easy bruising, or bleeding gums  ALLERY AND IMMUNOLOGIC: No hives or eczema	    [ ] All others negative	  [ ] Unable to obtain    PHYSICAL EXAM:  T(C): 36.3 (05-17-24 @ 05:16), Max: 36.7 (05-16-24 @ 08:46)  HR: 90 (05-17-24 @ 05:16) (66 - 102)  BP: 136/86 (05-17-24 @ 05:16) (113/63 - 136/86)  RR: 18 (05-17-24 @ 05:16) (18 - 20)  SpO2: 99% (05-17-24 @ 05:16) (97% - 99%)  Wt(kg): --  I&O's Summary      Appearance: NAD	  HEENT:  Dry  oral mucosa, PERRL, EOMI	  Lymphatic: No lymphadenopathy  Cardiovascular: Irregular  S1 S2, No JVD, No murmurs, No edema  Respiratory: decreased bs   Psychiatry: A & O x 3, Mood & affect appropriate  Gastrointestinal:   Soft, Nondistended, tender to soft palpation. Skin macerated with nonblanching erythema with soilage from the ileostomy site spilling onto the abdomen. Ileostomy pink/healthy, slightly retracted.     Skin: No rashes, No ecchymoses, No cyanosis	  Neurologic: Non-focal  Extremities: Normal range of motion, No clubbing, cyanosis or edema  Vascular: Peripheral pulses palpable 2+ bilaterally    TELEMETRY: 	    ECG:  	  RADIOLOGY:  < from: CT Abdomen and Pelvis w/ IV Cont (05.15.24 @ 20:56) >    ACC: [id number] EXAM:  CT ABDOMEN AND PELVIS IC   ORDERED BY: GRZEGORZ SHARMA     PROCEDURE DATE:  05/15/2024          INTERPRETATION:  CLINICAL INFORMATION: Abdominal pain with increased   ileostomy output    COMPARISON: CT chest abdomen and pelvis4/28/2024.    CONTRAST/COMPLICATIONS:  IV Contrast: Omnipaque 350  90 cc administered   10 cc discarded  Oral Contrast: NONE  Complications: None reported at time of study completion    PROCEDURE:  CT of the Abdomen and Pelvis was performed.  Sagittal and coronal reformats were performed.    FINDINGS:  LOWER CHEST: Basilar subsegmental atelectasis. Coronary artery and aortic   atherosclerotic changes. Cardiomegaly. Metallic hyperdensity in the left   anterior chest wall likely representing a looprecorder.    LIVER: Within normal limits.  BILE DUCTS: Normal caliber.  GALLBLADDER: Cholelithiasis.  SPLEEN: Within normal limits.  PANCREAS: Within normal limits.  ADRENALS: Redemonstrated 4 cm indeterminant left adrenal mass stable from   previousexam  KIDNEYS/URETERS: No renal stones or hydronephrosis. Bilateral renal cyst   and additional hypodense foci too small to characterize.    BLADDER: Within normal limits.  REPRODUCTIVE ORGANS: Uterus and adnexa within normal limits.    BOWEL: Statuspost small bowel resection with Ileostomy in the right   lower quadrant. No bowel obstruction.  PERITONEUM: No ascites.  VESSELS: Atherosclerotic changes.  RETROPERITONEUM/LYMPH NODES: No lymphadenopathy.  ABDOMINAL WALL: Small area of fat stranding /inflammatory changes in the   periumbilical region likely reflective of postsurgical changes, as are   overall similar compared to previous CT.  BONES: Degenerative changes. Anterolisthesis of L5 on S1.    IMPRESSION:  No emergent findings.  Chronic findings as above.        --- End of Report ---          IAM CA DO; Resident Radiologist  This document has been electronically signed.  JACQUIE PARK MD; Attending Radiologist  This document has been electronically signed. May 15 2024 10:15PM    < end of copied text >  < from: TTE W or WO Ultrasound Enhancing Agent (05.03.24 @ 11:15) >    TRANSTHORACIC ECHOCARDIOGRAM REPORT  ________________________________________________________________________________                                      _______       Pt. Name:       LEFTY AKBAR Study Date:    5/3/2024  MRN:            [id number]            YOB: 1945  Accession #:    [id number]           Age:           78 years  Account#:       [id number]        Gender:        F  Visit ID#  Heart Rate:     1112 bpm            Height:        64.00 in (162.56 cm)  Rhythm:       atrial fibrillation Weight:        160.00 lb (72.58 kg)  Blood Pressure: 102/62 mmHg         BSA/BMI:       1.78 m² / 27.46 kg/m²  ________________________________________________________________________________________  Referring Physician: [id number] Morris Burton  Interpreting Physician: Bryce Lugo MD  Primary Sonographer:    Lv Otero [facility]    CPT:               ECHO TTE WO CON COMP W DOPP - 93559.m  Indication(s):     Abnormal electrocardiogram ECG/EKG - R94.31  Procedure:      Transthoracic echocardiogram with 2-D, M-mode and complete                     spectral and color flow Doppler.  Ordering Location: [facility]  Admission Status:  Inpatient  Study Information: Image quality for this study is fair.    _______________________________________________________________________________________     CONCLUSIONS:      1. Left ventricular cavity is normal in size. Left ventricular systolic function is normal with an ejection fraction of 55 % by Shaffer's method of disks.    ________________________________________________________________________________________  FINDINGS:     Left Ventricle:  The left ventricular cavity is normal in size. Left ventricular systolic function is normal with a calculated ejection fraction of 55 % by the Shaffer's biplane method of disks. There are no regional wall motion abnormalities seen.     Right Ventricle:  The right ventricular cavity is normal in size and normal systolic function. Tricuspid annular plane systolic excursion (TAPSE) is 1.3 cm (normal >=1.7 cm). Tricuspid annular tissue Doppler S' is 12.6 cm/s (normal >10 cm/s).     Left Atrium:  The left atrium is moderately dilated with an indexed volume of 44.91 ml/m².     Right Atrium:  The right atrium is normal in size with an indexed volume of 27.54 ml/m².     Aortic Valve:  There is mild calcification of the aortic valve leaflets.     Mitral Valve:  There is calcification of the mitral valve annulus. There is moderate mitral regurgitation.     Tricuspid Valve:  Thereis mild tricuspid regurgitation. Normal pulmonary artery pressure.     Pulmonic Valve:  Normal pulmonic valve.     Aorta:  The aortic annulus and aortic root appear normal in size.     Pericardium:  No pericardial effusion seen.  ____________________________________________________________________  QUANTITATIVE DATA:  Left Ventricle Measurements: (Indexed to BSA)     IVSd (2D):   0.8 cm  LVPWd (2D):  0.8 cm  LVIDd (2D):  4.2 cm  LVIDs (2D):  3.3 cm  LV Mass:     101 g  56.9 g/m²  BiPlane LV EF%: 55 %     MV E Vmax:    0.86 m/s  e' lateral:   10.70 cm/s  e' medial:    7.93 cm/s  E/e' lateral: 8.05  E/e' medial:  10.86  E/e' Average: 9.24    Aorta Measurements: (Normal range) (Indexed to BSA)     Sinuses of Valsalva: 3.00 cm (2.7 - 3.3 cm)       Left Atrium Measurements: (Indexed to BSA)  LA Diam 2D: 4.50 cm    Right Ventricle Measurements: Right Atrial Measurements:     TAPSE:           1.3 cm       RA Vol:       49.00 ml  RV Base (RVID1): 3.6 cm       RA Vol Index: 27.54 ml/m²  RV Mid (RVID2):  2.2 cm       LVOT / RVOT/ Qp/Qs Data: (Indexed to BSA)  LVOT Diameter:  1.90 cm  LVOT Area:      2.84 cm²  LVOT Vmax:      0.89 m/s  LVOT Vmn:       0.586 m/s  LVOT VTI:       12.80 cm  LVOT peak grad: 3 mmHg  LVOT mean grad: 1.2 mmHg  LVOT SV:       36.3 ml   20.40 ml/m²    Mitral Valve Measurements:     MV Tatiana d, A4C 2.40 cm      MR Vmax:          5.19 m/s  MV E Vmax:    0.9 m/s      MR VTI:           164.00 cm                             MR Mean Gradient: 74.0 mmHg        MR Peak Gradient: 107.7 mmHg       Tricuspid Valve Measurements:     TV S'             12.6 cm/s  TR Vmax:          2.3 m/s  TR Peak Gradient: 21.3 mmHg    ________________________________________________________________________________________  Electronically signed on 5/4/2024 at 3:39:24 AM by Bryce Lugo MD         *** Final ***    < end of copied text >    OTHER: 	  	  LABS:	 	    CARDIAC MARKERS:                                  11.7   10.34 )-----------( 284      ( 17 May 2024 07:16 )             36.6     05-16    136  |  106  |  14  ----------------------------<  66<L>  4.2   |  17<L>  |  0.82    Ca    8.4      16 May 2024 07:35  Phos  3.3     05-16  Mg     1.6     05-16    TPro  6.3  /  Alb  3.0<L>  /  TBili  0.6  /  DBili  x   /  AST  19  /  ALT  18  /  AlkPhos  93  05-16    proBNP:   Lipid Profile:   HgA1c:   TSH:           
LEFTY AKBAR [id number]  78y Female  1d    HPI: 77yo F w pmhx of HTN, HLD, CAD s/p stents, COPD, CROW, and Afib c/b mesenteric ischemia in February requiring emergent ex lap and 20cm of bowel resection with subsequent RTOR for abdominal closure and end-ileostomy creation. Pt had prior admission for fall and again for Afib w RVR and UTi. Pt BIBEMS from her rehab for concern for increased ostomy output and soilage around the wafer resulting in pain and redness of her skin. Denies fevers, shortness of breath, nausea, vomiting. Ostomy has been functioning and is not painful.       PAST MEDICAL & SURGICAL HISTORY:  Hypertension      Hypothyroid      Osteoarthritis  knees, back      CAD (coronary artery disease)      CROW (obstructive sleep apnea)  non complaint on CPAP      History of MI (myocardial infarction)  h/o previous MI in 2004 prompted PTCA  with stenting x 2 vessels   last stress/ echo 2019      Heart murmur  dx in childhood      Bilateral hearing loss, unspecified hearing loss type  bilateral aids      Obesity      Mixed stress and urge urinary incontinence      Overactive bladder      S/P ORIF (open reduction internal fixation) fracture  left hip 1962      S/P appendectomy  30 plus years      S/P knee replacement  left 2000      Stented coronary artery  2004 X 2 STENTS      S/P laparotomy  due to adhesions, 30 years ago      H/O dilation and curettage  2/2019 Benign polyp            MEDICATIONS  (STANDING):  cefTRIAXone   IVPB 1000 milliGRAM(s) IV Intermittent once    MEDICATIONS  (PRN):      Allergies    penicillin (Hives)    Intolerances        REVIEW OF SYSTEMS    [X] A ten-point review of systems was otherwise negative except as noted.  [ ] Due to altered mental status/intubation, subjective information were not able to be obtained from the patient. History was obtained, to the extent possible, from review of the chart and collateral sources of information.      Vital Signs Last 24 Hrs  T(C): 36.6 (15 May 2024 22:58), Max: 36.6 (15 May 2024 22:58)  T(F): 97.9 (15 May 2024 22:58), Max: 97.9 (15 May 2024 22:58)  HR: 82 (15 May 2024 22:58) (69 - 82)  BP: 115/78 (15 May 2024 22:58) (93/65 - 115/78)  BP(mean): --  RR: 16 (15 May 2024 22:58) (16 - 16)  SpO2: 98% (15 May 2024 22:58) (98% - 100%)    Parameters below as of 15 May 2024 22:58  Patient On (Oxygen Delivery Method): room air        PHYSICAL EXAM:  GENERAL: NAD, numerous sites of bruises healing on the face.   CHEST/LUNG: Clear to auscultation bilaterally  HEART: Regular rate and rhythm  ABDOMEN: Soft, Nondistended, tender to soft palpation. Skin macerated with nonblanching erythema with soilage from the ileostomy site spilling onto the abdomen. Ileostomy pink/healthy, slightly retracted.   EXTREMITIES:  No clubbing, cyanosis, or edema      LABS:  Labs:  CAPILLARY BLOOD GLUCOSE                              11.0   11.02 )-----------( 304      ( 15 May 2024 17:48 )             34.2       Auto Neutrophil %: 69.8 % (05-15-24 @ 17:48)  Auto Immature Granulocyte %: 0.5 % (05-15-24 @ 17:48)    05-15    137  |  107  |  15  ----------------------------<  105<H>  4.4   |  19<L>  |  0.86      Calcium: 8.5 mg/dL (05-15-24 @ 17:48)      LFTs:             6.4  | 0.5  | 23       ------------------[103     ( 15 May 2024 17:48 )  3.1  | x    | 20          Lipase:x      Amylase:x         Blood Gas Venous - Lactate: 1.4 mmol/L (05-15-24 @ 17:39)      Coags:            Urinalysis Basic - ( 15 May 2024 17:48 )    Color: x / Appearance: x / SG: x / pH: x  Gluc: 105 mg/dL / Ketone: x  / Bili: x / Urobili: x   Blood: x / Protein: x / Nitrite: x   Leuk Esterase: x / RBC: x / WBC x   Sq Epi: x / Non Sq Epi: x / Bacteria: x            RADIOLOGY & ADDITIONAL STUDIES:  < from: CT Abdomen and Pelvis w/ IV Cont (05.15.24 @ 20:56) >  PROCEDURE:  CT of the Abdomen and Pelvis was performed.  Sagittal and coronal reformats were performed.    FINDINGS:  LOWER CHEST: Basilar subsegmental atelectasis. Coronary artery and aortic   atherosclerotic changes. Cardiomegaly. Metallic hyperdensity in the left   anterior chest wall likely representing a looprecorder.    LIVER: Within normal limits.  BILE DUCTS: Normal caliber.  GALLBLADDER: Cholelithiasis.  SPLEEN: Within normal limits.  PANCREAS: Within normal limits.  ADRENALS: Redemonstrated 4 cm indeterminant left adrenal mass stable from   previousexam  KIDNEYS/URETERS: No renal stones or hydronephrosis. Bilateral renal cyst   and additional hypodense foci too small to characterize.    BLADDER: Within normal limits.  REPRODUCTIVE ORGANS: Uterus and adnexa within normal limits.    BOWEL: Statuspost small bowel resection with Ileostomy in the right   lower quadrant. No bowel obstruction.  PERITONEUM: No ascites.  VESSELS: Atherosclerotic changes.  RETROPERITONEUM/LYMPH NODES: No lymphadenopathy.  ABDOMINAL WALL: Small area of fat stranding /inflammatory changes in the   periumbilical region likely reflective of postsurgical changes, as are   overall similar compared to previous CT.  BONES: Degenerative changes. Anterolisthesis of L5 on S1.    IMPRESSION:  No emergent findings.  Chronic findings as above.        --- End of Report ---    < end of copied text >  
      HPI:  79yo 73kg f, smoker, w pmh mci/dementia, [place], htn, hld, afib, cad c/b mi s/p pci w stents, copd, little, urinary incontinence, recurrent uti, hypothyroidism, oa s/p l tkr + l hip orif, and w recent hospitalization 2/16-3/9 for ischemic bowel s/p ex-lap w bowel resection + end ileostomy, 4/1-4/3 for drainage from incision site 2/2 hematoma in the laparotomy wound s/p conservative mgmt, 4/28-5/6 for afib w rvr iso urosepsis, p/w increased ostomy output; in er, found to ostomy output w spillage onto surrounding skin and resulting in macerated/erythematous skin; admit to medicine for further mgmt (16 May 2024 02:26)        Endo was consulted for adrenal nodule       PAST MEDICAL & SURGICAL HISTORY:  Hypertension      Hypothyroid      Osteoarthritis  knees, back      CAD (coronary artery disease)      LITTLE (obstructive sleep apnea)  non complaint on CPAP      History of MI (myocardial infarction)  h/o previous MI in 2004 prompted PTCA  with stenting x 2 vessels   last stress/ echo 2019      Heart murmur  dx in childhood      Bilateral hearing loss, unspecified hearing loss type  bilateral aids      Obesity      Mixed stress and urge urinary incontinence      Overactive bladder      S/P ORIF (open reduction internal fixation) fracture  left hip 1962      S/P appendectomy  30 plus years      S/P knee replacement  left 2000      Stented coronary artery  2004 X 2 STENTS      S/P laparotomy  due to adhesions, 30 years ago      H/O dilation and curettage  2/2019 Benign polyp          FAMILY HISTORY:      Social History:  from rehab (16 May 2024 02:26)            HOME MEDICATIONS:  Home Medications:  apixaban 5 mg oral tablet: 1 tab(s) orally every 12 hours (16 May 2024 02:30)  atorvastatin 80 mg oral tablet: 1 tab(s) orally once a day (at bedtime) (16 May 2024 02:30)  clopidogrel 75 mg oral tablet: 1 tab(s) orally once a day (16 May 2024 02:30)  digoxin 250 mcg (0.25 mg) oral tablet: 1 tab(s) orally once a day (16 May 2024 02:30)  levothyroxine 175 mcg (0.175 mg) oral tablet: 1 tab(s) orally once a day (16 May 2024 02:28)  metoprolol tartrate 25 mg oral tablet: 5 tab(s) orally 2 times a day (16 May 2024 02:30)  midodrine 5 mg oral tablet: 1 tab(s) orally 3 times a day (16 May 2024 02:30)  nicotine 14 mg/24 hr transdermal film, extended release: 1 patch transdermal every 24 hours (16 May 2024 02:30)            MEDICATIONS  (STANDING):  apixaban 5 milliGRAM(s) Oral every 12 hours  atorvastatin 80 milliGRAM(s) Oral at bedtime  clopidogrel Tablet 75 milliGRAM(s) Oral daily  digoxin     Tablet 250 MICROGram(s) Oral daily  levothyroxine 175 MICROGram(s) Oral daily  loperamide 4 milliGRAM(s) Oral three times a day  metoprolol tartrate 125 milliGRAM(s) Oral two times a day  midodrine. 5 milliGRAM(s) Oral three times a day  nicotine -  14 mG/24Hr(s) Patch 1 Patch Transdermal every 24 hours  sodium chloride 0.9%. 1000 milliLiter(s) (75 mL/Hr) IV Continuous <Continuous>    MEDICATIONS  (PRN):  acetaminophen     Tablet .. 650 milliGRAM(s) Oral every 6 hours PRN Temp greater or equal to 38C (100.4F), Mild Pain (1 - 3)  aluminum hydroxide/magnesium hydroxide/simethicone Suspension 30 milliLiter(s) Oral every 4 hours PRN Dyspepsia  melatonin 3 milliGRAM(s) Oral at bedtime PRN Insomnia  ondansetron Injectable 4 milliGRAM(s) IV Push every 8 hours PRN Nausea and/or Vomiting      Allergies    penicillin (Hives)    Intolerances        Review of Systems:  Neuro: No HA, no dizziness  Cardiovascular: No chest pain, no palpitations  Respiratory: no SOB, no cough  GI: No nausea, vomiting, abdominal pain  MSK: Denies joint/muscle pain      ALL OTHER SYSTEMS REVIEWED AND NEGATIVE        PHYSICAL EXAM:  VITALS: T(C): 36.5 (05-17-24 @ 12:03)  T(F): 97.7 (05-17-24 @ 12:03), Max: 97.9 (05-16-24 @ 19:15)  HR: 66 (05-17-24 @ 12:03) (66 - 90)  BP: 124/77 (05-17-24 @ 12:03) (117/76 - 136/86)  RR:  (18 - 18)  SpO2:  (97% - 99%)  Wt(kg): --  GENERAL: NAD, well-groomed, well-developed  NEURO:  alert and oriented  RESPIRATORY: Clear to auscultation bilaterally; No rales, rhonchi, wheezing  CARDIOVASCULAR: Si S2  GI: Soft, non distended, normal bowel sounds  MUSCULOSKELETAL: Moves all extremities equally                                 11.7   10.34 )-----------( 284      ( 17 May 2024 07:16 )             36.6       05-17    137  |  105  |  12  ----------------------------<  83  4.0   |  18<L>  |  0.91    eGFR: 65    Ca    9.3      05-17  Mg     1.6     05-16  Phos  3.3     05-16    TPro  6.7  /  Alb  3.3  /  TBili  0.6  /  DBili  x   /  AST  19  /  ALT  18  /  AlkPhos  102  05-17      Thyroid Function Tests:  05-16 @ 07:35 TSH 0.87 FreeT4 -- T3 -- Anti TPO -- Anti Thyroglobulin Ab -- TSI --  04-29 @ 06:51 TSH 0.30 FreeT4 -- T3 -- Anti TPO -- Anti Thyroglobulin Ab -- TSI --    Diet, Regular (05-16-24 @ 02:22) [Active]        05-16 Chol 89 Direct LDL -- LDL calculated 38 HDL 38<L> Trig 62, 04-29 Chol 79 Direct LDL -- LDL calculated 31 HDL 34<L> Trig 56  A1C with Estimated Average Glucose Result: 5.3 % (05-16-24 @ 07:35)  A1C with Estimated Average Glucose Result: 5.3 % (04-29-24 @ 06:51)        < from: CT Abdomen and Pelvis w/ IV Cont (05.15.24 @ 20:56) >  ADRENALS: Redemonstrated 4 cm indeterminant left adrenal mass stable from   previousexam    < end of copied text >

## 2024-05-17 NOTE — DIETITIAN INITIAL EVALUATION ADULT - ORAL INTAKE PTA/DIET HISTORY
Patient and patient's daughter overall reporting poor PO intake/appetite initially after new ileostomy creation during 2/2024, eventually had some recovery in PO intake but endured weight loss initially at that time due to acute illness, surgery and adjusting to new ileostomy. Used to have full dentures, upper dentures were lost at the end of last year. Bottom dentures has a part to help keep them in that was also missing, as a result patient does not use any dentures at present per patient's daughter, will eat most food items despite this.

## 2024-05-17 NOTE — CONSULT NOTE ADULT - ASSESSMENT
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 On examination pt has significant soilage of abdominal skin with resultant maceration and erythema, CT scan not significant for intraabdominal pathology.     RECOMMENDATIONS:   - medical admission for treatment of skin inflammation  - surgery to follow for ostomy management  - ostomy nurse consultation in AM for wound pouch placement     Discussed with surgical fellow on behalf of attending Dr. Prakash    ACS/Trauma Surgery [id number]   
  Assessment  Adrenal mass: Redemonstrated 4 cm indeterminant left adrenal mass stable from previous exams/scans, pt asymptomatic, hemodynamics stable   mass seen dating as far back as 2022 as per scans. Unlikely hormone secreting, labs pending.   Hypothyroidism: On Synthroid  175 mcg po daily, compliant with Synthroid intake, asymptomatic. TSH euthyroid, FT4 is pending.   HTN: on antihypertensive medications, monitored, asymptomatic.  Obesity: No strict exercise routines, not on any weight loss program, neither on low calorie diet.        Discussed plan and management wit Dr Marli Zelaya NP-TEAMS  Eric Calles MD  Cell: [phone]  Office: [phone]            
78 year old presenting with increased ostomy output    1. Ostomy output elevated  -check GI PCR  -add loperamide    2. SKin breakdown  ostomy nurse and wound care consultations    3. COPD        Advanced care planning forms were discussed. Code status including forceful chest compressions, defibrillation and intubation were discussed. The risks benefits and alternatives to pertinent gastrointestinal procedures and interventions were discussed in detail and all questions were answered. Duration: 15 Minutes.    [facility]  Shane Sprague M.D.   [address] [place] [address]  Office: [phone]      
77yo 73kg f, smoker, w pmh mci/dementia, [place], htn, hld, afib, cad c/b mi s/p pci w stents, copd, little, urinary incontinence, recurrent uti, hypothyroidism, oa s/p l tkr + l hip orif, and w recent hospitalization 2/16-3/9 for ischemic bowel s/p ex-lap w bowel resection + end ileostomy, 4/1-4/3 for drainage from incision site 2/2 hematoma in the laparotomy wound s/p conservative mgmt, 4/28-5/6 for afib w rvr iso urosepsis, p/w increased ostomy output; in er, found to ostomy output w spillage onto surrounding skin and resulting in macerated/erythematous skin;

## 2024-05-17 NOTE — PROGRESS NOTE ADULT - ASSESSMENT
79yo 73kg f, smoker, w pmh mci/dementia, [place], htn, hld, afib, cad c/b mi s/p pci w stents, copd, little, urinary incontinence, recurrent uti, hypothyroidism, oa s/p l tkr + l hip orif, and w recent hospitalization 2/16-3/9 for ischemic bowel s/p ex-lap w bowel resection + end ileostomy, 4/1-4/3 for drainage from incision site 2/2 hematoma in the laparotomy wound s/p conservative mgmt, 4/28-5/6 for afib w rvr iso urosepsis, p/w increased ostomy output; in er, found to ostomy output w spillage onto surrounding skin and resulting in macerated/erythematous skin; admit to medicine for further mgmt      Problem/Plan - 1:  ·  Problem: Increased ileostomy output.   ·  Plan: recent hospitalization 2/16-3/9 for ischemic bowel s/p ex-lap w bowel resection + end ileostomy, 4/1-4/3 for drainage from incision site 2/2 hematoma in the laparotomy wound s/p conservative mgmt  imaging currently showing, "no emergent findings... Small area of fat stranding /inflammatory changes in the periumbilical region likely reflective of postsurgical changes, as are overall similar compared to previous CT"  monitor Op   cont lopermide and if op eelvated will add opium tincture     Problem/Plan - 2:  ·  Problem: Longstanding persistent atrial fibrillation.   ·  Plan: previous recent hospitalizations complicated by multiple episodes of afib rvr, requiring lopressor ivp + digoxin ivp + amiodarone load + diltiazem ivp  no ekg done in er, but appears to be rate controlled  chadsvasc ~5   cont home eliquis  cont home lopressor + digoxin.    Problem/Plan - 3:  ·  Problem: CAD (coronary artery disease).   ·  Plan: h/o cad c/b mi s/p pci w stents  cont med s    Problem/Plan - 4:  ·  Problem: HTN (hypertension).   ·  Plan: lopressor.    Problem/Plan - 5:  ·  Problem: adrenal mass : will consult endo     Problem/Plan - 6:  ·  Problem: Chronic obstructive pulmonary disease (COPD).   ·  Plan: h/o smoking, copd, little  stable     Problem/Plan - 7:  ·  Problem: Hypothyroidism.   ·  Plan: synthroid.    FULL code .     d/w daughter at length  d/w Nikko buckner;columb[place] vallejo ion 24 hours

## 2024-05-17 NOTE — PATIENT PROFILE ADULT - NSTOBACCOCESSATIONEDU1_GEN_A_NUR
Baby is a 37.5 wk GA male born to a 34 y/o  mother via vacuum assist VD. PEDS called to delivery for vacuum. Maternal history of uterine cyst. Prenatal history uncomplicated. Maternal blood type A- (passive antibody +), PNL negative, non-reactive, and immune. GBS negative on . AROM at 08:00 on , clear fluids. Baby born vigorous and crying spontaneously. Warmed, dried, stimulated. Apgars 9/9. EOS 0.08, maternal Tmax 37.0C. Mom plans to breastfeed and bottlefeed and unsure of hepB. Circ declined. Mother's COVID PCR neg.    Physical Exam:  Gen: NAD, +grimace  HEENT: anterior fontanel open soft and flat, no cleft lip/palate, ears normal set, no ear pits or tags. no lesions in mouth/throat, nares clinically patent, +caput  Resp: no increased work of breathing, good air entry b/l, clear to auscultation bilaterally  Cardio: Normal S1/S2, regular rate and rhythm, no murmurs, rubs or gallops  Abd: soft, non tender, non distended, + bowel sounds, umbilical cord with 3 vessels  Neuro: +grasp/suck/jag, normal tone  Extremities: negative fuentes and ortolani, moving all extremities, full range of motion x 4, no crepitus  Skin: pink, warm, +multiple hyperpigmented flat lesions on midline back  Genitals: normal male anatomy, testicles palpable in scrotum b/l, Ramsey 1, anus patent Baby is a 37.5 wk GA male born to a 36 y/o  mother via vacuum assist VD. PEDS called to delivery for vacuum. Maternal history of uterine cyst. Prenatal history uncomplicated. Maternal blood type A- (passive antibody +), PNL negative, non-reactive, and immune. GBS negative on . AROM at 08:00 on , clear fluids. Baby born vigorous and crying spontaneously. Warmed, dried, stimulated. Apgars 9/9. EOS 0.08, maternal Tmax 37.0C. Mom plans to breastfeed and bottlefeed and requests hepB. Circ declined. Mother's COVID PCR neg.    Physical Exam:  Gen: NAD, +grimace  HEENT: anterior fontanel open soft and flat, no cleft lip/palate, ears normal set, no ear pits or tags. no lesions in mouth/throat, nares clinically patent, +caput  Resp: no increased work of breathing, good air entry b/l, clear to auscultation bilaterally  Cardio: Normal S1/S2, regular rate and rhythm, no murmurs, rubs or gallops  Abd: soft, non tender, non distended, + bowel sounds, umbilical cord with 3 vessels  Neuro: +grasp/suck/jag, normal tone  Extremities: negative fuentes and ortolani, moving all extremities, full range of motion x 4, no crepitus  Skin: pink, warm, +multiple hyperpigmented flat lesions on midline back  Genitals: normal male anatomy, testicles palpable in scrotum b/l, Ramsey 1, anus patent Recognize danger situations.  For example, stress, drinking alcohol, urges to smoke, smoking cues, cigarette availability

## 2024-05-17 NOTE — PROGRESS NOTE ADULT - ASSESSMENT
78 year old presenting with increased ostomy output    1. Ostomy output elevated  stool studies negative  Loperamide TID  add psyllium powder   Monitor ostomy output daily please   surgery input appreciated    2. SKin breakdown  ostomy nurse and wound care evals    3. COPD        Advanced care planning forms were discussed. Code status including forceful chest compressions, defibrillation and intubation were discussed. The risks benefits and alternatives to pertinent gastrointestinal procedures and interventions were discussed in detail and all questions were answered. Duration: 15 Minutes.    [facility]  Shane Sprague M.D.   [address], [place]  Office: [phone]

## 2024-05-18 LAB
ANION GAP SERPL CALC-SCNC: 12 MMOL/L — SIGNIFICANT CHANGE UP (ref 5–17)
BUN SERPL-MCNC: 12 MG/DL — SIGNIFICANT CHANGE UP (ref 7–23)
CALCIUM SERPL-MCNC: 8.8 MG/DL — SIGNIFICANT CHANGE UP (ref 8.4–10.5)
CHLORIDE SERPL-SCNC: 108 MMOL/L — SIGNIFICANT CHANGE UP (ref 96–108)
CO2 SERPL-SCNC: 18 MMOL/L — LOW (ref 22–31)
CREAT SERPL-MCNC: 1.06 MG/DL — SIGNIFICANT CHANGE UP (ref 0.5–1.3)
CULTURE RESULTS: ABNORMAL
EGFR: 54 ML/MIN/1.73M2 — LOW
GLUCOSE SERPL-MCNC: 66 MG/DL — LOW (ref 70–99)
POTASSIUM SERPL-MCNC: 4.7 MMOL/L — SIGNIFICANT CHANGE UP (ref 3.5–5.3)
POTASSIUM SERPL-SCNC: 4.7 MMOL/L — SIGNIFICANT CHANGE UP (ref 3.5–5.3)
SODIUM SERPL-SCNC: 138 MMOL/L — SIGNIFICANT CHANGE UP (ref 135–145)
SPECIMEN SOURCE: SIGNIFICANT CHANGE UP
T4 FREE SERPL-MCNC: 1.6 NG/DL — SIGNIFICANT CHANGE UP (ref 0.9–1.8)

## 2024-05-18 RX ORDER — PSYLLIUM SEED (WITH DEXTROSE)
1 POWDER (GRAM) ORAL DAILY
Refills: 0 | Status: DISCONTINUED | OUTPATIENT
Start: 2024-05-18 | End: 2024-05-20

## 2024-05-18 RX ADMIN — MIDODRINE HYDROCHLORIDE 5 MILLIGRAM(S): 2.5 TABLET ORAL at 10:43

## 2024-05-18 RX ADMIN — Medication 125 MILLIGRAM(S): at 06:50

## 2024-05-18 RX ADMIN — Medication 250 MICROGRAM(S): at 06:51

## 2024-05-18 RX ADMIN — Medication 1 PATCH: at 06:50

## 2024-05-18 RX ADMIN — APIXABAN 5 MILLIGRAM(S): 2.5 TABLET, FILM COATED ORAL at 18:11

## 2024-05-18 RX ADMIN — Medication 125 MILLIGRAM(S): at 23:29

## 2024-05-18 RX ADMIN — MIDODRINE HYDROCHLORIDE 5 MILLIGRAM(S): 2.5 TABLET ORAL at 06:51

## 2024-05-18 RX ADMIN — Medication 1 PATCH: at 07:15

## 2024-05-18 RX ADMIN — Medication 1 PATCH: at 19:32

## 2024-05-18 RX ADMIN — Medication 4 MILLIGRAM(S): at 23:30

## 2024-05-18 RX ADMIN — MIDODRINE HYDROCHLORIDE 5 MILLIGRAM(S): 2.5 TABLET ORAL at 18:10

## 2024-05-18 RX ADMIN — Medication 1 PATCH: at 05:00

## 2024-05-18 RX ADMIN — Medication 4 MILLIGRAM(S): at 06:50

## 2024-05-18 RX ADMIN — ATORVASTATIN CALCIUM 80 MILLIGRAM(S): 80 TABLET, FILM COATED ORAL at 23:30

## 2024-05-18 RX ADMIN — APIXABAN 5 MILLIGRAM(S): 2.5 TABLET, FILM COATED ORAL at 06:50

## 2024-05-18 RX ADMIN — CLOPIDOGREL BISULFATE 75 MILLIGRAM(S): 75 TABLET, FILM COATED ORAL at 10:43

## 2024-05-18 RX ADMIN — Medication 4 MILLIGRAM(S): at 13:50

## 2024-05-18 RX ADMIN — Medication 175 MICROGRAM(S): at 06:50

## 2024-05-18 NOTE — PROGRESS NOTE ADULT - ASSESSMENT
77yo 73kg f, smoker, w pmh mci/dementia, [place], htn, hld, afib, cad c/b mi s/p pci w stents, copd, little, urinary incontinence, recurrent uti, hypothyroidism, oa s/p l tkr + l hip orif, and w recent hospitalization 2/16-3/9 for ischemic bowel s/p ex-lap w bowel resection + end ileostomy, 4/1-4/3 for drainage from incision site 2/2 hematoma in the laparotomy wound s/p conservative mgmt, 4/28-5/6 for afib w rvr iso urosepsis, p/w increased ostomy output; in er, found to ostomy output w spillage onto surrounding skin and resulting in macerated/erythematous skin;

## 2024-05-18 NOTE — PROGRESS NOTE ADULT - ASSESSMENT
Assessment  Adrenal mass: Redemonstrated 4 cm indeterminant left adrenal mass stable from previous exams/scans, pt asymptomatic, hemodynamics stable   mass seen dating as far back as 2022 as per scans. Unlikely hormone secreting, labs pending.   Hypothyroidism: On Synthroid  175 mcg po daily, compliant with Synthroid intake, asymptomatic. TSH euthyroid.  HTN: on antihypertensive medications, monitored, asymptomatic.  Obesity: No strict exercise routines, not on any weight loss program, neither on low calorie diet.      Eric Calles MD  Cell: [phone]  Office: [phone]

## 2024-05-18 NOTE — PROGRESS NOTE ADULT - ASSESSMENT
77yo 73kg f, smoker, w pmh mci/dementia, [place], htn, hld, afib, cad c/b mi s/p pci w stents, copd, little, urinary incontinence, recurrent uti, hypothyroidism, oa s/p l tkr + l hip orif, and w recent hospitalization 2/16-3/9 for ischemic bowel s/p ex-lap w bowel resection + end ileostomy, 4/1-4/3 for drainage from incision site 2/2 hematoma in the laparotomy wound s/p conservative mgmt, 4/28-5/6 for afib w rvr iso urosepsis, p/w increased ostomy output; in er, found to ostomy output w spillage onto surrounding skin and resulting in macerated/erythematous skin; admit to medicine for further mgmt      Problem/Plan - 1:  ·  Problem: Increased ileostomy output.   ·  Plan: recent hospitalization 2/16-3/9 for ischemic bowel s/p ex-lap w bowel resection + end ileostomy, 4/1-4/3 for drainage from incision site 2/2 hematoma in the laparotomy wound s/p conservative mgmt  imaging currently showing, "no emergent findings... Small area of fat stranding /inflammatory changes in the periumbilical region likely reflective of postsurgical changes, as are overall similar compared to previous CT"  monitor Op   cont lopermide and if op eelvated will add opium tincture     Problem/Plan - 2:  ·  Problem: Longstanding persistent atrial fibrillation.   ·  Plan: previous recent hospitalizations complicated by multiple episodes of afib rvr, requiring lopressor ivp + digoxin ivp + amiodarone load + diltiazem ivp  no ekg done in er, but appears to be rate controlled  chadsvasc ~5   cont home eliquis  cont home lopressor + digoxin.    Problem/Plan - 3:  ·  Problem: CAD (coronary artery disease).   ·  Plan: h/o cad c/b mi s/p pci w stents  cont med s    Problem/Plan - 4:  ·  Problem: HTN (hypertension).   ·  Plan: lopressor.    Problem/Plan - 5:  ·  Problem: adrenal mass : will consult endo     Problem/Plan - 6:  ·  Problem: Chronic obstructive pulmonary disease (COPD).   ·  Plan: h/o smoking, copd, little  stable     Problem/Plan - 7:  ·  Problem: Hypothyroidism.   ·  Plan: synthroid.    FULL code .     d/w daughter at length  d/w Nikko buckner;[place] vallejo ion 24 hours

## 2024-05-18 NOTE — PROGRESS NOTE ADULT - SUBJECTIVE AND OBJECTIVE BOX
INTERVAL HPI/OVERNIGHT EVENTS:     No events overnight       acetaminophen     Tablet .. 650 milliGRAM(s) Oral every 6 hours PRN  aluminum hydroxide/magnesium hydroxide/simethicone Suspension 30 milliLiter(s) Oral every 4 hours PRN  apixaban 5 milliGRAM(s) Oral every 12 hours  atorvastatin 80 milliGRAM(s) Oral at bedtime  clopidogrel Tablet 75 milliGRAM(s) Oral daily  digoxin     Tablet 250 MICROGram(s) Oral daily  levothyroxine 175 MICROGram(s) Oral daily  loperamide 4 milliGRAM(s) Oral three times a day  melatonin 3 milliGRAM(s) Oral at bedtime PRN  metoprolol tartrate 125 milliGRAM(s) Oral two times a day  midodrine. 5 milliGRAM(s) Oral three times a day  nicotine -  14 mG/24Hr(s) Patch 1 Patch Transdermal every 24 hours  ondansetron Injectable 4 milliGRAM(s) IV Push every 8 hours PRN  sodium chloride 0.9%. 1000 milliLiter(s) IV Continuous <Continuous>                            11.7   10.34 )-----------( 284      ( 17 May 2024 07:16 )             36.6       Hemoglobin: 11.7 g/dL (05-17 @ 07:16)  Hemoglobin: 10.3 g/dL (05-16 @ 07:35)  Hemoglobin: 11.0 g/dL (05-15 @ 17:48)      05-18    138  |  108  |  12  ----------------------------<  66<L>  4.7   |  18<L>  |  1.06    Ca    8.8      18 May 2024 07:28    TPro  6.7  /  Alb  3.3  /  TBili  0.6  /  DBili  x   /  AST  19  /  ALT  18  /  AlkPhos  102  05-17    Creatinine Trend: 1.06<--, 0.91<--, 0.82<--, 0.86<--, 1.00<--, 1.09<--    COAGS:           T(C): 36.9 (05-18-24 @ 06:01), Max: 36.9 (05-18-24 @ 06:01)  HR: 94 (05-18-24 @ 06:01) (66 - 110)  BP: 102/69 (05-18-24 @ 06:01) (102/69 - 132/80)  RR: 18 (05-18-24 @ 06:01) (18 - 18)  SpO2: 97% (05-18-24 @ 06:01) (97% - 99%)  Wt(kg): --    I&O's Summary    17 May 2024 07:01  -  18 May 2024 07:00  --------------------------------------------------------  IN: 720 mL / OUT: 851 mL / NET: -131 mL          Allergies    penicillin (Hives)    Intolerances        Review of Systems:    General:  No wt loss, fevers, chills, night sweats, fatigue   Eyes:  Good vision, no reported pain  ENT:  No sore throat, pain, runny nose, dysphagia  CV:  No pain, palpitations, hypo/hypertension  Resp:  No dyspnea, cough, tachypnea, wheezing  GI:  No pain, No nausea, No vomiting, No diarrhea, No constipation, No weight loss, No fever, No pruritis, No rectal bleeding, No melena, No dysphagia  :  No pain, bleeding, incontinence, nocturia  Muscle:  No pain, weakness  Neuro:  No weakness, tingling, memory problems  Psych:  No fatigue, insomnia, mood problems, depression  Endocrine:  No polyuria, polydypsia, cold/heat intolerance  Heme:  No petechiae, ecchymosis, easy bruisability  Skin:  No rash, tattoos, scars, edema       PHYSICAL EXAM:    Constitutional: NAD  HEENT: EOMI, throat clear  Neck: No LAD, supple  Respiratory: CTA and P  Cardiovascular: S1 and S2, RRR, no M  Gastrointestinal: BS+, soft, NT/ND, neg HSM,  Extremities: No peripheral edema, neg clubbing, cyanosis  Vascular: 2+ peripheral pulses  Neurological: A/O   Psychiatric: Normal mood, normal affect  Skin: No rashes

## 2024-05-18 NOTE — PROGRESS NOTE ADULT - SUBJECTIVE AND OBJECTIVE BOX
Subjective: Patient seen and examined. No new events except as noted.     REVIEW OF SYSTEMS:    CONSTITUTIONAL:+ weakness, fevers or chills  EYES/ENT: No visual changes;  No vertigo or throat pain   NECK: No pain or stiffness  RESPIRATORY: No cough, wheezing, hemoptysis; No shortness of breath  CARDIOVASCULAR: No chest pain or palpitations  GASTROINTESTINAL: No abdominal or epigastric pain. No nausea, vomiting, or hematemesis; No diarrhea or constipation. No melena or hematochezia.  GENITOURINARY: No dysuria, frequency or hematuria  NEUROLOGICAL: No numbness or weakness  SKIN: No itching, burning, rashes, or lesions   All other review of systems is negative unless indicated above.    MEDICATIONS:  MEDICATIONS  (STANDING):  apixaban 5 milliGRAM(s) Oral every 12 hours  atorvastatin 80 milliGRAM(s) Oral at bedtime  clopidogrel Tablet 75 milliGRAM(s) Oral daily  digoxin     Tablet 250 MICROGram(s) Oral daily  levothyroxine 175 MICROGram(s) Oral daily  loperamide 4 milliGRAM(s) Oral three times a day  metoprolol tartrate 125 milliGRAM(s) Oral two times a day  midodrine. 5 milliGRAM(s) Oral three times a day  nicotine -  14 mG/24Hr(s) Patch 1 Patch Transdermal every 24 hours  psyllium Powder 1 Packet(s) Oral daily  sodium chloride 0.9%. 1000 milliLiter(s) (75 mL/Hr) IV Continuous <Continuous>      PHYSICAL EXAM:  T(C): 36.6 (05-18-24 @ 19:21), Max: 36.9 (05-18-24 @ 06:01)  HR: 98 (05-18-24 @ 19:21) (76 - 98)  BP: 110/73 (05-18-24 @ 19:21) (102/68 - 131/82)  RR: 18 (05-18-24 @ 19:21) (18 - 18)  SpO2: 96% (05-18-24 @ 19:21) (96% - 99%)  Wt(kg): --  I&O's Summary    17 May 2024 07:01  -  18 May 2024 07:00  --------------------------------------------------------  IN: 720 mL / OUT: 851 mL / NET: -131 mL    18 May 2024 07:01  -  18 May 2024 22:13  --------------------------------------------------------  IN: 240 mL / OUT: 150 mL / NET: 90 mL          Appearance: NAD	  HEENT:  Dry  oral mucosa, PERRL, EOMI	  Lymphatic: No lymphadenopathy  Cardiovascular: Irregular  S1 S2, No JVD, No murmurs, No edema  Respiratory: decreased bs   Psychiatry: A & O x 3, Mood & affect appropriate  Gastrointestinal:   Soft, Nondistended, tender to soft palpation. Skin macerated with nonblanching erythema with soilage from the ileostomy site spilling onto the abdomen. Ileostomy pink/healthy, slightly retracted.     Skin: No rashes, No ecchymoses, No cyanosis	  Neurologic: Non-focal  Extremities: Normal range of motion, No clubbing, cyanosis or edema  Vascular: Peripheral pulses palpable 2+ bilaterally    LABS:    CARDIAC MARKERS:                                11.7   10.34 )-----------( 284      ( 17 May 2024 07:16 )             36.6     05-18    138  |  108  |  12  ----------------------------<  66<L>  4.7   |  18<L>  |  1.06    Ca    8.8      18 May 2024 07:28    TPro  6.7  /  Alb  3.3  /  TBili  0.6  /  DBili  x   /  AST  19  /  ALT  18  /  AlkPhos  102  05-17    proBNP:   Lipid Profile:   HgA1c:   TSH:             TELEMETRY: 	    ECG:  	  RADIOLOGY:   DIAGNOSTIC TESTING:  [ ] Echocardiogram:  [ ]  Catheterization:  [ ] Stress Test:    OTHER:

## 2024-05-18 NOTE — PROGRESS NOTE ADULT - SUBJECTIVE AND OBJECTIVE BOX
Chief complaint    Patient is a 78y old  Female who presents with a chief complaint of increased ostomy output (18 May 2024 09:59)   Review of systems  Patient appears comfortable.    Labs and Fingersticks  CAPILLARY BLOOD GLUCOSE          Anion Gap: 12 (05-18 @ 07:28)  Anion Gap: 14 (05-17 @ 07:15)      Calcium: 8.8 (05-18 @ 07:28)  Calcium: 9.3 (05-17 @ 07:15)  Albumin: 3.3 (05-17 @ 07:15)    Alanine Aminotransferase (ALT/SGPT): 18 (05-17 @ 07:15)  Alkaline Phosphatase: 102 (05-17 @ 07:15)  Aspartate Aminotransferase (AST/SGOT): 19 (05-17 @ 07:15)        05-18    138  |  108  |  12  ----------------------------<  66<L>  4.7   |  18<L>  |  1.06    Ca    8.8      18 May 2024 07:28    TPro  6.7  /  Alb  3.3  /  TBili  0.6  /  DBili  x   /  AST  19  /  ALT  18  /  AlkPhos  102  05-17                        11.7   10.34 )-----------( 284      ( 17 May 2024 07:16 )             36.6     Medications  MEDICATIONS  (STANDING):  apixaban 5 milliGRAM(s) Oral every 12 hours  atorvastatin 80 milliGRAM(s) Oral at bedtime  clopidogrel Tablet 75 milliGRAM(s) Oral daily  digoxin     Tablet 250 MICROGram(s) Oral daily  levothyroxine 175 MICROGram(s) Oral daily  loperamide 4 milliGRAM(s) Oral three times a day  metoprolol tartrate 125 milliGRAM(s) Oral two times a day  midodrine. 5 milliGRAM(s) Oral three times a day  nicotine -  14 mG/24Hr(s) Patch 1 Patch Transdermal every 24 hours  psyllium Powder 1 Packet(s) Oral daily  sodium chloride 0.9%. 1000 milliLiter(s) (75 mL/Hr) IV Continuous <Continuous>      Physical Exam  General: Patient appears comfortable.  Vital Signs Last 12 Hrs  T(F): 97.7 (05-18-24 @ 11:30), Max: 98.4 (05-18-24 @ 06:01)  HR: 82 (05-18-24 @ 11:30) (82 - 94)  BP: 102/68 (05-18-24 @ 11:30) (102/68 - 102/69)  BP(mean): --  RR: 18 (05-18-24 @ 11:30) (18 - 18)  SpO2: 97% (05-18-24 @ 11:30) (97% - 97%)  Neck: No palpable thyroid nodules.  CVS: S1S2, No murmurs  Respiratory: No wheezing, no crepitations  GI: Abdomen soft, non tender.    Diagnostics        Radiology:

## 2024-05-18 NOTE — PROGRESS NOTE ADULT - ASSESSMENT
78 year old presenting with increased ostomy output    1. Ostomy output elevated  stool studies negative  Loperamide TID  add psyllium powder   Monitor ostomy output daily please   surgery input appreciated    2. SKin breakdown  ostomy nurse and wound care evals    3. COPD              78 year old presenting with increased ostomy output    1. Ostomy output elevated  stool studies negative  Loperamide TID  add psyllium powder   Monitor ostomy output daily please   surgery input appreciated  yeast like cells cultured, not likely of clinical significance    2. SKin breakdown  ostomy nurse and wound care evals    3. COPD

## 2024-05-18 NOTE — PROGRESS NOTE ADULT - SUBJECTIVE AND OBJECTIVE BOX
Date of service: 05-18-24 @ 22:33      Patient is a 78y old  Female who presents with a chief complaint of increased ostomy output (18 May 2024 22:13)                                                               INTERVAL HPI/OVERNIGHT EVENTS:    REVIEW OF SYSTEMS:     CONSTITUTIONAL: No weakness, fevers or chills  EYES/ENT: No visual changes , no ear ache   NECK: No pain or stiffness  RESPIRATORY: No cough, wheezing,  No shortness of breath  CARDIOVASCULAR: No chest pain or palpitations  GASTROINTESTINAL: No abdominal pain  . No nausea, vomiting, or hematemesis; No diarrhea or constipation. No melena or hematochezia.  GENITOURINARY: No dysuria, frequency or hematuria  NEUROLOGICAL: No numbness or weakness  SKIN: No itching, burning, rashes, or lesions                                                                                                                                                                                                                                                                                 Medications:  MEDICATIONS  (STANDING):  apixaban 5 milliGRAM(s) Oral every 12 hours  atorvastatin 80 milliGRAM(s) Oral at bedtime  clopidogrel Tablet 75 milliGRAM(s) Oral daily  digoxin     Tablet 250 MICROGram(s) Oral daily  levothyroxine 175 MICROGram(s) Oral daily  loperamide 4 milliGRAM(s) Oral three times a day  metoprolol tartrate 125 milliGRAM(s) Oral two times a day  midodrine. 5 milliGRAM(s) Oral three times a day  nicotine -  14 mG/24Hr(s) Patch 1 Patch Transdermal every 24 hours  psyllium Powder 1 Packet(s) Oral daily  sodium chloride 0.9%. 1000 milliLiter(s) (75 mL/Hr) IV Continuous <Continuous>    MEDICATIONS  (PRN):  acetaminophen     Tablet .. 650 milliGRAM(s) Oral every 6 hours PRN Temp greater or equal to 38C (100.4F), Mild Pain (1 - 3)  aluminum hydroxide/magnesium hydroxide/simethicone Suspension 30 milliLiter(s) Oral every 4 hours PRN Dyspepsia  melatonin 3 milliGRAM(s) Oral at bedtime PRN Insomnia  ondansetron Injectable 4 milliGRAM(s) IV Push every 8 hours PRN Nausea and/or Vomiting       Allergies    penicillin (Hives)    Intolerances      Vital Signs Last 24 Hrs  T(C): 36.6 (18 May 2024 19:21), Max: 36.9 (18 May 2024 06:01)  T(F): 97.8 (18 May 2024 19:21), Max: 98.4 (18 May 2024 06:01)  HR: 98 (18 May 2024 19:21) (76 - 98)  BP: 110/73 (18 May 2024 19:21) (102/68 - 131/82)  BP(mean): --  RR: 18 (18 May 2024 19:21) (18 - 18)  SpO2: 96% (18 May 2024 19:21) (96% - 99%)    Parameters below as of 18 May 2024 19:21  Patient On (Oxygen Delivery Method): room air      CAPILLARY BLOOD GLUCOSE          05-17 @ 07:01  -  05-18 @ 07:00  --------------------------------------------------------  IN: 720 mL / OUT: 851 mL / NET: -131 mL    05-18 @ 07:01  -  05-18 @ 22:33  --------------------------------------------------------  IN: 240 mL / OUT: 150 mL / NET: 90 mL      Physical Exam:    Daily     Daily   General:  Well appearing, NAD, not cachetic  HEENT:  Nonicteric, PERRLA  CV:  RRR, S1S2   Lungs:  CTA B/L, no wheezes, rales, rhonchi  Abdomen:  Soft, non-tender, no distended, positive BS  Extremities:  2+ pulses, no c/c, no edema  Skin:  Warm and dry, no rashes  :  No amaya  Neuro:  AAOx3, non-focal, grossly intact                                                                                                                                                                                                                                                                                                LABS:                               11.7   10.34 )-----------( 284      ( 17 May 2024 07:16 )             36.6                      05-18    138  |  108  |  12  ----------------------------<  66<L>  4.7   |  18<L>  |  1.06    Ca    8.8      18 May 2024 07:28    TPro  6.7  /  Alb  3.3  /  TBili  0.6  /  DBili  x   /  AST  19  /  ALT  18  /  AlkPhos  102  05-17                       RADIOLOGY & ADDITIONAL TESTS         I personally reviewed: [  ]EKG   [  ]CXR    [  ] CT      A/P:         Discussed with :     [facility] consultants' Notes   Time spent :   Date of service: 05-18-24 @ 22:33      Patient is a 78y old  Female who presents with a chief complaint of increased ostomy output (18 May 2024 22:13)                                                               INTERVAL HPI/OVERNIGHT EVENTS:    REVIEW OF SYSTEMS:     CONSTITUTIONAL: No weakness, fevers or chills  EYES/ENT: No visual changes , no ear ache   NECK: No pain or stiffness  RESPIRATORY: No cough, wheezing,  No shortness of breath  CARDIOVASCULAR: No chest pain or palpitations  GASTROINTESTINAL: No abdominal pain  . No nausea, vomiting, or hematemesis; No diarrhea or constipation. No melena or hematochezia.  GENITOURINARY: No dysuria, frequency or hematuria  NEUROLOGICAL: No numbness or weakness  SKIN: No itching, burning, rashes, or lesions                                                                                                                                                                                                                                                                                 Medications:  MEDICATIONS  (STANDING):  apixaban 5 milliGRAM(s) Oral every 12 hours  atorvastatin 80 milliGRAM(s) Oral at bedtime  clopidogrel Tablet 75 milliGRAM(s) Oral daily  digoxin     Tablet 250 MICROGram(s) Oral daily  levothyroxine 175 MICROGram(s) Oral daily  loperamide 4 milliGRAM(s) Oral three times a day  metoprolol tartrate 125 milliGRAM(s) Oral two times a day  midodrine. 5 milliGRAM(s) Oral three times a day  nicotine -  14 mG/24Hr(s) Patch 1 Patch Transdermal every 24 hours  psyllium Powder 1 Packet(s) Oral daily  sodium chloride 0.9%. 1000 milliLiter(s) (75 mL/Hr) IV Continuous <Continuous>    MEDICATIONS  (PRN):  acetaminophen     Tablet .. 650 milliGRAM(s) Oral every 6 hours PRN Temp greater or equal to 38C (100.4F), Mild Pain (1 - 3)  aluminum hydroxide/magnesium hydroxide/simethicone Suspension 30 milliLiter(s) Oral every 4 hours PRN Dyspepsia  melatonin 3 milliGRAM(s) Oral at bedtime PRN Insomnia  ondansetron Injectable 4 milliGRAM(s) IV Push every 8 hours PRN Nausea and/or Vomiting       Allergies    penicillin (Hives)    Intolerances      Vital Signs Last 24 Hrs  T(C): 36.6 (18 May 2024 19:21), Max: 36.9 (18 May 2024 06:01)  T(F): 97.8 (18 May 2024 19:21), Max: 98.4 (18 May 2024 06:01)  HR: 98 (18 May 2024 19:21) (76 - 98)  BP: 110/73 (18 May 2024 19:21) (102/68 - 131/82)  BP(mean): --  RR: 18 (18 May 2024 19:21) (18 - 18)  SpO2: 96% (18 May 2024 19:21) (96% - 99%)    Parameters below as of 18 May 2024 19:21  Patient On (Oxygen Delivery Method): room air      CAPILLARY BLOOD GLUCOSE          05-17 @ 07:01  -  05-18 @ 07:00  --------------------------------------------------------  IN: 720 mL / OUT: 851 mL / NET: -131 mL    05-18 @ 07:01  -  05-18 @ 22:33  --------------------------------------------------------  IN: 240 mL / OUT: 150 mL / NET: 90 mL      Physical Exam:    Daily     Daily   General:  Well appearing, NAD, not cachetic  HEENT:  Nonicteric, PERRLA  CV:  RRR, S1S2   Lungs:  CTA B/L, no wheezes, rales, rhonchi  Abdomen:  Soft, ostomy   Extremities: no edema                11.7   10.34 )-----------( 284      ( 17 May 2024 07:16 )             36.6                      05-18    138  |  108  |  12  ----------------------------<  66<L>  4.7   |  18<L>  |  1.06    Ca    8.8      18 May 2024 07:28    TPro  6.7  /  Alb  3.3  /  TBili  0.6  /  DBili  x   /  AST  19  /  ALT  18  /  AlkPhos  102  05-17                       RADIOLOGY & ADDITIONAL TESTS         I personally reviewed: [  ]EKG   [  ]CXR    [  ] CT      A/P:         Discussed with :     [facility] consultants' Notes   Time spent :

## 2024-05-19 ENCOUNTER — TRANSCRIPTION ENCOUNTER (OUTPATIENT)
Age: 79
End: 2024-05-19

## 2024-05-19 LAB — CORTIS AM PEAK SERPL-MCNC: 7.7 UG/DL — SIGNIFICANT CHANGE UP (ref 6–18.4)

## 2024-05-19 RX ORDER — NYSTATIN CREAM 100000 [USP'U]/G
1 CREAM TOPICAL
Refills: 0 | Status: DISCONTINUED | OUTPATIENT
Start: 2024-05-19 | End: 2024-05-20

## 2024-05-19 RX ADMIN — MIDODRINE HYDROCHLORIDE 5 MILLIGRAM(S): 2.5 TABLET ORAL at 11:48

## 2024-05-19 RX ADMIN — Medication 1 PACKET(S): at 11:48

## 2024-05-19 RX ADMIN — APIXABAN 5 MILLIGRAM(S): 2.5 TABLET, FILM COATED ORAL at 05:44

## 2024-05-19 RX ADMIN — Medication 125 MILLIGRAM(S): at 05:43

## 2024-05-19 RX ADMIN — Medication 1 PATCH: at 05:44

## 2024-05-19 RX ADMIN — Medication 1 PATCH: at 05:30

## 2024-05-19 RX ADMIN — ATORVASTATIN CALCIUM 80 MILLIGRAM(S): 80 TABLET, FILM COATED ORAL at 21:53

## 2024-05-19 RX ADMIN — Medication 4 MILLIGRAM(S): at 05:44

## 2024-05-19 RX ADMIN — Medication 125 MILLIGRAM(S): at 17:10

## 2024-05-19 RX ADMIN — NYSTATIN CREAM 1 APPLICATION(S): 100000 CREAM TOPICAL at 17:10

## 2024-05-19 RX ADMIN — MIDODRINE HYDROCHLORIDE 5 MILLIGRAM(S): 2.5 TABLET ORAL at 05:44

## 2024-05-19 RX ADMIN — Medication 250 MICROGRAM(S): at 05:43

## 2024-05-19 RX ADMIN — Medication 1 PATCH: at 19:17

## 2024-05-19 RX ADMIN — CLOPIDOGREL BISULFATE 75 MILLIGRAM(S): 75 TABLET, FILM COATED ORAL at 11:48

## 2024-05-19 RX ADMIN — MIDODRINE HYDROCHLORIDE 5 MILLIGRAM(S): 2.5 TABLET ORAL at 17:09

## 2024-05-19 RX ADMIN — APIXABAN 5 MILLIGRAM(S): 2.5 TABLET, FILM COATED ORAL at 17:09

## 2024-05-19 RX ADMIN — Medication 4 MILLIGRAM(S): at 13:08

## 2024-05-19 RX ADMIN — Medication 1 PATCH: at 09:59

## 2024-05-19 RX ADMIN — Medication 175 MICROGRAM(S): at 05:43

## 2024-05-19 RX ADMIN — Medication 3 MILLIGRAM(S): at 22:50

## 2024-05-19 RX ADMIN — Medication 4 MILLIGRAM(S): at 21:54

## 2024-05-19 NOTE — PROGRESS NOTE ADULT - SUBJECTIVE AND OBJECTIVE BOX
Date of service: 05-19-24 @ 19:57      Patient is a 78y old  Female who presents with a chief complaint of increased ostomy output (19 May 2024 19:44)                                                               INTERVAL HPI/OVERNIGHT EVENTS:    REVIEW OF SYSTEMS:     CONSTITUTIONAL: No weakness, fevers or chills  EYES/ENT: No visual changes , no ear ache   NECK: No pain or stiffness  RESPIRATORY: No cough, wheezing,  No shortness of breath  CARDIOVASCULAR: No chest pain or palpitations  GASTROINTESTINAL: No abdominal pain  . No nausea, vomiting, or hematemesis; No diarrhea or constipation. No melena or hematochezia.  GENITOURINARY: No dysuria, frequency or hematuria  NEUROLOGICAL: No numbness or weakness  SKIN: No itching, burning, rashes, or lesions                                                                                                                                                                                                                                                                                 Medications:  MEDICATIONS  (STANDING):  apixaban 5 milliGRAM(s) Oral every 12 hours  atorvastatin 80 milliGRAM(s) Oral at bedtime  clopidogrel Tablet 75 milliGRAM(s) Oral daily  digoxin     Tablet 250 MICROGram(s) Oral daily  levothyroxine 175 MICROGram(s) Oral daily  loperamide 4 milliGRAM(s) Oral three times a day  metoprolol tartrate 125 milliGRAM(s) Oral two times a day  midodrine. 5 milliGRAM(s) Oral three times a day  nicotine -  14 mG/24Hr(s) Patch 1 Patch Transdermal every 24 hours  nystatin Powder 1 Application(s) Topical two times a day  psyllium Powder 1 Packet(s) Oral daily  sodium chloride 0.9%. 1000 milliLiter(s) (75 mL/Hr) IV Continuous <Continuous>    MEDICATIONS  (PRN):  acetaminophen     Tablet .. 650 milliGRAM(s) Oral every 6 hours PRN Temp greater or equal to 38C (100.4F), Mild Pain (1 - 3)  aluminum hydroxide/magnesium hydroxide/simethicone Suspension 30 milliLiter(s) Oral every 4 hours PRN Dyspepsia  melatonin 3 milliGRAM(s) Oral at bedtime PRN Insomnia  ondansetron Injectable 4 milliGRAM(s) IV Push every 8 hours PRN Nausea and/or Vomiting       Allergies    penicillin (Hives)    Intolerances      Vital Signs Last 24 Hrs  T(C): 36.7 (19 May 2024 16:40), Max: 36.8 (18 May 2024 23:26)  T(F): 98.1 (19 May 2024 16:40), Max: 98.3 (18 May 2024 23:26)  HR: 73 (19 May 2024 16:40) (73 - 95)  BP: 116/60 (19 May 2024 16:40) (105/72 - 125/79)  BP(mean): --  RR: 18 (19 May 2024 16:40) (18 - 18)  SpO2: 98% (19 May 2024 16:40) (97% - 98%)    Parameters below as of 19 May 2024 16:40  Patient On (Oxygen Delivery Method): room air      CAPILLARY BLOOD GLUCOSE          05-18 @ 07:01  -  05-19 @ 07:00  --------------------------------------------------------  IN: 240 mL / OUT: 750 mL / NET: -510 mL    05-19 @ 07:01  -  05-19 @ 19:57  --------------------------------------------------------  IN: 620 mL / OUT: 200 mL / NET: 420 mL      Physical Exam:    Daily     Daily   General:  Well appearing, NAD, not cachetic  HEENT:  Nonicteric, PERRLA  CV:  RRR, S1S2   Lungs:  CTA B/L, no wheezes, rales, rhonchi  Abdomen:  Soft, non-tender, no distended, positive BS  Extremities:  2+ pulses, no c/c, no edema  Skin:  Warm and dry, no rashes  :  No amaya  Neuro:  AAOx3, non-focal, grossly intact                                                                                                                                                                                                                                                                                                LABS:                            05-18    138  |  108  |  12  ----------------------------<  66<L>  4.7   |  18<L>  |  1.06    Ca    8.8      18 May 2024 07:28                         RADIOLOGY & ADDITIONAL TESTS         I personally reviewed: [  ]EKG   [  ]CXR    [  ] CT      A/P:         Discussed with :     [facility] consultants' Notes   Time spent :   Date of service: 05-19-24 @ 19:57      Patient is a 78y old  Female who presents with a chief complaint of increased ostomy output (19 May 2024 19:44)                                                               INTERVAL HPI/OVERNIGHT EVENTS:    REVIEW OF SYSTEMS:     CONSTITUTIONAL: No weakness, fevers or chills  EYES/ENT: No visual changes , no ear ache   NECK: No pain or stiffness  RESPIRATORY: No cough, wheezing,  No shortness of breath  CARDIOVASCULAR: No chest pain or palpitations  GASTROINTESTINAL: No abdominal pain  .increased  output   GENITOURINARY: No dysuria, frequency or hematuria  NEUROLOGICAL: No numbness or weakness  SKIN: No itching, burning, rashes, or lesions                                                                                                                                                                                                                                                                                 Medications:  MEDICATIONS  (STANDING):  apixaban 5 milliGRAM(s) Oral every 12 hours  atorvastatin 80 milliGRAM(s) Oral at bedtime  clopidogrel Tablet 75 milliGRAM(s) Oral daily  digoxin     Tablet 250 MICROGram(s) Oral daily  levothyroxine 175 MICROGram(s) Oral daily  loperamide 4 milliGRAM(s) Oral three times a day  metoprolol tartrate 125 milliGRAM(s) Oral two times a day  midodrine. 5 milliGRAM(s) Oral three times a day  nicotine -  14 mG/24Hr(s) Patch 1 Patch Transdermal every 24 hours  nystatin Powder 1 Application(s) Topical two times a day  psyllium Powder 1 Packet(s) Oral daily  sodium chloride 0.9%. 1000 milliLiter(s) (75 mL/Hr) IV Continuous <Continuous>    MEDICATIONS  (PRN):  acetaminophen     Tablet .. 650 milliGRAM(s) Oral every 6 hours PRN Temp greater or equal to 38C (100.4F), Mild Pain (1 - 3)  aluminum hydroxide/magnesium hydroxide/simethicone Suspension 30 milliLiter(s) Oral every 4 hours PRN Dyspepsia  melatonin 3 milliGRAM(s) Oral at bedtime PRN Insomnia  ondansetron Injectable 4 milliGRAM(s) IV Push every 8 hours PRN Nausea and/or Vomiting       Allergies    penicillin (Hives)    Intolerances      Vital Signs Last 24 Hrs  T(C): 36.7 (19 May 2024 16:40), Max: 36.8 (18 May 2024 23:26)  T(F): 98.1 (19 May 2024 16:40), Max: 98.3 (18 May 2024 23:26)  HR: 73 (19 May 2024 16:40) (73 - 95)  BP: 116/60 (19 May 2024 16:40) (105/72 - 125/79)  BP(mean): --  RR: 18 (19 May 2024 16:40) (18 - 18)  SpO2: 98% (19 May 2024 16:40) (97% - 98%)    Parameters below as of 19 May 2024 16:40  Patient On (Oxygen Delivery Method): room air      CAPILLARY BLOOD GLUCOSE          05-18 @ 07:01  -  05-19 @ 07:00  --------------------------------------------------------  IN: 240 mL / OUT: 750 mL / NET: -510 mL    05-19 @ 07:01  - 05-19 @ 19:57  --------------------------------------------------------  IN: 620 mL / OUT: 200 mL / NET: 420 mL      Physical Exam:    Daily     Daily   General:  Well appearing, NAD, not cachetic  HEENT:  Nonicteric, PERRLA  CV:  RRR, S1S2   Lungs:  CTA B/L, no wheezes, rales, rhonchi  Abdomen:  Soft, ostomy in place   more formed   Extremities:  2+ pulses, no c/c, no edema  Skin:  Warm and dry, no rashes  :  No amaya  Neuro:  AAOx3, non-focal, grossly intact                                                                                                                                                                                                                                                                                                LABS:                            05-18    138  |  108  |  12  ----------------------------<  66<L>  4.7   |  18<L>  |  1.06    Ca    8.8      18 May 2024 07:28                         RADIOLOGY & ADDITIONAL TESTS         I personally reviewed: [  ]EKG   [  ]CXR    [  ] CT      A/P:         Discussed with :     [facility] consultants' Notes   Time spent :

## 2024-05-19 NOTE — PROGRESS NOTE ADULT - ASSESSMENT
78 year old presenting with increased ostomy output    1. Ostomy output elevated  stool studies negative  Loperamide TID  no GI objection to hospital d/c, output is <1L per day   Monitor ostomy output daily please   surgery input appreciated  yeast like cells cultured, not likely of clinical significance    2. SKin breakdown  ostomy nurse and wound care evals    3. COPD

## 2024-05-19 NOTE — DISCHARGE NOTE PROVIDER - CARE PROVIDER_API CALL
Dagoberto Obrien  Internal Medicine  [address], [address]  [place] [address]  Phone: [phone]  Fax: [phone]  Established Patient  Follow Up Time: 2 weeks

## 2024-05-19 NOTE — DISCHARGE NOTE PROVIDER - DETAILS OF MALNUTRITION DIAGNOSIS/DIAGNOSES
This patient has been assessed with a concern for Malnutrition and was treated during this hospitalization for the following Nutrition diagnosis/diagnoses:     -  05/17/2024: Severe protein-calorie malnutrition

## 2024-05-19 NOTE — PROGRESS NOTE ADULT - SUBJECTIVE AND OBJECTIVE BOX
Subjective: Patient seen and examined. No new events except as noted.     REVIEW OF SYSTEMS:    CONSTITUTIONAL:+ weakness, fevers or chills  EYES/ENT: No visual changes;  No vertigo or throat pain   NECK: No pain or stiffness  RESPIRATORY: No cough, wheezing, hemoptysis; No shortness of breath  CARDIOVASCULAR: No chest pain or palpitations  GASTROINTESTINAL: No abdominal or epigastric pain. No nausea, vomiting, or hematemesis; No diarrhea or constipation. No melena or hematochezia.  GENITOURINARY: No dysuria, frequency or hematuria  NEUROLOGICAL: No numbness or weakness  SKIN: No itching, burning, rashes, or lesions   All other review of systems is negative unless indicated above.    MEDICATIONS:  MEDICATIONS  (STANDING):  apixaban 5 milliGRAM(s) Oral every 12 hours  atorvastatin 80 milliGRAM(s) Oral at bedtime  clopidogrel Tablet 75 milliGRAM(s) Oral daily  digoxin     Tablet 250 MICROGram(s) Oral daily  levothyroxine 175 MICROGram(s) Oral daily  loperamide 4 milliGRAM(s) Oral three times a day  metoprolol tartrate 125 milliGRAM(s) Oral two times a day  midodrine. 5 milliGRAM(s) Oral three times a day  nicotine -  14 mG/24Hr(s) Patch 1 Patch Transdermal every 24 hours  psyllium Powder 1 Packet(s) Oral daily  sodium chloride 0.9%. 1000 milliLiter(s) (75 mL/Hr) IV Continuous <Continuous>      PHYSICAL EXAM:  T(C): 36.8 (05-19-24 @ 04:44), Max: 36.8 (05-18-24 @ 23:26)  HR: 95 (05-19-24 @ 04:44) (73 - 98)  BP: 119/82 (05-19-24 @ 04:44) (102/68 - 125/79)  RR: 18 (05-19-24 @ 04:44) (18 - 18)  SpO2: 97% (05-19-24 @ 04:44) (96% - 99%)  Wt(kg): --  I&O's Summary    18 May 2024 07:01  -  19 May 2024 07:00  --------------------------------------------------------  IN: 240 mL / OUT: 750 mL / NET: -510 mL          Appearance: NAD	  HEENT:  Dry  oral mucosa, PERRL, EOMI	  Lymphatic: No lymphadenopathy  Cardiovascular: Irregular  S1 S2, No JVD, No murmurs, No edema  Respiratory: decreased bs   Psychiatry: A & O x 3, Mood & affect appropriate  Gastrointestinal:   Soft, Nondistended, tender to soft palpation. Skin macerated with nonblanching erythema with soilage from the ileostomy site spilling onto the abdomen. Ileostomy pink/healthy, slightly retracted.     Skin: No rashes, No ecchymoses, No cyanosis	  Neurologic: Non-focal  Extremities: Normal range of motion, No clubbing, cyanosis or edema  Vascular: Peripheral pulses palpable 2+ bilaterally      LABS:    CARDIAC MARKERS:            05-18    138  |  108  |  12  ----------------------------<  66<L>  4.7   |  18<L>  |  1.06    Ca    8.8      18 May 2024 07:28        TELEMETRY: 	    ECG:  	  RADIOLOGY:   DIAGNOSTIC TESTING:  [ ] Echocardiogram:  [ ]  Catheterization:  [ ] Stress Test:    OTHER:

## 2024-05-19 NOTE — DISCHARGE NOTE PROVIDER - NSDCCPCAREPLAN_GEN_ALL_CORE_FT
PRINCIPAL DISCHARGE DIAGNOSIS  Diagnosis: Increased ileostomy output  Assessment and Plan of Treatment: You were seen by GI. Dressing changed.   Monitor output daily.      SECONDARY DISCHARGE DIAGNOSES  Diagnosis: Longstanding persistent atrial fibrillation  Assessment and Plan of Treatment: Atrial fibrillation is a common heart rhythm problem which increases the risk of stroke and heat attack.  It helps if you control your blood pressure, avoid alcohol, cut down on caffeine, get treatment for your thyroid if it is overactive, and perform moderate exercise in consultation with your [facility] Provider.  Call your doctor if you experience chest tightness/pain, lightheadedness, loss of consciousness, shortness of breath (especially with exercise), feel your heart racing or beating unusually, frequent or abnormal bleeding.  It is important to take all your heart medications as prescribed.

## 2024-05-19 NOTE — DISCHARGE NOTE PROVIDER - NSDCMRMEDTOKEN_GEN_ALL_CORE_FT
apixaban 5 mg oral tablet: 1 tab(s) orally every 12 hours  atorvastatin 80 mg oral tablet: 1 tab(s) orally once a day (at bedtime)  clopidogrel 75 mg oral tablet: 1 tab(s) orally once a day  digoxin 250 mcg (0.25 mg) oral tablet: 1 tab(s) orally once a day  levothyroxine 175 mcg (0.175 mg) oral tablet: 1 tab(s) orally once a day  metoprolol tartrate 25 mg oral tablet: 5 tab(s) orally 2 times a day  midodrine 5 mg oral tablet: 1 tab(s) orally 3 times a day  nicotine 14 mg/24 hr transdermal film, extended release: 1 patch transdermal every 24 hours   apixaban 5 mg oral tablet: 1 tab(s) orally every 12 hours  atorvastatin 80 mg oral tablet: 1 tab(s) orally once a day (at bedtime)  clopidogrel 75 mg oral tablet: 1 tab(s) orally once a day  digoxin 250 mcg (0.25 mg) oral tablet: 1 tab(s) orally once a day  levothyroxine 175 mcg (0.175 mg) oral tablet: 1 tab(s) orally once a day  loperamide 2 mg oral capsule: 2 cap(s) orally 3 times a day  metoprolol tartrate 25 mg oral tablet: 5 tab(s) orally 2 times a day  midodrine 5 mg oral tablet: 1 tab(s) orally 3 times a day  nicotine 14 mg/24 hr transdermal film, extended release: 1 patch transdermal every 24 hours   acetaminophen 325 mg oral tablet: 2 tab(s) orally every 6 hours As needed Temp greater or equal to 38C (100.4F), Mild Pain (1 - 3)  aluminum hydroxide-magnesium hydroxide 200 mg-200 mg/5 mL oral suspension: 30 milliliter(s) orally every 4 hours As needed Dyspepsia  apixaban 5 mg oral tablet: 1 tab(s) orally every 12 hours  atorvastatin 80 mg oral tablet: 1 tab(s) orally once a day (at bedtime)  clopidogrel 75 mg oral tablet: 1 tab(s) orally once a day  digoxin 250 mcg (0.25 mg) oral tablet: 1 tab(s) orally once a day  levothyroxine 175 mcg (0.175 mg) oral tablet: 1 tab(s) orally once a day  loperamide 2 mg oral capsule: 2 cap(s) orally 3 times a day  melatonin 3 mg oral tablet: 1 tab(s) orally once a day (at bedtime) As needed Insomnia  metoprolol tartrate 25 mg oral tablet: 5 tab(s) orally 2 times a day  midodrine 5 mg oral tablet: 1 tab(s) orally 3 times a day  nicotine 14 mg/24 hr transdermal film, extended release: 1 patch transdermal every 24 hours  nystatin 100,000 units/g topical powder: 1 Apply topically to affected area 2 times a day  psyllium 3.4 g/11 g oral powder for reconstitution: 1 packet(s) orally once a day

## 2024-05-19 NOTE — DISCHARGE NOTE PROVIDER - HOSPITAL COURSE
79yo 73kg f, smoker, w pmh mci/dementia, [place], htn, hld, afib, cad c/b mi s/p pci w stents, copd, little, urinary incontinence, recurrent uti, hypothyroidism, oa s/p l tkr + l hip orif, and w recent hospitalization 2/16-3/9 for ischemic bowel s/p ex-lap w bowel resection + end ileostomy, 4/1-4/3 for drainage from incision site 2/2 hematoma in the laparotomy wound s/p conservative mgmt, 4/28-5/6 for afib w rvr iso urosepsis, p/w increased ostomy output; in er, found to ostomy output w spillage onto surrounding skin and resulting in macerated/erythematous skin; admit to medicine for further mgmt.      Increased ileostomy output. recent hospitalization for ischemic bowel s/p ex-lap w bowel resection + end ileostomy, and additionally hospitalized for drainage from incision site 2/2 hematoma in the laparotomy wound managed  conservatively. CT no emergent findings. Monitor ostomy output serial abdominal exams, serial abdominal imaging as needed,  supportive care with analgesics, antiemetics, antipyretics, probiotics. PAtient also has sevaeral chronic conditions such as: Longstanding persistent atrial fibrillation, Coronary artery disease, HTN, HLD, COPD and hypothyroid, Continued with home medications. Patient condition being manage by Endocrine, GI, and cards.              HPI:  77yo 73kg f, smoker, w pmh mci/dementia, [place], htn, hld, afib, cad c/b mi s/p pci w stents, copd, little, urinary incontinence, recurrent uti, hypothyroidism, oa s/p l tkr + l hip orif, and w recent hospitalization 2/16-3/9 for ischemic bowel s/p ex-lap w bowel resection + end ileostomy, 4/1-4/3 for drainage from incision site 2/2 hematoma in the laparotomy wound s/p conservative mgmt, 4/28-5/6 for afib w rvr iso urosepsis, p/w increased ostomy output; in er, found to ostomy output w spillage onto surrounding skin and resulting in macerated/erythematous skin; admit to medicine for further mgmt (16 May 2024 02:26)    Hospital Course:  79 y/o F w/ PMHx as listed above admitted for increased ileostomy output. Had hx of recent hospitalization for ischemic bowel s/p ex-lap w bowel resection + end ileostomy, and additionally hospitalized for drainage from incision site 2/2 hematoma in the laparotomy wound managed  conservatively. GI and surgery consulted.   CT w/ no emergent findings. Small area of fat stranding /inflammatory changes in the periumbilical region likely reflective of postsurgical changes, as are overall similar compared to previous CT.   Stool studies negative. Pt was started on Loperamide and psyllium powder. Ostomy care provided by wound team. Yeast like cells cultured, not likely of clinical significance. Ostomy output was monitored daily, now w/ improvement. Output <1L per day.   Pt now medically stable to be discharged to [facility]. Discharge and med rec discussed with attending Dr. Duran.     Important Medication Changes and Reason:  - c/w Loperamide and Psyllium    Active or Pending Issues Requiring Follow-up:    Advanced Directives:   [x ] Full code  [ ] DNR  [ ] Hospice    Discharge Diagnoses:  Increased ileostomy output

## 2024-05-19 NOTE — PROGRESS NOTE ADULT - ASSESSMENT
79yo 73kg f, smoker, w pmh mci/dementia, [place], htn, hld, afib, cad c/b mi s/p pci w stents, copd, little, urinary incontinence, recurrent uti, hypothyroidism, oa s/p l tkr + l hip orif, and w recent hospitalization 2/16-3/9 for ischemic bowel s/p ex-lap w bowel resection + end ileostomy, 4/1-4/3 for drainage from incision site 2/2 hematoma in the laparotomy wound s/p conservative mgmt, 4/28-5/6 for afib w rvr iso urosepsis, p/w increased ostomy output; in er, found to ostomy output w spillage onto surrounding skin and resulting in macerated/erythematous skin;

## 2024-05-19 NOTE — PROGRESS NOTE ADULT - ASSESSMENT
77yo 73kg f, smoker, w pmh mci/dementia, [place], htn, hld, afib, cad c/b mi s/p pci w stents, copd, little, urinary incontinence, recurrent uti, hypothyroidism, oa s/p l tkr + l hip orif, and w recent hospitalization 2/16-3/9 for ischemic bowel s/p ex-lap w bowel resection + end ileostomy, 4/1-4/3 for drainage from incision site 2/2 hematoma in the laparotomy wound s/p conservative mgmt, 4/28-5/6 for afib w rvr iso urosepsis, p/w increased ostomy output; in er, found to ostomy output w spillage onto surrounding skin and resulting in macerated/erythematous skin; admit to medicine for further mgmt      Problem/Plan - 1:  ·  Problem: Increased ileostomy output.   ·  Plan: recent hospitalization 2/16-3/9 for ischemic bowel s/p ex-lap w bowel resection + end ileostomy, 4/1-4/3 for drainage from incision site 2/2 hematoma in the laparotomy wound s/p conservative mgmt  imaging currently showing, "no emergent findings... Small area of fat stranding /inflammatory changes in the periumbilical region likely reflective of postsurgical changes, as are overall similar compared to previous CT"  monitor Op   cont lopermide and if op eelvated will add opium tincture     Problem/Plan - 2:  ·  Problem: Longstanding persistent atrial fibrillation.   ·  Plan: previous recent hospitalizations complicated by multiple episodes of afib rvr, requiring lopressor ivp + digoxin ivp + amiodarone load + diltiazem ivp  no ekg done in er, but appears to be rate controlled  chadsvasc ~5   cont home eliquis  cont home lopressor + digoxin.    Problem/Plan - 3:  ·  Problem: CAD (coronary artery disease).   ·  Plan: h/o cad c/b mi s/p pci w stents  cont med s    Problem/Plan - 4:  ·  Problem: HTN (hypertension).   ·  Plan: lopressor.    Problem/Plan - 5:  ·  Problem: adrenal mass : will consult endo     Problem/Plan - 6:  ·  Problem: Chronic obstructive pulmonary disease (COPD).   ·  Plan: h/o smoking, copd, ilttle  stable     Problem/Plan - 7:  ·  Problem: Hypothyroidism.   ·  Plan: synthroid.    FULL code .     d/w daughter at length  d/w Nikko buckner;[place] ion 24 hours  79yo 73kg f, smoker, w pmh mci/dementia, [place], htn, hld, afib, cad c/b mi s/p pci w stents, copd, little, urinary incontinence, recurrent uti, hypothyroidism, oa s/p l tkr + l hip orif, and w recent hospitalization 2/16-3/9 for ischemic bowel s/p ex-lap w bowel resection + end ileostomy, 4/1-4/3 for drainage from incision site 2/2 hematoma in the laparotomy wound s/p conservative mgmt, 4/28-5/6 for afib w rvr iso urosepsis, p/w increased ostomy output; in er, found to ostomy output w spillage onto surrounding skin and resulting in macerated/erythematous skin; admit to medicine for further mgmt      Problem/Plan - 1:  ·  Problem: Increased ileostomy output.   ·  Plan: recent hospitalization 2/16-3/9 for ischemic bowel s/p ex-lap w bowel resection + end ileostomy, 4/1-4/3 for drainage from incision site 2/2 hematoma in the laparotomy wound s/p conservative mgmt  imaging currently showing, "no emergent findings... Small area of fat stranding /inflammatory changes in the periumbilical region likely reflective of postsurgical changes, as are overall similar compared to previous CT"  monitor Op   cont lopermide and psylium : still with increased op   however improved consstency     Problem/Plan - 2:  ·  Problem: Longstanding persistent atrial fibrillation.   ·  Plan: previous recent hospitalizations complicated by multiple episodes of afib rvr, requiring lopressor ivp + digoxin ivp + amiodarone load + diltiazem ivp  no ekg done in er, but appears to be rate controlled  chadsvasc ~5   cont home eliquis  cont home lopressor + digoxin.    Problem/Plan - 3:  ·  Problem: CAD (coronary artery disease).   ·  Plan: h/o cad c/b mi s/p pci w stents  cont med s    Problem/Plan - 4:  ·  Problem: HTN (hypertension).   ·  Plan: lopressor.    Problem/Plan - 5:  ·  Problem: adrenal mass : will consult endo : Redemonstrated 4 cm indeterminant left adrenal mass stable from previous exams/scans, pt asymptomatic, hemodynamics stable   mass seen dating as far back as 2022 as per scans. Unlikely hormone secreting, labs pending.     Problem/Plan - 6:  ·  Problem: Chronic obstructive pulmonary disease (COPD).   ·  Plan: h/o smoking, copd, little  stable     Problem/Plan - 7:  ·  Problem: Hypothyroidism.   ·  Plan: synthroid.    FULL code .     d/w daughter at length  d/w Nikko buckner;[place] ion 24 hours

## 2024-05-20 ENCOUNTER — TRANSCRIPTION ENCOUNTER (OUTPATIENT)
Age: 79
End: 2024-05-20

## 2024-05-20 VITALS
RESPIRATION RATE: 18 BRPM | HEART RATE: 74 BPM | SYSTOLIC BLOOD PRESSURE: 128 MMHG | OXYGEN SATURATION: 98 % | TEMPERATURE: 98 F | DIASTOLIC BLOOD PRESSURE: 69 MMHG

## 2024-05-20 LAB
ALDOST SERPL-MCNC: 7.3 NG/DL — SIGNIFICANT CHANGE UP
CULTURE RESULTS: SIGNIFICANT CHANGE UP
CULTURE RESULTS: SIGNIFICANT CHANGE UP
SPECIMEN SOURCE: SIGNIFICANT CHANGE UP
SPECIMEN SOURCE: SIGNIFICANT CHANGE UP

## 2024-05-20 PROCEDURE — 85610 PROTHROMBIN TIME: CPT

## 2024-05-20 PROCEDURE — 80061 LIPID PANEL: CPT

## 2024-05-20 PROCEDURE — 87045 FECES CULTURE AEROBIC BACT: CPT

## 2024-05-20 PROCEDURE — 84132 ASSAY OF SERUM POTASSIUM: CPT

## 2024-05-20 PROCEDURE — 87449 NOS EACH ORGANISM AG IA: CPT

## 2024-05-20 PROCEDURE — 83835 ASSAY OF METANEPHRINES: CPT

## 2024-05-20 PROCEDURE — 80048 BASIC METABOLIC PNL TOTAL CA: CPT

## 2024-05-20 PROCEDURE — 80053 COMPREHEN METABOLIC PANEL: CPT

## 2024-05-20 PROCEDURE — 82533 TOTAL CORTISOL: CPT

## 2024-05-20 PROCEDURE — 82435 ASSAY OF BLOOD CHLORIDE: CPT

## 2024-05-20 PROCEDURE — 74177 CT ABD & PELVIS W/CONTRAST: CPT | Mod: MC

## 2024-05-20 PROCEDURE — 83605 ASSAY OF LACTIC ACID: CPT

## 2024-05-20 PROCEDURE — 96374 THER/PROPH/DIAG INJ IV PUSH: CPT

## 2024-05-20 PROCEDURE — 84100 ASSAY OF PHOSPHORUS: CPT

## 2024-05-20 PROCEDURE — 36415 COLL VENOUS BLD VENIPUNCTURE: CPT

## 2024-05-20 PROCEDURE — 82330 ASSAY OF CALCIUM: CPT

## 2024-05-20 PROCEDURE — 84443 ASSAY THYROID STIM HORMONE: CPT

## 2024-05-20 PROCEDURE — 83735 ASSAY OF MAGNESIUM: CPT

## 2024-05-20 PROCEDURE — 84295 ASSAY OF SERUM SODIUM: CPT

## 2024-05-20 PROCEDURE — 85027 COMPLETE CBC AUTOMATED: CPT

## 2024-05-20 PROCEDURE — 85014 HEMATOCRIT: CPT

## 2024-05-20 PROCEDURE — 85730 THROMBOPLASTIN TIME PARTIAL: CPT

## 2024-05-20 PROCEDURE — 83036 HEMOGLOBIN GLYCOSYLATED A1C: CPT

## 2024-05-20 PROCEDURE — 87324 CLOSTRIDIUM AG IA: CPT

## 2024-05-20 PROCEDURE — 82803 BLOOD GASES ANY COMBINATION: CPT

## 2024-05-20 PROCEDURE — 87040 BLOOD CULTURE FOR BACTERIA: CPT

## 2024-05-20 PROCEDURE — 85018 HEMOGLOBIN: CPT

## 2024-05-20 PROCEDURE — 99285 EMERGENCY DEPT VISIT HI MDM: CPT | Mod: 25

## 2024-05-20 PROCEDURE — 85025 COMPLETE CBC W/AUTO DIFF WBC: CPT

## 2024-05-20 PROCEDURE — 87507 IADNA-DNA/RNA PROBE TQ 12-25: CPT

## 2024-05-20 PROCEDURE — 82088 ASSAY OF ALDOSTERONE: CPT

## 2024-05-20 PROCEDURE — 82947 ASSAY GLUCOSE BLOOD QUANT: CPT

## 2024-05-20 PROCEDURE — 97166 OT EVAL MOD COMPLEX 45 MIN: CPT

## 2024-05-20 PROCEDURE — 87046 STOOL CULTR AEROBIC BACT EA: CPT

## 2024-05-20 PROCEDURE — 84439 ASSAY OF FREE THYROXINE: CPT

## 2024-05-20 PROCEDURE — 97161 PT EVAL LOW COMPLEX 20 MIN: CPT

## 2024-05-20 RX ORDER — NYSTATIN CREAM 100000 [USP'U]/G
1 CREAM TOPICAL
Qty: 0 | Refills: 0 | DISCHARGE
Start: 2024-05-20

## 2024-05-20 RX ORDER — ACETAMINOPHEN 500 MG
2 TABLET ORAL
Qty: 0 | Refills: 0 | DISCHARGE
Start: 2024-05-20

## 2024-05-20 RX ORDER — LANOLIN ALCOHOL/MO/W.PET/CERES
1 CREAM (GRAM) TOPICAL
Qty: 0 | Refills: 0 | DISCHARGE
Start: 2024-05-20

## 2024-05-20 RX ORDER — PSYLLIUM SEED (WITH DEXTROSE)
1 POWDER (GRAM) ORAL
Qty: 0 | Refills: 0 | DISCHARGE
Start: 2024-05-20

## 2024-05-20 RX ORDER — LOPERAMIDE HCL 2 MG
2 TABLET ORAL
Qty: 0 | Refills: 0 | DISCHARGE
Start: 2024-05-20

## 2024-05-20 RX ADMIN — Medication 175 MICROGRAM(S): at 05:23

## 2024-05-20 RX ADMIN — APIXABAN 5 MILLIGRAM(S): 2.5 TABLET, FILM COATED ORAL at 05:23

## 2024-05-20 RX ADMIN — Medication 250 MICROGRAM(S): at 05:23

## 2024-05-20 RX ADMIN — CLOPIDOGREL BISULFATE 75 MILLIGRAM(S): 75 TABLET, FILM COATED ORAL at 12:00

## 2024-05-20 RX ADMIN — Medication 1 PATCH: at 03:20

## 2024-05-20 RX ADMIN — Medication 1 PATCH: at 07:08

## 2024-05-20 RX ADMIN — NYSTATIN CREAM 1 APPLICATION(S): 100000 CREAM TOPICAL at 05:29

## 2024-05-20 RX ADMIN — Medication 125 MILLIGRAM(S): at 18:22

## 2024-05-20 RX ADMIN — APIXABAN 5 MILLIGRAM(S): 2.5 TABLET, FILM COATED ORAL at 18:22

## 2024-05-20 RX ADMIN — Medication 1 PATCH: at 05:27

## 2024-05-20 RX ADMIN — Medication 1 PACKET(S): at 12:00

## 2024-05-20 RX ADMIN — MIDODRINE HYDROCHLORIDE 5 MILLIGRAM(S): 2.5 TABLET ORAL at 11:59

## 2024-05-20 RX ADMIN — Medication 125 MILLIGRAM(S): at 05:24

## 2024-05-20 RX ADMIN — Medication 4 MILLIGRAM(S): at 13:02

## 2024-05-20 RX ADMIN — Medication 4 MILLIGRAM(S): at 05:23

## 2024-05-20 RX ADMIN — MIDODRINE HYDROCHLORIDE 5 MILLIGRAM(S): 2.5 TABLET ORAL at 05:23

## 2024-05-20 RX ADMIN — MIDODRINE HYDROCHLORIDE 5 MILLIGRAM(S): 2.5 TABLET ORAL at 18:22

## 2024-05-20 RX ADMIN — NYSTATIN CREAM 1 APPLICATION(S): 100000 CREAM TOPICAL at 18:22

## 2024-05-20 NOTE — DISCHARGE NOTE NURSING/CASE MANAGEMENT/SOCIAL WORK - PATIENT PORTAL LINK FT
You can access the FollowMyHealth Patient Portal offered by [facility] by registering at the following website: [email or web address]. By joining [facility]’s FollowMyHealth portal, you will also be able to view your health information using other applications (apps) compatible with our system.

## 2024-05-20 NOTE — PROGRESS NOTE ADULT - PROBLEM SELECTOR PROBLEM 3
HLD (hyperlipidemia)
CAD (coronary artery disease)
CAD (coronary artery disease)
HLD (hyperlipidemia)
CAD (coronary artery disease)

## 2024-05-20 NOTE — DISCHARGE NOTE NURSING/CASE MANAGEMENT/SOCIAL WORK - NSDCPEEMAIL_GEN_ALL_CORE
[facility] for Tobacco Control email [email or web address]

## 2024-05-20 NOTE — ADVANCED PRACTICE NURSE CONSULT - RECOMMEDATIONS
Will recommend:  1. Encourage self care -participation w/ ostomy care.  2. Empty pouch when 1/3-1/2 full   3. Change pouching system every 3-4 days & prn leakage  4. Reinforce ostomy teaching w/patient  5. Contact ostomy specialists if questions, concerns/issues .  6. Supplies: Derick 1 3/4" Ceraplus convex skin barrier (#73700), Derick 1 3/4" drainable pouch (#07356, high output pouch #[id number]); Accessory products:  stoma paste #[id number], stoma powder (#8280) & Cavilon No sting barrier film wipe (#9929), stoma belt #[id number]  Will f/u. Support & encouragement provided throughout visit.   Will recommend:  1. Encourage participation w/ ostomy care. (daughter is assuming care-pt w/ periods of confusion)  2. Empty pouch when 1/3-1/2 full   3. Change pouching system every 3-4 days & prn leakage  4. Reinforce ostomy teaching w/patient/ daughter.   5. Contact ostomy specialists if questions, concerns/issues .  6. Supplies: [place] 1 3/4" Ceraplus convex skin barrier (#96174), [place]ument 1 3/4" drainable pouch (#27045, high output pouch #61372); Accessory products:  stoma paste #[id number], stoma powder (#2519) & Cavilon No sting barrier film wipe (#8852), stoma belt #1960  Will f/u. Support & encouragement provided throughout visit.

## 2024-05-20 NOTE — DISCHARGE NOTE NURSING/CASE MANAGEMENT/SOCIAL WORK - NSDCPEWEB_GEN_ALL_CORE
[facility] for Tobacco Control website --- [email or web address] website --- [email or web address]

## 2024-05-20 NOTE — PROGRESS NOTE ADULT - ASSESSMENT
Assessment  Adrenal mass: Redemonstrated 4 cm indeterminant left adrenal mass stable from previous exams/scans, pt asymptomatic, hemodynamics stable   mass seen dating as far back as 2022 as per scans. Unlikely hormone secreting, labs pending.   Hypothyroidism: On Synthroid  175 mcg po daily, compliant with Synthroid intake, asymptomatic and euthyroid.  HTN: on antihypertensive medications, monitored, asymptomatic.  Obesity: No strict exercise routines, not on any weight loss program, neither on low calorie diet.      Eric Calles MD  Cell: [phone]  Office: [phone]

## 2024-05-20 NOTE — PROGRESS NOTE ADULT - SUBJECTIVE AND OBJECTIVE BOX
Date of service: 05-20-24 @ 13:11      Patient is a 78y old  Female who presents with a chief complaint of increased ostomy output (20 May 2024 09:57)                                                               INTERVAL HPI/OVERNIGHT EVENTS:    REVIEW OF SYSTEMS:     CONSTITUTIONAL: No weakness, fevers or chills  EYES/ENT: No visual changes , no ear ache   NECK: No pain or stiffness  RESPIRATORY: No cough, wheezing,  No shortness of breath  CARDIOVASCULAR: No chest pain or palpitations  GASTROINTESTINAL: No abdominal pain  . No nausea, vomiting, or hematemesis; No diarrhea or constipation. No melena or hematochezia.  GENITOURINARY: No dysuria, frequency or hematuria  NEUROLOGICAL: No numbness or weakness  SKIN: No itching, burning, rashes, or lesions                                                                                                                                                                                                                                                                                 Medications:  MEDICATIONS  (STANDING):  apixaban 5 milliGRAM(s) Oral every 12 hours  atorvastatin 80 milliGRAM(s) Oral at bedtime  clopidogrel Tablet 75 milliGRAM(s) Oral daily  digoxin     Tablet 250 MICROGram(s) Oral daily  levothyroxine 175 MICROGram(s) Oral daily  loperamide 4 milliGRAM(s) Oral three times a day  metoprolol tartrate 125 milliGRAM(s) Oral two times a day  midodrine. 5 milliGRAM(s) Oral three times a day  nicotine -  14 mG/24Hr(s) Patch 1 Patch Transdermal every 24 hours  nystatin Powder 1 Application(s) Topical two times a day  psyllium Powder 1 Packet(s) Oral daily  sodium chloride 0.9%. 1000 milliLiter(s) (75 mL/Hr) IV Continuous <Continuous>    MEDICATIONS  (PRN):  acetaminophen     Tablet .. 650 milliGRAM(s) Oral every 6 hours PRN Temp greater or equal to 38C (100.4F), Mild Pain (1 - 3)  aluminum hydroxide/magnesium hydroxide/simethicone Suspension 30 milliLiter(s) Oral every 4 hours PRN Dyspepsia  melatonin 3 milliGRAM(s) Oral at bedtime PRN Insomnia  ondansetron Injectable 4 milliGRAM(s) IV Push every 8 hours PRN Nausea and/or Vomiting       Allergies    penicillin (Hives)    Intolerances      Vital Signs Last 24 Hrs  T(C): 36.1 (20 May 2024 11:57), Max: 36.8 (20 May 2024 04:27)  T(F): 97 (20 May 2024 11:57), Max: 98.3 (20 May 2024 04:27)  HR: 77 (20 May 2024 11:57) (61 - 98)  BP: 126/83 (20 May 2024 11:57) (111/70 - 128/75)  BP(mean): --  RR: 18 (20 May 2024 11:57) (18 - 18)  SpO2: 97% (20 May 2024 11:57) (97% - 99%)    Parameters below as of 20 May 2024 11:57  Patient On (Oxygen Delivery Method): room air      CAPILLARY BLOOD GLUCOSE          05-19 @ 07:01  -  05-20 @ 07:00  --------------------------------------------------------  IN: 620 mL / OUT: 200 mL / NET: 420 mL      Physical Exam:    Daily     Daily   General:  Well appearing, NAD, not cachetic  HEENT:  Nonicteric, PERRLA  CV:  RRR, S1S2   Lungs:  CTA B/L, no wheezes, rales, rhonchi  Abdomen:  Soft   ostomy in place   improved consitency and decreased op   erythema around ostmy bag much improved   R groin candida infection : added nystatin     Extremities:  2+ pulses, no c/c, no edema  Skin:  Warm and dry, no rashes  :  No amaya                                                                                                                                                                                                                                                                                               LABS:                                                     RADIOLOGY & ADDITIONAL TESTS         I personally reviewed: [  ]EKG   [  ]CXR    [  ] CT      A/P:         Discussed with :     Simon consultants' Notes   Time spent :

## 2024-05-20 NOTE — PROGRESS NOTE ADULT - ASSESSMENT
77yo 73kg f, smoker, w pmh mci/dementia, [place], htn, hld, afib, cad c/b mi s/p pci w stents, copd, little, urinary incontinence, recurrent uti, hypothyroidism, oa s/p l tkr + l hip orif, and w recent hospitalization 2/16-3/9 for ischemic bowel s/p ex-lap w bowel resection + end ileostomy, 4/1-4/3 for drainage from incision site 2/2 hematoma in the laparotomy wound s/p conservative mgmt, 4/28-5/6 for afib w rvr iso urosepsis, p/w increased ostomy output; in er, found to ostomy output w spillage onto surrounding skin and resulting in macerated/erythematous skin; admit to medicine for further mgmt      Problem/Plan - 1:  ·  Problem: Increased ileostomy output.   ·  Plan: recent hospitalization 2/16-3/9 for ischemic bowel s/p ex-lap w bowel resection + end ileostomy, 4/1-4/3 for drainage from incision site 2/2 hematoma in the laparotomy wound s/p conservative mgmt  imaging currently showing, "no emergent findings... Small area of fat stranding /inflammatory changes in the periumbilical region likely reflective of postsurgical changes, as are overall similar compared to previous CT"  monitor Op   cont lopermide and psylium : still with increased op   however improved consstency     Problem/Plan - 2:  ·  Problem: Longstanding persistent atrial fibrillation.   ·  Plan: previous recent hospitalizations complicated by multiple episodes of afib rvr, requiring lopressor ivp + digoxin ivp + amiodarone load + diltiazem ivp  no ekg done in er, but appears to be rate controlled  chadsvasc ~5   cont home eliquis  cont home lopressor + digoxin.    Problem/Plan - 3:  ·  Problem: CAD (coronary artery disease).   ·  Plan: h/o cad c/b mi s/p pci w stents  cont med s    Problem/Plan - 4:  ·  Problem: HTN (hypertension).   ·  Plan: lopressor.    Problem/Plan - 5:  ·  Problem: adrenal mass : will consult endo : Redemonstrated 4 cm indeterminant left adrenal mass stable from previous exams/scans, pt asymptomatic, hemodynamics stable   mass seen dating as far back as 2022 as per scans. Unlikely hormone secreting, labs pending.     Problem/Plan - 6:  ·  Problem: Chronic obstructive pulmonary disease (COPD).   ·  Plan: h/o smoking, copd, little  stable     Problem/Plan - 7:  ·  Problem: Hypothyroidism.   ·  Plan: synthroid.    FULL code .     d/w aCP   d/w nursing   d/w CM     dc to rehab today

## 2024-05-20 NOTE — PROGRESS NOTE ADULT - SUBJECTIVE AND OBJECTIVE BOX
Chief complaint    Patient is a 78y old  Female who presents with a chief complaint of increased ostomy output (20 May 2024 13:10)   Review of systems  Patient appears comfortable.    Labs and Fingersticks  CAPILLARY BLOOD GLUCOSE                            Medications  MEDICATIONS  (STANDING):  apixaban 5 milliGRAM(s) Oral every 12 hours  atorvastatin 80 milliGRAM(s) Oral at bedtime  clopidogrel Tablet 75 milliGRAM(s) Oral daily  digoxin     Tablet 250 MICROGram(s) Oral daily  levothyroxine 175 MICROGram(s) Oral daily  loperamide 4 milliGRAM(s) Oral three times a day  metoprolol tartrate 125 milliGRAM(s) Oral two times a day  midodrine. 5 milliGRAM(s) Oral three times a day  nicotine -  14 mG/24Hr(s) Patch 1 Patch Transdermal every 24 hours  nystatin Powder 1 Application(s) Topical two times a day  psyllium Powder 1 Packet(s) Oral daily  sodium chloride 0.9%. 1000 milliLiter(s) (75 mL/Hr) IV Continuous <Continuous>      Physical Exam  General: Patient appears comfortable.  Vital Signs Last 12 Hrs  T(F): 97 (05-20-24 @ 11:57), Max: 98.3 (05-20-24 @ 04:27)  HR: 77 (05-20-24 @ 11:57) (67 - 98)  BP: 126/83 (05-20-24 @ 11:57) (111/70 - 126/83)  BP(mean): --  RR: 18 (05-20-24 @ 11:57) (18 - 18)  SpO2: 97% (05-20-24 @ 11:57) (97% - 97%)  Neck: No palpable thyroid nodules.  CVS: S1S2, No murmurs  Respiratory: No wheezing, no crepitations  GI: Abdomen soft, non tender.    Diagnostics        Radiology:

## 2024-05-20 NOTE — PROGRESS NOTE ADULT - REASON FOR ADMISSION
increased ostomy output

## 2024-05-20 NOTE — PROGRESS NOTE ADULT - TIME BILLING
Advanced care planning was discussed with patient and family.  Advanced care planning forms were reviewed and discussed as appropriate.  Differential diagnosis and plan of care discussed with patient after the evaluation.   Pain assessed and judicious use of narcotics when appropriate was discussed.  Importance of Fall prevention discussed.  Counseling on Smoking and Alcohol cessation was offered when appropriate.  Counseling on Diet, exercise, and medication compliance was done.
as above
Advanced care planning was discussed with patient and family.  Advanced care planning forms were reviewed and discussed as appropriate.  Differential diagnosis and plan of care discussed with patient after the evaluation.   Pain assessed and judicious use of narcotics when appropriate was discussed.  Importance of Fall prevention discussed.  Counseling on Smoking and Alcohol cessation was offered when appropriate.  Counseling on Diet, exercise, and medication compliance was done.
Advanced care planning was discussed with patient and family.  Advanced care planning forms were reviewed and discussed as appropriate.  Differential diagnosis and plan of care discussed with patient after the evaluation.   Pain assessed and judicious use of narcotics when appropriate was discussed.  Importance of Fall prevention discussed.  Counseling on Smoking and Alcohol cessation was offered when appropriate.  Counseling on Diet, exercise, and medication compliance was done.

## 2024-05-20 NOTE — PROGRESS NOTE ADULT - PROBLEM SELECTOR PROBLEM 2
HTN (hypertension)
Increased ileostomy output
HTN (hypertension)
Increased ileostomy output
Increased ileostomy output

## 2024-05-20 NOTE — PROGRESS NOTE ADULT - PROBLEM SELECTOR PLAN 2
Surgery and GI consulted.
Suggest to continue medications, monitoring, FU primary team recommendations. .
Suggest to continue medications, monitoring, FU primary team recommendations. .

## 2024-05-20 NOTE — PROGRESS NOTE ADULT - PROBLEM SELECTOR PLAN 3
stable   Plavix.
Will continue statin, primary team FU
Will continue statin, primary team FU
stable   Plavix.
stable   Plavix.

## 2024-05-20 NOTE — ADVANCED PRACTICE NURSE CONSULT - REASON FOR CONSULT
Drain management, pouching. Routine follow up prior to discharge. Complete pouching system change. (s/p end ileostomy 2/24/24)

## 2024-05-20 NOTE — DISCHARGE NOTE NURSING/CASE MANAGEMENT/SOCIAL WORK - NSDCVIVACCINE_GEN_ALL_CORE_FT
Tdap; 14-Jun-2015 22:07; Rosana Loaiza (RN); [employer]; [id number]; IntraMuscular; Deltoid Left.; 0.5 milliLiter(s); VIS (VIS Published: 09-May-2013, VIS Presented: 14-Jun-2015);

## 2024-05-20 NOTE — PROGRESS NOTE ADULT - NUTRITIONAL ASSESSMENT
This patient has been assessed with a concern for Malnutrition and has been determined to have a diagnosis/diagnoses of Severe protein-calorie malnutrition.    This patient is being managed with:   Diet Regular-  Entered: May 16 2024  2:22AM  

## 2024-05-20 NOTE — ADVANCED PRACTICE NURSE CONSULT - ASSESSMENT
Chart reviewed & events noted. In at bedside for routine f/u. Pt in bed awake & alert, pleasantly confused. Daughter at bedside. On exam noted pouching system intact, noted skin barrier opening is cut too big, exposing peristomal skin to stool.. Complete pouching system changed.  Upon removal of old pouching system- noted end ileostomy red & viable, flush, 1", mucocutaneous junction & peristomal skin intact "much better" per daughter at bedside. Skin creases noted at 3&9 o'clock. Ileostomy is functioning for pasty stool.  Daughter participated w/ some steps for pouching system change. See steps for pouching as follows:  1. Wash & dry skin   2. Apply dusting of stoma powder to red, moist irritated skin (dust off excess) as needed  3. Dab with No sting barrier film wipe (i.e Cavilon) & fan dry  4. Apply bead of paste to creases at 3 & 9 o'clock to level pouching system  5. Repouch w/Shiloh 1 3/4" convex skin barrier (cut to 1"), stoma paste to opening at back of skin barrier & high output pouch  6. Stoma belt applied for extra support @3 & 9o'clock  Supplies w/ pattern at bedside.

## 2024-05-20 NOTE — PROGRESS NOTE ADULT - PROVIDER SPECIALTY LIST ADULT
Endocrinology
Cardiology
Internal Medicine
Internal Medicine
Endocrinology
Gastroenterology
Cardiology
Gastroenterology
Internal Medicine
Internal Medicine
Cardiology

## 2024-05-20 NOTE — PROGRESS NOTE ADULT - PROBLEM SELECTOR PROBLEM 1
Adrenal nodule
Adrenal nodule
Longstanding persistent atrial fibrillation

## 2024-05-20 NOTE — PROGRESS NOTE ADULT - ASSESSMENT
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

## 2024-05-20 NOTE — PROGRESS NOTE ADULT - SUBJECTIVE AND OBJECTIVE BOX
Subjective: Patient seen and examined. No new events except as noted.     REVIEW OF SYSTEMS:    CONSTITUTIONAL: +weakness, fevers or chills  EYES/ENT: No visual changes;  No vertigo or throat pain   NECK: No pain or stiffness  RESPIRATORY: No cough, wheezing, hemoptysis; No shortness of breath  CARDIOVASCULAR: No chest pain or palpitations  GASTROINTESTINAL: No abdominal or epigastric pain. No nausea, vomiting, or hematemesis; No diarrhea or constipation. No melena or hematochezia.  GENITOURINARY: No dysuria, frequency or hematuria  NEUROLOGICAL: No numbness or weakness  SKIN: No itching, burning, rashes, or lesions   All other review of systems is negative unless indicated above.    MEDICATIONS:  MEDICATIONS  (STANDING):  apixaban 5 milliGRAM(s) Oral every 12 hours  atorvastatin 80 milliGRAM(s) Oral at bedtime  clopidogrel Tablet 75 milliGRAM(s) Oral daily  digoxin     Tablet 250 MICROGram(s) Oral daily  levothyroxine 175 MICROGram(s) Oral daily  loperamide 4 milliGRAM(s) Oral three times a day  metoprolol tartrate 125 milliGRAM(s) Oral two times a day  midodrine. 5 milliGRAM(s) Oral three times a day  nicotine -  14 mG/24Hr(s) Patch 1 Patch Transdermal every 24 hours  nystatin Powder 1 Application(s) Topical two times a day  psyllium Powder 1 Packet(s) Oral daily  sodium chloride 0.9%. 1000 milliLiter(s) (75 mL/Hr) IV Continuous <Continuous>      PHYSICAL EXAM:  T(C): 36.8 (05-20-24 @ 04:27), Max: 36.8 (05-20-24 @ 04:27)  HR: 98 (05-20-24 @ 04:27) (61 - 98)  BP: 111/70 (05-20-24 @ 04:27) (111/70 - 128/75)  RR: 18 (05-20-24 @ 04:27) (18 - 18)  SpO2: 97% (05-20-24 @ 04:27) (97% - 99%)  Wt(kg): --  I&O's Summary    19 May 2024 07:01  -  20 May 2024 07:00  --------------------------------------------------------  IN: 620 mL / OUT: 200 mL / NET: 420 mL              Appearance: NAD	  HEENT:  Dry  oral mucosa, PERRL, EOMI	  Lymphatic: No lymphadenopathy  Cardiovascular: Irregular  S1 S2, No JVD, No murmurs, No edema  Respiratory: decreased bs   Psychiatry: A & O x 3, Mood & affect appropriate  Gastrointestinal:   Soft, Nondistended, tender to soft palpation. Skin macerated with nonblanching erythema with soilage from the ileostomy site spilling onto the abdomen. Ileostomy pink/healthy, slightly retracted.     Skin: No rashes, No ecchymoses, No cyanosis	  Neurologic: Non-focal  Extremities: Normal range of motion, No clubbing, cyanosis or edema  Vascular: Peripheral pulses palpable 2+ bilaterally        LABS:    CARDIAC MARKERS:          TELEMETRY: 	    ECG:  	  RADIOLOGY:   DIAGNOSTIC TESTING:  [ ] Echocardiogram:  [ ]  Catheterization:  [ ] Stress Test:    OTHER:

## 2024-05-20 NOTE — PROGRESS NOTE ADULT - PROBLEM SELECTOR PLAN 1
rate controlled   cont with current meds   On eliquis.
Will FU with lab results, FU primary team recommendations. .
rate controlled   cont with current meds   On eliquis.
rate controlled   cont with current meds   On eliquis.
Will FU with lab results, FU primary team recommendations. .

## 2024-05-30 NOTE — H&P PST ADULT - NSCAFFEINETYPE_GEN_ALL_CORE_SD
Worsening  Previously on amlodipine 5mg bid but developed LE edema  She is on atenolol 50mg and losartan 25mg  Reports elevated blood pressures today in the ED.   Her bp as been in the 140-160/70-90  Will increase losartan to 50mg   coffee/tea

## 2024-06-03 ENCOUNTER — EMERGENCY (EMERGENCY)
Facility: HOSPITAL | Age: 79
LOS: 1 days | Discharge: HOME | End: 2024-06-03
Attending: EMERGENCY MEDICINE
Payer: MEDICARE

## 2024-06-03 VITALS
OXYGEN SATURATION: 98 % | SYSTOLIC BLOOD PRESSURE: 124 MMHG | HEART RATE: 91 BPM | DIASTOLIC BLOOD PRESSURE: 71 MMHG | RESPIRATION RATE: 16 BRPM | TEMPERATURE: 98 F | HEIGHT: 64 IN

## 2024-06-03 VITALS
SYSTOLIC BLOOD PRESSURE: 101 MMHG | OXYGEN SATURATION: 96 % | RESPIRATION RATE: 16 BRPM | DIASTOLIC BLOOD PRESSURE: 75 MMHG | HEART RATE: 78 BPM | TEMPERATURE: 98 F

## 2024-06-03 DIAGNOSIS — Z90.49 ACQUIRED ABSENCE OF OTHER SPECIFIED PARTS OF DIGESTIVE TRACT: Chronic | ICD-10-CM

## 2024-06-03 DIAGNOSIS — Z95.5 PRESENCE OF CORONARY ANGIOPLASTY IMPLANT AND GRAFT: Chronic | ICD-10-CM

## 2024-06-03 DIAGNOSIS — Z96.659 PRESENCE OF UNSPECIFIED ARTIFICIAL KNEE JOINT: Chronic | ICD-10-CM

## 2024-06-03 DIAGNOSIS — Z96.7 PRESENCE OF OTHER BONE AND TENDON IMPLANTS: Chronic | ICD-10-CM

## 2024-06-03 DIAGNOSIS — Z98.890 OTHER SPECIFIED POSTPROCEDURAL STATES: Chronic | ICD-10-CM

## 2024-06-03 LAB
METANEPHRINE, PL: SIGNIFICANT CHANGE UP PG/ML (ref 0–88)
NORMETANEPHRINE, PL: SIGNIFICANT CHANGE UP PG/ML (ref 0–285.2)

## 2024-06-03 PROCEDURE — 76377 3D RENDER W/INTRP POSTPROCES: CPT

## 2024-06-03 PROCEDURE — 76377 3D RENDER W/INTRP POSTPROCES: CPT | Mod: 26

## 2024-06-03 PROCEDURE — 70486 CT MAXILLOFACIAL W/O DYE: CPT | Mod: 26,MC

## 2024-06-03 PROCEDURE — 72125 CT NECK SPINE W/O DYE: CPT | Mod: MC

## 2024-06-03 PROCEDURE — 70486 CT MAXILLOFACIAL W/O DYE: CPT | Mod: MC

## 2024-06-03 PROCEDURE — 99284 EMERGENCY DEPT VISIT MOD MDM: CPT | Mod: 25

## 2024-06-03 PROCEDURE — 70450 CT HEAD/BRAIN W/O DYE: CPT | Mod: MC

## 2024-06-03 PROCEDURE — 70450 CT HEAD/BRAIN W/O DYE: CPT | Mod: 26,MC

## 2024-06-03 PROCEDURE — 72125 CT NECK SPINE W/O DYE: CPT | Mod: 26,MC

## 2024-06-03 PROCEDURE — 99284 EMERGENCY DEPT VISIT MOD MDM: CPT | Mod: GC

## 2024-06-03 NOTE — ED PROVIDER NOTE - PHYSICAL EXAMINATION
General: well-appearing, no acute distress  Head: normocephalic, hematoma to R forehead   Eyes: EOM grossly in tact, no scleral icterus, no discharge  ENT: moist mucous membranes  Neurology: nonfocal, COX x 4  Respiratory: normal respiratory effort  CV:  Extremities warm and well perfused  Abdominal: Soft, non-distended, non-tender, no masses  Extremities: No edema, no deformities  Skin: warm and dry. No rashes  scattered old bruising along b/l LEs.

## 2024-06-03 NOTE — ED ADULT NURSE REASSESSMENT NOTE - NS ED NURSE REASSESS COMMENT FT1
Received report from Daja Samay[place]. pt is A&Ox3 able to follow all commands. Pulse, motor, sensation present and equal in all 4 extremities. pt Breathing spontaneous and unlabored on, Skin warm and dry and of color appropriate for ethnicity , moves all extremities, speech clear. pending CT. no further nurse intervention needed at this time.

## 2024-06-03 NOTE — ED ADULT NURSE NOTE - NSFALLHARMRISKINTERV_ED_ALL_ED

## 2024-06-03 NOTE — ED PROVIDER NOTE - OBJECTIVE STATEMENT
79yo f, smoker, w pmh mci/dementia, [place], htn, hld, afib, cad c/b mi s/p pci w stents, copd, little, urinary incontinence, recurrent uti, hypothyroidism, presenting w/ c/o a fall on eliquis. She was seated at her nursing facility and fell forward striking her R eye. She developed a hematoma. Sent in to have a CT performed.

## 2024-06-03 NOTE — ED PROVIDER NOTE - ATTENDING CONTRIBUTION TO CARE
Patient with head strike from wheelchair on eliquis.  will get ct head, ct neck and maxillofacial  Will follow up on imaging, reassess and disposition as clinically indicated.  *The above represents an initial assessment/impression. Please refer to my progress notes below for potential changes in patient clinical course*   Obinna Samuels MD FACEP note of transfer at the usual time of sign out: Receiving team will follow up on labs, analgesia, any clinical imaging, reassess and disposition as clinically indicated.  Details of patient and plan conveyed to receiving physician, Dr. Pacheco, and conveyed back for understanding.  There were no questions at this time about the patient's status, disposition, and plan. Patient's care to be taken over by receiving physician at this time, all decisions regarding the progression of care will be made at their discretion.

## 2024-06-03 NOTE — ED ADULT NURSE NOTE - OBJECTIVE STATEMENT
79 y/o female presents to the ED BIBEMS s/p witnessed mechanical fall. A/Ox2, aware of name and place. Ambulatory without assistive devices at baseline. PMH: Mci/Dementia, HTN, HLD, CAD s/p pci (On Plavix), COPD, A-fib (On Eliquis), hypothyroidism and RLQ Ileostomy. As per EMS, at 4am the patient was being assisted to the bathroom when "she got up without assistance and fell forward hitting her R forehead". Patient noted to having a previous fall with admission to [facility] with presence of [place] eyes. EMS endorses no LOC. RLQ Ileostomy noted with beefy, red stoma. Upon assessment, BL LE ecchymosis in multiple stages of healing. Ecchymosis present on BL eyes in multiple stages of healing. R forehead hematoma noted. Safety and comfort provided.

## 2024-06-03 NOTE — ED PROVIDER NOTE - CLINICAL SUMMARY MEDICAL DECISION MAKING FREE TEXT BOX
79yo f, smoker, w pmh mci/dementia, [place], htn, hld, afib, cad c/b mi s/p pci w stents, copd, little, urinary incontinence, recurrent uti, hypothyroidism, presenting w/ c/o a fall on eliquis. Differential diagnosis includes but is not limited to contusion, ICH, ecchymoses. will get CTH/cervical spine.

## 2024-06-03 NOTE — ED PROVIDER NOTE - PROGRESS NOTE DETAILS
Ansley Spencer PGY2: Pt endorsed to me at sign out. Pt reassessed and resting comfortably. Multiple bruises and hematomas on face. Pt answering questions appropriately and denies pain at this time. Pending CTs. Ansley Spencer PGY2: Pt reassessed and resting comfortably. Results discussed with pt and daughter at bedside. Pt okay for dc. DC instructions and return precautions discussed with patient. Questions answered. Pt to follow up with PCP.

## 2024-06-03 NOTE — ED PROVIDER NOTE - PATIENT PORTAL LINK FT
You can access the FollowMyHealth Patient Portal offered by [facility] by registering at the following website: [email or web address]. By joining [facility]’s FollowMyHealth portal, you will also be able to view your health information using other applications (apps) compatible with our system.

## 2024-06-03 NOTE — ED PROVIDER NOTE - NSFOLLOWUPINSTRUCTIONS_ED_ALL_ED_FT
Please follow up with your primary care physician within 2-3 days.   Return to the ER for any new or concerning symptoms.   You may take 975 mg acetaminophen every 6 hours as needed for pain.      When should you call for help?  	  Call 911 anytime you think you may need emergency care. For example, call if:    You have signs of a stroke. These may include:  Sudden numbness, paralysis, or weakness in your face, arm, or leg, especially on only one side of your body.  Sudden vision changes.  Sudden trouble speaking.  Sudden confusion or trouble understanding simple statements.  Sudden problems with walking or balance.  A sudden, severe headache that is different from past headaches.  Call your doctor or nurse advice line now or seek immediate medical care if:    You have a new or worse headache.  Your headache gets much worse.

## 2024-07-17 ENCOUNTER — APPOINTMENT (OUTPATIENT)
Dept: HOME HEALTH SERVICES | Facility: HOME HEALTH | Age: 79
End: 2024-07-17

## 2024-07-17 ENCOUNTER — APPOINTMENT (OUTPATIENT)
Dept: HOME HEALTH SERVICES | Facility: HOME HEALTH | Age: 79
End: 2024-07-17
Payer: MEDICARE

## 2024-07-17 VITALS
RESPIRATION RATE: 16 BRPM | TEMPERATURE: 97.6 F | OXYGEN SATURATION: 96 % | HEART RATE: 70 BPM | SYSTOLIC BLOOD PRESSURE: 110 MMHG | DIASTOLIC BLOOD PRESSURE: 60 MMHG

## 2024-07-17 DIAGNOSIS — Z87.898 PERSONAL HISTORY OF OTHER SPECIFIED CONDITIONS: ICD-10-CM

## 2024-07-17 DIAGNOSIS — E03.9 HYPOTHYROIDISM, UNSPECIFIED: ICD-10-CM

## 2024-07-17 DIAGNOSIS — F32.A DEPRESSION, UNSPECIFIED: ICD-10-CM

## 2024-07-17 DIAGNOSIS — I95.9 HYPOTENSION, UNSPECIFIED: ICD-10-CM

## 2024-07-17 DIAGNOSIS — R07.89 OTHER CHEST PAIN: ICD-10-CM

## 2024-07-17 DIAGNOSIS — L30.9 DERMATITIS, UNSPECIFIED: ICD-10-CM

## 2024-07-17 DIAGNOSIS — M79.672 PAIN IN LEFT FOOT: ICD-10-CM

## 2024-07-17 DIAGNOSIS — J44.9 CHRONIC OBSTRUCTIVE PULMONARY DISEASE, UNSPECIFIED: ICD-10-CM

## 2024-07-17 DIAGNOSIS — Z47.1 AFTERCARE FOLLOWING JOINT REPLACEMENT SURGERY: ICD-10-CM

## 2024-07-17 DIAGNOSIS — I48.91 UNSPECIFIED ATRIAL FIBRILLATION: ICD-10-CM

## 2024-07-17 DIAGNOSIS — F32.1 MAJOR DEPRESSIVE DISORDER, SINGLE EPISODE, MODERATE: ICD-10-CM

## 2024-07-17 PROCEDURE — 99344 HOME/RES VST NEW MOD MDM 60: CPT

## 2024-07-17 RX ORDER — ASCORBIC ACID 500 MG
500 TABLET ORAL DAILY
Qty: 90 | Refills: 3 | Status: ACTIVE | COMMUNITY
Start: 2024-07-17

## 2024-07-17 RX ORDER — LOPERAMIDE HYDROCHLORIDE 2 MG/1
2 CAPSULE ORAL
Refills: 0 | Status: ACTIVE | COMMUNITY
Start: 2024-07-17

## 2024-07-17 RX ORDER — ONDANSETRON 4 MG/1
4 TABLET, ORALLY DISINTEGRATING ORAL
Qty: 90 | Refills: 0 | Status: ACTIVE | COMMUNITY
Start: 2024-07-17 | End: 1900-01-01

## 2024-07-17 RX ORDER — ATORVASTATIN CALCIUM 80 MG/1
80 TABLET, FILM COATED ORAL
Qty: 90 | Refills: 3 | Status: ACTIVE | COMMUNITY
Start: 2024-07-17

## 2024-07-17 RX ORDER — NYSTATIN 100000 [USP'U]/G
100000 CREAM TOPICAL 3 TIMES DAILY
Qty: 1 | Refills: 3 | Status: ACTIVE | COMMUNITY
Start: 2024-07-17 | End: 1900-01-01

## 2024-07-17 RX ORDER — METOPROLOL TARTRATE 25 MG/1
25 TABLET, FILM COATED ORAL
Qty: 180 | Refills: 1 | Status: ACTIVE | COMMUNITY
Start: 2024-07-17 | End: 1900-01-01

## 2024-07-17 RX ORDER — ELECTROLYTES/DEXTROSE
SOLUTION, ORAL ORAL
Qty: 90 | Refills: 3 | Status: ACTIVE | COMMUNITY
Start: 2024-07-17 | End: 1900-01-01

## 2024-07-17 RX ORDER — DIGOXIN 250 UG/1
250 TABLET ORAL
Qty: 90 | Refills: 1 | Status: ACTIVE | COMMUNITY
Start: 2024-07-17 | End: 1900-01-01

## 2024-07-17 RX ORDER — MIRTAZAPINE 15 MG/1
15 TABLET, FILM COATED ORAL
Qty: 30 | Refills: 0 | Status: ACTIVE | COMMUNITY
Start: 2024-07-17 | End: 1900-01-01

## 2024-07-17 RX ORDER — MIDODRINE HYDROCHLORIDE 5 MG/1
5 TABLET ORAL
Qty: 270 | Refills: 3 | Status: ACTIVE | COMMUNITY
Start: 2024-07-17

## 2024-07-17 RX ORDER — CLOPIDOGREL BISULFATE 75 MG/1
75 TABLET, FILM COATED ORAL DAILY
Qty: 90 | Refills: 1 | Status: ACTIVE | COMMUNITY
Start: 2024-07-17 | End: 1900-01-01

## 2024-07-19 ENCOUNTER — NON-APPOINTMENT (OUTPATIENT)
Age: 79
End: 2024-07-19

## 2024-07-21 ENCOUNTER — INPATIENT (INPATIENT)
Facility: HOSPITAL | Age: 79
LOS: 7 days | Discharge: NURSING, REHAB OR LONG-TERM CARE | DRG: 887 | End: 2024-07-29
Attending: GENERAL ACUTE CARE HOSPITAL | Admitting: GENERAL ACUTE CARE HOSPITAL
Payer: MEDICARE

## 2024-07-21 VITALS
OXYGEN SATURATION: 90 % | HEIGHT: 64 IN | DIASTOLIC BLOOD PRESSURE: 76 MMHG | SYSTOLIC BLOOD PRESSURE: 108 MMHG | HEART RATE: 80 BPM

## 2024-07-21 DIAGNOSIS — Z96.659 PRESENCE OF UNSPECIFIED ARTIFICIAL KNEE JOINT: Chronic | ICD-10-CM

## 2024-07-21 DIAGNOSIS — Z87.440 PERSONAL HISTORY OF URINARY (TRACT) INFECTIONS: ICD-10-CM

## 2024-07-21 DIAGNOSIS — Z90.49 ACQUIRED ABSENCE OF OTHER SPECIFIED PARTS OF DIGESTIVE TRACT: Chronic | ICD-10-CM

## 2024-07-21 DIAGNOSIS — F44.89 OTHER DISSOCIATIVE AND CONVERSION DISORDERS: ICD-10-CM

## 2024-07-21 DIAGNOSIS — Z87.09 PERSONAL HISTORY OF OTHER DISEASES OF THE RESPIRATORY SYSTEM: ICD-10-CM

## 2024-07-21 DIAGNOSIS — Z86.79 PERSONAL HISTORY OF OTHER DISEASES OF THE CIRCULATORY SYSTEM: ICD-10-CM

## 2024-07-21 DIAGNOSIS — Z98.890 OTHER SPECIFIED POSTPROCEDURAL STATES: Chronic | ICD-10-CM

## 2024-07-21 DIAGNOSIS — F03.90 UNSPECIFIED DEMENTIA, UNSPECIFIED SEVERITY, WITHOUT BEHAVIORAL DISTURBANCE, PSYCHOTIC DISTURBANCE, MOOD DISTURBANCE, AND ANXIETY: ICD-10-CM

## 2024-07-21 DIAGNOSIS — E03.9 HYPOTHYROIDISM, UNSPECIFIED: ICD-10-CM

## 2024-07-21 DIAGNOSIS — Z95.5 PRESENCE OF CORONARY ANGIOPLASTY IMPLANT AND GRAFT: Chronic | ICD-10-CM

## 2024-07-21 DIAGNOSIS — G93.49 OTHER ENCEPHALOPATHY: ICD-10-CM

## 2024-07-21 DIAGNOSIS — Z93.9 ARTIFICIAL OPENING STATUS, UNSPECIFIED: ICD-10-CM

## 2024-07-21 DIAGNOSIS — A41.9 SEPSIS, UNSPECIFIED ORGANISM: ICD-10-CM

## 2024-07-21 DIAGNOSIS — Z96.7 PRESENCE OF OTHER BONE AND TENDON IMPLANTS: Chronic | ICD-10-CM

## 2024-07-21 LAB
ALBUMIN SERPL ELPH-MCNC: 3.7 G/DL — SIGNIFICANT CHANGE UP (ref 3.3–5)
ALP SERPL-CCNC: 80 U/L — SIGNIFICANT CHANGE UP (ref 40–120)
ALT FLD-CCNC: 17 U/L — SIGNIFICANT CHANGE UP (ref 10–45)
ANION GAP SERPL CALC-SCNC: 18 MMOL/L — HIGH (ref 5–17)
APPEARANCE UR: ABNORMAL
APTT BLD: 28.2 SEC — SIGNIFICANT CHANGE UP (ref 24.5–35.6)
AST SERPL-CCNC: 24 U/L — SIGNIFICANT CHANGE UP (ref 10–40)
BACTERIA # UR AUTO: ABNORMAL /HPF
BASE EXCESS BLDV CALC-SCNC: -5.4 MMOL/L — LOW (ref -2–3)
BASE EXCESS BLDV CALC-SCNC: -6.2 MMOL/L — LOW (ref -2–3)
BASOPHILS # BLD AUTO: 0.02 K/UL — SIGNIFICANT CHANGE UP (ref 0–0.2)
BASOPHILS NFR BLD AUTO: 0.2 % — SIGNIFICANT CHANGE UP (ref 0–2)
BILIRUB SERPL-MCNC: 0.9 MG/DL — SIGNIFICANT CHANGE UP (ref 0.2–1.2)
BILIRUB UR-MCNC: NEGATIVE — SIGNIFICANT CHANGE UP
BUN SERPL-MCNC: 51 MG/DL — HIGH (ref 7–23)
CA-I SERPL-SCNC: 1.15 MMOL/L — SIGNIFICANT CHANGE UP (ref 1.15–1.33)
CA-I SERPL-SCNC: 1.18 MMOL/L — SIGNIFICANT CHANGE UP (ref 1.15–1.33)
CALCIUM SERPL-MCNC: 10 MG/DL — SIGNIFICANT CHANGE UP (ref 8.4–10.5)
CAST: 8 /LPF — HIGH (ref 0–4)
CHLORIDE BLDV-SCNC: 101 MMOL/L — SIGNIFICANT CHANGE UP (ref 96–108)
CHLORIDE BLDV-SCNC: 98 MMOL/L — SIGNIFICANT CHANGE UP (ref 96–108)
CHLORIDE SERPL-SCNC: 97 MMOL/L — SIGNIFICANT CHANGE UP (ref 96–108)
CK SERPL-CCNC: 30 U/L — SIGNIFICANT CHANGE UP (ref 25–170)
CO2 BLDV-SCNC: 22 MMOL/L — SIGNIFICANT CHANGE UP (ref 22–26)
CO2 BLDV-SCNC: 23 MMOL/L — SIGNIFICANT CHANGE UP (ref 22–26)
CO2 SERPL-SCNC: 18 MMOL/L — LOW (ref 22–31)
COLOR SPEC: YELLOW — SIGNIFICANT CHANGE UP
CREAT SERPL-MCNC: 2.08 MG/DL — HIGH (ref 0.5–1.3)
DIFF PNL FLD: NEGATIVE — SIGNIFICANT CHANGE UP
EGFR: 24 ML/MIN/1.73M2 — LOW
EOSINOPHIL # BLD AUTO: 0.04 K/UL — SIGNIFICANT CHANGE UP (ref 0–0.5)
EOSINOPHIL NFR BLD AUTO: 0.3 % — SIGNIFICANT CHANGE UP (ref 0–6)
FINE GRAN CASTS #/AREA URNS AUTO: PRESENT
GAS PNL BLDV: 127 MMOL/L — LOW (ref 136–145)
GAS PNL BLDV: 128 MMOL/L — LOW (ref 136–145)
GAS PNL BLDV: SIGNIFICANT CHANGE UP
GLUCOSE BLDV-MCNC: 83 MG/DL — SIGNIFICANT CHANGE UP (ref 70–99)
GLUCOSE BLDV-MCNC: 98 MG/DL — SIGNIFICANT CHANGE UP (ref 70–99)
GLUCOSE SERPL-MCNC: 86 MG/DL — SIGNIFICANT CHANGE UP (ref 70–99)
GLUCOSE UR QL: NEGATIVE MG/DL — SIGNIFICANT CHANGE UP
HCO3 BLDV-SCNC: 20 MMOL/L — LOW (ref 22–29)
HCO3 BLDV-SCNC: 21 MMOL/L — LOW (ref 22–29)
HCT VFR BLD CALC: 44.1 % — SIGNIFICANT CHANGE UP (ref 34.5–45)
HCT VFR BLDA CALC: 44 % — SIGNIFICANT CHANGE UP (ref 34.5–46.5)
HCT VFR BLDA CALC: 45 % — SIGNIFICANT CHANGE UP (ref 34.5–46.5)
HGB BLD CALC-MCNC: 14.7 G/DL — SIGNIFICANT CHANGE UP (ref 11.7–16.1)
HGB BLD CALC-MCNC: 14.9 G/DL — SIGNIFICANT CHANGE UP (ref 11.7–16.1)
HGB BLD-MCNC: 14.3 G/DL — SIGNIFICANT CHANGE UP (ref 11.5–15.5)
HYALINE CASTS # UR AUTO: PRESENT
IMM GRANULOCYTES NFR BLD AUTO: 0.6 % — SIGNIFICANT CHANGE UP (ref 0–0.9)
INR BLD: 1.3 RATIO — HIGH (ref 0.85–1.18)
KETONES UR-MCNC: ABNORMAL MG/DL
LACTATE BLDV-MCNC: 1.5 MMOL/L — SIGNIFICANT CHANGE UP (ref 0.5–2)
LACTATE BLDV-MCNC: 1.8 MMOL/L — SIGNIFICANT CHANGE UP (ref 0.5–2)
LEUKOCYTE ESTERASE UR-ACNC: ABNORMAL
LIDOCAIN IGE QN: 41 U/L — SIGNIFICANT CHANGE UP (ref 7–60)
LYMPHOCYTES # BLD AUTO: 1.51 K/UL — SIGNIFICANT CHANGE UP (ref 1–3.3)
LYMPHOCYTES # BLD AUTO: 12.8 % — LOW (ref 13–44)
MAGNESIUM SERPL-MCNC: 2.1 MG/DL — SIGNIFICANT CHANGE UP (ref 1.6–2.6)
MCHC RBC-ENTMCNC: 30.5 PG — SIGNIFICANT CHANGE UP (ref 27–34)
MCHC RBC-ENTMCNC: 32.4 GM/DL — SIGNIFICANT CHANGE UP (ref 32–36)
MCV RBC AUTO: 94 FL — SIGNIFICANT CHANGE UP (ref 80–100)
MONOCYTES # BLD AUTO: 1.22 K/UL — HIGH (ref 0–0.9)
MONOCYTES NFR BLD AUTO: 10.3 % — SIGNIFICANT CHANGE UP (ref 2–14)
NEUTROPHILS # BLD AUTO: 8.96 K/UL — HIGH (ref 1.8–7.4)
NEUTROPHILS NFR BLD AUTO: 75.8 % — SIGNIFICANT CHANGE UP (ref 43–77)
NITRITE UR-MCNC: NEGATIVE — SIGNIFICANT CHANGE UP
NRBC # BLD: 0 /100 WBCS — SIGNIFICANT CHANGE UP (ref 0–0)
NT-PROBNP SERPL-SCNC: 4362 PG/ML — HIGH (ref 0–300)
PCO2 BLDV: 42 MMHG — SIGNIFICANT CHANGE UP (ref 39–42)
PCO2 BLDV: 44 MMHG — HIGH (ref 39–42)
PH BLDV: 7.29 — LOW (ref 7.32–7.43)
PH BLDV: 7.29 — LOW (ref 7.32–7.43)
PH UR: 5.5 — SIGNIFICANT CHANGE UP (ref 5–8)
PHOSPHATE SERPL-MCNC: 3.8 MG/DL — SIGNIFICANT CHANGE UP (ref 2.5–4.5)
PLATELET # BLD AUTO: 272 K/UL — SIGNIFICANT CHANGE UP (ref 150–400)
PO2 BLDV: 31 MMHG — SIGNIFICANT CHANGE UP (ref 25–45)
PO2 BLDV: 32 MMHG — SIGNIFICANT CHANGE UP (ref 25–45)
POTASSIUM BLDV-SCNC: 4.5 MMOL/L — SIGNIFICANT CHANGE UP (ref 3.5–5.1)
POTASSIUM BLDV-SCNC: 5 MMOL/L — SIGNIFICANT CHANGE UP (ref 3.5–5.1)
POTASSIUM SERPL-MCNC: 4.3 MMOL/L — SIGNIFICANT CHANGE UP (ref 3.5–5.3)
POTASSIUM SERPL-SCNC: 4.3 MMOL/L — SIGNIFICANT CHANGE UP (ref 3.5–5.3)
PROT SERPL-MCNC: 7.6 G/DL — SIGNIFICANT CHANGE UP (ref 6–8.3)
PROT UR-MCNC: 30 MG/DL
PROTHROM AB SERPL-ACNC: 14.2 SEC — HIGH (ref 9.5–13)
RBC # BLD: 4.69 M/UL — SIGNIFICANT CHANGE UP (ref 3.8–5.2)
RBC # FLD: 15.1 % — HIGH (ref 10.3–14.5)
RBC CASTS # UR COMP ASSIST: 5 /HPF — HIGH (ref 0–4)
REVIEW: SIGNIFICANT CHANGE UP
SAO2 % BLDV: 49 % — LOW (ref 67–88)
SAO2 % BLDV: 50.8 % — LOW (ref 67–88)
SODIUM SERPL-SCNC: 133 MMOL/L — LOW (ref 135–145)
SP GR SPEC: 1.02 — SIGNIFICANT CHANGE UP (ref 1–1.03)
SQUAMOUS # UR AUTO: 21 /HPF — HIGH (ref 0–5)
TROPONIN T, HIGH SENSITIVITY RESULT: 104 NG/L — HIGH (ref 0–51)
TROPONIN T, HIGH SENSITIVITY RESULT: 85 NG/L — HIGH (ref 0–51)
UROBILINOGEN FLD QL: 0.2 MG/DL — SIGNIFICANT CHANGE UP (ref 0.2–1)
WBC # BLD: 11.82 K/UL — HIGH (ref 3.8–10.5)
WBC # FLD AUTO: 11.82 K/UL — HIGH (ref 3.8–10.5)
WBC UR QL: 66 /HPF — HIGH (ref 0–5)

## 2024-07-21 PROCEDURE — 71045 X-RAY EXAM CHEST 1 VIEW: CPT | Mod: 26

## 2024-07-21 PROCEDURE — 71250 CT THORAX DX C-: CPT | Mod: 26,MC

## 2024-07-21 PROCEDURE — 72125 CT NECK SPINE W/O DYE: CPT | Mod: 26,MC

## 2024-07-21 PROCEDURE — 99223 1ST HOSP IP/OBS HIGH 75: CPT

## 2024-07-21 PROCEDURE — 99285 EMERGENCY DEPT VISIT HI MDM: CPT | Mod: GC

## 2024-07-21 PROCEDURE — 70450 CT HEAD/BRAIN W/O DYE: CPT | Mod: 26,MC

## 2024-07-21 PROCEDURE — 74176 CT ABD & PELVIS W/O CONTRAST: CPT | Mod: 26,MC

## 2024-07-21 RX ORDER — ACETAMINOPHEN 500 MG
650 TABLET ORAL EVERY 6 HOURS
Refills: 0 | Status: DISCONTINUED | OUTPATIENT
Start: 2024-07-21 | End: 2024-07-29

## 2024-07-21 RX ORDER — DIGOXIN 125 MCG
250 TABLET ORAL DAILY
Refills: 0 | Status: DISCONTINUED | OUTPATIENT
Start: 2024-07-21 | End: 2024-07-22

## 2024-07-21 RX ORDER — ONDANSETRON HCL/PF 4 MG/2 ML
4 VIAL (ML) INJECTION EVERY 8 HOURS
Refills: 0 | Status: DISCONTINUED | OUTPATIENT
Start: 2024-07-21 | End: 2024-07-29

## 2024-07-21 RX ORDER — BACTERIOSTATIC SODIUM CHLORIDE 0.9 %
1000 VIAL (ML) INJECTION
Refills: 0 | Status: COMPLETED | OUTPATIENT
Start: 2024-07-21 | End: 2024-07-21

## 2024-07-21 RX ORDER — MIRTAZAPINE 15 MG
7.5 TABLET ORAL AT BEDTIME
Refills: 0 | Status: DISCONTINUED | OUTPATIENT
Start: 2024-07-21 | End: 2024-07-29

## 2024-07-21 RX ORDER — METOPROLOL TARTRATE 100 MG
125 TABLET ORAL
Refills: 0 | Status: DISCONTINUED | OUTPATIENT
Start: 2024-07-21 | End: 2024-07-22

## 2024-07-21 RX ORDER — CLOPIDOGREL BISULFATE 75 MG/1
75 TABLET, FILM COATED ORAL DAILY
Refills: 0 | Status: DISCONTINUED | OUTPATIENT
Start: 2024-07-21 | End: 2024-07-29

## 2024-07-21 RX ORDER — ATORVASTATIN CALCIUM 40 MG/1
80 TABLET, FILM COATED ORAL AT BEDTIME
Refills: 0 | Status: DISCONTINUED | OUTPATIENT
Start: 2024-07-21 | End: 2024-07-29

## 2024-07-21 RX ORDER — APIXABAN 5 MG/1
5 TABLET, FILM COATED ORAL EVERY 12 HOURS
Refills: 0 | Status: DISCONTINUED | OUTPATIENT
Start: 2024-07-21 | End: 2024-07-29

## 2024-07-21 RX ORDER — LYSINE HCL 500 MG
500 TABLET ORAL DAILY
Refills: 0 | Status: DISCONTINUED | OUTPATIENT
Start: 2024-07-21 | End: 2024-07-29

## 2024-07-21 RX ORDER — MIDODRINE HYDROCHLORIDE 2.5 MG/1
5 TABLET ORAL THREE TIMES A DAY
Refills: 0 | Status: DISCONTINUED | OUTPATIENT
Start: 2024-07-21 | End: 2024-07-29

## 2024-07-21 RX ORDER — LEVOTHYROXINE SODIUM 175 MCG
175 TABLET ORAL DAILY
Refills: 0 | Status: DISCONTINUED | OUTPATIENT
Start: 2024-07-21 | End: 2024-07-23

## 2024-07-21 RX ORDER — BACTERIOSTATIC SODIUM CHLORIDE 0.9 %
500 VIAL (ML) INJECTION ONCE
Refills: 0 | Status: COMPLETED | OUTPATIENT
Start: 2024-07-21 | End: 2024-07-21

## 2024-07-21 RX ORDER — CYANOCOBALAMIN/FOLIC AC/VIT B6 2-2.5-25MG
1 TABLET ORAL DAILY
Refills: 0 | Status: DISCONTINUED | OUTPATIENT
Start: 2024-07-21 | End: 2024-07-29

## 2024-07-21 RX ADMIN — Medication 100 MILLIGRAM(S): at 16:52

## 2024-07-21 RX ADMIN — Medication 100 MILLILITER(S): at 21:02

## 2024-07-21 RX ADMIN — Medication 500 MILLILITER(S): at 16:52

## 2024-07-21 RX ADMIN — ATORVASTATIN CALCIUM 80 MILLIGRAM(S): 40 TABLET, FILM COATED ORAL at 22:07

## 2024-07-21 RX ADMIN — Medication 7.5 MILLIGRAM(S): at 22:07

## 2024-07-21 NOTE — H&P ADULT - NSHPLABSRESULTS_GEN_ALL_CORE
Labs personally reviewed:                          14.3   11.82 )-----------( 272      ( 2024 12:05 )             44.1     07-21    133<L>  |  97  |  51<H>  ----------------------------<  86  4.3   |  18<L>  |  2.08<H>    Ca    10.0      2024 12:05  Phos  3.8     07-  Mg     2.1     -    TPro  7.6  /  Alb  3.7  /  TBili  0.9  /  DBili  x   /  AST  24  /  ALT  17  /  AlkPhos  80  07-    CARDIAC MARKERS ( 2024 12:05 )  x     / x     / 30 U/L / x     / x          LIVER FUNCTIONS - ( 2024 12:05 )  Alb: 3.7 g/dL / Pro: 7.6 g/dL / ALK PHOS: 80 U/L / ALT: 17 U/L / AST: 24 U/L / GGT: x           PT/INR - ( 2024 12:05 )   PT: 14.2 sec;   INR: 1.30 ratio         PTT - ( 2024 12:05 )  PTT:28.2 sec  Urinalysis Basic - ( 2024 14:32 )    Color: Yellow / Appearance: Cloudy / S.020 / pH: x  Gluc: x / Ketone: Trace mg/dL  / Bili: Negative / Urobili: 0.2 mg/dL   Blood: x / Protein: 30 mg/dL / Nitrite: Negative   Leuk Esterase: Moderate / RBC: 5 /HPF / WBC 66 /HPF   Sq Epi: x / Non Sq Epi: 21 /HPF / Bacteria: Many /HPF      CAPILLARY BLOOD GLUCOSE      POCT Blood Glucose.: 79 mg/dL (2024 11:32)      Imaging:  CT head: Mild to moderate chronic microvascular changes without evidence   of an acute transcortical infarction or hemorrhage.    CT C-spine: Moderate spondylosis. No acute osseous abnormality. Consider   MRI as clinically warranted.    CT chest AP: Left adrenal mass which is unchanged. There is higher attenuation   centrally on this noncontrast study. This may be chronic although the   prior comparison studies are all with contrast limiting direct   comparison. Interval hemorrhage within the left adrenal gland is   possible, although I would expect left adrenal gland to have increased in   size of this were the case. Clinical correlation is suggested.,      EKG

## 2024-07-21 NOTE — H&P ADULT - PROBLEM SELECTOR PLAN 3
suspect pre renal , iso poor po intake   - IV fluid challenge   -renal dose medications  - monitor ins and outs  - monitor creatinine   - avoid nephrotoxins

## 2024-07-21 NOTE — ED PROVIDER NOTE - PROGRESS NOTE DETAILS
MD Aishwarya (PGY-3) patient's labs and imaging reviewed.  Patient UA consistent with UTI though it was contaminated with multiple epithelial cells.  Patient CT notable for possible interval hemorrhage of the left adrenal mass, but is unclear.  Patient vitals medically stable.  Patient hemoglobin stable at 14.3.  Patient does have an CHANELL, with a creatinine of 2.08 compared to her baseline of 1.06.  Patient admitted to the hospital.

## 2024-07-21 NOTE — H&P ADULT - ASSESSMENT
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

## 2024-07-21 NOTE — ED PROVIDER NOTE - CLINICAL SUMMARY MEDICAL DECISION MAKING FREE TEXT BOX
78-year-old female with past medical history of MCI/dementia, hypertension, hyperlipidemia, A-fib on Eliquis, CAD on Plavix, COPD, CROW, urinary retention, recurrent UTIs, hypothyroidism, presenting to the emergency room with altered mental status for about 2 days.  Vitals nonactionable.  Physical exam notable for diffuse chest wall tenderness, abdominal tenderness.  No midline spinal tenderness or step-offs.  Patient is protecting her airway.  Concern for infectious versus metabolic causes of altered mental status.  Will get labs including CBC, CMP, troponin, mag, Phos, BNP, coags, UA/UC, VBG, CT head, neck, CT chest abdomen pelvis.  Patient likely to be admitted for altered mental status.

## 2024-07-21 NOTE — ED ADULT NURSE NOTE - PAIN: PRESENCE, MLM
Final Anesthesia Post-op Assessment    Patient: Margo Horn  Procedure(s) Performed: COLONOSCOPY  Anesthesia type: MAC    Vitals Value Taken Time   Temp 36.5 10/12/21 0821   Pulse 60 10/12/21 0817   Resp 16 10/12/21 0817   SpO2 100 % 10/12/21 0817   /68 10/12/21 0815         Patient Location: Phase II  Post-op Vital Signs:stable  Level of Consciousness: participates in exam, awake, alert and return to baseline  Respiratory Status: spontaneous ventilation and unassisted  Cardiovascular stable  Hydration: euvolemic  Pain Management: well controlled  Handoff: Handoff to receiving nurse was performed and questions were answered  Vomiting: none  Nausea: None  Airway Patency:patent  Post-op Assessment: no complications, patient tolerated procedure well with no complications and dentition within defined limits      No complications documented.   
denies pain/discomfort (Rating = 0)

## 2024-07-21 NOTE — ED PROVIDER NOTE - ATTENDING CONTRIBUTION TO CARE
79 yo F with PMH of mci/dementia, [place], htn, hld, afib, CAD, s/p pci w stents, COPD, little, urinary incontinence, recurrent uti, hypothyroidism, presents for altered mental status.  Per daughter at bedside, patient is less responsive than usual baseline.  She also notes that patient had a fall with head trauma 1 week ago, not evaluated at that time but does admit to frequent falls in the past.  Denies any known fever or recent illness.  History is otherwise limited due to patient's mental status.    Physical Exam:  Gen: NAD, awake and alert  HEENT: Atraumatic, oropharynx clear, moist mucous membranes, normal conjunctiva. PERRL  Cardio: RRR, no murmurs, rubs or gallops  Lung: CTAB, no respiratory distress, no wheezes/rhonchi/rales B/L, speaking in full sentences  Abd: soft, diffuse TTP without guarding or rigidity, no rebound tenderness  MSK: no visible deformities, ROMx4   Neuro: No focal sensory or motor deficits. A&O1 (baseline).  Skin: Warm, well perfused, no rash, no leg swelling    This patient presents for altered mental status x 1 day. Hx of frequent falls, on eliquis. Patient is DNR/DNI with limited interventions, including antibiotics and IV fluids. Vitals WNL, afebrile. EKG same as previous in 4/2024 with STD in inferior and lateral leads, reassuring for no active STEMI. Concern for toxic metabolic derangements, uremia, encephalopathy, ICH, ACS, mesenteric ischemia. Given wide differential, will obtain cbc, cmp, UA, ct abd/pelv, CXR, troponin. 77 yo F with PMH of mci/dementia, [place], htn, hld, afib, CAD, s/p pci w stents, COPD, little, urinary incontinence, recurrent uti, hypothyroidism, presents for altered mental status.  Per daughter at bedside, patient is less responsive than usual baseline.  She also notes that patient had a fall with head trauma 1 week ago, not evaluated at that time but does admit to frequent falls in the past.  Denies any known fever or recent illness.  History is otherwise limited due to patient's mental status.    Physical Exam:  Gen: NAD, awake and alert  HEENT: Atraumatic, oropharynx clear, moist mucous membranes, normal conjunctiva. PERRL  Cardio: RRR, no murmurs, rubs or gallops  Lung: CTAB, no respiratory distress, no wheezes/rhonchi/rales B/L, speaking in full sentences  Abd: soft, diffuse TTP without guarding or rigidity, no rebound tenderness  MSK: no visible deformities, ROMx4   Neuro: No focal sensory or motor deficits. A&O1 (baseline).  Skin: Warm, well perfused, no rash, no leg swelling    This patient presents for altered mental status x 1 day. Hx of frequent falls, on eliquis. Patient is DNR/DNI with limited interventions, including antibiotics and IV fluids. Vitals WNL, afebrile. EKG same as previous in 4/2024 with STD in inferior and lateral leads, reassuring for no active STEMI. Concern for toxic metabolic derangements, uremia, encephalopathy, ICH, ACS, mesenteric ischemia. Given wide differential, will obtain cbc, cmp, UA, ct abd/pelv, CXR, troponin.  UA reveals +UTI, will start on ceftriaxone. may be cause to AMS.   Pt signed out to Dr. Collado at 3:00pm pending CT scans. pending dispo

## 2024-07-21 NOTE — ED CLERICAL - NS ED CLERK NOTE PRE-ARRIVAL INFORMATION; ADDITIONAL PRE-ARRIVAL INFORMATION

## 2024-07-21 NOTE — ED PROVIDER NOTE - PHYSICAL EXAMINATION
Const: not in acute distress. Protecting her airway  Eyes: no conjunctival injection  HEENT: Head NCAT, Moist MM.  Neck: Trachea midline.   CVS: +S1/S2, No murmurs or gallops  RESP: Unlabored respiratory effort. Clear to auscultation bilaterally.  Diffuse chest wall tenderness  GI: Diffuse tenderness soft. No melena/BRB in ileostomy bag, No CVA tenderness b/l.   Skin: Intact.   Neuro: moving all four extremities  Psych: AAOx0. Intermittently responsive to pain.

## 2024-07-21 NOTE — ED ADULT NURSE NOTE - NSFALLRISKINTERV_ED_ALL_ED
Assistance OOB with selected safe patient handling equipment if applicable/Assistance with ambulation/Communicate fall risk and risk factors to all staff, patient, and family/Monitor gait and stability/Monitor for mental status changes and reorient to person, place, and time, as needed/Provide visual cue: yellow wristband, yellow gown, etc/Reinforce activity limits and safety measures with patient and family/Toileting schedule using arm’s reach rule for commode and bathroom/Use of alarms - bed, stretcher, chair and/or video monitoring/Call bell, personal items and telephone in reach/Instruct patient to call for assistance before getting out of bed/chair/stretcher/Non-slip footwear applied when patient is off stretcher/[place] to call system/Physically safe environment - no spills, clutter or unnecessary equipment/Purposeful Proactive Rounding/Room/bathroom lighting operational, light cord in reach

## 2024-07-21 NOTE — PATIENT PROFILE ADULT - FALL HARM RISK - HARM RISK INTERVENTIONS

## 2024-07-21 NOTE — H&P ADULT - HISTORY OF PRESENT ILLNESS
Patient is a 78-year-old female with past medical history of MCI/dementia, hypertension, hyperlipidemia, A-fib on Eliquis, CAD on Plavix, COPD, CROW, urinary retention, recurrent UTIs, hypothyroidism,  presents for altered mental status for the past two days.  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   Per daughter, patient has been less interactive and nonverbal over the past few days. Patient had an unwitnessed fall about one week prior to admission with trauma to her buttocks, but remained at baseline mental state at the time.  Over the past two days she is more sleepy, not eating or taking medications . She had no reported fevers. She had one episode of vomiting.

## 2024-07-21 NOTE — ED ADULT NURSE NOTE - OBJECTIVE STATEMENT
Pt present to ED via EMS. As per EMS, pt has been altered x few days, pt hasn't eaten/ taken meds in 2 days, and vomiting bile. As per daughter at bedside, pt had an unwitnessed fall on 7/14, unknown headstrike, unknown LOC. Pt has hx of A-fib, is on Eliquis and Plavix. Pt has a RLQ ostomy w/ little output d/t ischemic bowel. Pt is currently AAOx0- not speaking in clear, concise sentences. As per daughter her baseline is AAOx3, independent, but has been bedbound for the last 3 weeks. Pt placed on cardiac monitor, A-fib. Bruising noted to pt's back L thigh. Plan of care and monitoring discussed with pt. Bed locked and lowered for safety. Safety and comfort maintained. Pt present to ED via EMS. As per EMS, pt has been altered x few days, pt hasn't eaten/ taken meds in 2 days, and vomiting bile. As per daughter at bedside, pt had an unwitnessed fall on 7/14, unknown headstrike, unknown LOC. Pt has hx of A-fib, is on Eliquis and Plavix. Pt has a RLQ ostomy w/ little output d/t ischemic bowel. Pt is currently AAOx0- not speaking in clear, concise sentences. As per daughter her baseline is AAOx3, independent, but has been bedbound for the last 3 weeks d/t previous fall and weakness. Pt placed on cardiac monitor, A-fib. VS as documented. Bruising noted to pt's back L thigh and to R side of forehead, as per daughter this is d/t her fall. Plan of care and monitoring discussed with pt. Bed locked and lowered for safety. Safety and comfort maintained.

## 2024-07-21 NOTE — H&P ADULT - PROBLEM SELECTOR PLAN 2
UA grossly positive , wbc 11 ,  prior culture w/ pansensitive klebsiella   -c/w ceftriaxone   -f/u urine and blood cultures

## 2024-07-21 NOTE — ED PROVIDER NOTE - OBJECTIVE STATEMENT
78-year-old female with past medical history of MCI/dementia, hypertension, hyperlipidemia, A-fib on Eliquis, CAD on Plavix, COPD, CROW, urinary retention, recurrent UTIs, hypothyroidism, presenting to the emergency room with altered mental status for about 2 days.  Patient is typically verbal and is able to communicate her needs.  Since 2 days ago, patient has been grossly nonverbal.  Patient reportedly fell about a week ago on Sunday.  Fall was unwitnessed.  Family noticed that she had a bruise on her left gluts. Denies fever, chills.  Does report 1 right episode of nausea and vomiting.

## 2024-07-21 NOTE — PATIENT PROFILE ADULT - FUNCTIONAL ASSESSMENT - BASIC MOBILITY 6.
2-calculated by average/Not able to assess (calculate score using [facility] averaging method)

## 2024-07-21 NOTE — H&P ADULT - NSHPPHYSICALEXAM_GEN_ALL_CORE
Vital Signs Last 24 Hrs  T(C): 36.3 (21 Jul 2024 18:03), Max: 36.5 (21 Jul 2024 16:56)  T(F): 97.4 (21 Jul 2024 18:03), Max: 97.7 (21 Jul 2024 16:56)  HR: 78 (21 Jul 2024 18:03) (77 - 87)  BP: 127/79 (21 Jul 2024 18:03) (108/76 - 134/86)  BP(mean): 103 (21 Jul 2024 11:42) (103 - 103)  RR: 18 (21 Jul 2024 18:03) (18 - 19)  SpO2: 99% (21 Jul 2024 18:03) (90% - 99%)    Parameters below as of 21 Jul 2024 18:03  Patient On (Oxygen Delivery Method): room air Vital Signs Last 24 Hrs  T(C): 36.3 (21 Jul 2024 18:03), Max: 36.5 (21 Jul 2024 16:56)  T(F): 97.4 (21 Jul 2024 18:03), Max: 97.7 (21 Jul 2024 16:56)  HR: 78 (21 Jul 2024 18:03) (77 - 87)  BP: 127/79 (21 Jul 2024 18:03) (108/76 - 134/86)  BP(mean): 103 (21 Jul 2024 11:42) (103 - 103)  RR: 18 (21 Jul 2024 18:03) (18 - 19)  SpO2: 99% (21 Jul 2024 18:03) (90% - 99%)    Parameters below as of 21 Jul 2024 18:03  Patient On (Oxygen Delivery Method): room air    T(C): 36.7 (07-21-24 @ 20:13), Max: 36.7 (07-21-24 @ 20:13)  HR: 77 (07-21-24 @ 20:13) (77 - 87)  BP: 120/78 (07-21-24 @ 20:13) (108/76 - 134/86)  RR: 17 (07-21-24 @ 20:13) (17 - 19)  SpO2: 98% (07-21-24 @ 20:13) (90% - 99%)    GENERAL: asleep , awakens to tactile stimuli , A&Ox 0   HEAD:  Atraumatic, Normocephalic  ENT: EOMI, PERRLA, conjunctiva and sclera clear,  moist mucosa no pharyngeal erythema or exudates   NECK: supple , no JVD   CHEST/LUNG: Clear to auscultation bilaterally; No wheeze, equal breath sounds bilaterally   BACK: limited exam due to medical condition   HEART: Regular rate and rhythm; No murmurs, rubs, or gallops  ABDOMEN: + ostomy , abd Soft, + tender to palpation , Nondistended; Bowel sounds present  EXTREMITIES:  No clubbing, cyanosis,+1 pitting edema b/l   MSK: No joint swelling or effusions, ROM intact   PSYCH: Normal behavior/affect  NEUROLOGY, non-focal, cranial nerves intact  akin -scattered echymosis  no open wound

## 2024-07-21 NOTE — H&P ADULT - TIME BILLING
Chart review , case discussion with other  provider , obtain  history via assistance of family ,  examination of patient , answering questions and concerns , ordering labs and medications , and documentation

## 2024-07-21 NOTE — ED ADULT NURSE REASSESSMENT NOTE - NS ED NURSE REASSESS COMMENT FT1
Sterile technique used to obtain urine sample with an output of 150 CC of clear, yellow urine. Second RN at bedside to ensure sterility. Pt appears comfortable at this time and is in no acute distress. Pt remains AAOx0. Plan of care and monitoring discussed with pt and daughter. Bed locked and lowered for safety. Safety and comfort maintained.

## 2024-07-22 ENCOUNTER — NON-APPOINTMENT (OUTPATIENT)
Age: 79
End: 2024-07-22

## 2024-07-22 DIAGNOSIS — R00.1 BRADYCARDIA, UNSPECIFIED: ICD-10-CM

## 2024-07-22 LAB
ALBUMIN SERPL ELPH-MCNC: 3.4 G/DL — SIGNIFICANT CHANGE UP (ref 3.3–5)
ALP SERPL-CCNC: 72 U/L — SIGNIFICANT CHANGE UP (ref 40–120)
ALT FLD-CCNC: 14 U/L — SIGNIFICANT CHANGE UP (ref 10–45)
ANION GAP SERPL CALC-SCNC: 18 MMOL/L — HIGH (ref 5–17)
AST SERPL-CCNC: 23 U/L — SIGNIFICANT CHANGE UP (ref 10–40)
BASOPHILS # BLD AUTO: 0.02 K/UL — SIGNIFICANT CHANGE UP (ref 0–0.2)
BASOPHILS NFR BLD AUTO: 0.2 % — SIGNIFICANT CHANGE UP (ref 0–2)
BILIRUB SERPL-MCNC: 0.7 MG/DL — SIGNIFICANT CHANGE UP (ref 0.2–1.2)
BUN SERPL-MCNC: 50 MG/DL — HIGH (ref 7–23)
CALCIUM SERPL-MCNC: 9.8 MG/DL — SIGNIFICANT CHANGE UP (ref 8.4–10.5)
CHLORIDE SERPL-SCNC: 100 MMOL/L — SIGNIFICANT CHANGE UP (ref 96–108)
CO2 SERPL-SCNC: 16 MMOL/L — LOW (ref 22–31)
CREAT SERPL-MCNC: 1.87 MG/DL — HIGH (ref 0.5–1.3)
DIGOXIN SERPL-MCNC: 3.3 NG/ML — CRITICAL HIGH (ref 0.8–2)
EGFR: 27 ML/MIN/1.73M2 — LOW
EOSINOPHIL # BLD AUTO: 0.01 K/UL — SIGNIFICANT CHANGE UP (ref 0–0.5)
EOSINOPHIL NFR BLD AUTO: 0.1 % — SIGNIFICANT CHANGE UP (ref 0–6)
GLUCOSE BLDC GLUCOMTR-MCNC: 106 MG/DL — HIGH (ref 70–99)
GLUCOSE BLDC GLUCOMTR-MCNC: 119 MG/DL — HIGH (ref 70–99)
GLUCOSE BLDC GLUCOMTR-MCNC: 154 MG/DL — HIGH (ref 70–99)
GLUCOSE BLDC GLUCOMTR-MCNC: 84 MG/DL — SIGNIFICANT CHANGE UP (ref 70–99)
GLUCOSE BLDC GLUCOMTR-MCNC: 98 MG/DL — SIGNIFICANT CHANGE UP (ref 70–99)
GLUCOSE SERPL-MCNC: 86 MG/DL — SIGNIFICANT CHANGE UP (ref 70–99)
HCT VFR BLD CALC: 43.6 % — SIGNIFICANT CHANGE UP (ref 34.5–45)
HGB BLD-MCNC: 14.1 G/DL — SIGNIFICANT CHANGE UP (ref 11.5–15.5)
IMM GRANULOCYTES NFR BLD AUTO: 0.6 % — SIGNIFICANT CHANGE UP (ref 0–0.9)
LYMPHOCYTES # BLD AUTO: 1.09 K/UL — SIGNIFICANT CHANGE UP (ref 1–3.3)
LYMPHOCYTES # BLD AUTO: 8.6 % — LOW (ref 13–44)
MAGNESIUM SERPL-MCNC: 2.1 MG/DL — SIGNIFICANT CHANGE UP (ref 1.6–2.6)
MCHC RBC-ENTMCNC: 30.5 PG — SIGNIFICANT CHANGE UP (ref 27–34)
MCHC RBC-ENTMCNC: 32.3 GM/DL — SIGNIFICANT CHANGE UP (ref 32–36)
MCV RBC AUTO: 94.4 FL — SIGNIFICANT CHANGE UP (ref 80–100)
MONOCYTES # BLD AUTO: 1.12 K/UL — HIGH (ref 0–0.9)
MONOCYTES NFR BLD AUTO: 8.8 % — SIGNIFICANT CHANGE UP (ref 2–14)
NEUTROPHILS # BLD AUTO: 10.34 K/UL — HIGH (ref 1.8–7.4)
NEUTROPHILS NFR BLD AUTO: 81.7 % — HIGH (ref 43–77)
NRBC # BLD: 0 /100 WBCS — SIGNIFICANT CHANGE UP (ref 0–0)
PHOSPHATE SERPL-MCNC: 4 MG/DL — SIGNIFICANT CHANGE UP (ref 2.5–4.5)
PLATELET # BLD AUTO: 240 K/UL — SIGNIFICANT CHANGE UP (ref 150–400)
POTASSIUM SERPL-MCNC: 4.4 MMOL/L — SIGNIFICANT CHANGE UP (ref 3.5–5.3)
POTASSIUM SERPL-SCNC: 4.4 MMOL/L — SIGNIFICANT CHANGE UP (ref 3.5–5.3)
PROT SERPL-MCNC: 6.8 G/DL — SIGNIFICANT CHANGE UP (ref 6–8.3)
RBC # BLD: 4.62 M/UL — SIGNIFICANT CHANGE UP (ref 3.8–5.2)
RBC # FLD: 15.2 % — HIGH (ref 10.3–14.5)
SODIUM SERPL-SCNC: 134 MMOL/L — LOW (ref 135–145)
TSH SERPL-MCNC: 0.04 UIU/ML — LOW (ref 0.27–4.2)
WBC # BLD: 12.66 K/UL — HIGH (ref 3.8–10.5)
WBC # FLD AUTO: 12.66 K/UL — HIGH (ref 3.8–10.5)

## 2024-07-22 RX ORDER — GLUCAGON INJECTION, SOLUTION 0.5 MG/.1ML
2 INJECTION, SOLUTION SUBCUTANEOUS ONCE
Refills: 0 | Status: COMPLETED | OUTPATIENT
Start: 2024-07-22 | End: 2024-07-22

## 2024-07-22 RX ORDER — DEXTROSE MONOHYDRATE, SODIUM CHLORIDE, SODIUM LACTATE, CALCIUM CHLORIDE, MAGNESIUM CHLORIDE 1.5; 538; 448; 18.4; 5.08 G/100ML; MG/100ML; MG/100ML; MG/100ML; MG/100ML
1000 SOLUTION INTRAPERITONEAL
Refills: 0 | Status: DISCONTINUED | OUTPATIENT
Start: 2024-07-22 | End: 2024-07-26

## 2024-07-22 RX ORDER — GLUCAGON INJECTION, SOLUTION 0.5 MG/.1ML
4 INJECTION, SOLUTION SUBCUTANEOUS ONCE
Refills: 0 | Status: COMPLETED | OUTPATIENT
Start: 2024-07-22 | End: 2024-07-22

## 2024-07-22 RX ORDER — DEXTROSE MONOHYDRATE, SODIUM CHLORIDE, SODIUM LACTATE, CALCIUM CHLORIDE, MAGNESIUM CHLORIDE 1.5; 538; 448; 18.4; 5.08 G/100ML; MG/100ML; MG/100ML; MG/100ML; MG/100ML
1000 SOLUTION INTRAPERITONEAL
Refills: 0 | Status: DISCONTINUED | OUTPATIENT
Start: 2024-07-22 | End: 2024-07-22

## 2024-07-22 RX ADMIN — GLUCAGON INJECTION, SOLUTION 4 MILLIGRAM(S): 0.5 INJECTION, SOLUTION SUBCUTANEOUS at 07:27

## 2024-07-22 RX ADMIN — MIDODRINE HYDROCHLORIDE 5 MILLIGRAM(S): 2.5 TABLET ORAL at 12:11

## 2024-07-22 RX ADMIN — MIDODRINE HYDROCHLORIDE 5 MILLIGRAM(S): 2.5 TABLET ORAL at 05:15

## 2024-07-22 RX ADMIN — Medication 125 MILLIGRAM(S): at 05:15

## 2024-07-22 RX ADMIN — Medication 1 TABLET(S): at 12:11

## 2024-07-22 RX ADMIN — Medication 175 MICROGRAM(S): at 05:16

## 2024-07-22 RX ADMIN — Medication 250 MICROGRAM(S): at 05:15

## 2024-07-22 RX ADMIN — Medication 7.5 MILLIGRAM(S): at 21:46

## 2024-07-22 RX ADMIN — GLUCAGON INJECTION, SOLUTION 2 MILLIGRAM(S): 0.5 INJECTION, SOLUTION SUBCUTANEOUS at 07:15

## 2024-07-22 RX ADMIN — Medication 500 MILLIGRAM(S): at 12:11

## 2024-07-22 RX ADMIN — GLUCAGON INJECTION, SOLUTION 2 MILLIGRAM(S): 0.5 INJECTION, SOLUTION SUBCUTANEOUS at 07:10

## 2024-07-22 RX ADMIN — CLOPIDOGREL BISULFATE 75 MILLIGRAM(S): 75 TABLET, FILM COATED ORAL at 12:12

## 2024-07-22 RX ADMIN — Medication 100 MILLIGRAM(S): at 13:33

## 2024-07-22 RX ADMIN — ATORVASTATIN CALCIUM 80 MILLIGRAM(S): 40 TABLET, FILM COATED ORAL at 21:44

## 2024-07-22 RX ADMIN — DEXTROSE MONOHYDRATE, SODIUM CHLORIDE, SODIUM LACTATE, CALCIUM CHLORIDE, MAGNESIUM CHLORIDE 50 MILLILITER(S): 1.5; 538; 448; 18.4; 5.08 SOLUTION INTRAPERITONEAL at 16:33

## 2024-07-22 RX ADMIN — APIXABAN 5 MILLIGRAM(S): 5 TABLET, FILM COATED ORAL at 05:15

## 2024-07-22 RX ADMIN — APIXABAN 5 MILLIGRAM(S): 5 TABLET, FILM COATED ORAL at 17:06

## 2024-07-22 RX ADMIN — MIDODRINE HYDROCHLORIDE 5 MILLIGRAM(S): 2.5 TABLET ORAL at 17:06

## 2024-07-22 NOTE — RAPID RESPONSE TEAM SUMMARY - NSOTHERINTERVENTIONSRRT_GEN_ALL_CORE
-q6h fingersticks after glucagon administration  -maintain pads, atropine @ bedside, monitor on tele -q6h fingersticks after glucagon administration  -maintain pads, atropine @ bedside, monitor on tele  -primary team can consider role for calcium gluconate, insulin gtt or other supportive measures, however anticipate lopressor to wash out ~4 hours after administration ~9-10am (was given ~5AM)

## 2024-07-22 NOTE — PROGRESS NOTE ADULT - ASSESSMENT
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   likley txic metabolic in setting of uti and CHANELL     Problem/Plan - 2:  ·  Problem: History of UTI.   cont abx     Problem/Plan - 3:  ·  Problem: Sepsis with acute renal failure.   ·  Plan: suspect pre renal , iso poor po intake   - IV fluid challenge   -renal dose medications  - monitor ins and outs  - monitor creatinine   - avoid nephrotoxins.    Problem/Plan - 4:  ·  Problem: History of chronic atrial fibrillation.   pt recieved 125 mg of BB instead of 12.5  : RRT called for severe bradycardia   now improved with glucagon   cont to monitor   EP consulted   fu with cardio   - c/w Eliquis.    Problem/Plan - 5:  ·  Problem: History of CAD (coronary artery disease).   ·  Plan: trop elevated iso of CHANELL   -c/w plavix  -c/w atorvastatin.  - fu with cardio     Problem/Plan - 6:  ·  Problem: H/O hypotension.   ·  Plan: -c/w midodrine.    Problem/Plan - 7:  ·  Problem: History of COPD.   ·  Plan: minimal  hypercapnia , no wheezing   - can use albuterol PRN for wheezing.    Problem/Plan - 8:  ·  Problem: Hypothyroidism.   ·  Plan: -levothyroxine.    Problem/Plan - 9:  ·  Problem: Dementia.   ·  Plan: -mirtazapine Hs.    Problem/Plan - 10:  ·  Problem: History of creation of ostomy.   ·  Plan; - ostomy care.

## 2024-07-22 NOTE — PHARMACOTHERAPY INTERVENTION NOTE - COMMENTS
79 YO F with RRT this morning for bradycardia with pauses noted on tele. This morning patient received metoprolol and digoxin. Metoprolol order was dc'ed and patient got reversed with glucagon. Digoxin dose given at 5AM and level this morning was elevated at 3.3 (however seemed like level was drawn after patient got dose). Discussed with team during IDRs, recommended to obtain a level tomorrow morning to assess true level and digoxin order was dc'ed.     Tawana Chang, PharmD, BCPS  Clinical Pharmacy Specialist  Teams (preferred) or [phone]

## 2024-07-22 NOTE — PROVIDER CONTACT NOTE (OTHER) - ASSESSMENT
Pt a/ox2 and at baseline mentation, BP 96/66, HR 45-55, O2 97%, RR 18. Pt denies any palpitations, chest pain, and remains asymptomatic. Pt a/ox2 and at baseline mentation, BP 96/66, HR 45-55, O2 97%, RR 18. Pt denies any palpitations, chest pain, and remains asymptomatic. Metoprolol 125 mg PO given at 05:15.

## 2024-07-22 NOTE — PROGRESS NOTE ADULT - SUBJECTIVE AND OBJECTIVE BOX
Date of service: 07-22-24 @ 16:26      Patient is a 78y old  Female who presents with a chief complaint of AMS (22 Jul 2024 14:58)                                                               INTERVAL HPI/OVERNIGHT EVENTS:  events noted       REVIEW OF SYSTEMS:    no complaints                                                                                                                                                                                                                                                                      Medications:  MEDICATIONS  (STANDING):  apixaban 5 milliGRAM(s) Oral every 12 hours  ascorbic acid 500 milliGRAM(s) Oral daily  atorvastatin 80 milliGRAM(s) Oral at bedtime  atropine Injectable 1 milliGRAM(s) IV Push once  cefTRIAXone   IVPB 1000 milliGRAM(s) IV Intermittent every 24 hours  clopidogrel Tablet 75 milliGRAM(s) Oral daily  dextrose 5% + lactated ringers. 1000 milliLiter(s) (50 mL/Hr) IV Continuous <Continuous>  levothyroxine 175 MICROGram(s) Oral daily  midodrine. 5 milliGRAM(s) Oral three times a day  mirtazapine 7.5 milliGRAM(s) Oral at bedtime  multivitamin 1 Tablet(s) Oral daily    MEDICATIONS  (PRN):  acetaminophen     Tablet .. 650 milliGRAM(s) Oral every 6 hours PRN Temp greater or equal to 38C (100.4F), Mild Pain (1 - 3)  ondansetron Injectable 4 milliGRAM(s) IV Push every 8 hours PRN Nausea and/or Vomiting       Allergies    penicillin (Hives)    Intolerances      Vital Signs Last 24 Hrs  T(C): 36.4 (22 Jul 2024 12:21), Max: 36.7 (21 Jul 2024 20:13)  T(F): 97.5 (22 Jul 2024 12:21), Max: 98 (21 Jul 2024 20:13)  HR: 52 (22 Jul 2024 12:21) (47 - 81)  BP: 104/63 (22 Jul 2024 12:21) (104/63 - 127/79)  BP(mean): --  RR: 18 (22 Jul 2024 12:21) (17 - 19)  SpO2: 96% (22 Jul 2024 12:02) (95% - 99%)    Parameters below as of 22 Jul 2024 12:21  Patient On (Oxygen Delivery Method): room air      CAPILLARY BLOOD GLUCOSE      POCT Blood Glucose.: 98 mg/dL (22 Jul 2024 12:07)  POCT Blood Glucose.: 119 mg/dL (22 Jul 2024 08:28)  POCT Blood Glucose.: 84 mg/dL (22 Jul 2024 07:06)      07-22 @ 07:01  -  07-22 @ 16:26  --------------------------------------------------------  IN: 240 mL / OUT: 0 mL / NET: 240 mL      Physical Exam:    Daily     Daily   General:  cachectic   HEENT:  Nonicteric, PERRLA  CV:  RRR, S1S2   Lungs:  CTA B/L, no wheezes, rales, rhonchi  Abdomen:  Soft, non-tender, no distended, positive BS  Extremities:  no edema                                                                                                                                                                                                                                                                                     LABS:                               14.1   12.66 )-----------( 240      ( 22 Jul 2024 07:41 )             43.6                      07-22    134<L>  |  100  |  50<H>  ----------------------------<  86  4.4   |  16<L>  |  1.87<H>    Ca    9.8      22 Jul 2024 07:41  Phos  4.0     07-22  Mg     2.1     07-22    TPro  6.8  /  Alb  3.4  /  TBili  0.7  /  DBili  x   /  AST  23  /  ALT  14  /  AlkPhos  72  07-22                       RADIOLOGY & ADDITIONAL TESTS         I personally reviewed: [  ]EKG   [  ]CXR    [  ] CT      A/P:         Discussed with :     [facility] consultants' Notes   Time spent :

## 2024-07-22 NOTE — PROVIDER CONTACT NOTE (OTHER) - ASSESSMENT
Pt A&Ox2. No complaints or indicators of chest pain, palpitations, SOB, headache, or dizziness. Patient is asymptomatic. HR is back up to 50-56 BPM on tele monitor.

## 2024-07-23 LAB
ANION GAP SERPL CALC-SCNC: 18 MMOL/L — HIGH (ref 5–17)
BUN SERPL-MCNC: 48 MG/DL — HIGH (ref 7–23)
CALCIUM SERPL-MCNC: 9.7 MG/DL — SIGNIFICANT CHANGE UP (ref 8.4–10.5)
CHLORIDE SERPL-SCNC: 101 MMOL/L — SIGNIFICANT CHANGE UP (ref 96–108)
CO2 SERPL-SCNC: 14 MMOL/L — LOW (ref 22–31)
CREAT SERPL-MCNC: 1.59 MG/DL — HIGH (ref 0.5–1.3)
DIGOXIN SERPL-MCNC: 1.3 NG/ML — SIGNIFICANT CHANGE UP (ref 0.8–2)
EGFR: 33 ML/MIN/1.73M2 — LOW
GLUCOSE BLDC GLUCOMTR-MCNC: 109 MG/DL — HIGH (ref 70–99)
GLUCOSE BLDC GLUCOMTR-MCNC: 128 MG/DL — HIGH (ref 70–99)
GLUCOSE SERPL-MCNC: 105 MG/DL — HIGH (ref 70–99)
POTASSIUM SERPL-MCNC: 4.4 MMOL/L — SIGNIFICANT CHANGE UP (ref 3.5–5.3)
POTASSIUM SERPL-SCNC: 4.4 MMOL/L — SIGNIFICANT CHANGE UP (ref 3.5–5.3)
SODIUM SERPL-SCNC: 133 MMOL/L — LOW (ref 135–145)

## 2024-07-23 RX ADMIN — ATORVASTATIN CALCIUM 80 MILLIGRAM(S): 40 TABLET, FILM COATED ORAL at 21:55

## 2024-07-23 RX ADMIN — Medication 100 MILLIGRAM(S): at 14:33

## 2024-07-23 RX ADMIN — Medication 650 MILLIGRAM(S): at 21:55

## 2024-07-23 RX ADMIN — APIXABAN 5 MILLIGRAM(S): 5 TABLET, FILM COATED ORAL at 18:39

## 2024-07-23 RX ADMIN — MIDODRINE HYDROCHLORIDE 5 MILLIGRAM(S): 2.5 TABLET ORAL at 12:24

## 2024-07-23 RX ADMIN — MIDODRINE HYDROCHLORIDE 5 MILLIGRAM(S): 2.5 TABLET ORAL at 05:46

## 2024-07-23 RX ADMIN — Medication 7.5 MILLIGRAM(S): at 21:55

## 2024-07-23 RX ADMIN — MIDODRINE HYDROCHLORIDE 5 MILLIGRAM(S): 2.5 TABLET ORAL at 18:38

## 2024-07-23 RX ADMIN — CLOPIDOGREL BISULFATE 75 MILLIGRAM(S): 75 TABLET, FILM COATED ORAL at 12:23

## 2024-07-23 RX ADMIN — Medication 500 MILLIGRAM(S): at 12:22

## 2024-07-23 RX ADMIN — Medication 1 TABLET(S): at 12:22

## 2024-07-23 RX ADMIN — APIXABAN 5 MILLIGRAM(S): 5 TABLET, FILM COATED ORAL at 05:46

## 2024-07-23 RX ADMIN — Medication 175 MICROGRAM(S): at 05:46

## 2024-07-23 NOTE — PROGRESS NOTE ADULT - ASSESSMENT
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   likely toxic metabolic in setting of uti and CHANELL     Problem/Plan - 2:  ·  Problem: History of UTI.   cont abx     Problem/Plan - 3:  ·  Problem: Sepsis with acute renal failure.   ·  Plan: suspect pre renal , iso poor po intake   - IV fluid challenge   -renal dose medications  - monitor ins and outs  - monitor creatinine   - avoid nephrotoxins.    Problem/Plan - 4:  ·  Problem: History of chronic atrial fibrillation.   pt recieved 125 mg of BB instead of 12.5  : RRT called for severe bradycardia   now improved with glucagon   cont to monitor   EP consulted   fu with cardio   - c/w Eliquis.    Problem/Plan - 5:  ·  Problem: History of CAD (coronary artery disease).   ·  Plan: trop elevated iso of CHANELL   -c/w plavix  -c/w atorvastatin.  - fu with cardio     Problem/Plan - 6:  ·  Problem: H/O hypotension.   ·  Plan: -c/w midodrine.    Problem/Plan - 7:  ·  Problem: History of COPD.   ·  Plan: minimal  hypercapnia , no wheezing   - can use albuterol PRN for wheezing.    Problem/Plan - 8:  ·  Problem: Hypothyroidism.   ·  Plan: -levothyroxine.    Problem/Plan - 9:  ·  Problem: Dementia.   ·  Plan: -mirtazapine Hs.    Problem/Plan - 10:  ·  Problem: History of creation of ostomy.   ·  Plan; - ostomy care.

## 2024-07-23 NOTE — PROGRESS NOTE ADULT - ASSESSMENT
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   Per daughter, patient has been less interactive and nonverbal over the past few days. Patient had an unwitnessed fall about one week prior to admission with trauma to her buttocks, but remained at baseline mental state at the time.  Over the past two days she is more sleepy, not eating or taking medications . She had no reported fevers. She had one episode of vomiting.   I was called this am for patient having bradycardia 20s after receiving Toprol 125 s/p RRT   Was given glucagon but continues to be bradycardic 40s with 3-4 sec pauses. In Afib   BP stable   no chest pain or shortness of breath

## 2024-07-23 NOTE — PROGRESS NOTE ADULT - SUBJECTIVE AND OBJECTIVE BOX
[facility] NEPHROLOGY- PROGRESS NOTE    78y Female with history of dementia, ischemic bowel s/p resection with end ostomy presents with AMS. Nephrology consulted for elevated Scr.    REVIEW OF SYSTEMS:  Gen: no fevers  Cards: no chest pain  Resp: no dyspnea  GI: no nausea or vomiting or diarrhea, + decreased PO intake  Vascular: no LE edema    penicillin (Hives)      Hospital Medications: Medications reviewed    VITALS:  T(F): 97.5 (07-23-24 @ 04:11), Max: 97.7 (07-22-24 @ 12:02)  HR: 86 (07-23-24 @ 04:11)  BP: 117/86 (07-23-24 @ 04:11)  RR: 18 (07-23-24 @ 04:11)  SpO2: 97% (07-23-24 @ 04:11)  Wt(kg): --  Height (cm): 162.6 (07-21 @ 11:32)    07-22 @ 07:01  -  07-23 @ 07:00  --------------------------------------------------------  IN: 240 mL / OUT: 760 mL / NET: -520 mL        PHYSICAL EXAM:    Gen: NAD, calm  Cards: RRR, +S1/S2, no M/G/R  Resp: CTA B/L  GI: soft, NT/ND, NABS, + ostomy  Vascular: no LE edema B/L    LABS:  07-23    133<L>  |  101  |  48<H>  ----------------------------<  105<H>  4.4   |  14<L>  |  1.59<H>    Ca    9.7      23 Jul 2024 07:24  Phos  4.0     07-22  Mg     2.1     07-22    TPro  6.8  /  Alb  3.4  /  TBili  0.7  /  DBili      /  AST  23  /  ALT  14  /  AlkPhos  72  07-22    Creatinine Trend: 1.59 <--, 1.87 <--, 2.08 <--                        14.1   12.66 )-----------( 240      ( 22 Jul 2024 07:41 )             43.6     Urine Studies:  Urinalysis Basic - ( 23 Jul 2024 07:24 )    Color:  / Appearance:  / SG:  / pH:   Gluc: 105 mg/dL / Ketone:   / Bili:  / Urobili:    Blood:  / Protein:  / Nitrite:    Leuk Esterase:  / RBC:  / WBC    Sq Epi:  / Non Sq Epi:  / Bacteria:           RADIOLOGY & ADDITIONAL STUDIES:

## 2024-07-23 NOTE — PROVIDER CONTACT NOTE (OTHER) - ASSESSMENT
Pt A&Ox2. No complaints or indicators of chest pain, palpitations, SOB, headache, or dizziness. Patient VSS. no c/o abdominal or pelvic pain

## 2024-07-23 NOTE — PROGRESS NOTE ADULT - SUBJECTIVE AND OBJECTIVE BOX
Subjective: Patient seen and examined. No new events except as noted.   s/p 1 dose Atropine 1 mg   HR stable   REVIEW OF SYSTEMS:    CONSTITUTIONAL: +weakness, fevers or chills  EYES/ENT: No visual changes;  No vertigo or throat pain   NECK: No pain or stiffness  RESPIRATORY: No cough, wheezing, hemoptysis; No shortness of breath  CARDIOVASCULAR: No chest pain or palpitations  GASTROINTESTINAL: No abdominal or epigastric pain. No nausea, vomiting, or hematemesis; No diarrhea or constipation. No melena or hematochezia.  GENITOURINARY: No dysuria, frequency or hematuria  NEUROLOGICAL: No numbness or weakness  SKIN: No itching, burning, rashes, or lesions   All other review of systems is negative unless indicated above.    MEDICATIONS:  MEDICATIONS  (STANDING):  apixaban 5 milliGRAM(s) Oral every 12 hours  ascorbic acid 500 milliGRAM(s) Oral daily  atorvastatin 80 milliGRAM(s) Oral at bedtime  atropine Injectable 1 milliGRAM(s) IV Push once  cefTRIAXone   IVPB 1000 milliGRAM(s) IV Intermittent every 24 hours  clopidogrel Tablet 75 milliGRAM(s) Oral daily  dextrose 5% + lactated ringers. 1000 milliLiter(s) (50 mL/Hr) IV Continuous <Continuous>  levothyroxine 175 MICROGram(s) Oral daily  midodrine. 5 milliGRAM(s) Oral three times a day  mirtazapine 7.5 milliGRAM(s) Oral at bedtime  multivitamin 1 Tablet(s) Oral daily      PHYSICAL EXAM:  T(C): 36.4 (07-23-24 @ 04:11), Max: 36.5 (07-22-24 @ 12:02)  HR: 86 (07-23-24 @ 04:11) (49 - 86)  BP: 117/86 (07-23-24 @ 04:11) (101/66 - 117/86)  RR: 18 (07-23-24 @ 04:11) (18 - 18)  SpO2: 97% (07-23-24 @ 04:11) (96% - 100%)  Wt(kg): --  I&O's Summary    22 Jul 2024 07:01  -  23 Jul 2024 07:00  --------------------------------------------------------  IN: 240 mL / OUT: 760 mL / NET: -520 mL        Appearance: NAD	  HEENT:  Dry  oral mucosa, PERRL, EOMI	  Lymphatic: No lymphadenopathy  Cardiovascular: Irregular  S1 S2, No JVD, No murmurs, No edema  Respiratory: Lungs clear to auscultation	  Psychiatry: A & O x 3, Mood & affect appropriate  Gastrointestinal:  Soft, Non-tender, + BS	  Skin: No rashes, No ecchymoses, No cyanosis	  Neurologic: Non-focal  Extremities: Normal range of motion, No clubbing, cyanosis or edema  Vascular: Peripheral pulses palpable 2+ bilaterally    LABS:    CARDIAC MARKERS:  CARDIAC MARKERS ( 21 Jul 2024 12:05 )  x     / x     / 30 U/L / x     / x        Thyroid Stimulating Hormone, Serum: 0.04 uIU/mL (07.21.24 @ 16:44)                             14.1   12.66 )-----------( 240      ( 22 Jul 2024 07:41 )             43.6     07-23    133<L>  |  101  |  48<H>  ----------------------------<  105<H>  4.4   |  14<L>  |  1.59<H>    Ca    9.7      23 Jul 2024 07:24  Phos  4.0     07-22  Mg     2.1     07-22    TPro  6.8  /  Alb  3.4  /  TBili  0.7  /  DBili  x   /  AST  23  /  ALT  14  /  AlkPhos  72  07-22      Digoxin Level, Serum: 3.3 ng/mL (07.22.24 @ 07:40)       TELEMETRY: 	AF    ECG:  	  RADIOLOGY:   DIAGNOSTIC TESTING:  [ ] Echocardiogram:  [ ]  Catheterization:  [ ] Stress Test:    OTHER:

## 2024-07-23 NOTE — PROGRESS NOTE ADULT - ASSESSMENT
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 Nephrology consulted for elevated Scr.    1) CHANELL: likely due to hypovolemia versus ATN given granular and hyaline casts on UA. Scr improving. Continue with D5LR @ 50 ml/hour given poor PO intake. Check urine sodium and urine creatinine. CT without obstruction. Avoid nephrotoxins. Monitor electrolytes.    2) Hypotension: BP low normal. Continue with midodrine 5 mg PO TID. Monitor BP.    3) Metabolic acidosis: Gap and non-gap acidosis with (due to ketoacidosis and renal failure) and respiratory acidosis. Sample this morning hemolyzed. Continue with IVF and repeat blood gas. Monitor pH.    4) Acute cystitis with hematuria: On CTX. F/U urine culture.      [facility] NEPHROLOGY  Paulie Storm M.D.  Mack Clancy D.O.  Mdadi Steve M.D.  MD Olivia Caldera, MSN, ANP-C    Telephone: [phone]  Facsimile: [phone] [place] [address], [address]  [place] [address]

## 2024-07-23 NOTE — PROVIDER CONTACT NOTE (OTHER) - ASSESSMENT
Pt A&Ox1-2. No complaints or indicators of chest pain, palpitations, SOB, headache, or dizziness. Patient rhythm back in AFIB.    tele tech documented in airstrip that patient HR hit 140s but RN was NOT notified. Patient HR on tele currently

## 2024-07-23 NOTE — PROGRESS NOTE ADULT - SUBJECTIVE AND OBJECTIVE BOX
Date of service: 07-23-24 @ 17:41      Patient is a 78y old  Female who presents with a chief complaint of AMS (23 Jul 2024 11:34)                                                               INTERVAL HPI/OVERNIGHT EVENTS:    REVIEW OF SYSTEMS:    no complaints                                                                                                                                                                                                                                                                               Medications:  MEDICATIONS  (STANDING):  apixaban 5 milliGRAM(s) Oral every 12 hours  ascorbic acid 500 milliGRAM(s) Oral daily  atorvastatin 80 milliGRAM(s) Oral at bedtime  atropine Injectable 1 milliGRAM(s) IV Push once  cefTRIAXone   IVPB 1000 milliGRAM(s) IV Intermittent every 24 hours  clopidogrel Tablet 75 milliGRAM(s) Oral daily  dextrose 5% + lactated ringers. 1000 milliLiter(s) (50 mL/Hr) IV Continuous <Continuous>  midodrine. 5 milliGRAM(s) Oral three times a day  mirtazapine 7.5 milliGRAM(s) Oral at bedtime  multivitamin 1 Tablet(s) Oral daily    MEDICATIONS  (PRN):  acetaminophen     Tablet .. 650 milliGRAM(s) Oral every 6 hours PRN Temp greater or equal to 38C (100.4F), Mild Pain (1 - 3)  ondansetron Injectable 4 milliGRAM(s) IV Push every 8 hours PRN Nausea and/or Vomiting       Allergies    penicillin (Hives)    Intolerances      Vital Signs Last 24 Hrs  T(C): 36.6 (23 Jul 2024 12:28), Max: 36.6 (23 Jul 2024 12:28)  T(F): 97.8 (23 Jul 2024 12:28), Max: 97.8 (23 Jul 2024 12:28)  HR: 87 (23 Jul 2024 12:28) (66 - 87)  BP: 109/70 (23 Jul 2024 12:28) (101/66 - 117/86)  BP(mean): --  RR: 18 (23 Jul 2024 12:28) (18 - 18)  SpO2: 97% (23 Jul 2024 12:28) (97% - 100%)    Parameters below as of 23 Jul 2024 12:28  Patient On (Oxygen Delivery Method): room air      CAPILLARY BLOOD GLUCOSE      POCT Blood Glucose.: 109 mg/dL (23 Jul 2024 06:38)  POCT Blood Glucose.: 128 mg/dL (23 Jul 2024 00:22)  POCT Blood Glucose.: 154 mg/dL (22 Jul 2024 21:47)      07-22 @ 07:01  -  07-23 @ 07:00  --------------------------------------------------------  IN: 240 mL / OUT: 760 mL / NET: -520 mL      Physical Exam:    Daily     Daily   General:  NAD   HEENT:  Nonicteric, PERRLA  CV:  RRR, S1S2   Lungs:  CTA B/L, no wheezes, rales, rhonchi  Abdomen:  Soft, ostomy   Extremities:  no edema   Neuro:  confused   NF                                                                                                                                                                                                                                                                           LABS:                               14.1   12.66 )-----------( 240      ( 22 Jul 2024 07:41 )             43.6                      07-23    133<L>  |  101  |  48<H>  ----------------------------<  105<H>  4.4   |  14<L>  |  1.59<H>    Ca    9.7      23 Jul 2024 07:24  Phos  4.0     07-22  Mg     2.1     07-22    TPro  6.8  /  Alb  3.4  /  TBili  0.7  /  DBili  x   /  AST  23  /  ALT  14  /  AlkPhos  72  07-22                       RADIOLOGY & ADDITIONAL TESTS         I personally reviewed: [  ]EKG   [  ]CXR    [  ] CT      A/P:         Discussed with :     [facility] consultants' Notes   Time spent :

## 2024-07-23 NOTE — ADVANCED PRACTICE NURSE CONSULT - RECOMMEDATIONS
Will recommend:  1. Encourage participation w/ ostomy care re: emptying.  2. Empty pouch when 1/3-1/2 full   3. Change pouching system every 3-4 days & prn leakage  4. Contact ostomy specialists if questions, concerns/issues .  5. Supplies: Patient may continue to use own supplies as preferred (at bedside -Alto one piece pre cut convex skin barrier). Daughter does ostomy care at home. Otherwise hospital  formulary as follows. Pouching system provided & left at bedside.   [place] 1 3/4" Ceraplus convex skin barrier (#81273), Alto 2 1/4" drainable pouch (#[id number]); Accessory products:  stoma paste #[id number], stoma powder (#[id number]) & Cavilon No sting barrier film wipe (#[id number])   Will recommend:  1. Encourage participation w/ ostomy care re: emptying.  2. Empty pouch when 1/3-1/2 full   3. Change pouching system every 3-4 days & prn leakage  4. Contact ostomy specialists if questions, concerns/issues .  5. Supplies: Patient may continue to use own supplies as preferred (at bedside -[place] one piece pre cut convex skin barrier). Daughter does ostomy care at home. Otherwise hospital  formulary as follows. Pouching system provided & left at bedside.   Derick 1 3/4" Ceraplus convex skin barrier (#[id number]), [place] 1 3/4" drainable pouch (#[id number]); Accessory products:  stoma paste #[id number], stoma powder (#1006) & Cavilon No sting barrier film wipe (#9437)

## 2024-07-23 NOTE — ADVANCED PRACTICE NURSE CONSULT - ASSESSMENT
Chart reviewed &events noted. In at bedside to f/u consult request re: drain management/pouching. 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 Pt in bed awake, A&O x 2, hard of hearing. On assessment pouching system intact, was changed 2 days ago by daughter. Pt is wearing [place] one piece pre cut to 1" convex system beige pouch w/ "peek-a -perez" window. Stoma pink & viable + function for pasty stool. Pt reports she empties pouch at home & daughter changes pouching system 2x a week. Pt brought own supplies & at bedside. While at the hospital.  Patient may use own supplies as preferred but has agreed to use hospital formulary [place] 2 piece, cut to fit pouching system as needed. Stoma size approx 1".   Supplies provided. (see below) [place] 2 1/4' drainable pouch #[id number], Skin barrier #[id number]. Safety maintained. Emotional support provided. Discussed plan w/ staff RN. Remains available.            Chart reviewed &events noted. In at bedside to f/u consult request re: drain management/pouching. 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 Pt in bed awake, A&O x 2, hard of hearing. On assessment pouching system intact, was changed 2 days ago by daughter. Pt is wearing [place] one piece pre cut to 1" convex system beige pouch w/ "peek-a -perez" window. Stoma pink & viable + function for pasty stool. Pt reports she empties pouch at home & daughter changes pouching system 2x a week. Pt brought own supplies & at bedside. While at the hospital.  Patient may use own supplies as preferred but has agreed to use hospital formulary [place] 2 piece, cut to fit pouching system as needed. Stoma size approx 1".   Supplies provided. (see below) [place] 1 3/4" drainable pouch #[id number], Skin barrier #[id number]. Safety maintained. Emotional support provided. Discussed plan w/ staff RN. Remains available.

## 2024-07-24 LAB
ANION GAP SERPL CALC-SCNC: 14 MMOL/L — SIGNIFICANT CHANGE UP (ref 5–17)
BASE EXCESS BLDA CALC-SCNC: -4.1 MMOL/L — LOW (ref -2–3)
BASE EXCESS BLDV CALC-SCNC: -1.7 MMOL/L — SIGNIFICANT CHANGE UP (ref -2–3)
BUN SERPL-MCNC: 42 MG/DL — HIGH (ref 7–23)
CALCIUM SERPL-MCNC: 9.6 MG/DL — SIGNIFICANT CHANGE UP (ref 8.4–10.5)
CHLORIDE SERPL-SCNC: 101 MMOL/L — SIGNIFICANT CHANGE UP (ref 96–108)
CO2 BLDA-SCNC: 21 MMOL/L — SIGNIFICANT CHANGE UP (ref 19–24)
CO2 BLDV-SCNC: 26 MMOL/L — SIGNIFICANT CHANGE UP (ref 22–26)
CO2 SERPL-SCNC: 21 MMOL/L — LOW (ref 22–31)
CORTIS AM PEAK SERPL-MCNC: 12.3 UG/DL — SIGNIFICANT CHANGE UP (ref 6–18.4)
CREAT SERPL-MCNC: 1.64 MG/DL — HIGH (ref 0.5–1.3)
EGFR: 32 ML/MIN/1.73M2 — LOW
GAS PNL BLDV: SIGNIFICANT CHANGE UP
GLUCOSE SERPL-MCNC: 109 MG/DL — HIGH (ref 70–99)
HCO3 BLDA-SCNC: 20 MMOL/L — LOW (ref 21–28)
HCO3 BLDV-SCNC: 24 MMOL/L — SIGNIFICANT CHANGE UP (ref 22–29)
HCT VFR BLD CALC: 42.4 % — SIGNIFICANT CHANGE UP (ref 34.5–45)
HGB BLD-MCNC: 13.7 G/DL — SIGNIFICANT CHANGE UP (ref 11.5–15.5)
HOROWITZ INDEX BLDA+IHG-RTO: 21 — SIGNIFICANT CHANGE UP
MCHC RBC-ENTMCNC: 30.2 PG — SIGNIFICANT CHANGE UP (ref 27–34)
MCHC RBC-ENTMCNC: 32.3 GM/DL — SIGNIFICANT CHANGE UP (ref 32–36)
MCV RBC AUTO: 93.4 FL — SIGNIFICANT CHANGE UP (ref 80–100)
NRBC # BLD: 0 /100 WBCS — SIGNIFICANT CHANGE UP (ref 0–0)
PCO2 BLDA: 33 MMHG — SIGNIFICANT CHANGE UP (ref 32–45)
PCO2 BLDV: 45 MMHG — HIGH (ref 39–42)
PH BLDA: 7.39 — SIGNIFICANT CHANGE UP (ref 7.35–7.45)
PH BLDV: 7.34 — SIGNIFICANT CHANGE UP (ref 7.32–7.43)
PLATELET # BLD AUTO: 233 K/UL — SIGNIFICANT CHANGE UP (ref 150–400)
PO2 BLDA: 87 MMHG — SIGNIFICANT CHANGE UP (ref 83–108)
PO2 BLDV: 29 MMHG — SIGNIFICANT CHANGE UP (ref 25–45)
POTASSIUM SERPL-MCNC: 3.7 MMOL/L — SIGNIFICANT CHANGE UP (ref 3.5–5.3)
POTASSIUM SERPL-SCNC: 3.7 MMOL/L — SIGNIFICANT CHANGE UP (ref 3.5–5.3)
PROLACTIN SERPL-MCNC: 12.4 NG/ML — SIGNIFICANT CHANGE UP (ref 3.4–24.1)
RBC # BLD: 4.54 M/UL — SIGNIFICANT CHANGE UP (ref 3.8–5.2)
RBC # FLD: 15.4 % — HIGH (ref 10.3–14.5)
SAO2 % BLDA: 98.6 % — HIGH (ref 94–98)
SAO2 % BLDV: 50.1 % — LOW (ref 67–88)
SODIUM SERPL-SCNC: 136 MMOL/L — SIGNIFICANT CHANGE UP (ref 135–145)
T4 FREE SERPL-MCNC: 1.5 NG/DL — SIGNIFICANT CHANGE UP (ref 0.9–1.8)
TSH SERPL-MCNC: 0.09 UIU/ML — LOW (ref 0.27–4.2)
WBC # BLD: 8.7 K/UL — SIGNIFICANT CHANGE UP (ref 3.8–10.5)
WBC # FLD AUTO: 8.7 K/UL — SIGNIFICANT CHANGE UP (ref 3.8–10.5)

## 2024-07-24 RX ORDER — VANCOMYCIN HYDROCHLORIDE 5 G/100ML
1000 INJECTION, POWDER, LYOPHILIZED, FOR SOLUTION INTRAVENOUS EVERY 24 HOURS
Refills: 0 | Status: DISCONTINUED | OUTPATIENT
Start: 2024-07-24 | End: 2024-07-25

## 2024-07-24 RX ORDER — METOPROLOL TARTRATE 100 MG
25 TABLET ORAL
Refills: 0 | Status: DISCONTINUED | OUTPATIENT
Start: 2024-07-24 | End: 2024-07-29

## 2024-07-24 RX ADMIN — MIDODRINE HYDROCHLORIDE 5 MILLIGRAM(S): 2.5 TABLET ORAL at 05:20

## 2024-07-24 RX ADMIN — VANCOMYCIN HYDROCHLORIDE 250 MILLIGRAM(S): 5 INJECTION, POWDER, LYOPHILIZED, FOR SOLUTION INTRAVENOUS at 23:39

## 2024-07-24 RX ADMIN — ATORVASTATIN CALCIUM 80 MILLIGRAM(S): 40 TABLET, FILM COATED ORAL at 23:39

## 2024-07-24 RX ADMIN — Medication 25 MILLIGRAM(S): at 23:37

## 2024-07-24 RX ADMIN — CLOPIDOGREL BISULFATE 75 MILLIGRAM(S): 75 TABLET, FILM COATED ORAL at 23:39

## 2024-07-24 RX ADMIN — MIDODRINE HYDROCHLORIDE 5 MILLIGRAM(S): 2.5 TABLET ORAL at 23:39

## 2024-07-24 RX ADMIN — APIXABAN 5 MILLIGRAM(S): 5 TABLET, FILM COATED ORAL at 23:38

## 2024-07-24 RX ADMIN — Medication 1 TABLET(S): at 23:38

## 2024-07-24 RX ADMIN — Medication 7.5 MILLIGRAM(S): at 23:38

## 2024-07-24 RX ADMIN — Medication 100 MILLIGRAM(S): at 15:18

## 2024-07-24 RX ADMIN — APIXABAN 5 MILLIGRAM(S): 5 TABLET, FILM COATED ORAL at 05:22

## 2024-07-24 RX ADMIN — Medication 500 MILLIGRAM(S): at 23:38

## 2024-07-24 RX ADMIN — DEXTROSE MONOHYDRATE, SODIUM CHLORIDE, SODIUM LACTATE, CALCIUM CHLORIDE, MAGNESIUM CHLORIDE 50 MILLILITER(S): 1.5; 538; 448; 18.4; 5.08 SOLUTION INTRAPERITONEAL at 21:03

## 2024-07-24 NOTE — CONSULT NOTE ADULT - SUBJECTIVE AND OBJECTIVE BOX
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   Per daughter, patient has been less interactive and nonverbal over the past few days. Patient had an unwitnessed fall about one week prior to admission with trauma to her buttocks, but remained at baseline mental state at the time.  Over the past two days she is more sleepy, not eating or taking medications . She had no reported fevers. She had one episode of vomiting.    (21 Jul 2024 20:05)  Patient has history of encephalopathy. Patient follows up with PCP for health management.    PAST MEDICAL & SURGICAL HISTORY:  Hypertension      Hypothyroid      Osteoarthritis  knees, back      CAD (coronary artery disease)      CROW (obstructive sleep apnea)  non complaint on CPAP      History of MI (myocardial infarction)  h/o previous MI in 2004 prompted PTCA  with stenting x 2 vessels   last stress/ echo 2019      Heart murmur  dx in childhood      Bilateral hearing loss, unspecified hearing loss type  bilateral aids      Obesity      Mixed stress and urge urinary incontinence      Overactive bladder      S/P ORIF (open reduction internal fixation) fracture  left hip 1962      S/P appendectomy  30 plus years      S/P knee replacement  left 2000      Stented coronary artery  2004 X 2 STENTS      S/P laparotomy  due to adhesions, 30 years ago      H/O dilation and curettage  2/2019 Benign polyp          FAMILY HISTORY:      Social History:    Outpatient Medications:    MEDICATIONS  (STANDING):  apixaban 5 milliGRAM(s) Oral every 12 hours  ascorbic acid 500 milliGRAM(s) Oral daily  atorvastatin 80 milliGRAM(s) Oral at bedtime  atropine Injectable 1 milliGRAM(s) IV Push once  cefTRIAXone   IVPB 1000 milliGRAM(s) IV Intermittent every 24 hours  clopidogrel Tablet 75 milliGRAM(s) Oral daily  dextrose 5% + lactated ringers. 1000 milliLiter(s) (50 mL/Hr) IV Continuous <Continuous>  metoprolol tartrate 25 milliGRAM(s) Oral two times a day  midodrine. 5 milliGRAM(s) Oral three times a day  mirtazapine 7.5 milliGRAM(s) Oral at bedtime  multivitamin 1 Tablet(s) Oral daily    MEDICATIONS  (PRN):  acetaminophen     Tablet .. 650 milliGRAM(s) Oral every 6 hours PRN Temp greater or equal to 38C (100.4F), Mild Pain (1 - 3)  ondansetron Injectable 4 milliGRAM(s) IV Push every 8 hours PRN Nausea and/or Vomiting      Allergies    penicillin (Hives)    Intolerances      Review of Systems:  UNABLE TO OBTAIN  Cardiovascular: No chest pain, no palpitations  Respiratory: No wheezing, no cough  GI: No nausea, no vomiting  : No dysuria        ALL OTHER SYSTEMS REVIEWED AND NEGATIVE    PHYSICAL EXAM:  VITALS: T(C): 36.3 (07-24-24 @ 11:30)  T(F): 97.3 (07-24-24 @ 11:30), Max: 97.3 (07-23-24 @ 21:19)  HR: 92 (07-24-24 @ 11:30) (88 - 97)  BP: 117/83 (07-24-24 @ 11:30) (108/72 - 133/93)  RR:  (18 - 18)  SpO2:  (91% - 98%)  Wt(kg): --  THYROID: Normal size, no palpable nodules  RESPIRATORY: Clear to auscultation bilaterally.  CARDIOVASCULAR: Si S2, No murmurs;  GI: Soft, non distended.      POCT Blood Glucose.: 109 mg/dL (07-23-24 @ 06:38)  POCT Blood Glucose.: 128 mg/dL (07-23-24 @ 00:22)  POCT Blood Glucose.: 154 mg/dL (07-22-24 @ 21:47)  POCT Blood Glucose.: 106 mg/dL (07-22-24 @ 16:43)  POCT Blood Glucose.: 98 mg/dL (07-22-24 @ 12:07)  POCT Blood Glucose.: 119 mg/dL (07-22-24 @ 08:28)  POCT Blood Glucose.: 84 mg/dL (07-22-24 @ 07:06)                            13.7   8.70  )-----------( 233      ( 24 Jul 2024 10:12 )             42.4       07-24    136  |  101  |  42<H>  ----------------------------<  109<H>  3.7   |  21<L>  |  1.64<H>    eGFR: 32<L>    Ca    9.6      07-24  Mg     2.1     07-22  Phos  4.0     07-22    TPro  6.8  /  Alb  3.4  /  TBili  0.7  /  DBili  x   /  AST  23  /  ALT  14  /  AlkPhos  72  07-22      Thyroid Function Tests:  07-24 @ 10:12 TSH 0.09 FreeT4 1.5 T3 -- Anti TPO -- Anti Thyroglobulin Ab -- TSI --  07-21 @ 16:44 TSH 0.04 FreeT4 -- T3 -- Anti TPO -- Anti Thyroglobulin Ab -- TSI --      05-16 Chol 89 Direct LDL -- LDL calculated 38 HDL 38<L> Trig 62, 04-29 Chol 79 Direct LDL -- LDL calculated 31 HDL 34<L> Trig 56    Radiology:             
[facility] NEPHROLOGY- CONSULTATION NOTE    78y Female with history of below presents with AMS. Nephrology consulted for elevated Scr.    Patient with no history of renal disease and presented with CHANELL (Scr 2 on admission) and decreased to 1.8 this morning with azotemia. Patient also with pyuria secondary to UTI. Patient not on any ACE-I/ARB or diuretic as an outpatient. Patient with poor PO intake PTA.     Patient with bradycardia on BB s/p glucagon this morning.    REVIEW OF SYSTEMS:  Gen: no fevers  HEENT: no rhinorrhea  Neck: no sore throat  Cards: no chest pain  Resp: + dyspnea  GI: no nausea or vomiting or diarrhea, + decreased PO intake  : no dysuria or hematuria, + urinary frequency  Vascular: no LE edema  Derm: no rashes  Neuro: no numbness/tingling    penicillin (Hives)      Home Medications Reviewed  Hospital Medications:   MEDICATIONS  (STANDING):  apixaban 5 milliGRAM(s) Oral every 12 hours  ascorbic acid 500 milliGRAM(s) Oral daily  atorvastatin 80 milliGRAM(s) Oral at bedtime  atropine Injectable 1 milliGRAM(s) IV Push once  cefTRIAXone   IVPB 1000 milliGRAM(s) IV Intermittent every 24 hours  clopidogrel Tablet 75 milliGRAM(s) Oral daily  levothyroxine 175 MICROGram(s) Oral daily  midodrine. 5 milliGRAM(s) Oral three times a day  mirtazapine 7.5 milliGRAM(s) Oral at bedtime  multivitamin 1 Tablet(s) Oral daily      PAST MEDICAL & SURGICAL HISTORY:  Hypertension      Hypothyroid      Osteoarthritis  knees, back      CAD (coronary artery disease)      CROW (obstructive sleep apnea)  non complaint on CPAP      History of MI (myocardial infarction)  h/o previous MI in 2004 prompted PTCA  with stenting x 2 vessels   last stress/ echo 2019      Heart murmur  dx in childhood      Bilateral hearing loss, unspecified hearing loss type  bilateral aids      Obesity      Mixed stress and urge urinary incontinence      Overactive bladder      S/P ORIF (open reduction internal fixation) fracture  left hip 1962      S/P appendectomy  30 plus years      S/P knee replacement  left 2000      Stented coronary artery  2004 X 2 STENTS      S/P laparotomy  due to adhesions, 30 years ago      H/O dilation and curettage  2/2019 Benign polyp      FAMILY HISTORY:  N/C    SOCIAL HISTORY:  Denies toxic substance use     VITALS:  T(F): 97.5 (07-22-24 @ 12:21), Max: 98 (07-21-24 @ 20:13)  HR: 52 (07-22-24 @ 12:21)  BP: 104/63 (07-22-24 @ 12:21)  RR: 18 (07-22-24 @ 12:21)  SpO2: 96% (07-22-24 @ 12:02)  Wt(kg): --        PHYSICAL EXAM:  Gen: NAD, calm  HEENT: MMM  Neck: no JVD  Cards: RRR, +S1/S2, no M/G/R  Resp: CTA B/L  GI: soft, NT/ND, NABS, + ostomy  : no CVA tenderness  Vascular: no LE edema B/L  Derm: no rashes  Neuro: non-focal      LABS:  07-22    134<L>  |  100  |  50<H>  ----------------------------<  86  4.4   |  16<L>  |  1.87<H>    Ca    9.8      22 Jul 2024 07:41  Phos  4.0     07-22  Mg     2.1     07-22    TPro  6.8  /  Alb  3.4  /  TBili  0.7  /  DBili      /  AST  23  /  ALT  14  /  AlkPhos  72  07-22    Creatinine Trend: 1.87 <--, 2.08 <--                        14.1   12.66 )-----------( 240      ( 22 Jul 2024 07:41 )             43.6     Urine Studies:  Urinalysis Basic - ( 22 Jul 2024 07:41 )    Color:  / Appearance:  / SG:  / pH:   Gluc: 86 mg/dL / Ketone:   / Bili:  / Urobili:    Blood:  / Protein:  / Nitrite:    Leuk Esterase:  / RBC:  / WBC    Sq Epi:  / Non Sq Epi:  / Bacteria:           RADIOLOGY & ADDITIONAL STUDIES:    < from: CT Abdomen and Pelvis No Cont (07.21.24 @ 16:13) >  IMPRESSION:  Left adrenal mass which is unchanged. There is higher attenuation   centrally on this noncontrast study. This may be chronic although the   prior comparison studies are all with contrast limiting direct   comparison. Interval hemorrhage within the left adrenal gland is   possible, although I would expect left adrenal gland to have increased in   size of this were the case. Clinical correlation is suggested.,        --- End of Report ---    < end of copied text >      < from: Xray Chest 1 View- PORTABLE-Urgent (Xray Chest 1 View- PORTABLE-Urgent .) (07.21.24 @ 12:08) >  IMPRESSION:  Clear lungs.    --- End of Report ---    < end of copied text >      < from: CT Head No Cont (07.21.24 @ 16:13) >  IMPRESSION:    CT head: Mild to moderate chronic microvascular changes without evidence   of an acute transcortical infarction or hemorrhage.    CT C-spine: Moderate spondylosis. No acute osseous abnormality. Consider   MRI as clinically warranted.    --- End of Report ---    < end of copied text >  
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   Per daughter, patient has been less interactive and nonverbal over the past few days. Patient had an unwitnessed fall about one week prior to admission with trauma to her buttocks, but remained at baseline mental state at the time.  Over the past two days she is more sleepy, not eating or taking medications . She had no reported fevers. She had one episode of vomiting.    (21 Jul 2024 20:05)    EP called re: abnormal EKG.  Telemetry with sinus bradycardia (briefly 20s-30s), with pauses of ~3.5-4sec duration.  Not apparently symptomatic as patient asleep/resting in bed.   In the ED, was inadverdently given 125mg of Metoprolol instead of 25mg.  Bradycardia resolving by next hospital day.  Being treated for urinary retention and UTI.  Has been straight-cath'd to relieve urinary retention.  On 7/23, patient awake, pleasantly confused, asking about our weekend plans.  States no angina, palpitations or lightheadedness, but continues to reference "[place]  A 10 pt ROS is otherwise negative, limited by history of dementia.  On recent admission, a loop recorder was placed for unexplained syncope w/ resulting head trauma.  She has persistent AFib. Anticoagulated w/ Apixaban 5mg BID.  On rate control Rx w/ metoprolol and digoxin.  ILR surveillance for AV block during AFib.    PAST MEDICAL & SURGICAL HISTORY:  Hypertension  Hypothyroid  Osteoarthritis  knees, back  CAD (coronary artery disease)  CROW (obstructive sleep apnea)  non complaint on CPAP  History of MI (myocardial infarction)  h/o previous MI in 2004 prompted PTCA  with stenting x 2 vessels   last stress/ echo 2019  Heart murmur  dx in childhood  Bilateral hearing loss, unspecified hearing loss type  bilateral aids  Obesity  Mixed stress and urge urinary incontinence  Overactive bladder  S/P ORIF (open reduction internal fixation) fracture  left hip 1962  S/P appendectomy  30 plus years  S/P knee replacement  left 2000  Stented coronary artery  2004 X 2 STENTS  S/P laparotomy  due to adhesions, 30 years ago  H/O dilation and curettage  2/2019 Benign polyp    MEDICATIONS  (STANDING):  apixaban 5 milliGRAM(s) Oral every 12 hours  ascorbic acid 500 milliGRAM(s) Oral daily  atorvastatin 80 milliGRAM(s) Oral at bedtime  atropine Injectable 1 milliGRAM(s) IV Push once  cefTRIAXone   IVPB 1000 milliGRAM(s) IV Intermittent every 24 hours  clopidogrel Tablet 75 milliGRAM(s) Oral daily  dextrose 5% + lactated ringers. 1000 milliLiter(s) (50 mL/Hr) IV Continuous <Continuous>  midodrine. 5 milliGRAM(s) Oral three times a day  mirtazapine 7.5 milliGRAM(s) Oral at bedtime  multivitamin 1 Tablet(s) Oral daily    Allergies    penicillin (Hives)    Intolerances    FAMILY HISTORY:    Non-contributary for premature coronary disease or sudden cardiac death    SOCIAL HISTORY:    [ x] Non-smoker  [ ] Smoker  [ ] Alcohol      PHYSICAL EXAM:  T(C): 36.4 (07-23-24 @ 04:11), Max: 36.5 (07-22-24 @ 12:02)  HR: 86 (07-23-24 @ 04:11) (49 - 86)  BP: 117/86 (07-23-24 @ 04:11) (101/66 - 117/86)  RR: 18 (07-23-24 @ 04:11) (18 - 18)  SpO2: 97% (07-23-24 @ 04:11) (96% - 100%)  Wt(kg): --    Appearance: elderly woman in no acute distress	  HEENT:   Normal oral mucosa, PERRL, EOMI	  Lymphatic: No lymphadenopathy , no edema  Cardiovascular: irregular S1 S2, No JVD, No murmurs , Peripheral pulses palpable 2+ bilaterally  Respiratory: Lungs clear to auscultation, normal effort 	  Gastrointestinal:  Soft, Non-tender, + BS	  Skin: No rashes, No ecchymoses, No cyanosis, warm to touch  Musculoskeletal: Normal range of motion, normal strength  Psychiatry:  Mood is "I feel OK" & affect appropriate.  Memory is impaired, oriented to name only at this time.    TELEMETRY: AFib, rate-controlled.  Previously showing SR and AF with very slow heartrates, albeit after getting high dose of oral metoprolol 	    	  LABS:	 	                          14.1   12.66 )-----------( 240      ( 22 Jul 2024 07:41 )             43.6     07-23    133<L>  |  101  |  48<H>  ----------------------------<  105<H>  4.4   |  14<L>  |  1.59<H>    Ca    9.7      23 Jul 2024 07:24  Phos  4.0     07-22  Mg     2.1     07-22    TPro  6.8  /  Alb  3.4  /  TBili  0.7  /  DBili  x   /  AST  23  /  ALT  14  /  AlkPhos  72  07-22    ASSESSMENT/PLAN: Ms Gooden is a pleasant 78y Female here w/ altered mental status. Being treated for encephalopathy related to sepsis (UTI) and urinary retention. EP called re: abnormal EKG.    Can continue metoprolol (25-50mg BID) for AFib rate control. Hold Digoxin in the setting of acute kidney injury.  Goal HR<100bpm at rest.  Continue apixaban 5mg BID for stroke prevention.  Continue telemetry - no unprovoked severe bradycardia or long pauses >5sec in AFib , and not acutely symptomatic during short pauses.  At this time does not require permanent pacemaker insertion.  Continue UTI treatment w/ antibiotics, and management of urinary retention to relieve Vagal effect on her heartrate.  ILR data mgmt per Dr Mendiola.  Will follow with you.      Shane Frias M.D.  Cardiac Electrophysiology    office [phone]  pager [phone]
[facility] INFECTIOUS DISEASES  GERALD Leahy S. Shah, Y. Patel, G. 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 Per daughter, patient has been less interactive and nonverbal over the past few days. Patient had an unwitnessed fall about one week prior to admission with trauma to her buttocks, but remained at baseline mental state at the time.  Over the past two days she is more sleepy, not eating or taking medications . She had no reported fevers. She had one episode of vomiting.  (21 Jul 2024 20:05)  Patient seen and examined at bedside this afternoon, patient arousable but not answering or following. Per RN at bedside, patient has been refusing everything all day, refused bladder scan earlier. She was straight cathed the night before and has not voided since. She appears comfortable.   ROS unable to obtain     Allergies: penicillin (Hives)    PMH -- Hypertension  Hypothyroid  Osteoarthritis  CAD (coronary artery disease)  CROW (obstructive sleep apnea)  History of MI (myocardial infarction)  Polyp of corpus uteri  Heart murmur  Bilateral hearing loss, unspecified hearing loss type  Obesity (BMI 35.0-39.9 without comorbidity)  Obesity  Mixed stress and urge urinary incontinence  Overactive bladder    PSH -- S/P ORIF (open reduction internal fixation) fracture  S/P appendectomy  S/P knee replacement  Stented coronary artery  S/P laparotomy  H/O dilation and curettage    FH -- noncontributory   Social History -- smoker, no known alcohol or illicit drug use    Physical Exam--  Vital Signs Last 24 Hrs  T(F): 97.3 (24 Jul 2024 11:30), Max: 97.3 (23 Jul 2024 21:19)  HR: 92 (24 Jul 2024 11:30) (88 - 97)  BP: 117/83 (24 Jul 2024 11:30) (108/72 - 133/93)  RR: 18 (24 Jul 2024 11:30) (18 - 18)  SpO2: 91% (24 Jul 2024 11:30) (91% - 98%)  General: no acute distress  HEENT: NC/AT, EOMI, anicteric  Lungs: decreased breath sounds b/l   Heart: S1, S2 present, normal rate  Abdomen: Soft. ND. NT. BS present.   Neuro: arousable but not following/answering  Extremities: No cyanosis. No edema.   Skin: Warm. Dry. No visible rash.   Lines: PIV     Laboratory & Imaging Data--  CBC:                       13.7   8.70  )-----------( 233      ( 24 Jul 2024 10:12 )             42.4     WBC Count: 8.70 K/uL (07-24-24 @ 10:12)  WBC Count: 12.66 K/uL (07-22-24 @ 07:41)  WBC Count: 11.82 K/uL (07-21-24 @ 12:05)    CMP: 07-24    136  |  101  |  42<H>  ----------------------------<  109<H>  3.7   |  21<L>  |  1.64<H>    Ca    9.6      24 Jul 2024 10:12    Urinalysis (07.21.24 @ 14:32)    Glucose Qualitative, Urine: Negative mg/dL   Blood, Urine: Negative   pH Urine: 5.5   Color: Yellow   Urine Appearance: Cloudy   Bilirubin: Negative   Ketone - Urine: Trace mg/dL   Specific Gravity: 1.020   Protein, Urine: 30 mg/dL   Urobilinogen: 0.2 mg/dL   Nitrite: Negative   Leukocyte Esterase Concentration: Moderate  Urine Microscopic-Add On (NC) (07.21.24 @ 14:32)    Cast: 8 /LPF   Epithelial Cells: 21 /HPF   Review: Reviewed   Bacteria: Many /HPF   Fine Granular Casts: Present   Red Blood Cell - Urine: 5 /HPF   White Blood Cell - Urine: 66 /HPF   Hyaline Casts: Present    Microbiology: reviewed  Culture - Urine (collected 07-21-24 @ 14:32)  Source: Clean Catch Clean Catch (Midstream)  Preliminary Report (07-23-24 @ 23:32):    >100,000 CFU/ml Gram Positive Cocci in Pairs and Chains    Culture - Blood (collected 07-21-24 @ 11:50)  Source: .Blood Blood-Venous  Preliminary Report (07-23-24 @ 20:01):    No growth at 48 Hours    Culture - Blood (collected 07-21-24 @ 11:40)  Source: .Blood Blood-Venous  Preliminary Report (07-23-24 @ 20:01):    No growth at 48 Hours    Radiology--reviewed  < from: CT Chest Abdomen and Pelvis No Cont (07.21.24 @ 16:13) >  IMPRESSION:  Left adrenal mass which is unchanged. There is higher attenuation   centrally on this noncontrast study. This may be chronic although the   prior comparison studies are all with contrast limiting direct   comparison. Interval hemorrhage within the left adrenal gland is   possible, although I would expect left adrenal gland to have increased in   size of this were the case. Clinical correlation is suggested.,    < end of copied text >    < from: CT Cervical Spine No Cont (07.21.24 @ 16:13) >  IMPRESSION:    CT head: Mild to moderate chronic microvascular changes without evidence   of an acute transcortical infarction or hemorrhage.    CT C-spine: Moderate spondylosis. No acute osseous abnormality. Consider   MRI as clinically warranted.    < end of copied text >    < from: Xray Chest 1 View- PORTABLE-Urgent (Xray Chest 1 View- PORTABLE-Urgent .) (07.21.24 @ 12:08) >  IMPRESSION:  Clear lungs.    < end of copied text >        Active Medications--  acetaminophen     Tablet .. 650 milliGRAM(s) Oral every 6 hours PRN  apixaban 5 milliGRAM(s) Oral every 12 hours  ascorbic acid 500 milliGRAM(s) Oral daily  atorvastatin 80 milliGRAM(s) Oral at bedtime  atropine Injectable 1 milliGRAM(s) IV Push once  cefTRIAXone   IVPB 1000 milliGRAM(s) IV Intermittent every 24 hours  clopidogrel Tablet 75 milliGRAM(s) Oral daily  dextrose 5% + lactated ringers. 1000 milliLiter(s) IV Continuous <Continuous>  metoprolol tartrate 25 milliGRAM(s) Oral two times a day  midodrine. 5 milliGRAM(s) Oral three times a day  mirtazapine 7.5 milliGRAM(s) Oral at bedtime  multivitamin 1 Tablet(s) Oral daily  ondansetron Injectable 4 milliGRAM(s) IV Push every 8 hours PRN    Current Antimicrobials:   cefTRIAXone   IVPB 1000 milliGRAM(s) IV Intermittent every 24 hours    Prior/Completed Antimicrobials:  cefTRIAXone   IVPB    
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   Per daughter, patient has been less interactive and nonverbal over the past few days. Patient had an unwitnessed fall about one week prior to admission with trauma to her buttocks, but remained at baseline mental state at the time.  Over the past two days she is more sleepy, not eating or taking medications . She had no reported fevers. She had one episode of vomiting.   I was called this am for patient having bradycardia 20s after receiving Toprol 125 s/p RRT   Was given glucagon but continues to be bradycardic 40s with 3-4 sec pauses. In Afib   BP stable   no chest pain or shortness of breath   PAST MEDICAL & SURGICAL HISTORY:  Hypertension      Hypothyroid      Osteoarthritis  knees, back      CAD (coronary artery disease)      CROW (obstructive sleep apnea)  non complaint on CPAP      History of MI (myocardial infarction)  h/o previous MI in 2004 prompted PTCA  with stenting x 2 vessels   last stress/ echo 2019      Heart murmur  dx in childhood      Bilateral hearing loss, unspecified hearing loss type  bilateral aids      Obesity      Mixed stress and urge urinary incontinence      Overactive bladder      S/P ORIF (open reduction internal fixation) fracture  left hip 1962      S/P appendectomy  30 plus years      S/P knee replacement  left 2000      Stented coronary artery  2004 X 2 STENTS      S/P laparotomy  due to adhesions, 30 years ago      H/O dilation and curettage  2/2019 Benign polyp              MEDICATIONS:  apixaban 5 milliGRAM(s) Oral every 12 hours  clopidogrel Tablet 75 milliGRAM(s) Oral daily  digoxin     Tablet 250 MICROGram(s) Oral daily  midodrine. 5 milliGRAM(s) Oral three times a day    cefTRIAXone   IVPB 1000 milliGRAM(s) IV Intermittent every 24 hours      acetaminophen     Tablet .. 650 milliGRAM(s) Oral every 6 hours PRN  mirtazapine 7.5 milliGRAM(s) Oral at bedtime  ondansetron Injectable 4 milliGRAM(s) IV Push every 8 hours PRN    atropine Injectable 1 milliGRAM(s) IV Push once    atorvastatin 80 milliGRAM(s) Oral at bedtime  levothyroxine 175 MICROGram(s) Oral daily    ascorbic acid 500 milliGRAM(s) Oral daily  multivitamin 1 Tablet(s) Oral daily      FAMILY HISTORY:      SOCIAL HISTORY:    [ ] Non-smoker  [ ] Smoker  [ ] Alcohol    Allergies    penicillin (Hives)    Intolerances    	    REVIEW OF SYSTEMS:  CONSTITUTIONAL: No fever, weight loss, + fatigue  EYES: No eye pain, visual disturbances, or discharge  ENMT:  No difficulty hearing, tinnitus, vertigo; No sinus or throat pain  NECK: No pain or stiffness  RESPIRATORY: No cough, wheezing, chills or hemoptysis; No Shortness of Breath  CARDIOVASCULAR: No chest pain, palpitations, passing out, dizziness, or leg swelling  GASTROINTESTINAL: No abdominal or epigastric pain. No nausea, vomiting, or hematemesis; No diarrhea or constipation. No melena or hematochezia.  GENITOURINARY: No dysuria, frequency, hematuria, or incontinence  NEUROLOGICAL: No headaches, memory loss, loss of strength, numbness, or tremors  SKIN: No itching, burning, rashes, or lesions   LYMPH Nodes: No enlarged glands  ENDOCRINE: No heat or cold intolerance; No hair loss  MUSCULOSKELETAL: No joint pain or swelling; No muscle, back, or extremity pain  PSYCHIATRIC: No depression, anxiety, mood swings, or difficulty sleeping  HEME/LYMPH: No easy bruising, or bleeding gums  ALLERY AND IMMUNOLOGIC: No hives or eczema	    [ ] All others negative	  [ ] Unable to obtain    PHYSICAL EXAM:  T(C): 36.4 (07-22-24 @ 04:51), Max: 36.7 (07-21-24 @ 20:13)  HR: 47 (07-22-24 @ 08:21) (47 - 87)  BP: 107/67 (07-22-24 @ 08:21) (107/67 - 134/86)  RR: 18 (07-22-24 @ 08:21) (17 - 19)  SpO2: 95% (07-22-24 @ 08:21) (90% - 99%)  Wt(kg): --  I&O's Summary      Appearance: NAD	  HEENT:  Dry  oral mucosa, PERRL, EOMI	  Lymphatic: No lymphadenopathy  Cardiovascular: Irregular  S1 S2, No JVD, No murmurs, No edema  Respiratory: Lungs clear to auscultation	  Psychiatry: A & O x 3, Mood & affect appropriate  Gastrointestinal:  Soft, Non-tender, + BS	  Skin: No rashes, No ecchymoses, No cyanosis	  Neurologic: Non-focal  Extremities: Normal range of motion, No clubbing, cyanosis or edema  Vascular: Peripheral pulses palpable 2+ bilaterally    TELEMETRY: 	AFib 20-40s 3-4 sec pauses     ECG:  	  RADIOLOGY:  < from: Xray Chest 1 View- PORTABLE-Urgent (Xray Chest 1 View- PORTABLE-Urgent .) (07.21.24 @ 12:08) >    ACC: [id number] EXAM:  XR CHEST PORTABLE URGENT 1V   ORDERED BY:  JUSTICE REID     PROCEDURE DATE:  07/21/2024          INTERPRETATION:  EXAMINATION: XR CHEST URGENT    CLINICAL INDICATION: eval for chest pain    TECHNIQUE: Single frontal, portable view of the chest was obtained.    COMPARISON: Chest x-ray 4/28/2024.    FINDINGS:  The heart is normal in size.  The lungs are clear.  There is no pneumothorax or pleural effusion.    IMPRESSION:  Clear lungs.    --- End of Report ---          ARMANI RAMSEY MD; Resident Radiologist  This document has been electronically signed.  HAIR MURRAY MD; Attending Radiologist  This document has been electronically signed. Jul 21 2024 12:27PM    < end of copied text >  < from: CT Abdomen and Pelvis No Cont (07.21.24 @ 16:13) >    ACC: [id number] EXAM:  CT ABDOMEN AND PELVIS   ORDERED BY:  JUSTICE REID     ACC: [id number] EXAM:  CT CHEST   ORDERED BY:  JUSTICE REID     PROCEDURE DATE:  07/21/2024          INTERPRETATION:  CLINICAL INFORMATION: Trauma    COMPARISON: CT scan of the chest and abdomen from 4/20/2024 scan of the   abdomen and pelvis from 5/15/2020    CONTRAST/COMPLICATIONS:  IV Contrast: NONE  Oral Contrast: NONE  Complications: None reported at time of study completion    PROCEDURE:  CT of the Chest, Abdomen and Pelvis was performed.  Sagittal and coronal reformats were performed.    FINDINGS:  CHEST:  LUNGS AND LARGE AIRWAYS: Patent central airways. 2 mm nodule in the right   upper lobe on series 301 image 38, and 2 mm nodule in the left upper lobe   on series 301 image 37 are grossly unchanged. Minimal mucous plugging and   bronchial wall thickening.  PLEURA: No pleural effusion.  VESSELS: Vascular calcifications, including the coronary arteries.  HEART: Heart size is normal. No pericardial effusion.  MEDIASTINUM AND ARIAS: No lymphadenopathy.  CHEST WALL AND LOWER NECK: Within normal limits.    ABDOMEN AND PELVIS:  LIVER: Within normal limits.  BILE DUCTS: Normal caliber.  GALLBLADDER: Within normal limits.  SPLEEN: Within normal limits.  PANCREAS: Within normal limits.  ADRENALS: Left adrenal mass measuring up to 4.7 x 3.0 cm which has not   significantly changed when compared with 4/20/2024. This is of higher   attenuation centrally  KIDNEYS/URETERS: Left renal cyst.    BLADDER: Small amount of air within the bladder. Has the been a recent   intervention?  REPRODUCTIVE ORGANS: Limited in evaluation on CT scan. Uterus is grossly   unremarkable. Mild calcification in the region of the right adnexa is   unchanged.    BOWEL: No bowel obstruction. Rightlower quadrant ileostomy is unchanged.  PERITONEUM/RETROPERITONEUM: Within normal limits.  VESSELS: Vascular calcifications.  LYMPH NODES: No lymphadenopathy.  ABDOMINAL WALL: Postsurgical changes midanterior abdominal wall.  BONES: Degenerative changes of bone. Old rib fractures. Spina bifida   occulta at L5.    IMPRESSION:  Left adrenal mass which is unchanged. There is higher attenuation   centrally on this noncontrast study. This may be chronic although the   prior comparison studies are all with contrast limiting direct   comparison. Interval hemorrhage within the left adrenal gland is   possible, although I would expect left adrenal gland to have increased in   size of this were the case. Clinical correlation is suggested.,        --- End of Report ---            KASSANDRA ROMAN MD; Attending Radiologist  This document has been electronically signed. Jul 21 2024  4:40PM    < end of copied text >    OTHER: 	  	  LABS:	 	    CARDIAC MARKERS:                                  14.1   12.66 )-----------( 240      ( 22 Jul 2024 07:41 )             43.6     07-22    134<L>  |  100  |  50<H>  ----------------------------<  86  4.4   |  16<L>  |  1.87<H>    Ca    9.8      22 Jul 2024 07:41  Phos  4.0     07-22  Mg     2.1     07-22    TPro  6.8  /  Alb  3.4  /  TBili  0.7  /  DBili  x   /  AST  23  /  ALT  14  /  AlkPhos  72  07-22    proBNP:   Lipid Profile:   HgA1c:   TSH: Thyroid Stimulating Hormone, Serum: 0.04 uIU/mL (07-21 @ 16:44)

## 2024-07-24 NOTE — PROVIDER CONTACT NOTE (OTHER) - ASSESSMENT
Pt a&ox1. VSS: /83, HR 92, temp 97.3, O2 91% on room air. Patient refusing to wake up. When aroused, patient asking to be left alone and becoming agitated stating "I will slap you across your face". Patient has not voided; when attempting to perform bladder scan patient says "I'm cold" and pulls covers back on. Offered patient to wake up and eat food but pt refusing to eat. Not alert enough to take PO meds: due for vitamins, plavix, & midodrine.

## 2024-07-24 NOTE — PROGRESS NOTE ADULT - SUBJECTIVE AND OBJECTIVE BOX
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   Per daughter, patient has been less interactive and nonverbal over the past few days. Patient had an unwitnessed fall about one week prior to admission with trauma to her buttocks, but remained at baseline mental state at the time.  Over the past two days she is more sleepy, not eating or taking medications . She had no reported fevers. She had one episode of vomiting.    (21 Jul 2024 20:05)    EP called re: abnormal EKG.  Telemetry with sinus bradycardia (briefly 20s-30s), with pauses of ~3.5-4sec duration.  Not apparently symptomatic as patient asleep/resting in bed.   In the ED, was inadverdently given 125mg of Metoprolol instead of 25mg.  Bradycardia resolving by next hospital day.  Being treated for urinary retention and UTI.  Has been straight-cath'd to relieve urinary retention.  On 7/23, patient awake, pleasantly confused, asking about our weekend plans.  States no angina, palpitations or lightheadedness, but continues to reference "[place]  A 10 pt ROS is otherwise negative, limited by history of dementia.  On recent admission, a loop recorder was placed for unexplained syncope w/ resulting head trauma.  She has persistent AFib. Anticoagulated w/ Apixaban 5mg BID.  On rate control Rx w/ metoprolol and digoxin.  ILR surveillance for AV block during AFib.  Date of service 7/24- resting in bed, no overnight issues.    PAST MEDICAL & SURGICAL HISTORY:  Hypertension  Hypothyroid  Osteoarthritis  knees, back  CAD (coronary artery disease)  CROW (obstructive sleep apnea)  non complaint on CPAP  History of MI (myocardial infarction)  h/o previous MI in 2004 prompted PTCA  with stenting x 2 vessels   last stress/ echo 2019  Heart murmur  dx in childhood  Bilateral hearing loss, unspecified hearing loss type  bilateral aids  Obesity  Mixed stress and urge urinary incontinence  Overactive bladder  S/P ORIF (open reduction internal fixation) fracture  left hip 1962  S/P appendectomy  30 plus years  S/P knee replacement  left 2000  Stented coronary artery  2004 X 2 STENTS  S/P laparotomy  due to adhesions, 30 years ago  H/O dilation and curettage  2/2019 Benign polyp    acetaminophen     Tablet .. 650 milliGRAM(s) Oral every 6 hours PRN  apixaban 5 milliGRAM(s) Oral every 12 hours  ascorbic acid 500 milliGRAM(s) Oral daily  atorvastatin 80 milliGRAM(s) Oral at bedtime  atropine Injectable 1 milliGRAM(s) IV Push once  cefTRIAXone   IVPB 1000 milliGRAM(s) IV Intermittent every 24 hours  clopidogrel Tablet 75 milliGRAM(s) Oral daily  dextrose 5% + lactated ringers. 1000 milliLiter(s) IV Continuous <Continuous>  metoprolol tartrate 25 milliGRAM(s) Oral two times a day  midodrine. 5 milliGRAM(s) Oral three times a day  mirtazapine 7.5 milliGRAM(s) Oral at bedtime  multivitamin 1 Tablet(s) Oral daily  ondansetron Injectable 4 milliGRAM(s) IV Push every 8 hours PRN                            13.7   8.70  )-----------( 233      ( 24 Jul 2024 10:12 )             42.4       07-24    136  |  101  |  42<H>  ----------------------------<  109<H>  3.7   |  21<L>  |  1.64<H>    Ca    9.6      24 Jul 2024 10:12    T(C): 36.3 (07-24-24 @ 11:30), Max: 36.3 (07-23-24 @ 21:19)  HR: 92 (07-24-24 @ 11:30) (88 - 97)  BP: 117/83 (07-24-24 @ 11:30) (108/72 - 133/93)  RR: 18 (07-24-24 @ 11:30) (18 - 18)  SpO2: 91% (07-24-24 @ 11:30) (91% - 98%)  Wt(kg): --    I&O's Summary    23 Jul 2024 07:01  -  24 Jul 2024 07:00  --------------------------------------------------------  IN: 120 mL / OUT: 450 mL / NET: -330 mL    24 Jul 2024 07:01  -  24 Jul 2024 14:46  --------------------------------------------------------  IN: 0 mL / OUT: 375 mL / NET: -375 mL    Appearance: elderly woman in no acute distress	  HEENT:   Normal oral mucosa, PERRL, EOMI	  Lymphatic: No lymphadenopathy , no edema  Cardiovascular: irregular S1 S2, No JVD, No murmurs , Peripheral pulses palpable 2+ bilaterally  Respiratory: Lungs clear to auscultation, normal effort 	  Gastrointestinal:  Soft, Non-tender, + BS	  Skin: No rashes, No ecchymoses, No cyanosis, warm to touch  Musculoskeletal: Normal range of motion, normal strength  Psychiatry:  Mood is "I feel OK" & affect appropriate.  Memory is impaired, oriented to name only at this time.    TELEMETRY: AFib, rate-controlled.  Previously showing SR and AF with very slow heartrates, albeit after getting high dose of oral metoprolol 	    	  ASSESSMENT/PLAN: Ms Gooden is a pleasant 78y Female here w/ altered mental status. Being treated for encephalopathy related to sepsis (UTI) and urinary retention. EP called re: abnormal EKG.    Can continue metoprolol (25-50mg BID) for AFib rate control. Hold Digoxin in the setting of acute kidney injury.  Goal HR<100bpm at rest.  Continue apixaban 5mg BID for stroke prevention.  Continue telemetry - no unprovoked severe bradycardia or long pauses >5sec in AFib , and not acutely symptomatic during short pauses.  At this time does not require permanent pacemaker insertion.  Continue UTI treatment w/ antibiotics, and management of urinary retention to relieve Vagal effect on her heartrate.  ILR data mgmt per Dr Mendiola.  Will follow with you.      Shane Frias M.D.  Cardiac Electrophysiology    office [phone]  pager [phone]

## 2024-07-24 NOTE — PROVIDER CONTACT NOTE (OTHER) - ASSESSMENT
Pt oriented x1, lethargic. Pt refusing to wake, saying "No" repetitively when attempting to provide care. VSS: /80, HR 76, temp 97.5, O2 97%. Pt due for evening meds soon but is too lethargic to take oral mediations at this time (eliquis & lopressor).

## 2024-07-24 NOTE — PROVIDER CONTACT NOTE (OTHER) - ACTION/TREATMENT ORDERED:
PA aware. As per PA, no action continue, with routine bladder scans. PA aware. As per PA, no action, continue with routine bladder scans.

## 2024-07-24 NOTE — PROGRESS NOTE ADULT - ASSESSMENT
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 Nephrology consulted for elevated Scr.    1) CHANELL: likely due to hypovolemia versus ATN given granular and hyaline casts on UA. Scr improving. Follow up renal panelt his morning. Continue with D5LR @ 50 ml/hour given poor PO intake. Check urine sodium and urine creatinine. CT without obstruction. Avoid nephrotoxins. Monitor electrolytes.    2) Hypotension: BP low normal. Continue with midodrine 5 mg PO TID. Monitor BP.    3) Metabolic acidosis: Gap and non-gap acidosis with (due to ketoacidosis and renal failure) and respiratory acidosis. Sample on 7/23 hemolyzed. Continue with IVF and repeat blood gas. Monitor pH.    4) Acute cystitis with hematuria: On CTX. Urine culture with gram positive organisms. Abx as per primary team.      [facility] NEPHROLOGY  Paulie Storm M.D.  Mack Clancy D.O.  Maddi Steve M.D.  MD Olivia Caldera, MSN, ANP-C    Telephone: [phone]  Facsimile: [phone] [place] [address], [address]  [place] [address]

## 2024-07-24 NOTE — PHYSICAL THERAPY INITIAL EVALUATION ADULT - NSPTDISCHREC_GEN_A_CORE
if pt returns home, pt will require home PT, assist with ALL mobility/ADLs, transport into the home recommended as pt is too weak to negotiate stairs/Sub-acute Rehab

## 2024-07-24 NOTE — CONSULT NOTE ADULT - ASSESSMENT
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 Per daughter, patient has been less interactive and nonverbal over the past few days, sleeping more and note taking meds or eating. Patient had episode of vomiting, no fevers noted. Patient had an unwitnessed fall about one week prior to admission with trauma to her buttocks, but remained at baseline mental state at the time.     Sepsis due to suspected UTI with hematuria  AMS, leukocytosis with urinary retention, CHANELL  UA with pyuria but noted poor/contaminated specimen with 21 sq epi cells  Ucx with GPC in pairs and chains   CT C/A/P with L adrenal mass unchanged, bladder with small amt of air, no hydronephrosis or stones, no consolidation   Bcx NGTD (48h)  afebrile, WBC normalized now, arousable  continues with urinary retention, straight cath the night before, bladder scan this afternoon with 349 ml    Recommendations:   Follow Ucx for GPC ID and sensitivities   Continue on ceftriaxone for now   Nephrology following   Monitor temps/WBC  Aspiration precautions   Continue rest of care per primary team       Marisa aDvidson M.D.  [facility], Division of Infectious Diseases  [phone]  After 5pm on weekdays and all day on weekends - please call [phone]  Available on [employer] TEAMS            
 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   Per daughter, patient has been less interactive and nonverbal over the past few days. Patient had an unwitnessed fall about one week prior to admission with trauma to her buttocks, but remained at baseline mental state at the time.  Over the past two days she is more sleepy, not eating or taking medications . She had no reported fevers. She had one episode of vomiting.   I was called this am for patient having bradycardia 20s after receiving Toprol 125 s/p RRT   Was given glucagon but continues to be bradycardic 40s with 3-4 sec pauses. In Afib   BP stable   no chest pain or shortness of breath   
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 Nephrology consulted for elevated Scr.    1) CHANELL: likely due to hypovolemia versus ATN given granular and hyaline casts on UA. Scr improving. Start D5LR @ 50 ml/hour given poor PO intake. Check urine sodium and urine creatinine. CT without obstruction. Avoid nephrotoxins. Monitor electrolytes.    2) Hypotension: BP low normal. Continue with midodrine 5 mg PO TID. Monitor BP.    3) Metabolic acidosis: Gap and non-gap acidosis with (due to ketoacidosis and renal failure) and respiratory acidosis. Start D5LR as above. Repeat blood gas in AM. Monitor pH.    4) Acute cystitis with hematuria: On CTX. F/U urine culture.      [facility] NEPHROLOGY  Paulie Storm M.D.  Mack Clancy D.O.  Maddi Steve M.D.  MD Olivia Caldera, MSN, ANP-C    Telephone: [phone]  Facsimile: [phone] [place] [address], [address]  [place] [id number]  
  Assessment  Hyperthyroidism: 78y Female with no history of thyroid disease, not on any thyroid supplements, in subclinical hypothyroid state, hypotensive has encephalopathy hypotension.  Hypotension: Cortisol within normal range, on medications, monitored.  CHANELL:  Labs, chart reviewed.            Eric Calles MD  Cell:  [phone]  Office: [phone]

## 2024-07-24 NOTE — CONSULT NOTE ADULT - PROBLEM SELECTOR RECOMMENDATION 9
Likely does have SSS and will likely need a PPM at some point   for now DC all AV felicia blockers   Atropine 1 mg x 1 now   Monitor on Tele   check thyroid panel   replete electrolytes   discussed with NP
Suggest to continue medications, monitoring, FU primary team recommendations. .

## 2024-07-24 NOTE — PROGRESS NOTE ADULT - ASSESSMENT
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   likely toxic metabolic in setting of uti and CHANELL   amaya for urinary retention   check ABG r/o Co2 retention     Problem/Plan - 2:  ·  Problem: History of UTI.   cont abx   fu culture     Problem/Plan - 3:  ·  Problem: Sepsis with acute renal failure.   ·  Plan: suspect pre renal , iso poor po intake   - IV fluid challenge   -renal dose medications  - monitor ins and outs  - monitor creatinine   - avoid nephrotoxins.    Problem/Plan - 4:  ·  Problem: History of chronic atrial fibrillation.   pt recieved 125 mg of BB instead of 12.5  : RRT called for severe bradycardia   now improved with glucagon   cont to monitor   EP consulted   fu with cardio   - c/w Eliquis.    Problem/Plan - 5:  ·  Problem: History of CAD (coronary artery disease).   ·  Plan: trop elevated iso of CHANELL   -c/w plavix  -c/w atorvastatin.  - fu with cardio     Problem/Plan - 6:  ·  Problem: H/O hypotension.   ·  Plan: -c/w midodrine.    Problem/Plan - 7:  ·  Problem: History of COPD.   ·  Plan: minimal  hypercapnia , no wheezing   - can use albuterol PRN for wheezing.    Problem/Plan - 8:  ·  Problem: Hypothyroidism.   ·  Plan: -levothyroxine.    Problem/Plan - 9:  ·  Problem: Dementia.   ·  Plan: -mirtazapine Hs.    Problem/Plan - 10:  ·  Problem: History of creation of ostomy.   ·  Plan; - ostomy care.

## 2024-07-24 NOTE — PROGRESS NOTE ADULT - SUBJECTIVE AND OBJECTIVE BOX
Subjective: Patient seen and examined. No new events except as noted.     REVIEW OF SYSTEMS:    CONSTITUTIONAL: + weakness, fevers or chills  EYES/ENT: No visual changes;  No vertigo or throat pain   NECK: No pain or stiffness  RESPIRATORY: No cough, wheezing, hemoptysis; No shortness of breath  CARDIOVASCULAR: No chest pain or palpitations  GASTROINTESTINAL: No abdominal or epigastric pain. No nausea, vomiting, or hematemesis; No diarrhea or constipation. No melena or hematochezia.  GENITOURINARY: No dysuria, frequency or hematuria  NEUROLOGICAL: No numbness or weakness  SKIN: No itching, burning, rashes, or lesions   All other review of systems is negative unless indicated above.    MEDICATIONS:  MEDICATIONS  (STANDING):  apixaban 5 milliGRAM(s) Oral every 12 hours  ascorbic acid 500 milliGRAM(s) Oral daily  atorvastatin 80 milliGRAM(s) Oral at bedtime  atropine Injectable 1 milliGRAM(s) IV Push once  cefTRIAXone   IVPB 1000 milliGRAM(s) IV Intermittent every 24 hours  clopidogrel Tablet 75 milliGRAM(s) Oral daily  dextrose 5% + lactated ringers. 1000 milliLiter(s) (50 mL/Hr) IV Continuous <Continuous>  midodrine. 5 milliGRAM(s) Oral three times a day  mirtazapine 7.5 milliGRAM(s) Oral at bedtime  multivitamin 1 Tablet(s) Oral daily      PHYSICAL EXAM:  T(C): 36.3 (07-24-24 @ 04:34), Max: 36.6 (07-23-24 @ 12:28)  HR: 88 (07-24-24 @ 04:34) (87 - 97)  BP: 108/72 (07-24-24 @ 04:34) (108/72 - 133/93)  RR: 18 (07-24-24 @ 04:34) (18 - 18)  SpO2: 98% (07-24-24 @ 04:34) (97% - 98%)  Wt(kg): --  I&O's Summary    23 Jul 2024 07:01  -  24 Jul 2024 07:00  --------------------------------------------------------  IN: 120 mL / OUT: 450 mL / NET: -330 mL          Appearance: NAD	  HEENT:  Dry  oral mucosa, PERRL, EOMI	  Lymphatic: No lymphadenopathy  Cardiovascular: Irregular  S1 S2, No JVD, No murmurs, No edema  Respiratory: Lungs clear to auscultation	  Psychiatry: A & O x 3, Mood & affect appropriate  Gastrointestinal:  Soft, Non-tender, + BS	  Skin: No rashes, No ecchymoses, No cyanosis	  Neurologic: Non-focal  Extremities: Normal range of motion, No clubbing, cyanosis or edema  Vascular: Peripheral pulses palpable 2+ bilaterally      LABS:    CARDIAC MARKERS:  CARDIAC MARKERS ( 21 Jul 2024 12:05 )  x     / x     / 30 U/L / x     / x                07-23    133<L>  |  101  |  48<H>  ----------------------------<  105<H>  4.4   |  14<L>  |  1.59<H>    Ca    9.7      23 Jul 2024 07:24      proBNP:   Lipid Profile:   HgA1c:   TSH:     Present          TELEMETRY: 	    ECG:  	  RADIOLOGY:   DIAGNOSTIC TESTING:  [ ] Echocardiogram:  [ ]  Catheterization:  [ ] Stress Test:    OTHER:

## 2024-07-24 NOTE — PHYSICAL THERAPY INITIAL EVALUATION ADULT - TRANSFER TRAINING, PT EVAL
GOAL: Patient will perform sit to stand transfers with CGA at rolling walker with proper hand placement in 2 weeks

## 2024-07-24 NOTE — PROVIDER CONTACT NOTE (OTHER) - ACTION/TREATMENT ORDERED:
PA aware & assessed pt at bedside. CT head ordered, amaya ordered 2/2 urinary retention, ABG ordered. As per PA, okay to reschedule medications to later this evening to see if pt becomes more alert.

## 2024-07-24 NOTE — PROGRESS NOTE ADULT - SUBJECTIVE AND OBJECTIVE BOX
Date of service: 07-24-24 @ 22:58      Patient is a 78y old  Female who presents with a chief complaint of AMS (24 Jul 2024 15:01)                                                               INTERVAL HPI/OVERNIGHT EVENTS:    REVIEW OF SYSTEMS:   lethargic                                                                                                                                                                                                                                                                         Medications:  MEDICATIONS  (STANDING):  apixaban 5 milliGRAM(s) Oral every 12 hours  ascorbic acid 500 milliGRAM(s) Oral daily  atorvastatin 80 milliGRAM(s) Oral at bedtime  atropine Injectable 1 milliGRAM(s) IV Push once  clopidogrel Tablet 75 milliGRAM(s) Oral daily  dextrose 5% + lactated ringers. 1000 milliLiter(s) (50 mL/Hr) IV Continuous <Continuous>  metoprolol tartrate 25 milliGRAM(s) Oral two times a day  midodrine. 5 milliGRAM(s) Oral three times a day  mirtazapine 7.5 milliGRAM(s) Oral at bedtime  multivitamin 1 Tablet(s) Oral daily    MEDICATIONS  (PRN):  acetaminophen     Tablet .. 650 milliGRAM(s) Oral every 6 hours PRN Temp greater or equal to 38C (100.4F), Mild Pain (1 - 3)  ondansetron Injectable 4 milliGRAM(s) IV Push every 8 hours PRN Nausea and/or Vomiting       Allergies    penicillin (Hives)    Intolerances      Vital Signs Last 24 Hrs  T(C): 36.4 (24 Jul 2024 20:54), Max: 36.4 (24 Jul 2024 16:30)  T(F): 97.6 (24 Jul 2024 20:54), Max: 97.6 (24 Jul 2024 20:54)  HR: 70 (24 Jul 2024 20:54) (70 - 92)  BP: 93/63 (24 Jul 2024 20:54) (92/64 - 117/83)  BP(mean): --  RR: 18 (24 Jul 2024 20:54) (16 - 18)  SpO2: 97% (24 Jul 2024 20:54) (91% - 98%)    Parameters below as of 24 Jul 2024 20:54  Patient On (Oxygen Delivery Method): room air      CAPILLARY BLOOD GLUCOSE          07-23 @ 07:01  -  07-24 @ 07:00  --------------------------------------------------------  IN: 120 mL / OUT: 450 mL / NET: -330 mL    07-24 @ 07:01  -  07-24 @ 22:58  --------------------------------------------------------  IN: 600 mL / OUT: 705 mL / NET: -105 mL      Physical Exam:    Daily     Daily   General:  NAD   HEENT:  Nonicteric, PERRLA  CV:  RRR, S1S2   Lungs:  CTA B/L, no wheezes, rales, rhonchi  Abdomen:  Soft, non-tender, no distended, positive BS  Extremities: no edema   elthargic                                                                                                                                                                                                                                                                                             LABS:                               13.7   8.70  )-----------( 233      ( 24 Jul 2024 10:12 )             42.4                      07-24    136  |  101  |  42<H>  ----------------------------<  109<H>  3.7   |  21<L>  |  1.64<H>    Ca    9.6      24 Jul 2024 10:12                         RADIOLOGY & ADDITIONAL TESTS         I personally reviewed: [  ]EKG   [  ]CXR    [  ] CT      A/P:         Discussed with :     Simon consultants' Notes   Time spent :

## 2024-07-24 NOTE — PHYSICAL THERAPY INITIAL EVALUATION ADULT - PERTINENT HX OF CURRENT PROBLEM, REHAB EVAL
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 Hosp course: XR chest (7/21) Clear lungs. CT head (7/21)  Mild to moderate chronic microvascular changes without evidence of an acute transcortical infarction or hemorrhage. CT C-Spine (7/21) Moderate spondylosis. No acute osseous abnormality. CT chest (7/21) Left adrenal mass which is unchanged.

## 2024-07-24 NOTE — PROGRESS NOTE ADULT - SUBJECTIVE AND OBJECTIVE BOX
[facility] NEPHROLOGY- PROGRESS NOTE    78y Female with history of dementia, ischemic bowel s/p resection with end ostomy presents with AMS. Nephrology consulted for elevated Scr.    REVIEW OF SYSTEMS:  Gen: no fevers  Cards: no chest pain  Resp: no dyspnea  GI: no nausea or vomiting or diarrhea, + decreased PO intake  Vascular: no LE edema    penicillin (Hives)      Hospital Medications: Medications reviewed      VITALS:  T(F): 97.3 (07-24-24 @ 04:34), Max: 97.8 (07-23-24 @ 12:28)  HR: 88 (07-24-24 @ 04:34)  BP: 108/72 (07-24-24 @ 04:34)  RR: 18 (07-24-24 @ 04:34)  SpO2: 98% (07-24-24 @ 04:34)  Wt(kg): --    07-23 @ 07:01  -  07-24 @ 07:00  --------------------------------------------------------  IN: 120 mL / OUT: 450 mL / NET: -330 mL        PHYSICAL EXAM:    Gen: NAD, calm  Cards: RRR, +S1/S2, no M/G/R  Resp: CTA B/L  GI: soft, NT/ND, NABS, + ostomy  Vascular: no LE edema B/L      LABS:  07-23    133<L>  |  101  |  48<H>  ----------------------------<  105<H>  4.4   |  14<L>  |  1.59<H>    Ca    9.7      23 Jul 2024 07:24      Creatinine Trend: 1.59 <--, 1.87 <--, 2.08 <--    Urine Studies:  Urinalysis Basic - ( 23 Jul 2024 07:24 )    Color:  / Appearance:  / SG:  / pH:   Gluc: 105 mg/dL / Ketone:   / Bili:  / Urobili:    Blood:  / Protein:  / Nitrite:    Leuk Esterase:  / RBC:  / WBC    Sq Epi:  / Non Sq Epi:  / Bacteria:

## 2024-07-24 NOTE — PHYSICAL THERAPY INITIAL EVALUATION ADULT - ADDITIONAL COMMENTS
Pt is a poor historian. Per chart review, pt resides with her daughter in a private home with +5 steps to enter. PTA, pt ambulated with RW, required assist for ADLs. Pt owns RW, cane, wheelchair, commode, hospital bed, shower chair

## 2024-07-25 LAB
-  AMPICILLIN: SIGNIFICANT CHANGE UP
-  CIPROFLOXACIN: SIGNIFICANT CHANGE UP
-  LEVOFLOXACIN: SIGNIFICANT CHANGE UP
-  NITROFURANTOIN: SIGNIFICANT CHANGE UP
-  TETRACYCLINE: SIGNIFICANT CHANGE UP
-  VANCOMYCIN: SIGNIFICANT CHANGE UP
ACTH SER-ACNC: 2.8 PG/ML — LOW (ref 7.2–63.3)
ANION GAP SERPL CALC-SCNC: 14 MMOL/L — SIGNIFICANT CHANGE UP (ref 5–17)
BASE EXCESS BLDV CALC-SCNC: -4.7 MMOL/L — LOW (ref -2–3)
BUN SERPL-MCNC: 35 MG/DL — HIGH (ref 7–23)
CALCIUM SERPL-MCNC: 9.2 MG/DL — SIGNIFICANT CHANGE UP (ref 8.4–10.5)
CHLORIDE SERPL-SCNC: 102 MMOL/L — SIGNIFICANT CHANGE UP (ref 96–108)
CO2 BLDV-SCNC: 22 MMOL/L — SIGNIFICANT CHANGE UP (ref 22–26)
CO2 SERPL-SCNC: 19 MMOL/L — LOW (ref 22–31)
CREAT ?TM UR-MCNC: 177 MG/DL — SIGNIFICANT CHANGE UP
CREAT SERPL-MCNC: 1.51 MG/DL — HIGH (ref 0.5–1.3)
CULTURE RESULTS: ABNORMAL
EGFR: 35 ML/MIN/1.73M2 — LOW
GLUCOSE BLDC GLUCOMTR-MCNC: 147 MG/DL — HIGH (ref 70–99)
GLUCOSE SERPL-MCNC: 106 MG/DL — HIGH (ref 70–99)
HCO3 BLDV-SCNC: 20 MMOL/L — LOW (ref 22–29)
HCT VFR BLD CALC: 41 % — SIGNIFICANT CHANGE UP (ref 34.5–45)
HGB BLD-MCNC: 13.8 G/DL — SIGNIFICANT CHANGE UP (ref 11.5–15.5)
MCHC RBC-ENTMCNC: 31.3 PG — SIGNIFICANT CHANGE UP (ref 27–34)
MCHC RBC-ENTMCNC: 33.7 GM/DL — SIGNIFICANT CHANGE UP (ref 32–36)
MCV RBC AUTO: 93 FL — SIGNIFICANT CHANGE UP (ref 80–100)
METHOD TYPE: SIGNIFICANT CHANGE UP
NRBC # BLD: 0 /100 WBCS — SIGNIFICANT CHANGE UP (ref 0–0)
ORGANISM # SPEC MICROSCOPIC CNT: ABNORMAL
ORGANISM # SPEC MICROSCOPIC CNT: ABNORMAL
PCO2 BLDV: 37 MMHG — LOW (ref 39–42)
PH BLDV: 7.35 — SIGNIFICANT CHANGE UP (ref 7.32–7.43)
PLATELET # BLD AUTO: 238 K/UL — SIGNIFICANT CHANGE UP (ref 150–400)
PO2 BLDV: 99 MMHG — HIGH (ref 25–45)
POTASSIUM SERPL-MCNC: 3.5 MMOL/L — SIGNIFICANT CHANGE UP (ref 3.5–5.3)
POTASSIUM SERPL-SCNC: 3.5 MMOL/L — SIGNIFICANT CHANGE UP (ref 3.5–5.3)
RBC # BLD: 4.41 M/UL — SIGNIFICANT CHANGE UP (ref 3.8–5.2)
RBC # FLD: 15.4 % — HIGH (ref 10.3–14.5)
SAO2 % BLDV: 99.2 % — HIGH (ref 67–88)
SODIUM SERPL-SCNC: 135 MMOL/L — SIGNIFICANT CHANGE UP (ref 135–145)
SODIUM UR-SCNC: 7 MMOL/L — SIGNIFICANT CHANGE UP
SPECIMEN SOURCE: SIGNIFICANT CHANGE UP
T4 FREE SERPL-MCNC: 1.4 NG/DL — SIGNIFICANT CHANGE UP (ref 0.9–1.8)
TSH SERPL-MCNC: 0.15 UIU/ML — LOW (ref 0.27–4.2)
WBC # BLD: 9.02 K/UL — SIGNIFICANT CHANGE UP (ref 3.8–10.5)
WBC # FLD AUTO: 9.02 K/UL — SIGNIFICANT CHANGE UP (ref 3.8–10.5)

## 2024-07-25 PROCEDURE — 70450 CT HEAD/BRAIN W/O DYE: CPT | Mod: 26

## 2024-07-25 RX ORDER — TAMSULOSIN HCL 0.4 MG
0.4 CAPSULE ORAL AT BEDTIME
Refills: 0 | Status: DISCONTINUED | OUTPATIENT
Start: 2024-07-25 | End: 2024-07-29

## 2024-07-25 RX ORDER — LEVOTHYROXINE SODIUM 175 MCG
125 TABLET ORAL DAILY
Refills: 0 | Status: DISCONTINUED | OUTPATIENT
Start: 2024-07-26 | End: 2024-07-29

## 2024-07-25 RX ORDER — CIPROFLOXACIN 500 MG/1
250 TABLET, FILM COATED ORAL EVERY 12 HOURS
Refills: 0 | Status: DISCONTINUED | OUTPATIENT
Start: 2024-07-25 | End: 2024-07-29

## 2024-07-25 RX ADMIN — CLOPIDOGREL BISULFATE 75 MILLIGRAM(S): 75 TABLET, FILM COATED ORAL at 11:28

## 2024-07-25 RX ADMIN — MIDODRINE HYDROCHLORIDE 5 MILLIGRAM(S): 2.5 TABLET ORAL at 11:27

## 2024-07-25 RX ADMIN — APIXABAN 5 MILLIGRAM(S): 5 TABLET, FILM COATED ORAL at 08:21

## 2024-07-25 RX ADMIN — MIDODRINE HYDROCHLORIDE 5 MILLIGRAM(S): 2.5 TABLET ORAL at 17:13

## 2024-07-25 RX ADMIN — Medication 0.4 MILLIGRAM(S): at 21:25

## 2024-07-25 RX ADMIN — Medication 25 MILLIGRAM(S): at 17:13

## 2024-07-25 RX ADMIN — CIPROFLOXACIN 250 MILLIGRAM(S): 500 TABLET, FILM COATED ORAL at 17:35

## 2024-07-25 RX ADMIN — DEXTROSE MONOHYDRATE, SODIUM CHLORIDE, SODIUM LACTATE, CALCIUM CHLORIDE, MAGNESIUM CHLORIDE 70 MILLILITER(S): 1.5; 538; 448; 18.4; 5.08 SOLUTION INTRAPERITONEAL at 17:14

## 2024-07-25 RX ADMIN — Medication 1 TABLET(S): at 11:38

## 2024-07-25 RX ADMIN — Medication 7.5 MILLIGRAM(S): at 21:25

## 2024-07-25 RX ADMIN — Medication 500 MILLIGRAM(S): at 11:28

## 2024-07-25 RX ADMIN — ATORVASTATIN CALCIUM 80 MILLIGRAM(S): 40 TABLET, FILM COATED ORAL at 21:25

## 2024-07-25 RX ADMIN — APIXABAN 5 MILLIGRAM(S): 5 TABLET, FILM COATED ORAL at 17:13

## 2024-07-25 RX ADMIN — MIDODRINE HYDROCHLORIDE 5 MILLIGRAM(S): 2.5 TABLET ORAL at 08:21

## 2024-07-25 NOTE — ADVANCED PRACTICE NURSE CONSULT - REASON FOR CONSULT
Reassess the ostomy drain management system    
Drain management, pouching. (s/p end ileostomy 2/24/24). HPI is noted.    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   Per daughter, patient has been less interactive and nonverbal over the past few days. Patient had an unwitnessed fall about one week prior to admission with trauma to her buttocks, but remained at baseline mental state at the time.  Over the past two days she is more sleepy, not eating or taking medications . She had no reported fevers. She had one episode of vomiting.

## 2024-07-25 NOTE — CHART NOTE - NSCHARTNOTEFT_GEN_A_CORE
Medicine PA note    Notified by Rn that pt had 2 -3 sec pauses on tele monitor. spoke with cards who say nothing to do at this time. Ep is following recc Continue telemetry and no need for pacemaker at this time. Pt have hx of pauses.  Ep to f/u today    Dept of med  Sonia Benitez PA-C
RRT called this morning for slow afib (HR 30s-50s) with multiple ~3 second pauses noted on telemetry. Please see RRT note for full details.   Briefly, patient received PO Lopressor 125mg this AM. During RRT, pt received 8mg total of glucagon with improvement in HR.   Patient remained on unit. C/w zoll pads (placed during rapid), telemetry monitoring. Keep atropine @ bedside,   Attending, Dr. Duran, notified of RRT by day ACP.   Cardiology, Dr. Mendiola, consulted by day ACP.   Patient daughter, Caity Gooden ([phone]), made aware.\  Continue to monitor.   Endorsed to Day ACP.    Evleyn Booker PA-C  Dept. of Medicine  MS Teams
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   Per daughter, patient has been less interactive and nonverbal over the past few days. Patient had an unwitnessed fall about one week prior to admission with trauma to her buttocks, but remained at baseline mental state at the time.  Over the past two days she is more sleepy, not eating or taking medications . She had no reported fevers. She had one episode of vomiting.    (21 Jul 2024 20:05)    Notified by RN for pt w/ increased drowsiness. Pt seen and examined by me at bedside, currently A&O x 1-2. She is more drowsy than usual, however non-toxic appearing. Currently on CTX for UTI. Will check CTH. ABG ordered to assess CO2 retention. Melvin placed as pt is retaining. Discussed w/ medicine attending Dr. Duran.     T(C): 36.4 (07-24-24 @ 16:30), Max: 36.4 (07-24-24 @ 16:30)  HR: 77 (07-24-24 @ 18:25) (76 - 97)  BP: 92/64 (07-24-24 @ 18:25) (92/64 - 133/93)  RR: 16 (07-24-24 @ 18:25) (16 - 18)  SpO2: 97% (07-24-24 @ 18:25) (91% - 98%)    CONSTITUTIONAL: Well groomed, no apparent distress  EYES: PERRLA and symmetric, EOMI, No conjunctival or scleral injection, non-icteric  ENMT: Oral mucosa with moist membranes. Normal dentition; no pharyngeal injection or exudates             NECK: Supple, symmetric and without tracheal deviation   RESP: No respiratory distress, no use of accessory muscles; CTA b/l, no WRR  CV: RRR, +S1S2, no MRG; no JVD; no peripheral edema  GI: Soft, NT, ND, no rebound, no guarding; no palpable masses; no hepatosplenomegaly; no hernia palpated  SKIN: No rashes or ulcers noted; no subcutaneous nodules or induration palpable
Patient seen by Dr Mendiola at this time.  Following evaluation and review of telemetry (Afib with rates low 40's currently), Dr Mendiola requested Atropine 1mg IV x1 now  Ordered as directed.  Patient remains on continuous remote tele

## 2024-07-25 NOTE — PROGRESS NOTE ADULT - ASSESSMENT
Assessment  Hyperthyroidism: 78y Female with hx hypothyroid disease, on home synthroid 175 mcg , in subclinical hypothyroid state, hypotensive has encephalopathy hypotension.  Hypotension: Cortisol within normal range, on medications, monitored.  CHANELL:  Labs, chart reviewed.        Discussed plan and management with Dr Marli Zelaya NP - TEAMS  Eric Calles MD  Cell: [phone]  Office: [phone]      Assessment  Hyperthyroidism: 78y Female with hx hypothyroid disease, on home synthroid 175 mcg , in subclinical hypothyroid state, hypotensive has encephalopathy hypotension.  Hypotension: Cortisol within normal range, on medications, monitored.  CHANELL:  Labs, chart reviewed.          Discussed plan and management with Dr Marli Zelaya NP - TEAMS  Eric Calles MD  Cell: [phone]  Office: [phone]

## 2024-07-25 NOTE — PROGRESS NOTE ADULT - SUBJECTIVE AND OBJECTIVE BOX
Chief complaint  Patient is a 78y old  Female who presents with a chief complaint of AMS (25 Jul 2024 13:57)         Labs and Fingersticks  CAPILLARY BLOOD GLUCOSE      POCT Blood Glucose.: 147 mg/dL (25 Jul 2024 00:23)      Anion Gap: 14 (07-25 @ 06:35)  Anion Gap: 14 (07-24 @ 10:12)      Calcium: 9.2 (07-25 @ 06:35)  Calcium: 9.6 (07-24 @ 10:12)          07-25    135  |  102  |  35<H>  ----------------------------<  106<H>  3.5   |  19<L>  |  1.51<H>    Ca    9.2      25 Jul 2024 06:35                          13.8   9.02  )-----------( 238      ( 25 Jul 2024 06:34 )             41.0     Medications  MEDICATIONS  (STANDING):  apixaban 5 milliGRAM(s) Oral every 12 hours  ascorbic acid 500 milliGRAM(s) Oral daily  atorvastatin 80 milliGRAM(s) Oral at bedtime  atropine Injectable 1 milliGRAM(s) IV Push once  ciprofloxacin     Tablet 250 milliGRAM(s) Oral every 12 hours  clopidogrel Tablet 75 milliGRAM(s) Oral daily  dextrose 5% + lactated ringers. 1000 milliLiter(s) (70 mL/Hr) IV Continuous <Continuous>  metoprolol tartrate 25 milliGRAM(s) Oral two times a day  midodrine. 5 milliGRAM(s) Oral three times a day  mirtazapine 7.5 milliGRAM(s) Oral at bedtime  multivitamin 1 Tablet(s) Oral daily  tamsulosin 0.4 milliGRAM(s) Oral at bedtime      Physical Exam  General: Patient comfortable in bed  Vital Signs Last 12 Hrs  T(F): 97.8 (07-25-24 @ 12:54), Max: 97.8 (07-25-24 @ 04:06)  HR: 63 (07-25-24 @ 12:54) (63 - 71)  BP: 115/67 (07-25-24 @ 12:54) (108/67 - 115/67)  BP(mean): --  RR: 18 (07-25-24 @ 12:54) (18 - 18)  SpO2: 97% (07-25-24 @ 12:54) (97% - 97%)    CVS: S1S2   Respiratory: No wheezing, no crepitations  GI: Abdomen soft, bowel sounds positive  Musculoskeletal:  moves all extremities  : Voiding     \\    Chief complaint  Patient is a 78y old  Female who presents with a chief complaint of AMS (25 Jul 2024 13:57)         Labs and Fingersticks  CAPILLARY BLOOD GLUCOSE      POCT Blood Glucose.: 147 mg/dL (25 Jul 2024 00:23)      Anion Gap: 14 (07-25 @ 06:35)  Anion Gap: 14 (07-24 @ 10:12)      Calcium: 9.2 (07-25 @ 06:35)  Calcium: 9.6 (07-24 @ 10:12)          07-25    135  |  102  |  35<H>  ----------------------------<  106<H>  3.5   |  19<L>  |  1.51<H>    Ca    9.2      25 Jul 2024 06:35                          13.8   9.02  )-----------( 238      ( 25 Jul 2024 06:34 )             41.0     Medications  MEDICATIONS  (STANDING):  apixaban 5 milliGRAM(s) Oral every 12 hours  ascorbic acid 500 milliGRAM(s) Oral daily  atorvastatin 80 milliGRAM(s) Oral at bedtime  atropine Injectable 1 milliGRAM(s) IV Push once  ciprofloxacin     Tablet 250 milliGRAM(s) Oral every 12 hours  clopidogrel Tablet 75 milliGRAM(s) Oral daily  dextrose 5% + lactated ringers. 1000 milliLiter(s) (70 mL/Hr) IV Continuous <Continuous>  metoprolol tartrate 25 milliGRAM(s) Oral two times a day  midodrine. 5 milliGRAM(s) Oral three times a day  mirtazapine 7.5 milliGRAM(s) Oral at bedtime  multivitamin 1 Tablet(s) Oral daily  tamsulosin 0.4 milliGRAM(s) Oral at bedtime      Physical Exam  General: Patient comfortable in bed  Vital Signs Last 12 Hrs  T(F): 97.8 (07-25-24 @ 12:54), Max: 97.8 (07-25-24 @ 04:06)  HR: 63 (07-25-24 @ 12:54) (63 - 71)  BP: 115/67 (07-25-24 @ 12:54) (108/67 - 115/67)  BP(mean): --  RR: 18 (07-25-24 @ 12:54) (18 - 18)  SpO2: 97% (07-25-24 @ 12:54) (97% - 97%)    CVS: S1S2   Respiratory: No wheezing, no crepitations  GI: Abdomen soft, bowel sounds positive  Musculoskeletal:  moves all extremities  : Voiding

## 2024-07-25 NOTE — PROGRESS NOTE ADULT - SUBJECTIVE AND OBJECTIVE BOX
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   Per daughter, patient has been less interactive and nonverbal over the past few days. Patient had an unwitnessed fall about one week prior to admission with trauma to her buttocks, but remained at baseline mental state at the time.  Over the past two days she is more sleepy, not eating or taking medications . She had no reported fevers. She had one episode of vomiting.    (21 Jul 2024 20:05)    EP called re: abnormal EKG.  Telemetry with sinus bradycardia (briefly 20s-30s), with pauses of ~3.5-4sec duration.  Not apparently symptomatic as patient asleep/resting in bed.   In the ED, was inadverdently given 125mg of Metoprolol instead of 25mg.  Bradycardia resolving by next hospital day.  Being treated for urinary retention and UTI.  Has been straight-cath'd to relieve urinary retention.  On 7/23, patient awake, pleasantly confused, asking about our weekend plans.  States no angina, palpitations or lightheadedness, but continues to reference "[place] want [place]  A 10 pt ROS is otherwise negative, limited by history of dementia.  On recent admission, a loop recorder was placed for unexplained syncope w/ resulting head trauma.  She has persistent AFib. Anticoagulated w/ Apixaban 5mg BID.  On rate control Rx w/ metoprolol and digoxin.  ILR surveillance for AV block during AFib.  Date of service 7/25- resting in bed, no overnight issues.    PAST MEDICAL & SURGICAL HISTORY:  Hypertension  Hypothyroid  Osteoarthritis  knees, back  CAD (coronary artery disease)  CROW (obstructive sleep apnea)  non complaint on CPAP  History of MI (myocardial infarction)  h/o previous MI in 2004 prompted PTCA  with stenting x 2 vessels   last stress/ echo 2019  Heart murmur  dx in childhood  Bilateral hearing loss, unspecified hearing loss type  bilateral aids  Obesity  Mixed stress and urge urinary incontinence  Overactive bladder  S/P ORIF (open reduction internal fixation) fracture  left hip 1962  S/P appendectomy  30 plus years  S/P knee replacement  left 2000  Stented coronary artery  2004 X 2 STENTS  S/P laparotomy  due to adhesions, 30 years ago  H/O dilation and curettage  2/2019 Benign polyp    acetaminophen     Tablet .. 650 milliGRAM(s) Oral every 6 hours PRN  apixaban 5 milliGRAM(s) Oral every 12 hours  ascorbic acid 500 milliGRAM(s) Oral daily  atorvastatin 80 milliGRAM(s) Oral at bedtime  atropine Injectable 1 milliGRAM(s) IV Push once  ciprofloxacin     Tablet 250 milliGRAM(s) Oral every 12 hours  clopidogrel Tablet 75 milliGRAM(s) Oral daily  dextrose 5% + lactated ringers. 1000 milliLiter(s) IV Continuous <Continuous>  metoprolol tartrate 25 milliGRAM(s) Oral two times a day  midodrine. 5 milliGRAM(s) Oral three times a day  mirtazapine 7.5 milliGRAM(s) Oral at bedtime  multivitamin 1 Tablet(s) Oral daily  ondansetron Injectable 4 milliGRAM(s) IV Push every 8 hours PRN  tamsulosin 0.4 milliGRAM(s) Oral at bedtime                            13.8   9.02  )-----------( 238      ( 25 Jul 2024 06:34 )             41.0       07-25    135  |  102  |  35<H>  ----------------------------<  106<H>  3.5   |  19<L>  |  1.51<H>    Ca    9.2      25 Jul 2024 06:35      T(C): 36.6 (07-25-24 @ 12:54), Max: 36.6 (07-24-24 @ 23:40)  HR: 63 (07-25-24 @ 12:54) (48 - 78)  BP: 115/67 (07-25-24 @ 12:54) (92/64 - 115/67)  RR: 18 (07-25-24 @ 12:54) (16 - 18)  SpO2: 97% (07-25-24 @ 12:54) (95% - 97%)  Wt(kg): --    I&O's Summary    24 Jul 2024 07:01  -  25 Jul 2024 07:00  --------------------------------------------------------  IN: 600 mL / OUT: 1005 mL / NET: -405 mL    25 Jul 2024 07:01  -  25 Jul 2024 16:39  --------------------------------------------------------  IN: 0 mL / OUT: 150 mL / NET: -150 mL    Appearance: elderly woman in no acute distress	  HEENT:   Normal oral mucosa, PERRL, EOMI	  Lymphatic: No lymphadenopathy , no edema  Cardiovascular: irregular S1 S2, No JVD, No murmurs , Peripheral pulses palpable 2+ bilaterally  Respiratory: Lungs clear to auscultation, normal effort 	  Gastrointestinal:  Soft, Non-tender, + BS	  Skin: No rashes, No ecchymoses, No cyanosis, warm to touch  Musculoskeletal: Normal range of motion, normal strength  Psychiatry:  Mood is "I feel OK" & affect appropriate.  Memory is impaired, oriented to name only at this time.    TELEMETRY: AFib, rate-controlled.  Previously showing SR and AF with very slow heartrates, albeit after getting high dose of oral metoprolol 	    	  ASSESSMENT/PLAN: Ms Gooden is a pleasant 78y Female here w/ altered mental status. Being treated for encephalopathy related to sepsis (UTI) and urinary retention. EP called re: abnormal EKG.    Can continue metoprolol (25-50mg BID) for AFib rate control. Hold Digoxin in the setting of acute kidney injury.  Goal HR<100bpm at rest.  Continue apixaban 5mg BID for stroke prevention.  Continue telemetry - no unprovoked severe bradycardia or long pauses >5sec in AFib , and not acutely symptomatic during short pauses.  At this time does not require permanent pacemaker insertion.  Continue UTI treatment w/ antibiotics, and management of urinary retention to relieve Vagal effect on her heartrate.  ILR data mgmt per Dr Mendiola.  Will follow with you.      Shane Frias M.D.  Cardiac Electrophysiology    office [phone]  pager [phone]

## 2024-07-25 NOTE — PROGRESS NOTE ADULT - SUBJECTIVE AND OBJECTIVE BOX
Subjective: Patient seen and examined. No new events except as noted.     REVIEW OF SYSTEMS:    CONSTITUTIONAL: + weakness, fevers or chills  EYES/ENT: No visual changes;  No vertigo or throat pain   NECK: No pain or stiffness  RESPIRATORY: No cough, wheezing, hemoptysis; No shortness of breath  CARDIOVASCULAR: No chest pain or palpitations  GASTROINTESTINAL: No abdominal or epigastric pain. No nausea, vomiting, or hematemesis; No diarrhea or constipation. No melena or hematochezia.  GENITOURINARY: No dysuria, frequency or hematuria  NEUROLOGICAL: No numbness or weakness  SKIN: No itching, burning, rashes, or lesions   All other review of systems is negative unless indicated above.    MEDICATIONS:  MEDICATIONS  (STANDING):  apixaban 5 milliGRAM(s) Oral every 12 hours  ascorbic acid 500 milliGRAM(s) Oral daily  atorvastatin 80 milliGRAM(s) Oral at bedtime  atropine Injectable 1 milliGRAM(s) IV Push once  clopidogrel Tablet 75 milliGRAM(s) Oral daily  dextrose 5% + lactated ringers. 1000 milliLiter(s) (50 mL/Hr) IV Continuous <Continuous>  metoprolol tartrate 25 milliGRAM(s) Oral two times a day  midodrine. 5 milliGRAM(s) Oral three times a day  mirtazapine 7.5 milliGRAM(s) Oral at bedtime  multivitamin 1 Tablet(s) Oral daily  vancomycin  IVPB 1000 milliGRAM(s) IV Intermittent every 24 hours      PHYSICAL EXAM:  T(C): 36.6 (07-25-24 @ 04:06), Max: 36.6 (07-24-24 @ 23:40)  HR: 71 (07-25-24 @ 04:20) (48 - 92)  BP: 113/76 (07-25-24 @ 04:20) (92/64 - 117/83)  RR: 18 (07-25-24 @ 04:06) (16 - 18)  SpO2: 97% (07-25-24 @ 04:06) (91% - 97%)  Wt(kg): --  I&O's Summary    24 Jul 2024 07:01  -  25 Jul 2024 07:00  --------------------------------------------------------  IN: 600 mL / OUT: 1005 mL / NET: -405 mL            Appearance: NAD	  HEENT:  Dry  oral mucosa, PERRL, EOMI	  Lymphatic: No lymphadenopathy  Cardiovascular: Irregular  S1 S2, No JVD, No murmurs, No edema  Respiratory: Lungs clear to auscultation	  Psychiatry: A & O x 3, Mood & affect appropriate  Gastrointestinal:  Soft, Non-tender, + BS	  Skin: No rashes, No ecchymoses, No cyanosis	  Neurologic: Non-focal  Extremities: Normal range of motion, No clubbing, cyanosis or edema  Vascular: Peripheral pulses palpable 2+ bilaterally        LABS:    CARDIAC MARKERS:                                13.8   9.02  )-----------( 238      ( 25 Jul 2024 06:34 )             41.0     07-25    135  |  102  |  35<H>  ----------------------------<  106<H>  3.5   |  19<L>  |  1.51<H>    Ca    9.2      25 Jul 2024 06:35      proBNP:   Lipid Profile:   HgA1c:   TSH: Thyroid Stimulating Hormone, Serum: 0.09 uIU/mL (07-24 @ 10:12)              TELEMETRY: 	  AF 2-3 sec pause  ECG:  	  RADIOLOGY:   DIAGNOSTIC TESTING:  [ ] Echocardiogram:  [ ]  Catheterization:  [ ] Stress Test:    OTHER:

## 2024-07-25 NOTE — PROGRESS NOTE ADULT - ASSESSMENT
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 Per daughter, patient has been less interactive and nonverbal over the past few days, sleeping more and note taking meds or eating. Patient had episode of vomiting, no fevers noted. Patient had an unwitnessed fall about one week prior to admission with trauma to her buttocks, but remained at baseline mental state at the time.     Sepsis due to suspected UTI with hematuria  AMS, leukocytosis with urinary retention, CHANELL  UA with pyuria but noted poor/contaminated specimen with 21 sq epi cells  Ucx with GPC in pairs and chains -- >100k cfu/ml E.faecalis    - switched from ceftriaxone to vancomycin 7/24 pm  CT C/A/P with L adrenal mass unchanged, bladder with small amt of air, no hydronephrosis or stones, no consolidation   Bcx NGTD x2 (72h)  7/24 continues with urinary retention, straight cath the night before, bladder scan this afternoon with 349 ml  now s/p amaya placement, remains afebrile, more awake/alert, WBC wnl    Recommendations:   Follow Ucx for E.faecalis sensitivities    Continue vancomycin (PCN allergy) - can plan to complete total 5d course   Monitor vancomycin trough, goal 15-20 or -600  Nephrology following   Monitor temps/WBC  Aspiration precautions   Continue rest of care per primary team       Marisa Davidson M.D.  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 Per daughter, patient has been less interactive and nonverbal over the past few days, sleeping more and note taking meds or eating. Patient had episode of vomiting, no fevers noted. Patient had an unwitnessed fall about one week prior to admission with trauma to her buttocks, but remained at baseline mental state at the time.     Sepsis due to suspected UTI with hematuria  AMS, leukocytosis with urinary retention, CHANELL  UA with pyuria but noted poor/contaminated specimen with 21 sq epi cells  Ucx with >100k cfu/ml E.faecalis  -- sensitivities noted    - switched from ceftriaxone to vancomycin 7/24 pm  CT C/A/P with L adrenal mass unchanged, bladder with small amt of air, no hydronephrosis or stones, no consolidation   Bcx NGTD x2 (72h)  7/24 continues with urinary retention, straight cath the night before, bladder scan this afternoon with 349 ml  now s/p amaya placement, remains afebrile, more awake/alert, WBC wnl    Recommendations:   Discontinued vancomycin   Started on ciprofloxacin 500mg PO BID (qtc ok) to complete total 5d course on 7/30  Nephrology following   Monitor temps/WBC  Aspiration precautions   Continue rest of care per primary team       Marisa Davidson M.D.  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 Per daughter, patient has been less interactive and nonverbal over the past few days, sleeping more and note taking meds or eating. Patient had episode of vomiting, no fevers noted. Patient had an unwitnessed fall about one week prior to admission with trauma to her buttocks, but remained at baseline mental state at the time.     Sepsis due to suspected UTI with hematuria  AMS, leukocytosis with urinary retention, CHANELL  UA with pyuria but noted poor/contaminated specimen with 21 sq epi cells  Ucx with >100k cfu/ml E.faecalis  -- sensitivities noted    - switched from ceftriaxone to vancomycin 7/24 pm  CT C/A/P with L adrenal mass unchanged, bladder with small amt of air, no hydronephrosis or stones, no consolidation   Bcx NGTD x2 (72h)  7/24 continues with urinary retention, straight cath the night before, bladder scan this afternoon with 349 ml  now s/p amaya placement, remains afebrile, more awake/alert, WBC wnl    Recommendations:   Discontinued vancomycin   Started on ciprofloxacin 250mg PO BID (renally adjusted; qtc ok) to complete total 5d course on 7/30  Nephrology following   Monitor temps/WBC  Aspiration precautions   Continue rest of care per primary team       Marisa Davidson M.D.  \[facility]\"", Division of Infectious Diseases  [phone]  After 5pm on weekdays and all day on weekends - please call [phone]  Available on [employer] TEAMS

## 2024-07-25 NOTE — PROGRESS NOTE ADULT - SUBJECTIVE AND OBJECTIVE BOX
[facility] DIVISION OF INFECTIOUS DISEASES  GERALD Leahy Y. Patel, S. Shah, G. Dino  [phone]  ([phone] - weekdays after 5pm and weekends)    Name: LEFTY AKBAR  Age/Gender: 78y Female  MRN: [id number]    Interval History:  Patient seen and examined this morning.   Much more awake this am, oriented x2.  States she feels fine, denies any pain or fever.   Notes reviewed, s/p amaya.   Afebrile   Allergies: penicillin (Hives)      Objective:  Vitals:   T(F): 97.8 (07-25-24 @ 04:06), Max: 97.9 (07-24-24 @ 23:40)  HR: 71 (07-25-24 @ 04:20) (48 - 78)  BP: 113/76 (07-25-24 @ 04:20) (92/64 - 117/80)  RR: 18 (07-25-24 @ 04:06) (16 - 18)  SpO2: 97% (07-25-24 @ 04:06) (95% - 97%)  Physical Examination:  General: no acute distress  HEENT: NC/AT, anicteric, EOMI  Respiratory: decreased breath sounds b/l  Cardiovascular: S1 and S2 present, normal rate   Gastrointestinal: soft, nontender, nondistended  Neuro: AAOxself, place, person, 2023  Extremities: no edema, no cyanosis  Skin: no visible rash  Amaya: draining yellow urine     Laboratory Studies:  CBC:                       13.8   9.02  )-----------( 238      ( 25 Jul 2024 06:34 )             41.0     WBC Trend:  9.02 07-25-24 @ 06:34  8.70 07-24-24 @ 10:12  12.66 07-22-24 @ 07:41  11.82 07-21-24 @ 12:05    CMP: 07-25    135  |  102  |  35<H>  ----------------------------<  106<H>  3.5   |  19<L>  |  1.51<H>    Ca    9.2      25 Jul 2024 06:35      Creatinine: 1.51 mg/dL (07-25-24 @ 06:35)  Creatinine: 1.64 mg/dL (07-24-24 @ 10:12)  Creatinine: 1.59 mg/dL (07-23-24 @ 07:24)  Creatinine: 1.87 mg/dL (07-22-24 @ 07:41)  Creatinine: 2.08 mg/dL (07-21-24 @ 12:05)    Microbiology: reviewed   Culture - Urine (collected 07-21-24 @ 14:32)  Source: Clean Catch Clean Catch (Midstream)  Preliminary Report (07-24-24 @ 21:33):    >100,000 CFU/ml Enterococcus faecalis    Culture - Blood (collected 07-21-24 @ 11:50)  Source: .Blood Blood-Venous  Preliminary Report (07-24-24 @ 20:02):    No growth at 72 Hours    Culture - Blood (collected 07-21-24 @ 11:40)  Source: .Blood Blood-Venous  Preliminary Report (07-24-24 @ 20:02):    No growth at 72 Hours    Radiology: reviewed     Medications:  acetaminophen     Tablet .. 650 milliGRAM(s) Oral every 6 hours PRN  apixaban 5 milliGRAM(s) Oral every 12 hours  ascorbic acid 500 milliGRAM(s) Oral daily  atorvastatin 80 milliGRAM(s) Oral at bedtime  atropine Injectable 1 milliGRAM(s) IV Push once  clopidogrel Tablet 75 milliGRAM(s) Oral daily  dextrose 5% + lactated ringers. 1000 milliLiter(s) IV Continuous <Continuous>  metoprolol tartrate 25 milliGRAM(s) Oral two times a day  midodrine. 5 milliGRAM(s) Oral three times a day  mirtazapine 7.5 milliGRAM(s) Oral at bedtime  multivitamin 1 Tablet(s) Oral daily  ondansetron Injectable 4 milliGRAM(s) IV Push every 8 hours PRN  vancomycin  IVPB 1000 milliGRAM(s) IV Intermittent every 24 hours    Current Antimicrobials:  vancomycin  IVPB 1000 milliGRAM(s) IV Intermittent every 24 hours    Prior/Completed Antimicrobials:  cefTRIAXone   IVPB  cefTRIAXone   IVPB   [facility] DISEASES  GERALD Leahy Y. Patel, S. Shah, G. Dino  [phone]  ([phone] - weekdays after 5pm and weekends)    Name: LEFTY AKBAR  Age/Gender: 78y Female  MRN: [id number]    Interval History:  Patient seen and examined this morning.   Much more awake this am, oriented x2.  States she feels fine, denies any pain or fever.   Notes reviewed, s/p amaya.   Afebrile   Allergies: penicillin (Hives)      Objective:  Vitals:   T(F): 97.8 (07-25-24 @ 04:06), Max: 97.9 (07-24-24 @ 23:40)  HR: 71 (07-25-24 @ 04:20) (48 - 78)  BP: 113/76 (07-25-24 @ 04:20) (92/64 - 117/80)  RR: 18 (07-25-24 @ 04:06) (16 - 18)  SpO2: 97% (07-25-24 @ 04:06) (95% - 97%)  Physical Examination:  General: no acute distress  HEENT: NC/AT, anicteric, EOMI  Respiratory: decreased breath sounds b/l  Cardiovascular: S1 and S2 present, normal rate   Gastrointestinal: soft, nontender, nondistended  Neuro: AAOxself, place, person, 2023  Extremities: no edema, no cyanosis  Skin: no visible rash  Amaya: draining yellow urine     Laboratory Studies:  CBC:                       13.8   9.02  )-----------( 238      ( 25 Jul 2024 06:34 )             41.0     WBC Trend:  9.02 07-25-24 @ 06:34  8.70 07-24-24 @ 10:12  12.66 07-22-24 @ 07:41  11.82 07-21-24 @ 12:05    CMP: 07-25    135  |  102  |  35<H>  ----------------------------<  106<H>  3.5   |  19<L>  |  1.51<H>    Ca    9.2      25 Jul 2024 06:35      Creatinine: 1.51 mg/dL (07-25-24 @ 06:35)  Creatinine: 1.64 mg/dL (07-24-24 @ 10:12)  Creatinine: 1.59 mg/dL (07-23-24 @ 07:24)  Creatinine: 1.87 mg/dL (07-22-24 @ 07:41)  Creatinine: 2.08 mg/dL (07-21-24 @ 12:05)    Microbiology: reviewed   Culture - Urine (07.21.24 @ 14:32)    -  Ampicillin: S <=2 Predicts results to ampicillin/sulbactam, amoxacillin-clavulanate and  piperacillin-tazobactam.   -  Ciprofloxacin: S <=1   -  Levofloxacin: S 1   -  Nitrofurantoin: S <=32 Should not be used to treat pyelonephritis.   -  Tetracycline: R >8   -  Vancomycin: S 2   Specimen Source: Clean Catch Clean Catch (Midstream)   Culture Results:   >100,000 CFU/ml Enterococcus faecalis   Organism Identification: Enterococcus faecalis   Organism: Enterococcus faecalis   Method Type: ANTOINE    Culture - Blood (collected 07-21-24 @ 11:50)  Source: .Blood Blood-Venous  Preliminary Report (07-24-24 @ 20:02):    No growth at 72 Hours    Culture - Blood (collected 07-21-24 @ 11:40)  Source: .Blood Blood-Venous  Preliminary Report (07-24-24 @ 20:02):    No growth at 72 Hours    Radiology: reviewed     Medications:  acetaminophen     Tablet .. 650 milliGRAM(s) Oral every 6 hours PRN  apixaban 5 milliGRAM(s) Oral every 12 hours  ascorbic acid 500 milliGRAM(s) Oral daily  atorvastatin 80 milliGRAM(s) Oral at bedtime  atropine Injectable 1 milliGRAM(s) IV Push once  clopidogrel Tablet 75 milliGRAM(s) Oral daily  dextrose 5% + lactated ringers. 1000 milliLiter(s) IV Continuous <Continuous>  metoprolol tartrate 25 milliGRAM(s) Oral two times a day  midodrine. 5 milliGRAM(s) Oral three times a day  mirtazapine 7.5 milliGRAM(s) Oral at bedtime  multivitamin 1 Tablet(s) Oral daily  ondansetron Injectable 4 milliGRAM(s) IV Push every 8 hours PRN  vancomycin  IVPB 1000 milliGRAM(s) IV Intermittent every 24 hours    Current Antimicrobials:  vancomycin  IVPB 1000 milliGRAM(s) IV Intermittent every 24 hours    Prior/Completed Antimicrobials:  cefTRIAXone   IVPB  cefTRIAXone   IVPB

## 2024-07-25 NOTE — ADVANCED PRACTICE NURSE CONSULT - RECOMMEDATIONS
Will recommend:  1. Encourage participation w/ ostomy care re: emptying.  2. Empty pouch when 1/3-1/2 full   3. Change pouching system every 3-4 days & prn leakage  4. Contact ostomy specialists if questions, concerns/issues .  5. Supplies: Patient may continue to use own supplies as preferred (at bedside -[place] one piece pre cut convex skin barrier). Daughter does ostomy care at home. Otherwise hospital  formulary as follows. Pouching system provided & left at bedside.   Derick 1 3/4" Ceraplus convex skin barrier (#57045), [place] 1 3/4" drainable pouch (#92487); Accessory products:  stoma paste #[id number], stoma powder (#5877) & Cavilon No sting barrier film wipe (#5988)

## 2024-07-25 NOTE — PROGRESS NOTE ADULT - ASSESSMENT
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   likely toxic metabolic in setting of uti and CHANELL   amaya for urinary retention   check ABG r/o Co2 retention : oth are acceptable      Problem/Plan - 2:  ·  Problem: History of UTI.   enterococouos : cipro started       Problem/Plan - 3:  ·  Problem: Sepsis with acute renal failure.   ·  Plan: suspect pre renal , iso poor po intake   - IV fluid challenge   -renal dose medications  - monitor ins and outs  - monitor creatinine   - avoid nephrotoxins.    Problem/Plan - 4:  ·  Problem: History of chronic atrial fibrillation.   pt recieved 125 mg of BB instead of 12.5  : RRT called for severe bradycardia   now improved with glucagon   cont to monitor   EP consulted   fu with cardio   - c/w Eliquis.    Problem/Plan - 5:  ·  Problem: History of CAD (coronary artery disease).   ·  Plan: trop elevated iso of CHANELL   -c/w plavix  -c/w atorvastatin.  - fu with cardio     Problem/Plan - 6:  ·  Problem: H/O hypotension.   ·  Plan: -c/w midodrine.    Problem/Plan - 7:  ·  Problem: History of COPD.   ·  Plan: minimal  hypercapnia , no wheezing   - can use albuterol PRN for wheezing.    Problem/Plan - 8:  ·  Problem: Hypothyroidism.   ·  Plan: -levothyroxine.    Problem/Plan - 9:  ·  Problem: Dementia.   ·  Plan: -mirtazapine Hs.    Problem/Plan - 10:  ·  Problem: History of creation of ostomy.   ·  Plan; - ostomy care.

## 2024-07-25 NOTE — PROGRESS NOTE ADULT - SUBJECTIVE AND OBJECTIVE BOX
Date of service: 07-25-24 @ 23:36      Patient is a 78y old  Female who presents with a chief complaint of AMS (25 Jul 2024 16:39)                                                               INTERVAL HPI/OVERNIGHT EVENTS:    REVIEW OF SYSTEMS:    sleeping arousable                                                                                                                                                                                                                                                                                    Medications:  MEDICATIONS  (STANDING):  apixaban 5 milliGRAM(s) Oral every 12 hours  ascorbic acid 500 milliGRAM(s) Oral daily  atorvastatin 80 milliGRAM(s) Oral at bedtime  atropine Injectable 1 milliGRAM(s) IV Push once  ciprofloxacin     Tablet 250 milliGRAM(s) Oral every 12 hours  clopidogrel Tablet 75 milliGRAM(s) Oral daily  dextrose 5% + lactated ringers. 1000 milliLiter(s) (70 mL/Hr) IV Continuous <Continuous>  metoprolol tartrate 25 milliGRAM(s) Oral two times a day  midodrine. 5 milliGRAM(s) Oral three times a day  mirtazapine 7.5 milliGRAM(s) Oral at bedtime  multivitamin 1 Tablet(s) Oral daily  tamsulosin 0.4 milliGRAM(s) Oral at bedtime    MEDICATIONS  (PRN):  acetaminophen     Tablet .. 650 milliGRAM(s) Oral every 6 hours PRN Temp greater or equal to 38C (100.4F), Mild Pain (1 - 3)  ondansetron Injectable 4 milliGRAM(s) IV Push every 8 hours PRN Nausea and/or Vomiting       Allergies    penicillin (Hives)    Intolerances      Vital Signs Last 24 Hrs  T(C): 36.6 (25 Jul 2024 20:58), Max: 36.6 (24 Jul 2024 23:40)  T(F): 97.8 (25 Jul 2024 20:58), Max: 97.9 (24 Jul 2024 23:40)  HR: 61 (25 Jul 2024 20:58) (48 - 78)  BP: 111/75 (25 Jul 2024 20:58) (94/64 - 115/67)  BP(mean): --  RR: 18 (25 Jul 2024 20:58) (16 - 18)  SpO2: 93% (25 Jul 2024 20:58) (93% - 97%)    Parameters below as of 25 Jul 2024 20:58  Patient On (Oxygen Delivery Method): room air      CAPILLARY BLOOD GLUCOSE      POCT Blood Glucose.: 147 mg/dL (25 Jul 2024 00:23)      07-24 @ 07:01  -  07-25 @ 07:00  --------------------------------------------------------  IN: 600 mL / OUT: 1005 mL / NET: -405 mL    07-25 @ 07:01 - 07-25 @ 23:36  --------------------------------------------------------  IN: 0 mL / OUT: 150 mL / NET: -150 mL      Physical Exam:    Daily     Daily   General:  Well appearing, NAD, not cachetic  HEENT:  Nonicteric, PERRLA  CV:  RRR, S1S2   Lungs:  CTA B/L, no wheezes, rales, rhonchi  Abdomen:  Soft, non-tender, no distended, positive BS  Extremities:  no edema   Neuro:  rousable but falls to sleep                                                                                                                                                                                                                                                                                          LABS:                               13.8   9.02  )-----------( 238      ( 25 Jul 2024 06:34 )             41.0                      07-25    135  |  102  |  35<H>  ----------------------------<  106<H>  3.5   |  19<L>  |  1.51<H>    Ca    9.2      25 Jul 2024 06:35                         RADIOLOGY & ADDITIONAL TESTS         I personally reviewed: [  ]EKG   [  ]CXR    [  ] CT      A/P:         Discussed with :     Simon consultants' Notes   Time spent :

## 2024-07-25 NOTE — ADVANCED PRACTICE NURSE CONSULT - ASSESSMENT
Drain management, pouching. (s/p end ileostomy 2/24/24). HPI is noted.    Drain management, pouching. (s/p end ileostomy 2/24/24). HPI is noted.    Chart reviewed and events noted. In at bedside to reassess patient's drain management/pouching system. Pt alert and oriented x 2. Permission asked to assess/change the pouching system.  Pt is wearing Derick one piece pre cut to 1" convex system beige pouch w/ "peek-a -perez" window. Stoma pink & viable + function with + flatus and pasty stool.  Abdomen soft and flabby with multiple skin folds. Patient's own supplies available at the bedside and used as preferred. Stoma size 1". Complete pouching system changed via teach back method. Barrier ring applied to the skin barrier. Pt tolerated the change well, commented in between but did not participate. Safety maintained. Emotional support provided. Discussed plan w/ staff RN. Remains available

## 2024-07-25 NOTE — PROGRESS NOTE ADULT - ASSESSMENT
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 Nephrology consulted for elevated Scr.    1) CHANELL: likely due to hypovolemia versus ATN given granular and hyaline casts on UA. Scr improving. Continue with D5LR but increase rate to 70 ml/hour as patient net negative. Check urine sodium and urine creatinine. CT without obstruction but patient with urinary retention s/p amaya placement. Start flomax and can attempt TOV prior to discharge. Avoid nephrotoxins. Monitor electrolytes.    2) Hypotension: BP low normal. Continue with midodrine 5 mg PO TID. Monitor BP.    3) Metabolic acidosis: Gap and non-gap acidosis which is compensated as per blood gas. No need for sodium bicarbonate. Monitor pH.    4) Acute cystitis with hematuria: As per ID.      [facility]  Paulie Storm M.D.  Mack Clancy D.O.  Maddi Steve M.D.  MD Olivia Caldera, MSN, ANP-C    Telephone: [phone]  Facsimile: [phone]    [facility] [place] [address], [address]  [place] [address]

## 2024-07-26 ENCOUNTER — TRANSCRIPTION ENCOUNTER (OUTPATIENT)
Age: 79
End: 2024-07-26

## 2024-07-26 LAB
ANION GAP SERPL CALC-SCNC: 13 MMOL/L — SIGNIFICANT CHANGE UP (ref 5–17)
BUN SERPL-MCNC: 26 MG/DL — HIGH (ref 7–23)
CALCIUM SERPL-MCNC: 9 MG/DL — SIGNIFICANT CHANGE UP (ref 8.4–10.5)
CHLORIDE SERPL-SCNC: 104 MMOL/L — SIGNIFICANT CHANGE UP (ref 96–108)
CO2 SERPL-SCNC: 18 MMOL/L — LOW (ref 22–31)
CREAT SERPL-MCNC: 1.33 MG/DL — HIGH (ref 0.5–1.3)
CULTURE RESULTS: SIGNIFICANT CHANGE UP
CULTURE RESULTS: SIGNIFICANT CHANGE UP
EGFR: 41 ML/MIN/1.73M2 — LOW
GLUCOSE BLDC GLUCOMTR-MCNC: 104 MG/DL — HIGH (ref 70–99)
GLUCOSE BLDC GLUCOMTR-MCNC: 119 MG/DL — HIGH (ref 70–99)
GLUCOSE BLDC GLUCOMTR-MCNC: 96 MG/DL — SIGNIFICANT CHANGE UP (ref 70–99)
GLUCOSE SERPL-MCNC: 139 MG/DL — HIGH (ref 70–99)
HCT VFR BLD CALC: 42 % — SIGNIFICANT CHANGE UP (ref 34.5–45)
HGB BLD-MCNC: 13.7 G/DL — SIGNIFICANT CHANGE UP (ref 11.5–15.5)
MAGNESIUM SERPL-MCNC: 1.7 MG/DL — SIGNIFICANT CHANGE UP (ref 1.6–2.6)
MCHC RBC-ENTMCNC: 30.2 PG — SIGNIFICANT CHANGE UP (ref 27–34)
MCHC RBC-ENTMCNC: 32.6 GM/DL — SIGNIFICANT CHANGE UP (ref 32–36)
MCV RBC AUTO: 92.5 FL — SIGNIFICANT CHANGE UP (ref 80–100)
NRBC # BLD: 0 /100 WBCS — SIGNIFICANT CHANGE UP (ref 0–0)
PLATELET # BLD AUTO: 233 K/UL — SIGNIFICANT CHANGE UP (ref 150–400)
POTASSIUM SERPL-MCNC: 3.3 MMOL/L — LOW (ref 3.5–5.3)
POTASSIUM SERPL-SCNC: 3.3 MMOL/L — LOW (ref 3.5–5.3)
RBC # BLD: 4.54 M/UL — SIGNIFICANT CHANGE UP (ref 3.8–5.2)
RBC # FLD: 15.6 % — HIGH (ref 10.3–14.5)
SODIUM SERPL-SCNC: 135 MMOL/L — SIGNIFICANT CHANGE UP (ref 135–145)
SPECIMEN SOURCE: SIGNIFICANT CHANGE UP
SPECIMEN SOURCE: SIGNIFICANT CHANGE UP
T4 FREE SERPL-MCNC: 1.2 NG/DL — SIGNIFICANT CHANGE UP (ref 0.9–1.8)
WBC # BLD: 8.52 K/UL — SIGNIFICANT CHANGE UP (ref 3.8–10.5)
WBC # FLD AUTO: 8.52 K/UL — SIGNIFICANT CHANGE UP (ref 3.8–10.5)

## 2024-07-26 RX ORDER — SODIUM BICARBONATE 0.9MEQ/ML
650 SYRINGE (ML) INTRAVENOUS
Refills: 0 | Status: DISCONTINUED | OUTPATIENT
Start: 2024-07-26 | End: 2024-07-29

## 2024-07-26 RX ORDER — SODIUM CHLORIDE AND POTASSIUM CHLORIDE 150; 450 MG/100ML; MG/100ML
1000 INJECTION, SOLUTION INTRAVENOUS
Refills: 0 | Status: DISCONTINUED | OUTPATIENT
Start: 2024-07-26 | End: 2024-07-27

## 2024-07-26 RX ADMIN — CIPROFLOXACIN 250 MILLIGRAM(S): 500 TABLET, FILM COATED ORAL at 17:18

## 2024-07-26 RX ADMIN — Medication 0.4 MILLIGRAM(S): at 21:46

## 2024-07-26 RX ADMIN — CIPROFLOXACIN 250 MILLIGRAM(S): 500 TABLET, FILM COATED ORAL at 05:45

## 2024-07-26 RX ADMIN — MIDODRINE HYDROCHLORIDE 5 MILLIGRAM(S): 2.5 TABLET ORAL at 11:18

## 2024-07-26 RX ADMIN — MIDODRINE HYDROCHLORIDE 5 MILLIGRAM(S): 2.5 TABLET ORAL at 05:15

## 2024-07-26 RX ADMIN — Medication 1 TABLET(S): at 11:18

## 2024-07-26 RX ADMIN — Medication 7.5 MILLIGRAM(S): at 21:46

## 2024-07-26 RX ADMIN — Medication 25 MILLIGRAM(S): at 05:15

## 2024-07-26 RX ADMIN — APIXABAN 5 MILLIGRAM(S): 5 TABLET, FILM COATED ORAL at 05:15

## 2024-07-26 RX ADMIN — ATORVASTATIN CALCIUM 80 MILLIGRAM(S): 40 TABLET, FILM COATED ORAL at 21:46

## 2024-07-26 RX ADMIN — Medication 25 MILLIGRAM(S): at 17:18

## 2024-07-26 RX ADMIN — SODIUM CHLORIDE AND POTASSIUM CHLORIDE 70 MILLILITER(S): 150; 450 INJECTION, SOLUTION INTRAVENOUS at 13:21

## 2024-07-26 RX ADMIN — MIDODRINE HYDROCHLORIDE 5 MILLIGRAM(S): 2.5 TABLET ORAL at 17:17

## 2024-07-26 RX ADMIN — Medication 125 MICROGRAM(S): at 05:19

## 2024-07-26 RX ADMIN — CLOPIDOGREL BISULFATE 75 MILLIGRAM(S): 75 TABLET, FILM COATED ORAL at 11:18

## 2024-07-26 RX ADMIN — APIXABAN 5 MILLIGRAM(S): 5 TABLET, FILM COATED ORAL at 17:17

## 2024-07-26 RX ADMIN — Medication 650 MILLIGRAM(S): at 17:18

## 2024-07-26 RX ADMIN — Medication 500 MILLIGRAM(S): at 11:18

## 2024-07-26 NOTE — PROVIDER CONTACT NOTE (OTHER) - ASSESSMENT
AOX1 to person. VSS. Pt was sleeping. Pt denies any complaints of chest pain, palpitations, headahce, dizziness/ lightheadedness or SOB. O2 stable on RA. Pt Afib on tele w/ hx of multiple pauses.

## 2024-07-26 NOTE — DISCHARGE NOTE PROVIDER - CARE PROVIDER_API CALL
Obinna Mendiola  Cardiology  [address] [place] [address], [address]  [place] [address]  Phone: [phone]  Fax: [phone]  Follow Up Time: 1 week

## 2024-07-26 NOTE — DISCHARGE NOTE PROVIDER - HOSPITAL COURSE
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   Per daughter, patient has been less interactive and nonverbal over the past few days. Patient had an unwitnessed fall about one week prior to admission with trauma to her buttocks, but remained at baseline mental state at the time.  Over the past two days she is more sleepy, not eating or taking medications . She had no reported fevers. She had one episode of vomiting.   Hospital Course: Admitted for encephalopathy related to sepsis (UTI) and urinary retention.   UTI/Sepsis - E. Fecalis - s/p IV Vanco and IV Rocephin transitioned to PO Cipro (on 7/25/24) - course completes 7/30/24  EP (Dr IVAN Frias) was consulted re: abnormal EKG.- recommended: Can continue metoprolol (25-50mg BID) for AFib rate control. Hold Digoxin in the setting of acute kidney injury. Goal HR<100bpm at rest. Continue apixaban 5mg BID for stroke prevention.  On telemetry - no unprovoked severe bradycardia or long pauses >5sec in AFib , and not acutely symptomatic during short pauses.  At this time does not require permanent pacemaker insertion. Continue UTI treatment w/ antibiotics, and management of urinary retention to relieve Vagal effect on her heartrate.  ILR data mgmt per Dr Mendiola.  Sees Dr JERE Mendiola out patient for Cardiology - he followed patient in hospital as well.      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   Per daughter, patient has been less interactive and nonverbal over the past few days. Patient had an unwitnessed fall about one week prior to admission with trauma to her buttocks, but remained at baseline mental state at the time.  Over the past two days she is more sleepy, not eating or taking medications . She had no reported fevers. She had one episode of vomiting.   Hospital Course: Admitted for encephalopathy related to sepsis due to suspected UTI with hematuria and urinary retention.   UTI/Sepsis - E. Fecalis - s/p IV Vanco and IV Rocephin transitioned to PO Cipro (on 7/25/24) - course completes 7/30/24  AMS, leukocytosis with urinary retention, CHANELL  UA with pyuria but noted poor/contaminated specimen with 21 sq epi cells  Ucx with >100k cfu/ml E.faecalis, Bcx negative   CT C/A/P with L adrenal mass unchanged, bladder with small amt of air, no hydronephrosis or stones, no consolidation   7/24 continues with urinary retention, now s/p amaya.  s/p ceftriaxone 7/21-7/24, s/p vancomycin 7/24-7/25. Continue ciprofloxacin 250mg PO BID (renally adjusted; qtc ok) to complete total 5d course tomorrow 7/30  sepsis resolved -- remains afebrile, mental status improved, WBC wnl  EP (Dr IVAN Frias) was consulted sinus bradycardia (briefly 20s-30s), with pauses of ~3.5-4sec duration.  Not apparently symptomatic as patient asleep/resting in bed.   In the ED, was inadverdently given 125mg of Metoprolol instead of 25mg.  Bradycardia resolving by next hospital day. Can continue metoprolol (25-50mg BID) for AFib rate control. Hold Digoxin in the setting of acute kidney injury. Goal HR<100bpm at rest. Continue apixaban 5mg BID for stroke prevention.   Nephrology consulted for CHANELL with Metabolic Acidosis - likely due to hypovolemia versus ATN given granular and hyaline casts on UA. Creatinine peaked to 2.08. CHANELL resolved on IVF. FeNa low. CT without obstruction but patient with urinary retention s/p amaya placement (failed TOV). Continue with flomax. Avoid nephrotoxins. Increased sodium bicarbonate to 650 mg TID. Follow up with urology fro Amaya management  For Hypotension: BP low normal. Continue with midodrine 5 mg PO TID. Monitor BP.  Continue UTI treatment w/ antibiotics, and management of urinary retention   ILR data mgmt per Dr Mendiola.  Sees Dr JERE Mendiola out patient for Cardiology       Important Medication Changes and Reason:    Active or Pending Issues Requiring Follow-up:  Follow up with cardiology -  Dr JERE Mendiola in 1 - 2 weeks   Advanced Directives:   [ ] Full code  [x] DNR  [ ] Hospice    Discharge Diagnoses:  AMS   UTI/Sepsis - E. 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   Per daughter, patient has been less interactive and nonverbal over the past few days. Patient had an unwitnessed fall about one week prior to admission with trauma to her buttocks, but remained at baseline mental state at the time.  Over the past two days she is more sleepy, not eating or taking medications . She had no reported fevers. She had one episode of vomiting.   Hospital Course: Admitted for encephalopathy related to sepsis due to suspected UTI with hematuria and urinary retention.   UTI/Sepsis - E. Fecalis - s/p IV Vanco and IV Rocephin transitioned to PO Cipro (on 7/25/24) - course completes 7/30/24  AMS, leukocytosis with urinary retention, CHANELL  UA with pyuria but noted poor/contaminated specimen with 21 sq epi cells  Ucx with >100k cfu/ml E.faecalis, Bcx negative   CT C/A/P with L adrenal mass unchanged, bladder with small amt of air, no hydronephrosis or stones, no consolidation   7/24 continues with urinary retention, now s/p amaya.  s/p ceftriaxone 7/21-7/24, s/p vancomycin 7/24-7/25. Continue ciprofloxacin 250mg PO BID (renally adjusted; qtc ok) to complete total 5d course tomorrow 7/30  sepsis resolved -- remains afebrile, mental status improved, WBC wnl  EP (Dr IVAN Frias) was consulted sinus bradycardia (briefly 20s-30s), with pauses of ~3.5-4sec duration.  Not apparently symptomatic as patient asleep/resting in bed.   In the ED, was inadverdently given 125mg of Metoprolol instead of 25mg.  Bradycardia resolving by next hospital day. Can continue metoprolol (25-50mg BID) for AFib rate control. Hold Digoxin in the setting of acute kidney injury. Goal HR<100bpm at rest. Continue apixaban 5mg BID for stroke prevention.   Nephrology consulted for CHANELL with Metabolic Acidosis - likely due to hypovolemia versus ATN given granular and hyaline casts on UA. Creatinine peaked to 2.08. CHANELL resolved on IVF. FeNa low. CT without obstruction but patient with urinary retention s/p amaya placement (failed TOV). Continue with flomax. Avoid nephrotoxins. Increased sodium bicarbonate to 650 mg TID. Follow up with urology fro Amaya management  For Hypotension: BP low normal. Continue with midodrine 5 mg PO TID. Monitor BP.  Continue UTI treatment w/ antibiotics, and management of urinary retention   ILR data mgmt per Dr Mendiola.  Sees Dr JERE Mendiola out patient for Cardiology   Hypothyroid - TSH Low 0.15 - Synthroid reduced to 125Mcg - Repeat Thyroid panel in 4 weeks      Important Medication Changes and Reason:  Repeat Thyroid panel in 4 weeks  Discontinue Digoxin - CHANELL and Gerson  Ciprofloxacin 250mg PO BID to complete total 5d course 7/30      Active or Pending Issues Requiring Follow-up:  Follow up with cardiology -  Dr JERE Mendiola in 1 - 2 weeks   Repeat Thyroid panel in 4 weeks    Advanced Directives:   [ ] Full code  [x] DNR  [ ] Hospice    Discharge Diagnoses:  AMS   UTI/Sepsis - E. 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   Per daughter, patient has been less interactive and nonverbal over the past few days. Patient had an unwitnessed fall about one week prior to admission with trauma to her buttocks, but remained at baseline mental state at the time.  Over the past two days she is more sleepy, not eating or taking medications . She had no reported fevers. She had one episode of vomiting.   Hospital Course: Admitted for encephalopathy related to sepsis due to suspected UTI with hematuria and urinary retention.   UTI/Sepsis - E. Fecalis - s/p IV Vanco and IV Rocephin transitioned to PO Cipro (on 7/25/24) - course completes 7/30/24  AMS, leukocytosis with urinary retention, CHANELL  UA with pyuria but noted poor/contaminated specimen with 21 sq epi cells  Ucx with >100k cfu/ml E.faecalis, Bcx negative   CT C/A/P with L adrenal mass unchanged, bladder with small amt of air, no hydronephrosis or stones, no consolidation   7/24 continues with urinary retention, now s/p amaya.  s/p ceftriaxone 7/21-7/24, s/p vancomycin 7/24-7/25. Continue ciprofloxacin 250mg PO BID (renally adjusted; qtc ok) to complete total 5d course tomorrow 7/30  sepsis resolved -- remains afebrile, mental status improved, WBC wnl  EP (Dr IVAN Frias) was consulted sinus bradycardia (briefly 20s-30s), with pauses of ~3.5-4sec duration.  Not apparently symptomatic as patient asleep/resting in bed.   In the ED, was inadverdently given 125mg of Metoprolol instead of 25mg.  Bradycardia resolving by next hospital day. Can continue metoprolol (25-50mg BID) for AFib rate control. Hold Digoxin in the setting of acute kidney injury. Goal HR<100bpm at rest. Continue apixaban 5mg BID for stroke prevention.   Nephrology consulted for CHANELL with Metabolic Acidosis - likely due to hypovolemia versus ATN given granular and hyaline casts on UA. Creatinine peaked to 2.08. CHANELL resolved on IVF. FeNa low. CT without obstruction but patient with urinary retention s/p amaya placement (failed TOV). Continue with flomax. Avoid nephrotoxins. Increased sodium bicarbonate to 650 mg TID. Follow up with urology fro Amaya management  For Hypotension: BP low normal. Continue with midodrine 5 mg PO TID. Monitor BP.  Continue UTI treatment w/ antibiotics, and management of urinary retention   ILR data mgmt per Dr Mendiola.  Sees Dr JERE Mendiola out patient for Cardiology   Hypothyroid - TSH Low 0.15 - Synthroid reduced to 125Mcg - Repeat Thyroid panel in 4 weeks      Important Medication Changes and Reason:  Repeat Thyroid panel in 4 weeks  Discontinue Digoxin - CHANELL and Gerson  Ciprofloxacin 250mg PO BID to complete total 5d course 7/30      Active or Pending Issues Requiring Follow-up:  Follow up with cardiology -  Dr JERE Mendiola in 1 - 2 weeks   Repeat Thyroid panel in 4 weeks    Advanced Directives:   [ ] Full code  [x] DNR  [ ] Hospice    Discharge Diagnoses:  AMS   UTI/Sepsis - E. Fecalis   Acute cystitis with hematuria  Slow Atrial fibrillation with pauses (~ 3 seconds)  Excessive Metoprolol dose  ARF  CHANELL   Urinary retention - Foey reinserted for Failed TOV    Medically cleared by Dr. Duran to discharge pt to Rehab - Med rec discussed

## 2024-07-26 NOTE — PROGRESS NOTE ADULT - SUBJECTIVE AND OBJECTIVE BOX
Subjective: Patient seen and examined. No new events except as noted.     REVIEW OF SYSTEMS:    CONSTITUTIONAL: +weakness, fevers or chills  EYES/ENT: No visual changes;  No vertigo or throat pain   NECK: No pain or stiffness  RESPIRATORY: No cough, wheezing, hemoptysis; No shortness of breath  CARDIOVASCULAR: No chest pain or palpitations  GASTROINTESTINAL: No abdominal or epigastric pain. No nausea, vomiting, or hematemesis; No diarrhea or constipation. No melena or hematochezia.  GENITOURINARY: No dysuria, frequency or hematuria  NEUROLOGICAL: No numbness or weakness  SKIN: No itching, burning, rashes, or lesions   All other review of systems is negative unless indicated above.    MEDICATIONS:  MEDICATIONS  (STANDING):  apixaban 5 milliGRAM(s) Oral every 12 hours  ascorbic acid 500 milliGRAM(s) Oral daily  atorvastatin 80 milliGRAM(s) Oral at bedtime  atropine Injectable 1 milliGRAM(s) IV Push once  ciprofloxacin     Tablet 250 milliGRAM(s) Oral every 12 hours  clopidogrel Tablet 75 milliGRAM(s) Oral daily  dextrose 5% + lactated ringers. 1000 milliLiter(s) (70 mL/Hr) IV Continuous <Continuous>  levothyroxine 125 MICROGram(s) Oral daily  metoprolol tartrate 25 milliGRAM(s) Oral two times a day  midodrine. 5 milliGRAM(s) Oral three times a day  mirtazapine 7.5 milliGRAM(s) Oral at bedtime  multivitamin 1 Tablet(s) Oral daily  tamsulosin 0.4 milliGRAM(s) Oral at bedtime      PHYSICAL EXAM:  T(C): 36.4 (07-26-24 @ 04:22), Max: 36.6 (07-25-24 @ 12:54)  HR: 77 (07-26-24 @ 04:22) (61 - 77)  BP: 103/70 (07-26-24 @ 04:22) (103/70 - 115/67)  RR: 18 (07-26-24 @ 04:22) (18 - 18)  SpO2: 99% (07-26-24 @ 04:22) (93% - 99%)  Wt(kg): --  I&O's Summary    25 Jul 2024 07:01  -  26 Jul 2024 07:00  --------------------------------------------------------  IN: 800 mL / OUT: 830 mL / NET: -30 mL        Weight (kg): 69.5 (07-25 @ 14:35)      Appearance: NAD	  HEENT:  Dry  oral mucosa, PERRL, EOMI	  Lymphatic: No lymphadenopathy  Cardiovascular: Irregular  S1 S2, No JVD, No murmurs, No edema  Respiratory: Lungs clear to auscultation	  Psychiatry: A & O x 3, Mood & affect appropriate  Gastrointestinal:  Soft, Non-tender, + BS	  Skin: No rashes, No ecchymoses, No cyanosis	  Neurologic: Non-focal  Extremities: Normal range of motion, No clubbing, cyanosis or edema  Vascular: Peripheral pulses palpable 2+ bilaterally        LABS:    CARDIAC MARKERS:                                13.8   9.02  )-----------( 238      ( 25 Jul 2024 06:34 )             41.0     07-25    135  |  102  |  35<H>  ----------------------------<  106<H>  3.5   |  19<L>  |  1.51<H>    Ca    9.2      25 Jul 2024 06:35      proBNP:   Lipid Profile:   HgA1c:   TSH:             TELEMETRY: 	AF    ECG:  	  RADIOLOGY:   DIAGNOSTIC TESTING:  [ ] Echocardiogram:  [ ]  Catheterization:  [ ] Stress Test:    OTHER:

## 2024-07-26 NOTE — PROGRESS NOTE ADULT - ASSESSMENT
79yo f , smoker, w pmh mci/dementia, [place] [facility], htn, hld, afib, cad c/b mi s/p pci w stents, copd, little, urinary incontinence, recurrent uti, hypothyroidism, oa s/p l tkr + l hip orif, and w recent hospitalization 2/16-3/9 for ischemic bowel s/p ex-lap w bowel resection + end ileostomy, 4/1-4/3 for drainage from incision site 2/2 hematoma in the laparotomy wound s/p conservative mgmt, 4/28-5/6 for afib w RVR  now admitted w encephalopathy    Problem/Plan - 1:  · encephalopathy.   likely toxic metabolic in setting of uti and CHANELL   amaya for urinary retention   improved        Problem/Plan - 2:  ·  Problem: History of UTI.   enterococouos : cipro started       Problem/Plan - 3:  ·  Problem: Sepsis with acute renal failure.   ·  Plan: suspect pre renal , iso poor po intake   - IV fluid challenge   -renal dose medications  - monitor ins and outs  - monitor creatinine   - avoid nephrotoxins.    Problem/Plan - 4:  ·  Problem: History of chronic atrial fibrillation.   pt recieved 125 mg of BB instead of 12.5  : RRT called for severe bradycardia   now improved with glucagon   cont to monitor   EP consulted   fu with cardio   - c/w Eliquis.    Problem/Plan - 5:  ·  Problem: History of CAD (coronary artery disease).   ·  Plan: trop elevated iso of CHANELL   -c/w plavix  -c/w atorvastatin.  - fu with cardio     Problem/Plan - 6:  ·  Problem: H/O hypotension.   ·  Plan: -c/w midodrine.    Problem/Plan - 7:  ·  Problem: History of COPD.   ·  Plan: minimal  hypercapnia , no wheezing   - can use albuterol PRN for wheezing.    Problem/Plan - 8:  ·  Problem: Hypothyroidism.   ·  Plan: -levothyroxine.    Problem/Plan - 9:  ·  Problem: Dementia.   ·  Plan: -mirtazapine Hs.    Problem/Plan - 10:  ·  Problem: History of creation of ostomy.   ·  Plan; - ostomy care.

## 2024-07-26 NOTE — DISCHARGE NOTE PROVIDER - NSDCMRMEDTOKEN_GEN_ALL_CORE_FT
apixaban 5 mg oral tablet: 1 tab(s) orally every 12 hours  ascorbic acid 500 mg oral tablet: 1 tab(s) orally once a day  atorvastatin 80 mg oral tablet: 1 tab(s) orally once a day (at bedtime)  clopidogrel 75 mg oral tablet: 1 tab(s) orally once a day  digoxin 250 mcg (0.25 mg) oral tablet: 1 tab(s) orally once a day  levothyroxine 175 mcg (0.175 mg) oral tablet: 1 tab(s) orally once a day  loperamide 2 mg oral capsule: 1 cap(s) orally 3 times a day as needed for  diarrhea  metoprolol tartrate 25 mg oral tablet: 1 tab(s) orally 2 times a day  midodrine 5 mg oral tablet: 1 tab(s) orally 3 times a day  mirtazapine 15 mg oral tablet: 0.5 tab(s) orally once a day (at bedtime)  Multiple Vitamins oral tablet: 1 tab(s) orally once a day  nystatin 100,000 units/g topical cream: Apply topically to affected area 2 times a day  ondansetron 4 mg oral tablet, disintegratin tab(s) orally every 6 hours as needed for  nausea  triamcinolone 0.5% topical cream: Apply topically to affected area once a day   apixaban 5 mg oral tablet: 1 tab(s) orally every 12 hours  ascorbic acid 500 mg oral tablet: 1 tab(s) orally once a day  atorvastatin 80 mg oral tablet: 1 tab(s) orally once a day (at bedtime)  ciprofloxacin 250 mg oral tablet: 1 tab(s) orally every 12 hours UNTIL 7/30/24  clopidogrel 75 mg oral tablet: 1 tab(s) orally once a day  levothyroxine 125 mcg (0.125 mg) oral tablet: 1 tab(s) orally once a day  metoprolol tartrate 25 mg oral tablet: 1 tab(s) orally 2 times a day  midodrine 5 mg oral tablet: 1 tab(s) orally 3 times a day  mirtazapine 15 mg oral tablet: 0.5 tab(s) orally once a day (at bedtime)  Multiple Vitamins oral tablet: 1 tab(s) orally once a day  sodium bicarbonate 650 mg oral tablet: 1 tab(s) orally 3 times a day  tamsulosin 0.4 mg oral capsule: 1 cap(s) orally once a day (at bedtime)

## 2024-07-26 NOTE — PROGRESS NOTE ADULT - SUBJECTIVE AND OBJECTIVE BOX
[facility] NEPHROLOGY- PROGRESS NOTE    78y Female with history of dementia, ischemic bowel s/p resection with end ostomy presents with AMS. Nephrology consulted for elevated Scr.    REVIEW OF SYSTEMS:  Gen: no fevers  Cards: no chest pain  Resp: no dyspnea  GI: no nausea or vomiting or diarrhea, + decreased PO intake  Vascular: no LE edema    [facility] ([facility])      Hospital Medications: Medications reviewed        VITALS:  T(F): 97.5 (07-26-24 @ 04:22), Max: 97.8 (07-25-24 @ 12:54)  HR: 77 (07-26-24 @ 04:22)  BP: 103/70 (07-26-24 @ 04:22)  RR: 18 (07-26-24 @ 04:22)  SpO2: 99% (07-26-24 @ 04:22)  Wt(kg): --    07-25 @ 07:01  -  07-26 @ 07:00  --------------------------------------------------------  IN: 800 mL / OUT: 830 mL / NET: -30 mL        Weight (kg): 69.5 (07-25 @ 14:35)      PHYSICAL EXAM:    Gen: NAD, calm  Cards: RRR, +S1/S2, no M/G/R  Resp: CTA B/L  GI: soft, NT/ND, NABS, + ostomy with liquidy output  : + amaya  Vascular: no LE edema B/L        LABS:  07-26    135  |  104  |  26<H>  ----------------------------<  139<H>  3.3<L>   |  18<L>  |  1.33<H>    Ca    9.0      26 Jul 2024 10:24  Mg     1.7     07-26      Creatinine Trend: 1.33 <--, 1.51 <--, 1.64 <--, 1.59 <--, 1.87 <--, 2.08 <--                        13.7   8.52  )-----------( 233      ( 26 Jul 2024 10:26 )             42.0     Urine Studies:  Urinalysis Basic - ( 26 Jul 2024 10:24 )    Color:  / Appearance:  / SG:  / pH:   Gluc: 139 mg/dL / Ketone:   / Bili:  / Urobili:    Blood:  / Protein:  / Nitrite:    Leuk Esterase:  / RBC:  / WBC    Sq Epi:  / Non Sq Epi:  / Bacteria:       Sodium, Random Urine: 7 mmol/L (07-25 @ 16:06)  Creatinine, Random Urine: 177 mg/dL (07-25 @ 16:06)

## 2024-07-26 NOTE — PROGRESS NOTE ADULT - ASSESSMENT
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 Per daughter, patient has been less interactive and nonverbal over the past few days, sleeping more and note taking meds or eating. Patient had episode of vomiting, no fevers noted. Patient had an unwitnessed fall about one week prior to admission with trauma to her buttocks, but remained at baseline mental state at the time.     Sepsis due to suspected UTI with hematuria  AMS, leukocytosis with urinary retention, CHAENLL  UA with pyuria but noted poor/contaminated specimen with 21 sq epi cells  Ucx with >100k cfu/ml E.faecalis  -- sensitivities noted   CT C/A/P with L adrenal mass unchanged, bladder with small amt of air, no hydronephrosis or stones, no consolidation   Bcx negative   7/24 continues with urinary retention, straight cath the night before, bladder scan this afternoon with 349 ml, now s/p amaya  sepsis resolved -- remains afebrile, mental status improved, WBC wnl    s/p ceftriaxone 7/21-7/24  s/p vancomycin 7/24-7/25     Recommendations:   Continue ciprofloxacin 250mg PO BID (renally adjusted; qtc ok) to complete total 5d course on 7/30  Nephrology following   Aspiration precautions   Continue rest of care per primary team   Stable from ID standpoint at this time.     Over the weekend Dr. Mark Sun will be covering for our group.  If you have any questions, concerns or new micro data, please reach out to them at [phone].    Marisa Davidson M.D.  [facility], Division of Infectious Diseases  [phone]  After 5pm on weekdays and all day on weekends - please call [phone]  Available on [employer] TEAMS

## 2024-07-26 NOTE — PROGRESS NOTE ADULT - PROBLEM SELECTOR PLAN 2
will decrease synthroid to 125 mcg daily   FU TFTs outpt 4-6 weeks will decrease synthroid to 125 mcg daily   discharge on current dose synthroid  FU TFTs outpt 4-6 weeks

## 2024-07-26 NOTE — PROGRESS NOTE ADULT - SUBJECTIVE AND OBJECTIVE BOX
[facility] INFECTIOUS DISEASES  GERALD Leahy Y. Patel, S. Shah, G. Casimir  [phone]  ([phone] - weekdays after 5pm and weekends)    Name: LEFTY AKBAR  Age/Gender: 78y Female  MRN: [id number]    Interval History:  Patient seen and examined this morning.   States she feels well, having breakfast.  No new complaints noted.  Notes reviewed  No concerning overnight events  Afebrile   Allergies: penicillin (Hives)      Objective:  Vitals:   T(F): 97.5 (07-26-24 @ 04:22), Max: 97.8 (07-25-24 @ 12:54)  HR: 77 (07-26-24 @ 04:22) (61 - 77)  BP: 103/70 (07-26-24 @ 04:22) (103/70 - 115/67)  RR: 18 (07-26-24 @ 04:22) (18 - 18)  SpO2: 99% (07-26-24 @ 04:22) (93% - 99%)  Physical Examination:  General: no acute distress, nontoxic appearing   HEENT: NC/AT, anicteric, EOMI  Respiratory: no acc muscle use, breathing comfortably  Cardiovascular: S1 and S2 present  Gastrointestinal: normal appearing, nondistended  Extremities: no edema, no cyanosis  Skin: no visible rash  Melvin: draining clear yellow urine     Laboratory Studies:  CBC:                       13.7   8.52  )-----------( 233      ( 26 Jul 2024 10:26 )             42.0     WBC Trend:  8.52 07-26-24 @ 10:26  9.02 07-25-24 @ 06:34  8.70 07-24-24 @ 10:12  12.66 07-22-24 @ 07:41  11.82 07-21-24 @ 12:05    CMP: 07-26    135  |  104  |  26<H>  ----------------------------<  139<H>  3.3<L>   |  18<L>  |  1.33<H>    Ca    9.0      26 Jul 2024 10:24  Mg     1.7     07-26      Creatinine: 1.33 mg/dL (07-26-24 @ 10:24)  Creatinine: 1.51 mg/dL (07-25-24 @ 06:35)  Creatinine: 1.64 mg/dL (07-24-24 @ 10:12)  Creatinine: 1.59 mg/dL (07-23-24 @ 07:24)  Creatinine: 1.87 mg/dL (07-22-24 @ 07:41)  Creatinine: 2.08 mg/dL (07-21-24 @ 12:05)    Microbiology: reviewed   Culture - Urine (collected 07-21-24 @ 14:32)  Source: Clean Catch Clean Catch (Midstream)  Final Report (07-25-24 @ 13:47):    >100,000 CFU/ml Enterococcus faecalis  Organism: Enterococcus faecalis (07-25-24 @ 13:47)  Organism: Enterococcus faecalis (07-25-24 @ 13:47)      Method Type: ANTOINE      -  Ampicillin: S <=2 Predicts results to ampicillin/sulbactam, amoxacillin-clavulanate and  piperacillin-tazobactam.      -  Ciprofloxacin: S <=1      -  Levofloxacin: S 1      -  Nitrofurantoin: S <=32 Should not be used to treat pyelonephritis.      -  Tetracycline: R >8      -  Vancomycin: S 2    Culture - Blood (collected 07-21-24 @ 11:50)  Source: .Blood Blood-Venous  Preliminary Report (07-25-24 @ 20:01):    No growth at 4 days    Culture - Blood (collected 07-21-24 @ 11:40)  Source: .Blood Blood-Venous  Preliminary Report (07-25-24 @ 20:01):    No growth at 4 days    Radiology: reviewed     Medications:  acetaminophen     Tablet .. 650 milliGRAM(s) Oral every 6 hours PRN  apixaban 5 milliGRAM(s) Oral every 12 hours  ascorbic acid 500 milliGRAM(s) Oral daily  atorvastatin 80 milliGRAM(s) Oral at bedtime  atropine Injectable 1 milliGRAM(s) IV Push once  ciprofloxacin     Tablet 250 milliGRAM(s) Oral every 12 hours  clopidogrel Tablet 75 milliGRAM(s) Oral daily  dextrose 5% + lactated ringers with potassium chloride 20 mEq/L 1000 milliLiter(s) IV Continuous <Continuous>  levothyroxine 125 MICROGram(s) Oral daily  metoprolol tartrate 25 milliGRAM(s) Oral two times a day  midodrine. 5 milliGRAM(s) Oral three times a day  mirtazapine 7.5 milliGRAM(s) Oral at bedtime  multivitamin 1 Tablet(s) Oral daily  ondansetron Injectable 4 milliGRAM(s) IV Push every 8 hours PRN  sodium bicarbonate 650 milliGRAM(s) Oral two times a day  tamsulosin 0.4 milliGRAM(s) Oral at bedtime    Current Antimicrobials:  ciprofloxacin     Tablet 250 milliGRAM(s) Oral every 12 hours    Prior/Completed Antimicrobials:  cefTRIAXone   IVPB  cefTRIAXone   IVPB

## 2024-07-26 NOTE — PROGRESS NOTE ADULT - SUBJECTIVE AND OBJECTIVE BOX
Chief complaint  Patient is a 78y old  Female who presents with a chief complaint of AMS (26 Jul 2024 13:30)         Labs and Fingersticks  CAPILLARY BLOOD GLUCOSE      POCT Blood Glucose.: 104 mg/dL (26 Jul 2024 06:27)  POCT Blood Glucose.: 96 mg/dL (26 Jul 2024 00:21)      Anion Gap: 13 (07-26 @ 10:24)  Anion Gap: 14 (07-25 @ 06:35)      Calcium: 9.0 (07-26 @ 10:24)  Calcium: 9.2 (07-25 @ 06:35)          07-26    135  |  104  |  26<H>  ----------------------------<  139<H>  3.3<L>   |  18<L>  |  1.33<H>    Ca    9.0      26 Jul 2024 10:24  Mg     1.7     07-26                          13.7   8.52  )-----------( 233      ( 26 Jul 2024 10:26 )             42.0     Medications  MEDICATIONS  (STANDING):  apixaban 5 milliGRAM(s) Oral every 12 hours  ascorbic acid 500 milliGRAM(s) Oral daily  atorvastatin 80 milliGRAM(s) Oral at bedtime  atropine Injectable 1 milliGRAM(s) IV Push once  ciprofloxacin     Tablet 250 milliGRAM(s) Oral every 12 hours  clopidogrel Tablet 75 milliGRAM(s) Oral daily  dextrose 5% + lactated ringers with potassium chloride 20 mEq/L 1000 milliLiter(s) (70 mL/Hr) IV Continuous <Continuous>  levothyroxine 125 MICROGram(s) Oral daily  metoprolol tartrate 25 milliGRAM(s) Oral two times a day  midodrine. 5 milliGRAM(s) Oral three times a day  mirtazapine 7.5 milliGRAM(s) Oral at bedtime  multivitamin 1 Tablet(s) Oral daily  sodium bicarbonate 650 milliGRAM(s) Oral two times a day  tamsulosin 0.4 milliGRAM(s) Oral at bedtime      Physical Exam  General: Patient comfortable in bed  Vital Signs Last 12 Hrs  T(F): 97.4 (07-26-24 @ 12:45), Max: 97.5 (07-26-24 @ 04:22)  HR: 83 (07-26-24 @ 12:45) (77 - 83)  BP: 105/74 (07-26-24 @ 12:45) (103/70 - 105/74)  BP(mean): --  RR: 18 (07-26-24 @ 12:45) (18 - 18)  SpO2: 98% (07-26-24 @ 12:45) (98% - 99%)    CVS: S1S2   Respiratory: No wheezing, no crepitations  GI: Abdomen soft, bowel sounds positive  Musculoskeletal:  moves all extremities  : Voiding        Chief complaint  Patient is a 78y old  Female who presents with a chief complaint of AMS (26 Jul 2024 13:30)    Labs and Fingersticks  CAPILLARY BLOOD GLUCOSE      POCT Blood Glucose.: 104 mg/dL (26 Jul 2024 06:27)  POCT Blood Glucose.: 96 mg/dL (26 Jul 2024 00:21)      Anion Gap: 13 (07-26 @ 10:24)  Anion Gap: 14 (07-25 @ 06:35)      Calcium: 9.0 (07-26 @ 10:24)  Calcium: 9.2 (07-25 @ 06:35)          07-26    135  |  104  |  26<H>  ----------------------------<  139<H>  3.3<L>   |  18<L>  |  1.33<H>    Ca    9.0      26 Jul 2024 10:24  Mg     1.7     07-26                          13.7   8.52  )-----------( 233      ( 26 Jul 2024 10:26 )             42.0     Medications  MEDICATIONS  (STANDING):  apixaban 5 milliGRAM(s) Oral every 12 hours  ascorbic acid 500 milliGRAM(s) Oral daily  atorvastatin 80 milliGRAM(s) Oral at bedtime  atropine Injectable 1 milliGRAM(s) IV Push once  ciprofloxacin     Tablet 250 milliGRAM(s) Oral every 12 hours  clopidogrel Tablet 75 milliGRAM(s) Oral daily  dextrose 5% + lactated ringers with potassium chloride 20 mEq/L 1000 milliLiter(s) (70 mL/Hr) IV Continuous <Continuous>  levothyroxine 125 MICROGram(s) Oral daily  metoprolol tartrate 25 milliGRAM(s) Oral two times a day  midodrine. 5 milliGRAM(s) Oral three times a day  mirtazapine 7.5 milliGRAM(s) Oral at bedtime  multivitamin 1 Tablet(s) Oral daily  sodium bicarbonate 650 milliGRAM(s) Oral two times a day  tamsulosin 0.4 milliGRAM(s) Oral at bedtime      Physical Exam  General: Patient comfortable in bed  Vital Signs Last 12 Hrs  T(F): 97.4 (07-26-24 @ 12:45), Max: 97.5 (07-26-24 @ 04:22)  HR: 83 (07-26-24 @ 12:45) (77 - 83)  BP: 105/74 (07-26-24 @ 12:45) (103/70 - 105/74)  BP(mean): --  RR: 18 (07-26-24 @ 12:45) (18 - 18)  SpO2: 98% (07-26-24 @ 12:45) (98% - 99%)    CVS: S1S2   Respiratory: No wheezing, no crepitations  GI: Abdomen soft, bowel sounds positive  Musculoskeletal:  moves all extremities  : Voiding

## 2024-07-26 NOTE — PROGRESS NOTE ADULT - ASSESSMENT
Assessment  Hyperthyroidism: 78y Female with hx hypothyroid disease, on home synthroid 175 mcg , in subclinical hyperthyroid state, dose adjusted.  hypotensive has encephalopathy hypotension.  Hypotension: Cortisol within normal range, on medications, monitored.  CHANELL:  Labs, chart reviewed.        Discussed plan and management with Dr Marli Zelaya NP - TEAMS  Eric Calles MD  Cell: [phone]  Office: [phone]      Assessment  Hyperthyroidism: 78y Female with hx hypothyroid disease, on home synthroid 175 mcg , in subclinical hyperthyroid state, FT4 higher normal 1.5 range. dose adjusted down.  hypotensive has encephalopathy hypotension.  Hypotension: Cortisol within normal range, on medications, monitored.  CHANELL:  Labs, chart reviewed.        Discussed plan and management with Dr Marli Zelaya NP - TEAMS  Eric Calles MD  Cell: [phone]  Office: [phone]      Assessment  Hyperthyroidism: 78y Female with hx hypothyroid disease, on home synthroid 175 mcg , in subclinical hyperthyroid state, FT4 higher normal 1.5 range. dose adjusted down.  hypotensive has encephalopathy hypotension.  Hypotension: Cortisol within normal range, on medications, monitored.  CHANELL:  Labs, chart reviewed.          Discussed plan and management with Dr Marli Zelaya NP - TEAMS  Eric Calles MD  Cell: [phone]  Office: [phone]

## 2024-07-26 NOTE — PROGRESS NOTE ADULT - SUBJECTIVE AND OBJECTIVE BOX
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   Per daughter, patient has been less interactive and nonverbal over the past few days. Patient had an unwitnessed fall about one week prior to admission with trauma to her buttocks, but remained at baseline mental state at the time.  Over the past two days she is more sleepy, not eating or taking medications . She had no reported fevers. She had one episode of vomiting.    (21 Jul 2024 20:05)    EP called re: abnormal EKG.  Telemetry with sinus bradycardia (briefly 20s-30s), with pauses of ~3.5-4sec duration.  Not apparently symptomatic as patient asleep/resting in bed.   In the ED, was inadverdently given 125mg of Metoprolol instead of 25mg.  Bradycardia resolving by next hospital day.  Being treated for urinary retention and UTI.  Has been straight-cath'd to relieve urinary retention.  On 7/23, patient awake, pleasantly confused, asking about our weekend plans.  States no angina, palpitations or lightheadedness, but continues to reference "[place]  A 10 pt ROS is otherwise negative, limited by history of dementia.  On recent admission, a loop recorder was placed for unexplained syncope w/ resulting head trauma.  She has persistent AFib. Anticoagulated w/ Apixaban 5mg BID.  On rate control Rx w/ metoprolol and digoxin.  ILR surveillance for AV block during AFib.    Date of service 7/26- resting in bed, no overnight issues.  HR controlled in AFib.    PAST MEDICAL & SURGICAL HISTORY:  Hypertension  Hypothyroid  Osteoarthritis  knees, back  CAD (coronary artery disease)  CROW (obstructive sleep apnea)  non complaint on CPAP  History of MI (myocardial infarction)  h/o previous MI in 2004 prompted PTCA  with stenting x 2 vessels   last stress/ echo 2019  Heart murmur  dx in childhood  Bilateral hearing loss, unspecified hearing loss type  bilateral aids  Obesity  Mixed stress and urge urinary incontinence  Overactive bladder  S/P ORIF (open reduction internal fixation) fracture  left hip 1962  S/P appendectomy  30 plus years  S/P knee replacement  left 2000  Stented coronary artery  2004 X 2 STENTS  S/P laparotomy  due to adhesions, 30 years ago  H/O dilation and curettage  2/2019 Benign polyp    acetaminophen     Tablet .. 650 milliGRAM(s) Oral every 6 hours PRN  apixaban 5 milliGRAM(s) Oral every 12 hours  ascorbic acid 500 milliGRAM(s) Oral daily  atorvastatin 80 milliGRAM(s) Oral at bedtime  atropine Injectable 1 milliGRAM(s) IV Push once  ciprofloxacin     Tablet 250 milliGRAM(s) Oral every 12 hours  clopidogrel Tablet 75 milliGRAM(s) Oral daily  dextrose 5% + lactated ringers. 1000 milliLiter(s) IV Continuous <Continuous>  levothyroxine 125 MICROGram(s) Oral daily  metoprolol tartrate 25 milliGRAM(s) Oral two times a day  midodrine. 5 milliGRAM(s) Oral three times a day  mirtazapine 7.5 milliGRAM(s) Oral at bedtime  multivitamin 1 Tablet(s) Oral daily  ondansetron Injectable 4 milliGRAM(s) IV Push every 8 hours PRN  tamsulosin 0.4 milliGRAM(s) Oral at bedtime                            13.8   9.02  )-----------( 238      ( 25 Jul 2024 06:34 )             41.0       07-25    135  |  102  |  35<H>  ----------------------------<  106<H>  3.5   |  19<L>  |  1.51<H>    Ca    9.2      25 Jul 2024 06:35    T(C): 36.4 (07-26-24 @ 04:22), Max: 36.6 (07-25-24 @ 12:54)  HR: 77 (07-26-24 @ 04:22) (61 - 77)  BP: 103/70 (07-26-24 @ 04:22) (103/70 - 115/67)  RR: 18 (07-26-24 @ 04:22) (18 - 18)  SpO2: 99% (07-26-24 @ 04:22) (93% - 99%)  Wt(kg): --    I&O's Summary    25 Jul 2024 07:01  -  26 Jul 2024 07:00  --------------------------------------------------------  IN: 800 mL / OUT: 830 mL / NET: -30 mL      Appearance: elderly woman in no acute distress	  HEENT:   Normal oral mucosa, PERRL, EOMI	  Lymphatic: No lymphadenopathy , no edema  Cardiovascular: irregular S1 S2, No JVD, No murmurs , Peripheral pulses palpable 2+ bilaterally  Respiratory: Lungs clear to auscultation, normal effort 	  Gastrointestinal:  Soft, Non-tender, + BS	  Skin: No rashes, No ecchymoses, No cyanosis, warm to touch  Musculoskeletal: Normal range of motion, normal strength  Psychiatry:  Mood is "I feel OK" & affect appropriate.  Memory is impaired, oriented to name only at this time.    TELEMETRY: AFib, rate-controlled.  Previously showing SR and AF with very slow heartrates, albeit after getting high dose of oral metoprolol 	    	  ASSESSMENT/PLAN: Ms Gooden is a pleasant 78y Female here w/ altered mental status. Being treated for encephalopathy related to sepsis (UTI) and urinary retention. EP called re: abnormal EKG.    Can continue metoprolol (25-50mg BID) for AFib rate control. Hold Digoxin in the setting of acute kidney injury.  Goal HR<100bpm at rest.  Continue apixaban 5mg BID for stroke prevention.  Continue telemetry - no unprovoked severe bradycardia or long pauses >5sec in AFib , and not acutely symptomatic during short pauses.  At this time does not require permanent pacemaker insertion.  Continue UTI treatment w/ antibiotics, and management of urinary retention to relieve Vagal effect on her heartrate.  ILR data mgmt per Dr Mendiola.  Will follow with you.      Shane Frias M.D.  Cardiac Electrophysiology    office [phone]  pager [phone]

## 2024-07-26 NOTE — DISCHARGE NOTE PROVIDER - NSDCCPCAREPLAN_GEN_ALL_CORE_FT
PRINCIPAL DISCHARGE DIAGNOSIS  Diagnosis: Sepsis due to urinary tract infection  Assessment and Plan of Treatment: UTI/Sepsis - Enterococcus Fecalis - streated with intravenous Vanco and Rocephin transitioned to PO Cipro (on 7/25/24) - course completes 7/30/24  UA showed infection but Urine culture showed E.faecalis,    CT C/A/P with L adrenal mass unchanged, bladder with small amt of air, no hydronephrosis or stones, no consolidation   7/24 continues with urinary retention, now s/p amaya.  s/p ceftriaxone 7/21-7/24, s/p vancomycin 7/24-7/25. Continue ciprofloxacin 250mg PO BID (renally adjusted; qtc ok) to complete total 5d course tomorrow 7/30  sepsis resolved -- remains afebrile, mental status improved, WBC wnl      SECONDARY DISCHARGE DIAGNOSES  Diagnosis: Acute metabolic encephalopathy  Assessment and Plan of Treatment: Admitted for encephalopathy related to sepsis due to suspected UTI with hematuria and urinary retention.       Diagnosis: Bradycardia  Assessment and Plan of Treatment: Heart rate dropped briefly 20s-30s with pauses of ~3.5-4sec duration.  Pt asymptomatic  Heart rate now improved and stable   Can continue metoprolol for AFib rate control.   Hold Digoxin Goal HR<100bpm at rest.   Continue apixaban 5mg BID for stroke prevention.  Follow up [facility] cardiology - Dr. Mendiola in 1 week   ILR  per Dr Mendiola    Diagnosis: CHANELL (acute kidney injury)  Assessment and Plan of Treatment: Nephrology consulted for CHANELL with Metabolic Acidosis - likely due to hypovolemia versus ATN given granular and hyaline casts on UA. Creatinine peaked to 2.08.   CHANELL resolved on IVF. FeNa low.   CT without obstruction  Avoid nephrotoxins.   Increased sodium bicarbonate to 650 mg 3 x day    Diagnosis: Urinary retention  Assessment and Plan of Treatment: Urinary retention - failed trial of void.  Continue with flomax.   Follow up with urology for Amaya management  but patient with urinary retention s/p amaya placement (failed TOV). Continue with flomax.   Follow up with urology for Amaya management      Diagnosis: Hypotension  Assessment and Plan of Treatment: For Hypotension: BP low normal.   Continue with midodrine 5 mg PO 3 x day   Follow up with primary physician in 1 week    Diagnosis: Left adrenal mass  Assessment and Plan of Treatment: CT C/A/P with Left adrenal mass unchanged, bladder with small amount of air, no hydronephrosis or stones, no consolidation     PRINCIPAL DISCHARGE DIAGNOSIS  Diagnosis: Sepsis due to urinary tract infection  Assessment and Plan of Treatment: UTI/Sepsis - Enterococcus Fecalis - streated with intravenous Vanco and Rocephin transitioned to PO Cipro (on 7/25/24) - course completes 7/30/24  UA showed infection but Urine culture showed E.faecalis,    CT C/A/P with L adrenal mass unchanged, bladder with small amt of air, no hydronephrosis or stones, no consolidation   7/24 continues with urinary retention, now s/p amaya.  s/p ceftriaxone 7/21-7/24, s/p vancomycin 7/24-7/25. Continue ciprofloxacin 250mg PO BID (renally adjusted; qtc ok) to complete total 5d course tomorrow 7/30  sepsis resolved -- remains afebrile, mental status improved, WBC wnl      SECONDARY DISCHARGE DIAGNOSES  Diagnosis: Acute metabolic encephalopathy  Assessment and Plan of Treatment: Admitted for encephalopathy related to sepsis due to suspected UTI with hematuria and urinary retention.       Diagnosis: Bradycardia  Assessment and Plan of Treatment: Heart rate dropped briefly 20s-30s with pauses of ~3.5-4sec duration.  Pt asymptomatic  Heart rate now improved and stable   Can continue metoprolol for AFib rate control.   Hold Digoxin Goal HR<100bpm at rest.   Continue apixaban 5mg BID for stroke prevention.  Follow up [facility] cardiology - Dr. Mendiola in 1 week   ILR  per Dr Mendiola    Diagnosis: CHANELL (acute kidney injury)  Assessment and Plan of Treatment: Nephrology consulted for CHANELL with Metabolic Acidosis - likely due to hypovolemia versus ATN given granular and hyaline casts on UA. Creatinine peaked to 2.08.   CHANELL resolved on IVF. FeNa low.   CT without obstruction  Avoid nephrotoxins.   Increased sodium bicarbonate to 650 mg 3 x day    Diagnosis: Urinary retention  Assessment and Plan of Treatment: Urinary retention - failed trial of void.  Continue with flomax.   Follow up with urology for Amaya management  but patient with urinary retention s/p amaya placement (failed TOV). Continue with flomax.   Follow up with urology for Amaya management      Diagnosis: Hypotension  Assessment and Plan of Treatment: For Hypotension: BP low normal.   Continue with midodrine 5 mg PO 3 x day   Follow up with primary physician in 1 week    Diagnosis: Left adrenal mass  Assessment and Plan of Treatment: CT C/A/P with Left adrenal mass unchanged, bladder with small amount of air, no hydronephrosis or stones, no consolidation    Diagnosis: History of metabolic acidosis  Assessment and Plan of Treatment: Continue sodium bicarbonate tabs     PRINCIPAL DISCHARGE DIAGNOSIS  Diagnosis: Sepsis due to urinary tract infection  Assessment and Plan of Treatment: UTI/Sepsis - Enterococcus Fecalis - streated with intravenous Vanco and Rocephin transitioned to PO Cipro (on 7/25/24) - course completes 7/30/24  UA showed infection but Urine culture showed E.faecalis,    CT C/A/P with L adrenal mass unchanged, bladder with small amt of air, no hydronephrosis or stones, no consolidation   7/24 continues with urinary retention, now s/p amaya.  s/p ceftriaxone 7/21-7/24, s/p vancomycin 7/24-7/25. Continue ciprofloxacin 250mg PO BID (renally adjusted; qtc ok) to complete total 5d course tomorrow 7/30  sepsis resolved -- remains afebrile, mental status improved, WBC wnl      SECONDARY DISCHARGE DIAGNOSES  Diagnosis: Acute metabolic encephalopathy  Assessment and Plan of Treatment: Admitted for encephalopathy related to sepsis due to suspected UTI with hematuria and urinary retention.       Diagnosis: Bradycardia  Assessment and Plan of Treatment: Heart rate dropped briefly 20s-30s with pauses of ~3.5-4sec duration.  Pt asymptomatic  Heart rate now improved and stable   Can continue metoprolol for AFib rate control.   Hold Digoxin Goal HR<100bpm at rest.   Continue apixaban 5mg BID for stroke prevention.  Follow up [facility] cardiology - Dr. Mendiola in 1 week   ILR  per Dr Mendiola    Diagnosis: CHANELL (acute kidney injury)  Assessment and Plan of Treatment: Nephrology consulted for CHANELL with Metabolic Acidosis - likely due to hypovolemia versus ATN given granular and hyaline casts on UA. Creatinine peaked to 2.08.   CHANELL resolved on IVF. FeNa low.   CT without obstruction  Avoid nephrotoxins.   Increased sodium bicarbonate to 650 mg 3 x day    Diagnosis: Urinary retention  Assessment and Plan of Treatment: Urinary retention - failed trial of void.  Continue with flomax.   Follow up with urology for Amaya management  but patient with urinary retention s/p amaya placement (failed TOV). Continue with flomax.   Follow up with urology for Amaya management      Diagnosis: Hypotension  Assessment and Plan of Treatment: For Hypotension: BP low normal.   Continue with midodrine 5 mg PO 3 x day   Follow up with primary physician in 1 week    Diagnosis: Left adrenal mass  Assessment and Plan of Treatment: CT C/A/P with Left adrenal mass unchanged, bladder with small amount of air, no hydronephrosis or stones, no consolidation    Diagnosis: History of metabolic acidosis  Assessment and Plan of Treatment: Continue sodium bicarbonate tabs    Diagnosis: Hypothyroid  Assessment and Plan of Treatment: Hypothyroid - TSH Low 0.15    Synthroid reduced to 125Mcg   - Repeat Thyroid panel in 4 weeks

## 2024-07-26 NOTE — PROGRESS NOTE ADULT - SUBJECTIVE AND OBJECTIVE BOX
Date of service: 07-26-24 @ 18:50      Patient is a 78y old  Female who presents with a chief complaint of AMS (26 Jul 2024 18:48)                                                               INTERVAL HPI/OVERNIGHT EVENTS:    REVIEW OF SYSTEMS:     no complaints                                                                                                                                                                                                                                                                        Medications:  MEDICATIONS  (STANDING):  apixaban 5 milliGRAM(s) Oral every 12 hours  ascorbic acid 500 milliGRAM(s) Oral daily  atorvastatin 80 milliGRAM(s) Oral at bedtime  atropine Injectable 1 milliGRAM(s) IV Push once  ciprofloxacin     Tablet 250 milliGRAM(s) Oral every 12 hours  clopidogrel Tablet 75 milliGRAM(s) Oral daily  dextrose 5% + lactated ringers with potassium chloride 20 mEq/L 1000 milliLiter(s) (50 mL/Hr) IV Continuous <Continuous>  levothyroxine 125 MICROGram(s) Oral daily  metoprolol tartrate 25 milliGRAM(s) Oral two times a day  midodrine. 5 milliGRAM(s) Oral three times a day  mirtazapine 7.5 milliGRAM(s) Oral at bedtime  multivitamin 1 Tablet(s) Oral daily  sodium bicarbonate 650 milliGRAM(s) Oral three times a day  tamsulosin 0.4 milliGRAM(s) Oral at bedtime    MEDICATIONS  (PRN):  acetaminophen     Tablet .. 650 milliGRAM(s) Oral every 6 hours PRN Temp greater or equal to 38C (100.4F), Mild Pain (1 - 3)  ondansetron Injectable 4 milliGRAM(s) IV Push every 8 hours PRN Nausea and/or Vomiting       Allergies    penicillin (Hives)    Intolerances      Vital Signs Last 24 Hrs  stable     Physical Exam:    Daily     Daily   General: AND   HEENT:  Nonicteric, PERRLA  CV:  RRR, S1S2   Lungs:  CTA B/L, no wheezes, rales, rhonchi  Abdomen:  Soft, non-tender, no distended, positive BS  Extremities: no edema                                                                                                                                                                                                                                                                                                 LABS:                             reviewed

## 2024-07-26 NOTE — PROGRESS NOTE ADULT - ASSESSMENT
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 Nephrology consulted for elevated Scr.    1) CHANELL: likely due to hypovolemia versus ATN given granular and hyaline casts on UA. Scr improving. Continue with D5LR but will add KCL given hypokalemia on labs. FeNa low. CT without obstruction but patient with urinary retention s/p amaya placement. Continue with flomax and can attempt TOV prior to discharge. Avoid nephrotoxins. Monitor electrolytes.    2) Hypotension: BP low normal. Continue with midodrine 5 mg PO TID. Monitor BP.    3) Metabolic acidosis: Predominantly non-gap acidosis in setting of ostomy. Start sodium bicarbonate 650 mg twice daily. Repeat blood gas in AM. Monitor pH.    4) Acute cystitis with hematuria: As per ID.      [facility] NEPHROLOGY  Paulie Storm M.D.  Mack Clancy D.O.  Maddi Steve M.D.  MD Olivia Caldera, MSN, ANP-C    Telephone: [phone]  Facsimile: [phone] [place] [address], [address]  [place] [address]

## 2024-07-27 LAB
ANION GAP SERPL CALC-SCNC: 17 MMOL/L — SIGNIFICANT CHANGE UP (ref 5–17)
BASE EXCESS BLDV CALC-SCNC: -3.2 MMOL/L — LOW (ref -2–3)
BUN SERPL-MCNC: 23 MG/DL — SIGNIFICANT CHANGE UP (ref 7–23)
CALCIUM SERPL-MCNC: 9.7 MG/DL — SIGNIFICANT CHANGE UP (ref 8.4–10.5)
CHLORIDE SERPL-SCNC: 103 MMOL/L — SIGNIFICANT CHANGE UP (ref 96–108)
CO2 BLDV-SCNC: 25 MMOL/L — SIGNIFICANT CHANGE UP (ref 22–26)
CO2 SERPL-SCNC: 17 MMOL/L — LOW (ref 22–31)
CREAT SERPL-MCNC: 1.27 MG/DL — SIGNIFICANT CHANGE UP (ref 0.5–1.3)
DIGOXIN SERPL-MCNC: 0.6 NG/ML — LOW (ref 0.8–2)
EGFR: 44 ML/MIN/1.73M2 — LOW
GAS PNL BLDV: SIGNIFICANT CHANGE UP
GLUCOSE BLDC GLUCOMTR-MCNC: 111 MG/DL — HIGH (ref 70–99)
GLUCOSE SERPL-MCNC: 123 MG/DL — HIGH (ref 70–99)
HCO3 BLDV-SCNC: 24 MMOL/L — SIGNIFICANT CHANGE UP (ref 22–29)
HCT VFR BLD CALC: 45.1 % — HIGH (ref 34.5–45)
HGB BLD-MCNC: 14.8 G/DL — SIGNIFICANT CHANGE UP (ref 11.5–15.5)
MCHC RBC-ENTMCNC: 31.2 PG — SIGNIFICANT CHANGE UP (ref 27–34)
MCHC RBC-ENTMCNC: 32.8 GM/DL — SIGNIFICANT CHANGE UP (ref 32–36)
MCV RBC AUTO: 94.9 FL — SIGNIFICANT CHANGE UP (ref 80–100)
NRBC # BLD: 0 /100 WBCS — SIGNIFICANT CHANGE UP (ref 0–0)
PCO2 BLDV: 48 MMHG — HIGH (ref 39–42)
PH BLDV: 7.3 — LOW (ref 7.32–7.43)
PLATELET # BLD AUTO: 199 K/UL — SIGNIFICANT CHANGE UP (ref 150–400)
PO2 BLDV: 40 MMHG — SIGNIFICANT CHANGE UP (ref 25–45)
POTASSIUM SERPL-MCNC: 4 MMOL/L — SIGNIFICANT CHANGE UP (ref 3.5–5.3)
POTASSIUM SERPL-SCNC: 4 MMOL/L — SIGNIFICANT CHANGE UP (ref 3.5–5.3)
RBC # BLD: 4.75 M/UL — SIGNIFICANT CHANGE UP (ref 3.8–5.2)
RBC # FLD: 15.6 % — HIGH (ref 10.3–14.5)
SAO2 % BLDV: 69.6 % — SIGNIFICANT CHANGE UP (ref 67–88)
SODIUM SERPL-SCNC: 137 MMOL/L — SIGNIFICANT CHANGE UP (ref 135–145)
THYROPEROXIDASE AB SERPL-ACNC: 117 IU/ML — HIGH
WBC # BLD: 9.65 K/UL — SIGNIFICANT CHANGE UP (ref 3.8–10.5)
WBC # FLD AUTO: 9.65 K/UL — SIGNIFICANT CHANGE UP (ref 3.8–10.5)

## 2024-07-27 RX ORDER — SODIUM CHLORIDE AND POTASSIUM CHLORIDE 150; 450 MG/100ML; MG/100ML
1000 INJECTION, SOLUTION INTRAVENOUS
Refills: 0 | Status: DISCONTINUED | OUTPATIENT
Start: 2024-07-27 | End: 2024-07-29

## 2024-07-27 RX ADMIN — MIDODRINE HYDROCHLORIDE 5 MILLIGRAM(S): 2.5 TABLET ORAL at 11:18

## 2024-07-27 RX ADMIN — CIPROFLOXACIN 250 MILLIGRAM(S): 500 TABLET, FILM COATED ORAL at 17:11

## 2024-07-27 RX ADMIN — SODIUM CHLORIDE AND POTASSIUM CHLORIDE 70 MILLILITER(S): 150; 450 INJECTION, SOLUTION INTRAVENOUS at 02:57

## 2024-07-27 RX ADMIN — Medication 0.4 MILLIGRAM(S): at 21:50

## 2024-07-27 RX ADMIN — Medication 650 MILLIGRAM(S): at 05:07

## 2024-07-27 RX ADMIN — MIDODRINE HYDROCHLORIDE 5 MILLIGRAM(S): 2.5 TABLET ORAL at 05:07

## 2024-07-27 RX ADMIN — Medication 1 TABLET(S): at 11:18

## 2024-07-27 RX ADMIN — CIPROFLOXACIN 250 MILLIGRAM(S): 500 TABLET, FILM COATED ORAL at 05:06

## 2024-07-27 RX ADMIN — ATORVASTATIN CALCIUM 80 MILLIGRAM(S): 40 TABLET, FILM COATED ORAL at 21:49

## 2024-07-27 RX ADMIN — APIXABAN 5 MILLIGRAM(S): 5 TABLET, FILM COATED ORAL at 05:07

## 2024-07-27 RX ADMIN — CLOPIDOGREL BISULFATE 75 MILLIGRAM(S): 75 TABLET, FILM COATED ORAL at 11:18

## 2024-07-27 RX ADMIN — Medication 125 MICROGRAM(S): at 05:07

## 2024-07-27 RX ADMIN — Medication 25 MILLIGRAM(S): at 17:11

## 2024-07-27 RX ADMIN — Medication 25 MILLIGRAM(S): at 05:07

## 2024-07-27 RX ADMIN — Medication 7.5 MILLIGRAM(S): at 21:49

## 2024-07-27 RX ADMIN — APIXABAN 5 MILLIGRAM(S): 5 TABLET, FILM COATED ORAL at 17:11

## 2024-07-27 RX ADMIN — Medication 650 MILLIGRAM(S): at 17:11

## 2024-07-27 RX ADMIN — Medication 500 MILLIGRAM(S): at 11:18

## 2024-07-27 RX ADMIN — MIDODRINE HYDROCHLORIDE 5 MILLIGRAM(S): 2.5 TABLET ORAL at 17:11

## 2024-07-27 NOTE — PROGRESS NOTE ADULT - ASSESSMENT
Assessment  Hyperthyroidism: 78y Female with hx hypothyroid disease, on home synthroid 175 mcg , in subclinical hyperthyroid state, FT4 higher normal 1.5 range. dose adjusted down.  hypotensive has encephalopathy hypotension.  Hypotension: Cortisol within normal range, on medications, monitored.  CHANELL:  Labs, chart reviewed.        Discussed plan and management with Dr Marli Zelaya NP - TEAMS  Eric Calles MD  Cell: [phone]  Office: [phone]      Assessment  Hyperthyroidism: 78y Female with hx hypothyroid disease, on home synthroid 175 mcg , in subclinical hyperthyroid state, FT4 higher normal 1.5 range. dose adjusted down.  hypotensive has encephalopathy hypotension.  Hypotension: Cortisol within normal range, on medications, monitored.  CHANELL:  Labs, chart reviewed.            Discussed plan and management with Dr Marli Zelaya NP - TEAMS  Eric Calles MD  Cell: [phone]  Office: [phone]

## 2024-07-27 NOTE — PROGRESS NOTE ADULT - SUBJECTIVE AND OBJECTIVE BOX
Chief complaint  Patient is a 78y old  Female who presents with a chief complaint of AMS (27 Jul 2024 11:43)         Labs and Fingersticks  CAPILLARY BLOOD GLUCOSE      POCT Blood Glucose.: 111 mg/dL (27 Jul 2024 06:23)  POCT Blood Glucose.: 119 mg/dL (26 Jul 2024 23:46)      Anion Gap: 17 (07-27 @ 06:52)  Anion Gap: 13 (07-26 @ 10:24)      Calcium: 9.7 (07-27 @ 06:52)  Calcium: 9.0 (07-26 @ 10:24)          07-27    137  |  103  |  23  ----------------------------<  123<H>  4.0   |  17<L>  |  1.27    Ca    9.7      27 Jul 2024 06:52  Mg     1.7     07-26                          14.8   9.65  )-----------( 199      ( 27 Jul 2024 06:52 )             45.1     Medications  MEDICATIONS  (STANDING):  apixaban 5 milliGRAM(s) Oral every 12 hours  ascorbic acid 500 milliGRAM(s) Oral daily  atorvastatin 80 milliGRAM(s) Oral at bedtime  atropine Injectable 1 milliGRAM(s) IV Push once  ciprofloxacin     Tablet 250 milliGRAM(s) Oral every 12 hours  clopidogrel Tablet 75 milliGRAM(s) Oral daily  dextrose 5% + lactated ringers with potassium chloride 20 mEq/L 1000 milliLiter(s) (70 mL/Hr) IV Continuous <Continuous>  levothyroxine 125 MICROGram(s) Oral daily  metoprolol tartrate 25 milliGRAM(s) Oral two times a day  midodrine. 5 milliGRAM(s) Oral three times a day  mirtazapine 7.5 milliGRAM(s) Oral at bedtime  multivitamin 1 Tablet(s) Oral daily  sodium bicarbonate 650 milliGRAM(s) Oral two times a day  tamsulosin 0.4 milliGRAM(s) Oral at bedtime      Physical Exam  General: Patient comfortable in bed   Vital Signs Last 12 Hrs  T(F): 97.5 (07-27-24 @ 12:23), Max: 97.8 (07-27-24 @ 04:09)  HR: 112 (07-27-24 @ 12:32) (86 - 112)  BP: 128/86 (07-27-24 @ 12:32) (108/70 - 131/84)  BP(mean): --  RR: 18 (07-27-24 @ 12:23) (18 - 18)  SpO2: 99% (07-27-24 @ 12:23) (98% - 99%)    CVS: S1S2   Respiratory: No wheezing, no crepitations  GI: Abdomen soft, bowel sounds positive  Musculoskeletal:  moves all extremities  : Voiding         Chief complaint  Patient is a 78y old  Female who presents with a chief complaint of AMS (27 Jul 2024 11:43)     Labs and Fingersticks  CAPILLARY BLOOD GLUCOSE      POCT Blood Glucose.: 111 mg/dL (27 Jul 2024 06:23)  POCT Blood Glucose.: 119 mg/dL (26 Jul 2024 23:46)      Anion Gap: 17 (07-27 @ 06:52)  Anion Gap: 13 (07-26 @ 10:24)      Calcium: 9.7 (07-27 @ 06:52)  Calcium: 9.0 (07-26 @ 10:24)          07-27    137  |  103  |  23  ----------------------------<  123<H>  4.0   |  17<L>  |  1.27    Ca    9.7      27 Jul 2024 06:52  Mg     1.7     07-26                          14.8   9.65  )-----------( 199      ( 27 Jul 2024 06:52 )             45.1     Medications  MEDICATIONS  (STANDING):  apixaban 5 milliGRAM(s) Oral every 12 hours  ascorbic acid 500 milliGRAM(s) Oral daily  atorvastatin 80 milliGRAM(s) Oral at bedtime  atropine Injectable 1 milliGRAM(s) IV Push once  ciprofloxacin     Tablet 250 milliGRAM(s) Oral every 12 hours  clopidogrel Tablet 75 milliGRAM(s) Oral daily  dextrose 5% + lactated ringers with potassium chloride 20 mEq/L 1000 milliLiter(s) (70 mL/Hr) IV Continuous <Continuous>  levothyroxine 125 MICROGram(s) Oral daily  metoprolol tartrate 25 milliGRAM(s) Oral two times a day  midodrine. 5 milliGRAM(s) Oral three times a day  mirtazapine 7.5 milliGRAM(s) Oral at bedtime  multivitamin 1 Tablet(s) Oral daily  sodium bicarbonate 650 milliGRAM(s) Oral two times a day  tamsulosin 0.4 milliGRAM(s) Oral at bedtime      Physical Exam  General: Patient comfortable in bed   Vital Signs Last 12 Hrs  T(F): 97.5 (07-27-24 @ 12:23), Max: 97.8 (07-27-24 @ 04:09)  HR: 112 (07-27-24 @ 12:32) (86 - 112)  BP: 128/86 (07-27-24 @ 12:32) (108/70 - 131/84)  BP(mean): --  RR: 18 (07-27-24 @ 12:23) (18 - 18)  SpO2: 99% (07-27-24 @ 12:23) (98% - 99%)    CVS: S1S2   Respiratory: No wheezing, no crepitations  GI: Abdomen soft, bowel sounds positive  Musculoskeletal:  moves all extremities  : Voiding

## 2024-07-27 NOTE — PROGRESS NOTE ADULT - SUBJECTIVE AND OBJECTIVE BOX
Patient is a 78y old  Female who presents with a chief complaint of AMS (27 Jul 2024 22:40)      INTERVAL HPI/OVERNIGHT EVENTS: seen and examined, NAD   T(C): 36.3 (07-27-24 @ 21:12), Max: 36.6 (07-27-24 @ 04:09)  HR: 60 (07-27-24 @ 21:12) (60 - 112)  BP: 114/72 (07-27-24 @ 21:12) (108/70 - 131/84)  RR: 18 (07-27-24 @ 21:12) (18 - 18)  SpO2: 98% (07-27-24 @ 21:12) (98% - 99%)  Wt(kg): --  I&O's Summary    26 Jul 2024 07:01  -  27 Jul 2024 07:00  --------------------------------------------------------  IN: 0 mL / OUT: 1340 mL / NET: -1340 mL    27 Jul 2024 07:01  -  27 Jul 2024 22:51  --------------------------------------------------------  IN: 1082 mL / OUT: 990 mL / NET: 92 mL        PAST MEDICAL & SURGICAL HISTORY:  Hypertension      Hypothyroid      Osteoarthritis  knees, back      CAD (coronary artery disease)      CROW (obstructive sleep apnea)  non complaint on CPAP      History of MI (myocardial infarction)  h/o previous MI in 2004 prompted PTCA  with stenting x 2 vessels   last stress/ echo 2019      Heart murmur  dx in childhood      Bilateral hearing loss, unspecified hearing loss type  bilateral aids      Obesity      Mixed stress and urge urinary incontinence      Overactive bladder      S/P ORIF (open reduction internal fixation) fracture  left hip 1962      S/P appendectomy  30 plus years      S/P knee replacement  left 2000      Stented coronary artery  2004 X 2 STENTS      S/P laparotomy  due to adhesions, 30 years ago      H/O dilation and curettage  2/2019 Benign polyp          SOCIAL HISTORY  Alcohol:  Tobacco:  Illicit substance use:    FAMILY HISTORY:    REVIEW OF SYSTEMS:  CONSTITUTIONAL: No fever, weight loss, or fatigue  EYES: No eye pain, visual disturbances, or discharge  ENMT:  No difficulty hearing, tinnitus, vertigo; No sinus or throat pain  NECK: No pain or stiffness  RESPIRATORY: No cough, wheezing, chills or hemoptysis; No shortness of breath  CARDIOVASCULAR: No chest pain, palpitations, dizziness, or leg swelling  GASTROINTESTINAL: No abdominal or epigastric pain. No nausea, vomiting, or hematemesis; No diarrhea or constipation. No melena or hematochezia.  GENITOURINARY: No dysuria, frequency, hematuria, or incontinence  NEUROLOGICAL: No headaches, memory loss, loss of strength, numbness, or tremors  SKIN: No itching, burning, rashes, or lesions   LYMPH NODES: No enlarged glands  ENDOCRINE: No heat or cold intolerance; No hair loss  MUSCULOSKELETAL: No joint pain or swelling; No muscle, back, or extremity pain  PSYCHIATRIC: No depression, anxiety, mood swings, or difficulty sleeping  HEME/LYMPH: No easy bruising, or bleeding gums  ALLERY AND IMMUNOLOGIC: No hives or eczema    RADIOLOGY & ADDITIONAL TESTS:    Imaging Personally Reviewed:  [ ] YES  [ ] NO    Consultant(s) Notes Reviewed:  [ ] YES  [ ] NO    PHYSICAL EXAM:  GENERAL: NAD, well-groomed, well-developed  HEAD:  Atraumatic, Normocephalic  EYES: EOMI, PERRLA, conjunctiva and sclera clear  ENMT: No tonsillar erythema, exudates, or enlargement; Moist mucous membranes, Good dentition, No lesions  NECK: Supple, No JVD, Normal thyroid  NERVOUS SYSTEM:  Alert & Oriented X3, Good concentration; Motor Strength 5/5 B/L upper and lower extremities; DTRs 2+ intact and symmetric  CHEST/LUNG: Clear to percussion bilaterally; No rales, rhonchi, wheezing, or rubs  HEART: Regular rate and rhythm; No murmurs, rubs, or gallops  ABDOMEN: Soft, Nontender, Nondistended; Bowel sounds present  EXTREMITIES:  2+ Peripheral Pulses, No clubbing, cyanosis, or edema  LYMPH: No lymphadenopathy noted  SKIN: No rashes or lesions    LABS:                        14.8   9.65  )-----------( 199      ( 27 Jul 2024 06:52 )             45.1     07-27    137  |  103  |  23  ----------------------------<  123<H>  4.0   |  17<L>  |  1.27    Ca    9.7      27 Jul 2024 06:52  Mg     1.7     07-26        Urinalysis Basic - ( 27 Jul 2024 06:52 )    Color: x / Appearance: x / SG: x / pH: x  Gluc: 123 mg/dL / Ketone: x  / Bili: x / Urobili: x   Blood: x / Protein: x / Nitrite: x   Leuk Esterase: x / RBC: x / WBC x   Sq Epi: x / Non Sq Epi: x / Bacteria: x      CAPILLARY BLOOD GLUCOSE      POCT Blood Glucose.: 111 mg/dL (27 Jul 2024 06:23)  POCT Blood Glucose.: 119 mg/dL (26 Jul 2024 23:46)        Urinalysis Basic - ( 27 Jul 2024 06:52 )    Color: x / Appearance: x / SG: x / pH: x  Gluc: 123 mg/dL / Ketone: x  / Bili: x / Urobili: x   Blood: x / Protein: x / Nitrite: x   Leuk Esterase: x / RBC: x / WBC x   Sq Epi: x / Non Sq Epi: x / Bacteria: x        MEDICATIONS  (STANDING):  apixaban 5 milliGRAM(s) Oral every 12 hours  ascorbic acid 500 milliGRAM(s) Oral daily  atorvastatin 80 milliGRAM(s) Oral at bedtime  atropine Injectable 1 milliGRAM(s) IV Push once  ciprofloxacin     Tablet 250 milliGRAM(s) Oral every 12 hours  clopidogrel Tablet 75 milliGRAM(s) Oral daily  dextrose 5% + lactated ringers with potassium chloride 20 mEq/L 1000 milliLiter(s) (70 mL/Hr) IV Continuous <Continuous>  levothyroxine 125 MICROGram(s) Oral daily  metoprolol tartrate 25 milliGRAM(s) Oral two times a day  midodrine. 5 milliGRAM(s) Oral three times a day  mirtazapine 7.5 milliGRAM(s) Oral at bedtime  multivitamin 1 Tablet(s) Oral daily  sodium bicarbonate 650 milliGRAM(s) Oral two times a day  tamsulosin 0.4 milliGRAM(s) Oral at bedtime    MEDICATIONS  (PRN):  acetaminophen     Tablet .. 650 milliGRAM(s) Oral every 6 hours PRN Temp greater or equal to 38C (100.4F), Mild Pain (1 - 3)  ondansetron Injectable 4 milliGRAM(s) IV Push every 8 hours PRN Nausea and/or Vomiting      Care Discussed with Consultants/Other Providers [ ] YES  [ ] NO

## 2024-07-27 NOTE — PROGRESS NOTE ADULT - PROBLEM SELECTOR PLAN 2
will decrease synthroid to 125 mcg daily   discharge on current dose synthroid  FU TFTs outpt 4-6 weeks

## 2024-07-27 NOTE — PROGRESS NOTE ADULT - SUBJECTIVE AND OBJECTIVE BOX
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 Nephrology consulted for elevated Scr.    REVIEW OF SYSTEMS:  Gen: no fevers  Cards: no chest pain  Resp: no dyspnea  GI: no nausea or vomiting or diarrhea, + decreased PO intake  Vascular: no LE edema    [facility] ([facility])      Hospital Medications: Medications reviewed        VITALS:  T(F): 97.8 (07-27-24 @ 04:09), Max: 98 (07-26-24 @ 20:59)  HR: 87 (07-27-24 @ 04:09)  BP: 108/70 (07-27-24 @ 04:09)  RR: 18 (07-27-24 @ 04:09)  SpO2: 98% (07-27-24 @ 04:09)  Wt(kg): --    07-26 @ 07:01  -  07-27 @ 07:00  --------------------------------------------------------  IN: 0 mL / OUT: 1340 mL / NET: -1340 mL    07-27 @ 07:01  -  07-27 @ 11:44  --------------------------------------------------------  IN: 0 mL / OUT: 490 mL / NET: -490 mL        PHYSICAL EXAM:    Gen: NAD, calm  Cards: Irregularly irregular, +S1/S2, no M/G/R  Resp: CTA B/L  GI: soft, NT/ND, NABS, + ostomy with liquidy output  : no amaya  Vascular: no LE edema B/L        LABS:  07-27    137  |  103  |  23  ----------------------------<  123<H>  4.0   |  17<L>  |  1.27    Ca    9.7      27 Jul 2024 06:52  Mg     1.7     07-26      Creatinine Trend: 1.27 <--, 1.33 <--, 1.51 <--, 1.64 <--, 1.59 <--, 1.87 <--, 2.08 <--                        14.8   9.65  )-----------( 199      ( 27 Jul 2024 06:52 )             45.1     Urine Studies:  Urinalysis Basic - ( 27 Jul 2024 06:52 )    Color:  / Appearance:  / SG:  / pH:   Gluc: 123 mg/dL / Ketone:   / Bili:  / Urobili:    Blood:  / Protein:  / Nitrite:    Leuk Esterase:  / RBC:  / WBC    Sq Epi:  / Non Sq Epi:  / Bacteria:       Sodium, Random Urine: 7 mmol/L (07-25 @ 16:06)  Creatinine, Random Urine: 177 mg/dL (07-25 @ 16:06)

## 2024-07-27 NOTE — PROGRESS NOTE ADULT - ASSESSMENT
77yo f , smoker, w pmh mci/dementia, Stebbins, htn, hld, afib, cad c/b mi s/p pci w stents, copd, little, urinary incontinence, recurrent uti, hypothyroidism, oa s/p l tkr + l hip orif, and w recent hospitalization 2/16-3/9 for ischemic bowel s/p ex-lap w bowel resection + end ileostomy, 4/1-4/3 for drainage from incision site 2/2 hematoma in the laparotomy wound s/p conservative mgmt, 4/28-5/6 for afib w RVR  now admitted w encephalopathy    # encephalopathy.   likely toxic metabolic in setting of uti and CHANELL   amaya for urinary retention   improving     # History of UTI.   enterococouos : on cipro PO x 5 days         #Sepsis with acute renal failure / metabolic acidosis   improving renal fx   renal following   sod bicarb added     # History of chronic atrial fibrillation.   rate controlled   EP consulted : following  fu with cardio   - c/w Eliquis.    # History of CAD (coronary artery disease).   ·  Plan: trop elevated iso of CHANELL   -c/w plavix  -c/w atorvastatin.  - fu with cardio     # H/O hypotension.   ·  Plan: -c/w midodrine.    # History of COPD.   - minimal  hypercapnia , no wheezing   - can use albuterol PRN for wheezing.      # Hypothyroidism.   ·  Plan: -levothyroxine.     #Dementia.   ·  Plan: -mirtazapine Hs.      # History of creation of ostomy.   ·  Plan; - ostomy care.    dispo: d/c planning on cipro to be finished for 5 days total     rossy mora MD  covering Dr. Duran

## 2024-07-27 NOTE — PROGRESS NOTE ADULT - SUBJECTIVE AND OBJECTIVE BOX
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   Per daughter, patient has been less interactive and nonverbal over the past few days. Patient had an unwitnessed fall about one week prior to admission with trauma to her buttocks, but remained at baseline mental state at the time.  Over the past two days she is more sleepy, not eating or taking medications . She had no reported fevers. She had one episode of vomiting.    (21 Jul 2024 20:05)    EP called re: abnormal EKG.  Telemetry with sinus bradycardia (briefly 20s-30s), with pauses of ~3.5-4sec duration.  Not apparently symptomatic as patient asleep/resting in bed.   In the ED, was inadverdently given 125mg of Metoprolol instead of 25mg.  Bradycardia resolving by next hospital day.  Being treated for urinary retention and UTI.  Has been straight-cath'd to relieve urinary retention.  On 7/23, patient awake, pleasantly confused, asking about our weekend plans.  States no angina, palpitations or lightheadedness, but continues to reference "[place]  A 10 pt ROS is otherwise negative, limited by history of dementia.  On recent admission, a loop recorder was placed for unexplained syncope w/ resulting head trauma.  She has persistent AFib. Anticoagulated w/ Apixaban 5mg BID.  On rate control Rx w/ metoprolol and digoxin.  ILR surveillance for AV block during AFib.    7/26- resting in bed, no overnight issues.  HR controlled in AFib.  Date of service  7/27 no chest pain or sob       PAST MEDICAL & SURGICAL HISTORY:  Hypertension  Hypothyroid  Osteoarthritis  knees, back  CAD (coronary artery disease)  CROW (obstructive sleep apnea)  non complaint on CPAP  History of MI (myocardial infarction)  h/o previous MI in 2004 prompted PTCA  with stenting x 2 vessels   last stress/ echo 2019  Heart murmur  dx in childhood  Bilateral hearing loss, unspecified hearing loss type  bilateral aids  Obesity  Mixed stress and urge urinary incontinence  Overactive bladder  S/P ORIF (open reduction internal fixation) fracture  left hip 1962  S/P appendectomy  30 plus years  S/P knee replacement  left 2000  Stented coronary artery  2004 X 2 STENTS  S/P laparotomy  due to adhesions, 30 years ago  H/O dilation and curettage  2/2019 Benign polyp           acetaminophen     Tablet .. 650 milliGRAM(s) Oral every 6 hours PRN  apixaban 5 milliGRAM(s) Oral every 12 hours  ascorbic acid 500 milliGRAM(s) Oral daily  atorvastatin 80 milliGRAM(s) Oral at bedtime  atropine Injectable 1 milliGRAM(s) IV Push once  ciprofloxacin     Tablet 250 milliGRAM(s) Oral every 12 hours  clopidogrel Tablet 75 milliGRAM(s) Oral daily  dextrose 5% + lactated ringers with potassium chloride 20 mEq/L 1000 milliLiter(s) IV Continuous <Continuous>  levothyroxine 125 MICROGram(s) Oral daily  metoprolol tartrate 25 milliGRAM(s) Oral two times a day  midodrine. 5 milliGRAM(s) Oral three times a day  mirtazapine 7.5 milliGRAM(s) Oral at bedtime  multivitamin 1 Tablet(s) Oral daily  ondansetron Injectable 4 milliGRAM(s) IV Push every 8 hours PRN  sodium bicarbonate 650 milliGRAM(s) Oral two times a day  tamsulosin 0.4 milliGRAM(s) Oral at bedtime                            14.8   9.65  )-----------( 199      ( 27 Jul 2024 06:52 )             45.1       Hemoglobin: 14.8 g/dL (07-27 @ 06:52)  Hemoglobin: 13.7 g/dL (07-26 @ 10:26)  Hemoglobin: 13.8 g/dL (07-25 @ 06:34)  Hemoglobin: 13.7 g/dL (07-24 @ 10:12)      07-27    137  |  103  |  23  ----------------------------<  123<H>  4.0   |  17<L>  |  1.27    Ca    9.7      27 Jul 2024 06:52  Mg     1.7     07-26      Creatinine Trend: 1.27<--, 1.33<--, 1.51<--, 1.64<--, 1.59<--, 1.87<--    COAGS:           T(C): 36.6 (07-27-24 @ 04:09), Max: 36.7 (07-26-24 @ 20:59)  HR: 87 (07-27-24 @ 04:09) (60 - 87)  BP: 108/70 (07-27-24 @ 04:09) (105/74 - 114/76)  RR: 18 (07-27-24 @ 04:09) (18 - 18)  SpO2: 98% (07-27-24 @ 04:09) (98% - 98%)  Wt(kg): --    I&O's Summary    26 Jul 2024 07:01  -  27 Jul 2024 07:00  --------------------------------------------------------  IN: 0 mL / OUT: 1340 mL / NET: -1340 mL      Appearance: elderly woman in no acute distress	  HEENT:   Normal oral mucosa, PERRL, EOMI	  Lymphatic: No lymphadenopathy , no edema  Cardiovascular: irregular S1 S2, No JVD, No murmurs , Peripheral pulses palpable 2+ bilaterally  Respiratory: Lungs clear to auscultation, normal effort 	  Gastrointestinal:  Soft, Non-tender, + BS	  Skin: No rashes, No ecchymoses, No cyanosis, warm to touch  Musculoskeletal: Normal range of motion, normal strength  Psychiatry:  Mood is "I feel OK" & affect appropriate.  Memory is impaired, oriented to name only at this time.    TELEMETRY: AFib, rate-controlled.  Previously showing SR and AF with very slow heartrates, albeit after getting high dose of oral metoprolol 	    	  ASSESSMENT/PLAN: Ms Gooden is a pleasant 78y Female here w/ altered mental status. Being treated for encephalopathy related to sepsis (UTI) and urinary retention. EP called re: abnormal EKG.    Can continue metoprolol (25-50mg BID) for AFib rate control. Hold Digoxin in the setting of acute kidney injury.  Goal HR<100bpm at rest.  Continue apixaban 5mg BID for stroke prevention.  Continue telemetry - no unprovoked severe bradycardia or long pauses >5sec in AFib , and not acutely symptomatic during short pauses.  At this time does not require permanent pacemaker insertion.  Continue UTI treatment w/ antibiotics, and management of urinary retention to relieve Vagal effect on her heartrate.  ILR data mgmt per Dr Mendiola.

## 2024-07-27 NOTE — PROGRESS NOTE ADULT - ASSESSMENT
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 Nephrology consulted for elevated Scr.    1) CHANELL: likely due to hypovolemia versus ATN given granular and hyaline casts on UA. CHANELL resolved on IVF. Continue with IVF given poor PO intake and increased ostomy output. FeNa low. CT without obstruction but patient with urinary retention s/p amaya placement. Patient now undergoing TOV (last bladder scan negative). Continue with flomax. Avoid nephrotoxins. Monitor electrolytes.    2) Hypotension: BP low normal. Continue with midodrine 5 mg PO TID. Monitor BP.    3) Metabolic acidosis: Resolved as per blood gas this morning. Continue with sodium bicarbonate 650 mg twice daily. Monitor pH.    4) Acute cystitis with hematuria: As per ID.      [facility] NEPHROLOGY  Paulie Storm M.D.  Mack Clancy D.O.  Maddi Steve M.D.  MD Olivia Caldera, MSN, ANP-C    Telephone: [phone]  Facsimile: [phone] [place] [address], [address]  [place] [address]

## 2024-07-27 NOTE — PROGRESS NOTE ADULT - SUBJECTIVE AND OBJECTIVE BOX
Subjective: Patient seen and examined. No new events except as noted.     REVIEW OF SYSTEMS:    CONSTITUTIONAL: + weakness, fevers or chills  EYES/ENT: No visual changes;  No vertigo or throat pain   NECK: No pain or stiffness  RESPIRATORY: No cough, wheezing, hemoptysis; No shortness of breath  CARDIOVASCULAR: No chest pain or palpitations  GASTROINTESTINAL: No abdominal or epigastric pain. No nausea, vomiting, or hematemesis; No diarrhea or constipation. No melena or hematochezia.  GENITOURINARY: No dysuria, frequency or hematuria  NEUROLOGICAL: No numbness or weakness  SKIN: No itching, burning, rashes, or lesions   All other review of systems is negative unless indicated above.    MEDICATIONS:  MEDICATIONS  (STANDING):  apixaban 5 milliGRAM(s) Oral every 12 hours  ascorbic acid 500 milliGRAM(s) Oral daily  atorvastatin 80 milliGRAM(s) Oral at bedtime  atropine Injectable 1 milliGRAM(s) IV Push once  ciprofloxacin     Tablet 250 milliGRAM(s) Oral every 12 hours  clopidogrel Tablet 75 milliGRAM(s) Oral daily  dextrose 5% + lactated ringers with potassium chloride 20 mEq/L 1000 milliLiter(s) (70 mL/Hr) IV Continuous <Continuous>  levothyroxine 125 MICROGram(s) Oral daily  metoprolol tartrate 25 milliGRAM(s) Oral two times a day  midodrine. 5 milliGRAM(s) Oral three times a day  mirtazapine 7.5 milliGRAM(s) Oral at bedtime  multivitamin 1 Tablet(s) Oral daily  sodium bicarbonate 650 milliGRAM(s) Oral two times a day  tamsulosin 0.4 milliGRAM(s) Oral at bedtime      PHYSICAL EXAM:  T(C): 36.3 (07-27-24 @ 21:12), Max: 36.6 (07-27-24 @ 04:09)  HR: 60 (07-27-24 @ 21:12) (60 - 112)  BP: 114/72 (07-27-24 @ 21:12) (108/70 - 131/84)  RR: 18 (07-27-24 @ 21:12) (18 - 18)  SpO2: 98% (07-27-24 @ 21:12) (98% - 99%)  Wt(kg): --  I&O's Summary    26 Jul 2024 07:01 - 27 Jul 2024 07:00  --------------------------------------------------------  IN: 0 mL / OUT: 1340 mL / NET: -1340 mL    27 Jul 2024 07:01 - 27 Jul 2024 22:41  --------------------------------------------------------  IN: 1082 mL / OUT: 990 mL / NET: 92 mL        Appearance: NAD	  HEENT:  Dry  oral mucosa, PERRL, EOMI	  Lymphatic: No lymphadenopathy  Cardiovascular: Irregular  S1 S2, No JVD, No murmurs, No edema  Respiratory: Lungs clear to auscultation	  Psychiatry: A & O x 3, Mood & affect appropriate  Gastrointestinal:  Soft, Non-tender, + BS	  Skin: No rashes, No ecchymoses, No cyanosis	  Neurologic: Non-focal  Extremities: Normal range of motion, No clubbing, cyanosis or edema  Vascular: Peripheral pulses palpable 2+ bilaterally        LABS:    CARDIAC MARKERS:                                14.8   9.65  )-----------( 199      ( 27 Jul 2024 06:52 )             45.1     07-27    137  |  103  |  23  ----------------------------<  123<H>  4.0   |  17<L>  |  1.27    Ca    9.7      27 Jul 2024 06:52  Mg     1.7     07-26          TELEMETRY: 	AF    ECG:  	  RADIOLOGY:   DIAGNOSTIC TESTING:  [ ] Echocardiogram:  [ ]  Catheterization:  [ ] Stress Test:    OTHER:

## 2024-07-28 LAB
ANION GAP SERPL CALC-SCNC: 12 MMOL/L — SIGNIFICANT CHANGE UP (ref 5–17)
BUN SERPL-MCNC: 21 MG/DL — SIGNIFICANT CHANGE UP (ref 7–23)
CALCIUM SERPL-MCNC: 9.1 MG/DL — SIGNIFICANT CHANGE UP (ref 8.4–10.5)
CHLORIDE SERPL-SCNC: 105 MMOL/L — SIGNIFICANT CHANGE UP (ref 96–108)
CO2 SERPL-SCNC: 20 MMOL/L — LOW (ref 22–31)
CREAT SERPL-MCNC: 1.22 MG/DL — SIGNIFICANT CHANGE UP (ref 0.5–1.3)
EGFR: 46 ML/MIN/1.73M2 — LOW
GLUCOSE BLDC GLUCOMTR-MCNC: 93 MG/DL — SIGNIFICANT CHANGE UP (ref 70–99)
GLUCOSE BLDC GLUCOMTR-MCNC: 95 MG/DL — SIGNIFICANT CHANGE UP (ref 70–99)
GLUCOSE SERPL-MCNC: 99 MG/DL — SIGNIFICANT CHANGE UP (ref 70–99)
HCT VFR BLD CALC: 40.9 % — SIGNIFICANT CHANGE UP (ref 34.5–45)
HGB BLD-MCNC: 13.5 G/DL — SIGNIFICANT CHANGE UP (ref 11.5–15.5)
MCHC RBC-ENTMCNC: 31.4 PG — SIGNIFICANT CHANGE UP (ref 27–34)
MCHC RBC-ENTMCNC: 33 GM/DL — SIGNIFICANT CHANGE UP (ref 32–36)
MCV RBC AUTO: 95.1 FL — SIGNIFICANT CHANGE UP (ref 80–100)
NRBC # BLD: 0 /100 WBCS — SIGNIFICANT CHANGE UP (ref 0–0)
PLATELET # BLD AUTO: 171 K/UL — SIGNIFICANT CHANGE UP (ref 150–400)
POTASSIUM SERPL-MCNC: 4.2 MMOL/L — SIGNIFICANT CHANGE UP (ref 3.5–5.3)
POTASSIUM SERPL-SCNC: 4.2 MMOL/L — SIGNIFICANT CHANGE UP (ref 3.5–5.3)
RBC # BLD: 4.3 M/UL — SIGNIFICANT CHANGE UP (ref 3.8–5.2)
RBC # FLD: 15.9 % — HIGH (ref 10.3–14.5)
SODIUM SERPL-SCNC: 137 MMOL/L — SIGNIFICANT CHANGE UP (ref 135–145)
TSI ACT/NOR SER: <0.1 IU/L — SIGNIFICANT CHANGE UP (ref 0–0.55)
WBC # BLD: 9.82 K/UL — SIGNIFICANT CHANGE UP (ref 3.8–10.5)
WBC # FLD AUTO: 9.82 K/UL — SIGNIFICANT CHANGE UP (ref 3.8–10.5)

## 2024-07-28 RX ADMIN — MIDODRINE HYDROCHLORIDE 5 MILLIGRAM(S): 2.5 TABLET ORAL at 05:44

## 2024-07-28 RX ADMIN — Medication 1 TABLET(S): at 11:39

## 2024-07-28 RX ADMIN — APIXABAN 5 MILLIGRAM(S): 5 TABLET, FILM COATED ORAL at 05:45

## 2024-07-28 RX ADMIN — MIDODRINE HYDROCHLORIDE 5 MILLIGRAM(S): 2.5 TABLET ORAL at 17:17

## 2024-07-28 RX ADMIN — Medication 7.5 MILLIGRAM(S): at 21:48

## 2024-07-28 RX ADMIN — ATORVASTATIN CALCIUM 80 MILLIGRAM(S): 40 TABLET, FILM COATED ORAL at 21:48

## 2024-07-28 RX ADMIN — APIXABAN 5 MILLIGRAM(S): 5 TABLET, FILM COATED ORAL at 17:16

## 2024-07-28 RX ADMIN — MIDODRINE HYDROCHLORIDE 5 MILLIGRAM(S): 2.5 TABLET ORAL at 11:39

## 2024-07-28 RX ADMIN — Medication 25 MILLIGRAM(S): at 05:45

## 2024-07-28 RX ADMIN — Medication 125 MICROGRAM(S): at 05:45

## 2024-07-28 RX ADMIN — Medication 650 MILLIGRAM(S): at 05:44

## 2024-07-28 RX ADMIN — CIPROFLOXACIN 250 MILLIGRAM(S): 500 TABLET, FILM COATED ORAL at 17:16

## 2024-07-28 RX ADMIN — Medication 0.4 MILLIGRAM(S): at 21:49

## 2024-07-28 RX ADMIN — Medication 25 MILLIGRAM(S): at 17:17

## 2024-07-28 RX ADMIN — Medication 650 MILLIGRAM(S): at 17:17

## 2024-07-28 RX ADMIN — Medication 500 MILLIGRAM(S): at 11:38

## 2024-07-28 RX ADMIN — CIPROFLOXACIN 250 MILLIGRAM(S): 500 TABLET, FILM COATED ORAL at 05:44

## 2024-07-28 RX ADMIN — SODIUM CHLORIDE AND POTASSIUM CHLORIDE 50 MILLILITER(S): 150; 450 INJECTION, SOLUTION INTRAVENOUS at 19:45

## 2024-07-28 RX ADMIN — CLOPIDOGREL BISULFATE 75 MILLIGRAM(S): 75 TABLET, FILM COATED ORAL at 11:39

## 2024-07-28 NOTE — PROGRESS NOTE ADULT - SUBJECTIVE AND OBJECTIVE BOX
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 Nephrology consulted for elevated Scr.    REVIEW OF SYSTEMS:  Gen: no fevers  Cards: no chest pain  Resp: no dyspnea  GI: no nausea or vomiting or diarrhea, + decreased PO intake  Vascular: no LE edema    [facility] ([facility])      Hospital Medications: Medications reviewed        VITALS:  T(F): 97.6 (07-28-24 @ 05:35), Max: 97.6 (07-28-24 @ 05:35)  HR: 83 (07-28-24 @ 05:35)  BP: 116/81 (07-28-24 @ 05:35)  RR: 18 (07-28-24 @ 05:35)  SpO2: 97% (07-28-24 @ 05:35)  Wt(kg): --    07-27 @ 07:01  -  07-28 @ 07:00  --------------------------------------------------------  IN: 1082 mL / OUT: 990 mL / NET: 92 mL        PHYSICAL EXAM:    Gen: NAD, calm  Cards: Irregularly irregular, +S1/S2, no M/G/R  Resp: CTA B/L  GI: soft, NT/ND, NABS, + ostomy with liquidy output  : + amaya  Vascular: no LE edema B/L        LABS:  07-28    137  |  105  |  21  ----------------------------<  99  4.2   |  20<L>  |  1.22    Ca    9.1      28 Jul 2024 06:05      Creatinine Trend: 1.22 <--, 1.27 <--, 1.33 <--, 1.51 <--, 1.64 <--, 1.59 <--, 1.87 <--, 2.08 <--                        13.5   9.82  )-----------( 171      ( 28 Jul 2024 06:05 )             40.9     Urine Studies:  Urinalysis Basic - ( 28 Jul 2024 06:05 )    Color:  / Appearance:  / SG:  / pH:   Gluc: 99 mg/dL / Ketone:   / Bili:  / Urobili:    Blood:  / Protein:  / Nitrite:    Leuk Esterase:  / RBC:  / WBC    Sq Epi:  / Non Sq Epi:  / Bacteria:       Sodium, Random Urine: 7 mmol/L (07-25 @ 16:06)  Creatinine, Random Urine: 177 mg/dL (07-25 @ 16:06)

## 2024-07-28 NOTE — PROVIDER CONTACT NOTE (OTHER) - DATE AND TIME:
24-Jul-2024 14:00
25-Jul-2024 04:28
28-Jul-2024 20:51
24-Jul-2024 18:30
22-Jul-2024 06:13
23-Jul-2024 03:05
22-Jul-2024 08:10
24-Jul-2024 12:35
25-Jul-2024 01:10
22-Jul-2024 19:54
24-Jul-2024 16:12
26-Jul-2024 23:32
23-Jul-2024 03:44

## 2024-07-28 NOTE — PROVIDER CONTACT NOTE (OTHER) - RECOMMENDATIONS
Notify provider.
notify provider
notify provider
Notify provider.
straight cath
Notify ACP
notify provider
GRISELDA Villanueva notified.

## 2024-07-28 NOTE — PROGRESS NOTE ADULT - NS ATTEND AMEND GEN_ALL_CORE FT
resting in bed, remains DNR, rhythm is AFib, non-actionable short pauses on telemetry.  no change in above plan of care.
resting in bed, remains DNR, rhythm is AFib, non-actionable short pauses on telemetry.  no change in above plan of care.
Chart, labs, vitals, radiology reviewed. Above H&P reviewed and edited where appropriate. Agree with history and physical exam. Agree with assessment and plan. I reviewed the overnight course of events and discussed the care with the patient/ family. All the decisions in assessment and plan are made by me.

## 2024-07-28 NOTE — PROGRESS NOTE ADULT - SUBJECTIVE AND OBJECTIVE BOX
Subjective: Patient seen and examined. No new events except as noted.     REVIEW OF SYSTEMS:    CONSTITUTIONAL: No weakness, fevers or chills  EYES/ENT: No visual changes;  No vertigo or throat pain   NECK: No pain or stiffness  RESPIRATORY: No cough, wheezing, hemoptysis; No shortness of breath  CARDIOVASCULAR: No chest pain or palpitations  GASTROINTESTINAL: No abdominal or epigastric pain. No nausea, vomiting, or hematemesis; No diarrhea or constipation. No melena or hematochezia.  GENITOURINARY: No dysuria, frequency or hematuria  NEUROLOGICAL: No numbness or weakness  SKIN: No itching, burning, rashes, or lesions   All other review of systems is negative unless indicated above.    MEDICATIONS:  MEDICATIONS  (STANDING):  apixaban 5 milliGRAM(s) Oral every 12 hours  ascorbic acid 500 milliGRAM(s) Oral daily  atorvastatin 80 milliGRAM(s) Oral at bedtime  atropine Injectable 1 milliGRAM(s) IV Push once  ciprofloxacin     Tablet 250 milliGRAM(s) Oral every 12 hours  clopidogrel Tablet 75 milliGRAM(s) Oral daily  dextrose 5% + lactated ringers with potassium chloride 20 mEq/L 1000 milliLiter(s) (70 mL/Hr) IV Continuous <Continuous>  levothyroxine 125 MICROGram(s) Oral daily  metoprolol tartrate 25 milliGRAM(s) Oral two times a day  midodrine. 5 milliGRAM(s) Oral three times a day  mirtazapine 7.5 milliGRAM(s) Oral at bedtime  multivitamin 1 Tablet(s) Oral daily  sodium bicarbonate 650 milliGRAM(s) Oral two times a day  tamsulosin 0.4 milliGRAM(s) Oral at bedtime      PHYSICAL EXAM:  T(C): 36.4 (07-28-24 @ 05:35), Max: 36.4 (07-27-24 @ 12:23)  HR: 83 (07-28-24 @ 05:35) (60 - 112)  BP: 116/81 (07-28-24 @ 05:35) (114/72 - 131/84)  RR: 18 (07-28-24 @ 05:35) (18 - 18)  SpO2: 97% (07-28-24 @ 05:35) (97% - 99%)  Wt(kg): --  I&O's Summary    27 Jul 2024 07:01  -  28 Jul 2024 07:00  --------------------------------------------------------  IN: 1082 mL / OUT: 990 mL / NET: 92 mL          Appearance: Normal	  HEENT:   Normal oral mucosa, PERRL, EOMI	  Lymphatic: No lymphadenopathy , no edema  Cardiovascular: Irregular  S1 S2, No JVD, No murmurs , Peripheral pulses palpable 2+ bilaterally  Respiratory: Lungs clear to auscultation, normal effort 	  Gastrointestinal:  Soft, Non-tender, + BS	  Skin: No rashes, No ecchymoses, No cyanosis, warm to touch  Musculoskeletal: Normal range of motion, normal strength  Psychiatry:  Mood & affect appropriate  Ext: No edema      LABS:    CARDIAC MARKERS:                                13.5   9.82  )-----------( 171      ( 28 Jul 2024 06:05 )             40.9     07-28    137  |  105  |  21  ----------------------------<  99  4.2   |  20<L>  |  1.22    Ca    9.1      28 Jul 2024 06:05  Mg     1.7     07-26      proBNP:   Lipid Profile:   HgA1c:   TSH:             TELEMETRY: 	AF    ECG:  	  RADIOLOGY:   DIAGNOSTIC TESTING:  [ ] Echocardiogram:  [ ]  Catheterization:  [ ] Stress Test:    OTHER:

## 2024-07-28 NOTE — PROVIDER CONTACT NOTE (OTHER) - ASSESSMENT
Patient AOx2, VSS on RA. Denies any discomfort at this time. Pt has had hx of pauses on telemetry, however this is the first event that has happened today.

## 2024-07-28 NOTE — PROVIDER CONTACT NOTE (OTHER) - SITUATION
3.0 sec pause
Patient heart rhythm back in AFIB
Pt bradycardic (HR 30-50s) with frequent pauses on tele monitor
Tele tech informed that patient HR of 28 with pause for 4.0 seconds
2 episodes of 3 second pauses on telemetry
Bladder scan 349mL. Patient has not been urinating.
Patient refusing to wake up. When aroused, patient asking to be left alone. Refusing bladder scan. Refusing to eat.
Pt had 3 sec pause
Pt had 3.2 sec pause
Pt had a 3.18 seconds pause for first time on tele.
patient c/o inability to urinate- Bladder scan shows 437ml
Notified by [employer] that patient had a 3.2 second pause followed by a 4.2 second pause.
Patient lethargic throughout day. When attempting to wake pt, pt has been refusing to wake and resistant to care- talking and groaning to leave her alone.

## 2024-07-28 NOTE — PROGRESS NOTE ADULT - SUBJECTIVE AND OBJECTIVE BOX
Date of service: 07-28-24 @ 21:33      Patient is a 78y old  Female who presents with a chief complaint of AMS (28 Jul 2024 15:33)                                                               INTERVAL HPI/OVERNIGHT EVENTS:    REVIEW OF SYSTEMS:     CONSTITUTIONAL: No weakness, fevers or chills  EYES/ENT: No visual changes , no ear ache   NECK: No pain or stiffness  RESPIRATORY: No cough, wheezing,  No shortness of breath  CARDIOVASCULAR: No chest pain or palpitations  GASTROINTESTINAL: No abdominal pain  . No nausea, vomiting, or hematemesis; No diarrhea or constipation. No melena or hematochezia.  GENITOURINARY: No dysuria, frequency or hematuria  NEUROLOGICAL: No numbness or weakness  SKIN: No itching, burning, rashes, or lesions                                                                                                                                                                                                                                                                                 Medications:  MEDICATIONS  (STANDING):  apixaban 5 milliGRAM(s) Oral every 12 hours  ascorbic acid 500 milliGRAM(s) Oral daily  atorvastatin 80 milliGRAM(s) Oral at bedtime  atropine Injectable 1 milliGRAM(s) IV Push once  ciprofloxacin     Tablet 250 milliGRAM(s) Oral every 12 hours  clopidogrel Tablet 75 milliGRAM(s) Oral daily  dextrose 5% + lactated ringers with potassium chloride 20 mEq/L 1000 milliLiter(s) (50 mL/Hr) IV Continuous <Continuous>  levothyroxine 125 MICROGram(s) Oral daily  metoprolol tartrate 25 milliGRAM(s) Oral two times a day  midodrine. 5 milliGRAM(s) Oral three times a day  mirtazapine 7.5 milliGRAM(s) Oral at bedtime  multivitamin 1 Tablet(s) Oral daily  sodium bicarbonate 650 milliGRAM(s) Oral two times a day  tamsulosin 0.4 milliGRAM(s) Oral at bedtime    MEDICATIONS  (PRN):  acetaminophen     Tablet .. 650 milliGRAM(s) Oral every 6 hours PRN Temp greater or equal to 38C (100.4F), Mild Pain (1 - 3)  ondansetron Injectable 4 milliGRAM(s) IV Push every 8 hours PRN Nausea and/or Vomiting       Allergies    penicillin (H[facility])    Intolerances      Vital Signs Last 24 Hrs  T(C): 36.5 (28 Jul 2024 21:07), Max: 36.5 (28 Jul 2024 21:07)  T(F): 97.7 (28 Jul 2024 21:07), Max: 97.7 (28 Jul 2024 21:07)  HR: 67 (28 Jul 2024 21:07) (67 - 83)  BP: 109/58 (28 Jul 2024 19:45) (99/65 - 116/81)  BP(mean): --  RR: 18 (28 Jul 2024 21:07) (18 - 18)  SpO2: 94% (28 Jul 2024 21:07) (94% - 99%)    Parameters below as of 28 Jul 2024 21:07  Patient On (Oxygen Delivery Method): room air      CAPILLARY BLOOD GLUCOSE      POCT Blood Glucose.: 93 mg/dL (28 Jul 2024 17:18)  POCT Blood Glucose.: 95 mg/dL (28 Jul 2024 00:28)      07-27 @ 07:01  -  07-28 @ 07:00  --------------------------------------------------------  IN: 1082 mL / OUT: 990 mL / NET: 92 mL      Physical Exam:    Daily     Daily   General:  NAD   HEENT:  Nonicteric, PERRLA  CV:  RRR, S1S2   Lungs:  CTA B/L, no wheezes, rales, rhonchi  Abdomen:  Soft, non-tender, no distended, positive BS  Extremities:  no edema  amaya in palce              13.5   9.82  )-----------( 171      ( 28 Jul 2024 06:05 )             40.9                      07-28    137  |  105  |  21  ----------------------------<  99  4.2   |  20<L>  |  1.22    Ca    9.1      28 Jul 2024 06:05                         RADIOLOGY & ADDITIONAL TESTS         I personally reviewed: [  ]EKG   [  ]CXR    [  ] CT      A/P:         Discussed with :     Simon consultants' Notes   Time spent :

## 2024-07-28 NOTE — PROVIDER CONTACT NOTE (OTHER) - BACKGROUND
Pt admitted for AMS & encephalopathy. PMH dementia, HTN, HLD, AFib, CAD, COPD, CROW, urinary retention, hypothyroidism.
Patient is 79 y/o male admitted for other dissociative and conversion disorders. Pmhx includes dementia, HTN, HLD, afib, CAD, COPD, & CROW.
77 yo F admitted with other dissociative and conversion disorders. acute UTI s/p abx. Bradycardia 7/22 supratherapeutic BB dose s/p RRT w glucagon. HR improved 60-80s. at this time no PM. Afib c/w med
Pt admitted with AMS & encephalopathy. PMH dementia, HTN, HLD, AFib, CAD, COPD, CROW, urinary retention.
Pt admitted for AMS & encephalopathy. PMH dementia, HTN, HLD, AFib, CAD, COPD, CROW, urinary retention.
Pt admitted for AMS. Hx of UTI on ceftriaxone, dementia, CAD on plavix, afib on eliquis and metoprolol.
Pt had pauses early during dayshift
other dissociative and conversion disorders
Pt admitted for AM & encephalopathy. PMH dementia, HTN, HLD, AFib, CAD, COPD, CROW, urinary retention.
Pt admitted with AMS/ UTI

## 2024-07-28 NOTE — PROVIDER CONTACT NOTE (OTHER) - REASON
3.2 seconds & 4.2 seconds pauses on tele
Bradycardia with pauses
Pt had 3.2sec pause
3.0 sec pause
patient c/o inability to urinate- Bladder scan shows 437ml
2 episodes of 3 second pauses on telemetry
Pt bradycardic (HR 30-50s) with frequent pauses
Patient heart rhythm back in AFIB
Pt had 3sec pause
Continued lethargy
Bladder scan 349mL
Patient refusing care
Tele tech informed that patient HR of 28 with pause for 4.0 seconds

## 2024-07-28 NOTE — PROGRESS NOTE ADULT - NS ATTEND OPT1 GEN_ALL_CORE
I independently performed the documented:
I independently performed the documented:
I attest my time as attending is greater than 50% of the total combined time spent on qualifying patient care activities by the PA/NP and attending.

## 2024-07-28 NOTE — PROGRESS NOTE ADULT - ASSESSMENT
Assessment  Hyperthyroidism: 78y Female with hx hypothyroid disease, on home synthroid 175 mcg , in subclinical hyperthyroid state, FT4 higher normal 1.5 range. dose adjusted down.  hypotensive has encephalopathy hypotension.  Hypotension: Cortisol within normal range, on medications, monitored.  CHANELL:  Labs, chart reviewed.      Discussed plan and management with Dr Marli Zelaya NP - TEAMS  Eric Calles MD  Cell: [phone]  Office: [phone]

## 2024-07-28 NOTE — PROGRESS NOTE ADULT - ASSESSMENT
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 Nephrology consulted for elevated Scr.    1) CHANELL: likely due to hypovolemia versus ATN given granular and hyaline casts on UA. CHANELL resolved on IVF. Continue with IVF given poor PO intake and increased ostomy output but decrease rate to 50 ml/hour. FeNa low. CT without obstruction but patient with urinary retention s/p amaya placement (failed TOV). Continue with flomax. Avoid nephrotoxins. Monitor electrolytes.    2) Hypotension: BP low normal. Continue with midodrine 5 mg PO TID. Monitor BP.    3) Metabolic acidosis: Resolved. Continue with sodium bicarbonate 650 mg twice daily. Monitor pH.    4) Acute cystitis with hematuria: As per [facility]OLOG[facility]  Paulie Storm M.D.  Mack Clancy D.O.  Maddi Steve M.D.  MD Olivia Caldera, MSN, ANP-C    Telephone: [phone]  Facsimile: [phone]    [facility] [place] [address], [address]  [place] [address]

## 2024-07-28 NOTE — PROGRESS NOTE ADULT - SUBJECTIVE AND OBJECTIVE BOX
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   Per daughter, patient has been less interactive and nonverbal over the past few days. Patient had an unwitnessed fall about one week prior to admission with trauma to her buttocks, but remained at baseline mental state at the time.  Over the past two days she is more sleepy, not eating or taking medications . She had no reported fevers. She had one episode of vomiting.    (21 Jul 2024 20:05)    EP called re: abnormal EKG.  Telemetry with sinus bradycardia (briefly 20s-30s), with pauses of ~3.5-4sec duration.  Not apparently symptomatic as patient asleep/resting in bed.   In the ED, was inadverdently given 125mg of Metoprolol instead of 25mg.  Bradycardia resolving by next hospital day.  Being treated for urinary retention and UTI.  Has been straight-cath'd to relieve urinary retention.  On 7/23, patient awake, pleasantly confused, asking about our weekend plans.  States no angina, palpitations or lightheadedness, but continues to reference "[place]  A 10 pt ROS is otherwise negative, limited by history of dementia.  On recent admission, a loop recorder was placed for unexplained syncope w/ resulting head trauma.  She has persistent AFib. Anticoagulated w/ Apixaban 5mg BID.  On rate control Rx w/ metoprolol and digoxin.  ILR surveillance for AV block during AFib.    7/26- resting in bed, no overnight issues.  HR controlled in AFib.    7/27 no chest pain or sob   Date of service 7/28 no events on tele overnight, pt in bed this morning, no acute distress       PAST MEDICAL & SURGICAL HISTORY:  Hypertension  Hypothyroid  Osteoarthritis  knees, back  CAD (coronary artery disease)  CROW (obstructive sleep apnea)  non complaint on CPAP  History of MI (myocardial infarction)  h/o previous MI in 2004 prompted PTCA  with stenting x 2 vessels   last stress/ echo 2019  Heart murmur  dx in childhood  Bilateral hearing loss, unspecified hearing loss type  bilateral aids  Obesity  Mixed stress and urge urinary incontinence  Overactive bladder  S/P ORIF (open reduction internal fixation) fracture  left hip 1962  S/P appendectomy  30 plus years  S/P knee replacement  left 2000  Stented coronary artery  2004 X 2 STENTS  S/P laparotomy  due to adhesions, 30 years ago  H/O dilation and curettage  2/2019 Benign polyp            acetaminophen     Tablet .. 650 milliGRAM(s) Oral every 6 hours PRN  apixaban 5 milliGRAM(s) Oral every 12 hours  ascorbic acid 500 milliGRAM(s) Oral daily  atorvastatin 80 milliGRAM(s) Oral at bedtime  atropine Injectable 1 milliGRAM(s) IV Push once  ciprofloxacin     Tablet 250 milliGRAM(s) Oral every 12 hours  clopidogrel Tablet 75 milliGRAM(s) Oral daily  dextrose 5% + lactated ringers with potassium chloride 20 mEq/L 1000 milliLiter(s) IV Continuous <Continuous>  levothyroxine 125 MICROGram(s) Oral daily  metoprolol tartrate 25 milliGRAM(s) Oral two times a day  midodrine. 5 milliGRAM(s) Oral three times a day  mirtazapine 7.5 milliGRAM(s) Oral at bedtime  multivitamin 1 Tablet(s) Oral daily  ondansetron Injectable 4 milliGRAM(s) IV Push every 8 hours PRN  sodium bicarbonate 650 milliGRAM(s) Oral two times a day  tamsulosin 0.4 milliGRAM(s) Oral at bedtime                            13.5   9.82  )-----------( 171      ( 28 Jul 2024 06:05 )             40.9       Hemoglobin: 13.5 g/dL (07-28 @ 06:05)  Hemoglobin: 14.8 g/dL (07-27 @ 06:52)  Hemoglobin: 13.7 g/dL (07-26 @ 10:26)  Hemoglobin: 13.8 g/dL (07-25 @ 06:34)  Hemoglobin: 13.7 g/dL (07-24 @ 10:12)      07-28    137  |  105  |  21  ----------------------------<  99  4.2   |  20<L>  |  1.22    Ca    9.1      28 Jul 2024 06:05  Mg     1.7     07-26      Creatinine Trend: 1.22<--, 1.27<--, 1.33<--, 1.51<--, 1.64<--, 1.59<--    COAGS:           T(C): 36.4 (07-28-24 @ 05:35), Max: 36.4 (07-27-24 @ 12:23)  HR: 83 (07-28-24 @ 05:35) (60 - 112)  BP: 116/81 (07-28-24 @ 05:35) (114/72 - 131/84)  RR: 18 (07-28-24 @ 05:35) (18 - 18)  SpO2: 97% (07-28-24 @ 05:35) (97% - 99%)  Wt(kg): --    I&O's Summary    27 Jul 2024 07:01  -  28 Jul 2024 07:00  --------------------------------------------------------  IN: 1082 mL / OUT: 990 mL / NET: 92 mL      Appearance: elderly woman in no acute distress	  HEENT:   Normal oral mucosa, PERRL, EOMI	  Lymphatic: No lymphadenopathy , no edema  Cardiovascular: irregular S1 S2, No JVD, No murmurs , Peripheral pulses palpable 2+ bilaterally  Respiratory: Lungs clear to auscultation, normal effort 	  Gastrointestinal:  Soft, Non-tender, + BS	  Skin: No rashes, No ecchymoses, No cyanosis, warm to touch  Musculoskeletal: Normal range of motion, normal strength  Psychiatry:  Mood is "I feel OK" & affect appropriate.  Memory is impaired, oriented to name only at this time.    TELEMETRY: AFib, rate-controlled.  Previously showing SR and AF with very slow heartrates, albeit after getting high dose of oral metoprolol 	    	  ASSESSMENT/PLAN: Ms Gooden is a pleasant 78y Female here w/ altered mental status. Being treated for encephalopathy related to sepsis (UTI) and urinary retention. EP called re: abnormal EKG.    Can continue metoprolol (25-50mg BID) for AFib rate control. Hold Digoxin in the setting of acute kidney injury.  Goal HR<100bpm at rest.  Continue apixaban 5mg BID for stroke prevention.  Continue telemetry - no unprovoked severe bradycardia or long pauses >5sec in AFib , and not acutely symptomatic during short pauses.  At this time does not require permanent pacemaker insertion.  Continue UTI treatment w/ antibiotics, and management of urinary retention to relieve Vagal effect on her heartrate.  ILR data mgmt per Dr Mendiola.

## 2024-07-28 NOTE — PROGRESS NOTE ADULT - SUBJECTIVE AND OBJECTIVE BOX
Chief complaint  Patient is a 78y old  Female who presents with a chief complaint of AMS (28 Jul 2024 10:59)         Labs and Fingersticks  CAPILLARY BLOOD GLUCOSE      POCT Blood Glucose.: 95 mg/dL (28 Jul 2024 00:28)      Anion Gap: 12 (07-28 @ 06:05)  Anion Gap: 17 (07-27 @ 06:52)      Calcium: 9.1 (07-28 @ 06:05)  Calcium: 9.7 (07-27 @ 06:52)          07-28    137  |  105  |  21  ----------------------------<  99  4.2   |  20<L>  |  1.22    Ca    9.1      28 Jul 2024 06:05                          13.5   9.82  )-----------( 171      ( 28 Jul 2024 06:05 )             40.9     Medications  MEDICATIONS  (STANDING):  apixaban 5 milliGRAM(s) Oral every 12 hours  ascorbic acid 500 milliGRAM(s) Oral daily  atorvastatin 80 milliGRAM(s) Oral at bedtime  atropine Injectable 1 milliGRAM(s) IV Push once  ciprofloxacin     Tablet 250 milliGRAM(s) Oral every 12 hours  clopidogrel Tablet 75 milliGRAM(s) Oral daily  dextrose 5% + lactated ringers with potassium chloride 20 mEq/L 1000 milliLiter(s) (50 mL/Hr) IV Continuous <Continuous>  levothyroxine 125 MICROGram(s) Oral daily  metoprolol tartrate 25 milliGRAM(s) Oral two times a day  midodrine. 5 milliGRAM(s) Oral three times a day  mirtazapine 7.5 milliGRAM(s) Oral at bedtime  multivitamin 1 Tablet(s) Oral daily  sodium bicarbonate 650 milliGRAM(s) Oral two times a day  tamsulosin 0.4 milliGRAM(s) Oral at bedtime      Physical Exam  General: Patient comfortable in bed   Vital Signs Last 12 Hrs  T(F): 97.3 (07-28-24 @ 13:30), Max: 97.6 (07-28-24 @ 05:35)  HR: 81 (07-28-24 @ 13:30) (81 - 83)  BP: 99/65 (07-28-24 @ 13:30) (99/65 - 116/81)  BP(mean): --  RR: 18 (07-28-24 @ 13:30) (18 - 18)  SpO2: 99% (07-28-24 @ 13:30) (97% - 99%)    CVS: S1S2   Respiratory: No wheezing, no crepitations  GI: Abdomen soft, bowel sounds positive  Musculoskeletal:  moves all extremities  : Voiding

## 2024-07-28 NOTE — PROGRESS NOTE ADULT - ASSESSMENT
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   likely toxic metabolic in setting of uti and CHANELL   amaya for urinary retention : failed TOV      History of UTI.   enterococouos : on cipro PO x 5 days     Sepsis with acute renal failure / metabolic acidosis   improving renal fx   renal following   sod bicarb added     History of chronic atrial fibrillation.   rate controlled   EP consulted : following  fu with cardio   - c/w Eliquis.     History of CAD (coronary artery disease).   ·  Plan: trop elevated iso of CHANELL   -c/w plavix  -c/w atorvastatin.  - fu with cardio      H/O hypotension.   ·  Plan: -c/w midodrine.    History of COPD.   - minimal  hypercapnia , no wheezing   - can use albuterol PRN for wheezing.     Hypothyroidism.   ·  Plan: -levothyroxine.    Dementia.   ·  Plan: -mirtazapine Hs.       History of creation of ostomy.   ·  Plan; - ostomy care.    dispo: d/c planning on cipro to be finished for 5 days total

## 2024-07-28 NOTE — PROVIDER CONTACT NOTE (OTHER) - NAME OF MD/NP/PA/DO NOTIFIED:
FILI Rojas
GRISELDA Garrison
SHELDON Benitez
SHELDON Benitez
Erlinda Noble
GRISELDA Garrison
GRISELDA Garrison
FILI Rojas
Ni-Laura Hickman, ACP
FILI Rojas
GRISELDA Benitez
GRISELDA Garrison
GRISELDA Leonard

## 2024-07-29 ENCOUNTER — TRANSCRIPTION ENCOUNTER (OUTPATIENT)
Age: 79
End: 2024-07-29

## 2024-07-29 VITALS
SYSTOLIC BLOOD PRESSURE: 115 MMHG | HEART RATE: 86 BPM | DIASTOLIC BLOOD PRESSURE: 74 MMHG | OXYGEN SATURATION: 96 % | RESPIRATION RATE: 18 BRPM | TEMPERATURE: 98 F

## 2024-07-29 LAB
ANION GAP SERPL CALC-SCNC: 10 MMOL/L — SIGNIFICANT CHANGE UP (ref 5–17)
BUN SERPL-MCNC: 19 MG/DL — SIGNIFICANT CHANGE UP (ref 7–23)
CALCIUM SERPL-MCNC: 9.2 MG/DL — SIGNIFICANT CHANGE UP (ref 8.4–10.5)
CHLORIDE SERPL-SCNC: 108 MMOL/L — SIGNIFICANT CHANGE UP (ref 96–108)
CO2 SERPL-SCNC: 18 MMOL/L — LOW (ref 22–31)
CREAT SERPL-MCNC: 1.25 MG/DL — SIGNIFICANT CHANGE UP (ref 0.5–1.3)
EGFR: 44 ML/MIN/1.73M2 — LOW
GLUCOSE BLDC GLUCOMTR-MCNC: 67 MG/DL — LOW (ref 70–99)
GLUCOSE BLDC GLUCOMTR-MCNC: 84 MG/DL — SIGNIFICANT CHANGE UP (ref 70–99)
GLUCOSE BLDC GLUCOMTR-MCNC: 87 MG/DL — SIGNIFICANT CHANGE UP (ref 70–99)
GLUCOSE BLDC GLUCOMTR-MCNC: 94 MG/DL — SIGNIFICANT CHANGE UP (ref 70–99)
GLUCOSE SERPL-MCNC: 88 MG/DL — SIGNIFICANT CHANGE UP (ref 70–99)
HCT VFR BLD CALC: 40.4 % — SIGNIFICANT CHANGE UP (ref 34.5–45)
HGB BLD-MCNC: 13.1 G/DL — SIGNIFICANT CHANGE UP (ref 11.5–15.5)
MCHC RBC-ENTMCNC: 31 PG — SIGNIFICANT CHANGE UP (ref 27–34)
MCHC RBC-ENTMCNC: 32.4 GM/DL — SIGNIFICANT CHANGE UP (ref 32–36)
MCV RBC AUTO: 95.7 FL — SIGNIFICANT CHANGE UP (ref 80–100)
NRBC # BLD: 0 /100 WBCS — SIGNIFICANT CHANGE UP (ref 0–0)
PLATELET # BLD AUTO: 133 K/UL — LOW (ref 150–400)
POTASSIUM SERPL-MCNC: 4.8 MMOL/L — SIGNIFICANT CHANGE UP (ref 3.5–5.3)
POTASSIUM SERPL-SCNC: 4.8 MMOL/L — SIGNIFICANT CHANGE UP (ref 3.5–5.3)
RBC # BLD: 4.22 M/UL — SIGNIFICANT CHANGE UP (ref 3.8–5.2)
RBC # FLD: 16 % — HIGH (ref 10.3–14.5)
SODIUM SERPL-SCNC: 136 MMOL/L — SIGNIFICANT CHANGE UP (ref 135–145)
WBC # BLD: 8.58 K/UL — SIGNIFICANT CHANGE UP (ref 3.8–10.5)
WBC # FLD AUTO: 8.58 K/UL — SIGNIFICANT CHANGE UP (ref 3.8–10.5)

## 2024-07-29 PROCEDURE — 72125 CT NECK SPINE W/O DYE: CPT | Mod: MC

## 2024-07-29 PROCEDURE — 85025 COMPLETE CBC W/AUTO DIFF WBC: CPT

## 2024-07-29 PROCEDURE — 80048 BASIC METABOLIC PNL TOTAL CA: CPT

## 2024-07-29 PROCEDURE — 87086 URINE CULTURE/COLONY COUNT: CPT

## 2024-07-29 PROCEDURE — 87077 CULTURE AEROBIC IDENTIFY: CPT

## 2024-07-29 PROCEDURE — 97161 PT EVAL LOW COMPLEX 20 MIN: CPT

## 2024-07-29 PROCEDURE — 96374 THER/PROPH/DIAG INJ IV PUSH: CPT

## 2024-07-29 PROCEDURE — 87040 BLOOD CULTURE FOR BACTERIA: CPT

## 2024-07-29 PROCEDURE — 82330 ASSAY OF CALCIUM: CPT

## 2024-07-29 PROCEDURE — 82962 GLUCOSE BLOOD TEST: CPT

## 2024-07-29 PROCEDURE — 93005 ELECTROCARDIOGRAM TRACING: CPT

## 2024-07-29 PROCEDURE — 71250 CT THORAX DX C-: CPT | Mod: MC

## 2024-07-29 PROCEDURE — 84484 ASSAY OF TROPONIN QUANT: CPT

## 2024-07-29 PROCEDURE — 84146 ASSAY OF PROLACTIN: CPT

## 2024-07-29 PROCEDURE — 85018 HEMOGLOBIN: CPT

## 2024-07-29 PROCEDURE — 70450 CT HEAD/BRAIN W/O DYE: CPT | Mod: MC

## 2024-07-29 PROCEDURE — 84439 ASSAY OF FREE THYROXINE: CPT

## 2024-07-29 PROCEDURE — 82550 ASSAY OF CK (CPK): CPT

## 2024-07-29 PROCEDURE — 82533 TOTAL CORTISOL: CPT

## 2024-07-29 PROCEDURE — 83735 ASSAY OF MAGNESIUM: CPT

## 2024-07-29 PROCEDURE — 82803 BLOOD GASES ANY COMBINATION: CPT

## 2024-07-29 PROCEDURE — 83880 ASSAY OF NATRIURETIC PEPTIDE: CPT

## 2024-07-29 PROCEDURE — 85610 PROTHROMBIN TIME: CPT

## 2024-07-29 PROCEDURE — 86376 MICROSOMAL ANTIBODY EACH: CPT

## 2024-07-29 PROCEDURE — 82570 ASSAY OF URINE CREATININE: CPT

## 2024-07-29 PROCEDURE — 87186 SC STD MICRODIL/AGAR DIL: CPT

## 2024-07-29 PROCEDURE — 84132 ASSAY OF SERUM POTASSIUM: CPT

## 2024-07-29 PROCEDURE — 80162 ASSAY OF DIGOXIN TOTAL: CPT

## 2024-07-29 PROCEDURE — 82435 ASSAY OF BLOOD CHLORIDE: CPT

## 2024-07-29 PROCEDURE — 74176 CT ABD & PELVIS W/O CONTRAST: CPT | Mod: MC

## 2024-07-29 PROCEDURE — 36415 COLL VENOUS BLD VENIPUNCTURE: CPT

## 2024-07-29 PROCEDURE — 71045 X-RAY EXAM CHEST 1 VIEW: CPT

## 2024-07-29 PROCEDURE — 84100 ASSAY OF PHOSPHORUS: CPT

## 2024-07-29 PROCEDURE — 85730 THROMBOPLASTIN TIME PARTIAL: CPT

## 2024-07-29 PROCEDURE — 84443 ASSAY THYROID STIM HORMONE: CPT

## 2024-07-29 PROCEDURE — 85014 HEMATOCRIT: CPT

## 2024-07-29 PROCEDURE — 82947 ASSAY GLUCOSE BLOOD QUANT: CPT

## 2024-07-29 PROCEDURE — 83690 ASSAY OF LIPASE: CPT

## 2024-07-29 PROCEDURE — 81001 URINALYSIS AUTO W/SCOPE: CPT

## 2024-07-29 PROCEDURE — 85027 COMPLETE CBC AUTOMATED: CPT

## 2024-07-29 PROCEDURE — 80053 COMPREHEN METABOLIC PANEL: CPT

## 2024-07-29 PROCEDURE — 84300 ASSAY OF URINE SODIUM: CPT

## 2024-07-29 PROCEDURE — 99285 EMERGENCY DEPT VISIT HI MDM: CPT | Mod: 25

## 2024-07-29 PROCEDURE — 83605 ASSAY OF LACTIC ACID: CPT

## 2024-07-29 PROCEDURE — 84445 ASSAY OF TSI GLOBULIN: CPT

## 2024-07-29 PROCEDURE — 82024 ASSAY OF ACTH: CPT

## 2024-07-29 PROCEDURE — 84295 ASSAY OF SERUM SODIUM: CPT

## 2024-07-29 RX ORDER — LEVOTHYROXINE SODIUM 175 MCG
1 TABLET ORAL
Qty: 0 | Refills: 0 | DISCHARGE
Start: 2024-07-29

## 2024-07-29 RX ORDER — NYSTATIN 100000 [USP'U]/G
1 POWDER TOPICAL
Refills: 0 | DISCHARGE

## 2024-07-29 RX ORDER — TAMSULOSIN HCL 0.4 MG
1 CAPSULE ORAL
Qty: 0 | Refills: 0 | DISCHARGE
Start: 2024-07-29

## 2024-07-29 RX ORDER — TRIAMCINOLONE ACETONIDE/L.S.B. 0.1 %
1 CREAM (GRAM) TOPICAL
Refills: 0 | DISCHARGE

## 2024-07-29 RX ORDER — SODIUM BICARBONATE 0.9MEQ/ML
650 SYRINGE (ML) INTRAVENOUS THREE TIMES A DAY
Refills: 0 | Status: DISCONTINUED | OUTPATIENT
Start: 2024-07-29 | End: 2024-07-29

## 2024-07-29 RX ORDER — SODIUM BICARBONATE 0.9MEQ/ML
1 SYRINGE (ML) INTRAVENOUS
Qty: 0 | Refills: 0 | DISCHARGE
Start: 2024-07-29

## 2024-07-29 RX ORDER — ONDANSETRON HCL/PF 4 MG/2 ML
1 VIAL (ML) INJECTION
Refills: 0 | DISCHARGE

## 2024-07-29 RX ORDER — CIPROFLOXACIN 500 MG/1
1 TABLET, FILM COATED ORAL
Qty: 0 | Refills: 0 | DISCHARGE
Start: 2024-07-29

## 2024-07-29 RX ADMIN — MIDODRINE HYDROCHLORIDE 5 MILLIGRAM(S): 2.5 TABLET ORAL at 05:00

## 2024-07-29 RX ADMIN — CLOPIDOGREL BISULFATE 75 MILLIGRAM(S): 75 TABLET, FILM COATED ORAL at 11:11

## 2024-07-29 RX ADMIN — CIPROFLOXACIN 250 MILLIGRAM(S): 500 TABLET, FILM COATED ORAL at 05:01

## 2024-07-29 RX ADMIN — SODIUM CHLORIDE AND POTASSIUM CHLORIDE 50 MILLILITER(S): 150; 450 INJECTION, SOLUTION INTRAVENOUS at 03:43

## 2024-07-29 RX ADMIN — Medication 500 MILLIGRAM(S): at 11:11

## 2024-07-29 RX ADMIN — MIDODRINE HYDROCHLORIDE 5 MILLIGRAM(S): 2.5 TABLET ORAL at 11:11

## 2024-07-29 RX ADMIN — Medication 650 MILLIGRAM(S): at 13:20

## 2024-07-29 RX ADMIN — Medication 25 MILLIGRAM(S): at 05:00

## 2024-07-29 RX ADMIN — Medication 1 TABLET(S): at 11:12

## 2024-07-29 RX ADMIN — APIXABAN 5 MILLIGRAM(S): 5 TABLET, FILM COATED ORAL at 05:01

## 2024-07-29 RX ADMIN — Medication 650 MILLIGRAM(S): at 05:00

## 2024-07-29 RX ADMIN — Medication 125 MICROGRAM(S): at 05:00

## 2024-07-29 NOTE — PROGRESS NOTE ADULT - SUBJECTIVE AND OBJECTIVE BOX
Chief complaint  Patient is a 78y old  Female who presents with a chief complaint of AMS (29 Jul 2024 12:23)         Labs and Fingersticks  CAPILLARY BLOOD GLUCOSE      POCT Blood Glucose.: 94 mg/dL (29 Jul 2024 12:05)  POCT Blood Glucose.: 84 mg/dL (29 Jul 2024 05:28)  POCT Blood Glucose.: 87 mg/dL (29 Jul 2024 00:26)  POCT Blood Glucose.: 67 mg/dL (29 Jul 2024 00:25)  POCT Blood Glucose.: 93 mg/dL (28 Jul 2024 17:18)      Anion Gap: 10 (07-29 @ 05:48)  Anion Gap: 12 (07-28 @ 06:05)      Calcium: 9.2 (07-29 @ 05:48)  Calcium: 9.1 (07-28 @ 06:05)          07-29    136  |  108  |  19  ----------------------------<  88  4.8   |  18<L>  |  1.25    Ca    9.2      29 Jul 2024 05:48                          13.1   8.58  )-----------( 133      ( 29 Jul 2024 05:46 )             40.4     Medications  MEDICATIONS  (STANDING):  apixaban 5 milliGRAM(s) Oral every 12 hours  ascorbic acid 500 milliGRAM(s) Oral daily  atorvastatin 80 milliGRAM(s) Oral at bedtime  atropine Injectable 1 milliGRAM(s) IV Push once  ciprofloxacin     Tablet 250 milliGRAM(s) Oral every 12 hours  clopidogrel Tablet 75 milliGRAM(s) Oral daily  dextrose 5% + lactated ringers with potassium chloride 20 mEq/L 1000 milliLiter(s) (50 mL/Hr) IV Continuous <Continuous>  levothyroxine 125 MICROGram(s) Oral daily  metoprolol tartrate 25 milliGRAM(s) Oral two times a day  midodrine. 5 milliGRAM(s) Oral three times a day  mirtazapine 7.5 milliGRAM(s) Oral at bedtime  multivitamin 1 Tablet(s) Oral daily  sodium bicarbonate 650 milliGRAM(s) Oral three times a day  tamsulosin 0.4 milliGRAM(s) Oral at bedtime      Physical Exam  General: Patient comfortable in bed   Vital Signs Last 12 Hrs  T(F): 98 (07-29-24 @ 12:36), Max: 98 (07-29-24 @ 12:36)  HR: 86 (07-29-24 @ 12:36) (86 - 98)  BP: 115/74 (07-29-24 @ 12:36) (100/65 - 115/74)  BP(mean): --  RR: 18 (07-29-24 @ 12:36) (18 - 18)  SpO2: 96% (07-29-24 @ 12:36) (95% - 96%)    CVS: S1S2   Respiratory: No wheezing, no crepitations  GI: Abdomen soft, bowel sounds positive  Musculoskeletal:  moves all extremities  : Voiding

## 2024-07-29 NOTE — PROGRESS NOTE ADULT - PROBLEM SELECTOR PLAN 1
now improved   Cont  metoprolol 25 bid   no indication for PPM for now   Monitor on Tele   Hyperthyroid  dced  synthroid for now   replete electrolytes
Keep off  all AV felicia blockers   Monitor on Tele   Hyperthyroid, would check T4  dc synthroid for now   replete electrolytes
now improved   restart metoprolol 25 bid   Monitor on Tele   Hyperthyroid  dced  synthroid for now   replete electrolytes
Suggest to continue medications, monitoring, FU primary team recommendations.
now improved   Cont  metoprolol 25 bid   no indication for PPM for now   Monitor on Tele   Hyperthyroid  dced  synthroid for now   replete electrolytes
Suggest to continue medications, monitoring, FU primary team recommendations.
Suggest to continue medications, monitoring, FU primary team recommendations.
now improved   Cont  metoprolol 25 bid   no indication for PPM for now   Monitor on Tele   Hyperthyroid  dced  synthroid for now   replete electrolytes
Suggest to continue medications, monitoring, FU primary team recommendations.
Suggest to continue medications, monitoring, FU primary team recommendations.

## 2024-07-29 NOTE — PROGRESS NOTE ADULT - ASSESSMENT
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 Per daughter, patient has been less interactive and nonverbal over the past few days, sleeping more and note taking meds or eating. Patient had episode of vomiting, no fevers noted. Patient had an unwitnessed fall about one week prior to admission with trauma to her buttocks, but remained at baseline mental state at the time.     Sepsis due to suspected UTI with hematuria  AMS, leukocytosis with urinary retention, CHANELL  UA with pyuria but noted poor/contaminated specimen with 21 sq epi cells  Ucx with >100k cfu/ml E.faecalis  -- sensitivities noted   CT C/A/P with L adrenal mass unchanged, bladder with small amt of air, no hydronephrosis or stones, no consolidation   Bcx negative   7/24 continues with urinary retention, straight cath the night before, bladder scan this afternoon with 349 ml, now s/p amaya  sepsis resolved -- remains afebrile, mental status improved, WBC wnl    s/p ceftriaxone 7/21-7/24  s/p vancomycin 7/24-7/25     Recommendations:   Continue ciprofloxacin 250mg PO BID (renally adjusted; qtc ok) to complete total 5d course tomorrow 7/30  Nephrology following   Aspiration precautions   Continue rest of care per primary team   Stable from ID standpoint at this time.   Discharge planning per primary team.       Marisa Davidson M.D.  [facility], Division of Infectious Diseases  [phone]  After 5pm on weekdays and all day on weekends - please call [phone]  Available on [employer] TEAMS

## 2024-07-29 NOTE — PROGRESS NOTE ADULT - PROBLEM SELECTOR PROBLEM 2
Hypothyroidism
Other encephalopathy
Other encephalopathy
Hypothyroidism
Hypothyroidism
Other encephalopathy

## 2024-07-29 NOTE — PROGRESS NOTE ADULT - ASSESSMENT
Assessment  Hyperthyroidism: 78y Female with hx hypothyroid disease, on home synthroid 175 mcg , in subclinical hyperthyroid state, FT4 higher normal 1.5 range. dose adjusted down.  had hypotension, has encephalopathy .  Hypotension: Cortisol within normal range, on medications, monitored.  CHANELL:  Labs, chart reviewed.      Discussed plan and management with Dr Marli Zelaya NP - TEAMS  Eric Calles MD  Cell: [phone]  Office: [phone]

## 2024-07-29 NOTE — PROGRESS NOTE ADULT - SUBJECTIVE AND OBJECTIVE BOX
[facility] NEPHROLOGY- PROGRESS NOTE    78y Female with history of dementia, ischemic bowel s/p resection with end ostomy presents with AMS. Nephrology consulted for elevated Scr.    REVIEW OF SYSTEMS:  Gen: no fevers  Cards: no chest pain  Resp: no dyspnea  GI: no nausea or vomiting or diarrhea, + decreased PO intake  Vascular: no LE edema    [facility] (Hives)      Hospital Medications: Medications reviewed        VITALS:  T(F): 97.4 (07-29-24 @ 05:04), Max: 97.7 (07-28-24 @ 21:07)  HR: 98 (07-29-24 @ 05:04)  BP: 100/65 (07-29-24 @ 05:04)  RR: 18 (07-29-24 @ 05:04)  SpO2: 95% (07-29-24 @ 05:04)  Wt(kg): --    07-28 @ 07:01  -  07-29 @ 07:00  --------------------------------------------------------  IN: 840 mL / OUT: 700 mL / NET: 140 mL        PHYSICAL EXAM:    Gen: NAD, calm  Cards: Irregularly irregular, +S1/S2, no M/G/R  Resp: CTA B/L  GI: soft, NT/ND, NABS, + ostomy with liquidy output  : + amaya  Vascular: no LE edema B/L        LABS:  07-29    136  |  108  |  19  ----------------------------<  88  4.8   |  18<L>  |  1.25    Ca    9.2      29 Jul 2024 05:48      Creatinine Trend: 1.25 <--, 1.22 <--, 1.27 <--, 1.33 <--, 1.51 <--, 1.64 <--, 1.59 <--                        13.1   8.58  )-----------( 133      ( 29 Jul 2024 05:46 )             40.4     Urine Studies:  Urinalysis Basic - ( 29 Jul 2024 05:48 )    Color:  / Appearance:  / SG:  / pH:   Gluc: 88 mg/dL / Ketone:   / Bili:  / Urobili:    Blood:  / Protein:  / Nitrite:    Leuk Esterase:  / RBC:  / WBC    Sq Epi:  / Non Sq Epi:  / Bacteria:       Sodium, Random Urine: 7 mmol/L (07-25 @ 16:06)  Creatinine, Random Urine: 177 mg/dL (07-25 @ 16:06)

## 2024-07-29 NOTE — PROGRESS NOTE ADULT - PROBLEM SELECTOR PLAN 3
Suggest to continue medications, monitoring, FU primary team recommendations.
stable   Plavix.
stable   Plavix.
Suggest to continue medications, monitoring, FU primary team recommendations.
stable   Plavix.
Suggest to continue medications, monitoring, FU primary team recommendations.
stable   Plavix.
Suggest to continue medications, monitoring, FU primary team recommendations.
Suggest to continue medications, monitoring, FU primary team recommendations.
stable   Plavix.

## 2024-07-29 NOTE — PROGRESS NOTE ADULT - PROVIDER SPECIALTY LIST ADULT
Cardiology
Electrophysiology
Internal Medicine
Electrophysiology
Infectious Disease
Internal Medicine
Internal Medicine
Nephrology
Cardiology
Endocrinology
Infectious Disease
Infectious Disease
Nephrology
Internal Medicine
Nephrology
Nephrology
Cardiology
Cardiology
Endocrinology
Cardiology
Cardiology
Endocrinology
Cardiology

## 2024-07-29 NOTE — PROGRESS NOTE ADULT - ASSESSMENT
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 Nephrology consulted for elevated Scr.    1) CHANELL: likely due to hypovolemia versus ATN given granular and hyaline casts on UA. CHANELL resolved on IVF. Continue with IVF given poor PO intake and ostomy output. FeNa low. CT without obstruction but patient with urinary retention s/p amaya placement (failed TOV). Continue with flomax. Avoid nephrotoxins. Monitor electrolytes.    2) Hypotension: BP low normal. Continue with midodrine 5 mg PO TID. Monitor BP.    3) Metabolic acidosis: Increase sodium bicarbonate to 650 mg TID. Monitor pH.    4) Acute cystitis with hematuria: As per [facility] [place] NEPHROLOGY  Paulie Storm M.D.  Mack Clancy D.O.  Maddi Steve M.D.  MD Olivia Caldear, MSN, ANP-C    Telephone: [phone]  Facsimile: [phone] [place] [address], [address]  [place] [id number]

## 2024-07-29 NOTE — PROGRESS NOTE ADULT - SUBJECTIVE AND OBJECTIVE BOX
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   Per daughter, patient has been less interactive and nonverbal over the past few days. Patient had an unwitnessed fall about one week prior to admission with trauma to her buttocks, but remained at baseline mental state at the time.  Over the past two days she is more sleepy, not eating or taking medications . She had no reported fevers. She had one episode of vomiting.    (21 Jul 2024 20:05)    EP called re: abnormal EKG.  Telemetry with sinus bradycardia (briefly 20s-30s), with pauses of ~3.5-4sec duration.  Not apparently symptomatic as patient asleep/resting in bed.   In the ED, was inadverdently given 125mg of Metoprolol instead of 25mg.  Bradycardia resolving by next hospital day.  Being treated for urinary retention and UTI.  Has been straight-cath'd to relieve urinary retention.  On 7/23, patient awake, pleasantly confused, asking about our weekend plans.  States no angina, palpitations or lightheadedness, but continues to reference "[place] want [place]".  A 10 pt ROS is otherwise negative, limited by history of dementia.  On recent admission, a loop recorder was placed for unexplained syncope w/ resulting head trauma.  She has persistent AFib. Anticoagulated w/ Apixaban 5mg BID.  On rate control Rx w/ metoprolol and digoxin.  ILR surveillance for AV block during AFib.    Date of service 7/29 no events on tele overnight, pt in bed this morning, no acute distress       PAST MEDICAL & SURGICAL HISTORY:  Hypertension  Hypothyroid  Osteoarthritis  knees, back  CAD (coronary artery disease)  CROW (obstructive sleep apnea)  non complaint on CPAP  History of MI (myocardial infarction)  h/o previous MI in 2004 prompted PTCA  with stenting x 2 vessels   last stress/ echo 2019  Heart murmur  dx in childhood  Bilateral hearing loss, unspecified hearing loss type  bilateral aids  Obesity  Mixed stress and urge urinary incontinence  Overactive bladder  S/P ORIF (open reduction internal fixation) fracture  left hip 1962  S/P appendectomy  30 plus years  S/P knee replacement  left 2000  Stented coronary artery  2004 X 2 STENTS  S/P laparotomy  due to adhesions, 30 years ago  H/O dilation and curettage  2/2019 Benign polyp       acetaminophen     Tablet .. 650 milliGRAM(s) Oral every 6 hours PRN  apixaban 5 milliGRAM(s) Oral every 12 hours  ascorbic acid 500 milliGRAM(s) Oral daily  atorvastatin 80 milliGRAM(s) Oral at bedtime  atropine Injectable 1 milliGRAM(s) IV Push once  ciprofloxacin     Tablet 250 milliGRAM(s) Oral every 12 hours  clopidogrel Tablet 75 milliGRAM(s) Oral daily  dextrose 5% + lactated ringers with potassium chloride 20 mEq/L 1000 milliLiter(s) IV Continuous <Continuous>  levothyroxine 125 MICROGram(s) Oral daily  metoprolol tartrate 25 milliGRAM(s) Oral two times a day  midodrine. 5 milliGRAM(s) Oral three times a day  mirtazapine 7.5 milliGRAM(s) Oral at bedtime  multivitamin 1 Tablet(s) Oral daily  ondansetron Injectable 4 milliGRAM(s) IV Push every 8 hours PRN  sodium bicarbonate 650 milliGRAM(s) Oral three times a day  tamsulosin 0.4 milliGRAM(s) Oral at bedtime                            13.1   8.58  )-----------( 133      ( 29 Jul 2024 05:46 )             40.4       07-29    136  |  108  |  19  ----------------------------<  88  4.8   |  18<L>  |  1.25    Ca    9.2      29 Jul 2024 05:48      T(C): 36.3 (07-29-24 @ 05:04), Max: 36.5 (07-28-24 @ 21:07)  HR: 98 (07-29-24 @ 05:04) (71 - 98)  BP: 100/65 (07-29-24 @ 05:04) (99/65 - 109/58)  RR: 18 (07-29-24 @ 05:04) (18 - 18)  SpO2: 95% (07-29-24 @ 05:04) (94% - 99%)  Wt(kg): --    I&O's Summary    28 Jul 2024 07:01  -  29 Jul 2024 07:00  --------------------------------------------------------  IN: 840 mL / OUT: 700 mL / NET: 140 mL      Appearance: elderly woman in no acute distress	  HEENT:   Normal oral mucosa, PERRL, EOMI	  Lymphatic: No lymphadenopathy , no edema  Cardiovascular: irregular S1 S2, No JVD, No murmurs , Peripheral pulses palpable 2+ bilaterally  Respiratory: Lungs clear to auscultation, normal effort 	  Gastrointestinal:  Soft, Non-tender, + BS	  Skin: No rashes, No ecchymoses, No cyanosis, warm to touch  Musculoskeletal: Normal range of motion, normal strength  Psychiatry:  Mood is "I feel OK" & affect appropriate.  Memory is impaired, oriented to name only at this time.    TELEMETRY: AFib, rate-controlled.  Non-actionable short pauses during AFib.	    	  ASSESSMENT/PLAN: Ms Gooden is a pleasant 78y Female here w/ altered mental status. Being treated for encephalopathy related to sepsis (UTI) and urinary retention. EP called re: abnormal EKG.    Can continue metoprolol (25-50mg BID) for AFib rate control. Hold Digoxin in the setting of acute kidney injury.  Goal HR<100bpm at rest.  Continue apixaban 5mg BID for stroke prevention.  Continue telemetry - no unprovoked severe bradycardia or long pauses >5sec in AFib , and not acutely symptomatic during short pauses.  At this time does not require permanent pacemaker insertion.  Continue UTI treatment w/ antibiotics, and management of urinary retention to relieve Vagal effect on her heartrate.  ILR data mgmt per Dr Mendiola.     Shane Frias M.D.  Cardiac Electrophysiology  [phone]

## 2024-07-29 NOTE — DISCHARGE NOTE NURSING/CASE MANAGEMENT/SOCIAL WORK - NSDCVIVACCINE_GEN_ALL_CORE_FT
Tdap; 14-Jun-2015 22:07; Rosana Loaiza (RN); [employer]; [phone]; IntraMuscular; Deltoid Left.; 0.5 milliLiter(s); VIS (VIS Published: 09-May-2013, VIS Presented: 14-Jun-2015);

## 2024-07-29 NOTE — PROGRESS NOTE ADULT - SUBJECTIVE AND OBJECTIVE BOX
Subjective: Patient seen and examined. No new events except as noted.     REVIEW OF SYSTEMS:    CONSTITUTIONAL: +weakness, fevers or chills  EYES/ENT: No visual changes;  No vertigo or throat pain   NECK: No pain or stiffness  RESPIRATORY: No cough, wheezing, hemoptysis; No shortness of breath  CARDIOVASCULAR: No chest pain or palpitations  GASTROINTESTINAL: No abdominal or epigastric pain. No nausea, vomiting, or hematemesis; No diarrhea or constipation. No melena or hematochezia.  GENITOURINARY: No dysuria, frequency or hematuria  NEUROLOGICAL: No numbness or weakness  SKIN: No itching, burning, rashes, or lesions   All other review of systems is negative unless indicated above.    MEDICATIONS:  MEDICATIONS  (STANDING):  apixaban 5 milliGRAM(s) Oral every 12 hours  ascorbic acid 500 milliGRAM(s) Oral daily  atorvastatin 80 milliGRAM(s) Oral at bedtime  atropine Injectable 1 milliGRAM(s) IV Push once  ciprofloxacin     Tablet 250 milliGRAM(s) Oral every 12 hours  clopidogrel Tablet 75 milliGRAM(s) Oral daily  dextrose 5% + lactated ringers with potassium chloride 20 mEq/L 1000 milliLiter(s) (50 mL/Hr) IV Continuous <Continuous>  levothyroxine 125 MICROGram(s) Oral daily  metoprolol tartrate 25 milliGRAM(s) Oral two times a day  midodrine. 5 milliGRAM(s) Oral three times a day  mirtazapine 7.5 milliGRAM(s) Oral at bedtime  multivitamin 1 Tablet(s) Oral daily  sodium bicarbonate 650 milliGRAM(s) Oral three times a day  tamsulosin 0.4 milliGRAM(s) Oral at bedtime      PHYSICAL EXAM:  T(C): 36.3 (07-29-24 @ 05:04), Max: 36.5 (07-28-24 @ 21:07)  HR: 98 (07-29-24 @ 05:04) (71 - 98)  BP: 100/65 (07-29-24 @ 05:04) (99/65 - 109/58)  RR: 18 (07-29-24 @ 05:04) (18 - 18)  SpO2: 95% (07-29-24 @ 05:04) (94% - 99%)  Wt(kg): --  I&O's Summary    28 Jul 2024 07:01  -  29 Jul 2024 07:00  --------------------------------------------------------  IN: 840 mL / OUT: 700 mL / NET: 140 mL          Appearance: Normal	  HEENT:   Normal oral mucosa, PERRL, EOMI	  Lymphatic: No lymphadenopathy , no edema  Cardiovascular: Irregular  S1 S2, No JVD, No murmurs , Peripheral pulses palpable 2+ bilaterally  Respiratory: Lungs clear to auscultation, normal effort 	  Gastrointestinal:  Soft, Non-tender, + BS	  Skin: No rashes, No ecchymoses, No cyanosis, warm to touch  Musculoskeletal: Normal range of motion, normal strength  Psychiatry:  Mood & affect appropriate  Ext: No edema        LABS:    CARDIAC MARKERS:                                13.1   8.58  )-----------( 133      ( 29 Jul 2024 05:46 )             40.4     07-29    136  |  108  |  19  ----------------------------<  88  4.8   |  18<L>  |  1.25    Ca    9.2      29 Jul 2024 05:48      proBNP:   Lipid Profile:   HgA1c:   TSH:             TELEMETRY: 	  AF  ECG:  	  RADIOLOGY:   DIAGNOSTIC TESTING:  [ ] Echocardiogram:  [ ]  Catheterization:  [ ] Stress Test:    OTHER:

## 2024-07-29 NOTE — DISCHARGE NOTE NURSING/CASE MANAGEMENT/SOCIAL WORK - PATIENT PORTAL LINK FT
You can access the FollowMyHealth Patient Portal offered by [facility] by registering at the following website: [email or web address]. By joining [facility]’s FollowMyHealth portal, you will also be able to view your health information using other applications (apps) compatible with our system.

## 2024-07-29 NOTE — PROGRESS NOTE ADULT - TIME BILLING
Advanced care planning was discussed with patient and family.  Advanced care planning forms were reviewed and discussed as appropriate.  Differential diagnosis and plan of care discussed with patient after the evaluation.   Pain assessed and judicious use of narcotics when appropriate was discussed.  Importance of Fall prevention discussed.  Counseling on Smoking and Alcohol cessation was offered when appropriate.  Counseling on Diet, exercise, and medication compliance was done.
pt , acp , daughter , cm
Advanced care planning was discussed with patient and family.  Advanced care planning forms were reviewed and discussed as appropriate.  Differential diagnosis and plan of care discussed with patient after the evaluation.   Pain assessed and judicious use of narcotics when appropriate was discussed.  Importance of Fall prevention discussed.  Counseling on Smoking and Alcohol cessation was offered when appropriate.  Counseling on Diet, exercise, and medication compliance was done.
Advanced care planning was discussed with patient and family.  Advanced care planning forms were reviewed and discussed as appropriate.  Differential diagnosis and plan of care discussed with patient after the evaluation.   Pain assessed and judicious use of narcotics when appropriate was discussed.  Importance of Fall prevention discussed.  Counseling on Smoking and Alcohol cessation was offered when appropriate.  Counseling on Diet, exercise, and medication compliance was done.

## 2024-07-29 NOTE — PROGRESS NOTE ADULT - PROBLEM SELECTOR PROBLEM 1
Other encephalopathy
Bradycardia
Other encephalopathy
Bradycardia
Other encephalopathy
Bradycardia

## 2024-07-29 NOTE — PROGRESS NOTE ADULT - PROBLEM SELECTOR PROBLEM 3
History of CAD (coronary artery disease)
Dementia
Dementia
History of CAD (coronary artery disease)
Dementia
History of CAD (coronary artery disease)
History of CAD (coronary artery disease)

## 2024-07-29 NOTE — PROGRESS NOTE ADULT - SUBJECTIVE AND OBJECTIVE BOX
[facility] DIVISION OF INFECTIOUS DISEASES  GERALD Leahy Y. Patel, S. Shah, G. Casimir  [phone]  ([phone] - weekdays after 5pm and weekends)    Name: LEFTY AKBAR  Age/Gender: 78y Female  MRN: [id number]    Interval History:  Patient seen and examined this morning.   No new complaints noted.  Notes reviewed  No concerning overnight events  Afebrile   Allergies: penicillin (Hives)      Objective:  Vitals:   T(F): 97.4 (07-29-24 @ 05:04), Max: 97.7 (07-28-24 @ 21:07)  HR: 98 (07-29-24 @ 05:04) (71 - 98)  BP: 100/65 (07-29-24 @ 05:04) (99/65 - 109/58)  RR: 18 (07-29-24 @ 05:04) (18 - 18)  SpO2: 95% (07-29-24 @ 05:04) (94% - 99%)  Physical Examination:  General: no acute distress, nontoxic appearing   HEENT: NC/AT, anicteric, EOMI  Respiratory: no acc muscle use, breathing comfortably  Cardiovascular: S1 and S2 present  Gastrointestinal: normal appearing, nondistended  Extremities: no edema, no cyanosis  Skin: no visible rash    Laboratory Studies:  CBC:                       13.1   8.58  )-----------( 133      ( 29 Jul 2024 05:46 )             40.4     WBC Trend:  8.58 07-29-24 @ 05:46  9.82 07-28-24 @ 06:05  9.65 07-27-24 @ 06:52  8.52 07-26-24 @ 10:26  9.02 07-25-24 @ 06:34  8.70 07-24-24 @ 10:12    CMP: 07-29    136  |  108  |  19  ----------------------------<  88  4.8   |  18<L>  |  1.25    Ca    9.2      29 Jul 2024 05:48      Creatinine: 1.25 mg/dL (07-29-24 @ 05:48)  Creatinine: 1.22 mg/dL (07-28-24 @ 06:05)  Creatinine: 1.27 mg/dL (07-27-24 @ 06:52)  Creatinine: 1.33 mg/dL (07-26-24 @ 10:24)  Creatinine: 1.51 mg/dL (07-25-24 @ 06:35)  Creatinine: 1.64 mg/dL (07-24-24 @ 10:12)  Creatinine: 1.59 mg/dL (07-23-24 @ 07:24)    Microbiology: reviewed   Culture - Urine (collected 07-21-24 @ 14:32)  Source: Clean Catch Clean Catch (Midstream)  Final Report (07-25-24 @ 13:47):    >100,000 CFU/ml Enterococcus faecalis  Organism: Enterococcus faecalis (07-25-24 @ 13:47)  Organism: Enterococcus faecalis (07-25-24 @ 13:47)      Method Type: ANTOINE      -  Ampicillin: S <=2 Predicts results to ampicillin/sulbactam, amoxacillin-clavulanate and  piperacillin-tazobactam.      -  Ciprofloxacin: S <=1      -  Levofloxacin: S 1      -  Nitrofurantoin: S <=32 Should not be used to treat pyelonephritis.      -  Tetracycline: R >8      -  Vancomycin: S 2    Culture - Blood (collected 07-21-24 @ 11:50)  Source: .Blood Blood-Venous  Final Report (07-26-24 @ 20:01):    No growth at 5 days    Culture - Blood (collected 07-21-24 @ 11:40)  Source: .Blood Blood-Venous  Final Report (07-26-24 @ 20:01):    No growth at 5 days    Radiology: reviewed     Medications:  acetaminophen     Tablet .. 650 milliGRAM(s) Oral every 6 hours PRN  apixaban 5 milliGRAM(s) Oral every 12 hours  ascorbic acid 500 milliGRAM(s) Oral daily  atorvastatin 80 milliGRAM(s) Oral at bedtime  atropine Injectable 1 milliGRAM(s) IV Push once  ciprofloxacin     Tablet 250 milliGRAM(s) Oral every 12 hours  clopidogrel Tablet 75 milliGRAM(s) Oral daily  dextrose 5% + lactated ringers with potassium chloride 20 mEq/L 1000 milliLiter(s) IV Continuous <Continuous>  levothyroxine 125 MICROGram(s) Oral daily  metoprolol tartrate 25 milliGRAM(s) Oral two times a day  midodrine. 5 milliGRAM(s) Oral three times a day  mirtazapine 7.5 milliGRAM(s) Oral at bedtime  multivitamin 1 Tablet(s) Oral daily  ondansetron Injectable 4 milliGRAM(s) IV Push every 8 hours PRN  sodium bicarbonate 650 milliGRAM(s) Oral three times a day  tamsulosin 0.4 milliGRAM(s) Oral at bedtime    Current Antimicrobials:  ciprofloxacin     Tablet 250 milliGRAM(s) Oral every 12 hours    Prior/Completed Antimicrobials:  cefTRIAXone   IVPB  cefTRIAXone   IVPB

## 2024-07-29 NOTE — DISCHARGE NOTE NURSING/CASE MANAGEMENT/SOCIAL WORK - NSDCPEFALRISK_GEN_ALL_CORE
For information on Fall & Injury Prevention, visit: [email or web address] OR  [email or web address] OR  https://www.cdc.gov/steadi/patient.html

## 2024-07-29 NOTE — PROGRESS NOTE ADULT - REASON FOR ADMISSION
AMS

## 2024-07-30 ENCOUNTER — NON-APPOINTMENT (OUTPATIENT)
Age: 79
End: 2024-07-30

## 2024-08-05 ENCOUNTER — INPATIENT (INPATIENT)
Facility: HOSPITAL | Age: 79
LOS: 15 days | Discharge: HOSPICE | DRG: 377 | End: 2024-08-21
Attending: GENERAL ACUTE CARE HOSPITAL | Admitting: HOSPITALIST
Payer: MEDICARE

## 2024-08-05 VITALS
SYSTOLIC BLOOD PRESSURE: 132 MMHG | HEIGHT: 64 IN | HEART RATE: 158 BPM | OXYGEN SATURATION: 98 % | RESPIRATION RATE: 17 BRPM | TEMPERATURE: 97 F | DIASTOLIC BLOOD PRESSURE: 76 MMHG

## 2024-08-05 DIAGNOSIS — Z90.49 ACQUIRED ABSENCE OF OTHER SPECIFIED PARTS OF DIGESTIVE TRACT: Chronic | ICD-10-CM

## 2024-08-05 DIAGNOSIS — Z98.890 OTHER SPECIFIED POSTPROCEDURAL STATES: Chronic | ICD-10-CM

## 2024-08-05 DIAGNOSIS — N17.9 ACUTE KIDNEY FAILURE, UNSPECIFIED: ICD-10-CM

## 2024-08-05 DIAGNOSIS — Z96.7 PRESENCE OF OTHER BONE AND TENDON IMPLANTS: Chronic | ICD-10-CM

## 2024-08-05 DIAGNOSIS — Z96.659 PRESENCE OF UNSPECIFIED ARTIFICIAL KNEE JOINT: Chronic | ICD-10-CM

## 2024-08-05 DIAGNOSIS — Z95.5 PRESENCE OF CORONARY ANGIOPLASTY IMPLANT AND GRAFT: Chronic | ICD-10-CM

## 2024-08-05 LAB
ADD ON TEST-SPECIMEN IN LAB: SIGNIFICANT CHANGE UP
ALBUMIN SERPL ELPH-MCNC: 2.5 G/DL — LOW (ref 3.3–5)
ALP SERPL-CCNC: 62 U/L — SIGNIFICANT CHANGE UP (ref 40–120)
ALT FLD-CCNC: 25 U/L — SIGNIFICANT CHANGE UP (ref 10–45)
ANION GAP SERPL CALC-SCNC: 12 MMOL/L — SIGNIFICANT CHANGE UP (ref 5–17)
ANION GAP SERPL CALC-SCNC: 16 MMOL/L — SIGNIFICANT CHANGE UP (ref 5–17)
APPEARANCE UR: ABNORMAL
APTT BLD: 29.4 SEC — SIGNIFICANT CHANGE UP (ref 24.5–35.6)
AST SERPL-CCNC: 42 U/L — HIGH (ref 10–40)
BACTERIA # UR AUTO: ABNORMAL /HPF
BASE EXCESS BLDV CALC-SCNC: -9.1 MMOL/L — LOW (ref -2–3)
BASOPHILS # BLD AUTO: 0.02 K/UL — SIGNIFICANT CHANGE UP (ref 0–0.2)
BASOPHILS NFR BLD AUTO: 0.2 % — SIGNIFICANT CHANGE UP (ref 0–2)
BILIRUB SERPL-MCNC: 0.4 MG/DL — SIGNIFICANT CHANGE UP (ref 0.2–1.2)
BILIRUB UR-MCNC: NEGATIVE — SIGNIFICANT CHANGE UP
BLD GP AB SCN SERPL QL: NEGATIVE — SIGNIFICANT CHANGE UP
BUN SERPL-MCNC: 51 MG/DL — HIGH (ref 7–23)
BUN SERPL-MCNC: 52 MG/DL — HIGH (ref 7–23)
C DIFF GDH STL QL: POSITIVE — SIGNIFICANT CHANGE UP
C DIFF GDH STL QL: SIGNIFICANT CHANGE UP
CA-I SERPL-SCNC: 0.97 MMOL/L — LOW (ref 1.15–1.33)
CALCIUM SERPL-MCNC: 7.1 MG/DL — LOW (ref 8.4–10.5)
CALCIUM SERPL-MCNC: 7.1 MG/DL — LOW (ref 8.4–10.5)
CAST: 18 /LPF — HIGH (ref 0–4)
CHLORIDE BLDV-SCNC: 101 MMOL/L — SIGNIFICANT CHANGE UP (ref 96–108)
CHLORIDE SERPL-SCNC: 93 MMOL/L — LOW (ref 96–108)
CHLORIDE SERPL-SCNC: 98 MMOL/L — SIGNIFICANT CHANGE UP (ref 96–108)
CO2 BLDV-SCNC: 18 MMOL/L — LOW (ref 22–26)
CO2 SERPL-SCNC: 12 MMOL/L — LOW (ref 22–31)
CO2 SERPL-SCNC: 14 MMOL/L — LOW (ref 22–31)
COLOR SPEC: YELLOW — SIGNIFICANT CHANGE UP
CREAT SERPL-MCNC: 2.58 MG/DL — HIGH (ref 0.5–1.3)
CREAT SERPL-MCNC: 2.68 MG/DL — HIGH (ref 0.5–1.3)
DIFF PNL FLD: ABNORMAL
EGFR: 18 ML/MIN/1.73M2 — LOW
EGFR: 19 ML/MIN/1.73M2 — LOW
EOSINOPHIL # BLD AUTO: 0.08 K/UL — SIGNIFICANT CHANGE UP (ref 0–0.5)
EOSINOPHIL NFR BLD AUTO: 0.8 % — SIGNIFICANT CHANGE UP (ref 0–6)
GAS PNL BLDV: 125 MMOL/L — LOW (ref 136–145)
GAS PNL BLDV: SIGNIFICANT CHANGE UP
GAS PNL BLDV: SIGNIFICANT CHANGE UP
GI PCR PANEL: SIGNIFICANT CHANGE UP
GLUCOSE BLDV-MCNC: 83 MG/DL — SIGNIFICANT CHANGE UP (ref 70–99)
GLUCOSE SERPL-MCNC: 349 MG/DL — HIGH (ref 70–99)
GLUCOSE SERPL-MCNC: 417 MG/DL — HIGH (ref 70–99)
GLUCOSE UR QL: NEGATIVE MG/DL — SIGNIFICANT CHANGE UP
HCO3 BLDV-SCNC: 17 MMOL/L — LOW (ref 22–29)
HCT VFR BLD CALC: 32.3 % — LOW (ref 34.5–45)
HCT VFR BLD CALC: 34.7 % — SIGNIFICANT CHANGE UP (ref 34.5–45)
HCT VFR BLDA CALC: 34 % — LOW (ref 34.5–46.5)
HGB BLD CALC-MCNC: 11.3 G/DL — LOW (ref 11.7–16.1)
HGB BLD-MCNC: 10.3 G/DL — LOW (ref 11.5–15.5)
HGB BLD-MCNC: 11.3 G/DL — LOW (ref 11.5–15.5)
IMM GRANULOCYTES NFR BLD AUTO: 0.8 % — SIGNIFICANT CHANGE UP (ref 0–0.9)
INR BLD: 2.97 RATIO — HIGH (ref 0.85–1.18)
KETONES UR-MCNC: ABNORMAL MG/DL
LACTATE BLDV-MCNC: 4.7 MMOL/L — CRITICAL HIGH (ref 0.5–2)
LEUKOCYTE ESTERASE UR-ACNC: ABNORMAL
LYMPHOCYTES # BLD AUTO: 2.17 K/UL — SIGNIFICANT CHANGE UP (ref 1–3.3)
LYMPHOCYTES # BLD AUTO: 21.8 % — SIGNIFICANT CHANGE UP (ref 13–44)
MCHC RBC-ENTMCNC: 30.7 PG — SIGNIFICANT CHANGE UP (ref 27–34)
MCHC RBC-ENTMCNC: 31.2 PG — SIGNIFICANT CHANGE UP (ref 27–34)
MCHC RBC-ENTMCNC: 31.9 GM/DL — LOW (ref 32–36)
MCHC RBC-ENTMCNC: 32.6 GM/DL — SIGNIFICANT CHANGE UP (ref 32–36)
MCV RBC AUTO: 95.9 FL — SIGNIFICANT CHANGE UP (ref 80–100)
MCV RBC AUTO: 96.1 FL — SIGNIFICANT CHANGE UP (ref 80–100)
MONOCYTES # BLD AUTO: 0.73 K/UL — SIGNIFICANT CHANGE UP (ref 0–0.9)
MONOCYTES NFR BLD AUTO: 7.3 % — SIGNIFICANT CHANGE UP (ref 2–14)
NEUTROPHILS # BLD AUTO: 6.88 K/UL — SIGNIFICANT CHANGE UP (ref 1.8–7.4)
NEUTROPHILS NFR BLD AUTO: 69.1 % — SIGNIFICANT CHANGE UP (ref 43–77)
NITRITE UR-MCNC: NEGATIVE — SIGNIFICANT CHANGE UP
NRBC # BLD: 0 /100 WBCS — SIGNIFICANT CHANGE UP (ref 0–0)
NRBC # BLD: 0 /100 WBCS — SIGNIFICANT CHANGE UP (ref 0–0)
NT-PROBNP SERPL-SCNC: HIGH PG/ML (ref 0–300)
PCO2 BLDV: 36 MMHG — LOW (ref 39–42)
PH BLDV: 7.28 — LOW (ref 7.32–7.43)
PH UR: 5.5 — SIGNIFICANT CHANGE UP (ref 5–8)
PLATELET # BLD AUTO: 177 K/UL — SIGNIFICANT CHANGE UP (ref 150–400)
PLATELET # BLD AUTO: 202 K/UL — SIGNIFICANT CHANGE UP (ref 150–400)
PO2 BLDV: 15 MMHG — LOW (ref 25–45)
POTASSIUM BLDV-SCNC: 9.6 MMOL/L — CRITICAL HIGH (ref 3.5–5.1)
POTASSIUM SERPL-MCNC: 3.5 MMOL/L — SIGNIFICANT CHANGE UP (ref 3.5–5.3)
POTASSIUM SERPL-MCNC: 4.4 MMOL/L — SIGNIFICANT CHANGE UP (ref 3.5–5.3)
POTASSIUM SERPL-SCNC: 3.5 MMOL/L — SIGNIFICANT CHANGE UP (ref 3.5–5.3)
POTASSIUM SERPL-SCNC: 4.4 MMOL/L — SIGNIFICANT CHANGE UP (ref 3.5–5.3)
PROT SERPL-MCNC: 5.2 G/DL — LOW (ref 6–8.3)
PROT UR-MCNC: 100 MG/DL
PROTHROM AB SERPL-ACNC: 31.7 SEC — HIGH (ref 9.5–13)
RBC # BLD: 3.36 M/UL — LOW (ref 3.8–5.2)
RBC # BLD: 3.62 M/UL — LOW (ref 3.8–5.2)
RBC # FLD: 16.2 % — HIGH (ref 10.3–14.5)
RBC # FLD: 16.4 % — HIGH (ref 10.3–14.5)
RBC CASTS # UR COMP ASSIST: 202 /HPF — HIGH (ref 0–4)
REVIEW: SIGNIFICANT CHANGE UP
RH IG SCN BLD-IMP: POSITIVE — SIGNIFICANT CHANGE UP
SAO2 % BLDV: 19 % — LOW (ref 67–88)
SODIUM SERPL-SCNC: 122 MMOL/L — LOW (ref 135–145)
SODIUM SERPL-SCNC: 123 MMOL/L — LOW (ref 135–145)
SP GR SPEC: 1.02 — SIGNIFICANT CHANGE UP (ref 1–1.03)
SQUAMOUS # UR AUTO: 9 /HPF — HIGH (ref 0–5)
TROPONIN T, HIGH SENSITIVITY RESULT: 180 NG/L — HIGH (ref 0–51)
UROBILINOGEN FLD QL: 0.2 MG/DL — SIGNIFICANT CHANGE UP (ref 0.2–1)
WBC # BLD: 9.03 K/UL — SIGNIFICANT CHANGE UP (ref 3.8–10.5)
WBC # BLD: 9.96 K/UL — SIGNIFICANT CHANGE UP (ref 3.8–10.5)
WBC # FLD AUTO: 9.03 K/UL — SIGNIFICANT CHANGE UP (ref 3.8–10.5)
WBC # FLD AUTO: 9.96 K/UL — SIGNIFICANT CHANGE UP (ref 3.8–10.5)
WBC UR QL: 813 /HPF — HIGH (ref 0–5)
YEAST-LIKE CELLS: PRESENT

## 2024-08-05 PROCEDURE — 74176 CT ABD & PELVIS W/O CONTRAST: CPT | Mod: 26,MC

## 2024-08-05 PROCEDURE — 71045 X-RAY EXAM CHEST 1 VIEW: CPT | Mod: 26

## 2024-08-05 PROCEDURE — 99497 ADVNCD CARE PLAN 30 MIN: CPT | Mod: 25

## 2024-08-05 PROCEDURE — 99223 1ST HOSP IP/OBS HIGH 75: CPT

## 2024-08-05 PROCEDURE — 70450 CT HEAD/BRAIN W/O DYE: CPT | Mod: 26,MC

## 2024-08-05 PROCEDURE — 99291 CRITICAL CARE FIRST HOUR: CPT

## 2024-08-05 RX ORDER — OXYCODONE HYDROCHLORIDE 15 MG/1
150 TABLET ORAL ONCE
Refills: 0 | Status: COMPLETED | OUTPATIENT
Start: 2024-08-05 | End: 2024-08-05

## 2024-08-05 RX ORDER — METRONIDAZOLE 250 MG
500 TABLET ORAL ONCE
Refills: 0 | Status: COMPLETED | OUTPATIENT
Start: 2024-08-05 | End: 2024-08-05

## 2024-08-05 RX ORDER — SODIUM CHLORIDE 9 MG/ML
1000 INJECTION INTRAMUSCULAR; INTRAVENOUS; SUBCUTANEOUS ONCE
Refills: 0 | Status: COMPLETED | OUTPATIENT
Start: 2024-08-05 | End: 2024-08-05

## 2024-08-05 RX ORDER — METOPROLOL TARTRATE 100 MG/1
5 TABLET ORAL ONCE
Refills: 0 | Status: DISCONTINUED | OUTPATIENT
Start: 2024-08-05 | End: 2024-08-05

## 2024-08-05 RX ORDER — OXYCODONE HYDROCHLORIDE 15 MG/1
1 TABLET ORAL
Qty: 450 | Refills: 0 | Status: DISCONTINUED | OUTPATIENT
Start: 2024-08-05 | End: 2024-08-08

## 2024-08-05 RX ORDER — PANTOPRAZOLE SODIUM 40 MG
8 TABLET, DELAYED RELEASE (ENTERIC COATED) ORAL
Qty: 80 | Refills: 0 | Status: ACTIVE | OUTPATIENT
Start: 2024-08-05 | End: 2025-07-04

## 2024-08-05 RX ORDER — PANTOPRAZOLE SODIUM 40 MG
80 TABLET, DELAYED RELEASE (ENTERIC COATED) ORAL ONCE
Refills: 0 | Status: COMPLETED | OUTPATIENT
Start: 2024-08-05 | End: 2024-08-05

## 2024-08-05 RX ORDER — ACETAMINOPHEN 325 MG/1
1000 TABLET ORAL ONCE
Refills: 0 | Status: COMPLETED | OUTPATIENT
Start: 2024-08-05 | End: 2024-08-05

## 2024-08-05 RX ORDER — MIDODRINE HYDROCHLORIDE 5 MG/1
5 TABLET ORAL ONCE
Refills: 0 | Status: COMPLETED | OUTPATIENT
Start: 2024-08-05 | End: 2024-08-05

## 2024-08-05 RX ORDER — VANCOMYCIN/0.9 % SOD CHLORIDE 1.75G/25
500 PLASTIC BAG, INJECTION (ML) INTRAVENOUS ONCE
Refills: 0 | Status: COMPLETED | OUTPATIENT
Start: 2024-08-05 | End: 2024-08-05

## 2024-08-05 RX ADMIN — Medication 500 MILLIGRAM(S): at 21:54

## 2024-08-05 RX ADMIN — Medication 10 MG/HR: at 14:59

## 2024-08-05 RX ADMIN — ACETAMINOPHEN 400 MILLIGRAM(S): 325 TABLET ORAL at 15:42

## 2024-08-05 RX ADMIN — OXYCODONE HYDROCHLORIDE 600 MILLIGRAM(S): 15 TABLET ORAL at 19:00

## 2024-08-05 RX ADMIN — Medication 100 MILLIGRAM(S): at 20:03

## 2024-08-05 RX ADMIN — SODIUM CHLORIDE 1000 MILLILITER(S): 9 INJECTION INTRAMUSCULAR; INTRAVENOUS; SUBCUTANEOUS at 14:38

## 2024-08-05 RX ADMIN — Medication 500 MILLILITER(S): at 16:55

## 2024-08-05 RX ADMIN — ACETAMINOPHEN 1000 MILLIGRAM(S): 325 TABLET ORAL at 19:21

## 2024-08-05 RX ADMIN — Medication 80 MILLIGRAM(S): at 14:15

## 2024-08-05 RX ADMIN — OXYCODONE HYDROCHLORIDE 33.3 MG/MIN: 15 TABLET ORAL at 19:21

## 2024-08-05 RX ADMIN — OXYCODONE HYDROCHLORIDE 600 MILLIGRAM(S): 15 TABLET ORAL at 18:45

## 2024-08-05 RX ADMIN — OXYCODONE HYDROCHLORIDE 600 MILLIGRAM(S): 15 TABLET ORAL at 16:50

## 2024-08-05 NOTE — H&P ADULT - HISTORY OF PRESENT ILLNESS
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  She was found with oliguria for few days over the weekend for which started on IV fluid however she was noncompliant with the IV access and she pulled it out many times and finally today she was very poor appetite and became lethargic and hypotensive. She was transferred to hospital and she was found with some black output in Ileostomy. She was started on blood transfusion one unit in ER.    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 Limited hx obtainable from pt, was AAOx~1 on encounter and very lethargic/somnolent. Collateral hx obtained from chart review. During recent admission (7/21/24-7/29/24), she was treated for UTI/sepsis and ultimately discharged to rehab to completed a 5-day course of ciprofloxacin (last dose 7/30/24). At rehab, she was reportedly found to have oliguria for a few days over the weekend for which she was started on IV fluids, however she was noncompliant with the IV access and she pulled it out many times. She has had very poor appetite and became lethargic and hypotensive. She was subsequently transferred to hospital where she was found to have black-colored output in ileostomy bag.     In ED: Afebrile, HR 120s-170s (Afib), SBP 90s-130s, RR 15-22, sating % on RA.  Labs notable for Hgb 11.3->10.3, Na 133->122, SCr 3.04->2.58; lactated 4.7->2.8, blood glucose to 400s but FS 100s. Found to be C.diff positive. UA not best sample with 9 epithelial cells, but noted +LE, +bacteria, +WBC, neg nitrite. CT A/P showed no acute intra-abdominal pathology and unchanged indeterminate left adrenal lesion. Received Vanc 500mg PO, Flagyl 500mg IVPB, amio 150mg IVPB x3, ofirmev 1g, 1.5L IVF, and 1u pRBC. Also started on amio gtt and protonix gtt. Nephrology and MICU were consulted. Admitted to Medicine for further management. 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 Limited hx obtainable from pt, was AAOx~1 on encounter and very lethargic/somnolent, but arousable and seemed to deny pain anywhere. Collateral hx obtained from chart review. During recent admission (7/21/24-7/29/24), she was treated for UTI/sepsis and ultimately discharged to rehab to completed a 5-day course of ciprofloxacin (last dose 7/30/24). At rehab, she was reportedly found to have oliguria for a few days over the weekend for which she was started on IV fluids, however she was noncompliant with the IV access and she pulled it out many times. She has had very poor appetite and became lethargic and hypotensive. She was subsequently transferred to hospital where she was found to have black-colored output in ileostomy bag.     In ED: Afebrile, HR 120s-170s (Afib), SBP 90s-130s, RR 15-22, sating % on RA.  Labs notable for Hgb 11.3->10.3, Na 133->122, SCr 3.04->2.58; lactated 4.7->2.8, blood glucose to 400s but FS 100s. Found to be C.diff positive. UA not best sample with 9 epithelial cells, but noted +LE, +bacteria, +WBC, neg nitrite. CT A/P showed no acute intra-abdominal pathology and unchanged indeterminate left adrenal lesion. Received Vanc 500mg PO, Flagyl 500mg IVPB, amio 150mg IVPB x3, ofirmev 1g, 1.5L IVF, and 1u pRBC. Also started on amio gtt and protonix gtt. Nephrology and MICU were consulted. Admitted to Medicine for further management. 78F w/ hx of Afib (on Eliquis), CAD (on plavix), MCI/dementia, 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 Limited hx obtainable from pt, was AAOx~1 on encounter and very lethargic/somnolent, but arousable and seemed to deny pain anywhere. Collateral hx obtained from chart review. During recent admission (7/21/24-7/29/24), she was treated for UTI/sepsis and ultimately discharged to rehab to completed a 5-day course of ciprofloxacin (last dose 7/30/24). At rehab, she was reportedly found to have oliguria for a few days over the weekend for which she was started on IV fluids, however she was noncompliant with the IV access and she pulled it out many times. She has had very poor appetite and became lethargic and hypotensive. She was subsequently transferred to hospital where she was found to have black-colored output in ileostomy bag.     In ED: Afebrile, HR 120s-170s (Afib), SBP 90s-130s, RR 15-22, sating % on RA.  Labs notable for Hgb 11.3->10.3, Na 133->122, SCr 3.04->2.58; lactated 4.7->2.8, blood glucose to 400s but FS 100s. Found to be C.diff positive. UA not best sample with 9 epithelial cells, but noted +LE, +bacteria, +WBC, neg nitrite. CT A/P showed no acute intra-abdominal pathology and unchanged indeterminate left adrenal lesion. Received Vanc 500mg PO, Flagyl 500mg IVPB, amio 150mg IVPB x3, ofirmev 1g, 1.5L IVF, and 1u pRBC. Also started on amio gtt and protonix gtt. Nephrology and MICU were consulted. Admitted to Medicine for further management.

## 2024-08-05 NOTE — ED PROVIDER NOTE - ATTENDING CONTRIBUTION TO CARE
I, Micah Elizalde MD, Emergency Medicine Attending Physician, personally saw and examined the patient and I personally made/approve the management plan and take responsibility for the patient management.    MDM: 78-year-old female with history of MCI/dementia, hypertension, hyperlipidemia, atrial fibrillation on Eliquis, CAD on Plavix, COPD, CROW, urinary retention, recurrent UTI, hypothyroidism, ischemic bowel s/p ex lap with bowel resection and end ileostomy, who presents with lethargy and tachycardia.  Patient poor story but does endorse abdominal pain.  At nursing facility, patient was given 2 L NS over the weekend, last dose yesterday.    On examination, patient with irregularly irregular rhythm with tachycardic rate, blood pressure stable.  Awake and alert.  Abdomen with diffuse tenderness, worse around the ostomy site.  Black drainage from the ostomy, sample sent.    My independent interpretation of the EKG shows:  Atrial fibrillation with RVR with rate of 167 bpm, normal axis, no ST elevations.     Will obtain labs to evaluate for hematologic disorder, metabolic derangements, hepatic and renal function, and screen for infection and ischemia.  Will obtain chest x-ray to evaluate for acute cardiopulmonary pathology.  Will obtain CT Abd/Pelv to assess for acute intraabdominal surgical and infectious pathology and source for GI bleed.  Will keep patient on cardiac monitor, obtain EKG and troponin to evaluate for possible cardiac abnormality including ACS and arrhythmia.  Will obtain GI c consulted at 2:22 PM, and likely will require surgery consultation pending CT imaging.    Initiated fluid resuscitation with IV fluids, monitor closely on telemetry.  Patient hemodynamically stable blood pressure.    Patient with drop in blood pressure, reassess, mentating normally.  Bedside ultrasound showed IVC not dilated and some variation with respirations.  Due to persistent tachycardia, will treat with emergent release blood transfusion.  Low threshold for reversal if patient not improving with PRBC.    Signed out at 3 PM pending labs, imaging, blood transfusion, hemodynamic monitoring, specialist consultation and close reassessments for further treatment and admission.    Critical Care Billing: Atrial fibrillation with RVR, hypotension, GI bleed  Upon my evaluation, this patient had a high probability of imminent or life-threatening deterioration, which required my direct attention, intervention, and personal management.  The patient has a  medical condition that impairs one or more vital organ systems.  Frequent personal assessment and adjustment of medical interventions was performed.      I have personally provided 35 minutes of critical care time exclusive of time spent on separately billable procedures. Time includes review of laboratory data, radiology results, discussion with consultants, patient and family; monitoring for potential decompensation, as well as time spent retrieving data and reviewing the chart and documenting the visit. Interventions were performed as documented above.

## 2024-08-05 NOTE — H&P ADULT - NSHPLABSRESULTS_GEN_ALL_CORE
reviewed
LABS:                        10.3   9.03  )-----------( 177      ( 05 Aug 2024 21:01 )             32.3     08-05    123<L>  |  93<L>  |  52<H>  ----------------------------<  417<H>  3.5   |  14<L>  |  2.68<H>    Ca    7.1<L>      05 Aug 2024 23:08    TPro  5.2<L>  /  Alb  2.5<L>  /  TBili  0.4  /  DBili  x   /  AST  42<H>  /  ALT  25  /  AlkPhos  62  08-05    PT/INR - ( 05 Aug 2024 15:05 )   PT: 31.7 sec;   INR: 2.97 ratio         PTT - ( 05 Aug 2024 15:05 )  PTT:29.4 sec      Urinalysis Basic - ( 05 Aug 2024 23:08 )    Color: x / Appearance: x / SG: x / pH: x  Gluc: 417 mg/dL / Ketone: x  / Bili: x / Urobili: x   Blood: x / Protein: x / Nitrite: x   Leuk Esterase: x / RBC: x / WBC x   Sq Epi: x / Non Sq Epi: x / Bacteria: x    COVID-19 PCR: NotDetec (08 Mar 2024 18:29)  COVID-19 PCR: NotDetec (05 Mar 2024 16:57)    C.diff+    IMAGING/ADDITIONAL TESTS:    < from: CT Abdomen and Pelvis No Cont (08.05.24 @ 19:52) >  IMPRESSION:  No acute intra-abdominal pathology. Limited evaluation for mesenteric   ischemia in the absence of intravenous contrast.  Unchanged indeterminate left adrenal lesion.  --- End of Report ---  < end of copied text >    < from: CT Head No Cont (08.05.24 @ 19:52) >  IMPRESSION:   Ischemic white matter disease and atrophy. No adverse   interval change 7/25/2024  --- End of Report ---  < end of copied text >

## 2024-08-05 NOTE — ED PROVIDER NOTE - CARE PLAN
Principal Discharge DX:	Acute renal failure  Secondary Diagnosis:	Anuria  Secondary Diagnosis:	Melena   1

## 2024-08-05 NOTE — H&P ADULT - ASSESSMENT
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  She was found with oliguria for few days over the weekend for which started on IV fluid however she was noncompliant with the IV access and she pulled it out many times and finally today she was very poor appetite and became lethargic and hypotensive. She was transferred to hospital and she was found with some black output in Ileostomy. She was started on blood transfusion one unit in ER.    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  She was found with oliguria for few days over the weekend for which started on IV fluid however she was noncompliant with the IV access and she pulled it out many times and finally today she was very poor appetite and became lethargic and hypotensive. She was transferred to hospital and she was found with some black output in Ileostomy. She was started on blood transfusion one unit in ER.    AMS - due to metabolic encephalopathy versus anorexia, poor appetite,   Anemia - Acute, Hb dropped from 14 to 11, s/p PRBC 1 unit, hold Eliquis, Plavix, F/u with GI r/o GI bleed  CHANELL on CKD - with oliguria, due to volume depeletion, poor appetite, GI bleed ?, continue IVF, monitor  Cr, continue Amaya, renal bladder US, f/u with nephro team  UTI - with E. Fecalis, s/p IV Vanco and IV Rocephin transitioned to PO Cipro (on 7/25/24) - course completes 7/30/24  L adrenal mass - unchanged in CT scan.  Urinary retention - s/p amaya catheter. monitor urine output.  Sinus bradycardia - (briefly 20s-30s), with pauses of ~3.5-4sec duration, asymptomatic.  Atrial fibrillation - on apixaban 5mg BID and metoprolol BID  CHANELL with Metabolic Acidosis - likely due to hypovolemia versus ATN given granular and hyaline casts on UA. Creatinine peaked to 2.08. CHANELL resolved on IVF. FeNa low. CT without obstruction but patient with urinary retention s/p amaya placement (failed TOV). Continue with Flomax. Avoid nephrotoxins. Increased sodium bicarbonate to 650 mg TID. Follow up with urology fro Amaya management  Hypotension - on midodrine 5 mg PO TID. Monitor BP.  s/p ILR -  per Dr Mendiola.  Hypothyroid - TSH Low 0.15, Synthroid reduced to 125Mcg, Repeat Thyroid  Depression - on Mirtazapine 7.5 mg QHS.  Sacral DU stage 2 - apply Hydrogel and cover with DPD, off loading and repositioning, f/u with wound care team.

## 2024-08-05 NOTE — H&P ADULT - PROBLEM SELECTOR PLAN 6
Found to have Na down to 122. Possibly i/s/o GI losses vs poor PO intake vs SIADH  - c/w 1/2 NS-bicarb gtt as below  - monitor Na  - f/u urine lytes  - f/u Nephrology recs in AM

## 2024-08-05 NOTE — H&P ADULT - PROBLEM SELECTOR PLAN 9
- c/w home levothyroxine (transition from PO to IV if unable to tolerate PO)  - f/u TSH/FT4 Hx of CAD s/p PCI (?stent)  - holding home plavix  - c/w home statin  - c/w home BB as above

## 2024-08-05 NOTE — ED ADULT NURSE REASSESSMENT NOTE - NS ED NURSE REASSESS COMMENT FT1
Report received from DEBORAH Aquino. Pt received A&Ox1(non aware of self, situation or year, can follow some commands) vitals retaken and documented. Pt received with right sided colostomy bag draining black output, MDs aware as per dayshift nurse. Pt started on amio drip as per MD order, pt telepacked, ED RN at bedside for transport to CT. Bed locked and in lowest position, side rails raised, call bell within reach. Currently pending CT.

## 2024-08-05 NOTE — H&P ADULT - PROBLEM SELECTOR PLAN 2
Presented with AMS/lethargy (AAOx~1). Unclear current baseline per daughter, but AAOx4 in Feb 2024, but since then has had multiple hospitalizations and general decline in mental status with concern of MCI/dementia (no official diagnosis yet). Now more lethargic than usual. Concern for metabolic/septic encephalopathy i/so infection/sepsis and metabolic derangements  - treat hyponatremia and metabolic acidosis as below  - treat C.diff as above  - f/u UCx (UA not best sample with 9 epithelial cells, but noted +LE, +bacteria, +WBC, neg nitrite; recently treated for E. faecalis UTI)   - f/u BCx

## 2024-08-05 NOTE — H&P ADULT - NSHPADDITIONALINFOADULT_GEN_ALL_CORE
Requested to be seen as new admission by Dr. Rasheed, assigned to me at this time by overnight hospitalist in charge. Care to be resumed by Dr. Rasheed or his covering colleague in AM.

## 2024-08-05 NOTE — H&P ADULT - PROBLEM SELECTOR PLAN 4
Presented with black-colored output in ileostomy bag. On admission, Hgb 11.3->10.3 (baseline Hgb 13-14). Concern for possible GIB.  - s/p 1u pRBC  - continue monitor CBC, maintain active T+S, transfuse PRN  - c/w PPI gtt for now  - GI consult in AM

## 2024-08-05 NOTE — H&P ADULT - PROBLEM SELECTOR PLAN 10
No accompanying records from rehab found on admission. Prelim rec performed based on discharge med rec from recent admission  - further med rec in AM - c/w home levothyroxine (transition from PO to IV if unable to tolerate PO)  - f/u TSH/FT4

## 2024-08-05 NOTE — H&P ADULT - PROBLEM SELECTOR PLAN 3
Presented in Afib w/ RVR to HR 170s. Has hx of Afib (on eliquis at home). Suspect RVR i/s/o infection, dehydration, electrolyte abnormalities  - s/p amio 150mg IVPB x3 and started on amio gtt with improvement in BP and HR, will hold off on further amio for now  - resume home metoprolol 25mg BID for rate control  - holding home Eliquis given concerns of possible GIB  - monitor and replete electrolytes PRN  - monitoring on telemetry  - f/u EKG (none located for this admission)  - f/u TSH/FT4

## 2024-08-05 NOTE — ED ADULT NURSE REASSESSMENT NOTE - NS ED NURSE REASSESS COMMENT FT1
pt resting in bed, dtr present, NS/protonix infusing, ED attendng x 2, ED resident x 2 present for bedside POCUS

## 2024-08-05 NOTE — H&P ADULT - PROBLEM SELECTOR PLAN 8
Hx of CAD s/p PCI (?stent)  - holding home plavix  - c/w home statin  - c/w home BB as above Presented with SBP to 90s initially. Now improving. Likely multifactorial 2/2 infection/sepsis, fluid losses, and Afib w/ RVR.   - CT A/P noted unchanged indeterminate left adrenal lesion  - c/w home midodrine  - ensure HR control  - f/u infectious workup and treat C.diff as above  - monitor I/Os, fluid resuscitation as needed  - monitor BP closely

## 2024-08-05 NOTE — H&P ADULT - HISTORY OF PRESENT ILLNESS
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  She was found with oliguria for few days over the weekend for which started on IV fluid however she was noncompliant with the IV access and she pulled it out many times and finally today she was very poor appetite and became lethargic and hypotensive. She was transferred to hospital and she was found with some black output in Ileostomy. She was started on blood transfusion one unit in ER.

## 2024-08-05 NOTE — H&P ADULT - TIME BILLING
reviewing prior documentation, independently attempt to obtain history from patient, performing a physical examination, reviewing and independently interpreting tests/imaging, updating discussing the plan with the daughter, ordering medications/tests, documenting clinical information in the electronic health record, and coordinating care with staff. Additional time was spent separately on advance care planning as above. reviewing prior documentation, independently attempt to obtain history from patient, performing a physical examination, reviewing and independently interpreting tests/imaging, updating discussing the plan with the daughter, ordering medications/tests, managing multiple active issues and comorbidities, documenting clinical information in the electronic health record, and coordinating care with staff. Additional time was spent separately on advance care planning as above.

## 2024-08-05 NOTE — ED PROVIDER NOTE - PHYSICAL EXAMINATION
PHYSICAL EXAM:  GEN: uncomfortabel appearing, lethargic   HEAD: Atraumatic  CARDIAC: extremely tachycardic   RESPIRATORY: Breathing unlabored. Breath sounds clear and equal bilaterally.  ABDOMEN:  Soft, nondistended, diffuse ttp with black drainage from R sided ostomy   NEUROLOGICAL: Alert and oriented, no focal deficits, no motor or sensory deficits. CN2-12 intact. Sensation intact x4 extremities. PHYSICAL EXAM:  GEN: uncomfortabel appearing, lethargic   HEAD: Atraumatic  CARDIAC: extremely tachycardic   RESPIRATORY: Breathing unlabored. Breath sounds clear and equal bilaterally.  ABDOMEN:  Soft, nondistended, diffuse ttp with black drainage from R sided ostomy   NEUROLOGICAL: Alert and oriented, no focal deficits, no motor or sensory deficits. CN2-12 intact. Sensation intact x4 extremities.  Skin: punctate erythematous nonblanchable lesions to lower abdomen

## 2024-08-05 NOTE — H&P ADULT - CONVERSATION DETAILS
Pt presented in AMS/lethargy with multiple active issues. Chart review revealed prior MOLST indicating DNR/DNI (found on "Patient Window." Spoke to daughter, Caity, who consented to discussing code status/HCP over the phone. Daughter stated she is the official HCP for the pt and for now she would like everything done to keep pt alive. She is rescinding the DNR/DNI for now, but may reconsider reinstating it later on. For now, pt is FULL CODE.

## 2024-08-05 NOTE — ED ADULT NURSE NOTE - OBJECTIVE STATEMENT
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 On initial assessment, patient is tachycardic to 160s in AF with RVR, and soft bp. Exam notable for black stool draining from ostomy, petechial appearing rash to lower abdomen/ around ostomy site, afebrile, spo2 100% on RA. Pt. arrived with 24g in L top of hand that flushes without resistance, additional two 20g peripheral IVs placed in RUE on arrival to [facility] ED. Daughter denies fevers at rehab, states patient had amaya catheter changed 1 week ago prior to DC from hospital admission to rehab which is connected to leg bag and draining urine.

## 2024-08-05 NOTE — H&P ADULT - ASSESSMENT
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 Found to have C.diff, CHANELL, and possible anemia of acute blood loss 2/2 GIB.

## 2024-08-05 NOTE — H&P ADULT - PROBLEM SELECTOR PLAN 1
Found to have C.diff infection. Likely i/s/o recent abx use (cipro) to treat UTI.  - CT A/P showed no acute intra-abdominal pathology  - s/p Vanc 500mg PO and flagyl 500mg IVPB in ED  - c/w Vanc 125mg PO QID for now  - maintain contact precautions

## 2024-08-05 NOTE — ED PROVIDER NOTE - PROGRESS NOTE DETAILS
Attending MD Cano: Patient remains in rapid atrial fibrillation to 160s 170s, known history of atrial fibrillation on apixaban.  Presenting rapid atrial fibrillation is likely secondary to acute medical process such as GI bleeding or occult sepsis.  Targeted interventions are being performed for acute medical processes (ongoing PRBC transfusion) as well as empiric antibiotics provided.  I think we do need to pursue some careful rate control in this patient given extreme tachycardia was detrimental likely in this patient, will provide bolus of amiodarone and initial attempts for rate control. Janette Madsen PGY3 - sign out -78-year-old female with dementia, HTN, HLD, A-fib on Eliquis, CAD on Plavix, COPD, CROW, urinary retention/recurrent UTIs, hypothyroidism, recent ischemic bowel s/p ex lap with bowel resection and end ileostomy presenting with hypotension, tachycardia and dark output from ostomy.  Concern for GIB versus new A-fib.  Patient with 1 unit PRBC emergently's ordered.  Consent form signed and in chart.  Amio ordered for rate control.  Will continue to monitor. Patient persistently hypotensive and tachycardic.  Given amnio 150 x 2.  CT abdomen pelvis pending.  MICU consulted given acute renal failure, concern for GIB, persistent tachycardia and hypotension. Discussed patient with Dr. Rasheed, admit to medicine for further management. Nephrology consulted and will be by to evaluate the patient in the morning.

## 2024-08-05 NOTE — H&P ADULT - PROBLEM SELECTOR PLAN 11
- DVT ppx: hold home Eliquis for now given concerns of GIB  - Diet: NPO for now, advance as tolerated  - Dispo: pending improvement/workup. Of note, daughter stated she is not interested in having pt return to [facility] No accompanying records from rehab found on admission. Prelim rec performed based on discharge med rec from recent admission  - further med rec in AM

## 2024-08-05 NOTE — H&P ADULT - PROBLEM SELECTOR PLAN 5
On admission, SCr 3.04 (baseline SCr ~0.8-1.0 in May 2024)  - s/p 1.5L IVF in ED  - c/w 1/2 NS-bicarb gtt as below   - strict I/Os, renally dose meds, avoid nephrotoxins  - monitor SCr and electrolytes  - f/u Nephrology recs in AM On admission, SCr 3.04 (baseline SCr ~0.8-1.0 in May 2024)  - s/p 1.5L IVF in ED  - c/w 1/2 NS-bicarb gtt as below   - c/w home tamsulosin for urinary retention  - c/w indwelling amaya  - strict I/Os, renally dose meds, avoid nephrotoxins  - monitor SCr and electrolytes  - f/u Nephrology recs in AM

## 2024-08-05 NOTE — H&P ADULT - NSHPPHYSICALEXAM_GEN_ALL_CORE
PHYSICAL EXAM    Constitutional: NAD, malnourished, drowsy.   HEENT: PERRLA, EOMI, Normal Hearing, MMM  Neck: No LAD, No JVD  Back: Normal spine flexure, No CVA tenderness  Respiratory: CTAB/L   Cardiovascular: S1 and S2, RRR, no M/G/R  Gastrointestinal: BS+, soft, NT/ND  Extremities: No peripheral edema  Vascular: 2+ peripheral pulses  Neurological: A/O x 1, no focal deficits  Skin: No rashes
Vital Signs Last 24 Hrs  T(C): 35.9 (06 Aug 2024 01:40), Max: 36.7 (05 Aug 2024 19:15)  T(F): 96.6 (06 Aug 2024 01:40), Max: 98 (05 Aug 2024 19:15)  HR: 114 (06 Aug 2024 01:40) (114 - 172)  BP: 102/73 (06 Aug 2024 01:40) (94/70 - 132/76)  BP(mean): 93 (06 Aug 2024 01:20) (78 - 93)  RR: 18 (06 Aug 2024 01:40) (15 - 22)  SpO2: 100% (06 Aug 2024 01:40) (95% - 100%)    Parameters below as of 06 Aug 2024 01:40  Patient On (Oxygen Delivery Method): room air    CONSTITUTIONAL: NAD, elderly woman resting in bed, lethargic/somnolent but arousable  EYES: PERRL; sclera clear  ENMT: Mildly dry oral mucosa  NECK: Supple, no palpable masses  RESPIRATORY: Normal respiratory effort; lungs are clear to auscultation bilaterally; No wheezes or rales  CARDIOVASCULAR: Irregularly irregular; Normal S1 and S2; No murmurs, rubs, or gallops; trace b/l LE edema; Peripheral pulses are palpable bilaterally  ABDOMEN: Soft, Nontender, Nondistended; Bowel sounds present; black-colored liquid output in ileostomy bag  MUSCULOSKELETAL:  No clubbing or cyanosis of digits; No joint swelling or tenderness to palpation  PSYCH: AAOx~1 (oriented to self only); affect appropriate  NEUROLOGY: moving all extremities spontaneously; no gross sensory deficits  SKIN: erythematous rash on abdomen around ileostomy site

## 2024-08-05 NOTE — ED PROVIDER NOTE - CLINICAL SUMMARY MEDICAL DECISION MAKING FREE TEXT BOX
78-year-old female with dementia, HTN, HLD, A-fib on Eliquis, CAD on Plavix, COPD, CROW, urinary retention/recurrent UTIs, hypothyroidism, recent ischemic bowel s/p ex lap with bowel resection and end ileostomy presenting with lethargy x2d. Initially noted to be tachy to 160s in af rvr with somewhat soft bp. Exam notable for black stool draining from ostomy, petichial appearing rash to lower abdomen. Will obtain labs/imaging to eval for causes of rvr, transfuse as needed, continue to reassess, pt tba

## 2024-08-05 NOTE — H&P ADULT - PROBLEM SELECTOR PLAN 12
- DVT ppx: hold home Eliquis for now given concerns of GIB  - Diet: NPO for now, advance as tolerated  - Dispo: pending improvement/workup. Of note, daughter stated she is not interested in having pt return to [facility]

## 2024-08-05 NOTE — ED PROVIDER NOTE - OBJECTIVE STATEMENT
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 Pt found to be tachycardic to 160s in triage. Pt endorsing abdominal discomfort. Black stool noted to be draining from ostomy.

## 2024-08-05 NOTE — H&P ADULT - PROBLEM SELECTOR PLAN 7
Found with pH down to 7.28 and serum bicarb 12. Suspect multifactorial etiology of metabolic acidosis 2/2 GI losses, infection, hypotensive BPs, CHANELL.  - c/w 1/2 NS-bicarb gtt as ordered by Nephrology  - continue to monitor on BMP and VBG  - f/u Nephrology recs in AM

## 2024-08-06 ENCOUNTER — NON-APPOINTMENT (OUTPATIENT)
Age: 79
End: 2024-08-06

## 2024-08-06 DIAGNOSIS — N17.9 ACUTE KIDNEY FAILURE, UNSPECIFIED: ICD-10-CM

## 2024-08-06 DIAGNOSIS — A49.8 OTHER BACTERIAL INFECTIONS OF UNSPECIFIED SITE: ICD-10-CM

## 2024-08-06 DIAGNOSIS — N39.0 URINARY TRACT INFECTION, SITE NOT SPECIFIED: ICD-10-CM

## 2024-08-06 DIAGNOSIS — G93.41 METABOLIC ENCEPHALOPATHY: ICD-10-CM

## 2024-08-06 DIAGNOSIS — I48.91 UNSPECIFIED ATRIAL FIBRILLATION: ICD-10-CM

## 2024-08-06 DIAGNOSIS — E03.9 HYPOTHYROIDISM, UNSPECIFIED: ICD-10-CM

## 2024-08-06 DIAGNOSIS — I48.20 CHRONIC ATRIAL FIBRILLATION, UNSPECIFIED: ICD-10-CM

## 2024-08-06 DIAGNOSIS — Z79.899 OTHER LONG TERM (CURRENT) DRUG THERAPY: ICD-10-CM

## 2024-08-06 DIAGNOSIS — I95.9 HYPOTENSION, UNSPECIFIED: ICD-10-CM

## 2024-08-06 DIAGNOSIS — A04.72 ENTEROCOLITIS DUE TO CLOSTRIDIUM DIFFICILE, NOT SPECIFIED AS RECURRENT: ICD-10-CM

## 2024-08-06 DIAGNOSIS — Z29.9 ENCOUNTER FOR PROPHYLACTIC MEASURES, UNSPECIFIED: ICD-10-CM

## 2024-08-06 DIAGNOSIS — I25.10 ATHEROSCLEROTIC HEART DISEASE OF NATIVE CORONARY ARTERY WITHOUT ANGINA PECTORIS: ICD-10-CM

## 2024-08-06 DIAGNOSIS — K92.1 MELENA: ICD-10-CM

## 2024-08-06 DIAGNOSIS — E87.1 HYPO-OSMOLALITY AND HYPONATREMIA: ICD-10-CM

## 2024-08-06 DIAGNOSIS — K92.2 GASTROINTESTINAL HEMORRHAGE, UNSPECIFIED: ICD-10-CM

## 2024-08-06 DIAGNOSIS — E87.20 ACIDOSIS, UNSPECIFIED: ICD-10-CM

## 2024-08-06 LAB
ALBUMIN SERPL ELPH-MCNC: 2.5 G/DL — LOW (ref 3.3–5)
ALBUMIN SERPL ELPH-MCNC: 2.9 G/DL — LOW (ref 3.3–5)
ALP SERPL-CCNC: 54 U/L — SIGNIFICANT CHANGE UP (ref 40–120)
ALP SERPL-CCNC: 72 U/L — SIGNIFICANT CHANGE UP (ref 40–120)
ALT FLD-CCNC: 21 U/L — SIGNIFICANT CHANGE UP (ref 10–45)
ALT FLD-CCNC: 24 U/L — SIGNIFICANT CHANGE UP (ref 10–45)
ANION GAP SERPL CALC-SCNC: 14 MMOL/L — SIGNIFICANT CHANGE UP (ref 5–17)
ANION GAP SERPL CALC-SCNC: 14 MMOL/L — SIGNIFICANT CHANGE UP (ref 5–17)
ANION GAP SERPL CALC-SCNC: 17 MMOL/L — SIGNIFICANT CHANGE UP (ref 5–17)
APTT BLD: 28.8 SEC — SIGNIFICANT CHANGE UP (ref 24.5–35.6)
APTT BLD: 29.1 SEC — SIGNIFICANT CHANGE UP (ref 24.5–35.6)
AST SERPL-CCNC: 21 U/L — SIGNIFICANT CHANGE UP (ref 10–40)
AST SERPL-CCNC: 30 U/L — SIGNIFICANT CHANGE UP (ref 10–40)
BASE EXCESS BLDV CALC-SCNC: -7.9 MMOL/L — LOW (ref -2–3)
BASOPHILS # BLD AUTO: 0.01 K/UL — SIGNIFICANT CHANGE UP (ref 0–0.2)
BASOPHILS # BLD AUTO: 0.02 K/UL — SIGNIFICANT CHANGE UP (ref 0–0.2)
BASOPHILS NFR BLD AUTO: 0.1 % — SIGNIFICANT CHANGE UP (ref 0–2)
BASOPHILS NFR BLD AUTO: 0.3 % — SIGNIFICANT CHANGE UP (ref 0–2)
BILIRUB SERPL-MCNC: 0.6 MG/DL — SIGNIFICANT CHANGE UP (ref 0.2–1.2)
BILIRUB SERPL-MCNC: 0.6 MG/DL — SIGNIFICANT CHANGE UP (ref 0.2–1.2)
BUN SERPL-MCNC: 51 MG/DL — HIGH (ref 7–23)
BUN SERPL-MCNC: 54 MG/DL — HIGH (ref 7–23)
BUN SERPL-MCNC: 55 MG/DL — HIGH (ref 7–23)
CA-I SERPL-SCNC: 1.07 MMOL/L — LOW (ref 1.15–1.33)
CALCIUM SERPL-MCNC: 7.2 MG/DL — LOW (ref 8.4–10.5)
CALCIUM SERPL-MCNC: 7.7 MG/DL — LOW (ref 8.4–10.5)
CALCIUM SERPL-MCNC: 8 MG/DL — LOW (ref 8.4–10.5)
CHLORIDE BLDV-SCNC: 103 MMOL/L — SIGNIFICANT CHANGE UP (ref 96–108)
CHLORIDE SERPL-SCNC: 101 MMOL/L — SIGNIFICANT CHANGE UP (ref 96–108)
CHLORIDE SERPL-SCNC: 102 MMOL/L — SIGNIFICANT CHANGE UP (ref 96–108)
CHLORIDE SERPL-SCNC: 105 MMOL/L — SIGNIFICANT CHANGE UP (ref 96–108)
CHLORIDE UR-SCNC: <20 MMOL/L — SIGNIFICANT CHANGE UP
CO2 BLDV-SCNC: 20 MMOL/L — LOW (ref 22–26)
CO2 SERPL-SCNC: 12 MMOL/L — LOW (ref 22–31)
CO2 SERPL-SCNC: 15 MMOL/L — LOW (ref 22–31)
CO2 SERPL-SCNC: 18 MMOL/L — LOW (ref 22–31)
CREAT ?TM UR-MCNC: 111 MG/DL — SIGNIFICANT CHANGE UP
CREAT SERPL-MCNC: 2.49 MG/DL — HIGH (ref 0.5–1.3)
CREAT SERPL-MCNC: 2.81 MG/DL — HIGH (ref 0.5–1.3)
CREAT SERPL-MCNC: 2.92 MG/DL — HIGH (ref 0.5–1.3)
EGFR: 16 ML/MIN/1.73M2 — LOW
EGFR: 17 ML/MIN/1.73M2 — LOW
EGFR: 19 ML/MIN/1.73M2 — LOW
EOSINOPHIL # BLD AUTO: 0.09 K/UL — SIGNIFICANT CHANGE UP (ref 0–0.5)
EOSINOPHIL # BLD AUTO: 0.22 K/UL — SIGNIFICANT CHANGE UP (ref 0–0.5)
EOSINOPHIL NFR BLD AUTO: 0.9 % — SIGNIFICANT CHANGE UP (ref 0–6)
EOSINOPHIL NFR BLD AUTO: 2.8 % — SIGNIFICANT CHANGE UP (ref 0–6)
GAS PNL BLDA: SIGNIFICANT CHANGE UP
GAS PNL BLDV: 129 MMOL/L — LOW (ref 136–145)
GAS PNL BLDV: SIGNIFICANT CHANGE UP
GAS PNL BLDV: SIGNIFICANT CHANGE UP
GLUCOSE BLDC GLUCOMTR-MCNC: 66 MG/DL — LOW (ref 70–99)
GLUCOSE BLDC GLUCOMTR-MCNC: 67 MG/DL — LOW (ref 70–99)
GLUCOSE BLDC GLUCOMTR-MCNC: 72 MG/DL — SIGNIFICANT CHANGE UP (ref 70–99)
GLUCOSE BLDC GLUCOMTR-MCNC: 83 MG/DL — SIGNIFICANT CHANGE UP (ref 70–99)
GLUCOSE BLDC GLUCOMTR-MCNC: 94 MG/DL — SIGNIFICANT CHANGE UP (ref 70–99)
GLUCOSE BLDV-MCNC: 69 MG/DL — LOW (ref 70–99)
GLUCOSE SERPL-MCNC: 72 MG/DL — SIGNIFICANT CHANGE UP (ref 70–99)
GLUCOSE SERPL-MCNC: 74 MG/DL — SIGNIFICANT CHANGE UP (ref 70–99)
GLUCOSE SERPL-MCNC: 74 MG/DL — SIGNIFICANT CHANGE UP (ref 70–99)
HCO3 BLDV-SCNC: 18 MMOL/L — LOW (ref 22–29)
HCT VFR BLD CALC: 26 % — LOW (ref 34.5–45)
HCT VFR BLD CALC: 32.7 % — LOW (ref 34.5–45)
HCT VFR BLDA CALC: 33 % — LOW (ref 34.5–46.5)
HGB BLD CALC-MCNC: 10.9 G/DL — LOW (ref 11.7–16.1)
HGB BLD-MCNC: 11 G/DL — LOW (ref 11.5–15.5)
HGB BLD-MCNC: 8.8 G/DL — LOW (ref 11.5–15.5)
IMM GRANULOCYTES NFR BLD AUTO: 0.8 % — SIGNIFICANT CHANGE UP (ref 0–0.9)
IMM GRANULOCYTES NFR BLD AUTO: 1 % — HIGH (ref 0–0.9)
INR BLD: 2.03 RATIO — HIGH (ref 0.85–1.18)
INR BLD: 2.11 RATIO — HIGH (ref 0.85–1.18)
LACTATE BLDV-MCNC: 2.1 MMOL/L — HIGH (ref 0.5–2)
LYMPHOCYTES # BLD AUTO: 1.7 K/UL — SIGNIFICANT CHANGE UP (ref 1–3.3)
LYMPHOCYTES # BLD AUTO: 1.96 K/UL — SIGNIFICANT CHANGE UP (ref 1–3.3)
LYMPHOCYTES # BLD AUTO: 18.9 % — SIGNIFICANT CHANGE UP (ref 13–44)
LYMPHOCYTES # BLD AUTO: 21.5 % — SIGNIFICANT CHANGE UP (ref 13–44)
MAGNESIUM SERPL-MCNC: 1.3 MG/DL — LOW (ref 1.6–2.6)
MAGNESIUM SERPL-MCNC: 1.6 MG/DL — SIGNIFICANT CHANGE UP (ref 1.6–2.6)
MCHC RBC-ENTMCNC: 30.7 PG — SIGNIFICANT CHANGE UP (ref 27–34)
MCHC RBC-ENTMCNC: 31.7 PG — SIGNIFICANT CHANGE UP (ref 27–34)
MCHC RBC-ENTMCNC: 33.6 GM/DL — SIGNIFICANT CHANGE UP (ref 32–36)
MCHC RBC-ENTMCNC: 33.8 GM/DL — SIGNIFICANT CHANGE UP (ref 32–36)
MCV RBC AUTO: 90.6 FL — SIGNIFICANT CHANGE UP (ref 80–100)
MCV RBC AUTO: 94.2 FL — SIGNIFICANT CHANGE UP (ref 80–100)
MONOCYTES # BLD AUTO: 0.62 K/UL — SIGNIFICANT CHANGE UP (ref 0–0.9)
MONOCYTES # BLD AUTO: 0.86 K/UL — SIGNIFICANT CHANGE UP (ref 0–0.9)
MONOCYTES NFR BLD AUTO: 7.8 % — SIGNIFICANT CHANGE UP (ref 2–14)
MONOCYTES NFR BLD AUTO: 8.3 % — SIGNIFICANT CHANGE UP (ref 2–14)
NEUTROPHILS # BLD AUTO: 5.28 K/UL — SIGNIFICANT CHANGE UP (ref 1.8–7.4)
NEUTROPHILS # BLD AUTO: 7.36 K/UL — SIGNIFICANT CHANGE UP (ref 1.8–7.4)
NEUTROPHILS NFR BLD AUTO: 66.8 % — SIGNIFICANT CHANGE UP (ref 43–77)
NEUTROPHILS NFR BLD AUTO: 70.8 % — SIGNIFICANT CHANGE UP (ref 43–77)
NRBC # BLD: 0 /100 WBCS — SIGNIFICANT CHANGE UP (ref 0–0)
NRBC # BLD: 0 /100 WBCS — SIGNIFICANT CHANGE UP (ref 0–0)
PCO2 BLDV: 39 MMHG — SIGNIFICANT CHANGE UP (ref 39–42)
PH BLDV: 7.28 — LOW (ref 7.32–7.43)
PHOSPHATE SERPL-MCNC: 2 MG/DL — LOW (ref 2.5–4.5)
PHOSPHATE SERPL-MCNC: 2.6 MG/DL — SIGNIFICANT CHANGE UP (ref 2.5–4.5)
PLATELET # BLD AUTO: 126 K/UL — LOW (ref 150–400)
PLATELET # BLD AUTO: 143 K/UL — LOW (ref 150–400)
PO2 BLDV: 24 MMHG — LOW (ref 25–45)
POTASSIUM BLDV-SCNC: 4.6 MMOL/L — SIGNIFICANT CHANGE UP (ref 3.5–5.1)
POTASSIUM SERPL-MCNC: 3.8 MMOL/L — SIGNIFICANT CHANGE UP (ref 3.5–5.3)
POTASSIUM SERPL-MCNC: 4 MMOL/L — SIGNIFICANT CHANGE UP (ref 3.5–5.3)
POTASSIUM SERPL-MCNC: 4.4 MMOL/L — SIGNIFICANT CHANGE UP (ref 3.5–5.3)
POTASSIUM SERPL-SCNC: 3.8 MMOL/L — SIGNIFICANT CHANGE UP (ref 3.5–5.3)
POTASSIUM SERPL-SCNC: 4 MMOL/L — SIGNIFICANT CHANGE UP (ref 3.5–5.3)
POTASSIUM SERPL-SCNC: 4.4 MMOL/L — SIGNIFICANT CHANGE UP (ref 3.5–5.3)
PROT SERPL-MCNC: 4.5 G/DL — LOW (ref 6–8.3)
PROT SERPL-MCNC: 5.4 G/DL — LOW (ref 6–8.3)
PROTHROM AB SERPL-ACNC: 20.9 SEC — HIGH (ref 9.5–13)
PROTHROM AB SERPL-ACNC: 21.7 SEC — HIGH (ref 9.5–13)
RBC # BLD: 2.87 M/UL — LOW (ref 3.8–5.2)
RBC # BLD: 3.47 M/UL — LOW (ref 3.8–5.2)
RBC # FLD: 16.1 % — HIGH (ref 10.3–14.5)
RBC # FLD: 16.2 % — HIGH (ref 10.3–14.5)
SAO2 % BLDV: 37.2 % — LOW (ref 67–88)
SODIUM SERPL-SCNC: 130 MMOL/L — LOW (ref 135–145)
SODIUM SERPL-SCNC: 134 MMOL/L — LOW (ref 135–145)
SODIUM SERPL-SCNC: 134 MMOL/L — LOW (ref 135–145)
SODIUM UR-SCNC: <5 MMOL/L — SIGNIFICANT CHANGE UP
T4 FREE SERPL-MCNC: 0.8 NG/DL — LOW (ref 0.9–1.8)
TSH SERPL-MCNC: 5.05 UIU/ML — HIGH (ref 0.27–4.2)
UUN UR-MCNC: 790 MG/DL — SIGNIFICANT CHANGE UP
WBC # BLD: 10.38 K/UL — SIGNIFICANT CHANGE UP (ref 3.8–10.5)
WBC # BLD: 7.9 K/UL — SIGNIFICANT CHANGE UP (ref 3.8–10.5)
WBC # FLD AUTO: 10.38 K/UL — SIGNIFICANT CHANGE UP (ref 3.8–10.5)
WBC # FLD AUTO: 7.9 K/UL — SIGNIFICANT CHANGE UP (ref 3.8–10.5)

## 2024-08-06 PROCEDURE — 99284 EMERGENCY DEPT VISIT MOD MDM: CPT | Mod: GC

## 2024-08-06 PROCEDURE — 99222 1ST HOSP IP/OBS MODERATE 55: CPT

## 2024-08-06 RX ORDER — SODIUM PHOSPHATE, MONOBASIC, MONOHYDRATE AND SODIUM PHOSPHATE, DIBASIC ANHYDROUS 276; 142 MG/ML; MG/ML
15 INJECTION, SOLUTION INTRAVENOUS ONCE
Refills: 0 | Status: COMPLETED | OUTPATIENT
Start: 2024-08-06 | End: 2024-08-07

## 2024-08-06 RX ORDER — DEXTROSE 15 G/33 G
12.5 GEL IN PACKET (GRAM) ORAL ONCE
Refills: 0 | Status: COMPLETED | OUTPATIENT
Start: 2024-08-06 | End: 2024-08-06

## 2024-08-06 RX ORDER — DIGOXIN 0.12 MG/1
250 TABLET ORAL ONCE
Refills: 0 | Status: DISCONTINUED | OUTPATIENT
Start: 2024-08-06 | End: 2024-08-09

## 2024-08-06 RX ORDER — OXYCODONE HYDROCHLORIDE 15 MG/1
150 TABLET ORAL ONCE
Refills: 0 | Status: COMPLETED | OUTPATIENT
Start: 2024-08-06 | End: 2024-08-06

## 2024-08-06 RX ORDER — POLYETHYLENE GLYCOL 3350, SODIUM SULFATE ANHYDROUS, SODIUM BICARBONATE, SODIUM CHLORIDE, POTASSIUM CHLORIDE 236; 22.74; 6.74; 5.86; 2.97 G/4L; G/4L; G/4L; G/4L; G/4L
1000 POWDER, FOR SOLUTION ORAL ONCE
Refills: 0 | Status: COMPLETED | OUTPATIENT
Start: 2024-08-06 | End: 2024-08-06

## 2024-08-06 RX ORDER — ACETAMINOPHEN 325 MG/1
650 TABLET ORAL EVERY 6 HOURS
Refills: 0 | Status: DISCONTINUED | OUTPATIENT
Start: 2024-08-06 | End: 2024-08-21

## 2024-08-06 RX ORDER — METOPROLOL TARTRATE 100 MG/1
25 TABLET ORAL
Refills: 0 | Status: DISCONTINUED | OUTPATIENT
Start: 2024-08-06 | End: 2024-08-06

## 2024-08-06 RX ORDER — SODIUM BICARBONATE 84 MG/ML
650 INJECTION, SOLUTION INTRAVENOUS THREE TIMES A DAY
Refills: 0 | Status: DISCONTINUED | OUTPATIENT
Start: 2024-08-06 | End: 2024-08-08

## 2024-08-06 RX ORDER — DIGOXIN 0.12 MG/1
125 TABLET ORAL ONCE
Refills: 0 | Status: DISCONTINUED | OUTPATIENT
Start: 2024-08-06 | End: 2024-08-06

## 2024-08-06 RX ORDER — ASCORBIC ACID/ASCORBATE SODIUM 500 MG
500 TABLET,CHEWABLE ORAL DAILY
Refills: 0 | Status: DISCONTINUED | OUTPATIENT
Start: 2024-08-06 | End: 2024-08-17

## 2024-08-06 RX ORDER — VANCOMYCIN/0.9 % SOD CHLORIDE 1.75G/25
125 PLASTIC BAG, INJECTION (ML) INTRAVENOUS EVERY 6 HOURS
Refills: 0 | Status: DISCONTINUED | OUTPATIENT
Start: 2024-08-06 | End: 2024-08-07

## 2024-08-06 RX ORDER — POTASSIUM CHLORIDE 10 MEQ
10 TABLET, EXT RELEASE, PARTICLES/CRYSTALS ORAL
Refills: 0 | Status: COMPLETED | OUTPATIENT
Start: 2024-08-06 | End: 2024-08-06

## 2024-08-06 RX ORDER — MIDODRINE HYDROCHLORIDE 5 MG/1
5 TABLET ORAL THREE TIMES A DAY
Refills: 0 | Status: DISCONTINUED | OUTPATIENT
Start: 2024-08-06 | End: 2024-08-07

## 2024-08-06 RX ORDER — TAMSULOSIN HYDROCHLORIDE 0.4 MG/1
0.4 CAPSULE ORAL AT BEDTIME
Refills: 0 | Status: DISCONTINUED | OUTPATIENT
Start: 2024-08-06 | End: 2024-08-06

## 2024-08-06 RX ORDER — SODIUM CHLORIDE 9 MG/ML
1000 INJECTION INTRAMUSCULAR; INTRAVENOUS; SUBCUTANEOUS ONCE
Refills: 0 | Status: COMPLETED | OUTPATIENT
Start: 2024-08-06 | End: 2024-08-06

## 2024-08-06 RX ORDER — LEVOTHYROXINE SODIUM 100 MCG
125 TABLET ORAL DAILY
Refills: 0 | Status: DISCONTINUED | OUTPATIENT
Start: 2024-08-06 | End: 2024-08-07

## 2024-08-06 RX ORDER — NYSTATIN 100000/G
1 CREAM (GRAM) TOPICAL
Refills: 0 | Status: DISCONTINUED | OUTPATIENT
Start: 2024-08-06 | End: 2024-08-06

## 2024-08-06 RX ADMIN — Medication 125 MILLIGRAM(S): at 10:52

## 2024-08-06 RX ADMIN — SODIUM BICARBONATE 650 MILLIGRAM(S): 84 INJECTION, SOLUTION INTRAVENOUS at 06:54

## 2024-08-06 RX ADMIN — METOPROLOL TARTRATE 25 MILLIGRAM(S): 100 TABLET ORAL at 06:54

## 2024-08-06 RX ADMIN — Medication 125 MILLIGRAM(S): at 04:45

## 2024-08-06 RX ADMIN — Medication 100 MILLIEQUIVALENT(S): at 02:30

## 2024-08-06 RX ADMIN — OXYCODONE HYDROCHLORIDE 600 MILLIGRAM(S): 15 TABLET ORAL at 10:53

## 2024-08-06 RX ADMIN — SODIUM BICARBONATE 650 MILLIGRAM(S): 84 INJECTION, SOLUTION INTRAVENOUS at 15:00

## 2024-08-06 RX ADMIN — Medication 80 MILLIGRAM(S): at 23:01

## 2024-08-06 RX ADMIN — MIDODRINE HYDROCHLORIDE 5 MILLIGRAM(S): 5 TABLET ORAL at 11:22

## 2024-08-06 RX ADMIN — Medication 12.5 GRAM(S): at 06:53

## 2024-08-06 RX ADMIN — Medication 100 MILLILITER(S): at 12:31

## 2024-08-06 RX ADMIN — Medication 125 MILLIGRAM(S): at 23:02

## 2024-08-06 RX ADMIN — Medication 25 GRAM(S): at 11:49

## 2024-08-06 RX ADMIN — MIDODRINE HYDROCHLORIDE 5 MILLIGRAM(S): 5 TABLET ORAL at 06:54

## 2024-08-06 RX ADMIN — Medication 75 MILLILITER(S): at 02:20

## 2024-08-06 RX ADMIN — Medication 100 MILLIEQUIVALENT(S): at 03:40

## 2024-08-06 RX ADMIN — MIDODRINE HYDROCHLORIDE 5 MILLIGRAM(S): 5 TABLET ORAL at 20:01

## 2024-08-06 RX ADMIN — Medication 125 MICROGRAM(S): at 06:54

## 2024-08-06 RX ADMIN — Medication 1 APPLICATION(S): at 06:54

## 2024-08-06 RX ADMIN — SODIUM BICARBONATE 650 MILLIGRAM(S): 84 INJECTION, SOLUTION INTRAVENOUS at 23:01

## 2024-08-06 RX ADMIN — Medication 100 MILLIEQUIVALENT(S): at 04:45

## 2024-08-06 RX ADMIN — Medication 10 MG/HR: at 02:08

## 2024-08-06 RX ADMIN — SODIUM CHLORIDE 1000 MILLILITER(S): 9 INJECTION INTRAMUSCULAR; INTRAVENOUS; SUBCUTANEOUS at 16:03

## 2024-08-06 NOTE — CONSULT NOTE ADULT - SUBJECTIVE AND OBJECTIVE BOX
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 Limited hx obtainable from pt, was AAOx~1 on encounter and very lethargic/somnolent, but arousable and seemed to deny pain anywhere. Collateral hx obtained from chart review. During recent admission (7/21/24-7/29/24), she was treated for UTI/sepsis and ultimately discharged to rehab to completed a 5-day course of ciprofloxacin (last dose 7/30/24). At rehab, she was reportedly found to have oliguria for a few days over the weekend for which she was started on IV fluids, however she was noncompliant with the IV access and she pulled it out many times. She has had very poor appetite and became lethargic and hypotensive. She was subsequently transferred to hospital where she was found to have black-colored output in ileostomy bag.     In ED: Afebrile, HR 120s-170s (Afib), SBP 90s-130s, RR 15-22, sating % on RA.  Labs notable for Hgb 11.3->10.3, Na 133->122, SCr 3.04->2.58; lactated 4.7->2.8, blood glucose to 400s but FS 100s. Found to be C.diff positive. UA not best sample with 9 epithelial cells, but noted +LE, +bacteria, +WBC, neg nitrite. CT A/P showed no acute intra-abdominal pathology and unchanged indeterminate left adrenal lesion. Received Vanc 500mg PO, Flagyl 500mg IVPB, amio 150mg IVPB x3, ofirmev 1g, 1.5L IVF, and 1u pRBC. Also started on amio gtt and protonix gtt. Nephrology and MICU were consulted. Admitted to Medicine for further management. (05 Aug 2024 23:46)        N/V/D,  BM/ Flatus,   NGT,     palp/ sob/dyspnea/ cp,       F/C/S  Wound consult requested by team to assist w/ management of      wound/ pressure injury.   Pt (unable to)  c/o pain, drainage, odor, color change,  or worsening swelling. Offloading and pericare initiated upon admission as pt Increasingly sedentary 2/2 to illness. Pt is Incontinent of urine & stool. (+)amaya/ ostomy.   No h/o bites, scratches, falls, trauma.  Pt seen by Wound RN  CAV[facility]ON Advance/  Feliberto,TRIAD/ Kapil/ medihoney/ Allevyn foam/ dakins/ Adaptic/ DSD recommended used at home/ while awaiting consult.  Appetite good/ decreased.  weight loss.  S&S / RD consult appreciated All questions asked and answered to pt's and family's expressed understanding and satisfaction.    Current Diet: Diet, NPO:   Except Medications (08-06-24 @ 04:21)      PAST MEDICAL & SURGICAL HISTORY:  Hypertension      Hypothyroid      Osteoarthritis  knees, back      CAD (coronary artery disease)      CROW (obstructive sleep apnea)  non complaint on CPAP      History of MI (myocardial infarction)  h/o previous MI in 2004 prompted PTCA  with stenting x 2 vessels   last stress/ echo 2019      Heart murmur  dx in childhood      Bilateral hearing loss, unspecified hearing loss type  bilateral aids      Obesity      Mixed stress and urge urinary incontinence      Overactive bladder      S/P ORIF (open reduction internal fixation) fracture  left hip 1962      S/P appendectomy  30 plus years      S/P knee replacement  left 2000      Stented coronary artery  2004 X 2 STENTS      S/P laparotomy  due to adhesions, 30 years ago      H/O dilation and curettage  2/2019 Benign polyp          REVIEW OF SYSTEMS: Pt unable to offer  General/ Breast/ Skin/Vasc/ Neuro/ MSK: see HPI  All other systems negative    MEDICATIONS  (STANDING):  aMIOdarone Infusion 1 mG/Min (33.3 mL/Hr) IV Continuous <Continuous>  ascorbic acid 500 milliGRAM(s) Oral daily  atorvastatin 80 milliGRAM(s) Oral at bedtime  digoxin     Tablet 125 MICROGram(s) Oral once  levothyroxine 125 MICROGram(s) Oral daily  metoprolol tartrate 25 milliGRAM(s) Oral two times a day  midodrine. 5 milliGRAM(s) Oral three times a day  multivitamin 1 Tablet(s) Oral daily  nystatin Powder 1 Application(s) Topical two times a day  pantoprazole Infusion 8 mG/Hr (10 mL/Hr) IV Continuous <Continuous>  sodium bicarbonate 650 milliGRAM(s) Oral three times a day  sodium chloride 0.45% 1000 milliLiter(s) (100 mL/Hr) IV Continuous <Continuous>  tamsulosin 0.4 milliGRAM(s) Oral at bedtime  vancomycin    Solution 125 milliGRAM(s) Oral every 6 hours    MEDICATIONS  (PRN):  acetaminophen     Tablet .. 650 milliGRAM(s) Oral every 6 hours PRN Temp greater or equal to 38C (100.4F), Mild Pain (1 - 3)      Allergies    penicillin (Hives)    Intolerances        SOCIAL HISTORY:  / /single/ ; (+)HHA/ lives in SNF; Former smoker, No current/ Denies smoking, ETOH, drugs    FAMILY HISTORY:   no h/o PVD or wound healing or skin/ significant problems    PHYSICAL EXAM:  Vital Signs Last 24 Hrs  T(C): 36.4 (06 Aug 2024 11:39), Max: 36.7 (05 Aug 2024 19:15)  T(F): 97.6 (06 Aug 2024 11:39), Max: 98 (05 Aug 2024 19:15)  HR: 112 (06 Aug 2024 11:39) (112 - 172)  BP: 88/60 (06 Aug 2024 11:39) (88/60 - 115/81)  BP(mean): 93 (06 Aug 2024 01:20) (78 - 93)  RR: 18 (06 Aug 2024 11:39) (15 - 22)  SpO2: 97% (06 Aug 2024 11:39) (95% - 100%)    Parameters below as of 06 Aug 2024 11:39  Patient On (Oxygen Delivery Method): room air        NAD, Guarded but stable,  A&Ox3/ Alert/ Confused  cachectic/ thin, MO/ Obese, frail,  WD/ WN/ WG,  Disheveled  Total Care Sport/ Versa Care P500 / Envella Progressa bed     HEENT:  NC/AT, PERRL, EOMI, sclera clear, mucosa moist, throat clear, trachea midline, neck supple, trach  Respiratory: nonlabored w/ equal chest rise  Gastrointestinal: soft NT/ND (+)BS  (+)PEG (+)ostomy (+)NGT  : (+)amaya/ purewick/ condom cath  Neurology:  weakened strength & sensation grossly intact, paraesthesia  nonverbal, no follow commands, paraplegic  Psych: calm/ appropriate/ flat affect/ easily agitated/ restless/ anxious/ difficult to assess  Musculoskeletal:  limited stiff / p/FROM, no deformities/ contractures  Vascular: BLE equally warm/ cool,  no cyanosis, clubbing, edema nor acute ischemia           >LE //BLE edema equal           BLE DP/PT pulses palpable          BLE hemosiderin staining/ varicose veins  Skin:  moist w/ good turgor  thin, dry, pale, frail,  ecchymosis w/o hematoma  blistering  or serosanguinous drainage  No odor, erythema, increased warmth, tenderness, induration, fluctuance, nor crepitus    LABS/ CULTURES/ RADIOLOGY:                        11.0   10.38 )-----------( 143      ( 06 Aug 2024 06:05 )             32.7       130  |  101  |  55  ----------------------------<  74      [08-06-24 @ 06:05]  4.4   |  12  |  2.92        Ca     8.0     [08-06-24 @ 06:05]      Mg     1.3     [08-06-24 @ 06:05]      Phos  2.6     [08-06-24 @ 06:05]    TPro  5.4  /  Alb  2.9  /  TBili  0.6  /  DBili  x   /  AST  30  /  ALT  24  /  AlkPhos  72  [08-06-24 @ 06:05]    PT/INR: PT 31.7 , INR 2.97       [08-05-24 @ 15:05]  PTT: 29.4       [08-05-24 @ 15:05]        A1C with Estimated Average Glucose Result: 5.3 % (05-16-24 @ 07:35)  A1C with Estimated Average Glucose Result: 5.3 % (04-29-24 @ 06:51)         Wound SURGERY CONSULT NOTE    HPI:  78F w/ hx of Afib (on Eliquis), CAD (on plavix), MCI/dementia, 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 Limited hx obtainable from pt, was AAOx~1 on encounter and very lethargic/somnolent, but arousable and seemed to deny pain anywhere. Collateral hx obtained from chart review. During recent admission (7/21/24-7/29/24), she was treated for UTI/sepsis and ultimately discharged to rehab to completed a 5-day course of ciprofloxacin (last dose 7/30/24). At rehab, she was reportedly found to have oliguria for a few days over the weekend for which she was started on IV fluids, however she was noncompliant with the IV access and she pulled it out many times. She has had very poor appetite and became lethargic and hypotensive. She was subsequently transferred to hospital where she was found to have black-colored output in ileostomy bag.     In ED: Afebrile, HR 120s-170s (Afib), SBP 90s-130s, RR 15-22, sating % on RA.  Labs notable for Hgb 11.3->10.3, Na 133->122, SCr 3.04->2.58; lactated 4.7->2.8, blood glucose to 400s but FS 100s. Found to be C.diff positive. UA not best sample with 9 epithelial cells, but noted +LE, +bacteria, +WBC, neg nitrite. CT A/P showed no acute intra-abdominal pathology and unchanged indeterminate left adrenal lesion. Received Vanc 500mg PO, Flagyl 500mg IVPB, amio 150mg IVPB x3, ofirmev 1g, 1.5L IVF, and 1u pRBC. Also started on amio gtt and protonix gtt. Nephrology and MICU were consulted. Admitted to Medicine for further management.   Wound consult requested by team to assist w/ management of      wound.   Pt w/p c/o pain, drainage, odor, color change,  or worsening swelling. Offloading and pericare initiated upon admission as pt Increasingly sedentary 2/2 to illness. Pt is Incontinent of urine. (+) ostomy.   No h/o bites, scratches, falls, trauma.  Pt well known to Ostomy team.  Appetite usually good w/o   weight loss.      Current Diet: Diet, NPO:   Except Medications (08-06-24 @ 04:21)      PAST MEDICAL & SURGICAL HISTORY:  Hypertension    Hypothyroid    Osteoarthritis, knees, back    CAD (coronary artery disease)    CROW (obstructive sleep apnea)  non complaint on CPAP    MI (myocardial infarction)  s/p PTCA  with stenting x 2 vessels     Heart murmur    Bilateral hearing loss, unspecified hearing loss type  bilateral hearing aids    Obesity    Mixed stress and urge urinary incontinence  Overactive bladder    S/P Lt hip ORIF (open reduction internal fixation) fracture    S/P appendectomy    S/P Lt knee replacement    S/P laparotomy, GRADY    s/p dilation and curettage, Benign polyp      REVIEW OF SYSTEMS: Pt unable to offer      MEDICATIONS  (STANDING):  aMIOdarone Infusion 1 mG/Min (33.3 mL/Hr) IV Continuous <Continuous>  ascorbic acid 500 milliGRAM(s) Oral daily  atorvastatin 80 milliGRAM(s) Oral at bedtime  digoxin Tablet 125 MICROGram(s) Oral once  levothyroxine 125 MICROGram(s) Oral daily  metoprolol tartrate 25 milliGRAM(s) Oral two times a day  midodrine. 5 milliGRAM(s) Oral three times a day  multivitamin 1 Tablet(s) Oral daily  nystatin Powder 1 Application(s) Topical two times a day  pantoprazole Infusion 8 mG/Hr (10 mL/Hr) IV Continuous <Continuous>  sodium bicarbonate 650 milliGRAM(s) Oral three times a day  sodium chloride 0.45% 1000 milliLiter(s) (100 mL/Hr) IV Continuous <Continuous>  tamsulosin 0.4 milliGRAM(s) Oral at bedtime  vancomycin Solution 125 milliGRAM(s) Oral every 6 hours    MEDICATIONS  (PRN):  acetaminophen Tablet 650 milliGRAM(s) Oral every 6 hours PRN Temp greater or equal to 38C (100.4F), Mild Pain (1 - 3)      Allergies  penicillin (Hives)    SOCIAL HISTORY:   lives in SNF; No smoking, ETOH, drugs    FAMILY HISTORY: no h/o significant problems    PHYSICAL EXAM:  Vital Signs Last 24 Hrs  T(C): 36.4 (06 Aug 2024 11:39), Max: 36.7 (05 Aug 2024 19:15)  T(F): 97.6 (06 Aug 2024 11:39), Max: 98 (05 Aug 2024 19:15)  HR: 112 (06 Aug 2024 11:39) (112 - 172)  BP: 88/60 (06 Aug 2024 11:39) (88/60 - 115/81)  BP(mean): 93 (06 Aug 2024 01:20) (78 - 93)  RR: 18 (06 Aug 2024 11:39) (15 - 22)  SpO2: 97% (06 Aug 2024 11:39) (95% - 100%)    Parameters below as of 06 Aug 2024 11:39  Patient On (Oxygen Delivery Method): room air      NAD,  Confused, frail,  WD/ WN/ WG,   Versa Care P500 bed  HEENT: NC/AT, EOMI, sclera clear, mucosa moist, throat clear, trachea midline, neck supple  Respiratory: nonlabored w/ equal chest rise  Gastrointestinal: soft NT/ND  (+)ostomy   Neurology:  weakened strength & sensation grossly intact, no follow commands  Psych: calm/ appropriate  Musculoskeletal:  FROM, no deformities/ contractures  Vascular: BLE equally warm,  no cyanosis, clubbing, edema nor acute ischemia        (+) BLE DP pulses palpable        BLE hemosiderin staining w/ varicose veins    Bilateral heels w/ purple skin changes DTI         1.5cm x 3cm w/o blistering or drainage  No odor, erythema, increased warmth, tenderness, induration, fluctuance, nor crepitus  Skin: thin, dry, pale, frail,  ecchymosis w/o hematoma  Bilateral buttocks into gluteal cleft mild blanchable erythema  w/o blistering  or serosanguinous drainage  No odor, erythema, increased warmth, tenderness, induration, fluctuance, nor crepitus    LABS/ CULTURES/ RADIOLOGY:                        11.0   10.38 )-----------( 143      ( 06 Aug 2024 06:05 )             32.7       130  |  101  |  55  ----------------------------<  74      [08-06-24 @ 06:05]  4.4   |  12  |  2.92        Ca     8.0     [08-06-24 @ 06:05]      Mg     1.3     [08-06-24 @ 06:05]      Phos  2.6     [08-06-24 @ 06:05]    TPro  5.4  /  Alb  2.9  /  TBili  0.6  /  DBili  x   /  AST  30  /  ALT  24  /  AlkPhos  72  [08-06-24 @ 06:05]    PT/INR: PT 31.7 , INR 2.97       [08-05-24 @ 15:05]  PTT: 29.4       [08-05-24 @ 15:05]        A1C with Estimated Average Glucose Result: 5.3 % (05-16-24 @ 07:35)  A1C with Estimated Average Glucose Result: 5.3 % (04-29-24 @ 06:51)         Wound SURGERY CONSULT NOTE    HPI:  78F w/ hx of Afib (on Eliquis), CAD (on plavix), MCI/dementia, 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 Limited hx obtainable from pt, was AAOx~1 on encounter and very lethargic/somnolent, but arousable and seemed to deny pain anywhere. Collateral hx obtained from chart review. During recent admission (7/21/24-7/29/24), she was treated for UTI/sepsis and ultimately discharged to rehab to completed a 5-day course of ciprofloxacin (last dose 7/30/24). At rehab, she was reportedly found to have oliguria for a few days over the weekend for which she was started on IV fluids, however she was noncompliant with the IV access and she pulled it out many times. She has had very poor appetite and became lethargic and hypotensive. She was subsequently transferred to hospital where she was found to have black-colored output in ileostomy bag.     In ED: Afebrile, HR 120s-170s (Afib), SBP 90s-130s, RR 15-22, sating % on RA.  Labs notable for Hgb 11.3->10.3, Na 133->122, SCr 3.04->2.58; lactated 4.7->2.8, blood glucose to 400s but FS 100s. Found to be C.diff positive. UA not best sample with 9 epithelial cells, but noted +LE, +bacteria, +WBC, neg nitrite. CT A/P showed no acute intra-abdominal pathology and unchanged indeterminate left adrenal lesion. Received Vanc 500mg PO, Flagyl 500mg IVPB, amio 150mg IVPB x3, ofirmev 1g, 1.5L IVF, and 1u pRBC. Also started on amio gtt and protonix gtt. Nephrology and MICU were consulted. Admitted to Medicine for further management.   Wound consult requested by team to assist w/ management of      wound.   Pt w/p c/o pain, drainage, odor, color change,  or worsening swelling. Offloading and pericare initiated upon admission as pt Increasingly sedentary 2/2 to illness. Pt is Incontinent of urine. (+) ostomy.   No h/o bites, scratches, falls, trauma.  Pt well known to Ostomy team.  Appetite usually good w/o   weight loss.      Current Diet: Diet, NPO:   Except Medications (08-06-24 @ 04:21)      PAST MEDICAL & SURGICAL HISTORY:  Hypertension    Hypothyroid    Osteoarthritis, knees, back    CAD (coronary artery disease)    CROW (obstructive sleep apnea)  non complaint on CPAP    MI (myocardial infarction)  s/p PTCA  with stenting x 2 vessels     Heart murmur    Bilateral hearing loss, unspecified hearing loss type  bilateral hearing aids    Obesity    Mixed stress and urge urinary incontinence  Overactive bladder    S/P Lt hip ORIF (open reduction internal fixation) fracture    S/P appendectomy    S/P Lt knee replacement    S/P laparotomy, GRADY    s/p dilation and curettage, Benign polyp      REVIEW OF SYSTEMS: Pt unable to offer      MEDICATIONS  (STANDING):  aMIOdarone Infusion 1 mG/Min (33.3 mL/Hr) IV Continuous <Continuous>  ascorbic acid 500 milliGRAM(s) Oral daily  atorvastatin 80 milliGRAM(s) Oral at bedtime  digoxin Tablet 125 MICROGram(s) Oral once  levothyroxine 125 MICROGram(s) Oral daily  metoprolol tartrate 25 milliGRAM(s) Oral two times a day  midodrine. 5 milliGRAM(s) Oral three times a day  multivitamin 1 Tablet(s) Oral daily  nystatin Powder 1 Application(s) Topical two times a day  pantoprazole Infusion 8 mG/Hr (10 mL/Hr) IV Continuous <Continuous>  sodium bicarbonate 650 milliGRAM(s) Oral three times a day  sodium chloride 0.45% 1000 milliLiter(s) (100 mL/Hr) IV Continuous <Continuous>  tamsulosin 0.4 milliGRAM(s) Oral at bedtime  vancomycin Solution 125 milliGRAM(s) Oral every 6 hours    MEDICATIONS  (PRN):  acetaminophen Tablet 650 milliGRAM(s) Oral every 6 hours PRN Temp greater or equal to 38C (100.4F), Mild Pain (1 - 3)      Allergies  penicillin (Hives)    SOCIAL HISTORY:   lives in SNF; No smoking, ETOH, drugs    FAMILY HISTORY: no h/o significant problems    PHYSICAL EXAM:  Vital Signs Last 24 Hrs  T(C): 36.4 (06 Aug 2024 11:39), Max: 36.7 (05 Aug 2024 19:15)  T(F): 97.6 (06 Aug 2024 11:39), Max: 98 (05 Aug 2024 19:15)  HR: 112 (06 Aug 2024 11:39) (112 - 172)  BP: 88/60 (06 Aug 2024 11:39) (88/60 - 115/81)  BP(mean): 93 (06 Aug 2024 01:20) (78 - 93)  RR: 18 (06 Aug 2024 11:39) (15 - 22)  SpO2: 97% (06 Aug 2024 11:39) (95% - 100%)    Parameters below as of 06 Aug 2024 11:39  Patient On (Oxygen Delivery Method): room air      NAD,  Confused, frail,  WD/ WN/ WG,   Versa Care P500 bed  HEENT: NC/AT, EOMI, sclera clear, mucosa moist, throat clear, trachea midline, neck supple  Respiratory: nonlabored w/ equal chest rise  Gastrointestinal: soft NT/ND  (+)ostomy   Neurology:  weakened strength & sensation grossly intact, no follow commands  Psych: calm/ appropriate  Musculoskeletal:  FROM, no deformities/ contractures  Vascular: BLE equally warm,  no cyanosis, clubbing, edema nor acute ischemia        (+) BLE DP pulses palpable        BLE hemosiderin staining w/ varicose veins       Right heel w/ purple skin changes DTI         1.5cm x 3cm w/o blistering or drainage  No odor, erythema, increased warmth, tenderness, induration, fluctuance, nor crepitus  Skin: thin, dry, pale, frail,  ecchymosis w/o hematoma  Bilateral buttocks into gluteal cleft mild blanchable erythema  w/o blistering  or serosanguinous drainage  No odor, erythema, increased warmth, tenderness, induration, fluctuance, nor crepitus    LABS/ CULTURES/ RADIOLOGY:                        11.0   10.38 )-----------( 143      ( 06 Aug 2024 06:05 )             32.7       130  |  101  |  55  ----------------------------<  74      [08-06-24 @ 06:05]  4.4   |  12  |  2.92        Ca     8.0     [08-06-24 @ 06:05]      Mg     1.3     [08-06-24 @ 06:05]      Phos  2.6     [08-06-24 @ 06:05]    TPro  5.4  /  Alb  2.9  /  TBili  0.6  /  DBili  x   /  AST  30  /  ALT  24  /  AlkPhos  72  [08-06-24 @ 06:05]    PT/INR: PT 31.7 , INR 2.97       [08-05-24 @ 15:05]  PTT: 29.4       [08-05-24 @ 15:05]        A1C with Estimated Average Glucose Result: 5.3 % (05-16-24 @ 07:35)  A1C with Estimated Average Glucose Result: 5.3 % (04-29-24 @ 06:51)

## 2024-08-06 NOTE — CONSULT NOTE ADULT - ASSESSMENT
77 yo female with history of COPD, CAD, afib on Eliquis, and PSH exlap and SBR for ischemic bowel 2/22/24 (negative intraoperative mesenteric angiogram) with RTOR 2/24/24 for end ileostomy and abdomen closure admitted from [facility] for lethargy and melanotic ostomy output. Found to have C. diff and an CHANELL. Was planned for EGD with GI today however canceled after RRT for hypotension and afib with RVR in preop area. H&H 8.8/26.0 from 11.0/32.7 in AM. Vitals improved after bolus and pRBC transfusion ordered by primary. Patient now hemodynamically stable, continued melanotic ostomy output however ostomy appears viable and abdomen benign.    Recommendations:  - No acute surgical intervention  - Patient planned for EGD in AM with GI  - Can consider CTA/IR consult if concern for active bleed and stable for scan  - If source of bleeding identified and unable to be controlled with EGD or IR, exlap and resection of bleeding intestine a last resort  - Resuscitate/transfuse prn  - Global care per primary    Discussed with Dr. Bray    Trauma/ACS  [id number]

## 2024-08-06 NOTE — PROGRESS NOTE ADULT - SUBJECTIVE AND OBJECTIVE BOX
Date of service: 08-06-24 @ 09:01      Patient is a 78y old  Female who presents with a chief complaint of AMS/lethargy, Afib w/ RVR, Hyponatremia, C.diff infection (06 Aug 2024 03:22)                                                               INTERVAL HPI/OVERNIGHT EVENTS:    REVIEW OF SYSTEMS:     CONSTITUTIONAL: No weakness, fevers or chills  EYES/ENT: No visual changes , no ear ache   NECK: No pain or stiffness  RESPIRATORY: No cough, wheezing,  No shortness of breath  CARDIOVASCULAR: No chest pain or palpitations  GASTROINTESTINAL: No abdominal pain  . No nausea, vomiting, or hematemesis; No diarrhea or constipation. No melena or hematochezia.  GENITOURINARY: No dysuria, frequency or hematuria  NEUROLOGICAL: No numbness or weakness  SKIN: No itching, burning, rashes, or lesions                                                                                                                                                                                                                                                                                 Medications:  MEDICATIONS  (STANDING):  aMIOdarone Infusion 1 mG/Min (33.3 mL/Hr) IV Continuous <Continuous>  ascorbic acid 500 milliGRAM(s) Oral daily  atorvastatin 80 milliGRAM(s) Oral at bedtime  levothyroxine 125 MICROGram(s) Oral daily  metoprolol tartrate 25 milliGRAM(s) Oral two times a day  midodrine. 5 milliGRAM(s) Oral three times a day  multivitamin 1 Tablet(s) Oral daily  nystatin Powder 1 Application(s) Topical two times a day  pantoprazole Infusion 8 mG/Hr (10 mL/Hr) IV Continuous <Continuous>  sodium bicarbonate 650 milliGRAM(s) Oral three times a day  sodium chloride 0.45% 1000 milliLiter(s) (75 mL/Hr) IV Continuous <Continuous>  tamsulosin 0.4 milliGRAM(s) Oral at bedtime  vancomycin    Solution 125 milliGRAM(s) Oral every 6 hours    MEDICATIONS  (PRN):  acetaminophen     Tablet .. 650 milliGRAM(s) Oral every 6 hours PRN Temp greater or equal to 38C (100.4F), Mild Pain (1 - 3)       Allergies    penicillin (Hives)    Intolerances      Vital Signs Last 24 Hrs  T(C): 36.3 (06 Aug 2024 05:00), Max: 36.7 (05 Aug 2024 19:15)  T(F): 97.4 (06 Aug 2024 05:00), Max: 98 (05 Aug 2024 19:15)  HR: 117 (06 Aug 2024 05:00) (114 - 172)  BP: 115/61 (06 Aug 2024 05:00) (94/70 - 132/76)  BP(mean): 93 (06 Aug 2024 01:20) (78 - 93)  RR: 18 (06 Aug 2024 05:00) (15 - 22)  SpO2: 100% (06 Aug 2024 05:00) (95% - 100%)    Parameters below as of 06 Aug 2024 05:00  Patient On (Oxygen Delivery Method): room air      CAPILLARY BLOOD GLUCOSE      POCT Blood Glucose.: 94 mg/dL (06 Aug 2024 07:10)  POCT Blood Glucose.: 66 mg/dL (06 Aug 2024 06:41)  POCT Blood Glucose.: 67 mg/dL (06 Aug 2024 06:38)  POCT Blood Glucose.: 101 mg/dL (05 Aug 2024 22:02)      08-05 @ 07:01  -  08-06 @ 07:00  --------------------------------------------------------  IN: 425 mL / OUT: 425 mL / NET: 0 mL      Physical Exam:    Daily Height in cm: 162.56 (05 Aug 2024 13:50)    Daily   General:  Well appearing, NAD, not cachetic  HEENT:  Nonicteric, PERRLA  CV:  RRR, S1S2   Lungs:  CTA B/L, no wheezes, rales, rhonchi  Abdomen:  Soft, non-tender, no distended, positive BS  Extremities:  2+ pulses, no c/c, no edema  Skin:  Warm and dry, no rashes  :  No amaya  Neuro:  AAOx3, non-focal, grossly intact                                                                                                                                                                                                                                                                                                LABS:                               11.0   10.38 )-----------( 143      ( 06 Aug 2024 06:05 )             32.7                      08-06    130<L>  |  101  |  55<H>  ----------------------------<  74  4.4   |  12<L>  |  2.92<H>    Ca    8.0<L>      06 Aug 2024 06:05  Phos  2.6     08-06  Mg     1.3     08-06    TPro  5.4<L>  /  Alb  2.9<L>  /  TBili  0.6  /  DBili  x   /  AST  30  /  ALT  24  /  AlkPhos  72  08-06                       RADIOLOGY & ADDITIONAL TESTS         I personally reviewed: [  ]EKG   [  ]CXR    [  ] CT      A/P:         Discussed with :     [facility] consultants' Notes   Time spent :   Date of service: 08-06-24 @ 09:01      Patient is a 78y old  Female who presents with a chief complaint of AMS/lethargy, Afib w/ RVR, Hyponatremia, C.diff infection (06 Aug 2024 03:22)                                                               INTERVAL HPI/OVERNIGHT EVENTS:    REVIEW OF SYSTEMS:  No complaints at this time                                                                                                                                                                                                                                                             Medications:  MEDICATIONS  (STANDING):  aMIOdarone Infusion 1 mG/Min (33.3 mL/Hr) IV Continuous <Continuous>  ascorbic acid 500 milliGRAM(s) Oral daily  atorvastatin 80 milliGRAM(s) Oral at bedtime  levothyroxine 125 MICROGram(s) Oral daily  metoprolol tartrate 25 milliGRAM(s) Oral two times a day  midodrine. 5 milliGRAM(s) Oral three times a day  multivitamin 1 Tablet(s) Oral daily  nystatin Powder 1 Application(s) Topical two times a day  pantoprazole Infusion 8 mG/Hr (10 mL/Hr) IV Continuous <Continuous>  sodium bicarbonate 650 milliGRAM(s) Oral three times a day  sodium chloride 0.45% 1000 milliLiter(s) (75 mL/Hr) IV Continuous <Continuous>  tamsulosin 0.4 milliGRAM(s) Oral at bedtime  vancomycin    Solution 125 milliGRAM(s) Oral every 6 hours    MEDICATIONS  (PRN):  acetaminophen     Tablet .. 650 milliGRAM(s) Oral every 6 hours PRN Temp greater or equal to 38C (100.4F), Mild Pain (1 - 3)       Allergies    penicillin (Hives)    Intolerances      Vital Signs Last 24 Hrs  T(C): 36.3 (06 Aug 2024 05:00), Max: 36.7 (05 Aug 2024 19:15)  T(F): 97.4 (06 Aug 2024 05:00), Max: 98 (05 Aug 2024 19:15)  HR: 117 (06 Aug 2024 05:00) (114 - 172)  BP: 115/61 (06 Aug 2024 05:00) (94/70 - 132/76)  BP(mean): 93 (06 Aug 2024 01:20) (78 - 93)  RR: 18 (06 Aug 2024 05:00) (15 - 22)  SpO2: 100% (06 Aug 2024 05:00) (95% - 100%)    Parameters below as of 06 Aug 2024 05:00  Patient On (Oxygen Delivery Method): room air      CAPILLARY BLOOD GLUCOSE      POCT Blood Glucose.: 94 mg/dL (06 Aug 2024 07:10)  POCT Blood Glucose.: 66 mg/dL (06 Aug 2024 06:41)  POCT Blood Glucose.: 67 mg/dL (06 Aug 2024 06:38)  POCT Blood Glucose.: 101 mg/dL (05 Aug 2024 22:02)      08-05 @ 07:01  -  08-06 @ 07:00  --------------------------------------------------------  IN: 425 mL / OUT: 425 mL / NET: 0 mL      Physical Exam:    Daily Height in cm: 162.56 (05 Aug 2024 13:50)    Daily   General: Sleeping but arousable, oriented to person but not the place  HEENT:  Nonicteric, PERRLA  CV:  RRR, S1S2   Lungs:  Poor effort otherwise clear to auscultation  Abdomen:  Soft, non-tender, Ostomy bag in place with melanotic stool noted  Extremities:  No edema  Neuro grossly nonfocal                                                                                                                                                                                                                                                                                   LABS:                               11.0   10.38 )-----------( 143      ( 06 Aug 2024 06:05 )             32.7                      08-06    130<L>  |  101  |  55<H>  ----------------------------<  74  4.4   |  12<L>  |  2.92<H>    Ca    8.0<L>      06 Aug 2024 06:05  Phos  2.6     08-06  Mg     1.3     08-06    TPro  5.4<L>  /  Alb  2.9<L>  /  TBili  0.6  /  DBili  x   /  AST  30  /  ALT  24  /  AlkPhos  72  08-06                       RADIOLOGY & ADDITIONAL TESTS         I personally reviewed: [  ]EKG   [  ]CXR    [  ] CT      A/P:         Discussed with :     [facility] consultants' Notes   Time spent :

## 2024-08-06 NOTE — CONSULT NOTE ADULT - SUBJECTIVE AND OBJECTIVE BOX
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 Last admitted from 7/21-7/29 with E. faecalis UTI. At rehab she had hypotension, lethargy, and poor PO intake. On admission she was found to have black ostomy output, tachycardia, hypotension, and falling hemoglobin requiring 1 unit PRBC and 1500cc crystalloid. UA was significant for pyuria and she was given vancomycin and metronidazole empirically, which was switched to vanc + cipro. GI was consulted and she was taken for endoscopy on 8/6 for evaluation of suspected GI bleed. In endoscopy a rapid response was called for hypotension in the setting of Afib RVR, responsive to 1L bolus LR. MICU consulted for hypotension.    PAST MEDICAL & SURGICAL HISTORY:  Hypertension      Hypothyroid  Osteoarthritis  knees, back  CAD (coronary artery disease)  CROW (obstructive sleep apnea) non complaint on CPAP  History of MI (myocardial infarction)  h/o previous MI in 2004 prompted PTCA  with stenting x 2 vessels   last stress/ echo 2019  Heart murmur dx in childhood  Bilateral hearing loss, unspecified hearing loss type bilateral aids  Obesity  Mixed stress and urge urinary incontinence  Overactive bladder  S/P ORIF (open reduction internal fixation) fracture left hip 1962  S/P appendectomy 30 plus years  S/P knee replacement left 2000  Stented coronary artery 2004 X 2 STENTS  S/P laprotomy due to adhesions, 30 years ago  H/O dilation and curettage  2/2019 Benign polyp          FAMILY HISTORY:      SOCIAL HISTORY:  Smoking: __ packs x ___ years  EtOH Use:  Marital Status:  Occupation:  Recent Travel:  Country of Birth:  Advance Directives:    Allergies    penicillin (Hives)    Intolerances        HOME MEDICATIONS:    REVIEW OF SYSTEMS:  Constitutional:   Eyes:  ENT:  CV:  Resp:  GI:  :  MSK:  Integumentary:  Neurological:  Psychiatric:  Endocrine:  Hematologic/Lymphatic:  Allergic/Immunologic:  [ ] All other systems negative  [ ] Unable to assess ROS because ________    OBJECTIVE:  ICU Vital Signs Last 24 Hrs  T(C): 35.8 (06 Aug 2024 19:22), Max: 36.4 (06 Aug 2024 11:39)  T(F): 96.5 (06 Aug 2024 19:22), Max: 97.6 (06 Aug 2024 11:39)  HR: 121 (06 Aug 2024 19:22) (101 - 126)  BP: 90/58 (06 Aug 2024 19:22) (88/60 - 115/61)  BP(mean): 93 (06 Aug 2024 17:00) (93 - 93)  ABP: --  ABP(mean): --  RR: 17 (06 Aug 2024 19:22) (17 - 18)  SpO2: 96% (06 Aug 2024 19:22) (96% - 100%)    O2 Parameters below as of 06 Aug 2024 19:22  Patient On (Oxygen Delivery Method): room air              08-05 @ 07:01  -  08-06 @ 07:00  --------------------------------------------------------  IN: 425 mL / OUT: 425 mL / NET: 0 mL    08-06 @ 07:01  -  08-06 @ 19:51  --------------------------------------------------------  IN: 0 mL / OUT: 0 mL / NET: 0 mL      CAPILLARY BLOOD GLUCOSE      POCT Blood Glucose.: 72 mg/dL (06 Aug 2024 17:23)      PHYSICAL EXAM:  General:   HEENT:   Lymph Nodes:  Neck:   Respiratory:   Cardiovascular:   Abdomen:   Extremities:   Skin:   Neurological:  Psychiatry:    HOSPITAL MEDICATIONS:  MEDICATIONS  (STANDING):  aMIOdarone Infusion 1 mG/Min (33.3 mL/Hr) IV Continuous <Continuous>  ascorbic acid 500 milliGRAM(s) Oral daily  atorvastatin 80 milliGRAM(s) Oral at bedtime  ciprofloxacin   IVPB 400 milliGRAM(s) IV Intermittent every 12 hours  digoxin  Injectable 250 MICROGram(s) IV Push once  lactated ringers. 1000 milliLiter(s) (75 mL/Hr) IV Continuous <Continuous>  levothyroxine 125 MICROGram(s) Oral daily  magnesium sulfate  IVPB 2 Gram(s) IV Intermittent once  midodrine. 5 milliGRAM(s) Oral three times a day  multivitamin 1 Tablet(s) Oral daily  pantoprazole Infusion 8 mG/Hr (10 mL/Hr) IV Continuous <Continuous>  sodium bicarbonate 650 milliGRAM(s) Oral three times a day  sodium chloride 0.45% 1000 milliLiter(s) (100 mL/Hr) IV Continuous <Continuous>  sodium phosphate 15 milliMole(s)/250 mL IVPB 15 milliMole(s) IV Intermittent once  vancomycin    Solution 125 milliGRAM(s) Oral every 6 hours    MEDICATIONS  (PRN):  acetaminophen     Tablet .. 650 milliGRAM(s) Oral every 6 hours PRN Temp greater or equal to 38C (100.4F), Mild Pain (1 - 3)      LABS:                        8.8    7.90  )-----------( 126      ( 06 Aug 2024 18:05 )             26.0     08-06    134<L>  |  105  |  51<H>  ----------------------------<  72  3.8   |  15<L>  |  2.49<H>    Ca    7.2<L>      06 Aug 2024 18:05  Phos  2.0     08-06  Mg     1.6     08-06    TPro  4.5<L>  /  Alb  2.5<L>  /  TBili  0.6  /  DBili  x   /  AST  21  /  ALT  21  /  AlkPhos  54  08-06    PT/INR - ( 06 Aug 2024 18:05 )   PT: 21.7 sec;   INR: 2.11 ratio         PTT - ( 06 Aug 2024 18:05 )  PTT:29.1 sec  Urinalysis Basic - ( 06 Aug 2024 18:05 )    Color: x / Appearance: x / SG: x / pH: x  Gluc: 72 mg/dL / Ketone: x  / Bili: x / Urobili: x   Blood: x / Protein: x / Nitrite: x   Leuk Esterase: x / RBC: x / WBC x   Sq Epi: x / Non Sq Epi: x / Bacteria: x      Arterial Blood Gas:  08-06 @ 18:03  7.39/26/103/16/99.3/-8.1  ABG lactate: --    Venous Blood Gas:  08-06 @ 06:22  7.28/39/24/18/37.2  VBG Lactate: 2.1  Venous Blood Gas:  08-05 @ 15:05  7.28/36/15/17/19.0  VBG Lactate: 4.7      MICROBIOLOGY:     RADIOLOGY:  [ ] Reviewed and interpreted by me    EKG: CHIEF COMPLAINT: hypotension    HPI: 78F with a history of dementia, HTN, HLD, afib on eliquis, CAD s/p stents on plavix, COPD, hypothyroidism, recurrent UTIs, and 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 Last admitted from 7/21-7/29 with E. faecalis UTI, presented from rehab with hypotension, lethargy, and poor PO intake. On admission she was found to have black ostomy output with H/H drop and hypotension requiring 1 unit PRBC and 1500cc crystalloid. Tested positive for c.diff and was started on vancomycin. GI was consulted and she was taken for endoscopy on 8/6 for evaluation of suspected GI bleed. In endoscopy RRT was called for hypotension in the setting of afib RVR, responsive to 1L bolus LR. MICU consulted for hypotension.    Assessed patient at bedside. AAOx2. MAP 64. Denies CP or lightheadedness. Black output noted on ostomy bag.     PAST MEDICAL & SURGICAL HISTORY:  Hypertension  Hypothyroid  Osteoarthritis  knees, back  CAD (coronary artery disease)  CROW (obstructive sleep apnea) non complaint on CPAP  History of MI (myocardial infarction)  h/o previous MI in 2004 prompted PTCA  with stenting x 2 vessels   last stress/ echo 2019  Heart murmur dx in childhood  Bilateral hearing loss, unspecified hearing loss type bilateral aids  Obesity  Mixed stress and urge urinary incontinence  Overactive bladder  S/P ORIF (open reduction internal fixation) fracture left hip 1962  S/P appendectomy 30 plus years  S/P knee replacement left 2000  Stented coronary artery 2004 X 2 STENTS  S/P laprotomy due to adhesions, 30 years ago  H/O dilation and curettage  2/2019 Benign polyp    FAMILY HISTORY:  Non relevant to current illness    SOCIAL HISTORY:  Lives at rehab    ALLERGIES:  penicillin (Hives)    HOME MEDICATIONS:    REVIEW OF SYSTEMS:  [ ] All other systems negative  [x] Unable to assess ROS because of difficulty hearing    OBJECTIVE:  ICU Vital Signs Last 24 Hrs  T(C): 35.8 (06 Aug 2024 19:22), Max: 36.4 (06 Aug 2024 11:39)  T(F): 96.5 (06 Aug 2024 19:22), Max: 97.6 (06 Aug 2024 11:39)  HR: 121 (06 Aug 2024 19:22) (101 - 126)  BP: 90/58 (06 Aug 2024 19:22) (88/60 - 115/61)  BP(mean): 93 (06 Aug 2024 17:00) (93 - 93)  RR: 17 (06 Aug 2024 19:22) (17 - 18)  SpO2: 96% (06 Aug 2024 19:22) (9% - 100%)    O2 Parameters below as of 06 Aug 2024 19:22  Patient On (Oxygen Delivery Method): room air    08-05 @ 07:01  -  08-06 @ 07:00  --------------------------------------------------------  IN: 425 mL / OUT: 425 mL / NET: 0 mL    08-06 @ 07:01  -  08-06 @ 19:51  --------------------------------------------------------  IN: 0 mL / OUT: 0 mL / NET: 0 mL    CAPILLARY BLOOD GLUCOSE:  POCT Blood Glucose.: 72 mg/dL (06 Aug 2024 17:23)    PHYSICAL EXAM:  General: NAD, on RA, resting in bed  Respiratory: clear to auscultation anteriorly, no wheezes  Cardiovascular: +s1/s2, irregularly irregular, normal rate  Abdomen: soft, non-distended, black output in ostomy bag  Extremities: no peripheral edema bilaterally  Skin: warm  Neurological: AAOx2, moving extremities      HOSPITAL MEDICATIONS:  MEDICATIONS  (STANDING):  aMIOdarone Infusion 1 mG/Min (33.3 mL/Hr) IV Continuous <Continuous>  ascorbic acid 500 milliGRAM(s) Oral daily  atorvastatin 80 milliGRAM(s) Oral at bedtime  ciprofloxacin   IVPB 400 milliGRAM(s) IV Intermittent every 12 hours  digoxin  Injectable 250 MICROGram(s) IV Push once  lactated ringers. 1000 milliLiter(s) (75 mL/Hr) IV Continuous <Continuous>  levothyroxine 125 MICROGram(s) Oral daily  magnesium sulfate  IVPB 2 Gram(s) IV Intermittent once  midodrine. 5 milliGRAM(s) Oral three times a day  multivitamin 1 Tablet(s) Oral daily  pantoprazole Infusion 8 mG/Hr (10 mL/Hr) IV Continuous <Continuous>  sodium bicarbonate 650 milliGRAM(s) Oral three times a day  sodium chloride 0.45% 1000 milliLiter(s) (100 mL/Hr) IV Continuous <Continuous>  sodium phosphate 15 milliMole(s)/250 mL IVPB 15 milliMole(s) IV Intermittent once  vancomycin    Solution 125 milliGRAM(s) Oral every 6 hours    MEDICATIONS  (PRN):  acetaminophen     Tablet .. 650 milliGRAM(s) Oral every 6 hours PRN Temp greater or equal to 38C (100.4F), Mild Pain (1 - 3)    LABS:                        8.8    7.90  )-----------( 126      ( 06 Aug 2024 18:05 )             26.0     08-06    134<L>  |  105  |  51<H>  ----------------------------<  72  3.8   |  15<L>  |  2.49<H>    Ca    7.2<L>      06 Aug 2024 18:05  Phos  2.0     08-06  Mg     1.6     08-06    TPro  4.5<L>  /  Alb  2.5<L>  /  TBili  0.6  /  DBili  x   /  AST  21  /  ALT  21  /  AlkPhos  54  08-06    PT/INR - ( 06 Aug 2024 18:05 )   PT: 21.7 sec;   INR: 2.11 ratio      PTT - ( 06 Aug 2024 18:05 )  PTT:29.1 sec  Urinalysis Basic - ( 06 Aug 2024 18:05 )    Color: x / Appearance: x / SG: x / pH: x  Gluc: 72 mg/dL / Ketone: x  / Bili: x / Urobili: x   Blood: x / Protein: x / Nitrite: x   Leuk Esterase: x / RBC: x / WBC x   Sq Epi: x / Non Sq Epi: x / Bacteria: x    Arterial Blood Gas:  08-06 @ 18:03  7.39/26/103/16/99.3/-8.1    Venous Blood Gas:  08-06 @ 06:22  7.28/39/24/18/37.2  VBG Lactate: 2.1  Venous Blood Gas:  08-05 @ 15:05  7.28/36/15/17/19.0  VBG Lactate: 4.7    MICROBIOLOGY:     RADIOLOGY:  [x] Reviewed and interpreted by me

## 2024-08-06 NOTE — CONSULT NOTE ADULT - SUBJECTIVE AND OBJECTIVE BOX
[facility] NEPHROLOGY- CONSULTATION NOTE    78y Female with history of below presents with AMS. Nephrology consulted for elevated Scr and metabolic acidosis.    Patient well known from recent admission where she was evaluated for CHANELL secondary to ATN in setting of hypovolemia and increased ostomy output. CHANELL had resolved by discharge with Scr decreased to 1.2. Patient was discharged on sodium bicarbonate tablets for metabolic acidosis, amaya with flomax for urinary retention (failed TOV) and midodrine for hypotension.     Patient now returns with AMS found to have C. diff and CHANELL with metabolic acidosis started on bicarb gtt.    REVIEW OF SYSTEMS:  Gen: no fevers  HEENT: no rhinorrhea  Neck: no sore throat  Cards: no chest pain  Resp: no dyspnea  GI: no nausea or vomiting, + diarrhea, + decreased PO intake  : no dysuria or hematuria  Vascular: no LE edema  Derm: no rashes  Neuro: no numbness/tingling    penicillin (Hives)      Home Medications Reviewed  Hospital Medications:   MEDICATIONS  (STANDING):  aMIOdarone Infusion 1 mG/Min (33.3 mL/Hr) IV Continuous <Continuous>  ascorbic acid 500 milliGRAM(s) Oral daily  atorvastatin 80 milliGRAM(s) Oral at bedtime  levothyroxine 125 MICROGram(s) Oral daily  magnesium sulfate  IVPB 2 Gram(s) IV Intermittent once  metoprolol tartrate 25 milliGRAM(s) Oral two times a day  midodrine. 5 milliGRAM(s) Oral three times a day  multivitamin 1 Tablet(s) Oral daily  nystatin Powder 1 Application(s) Topical two times a day  pantoprazole Infusion 8 mG/Hr (10 mL/Hr) IV Continuous <Continuous>  polyethylene glycol/electrolyte Solution 1000 milliLiter(s) Oral once  sodium bicarbonate 650 milliGRAM(s) Oral three times a day  sodium chloride 0.45% 1000 milliLiter(s) (75 mL/Hr) IV Continuous <Continuous>  tamsulosin 0.4 milliGRAM(s) Oral at bedtime  vancomycin    Solution 125 milliGRAM(s) Oral every 6 hours      PAST MEDICAL & SURGICAL HISTORY:  Hypertension      Hypothyroid      Osteoarthritis  knees, back      CAD (coronary artery disease)      CROW (obstructive sleep apnea)  non complaint on CPAP      History of MI (myocardial infarction)  h/o previous MI in 2004 prompted PTCA  with stenting x 2 vessels   last stress/ echo 2019      Heart murmur  dx in childhood      Bilateral hearing loss, unspecified hearing loss type  bilateral aids      Obesity      Mixed stress and urge urinary incontinence      Overactive bladder      S/P ORIF (open reduction internal fixation) fracture  left hip 1962      S/P appendectomy  30 plus years      S/P knee replacement  left 2000      Stented coronary artery  2004 X 2 STENTS      S/P laparotomy  due to adhesions, 30 years ago      H/O dilation and curettage  2/2019 Benign polyp          FAMILY HISTORY:  N/C    SOCIAL HISTORY:  Denies toxic substance use     VITALS:  T(F): 97.6 (08-06-24 @ 11:39), Max: 98 (08-05-24 @ 19:15)  HR: 112 (08-06-24 @ 11:39)  BP: 88/60 (08-06-24 @ 11:39)  RR: 18 (08-06-24 @ 11:39)  SpO2: 97% (08-06-24 @ 11:39)  Wt(kg): --    08-05 @ 07:01  -  08-06 @ 07:00  --------------------------------------------------------  IN: 425 mL / OUT: 425 mL / NET: 0 mL    08-06 @ 07:01  -  08-06 @ 11:42  --------------------------------------------------------  IN: 0 mL / OUT: 0 mL / NET: 0 mL      Height (cm): 162.6 (08-05 @ 13:50)    PHYSICAL EXAM:  Gen: NAD, calm  HEENT: MMM  Neck: no JVD  Cards: RRR, +S1/S2, no M/G/R  Resp: CTA B/L  GI: soft, NT/ND, NABS, + ostomy with black liquid noted  : no CVA tenderness, + amaya  Vascular: no LE edema B/L  Derm: no rashes  Neuro: non-focal    LABS:  08-06    130<L>  |  101  |  55<H>  ----------------------------<  74  4.4   |  12<L>  |  2.92<H>    Ca    8.0<L>      06 Aug 2024 06:05  Phos  2.6     08-06  Mg     1.3     08-06    TPro  5.4<L>  /  Alb  2.9<L>  /  TBili  0.6  /  DBili      /  AST  30  /  ALT  24  /  AlkPhos  72  08-06    Creatinine Trend: 2.92 <--, 2.68 <--, 2.58 <--, 3.04 <--                        11.0   10.38 )-----------( 143      ( 06 Aug 2024 06:05 )             32.7     Urine Studies:  Urinalysis Basic - ( 06 Aug 2024 06:05 )    Color:  / Appearance:  / SG:  / pH:   Gluc: 74 mg/dL / Ketone:   / Bili:  / Urobili:    Blood:  / Protein:  / Nitrite:    Leuk Esterase:  / RBC:  / WBC    Sq Epi:  / Non Sq Epi:  / Bacteria:       Sodium, Random Urine: <5 mmol/L (08-06 @ 08:00)  Chloride, Random Urine: <20 mmol/L (08-06 @ 08:00)  Creatinine, Random Urine: 111 mg/dL (08-06 @ 08:00)      RADIOLOGY & ADDITIONAL STUDIES:    < from: CT Abdomen and Pelvis No Cont (08.05.24 @ 19:52) >  IMPRESSION:  No acute intra-abdominal pathology. Limited evaluation for mesenteric   ischemia in the absence of intravenous contrast.    Unchanged indeterminate left adrenal lesion.    --- End of Report ---    < end of copied text >      < from: CT Abdomen and Pelvis No Cont (08.05.24 @ 19:52) >  KIDNEYS/URETERS: No hydronephrosis. Left renal cyst.    BLADDER: Amaya catheter.    < end of copied text >        < from: CT Head No Cont (08.05.24 @ 19:52) >  IMPRESSION:   Ischemic white matter disease and atrophy. No adverse   interval change 7/25/2024    --- End of Report ---    < end of copied text >      < from: Xray Chest 1 View- PORTABLE-Urgent (Xray Chest 1 View- PORTABLE-Urgent .) (08.05.24 @ 16:56) >  IMPRESSION:  Clear lungs.    --- End of Report ---    < end of copied text >

## 2024-08-06 NOTE — CONSULT NOTE ADULT - SUBJECTIVE AND OBJECTIVE BOX
Patient:  LEFTY AKBAR  [id number]    CHIEF COMPLAINT:    HPI:    PAST MEDICAL & SURGICAL HISTORY:  Hypertension      Hypothyroid      Osteoarthritis  knees, back      CAD (coronary artery disease)      CROW (obstructive sleep apnea)  non complaint on CPAP      History of MI (myocardial infarction)  h/o previous MI in 2004 prompted PTCA  with stenting x 2 vessels   last stress/ echo 2019      Heart murmur  dx in childhood      Bilateral hearing loss, unspecified hearing loss type  bilateral aids      Obesity      Mixed stress and urge urinary incontinence      Overactive bladder      S/P ORIF (open reduction internal fixation) fracture  left hip 1962      S/P appendectomy  30 plus years      S/P knee replacement  left 2000      Stented coronary artery  2004 X 2 STENTS      S/P laparotomy  due to adhesions, 30 years ago      H/O dilation and curettage  2/2019 Benign polyp          FAMILY HISTORY:      SOCIAL HISTORY:    Allergies    penicillin (Hives)    Intolerances        HOME MEDICATIONS:    REVIEW OF SYSTEMS:  [x] Unable to assess ROS due to AMS    [ ] Negative except as stated in HPI      OBJECTIVE:  T(F): 96.6 (08-06-24 @ 01:40), Max: 98 (08-05-24 @ 19:15)  HR: 114 (08-06-24 @ 01:40) (114 - 172)  BP: 102/73 (08-06-24 @ 01:40) (94/70 - 132/76)  BP(mean): 93 (08-06-24 @ 01:20) (78 - 93)  ABP: --  ABP(mean): --  RR: 18 (08-06-24 @ 01:40) (15 - 22)  SpO2: 100% (08-06-24 @ 01:40) (95% - 100%)  CVP(mm Hg): --    I/O Summary 24H    CAPILLARY BLOOD GLUCOSE      POCT Blood Glucose.: 101 mg/dL (05 Aug 2024 22:02)      PHYSICAL EXAM:  GENERAL: NAD, lying in bed comfortably  HEAD:  Atraumatic, Normocephalic  EYES: EOMI, PERRLA, conjunctiva and sclera clear  ENT: Moist mucous membranes  NECK: Supple, No JVD  CHEST/LUNG: Clear to auscultation bilaterally; No rales, rhonchi, wheezing, or rubs. Unlabored respirations  HEART: Regular rate and rhythm; No murmurs, rubs, or gallops  ABDOMEN: Bowel sounds present; Soft, Nontender, Nondistended. No hepatomegaly  EXTREMITIES:  2+ Peripheral Pulses, brisk capillary refill. No clubbing, cyanosis, or edema  NERVOUS SYSTEM:  Alert & Oriented X3, speech clear. No deficits   MSK: FROM all 4 extremities, full and equal strength  SKIN: No rashes or lesions    HOSPITAL MEDICATIONS:  MEDICATIONS  (STANDING):  aMIOdarone Infusion 1 mG/Min (33.3 mL/Hr) IV Continuous <Continuous>  pantoprazole Infusion 8 mG/Hr (10 mL/Hr) IV Continuous <Continuous>  potassium chloride  10 mEq/100 mL IVPB 10 milliEquivalent(s) IV Intermittent every 1 hour  sodium chloride 0.45% 1000 milliLiter(s) (75 mL/Hr) IV Continuous <Continuous>  vancomycin    Solution 125 milliGRAM(s) Oral every 6 hours    MEDICATIONS  (PRN):  acetaminophen     Tablet .. 650 milliGRAM(s) Oral every 6 hours PRN Temp greater or equal to 38C (100.4F), Mild Pain (1 - 3)      LABS:  CBC 08-05-24 @ 21:01                        10.3   9.03  )-----------( 177                   32.3     Hgb trend: 10.3 <-- , 11.3 <--   WBC trend: 9.03 <-- , 9.96 <--     CMP 08-05-24 @ 23:08    123<L>  |  93<L>  |  52<H>  ----------------------------<  417<H>  3.5   |  14<L>  |  2.68<H>    Ca    7.1<L>      08-05-24 @ 23:08    TPro  5.2<L>  /  Alb  2.5<L>  /  TBili  0.4  /  DBili  x   /  AST  42<H>  /  ALT  25  /  AlkPhos  62     08-05    Serum Cr (eGFR) trend: 2.68 (18) <-- , 2.58 (19) <-- , 3.04 (15) <--     PT/INR - ( 05 Aug 2024 15:05 )   PT: 31.7 sec;   INR: 2.97 ratio    PTT - ( 05 Aug 2024 15:05 ):29.4 sec    ABG Trend:     VBG Trend:   08-05-24 @ 15:05 - pH: 7.28  | pCO2: 36    | pO2: 15    | HCO3: 17    | Lactate: 4.7        MICROBIOLOGY:       RADIOLOGY:  [ ] Reviewed and interpreted by me    EKG [ ]      Patient:  LEFTY AKBAR  [id number]    CHIEF COMPLAINT: AMS    HPI:  78F w/ hx of Afib (on Eliquis), CAD (on plavix), MCI/dementia, 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 Limited hx obtainable from pt, was AAOx~1 on encounter and very lethargic/somnolent. Collateral hx obtained from chart review. During recent admission (7/21/24-7/29/24), she was treated for UTI/sepsis and ultimately discharged to rehab to completed a 5-day course of ciprofloxacin (last dose 7/30/24). At rehab, she was reportedly found to have oliguria for a few days over the weekend for which she was started on IV fluids, however she was noncompliant with the IV access and she pulled it out many times. She has had very poor appetite and became lethargic and hypotensive. She was subsequently transferred to hospital where she was found to have black-colored output in ileostomy bag.     In ED: Afebrile, HR 120s-170s (Afib), SBP 90s-130s, RR 15-22, sating % on RA.  Labs notable for Hgb 11.3->10.3, Na 133->122, SCr 3.04->2.58; lactated 4.7->2.8, blood glucose to 400s but FS 100s. Found to be C.diff positive. UA not best sample with 9 epithelial cells, but noted +LE, +bacteria, +WBC, neg nitrite. CT A/P showed no acute intra-abdominal pathology and unchanged indeterminate left adrenal lesion. Received Vanc 500mg PO, Flagyl 500mg IVPB, amio 150mg IVPB x3, ofirmev 1g, 1.5L IVF, and 1u pRBC. Also started on amio gtt and protonix gtt.     PAST MEDICAL & SURGICAL HISTORY:  Hypertension      Hypothyroid      Osteoarthritis  knees, back      CAD (coronary artery disease)      CROW (obstructive sleep apnea)  non complaint on CPAP      History of MI (myocardial infarction)  h/o previous MI in 2004 prompted PTCA  with stenting x 2 vessels   last stress/ echo 2019      Heart murmur  dx in childhood      Bilateral hearing loss, unspecified hearing loss type  bilateral aids      Obesity      Mixed stress and urge urinary incontinence      Overactive bladder      S/P ORIF (open reduction internal fixation) fracture  left hip 1962      S/P appendectomy  30 plus years      S/P knee replacement  left 2000      Stented coronary artery  2004 X 2 STENTS      S/P laparotomy  due to adhesions, 30 years ago      H/O dilation and curettage  2/2019 Benign polyp          FAMILY HISTORY:      SOCIAL HISTORY:    Allergies    penicillin (Hives)    Intolerances        HOME MEDICATIONS:    REVIEW OF SYSTEMS:  [x] Unable to assess ROS due to AMS    [ ] Negative except as stated in HPI      OBJECTIVE:  T(F): 96.6 (08-06-24 @ 01:40), Max: 98 (08-05-24 @ 19:15)  HR: 114 (08-06-24 @ 01:40) (114 - 172)  BP: 102/73 (08-06-24 @ 01:40) (94/70 - 132/76)  BP(mean): 93 (08-06-24 @ 01:20) (78 - 93)  ABP: --  ABP(mean): --  RR: 18 (08-06-24 @ 01:40) (15 - 22)  SpO2: 100% (08-06-24 @ 01:40) (95% - 100%)  CVP(mm Hg): --    I/O Summary 24H    CAPILLARY BLOOD GLUCOSE      POCT Blood Glucose.: 101 mg/dL (05 Aug 2024 22:02)      PHYSICAL EXAM:  GENERAL: NAD, lying in bed, lethargic but arousable   HEAD:  Atraumatic, Normocephalic  EYES: EOMI, PERRLA, conjunctiva and sclera clear  ENT: Moist mucous membranes  NECK: Supple, No JVD  CHEST/LUNG: Clear to auscultation bilaterally; Unlabored respirations  HEART: Regular rate and rhythm; No murmurs, rubs, or gallops  ABDOMEN: Soft, Nontender, Nondistended. Ileostomy site is clean, dry, intact with black liquid output   EXTREMITIES:  2+ Peripheral Pulses, brisk capillary refill. No clubbing, cyanosis, or edema  NERVOUS SYSTEM:  Alert & Oriented X1.  MSK: Moving all extremities spontaneously.    SKIN: No rashes or lesions    HOSPITAL MEDICATIONS:  MEDICATIONS  (STANDING):  aMIOdarone Infusion 1 mG/Min (33.3 mL/Hr) IV Continuous <Continuous>  pantoprazole Infusion 8 mG/Hr (10 mL/Hr) IV Continuous <Continuous>  potassium chloride  10 mEq/100 mL IVPB 10 milliEquivalent(s) IV Intermittent every 1 hour  sodium chloride 0.45% 1000 milliLiter(s) (75 mL/Hr) IV Continuous <Continuous>  vancomycin    Solution 125 milliGRAM(s) Oral every 6 hours    MEDICATIONS  (PRN):  acetaminophen     Tablet .. 650 milliGRAM(s) Oral every 6 hours PRN Temp greater or equal to 38C (100.4F), Mild Pain (1 - 3)      LABS:  CBC 08-05-24 @ 21:01                        10.3   9.03  )-----------( 177                   32.3     Hgb trend: 10.3 <-- , 11.3 <--   WBC trend: 9.03 <-- , 9.96 <--     CMP 08-05-24 @ 23:08    123<L>  |  93<L>  |  52<H>  ----------------------------<  417<H>  3.5   |  14<L>  |  2.68<H>    Ca    7.1<L>      08-05-24 @ 23:08    TPro  5.2<L>  /  Alb  2.5<L>  /  TBili  0.4  /  DBili  x   /  AST  42<H>  /  ALT  25  /  AlkPhos  62     08-05    Serum Cr (eGFR) trend: 2.68 (18) <-- , 2.58 (19) <-- , 3.04 (15) <--     PT/INR - ( 05 Aug 2024 15:05 )   PT: 31.7 sec;   INR: 2.97 ratio    PTT - ( 05 Aug 2024 15:05 ):29.4 sec    ABG Trend:     VBG Trend:   08-05-24 @ 15:05 - pH: 7.28  | pCO2: 36    | pO2: 15    | HCO3: 17    | Lactate: 4.7        MICROBIOLOGY:       RADIOLOGY:  [ ] Reviewed and interpreted by me    EKG [ ]      Patient:  LEFTY AKBAR  [id number]    CHIEF COMPLAINT: AMS    HPI:  78F w/ hx of Afib (on Eliquis), CAD (on plavix), MCI/dementia, 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 Limited hx obtainable from pt, was AAOx~1 on encounter and very lethargic/somnolent. Collateral hx obtained from chart review. During recent admission (7/21/24-7/29/24), she was treated for UTI/sepsis and ultimately discharged to rehab to completed a 5-day course of ciprofloxacin (last dose 7/30/24). At rehab, she was reportedly found to have oliguria for a few days over the weekend for which she was started on IV fluids, however she was noncompliant with the IV access and she pulled it out many times. She has had very poor appetite and became lethargic and hypotensive. She was subsequently transferred to hospital where she was found to have black-colored output in ileostomy bag.     In ED: Afebrile, HR 120s-170s (Afib), SBP 90s-130s, RR 15-22, sating % on RA.  Labs notable for Hgb 11.3->10.3, Na 133->122, SCr 3.04->2.58; lactated 4.7->2.8, blood glucose to 400s but FS 100s. Found to be C.diff positive. UA not best sample with 9 epithelial cells, but noted +LE, +bacteria, +WBC, neg nitrite. CT A/P showed no acute intra-abdominal pathology and unchanged indeterminate left adrenal lesion. Received Vanc 500mg PO, Flagyl 500mg IVPB, amio 150mg IVPB x3, ofirmev 1g, 1.5L IVF, and 1u pRBC. Also started on amio gtt and protonix gtt.     PAST MEDICAL & SURGICAL HISTORY:  Hypertension      Hypothyroid      Osteoarthritis  knees, back      CAD (coronary artery disease)      CROW (obstructive sleep apnea)  non complaint on CPAP      History of MI (myocardial infarction)  h/o previous MI in 2004 prompted PTCA  with stenting x 2 vessels   last stress/ echo 2019      Heart murmur  dx in childhood      Bilateral hearing loss, unspecified hearing loss type  bilateral aids      Obesity      Mixed stress and urge urinary incontinence      Overactive bladder      S/P ORIF (open reduction internal fixation) fracture  left hip 1962      S/P appendectomy  30 plus years      S/P knee replacement  left 2000      Stented coronary artery  2004 X 2 STENTS      S/P laparotomy  due to adhesions, 30 years ago      H/O dilation and curettage  2/2019 Benign polyp          FAMILY HISTORY: unable to obtain due to encephalopathy      SOCIAL HISTORY: unable to obtain due to encephalopathy    Allergies  penicillin (Hives)      REVIEW OF SYSTEMS:  [x] Unable to assess ROS due to AMS    [ ] Negative except as stated in HPI      OBJECTIVE:  T(F): 96.6 (08-06-24 @ 01:40), Max: 98 (08-05-24 @ 19:15)  HR: 114 (08-06-24 @ 01:40) (114 - 172)  BP: 102/73 (08-06-24 @ 01:40) (94/70 - 132/76)  BP(mean): 93 (08-06-24 @ 01:20) (78 - 93)  RR: 18 (08-06-24 @ 01:40) (15 - 22)  SpO2: 100% (08-06-24 @ 01:40) (95% - 100%)  CVP(mm Hg): --    CAPILLARY BLOOD GLUCOSE  POCT Blood Glucose.: 101 mg/dL (05 Aug 2024 22:02)      PHYSICAL EXAM:  GENERAL: NAD, lying in bed, lethargic but arousable   HEAD:  Atraumatic, Normocephalic  EYES: EOMI, PERRLA, conjunctiva and sclera clear  ENT: Moist mucous membranes  NECK: Supple, No JVD  CHEST/LUNG: Clear to auscultation bilaterally; Unlabored respirations  HEART: Regular rate and rhythm; No murmurs, rubs, or gallops  ABDOMEN: Soft, Nontender, Nondistended. Ileostomy site is clean, dry, intact with black liquid output   EXTREMITIES:  2+ Peripheral Pulses, brisk capillary refill. No clubbing, cyanosis, or edema  NERVOUS SYSTEM:  Alert & Oriented X1.  MSK: Moving all extremities spontaneously.    SKIN: No rashes or lesions    HOSPITAL MEDICATIONS:  MEDICATIONS  (STANDING):  aMIOdarone Infusion 1 mG/Min (33.3 mL/Hr) IV Continuous <Continuous>  pantoprazole Infusion 8 mG/Hr (10 mL/Hr) IV Continuous <Continuous>  potassium chloride  10 mEq/100 mL IVPB 10 milliEquivalent(s) IV Intermittent every 1 hour  sodium chloride 0.45% 1000 milliLiter(s) (75 mL/Hr) IV Continuous <Continuous>  vancomycin    Solution 125 milliGRAM(s) Oral every 6 hours    MEDICATIONS  (PRN):  acetaminophen     Tablet .. 650 milliGRAM(s) Oral every 6 hours PRN Temp greater or equal to 38C (100.4F), Mild Pain (1 - 3)      LABS:  CBC 08-05-24 @ 21:01                        10.3   9.03  )-----------( 177                   32.3     Hgb trend: 10.3 <-- , 11.3 <--   WBC trend: 9.03 <-- , 9.96 <--     CMP 08-05-24 @ 23:08    123<L>  |  93<L>  |  52<H>  ----------------------------<  417<H>  3.5   |  14<L>  |  2.68<H>    Ca    7.1<L>      08-05-24 @ 23:08    TPro  5.2<L>  /  Alb  2.5<L>  /  TBili  0.4  /  DBili  x   /  AST  42<H>  /  ALT  25  /  AlkPhos  62     08-05    Serum Cr (eGFR) trend: 2.68 (18) <-- , 2.58 (19) <-- , 3.04 (15) <--     PT/INR - ( 05 Aug 2024 15:05 )   PT: 31.7 sec;   INR: 2.97 ratio    PTT - ( 05 Aug 2024 15:05 ):29.4 sec    ABG Trend:     VBG Trend:   08-05-24 @ 15:05 - pH: 7.28  | pCO2: 36    | pO2: 15    | HCO3: 17    | Lactate: 4.7        RADIOLOGY:  [ x] Reviewed and interpreted by me    < from: CT Head No Cont (08.05.24 @ 19:52) >      IMPRESSION:   Ischemic white matter disease and atrophy. No adverse   interval change 7/25/2024    < end of copied text >  < from: CT Abdomen and Pelvis No Cont (08.05.24 @ 19:52) >  IMPRESSION:  No acute intra-abdominal pathology. Limited evaluation for mesenteric   ischemia in the absence of intravenous contrast.    Unchanged indeterminate left adrenal lesion.    < end of copied text >

## 2024-08-06 NOTE — CONSULT NOTE ADULT - ATTENDING COMMENTS
Patient seen and examined.  Agree with resident note as above.  Patient with hx as noted including dementia, many recent and recurrent admits following an initial admit for ischemic bowel in Feb 2024.  Most recently admitted for UTI/CHANELL/bradycardia/encephalopathy.  Now presents again to [facility] 1 week after discharge with lethargy/black stool from ostomy and found in the ER to have RVR/hypoTN.  Labs show CHANELL, lactic acidosis, +cdiff.  In the ER< patient received IVF, abx, metop and amio and now patient has acceptable vitals and HR consistently<130.      Recs as above and I have edited as appropriate.  Manage UGIB with protonix, serial CBC, PRBC prn, await GI recs.  Tx for cdiff.  RVR controlled, maintain amio gtt.  Trend lactate/cr/uop s/p volume resuscitation and stabilization of BP.  In ER, patient is beginning to make urine.    No need for MICU at this time.

## 2024-08-06 NOTE — CHART NOTE - NSCHARTNOTEFT_GEN_A_CORE
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 Patient was planned for endoscopy today given concern for acute bleeding. Pt with hypotension and afib RVR prior to procedure. Pt received transfusion on admission, was on continuous fluids throughout the day. Patient s/p amiodarone gtt and IV push of amiodarone for afib. Patient was transferred to GI suite with sbp in 100s, HR in 100-120s. Procedure then cancelled due to hypotension and tachycardia, RRT was called.     Patient received 1L of LR during the RRT, received a dose of IV digoxin, stat cbc, bmp, blood gas, and blood cultures drawn. Rectal temp 96 degrees F. At the end of the RRT, patient's blood pressure improved, patient was stable for transfer.     Hgb dropped to 8.8 from 11, ordered for 2 units of PRBC for active hemorrhage.   hypothermia: blood cultures drawn, concern for infection translocation; would favor empiric abx, given allergy to penicillin and unknown reaction will dose cipro empirically which was discussed with pharmacist.   Given concern for active hemorrhage with unknown source, persistent drop in blood pressure, MICU consulted    Will endorse to night ACP to follow up with MICU team, f/u hgb post transfusions.    Discussed case with surgery consult resident. A this time they recommend CT w/con of abd/pel once patient stabilizes. If source of bleed isn't found by GI intervention, call surgery at that time.     Discussed with Dr. Duran

## 2024-08-06 NOTE — CONSULT NOTE ADULT - SUBJECTIVE AND OBJECTIVE BOX
pt seen and examined, full consult to follow     1. GIB  suspect UGIB, however, cannot r/o lower   cont PPI Drip  appreciate MICU eval  maintain NPO   will rapid bowel prep w/1L Moviprep now  NPO for EGD & ileoscopy this afternoon    2. C. Diff   contact precautions   cont Vancomycin Q6H   pt seen and examined, full consult to follow     1. GIB  suspect UGIB; hemodynamically stable  cont PPI Drip  maintain NPO   will rapid bowel prep w/1L Moviprep, however, pt not able to tolerate   NPO for EGD this afternoon    2. C. Diff   contact precautions   cont Vancomycin Q6H     Chief Complaint:  Patient is a 78y old  Female who presents with a chief complaint of AMS/lethargy, Afib w/ RVR, Hyponatremia, C.diff infection (06 Aug 2024 11:41)    Hypertension    Hypothyroid    Osteoarthritis    CAD (coronary artery disease)    CROW (obstructive sleep apnea)    History of MI (myocardial infarction)    Polyp of corpus uteri    Heart murmur    Bilateral hearing loss, unspecified hearing loss type    Obesity (BMI 35.0-39.9 without comorbidity)    Obesity    Mixed stress and urge urinary incontinence    Overactive bladder    S/P ORIF (open reduction internal fixation) fracture    S/P appendectomy    S/P knee replacement    Stented coronary artery    S/P laparotomy    H/O dilation and curettage       HPI:  78F w/ hx of Afib (on Eliquis), CAD (on plavix), MCI/dementia, 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 Limited hx obtainable from pt, was AAOx~1 on encounter and very lethargic/somnolent, but arousable and seemed to deny pain anywhere. Collateral hx obtained from chart review. During recent admission (7/21/24-7/29/24), she was treated for UTI/sepsis and ultimately discharged to rehab to completed a 5-day course of ciprofloxacin (last dose 7/30/24). At rehab, she was reportedly found to have oliguria for a few days over the weekend for which she was started on IV fluids, however she was noncompliant with the IV access and she pulled it out many times. She has had very poor appetite and became lethargic and hypotensive. She was subsequently transferred to hospital where she was found to have black-colored output in ileostomy bag.   In ED: Afebrile, HR 120s-170s (Afib), SBP 90s-130s, RR 15-22, sating % on RA.  Labs notable for Hgb 11.3->10.3, Na 133->122, SCr 3.04->2.58; lactated 4.7->2.8, blood glucose to 400s but FS 100s. Found to be C.diff positive. UA not best sample with 9 epithelial cells, but noted +LE, +bacteria, +WBC, neg nitrite. CT A/P showed no acute intra-abdominal pathology and unchanged indeterminate left adrenal lesion. Received Vanc 500mg PO, Flagyl 500mg IVPB, amio 150mg IVPB x3, ofirmev 1g, 1.5L IVF, and 1u pRBC. Also started on amio gtt and protonix gtt. Nephrology and MICU were consulted. Admitted to Medicine for further management. (05 Aug 2024 23:46)    GI Consulted for melena in ileostomy bag. The patient is known to us from previous admission. She is a poor historian and does not offer much during the encounter aside from some LUQ discomfort only during exam. She recently took Cipro for UTI now with C. Diff    penicillin (Hives)      acetaminophen     Tablet .. 650 milliGRAM(s) Oral every 6 hours PRN  aMIOdarone Infusion 1 mG/Min IV Continuous <Continuous>  ascorbic acid 500 milliGRAM(s) Oral daily  atorvastatin 80 milliGRAM(s) Oral at bedtime  digoxin     Tablet 125 MICROGram(s) Oral once  levothyroxine 125 MICROGram(s) Oral daily  metoprolol tartrate 25 milliGRAM(s) Oral two times a day  midodrine. 5 milliGRAM(s) Oral three times a day  multivitamin 1 Tablet(s) Oral daily  nystatin Powder 1 Application(s) Topical two times a day  pantoprazole Infusion 8 mG/Hr IV Continuous <Continuous>  sodium bicarbonate 650 milliGRAM(s) Oral three times a day  sodium chloride 0.45% 1000 milliLiter(s) IV Continuous <Continuous>  tamsulosin 0.4 milliGRAM(s) Oral at bedtime  vancomycin    Solution 125 milliGRAM(s) Oral every 6 hours        FAMILY HISTORY:        Review of Systems: *limited given dementia     General:  No wt loss, fevers, chills, night sweats, fatigue  Eyes:  Good vision, no reported pain  ENT:  No sore throat, pain, runny nose, dysphagia  CV:  No pain, palpitations, no lightheadedness  Resp:  No dyspnea, cough, tachypnea, wheezing  GI: per hpi   :  No pain, bleeding, incontinence, nocturia  Muscle:  No pain, weakness  Neuro:  No weakness, tingling, memory problems  Psych:  No fatigue, insomnia, mood problems, depression  Endocrine:  No polyuria, polydypsia, cold/heat intolerance  Heme:  No petechiae, ecchymosis, easy bruisability  Skin:  No rash, tattoos, scars, edema    Relevant Family History:   n/c    Relevant Social History: n/c      Physical Exam:    Vital Signs:  Vital Signs Last 24 Hrs  T(C): 36.4 (06 Aug 2024 11:39), Max: 36.7 (05 Aug 2024 19:15)  T(F): 97.6 (06 Aug 2024 11:39), Max: 98 (05 Aug 2024 19:15)  HR: 112 (06 Aug 2024 11:39) (112 - 172)  BP: 88/60 (06 Aug 2024 11:39) (88/60 - 132/76)  BP(mean): 93 (06 Aug 2024 01:20) (78 - 93)  RR: 18 (06 Aug 2024 11:39) (15 - 22)  SpO2: 97% (06 Aug 2024 11:39) (95% - 100%)    Parameters below as of 06 Aug 2024 11:39  Patient On (Oxygen Delivery Method): room air      Daily Height in cm: 162.56 (05 Aug 2024 13:50)    Daily     General:  Appears stated age, well-groomed, nad  HEENT:  NC/AT,  conjunctivae clear and pink, no thyromegaly, nodules, adenopathy, no JVD  Chest:  Full & symmetric excursion, no increased effort, breath sounds clear  Cardiovascular:  Regular rhythm, S1, S2, no murmur/rub/S3/S4, no abdominal bruit, no edema  Abdomen:  Soft, +LUQ ttp, non-distended, normoactive bowel sounds,  no masses ,no hepatosplenomeagaly, no signs of chronic liver disease  Extremities:  no cyanosis,clubbing or edema  Skin:  No rash/erythema/ecchymoses/petechiae/wounds/abscess/warm/dry  Neuro/Psych:  A&O x1 , no asterixis, no tremor, no encephalopathy  Ileostomy Bag: (+) melena that smears red     Laboratory:                            11.0   10.38 )-----------( 143      ( 06 Aug 2024 06:05 )             32.7     08-06    130<L>  |  101  |  55<H>  ----------------------------<  74  4.4   |  12<L>  |  2.92<H>    Ca    8.0<L>      06 Aug 2024 06:05  Phos  2.6     08-06  Mg     1.3     08-06    TPro  5.4<L>  /  Alb  2.9<L>  /  TBili  0.6  /  DBili  x   /  AST  30  /  ALT  24  /  AlkPhos  72  08-06    LIVER FUNCTIONS - ( 06 Aug 2024 06:05 )  Alb: 2.9 g/dL / Pro: 5.4 g/dL / ALK PHOS: 72 U/L / ALT: 24 U/L / AST: 30 U/L / GGT: x           PT/INR - ( 05 Aug 2024 15:05 )   PT: 31.7 sec;   INR: 2.97 ratio         PTT - ( 05 Aug 2024 15:05 )  PTT:29.4 sec  Urinalysis Basic - ( 06 Aug 2024 06:05 )    Color: x / Appearance: x / SG: x / pH: x  Gluc: 74 mg/dL / Ketone: x  / Bili: x / Urobili: x   Blood: x / Protein: x / Nitrite: x   Leuk Esterase: x / RBC: x / WBC x   Sq Epi: x / Non Sq Epi: x / Bacteria: x        Imaging:

## 2024-08-06 NOTE — PROVIDER CONTACT NOTE (HYPOGLYCEMIA EVENT) - NS PROVIDER CONTACT BACKGROUND-HYPO
Age: 78y    Gender: Female    POCT Blood Glucose:  94 mg/dL (08-06-24 @ 07:10)  66 mg/dL (08-06-24 @ 06:41)  67 mg/dL (08-06-24 @ 06:38)  101 mg/dL (08-05-24 @ 22:02)      eMAR:  dextrose 50% Injectable   12.5 Gram(s) IV Push (08-06-24 @ 06:53)    levothyroxine   125 MICROGram(s) Oral (08-06-24 @ 06:54)

## 2024-08-06 NOTE — PHYSICAL THERAPY INITIAL EVALUATION ADULT - PERTINENT HX OF CURRENT PROBLEM, REHAB EVAL
78F w/ hx of Afib (on Eliquis), CAD (on plavix), MCI/dementia, 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 Limited hx obtainable from pt, was AAOx~1 on encounter and very lethargic/somnolent. During recent admission (7/21/24-7/29/24), she was treated for UTI/sepsis and ultimately discharged to rehab to completed a 5-day course of ciprofloxacin (last dose 7/30/24). At rehab, she was reportedly found to have oliguria for a few days over the weekend for which she was started on IV fluids, however she was noncompliant with the IV access and she pulled it out many times. She has had very poor appetite and became lethargic and hypotensive. She was subsequently transferred to hospital where she was found to have black-colored output in ileostomy bag. CT Head (8/5):   Ischemic white matter disease and atrophy. No adverse interval change 7/25/2024. CT Abd/Pelvis (8/5): No acute intra-abdominal pathology. Limited evaluation for mesenteric ischemia in the absence of intravenous contrast. Unchanged indeterminate left adrenal lesion.

## 2024-08-06 NOTE — PROGRESS NOTE ADULT - ASSESSMENT
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 Found to have C.diff, CHANELL, and possible anemia of acute blood loss 2/2 GIB.        Problem/Plan - 1:  ·  Problem: Clostridium difficile infection.   ·  Plan: Found to have C.diff infection. Likely i/s/o recent abx use (cipro) to treat UTI.  - CT A/P showed no acute intra-abdominal pathology  - s/p Vanc 500mg PO and flagyl 500mg IVPB in ED  - c/w Vanc 125mg PO QID for now  - maintain contact precautions.    Problem/Plan - 2:  ·  Problem: Metabolic encephalopathy.   ·  Plan: Presented with AMS/lethargy (AAOx~1). Unclear current baseline per daughter, but AAOx4 in Feb 2024, but since then has had multiple hospitalizations and general decline in mental status with concern of MCI/dementia (no official diagnosis yet). Now more lethargic than usual. Concern for metabolic/septic encephalopathy i/so infection/sepsis and metabolic derangements  - treat hyponatremia and metabolic acidosis as below  - treat C.diff as above  - f/u UCx (UA not best sample with 9 epithelial cells, but noted +LE, +bacteria, +WBC, neg nitrite; recently treated for E. faecalis UTI)   - f/u BCx.    Problem/Plan - 3:  ·  Problem: Atrial fibrillation with RVR.   ·  Plan: Presented in Afib w/ RVR to HR 170s. Has hx of Afib (on eliquis at home). Suspect RVR i/s/o infection, dehydration, electrolyte abnormalities  - s/p amio 150mg IVPB x3 and started on amio gtt with improvement in BP and HR, will hold off on further amio for now  - resume home metoprolol 25mg BID for rate control  - holding home Eliquis given concerns of possible GIB  - monitor and replete electrolytes PRN  - monitoring on telemetry  - f/u EKG (none located for this admission)  - f/u TSH/FT4.    Problem/Plan - 4:  ·  Problem: Melanotic stools.   ·  Plan: Presented with black-colored output in ileostomy bag. On admission, Hgb 11.3->10.3 (baseline Hgb 13-14). Concern for possible GIB.  - s/p 1u pRBC  - continue monitor CBC, maintain active T+S, transfuse PRN  - c/w PPI gtt for now  - GI consult in AM.    Problem/Plan - 5:  ·  Problem: CHANELL (acute kidney injury).   ·  Plan: On admission, SCr 3.04 (baseline SCr ~0.8-1.0 in May 2024)  - s/p 1.5L IVF in ED  - c/w 1/2 NS-bicarb gtt as below   - c/w home tamsulosin for urinary retention  - c/w indwelling amaya  - strict I/Os, renally dose meds, avoid nephrotoxins  - monitor SCr and electrolytes  - f/u Nephrology recs in AM.    Problem/Plan - 6:  ·  Problem: Hyponatremia.   ·  Plan: Found to have Na down to 122. Possibly i/s/o GI losses vs poor PO intake vs SIADH  - c/w 1/2 NS-bicarb gtt as below  - monitor Na  - f/u urine lytes  - f/u Nephrology recs in AM.    Problem/Plan - 7:  ·  Problem: Metabolic acidosis.   ·  Plan: Found with pH down to 7.28 and serum bicarb 12. Suspect multifactorial etiology of metabolic acidosis 2/2 GI losses, infection, hypotensive BPs, CHANELL.  - c/w 1/2 NS-bicarb gtt as ordered by Nephrology  - continue to monitor on BMP and VBG  - f/u Nephrology recs in AM.    Problem/Plan - 8:  ·  Problem: Hypotension.   ·  Plan: Presented with SBP to 90s initially. Now improving. Likely multifactorial 2/2 infection/sepsis, fluid losses, and Afib w/ RVR.   - CT A/P noted unchanged indeterminate left adrenal lesion  - c/w home midodrine  - ensure HR control  - f/u infectious workup and treat C.diff as above  - monitor I/Os, fluid resuscitation as needed  - monitor BP closely.    Problem/Plan - 9:  ·  Problem: CAD (coronary artery disease).   ·  Plan: Hx of CAD s/p PCI (?stent)  - holding home plavix  - c/w home statin  - c/w home BB as above.    Problem/Plan - 10:  ·  Problem: Hypothyroidism.   ·  Plan; - c/w home levothyroxine (transition from PO to IV if unable to tolerate PO)  - f/u TSH/FT4.    Problem/Plan - 11:  ·  Problem: Medication management.   ·  Plan: No accompanying records from rehab found on admission. Prelim rec performed based on discharge med rec from recent admission  - further med rec in AM.    Problem/Plan - 12:  ·  Problem: Prophylactic measure.   ·  Plan: - DVT ppx: hold home Eliquis for now given concerns of GIB  - Diet: NPO for now, advance as tolerated  - Dispo: pending improvement/workup. Of note, daughter stated she is not interested in having pt return to [facility] afterwards. 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 Found to have C.diff, CHANELL, and possible anemia of acute blood loss 2/2 GIB.          ·  Problem: Clostridium difficile infection.   ·  Plan: Found to have C.diff infection. Likely i/s/o recent abx use (cipro) to treat UTI.  - CT A/P showed no acute intra-abdominal pathology  Continue by mouth Vanco  Follow-up with GI    ·  Problem: Metabolic encephalopathy.   ·  Plan: Patient is lethargic but arousable...  Likely multifactorial including acute infection,A KII and dehydrationdehydration  Mental status during the recent hospitalization showed decline and mentation however workup was negative for acute neurological pathology.  At this time the patient seems to be at baseline compared to last hospitalization,      ·  Problem: Atrial fibrillation with RVR.   ·  Plan: Presented in Afib w/ RVR to HR 170s. Has hx of Afib (on eliquis at home). Suspect RVR i/s/o infection, dehydration, electrolyte abnormalities  - Amiodarone as per cardiology... EP follow-up  Hold anticoagulation in setting of melanotic stool      Melanotic stools.   - s/p 1u pRBC  Follow-up with Dr. Sprague    ·  Problem: CHANELL (acute kidney injury).   Likely prerenal secondary to dehydration continue IV fluids and bicarb as per renal      ·  Problem: Hyponatremia.   Likely secondary to dehydration.  Continue IV fluid      ·  Problem: Hypotension.   Continue to monitor closely      ·  Problem: CAD (coronary artery disease).   - holding home plavix  - c/w home statin  - c/w home BB as above.      ·  Problem: Hypothyroidism.   Recently decreased dose      Appreciate wound care consult.  Follow-up official input

## 2024-08-06 NOTE — RAPID RESPONSE TEAM SUMMARY - NSSITUATIONBACKGROUNDRRT_GEN_ALL_CORE
78F w/ hx of Afib (on Eliquis), CAD (on plavix), MCI/dementia, ischemic bowel s/p ex-lap w bowel resection + end ileostomy, COPD, CROW, HTN, HLD, urinary retention, recurrent UTIs, hypothyroidism, recent admission for UTI c/b sepsis, encephalopathy, and bradycardia presenting with concerns for GI Bleed (dark output from ileostomy). Course complicated by C diff. RRT called for hypotension. During evaluation BP 85/52, HR: 130. Started 1L LR with improvement to 124/60, HR to 130s. Temperature: 96.5.  Answering questions, following commands, abdomen soft NT, no accessory muscle use.     #Shock  #Afib with RVR  Likely hypovolemic/hemorrhagic shock 2/2 cdiff & GIB, afib RVR likely compensatory response. Potential component of sepsis given T: 96.5.  - Improvement of BP to SBP 120s with 1L LR  - 250 IV digoxin given for potential afib rvr contributing to hypotension  - GI to scope when more stable in AM  - c/w protonix gtt  - c/w po vancomycin  - c/w amiodarone  - BCX obtained 8/5  - Consider broadening with Zosyn given hypothermia/sepsis/potential translocation in the setting of cdiff.  - CBC/CMP/VBG with lactate/Coags  - Tranfuse pRBC for active bleeding  - Case discussed with primary team at bedside.     Lv Martinez MD  PGY-3     78F w/ hx of Afib (on Eliquis), CAD (on plavix), MCI/dementia, ischemic bowel s/p ex-lap w bowel resection + end ileostomy, COPD, CROW, HTN, HLD, urinary retention, recurrent UTIs, hypothyroidism, recent admission for UTI c/b sepsis, encephalopathy, and bradycardia presenting with concerns for GI Bleed (dark output from ileostomy). Course complicated by C diff. RRT called for hypotension. During evaluation BP 85/52, HR: 130. Started 1L LR with improvement to 124/60, HR to 130s. Temperature: 96.5.  Answering questions, following commands, abdomen soft NT, no accessory muscle use.     #Shock  #Afib with RVR  Likely hypovolemic/hemorrhagic shock 2/2 cdiff & GIB, afib RVR likely compensatory response. Potential component of sepsis given T: 96.5.  - Improvement of BP to SBP 120s with 1L LR  - 250 IV digoxin given for potential afib rvr contributing to hypotension  - GI to scope when more stable in AM  - c/w protonix gtt  - c/w po vancomycin  - c/w amiodarone  - c/w midodrine 5 mg PO TID  - BCX obtained 8/5  - Consider broadening with Zosyn given hypothermia/sepsis/potential translocation in the setting of cdiff.  - CBC/CMP/VBG with lactate/Coags  - Tranfuse pRBC for active bleeding  - TSH in AM   - Case discussed with primary team at bedside.     Lv Martinez MD  PGY-3     78F w/ hx of Afib (on Eliquis), CAD (on plavix), MCI/dementia, ischemic bowel s/p ex-lap w bowel resection + end ileostomy, COPD, CROW, HTN, HLD, urinary retention, recurrent UTIs, hypothyroidism, recent admission for UTI c/b sepsis, encephalopathy, and bradycardia presenting with concerns for GI Bleed (dark output from ileostomy). Course complicated by C diff. RRT called for hypotension. During evaluation BP 85/52, HR: 130. Started 1L LR with improvement to 124/60, HR to 130s. Temperature: 96.5.  Answering questions, following commands, abdomen soft NT, no accessory muscle use.     #Shock  #Afib with RVR  Likely hypovolemic/hemorrhagic shock 2/2 cdiff & GIB, afib RVR likely compensatory response. Potential component of sepsis given T: 96.5.  - Improvement of BP to SBP 120s with 1L LR  - digoxin 250 mcg IV given for potential afib rvr contributing to hypotension  - GI to scope when more stable in AM  - c/w protonix gtt  - c/w po vancomycin  - c/w amiodarone  - c/w midodrine 5 mg PO TID  - BCX obtained 8/5  - Consider broadening with Zosyn given hypothermia/sepsis/potential translocation in the setting of cdiff.  - CBC/CMP/VBG with lactate/Coags  - Tranfuse pRBC for active bleeding  - TSH in AM   - Case discussed with primary team at bedside.     Lv Martinez MD  PGY-3

## 2024-08-06 NOTE — CONSULT NOTE ADULT - ASSESSMENT
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 Admitted due to black output in the ileostomy bag c/f UGIB, afib with RVR, and c diff positive.

## 2024-08-06 NOTE — PRE PROCEDURE NOTE - PRE PROCEDURE EVALUATION
Attending Physician:                        dayday    Procedure:egd/ileoscopy    Indication for Procedure:gi bleed  ________________________________________________________  PAST MEDICAL & SURGICAL HISTORY:  Hypertension      Hypothyroid      Osteoarthritis  knees, back      CAD (coronary artery disease)      CROW (obstructive sleep apnea)  non complaint on CPAP      History of MI (myocardial infarction)  h/o previous MI in 2004 prompted PTCA  with stenting x 2 vessels   last stress/ echo 2019      Heart murmur  dx in childhood      Bilateral hearing loss, unspecified hearing loss type  bilateral aids      Obesity      Mixed stress and urge urinary incontinence      Overactive bladder      S/P ORIF (open reduction internal fixation) fracture  left hip 1962      S/P appendectomy  30 plus years      S/P knee replacement  left 2000      Stented coronary artery  2004 X 2 STENTS      S/P laparotomy  due to adhesions, 30 years ago      H/O dilation and curettage  2/2019 Benign polyp        ALLERGIES:  penicillin (Hives)    HOME MEDICATIONS:  apixaban 5 mg oral tablet: 1 tab(s) orally every 12 hours  ascorbic acid 500 mg oral tablet: 1 tab(s) orally once a day  atorvastatin 80 mg oral tablet: 1 tab(s) orally once a day (at bedtime)  clopidogrel 75 mg oral tablet: 1 tab(s) orally once a day  levothyroxine 125 mcg (0.125 mg) oral tablet: 1 tab(s) orally once a day  metoprolol tartrate 25 mg oral tablet: 1 tab(s) orally 2 times a day  midodrine 5 mg oral tablet: 1 tab(s) orally 3 times a day  mirtazapine 15 mg oral tablet: 0.5 tab(s) orally once a day (at bedtime)  Multiple Vitamins oral tablet: 1 tab(s) orally once a day  sodium bicarbonate 650 mg oral tablet: 1 tab(s) orally 3 times a day  tamsulosin 0.4 mg oral capsule: 1 cap(s) orally once a day (at bedtime)    AICD/PPM: [ ] yes   [ ] no    PERTINENT LAB DATA:                        11.0   10.38 )-----------( 143      ( 06 Aug 2024 06:05 )             32.7     08-06    134<L>  |  102  |  54<H>  ----------------------------<  74  4.0   |  18<L>  |  2.81<H>    Ca    7.7<L>      06 Aug 2024 15:29  Phos  2.6     08-06  Mg     1.3     08-06    TPro  5.4<L>  /  Alb  2.9<L>  /  TBili  0.6  /  DBili  x   /  AST  30  /  ALT  24  /  AlkPhos  72  08-06    PT/INR - ( 06 Aug 2024 15:29 )   PT: 20.9 sec;   INR: 2.03 ratio         PTT - ( 06 Aug 2024 15:29 )  PTT:28.8 sec            PHYSICAL EXAMINATION:    Height (cm): 162.6T(C): 35.7  HR: 126  BP: 96/54  RR: 17  SpO2: 100%    Constitutional: NAD  HEENT: PERRLA, EOMI,    Neck:  No JVD  Respiratory: CTAB/L  Cardiovascular: S1 and S2  Gastrointestinal: BS+, soft, NT/ND  Extremities: No peripheral edema  Neurological: A/O x 3, no focal deficits  Psychiatric: Normal mood, normal affect  Skin: No rashes    ASA Class: I [ ]  II [ ]  III [ x]  IV [ ]    COMMENTS:    The patient is a suitable candidate for the planned procedure unless box checked [ ]  No, explain:

## 2024-08-06 NOTE — PATIENT PROFILE ADULT - VISION (WITH CORRECTIVE LENSES IF THE PATIENT USUALLY WEARS THEM):
Chief Complaint   Patient presents with     Well Child     12 Month Well Child          Medication Reconciliation: complete    Karla Traylor    
Normal vision: sees adequately in most situations; can see medication labels, newsprint

## 2024-08-06 NOTE — CONSULT NOTE ADULT - SUBJECTIVE AND OBJECTIVE BOX
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 Limited hx obtainable from pt, was AAOx~1 on encounter and very lethargic/somnolent. Collateral hx obtained from chart review. During recent admission (7/21/24-7/29/24), she was treated for UTI/sepsis and ultimately discharged to rehab to completed a 5-day course of ciprofloxacin (last dose 7/30/24). At rehab, she was reportedly found to have oliguria for a few days over the weekend for which she was started on IV fluids, however she was noncompliant with the IV access and she pulled it out many times. She has had very poor appetite and became lethargic and hypotensive. She was subsequently transferred to hospital where she was found to have black-colored output in ileostomy bag.     In ED: Afebrile, HR 120s-170s (Afib), SBP 90s-130s, RR 15-22, sating % on RA.  Labs notable for Hgb 11.3->10.3, Na 133->122, SCr 3.04->2.58; lactated 4.7->2.8, blood glucose to 400s but FS 100s. Found to be C.diff positive. UA not best sample with 9 epithelial cells, but noted +LE, +bacteria, +WBC, neg nitrite. CT A/P showed no acute intra-abdominal pathology and unchanged indeterminate left adrenal lesion. Received Vanc 500mg PO, Flagyl 500mg IVPB, amio 150mg IVPB x3, ofirmev 1g, 1.5L IVF, and 1u pRBC. Also started on amio gtt and protonix gtt.       PAST MEDICAL & SURGICAL HISTORY:  Hypertension      Hypothyroid      Osteoarthritis  knees, back      CAD (coronary artery disease)      CROW (obstructive sleep apnea)  non complaint on CPAP      History of MI (myocardial infarction)  h/o previous MI in 2004 prompted PTCA  with stenting x 2 vessels   last stress/ echo 2019      Heart murmur  dx in childhood      Bilateral hearing loss, unspecified hearing loss type  bilateral aids      Obesity      Mixed stress and urge urinary incontinence      Overactive bladder      S/P ORIF (open reduction internal fixation) fracture  left hip 1962      S/P appendectomy  30 plus years      S/P knee replacement  left 2000      Stented coronary artery  2004 X 2 STENTS      S/P laparotomy  due to adhesions, 30 years ago      H/O dilation and curettage  2/2019 Benign polyp              MEDICATIONS:  aMIOdarone Infusion 1 mG/Min IV Continuous <Continuous>  metoprolol tartrate 25 milliGRAM(s) Oral two times a day  midodrine. 5 milliGRAM(s) Oral three times a day    vancomycin    Solution 125 milliGRAM(s) Oral every 6 hours      acetaminophen     Tablet .. 650 milliGRAM(s) Oral every 6 hours PRN    pantoprazole Infusion 8 mG/Hr IV Continuous <Continuous>    atorvastatin 80 milliGRAM(s) Oral at bedtime  levothyroxine 125 MICROGram(s) Oral daily    ascorbic acid 500 milliGRAM(s) Oral daily  multivitamin 1 Tablet(s) Oral daily  nystatin Powder 1 Application(s) Topical two times a day  sodium bicarbonate 650 milliGRAM(s) Oral three times a day  sodium chloride 0.45% 1000 milliLiter(s) IV Continuous <Continuous>  tamsulosin 0.4 milliGRAM(s) Oral at bedtime      FAMILY HISTORY:      SOCIAL HISTORY:    [ ] Non-smoker  [ ] Smoker  [ ] Alcohol    Allergies    penicillin (Hives)    Intolerances    	    REVIEW OF SYSTEMS:  CONSTITUTIONAL: No fever, weight loss, o+fatigue  EYES: No eye pain, visual disturbances, or discharge  ENMT:  No difficulty hearing, tinnitus, vertigo; No sinus or throat pain  NECK: No pain or stiffness  RESPIRATORY: No cough, wheezing, chills or hemoptysis; No Shortness of Breath  CARDIOVASCULAR: No chest pain, palpitations, passing out, dizziness, or leg swelling  GASTROINTESTINAL: No abdominal or epigastric pain. No nausea, vomiting, or hematemesis; No diarrhea or constipation. No melena or hematochezia.  GENITOURINARY: No dysuria, frequency, hematuria, or incontinence  NEUROLOGICAL: No headaches, memory loss, loss of strength, numbness, or tremors  SKIN: No itching, burning, rashes, or lesions   LYMPH Nodes: No enlarged glands  ENDOCRINE: No heat or cold intolerance; No hair loss  MUSCULOSKELETAL: No joint pain or swelling; No muscle, back, or extremity pain  PSYCHIATRIC: No depression, anxiety, mood swings, or difficulty sleeping  HEME/LYMPH: No easy bruising, or bleeding gums  ALLERY AND IMMUNOLOGIC: No hives or eczema	    [ ] All others negative	  [ ] Unable to obtain    PHYSICAL EXAM:  T(C): 36.3 (08-06-24 @ 05:00), Max: 36.7 (08-05-24 @ 19:15)  HR: 117 (08-06-24 @ 05:00) (114 - 172)  BP: 115/61 (08-06-24 @ 05:00) (94/70 - 132/76)  RR: 18 (08-06-24 @ 05:00) (15 - 22)  SpO2: 100% (08-06-24 @ 05:00) (95% - 100%)  Wt(kg): --  I&O's Summary    05 Aug 2024 07:01  -  06 Aug 2024 07:00  --------------------------------------------------------  IN: 425 mL / OUT: 425 mL / NET: 0 mL        Appearance: NAD lethargic  HEENT:   Normal oral mucosa, PERRL, EOMI	  Lymphatic: No lymphadenopathy  Cardiovascular: Irregular  S1 S2, No JVD, No murmurs, No edema  Respiratory: Decreased bs   Psychiatry: A & O x 1-2  Gastrointestinal:  Soft, Non-tender, + BS	  Skin: No rashes, No ecchymoses, No cyanosis	  Neurologic: Non-focal  Extremities: Normal range of motion, No clubbing, cyanosis or edema  Vascular: Peripheral pulses palpable 2+ bilaterally    TELEMETRY: 	AF    ECG:  	  RADIOLOGY:  < from: CT Abdomen and Pelvis No Cont (08.05.24 @ 19:52) >    ACC: [id number] EXAM:  CT ABDOMEN AND PELVIS   ORDERED BY:  LEIGH MALIK     PROCEDURE DATE:  08/05/2024          INTERPRETATION:  CLINICAL INFORMATION: Evaluate for ischemic bowel,   abdominal mass. Status post exploratory laparotomy with bowel resection    and end ileostomy on 2024-02-24. Presents with 2 days of lethargy and   abdominal discomfort.    COMPARISON: CT chest, abdomen and pelvis 7/21/2024.    CONTRAST/COMPLICATIONS:  IV Contrast: NONE  Oral Contrast: NONE  Complications: None reported at time of study completion    PROCEDURE:  CT of the Abdomen and Pelvis was performed.  Sagittal and coronal reformats were performed.    FINDINGS:  LOWER CHEST: Partially imaged left chest wall loop recorder, coronary   artery calcifications and mitral annular calcifications. Left basilar   confluent opacities, likely atelectasis.    LIVER: Within normal limits.  BILE DUCTS: Normal caliber.  GALLBLADDER: Within normal limits.  SPLEEN: Within normal limits.  PANCREAS: Within normal limits.  ADRENALS: Left adrenal mass with central hyperattenuation measures 4.2 x   3.7 cm, and is unchanged from 4/20/2024.  KIDNEYS/URETERS: No hydronephrosis. Left renal cyst.    BLADDER: Melvin catheter.  REPRODUCTIVE ORGANS: Uterus and adnexa within normallimits.    BOWEL: No bowel obstruction. Status post right lower quadrant ileostomy.  PERITONEUM/RETROPERITONEUM: Within normal limits.  VESSELS: Atherosclerotic changes. Limited evaluation for mesenteric   ischemia in the absence of intravenous contrast.  LYMPH NODES: No lymphadenopathy.  ABDOMINAL WALL: Postsurgical changes. Subcutaneous edema.  BONES: Degenerative changes.    IMPRESSION:  No acute intra-abdominal pathology. Limited evaluation for mesenteric   ischemia in the absence of intravenous contrast.    Unchanged indeterminate left adrenal lesion.    --- End of Report ---           CHRISTINA CABELLO MD; Resident Radiologist  This document has been electronically signed.  CORINNE SUN MD; Attending Radiologist  This document has been electronically signed. Aug  5 2024  8:15PM  < from: TTE W or WO Ultrasound Enhancing Agent (05.03.24 @ 11:15) >    TRANSTHORACIC ECHOCARDIOGRAM REPORT  ________________________________________________________________________________                                      _______       Pt. Name:       LEFTY AKBAR Study Date:    5/3/2024  MRN:            [id number]            YOB: 1945  Accession #:    [id number]           Age:           78 years  Account#:       [id number]        Gender:        F  Visit ID#  Heart Rate:     1112 bpm            Height:        64.00 in (162.56 cm)  Rhythm:       atrial fibrillation Weight:        160.00 lb (72.58 kg)  Blood Pressure: 102/62 mmHg         BSA/BMI:       1.78 m² / 27.46 kg/m²  ________________________________________________________________________________________  Referring Physician: [id number] Morris Burton  Interpreting Physician: Bryce Lugo MD  Primary Sonographer:    Lv Otero RDCS    CPT:               ECHO TTE WO CON COMP W DOPP - 43270.m  Indication(s):     Abnormal electrocardiogram ECG/EKG - R94.31  Procedure:      Transthoracic echocardiogram with 2-D, M-mode and complete                     spectral and color flow Doppler.  Ordering Location: [facility]  Admission Status:  Inpatient  Study Information: Image quality for this study is fair.    _______________________________________________________________________________________     CONCLUSIONS:      1. Left ventricular cavity is normal in size. Left ventricular systolic function is normal with an ejection fraction of 55 % by Shaffer's method of disks.    ________________________________________________________________________________________  FINDINGS:     Left Ventricle:  The left ventricular cavity is normal in size. Left ventricular systolic function is normal with a calculated ejection fraction of 55 % by the Shaffer's biplane method of disks. There are no regional wall motion abnormalities seen.     Right Ventricle:  The right ventricular cavity is normal in size and normal systolic function. Tricuspid annular plane systolic excursion (TAPSE) is 1.3 cm (normal >=1.7 cm). Tricuspid annular tissue Doppler S' is 12.6 cm/s (normal >10 cm/s).     Left Atrium:  The left atrium is moderately dilated with an indexed volume of 44.91 ml/m².     Right Atrium:  The right atrium is normal in size with an indexed volume of 27.54 ml/m².     Aortic Valve:  There is mild calcification of the aortic valve leaflets.     Mitral Valve:  There is calcification of the mitral valve annulus. There is moderate mitral regurgitation.     Tricuspid Valve:  Thereis mild tricuspid regurgitation. Normal pulmonary artery pressure.     Pulmonic Valve:  Normal pulmonic valve.     Aorta:  The aortic annulus and aortic root appear normal in size.     Pericardium:  No pericardial effusion seen.  ____________________________________________________________________    < end of copied text >    OTHER: 	  	  LABS:	 	    CARDIAC MARKERS:      Troponin T, High Sensitivity Result: 180: * Troponin T, High Sensitivity Result: 85: * Pro-Brain Natriuretic Peptide: 29264 pg/mL (08.05.24 @ 15:05)                             11.0   10.38 )-----------( 143      ( 06 Aug 2024 06:05 )             32.7     08-06    130<L>  |  101  |  55<H>  ----------------------------<  74  4.4   |  12<L>  |  2.92<H>    Ca    8.0<L>      06 Aug 2024 06:05  Phos  2.6     08-06  Mg     1.3     08-06    TPro  5.4<L>  /  Alb  2.9<L>  /  TBili  0.6  /  DBili  x   /  AST  30  /  ALT  24  /  AlkPhos  72  08-06    C. difficile GDH & toxins A/B by EIA (08.05.24 @ 16:24)   Clostridium difficile GDH Toxins A&B, EIA:   Positive  Clostridium difficile GDH Interpretation: Positive for toxigenic C. Difficile. This specimen is positive for C.   Difficile glutamate dehydrogenase (GDH) antigen and positive for C.   Difficile Toxins A & B, by EIA. GDH is a highly sensitive marker for C.   Difficile that is produced in largeamounts by all C. Difficile strains,   both toxigenic and nontoxigenic. This assay has not been validated as a   test of cure. The results of this assay should always be interpreted in   conjunction with patient's clinical history.

## 2024-08-06 NOTE — CONSULT NOTE ADULT - SUBJECTIVE AND OBJECTIVE BOX
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 Limited hx obtainable from pt, was AAOx~1 on encounter and very lethargic/somnolent, but arousable and seemed to deny pain anywhere. Collateral hx obtained from chart review. During recent admission (7/21/24-7/29/24), she was treated for UTI/sepsis and ultimately discharged to rehab to completed a 5-day course of ciprofloxacin (last dose 7/30/24). At rehab, she was reportedly found to have oliguria for a few days over the weekend for which she was started on IV fluids, however she was noncompliant with the IV access and she pulled it out many times. She has had very poor appetite and became lethargic and hypotensive. She was subsequently transferred to hospital where she was found to have black-colored output in ileostomy bag.     In ED: Afebrile, HR 120s-170s (Afib), SBP 90s-130s, RR 15-22, sating % on RA.  Labs notable for Hgb 11.3->10.3, Na 133->122, SCr 3.04->2.58; lactated 4.7->2.8, blood glucose to 400s but FS 100s. Found to be C.diff positive. UA not best sample with 9 epithelial cells, but noted +LE, +bacteria, +WBC, neg nitrite. CT A/P showed no acute intra-abdominal pathology and unchanged indeterminate left adrenal lesion. Received Vanc 500mg PO, Flagyl 500mg IVPB, amio 150mg IVPB x3, ofirmev 1g, 1.5L IVF, and 1u pRBC. Also started on amio gtt and protonix gtt. Nephrology and MICU were consulted. Admitted to Medicine for further management. (05 Aug 2024 23:46)      Surgery called by primary team after patient had a RRT in endoscopy suite for hypotension and afib with RVR prior to EGD for evaluation of UGIB. Patient has a history of dementia, COPD, CAD on Plavix, afib on Eliquis and underwent exlap and SBR for ischemic bowel on 2/22/24, left in discontinuity with an Abthera. Intraoperative mesenteric angiogram unremarkable. RTOR with end ileostomy creation and closure 2/24/24. She was brought to the ED from [facility] for lethargy and melanotic ostomy output, now being treated for C. diff and CHANELL.       PAST MEDICAL HISTORY:  Hypertension    Hypothyroid    Osteoarthritis    CAD (coronary artery disease)    CROW (obstructive sleep apnea)    History of MI (myocardial infarction)    Polyp of corpus uteri    Heart murmur    Bilateral hearing loss, unspecified hearing loss type    Obesity (BMI 35.0-39.9 without comorbidity)    Obesity    Mixed stress and urge urinary incontinence    Overactive bladder        PAST SURGICAL HISTORY:  No significant past surgical history    S/P ORIF (open reduction internal fixation) fracture    S/P appendectomy    S/P knee replacement    Stented coronary artery    S/P laparotomy    H/O dilation and curettage        SOCIAL HISTORY:  - Denies EtOH abuse, smoking, IVDA    MEDICATIONS:  acetaminophen     Tablet .. 650 milliGRAM(s) Oral every 6 hours PRN  aMIOdarone Infusion 1 mG/Min IV Continuous <Continuous>  ascorbic acid 500 milliGRAM(s) Oral daily  atorvastatin 80 milliGRAM(s) Oral at bedtime  ciprofloxacin   IVPB 400 milliGRAM(s) IV Intermittent every 12 hours  digoxin  Injectable 250 MICROGram(s) IV Push once  levothyroxine 125 MICROGram(s) Oral daily  magnesium sulfate  IVPB 2 Gram(s) IV Intermittent once  midodrine. 5 milliGRAM(s) Oral three times a day  multivitamin 1 Tablet(s) Oral daily  pantoprazole Infusion 8 mG/Hr IV Continuous <Continuous>  sodium bicarbonate 650 milliGRAM(s) Oral three times a day  sodium chloride 0.45% 1000 milliLiter(s) IV Continuous <Continuous>  sodium phosphate 15 milliMole(s)/250 mL IVPB 15 milliMole(s) IV Intermittent once  vancomycin    Solution 125 milliGRAM(s) Oral every 6 hours      ALLERGIES:  penicillin (Hives)      VITALS & I/Os:  Vital Signs Last 24 Hrs  T(C): 36.3 (06 Aug 2024 20:01), Max: 36.4 (06 Aug 2024 11:39)  T(F): 97.4 (06 Aug 2024 20:01), Max: 97.6 (06 Aug 2024 11:39)  HR: 90 (06 Aug 2024 20:01) (90 - 126)  BP: 81/56 (06 Aug 2024 20:01) (81/56 - 115/61)  BP(mean): 93 (06 Aug 2024 17:00) (93 - 93)  RR: 16 (06 Aug 2024 20:01) (16 - 18)  SpO2: 98% (06 Aug 2024 20:01) (96% - 100%)    Parameters below as of 06 Aug 2024 20:01  Patient On (Oxygen Delivery Method): room air        I&O's Summary    05 Aug 2024 07:01  -  06 Aug 2024 07:00  --------------------------------------------------------  IN: 425 mL / OUT: 425 mL / NET: 0 mL    06 Aug 2024 07:01  -  06 Aug 2024 22:51  --------------------------------------------------------  IN: 0 mL / OUT: 325 mL / NET: -325 mL        PHYSICAL EXAM:  GEN: resting comfortably in bed, in NAD  CHEST: irregularly irregular, HR 90s-100s, hemodynamically stable  PULM: nonlabored breathing on RA  ABD: soft, non-distended, non-tender. Ostomy in RLQ pink and viable with melanotic stool and gas in bag  EXTREMITIES: Grossly symmetric, WWP  NEURO: Awake, alert, oriented to self; repeatedly stating it is cold in her room    LABS:                        8.8    7.90  )-----------( 126      ( 06 Aug 2024 18:05 )             26.0     08-06    134<L>  |  105  |  51<H>  ----------------------------<  72  3.8   |  15<L>  |  2.49<H>    Ca    7.2<L>      06 Aug 2024 18:05  Phos  2.0     08-06  Mg     1.6     08-06    TPro  4.5<L>  /  Alb  2.5<L>  /  TBili  0.6  /  DBili  x   /  AST  21  /  ALT  21  /  AlkPhos  54  08-06    Lactate:  08-06 @ 06:22  2.1    PT/INR - ( 06 Aug 2024 18:05 )   PT: 21.7 sec;   INR: 2.11 ratio         PTT - ( 06 Aug 2024 18:05 )  PTT:29.1 sec  ABG - ( 06 Aug 2024 18:03 )  pH, Arterial: 7.39  pH, Blood: x     /  pCO2: 26    /  pO2: 103   / HCO3: 16    / Base Excess: -8.1  /  SaO2: 99.3          Urinalysis Basic - ( 06 Aug 2024 18:05 )    Color: x / Appearance: x / SG: x / pH: x  Gluc: 72 mg/dL / Ketone: x  / Bili: x / Urobili: x   Blood: x / Protein: x / Nitrite: x   Leuk Esterase: x / RBC: x / WBC x   Sq Epi: x / Non Sq Epi: x / Bacteria: x        IMAGING:   GENERAL SURGERY CONSULT NOTE  Consulting surgical team: ACS  Consulting attending: Asa    HPI:  HPI:  78F w/ hx of Afib (on Eliquis), CAD (on plavix), MCI/dementia, 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 Limited hx obtainable from pt, was AAOx~1 on encounter and very lethargic/somnolent, but arousable and seemed to deny pain anywhere. Collateral hx obtained from chart review. During recent admission (7/21/24-7/29/24), she was treated for UTI/sepsis and ultimately discharged to rehab to completed a 5-day course of ciprofloxacin (last dose 7/30/24). At rehab, she was reportedly found to have oliguria for a few days over the weekend for which she was started on IV fluids, however she was noncompliant with the IV access and she pulled it out many times. She has had very poor appetite and became lethargic and hypotensive. She was subsequently transferred to hospital where she was found to have black-colored output in ileostomy bag.     In ED: Afebrile, HR 120s-170s (Afib), SBP 90s-130s, RR 15-22, sating % on RA.  Labs notable for Hgb 11.3->10.3, Na 133->122, SCr 3.04->2.58; lactated 4.7->2.8, blood glucose to 400s but FS 100s. Found to be C.diff positive. UA not best sample with 9 epithelial cells, but noted +LE, +bacteria, +WBC, neg nitrite. CT A/P showed no acute intra-abdominal pathology and unchanged indeterminate left adrenal lesion. Received Vanc 500mg PO, Flagyl 500mg IVPB, amio 150mg IVPB x3, ofirmev 1g, 1.5L IVF, and 1u pRBC. Also started on amio gtt and protonix gtt. Nephrology and MICU were consulted. Admitted to Medicine for further management. (05 Aug 2024 23:46)      Surgery called by primary team after patient had a RRT in endoscopy suite for hypotension and afib with RVR prior to EGD for evaluation of UGIB. Patient has a history of dementia, COPD, CAD on Plavix, afib on Eliquis and underwent exlap and SBR for ischemic bowel on 2/22/24, left in discontinuity with an Abthera. Intraoperative mesenteric angiogram unremarkable. RTOR with end ileostomy creation and closure 2/24/24. She was brought to the ED from [facility] for lethargy and melanotic ostomy output, now being treated for C. diff and CHANELL. Patient now back on floor from endoscopy suite, resting comfortably in bed NAD. Complains that the room is cold but denies abdominal pain, CP, SOB, N/V.      PAST MEDICAL HISTORY:  Hypertension    Hypothyroid    Osteoarthritis    CAD (coronary artery disease)    CROW (obstructive sleep apnea)    History of MI (myocardial infarction)    Polyp of corpus uteri    Heart murmur    Bilateral hearing loss, unspecified hearing loss type    Obesity (BMI 35.0-39.9 without comorbidity)    Obesity    Mixed stress and urge urinary incontinence    Overactive bladder        PAST SURGICAL HISTORY:  No significant past surgical history    S/P ORIF (open reduction internal fixation) fracture    S/P appendectomy    S/P knee replacement    Stented coronary artery    S/P laparotomy    H/O dilation and curettage        SOCIAL HISTORY:  - Denies EtOH abuse, smoking, IVDA    MEDICATIONS:  acetaminophen     Tablet .. 650 milliGRAM(s) Oral every 6 hours PRN  aMIOdarone Infusion 1 mG/Min IV Continuous <Continuous>  ascorbic acid 500 milliGRAM(s) Oral daily  atorvastatin 80 milliGRAM(s) Oral at bedtime  ciprofloxacin   IVPB 400 milliGRAM(s) IV Intermittent every 12 hours  digoxin  Injectable 250 MICROGram(s) IV Push once  levothyroxine 125 MICROGram(s) Oral daily  magnesium sulfate  IVPB 2 Gram(s) IV Intermittent once  midodrine. 5 milliGRAM(s) Oral three times a day  multivitamin 1 Tablet(s) Oral daily  pantoprazole Infusion 8 mG/Hr IV Continuous <Continuous>  sodium bicarbonate 650 milliGRAM(s) Oral three times a day  sodium chloride 0.45% 1000 milliLiter(s) IV Continuous <Continuous>  sodium phosphate 15 milliMole(s)/250 mL IVPB 15 milliMole(s) IV Intermittent once  vancomycin    Solution 125 milliGRAM(s) Oral every 6 hours      ALLERGIES:  penicillin (Hives)      VITALS & I/Os:  Vital Signs Last 24 Hrs  T(C): 36.3 (06 Aug 2024 20:01), Max: 36.4 (06 Aug 2024 11:39)  T(F): 97.4 (06 Aug 2024 20:01), Max: 97.6 (06 Aug 2024 11:39)  HR: 90 (06 Aug 2024 20:01) (90 - 126)  BP: 81/56 (06 Aug 2024 20:01) (81/56 - 115/61)  BP(mean): 93 (06 Aug 2024 17:00) (93 - 93)  RR: 16 (06 Aug 2024 20:01) (16 - 18)  SpO2: 98% (06 Aug 2024 20:01) (96% - 100%)    Parameters below as of 06 Aug 2024 20:01  Patient On (Oxygen Delivery Method): room air        I&O's Summary    05 Aug 2024 07:01  -  06 Aug 2024 07:00  --------------------------------------------------------  IN: 425 mL / OUT: 425 mL / NET: 0 mL    06 Aug 2024 07:01  -  06 Aug 2024 22:51  --------------------------------------------------------  IN: 0 mL / OUT: 325 mL / NET: -325 mL        PHYSICAL EXAM:  GEN: resting comfortably in bed, in NAD  CHEST: irregularly irregular, HR 90s-100s, hemodynamically stable  PULM: nonlabored breathing on RA  ABD: soft, non-distended, non-tender. Ostomy in RLQ pink and viable with melanotic stool and gas in bag  EXTREMITIES: Grossly symmetric, WWP  NEURO: Awake, alert, oriented to self; repeatedly stating it is cold in her room    LABS:                        8.8    7.90  )-----------( 126      ( 06 Aug 2024 18:05 )             26.0     08-06    134<L>  |  105  |  51<H>  ----------------------------<  72  3.8   |  15<L>  |  2.49<H>    Ca    7.2<L>      06 Aug 2024 18:05  Phos  2.0     08-06  Mg     1.6     08-06    TPro  4.5<L>  /  Alb  2.5<L>  /  TBili  0.6  /  DBili  x   /  AST  21  /  ALT  21  /  AlkPhos  54  08-06    Lactate:  08-06 @ 06:22  2.1    PT/INR - ( 06 Aug 2024 18:05 )   PT: 21.7 sec;   INR: 2.11 ratio         PTT - ( 06 Aug 2024 18:05 )  PTT:29.1 sec  ABG - ( 06 Aug 2024 18:03 )  pH, Arterial: 7.39  pH, Blood: x     /  pCO2: 26    /  pO2: 103   / HCO3: 16    / Base Excess: -8.1  /  SaO2: 99.3          Urinalysis Basic - ( 06 Aug 2024 18:05 )    Color: x / Appearance: x / SG: x / pH: x  Gluc: 72 mg/dL / Ketone: x  / Bili: x / Urobili: x   Blood: x / Protein: x / Nitrite: x   Leuk Esterase: x / RBC: x / WBC x   Sq Epi: x / Non Sq Epi: x / Bacteria: x        IMAGING:

## 2024-08-06 NOTE — CONSULT NOTE ADULT - ATTENDING COMMENTS
Mirna Gooden is a 77 yo F hx dementia, HTN, HLD, Afib on eliquis, CAD s/p stents on plavix, COPD, hypothyroidism, recurrent UTIs, and ischemic bowel s/p resection and end ileostomy (2/2024), who initially presented for presenting for hypotension/lethargy in the setting of melanotic ostomy output, and pyuria. MICU was consulted for further evaluation of hypotension    #Hypotension  #Sepsis  -suspect hypotension likely multifactorial due to GI bleed as well as sepsis due to UTI given infectious-appearing UA (patient also with history of UTI due to E faecalis and Klebsiella pneumoniae    Recommendations:  -agree w/additional unit of PRBC if patient with ongoing melena  -increase midodrine to 15 mg TID  -can give additional 1 liter LR  -initiate zosyn for UTI  -consider surgery consult given recent bowel surgery, would also consider discussing imaging with radiology to evaluate for possible fistula given recurrent UTIs in setting of bowel surgery  - trend CBC, CMP, lactate    Patient does not require ICU admission at this time, please re consult as needed for further evaluation

## 2024-08-06 NOTE — CONSULT NOTE ADULT - ASSESSMENT
Assessment:   79 yo F hx dementia, HTN, HLD, Afib on eliquis, CAD s/p stents on plavix, COPD, hypothyroidism, recurrent UTIs, and ischemic bowel s/p resection and end ileostomy (2/2024), presenting for hypotension/lethargy and melanotic ostomy, and pyuria. Hypotension likely due to acute blood loss from UGIB complicated by sepsis.    #Neuro  - Patient AOx2, responsive to voice  - Baseline dementia    #Cardio  - Hypotension iso likely GIB, possible UTI  - Hb dropped from 11 -> 8.8 today  - BP has been fluid responsive, MAPs >65  - No acidosis, lactate 2  - MAP 64 at bedside  > Recommend 1U PRBC and 1L Bolus LR  > No MICU need at this time    #Pulm  - Patient protecting airway, no respiratory distress  - No need for intervention seda    #GI  - Melanotic ostomy output, falling Hb, hypotension  - Likely GIB, s/p scope today  - GI following  > Recommend transfusion as above  > No MICU need  > C/w GI recs     Assessment:   77 yo F hx dementia, HTN, HLD, Afib on eliquis, CAD s/p stents on plavix, COPD, hypothyroidism, recurrent UTIs, and ischemic bowel s/p resection and end ileostomy (2/2024), presenting for hypotension/lethargy and melanotic ostomy, and pyuria. Hypotension likely iso acute blood loss from UGIB complicated by sepsis.    #Hypotension  - noted to be hypotensive to SBP 80s  - repeat BP at bedside MAP 64  - warm extremities, asymptomatic w/o lightheadedness or chest pain  - Hgb drop from 11 to 8, along with worsening lactic acidosis  - likely multifactorial iso bleeding, developing sepsis, and intermittent RVR  > transfuse another 1U pRBC, can consider further fluid resuscitation  > uptitrate midodrine to 10-15mg tid as tolerated by HR  > trend CBC and lactate  > consider surgery consult for continued bleeding from ostomy bag     Case discussed with Dr. Perales    Patient not a MICU candidate at this time, please reconsult as needed  Note not finalized until attending attestation   Assessment:   79 yo F hx dementia, HTN, HLD, Afib on eliquis, CAD s/p stents on plavix, COPD, hypothyroidism, recurrent UTIs, and ischemic bowel s/p resection and end ileostomy (2/2024), presenting for hypotension/lethargy and melanotic ostomy, and pyuria. Hypotension likely iso acute blood loss from UGIB complicated by sepsis.    #Hypotension  - noted to be hypotensive to SBP 80s  - repeat BP at bedside MAP 64  - warm extremities, asymptomatic w/o lightheadedness or chest pain  - Hgb drop from 11 to 8, along with worsening lactic acidosis  - likely multifactorial iso bleeding, developing sepsis, and intermittent RVR  > transfuse another 1U pRBC, can consider further fluid resuscitation  > uptitrate midodrine to 10-15mg tid as tolerated by HR  > broaden antibiotics to zosyn in addition to PO vanc to cover potential GI/ infection  > trend CBC and lactate  > consider surgery consult for continued bleeding from ostomy bag     Case discussed with Dr. Perales    Patient not a MICU candidate at this time, please reconsult as needed  Note not finalized until attending attestation

## 2024-08-06 NOTE — CONSULT NOTE ADULT - ASSESSMENT
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 Admitted due to black output in the ileostomy bag c/f UGIB, afib with RVR, and c diff positive.    1. GIB  suspect UGIB  trend h/h  INR pending, may need to reverse   IVF   PPI Drip  patient unable to tolerate bowel prep   NPO for EGD this afternoon    2. C. Diff   favor r/t recent Cipro use  contact precautions   cont Vancomycin Q6H    3. Afib   a/c on hold for GIB   cardiology on board

## 2024-08-06 NOTE — CONSULT NOTE ADULT - ASSESSMENT
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 Found to have C.diff, CHANELL, and possible anemia of acute blood loss 2/2 GIB.      Wound Consult requested to assist w/ management of Bilateral Heel DTI  BLE PVD w/ stasis dermatitis and varicose veins  Mild buttocks moisture dermatitis    BLE heels CAVILON QD  BLE elevation & Compression  Abx per Medicine/ ID  Moisturize intact skin w/ SWEEN cream BID  Nutrition Consult for optimization          encourage high quality protein, mary carmen/ prosource, MVI & Vit C to promote wound healing  Continue turning and positioning w/ offloading assistive devices as per protocol  Buttocks/ Sacrum Feliberto BID and prn soiling        Continue w/ attends under pads and Pericare as per protocol       Appreciate Stoma Team input  Waffle Cushion to chair when oob to chair  Continue w/ low air loss pressure redistribution bed surface   Care as per medicine, remain available as requested  Upon discharge f/u as outpatient at [facility] [address] [place] [address] [phone]  Seen w/ attng & RN and D/w team  Thank you for this consult  Lani Frias PA-C [id number]  Nights/ Weekends/ Holidays please call:  General Surgery Consult pager ([phone]) for emergencies  Wound PT for multilayer leg wrapping or VAC issues (x [phone])   I spent 55minutes face to face w/ this pt of which more than 50% of the time was spent counseling & coordinating care of this pt.  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 Found to have C.diff, CHANELL, and possible anemia of acute blood loss 2/2 GIB.      Wound Consult requested to assist w/ management of:  Rt Heel DTI  BLE PVD w/ stasis dermatitis and varicose veins  Mild buttocks moisture dermatitis    BLE heels CAVILON QD, offload  BLE elevation & Compression  Abx per Medicine/ ID  Moisturize intact skin w/ SWEEN cream BID  Nutrition Consult for optimization          encourage high quality protein, mary carmen/ prosource, MVI & Vit C to promote wound healing  Continue turning and positioning w/ offloading assistive devices as per protocol  Buttocks/ Sacrum Feliberto BID and prn soiling        Continue w/ attends under pads and Pericare as per protocol       Appreciate Stoma Team input  Waffle Cushion to chair when oob to chair  Continue w/ low air loss pressure redistribution bed surface   Care as per medicine, remain available as requested  Upon discharge f/u as outpatient at [facility] [address] [place] [address] [phone]  Seen w/ attng & RN and D/w team  Thank you for this consult  Lani Frias PA-C CWS [id number]  Nights/ Weekends/ Holidays please call:  General Surgery Consult pager ([phone]) for emergencies  Wound PT for multilayer leg wrapping or VAC issues ([phone])

## 2024-08-06 NOTE — CONSULT NOTE ADULT - ASSESSMENT
No MICU needs. Please reconsult as needed.   NOTE IS NOT FINALIZED UNTIL ATTENDING  ATTESTATION.  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 Admitted due to black output in the ileostomy bag c/f UGIB, afib with RVR, and c diff positive.    Recommendations:  #Melena  -hb 11.3 on admission from 13 on prior hospitalizations  - s/p 1.5L IVF  - s/p 1u pRBC with appropriate compensation   - bp ~100s mpa >65  - no need for pressors at this time  - maintain hb >7 and maintain active type and screen   - c/w protonix gtt    #Afib RVR  -s/p lopressor 5  - c/w amio gtt  - likely triggered by UGIB -> maintain hb > 7     #C diff positive  - c/w PO vanc and flagyl  - likely 2/2 prior cipro usage    No MICU needs. Please reconsult as needed.   NOTE IS NOT FINALIZED UNTIL ATTENDING  ATTESTATION.  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 Admitted due to black output in the ileostomy bag c/f UGIB, afib with RVR, and c diff positive.    Recommendations:  #Melena  -hb 11.3 on admission from 13 on prior hospitalizations  - s/p 1.5L IVF  - s/p 1u pRBC with appropriate compensation   - bp ~100s mpa >65  - no need for pressors at this time  - maintain hb >7 and maintain active type and screen   - c/w protonix gtt    #Afib RVR  -s/p lopressor 5  - c/w amio gtt  - likely triggered by UGIB and sepsis -> maintain hb > 7     #C diff positive  - c/w PO vanc and flagyl  - likely 2/2 prior cipro usage    No MICU needs. Please reconsult as needed.   NOTE IS NOT FINALIZED UNTIL ATTENDING  ATTESTATION.

## 2024-08-06 NOTE — CONSULT NOTE ADULT - ASSESSMENT
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 Nephrology consulted for elevated Scr and metabolic acidosis.    1) CHANELL: likely due to hypovolemia from diarrhea and ostomy output. UA active likely due to UTI (urine culture pending) but will consider GN work up if urine culture negative. FeNa low. CT without obstruction. Continue with IVF as ordered. Avoid nephrotoxins.    2) Hypotension: BP low. Continue with midodrine 5 mg PO TID and can increase to 10 mg PO TID if BP < 100 systolic. Monitor BP.    3) Metabolic acidosis: Gap acidosis (due to lactic and ketoacidosis) and non-gap acidosis (due to diarrhea and ostomy). Continue with IVF with sodium bicarbonate and sodium bicarb 650 mg PO TID. Blood gas with concurrent respiratory acidosis (CO2 higher than expected for compensation). Repeat blood gas with ketones and BOH in AM. Monitor pH.    4) Hyponatremia: likely due to hypovolemia. Suspect value of 122 erroneous? Regardless, Na improving and rapid correction acceptable given serum Na noted to be 133 on admission. Monitor serum Na.    5) Urinary retention: Continue with amaya and flomax.       [facility] NEPHROLOGY  Paulie Storm M.D.  Mack Clancy D.O.  Maddi Steve M.D.  MD Olivia Caldera, MSN, ANP-C    Telephone: [phone]  Facsimile: [phone]    [address] [place] [address], [address]  [place] [address]

## 2024-08-07 LAB
ALBUMIN SERPL ELPH-MCNC: 2.7 G/DL — LOW (ref 3.3–5)
ALP SERPL-CCNC: 59 U/L — SIGNIFICANT CHANGE UP (ref 40–120)
ALT FLD-CCNC: 19 U/L — SIGNIFICANT CHANGE UP (ref 10–45)
ANION GAP SERPL CALC-SCNC: 15 MMOL/L — SIGNIFICANT CHANGE UP (ref 5–17)
APTT BLD: 30.5 SEC — SIGNIFICANT CHANGE UP (ref 24.5–35.6)
AST SERPL-CCNC: 22 U/L — SIGNIFICANT CHANGE UP (ref 10–40)
BASE EXCESS BLDV CALC-SCNC: -8.1 MMOL/L — LOW (ref -2–3)
BASOPHILS # BLD AUTO: 0.01 K/UL — SIGNIFICANT CHANGE UP (ref 0–0.2)
BASOPHILS NFR BLD AUTO: 0.1 % — SIGNIFICANT CHANGE UP (ref 0–2)
BILIRUB SERPL-MCNC: 0.7 MG/DL — SIGNIFICANT CHANGE UP (ref 0.2–1.2)
BUN SERPL-MCNC: 48 MG/DL — HIGH (ref 7–23)
CA-I SERPL-SCNC: 1 MMOL/L — LOW (ref 1.15–1.33)
CALCIUM SERPL-MCNC: 7.3 MG/DL — LOW (ref 8.4–10.5)
CHLORIDE BLDV-SCNC: 106 MMOL/L — SIGNIFICANT CHANGE UP (ref 96–108)
CHLORIDE SERPL-SCNC: 102 MMOL/L — SIGNIFICANT CHANGE UP (ref 96–108)
CO2 BLDV-SCNC: 18 MMOL/L — LOW (ref 22–26)
CO2 SERPL-SCNC: 15 MMOL/L — LOW (ref 22–31)
CREAT SERPL-MCNC: 2.38 MG/DL — HIGH (ref 0.5–1.3)
CULTURE RESULTS: ABNORMAL
EGFR: 20 ML/MIN/1.73M2 — LOW
EOSINOPHIL # BLD AUTO: 0.13 K/UL — SIGNIFICANT CHANGE UP (ref 0–0.5)
EOSINOPHIL NFR BLD AUTO: 1.6 % — SIGNIFICANT CHANGE UP (ref 0–6)
GAS PNL BLDV: 127 MMOL/L — LOW (ref 136–145)
GAS PNL BLDV: SIGNIFICANT CHANGE UP
GAS PNL BLDV: SIGNIFICANT CHANGE UP
GLUCOSE BLDC GLUCOMTR-MCNC: 109 MG/DL — HIGH (ref 70–99)
GLUCOSE BLDC GLUCOMTR-MCNC: 64 MG/DL — LOW (ref 70–99)
GLUCOSE BLDC GLUCOMTR-MCNC: 65 MG/DL — LOW (ref 70–99)
GLUCOSE BLDC GLUCOMTR-MCNC: 71 MG/DL — SIGNIFICANT CHANGE UP (ref 70–99)
GLUCOSE BLDC GLUCOMTR-MCNC: 72 MG/DL — SIGNIFICANT CHANGE UP (ref 70–99)
GLUCOSE BLDC GLUCOMTR-MCNC: 80 MG/DL — SIGNIFICANT CHANGE UP (ref 70–99)
GLUCOSE BLDC GLUCOMTR-MCNC: 81 MG/DL — SIGNIFICANT CHANGE UP (ref 70–99)
GLUCOSE BLDV-MCNC: 136 MG/DL — HIGH (ref 70–99)
GLUCOSE SERPL-MCNC: 137 MG/DL — HIGH (ref 70–99)
HCO3 BLDV-SCNC: 17 MMOL/L — LOW (ref 22–29)
HCT VFR BLD CALC: 34.3 % — LOW (ref 34.5–45)
HCT VFR BLD CALC: 36.2 % — SIGNIFICANT CHANGE UP (ref 34.5–45)
HCT VFR BLDA CALC: 36 % — SIGNIFICANT CHANGE UP (ref 34.5–46.5)
HGB BLD CALC-MCNC: 11.9 G/DL — SIGNIFICANT CHANGE UP (ref 11.7–16.1)
HGB BLD-MCNC: 11.7 G/DL — SIGNIFICANT CHANGE UP (ref 11.5–15.5)
HGB BLD-MCNC: 12 G/DL — SIGNIFICANT CHANGE UP (ref 11.5–15.5)
IMM GRANULOCYTES NFR BLD AUTO: 0.8 % — SIGNIFICANT CHANGE UP (ref 0–0.9)
INR BLD: 1.75 RATIO — HIGH (ref 0.85–1.18)
LACTATE BLDV-MCNC: 1.8 MMOL/L — SIGNIFICANT CHANGE UP (ref 0.5–2)
LYMPHOCYTES # BLD AUTO: 0.93 K/UL — LOW (ref 1–3.3)
LYMPHOCYTES # BLD AUTO: 11.7 % — LOW (ref 13–44)
MAGNESIUM SERPL-MCNC: 1.6 MG/DL — SIGNIFICANT CHANGE UP (ref 1.6–2.6)
MCHC RBC-ENTMCNC: 30 PG — SIGNIFICANT CHANGE UP (ref 27–34)
MCHC RBC-ENTMCNC: 31 PG — SIGNIFICANT CHANGE UP (ref 27–34)
MCHC RBC-ENTMCNC: 33.1 GM/DL — SIGNIFICANT CHANGE UP (ref 32–36)
MCHC RBC-ENTMCNC: 34.1 GM/DL — SIGNIFICANT CHANGE UP (ref 32–36)
MCV RBC AUTO: 90.5 FL — SIGNIFICANT CHANGE UP (ref 80–100)
MCV RBC AUTO: 91 FL — SIGNIFICANT CHANGE UP (ref 80–100)
MONOCYTES # BLD AUTO: 0.31 K/UL — SIGNIFICANT CHANGE UP (ref 0–0.9)
MONOCYTES NFR BLD AUTO: 3.9 % — SIGNIFICANT CHANGE UP (ref 2–14)
NEUTROPHILS # BLD AUTO: 6.49 K/UL — SIGNIFICANT CHANGE UP (ref 1.8–7.4)
NEUTROPHILS NFR BLD AUTO: 81.9 % — HIGH (ref 43–77)
NRBC # BLD: 0 /100 WBCS — SIGNIFICANT CHANGE UP (ref 0–0)
NRBC # BLD: 0 /100 WBCS — SIGNIFICANT CHANGE UP (ref 0–0)
PCO2 BLDV: 33 MMHG — LOW (ref 39–42)
PH BLDV: 7.32 — SIGNIFICANT CHANGE UP (ref 7.32–7.43)
PHOSPHATE SERPL-MCNC: 2.4 MG/DL — LOW (ref 2.5–4.5)
PLATELET # BLD AUTO: 100 K/UL — LOW (ref 150–400)
PLATELET # BLD AUTO: 109 K/UL — LOW (ref 150–400)
PO2 BLDV: 47 MMHG — HIGH (ref 25–45)
POTASSIUM BLDV-SCNC: 4.2 MMOL/L — SIGNIFICANT CHANGE UP (ref 3.5–5.1)
POTASSIUM SERPL-MCNC: 4.6 MMOL/L — SIGNIFICANT CHANGE UP (ref 3.5–5.3)
POTASSIUM SERPL-SCNC: 4.6 MMOL/L — SIGNIFICANT CHANGE UP (ref 3.5–5.3)
PROT SERPL-MCNC: 4.9 G/DL — LOW (ref 6–8.3)
PROTHROM AB SERPL-ACNC: 18.9 SEC — HIGH (ref 9.5–13)
RBC # BLD: 3.77 M/UL — LOW (ref 3.8–5.2)
RBC # BLD: 4 M/UL — SIGNIFICANT CHANGE UP (ref 3.8–5.2)
RBC # FLD: 15.9 % — HIGH (ref 10.3–14.5)
RBC # FLD: 16.4 % — HIGH (ref 10.3–14.5)
SAO2 % BLDV: 82 % — SIGNIFICANT CHANGE UP (ref 67–88)
SODIUM SERPL-SCNC: 132 MMOL/L — LOW (ref 135–145)
SPECIMEN SOURCE: SIGNIFICANT CHANGE UP
WBC # BLD: 7.9 K/UL — SIGNIFICANT CHANGE UP (ref 3.8–10.5)
WBC # BLD: 7.93 K/UL — SIGNIFICANT CHANGE UP (ref 3.8–10.5)
WBC # FLD AUTO: 7.9 K/UL — SIGNIFICANT CHANGE UP (ref 3.8–10.5)
WBC # FLD AUTO: 7.93 K/UL — SIGNIFICANT CHANGE UP (ref 3.8–10.5)

## 2024-08-07 PROCEDURE — 99223 1ST HOSP IP/OBS HIGH 75: CPT | Mod: GC

## 2024-08-07 RX ORDER — DEXTROSE 15 G/33 G
12.5 GEL IN PACKET (GRAM) ORAL ONCE
Refills: 0 | Status: COMPLETED | OUTPATIENT
Start: 2024-08-07 | End: 2024-08-07

## 2024-08-07 RX ORDER — VANCOMYCIN/0.9 % SOD CHLORIDE 1.75G/25
500 PLASTIC BAG, INJECTION (ML) INTRAVENOUS EVERY 6 HOURS
Refills: 0 | Status: DISCONTINUED | OUTPATIENT
Start: 2024-08-07 | End: 2024-08-19

## 2024-08-07 RX ORDER — PIPERACILLIN SODIUM AND TAZOBACTAM SODIUM 3; .375 G/15ML; G/15ML
3.38 INJECTION, POWDER, FOR SOLUTION INTRAVENOUS ONCE
Refills: 0 | Status: COMPLETED | OUTPATIENT
Start: 2024-08-07 | End: 2024-08-07

## 2024-08-07 RX ORDER — PIPERACILLIN SODIUM AND TAZOBACTAM SODIUM 3; .375 G/15ML; G/15ML
3.38 INJECTION, POWDER, FOR SOLUTION INTRAVENOUS EVERY 12 HOURS
Refills: 0 | Status: DISCONTINUED | OUTPATIENT
Start: 2024-08-07 | End: 2024-08-08

## 2024-08-07 RX ORDER — MIDODRINE HYDROCHLORIDE 5 MG/1
20 TABLET ORAL THREE TIMES A DAY
Refills: 0 | Status: DISCONTINUED | OUTPATIENT
Start: 2024-08-07 | End: 2024-08-08

## 2024-08-07 RX ORDER — MIDODRINE HYDROCHLORIDE 5 MG/1
15 TABLET ORAL THREE TIMES A DAY
Refills: 0 | Status: DISCONTINUED | OUTPATIENT
Start: 2024-08-07 | End: 2024-08-07

## 2024-08-07 RX ORDER — LEVOTHYROXINE SODIUM 100 MCG
150 TABLET ORAL DAILY
Refills: 0 | Status: DISCONTINUED | OUTPATIENT
Start: 2024-08-07 | End: 2024-08-21

## 2024-08-07 RX ORDER — MIDODRINE HYDROCHLORIDE 5 MG/1
10 TABLET ORAL ONCE
Refills: 0 | Status: DISCONTINUED | OUTPATIENT
Start: 2024-08-07 | End: 2024-08-07

## 2024-08-07 RX ADMIN — Medication 12.5 MILLILITER(S): at 00:56

## 2024-08-07 RX ADMIN — Medication 500 MILLIGRAM(S): at 18:14

## 2024-08-07 RX ADMIN — SODIUM PHOSPHATE, MONOBASIC, MONOHYDRATE AND SODIUM PHOSPHATE, DIBASIC ANHYDROUS 63.75 MILLIMOLE(S): 276; 142 INJECTION, SOLUTION INTRAVENOUS at 12:44

## 2024-08-07 RX ADMIN — MIDODRINE HYDROCHLORIDE 15 MILLIGRAM(S): 5 TABLET ORAL at 10:27

## 2024-08-07 RX ADMIN — PIPERACILLIN SODIUM AND TAZOBACTAM SODIUM 25 GRAM(S): 3; .375 INJECTION, POWDER, FOR SOLUTION INTRAVENOUS at 10:26

## 2024-08-07 RX ADMIN — Medication 125 MILLIGRAM(S): at 06:29

## 2024-08-07 RX ADMIN — SODIUM BICARBONATE 650 MILLIGRAM(S): 84 INJECTION, SOLUTION INTRAVENOUS at 06:24

## 2024-08-07 RX ADMIN — Medication 125 MICROGRAM(S): at 06:24

## 2024-08-07 RX ADMIN — Medication 10 MG/HR: at 19:25

## 2024-08-07 RX ADMIN — PIPERACILLIN SODIUM AND TAZOBACTAM SODIUM 200 GRAM(S): 3; .375 INJECTION, POWDER, FOR SOLUTION INTRAVENOUS at 04:33

## 2024-08-07 RX ADMIN — PIPERACILLIN SODIUM AND TAZOBACTAM SODIUM 25 GRAM(S): 3; .375 INJECTION, POWDER, FOR SOLUTION INTRAVENOUS at 18:05

## 2024-08-07 RX ADMIN — MIDODRINE HYDROCHLORIDE 20 MILLIGRAM(S): 5 TABLET ORAL at 18:05

## 2024-08-07 RX ADMIN — Medication 10 MG/HR: at 21:13

## 2024-08-07 RX ADMIN — Medication 25 GRAM(S): at 10:27

## 2024-08-07 RX ADMIN — MIDODRINE HYDROCHLORIDE 15 MILLIGRAM(S): 5 TABLET ORAL at 06:24

## 2024-08-07 RX ADMIN — Medication 125 MILLIGRAM(S): at 10:27

## 2024-08-07 RX ADMIN — Medication 100 MILLILITER(S): at 12:44

## 2024-08-07 RX ADMIN — Medication 100 MILLILITER(S): at 19:25

## 2024-08-07 NOTE — PROGRESS NOTE ADULT - ASSESSMENT
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 Nephrology consulted for elevated Scr and metabolic acidosis.    1) CHANELL: likely due to hypovolemia from diarrhea and ostomy output. Scr improving with IVF. Continue with IVF. UA active likely due to UTI (urine culture pending) but will consider GN work up if urine culture negative. FeNa low. CT without obstruction. Can increase Zosyn to Q8 hour dosing if Scr continues to improve. Avoid nephrotoxins.    2) Hypotension: BP low. Increase midodrine to 20 mg PO TID. Monitor BP.    3) Metabolic acidosis: Predominantly non-gap acidosis due to diarrhea and ostomy. Continue with IVF with sodium bicarbonate and sodium bicarb 650 mg PO TID. Blood gas with compensated metabolic acidosis and improving pH. Monitor pH.    4) Hyponatremia: likely due to hypovolemia. Serum Na stable. Continue with isotonic IVF. Monitor serum Na.    5) Urinary retention: Continue with amaya. Holding flomax given hypotension.       [facility] NEPHROLOGY  Paulie Storm M.D.  Mack Clancy D.O.  Maddi Steve M.D.  MD Olivia Caldera, MSN, ANP-C    Telephone: [phone]  Facsimile: [phone] [place] [address], [address]  [place] [address]

## 2024-08-07 NOTE — PROGRESS NOTE ADULT - SUBJECTIVE AND OBJECTIVE BOX
Subjective: Patient seen and examined. No new events except as noted.   s/p RRT for persistent hypotension  REVIEW OF SYSTEMS:    CONSTITUTIONAL: N+weakness, fevers or chills  EYES/ENT: No visual changes;  No vertigo or throat pain   NECK: No pain or stiffness  RESPIRATORY: No cough, wheezing, hemoptysis; No shortness of breath  CARDIOVASCULAR: No chest pain or palpitations  GASTROINTESTINAL: No abdominal or epigastric pain. No nausea, vomiting, or hematemesis; No diarrhea or constipation. No melena or hematochezia.  GENITOURINARY: No dysuria, frequency or hematuria  NEUROLOGICAL: No numbness or weakness  SKIN: No itching, burning, rashes, or lesions   All other review of systems is negative unless indicated above.    MEDICATIONS:  MEDICATIONS  (STANDING):  aMIOdarone Infusion 1 mG/Min (33.3 mL/Hr) IV Continuous <Continuous>  ascorbic acid 500 milliGRAM(s) Oral daily  atorvastatin 80 milliGRAM(s) Oral at bedtime  dextrose 50% Injectable 12.5 milliLiter(s) IV Push once  digoxin  Injectable 250 MICROGram(s) IV Push once  lactated ringers Bolus 1000 milliLiter(s) IV Bolus once  levothyroxine 150 MICROGram(s) Oral daily  magnesium sulfate  IVPB 2 Gram(s) IV Intermittent once  midodrine. 15 milliGRAM(s) Oral three times a day  multivitamin 1 Tablet(s) Oral daily  pantoprazole Infusion 8 mG/Hr (10 mL/Hr) IV Continuous <Continuous>  piperacillin/tazobactam IVPB.- 3.375 Gram(s) IV Intermittent once  piperacillin/tazobactam IVPB.. 3.375 Gram(s) IV Intermittent every 12 hours  sodium bicarbonate 650 milliGRAM(s) Oral three times a day  sodium chloride 0.45% 1000 milliLiter(s) (100 mL/Hr) IV Continuous <Continuous>  sodium phosphate 15 milliMole(s)/250 mL IVPB 15 milliMole(s) IV Intermittent once  vancomycin    Solution 125 milliGRAM(s) Oral every 6 hours      PHYSICAL EXAM:  T(C): 36.3 (08-07-24 @ 05:14), Max: 36.4 (08-06-24 @ 11:39)  HR: 114 (08-07-24 @ 05:14) (90 - 126)  BP: 86/59 (08-07-24 @ 05:14) (81/56 - 101/71)  RR: 18 (08-07-24 @ 05:14) (16 - 18)  SpO2: 100% (08-07-24 @ 05:14) (96% - 100%)  Wt(kg): --  I&O's Summary    06 Aug 2024 07:01  -  07 Aug 2024 07:00  --------------------------------------------------------  IN: 0 mL / OUT: 425 mL / NET: -425 mL      Height (cm): 162.6 (08-06 @ 17:00)    Appearance: NAD  HEENT:   Dry oral mucosa, PERRL, EOMI	  Lymphatic: No lymphadenopathy , no edema  Cardiovascular: Irregular  S1 S2, No JVD, No murmurs , Peripheral pulses palpable 2+ bilaterally  Respiratory: decreased bs 	  Gastrointestinal:  Soft, Non-tender, + BS	  Skin: No rashes, No ecchymoses, No cyanosis, warm to touch  Musculoskeletal: Normal range of motion, normal strength  Psychiatry:  sleepy  Ext: No edema      LABS:    CARDIAC MARKERS:                                11.7   7.93  )-----------( 109      ( 07 Aug 2024 07:36 )             34.3     08-07    132<L>  |  102  |  48<H>  ----------------------------<  137<H>  4.6   |  15<L>  |  2.38<H>    Ca    7.3<L>      07 Aug 2024 07:36  Phos  2.4     08-07  Mg     1.6     08-07    TPro  4.9<L>  /  Alb  2.7<L>  /  TBili  0.7  /  DBili  x   /  AST  22  /  ALT  19  /  AlkPhos  59  08-07        TELEMETRY: 	 AF 90-120s  ECG:  	  RADIOLOGY:   DIAGNOSTIC TESTING:  [ ] Echocardiogram:  [ ]  Catheterization:  [ ] Stress Test:    OTHER:

## 2024-08-07 NOTE — PROGRESS NOTE ADULT - SUBJECTIVE AND OBJECTIVE BOX
Date of service: 08-07-24 @ 22:04      Patient is a 78y old  Female who presents with a chief complaint of AMS/lethargy, Afib w/ RVR, Hyponatremia, C.diff infection (07 Aug 2024 16:11)                                                               INTERVAL HPI/OVERNIGHT EVENTS:  RRT called earlier today for hypertension  REVIEW OF SYSTEMS:  Lethargic but arousable  Denies any pain at this time                                                                                                                                                                                                                                                                               Medications:  MEDICATIONS  (STANDING):  aMIOdarone Infusion 1 mG/Min (33.3 mL/Hr) IV Continuous <Continuous>  ascorbic acid 500 milliGRAM(s) Oral daily  atorvastatin 80 milliGRAM(s) Oral at bedtime  dextrose 50% Injectable 12.5 milliLiter(s) IV Push once  digoxin  Injectable 250 MICROGram(s) IV Push once  lactated ringers Bolus 1000 milliLiter(s) IV Bolus once  levothyroxine 150 MICROGram(s) Oral daily  midodrine. 20 milliGRAM(s) Oral three times a day  multivitamin 1 Tablet(s) Oral daily  pantoprazole Infusion 8 mG/Hr (10 mL/Hr) IV Continuous <Continuous>  piperacillin/tazobactam IVPB.. 3.375 Gram(s) IV Intermittent every 12 hours  sodium bicarbonate 650 milliGRAM(s) Oral three times a day  sodium chloride 0.45% 1000 milliLiter(s) (100 mL/Hr) IV Continuous <Continuous>  vancomycin    Solution 500 milliGRAM(s) Oral every 6 hours    MEDICATIONS  (PRN):  acetaminophen     Tablet .. 650 milliGRAM(s) Oral every 6 hours PRN Temp greater or equal to 38C (100.4F), Mild Pain (1 - 3)       Allergies    penicillin (Hives)    Intolerances      Vital Signs Last 24 Hrs  T(C): 36.1 (07 Aug 2024 20:23), Max: 36.4 (07 Aug 2024 10:02)  T(F): 97 (07 Aug 2024 20:23), Max: 97.5 (07 Aug 2024 10:02)  HR: 79 (07 Aug 2024 20:23) (63 - 114)  BP: 95/63 (07 Aug 2024 20:23) (86/58 - 101/72)  BP(mean): --  RR: 18 (07 Aug 2024 20:23) (17 - 18)  SpO2: 97% (07 Aug 2024 20:23) (96% - 100%)    Parameters below as of 07 Aug 2024 20:23  Patient On (Oxygen Delivery Method): room air      CAPILLARY BLOOD GLUCOSE      POCT Blood Glucose.: 81 mg/dL (07 Aug 2024 17:19)  POCT Blood Glucose.: 80 mg/dL (07 Aug 2024 12:11)  POCT Blood Glucose.: 109 mg/dL (07 Aug 2024 07:54)  POCT Blood Glucose.: 71 mg/dL (07 Aug 2024 07:05)  POCT Blood Glucose.: 65 mg/dL (07 Aug 2024 07:01)  POCT Blood Glucose.: 72 mg/dL (07 Aug 2024 01:59)  POCT Blood Glucose.: 64 mg/dL (07 Aug 2024 00:38)      08-06 @ 07:01  -  08-07 @ 07:00  --------------------------------------------------------  IN: 0 mL / OUT: 425 mL / NET: -425 mL    08-07 @ 07:01  -  08-07 @ 22:04  --------------------------------------------------------  IN: 100 mL / OUT: 400 mL / NET: -300 mL      Physical Exam:    Daily     Daily   General:  No acute distress cachetic  HEENT:  Nonicteric, PERRLA  CV:  RRR, S1S2   Lungs:  Poor effort otherwise clear to auscultation  Abdomen:  Soft,Ostomy back in placeWith melanotic stool  Extremities:No edema  Neuro:  Lethargic but arousable  Grossly nonfocal                                                                                                                                                                                                                                                                                              LABS:                               12.0   7.90  )-----------( 100      ( 07 Aug 2024 18:55 )             36.2                      08-07    132<L>  |  102  |  48<H>  ----------------------------<  137<H>  4.6   |  15<L>  |  2.38<H>    Ca    7.3<L>      07 Aug 2024 07:36  Phos  2.4     08-07  Mg     1.6     08-07    TPro  4.9<L>  /  Alb  2.7<L>  /  TBili  0.7  /  DBili  x   /  AST  22  /  ALT  19  /  AlkPhos  59  08-07                       RADIOLOGY & ADDITIONAL TESTS         I personally reviewed: [  ]EKG   [  ]CXR    [  ] CT      A/P:         Discussed with :     Simon consultants' Notes   Time spent :

## 2024-08-07 NOTE — CONSULT NOTE ADULT - SUBJECTIVE AND OBJECTIVE BOX
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 Limited hx obtainable from pt, was AAOx~1 on encounter and very lethargic/somnolent, but arousable and seemed to deny pain anywhere. Collateral hx obtained from chart review. During recent admission (7/21/24-7/29/24), she was treated for UTI/sepsis and ultimately discharged to rehab to completed a 5-day course of ciprofloxacin (last dose 7/30/24). At rehab, she was reportedly found to have oliguria for a few days over the weekend for which she was started on IV fluids, however she was noncompliant with the IV access and she pulled it out many times. She has had very poor appetite and became lethargic and hypotensive. She was subsequently transferred to hospital where she was found to have black-colored output in ileostomy bag.     In ED: Afebrile, HR 120s-170s (Afib), SBP 90s-130s, RR 15-22, sating % on RA.  Labs notable for Hgb 11.3->10.3, Na 133->122, SCr 3.04->2.58; lactated 4.7->2.8, blood glucose to 400s but FS 100s. Found to be C.diff positive. UA not best sample with 9 epithelial cells, but noted +LE, +bacteria, +WBC, neg nitrite. CT A/P showed no acute intra-abdominal pathology and unchanged indeterminate left adrenal lesion. Received Vanc 500mg PO, Flagyl 500mg IVPB, amio 150mg IVPB x3, ofirmev 1g, 1.5L IVF, and 1u pRBC. Also started on amio gtt and protonix gtt. Nephrology and MICU were consulted. Admitted to Medicine for further management. (05 Aug 2024 23:46)      Endo was consulted for hypothyroid       PAST MEDICAL & SURGICAL HISTORY:  Hypertension      Hypothyroid      Osteoarthritis  knees, back      CAD (coronary artery disease)      CROW (obstructive sleep apnea)  non complaint on CPAP      History of MI (myocardial infarction)  h/o previous MI in 2004 prompted PTCA  with stenting x 2 vessels   last stress/ echo 2019      Heart murmur  dx in childhood      Bilateral hearing loss, unspecified hearing loss type  bilateral aids      Obesity      Mixed stress and urge urinary incontinence      Overactive bladder      S/P ORIF (open reduction internal fixation) fracture  left hip 1962      S/P appendectomy  30 plus years      S/P knee replacement  left 2000      Stented coronary artery  2004 X 2 STENTS      S/P laparotomy  due to adhesions, 30 years ago      H/O dilation and curettage  2/2019 Benign polyp          FAMILY HISTORY:      Social History:  [facility] (05 Aug 2024 23:46)            HOME MEDICATIONS:  Home Medications:  apixaban 5 mg oral tablet: 1 tab(s) orally every 12 hours (29 Jul 2024 13:55)  ascorbic acid 500 mg oral tablet: 1 tab(s) orally once a day (29 Jul 2024 13:55)  atorvastatin 80 mg oral tablet: 1 tab(s) orally once a day (at bedtime) (29 Jul 2024 13:55)  clopidogrel 75 mg oral tablet: 1 tab(s) orally once a day (29 Jul 2024 13:55)  levothyroxine 125 mcg (0.125 mg) oral tablet: 1 tab(s) orally once a day (29 Jul 2024 13:55)  metoprolol tartrate 25 mg oral tablet: 1 tab(s) orally 2 times a day (29 Jul 2024 13:55)  midodrine 5 mg oral tablet: 1 tab(s) orally 3 times a day (29 Jul 2024 13:55)  mirtazapine 15 mg oral tablet: 0.5 tab(s) orally once a day (at bedtime) (29 Jul 2024 13:55)  Multiple Vitamins oral tablet: 1 tab(s) orally once a day (29 Jul 2024 13:55)  sodium bicarbonate 650 mg oral tablet: 1 tab(s) orally 3 times a day (29 Jul 2024 13:55)  tamsulosin 0.4 mg oral capsule: 1 cap(s) orally once a day (at bedtime) (29 Jul 2024 13:55)            MEDICATIONS  (STANDING):  aMIOdarone Infusion 1 mG/Min (33.3 mL/Hr) IV Continuous <Continuous>  ascorbic acid 500 milliGRAM(s) Oral daily  atorvastatin 80 milliGRAM(s) Oral at bedtime  dextrose 50% Injectable 12.5 milliLiter(s) IV Push once  digoxin  Injectable 250 MICROGram(s) IV Push once  lactated ringers Bolus 1000 milliLiter(s) IV Bolus once  levothyroxine 150 MICROGram(s) Oral daily  midodrine. 20 milliGRAM(s) Oral three times a day  multivitamin 1 Tablet(s) Oral daily  pantoprazole Infusion 8 mG/Hr (10 mL/Hr) IV Continuous <Continuous>  piperacillin/tazobactam IVPB.. 3.375 Gram(s) IV Intermittent every 12 hours  sodium bicarbonate 650 milliGRAM(s) Oral three times a day  sodium chloride 0.45% 1000 milliLiter(s) (100 mL/Hr) IV Continuous <Continuous>  vancomycin    Solution 500 milliGRAM(s) Oral every 6 hours    MEDICATIONS  (PRN):  acetaminophen     Tablet .. 650 milliGRAM(s) Oral every 6 hours PRN Temp greater or equal to 38C (100.4F), Mild Pain (1 - 3)      Allergies    penicillin (Hives)    Intolerances        Review of Systems:  Neuro: No HA, no dizziness  Cardiovascular: No chest pain, no palpitations  Respiratory: no SOB, no cough  GI: No nausea, vomiting, abdominal pain  MSK: Denies joint/muscle pain      ALL OTHER SYSTEMS REVIEWED AND NEGATIVE        PHYSICAL EXAM:  VITALS: T(C): 36.3 (08-07-24 @ 11:00)  T(F): 97.4 (08-07-24 @ 11:00), Max: 97.5 (08-07-24 @ 10:02)  HR: 68 (08-07-24 @ 11:00) (68 - 126)  BP: 100/70 (08-07-24 @ 11:00) (81/56 - 100/70)  RR:  (16 - 18)  SpO2:  (96% - 100%)  Wt(kg): --  GENERAL: NAD, well-groomed, well-developed  NEURO:  alert and oriented  RESPIRATORY: Clear to auscultation bilaterally; No rales, rhonchi, wheezing  CARDIOVASCULAR: Si S2  GI: Soft, non distended, normal bowel sounds  MUSCULOSKELETAL: Moves all extremities equally       POCT Blood Glucose.: 80 mg/dL (08-07-24 @ 12:11)  POCT Blood Glucose.: 109 mg/dL (08-07-24 @ 07:54)  POCT Blood Glucose.: 71 mg/dL (08-07-24 @ 07:05)  POCT Blood Glucose.: 65 mg/dL (08-07-24 @ 07:01)  POCT Blood Glucose.: 72 mg/dL (08-07-24 @ 01:59)  POCT Blood Glucose.: 64 mg/dL (08-07-24 @ 00:38)  POCT Blood Glucose.: 72 mg/dL (08-06-24 @ 17:23)  POCT Blood Glucose.: 83 mg/dL (08-06-24 @ 11:57)  POCT Blood Glucose.: 94 mg/dL (08-06-24 @ 07:10)  POCT Blood Glucose.: 66 mg/dL (08-06-24 @ 06:41)  POCT Blood Glucose.: 67 mg/dL (08-06-24 @ 06:38)  POCT Blood Glucose.: 101 mg/dL (08-05-24 @ 22:02)                            11.7   7.93  )-----------( 109      ( 07 Aug 2024 07:36 )             34.3       08-07    132<L>  |  102  |  48<H>  ----------------------------<  137<H>  4.6   |  15<L>  |  2.38<H>    eGFR: 20<L>    Ca    7.3<L>      08-07  Mg     1.6     08-07  Phos  2.4     08-07    TPro  4.9<L>  /  Alb  2.7<L>  /  TBili  0.7  /  DBili  x   /  AST  22  /  ALT  19  /  AlkPhos  59  08-07      Thyroid Function Tests:  08-06 @ 06:05 TSH 5.05 FreeT4 0.8 T3 -- Anti TPO -- Anti Thyroglobulin Ab -- TSI --  07-26 @ 10:23 TSH -- FreeT4 1.2 T3 -- Anti .0 Anti Thyroglobulin Ab -- TSI <0.10    Diet, NPO after Midnight:      NPO Start Date: 07-Aug-2024,   NPO Start Time: 23:59 (08-07-24 @ 10:04) [Active]  Diet, Clear Liquid (08-07-24 @ 10:04) [Active]        05-16 Chol 89 Direct LDL -- LDL calculated 38 HDL 38<L> Trig 62  A1C with Estimated Average Glucose Result: 5.3 % (05-16-24 @ 07:35)  A1C with Estimated Average Glucose Result: 5.3 % (04-29-24 @ 06:51)

## 2024-08-07 NOTE — PROGRESS NOTE ADULT - ASSESSMENT
79 yo female with history of COPD, CAD, afib on Eliquis, and PSH exlap and SBR for ischemic bowel 2/22/24 (negative intraoperative mesenteric angiogram) with RTOR 2/24/24 for end ileostomy and abdomen closure c/b upper GI bleed with intermittent hemodynamic instability with need for blood resuscitation with c-diff and CHAENLL and AFib with RVR.     Recommendations:  - No acute surgical intervention  - Patient planned for EGD this AM with Dr. Sprague. F/u.  - Can consider CTA/IR consult if concern for active bleed and stable for scan  - If source of bleeding identified and unable to be controlled with EGD or IR, exlap and resection of bleeding intestine a last resort  - Suggest more aggressive fluid resuscitation in setting of septic vs hemorrhagic shock  - Recommend maintaining 2 large bore IVs for resuscitation  - Global care per primary    Trauma/ACS  [id number]

## 2024-08-07 NOTE — CHART NOTE - NSCHARTNOTEFT_GEN_A_CORE
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 Limited hx obtainable from pt, was AAOx~1 on encounter and very lethargic/somnolent, but arousable and seemed to deny pain anywhere. Collateral hx obtained from chart review. During recent admission (7/21/24-7/29/24), she was treated for UTI/sepsis and ultimately discharged to rehab to completed a 5-day course of ciprofloxacin (last dose 7/30/24). At rehab, she was reportedly found to have oliguria for a few days over the weekend for which she was started on IV fluids, however she was noncompliant with the IV access and she pulled it out many times. She has had very poor appetite and became lethargic and hypotensive. She was subsequently transferred to hospital where she was found to have black-colored output in ileostomy bag.     In ED: Afebrile, HR 120s-170s (Afib), SBP 90s-130s, RR 15-22, sating % on RA.  Labs notable for Hgb 11.3->10.3, Na 133->122, SCr 3.04->2.58; lactated 4.7->2.8, blood glucose to 400s but FS 100s. Found to be C.diff positive. UA not best sample with 9 epithelial cells, but noted +LE, +bacteria, +WBC, neg nitrite. CT A/P showed no acute intra-abdominal pathology and unchanged indeterminate left adrenal lesion. Received Vanc 500mg PO, Flagyl 500mg IVPB, amio 150mg IVPB x3, ofirmev 1g, 1.5L IVF, and 1u pRBC. Also started on amio gtt and protonix gtt. Nephrology and MICU were consulted. Admitted to Medicine for further management. (05 Aug 2024 23:46)    Patient is s/p RRT for persistent hypotension. See RRT note for full details.   Post RRT, patient remained on unit.   F/u with RRT labs.   Attending, Dr. Duran called, no answer. Signed out to day ACP to update attending.   Patient daughter, Caity Gooden ([phone]), updated of events overnight.     Evelyn Booker PA-C  Dept. of Medicine  [employer]

## 2024-08-07 NOTE — CHART NOTE - NSCHARTNOTEFT_GEN_A_CORE
Patient s/p MICU eval for concern for active hemorrhage with unknown source, persistent hypotension; per consult note, Patient not a MICU candidate at this time.  Recommendation broaden antibiotics to zosyn. Patient with documented penicillin allergy, however, received ceftriaxone       Recommendations:  -agree w/additional unit of PRBC if patient with ongoing melena  -increase midodrine to 15 mg TID  -can give additional 1 liter LR  -initiate zosyn for UTI  -consider surgery consult given recent bowel surgery, would also consider discussing imaging with radiology to evaluate for possible fistula given recurrent UTIs in setting of bowel surgery  - trend CBC, CMP, lactate Patient s/p MICU eval for concern for active hemorrhage with unknown source, persistent hypotension; per consult note, Patient not a MICU candidate at this time.  MICU recommendation as follows:   -agree w/additional unit of PRBC if patient with ongoing melena  -increase midodrine to 15 mg TID (ordered)  -can give additional 1 liter LR (ordered)  -initiate zosyn for UTI (ordered)     Patient with documented history of penicillin allergy (reaction: hives), however, received Ceftriaxone on previous admissions (most recently July 2024), and has also received Zosyn on previous admission in February 2024.   Will start patient on Zosyn at this time per MICU recommendations.   Will monitor for signs of allergic reaction.   Will endorse to AM team; attending to follow.    Evelyn Booker PA-C  Dept. of Medicine  Spectra [phone]

## 2024-08-07 NOTE — CONSULT NOTE ADULT - ASSESSMENT
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   Afib: Cortisol within normal range, on medications, monitored.  CAD:  stable monitored      Discussed plan and management with Dr Mirza Zelaya NP - TEAMS

## 2024-08-07 NOTE — PROGRESS NOTE ADULT - SUBJECTIVE AND OBJECTIVE BOX
TEAM [ ACS ] Surgery Daily Progress Note  =====================================================  SUBJECTIVE: Patient seen and examined at bedside on AM rounds. Overnight patient hypotensive. At time of exam, pt was having RRT for hypotension to 79 SBP i/s/o c diff colitis + UGIB. Pt mentating at baseline, denies fever, chills. Was getting 0.5U PRBC via 22 gauge IV. When pressure bag placed over PRBC and run at a faster rate, pt responded with SBP 110s. Pt tachycardic to 120s (Afib with RVR on monitor). Pt noted to have ~50-100cc of loose melenic ileostomy output in the bag (not changed overnight). Labs drawn at time of rapid. Rapid run by medicine/primary team.      PMH:   PAST MEDICAL & SURGICAL HISTORY:  Hypertension      Hypothyroid      Osteoarthritis  knees, back      CAD (coronary artery disease)      CROW (obstructive sleep apnea)  non complaint on CPAP      History of MI (myocardial infarction)  h/o previous MI in 2004 prompted PTCA  with stenting x 2 vessels   last stress/ echo 2019      Heart murmur  dx in childhood      Bilateral hearing loss, unspecified hearing loss type  bilateral aids      Obesity      Mixed stress and urge urinary incontinence      Overactive bladder      S/P ORIF (open reduction internal fixation) fracture  left hip 1962      S/P appendectomy  30 plus years      S/P knee replacement  left 2000      Stented coronary artery  2004 X 2 STENTS      S/P laparotomy  due to adhesions, 30 years ago      H/O dilation and curettage  2/2019 Benign polyp    ALLERGIES:  penicillin (Hives)  --------------------------------------------------------------------------------------    MEDICATIONS:    Neurologic Medications  acetaminophen     Tablet .. 650 milliGRAM(s) Oral every 6 hours PRN Temp greater or equal to 38C (100.4F), Mild Pain (1 - 3)    Respiratory Medications    Cardiovascular Medications  aMIOdarone Infusion 1 mG/Min IV Continuous <Continuous>  digoxin  Injectable 250 MICROGram(s) IV Push once  midodrine. 15 milliGRAM(s) Oral three times a day    Gastrointestinal Medications  ascorbic acid 500 milliGRAM(s) Oral daily  lactated ringers Bolus 1000 milliLiter(s) IV Bolus once  magnesium sulfate  IVPB 2 Gram(s) IV Intermittent once  multivitamin 1 Tablet(s) Oral daily  pantoprazole Infusion 8 mG/Hr IV Continuous <Continuous>  sodium bicarbonate 650 milliGRAM(s) Oral three times a day  sodium chloride 0.45% 1000 milliLiter(s) IV Continuous <Continuous>  sodium phosphate 15 milliMole(s)/250 mL IVPB 15 milliMole(s) IV Intermittent once    Genitourinary Medications    Hematologic/Oncologic Medications    Antimicrobial/Immunologic Medications  piperacillin/tazobactam IVPB.- 3.375 Gram(s) IV Intermittent once  piperacillin/tazobactam IVPB.. 3.375 Gram(s) IV Intermittent every 12 hours  vancomycin    Solution 125 milliGRAM(s) Oral every 6 hours    Endocrine/Metabolic Medications  atorvastatin 80 milliGRAM(s) Oral at bedtime  dextrose 50% Injectable 12.5 milliLiter(s) IV Push once  levothyroxine 150 MICROGram(s) Oral daily    Topical/Other Medications    --------------------------------------------------------------------------------------  VITAL SIGNS:    T(C): 36.3 (08-07-24 @ 05:14), Max: 36.4 (08-06-24 @ 11:39)  HR: 114 (08-07-24 @ 05:14) (90 - 126)  BP: 86/59 (08-07-24 @ 05:14) (81/56 - 101/71)  RR: 18 (08-07-24 @ 05:14) (16 - 18)  SpO2: 100% (08-07-24 @ 05:14) (96% - 100%)    --------------------------------------------------------------------------------------  Exam  GEN: resting in bed, baseline mental status per nursing during medical RRT. Not alert but responding to verbal stimuli. Tired  CHEST: irregularly irregular, HR 120s, by time provider in room 110s/50s on zoll.   PULM: nonlabored breathing on RA  ABD: soft, non-distended, slight grimacing diffusely to palpation Ostomy in RLQ pink and viable with melanotic stool and gas in bag  EXTREMITIES: Grossly symmetric, WWP    --------------------------------------------------------------------------------------  LABS               11.7   7.93  )-----------( 109      ( 07 Aug 2024 07:36 )             34.3   08-07    132<L>  |  102  |  48<H>  ----------------------------<  137<H>  4.6   |  15<L>  |  2.38<H>    Ca    7.3<L>      07 Aug 2024 07:36  Phos  2.4     08-07  Mg     1.6     08-07    TPro  4.9<L>  /  Alb  2.7<L>  /  TBili  0.7  /  DBili  x   /  AST  22  /  ALT  19  /  AlkPhos  59  08-07  --------------------------------------------------------------------------------------    INS AND OUTS:    08-06-24 @ 07:01  -  08-07-24 @ 07:00  --------------------------------------------------------  IN: 0 mL / OUT: 425 mL / NET: -425 mL        --------------------------------------------------------------------------------------

## 2024-08-07 NOTE — CHART NOTE - NSCHARTNOTEFT_GEN_A_CORE
Called by primary team for reassessment of BP after RRT in AM. On reassessment, patient not in acute distress and resting comfortably in bed. Extremities warm, ostomy with melanotic stool without bright red blood. Repeat BP at bedside 91/57 with MAP 68. HR 78, SpO2 98% on RA. Mentating appropriately, denies pain or dizziness. H/H this AM stable at 11, no lactate elevation. No need for pressor currently. Not a MICU candidate, please reconsult as needed for change in clinical status. Called by primary team for reassessment of BP after RRT in AM. On reassessment, patient not in acute distress and resting comfortably in bed. Extremities warm, ostomy with melanotic stool without bright red blood. Repeat BP at bedside 91/57 with MAP 68. HR 78, SpO2 98% on RA. Mentating appropriately, denies pain or dizziness. H/H this AM stable at 11, no lactate elevation. No need for pressor currently. Not a MICU candidate, please reconsult as needed for change in clinical status.    ATTENDING ADDENDUM:    Interval events: sleeping comfortably, no distress    Review of Systems:  Constitutional: no fever, chills, fatigue  Neuro: no headache, numbness, weakness  Resp: no cough, wheezing, shortness of breath  CVS: no chest pain, palpitations, leg swelling  GI: no abdominal pain, nausea, vomiting, diarrhea   : no dysuria, frequency, incontinence  Skin: no itching, burning, rashes, or lesions   Msk: no joint pain or swelling  Psych: no depression, anxiety    T(F): 97.4 (08-07-24 @ 11:00), Max: 97.5 (08-07-24 @ 10:02)  HR: 68 (08-07-24 @ 11:00) (68 - 126)  BP: 100/70 (08-07-24 @ 11:00) (81/56 - 100/70)  RR: 18 (08-07-24 @ 11:00) (16 - 18)  SpO2: 96% (08-07-24 @ 11:00) (96% - 100%)    CAPILLARY BLOOD GLUCOSE      POCT Blood Glucose.: 80 mg/dL (07 Aug 2024 12:11)    I&O's Summary    06 Aug 2024 07:01  -  07 Aug 2024 07:00  --------------------------------------------------------  IN: 0 mL / OUT: 425 mL / NET: -425 mL    07 Aug 2024 07:01  -  07 Aug 2024 16:47  --------------------------------------------------------  IN: 100 mL / OUT: 300 mL / NET: -200 mL        Physical Exam:     Gen: NAD; AAOx3  Neuro: CN II-XII grossly intact; moving all extremities   HEENT: NC/AT; EOMI; MMM  CV: normal S1 & S2; regular rate and rhythm   Pulm: clear to auscultation bilaterally   GI: soft; NT/ND; +RLQ ileostomy with dark brown stool, no bright red blood  Ext: no edema; pulses intact  Skin: warm and well perfused     Meds:  piperacillin/tazobactam IVPB.. 3.375 Gram(s) IV Intermittent every 12 hours  vancomycin    Solution 500 milliGRAM(s) Oral every 6 hours  aMIOdarone Infusion 1 mG/Min IV Continuous <Continuous>  digoxin  Injectable 250 MICROGram(s) IV Push once  midodrine. 20 milliGRAM(s) Oral three times a day  atorvastatin 80 milliGRAM(s) Oral at bedtime  dextrose 50% Injectable 12.5 milliLiter(s) IV Push once  levothyroxine 150 MICROGram(s) Oral daily  acetaminophen     Tablet .. 650 milliGRAM(s) Oral every 6 hours PRN  pantoprazole Infusion 8 mG/Hr IV Continuous <Continuous>  ascorbic acid 500 milliGRAM(s) Oral daily  lactated ringers Bolus 1000 milliLiter(s) IV Bolus once  multivitamin 1 Tablet(s) Oral daily  sodium bicarbonate 650 milliGRAM(s) Oral three times a day  sodium chloride 0.45% 1000 milliLiter(s) IV Continuous <Continuous>                            11.7   7.93  )-----------( 109      ( 07 Aug 2024 07:36 )             34.3       08-07    132<L>  |  102  |  48<H>  ----------------------------<  137<H>  4.6   |  15<L>  |  2.38<H>    Ca    7.3<L>      07 Aug 2024 07:36  Phos  2.4     08-07  Mg     1.6     08-07    TPro  4.9<L>  /  Alb  2.7<L>  /  TBili  0.7  /  DBili  x   /  AST  22  /  ALT  19  /  AlkPhos  59  08-07          PT/INR - ( 07 Aug 2024 07:36 )   PT: 18.9 sec;   INR: 1.75 ratio         PTT - ( 07 Aug 2024 07:36 )  PTT:30.5 sec  Urinalysis Basic - ( 07 Aug 2024 07:36 )    Color: x / Appearance: x / SG: x / pH: x  Gluc: 137 mg/dL / Ketone: x  / Bili: x / Urobili: x   Blood: x / Protein: x / Nitrite: x   Leuk Esterase: x / RBC: x / WBC x   Sq Epi: x / Non Sq Epi: x / Bacteria: x      Clean Catch Clean Catch (Midstream)   50,000 - 99,000 CFU/mL Candida albicans "Susceptibilities not performed" -- 08-05 @ 21:00  .Blood Blood-Peripheral   No growth at 24 hours -- 08-05 @ 20:54  .Blood Blood-Peripheral   No growth at 24 hours -- 08-05 @ 18:00        ABG - ( 06 Aug 2024 18:03 )  pH, Arterial: 7.39  pH, Blood: x     /  pCO2: 26    /  pO2: 103   / HCO3: 16    / Base Excess: -8.1  /  SaO2: 99.3        A/P:  78F PMH dementia, HTN, HLD, Afib on apixaban, CAD s/p stents on clopidogrel, COPD, hypothyroidism, recurrent UTIs, and 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     - Currently normotensive, /72  - She is non-toxic appearing  - Continue midodrine 20 mg q8h  - H/H stable this morning at 11, no active bleeding currently  - Remains on PPI gtt  - Continue oral vancomycin for C diff  - Does not require MICU care at this time, please call back with any questions  - Discussed with family at bedside

## 2024-08-07 NOTE — CONSULT NOTE ADULT - ASSESSMENT
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 During recent admission (7/21/24-7/29/24), she was treated for UTI/sepsis and ultimately discharged to rehab to completed a 5-day course of ciprofloxacin (last dose 7/30/24). At rehab, she was reportedly found to have oliguria for a few days over the weekend for which she was started on IV fluids, however she was noncompliant with the IV access and she pulled it out many times. She has had very poor appetite and became lethargic and hypotensive. She was subsequently transferred to hospital where she was found to have black-colored output in ileostomy bag.     C.diff likely iso recent antibiotic use for UTI tx  Acute blood loss anemia due to UGIB  Hypotension likely multifactorial due to above  Positive UA in setting of amaya  CHANELL likely due to hypovolemia iso diarrhea    8/5 CTAP wo contrast with no acute intra-abd pathology   8/6 am s/p RRT for hypotension, tachycardia  ostomy with loose/watery melanotic stools   Surgery following - no acute intervention at this time  Consider CTA/IR eval if concern for active bleed and if stable for scan  UA with pyuria, large LE, occasional bacteria with 9 sq epi cells, has amaya in place, suspect contaminant/colonization  afebrile, no leukocytosis, tired/weak but mental status appears at baseline    Recommendations:   Will increase oral vancomycin to 500mg Q6h  Continue on zosyn pending Ucx   GI and Surgery following - plan for EGD tomorrow   Contact isolation per IC protocol   Monitor temps/CBC, Cr   Aspiration precautions       D/w Dr. Renee Davidson M.D.  [facility], Division of Infectious Diseases  [phone]  After 5pm on weekdays and all day on weekends - please call [phone]  Available on [employer] TEAMS

## 2024-08-07 NOTE — PROGRESS NOTE ADULT - SUBJECTIVE AND OBJECTIVE BOX
INTERVAL HPI/OVERNIGHT EVENTS:    RRT earlier this morning noted; hypotensive/tachycardic   pt seen laying in bed  dark Green loose ostomy output noted; no melena or red blood noted       MEDICATIONS  (STANDING):  aMIOdarone Infusion 1 mG/Min (33.3 mL/Hr) IV Continuous <Continuous>  ascorbic acid 500 milliGRAM(s) Oral daily  atorvastatin 80 milliGRAM(s) Oral at bedtime  dextrose 50% Injectable 12.5 milliLiter(s) IV Push once  digoxin  Injectable 250 MICROGram(s) IV Push once  lactated ringers Bolus 1000 milliLiter(s) IV Bolus once  levothyroxine 150 MICROGram(s) Oral daily  magnesium sulfate  IVPB 2 Gram(s) IV Intermittent once  midodrine. 15 milliGRAM(s) Oral three times a day  multivitamin 1 Tablet(s) Oral daily  pantoprazole Infusion 8 mG/Hr (10 mL/Hr) IV Continuous <Continuous>  piperacillin/tazobactam IVPB.- 3.375 Gram(s) IV Intermittent once  piperacillin/tazobactam IVPB.. 3.375 Gram(s) IV Intermittent every 12 hours  sodium bicarbonate 650 milliGRAM(s) Oral three times a day  sodium chloride 0.45% 1000 milliLiter(s) (100 mL/Hr) IV Continuous <Continuous>  sodium phosphate 15 milliMole(s)/250 mL IVPB 15 milliMole(s) IV Intermittent once  vancomycin    Solution 125 milliGRAM(s) Oral every 6 hours    MEDICATIONS  (PRN):  acetaminophen     Tablet .. 650 milliGRAM(s) Oral every 6 hours PRN Temp greater or equal to 38C (100.4F), Mild Pain (1 - 3)      Allergies    penicillin (Hives)    Intolerances        Review of Systems:    General:  No wt loss, fevers, chills, night sweats, fatigue   Eyes:  Good vision, no reported pain  ENT:  No sore throat, pain, runny nose, dysphagia  CV:  No pain, palpitations, hypo/hypertension  Resp:  No dyspnea, cough, tachypnea, wheezing  GI:  No pain, No nausea, No vomiting, No diarrhea, No constipation, No weight loss, No fever, No pruritis, No rectal bleeding, No melena, No dysphagia  :  No pain, bleeding, incontinence, nocturia  Muscle:  No pain, weakness  Neuro:  No weakness, tingling, memory problems  Psych:  No fatigue, insomnia, mood problems, depression  Endocrine:  No polyuria, polydypsia, cold/heat intolerance  Heme:  No petechiae, ecchymosis, easy bruisability  Skin:  No rash, tattoos, scars, edema      Vital Signs Last 24 Hrs  T(C): 36.4 (07 Aug 2024 10:02), Max: 36.4 (06 Aug 2024 11:39)  T(F): 97.5 (07 Aug 2024 10:02), Max: 97.6 (06 Aug 2024 11:39)  HR: 89 (07 Aug 2024 10:02) (89 - 126)  BP: 87/58 (07 Aug 2024 10:02) (81/56 - 101/71)  BP(mean): 93 (06 Aug 2024 17:00) (93 - 93)  RR: 17 (07 Aug 2024 10:02) (16 - 18)  SpO2: 97% (07 Aug 2024 10:02) (96% - 100%)    Parameters below as of 07 Aug 2024 10:02  Patient On (Oxygen Delivery Method): room air        PHYSICAL EXAM:    Constitutional: NAD  HEENT: EOMI, throat clear  Neck: No LAD, supple  Respiratory: CTA and P  Cardiovascular: S1 and S2, RRR, no M  Gastrointestinal: BS+, soft, NT/ND, neg HSM,  Extremities: No peripheral edema, neg clubbing, cyanosis  Vascular: 2+ peripheral pulses  Neurological: A/O   Psychiatric: Normal mood, normal affect  Skin: No rashes      LABS:                        11.7   7.93  )-----------( 109      ( 07 Aug 2024 07:36 )             34.3     08-07    132<L>  |  102  |  48<H>  ----------------------------<  137<H>  4.6   |  15<L>  |  2.38<H>    Ca    7.3<L>      07 Aug 2024 07:36  Phos  2.4     08-07  Mg     1.6     08-07    TPro  4.9<L>  /  Alb  2.7<L>  /  TBili  0.7  /  DBili  x   /  AST  22  /  ALT  19  /  AlkPhos  59  08-07    PT/INR - ( 07 Aug 2024 07:36 )   PT: 18.9 sec;   INR: 1.75 ratio         PTT - ( 07 Aug 2024 07:36 )  PTT:30.5 sec  Urinalysis Basic - ( 07 Aug 2024 07:36 )    Color: x / Appearance: x / SG: x / pH: x  Gluc: 137 mg/dL / Ketone: x  / Bili: x / Urobili: x   Blood: x / Protein: x / Nitrite: x   Leuk Esterase: x / RBC: x / WBC x   Sq Epi: x / Non Sq Epi: x / Bacteria: x        RADIOLOGY & ADDITIONAL TESTS:

## 2024-08-07 NOTE — PROGRESS NOTE ADULT - SUBJECTIVE AND OBJECTIVE BOX
[facility] NEPHROLOGY- PROGRESS NOTE    78y Female with history of dementia, ischemic bowel s/p resection with end ostomy presents with AMS. Nephrology consulted for elevated Scr and metabolic acidosis.    REVIEW OF SYSTEMS: Unable to obtain due to mental status.    penicillin (Hives)      Hospital Medications: Medications reviewed    VITALS:  T(F): 97.5 (08-07-24 @ 10:02), Max: 97.6 (08-06-24 @ 11:39)  HR: 89 (08-07-24 @ 10:02)  BP: 87/58 (08-07-24 @ 10:02)  RR: 17 (08-07-24 @ 10:02)  SpO2: 97% (08-07-24 @ 10:02)  Wt(kg): --  Height (cm): 162.6 (08-06 @ 17:00), 162.6 (07-21 @ 11:32)  Weight (kg): 69.5 (07-25 @ 14:35)  BMI (kg/m2): 26.3 (08-06 @ 17:00), 26.3 (07-25 @ 14:35)  BSA (m2): 1.75 (08-06 @ 17:00), 1.75 (07-25 @ 14:35)    08-06 @ 07:01  -  08-07 @ 07:00  --------------------------------------------------------  IN: 0 mL / OUT: 425 mL / NET: -425 mL    08-07 @ 07:01  -  08-07 @ 10:38  --------------------------------------------------------  IN: 0 mL / OUT: 50 mL / NET: -50 mL        PHYSICAL EXAM:    Gen: NAD, calm  Cards: RRR, +S1/S2, no M/G/R  Resp: CTA B/L  GI: soft, NT/ND, NABS, + ostomy   : + amaya  Vascular: no LE edema B/L    LABS:  08-07    132<L>  |  102  |  48<H>  ----------------------------<  137<H>  4.6   |  15<L>  |  2.38<H>    Ca    7.3<L>      07 Aug 2024 07:36  Phos  2.4     08-07  Mg     1.6     08-07    TPro  4.9<L>  /  Alb  2.7<L>  /  TBili  0.7  /  DBili      /  AST  22  /  ALT  19  /  AlkPhos  59  08-07    Creatinine Trend: 2.38 <--, 2.49 <--, 2.81 <--, 2.92 <--, 2.68 <--, 2.58 <--, 3.04 <--                        11.7   7.93  )-----------( 109      ( 07 Aug 2024 07:36 )             34.3     Urine Studies:  Urinalysis Basic - ( 07 Aug 2024 07:36 )    Color:  / Appearance:  / SG:  / pH:   Gluc: 137 mg/dL / Ketone:   / Bili:  / Urobili:    Blood:  / Protein:  / Nitrite:    Leuk Esterase:  / RBC:  / WBC    Sq Epi:  / Non Sq Epi:  / Bacteria:       Sodium, Random Urine: <5 mmol/L (08-06 @ 08:00)  Chloride, Random Urine: <20 mmol/L (08-06 @ 08:00)  Creatinine, Random Urine: 111 mg/dL (08-06 @ 08:00)      RADIOLOGY & ADDITIONAL STUDIES:

## 2024-08-07 NOTE — CONSULT NOTE ADULT - NS ATTEND AMEND GEN_ALL_CORE FT
Pt seen and examined with ACP.  Assessment and plan reviewed and discussed.  Agree with above.      I spent 55  minutes face to face w/ this pt of which more than 50% of the time was spent counseling & coordinating care of this pt.
Chart and lab reviewed. plan of care discussed with the NP.

## 2024-08-07 NOTE — PROGRESS NOTE ADULT - ASSESSMENT
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 Admitted due to black output in the ileostomy bag c/f UGIB, afib with RVR, and c diff positive.    1. GIB  favor resolving bleed given stools now green in color  h/h remains stable   cont PPI Drip  surgery input appreciated; agree w/CTA if stable to leave floors   needs better resuscitation w/IVF and PRBC today prior to EGD tomorrow (NPO p MN)    2. C. Diff   favor r/t recent Cipro use  contact precautions   cont Vancomycin Q6H    3. Afib   a/c on hold for GIB   cardiology on board

## 2024-08-07 NOTE — CONSULT NOTE ADULT - SUBJECTIVE AND OBJECTIVE BOX
[facility] DIVISION OF INFECTIOUS DISEASES  GERALD Leahy S. Shah, Y. Patel, G. 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 Limited hx obtainable from pt, was AAOx~1 on encounter and very lethargic/somnolent, but arousable and seemed to deny pain anywhere. Collateral hx obtained from chart review. During recent admission (7/21/24-7/29/24), she was treated for UTI/sepsis and ultimately discharged to rehab to completed a 5-day course of ciprofloxacin (last dose 7/30/24). At rehab, she was reportedly found to have oliguria for a few days over the weekend for which she was started on IV fluids, however she was noncompliant with the IV access and she pulled it out many times. She has had very poor appetite and became lethargic and hypotensive. She was subsequently transferred to hospital where she was found to have black-colored output in ileostomy bag.   In ED: Afebrile, HR 120s-170s (Afib), SBP 90s-130s, RR 15-22, sating % on RA.  Labs notable for Hgb 11.3->10.3, Na 133->122, SCr 3.04->2.58; lactated 4.7->2.8, blood glucose to 400s but FS 100s. Found to be C.diff positive. UA not best sample with 9 epithelial cells, but noted +LE, +bacteria, +WBC, neg nitrite. CT A/P showed no acute intra-abdominal pathology and unchanged indeterminate left adrenal lesion. Received Vanc 500mg PO, Flagyl 500mg IVPB, amio 150mg IVPB x3, ofirmev 1g, 1.5L IVF, and 1u pRBC. Also started on amio gtt and protonix gtt. Nephrology and MICU were consulted. Admitted to Medicine for further management. (05 Aug 2024 23:46) Patient is s/p RRT for persistent hypotension and tachycardia in setting of UGIB and C.diff colitis; ileostomy with dark liquid stools. Patient was started on zosyn last night for possible UTI. Patient seen and examined at bedside, feels tired and weak, denies fever, chills or any pain.   ROS: 14 point review of systems completed, pertinent positives and negatives as per HPI.    Allergies: penicillin (Hives)    PMH -- Hypertension  Hypothyroid  Osteoarthritis  CAD (coronary artery disease)  CROW (obstructive sleep apnea)  History of MI (myocardial infarction)  Polyp of corpus uteri  Heart murmur  Bilateral hearing loss, unspecified hearing loss type  Obesity (BMI 35.0-39.9 without comorbidity)  Obesity  Mixed stress and urge urinary incontinence  Overactive bladder    PSH -- S/P ORIF (open reduction internal fixation) fracture  S/P appendectomy  S/P knee replacement  Stented coronary artery  S/P laparotomy  H/O dilation and curettage    FH -- noncontributory   Social History -- denies tobacco, alcohol or illicit drug use    Physical Exam--  Vital Signs Last 24 Hrs  T(F): 97.4 (07 Aug 2024 11:00), Max: 97.5 (07 Aug 2024 10:02)  HR: 68 (07 Aug 2024 11:00) (68 - 126)  BP: 100/70 (07 Aug 2024 11:00) (81/56 - 101/71)  RR: 18 (07 Aug 2024 11:00) (16 - 18)  SpO2: 96% (07 Aug 2024 11:00) (96% - 100%)  General: no acute distress  HEENT: NC/AT, EOMI, anicteric  Lungs: decreased breath sounds b/l   Heart: S1, S2 present, normal rate/rhythm  Abdomen: Soft. ND. NT. Ostomy with liquid dark stools   Neuro: awake, alert, answers/follows   Extremities: No cyanosis. No edema.   Skin: Warm. Dry. No visible rash.   Lines: PIV; amaya with yellow urine     Laboratory & Imaging Data--  CBC:                       11.7   7.93  )-----------( 109      ( 07 Aug 2024 07:36 )             34.3     WBC Count: 7.93 K/uL (08-07-24 @ 07:36)  WBC Count: 7.90 K/uL (08-06-24 @ 18:05)  WBC Count: 10.38 K/uL (08-06-24 @ 06:05)  WBC Count: 9.03 K/uL (08-05-24 @ 21:01)  WBC Count: 9.96 K/uL (08-05-24 @ 15:05)    CMP: 08-07    132<L>  |  102  |  48<H>  ----------------------------<  137<H>  4.6   |  15<L>  |  2.38<H>    Ca    7.3<L>      07 Aug 2024 07:36  Phos  2.4     08-07  Mg     1.6     08-07    TPro  4.9<L>  /  Alb  2.7<L>  /  TBili  0.7  /  DBili  x   /  AST  22  /  ALT  19  /  AlkPhos  59  08-07    LIVER FUNCTIONS - ( 07 Aug 2024 07:36 )  Alb: 2.7 g/dL / Pro: 4.9 g/dL / ALK PHOS: 59 U/L / ALT: 19 U/L / AST: 22 U/L / GGT: x           Urinalysis + Microscopic Examination (08.05.24 @ 21:00)    pH Urine: 5.5   Urine Appearance: Turbid   Color: Yellow   Specific Gravity: 1.020   Protein, Urine: 100 mg/dL   Glucose Qualitative, Urine: Negative mg/dL   Ketone - Urine: Trace mg/dL   Blood, Urine: Large   Bilirubin: Negative   Urobilinogen: 0.2 mg/dL   Leukocyte Esterase Concentration: Large   Nitrite: Negative   Review: Reviewed   White Blood Cell - Urine: 813 /HPF   Red Blood Cell - Urine: 202 /HPF   Bacteria: Occasional /HPF   Cast: 18 /LPF   Epithelial Cells: 9 /HPF   Yeast-like Cells: Present    Microbiology: reviewed  Culture - Blood (collected 08-05-24 @ 20:54)  Source: .Blood Blood-Peripheral  Preliminary Report (08-07-24 @ 01:02):    No growth at 24 hours    Culture - Blood (collected 08-05-24 @ 18:00)  Source: .Blood Blood-Peripheral  Preliminary Report (08-07-24 @ 01:02):    No growth at 24 hours    Radiology--reviewed  < from: CT Abdomen and Pelvis No Cont (08.05.24 @ 19:52) >  IMPRESSION:  No acute intra-abdominal pathology. Limited evaluation for mesenteric   ischemia in the absence of intravenous contrast.    Unchanged indeterminate left adrenal lesion.    < end of copied text >    < from: CT Head No Cont (08.05.24 @ 19:52) >  IMPRESSION:   Ischemic white matter disease and atrophy. No adverse   interval change 7/25/2024    < end of copied text >    < from: Xray Chest 1 View- PORTABLE-Urgent (Xray Chest 1 View- PORTABLE-Urgent .) (08.05.24 @ 16:56) >  IMPRESSION:  Clear lungs.    < end of copied text >    Active Medications--  acetaminophen     Tablet .. 650 milliGRAM(s) Oral every 6 hours PRN  aMIOdarone Infusion 1 mG/Min IV Continuous <Continuous>  ascorbic acid 500 milliGRAM(s) Oral daily  atorvastatin 80 milliGRAM(s) Oral at bedtime  dextrose 50% Injectable 12.5 milliLiter(s) IV Push once  digoxin  Injectable 250 MICROGram(s) IV Push once  lactated ringers Bolus 1000 milliLiter(s) IV Bolus once  levothyroxine 150 MICROGram(s) Oral daily  midodrine. 20 milliGRAM(s) Oral three times a day  multivitamin 1 Tablet(s) Oral daily  pantoprazole Infusion 8 mG/Hr IV Continuous <Continuous>  piperacillin/tazobactam IVPB.. 3.375 Gram(s) IV Intermittent every 12 hours  sodium bicarbonate 650 milliGRAM(s) Oral three times a day  sodium chloride 0.45% 1000 milliLiter(s) IV Continuous <Continuous>  sodium phosphate 15 milliMole(s)/250 mL IVPB 15 milliMole(s) IV Intermittent once  vancomycin    Solution 125 milliGRAM(s) Oral every 6 hours    Current Antimicrobials:   piperacillin/tazobactam IVPB.. 3.375 Gram(s) IV Intermittent every 12 hours  vancomycin    Solution 125 milliGRAM(s) Oral every 6 hours    Prior/Completed Antimicrobials:  metroNIDAZOLE  IVPB  piperacillin/tazobactam IVPB.  piperacillin/tazobactam IVPB.-  vancomycin    Solution

## 2024-08-07 NOTE — PROGRESS NOTE ADULT - ASSESSMENT
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 Found to have C.diff, CHANELL, and possible anemia of acute blood loss 2/2 GIB.      Acute blood loss anemia secondary to acute GI bleed: Status post transfusion with good response.  Follow-up with GI regarding scope    ·  Problem: Clostridium difficile infection.   ·  Plan: Found to have C.diff infection. Likely i/s/o recent abx use (cipro) to treat UTI.  - CT A/P showed no acute intra-abdominal pathology  Continue by mouth Vanco: Discussed with infectious disease, will increase vancomycin      ·  Problem: Metabolic encephalopathy.   ·  Plan: Patient is lethargic but arousable...  Likely multifactorial including acute infection,A KII and Dehydration  Mental status during the recent hospitalization showed decline and mentation however workup was negative for acute neurological pathology.  At this time the patient seems to be at baseline compared to last hospitalization,      ·  Problem: Atrial fibrillation with RVR.   ·  Plan: Presented in Afib w/ RVR to HR 170s. Has hx of Afib (on eliquis at home). Suspect RVR i/s/o infection, dehydration, electrolyte abnormalities  - Amiodarone as per cardiology... EP follow-up  Hold anticoagulation in setting of melanotic stool      ·  Problem: CHANELL (acute kidney injury).   Likely prerenal secondary to dehydration continue IV fluids and bicarb as per renal      ·  Problem: Hyponatremia.   Likely secondary to dehydration.  Continue IV fluid      ·  Problem: Hypotension.   Likely multifactorial secondary to acute GI bleed, dehydration, possible infection  Continue to monitor H&H  Continue fluid resuscitation and midodrin  Discussed with infectious disease: We will continue with Empiric antibiotics and if culture is negative we will DC  Discussed at length with MICU    ·  Problem: CAD (coronary artery disease).   - holding home plavix  - c/w home statin  - c/w home BB as above.      ·  Problem: Hypothyroidism.   Recently decreased dose      Appreciate wound care consult.  Follow-up official input

## 2024-08-07 NOTE — RAPID RESPONSE TEAM SUMMARY - NSSITUATIONBACKGROUNDRRT_GEN_ALL_CORE
78F w/ hx of Afib (on Eliquis), CAD (on plavix), MCI/dementia, ischemic bowel s/p ex-lap w bowel resection + end ileostomy, COPD, CROW, HTN, HLD, urinary retention, recurrent UTIs, hypothyroidism, recent admission for UTI c/b sepsis, encephalopathy, and bradycardia presenting with concerns for GI Bleed (dark output from ileostomy). Course complicated by C diff. RRT called for hypotension with systolic BP in the high 70s. On arrival, temp 97.4, BP 79/59, , RR 18, SpO2 97%. POCT FS 65. Prior to RRT, patient had received 1U of pRBC and was receiving 1/2 unit on arrival. Remainder of 1/2U was pressure bagged and repeat BP was 108/56 and . Patient was given 1/2 an amp of dextrose given FS of 65. Patient's morning labs were collected, including VBG, CBC, BMP, and coags. VSS remained stable throughout RRT. Additional IV access was obtained. Primary team to f/u labs and reach out to GI for possible endoscopic intervention.

## 2024-08-07 NOTE — CONSULT NOTE ADULT - SUBJECTIVE AND OBJECTIVE BOX
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 Limited hx obtainable from pt, was AAOx~1 on encounter and very lethargic/somnolent, but arousable and seemed to deny pain anywhere. Collateral hx obtained from chart review. During recent admission (7/21/24-7/29/24), she was treated for UTI/sepsis and ultimately discharged to rehab to completed a 5-day course of ciprofloxacin (last dose 7/30/24). At rehab, she was reportedly found to have oliguria for a few days over the weekend for which she was started on IV fluids, however she was noncompliant with the IV access and she pulled it out many times. She has had very poor appetite and became lethargic and hypotensive. She was subsequently transferred to hospital where she was found to have black-colored output in ileostomy bag.   In ED: Afebrile, HR 120s-170s (Afib), SBP 90s-130s, RR 15-22, sating % on RA.  Labs notable for Hgb 11.3->10.3, Na 133->122, SCr 3.04->2.58; lactated 4.7->2.8, blood glucose to 400s but FS 100s. Found to be C.diff positive. UA not best sample with 9 epithelial cells, but noted +LE, +bacteria, +WBC, neg nitrite. CT A/P showed no acute intra-abdominal pathology and unchanged indeterminate left adrenal lesion. Received Vanc 500mg PO, Flagyl 500mg IVPB, amio 150mg IVPB x3, ofirmev 1g, 1.5L IVF, and 1u pRBC. Also started on amio gtt and protonix gtt. Nephrology and MICU were consulted. Admitted to Medicine for further management. (05 Aug 2024 23:46)    Known to me from prior admissions. Has paroxysmal AFib, and syncope, has an ILR for surveillance for long pauses.  She has known short pauses overnight, but nothing long enough or with symptoms to warrant pacemaker insertion.  During subsequent admissions, the usual issue has been rapidly conducted  AFib.  Unable to answer 10pt ROS due to somnolence.    PAST MEDICAL & SURGICAL HISTORY:  Hypertension  Hypothyroid  Osteoarthritis  knees, back  CAD (coronary artery disease)  CROW (obstructive sleep apnea)  non complaint on CPAP  History of MI (myocardial infarction)  h/o previous MI in 2004 prompted PTCA  with stenting x 2 vessels   last stress/ echo 2019  Heart murmur  dx in childhood  Bilateral hearing loss, unspecified hearing loss type  bilateral aids  Obesity  Mixed stress and urge urinary incontinence  Overactive bladder  S/P ORIF (open reduction internal fixation) fracture  left hip 1962  S/P appendectomy  30 plus years  S/P knee replacement  left 2000  Stented coronary artery  2004 X 2 STENTS  S/P laparotomy  due to adhesions, 30 years ago  H/O dilation and curettage  2/2019 Benign polyp      MEDICATIONS  (STANDING):  aMIOdarone Infusion 1 mG/Min (33.3 mL/Hr) IV Continuous <Continuous>  ascorbic acid 500 milliGRAM(s) Oral daily  atorvastatin 80 milliGRAM(s) Oral at bedtime  dextrose 50% Injectable 12.5 milliLiter(s) IV Push once  digoxin  Injectable 250 MICROGram(s) IV Push once  lactated ringers Bolus 1000 milliLiter(s) IV Bolus once  levothyroxine 150 MICROGram(s) Oral daily  midodrine. 20 milliGRAM(s) Oral three times a day  multivitamin 1 Tablet(s) Oral daily  pantoprazole Infusion 8 mG/Hr (10 mL/Hr) IV Continuous <Continuous>  piperacillin/tazobactam IVPB.. 3.375 Gram(s) IV Intermittent every 12 hours  sodium bicarbonate 650 milliGRAM(s) Oral three times a day  sodium chloride 0.45% 1000 milliLiter(s) (100 mL/Hr) IV Continuous <Continuous>  sodium phosphate 15 milliMole(s)/250 mL IVPB 15 milliMole(s) IV Intermittent once  vancomycin    Solution 125 milliGRAM(s) Oral every 6 hours      Allergies    penicillin (Hives)    Intolerances        FAMILY HISTORY:    Non-contributary for premature coronary disease or sudden cardiac death    SOCIAL HISTORY:    [ x] Non-smoker  [ ] Smoker  [ ] Alcohol    PHYSICAL EXAM:  T(C): 36.4 (08-07-24 @ 10:02), Max: 36.4 (08-07-24 @ 10:02)  HR: 89 (08-07-24 @ 10:02) (89 - 126)  BP: 87/58 (08-07-24 @ 10:02) (81/56 - 101/71)  RR: 17 (08-07-24 @ 10:02) (16 - 18)  SpO2: 97% (08-07-24 @ 10:02) (96% - 100%)  Wt(kg): --    Appearance: frail elderly woman in no acute distress, somnolent	  HEENT:   Normal oral mucosa, PERRL, EOMI	  Lymphatic: No lymphadenopathy , no edema  Cardiovascular: rapid irreuglar S1 S2, No JVD, No murmurs , Peripheral pulses palpable 2+ bilaterally  Respiratory: Lungs clear to auscultation, normal effort 	  Gastrointestinal:  Soft, Non-tender, + BS	  Skin: No rashes, No ecchymoses, No cyanosis, warm to touch  Musculoskeletal: Normal range of motion, normal strength  Psychiatry:  Mood & affect appropriate      TELEMETRY: AF 120s.	    ECG: AFib RVR 	  	  LABS:	 	                          11.7   7.93  )-----------( 109      ( 07 Aug 2024 07:36 )             34.3     08-07    132<L>  |  102  |  48<H>  ----------------------------<  137<H>  4.6   |  15<L>  |  2.38<H>    Ca    7.3<L>      07 Aug 2024 07:36  Phos  2.4     08-07  Mg     1.6     08-07    TPro  4.9<L>  /  Alb  2.7<L>  /  TBili  0.7  /  DBili  x   /  AST  22  /  ALT  19  /  AlkPhos  59  08-07    ASSESSMENT/PLAN: Ms Gooden is a pleasant 78y Female here with CDiff +/- GI bleeding.  EP called re: management of rapid AFib.  Has Paroxysmal AFib.  Had brief episodes of sinus rhythm on last admission, and known short pauses that are not long enough to justify permanent pacemaker insertion.  Has an ILR for long-pause surveillance, data managed by Dr Mendiola.  [id number] is at least 5.  Continue apixaban 5mg BID for stroke prevention unless this needs to be held for GI workup.  Re: rate control of her AFib, she has an acute kidney injury and is not a good candidate for full-dose Digoxin at this time.  Amiodarone not ideal, as this could chemically cardiovert her while we are holding anticoagulation.    Recommend to maximize Metoprolol dose:  50mg PO Q6hrs, with holding parameters as needed for the floor re: HR (60).  It is generally a blood pressure-neutral drug.  OK with modified-load of Digoxin (250mcg up to 1000mcg total... would hold off on maintenance dose until kidney function improves and we have a Dig level that is under 1, or ideally under 0.6).  She's had multiple infections in the last few hospital stays, and has looked weaker and more frail each time.  I suspect her long-term prognosis is becoming worse.  She is not a good candidate for invasive management of AFib (ablation).    Will follow with you.      Shane Frias M.D.  Cardiac Electrophysiology    office [phone]  pager [phone]

## 2024-08-08 LAB
ANION GAP SERPL CALC-SCNC: 16 MMOL/L — SIGNIFICANT CHANGE UP (ref 5–17)
BUN SERPL-MCNC: 40 MG/DL — HIGH (ref 7–23)
CALCIUM SERPL-MCNC: 7.8 MG/DL — LOW (ref 8.4–10.5)
CHLORIDE SERPL-SCNC: 103 MMOL/L — SIGNIFICANT CHANGE UP (ref 96–108)
CO2 SERPL-SCNC: 16 MMOL/L — LOW (ref 22–31)
CREAT SERPL-MCNC: 2.26 MG/DL — HIGH (ref 0.5–1.3)
EGFR: 22 ML/MIN/1.73M2 — LOW
GLUCOSE BLDC GLUCOMTR-MCNC: 101 MG/DL — HIGH (ref 70–99)
GLUCOSE BLDC GLUCOMTR-MCNC: 140 MG/DL — HIGH (ref 70–99)
GLUCOSE BLDC GLUCOMTR-MCNC: 69 MG/DL — LOW (ref 70–99)
GLUCOSE BLDC GLUCOMTR-MCNC: 70 MG/DL — SIGNIFICANT CHANGE UP (ref 70–99)
GLUCOSE BLDC GLUCOMTR-MCNC: 70 MG/DL — SIGNIFICANT CHANGE UP (ref 70–99)
GLUCOSE BLDC GLUCOMTR-MCNC: 74 MG/DL — SIGNIFICANT CHANGE UP (ref 70–99)
GLUCOSE BLDC GLUCOMTR-MCNC: 76 MG/DL — SIGNIFICANT CHANGE UP (ref 70–99)
GLUCOSE BLDC GLUCOMTR-MCNC: 93 MG/DL — SIGNIFICANT CHANGE UP (ref 70–99)
GLUCOSE BLDC GLUCOMTR-MCNC: 96 MG/DL — SIGNIFICANT CHANGE UP (ref 70–99)
GLUCOSE SERPL-MCNC: 72 MG/DL — SIGNIFICANT CHANGE UP (ref 70–99)
HCT VFR BLD CALC: 35.8 % — SIGNIFICANT CHANGE UP (ref 34.5–45)
HCT VFR BLD CALC: 37.7 % — SIGNIFICANT CHANGE UP (ref 34.5–45)
HGB BLD-MCNC: 12.3 G/DL — SIGNIFICANT CHANGE UP (ref 11.5–15.5)
HGB BLD-MCNC: 12.8 G/DL — SIGNIFICANT CHANGE UP (ref 11.5–15.5)
MAGNESIUM SERPL-MCNC: 1.8 MG/DL — SIGNIFICANT CHANGE UP (ref 1.6–2.6)
MCHC RBC-ENTMCNC: 30.8 PG — SIGNIFICANT CHANGE UP (ref 27–34)
MCHC RBC-ENTMCNC: 31.1 PG — SIGNIFICANT CHANGE UP (ref 27–34)
MCHC RBC-ENTMCNC: 34 GM/DL — SIGNIFICANT CHANGE UP (ref 32–36)
MCHC RBC-ENTMCNC: 34.4 GM/DL — SIGNIFICANT CHANGE UP (ref 32–36)
MCV RBC AUTO: 90.6 FL — SIGNIFICANT CHANGE UP (ref 80–100)
MCV RBC AUTO: 90.8 FL — SIGNIFICANT CHANGE UP (ref 80–100)
NRBC # BLD: 0 /100 WBCS — SIGNIFICANT CHANGE UP (ref 0–0)
NRBC # BLD: 0 /100 WBCS — SIGNIFICANT CHANGE UP (ref 0–0)
PHOSPHATE SERPL-MCNC: 3.1 MG/DL — SIGNIFICANT CHANGE UP (ref 2.5–4.5)
PLATELET # BLD AUTO: 100 K/UL — LOW (ref 150–400)
PLATELET # BLD AUTO: 113 K/UL — LOW (ref 150–400)
POTASSIUM SERPL-MCNC: 3.9 MMOL/L — SIGNIFICANT CHANGE UP (ref 3.5–5.3)
POTASSIUM SERPL-SCNC: 3.9 MMOL/L — SIGNIFICANT CHANGE UP (ref 3.5–5.3)
RBC # BLD: 3.95 M/UL — SIGNIFICANT CHANGE UP (ref 3.8–5.2)
RBC # BLD: 4.15 M/UL — SIGNIFICANT CHANGE UP (ref 3.8–5.2)
RBC # FLD: 17 % — HIGH (ref 10.3–14.5)
RBC # FLD: 17.2 % — HIGH (ref 10.3–14.5)
SODIUM SERPL-SCNC: 135 MMOL/L — SIGNIFICANT CHANGE UP (ref 135–145)
WBC # BLD: 7.58 K/UL — SIGNIFICANT CHANGE UP (ref 3.8–10.5)
WBC # BLD: 7.6 K/UL — SIGNIFICANT CHANGE UP (ref 3.8–10.5)
WBC # FLD AUTO: 7.58 K/UL — SIGNIFICANT CHANGE UP (ref 3.8–10.5)
WBC # FLD AUTO: 7.6 K/UL — SIGNIFICANT CHANGE UP (ref 3.8–10.5)

## 2024-08-08 RX ORDER — DEXTROSE 15 G/33 G
12.5 GEL IN PACKET (GRAM) ORAL ONCE
Refills: 0 | Status: COMPLETED | OUTPATIENT
Start: 2024-08-08 | End: 2024-08-08

## 2024-08-08 RX ORDER — MIDODRINE HYDROCHLORIDE 5 MG/1
30 TABLET ORAL THREE TIMES A DAY
Refills: 0 | Status: DISCONTINUED | OUTPATIENT
Start: 2024-08-08 | End: 2024-08-10

## 2024-08-08 RX ORDER — SODIUM BICARBONATE 84 MG/ML
1300 INJECTION, SOLUTION INTRAVENOUS THREE TIMES A DAY
Refills: 0 | Status: DISCONTINUED | OUTPATIENT
Start: 2024-08-08 | End: 2024-08-12

## 2024-08-08 RX ORDER — PANTOPRAZOLE SODIUM 40 MG
40 TABLET, DELAYED RELEASE (ENTERIC COATED) ORAL
Refills: 0 | Status: DISCONTINUED | OUTPATIENT
Start: 2024-08-08 | End: 2024-08-21

## 2024-08-08 RX ORDER — PIPERACILLIN SODIUM AND TAZOBACTAM SODIUM 3; .375 G/15ML; G/15ML
3.38 INJECTION, POWDER, FOR SOLUTION INTRAVENOUS EVERY 8 HOURS
Refills: 0 | Status: DISCONTINUED | OUTPATIENT
Start: 2024-08-08 | End: 2024-08-08

## 2024-08-08 RX ORDER — POTASSIUM CHLORIDE 10 MEQ
40 TABLET, EXT RELEASE, PARTICLES/CRYSTALS ORAL ONCE
Refills: 0 | Status: COMPLETED | OUTPATIENT
Start: 2024-08-08 | End: 2024-08-08

## 2024-08-08 RX ADMIN — PIPERACILLIN SODIUM AND TAZOBACTAM SODIUM 25 GRAM(S): 3; .375 INJECTION, POWDER, FOR SOLUTION INTRAVENOUS at 05:57

## 2024-08-08 RX ADMIN — Medication 40 MILLIEQUIVALENT(S): at 12:40

## 2024-08-08 RX ADMIN — Medication 150 MICROGRAM(S): at 05:56

## 2024-08-08 RX ADMIN — Medication 1000 MILLILITER(S): at 09:48

## 2024-08-08 RX ADMIN — Medication 12.5 MILLILITER(S): at 06:34

## 2024-08-08 RX ADMIN — Medication 12.5 MILLILITER(S): at 01:16

## 2024-08-08 RX ADMIN — Medication 500 MILLIGRAM(S): at 00:31

## 2024-08-08 RX ADMIN — PIPERACILLIN SODIUM AND TAZOBACTAM SODIUM 25 GRAM(S): 3; .375 INJECTION, POWDER, FOR SOLUTION INTRAVENOUS at 12:43

## 2024-08-08 RX ADMIN — MIDODRINE HYDROCHLORIDE 30 MILLIGRAM(S): 5 TABLET ORAL at 22:07

## 2024-08-08 RX ADMIN — SODIUM BICARBONATE 1300 MILLIGRAM(S): 84 INJECTION, SOLUTION INTRAVENOUS at 12:43

## 2024-08-08 RX ADMIN — MIDODRINE HYDROCHLORIDE 20 MILLIGRAM(S): 5 TABLET ORAL at 17:50

## 2024-08-08 RX ADMIN — Medication 100 MILLILITER(S): at 09:48

## 2024-08-08 RX ADMIN — Medication 12.5 MILLILITER(S): at 14:17

## 2024-08-08 RX ADMIN — SODIUM BICARBONATE 650 MILLIGRAM(S): 84 INJECTION, SOLUTION INTRAVENOUS at 05:57

## 2024-08-08 RX ADMIN — Medication 80 MILLIGRAM(S): at 22:07

## 2024-08-08 RX ADMIN — Medication 500 MILLIGRAM(S): at 17:47

## 2024-08-08 RX ADMIN — SODIUM BICARBONATE 1300 MILLIGRAM(S): 84 INJECTION, SOLUTION INTRAVENOUS at 22:08

## 2024-08-08 RX ADMIN — Medication 40 MILLIGRAM(S): at 17:44

## 2024-08-08 RX ADMIN — MIDODRINE HYDROCHLORIDE 20 MILLIGRAM(S): 5 TABLET ORAL at 05:56

## 2024-08-08 RX ADMIN — Medication 500 MILLIGRAM(S): at 05:57

## 2024-08-08 NOTE — PROGRESS NOTE ADULT - SUBJECTIVE AND OBJECTIVE BOX
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 Limited hx obtainable from pt, was AAOx~1 on encounter and very lethargic/somnolent, but arousable and seemed to deny pain anywhere. Collateral hx obtained from chart review. During recent admission (7/21/24-7/29/24), she was treated for UTI/sepsis and ultimately discharged to rehab to completed a 5-day course of ciprofloxacin (last dose 7/30/24). At rehab, she was reportedly found to have oliguria for a few days over the weekend for which she was started on IV fluids, however she was noncompliant with the IV access and she pulled it out many times. She has had very poor appetite and became lethargic and hypotensive. She was subsequently transferred to hospital where she was found to have black-colored output in ileostomy bag.   In ED: Afebrile, HR 120s-170s (Afib), SBP 90s-130s, RR 15-22, sating % on RA.  Labs notable for Hgb 11.3->10.3, Na 133->122, SCr 3.04->2.58; lactated 4.7->2.8, blood glucose to 400s but FS 100s. Found to be C.diff positive. UA not best sample with 9 epithelial cells, but noted +LE, +bacteria, +WBC, neg nitrite. CT A/P showed no acute intra-abdominal pathology and unchanged indeterminate left adrenal lesion. Received Vanc 500mg PO, Flagyl 500mg IVPB, amio 150mg IVPB x3, ofirmev 1g, 1.5L IVF, and 1u pRBC. Also started on amio gtt and protonix gtt. Nephrology and MICU were consulted. Admitted to Medicine for further management. (05 Aug 2024 23:46)    Known to me from prior admissions. Has paroxysmal AFib, and syncope, has an ILR for surveillance for long pauses.  She has known short pauses overnight, but nothing long enough or with symptoms to warrant pacemaker insertion.  During subsequent admissions, the usual issue has been rapidly conducted  AFib.  Unable to answer 10pt ROS due to somnolence.  Date of service 8/8- continues to have black output and gas in ostomy.  remains somnolent.  AF/RVR on telemetry.  Hgb back up over 11g/dL.    PAST MEDICAL & SURGICAL HISTORY:  Hypertension  Hypothyroid  Osteoarthritis  knees, back  CAD (coronary artery disease)  CROW (obstructive sleep apnea)  non complaint on CPAP  History of MI (myocardial infarction)  h/o previous MI in 2004 prompted PTCA  with stenting x 2 vessels   last stress/ echo 2019  Heart murmur  dx in childhood  Bilateral hearing loss, unspecified hearing loss type  bilateral aids  Obesity  Mixed stress and urge urinary incontinence  Overactive bladder  S/P ORIF (open reduction internal fixation) fracture  left hip 1962  S/P appendectomy  30 plus years  S/P knee replacement  left 2000  Stented coronary artery  2004 X 2 STENTS  S/P laparotomy  due to adhesions, 30 years ago  H/O dilation and curettage  2/2019 Benign polyp    acetaminophen     Tablet .. 650 milliGRAM(s) Oral every 6 hours PRN  aMIOdarone Infusion 1 mG/Min IV Continuous <Continuous>  ascorbic acid 500 milliGRAM(s) Oral daily  atorvastatin 80 milliGRAM(s) Oral at bedtime  dextrose 50% Injectable 12.5 milliLiter(s) IV Push once  digoxin  Injectable 250 MICROGram(s) IV Push once  levothyroxine 150 MICROGram(s) Oral daily  midodrine. 20 milliGRAM(s) Oral three times a day  multivitamin 1 Tablet(s) Oral daily  pantoprazole Infusion 8 mG/Hr IV Continuous <Continuous>  piperacillin/tazobactam IVPB.. 3.375 Gram(s) IV Intermittent every 12 hours  potassium chloride   Powder 40 milliEquivalent(s) Oral once  sodium bicarbonate 1300 milliGRAM(s) Oral three times a day  sodium chloride 0.45% 1000 milliLiter(s) IV Continuous <Continuous>  vancomycin    Solution 500 milliGRAM(s) Oral every 6 hours                        12.8   7.60  )-----------( 113      ( 08 Aug 2024 07:38 )             37.7       08-08    135  |  103  |  40<H>  ----------------------------<  72  3.9   |  16<L>  |  2.26<H>    Ca    7.8<L>      08 Aug 2024 06:37  Phos  3.1     08-08  Mg     1.8     08-08    TPro  4.9<L>  /  Alb  2.7<L>  /  TBili  0.7  /  DBili  x   /  AST  22  /  ALT  19  /  AlkPhos  59  08-07    T(C): 36.1 (08-08-24 @ 06:15), Max: 36.4 (08-07-24 @ 16:35)  HR: 68 (08-08-24 @ 06:15) (63 - 79)  BP: 86/58 (08-08-24 @ 06:55) (82/57 - 101/72)  RR: 18 (08-08-24 @ 06:15) (18 - 18)  SpO2: 98% (08-08-24 @ 06:15) (96% - 99%)  Wt(kg): --    I&O's Summary    07 Aug 2024 07:01  -  08 Aug 2024 07:00  --------------------------------------------------------  IN: 100 mL / OUT: 475 mL / NET: -375 mL    Appearance: frail elderly woman in no acute distress, somnolent	  HEENT:   Normal oral mucosa, PERRL, EOMI	  Lymphatic: No lymphadenopathy , no edema  Cardiovascular: rapid irreuglar S1 S2, No JVD, No murmurs , Peripheral pulses palpable 2+ bilaterally  Respiratory: Lungs clear to auscultation, normal effort 	  Gastrointestinal:  Soft, Non-tender, + BS	  Skin: No rashes, No ecchymoses, No cyanosis, warm to touch  Musculoskeletal: Normal range of motion, normal strength  Psychiatry:  Mood & affect appropriate      TELEMETRY: AF 120s.	    ECG: AFib RVR 	  	  ASSESSMENT/PLAN: Ms Gooden is a pleasant 78y Female here with CDiff +/- GI bleeding.  EP called re: management of rapid AFib.  Has Paroxysmal AFib.  Had brief episodes of sinus rhythm on last admission, and known short pauses that are not long enough to justify permanent pacemaker insertion.  Has an ILR for long-pause surveillance, data managed by Dr Mendiola.  [id number] is at least 5.  Continue apixaban 5mg BID for stroke prevention unless this needs to be held for GI workup.  Re: rate control of her AFib, she has an acute kidney injury and is not a good candidate for full-dose Digoxin at this time.  Amiodarone not ideal, as this could chemically cardiovert her while we are holding anticoagulation.    Recommend to maximize Metoprolol dose:  50mg PO Q6hrs, with holding parameters as needed for the floor re: HR (60).  It is generally a blood pressure-neutral drug.  OK with modified-load of Digoxin (250mcg up to 1000mcg total... would hold off on maintenance dose until kidney function improves and we have a Dig level that is under 1, or ideally under 0.6).  She's had multiple infections in the last few hospital stays, and has looked weaker and more frail each time.  I suspect her long-term prognosis is becoming worse.  She is not a good candidate for invasive management of AFib (ablation).    Will follow with you.      Shane Frias M.D.  Cardiac Electrophysiology    office [phone]  pager [phone]

## 2024-08-08 NOTE — DIETITIAN INITIAL EVALUATION ADULT - ORAL INTAKE PTA/DIET HISTORY
Pt resides in skilled nursing facility for 1 week before this admission. No transfer paper in file. Per daughter, pt was on puree diet in skilled nursing facility. PO remains poor with ~50% or less of foods consumed most meals since February 2024 (ileostomy creation). Pt edentulous and lose her denture, was able to tolerate chopped foods at home. Not on any therapeutic restriction prior to admission.   Confirms NKFA/intolerance  Micronutrient/Other supplementation: Vitamin C, MVI per H&P  Protein-energy supplementation: Ensure 1x daily per daughter

## 2024-08-08 NOTE — DIETITIAN INITIAL EVALUATION ADULT - NSFNSGIIOFT_GEN_A_CORE
08-07-24 @ 07:01  -  08-08-24 @ 07:00  --------------------------------------------------------  OUT:    Ileostomy (mL): 175 mL  Total OUT: 175 mL    Total NET: -175 mL         08-07-24 @ 07:01  -  08-08-24 @ 07:00  --------------------------------------------------------  OUT:    Ileostomy (mL): 175 mL  Total OUT: 175 mL    Total NET: -175 mL

## 2024-08-08 NOTE — PROGRESS NOTE ADULT - SUBJECTIVE AND OBJECTIVE BOX
[facility] NEPHROLOGY- PROGRESS NOTE    78y Female with history of dementia, ischemic bowel s/p resection with end ostomy presents with AMS. Nephrology consulted for elevated Scr and metabolic acidosis.    REVIEW OF SYSTEMS: Unable to obtain due to mental status.    penicillin (Hives)      Hospital Medications: Medications reviewed      VITALS:  T(F): 97 (08-08-24 @ 06:15), Max: 97.5 (08-07-24 @ 10:02)  HR: 68 (08-08-24 @ 06:15)  BP: 86/58 (08-08-24 @ 06:55)  RR: 18 (08-08-24 @ 06:15)  SpO2: 98% (08-08-24 @ 06:15)  Wt(kg): --    08-07 @ 07:01  -  08-08 @ 07:00  --------------------------------------------------------  IN: 100 mL / OUT: 475 mL / NET: -375 mL        Weight (kg): 75.1 (08-08 @ 08:34)      PHYSICAL EXAM:    Gen: NAD, calm  Cards: RRR, +S1/S2, no M/G/R  Resp: CTA B/L  GI: soft, NT/ND, NABS, + ostomy   : + amaya  Vascular: no LE edema B/L      LABS:  08-08    135  |  103  |  40<H>  ----------------------------<  72  3.9   |  16<L>  |  2.26<H>    Ca    7.8<L>      08 Aug 2024 06:37  Phos  3.1     08-08  Mg     1.8     08-08    TPro  4.9<L>  /  Alb  2.7<L>  /  TBili  0.7  /  DBili      /  AST  22  /  ALT  19  /  AlkPhos  59  08-07    Creatinine Trend: 2.26 <--, 2.38 <--, 2.49 <--, 2.81 <--, 2.92 <--, 2.68 <--, 2.58 <--, 3.04 <--                        12.8   7.60  )-----------( 113      ( 08 Aug 2024 07:38 )             37.7     Urine Studies:  Urinalysis Basic - ( 08 Aug 2024 06:37 )    Color:  / Appearance:  / SG:  / pH:   Gluc: 72 mg/dL / Ketone:   / Bili:  / Urobili:    Blood:  / Protein:  / Nitrite:    Leuk Esterase:  / RBC:  / WBC    Sq Epi:  / Non Sq Epi:  / Bacteria:       Sodium, Random Urine: <5 mmol/L (08-06 @ 08:00)  Chloride, Random Urine: <20 mmol/L (08-06 @ 08:00)  Creatinine, Random Urine: 111 mg/dL (08-06 @ 08:00)

## 2024-08-08 NOTE — DIETITIAN INITIAL EVALUATION ADULT - ADD RECOMMEND
-- Continue MVI and Vitamin C as ordered, pending no medical contraindications, for micronutrient support/aid wound healing.   -- Monitor PO intake, GI tolerance, skin integrity, labs, weight, and bowel movement regularity.   -- Encourage use of daily menus. Honor dietary preferences as expressed as able.   -- Malnutrition alert placed in chart.

## 2024-08-08 NOTE — DIETITIAN INITIAL EVALUATION ADULT - ETIOLOGY
increased physiological demand for nutrients  inadequate PO intake in setting of increased physiological demand for nutrients

## 2024-08-08 NOTE — DIETITIAN INITIAL EVALUATION ADULT - ORAL NUTRITION SUPPLEMENTS
Recommend Ensure plus high protein 2x daily (700kcal, 40g proteins) to provide additional calories and nutrients to encourage PO intake while in house/aid wound healing.

## 2024-08-08 NOTE — DIETITIAN INITIAL EVALUATION ADULT - ENERGY INTAKE
Pt was NPO from 8/5 till 8/7, started on Clear Liquid diet on 8/7 with poor PO intake <50%, NPO today for EGD.  Poor (<50%) Pt was NPO from 8/5 till 8/7, started on Clear Liquid diet on 8/7 with poor PO intake <50%. Daughter is amenable for pt to get Ensure to supplement PO intake. Also prefer pt to get chopped foods if possible. Daughter made aware of menu ordering procedure in house with menu provided, encourage  to order preferred foods upon diet initiation.

## 2024-08-08 NOTE — DIETITIAN INITIAL EVALUATION ADULT - PROBLEM SELECTOR PLAN 8
Presented with SBP to 90s initially. Now improving. Likely multifactorial 2/2 infection/sepsis, fluid losses, and Afib w/ RVR.   - CT A/P noted unchanged indeterminate left adrenal lesion  - c/w home midodrine  - ensure HR control  - f/u infectious workup and treat C.diff as above  - monitor I/Os, fluid resuscitation as needed  - monitor BP closely

## 2024-08-08 NOTE — DIETITIAN INITIAL EVALUATION ADULT - PROBLEM SELECTOR PLAN 5
On admission, SCr 3.04 (baseline SCr ~0.8-1.0 in May 2024)  - s/p 1.5L IVF in ED  - c/w 1/2 NS-bicarb gtt as below   - c/w home tamsulosin for urinary retention  - c/w indwelling amaya  - strict I/Os, renally dose meds, avoid nephrotoxins  - monitor SCr and electrolytes  - f/u Nephrology recs in AM

## 2024-08-08 NOTE — PROGRESS NOTE ADULT - ASSESSMENT
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 Found to have C.diff, CHANELL, and possible anemia of acute blood loss 2/2 GIB.      Acute blood loss anemia secondary to acute GI bleed: Status post transfusion with good response.  Discussed with Dr. Sprague: Stool is yellow, H&H seems stable with good response to transfusion.  will hold off on endoscopy at this time given hypotension and continue to optimize...Will plan EGD early next week    ·  Problem: Clostridium difficile infection.   ·  Plan: Found to have C.diff infection. Likely i/s/o recent abx use (cipro) to treat UTI.  - CT A/P showed no acute intra-abdominal pathology  Continue by mouth Vanco: Discussed with infectious disease, will increase vancomycin      ·  Problem: Metabolic encephalopathy.   ·  Plan: Patient Was lethargic but arousable...  Likely multifactorial including acute infection,A KII and Dehydration  Mental status during the recent hospitalization showed decline and mentation however workup was negative for acute neurological pathology.  At this time the patient seems to be at baseline compared to last hospitalization      ·  Problem: Atrial fibrillation with RVR.   ·  Plan: Presented in Afib w/ RVR to HR 170s. Has hx of Afib (on eliquis at home). Suspect RVR i/s/o infection, dehydration, electrolyte abnormalities  - Amiodarone as per cardiology... EP follow-up  Hold anticoagulation in setting of melanotic stool/.      ·  Problem: CHANELL (acute kidney injury).   Likely prerenal secondary to dehydration continue IV fluids and bicarb as per renal      ·  Problem: Hyponatremia.   Likely secondary to dehydration.  Continue IV fluid      ·  Problem: Hypotension.   Likely multifactorial secondary to acute GI bleed, dehydration, possible infection  Continue to monitor H&H  Continue fluid resuscitation and midodrin  Discussed with infectious disease: We will continue with Empiric antibiotics and if culture is negative we will DC  Discussed at length with MICU    ·  Problem: CAD (coronary artery disease).   - holding home plavix  - c/w home statin        ·  Problem: Hypothyroidism.   Recently decreased dose      Appreciate wound care consult.  Follow-up official input

## 2024-08-08 NOTE — PROGRESS NOTE ADULT - ASSESSMENT
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 During recent admission (7/21/24-7/29/24), she was treated for UTI/sepsis and ultimately discharged to rehab to completed a 5-day course of ciprofloxacin (last dose 7/30/24). At rehab, she was reportedly found to have oliguria for a few days over the weekend for which she was started on IV fluids, however she was noncompliant with the IV access and she pulled it out many times. She has had very poor appetite and became lethargic and hypotensive. She was subsequently transferred to hospital where she was found to have black-colored output in ileostomy bag.     C.diff likely iso recent antibiotic use for UTI tx  Acute blood loss anemia due to UGIB  Hypotension likely multifactorial due to above  Positive UA in setting of amaya, Ucx with C.albicans likely colonization  CHANELL likely due to hypovolemia iso diarrhea    8/5 CTAP wo contrast with no acute intra-abd pathology   8/6 am s/p RRT for hypotension, tachycardia  ostomy with loose/watery melanotic stools   Surgery following - no acute intervention at this time  Consider CTA/IR eval if concern for active bleed and if stable for scan  UA with pyuria, large LE, occasional bacteria with 9 sq epi cells,   Ucx with C. albicans some, has amaya in place, suspect contaminant/colonization  afebrile, no leukocytosis, tired/weak but mental status appears at baseline  stools lighter in color today     Recommendations:   Continue vancomycin 500mg PO Q6h  Discontinue zosyn  GI and Surgery following - plan for EGD today   Contact isolation per IC protocol   Monitor temps/CBC, Cr   Aspiration precautions       Marisa Davidson M.D.  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 During recent admission (7/21/24-7/29/24), she was treated for UTI/sepsis and ultimately discharged to rehab to completed a 5-day course of ciprofloxacin (last dose 7/30/24). At rehab, she was reportedly found to have oliguria for a few days over the weekend for which she was started on IV fluids, however she was noncompliant with the IV access and she pulled it out many times. She has had very poor appetite and became lethargic and hypotensive. She was subsequently transferred to hospital where she was found to have black-colored output in ileostomy bag.     C.diff likely iso recent antibiotic use for UTI tx  Acute blood loss anemia due to UGIB  Hypotension likely multifactorial due to above  Positive UA in setting of amaya, Ucx with C.albicans likely colonization  CHANELL likely due to hypovolemia iso diarrhea    8/5 CTAP wo contrast with no acute intra-abd pathology   8/6 am s/p RRT for hypotension, tachycardia  ostomy with loose/watery melanotic stools   Surgery following - no acute intervention at this time  Consider CTA/IR eval if concern for active bleed and if stable for scan  UA with pyuria, large LE, occasional bacteria with 9 sq epi cells,   Ucx with C. albicans some, has amaya in place, suspect contaminant/colonization  afebrile, no leukocytosis, tired/weak but mental status appears at baseline  stools lighter in color today     Recommendations:   Continue vancomycin 500mg PO Q6h  Discontinued zosyn  GI and Surgery following - plan for EGD today   Contact isolation per IC protocol   Monitor temps/CBC, Cr   Aspiration precautions     D/w Dr. Renee Davidson M.D.  [facility], Division of Infectious Diseases  [phone]  After 5pm on weekdays and all day on weekends - please call [phone]  Available on [employer] TEAMS

## 2024-08-08 NOTE — DIETITIAN INITIAL EVALUATION ADULT - PHYSCIAL ASSESSMENT
Drug Dosing Weight  Height (cm): 162.6 (06 Aug 2024 17:00)  Weight (kg): 75.1 (08 Aug 2024 08:34)  BMI (kg/m2): 28.4 (08 Aug 2024 08:34)    Daily weight (standing or bed scale): 75.1kg (8/8)   Weight obtained by RD: 76kg (8/8 bed scale)     Weight history:   Previous RD notes: 72.6kg (5/17/24), 83.7kg (4/20/24), 88.5kg (02/23/24)      **wt history suggest a significant wt loss of 13.4kg/29lb/15% in 6 months (since Ileostomy creation in February 2024). RD will continue to monitor wt trends as available/able.     IBW: 120lb, 138% IBW    Limited Nutrition focused physical exam conducted as pt lying in bed

## 2024-08-08 NOTE — DIETITIAN INITIAL EVALUATION ADULT - NSFNSGIASSESSMENTFT_GEN_A_CORE
pt with ileostomy in place, admitted due to black output in the ileostomy bag c/f UGIB, c diff positive. GIB resolving per chart, on Protonix.

## 2024-08-08 NOTE — PROGRESS NOTE ADULT - ASSESSMENT
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 Nephrology consulted for elevated Scr and metabolic acidosis.    1) CHANELL: likely due to hypovolemia from diarrhea and ostomy output. Scr improving with IVF. Continue with IVF as ordered. UA active likely due to infection. FeNa low. CT without obstruction. Check LDH/hapto r/o TMA. Can increase Zosyn to Q8 hour dosing. Avoid nephrotoxins.    2) Hypotension: BP low. Will give LR 1L bolus today in addition to maintenance IVF. If no improvement in BP, can increase midodrine to 30 mg PO TID. Monitor BP.    3) Metabolic acidosis: Predominantly non-gap acidosis due to diarrhea and ostomy with mild gap acidosis. Continue with IVF with sodium bicarbonate and increase sodium bicarb tablets to 1300 mg PO TID. Blood gas with compensated metabolic acidosis and improving pH. Monitor pH.    4) Hyponatremia: likely due to hypovolemia. Hyponatremia resolved. Continue with isotonic IVF. Monitor serum Na.    5) Urinary retention: Continue with amaya. Holding flomax given hypotension.       [facility] NEPHROLOGY  Paulie Storm M.D.  Mack Clancy D.O.  Maddi Steve M.D.  MD Olivia Caldera, MSN, ANP-C    Telephone: [phone]  Facsimile: [phone] [place] [address], [address]  [place] [address]

## 2024-08-08 NOTE — DIETITIAN INITIAL EVALUATION ADULT - CONTINUE CURRENT NUTRITION CARE PLAN
continue on Clear Liquid diet per team. Upon diet advancement Recommend regular diet free of therapeutic restriction- kitchen will send up chopped to ease PO intake. RD remains available to adjust diet as needed./yes

## 2024-08-08 NOTE — PROGRESS NOTE ADULT - SUBJECTIVE AND OBJECTIVE BOX
INTERVAL HPI/OVERNIGHT EVENTS:    daughter bedside  patient still hypotensive but participates in my exam   light green stool in ostomy bag    MEDICATIONS  (STANDING):  aMIOdarone Infusion 1 mG/Min (33.3 mL/Hr) IV Continuous <Continuous>  ascorbic acid 500 milliGRAM(s) Oral daily  atorvastatin 80 milliGRAM(s) Oral at bedtime  dextrose 50% Injectable 12.5 milliLiter(s) IV Push once  digoxin  Injectable 250 MICROGram(s) IV Push once  levothyroxine 150 MICROGram(s) Oral daily  midodrine. 20 milliGRAM(s) Oral three times a day  multivitamin 1 Tablet(s) Oral daily  pantoprazole Infusion 8 mG/Hr (10 mL/Hr) IV Continuous <Continuous>  piperacillin/tazobactam IVPB.. 3.375 Gram(s) IV Intermittent every 8 hours  potassium chloride   Powder 40 milliEquivalent(s) Oral once  sodium bicarbonate 1300 milliGRAM(s) Oral three times a day  sodium chloride 0.45% 1000 milliLiter(s) (100 mL/Hr) IV Continuous <Continuous>  vancomycin    Solution 500 milliGRAM(s) Oral every 6 hours    MEDICATIONS  (PRN):  acetaminophen     Tablet .. 650 milliGRAM(s) Oral every 6 hours PRN Temp greater or equal to 38C (100.4F), Mild Pain (1 - 3)      Allergies    penicillin (Hives)    Intolerances        Review of Systems:    General:  No wt loss, fevers, chills, night sweats, fatigue   Eyes:  Good vision, no reported pain  ENT:  No sore throat, pain, runny nose, dysphagia  CV:  No pain, palpitations, hypo/hypertension  Resp:  No dyspnea, cough, tachypnea, wheezing  GI:  No pain, No nausea, No vomiting, No diarrhea, No constipation, No weight loss, No fever, No pruritis, No rectal bleeding, No melena, No dysphagia  :  No pain, bleeding, incontinence, nocturia  Muscle:  No pain, weakness  Neuro:  No weakness, tingling, memory problems  Psych:  No fatigue, insomnia, mood problems, depression  Endocrine:  No polyuria, polydypsia, cold/heat intolerance  Heme:  No petechiae, ecchymosis, easy bruisability  Skin:  No rash, tattoos, scars, edema      Vital Signs Last 24 Hrs  T(C): 36.3 (08 Aug 2024 08:08), Max: 36.4 (07 Aug 2024 16:35)  T(F): 97.3 (08 Aug 2024 08:08), Max: 97.5 (07 Aug 2024 16:35)  HR: 93 (08 Aug 2024 08:08) (63 - 93)  BP: 85/66 (08 Aug 2024 08:08) (82/57 - 101/72)  BP(mean): --  RR: 18 (08 Aug 2024 08:08) (18 - 18)  SpO2: 99% (08 Aug 2024 08:08) (97% - 99%)    Parameters below as of 08 Aug 2024 08:08  Patient On (Oxygen Delivery Method): room air        PHYSICAL EXAM:    Constitutional: NAD  HEENT: EOMI, throat clear  Neck: No LAD, supple  Respiratory: CTA and P  Cardiovascular: S1 and S2, RRR, no M  Gastrointestinal: BS+, soft, NT/ND, neg HSM,  Extremities: No peripheral edema, neg clubbing, cyanosis  Vascular: 2+ peripheral pulses  Neurological: A/O   Psychiatric: Normal mood, normal affect  Skin: No rashes      LABS:                        12.8   7.60  )-----------( 113      ( 08 Aug 2024 07:38 )             37.7     08-08    135  |  103  |  40<H>  ----------------------------<  72  3.9   |  16<L>  |  2.26<H>    Ca    7.8<L>      08 Aug 2024 06:37  Phos  3.1     08-08  Mg     1.8     08-08    TPro  4.9<L>  /  Alb  2.7<L>  /  TBili  0.7  /  DBili  x   /  AST  22  /  ALT  19  /  AlkPhos  59  08-07    PT/INR - ( 07 Aug 2024 07:36 )   PT: 18.9 sec;   INR: 1.75 ratio         PTT - ( 07 Aug 2024 07:36 )  PTT:30.5 sec  Urinalysis Basic - ( 08 Aug 2024 06:37 )    Color: x / Appearance: x / SG: x / pH: x  Gluc: 72 mg/dL / Ketone: x  / Bili: x / Urobili: x   Blood: x / Protein: x / Nitrite: x   Leuk Esterase: x / RBC: x / WBC x   Sq Epi: x / Non Sq Epi: x / Bacteria: x        RADIOLOGY & ADDITIONAL TESTS:

## 2024-08-08 NOTE — PROGRESS NOTE ADULT - ASSESSMENT
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   Hashimoto's hypothyroid, +TPO antibodies   Afib: Cortisol within normal range, on medications, monitored.  CAD:  stable monitored      Discussed plan and management with Dr Mirza Zelaya NP - TEAMS

## 2024-08-08 NOTE — DIETITIAN INITIAL EVALUATION ADULT - SIGNS/SYMPTOMS
pt with suspected deep tissue injuries upon admission pt with significant wt loss in ~ 6 months, severe fat and muscle wasting

## 2024-08-08 NOTE — PROGRESS NOTE ADULT - SUBJECTIVE AND OBJECTIVE BOX
[facility]  GERALD Leahy Y. Patel, S. Shah, G. Casimir  [phone]  ([phone] - weekdays after 5pm and weekends)    Name: LEFTY AKBAR  Age/Gender: 78y Female  MRN: [id number]    Interval History:  Patient seen and examined this morning.   Feels tired, denies fever or any pain.   Notes reviewed. Afebrile   Allergies: penicillin (Hives)      Objective:  Vitals:   T(F): 97.3 (08-08-24 @ 08:08), Max: 97.5 (08-07-24 @ 16:35)  HR: 70 (08-08-24 @ 12:21) (63 - 93)  BP: 81/55 (08-08-24 @ 12:21) (81/55 - 101/72)  RR: 18 (08-08-24 @ 12:21) (18 - 18)  SpO2: 97% (08-08-24 @ 12:21) (97% - 99%)  Physical Examination:  General: no acute distress  HEENT: NC/AT, EOMI, anicteric  Lungs: decreased breath sounds b/l   Heart: S1, S2 present, normal rate/rhythm  Abdomen: Soft. ND. NT. Ostomy brown stool  Neuro: awake, alert, answers/follows   Extremities: No cyanosis. No edema.   Skin: Warm. Dry. No visible rash.   Lines: PIV; amaya with yellow urine     Laboratory Studies:  CBC:                       12.8   7.60  )-----------( 113      ( 08 Aug 2024 07:38 )             37.7     WBC Trend:  7.60 08-08-24 @ 07:38  7.58 08-08-24 @ 00:39  7.90 08-07-24 @ 18:55  7.93 08-07-24 @ 07:36  7.90 08-06-24 @ 18:05  10.38 08-06-24 @ 06:05  9.03 08-05-24 @ 21:01  9.96 08-05-24 @ 15:05    CMP: 08-08    135  |  103  |  40<H>  ----------------------------<  72  3.9   |  16<L>  |  2.26<H>    Ca    7.8<L>      08 Aug 2024 06:37  Phos  3.1     08-08  Mg     1.8     08-08    TPro  4.9<L>  /  Alb  2.7<L>  /  TBili  0.7  /  DBili  x   /  AST  22  /  ALT  19  /  AlkPhos  59  08-07    Creatinine: 2.26 mg/dL (08-08-24 @ 06:37)  Creatinine: 2.38 mg/dL (08-07-24 @ 07:36)  Creatinine: 2.49 mg/dL (08-06-24 @ 18:05)  Creatinine: 2.81 mg/dL (08-06-24 @ 15:29)  Creatinine: 2.92 mg/dL (08-06-24 @ 06:05)  Creatinine: 2.68 mg/dL (08-05-24 @ 23:08)  Creatinine: 2.58 mg/dL (08-05-24 @ 21:01)  Creatinine: 3.04 mg/dL (08-05-24 @ 15:05)    LIVER FUNCTIONS - ( 07 Aug 2024 07:36 )  Alb: 2.7 g/dL / Pro: 4.9 g/dL / ALK PHOS: 59 U/L / ALT: 19 U/L / AST: 22 U/L / GGT: x           Microbiology: reviewed   Culture - Urine (collected 08-05-24 @ 21:00)  Source: Clean Catch Clean Catch (Midstream)  Final Report (08-07-24 @ 14:01):    50,000 - 99,000 CFU/mL Candida albicans "Susceptibilities not performed"    Culture - Blood (collected 08-05-24 @ 20:54)  Source: .Blood Blood-Peripheral  Preliminary Report (08-08-24 @ 01:02):    No growth at 48 Hours    Culture - Blood (collected 08-05-24 @ 18:00)  Source: .Blood Blood-Peripheral  Preliminary Report (08-08-24 @ 01:02):    No growth at 48 Hours    Radiology: reviewed     Medications:  acetaminophen     Tablet .. 650 milliGRAM(s) Oral every 6 hours PRN  aMIOdarone Infusion 1 mG/Min IV Continuous <Continuous>  ascorbic acid 500 milliGRAM(s) Oral daily  atorvastatin 80 milliGRAM(s) Oral at bedtime  dextrose 50% Injectable 12.5 milliLiter(s) IV Push once  digoxin  Injectable 250 MICROGram(s) IV Push once  levothyroxine 150 MICROGram(s) Oral daily  midodrine. 20 milliGRAM(s) Oral three times a day  multivitamin 1 Tablet(s) Oral daily  pantoprazole Infusion 8 mG/Hr IV Continuous <Continuous>  piperacillin/tazobactam IVPB.. 3.375 Gram(s) IV Intermittent every 8 hours  potassium chloride   Powder 40 milliEquivalent(s) Oral once  sodium bicarbonate 1300 milliGRAM(s) Oral three times a day  sodium chloride 0.45% 1000 milliLiter(s) IV Continuous <Continuous>  vancomycin    Solution 500 milliGRAM(s) Oral every 6 hours    Current Antimicrobials:  piperacillin/tazobactam IVPB.. 3.375 Gram(s) IV Intermittent every 8 hours  vancomycin    Solution 500 milliGRAM(s) Oral every 6 hours    Prior/Completed Antimicrobials:  metroNIDAZOLE  IVPB  piperacillin/tazobactam IVPB.  piperacillin/tazobactam IVPB.-  vancomycin    Solution

## 2024-08-08 NOTE — PROGRESS NOTE ADULT - ASSESSMENT
79 yo female with history of COPD, CAD, afib on Eliquis, and PSH exlap and SBR for ischemic bowel 2/22/24 (negative intraoperative mesenteric angiogram) with RTOR 2/24/24 for end ileostomy and abdomen closure c/b upper GI bleed with intermittent hemodynamic instability with need for blood resuscitation with c-diff and CHANELL and AFib with RVR.     Recommendations:  - No acute surgical intervention  - Patient planned for EGD with Dr. Sprague when able.  - Can consider CTA/IR consult if concern for active bleed and stable for scan  - If source of bleeding identified and unable to be controlled with EGD or IR, exlap and resection of bleeding intestine a last resort  - Suggest more aggressive fluid resuscitation in setting of septic vs hemorrhagic shock  - Recommend maintaining 2 large bore IVs for resuscitation  - Global care per primary    Trauma/ACS  [id number]   77 yo female with history of COPD, CAD, afib on Eliquis, and PSH exlap and SBR for ischemic bowel 2/22/24 (negative intraoperative mesenteric angiogram) with RTOR 2/24/24 for end ileostomy and abdomen closure c/b upper GI bleed with intermittent hemodynamic instability with need for blood resuscitation with c-diff and CHANELL and AFib with RVR.     Recommendations:  - No acute surgical intervention  - Per GI on 8/8, EGD will be deferred, surgery will sign off at this time. Please re-page if patient status changes.   - Global care per primary    Trauma/ACS  [id number]

## 2024-08-08 NOTE — PROGRESS NOTE ADULT - SUBJECTIVE AND OBJECTIVE BOX
TEAM [ ACS ] Surgery Daily Progress Note  =====================================================  SUBJECTIVE: Patient seen and examined at bedside on AM rounds. Patient reports that they're feeling "better"; says her belly is "healing". NPO, denies nausea, vomiting. + gas and +lighter/less black appearing ileostomy output. Denies fever, chills. More interactive than previous.     PMH:   PAST MEDICAL & SURGICAL HISTORY:  Hypertension      Hypothyroid      Osteoarthritis  knees, back      CAD (coronary artery disease)      CROW (obstructive sleep apnea)  non complaint on CPAP      History of MI (myocardial infarction)  h/o previous MI in 2004 prompted PTCA  with stenting x 2 vessels   last stress/ echo 2019      Heart murmur  dx in childhood      Bilateral hearing loss, unspecified hearing loss type  bilateral aids      Obesity      Mixed stress and urge urinary incontinence      Overactive bladder      S/P ORIF (open reduction internal fixation) fracture  left hip 1962      S/P appendectomy  30 plus years      S/P knee replacement  left 2000      Stented coronary artery  2004 X 2 STENTS      S/P laparotomy  due to adhesions, 30 years ago      H/O dilation and curettage  2/2019 Benign polyp          ALLERGIES:  penicillin (Hives)      --------------------------------------------------------------------------------------    MEDICATIONS:    Neurologic Medications  acetaminophen     Tablet .. 650 milliGRAM(s) Oral every 6 hours PRN Temp greater or equal to 38C (100.4F), Mild Pain (1 - 3)    Respiratory Medications    Cardiovascular Medications  aMIOdarone Infusion 1 mG/Min IV Continuous <Continuous>  digoxin  Injectable 250 MICROGram(s) IV Push once  midodrine. 20 milliGRAM(s) Oral three times a day    Gastrointestinal Medications  ascorbic acid 500 milliGRAM(s) Oral daily  lactated ringers Bolus 1000 milliLiter(s) IV Bolus once  multivitamin 1 Tablet(s) Oral daily  pantoprazole Infusion 8 mG/Hr IV Continuous <Continuous>  potassium chloride   Powder 40 milliEquivalent(s) Oral once  sodium bicarbonate 1300 milliGRAM(s) Oral three times a day  sodium chloride 0.45% 1000 milliLiter(s) IV Continuous <Continuous>    Genitourinary Medications    Hematologic/Oncologic Medications    Antimicrobial/Immunologic Medications  piperacillin/tazobactam IVPB.. 3.375 Gram(s) IV Intermittent every 12 hours  vancomycin    Solution 500 milliGRAM(s) Oral every 6 hours    Endocrine/Metabolic Medications  atorvastatin 80 milliGRAM(s) Oral at bedtime  dextrose 50% Injectable 12.5 milliLiter(s) IV Push once  levothyroxine 150 MICROGram(s) Oral daily    Topical/Other Medications    --------------------------------------------------------------------------------------  VITAL SIGNS:    T(C): 36.1 (08-08-24 @ 06:15), Max: 36.4 (08-07-24 @ 10:02)  HR: 68 (08-08-24 @ 06:15) (63 - 89)  BP: 86/58 (08-08-24 @ 06:55) (82/57 - 101/72)  RR: 18 (08-08-24 @ 06:15) (17 - 18)  SpO2: 98% (08-08-24 @ 06:15) (96% - 99%)    --------------------------------------------------------------------------------------  EXAM  GEN: resting in bed, Mental status improved from yesterday.   CHEST: 90s/60s HR 100s.   PULM: nonlabored breathing on RA  ABD: soft, non-distended, slight grimacing diffusely to palpation Ostomy in RLQ pink and viable with brown vs melanotic stool and gas in bag  EXTREMITIES: Grossly symmetric, WWP    --------------------------------------------------------------------------------------  LABS                          12.8   7.60  )-----------( 113      ( 08 Aug 2024 07:38 )             37.7   08-08    135  |  103  |  40<H>  ----------------------------<  72  3.9   |  16<L>  |  2.26<H>    Ca    7.8<L>      08 Aug 2024 06:37  Phos  3.1     08-08  Mg     1.8     08-08    TPro  4.9<L>  /  Alb  2.7<L>  /  TBili  0.7  /  DBili  x   /  AST  22  /  ALT  19  /  AlkPhos  59  08-07    --------------------------------------------------------------------------------------    INS AND OUTS:    08-07-24 @ 07:01  -  08-08-24 @ 07:00  --------------------------------------------------------  IN: 100 mL / OUT: 475 mL / NET: -375 mL  --------------------------------------------------------------------------------------

## 2024-08-08 NOTE — DIETITIAN INITIAL EVALUATION ADULT - OTHER INFO
-- on IVF NaCl 0.45% with NaHCO3 additives @ 75ml/hr  -- CV: Midodrine and Amiodarone   -- CHANELL: on NaHCO3, ordered for KCl today  -- Micronutrient/Other supplementation: MVI, Vitamin C   -- Hypothyroidism on Synthroid

## 2024-08-08 NOTE — DIETITIAN INITIAL EVALUATION ADULT - PERTINENT MEDS FT
MEDICATIONS  (STANDING):  aMIOdarone Infusion 1 mG/Min (33.3 mL/Hr) IV Continuous <Continuous>  ascorbic acid 500 milliGRAM(s) Oral daily  atorvastatin 80 milliGRAM(s) Oral at bedtime  dextrose 50% Injectable 12.5 milliLiter(s) IV Push once  digoxin  Injectable 250 MICROGram(s) IV Push once  levothyroxine 150 MICROGram(s) Oral daily  midodrine. 20 milliGRAM(s) Oral three times a day  multivitamin 1 Tablet(s) Oral daily  pantoprazole Infusion 8 mG/Hr (10 mL/Hr) IV Continuous <Continuous>  piperacillin/tazobactam IVPB.. 3.375 Gram(s) IV Intermittent every 12 hours  potassium chloride   Powder 40 milliEquivalent(s) Oral once  sodium bicarbonate 1300 milliGRAM(s) Oral three times a day  sodium chloride 0.45% 1000 milliLiter(s) (100 mL/Hr) IV Continuous <Continuous>  vancomycin    Solution 500 milliGRAM(s) Oral every 6 hours    MEDICATIONS  (PRN):  acetaminophen     Tablet .. 650 milliGRAM(s) Oral every 6 hours PRN Temp greater or equal to 38C (100.4F), Mild Pain (1 - 3)

## 2024-08-08 NOTE — PROGRESS NOTE ADULT - SUBJECTIVE AND OBJECTIVE BOX
Chief complaint  Patient is a 78y old  Female who presents with a chief complaint of AMS/lethargy, Afib w/ RVR, Hyponatremia, C.diff infection (08 Aug 2024 11:25)         Labs and Fingersticks  CAPILLARY BLOOD GLUCOSE      POCT Blood Glucose.: 76 mg/dL (08 Aug 2024 11:51)  POCT Blood Glucose.: 93 mg/dL (08 Aug 2024 07:50)  POCT Blood Glucose.: 101 mg/dL (08 Aug 2024 06:53)  POCT Blood Glucose.: 69 mg/dL (08 Aug 2024 06:05)  POCT Blood Glucose.: 74 mg/dL (08 Aug 2024 05:58)  POCT Blood Glucose.: 140 mg/dL (08 Aug 2024 01:43)  POCT Blood Glucose.: 70 mg/dL (08 Aug 2024 01:00)  POCT Blood Glucose.: 70 mg/dL (08 Aug 2024 00:34)  POCT Blood Glucose.: 81 mg/dL (07 Aug 2024 17:19)      Anion Gap: 16 (08-08 @ 06:37)  Anion Gap: 15 (08-07 @ 07:36)  Anion Gap: 14 (08-06 @ 18:05)      Calcium: 7.8 *L* (08-08 @ 06:37)  Calcium: 7.3 *L* (08-07 @ 07:36)  Calcium: 7.2 *L* (08-06 @ 18:05)  Albumin: 2.7 *L* (08-07 @ 07:36)  Albumin: 2.5 *L* (08-06 @ 18:05)    Alanine Aminotransferase (ALT/SGPT): 19 (08-07 @ 07:36)  Alanine Aminotransferase (ALT/SGPT): 21 (08-06 @ 18:05)  Alkaline Phosphatase: 59 (08-07 @ 07:36)  Alkaline Phosphatase: 54 (08-06 @ 18:05)  Aspartate Aminotransferase (AST/SGOT): 22 (08-07 @ 07:36)  Aspartate Aminotransferase (AST/SGOT): 21 (08-06 @ 18:05)        08-08    135  |  103  |  40<H>  ----------------------------<  72  3.9   |  16<L>  |  2.26<H>    Ca    7.8<L>      08 Aug 2024 06:37  Phos  3.1     08-08  Mg     1.8     08-08    TPro  4.9<L>  /  Alb  2.7<L>  /  TBili  0.7  /  DBili  x   /  AST  22  /  ALT  19  /  AlkPhos  59  08-07                        12.8   7.60  )-----------( 113      ( 08 Aug 2024 07:38 )             37.7     Medications  MEDICATIONS  (STANDING):  aMIOdarone Infusion 1 mG/Min (33.3 mL/Hr) IV Continuous <Continuous>  ascorbic acid 500 milliGRAM(s) Oral daily  atorvastatin 80 milliGRAM(s) Oral at bedtime  digoxin  Injectable 250 MICROGram(s) IV Push once  levothyroxine 150 MICROGram(s) Oral daily  midodrine. 20 milliGRAM(s) Oral three times a day  multivitamin 1 Tablet(s) Oral daily  pantoprazole  Injectable 40 milliGRAM(s) IV Push two times a day  sodium bicarbonate 1300 milliGRAM(s) Oral three times a day  sodium chloride 0.45% 1000 milliLiter(s) (100 mL/Hr) IV Continuous <Continuous>  vancomycin    Solution 500 milliGRAM(s) Oral every 6 hours      Physical Exam  General: Patient comfortable in bed   Vital Signs Last 12 Hrs  T(F): 97.3 (08-08-24 @ 08:08), Max: 97.3 (08-08-24 @ 08:08)  HR: 70 (08-08-24 @ 12:21) (68 - 93)  BP: 81/55 (08-08-24 @ 12:21) (81/55 - 86/58)  BP(mean): --  RR: 18 (08-08-24 @ 12:21) (18 - 18)  SpO2: 97% (08-08-24 @ 12:21) (97% - 99%)    CVS: S1S2   Respiratory: No wheezing, no crepitations  GI: Abdomen soft, bowel sounds positive  Musculoskeletal:  moves all extremities  : Voiding

## 2024-08-08 NOTE — PROGRESS NOTE ADULT - SUBJECTIVE AND OBJECTIVE BOX
Date of service: 24 @ 21:10      Patient is a 78y old  Female who presents with a chief complaint of AMS/lethargy, Afib w/ RVR, Hyponatremia, C.diff infection (08 Aug 2024 15:40)                                                               INTERVAL HPI/OVERNIGHT EVENTS:    REVIEW OF SYSTEMS:     Denies any complaints at this time                                                                                                                                                                                                                                                                             Medications:  MEDICATIONS  (STANDING):  ascorbic acid 500 milliGRAM(s) Oral daily  atorvastatin 80 milliGRAM(s) Oral at bedtime  digoxin  Injectable 250 MICROGram(s) IV Push once  levothyroxine 150 MICROGram(s) Oral daily  midodrine. 30 milliGRAM(s) Oral three times a day  multivitamin 1 Tablet(s) Oral daily  pantoprazole  Injectable 40 milliGRAM(s) IV Push two times a day  sodium bicarbonate 1300 milliGRAM(s) Oral three times a day  sodium chloride 0.45% 1000 milliLiter(s) (100 mL/Hr) IV Continuous <Continuous>  vancomycin    Solution 500 milliGRAM(s) Oral every 6 hours    MEDICATIONS  (PRN):  acetaminophen     Tablet .. 650 milliGRAM(s) Oral every 6 hours PRN Temp greater or equal to 38C (100.4F), Mild Pain (1 - 3)       Allergies    penicillin (Hives)    Intolerances      Vital Signs Last 24 Hrs  T(C): 36.2 (08 Aug 2024 19:57), Max: 36.3 (08 Aug 2024 08:08)  T(F): 97.1 (08 Aug 2024 19:57), Max: 97.3 (08 Aug 2024 08:08)  HR: 110 (08 Aug 2024 19:57) (65 - 110)  BP: 108/77 (08 Aug 2024 19:57) (81/55 - 108/77)  BP(mean): --  RR: 18 (08 Aug 2024 19:57) (18 - 18)  SpO2: 100% (08 Aug 2024 19:57) (97% - 100%)    Parameters below as of 08 Aug 2024 19:57  Patient On (Oxygen Delivery Method): room air      CAPILLARY BLOOD GLUCOSE      POCT Blood Glucose.: 96 mg/dL (08 Aug 2024 17:13)  POCT Blood Glucose.: 76 mg/dL (08 Aug 2024 11:51)  POCT Blood Glucose.: 93 mg/dL (08 Aug 2024 07:50)  POCT Blood Glucose.: 101 mg/dL (08 Aug 2024 06:53)  POCT Blood Glucose.: 69 mg/dL (08 Aug 2024 06:05)  POCT Blood Glucose.: 74 mg/dL (08 Aug 2024 05:58)  POCT Blood Glucose.: 140 mg/dL (08 Aug 2024 01:43)  POCT Blood Glucose.: 70 mg/dL (08 Aug 2024 01:00)  POCT Blood Glucose.: 70 mg/dL (08 Aug 2024 00:34)       @ 07:  -   @ 07:00  --------------------------------------------------------  IN: 100 mL / OUT: 475 mL / NET: -375 mL     @ 07:01  -   @ 21:10  --------------------------------------------------------  IN: 60 mL / OUT: 225 mL / NET: -165 mL      Physical Exam:    Daily     Daily Weight in k.1 (08 Aug 2024 08:34)  General:  No acute distress  HEENT:  Nonicteric, PERRLA  CV:  RRR, S1S2   Lungs:  Clear to auscultation  Abdomen:  Soft, Ostomy in place with yellow stool  Extremities: Trace edema  Melvin in place  Neuro: Grossly nonfocal                                                                                                                                                                                                                                                                               LABS:                               12.8   7.60  )-----------( 113      ( 08 Aug 2024 07:38 )             37.7                      08-08    135  |  103  |  40<H>  ----------------------------<  72  3.9   |  16<L>  |  2.26<H>    Ca    7.8<L>      08 Aug 2024 06:37  Phos  3.1     08-08  Mg     1.8     08-08    TPro  4.9<L>  /  Alb  2.7<L>  /  TBili  0.7  /  DBili  x   /  AST  22  /  ALT  19  /  AlkPhos  59  08-07                       RADIOLOGY & ADDITIONAL TESTS         I personally reviewed: [  ]EKG   [  ]CXR    [  ] CT      A/P:         Discussed with :     Simon consultants' Notes   Time spent :

## 2024-08-08 NOTE — PROGRESS NOTE ADULT - ASSESSMENT
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 Admitted due to black output in the ileostomy bag c/f UGIB, afib with RVR, and c diff positive.    1. GIB  favor resolving bleed given stools are green in color  h/h remains stable   cont PPI Drip  surgery input appreciated; agree w/CTA if stable to leave floors   needs better BP control  will defer EGD for now   clear diet    2. C. Diff   favor r/t recent Cipro use  contact precautions   cont Vancomycin Q6H    3. 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 Admitted due to black output in the ileostomy bag c/f UGIB, afib with RVR, and c diff positive.    1. GIB  favor resolving bleed given stools are green in color  h/h remains stable   protonix 40mg IVP bid  surgery input appreciated; agree w/CTA if stable to leave floors   needs better BP control  will defer EGD for now   clear diet    2. C. Diff   favor r/t recent Cipro use  contact precautions   cont Vancomycin Q6H    3. 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 Admitted due to black output in the ileostomy bag c/f UGIB, afib with RVR, and c diff positive.    1. GIB  favor resolving bleed given stools are green in color  h/h remains stable   protonix 40mg IVP bid  surgery input appreciated; agree w/CTA if stable to leave floors   needs better BP control  will defer EGD for now   clear diet, no objection to advancing to regular diet     2. C. Diff   favor r/t recent Cipro use  contact precautions   cont Vancomycin Q6H    3. Afib   a/c on hold for GIB   cardiology on board

## 2024-08-08 NOTE — PROGRESS NOTE ADULT - SUBJECTIVE AND OBJECTIVE BOX
Statement Selected How Severe Is Your Skin Lesion?: moderate Has Your Skin Lesion Been Treated?: not been treated Is This A New Presentation, Or A Follow-Up?: Skin Lesion Subjective: Patient seen and examined. No new events except as noted.   HR better   remains hypotensive on Midodrine   REVIEW OF SYSTEMS:    CONSTITUTIONAL: + weakness, fevers or chills  EYES/ENT: No visual changes;  No vertigo or throat pain   NECK: No pain or stiffness  RESPIRATORY: No cough, wheezing, hemoptysis; No shortness of breath  CARDIOVASCULAR: No chest pain or palpitations  GASTROINTESTINAL: No abdominal or epigastric pain. No nausea, vomiting, or hematemesis; No diarrhea or constipation. No melena or hematochezia.  GENITOURINARY: No dysuria, frequency or hematuria  NEUROLOGICAL: No numbness or weakness  SKIN: No itching, burning, rashes, or lesions   All other review of systems is negative unless indicated above.    MEDICATIONS:  MEDICATIONS  (STANDING):  aMIOdarone Infusion 1 mG/Min (33.3 mL/Hr) IV Continuous <Continuous>  ascorbic acid 500 milliGRAM(s) Oral daily  atorvastatin 80 milliGRAM(s) Oral at bedtime  dextrose 50% Injectable 12.5 milliLiter(s) IV Push once  digoxin  Injectable 250 MICROGram(s) IV Push once  lactated ringers Bolus 1000 milliLiter(s) IV Bolus once  levothyroxine 150 MICROGram(s) Oral daily  midodrine. 20 milliGRAM(s) Oral three times a day  multivitamin 1 Tablet(s) Oral daily  pantoprazole Infusion 8 mG/Hr (10 mL/Hr) IV Continuous <Continuous>  piperacillin/tazobactam IVPB.. 3.375 Gram(s) IV Intermittent every 12 hours  sodium bicarbonate 650 milliGRAM(s) Oral three times a day  sodium chloride 0.45% 1000 milliLiter(s) (100 mL/Hr) IV Continuous <Continuous>  vancomycin    Solution 500 milliGRAM(s) Oral every 6 hours      PHYSICAL EXAM:  T(C): 36.1 (08-08-24 @ 06:15), Max: 36.4 (08-07-24 @ 10:02)  HR: 68 (08-08-24 @ 06:15) (63 - 89)  BP: 86/58 (08-08-24 @ 06:55) (82/57 - 101/72)  RR: 18 (08-08-24 @ 06:15) (17 - 18)  SpO2: 98% (08-08-24 @ 06:15) (96% - 99%)  Wt(kg): --  I&O's Summary    07 Aug 2024 07:01  -  08 Aug 2024 07:00  --------------------------------------------------------  IN: 100 mL / OUT: 475 mL / NET: -375 mL            Appearance: NAD  HEENT:   Dry oral mucosa, PERRL, EOMI	  Lymphatic: No lymphadenopathy , no edema  Cardiovascular: Irregular  S1 S2, No JVD, No murmurs , Peripheral pulses palpable 2+ bilaterally  Respiratory: decreased bs 	  Gastrointestinal:  Soft, Non-tender, + BS	  Skin: No rashes, No ecchymoses, No cyanosis, warm to touch  Musculoskeletal: Normal range of motion, normal strength  Psychiatry:  sleepy  Ext: No edema      LABS:    CARDIAC MARKERS:                                12.8   7.60  )-----------( 113      ( 08 Aug 2024 07:38 )             37.7     08-08    135  |  103  |  40<H>  ----------------------------<  72  3.9   |  16<L>  |  2.26<H>    Ca    7.8<L>      08 Aug 2024 06:37  Phos  3.1     08-08  Mg     1.8     08-08    TPro  4.9<L>  /  Alb  2.7<L>  /  TBili  0.7  /  DBili  x   /  AST  22  /  ALT  19  /  AlkPhos  59  08-07        TELEMETRY: 	  AF  ECG:  	  RADIOLOGY:   DIAGNOSTIC TESTING:  [ ] Echocardiogram:  [ ]  Catheterization:  [ ] Stress Test:    OTHER:

## 2024-08-08 NOTE — DIETITIAN INITIAL EVALUATION ADULT - OTHER CALCULATIONS
Fluid needs deferred to team. Energy and protein needs based on lBW of 120lb in consideration of malnutrition and Increased nutrient needs

## 2024-08-08 NOTE — DIETITIAN INITIAL EVALUATION ADULT - PERTINENT LABORATORY DATA
08-08    135  |  103  |  40<H>  ----------------------------<  72  3.9   |  16<L>  |  2.26<H>    Ca    7.8<L>      08 Aug 2024 06:37  Phos  3.1     08-08  Mg     1.8     08-08    TPro  4.9<L>  /  Alb  2.7<L>  /  TBili  0.7  /  DBili  x   /  AST  22  /  ALT  19  /  AlkPhos  59  08-07  POCT Blood Glucose.: 93 mg/dL (08-08-24 @ 07:50)  A1C with Estimated Average Glucose Result: 5.3 % (05-16-24 @ 07:35)  A1C with Estimated Average Glucose Result: 5.3 % (04-29-24 @ 06:51)

## 2024-08-08 NOTE — DIETITIAN INITIAL EVALUATION ADULT - NSFNSPHYEXAMSKINFT_GEN_A_CORE
Pressure Injury 1: Right:, heel, Suspected deep tissue injury  Pressure Injury 2: Left:, heel, Suspected deep tissue injury  Pressure Injury 3: none, none  Pressure Injury 4: none, none  Pressure Injury 5: none, none  Pressure Injury 6: none, none  Pressure Injury 7: none, none  Pressure Injury 8: none, none  Pressure Injury 9: none, none  Pressure Injury 10: none, none  Pressure Injury 11: none, none, Pressure Injury 1: Right:, heel, Suspected deep tissue injury  Pressure Injury 2: Left:, heel, Suspected deep tissue injury  Pressure Injury 3: none, none  Pressure Injury 4: none, none  Pressure Injury 5: none, none  Pressure Injury 6: none, none  Pressure Injury 7: none, none  Pressure Injury 8: none, none  Pressure Injury 9: none, none  Pressure Injury 10: none, none  Pressure Injury 11: none, none Right and left heels- Suspected deep tissue injury (per flowsheet 8/8)

## 2024-08-08 NOTE — DIETITIAN INITIAL EVALUATION ADULT - REASON INDICATOR FOR ASSESSMENT
Pt seen for consult for pressure injury stage 2/>. Pt with dementia and hard of hearing, information obtained from pt, RN, electronic medical record, daughter (Caity) at bedside. Chart reviewed, events noted.

## 2024-08-09 LAB
ALBUMIN SERPL ELPH-MCNC: 2.9 G/DL — LOW (ref 3.3–5)
ALP SERPL-CCNC: 75 U/L — SIGNIFICANT CHANGE UP (ref 40–120)
ALT FLD-CCNC: 25 U/L — SIGNIFICANT CHANGE UP (ref 10–45)
ANION GAP SERPL CALC-SCNC: 17 MMOL/L — SIGNIFICANT CHANGE UP (ref 5–17)
ANION GAP SERPL CALC-SCNC: 18 MMOL/L — HIGH (ref 5–17)
AST SERPL-CCNC: 30 U/L — SIGNIFICANT CHANGE UP (ref 10–40)
BASOPHILS # BLD AUTO: 0.02 K/UL — SIGNIFICANT CHANGE UP (ref 0–0.2)
BASOPHILS NFR BLD AUTO: 0.2 % — SIGNIFICANT CHANGE UP (ref 0–2)
BILIRUB SERPL-MCNC: 0.9 MG/DL — SIGNIFICANT CHANGE UP (ref 0.2–1.2)
BUN SERPL-MCNC: 34 MG/DL — HIGH (ref 7–23)
BUN SERPL-MCNC: 35 MG/DL — HIGH (ref 7–23)
CALCIUM SERPL-MCNC: 7.6 MG/DL — LOW (ref 8.4–10.5)
CALCIUM SERPL-MCNC: 7.7 MG/DL — LOW (ref 8.4–10.5)
CHLORIDE SERPL-SCNC: 98 MMOL/L — SIGNIFICANT CHANGE UP (ref 96–108)
CHLORIDE SERPL-SCNC: 98 MMOL/L — SIGNIFICANT CHANGE UP (ref 96–108)
CO2 SERPL-SCNC: 17 MMOL/L — LOW (ref 22–31)
CO2 SERPL-SCNC: 18 MMOL/L — LOW (ref 22–31)
CORTIS AM PEAK SERPL-MCNC: 27.2 UG/DL — HIGH (ref 6–18.4)
CREAT SERPL-MCNC: 2.25 MG/DL — HIGH (ref 0.5–1.3)
CREAT SERPL-MCNC: 2.26 MG/DL — HIGH (ref 0.5–1.3)
DIGOXIN SERPL-MCNC: 1.7 NG/ML — SIGNIFICANT CHANGE UP (ref 0.8–2)
EGFR: 22 ML/MIN/1.73M2 — LOW
EGFR: 22 ML/MIN/1.73M2 — LOW
EOSINOPHIL # BLD AUTO: 0.03 K/UL — SIGNIFICANT CHANGE UP (ref 0–0.5)
EOSINOPHIL NFR BLD AUTO: 0.3 % — SIGNIFICANT CHANGE UP (ref 0–6)
GLUCOSE BLDC GLUCOMTR-MCNC: 105 MG/DL — HIGH (ref 70–99)
GLUCOSE BLDC GLUCOMTR-MCNC: 115 MG/DL — HIGH (ref 70–99)
GLUCOSE BLDC GLUCOMTR-MCNC: 90 MG/DL — SIGNIFICANT CHANGE UP (ref 70–99)
GLUCOSE BLDC GLUCOMTR-MCNC: 92 MG/DL — SIGNIFICANT CHANGE UP (ref 70–99)
GLUCOSE SERPL-MCNC: 115 MG/DL — HIGH (ref 70–99)
GLUCOSE SERPL-MCNC: 118 MG/DL — HIGH (ref 70–99)
HCT VFR BLD CALC: 41 % — SIGNIFICANT CHANGE UP (ref 34.5–45)
HGB BLD-MCNC: 13.7 G/DL — SIGNIFICANT CHANGE UP (ref 11.5–15.5)
IMM GRANULOCYTES NFR BLD AUTO: 0.6 % — SIGNIFICANT CHANGE UP (ref 0–0.9)
LDH SERPL L TO P-CCNC: 409 U/L — HIGH (ref 50–242)
LYMPHOCYTES # BLD AUTO: 1.24 K/UL — SIGNIFICANT CHANGE UP (ref 1–3.3)
LYMPHOCYTES # BLD AUTO: 12 % — LOW (ref 13–44)
MAGNESIUM SERPL-MCNC: 1.9 MG/DL — SIGNIFICANT CHANGE UP (ref 1.6–2.6)
MCHC RBC-ENTMCNC: 30.3 PG — SIGNIFICANT CHANGE UP (ref 27–34)
MCHC RBC-ENTMCNC: 33.4 GM/DL — SIGNIFICANT CHANGE UP (ref 32–36)
MCV RBC AUTO: 90.7 FL — SIGNIFICANT CHANGE UP (ref 80–100)
MONOCYTES # BLD AUTO: 0.61 K/UL — SIGNIFICANT CHANGE UP (ref 0–0.9)
MONOCYTES NFR BLD AUTO: 5.9 % — SIGNIFICANT CHANGE UP (ref 2–14)
NEUTROPHILS # BLD AUTO: 8.38 K/UL — HIGH (ref 1.8–7.4)
NEUTROPHILS NFR BLD AUTO: 81 % — HIGH (ref 43–77)
NRBC # BLD: 0 /100 WBCS — SIGNIFICANT CHANGE UP (ref 0–0)
PLATELET # BLD AUTO: 133 K/UL — LOW (ref 150–400)
POTASSIUM SERPL-MCNC: 3.8 MMOL/L — SIGNIFICANT CHANGE UP (ref 3.5–5.3)
POTASSIUM SERPL-MCNC: 3.9 MMOL/L — SIGNIFICANT CHANGE UP (ref 3.5–5.3)
POTASSIUM SERPL-SCNC: 3.8 MMOL/L — SIGNIFICANT CHANGE UP (ref 3.5–5.3)
POTASSIUM SERPL-SCNC: 3.9 MMOL/L — SIGNIFICANT CHANGE UP (ref 3.5–5.3)
PROT SERPL-MCNC: 5.4 G/DL — LOW (ref 6–8.3)
RBC # BLD: 4.52 M/UL — SIGNIFICANT CHANGE UP (ref 3.8–5.2)
RBC # FLD: 17.9 % — HIGH (ref 10.3–14.5)
SODIUM SERPL-SCNC: 133 MMOL/L — LOW (ref 135–145)
SODIUM SERPL-SCNC: 133 MMOL/L — LOW (ref 135–145)
WBC # BLD: 10.34 K/UL — SIGNIFICANT CHANGE UP (ref 3.8–10.5)
WBC # FLD AUTO: 10.34 K/UL — SIGNIFICANT CHANGE UP (ref 3.8–10.5)

## 2024-08-09 PROCEDURE — 93010 ELECTROCARDIOGRAM REPORT: CPT

## 2024-08-09 PROCEDURE — 99232 SBSQ HOSP IP/OBS MODERATE 35: CPT | Mod: GC

## 2024-08-09 PROCEDURE — 71045 X-RAY EXAM CHEST 1 VIEW: CPT | Mod: 26

## 2024-08-09 RX ORDER — DIGOXIN 0.12 MG/1
125 TABLET ORAL ONCE
Refills: 0 | Status: COMPLETED | OUTPATIENT
Start: 2024-08-09 | End: 2024-08-09

## 2024-08-09 RX ORDER — DIGOXIN 0.12 MG/1
125 TABLET ORAL EVERY OTHER DAY
Refills: 0 | Status: DISCONTINUED | OUTPATIENT
Start: 2024-08-09 | End: 2024-08-11

## 2024-08-09 RX ORDER — DROXIDOPA 300 UG/1
100 CAPSULE ORAL ONCE
Refills: 0 | Status: COMPLETED | OUTPATIENT
Start: 2024-08-09 | End: 2024-08-09

## 2024-08-09 RX ORDER — SODIUM CHLORIDE 9 MG/ML
500 INJECTION INTRAMUSCULAR; INTRAVENOUS; SUBCUTANEOUS ONCE
Refills: 0 | Status: COMPLETED | OUTPATIENT
Start: 2024-08-09 | End: 2024-08-09

## 2024-08-09 RX ORDER — ONDANSETRON 2 MG/ML
4 INJECTION, SOLUTION INTRAMUSCULAR; INTRAVENOUS ONCE
Refills: 0 | Status: COMPLETED | OUTPATIENT
Start: 2024-08-09 | End: 2024-08-09

## 2024-08-09 RX ADMIN — MIDODRINE HYDROCHLORIDE 30 MILLIGRAM(S): 5 TABLET ORAL at 17:05

## 2024-08-09 RX ADMIN — Medication 40 MILLIGRAM(S): at 06:53

## 2024-08-09 RX ADMIN — DIGOXIN 125 MICROGRAM(S): 0.12 TABLET ORAL at 01:33

## 2024-08-09 RX ADMIN — Medication 500 MILLIGRAM(S): at 00:34

## 2024-08-09 RX ADMIN — Medication 150 MICROGRAM(S): at 06:12

## 2024-08-09 RX ADMIN — ONDANSETRON 4 MILLIGRAM(S): 2 INJECTION, SOLUTION INTRAMUSCULAR; INTRAVENOUS at 12:11

## 2024-08-09 RX ADMIN — MIDODRINE HYDROCHLORIDE 30 MILLIGRAM(S): 5 TABLET ORAL at 11:40

## 2024-08-09 RX ADMIN — SODIUM BICARBONATE 1300 MILLIGRAM(S): 84 INJECTION, SOLUTION INTRAVENOUS at 06:12

## 2024-08-09 RX ADMIN — Medication 500 MILLIGRAM(S): at 06:12

## 2024-08-09 RX ADMIN — Medication 500 MILLIGRAM(S): at 11:41

## 2024-08-09 RX ADMIN — DIGOXIN 125 MICROGRAM(S): 0.12 TABLET ORAL at 13:12

## 2024-08-09 RX ADMIN — SODIUM BICARBONATE 1300 MILLIGRAM(S): 84 INJECTION, SOLUTION INTRAVENOUS at 13:12

## 2024-08-09 RX ADMIN — Medication 40 MILLIGRAM(S): at 17:05

## 2024-08-09 RX ADMIN — MIDODRINE HYDROCHLORIDE 30 MILLIGRAM(S): 5 TABLET ORAL at 06:12

## 2024-08-09 RX ADMIN — Medication 100 MILLILITER(S): at 09:29

## 2024-08-09 RX ADMIN — SODIUM BICARBONATE 1300 MILLIGRAM(S): 84 INJECTION, SOLUTION INTRAVENOUS at 21:19

## 2024-08-09 RX ADMIN — Medication 500 MILLIGRAM(S): at 17:04

## 2024-08-09 RX ADMIN — Medication 80 MILLIGRAM(S): at 21:19

## 2024-08-09 RX ADMIN — Medication 100 GRAM(S): at 01:33

## 2024-08-09 RX ADMIN — DROXIDOPA 100 MILLIGRAM(S): 300 CAPSULE ORAL at 13:12

## 2024-08-09 RX ADMIN — SODIUM CHLORIDE 500 MILLILITER(S): 9 INJECTION INTRAMUSCULAR; INTRAVENOUS; SUBCUTANEOUS at 18:31

## 2024-08-09 NOTE — PROGRESS NOTE ADULT - ASSESSMENT
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 Found to have C.diff, CHANELL, and possible anemia of acute blood loss 2/2 GIB.      Acute blood loss anemia secondary to acute GI bleed: Status post transfusion with good response.  Discussed with Dr. Sprague: Stool is yellow, H&H seems stable with good response to transfusion.  will hold off on endoscopy at this time given hypotension and continue to optimize...  Will plan EGD early next week    ·  Problem: Clostridium difficile infection.   ·  Plan: Found to have C.diff infection. Likely i/s/o recent abx use (cipro) to treat UTI.  - CT A/P showed no acute intra-abdominal pathology  Continue by mouth Vanco: Discussed with infectious disease, will increase vancomycin      ·  Problem: Metabolic encephalopathy.   ·  Plan: Patient Was lethargic but arousable...Now improved  and more alert    Likely multifactorial including acute infection,CHANELL and Dehydration  Mental status during the recent hospitalization showed decline and mentation however workup was negative for acute neurological pathology.      ·  Problem: Atrial fibrillation with RVR.   ·  Plan: Presented in Afib w/ RVR to HR 170s. Has hx of Afib (on eliquis at home). Suspect RVR i/s/o infection, dehydration, electrolyte abnormalities  - Amiodarone as per cardiology... EP follow-up  Hold anticoagulation in setting of melanotic stool/.      ·  Problem: CHANELL (acute kidney injury).   Likely prerenal secondary to dehydration continue IV fluids and bicarb as per renal      ·  Problem: Hyponatremia.   Likely secondary to dehydration.  Continue IV fluid      ·  Problem: Hypotension.   Likely multifactorial secondary to acute GI bleed, dehydration, possible infection  Continue to monitor H&H  Continue fluid resuscitation and midodrin  Discussed with infectious disease: We will continue with Empiric antibiotics and if culture is negative we will DC: Now discontinued  Patient still hypotensive With unclear etiology...Decreased urine output however mentating Better than yesterday..  MICU was reconsulted.  Patient is notDeemed to be a candidate        ·  Problem: CAD (coronary artery disease).   - holding home plavix  - c/w home statin        ·  Problem: Hypothyroidism.   Recently decreased dose      Appreciate wound care consult.  Follow-up official input    Discussed at length at bedside with patient and daughter  Discussed with MICU  Discussed with ACP  Also of note patient now is full code since daughter rescinded DNR

## 2024-08-09 NOTE — PROGRESS NOTE ADULT - SUBJECTIVE AND OBJECTIVE BOX
INTERVAL HPI/OVERNIGHT EVENTS:    daughter bedside   light stool in bag, green/yellow; loose  mild abdominal pain luq  small episode emesis x 1 after drinking some coffee     MEDICATIONS  (STANDING):  ascorbic acid 500 milliGRAM(s) Oral daily  atorvastatin 80 milliGRAM(s) Oral at bedtime  digoxin  Injectable 250 MICROGram(s) IV Push once  levothyroxine 150 MICROGram(s) Oral daily  midodrine. 30 milliGRAM(s) Oral three times a day  multivitamin 1 Tablet(s) Oral daily  pantoprazole  Injectable 40 milliGRAM(s) IV Push two times a day  sodium bicarbonate 1300 milliGRAM(s) Oral three times a day  sodium chloride 0.45% 1000 milliLiter(s) (100 mL/Hr) IV Continuous <Continuous>  vancomycin    Solution 500 milliGRAM(s) Oral every 6 hours    MEDICATIONS  (PRN):  acetaminophen     Tablet .. 650 milliGRAM(s) Oral every 6 hours PRN Temp greater or equal to 38C (100.4F), Mild Pain (1 - 3)      Allergies    penicillin (Hives)    Intolerances        Review of Systems:    General:  No wt loss, fevers, chills, night sweats, fatigue   Eyes:  Good vision, no reported pain  ENT:  No sore throat, pain, runny nose, dysphagia  CV:  No pain, palpitations, hypo/hypertension  Resp:  No dyspnea, cough, tachypnea, wheezing  GI:  No pain, No nausea, No vomiting, No diarrhea, No constipation, No weight loss, No fever, No pruritis, No rectal bleeding, No melena, No dysphagia  :  No pain, bleeding, incontinence, nocturia  Muscle:  No pain, weakness  Neuro:  No weakness, tingling, memory problems  Psych:  No fatigue, insomnia, mood problems, depression  Endocrine:  No polyuria, polydypsia, cold/heat intolerance  Heme:  No petechiae, ecchymosis, easy bruisability  Skin:  No rash, tattoos, scars, edema      Vital Signs Last 24 Hrs  T(C): 34.9 (09 Aug 2024 06:09), Max: 36.2 (08 Aug 2024 19:57)  T(F): 94.8 (09 Aug 2024 06:09), Max: 97.1 (08 Aug 2024 19:57)  HR: 140 (09 Aug 2024 06:09) (70 - 140)  BP: 82/56 (09 Aug 2024 06:09) (81/55 - 108/77)  BP(mean): --  RR: 18 (09 Aug 2024 06:09) (18 - 18)  SpO2: 100% (09 Aug 2024 06:09) (97% - 100%)    Parameters below as of 09 Aug 2024 07:15  Patient On (Oxygen Delivery Method): nasal cannula  O2 Flow (L/min): 2      PHYSICAL EXAM:    Constitutional: NAD  HEENT: EOMI, throat clear  Neck: No LAD, supple  Respiratory: CTA and P  Cardiovascular: S1 and S2, RRR, no M  Gastrointestinal: BS+, soft, NT/ND, neg HSM,  Extremities: No peripheral edema, neg clubbing, cyanosis  Vascular: 2+ peripheral pulses  Neurological: A/O   Psychiatric: Normal mood, normal affect  Skin: No rashes      LABS:                        13.7   10.34 )-----------( 133      ( 09 Aug 2024 10:11 )             41.0     08-09    133<L>  |  98  |  34<H>  ----------------------------<  115<H>  3.9   |  18<L>  |  2.25<H>    Ca    7.6<L>      09 Aug 2024 10:11  Phos  3.1     08-08  Mg     1.9     08-09    TPro  5.4<L>  /  Alb  2.9<L>  /  TBili  0.9  /  DBili  x   /  AST  30  /  ALT  25  /  AlkPhos  75  08-09      Urinalysis Basic - ( 09 Aug 2024 10:11 )    Color: x / Appearance: x / SG: x / pH: x  Gluc: 115 mg/dL / Ketone: x  / Bili: x / Urobili: x   Blood: x / Protein: x / Nitrite: x   Leuk Esterase: x / RBC: x / WBC x   Sq Epi: x / Non Sq Epi: x / Bacteria: x        RADIOLOGY & ADDITIONAL TESTS:

## 2024-08-09 NOTE — PROGRESS NOTE ADULT - SUBJECTIVE AND OBJECTIVE BOX
Chief complaint  Patient is a 78y old  Female who presents with a chief complaint of AMS/lethargy, Afib w/ RVR, Hyponatremia, C.diff infection (09 Aug 2024 12:55)         Labs and Fingersticks  CAPILLARY BLOOD GLUCOSE      POCT Blood Glucose.: 105 mg/dL (09 Aug 2024 13:11)  POCT Blood Glucose.: 92 mg/dL (09 Aug 2024 06:28)  POCT Blood Glucose.: 90 mg/dL (09 Aug 2024 00:28)  POCT Blood Glucose.: 96 mg/dL (08 Aug 2024 17:13)      Anion Gap: 17 (08-09 @ 10:11)  Anion Gap: 18 *H* (08-09 @ 10:11)  Anion Gap: 16 (08-08 @ 06:37)      Calcium: 7.6 *L* (08-09 @ 10:11)  Calcium: 7.7 *L* (08-09 @ 10:11)  Calcium: 7.8 *L* (08-08 @ 06:37)  Albumin: 2.9 *L* (08-09 @ 10:11)    Alanine Aminotransferase (ALT/SGPT): 25 (08-09 @ 10:11)  Alkaline Phosphatase: 75 (08-09 @ 10:11)  Aspartate Aminotransferase (AST/SGOT): 30 (08-09 @ 10:11)        08-09    133<L>  |  98  |  34<H>  ----------------------------<  115<H>  3.9   |  18<L>  |  2.25<H>    Ca    7.6<L>      09 Aug 2024 10:11  Phos  3.1     08-08  Mg     1.9     08-09    TPro  5.4<L>  /  Alb  2.9<L>  /  TBili  0.9  /  DBili  x   /  AST  30  /  ALT  25  /  AlkPhos  75  08-09                        13.7   10.34 )-----------( 133      ( 09 Aug 2024 10:11 )             41.0     Medications  MEDICATIONS  (STANDING):  ascorbic acid 500 milliGRAM(s) Oral daily  atorvastatin 80 milliGRAM(s) Oral at bedtime  digoxin  Injectable 125 MICROGram(s) IV Push every other day  levothyroxine 150 MICROGram(s) Oral daily  midodrine. 30 milliGRAM(s) Oral three times a day  multivitamin 1 Tablet(s) Oral daily  pantoprazole  Injectable 40 milliGRAM(s) IV Push two times a day  sodium bicarbonate 1300 milliGRAM(s) Oral three times a day  sodium chloride 0.45% 1000 milliLiter(s) (100 mL/Hr) IV Continuous <Continuous>  vancomycin    Solution 500 milliGRAM(s) Oral every 6 hours      Physical Exam  General: Patient comfortable in bed   Vital Signs Last 12 Hrs  T(F): 96.1 (08-09-24 @ 13:23), Max: 96.1 (08-09-24 @ 13:23)  HR: 129 (08-09-24 @ 12:50) (129 - 140)  BP: 79/56 (08-09-24 @ 12:50) (79/56 - 82/56)  BP(mean): --  RR: 18 (08-09-24 @ 12:50) (18 - 18)  SpO2: 99% (08-09-24 @ 12:50) (99% - 100%)    CVS: S1S2   Respiratory: No wheezing, no crepitations  GI: Abdomen soft, bowel sounds positive  Musculoskeletal:  moves all extremities  : Voiding

## 2024-08-09 NOTE — CHART NOTE - NSCHARTNOTEFT_GEN_A_CORE
[facility] consult called , spoke with DELANO Feliciano - Rec: give Northera 100mg po x1. D/w Dr. Duran - medication ordered

## 2024-08-09 NOTE — PROGRESS NOTE ADULT - SUBJECTIVE AND OBJECTIVE BOX
[facility] INFECTI[facility] DISEASES  GERALD Leahy Y. Patel, S. Shah, G. Casimir  [phone]  ([phone] - weekdays after 5pm and weekends)    Name: LEFTY AKBAR  Age/Gender: 78y Female  MRN: [id number]    Interval History:  Patient seen and examined this morning.   Resting comfortably, denies pain or chills.   Notes reviewed, temps noted.   Allergies: penicillin (Hives)      Objective:  Vitals:   T(F): 94.8 (08-09-24 @ 06:09), Max: 97.1 (08-08-24 @ 19:57)  HR: 140 (08-09-24 @ 06:09) (70 - 140)  BP: 82/56 (08-09-24 @ 06:09) (81/55 - 108/77)  RR: 18 (08-09-24 @ 06:09) (18 - 18)  SpO2: 100% (08-09-24 @ 06:09) (97% - 100%)  Physical Examination:  General: no acute distress  HEENT: NC/AT, EOMI, anicteric  Lungs: decreased breath sounds b/l   Heart: S1, S2 present, tachycardia   Abdomen: Soft. ND. NT.   Ostomy little green-brown stool  Neuro: awake, alert, answers/follows   Extremities: No cyanosis. No edema.   Skin: Warm. Dry. No visible rash.     Laboratory Studies:  CBC:                       13.7   10.34 )-----------( 133      ( 09 Aug 2024 10:11 )             41.0     WBC Trend:  10.34 08-09-24 @ 10:11  7.60 08-08-24 @ 07:38  7.58 08-08-24 @ 00:39  7.90 08-07-24 @ 18:55  7.93 08-07-24 @ 07:36  7.90 08-06-24 @ 18:05  10.38 08-06-24 @ 06:05  9.03 08-05-24 @ 21:01  9.96 08-05-24 @ 15:05    CMP: 08-08    135  |  103  |  40<H>  ----------------------------<  72  3.9   |  16<L>  |  2.26<H>    Ca    7.8<L>      08 Aug 2024 06:37  Phos  3.1     08-08  Mg     1.8     08-08      Creatinine: 2.26 mg/dL (08-08-24 @ 06:37)  Creatinine: 2.38 mg/dL (08-07-24 @ 07:36)  Creatinine: 2.49 mg/dL (08-06-24 @ 18:05)  Creatinine: 2.81 mg/dL (08-06-24 @ 15:29)  Creatinine: 2.92 mg/dL (08-06-24 @ 06:05)  Creatinine: 2.68 mg/dL (08-05-24 @ 23:08)  Creatinine: 2.58 mg/dL (08-05-24 @ 21:01)  Creatinine: 3.04 mg/dL (08-05-24 @ 15:05)    Microbiology: reviewed   Culture - Urine (collected 08-05-24 @ 21:00)  Source: Clean Catch Clean Catch (Midstream)  Final Report (08-07-24 @ 14:01):    50,000 - 99,000 CFU/mL Candida albicans "Susceptibilities not performed"    Culture - Blood (collected 08-05-24 @ 20:54)  Source: .Blood Blood-Peripheral  Preliminary Report (08-09-24 @ 01:01):    No growth at 72 Hours    Culture - Blood (collected 08-05-24 @ 18:00)  Source: .Blood Blood-Peripheral  Preliminary Report (08-09-24 @ 01:01):    No growth at 72 Hours    Radiology: reviewed     Medications:  acetaminophen     Tablet .. 650 milliGRAM(s) Oral every 6 hours PRN  ascorbic acid 500 milliGRAM(s) Oral daily  atorvastatin 80 milliGRAM(s) Oral at bedtime  digoxin  Injectable 250 MICROGram(s) IV Push once  levothyroxine 150 MICROGram(s) Oral daily  midodrine. 30 milliGRAM(s) Oral three times a day  multivitamin 1 Tablet(s) Oral daily  pantoprazole  Injectable 40 milliGRAM(s) IV Push two times a day  sodium bicarbonate 1300 milliGRAM(s) Oral three times a day  sodium chloride 0.45% 1000 milliLiter(s) IV Continuous <Continuous>  vancomycin    Solution 500 milliGRAM(s) Oral every 6 hours    Current Antimicrobials:  vancomycin    Solution 500 milliGRAM(s) Oral every 6 hours    Prior/Completed Antimicrobials:  metroNIDAZOLE  IVPB  piperacillin/tazobactam IVPB.  piperacillin/tazobactam IVPB.-  vancomycin    Solution

## 2024-08-09 NOTE — PROGRESS NOTE ADULT - ASSESSMENT
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 Nephrology consulted for elevated Scr and metabolic acidosis.    1) CHANELL: likely due to hypovolemia from diarrhea and ostomy output. Scr improved with IVF but remains above baseline likely due to hypotension. Continue with IVF as ordered. UA active likely due to infection. FeNa low. CT without obstruction. F/U LDH/hapto r/o TMA. If urgent CT with IV contrast needed, can proceed despite risks of NAINA. Avoid nephrotoxins.    2) Hypotension: BP low. Continue with midodrine 30 mg PO TID and IVF. Recommend ICU consultation if SBP decreases for pressors support. Monitor BP.    3) Metabolic acidosis: Gap and non-gap acidosis. Continue with IVF with sodium bicarbonate and sodium bicarb 1300 mg PO TID. Repeat blood gas with lactate and ketones in AM. Monitor pH.    4) Hyponatremia: likely due to hypovolemia. Hyponatremia improved. Continue with isotonic IVF. Monitor serum Na.    5) Urinary retention: Continue with amaya. Holding flomax given hypotension.       [facility] NEPHROLOG[facility]  Paulie Storm M.D.  Mack Clancy D.O.  Maddi Steve M.D.  MD Olivia Caldera, MSN, ANP-C    Telephone: [phone]  Facsimile: [phone] [place] [address], [id number]  [place] [phone]

## 2024-08-09 NOTE — PROGRESS NOTE ADULT - ASSESSMENT
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 Admitted due to black output in the ileostomy bag c/f UGIB, afib with RVR, and c diff positive.    1. GIB  resolved GIB; stools back to brown/yellow  h/h remains stable   cont protonix 40mg IVP bid  remains hypotensive, unclear etiology; will defer EGD for now   rec CT A/P when stable to leave the floors   soft diet    2. Nausea/Vomiting   clinically does not seem obstructed  likely d/t receiving medications on empty stomach   zofran as needed        3. C. Diff   favor r/t recent Cipro use  contact precautions   cont Vancomycin Q6H    4. 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 Admitted due to black output in the ileostomy bag c/f UGIB, afib with RVR, and c diff positive.    1. GIB  resolved GIB; stools brown/yellow  h/h remains stable   cont protonix 40mg IVP bid  remains hypotensive, unclear etiology   rec CT A/P when stable to leave the floors   soft diet  plan for EGD/Colonoscopy on Monday       2. Nausea/Vomiting   clinically does not seem obstructed  likely d/t receiving medications on empty stomach   zofran as needed        3. C. Diff   favor r/t recent Cipro use  contact precautions   cont Vancomycin Q6H    4. 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 Admitted due to black output in the ileostomy bag c/f UGIB, afib with RVR, and c diff positive.    1. GIB  resolved GIB; stools brown/yellow  h/h remains stable   cont protonix 40mg IVP bid  remains hypotensive, unclear etiology   rec CTA A/P when stable to leave the floors   soft diet  plan for EGD/Colonoscopy on Monday       2. Nausea/Vomiting   clinically does not seem obstructed  likely d/t receiving medications on empty stomach   zofran as needed        3. C. Diff   favor r/t recent Cipro use  contact precautions   cont Vancomycin Q6H    4. 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 Admitted due to black output in the ileostomy bag c/f UGIB, afib with RVR, and c diff positive.    1. GIB  resolved GIB; stools brown/yellow  at this point her H/H remained stable for several days yet she continues to have uneven hemodynamic parameters with tachycardia and intermittent hypotension which cannot be explained by GI bleeding. Will hold off on endosocpy for now, must determine etiology of her hemodynamic changes       2. Nausea/Vomiting   improved        3. C. Diff   ?significance given ileostomy, may be a colonizer, defer to ID      4. Afib   a/c on hold for GIB   cardiology on board

## 2024-08-09 NOTE — PROGRESS NOTE ADULT - ASSESSMENT
EP ATTENDING    Agree with above. Can't offer a rhythm control strategy if she can't tolerate a/c - so will start dig qod for rate control

## 2024-08-09 NOTE — PROGRESS NOTE ADULT - SUBJECTIVE AND OBJECTIVE BOX
Date of service: 08-09-24 @ 16:42      Patient is a 78y old  Female who presents with a chief complaint of AMS/lethargy, Afib w/ RVR, Hyponatremia, C.diff infection (09 Aug 2024 14:28)                                                               INTERVAL HPI/OVERNIGHT EVENTS:    REVIEW OF SYSTEMS:    Denies any chest pain or shortness of breath  No nausea or vomiting                                                                                                                                                                                                                                                                     Medications:  MEDICATIONS  (STANDING):  ascorbic acid 500 milliGRAM(s) Oral daily  atorvastatin 80 milliGRAM(s) Oral at bedtime  digoxin  Injectable 125 MICROGram(s) IV Push every other day  levothyroxine 150 MICROGram(s) Oral daily  midodrine. 30 milliGRAM(s) Oral three times a day  multivitamin 1 Tablet(s) Oral daily  pantoprazole  Injectable 40 milliGRAM(s) IV Push two times a day  sodium bicarbonate 1300 milliGRAM(s) Oral three times a day  sodium chloride 0.45% 1000 milliLiter(s) (100 mL/Hr) IV Continuous <Continuous>  vancomycin    Solution 500 milliGRAM(s) Oral every 6 hours    MEDICATIONS  (PRN):  acetaminophen     Tablet .. 650 milliGRAM(s) Oral every 6 hours PRN Temp greater or equal to 38C (100.4F), Mild Pain (1 - 3)       Allergies    penicillin (Hives)    Intolerances      Vital Signs Last 24 Hrs  T(C): 35.6 (09 Aug 2024 13:23), Max: 36.2 (08 Aug 2024 19:57)  T(F): 96.1 (09 Aug 2024 13:23), Max: 97.1 (08 Aug 2024 19:57)  HR: 129 (09 Aug 2024 12:50) (82 - 140)  BP: 79/56 (09 Aug 2024 12:50) (79/56 - 108/77)  BP(mean): --  RR: 18 (09 Aug 2024 12:50) (18 - 18)  SpO2: 99% (09 Aug 2024 12:50) (99% - 100%)    Parameters below as of 09 Aug 2024 12:50  Patient On (Oxygen Delivery Method): nasal cannula  O2 Flow (L/min): 2    CAPILLARY BLOOD GLUCOSE      POCT Blood Glucose.: 105 mg/dL (09 Aug 2024 13:11)  POCT Blood Glucose.: 92 mg/dL (09 Aug 2024 06:28)  POCT Blood Glucose.: 90 mg/dL (09 Aug 2024 00:28)  POCT Blood Glucose.: 96 mg/dL (08 Aug 2024 17:13)      08-08 @ 07:01 - 08-09 @ 07:00  --------------------------------------------------------  IN: 1360 mL / OUT: 400 mL / NET: 960 mL    08-09 @ 07:01 - 08-09 @ 16:42  --------------------------------------------------------  IN: 220 mL / OUT: 50 mL / NET: 170 mL      Physical Exam:    Daily     Daily   General:  No acute distress  HEENT:  Nonicteric, PERRLA  CV:  RRR, S1S2   Lungs:  Poor effort otherwise clear to auscultation  Abdomen:  Soft, non-tender, no distended, positive BS  Extremities:  No edema  Melvin in place   Neuro:  Alert not oriented                                                                                                                                                                                                                                                                                LABS:                               13.7   10.34 )-----------( 133      ( 09 Aug 2024 10:11 )             41.0                      08-09    133<L>  |  98  |  34<H>  ----------------------------<  115<H>  3.9   |  18<L>  |  2.25<H>    Ca    7.6<L>      09 Aug 2024 10:11  Phos  3.1     08-08  Mg     1.9     08-09    TPro  5.4<L>  /  Alb  2.9<L>  /  TBili  0.9  /  DBili  x   /  AST  30  /  ALT  25  /  AlkPhos  75  08-09                       RADIOLOGY & ADDITIONAL TESTS         I personally reviewed: [  ]EKG   [  ]CXR    [  ] CT      A/P:         Discussed with :     Simon consultants' Notes   Time spent :

## 2024-08-09 NOTE — STUDENT SIGN OFF DOCUMENT - DOCUMENTS STUDENTS ARE SIGNED OFF ON
Called pt to follow up on abx and upcoming appt.    lmom to call office back at his earliest convenience. Plan of Care

## 2024-08-09 NOTE — PROGRESS NOTE ADULT - SUBJECTIVE AND OBJECTIVE BOX
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 Limited hx obtainable from pt, was AAOx~1 on encounter and very lethargic/somnolent, but arousable and seemed to deny pain anywhere. Collateral hx obtained from chart review. During recent admission (7/21/24-7/29/24), she was treated for UTI/sepsis and ultimately discharged to rehab to completed a 5-day course of ciprofloxacin (last dose 7/30/24). At rehab, she was reportedly found to have oliguria for a few days over the weekend for which she was started on IV fluids, however she was noncompliant with the IV access and she pulled it out many times. She has had very poor appetite and became lethargic and hypotensive. She was subsequently transferred to hospital where she was found to have black-colored output in ileostomy bag.   In ED: Afebrile, HR 120s-170s (Afib), SBP 90s-130s, RR 15-22, sating % on RA.  Labs notable for Hgb 11.3->10.3, Na 133->122, SCr 3.04->2.58; lactated 4.7->2.8, blood glucose to 400s but FS 100s. Found to be C.diff positive. UA not best sample with 9 epithelial cells, but noted +LE, +bacteria, +WBC, neg nitrite. CT A/P showed no acute intra-abdominal pathology and unchanged indeterminate left adrenal lesion. Received Vanc 500mg PO, Flagyl 500mg IVPB, amio 150mg IVPB x3, ofirmev 1g, 1.5L IVF, and 1u pRBC. Also started on amio gtt and protonix gtt. Nephrology and MICU were consulted. Admitted to Medicine for further management. (05 Aug 2024 23:46)    Known to me from prior admissions. Has paroxysmal AFib, and syncope, has an ILR for surveillance for long pauses.  She has known short pauses overnight, but nothing long enough or with symptoms to warrant pacemaker insertion.  During subsequent admissions, the usual issue has been rapidly conducted  AFib.  Unable to answer 10pt ROS due to somnolence.  Date of service 8/8- continues to have black output and gas in ostomy.  remains somnolent.  AF/RVR on telemetry.  Hgb back up over 11g/dL.  Date of service 8/9/23 - continues to be in AF RVR, she is now vomiting not tolerating PO meds. Denies chest pain, dyspnea, or palpitations. Review of systems otherwise negative    PAST MEDICAL & SURGICAL HISTORY:  Hypertension  Hypothyroid  Osteoarthritis  knees, back  CAD (coronary artery disease)  CROW (obstructive sleep apnea)  non complaint on CPAP  History of MI (myocardial infarction)  h/o previous MI in 2004 prompted PTCA  with stenting x 2 vessels   last stress/ echo 2019  Heart murmur  dx in childhood  Bilateral hearing loss, unspecified hearing loss type  bilateral aids  Obesity  Mixed stress and urge urinary incontinence  Overactive bladder  S/P ORIF (open reduction internal fixation) fracture  left hip 1962  S/P appendectomy  30 plus years  S/P knee replacement  left 2000  Stented coronary artery  2004 X 2 STENTS  S/P laparotomy  due to adhesions, 30 years ago  H/O dilation and curettage  2/2019 Benign polyp    MEDICATIONS:  acetaminophen     Tablet .. 650 milliGRAM(s) Oral every 6 hours PRN  ascorbic acid 500 milliGRAM(s) Oral daily  atorvastatin 80 milliGRAM(s) Oral at bedtime  digoxin  Injectable 125 MICROGram(s) IV Push every other day  droxidopa 100 milliGRAM(s) Oral once  levothyroxine 150 MICROGram(s) Oral daily  midodrine. 30 milliGRAM(s) Oral three times a day  multivitamin 1 Tablet(s) Oral daily  pantoprazole  Injectable 40 milliGRAM(s) IV Push two times a day  sodium bicarbonate 1300 milliGRAM(s) Oral three times a day  sodium chloride 0.45% 1000 milliLiter(s) IV Continuous <Continuous>  vancomycin    Solution 500 milliGRAM(s) Oral every 6 hours      LABS:                        13.7   10.34 )-----------( 133      ( 09 Aug 2024 10:11 )             41.0       Hemoglobin: 13.7 g/dL (08-09 @ 10:11)  Hemoglobin: 12.8 g/dL (08-08 @ 07:38)  Hemoglobin: 12.3 g/dL (08-08 @ 00:39)  Hemoglobin: 12.0 g/dL (08-07 @ 18:55)  Hemoglobin: 11.7 g/dL (08-07 @ 07:36)      08-09    133<L>  |  98  |  34<H>  ----------------------------<  115<H>  3.9   |  18<L>  |  2.25<H>    Ca    7.6<L>      09 Aug 2024 10:11  Phos  3.1     08-08  Mg     1.9     08-09    TPro  5.4<L>  /  Alb  2.9<L>  /  TBili  0.9  /  DBili  x   /  AST  30  /  ALT  25  /  AlkPhos  75  08-09    Creatinine Trend: 2.25<--, 2.26<--, 2.38<--, 2.49<--, 2.81<--, 2.92<--    COAGS:           PHYSICAL EXAM:  T(C): 34.4 (08-09-24 @ 12:50), Max: 36.2 (08-08-24 @ 19:57)  HR: 129 (08-09-24 @ 12:50) (82 - 140)  BP: 79/56 (08-09-24 @ 12:50) (79/56 - 108/77)  RR: 18 (08-09-24 @ 12:50) (18 - 18)  SpO2: 99% (08-09-24 @ 12:50) (99% - 100%)  Wt(kg): --    I&O's Summary    08 Aug 2024 07:01  -  09 Aug 2024 07:00  --------------------------------------------------------  IN: 1360 mL / OUT: 400 mL / NET: 960 mL    09 Aug 2024 07:01  -  09 Aug 2024 12:57  --------------------------------------------------------  IN: 100 mL / OUT: 0 mL / NET: 100 mL        Appearance: frail elderly woman in no acute distress, somnolent	  HEENT:   Normal oral mucosa, PERRL, EOMI	  Lymphatic: No lymphadenopathy , no edema  Cardiovascular: rapid irreuglar S1 S2, No JVD, No murmurs , Peripheral pulses palpable 2+ bilaterally  Respiratory: Lungs clear to auscultation, normal effort 	  Gastrointestinal:  Soft, Non-tender, + BS	  Skin: No rashes, No ecchymoses, No cyanosis, warm to touch  Musculoskeletal: Normal range of motion, normal strength  Psychiatry:  Mood & affect appropriate      TELEMETRY: -150s    ECG: AFib RVR 	  	  ASSESSMENT/PLAN: Ms Gooden is a pleasant 78y Female here with CDiff +/- GI bleeding.  EP called re: management of rapid AFib.  Has Paroxysmal AFib.  Had brief episodes of sinus rhythm on last admission, and known short pauses that are not long enough to justify permanent pacemaker insertion.  Has an ILR for long-pause surveillance, data managed by Dr Mendiola.  [id number] is at least 5. Restart apixaban 5mg BID for stroke prevention when ok with GI (held for GIB)  Re: rate control of her AFib, she has an acute kidney injury and is not a good candidate for full-dose Digoxin at this time.  Amiodarone not ideal, as this could chemically cardiovert her while we are holding anticoagulation.    Metoprolol remains on hold given hypotension  OK with modified-load of Digoxin. Received 0.125mg overnight, will start IV Digoxin 0.125mg QOD, can transition to oral once tolerating PO. Plan to stop digoxin prior to discharge. Will hopefully be able to tolerate metoprolol by then once BP improved.  She's had multiple infections in the last few hospital stays, and has looked weaker and more frail each time.  I suspect her long-term prognosis is becoming worse.  She is not a good candidate for invasive management of AFib (ablation).    Will follow with you.    Bernardino Nguyen PA-C  Pager: [phone]

## 2024-08-09 NOTE — PROGRESS NOTE ADULT - SUBJECTIVE AND OBJECTIVE BOX
Subjective: Patient seen and examined. No new events except as noted.   HR fluctuating up to 160's overnight  REVIEW OF SYSTEMS:    CONSTITUTIONAL: + weakness, fevers or chills  EYES/ENT: No visual changes;  No vertigo or throat pain   NECK: No pain or stiffness  RESPIRATORY: No cough, wheezing, hemoptysis; No shortness of breath  CARDIOVASCULAR: No chest pain or palpitations  GASTROINTESTINAL: No abdominal or epigastric pain. No nausea, vomiting, or hematemesis; No diarrhea or constipation. No melena or hematochezia.  GENITOURINARY: No dysuria, frequency or hematuria  NEUROLOGICAL: No numbness or weakness  SKIN: No itching, burning, rashes, or lesions   All other review of systems is negative unless indicated above.    MEDICATIONS:  MEDICATIONS  (STANDING):  ascorbic acid 500 milliGRAM(s) Oral daily  atorvastatin 80 milliGRAM(s) Oral at bedtime  digoxin  Injectable 250 MICROGram(s) IV Push once  levothyroxine 150 MICROGram(s) Oral daily  midodrine. 30 milliGRAM(s) Oral three times a day  multivitamin 1 Tablet(s) Oral daily  pantoprazole  Injectable 40 milliGRAM(s) IV Push two times a day  sodium bicarbonate 1300 milliGRAM(s) Oral three times a day  sodium chloride 0.45% 1000 milliLiter(s) (100 mL/Hr) IV Continuous <Continuous>  vancomycin    Solution 500 milliGRAM(s) Oral every 6 hours      PHYSICAL EXAM:  T(C): 34.9 (08-09-24 @ 06:09), Max: 36.2 (08-08-24 @ 19:57)  HR: 140 (08-09-24 @ 06:09) (70 - 140)  BP: 82/56 (08-09-24 @ 06:09) (81/55 - 108/77)  RR: 18 (08-09-24 @ 06:09) (18 - 18)  SpO2: 100% (08-09-24 @ 06:09) (97% - 100%)  Wt(kg): --  I&O's Summary    08 Aug 2024 07:01  -  09 Aug 2024 07:00  --------------------------------------------------------  IN: 1360 mL / OUT: 400 mL / NET: 960 mL          Appearance: NAD  HEENT:   Dry  oral mucosa, PERRL, EOMI	  Lymphatic: No lymphadenopathy , no edema  Cardiovascular: Irregular  S1 S2, No JVD, No murmurs , Peripheral pulses palpable 2+ bilaterally  Respiratory: decreased bs   Gastrointestinal:  Soft, Non-tender, + BS	  Skin: No rashes, No ecchymoses, No cyanosis, warm to touch  Musculoskeletal: decreased range of motion and strength  Psychiatry: sleepy    Ext: No edema      LABS:    CARDIAC MARKERS:                                12.8   7.60  )-----------( 113      ( 08 Aug 2024 07:38 )             37.7     08-08    135  |  103  |  40<H>  ----------------------------<  72  3.9   |  16<L>  |  2.26<H>    Ca    7.8<L>      08 Aug 2024 06:37  Phos  3.1     08-08  Mg     1.8     08-08        TELEMETRY:  AF	    ECG:  	  RADIOLOGY:   DIAGNOSTIC TESTING:  [ ] Echocardiogram:  [ ]  Catheterization:  [ ] Stress Test:    OTHER:

## 2024-08-09 NOTE — CHART NOTE - NSCHARTNOTEFT_GEN_A_CORE
Medicine NP Episodic Note    Pt. w/ episode of Afib w/ RVR, HR fluctuating up to 160's overnight.  Pt's BP 80's (per RN report, pt. spitting out meds and did not take full dose of 10p Midodrine).  EP following, recs appreciated (note 8/8).  Pt. given dose of Digoxin 125mcg IV x 1 for rate control w/ HR improving to 110's-120's.    > Will continue to monitor pt. / vital signs closely  > Continue tele     > F/u Dig level in am  > Monitor labs   > Dr. Duran notified of above  > Will f/u w/ EP and Cardiology in am    Aditi Ariza, [facility]  [phone]

## 2024-08-09 NOTE — PROGRESS NOTE ADULT - ASSESSMENT
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 During recent admission (7/21/24-7/29/24), she was treated for UTI/sepsis and ultimately discharged to rehab to completed a 5-day course of ciprofloxacin (last dose 7/30/24). At rehab, she was reportedly found to have oliguria for a few days over the weekend for which she was started on IV fluids, however she was noncompliant with the IV access and she pulled it out many times. She has had very poor appetite and became lethargic and hypotensive. She was subsequently transferred to hospital where she was found to have black-colored output in ileostomy bag.     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 albicans some, has amaya in place, suspect contaminant/colonization  mental status appears at baseline  temps/WBC noted, no new findings on exam    Recommendations:   Continue vancomycin 500mg PO Q6h for 10-14d   Contact isolation per IC protocol   Monitor temps/CBC, Cr   Aspiration precautions       Marisa Davidson M.D.  [facility], Division of Infectious Diseases  [phone]  After 5pm on weekdays and all day on weekends - please call [phone]  Available on [employer] TEAMS

## 2024-08-09 NOTE — CONSULT NOTE ADULT - SUBJECTIVE AND OBJECTIVE BOX
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 During recent admission (7/21/24-7/29/24), she was treated for UTI/sepsis and ultimately discharged to rehab to completed a 5-day course of ciprofloxacin (last dose 7/30/24). At rehab, she was reportedly found to have oliguria for a few days over the weekend for which she was started on IV fluids, however she was noncompliant with the IV access and she pulled it out many times. She has had very poor appetite and became lethargic and hypotensive. She was subsequently transferred to hospital where she was found to have black-colored output in ileostomy bag and found to be positive for C. diff    In the hospital, patient was evaluated by GI for UGIB. She was given 3u pRBC with appropriate improvement in hemoglobin. Because her hemoglobin remained stable and she was not having further evidence of GI bleed, GI team decided to forego EGD. Given her GI losses and hypotension, she developed an CHANELL with AGMA. Nephrology consulted and following. Infectious disease following for C.diff and recommending oral vancomycin. EP consulted for atrial fibrillation and recommending starting digoxin in s/o low blood pressures. MICU consulted for hypotension likely in setting of GI fluid losses vs sepsis.    CHIEF COMPLAINT:    HPI:    PAST MEDICAL & SURGICAL HISTORY:  Hypertension      Hypothyroid      Osteoarthritis  knees, back      CAD (coronary artery disease)      CROW (obstructive sleep apnea)  non complaint on CPAP      History of MI (myocardial infarction)  h/o previous MI in 2004 prompted PTCA  with stenting x 2 vessels   last stress/ echo 2019      Heart murmur  dx in childhood      Bilateral hearing loss, unspecified hearing loss type  bilateral aids      Obesity      Mixed stress and urge urinary incontinence      Overactive bladder      S/P ORIF (open reduction internal fixation) fracture  left hip 1962      S/P appendectomy  30 plus years      S/P knee replacement  left 2000      Stented coronary artery  2004 X 2 STENTS      S/P laparotomy  due to adhesions, 30 years ago      H/O dilation and curettage  2/2019 Benign polyp          FAMILY HISTORY:      SOCIAL HISTORY:  Smoking: __ packs x ___ years  EtOH Use:  Marital Status:  Occupation:  Recent Travel:  Country of Birth:  Advance Directives:    Allergies    penicillin (Hives)    Intolerances        HOME MEDICATIONS:      OBJECTIVE:  ICU Vital Signs Last 24 Hrs  T(C): 35.6 (09 Aug 2024 13:23), Max: 36.2 (08 Aug 2024 19:57)  T(F): 96.1 (09 Aug 2024 13:23), Max: 97.1 (08 Aug 2024 19:57)  HR: 129 (09 Aug 2024 12:50) (82 - 140)  BP: 79/56 (09 Aug 2024 12:50) (79/56 - 108/77)  BP(mean): --  ABP: --  ABP(mean): --  RR: 18 (09 Aug 2024 12:50) (18 - 18)  SpO2: 99% (09 Aug 2024 12:50) (99% - 100%)    O2 Parameters below as of 09 Aug 2024 12:50  Patient On (Oxygen Delivery Method): nasal cannula  O2 Flow (L/min): 2            08-08 @ 07:01 - 08-09 @ 07:00  --------------------------------------------------------  IN: 1360 mL / OUT: 400 mL / NET: 960 mL    08-09 @ 07:01 - 08-09 @ 14:47  --------------------------------------------------------  IN: 220 mL / OUT: 50 mL / NET: 170 mL      CAPILLARY BLOOD GLUCOSE      POCT Blood Glucose.: 105 mg/dL (09 Aug 2024 13:11)      PHYSICAL EXAM:  GENERAL: NAD, lying in bed comfortably  HEAD:  Atraumatic, normocephalic  EYES: EOMI,  conjunctiva and sclera clear  NECK: Supple, trachea midline, no JVD  HEART: Regular rate and rhythm, no murmurs, rubs, or gallops  LUNGS: Unlabored respirations.  Clear to auscultation bilaterally, no crackles, wheezing, or rhonchi  ABDOMEN: Soft, nontender, nondistended, ostomy output green/watery  EXTREMITIES: No LE edema  NERVOUS SYSTEM:  moving all extremities, no focal deficits   SKIN: No rashes or lesions    HOSPITAL MEDICATIONS:  MEDICATIONS  (STANDING):  ascorbic acid 500 milliGRAM(s) Oral daily  atorvastatin 80 milliGRAM(s) Oral at bedtime  digoxin  Injectable 125 MICROGram(s) IV Push every other day  levothyroxine 150 MICROGram(s) Oral daily  midodrine. 30 milliGRAM(s) Oral three times a day  multivitamin 1 Tablet(s) Oral daily  pantoprazole  Injectable 40 milliGRAM(s) IV Push two times a day  sodium bicarbonate 1300 milliGRAM(s) Oral three times a day  sodium chloride 0.45% 1000 milliLiter(s) (100 mL/Hr) IV Continuous <Continuous>  vancomycin    Solution 500 milliGRAM(s) Oral every 6 hours    MEDICATIONS  (PRN):  acetaminophen     Tablet .. 650 milliGRAM(s) Oral every 6 hours PRN Temp greater or equal to 38C (100.4F), Mild Pain (1 - 3)      LABS:                        13.7   10.34 )-----------( 133      ( 09 Aug 2024 10:11 )             41.0     08-09    133<L>  |  98  |  34<H>  ----------------------------<  115<H>  3.9   |  18<L>  |  2.25<H>    Ca    7.6<L>      09 Aug 2024 10:11  Phos  3.1     08-08  Mg     1.9     08-09    TPro  5.4<L>  /  Alb  2.9<L>  /  TBili  0.9  /  DBili  x   /  AST  30  /  ALT  25  /  AlkPhos  75  08-09      Urinalysis Basic - ( 09 Aug 2024 10:11 )    Color: x / Appearance: x / SG: x / pH: x  Gluc: 115 mg/dL / Ketone: x  / Bili: x / Urobili: x   Blood: x / Protein: x / Nitrite: x   Leuk Esterase: x / RBC: x / WBC x   Sq Epi: x / Non Sq Epi: x / Bacteria: x            MICROBIOLOGY:     RADIOLOGY:  [ ] Reviewed and interpreted by me    EKG:

## 2024-08-09 NOTE — CONSULT NOTE ADULT - ATTENDING COMMENTS
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 CCM consulted 2 to hypotension. Upon my assessment pt is non-toxic appearing, alert answering simple questions with SBP 93.     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 CCM consulted 2 to hypotension. Upon my assessment pt is non-toxic appearing, alert answering simple questions with SBP 93.     Resp: Supplemental O2 prn to maintain O2 sat >92%   ID: Cont oral Vanco as per ID  CVS: Rate control A fib with Dig/ Cont Midodrine  HEME: AC held 2 to GIB  FEN/Endo: Would give fluid bolus and cont IVF as per Renal/ May consider to add D5 to IVF as Glu is at times borderline low  GI: Possible scope by GI Monday  Currently pt may cont to be managed on GMF

## 2024-08-09 NOTE — PROGRESS NOTE ADULT - SUBJECTIVE AND OBJECTIVE BOX
[facility] NEPHROLOGY- PROGRESS NOTE    78y Female with history of dementia, ischemic bowel s/p resection with end ostomy presents with AMS. Nephrology consulted for elevated Scr and metabolic acidosis.    REVIEW OF SYSTEMS: Unable to obtain due to mental status. Patient however with vomiting this morning as per RN.    penicillin (Hives)      Hospital Medications: Medications reviewed        VITALS:  T(F): 94.8 (08-09-24 @ 06:09), Max: 97.1 (08-08-24 @ 19:57)  HR: 140 (08-09-24 @ 06:09)  BP: 82/56 (08-09-24 @ 06:09)  RR: 18 (08-09-24 @ 06:09)  SpO2: 100% (08-09-24 @ 06:09)  Wt(kg): --    08-08 @ 07:01  -  08-09 @ 07:00  --------------------------------------------------------  IN: 1360 mL / OUT: 400 mL / NET: 960 mL    08-09 @ 07:01  -  08-09 @ 11:10  --------------------------------------------------------  IN: 100 mL / OUT: 0 mL / NET: 100 mL        PHYSICAL EXAM:    Gen: NAD, calm  Cards: RRR, +S1/S2, no M/G/R  Resp: CTA B/L  GI: soft, NT/ND, NABS, + ostomy   : + amaya  Vascular: no LE edema B/L        LABS:  08-09    133<L>  |  98  |  34<H>  ----------------------------<  115<H>  3.9   |  18<L>  |  2.25<H>    Ca    7.6<L>      09 Aug 2024 10:11  Phos  3.1     08-08  Mg     1.9     08-09    TPro  5.4<L>  /  Alb  2.9<L>  /  TBili  0.9  /  DBili      /  AST  30  /  ALT  25  /  AlkPhos  75  08-09    Creatinine Trend: 2.25 <--, 2.26 <--, 2.38 <--, 2.49 <--, 2.81 <--, 2.92 <--, 2.68 <--, 2.58 <--, 3.04 <--                        13.7   10.34 )-----------( 133      ( 09 Aug 2024 10:11 )             41.0     Urine Studies:  Urinalysis Basic - ( 09 Aug 2024 10:11 )    Color:  / Appearance:  / SG:  / pH:   Gluc: 115 mg/dL / Ketone:   / Bili:  / Urobili:    Blood:  / Protein:  / Nitrite:    Leuk Esterase:  / RBC:  / WBC    Sq Epi:  / Non Sq Epi:  / Bacteria:       Sodium, Random Urine: <5 mmol/L (08-06 @ 08:00)  Chloride, Random Urine: <20 mmol/L (08-06 @ 08:00)  Creatinine, Random Urine: 111 mg/dL (08-06 @ 08:00)       [facility] NEPHROLOGY- PROGRESS NOTE    78y Female with history of dementia, ischemic bowel s/p resection with end ostomy presents with AMS. Nephrology consulted for elevated Scr and metabolic acidosis.    REVIEW OF SYSTEMS: Unable to obtain due to mental status. Patient however with vomiting this morning as per RN.    penicillin (Hives)      Hospital Medications: Medications reviewed        VITALS:  T(F): 94.8 (08-09-24 @ 06:09), Max: 97.1 (08-08-24 @ 19:57)  HR: 140 (08-09-24 @ 06:09)  BP: 82/56 (08-09-24 @ 06:09)  RR: 18 (08-09-24 @ 06:09)  SpO2: 100% (08-09-24 @ 06:09)  Wt(kg): --    08-08 @ 07:01  -  08-09 @ 07:00  --------------------------------------------------------  IN: 1360 mL / OUT: 400 mL / NET: 960 mL    08-09 @ 07:01  -  08-09 @ 11:10  --------------------------------------------------------  IN: 100 mL / OUT: 0 mL / NET: 100 mL        PHYSICAL EXAM:    Gen: NAD, calm  Cards: Irregularly irregular with tachycardia, +S1/S2, no M/G/R  Resp: CTA B/L  GI: soft, NT/ND, NABS, + ostomy   : + amaya  Vascular: no LE edema B/L        LABS:  08-09    133<L>  |  98  |  34<H>  ----------------------------<  115<H>  3.9   |  18<L>  |  2.25<H>    Ca    7.6<L>      09 Aug 2024 10:11  Phos  3.1     08-08  Mg     1.9     08-09    TPro  5.4<L>  /  Alb  2.9<L>  /  TBili  0.9  /  DBili      /  AST  30  /  ALT  25  /  AlkPhos  75  08-09    Creatinine Trend: 2.25 <--, 2.26 <--, 2.38 <--, 2.49 <--, 2.81 <--, 2.92 <--, 2.68 <--, 2.58 <--, 3.04 <--                        13.7   10.34 )-----------( 133      ( 09 Aug 2024 10:11 )             41.0     Urine Studies:  Urinalysis Basic - ( 09 Aug 2024 10:11 )    Color:  / Appearance:  / SG:  / pH:   Gluc: 115 mg/dL / Ketone:   / Bili:  / Urobili:    Blood:  / Protein:  / Nitrite:    Leuk Esterase:  / RBC:  / WBC    Sq Epi:  / Non Sq Epi:  / Bacteria:       Sodium, Random Urine: <5 mmol/L (08-06 @ 08:00)  Chloride, Random Urine: <20 mmol/L (08-06 @ 08:00)  Creatinine, Random Urine: 111 mg/dL (08-06 @ 08:00)

## 2024-08-09 NOTE — CONSULT NOTE ADULT - ASSESSMENT
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      #Hypotension likely 2/2 GI fluid losses vs sepsis  #Pre-renal CHANELL  #Atrial fibrillation with RVR  -On exam, patient mentating well and answering questions appropriately  -Suspect that patient continues to be volume down from GI fluid losses  -Please give 500cc NS bolus  -Isotonic maintenance fluid at 100cc/hr  -Continue with midodrine 30mg TID  -Control atrial fibrillation with RVR as per EP  -Treatment of C. diff per ID  -Patient is not a MICU candidate at this time. Please re-consult PRN.    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      #Hypotension likely 2/2 GI fluid losses vs sepsis  #Pre-renal CHANELL  #Atrial fibrillation with RVR  -On exam, patient mentating well and answering questions appropriately  -Suspect that patient continues to be volume down from GI fluid losses  -Please give droxidopa 100mg x1 followed by 500cc NS bolus   -Isotonic maintenance fluid at 100cc/hr  -Continue with midodrine 30mg TID  -Control atrial fibrillation with RVR as per EP  -Treatment of C. diff per ID  -Patient is not a MICU candidate at this time. Please re-consult PRN.    All recommendations preliminary until attending attestation

## 2024-08-09 NOTE — CHART NOTE - NSCHARTNOTEFT_GEN_A_CORE
Patient with hypothermia , non toxic appearance   - Discussed with Dr. Davidson, year (ID) CXR/ Blood cultures ordered   - Remain off antibiotics for now   - Continue to monitor on tele

## 2024-08-10 LAB
GLUCOSE BLDC GLUCOMTR-MCNC: 93 MG/DL — SIGNIFICANT CHANGE UP (ref 70–99)
GLUCOSE BLDC GLUCOMTR-MCNC: 94 MG/DL — SIGNIFICANT CHANGE UP (ref 70–99)
GLUCOSE BLDC GLUCOMTR-MCNC: 95 MG/DL — SIGNIFICANT CHANGE UP (ref 70–99)
HAPTOGLOB SERPL-MCNC: 119 MG/DL — SIGNIFICANT CHANGE UP (ref 34–200)

## 2024-08-10 RX ORDER — MIDODRINE HYDROCHLORIDE 5 MG/1
30 TABLET ORAL EVERY 8 HOURS
Refills: 0 | Status: DISCONTINUED | OUTPATIENT
Start: 2024-08-10 | End: 2024-08-21

## 2024-08-10 RX ORDER — MIDODRINE HYDROCHLORIDE 5 MG/1
30 TABLET ORAL EVERY 8 HOURS
Refills: 0 | Status: DISCONTINUED | OUTPATIENT
Start: 2024-08-10 | End: 2024-08-10

## 2024-08-10 RX ADMIN — SODIUM BICARBONATE 1300 MILLIGRAM(S): 84 INJECTION, SOLUTION INTRAVENOUS at 22:51

## 2024-08-10 RX ADMIN — Medication 150 MICROGRAM(S): at 06:38

## 2024-08-10 RX ADMIN — Medication 500 MILLIGRAM(S): at 06:37

## 2024-08-10 RX ADMIN — Medication 80 MILLIGRAM(S): at 22:50

## 2024-08-10 RX ADMIN — Medication 125 MILLILITER(S): at 11:07

## 2024-08-10 RX ADMIN — Medication 40 MILLIGRAM(S): at 18:34

## 2024-08-10 RX ADMIN — SODIUM BICARBONATE 1300 MILLIGRAM(S): 84 INJECTION, SOLUTION INTRAVENOUS at 15:36

## 2024-08-10 RX ADMIN — MIDODRINE HYDROCHLORIDE 30 MILLIGRAM(S): 5 TABLET ORAL at 15:36

## 2024-08-10 RX ADMIN — Medication 500 MILLIGRAM(S): at 12:21

## 2024-08-10 RX ADMIN — Medication 500 MILLIGRAM(S): at 12:22

## 2024-08-10 RX ADMIN — Medication 500 MILLIGRAM(S): at 00:11

## 2024-08-10 RX ADMIN — Medication 500 MILLIGRAM(S): at 18:35

## 2024-08-10 RX ADMIN — MIDODRINE HYDROCHLORIDE 30 MILLIGRAM(S): 5 TABLET ORAL at 22:50

## 2024-08-10 RX ADMIN — MIDODRINE HYDROCHLORIDE 30 MILLIGRAM(S): 5 TABLET ORAL at 02:18

## 2024-08-10 RX ADMIN — Medication 1 TABLET(S): at 12:22

## 2024-08-10 RX ADMIN — Medication 40 MILLIGRAM(S): at 06:39

## 2024-08-10 RX ADMIN — SODIUM BICARBONATE 1300 MILLIGRAM(S): 84 INJECTION, SOLUTION INTRAVENOUS at 06:38

## 2024-08-10 NOTE — PROGRESS NOTE ADULT - SUBJECTIVE AND OBJECTIVE BOX
Date of service: 08-10-24 @ 21:55      Patient is a 78y old  Female who presents with a chief complaint of AMS/lethargy, Afib w/ RVR, Hyponatremia, C.diff infection (10 Aug 2024 21:30)                                                               INTERVAL HPI/OVERNIGHT EVENTS:    REVIEW OF SYSTEMS:     CONSTITUTIONAL: No weakness, fevers or chills  EYES/ENT: No visual changes , no ear ache   NECK: No pain or stiffness  RESPIRATORY: No cough, wheezing,  No shortness of breath  CARDIOVASCULAR: No chest pain or palpitations  GASTROINTESTINAL: No abdominal pain  . No nausea, vomiting, or hematemesis; No diarrhea or constipation. No melena or hematochezia.  GENITOURINARY: No dysuria, frequency or hematuria  NEUROLOGICAL: No numbness or weakness  SKIN: No itching, burning, rashes, or lesions                                                                                                                                                                                                                                                                                 Medications:  MEDICATIONS  (STANDING):  ascorbic acid 500 milliGRAM(s) Oral daily  atorvastatin 80 milliGRAM(s) Oral at bedtime  digoxin  Injectable 125 MICROGram(s) IV Push every other day  lactated ringers. 500 milliLiter(s) (125 mL/Hr) IV Continuous <Continuous>  levothyroxine 150 MICROGram(s) Oral daily  midodrine. 30 milliGRAM(s) Oral every 8 hours  multivitamin 1 Tablet(s) Oral daily  pantoprazole  Injectable 40 milliGRAM(s) IV Push two times a day  sodium bicarbonate 1300 milliGRAM(s) Oral three times a day  sodium chloride 0.45% 1000 milliLiter(s) (100 mL/Hr) IV Continuous <Continuous>  vancomycin    Solution 500 milliGRAM(s) Oral every 6 hours    MEDICATIONS  (PRN):  acetaminophen     Tablet .. 650 milliGRAM(s) Oral every 6 hours PRN Temp greater or equal to 38C (100.4F), Mild Pain (1 - 3)       Allergies    penicillin (Hives)    Intolerances      Vital Signs Last 24 Hrs  T(C): 36.4 (10 Aug 2024 20:34), Max: 36.4 (10 Aug 2024 20:34)  T(F): 97.5 (10 Aug 2024 20:34), Max: 97.5 (10 Aug 2024 20:34)  HR: 77 (10 Aug 2024 20:34) (77 - 144)  BP: 106/67 (10 Aug 2024 20:34) (74/58 - 116/84)  BP(mean): --  RR: 18 (10 Aug 2024 20:34) (18 - 18)  SpO2: 96% (10 Aug 2024 15:35) (95% - 99%)    Parameters below as of 10 Aug 2024 15:35  Patient On (Oxygen Delivery Method): nasal cannula  O2 Flow (L/min): 3    CAPILLARY BLOOD GLUCOSE      POCT Blood Glucose.: 95 mg/dL (10 Aug 2024 18:04)  POCT Blood Glucose.: 93 mg/dL (10 Aug 2024 11:54)  POCT Blood Glucose.: 94 mg/dL (10 Aug 2024 07:59)      08-09 @ 07:01  -  08-10 @ 07:00  --------------------------------------------------------  IN: 220 mL / OUT: 350 mL / NET: -130 mL    08-10 @ 07:01  -  08-10 @ 21:55  --------------------------------------------------------  IN: 420 mL / OUT: 300 mL / NET: 120 mL      Physical Exam:    Daily     Daily   General:  Well appearing, NAD, not cachetic  HEENT:  Nonicteric, PERRLA  CV:  RRR, S1S2   Lungs:  CTA B/L, no wheezes, rales, rhonchi  Abdomen:  Soft, non-tender, no distended, positive BS  Extremities:  2+ pulses, no c/c, no edema  Skin:  Warm and dry, no rashes  :  No amaya  Neuro:  AAOx3, non-focal, grossly intact                                                                                                                                                                                                                                                                                                LABS:                               13.7   10.34 )-----------( 133      ( 09 Aug 2024 10:11 )             41.0                      08-09    133<L>  |  98  |  34<H>  ----------------------------<  115<H>  3.9   |  18<L>  |  2.25<H>    Ca    7.6<L>      09 Aug 2024 10:11  Mg     1.9     08-09    TPro  5.4<L>  /  Alb  2.9<L>  /  TBili  0.9  /  DBili  x   /  AST  30  /  ALT  25  /  AlkPhos  75  08-09                       RADIOLOGY & ADDITIONAL TESTS         I personally reviewed: [  ]EKG   [  ]CXR    [  ] CT      A/P:         Discussed with :     Simon consultants' Notes   Time spent :

## 2024-08-10 NOTE — PROGRESS NOTE ADULT - ASSESSMENT
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 Rpt TFTs  Hashimoto's hypothyroid, +TPO antibodies   Afib: Cortisol not suggestive of AI, on medications, monitored.  CAD:  stable monitored      Discussed plan and management with Dr Mirza Zelaya NP - TEAMS

## 2024-08-10 NOTE — PROGRESS NOTE ADULT - SUBJECTIVE AND OBJECTIVE BOX
[facility] DIVISION OF INFECTIOUS DISEASES  GERALD Leahy Y. Patel, S. Shah, G. Casimir  [phone]  ([phone] - weekdays after 5pm and weekends)    Name: LEFTY AKBAR  Age/Gender: 78y Female  MRN: [id number]    Interval History:  Patient seen and examined this morning.   Resting comfortably, denies fever, chills or pain.  Notes reviewed, temps noted.   Allergies: penicillin (Hives)      Objective:  Vitals:   T(F): 96.7 (08-10-24 @ 06:27), Max: 97.4 (08-09-24 @ 20:28)  HR: 120 (08-10-24 @ 06:27) (94 - 129)  BP: 84/50 (08-10-24 @ 06:27) (74/58 - 90/69)  RR: 18 (08-10-24 @ 06:27) (18 - 18)  SpO2: 97% (08-10-24 @ 06:27) (97% - 99%)  Physical Examination:  General: no acute distress  HEENT: NC/AT, EOMI, anicteric  Lungs: decreased breath sounds b/l   Heart: S1, S2 present, tachycardia   Abdomen: Soft. nondistended, nontender   Ostomy dark liquid stool, brown soft stool at opening  Neuro: awake, alert, answers/follows   Extremities: No cyanosis. No LE edema.   Skin: Warm. Dry. No visible rash.     Laboratory Studies:  CBC:                       13.7   10.34 )-----------( 133      ( 09 Aug 2024 10:11 )             41.0     WBC Trend:  10.34 08-09-24 @ 10:11  7.60 08-08-24 @ 07:38  7.58 08-08-24 @ 00:39  7.90 08-07-24 @ 18:55  7.93 08-07-24 @ 07:36  7.90 08-06-24 @ 18:05  10.38 08-06-24 @ 06:05  9.03 08-05-24 @ 21:01  9.96 08-05-24 @ 15:05    CMP: 08-09    133<L>  |  98  |  34<H>  ----------------------------<  115<H>  3.9   |  18<L>  |  2.25<H>    Ca    7.6<L>      09 Aug 2024 10:11  Mg     1.9     08-09    TPro  5.4<L>  /  Alb  2.9<L>  /  TBili  0.9  /  DBili  x   /  AST  30  /  ALT  25  /  AlkPhos  75  08-09    Creatinine: 2.25 mg/dL (08-09-24 @ 10:11)  Creatinine: 2.26 mg/dL (08-09-24 @ 10:11)  Creatinine: 2.26 mg/dL (08-08-24 @ 06:37)  Creatinine: 2.38 mg/dL (08-07-24 @ 07:36)  Creatinine: 2.49 mg/dL (08-06-24 @ 18:05)  Creatinine: 2.81 mg/dL (08-06-24 @ 15:29)  Creatinine: 2.92 mg/dL (08-06-24 @ 06:05)  Creatinine: 2.68 mg/dL (08-05-24 @ 23:08)  Creatinine: 2.58 mg/dL (08-05-24 @ 21:01)  Creatinine: 3.04 mg/dL (08-05-24 @ 15:05)      LIVER FUNCTIONS - ( 09 Aug 2024 10:11 )  Alb: 2.9 g/dL / Pro: 5.4 g/dL / ALK PHOS: 75 U/L / ALT: 25 U/L / AST: 30 U/L / GGT: x           Microbiology: reviewed   Culture - Urine (collected 08-05-24 @ 21:00)  Source: Clean Catch Clean Catch (Midstream)  Final Report (08-07-24 @ 14:01):    50,000 - 99,000 CFU/mL Candida albicans "Susceptibilities not performed"    Culture - Blood (collected 08-05-24 @ 20:54)  Source: .Blood Blood-Peripheral  Preliminary Report (08-10-24 @ 01:01):    No growth at 4 days    Culture - Blood (collected 08-05-24 @ 18:00)  Source: .Blood Blood-Peripheral  Preliminary Report (08-10-24 @ 01:01):    No growth at 4 days    Radiology: reviewed     Medications:  acetaminophen     Tablet .. 650 milliGRAM(s) Oral every 6 hours PRN  ascorbic acid 500 milliGRAM(s) Oral daily  atorvastatin 80 milliGRAM(s) Oral at bedtime  digoxin  Injectable 125 MICROGram(s) IV Push every other day  levothyroxine 150 MICROGram(s) Oral daily  midodrine. 30 milliGRAM(s) Oral every 8 hours  multivitamin 1 Tablet(s) Oral daily  pantoprazole  Injectable 40 milliGRAM(s) IV Push two times a day  sodium bicarbonate 1300 milliGRAM(s) Oral three times a day  sodium chloride 0.45% 1000 milliLiter(s) IV Continuous <Continuous>  vancomycin    Solution 500 milliGRAM(s) Oral every 6 hours    Current Antimicrobials:  vancomycin    Solution 500 milliGRAM(s) Oral every 6 hours    Prior/Completed Antimicrobials:  metroNIDAZOLE  IVPB  piperacillin/tazobactam IVPB.  piperacillin/tazobactam IVPB.-  vancomycin    Solution

## 2024-08-10 NOTE — PROGRESS NOTE ADULT - ASSESSMENT
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 Nephrology consulted for elevated Scr and metabolic acidosis.    1) CHANELL: likely due to hypovolemia from diarrhea and ostomy output. Scr improved with IVF but remains above baseline likely due to hypotension. Continue with IVF as ordered. UA active likely due to infection. FeNa low. CT without obstruction. F/U LDH/hapto r/o TMA. If urgent CT with IV contrast needed, can proceed despite risks of NAINA. Avoid nephrotoxins.    2) Hypotension: BP low. Continue with midodrine 30 mg PO TID and IVF. Recommend ICU consultation if SBP decreases for pressors support. Monitor BP.    3) Metabolic acidosis: Gap and non-gap acidosis. Continue with IVF with sodium bicarbonate and sodium bicarb 1300 mg PO TID. Monitor pH.    4) Hyponatremia: likely due to hypovolemia. Hyponatremia improved. Continue with isotonic IVF. Monitor serum Na.    5) Urinary retention: Continue with amaya. Holding flomax given hypotension.       [facility] NEPHROLOGY  Paulie Storm M.D.  Mack Clancy D.O.  Maddi Steve M.D.  MD Olivia Caldera, MSN, ANP-C    Telephone: [phone]  Facsimile: [phone] [place] [address], [id number]  [place] [address]

## 2024-08-10 NOTE — PROGRESS NOTE ADULT - SUBJECTIVE AND OBJECTIVE BOX
Chief complaint  Patient is a 78y old  Female who presents with a chief complaint of AMS/lethargy, Afib w/ RVR, Hyponatremia, C.diff infection (10 Aug 2024 13:08)         Labs and Fingersticks  CAPILLARY BLOOD GLUCOSE      POCT Blood Glucose.: 93 mg/dL (10 Aug 2024 11:54)  POCT Blood Glucose.: 94 mg/dL (10 Aug 2024 07:59)  POCT Blood Glucose.: 115 mg/dL (09 Aug 2024 18:00)      Anion Gap: 17 (08-09 @ 10:11)  Anion Gap: 18 *H* (08-09 @ 10:11)      Calcium: 7.6 *L* (08-09 @ 10:11)  Calcium: 7.7 *L* (08-09 @ 10:11)  Albumin: 2.9 *L* (08-09 @ 10:11)    Alanine Aminotransferase (ALT/SGPT): 25 (08-09 @ 10:11)  Alkaline Phosphatase: 75 (08-09 @ 10:11)  Aspartate Aminotransferase (AST/SGOT): 30 (08-09 @ 10:11)        08-09    133<L>  |  98  |  34<H>  ----------------------------<  115<H>  3.9   |  18<L>  |  2.25<H>    Ca    7.6<L>      09 Aug 2024 10:11  Mg     1.9     08-09    TPro  5.4<L>  /  Alb  2.9<L>  /  TBili  0.9  /  DBili  x   /  AST  30  /  ALT  25  /  AlkPhos  75  08-09                        13.7   10.34 )-----------( 133      ( 09 Aug 2024 10:11 )             41.0     Medications  MEDICATIONS  (STANDING):  ascorbic acid 500 milliGRAM(s) Oral daily  atorvastatin 80 milliGRAM(s) Oral at bedtime  digoxin  Injectable 125 MICROGram(s) IV Push every other day  lactated ringers. 500 milliLiter(s) (125 mL/Hr) IV Continuous <Continuous>  levothyroxine 150 MICROGram(s) Oral daily  midodrine. 30 milliGRAM(s) Oral every 8 hours  multivitamin 1 Tablet(s) Oral daily  pantoprazole  Injectable 40 milliGRAM(s) IV Push two times a day  sodium bicarbonate 1300 milliGRAM(s) Oral three times a day  sodium chloride 0.45% 1000 milliLiter(s) (100 mL/Hr) IV Continuous <Continuous>  vancomycin    Solution 500 milliGRAM(s) Oral every 6 hours      Physical Exam  General: Patient comfortable in bed   Vital Signs Last 12 Hrs  T(F): 97.3 (08-10-24 @ 12:10), Max: 97.3 (08-10-24 @ 12:10)  HR: 134 (08-10-24 @ 12:10) (120 - 144)  BP: 116/84 (08-10-24 @ 12:10) (84/50 - 116/84)  BP(mean): --  RR: 18 (08-10-24 @ 12:10) (18 - 18)  SpO2: 99% (08-10-24 @ 12:10) (95% - 99%)    CVS: S1S2   Respiratory: No wheezing, no crepitations  GI: Abdomen soft, bowel sounds positive  Musculoskeletal:  moves all extremities  : Voiding

## 2024-08-10 NOTE — PROGRESS NOTE ADULT - ASSESSMENT
EP ATTENDING    Agree with above. Can't offer a rhythm control strategy if she can't tolerate a/c - and can't offer AV felciia blockers due to persistent hypotension. So continue dig qod for rate control

## 2024-08-10 NOTE — PROGRESS NOTE ADULT - SUBJECTIVE AND OBJECTIVE BOX
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 Limited hx obtainable from pt, was AAOx~1 on encounter and very lethargic/somnolent, but arousable and seemed to deny pain anywhere. Collateral hx obtained from chart review. During recent admission (7/21/24-7/29/24), she was treated for UTI/sepsis and ultimately discharged to rehab to completed a 5-day course of ciprofloxacin (last dose 7/30/24). At rehab, she was reportedly found to have oliguria for a few days over the weekend for which she was started on IV fluids, however she was noncompliant with the IV access and she pulled it out many times. She has had very poor appetite and became lethargic and hypotensive. She was subsequently transferred to hospital where she was found to have black-colored output in ileostomy bag.   In ED: Afebrile, HR 120s-170s (Afib), SBP 90s-130s, RR 15-22, sating % on RA.  Labs notable for Hgb 11.3->10.3, Na 133->122, SCr 3.04->2.58; lactated 4.7->2.8, blood glucose to 400s but FS 100s. Found to be C.diff positive. UA not best sample with 9 epithelial cells, but noted +LE, +bacteria, +WBC, neg nitrite. CT A/P showed no acute intra-abdominal pathology and unchanged indeterminate left adrenal lesion. Received Vanc 500mg PO, Flagyl 500mg IVPB, amio 150mg IVPB x3, ofirmev 1g, 1.5L IVF, and 1u pRBC. Also started on amio gtt and protonix gtt. Nephrology and MICU were consulted. Admitted to Medicine for further management. (05 Aug 2024 23:46)    Known to me from prior admissions. Has paroxysmal AFib, and syncope, has an ILR for surveillance for long pauses.  She has known short pauses overnight, but nothing long enough or with symptoms to warrant pacemaker insertion.  During subsequent admissions, the usual issue has been rapidly conducted  AFib.  Unable to answer 10pt ROS due to somnolence.  Date of service 8/8- continues to have black output and gas in ostomy.  remains somnolent.  AF/RVR on telemetry.  Hgb back up over 11g/dL.   8/9/23 - continues to be in AF RVR, she is now vomiting not tolerating PO meds. Denies chest pain, dyspnea, or palpitations. Review of systems otherwise negative  Date of service 8/10 no events overnight, pan cultured overnight, hypothermic overnight       PAST MEDICAL & SURGICAL HISTORY:  Hypertension  Hypothyroid  Osteoarthritis  knees, back  CAD (coronary artery disease)  CROW (obstructive sleep apnea)  non complaint on CPAP  History of MI (myocardial infarction)  h/o previous MI in 2004 prompted PTCA  with stenting x 2 vessels   last stress/ echo 2019  Heart murmur  dx in childhood  Bilateral hearing loss, unspecified hearing loss type  bilateral aids  Obesity  Mixed stress and urge urinary incontinence  Overactive bladder  S/P ORIF (open reduction internal fixation) fracture  left hip 1962  S/P appendectomy  30 plus years  S/P knee replacement  left 2000  Stented coronary artery  2004 X 2 STENTS  S/P laparotomy  due to adhesions, 30 years ago  H/O dilation and curettage  2/2019 Benign polyp              acetaminophen     Tablet .. 650 milliGRAM(s) Oral every 6 hours PRN  ascorbic acid 500 milliGRAM(s) Oral daily  atorvastatin 80 milliGRAM(s) Oral at bedtime  digoxin  Injectable 125 MICROGram(s) IV Push every other day  levothyroxine 150 MICROGram(s) Oral daily  midodrine. 30 milliGRAM(s) Oral every 8 hours  multivitamin 1 Tablet(s) Oral daily  pantoprazole  Injectable 40 milliGRAM(s) IV Push two times a day  sodium bicarbonate 1300 milliGRAM(s) Oral three times a day  sodium chloride 0.45% 1000 milliLiter(s) IV Continuous <Continuous>  vancomycin    Solution 500 milliGRAM(s) Oral every 6 hours                            13.7   10.34 )-----------( 133      ( 09 Aug 2024 10:11 )             41.0       Hemoglobin: 13.7 g/dL (08-09 @ 10:11)  Hemoglobin: 12.8 g/dL (08-08 @ 07:38)  Hemoglobin: 12.3 g/dL (08-08 @ 00:39)  Hemoglobin: 12.0 g/dL (08-07 @ 18:55)  Hemoglobin: 11.7 g/dL (08-07 @ 07:36)      08-09    133<L>  |  98  |  34<H>  ----------------------------<  115<H>  3.9   |  18<L>  |  2.25<H>    Ca    7.6<L>      09 Aug 2024 10:11  Mg     1.9     08-09    TPro  5.4<L>  /  Alb  2.9<L>  /  TBili  0.9  /  DBili  x   /  AST  30  /  ALT  25  /  AlkPhos  75  08-09    Creatinine Trend: 2.25<--, 2.26<--, 2.38<--, 2.49<--, 2.81<--, 2.92<--    COAGS:           T(C): 35.6 (08-10-24 @ 08:28), Max: 36.3 (08-09-24 @ 20:28)  HR: 140 (08-10-24 @ 08:28) (94 - 140)  BP: 86/72 (08-10-24 @ 08:28) (74/58 - 90/69)  RR: 18 (08-10-24 @ 08:28) (18 - 18)  SpO2: 95% (08-10-24 @ 08:28) (95% - 99%)  Wt(kg): --    I&O's Summary    09 Aug 2024 07:01  -  10 Aug 2024 07:00  --------------------------------------------------------  IN: 220 mL / OUT: 350 mL / NET: -130 mL          Appearance: frail elderly woman in no acute distress, somnolent	  HEENT:   Normal oral mucosa, PERRL, EOMI	  Lymphatic: No lymphadenopathy , no edema  Cardiovascular: rapid irreuglar S1 S2, No JVD, No murmurs , Peripheral pulses palpable 2+ bilaterally  Respiratory: Lungs clear to auscultation, normal effort 	  Gastrointestinal:  Soft, Non-tender, + BS	  Skin: No rashes, No ecchymoses, No cyanosis, warm to touch  Musculoskeletal: Normal range of motion, normal strength  Psychiatry:  Mood & affect appropriate      TELEMETRY: -150s    ECG: AFib RVR 	  	  ASSESSMENT/PLAN: Ms Gooden is a pleasant 78y Female here with CDiff +/- GI bleeding.  EP called re: management of rapid AFib.  Has Paroxysmal AFib.  Had brief episodes of sinus rhythm on last admission, and known short pauses that are not long enough to justify permanent pacemaker insertion.  Has an ILR for long-pause surveillance, data managed by Dr Mendiola.  [id number] is at least 5. Restart apixaban 5mg BID for stroke prevention when ok with GI (held for GIB)  Re: rate control of her AFib, she has an acute kidney injury and is not a good candidate for full-dose Digoxin at this time.  Amiodarone not ideal, as this could chemically cardiovert her while we are holding anticoagulation.    Metoprolol remains on hold given hypotension  OK with modified-load of Digoxin. Received 0.125mg overnight, will start IV Digoxin 0.125mg QOD, can transition to oral once tolerating PO. Plan to stop digoxin prior to discharge. Will hopefully be able to tolerate metoprolol by then once BP improved.  She's had multiple infections in the last few hospital stays, and has looked weaker and more frail each time.  I suspect her long-term prognosis is becoming worse.  She is not a good candidate for invasive management of AFib (ablation).

## 2024-08-10 NOTE — PROGRESS NOTE ADULT - SUBJECTIVE AND OBJECTIVE BOX
Subjective: Patient seen and examined. No new events except as noted.     REVIEW OF SYSTEMS:    CONSTITUTIONAL:+ weakness, fevers or chills  EYES/ENT: No visual changes;  No vertigo or throat pain   NECK: No pain or stiffness  RESPIRATORY: No cough, wheezing, hemoptysis; No shortness of breath  CARDIOVASCULAR: No chest pain or palpitations  GASTROINTESTINAL: No abdominal or epigastric pain. No nausea, vomiting, or hematemesis; No diarrhea or constipation. No melena or hematochezia.  GENITOURINARY: No dysuria, frequency or hematuria  NEUROLOGICAL: No numbness or weakness  SKIN: No itching, burning, rashes, or lesions   All other review of systems is negative unless indicated above.    MEDICATIONS:  MEDICATIONS  (STANDING):  ascorbic acid 500 milliGRAM(s) Oral daily  atorvastatin 80 milliGRAM(s) Oral at bedtime  digoxin  Injectable 125 MICROGram(s) IV Push every other day  lactated ringers. 500 milliLiter(s) (125 mL/Hr) IV Continuous <Continuous>  levothyroxine 150 MICROGram(s) Oral daily  midodrine. 30 milliGRAM(s) Oral every 8 hours  multivitamin 1 Tablet(s) Oral daily  pantoprazole  Injectable 40 milliGRAM(s) IV Push two times a day  sodium bicarbonate 1300 milliGRAM(s) Oral three times a day  sodium chloride 0.45% 1000 milliLiter(s) (100 mL/Hr) IV Continuous <Continuous>  vancomycin    Solution 500 milliGRAM(s) Oral every 6 hours      PHYSICAL EXAM:  T(C): 36.4 (08-10-24 @ 20:34), Max: 36.4 (08-10-24 @ 20:34)  HR: 77 (08-10-24 @ 20:34) (77 - 144)  BP: 106/67 (08-10-24 @ 20:34) (74/58 - 116/84)  RR: 18 (08-10-24 @ 20:34) (18 - 18)  SpO2: 96% (08-10-24 @ 15:35) (95% - 99%)  Wt(kg): --  I&O's Summary    09 Aug 2024 07:01  -  10 Aug 2024 07:00  --------------------------------------------------------  IN: 220 mL / OUT: 350 mL / NET: -130 mL    10 Aug 2024 07:01  -  10 Aug 2024 21:30  --------------------------------------------------------  IN: 420 mL / OUT: 300 mL / NET: 120 mL          Appearance: NAD  HEENT:   Dry  oral mucosa, PERRL, EOMI	  Lymphatic: No lymphadenopathy , no edema  Cardiovascular: Irregular  S1 S2, No JVD, No murmurs , Peripheral pulses palpable 2+ bilaterally  Respiratory: decreased bs   Gastrointestinal:  Soft, Non-tender, + BS	  Skin: No rashes, No ecchymoses, No cyanosis, warm to touch  Musculoskeletal: decreased range of motion and strength  Psychiatry: sleepy    Ext: No edema        LABS:    CARDIAC MARKERS:                                13.7   10.34 )-----------( 133      ( 09 Aug 2024 10:11 )             41.0     08-09    133<L>  |  98  |  34<H>  ----------------------------<  115<H>  3.9   |  18<L>  |  2.25<H>    Ca    7.6<L>      09 Aug 2024 10:11  Mg     1.9     08-09    TPro  5.4<L>  /  Alb  2.9<L>  /  TBili  0.9  /  DBili  x   /  AST  30  /  ALT  25  /  AlkPhos  75  08-09            TELEMETRY: 	  AF  ECG:  	  RADIOLOGY:   DIAGNOSTIC TESTING:  [ ] Echocardiogram:  [ ]  Catheterization:  [ ] Stress Test:    OTHER:

## 2024-08-10 NOTE — PROGRESS NOTE ADULT - SUBJECTIVE AND OBJECTIVE BOX
[facility] NEPHROLOGY- PROGRESS NOTE    78y Female with history of dementia, ischemic bowel s/p resection with end ostomy presents with AMS. Nephrology consulted for elevated Scr and metabolic acidosis.    REVIEW OF SYSTEMS: Unable to obtain due to mental status.    Allergies:  penicillin (Hives)    Hospital Medications: Medications reviewed    VITALS:  T(F): 97.3 (08-10-24 @ 12:10), Max: 97.4 (08-09-24 @ 20:28)  HR: 134 (08-10-24 @ 12:10)  BP: 116/84 (08-10-24 @ 12:10)  RR: 18 (08-10-24 @ 12:10)  SpO2: 99% (08-10-24 @ 12:10)  Wt(kg): --    08-09 @ 07:01  -  08-10 @ 07:00  --------------------------------------------------------  IN: 220 mL / OUT: 350 mL / NET: -130 mL    08-10 @ 07:01  -  08-10 @ 13:09  --------------------------------------------------------  IN: 180 mL / OUT: 0 mL / NET: 180 mL        PHYSICAL EXAM:    Gen: NAD, calm  Cards: Irregularly irregular with tachycardia, +S1/S2, no M/G/R  Resp: CTA B/L  GI: soft, NT/ND, NABS, + ostomy   : + amaya  Vascular: no LE edema B/L    LABS:  08-09    Na+ trend: 133 <--, 135 <--, 132 <--, 134 <--, 134 <--, 130 <--, 123 <--    133<L>  |  98  |  34<H>  ----------------------------<  115<H>  3.9   |  18<L>  |  2.25<H>    Ca    7.6<L>      09 Aug 2024 10:11  Mg     1.9     08-09    TPro  5.4<L>  /  Alb  2.9<L>  /  TBili  0.9  /  DBili      /  AST  30  /  ALT  25  /  AlkPhos  75  08-09    Creatinine Trend: 2.25 <--, 2.26 <--, 2.38 <--, 2.49 <--, 2.81 <--, 2.92 <--, 2.68 <--, 2.58 <--, 3.04 <--                        13.7   10.34 )-----------( 133      ( 09 Aug 2024 10:11 )             41.0     Urine Studies:  Urinalysis Basic - ( 09 Aug 2024 10:11 )    Color:  / Appearance:  / SG:  / pH:   Gluc: 115 mg/dL / Ketone:   / Bili:  / Urobili:    Blood:  / Protein:  / Nitrite:    Leuk Esterase:  / RBC:  / WBC    Sq Epi:  / Non Sq Epi:  / Bacteria:       Sodium, Random Urine: <5 mmol/L (08-06 @ 08:00)  Chloride, Random Urine: <20 mmol/L (08-06 @ 08:00)  Creatinine, Random Urine: 111 mg/dL (08-06 @ 08:00)

## 2024-08-10 NOTE — PROGRESS NOTE ADULT - ASSESSMENT
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 During recent admission (7/21/24-7/29/24), she was treated for UTI/sepsis and ultimately discharged to rehab to completed a 5-day course of ciprofloxacin (last dose 7/30/24). At rehab, she was reportedly found to have oliguria for a few days over the weekend for which she was started on IV fluids, however she was noncompliant with the IV access and she pulled it out many times. She has had very poor appetite and became lethargic and hypotensive. She was subsequently transferred to hospital where she was found to have black-colored output in ileostomy bag.     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 albicans some, has amaya in place, suspect contaminant/colonization  mental status remains at baseline, answers/follows   low temps noted, WBC noted, no new findings on exam, nontoxic     Recommendations:   Follow Bcx -- please send second set   Follow official CXR report   Continue vancomycin 500mg PO Q6h for 10-14d   Contact isolation per IC protocol   Monitor temps/CBC, Cr   Aspiration precautions       Marisa Davidson M.D.  \[facility]\"", Division of Infectious Diseases  [phone]  After 5pm on weekdays and all day on weekends - please call [phone]  Available on [employer] TEAMS

## 2024-08-11 LAB
ANION GAP SERPL CALC-SCNC: 16 MMOL/L — SIGNIFICANT CHANGE UP (ref 5–17)
ANION GAP SERPL CALC-SCNC: 17 MMOL/L — SIGNIFICANT CHANGE UP (ref 5–17)
B-OH-BUTYR SERPL-SCNC: 0.3 MMOL/L — SIGNIFICANT CHANGE UP
BASE EXCESS BLDV CALC-SCNC: 0.8 MMOL/L — SIGNIFICANT CHANGE UP (ref -2–3)
BUN SERPL-MCNC: 36 MG/DL — HIGH (ref 7–23)
BUN SERPL-MCNC: 36 MG/DL — HIGH (ref 7–23)
CA-I SERPL-SCNC: 0.95 MMOL/L — LOW (ref 1.15–1.33)
CALCIUM SERPL-MCNC: 7.9 MG/DL — LOW (ref 8.4–10.5)
CALCIUM SERPL-MCNC: 7.9 MG/DL — LOW (ref 8.4–10.5)
CHLORIDE BLDV-SCNC: 95 MMOL/L — LOW (ref 96–108)
CHLORIDE SERPL-SCNC: 96 MMOL/L — SIGNIFICANT CHANGE UP (ref 96–108)
CHLORIDE SERPL-SCNC: 96 MMOL/L — SIGNIFICANT CHANGE UP (ref 96–108)
CO2 BLDV-SCNC: 28 MMOL/L — HIGH (ref 22–26)
CO2 SERPL-SCNC: 22 MMOL/L — SIGNIFICANT CHANGE UP (ref 22–31)
CO2 SERPL-SCNC: 24 MMOL/L — SIGNIFICANT CHANGE UP (ref 22–31)
CREAT SERPL-MCNC: 2.56 MG/DL — HIGH (ref 0.5–1.3)
CREAT SERPL-MCNC: 2.57 MG/DL — HIGH (ref 0.5–1.3)
CULTURE RESULTS: SIGNIFICANT CHANGE UP
CULTURE RESULTS: SIGNIFICANT CHANGE UP
EGFR: 19 ML/MIN/1.73M2 — LOW
EGFR: 19 ML/MIN/1.73M2 — LOW
GAS PNL BLDV: 129 MMOL/L — LOW (ref 136–145)
GAS PNL BLDV: SIGNIFICANT CHANGE UP
GAS PNL BLDV: SIGNIFICANT CHANGE UP
GLUCOSE BLDC GLUCOMTR-MCNC: 83 MG/DL — SIGNIFICANT CHANGE UP (ref 70–99)
GLUCOSE BLDC GLUCOMTR-MCNC: 96 MG/DL — SIGNIFICANT CHANGE UP (ref 70–99)
GLUCOSE BLDV-MCNC: 95 MG/DL — SIGNIFICANT CHANGE UP (ref 70–99)
GLUCOSE SERPL-MCNC: 81 MG/DL — SIGNIFICANT CHANGE UP (ref 70–99)
GLUCOSE SERPL-MCNC: 97 MG/DL — SIGNIFICANT CHANGE UP (ref 70–99)
HCO3 BLDV-SCNC: 27 MMOL/L — SIGNIFICANT CHANGE UP (ref 22–29)
HCT VFR BLD CALC: 40.7 % — SIGNIFICANT CHANGE UP (ref 34.5–45)
HCT VFR BLDA CALC: 42 % — SIGNIFICANT CHANGE UP (ref 34.5–46.5)
HGB BLD CALC-MCNC: 14 G/DL — SIGNIFICANT CHANGE UP (ref 11.7–16.1)
HGB BLD-MCNC: 13.5 G/DL — SIGNIFICANT CHANGE UP (ref 11.5–15.5)
LACTATE BLDV-MCNC: 3.3 MMOL/L — HIGH (ref 0.5–2)
LACTATE SERPL-SCNC: 2.6 MMOL/L — HIGH (ref 0.5–2)
MCHC RBC-ENTMCNC: 30.5 PG — SIGNIFICANT CHANGE UP (ref 27–34)
MCHC RBC-ENTMCNC: 33.2 GM/DL — SIGNIFICANT CHANGE UP (ref 32–36)
MCV RBC AUTO: 92.1 FL — SIGNIFICANT CHANGE UP (ref 80–100)
NRBC # BLD: 0 /100 WBCS — SIGNIFICANT CHANGE UP (ref 0–0)
PCO2 BLDV: 46 MMHG — HIGH (ref 39–42)
PH BLDV: 7.37 — SIGNIFICANT CHANGE UP (ref 7.32–7.43)
PLATELET # BLD AUTO: 110 K/UL — LOW (ref 150–400)
PO2 BLDV: 27 MMHG — SIGNIFICANT CHANGE UP (ref 25–45)
POTASSIUM BLDV-SCNC: 3.9 MMOL/L — SIGNIFICANT CHANGE UP (ref 3.5–5.1)
POTASSIUM SERPL-MCNC: 4 MMOL/L — SIGNIFICANT CHANGE UP (ref 3.5–5.3)
POTASSIUM SERPL-MCNC: 4.1 MMOL/L — SIGNIFICANT CHANGE UP (ref 3.5–5.3)
POTASSIUM SERPL-SCNC: 4 MMOL/L — SIGNIFICANT CHANGE UP (ref 3.5–5.3)
POTASSIUM SERPL-SCNC: 4.1 MMOL/L — SIGNIFICANT CHANGE UP (ref 3.5–5.3)
RBC # BLD: 4.42 M/UL — SIGNIFICANT CHANGE UP (ref 3.8–5.2)
RBC # FLD: 17.6 % — HIGH (ref 10.3–14.5)
SAO2 % BLDV: 41.6 % — LOW (ref 67–88)
SODIUM SERPL-SCNC: 135 MMOL/L — SIGNIFICANT CHANGE UP (ref 135–145)
SODIUM SERPL-SCNC: 136 MMOL/L — SIGNIFICANT CHANGE UP (ref 135–145)
SPECIMEN SOURCE: SIGNIFICANT CHANGE UP
SPECIMEN SOURCE: SIGNIFICANT CHANGE UP
T4 FREE SERPL-MCNC: 1.1 NG/DL — SIGNIFICANT CHANGE UP (ref 0.9–1.8)
WBC # BLD: 10 K/UL — SIGNIFICANT CHANGE UP (ref 3.8–10.5)
WBC # FLD AUTO: 10 K/UL — SIGNIFICANT CHANGE UP (ref 3.8–10.5)

## 2024-08-11 RX ORDER — DIGOXIN 0.12 MG/1
125 TABLET ORAL
Refills: 0 | Status: DISCONTINUED | OUTPATIENT
Start: 2024-08-11 | End: 2024-08-21

## 2024-08-11 RX ADMIN — Medication 40 MILLIGRAM(S): at 05:14

## 2024-08-11 RX ADMIN — Medication 150 MICROGRAM(S): at 05:15

## 2024-08-11 RX ADMIN — Medication 500 MILLIGRAM(S): at 12:16

## 2024-08-11 RX ADMIN — Medication 500 MILLIGRAM(S): at 05:14

## 2024-08-11 RX ADMIN — Medication 500 MILLIGRAM(S): at 17:43

## 2024-08-11 RX ADMIN — MIDODRINE HYDROCHLORIDE 30 MILLIGRAM(S): 5 TABLET ORAL at 14:21

## 2024-08-11 RX ADMIN — SODIUM BICARBONATE 1300 MILLIGRAM(S): 84 INJECTION, SOLUTION INTRAVENOUS at 14:20

## 2024-08-11 RX ADMIN — SODIUM BICARBONATE 1300 MILLIGRAM(S): 84 INJECTION, SOLUTION INTRAVENOUS at 05:15

## 2024-08-11 RX ADMIN — MIDODRINE HYDROCHLORIDE 30 MILLIGRAM(S): 5 TABLET ORAL at 05:14

## 2024-08-11 RX ADMIN — Medication 1 TABLET(S): at 12:16

## 2024-08-11 RX ADMIN — DIGOXIN 125 MICROGRAM(S): 0.12 TABLET ORAL at 10:28

## 2024-08-11 RX ADMIN — Medication 40 MILLIGRAM(S): at 17:43

## 2024-08-11 RX ADMIN — Medication 500 MILLIGRAM(S): at 00:20

## 2024-08-11 NOTE — PROVIDER CONTACT NOTE (OTHER) - ASSESSMENT
provider assessed patient. patient asymptomatic no complaints of distress or chest pain.    /67  100% O2  Temp 97.5

## 2024-08-11 NOTE — PROGRESS NOTE ADULT - SUBJECTIVE AND OBJECTIVE BOX
[facility] DIVISION OF INFECTIOUS DISEASES  GERALD Leahy Y. Patel, S. Shah, G. Dino  [phone]  ([phone] - weekdays after 5pm and weekends)    Name: LEFTY AKBAR  Age/Gender: 78y Female  MRN: [id number]    Interval History:  Patient seen and examined this morning.   Resting comfortably, no new complaints.   Notes reviewed. D/w RN at bedside.   No concerning overnight events  Afebrile   Allergies: penicillin (Hives)      Objective:  Vitals:   T(F): 97.5 (08-11-24 @ 04:48), Max: 97.5 (08-10-24 @ 20:34)  HR: 144 (08-11-24 @ 08:15) (77 - 144)  BP: 102/85 (08-11-24 @ 04:48) (95/61 - 116/84)  RR: 18 (08-11-24 @ 04:48) (18 - 18)  SpO2: 98% (08-11-24 @ 08:15) (96% - 100%)  Physical Examination:  General: no acute distress, nontoxic appearing   HEENT: NC/AT, anicteric, EOMI  Respiratory: no acc muscle use, breathing comfortably  Cardiovascular: S1 and S2 present  Gastrointestinal: soft, NT, ND, ostomy with green stool  Extremities: no edema, no cyanosis  Skin: no visible rash    Laboratory Studies:  CBC:                       13.5   10.00 )-----------( 110      ( 11 Aug 2024 07:21 )             40.7     WBC Trend:  10.00 08-11-24 @ 07:21  10.34 08-09-24 @ 10:11  7.60 08-08-24 @ 07:38  7.58 08-08-24 @ 00:39  7.90 08-07-24 @ 18:55  7.93 08-07-24 @ 07:36  7.90 08-06-24 @ 18:05  10.38 08-06-24 @ 06:05  9.03 08-05-24 @ 21:01  9.96 08-05-24 @ 15:05    CMP: 08-11    135  |  96  |  36<H>  ----------------------------<  81  4.1   |  22  |  2.56<H>    Ca    7.9<L>      11 Aug 2024 07:22  Mg     1.9     08-09    TPro  5.4<L>  /  Alb  2.9<L>  /  TBili  0.9  /  DBili  x   /  AST  30  /  ALT  25  /  AlkPhos  75  08-09    Creatinine: 2.56 mg/dL (08-11-24 @ 07:22)  Creatinine: 2.25 mg/dL (08-09-24 @ 10:11)  Creatinine: 2.26 mg/dL (08-09-24 @ 10:11)  Creatinine: 2.26 mg/dL (08-08-24 @ 06:37)  Creatinine: 2.38 mg/dL (08-07-24 @ 07:36)  Creatinine: 2.49 mg/dL (08-06-24 @ 18:05)  Creatinine: 2.81 mg/dL (08-06-24 @ 15:29)  Creatinine: 2.92 mg/dL (08-06-24 @ 06:05)  Creatinine: 2.68 mg/dL (08-05-24 @ 23:08)  Creatinine: 2.58 mg/dL (08-05-24 @ 21:01)  Creatinine: 3.04 mg/dL (08-05-24 @ 15:05)    LIVER FUNCTIONS - ( 09 Aug 2024 10:11 )  Alb: 2.9 g/dL / Pro: 5.4 g/dL / ALK PHOS: 75 U/L / ALT: 25 U/L / AST: 30 U/L / GGT: x           Microbiology: reviewed   Culture - Blood (collected 08-09-24 @ 18:50)  Source: .Blood Blood-Peripheral  Preliminary Report (08-11-24 @ 01:02):    No growth at 24 hours    Culture - Urine (collected 08-05-24 @ 21:00)  Source: Clean Catch Clean Catch (Midstream)  Final Report (08-07-24 @ 14:01):    50,000 - 99,000 CFU/mL Candida albicans "Susceptibilities not performed"    Culture - Blood (collected 08-05-24 @ 20:54)  Source: .Blood Blood-Peripheral  Final Report (08-11-24 @ 01:00):    No growth at 5 days    Culture - Blood (collected 08-05-24 @ 18:00)  Source: .Blood Blood-Peripheral  Final Report (08-11-24 @ 01:00):    No growth at 5 days    Radiology: reviewed   < from: Xray Chest 1 View- PORTABLE-Urgent (Xray Chest 1 View- PORTABLE-Urgent .) (08.09.24 @ 19:48) >  IMPRESSION:    Clear lungs.    < end of copied text >    Medications:  acetaminophen     Tablet .. 650 milliGRAM(s) Oral every 6 hours PRN  ascorbic acid 500 milliGRAM(s) Oral daily  atorvastatin 80 milliGRAM(s) Oral at bedtime  digoxin  Injectable 125 MICROGram(s) IV Push <User Schedule>  lactated ringers. 500 milliLiter(s) IV Continuous <Continuous>  levothyroxine 150 MICROGram(s) Oral daily  midodrine. 30 milliGRAM(s) Oral every 8 hours  multivitamin 1 Tablet(s) Oral daily  pantoprazole  Injectable 40 milliGRAM(s) IV Push two times a day  sodium bicarbonate 1300 milliGRAM(s) Oral three times a day  sodium chloride 0.45% 1000 milliLiter(s) IV Continuous <Continuous>  vancomycin    Solution 500 milliGRAM(s) Oral every 6 hours    Current Antimicrobials:  vancomycin    Solution 500 milliGRAM(s) Oral every 6 hours    Prior/Completed Antimicrobials:  metroNIDAZOLE  IVPB  piperacillin/tazobactam IVPB.  piperacillin/tazobactam IVPB.-  vancomycin    Solution

## 2024-08-11 NOTE — PROGRESS NOTE ADULT - SUBJECTIVE AND OBJECTIVE BOX
INTERVAL HPI/OVERNIGHT EVENTS:    no gi events    MEDICATIONS  (STANDING):  ascorbic acid 500 milliGRAM(s) Oral daily  atorvastatin 80 milliGRAM(s) Oral at bedtime  digoxin  Injectable 250 MICROGram(s) IV Push once  levothyroxine 150 MICROGram(s) Oral daily  midodrine. 30 milliGRAM(s) Oral three times a day  multivitamin 1 Tablet(s) Oral daily  pantoprazole  Injectable 40 milliGRAM(s) IV Push two times a day  sodium bicarbonate 1300 milliGRAM(s) Oral three times a day  sodium chloride 0.45% 1000 milliLiter(s) (100 mL/Hr) IV Continuous <Continuous>  vancomycin    Solution 500 milliGRAM(s) Oral every 6 hours    MEDICATIONS  (PRN):  acetaminophen     Tablet .. 650 milliGRAM(s) Oral every 6 hours PRN Temp greater or equal to 38C (100.4F), Mild Pain (1 - 3)      Allergies    penicillin (Hives)    Intolerances        Review of Systems:    General:  No wt loss, fevers, chills, night sweats, fatigue   Eyes:  Good vision, no reported pain  ENT:  No sore throat, pain, runny nose, dysphagia  CV:  No pain, palpitations, hypo/hypertension  Resp:  No dyspnea, cough, tachypnea, wheezing  GI:  No pain, No nausea, No vomiting, No diarrhea, No constipation, No weight loss, No fever, No pruritis, No rectal bleeding, No melena, No dysphagia  :  No pain, bleeding, incontinence, nocturia  Muscle:  No pain, weakness  Neuro:  No weakness, tingling, memory problems  Psych:  No fatigue, insomnia, mood problems, depression  Endocrine:  No polyuria, polydypsia, cold/heat intolerance  Heme:  No petechiae, ecchymosis, easy bruisability  Skin:  No rash, tattoos, scars, edema      Vital Signs Last 24 Hrs  T(C): 34.9 (09 Aug 2024 06:09), Max: 36.2 (08 Aug 2024 19:57)  T(F): 94.8 (09 Aug 2024 06:09), Max: 97.1 (08 Aug 2024 19:57)  HR: 140 (09 Aug 2024 06:09) (70 - 140)  BP: 82/56 (09 Aug 2024 06:09) (81/55 - 108/77)  BP(mean): --  RR: 18 (09 Aug 2024 06:09) (18 - 18)  SpO2: 100% (09 Aug 2024 06:09) (97% - 100%)    Parameters below as of 09 Aug 2024 07:15  Patient On (Oxygen Delivery Method): nasal cannula  O2 Flow (L/min): 2      PHYSICAL EXAM:    Constitutional: NAD  HEENT: EOMI, throat clear  Neck: No LAD, supple  Respiratory: CTA and P  Cardiovascular: S1 and S2, RRR, no M  Gastrointestinal: BS+, soft, NT/ND, neg HSM,  Extremities: No peripheral edema, neg clubbing, cyanosis  Vascular: 2+ peripheral pulses  Neurological: A/O   Psychiatric: Normal mood, normal affect  Skin: No rashes      LABS:                        13.7   10.34 )-----------( 133      ( 09 Aug 2024 10:11 )             41.0     08-09    133<L>  |  98  |  34<H>  ----------------------------<  115<H>  3.9   |  18<L>  |  2.25<H>    Ca    7.6<L>      09 Aug 2024 10:11  Phos  3.1     08-08  Mg     1.9     08-09    TPro  5.4<L>  /  Alb  2.9<L>  /  TBili  0.9  /  DBili  x   /  AST  30  /  ALT  25  /  AlkPhos  75  08-09      Urinalysis Basic - ( 09 Aug 2024 10:11 )    Color: x / Appearance: x / SG: x / pH: x  Gluc: 115 mg/dL / Ketone: x  / Bili: x / Urobili: x   Blood: x / Protein: x / Nitrite: x   Leuk Esterase: x / RBC: x / WBC x   Sq Epi: x / Non Sq Epi: x / Bacteria: x        RADIOLOGY & ADDITIONAL TESTS:

## 2024-08-11 NOTE — PROGRESS NOTE ADULT - SUBJECTIVE AND OBJECTIVE BOX
Chief complaint  Patient is a 78y old  Female who presents with a chief complaint of AMS/lethargy, Afib w/ RVR, Hyponatremia, C.diff infection (11 Aug 2024 08:56)         Labs and Fingersticks  CAPILLARY BLOOD GLUCOSE      POCT Blood Glucose.: 83 mg/dL (11 Aug 2024 11:41)  POCT Blood Glucose.: 95 mg/dL (10 Aug 2024 18:04)      Anion Gap: 16 (08-11 @ 09:29)  Anion Gap: 17 (08-11 @ 07:22)      Calcium: 7.9 *L* (08-11 @ 09:29)  Calcium: 7.9 *L* (08-11 @ 07:22)          08-11    136  |  96  |  36<H>  ----------------------------<  97  4.0   |  24  |  2.57<H>    Ca    7.9<L>      11 Aug 2024 09:29                          13.5   10.00 )-----------( 110      ( 11 Aug 2024 07:21 )             40.7     Medications  MEDICATIONS  (STANDING):  ascorbic acid 500 milliGRAM(s) Oral daily  atorvastatin 80 milliGRAM(s) Oral at bedtime  digoxin  Injectable 125 MICROGram(s) IV Push <User Schedule>  lactated ringers. 500 milliLiter(s) (125 mL/Hr) IV Continuous <Continuous>  levothyroxine 150 MICROGram(s) Oral daily  midodrine. 30 milliGRAM(s) Oral every 8 hours  multivitamin 1 Tablet(s) Oral daily  pantoprazole  Injectable 40 milliGRAM(s) IV Push two times a day  sodium bicarbonate 1300 milliGRAM(s) Oral three times a day  sodium chloride 0.45% 1000 milliLiter(s) (100 mL/Hr) IV Continuous <Continuous>  vancomycin    Solution 500 milliGRAM(s) Oral every 6 hours      Physical Exam  General: Patient comfortable in bed   Vital Signs Last 12 Hrs  T(F): 97.2 (08-11-24 @ 12:03), Max: 97.5 (08-11-24 @ 04:48)  HR: 147 (08-11-24 @ 12:03) (120 - 147)  BP: 112/83 (08-11-24 @ 12:03) (93/65 - 112/83)  BP(mean): --  RR: 18 (08-11-24 @ 12:03) (18 - 18)  SpO2: 97% (08-11-24 @ 12:03) (94% - 100%)    CVS: S1S2   Respiratory: No wheezing, no crepitations  GI: Abdomen soft, bowel sounds positive  Musculoskeletal:  moves all extremities  : Voiding

## 2024-08-11 NOTE — PROGRESS NOTE ADULT - SUBJECTIVE AND OBJECTIVE BOX
[facility] NEPHROLOGY- PROGRESS NOTE    78y Female with history of dementia, ischemic bowel s/p resection with end ostomy presents with AMS. Nephrology consulted for elevated Scr and metabolic acidosis.    REVIEW OF SYSTEMS: Unable to obtain due to mental status.    Allergies:  penicillin (Hives)    Hospital Medications: Medications reviewed    VITALS:  T(F): 97.2 (08-11-24 @ 12:03), Max: 97.5 (08-10-24 @ 20:34)  HR: 147 (08-11-24 @ 12:03)  BP: 112/83 (08-11-24 @ 12:03)  RR: 18 (08-11-24 @ 12:03)  SpO2: 97% (08-11-24 @ 12:03)  Wt(kg): --    08-10 @ 07:01  -  08-11 @ 07:00  --------------------------------------------------------  IN: 420 mL / OUT: 300 mL / NET: 120 mL    08-11 @ 07:01  -  08-11 @ 14:15  --------------------------------------------------------  IN: 210 mL / OUT: 0 mL / NET: 210 mL    PHYSICAL EXAM:    Gen: NAD, calm  Cards: Irregularly irregular with tachycardia, +S1/S2, no M/G/R  Resp: CTA B/L  GI: soft, NT/ND, NABS, + ostomy   : + amaya  Vascular: no LE edema B/L    LABS:  08-11    Na+ trend: 136 <--, 135 <--, 133 <--, 135 <--, 132 <--, 134 <--, 134 <--    136  |  96  |  36<H>  ----------------------------<  97  4.0   |  24  |  2.57<H>    Ca    7.9<L>      11 Aug 2024 09:29      Creatinine Trend: 2.57 <--, 2.56 <--, 2.25 <--, 2.26 <--, 2.38 <--, 2.49 <--, 2.81 <--, 2.92 <--, 2.68 <--, 2.58 <--, 3.04 <--                        13.5   10.00 )-----------( 110      ( 11 Aug 2024 07:21 )             40.7     Urine Studies:  Urinalysis Basic - ( 11 Aug 2024 09:29 )    Color:  / Appearance:  / SG:  / pH:   Gluc: 97 mg/dL / Ketone:   / Bili:  / Urobili:    Blood:  / Protein:  / Nitrite:    Leuk Esterase:  / RBC:  / WBC    Sq Epi:  / Non Sq Epi:  / Bacteria:       Sodium, Random Urine: <5 mmol/L (08-06 @ 08:00)  Chloride, Random Urine: <20 mmol/L (08-06 @ 08:00)  Creatinine, Random Urine: 111 mg/dL (08-06 @ 08:00)

## 2024-08-11 NOTE — PROGRESS NOTE ADULT - SUBJECTIVE AND OBJECTIVE BOX
Subjective: Patient seen and examined. No new events except as noted.     REVIEW OF SYSTEMS:    CONSTITUTIONAL:+ weakness, fevers or chills  EYES/ENT: No visual changes;  No vertigo or throat pain   NECK: No pain or stiffness  RESPIRATORY: No cough, wheezing, hemoptysis; No shortness of breath  CARDIOVASCULAR: No chest pain or palpitations  GASTROINTESTINAL: No abdominal or epigastric pain. No nausea, vomiting, or hematemesis; No diarrhea or constipation. No melena or hematochezia.  GENITOURINARY: No dysuria, frequency or hematuria  NEUROLOGICAL: No numbness or weakness  SKIN: No itching, burning, rashes, or lesions   All other review of systems is negative unless indicated above.    MEDICATIONS:  MEDICATIONS  (STANDING):  ascorbic acid 500 milliGRAM(s) Oral daily  atorvastatin 80 milliGRAM(s) Oral at bedtime  digoxin  Injectable 125 MICROGram(s) IV Push every other day  lactated ringers. 500 milliLiter(s) (125 mL/Hr) IV Continuous <Continuous>  levothyroxine 150 MICROGram(s) Oral daily  midodrine. 30 milliGRAM(s) Oral every 8 hours  multivitamin 1 Tablet(s) Oral daily  pantoprazole  Injectable 40 milliGRAM(s) IV Push two times a day  sodium bicarbonate 1300 milliGRAM(s) Oral three times a day  sodium chloride 0.45% 1000 milliLiter(s) (100 mL/Hr) IV Continuous <Continuous>  vancomycin    Solution 500 milliGRAM(s) Oral every 6 hours      PHYSICAL EXAM:  T(C): 36.4 (08-11-24 @ 04:48), Max: 36.4 (08-10-24 @ 20:34)  HR: 120 (08-11-24 @ 04:48) (77 - 144)  BP: 102/85 (08-11-24 @ 04:48) (86/72 - 116/84)  RR: 18 (08-11-24 @ 04:48) (18 - 18)  SpO2: 100% (08-11-24 @ 04:48) (95% - 100%)  Wt(kg): --  I&O's Summary    10 Aug 2024 07:01  -  11 Aug 2024 07:00  --------------------------------------------------------  IN: 420 mL / OUT: 300 mL / NET: 120 mL            Appearance: NAD  HEENT:   Dry  oral mucosa, PERRL, EOMI	  Lymphatic: No lymphadenopathy , no edema  Cardiovascular: Irregular  S1 S2, No JVD, No murmurs , Peripheral pulses palpable 2+ bilaterally  Respiratory: decreased bs   Gastrointestinal:  Soft, Non-tender, + BS	  Skin: No rashes, No ecchymoses, No cyanosis, warm to touch  Musculoskeletal: decreased range of motion and strength  Psychiatry: sleepy    Ext: No edema          LABS:    CARDIAC MARKERS:                                13.7   10.34 )-----------( 133      ( 09 Aug 2024 10:11 )             41.0     08-09    133<L>  |  98  |  34<H>  ----------------------------<  115<H>  3.9   |  18<L>  |  2.25<H>    Ca    7.6<L>      09 Aug 2024 10:11  Mg     1.9     08-09    TPro  5.4<L>  /  Alb  2.9<L>  /  TBili  0.9  /  DBili  x   /  AST  30  /  ALT  25  /  AlkPhos  75  08-09          TELEMETRY: 	  AF  ECG:  	  RADIOLOGY:   DIAGNOSTIC TESTING:  [ ] Echocardiogram:  [ ]  Catheterization:  [ ] Stress Test:    OTHER:

## 2024-08-11 NOTE — PROGRESS NOTE ADULT - ASSESSMENT
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 During recent admission (7/21/24-7/29/24), she was treated for UTI/sepsis and ultimately discharged to rehab to completed a 5-day course of ciprofloxacin (last dose 7/30/24). At rehab, she was reportedly found to have oliguria for a few days over the weekend for which she was started on IV fluids, however she was noncompliant with the IV access and she pulled it out many times. She has had very poor appetite and became lethargic and hypotensive. She was subsequently transferred to hospital where she was found to have black-colored output in ileostomy bag.     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 albicans some, has amaya in place, suspect contaminant/colonization  8/9 CXR with clear lungs   mental status remains at baseline, answers/follows   temps noted, WBC wnl/stable, no new findings on exam, nontoxic     Recommendations:   Follow Bcx- NGTD (1 set sent)   Continue vancomycin 500mg PO Q6h for 10d   Contact isolation per IC protocol   Monitor temps/CBC, Cr   Aspiration precautions       Marisa Davidson M.D.  [facility], Division of Infectious Diseases  [phone]  After 5pm on weekdays and all day on weekends - please call [phone]  Available on [employer] TEAMS

## 2024-08-11 NOTE — CHART NOTE - NSCHARTNOTEFT_GEN_A_CORE
IR Midline Clearance  IR contacted to evaluate for midline clearance. Patient cleared for midline by procedure team.

## 2024-08-11 NOTE — PROGRESS NOTE ADULT - ASSESSMENT
EP ATTENDING    Agree with above. Can't offer a rhythm control strategy if she can't tolerate a/c - and can't offer AV felicia blockers due to persistent hypotension. So continue dig qod for rate control

## 2024-08-11 NOTE — PROGRESS NOTE ADULT - ASSESSMENT
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 Nephrology consulted for elevated Scr and metabolic acidosis.    1) CHANELL: likely due to hypovolemia from diarrhea and ostomy output. Scr improved with IVF but remains above baseline likely due to hypotension. Continue with IVF as ordered. UA active likely due to infection. FeNa low. CT without obstruction. F/U LDH/hapto r/o TMA. If urgent CT with IV contrast needed, can proceed despite risks of NAINA. Avoid nephrotoxins.    2) Hypotension: BP low. Continue with midodrine 30 mg PO TID and IVF. Recommend ICU consultation if SBP decreases for pressors support. Monitor BP.    3) Metabolic acidosis: Gap and non-gap acidosis. Continue with IVF with sodium bicarbonate and sodium bicarb 1300 mg PO TID. Monitor pH.    4) Hyponatremia: likely due to hypovolemia. Hyponatremia improved. Continue with isotonic IVF. Monitor serum Na.    5) Urinary retention: Continue with amaya. Holding flomax given hypotension.       [facility] NEPHROLOGY  Paulie Storm M.D.  Mack Clancy D.O.  Maddi Steve M.D.  MD Olivia Caldera, MSN, ANP-C    Telephone: [phone]  Facsimile: [phone] [place] [address], [address]  [place] [address]

## 2024-08-11 NOTE — PROGRESS NOTE ADULT - ASSESSMENT
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 Found to have C.diff, CHANELL, and possible anemia of acute blood loss 2/2 GIB.      Acute blood loss anemia secondary to acute GI bleed: Status post transfusion with good response.  Discussed with Dr. Sprague: Stool is yellow, H&H seems stable with good response to transfusion.  will hold off on endoscopy at this time given hypotension and continue to optimize...  Will plan EGD early next week    ·  Problem: Clostridium difficile infection.   ·  Plan: Found to have C.diff infection. Likely i/s/o recent abx use (cipro) to treat UTI.  - CT A/P showed no acute intra-abdominal pathology  Continue vancomycin      ·  Problem: Metabolic encephalopathy.   ·  Plan: Patient Was lethargic but arousable...Now improved  and more alert    Likely multifactorial including acute infection,CHANELL and Dehydration  Mental status during the recent hospitalization showed decline and mentation however workup was negative for acute neurological pathology.      ·  Problem: Atrial fibrillation with RVR.   ·  Plan: Presented in Afib w/ RVR to HR 170s. Has hx of Afib (on eliquis at home). Suspect RVR i/s/o infection, dehydration, electrolyte abnormalities    Patient still with A. fib and RVR.....Follow-up with electrophysiology  Hold anticoagulation in setting of melanotic stool      ·  Problem: CHANELL (acute kidney injury).   Likely prerenal secondary to dehydration continue IV fluids and bicarb as per renal      ·  Problem: Hyponatremia.   Likely secondary to dehydration.  Continue IV fluid      ·  Problem: Hypotension.   Likely multifactorial secondary to acute GI bleed, dehydration, possible infection  Continue to monitor H&H  Continue fluid resuscitation and midodrin  Discussed with infectious disease: We will continue with Empiric antibiotics and if culture is negative we will DC: Now discontinued  Patient still hypotensive With unclear etiology...Decreased urine output however mentating Better than yesterday..  MICU was reconsulted.  Patient is notDeemed to be a candidate        ·  Problem: CAD (coronary artery disease).   - holding home plavix  - c/w home statin        ·  Problem: Hypothyroidism.   Recently decreased dose      Appreciate wound care consult.  Follow-up official input    Also of note patient now is full code since daughter rescinded DNR

## 2024-08-11 NOTE — PROGRESS NOTE ADULT - SUBJECTIVE AND OBJECTIVE BOX
Date of service: 08-11-24 @ 22:36      Patient is a 78y old  Female who presents with a chief complaint of AMS/lethargy, Afib w/ RVR, Hyponatremia, C.diff infection (11 Aug 2024 14:15)                                                               INTERVAL HPI/OVERNIGHT EVENTS:    REVIEW OF SYSTEMS:     CONSTITUTIONAL: No weakness, fevers or chills  EYES/ENT: No visual changes , no ear ache   NECK: No pain or stiffness  RESPIRATORY: No cough, wheezing,  No shortness of breath  CARDIOVASCULAR: No chest pain or palpitations  GASTROINTESTINAL: No abdominal pain  . No nausea, vomiting, or hematemesis; No diarrhea or constipation. No melena or hematochezia.  GENITOURINARY: No dysuria, frequency or hematuria  NEUROLOGICAL: No numbness or weakness  SKIN: No itching, burning, rashes, or lesions                                                                                                                                                                                                                                                                                 Medications:  MEDICATIONS  (STANDING):  ascorbic acid 500 milliGRAM(s) Oral daily  atorvastatin 80 milliGRAM(s) Oral at bedtime  digoxin  Injectable 125 MICROGram(s) IV Push <User Schedule>  lactated ringers. 500 milliLiter(s) (125 mL/Hr) IV Continuous <Continuous>  levothyroxine 150 MICROGram(s) Oral daily  midodrine. 30 milliGRAM(s) Oral every 8 hours  multivitamin 1 Tablet(s) Oral daily  pantoprazole  Injectable 40 milliGRAM(s) IV Push two times a day  sodium bicarbonate 1300 milliGRAM(s) Oral three times a day  sodium chloride 0.45% 1000 milliLiter(s) (100 mL/Hr) IV Continuous <Continuous>  vancomycin    Solution 500 milliGRAM(s) Oral every 6 hours    MEDICATIONS  (PRN):  acetaminophen     Tablet .. 650 milliGRAM(s) Oral every 6 hours PRN Temp greater or equal to 38C (100.4F), Mild Pain (1 - 3)       Allergies    penicillin (Hives)    Intolerances      Vital Signs Last 24 Hrs  T(C): 36.2 (11 Aug 2024 12:03), Max: 36.4 (11 Aug 2024 02:05)  T(F): 97.2 (11 Aug 2024 12:03), Max: 97.5 (11 Aug 2024 02:05)  HR: 147 (11 Aug 2024 14:05) (100 - 147)  BP: 120/84 (11 Aug 2024 14:05) (93/65 - 120/84)  BP(mean): --  RR: 18 (11 Aug 2024 14:05) (18 - 18)  SpO2: 97% (11 Aug 2024 14:05) (94% - 100%)    Parameters below as of 11 Aug 2024 14:05  Patient On (Oxygen Delivery Method): nasal cannula  O2 Flow (L/min): 2    CAPILLARY BLOOD GLUCOSE      POCT Blood Glucose.: 96 mg/dL (11 Aug 2024 17:05)  POCT Blood Glucose.: 83 mg/dL (11 Aug 2024 11:41)      08-10 @ 07:01 - 08-11 @ 07:00  --------------------------------------------------------  IN: 420 mL / OUT: 300 mL / NET: 120 mL    08-11 @ 07:01 - 08-11 @ 22:36  --------------------------------------------------------  IN: 270 mL / OUT: 100 mL / NET: 170 mL      Physical Exam:    Daily     Daily   General: Cachectic  HEENT:  Nonicteric, PERRLA  CV:  RRR, S1S2   Lungs:  CTA B/L, no wheezes, rales, rhonchi  Abdomen:  Soft, non-tender, no distended, positive BS  Extremities:  No edema  Neuro:  Grossly nonfocal                                                                                                                                                                                                                                                                                 LABS:                               13.5   10.00 )-----------( 110      ( 11 Aug 2024 07:21 )             40.7                      08-11    136  |  96  |  36<H>  ----------------------------<  97  4.0   |  24  |  2.57<H>    Ca    7.9<L>      11 Aug 2024 09:29                         RADIOLOGY & ADDITIONAL TESTS         I personally reviewed: [  ]EKG   [  ]CXR    [  ] CT      A/P:         Discussed with :     Simon consultants' Notes   Time spent :

## 2024-08-11 NOTE — CHART NOTE - NSCHARTNOTEFT_GEN_A_CORE
pt placed npo at [place] for possible egd tomorrow, but would need rate control, currently remains in rapid af

## 2024-08-11 NOTE — PROGRESS NOTE ADULT - ASSESSMENT
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 Rpt TFTs  FT4 level repeat up to 1.1, improving   Hashimoto's hypothyroid, +TPO antibodies   Afib: Cortisol not suggestive of AI, on medications, monitored.  CAD:  stable monitored    Discussed plan and management with Dr Mirza Zelaya NP - TEAMS

## 2024-08-11 NOTE — PROGRESS NOTE ADULT - SUBJECTIVE AND OBJECTIVE BOX
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 Limited hx obtainable from pt, was AAOx~1 on encounter and very lethargic/somnolent, but arousable and seemed to deny pain anywhere. Collateral hx obtained from chart review. During recent admission (7/21/24-7/29/24), she was treated for UTI/sepsis and ultimately discharged to rehab to completed a 5-day course of ciprofloxacin (last dose 7/30/24). At rehab, she was reportedly found to have oliguria for a few days over the weekend for which she was started on IV fluids, however she was noncompliant with the IV access and she pulled it out many times. She has had very poor appetite and became lethargic and hypotensive. She was subsequently transferred to hospital where she was found to have black-colored output in ileostomy bag.   In ED: Afebrile, HR 120s-170s (Afib), SBP 90s-130s, RR 15-22, sating % on RA.  Labs notable for Hgb 11.3->10.3, Na 133->122, SCr 3.04->2.58; lactated 4.7->2.8, blood glucose to 400s but FS 100s. Found to be C.diff positive. UA not best sample with 9 epithelial cells, but noted +LE, +bacteria, +WBC, neg nitrite. CT A/P showed no acute intra-abdominal pathology and unchanged indeterminate left adrenal lesion. Received Vanc 500mg PO, Flagyl 500mg IVPB, amio 150mg IVPB x3, ofirmev 1g, 1.5L IVF, and 1u pRBC. Also started on amio gtt and protonix gtt. Nephrology and MICU were consulted. Admitted to Medicine for further management. (05 Aug 2024 23:46)    Known to me from prior admissions. Has paroxysmal AFib, and syncope, has an ILR for surveillance for long pauses.  She has known short pauses overnight, but nothing long enough or with symptoms to warrant pacemaker insertion.  During subsequent admissions, the usual issue has been rapidly conducted  AFib.  Unable to answer 10pt ROS due to somnolence.  Date of service 8/8- continues to have black output and gas in ostomy.  remains somnolent.  AF/RVR on telemetry.  Hgb back up over 11g/dL.   8/9/23 - continues to be in AF RVR, she is now vomiting not tolerating PO meds. Denies chest pain, dyspnea, or palpitations. Review of systems otherwise negative   8/10 no events overnight, pan cultured overnight, hypothermic overnight   Date of service 8/11 pt with persistent tachycardia, on dig, BP lower than normal, off BB at present, pt denies chest pain or sob    PAST MEDICAL & SURGICAL HISTORY:  Hypertension  Hypothyroid  Osteoarthritis  knees, back  CAD (coronary artery disease)  CROW (obstructive sleep apnea)  non complaint on CPAP  History of MI (myocardial infarction)  h/o previous MI in 2004 prompted PTCA  with stenting x 2 vessels   last stress/ echo 2019  Heart murmur  dx in childhood  Bilateral hearing loss, unspecified hearing loss type  bilateral aids  Obesity  Mixed stress and urge urinary incontinence  Overactive bladder  S/P ORIF (open reduction internal fixation) fracture  left hip 1962  S/P appendectomy  30 plus years  S/P knee replacement  left 2000  Stented coronary artery  2004 X 2 STENTS  S/P laparotomy  due to adhesions, 30 years ago  H/O dilation and curettage  2/2019 Benign polyp            acetaminophen     Tablet .. 650 milliGRAM(s) Oral every 6 hours PRN  ascorbic acid 500 milliGRAM(s) Oral daily  atorvastatin 80 milliGRAM(s) Oral at bedtime  digoxin  Injectable 125 MICROGram(s) IV Push every other day  lactated ringers. 500 milliLiter(s) IV Continuous <Continuous>  levothyroxine 150 MICROGram(s) Oral daily  midodrine. 30 milliGRAM(s) Oral every 8 hours  multivitamin 1 Tablet(s) Oral daily  pantoprazole  Injectable 40 milliGRAM(s) IV Push two times a day  sodium bicarbonate 1300 milliGRAM(s) Oral three times a day  sodium chloride 0.45% 1000 milliLiter(s) IV Continuous <Continuous>  vancomycin    Solution 500 milliGRAM(s) Oral every 6 hours                            13.7   10.34 )-----------( 133      ( 09 Aug 2024 10:11 )             41.0       Hemoglobin: 13.7 g/dL (08-09 @ 10:11)  Hemoglobin: 12.8 g/dL (08-08 @ 07:38)  Hemoglobin: 12.3 g/dL (08-08 @ 00:39)  Hemoglobin: 12.0 g/dL (08-07 @ 18:55)  Hemoglobin: 11.7 g/dL (08-07 @ 07:36)      08-09    133<L>  |  98  |  34<H>  ----------------------------<  115<H>  3.9   |  18<L>  |  2.25<H>    Ca    7.6<L>      09 Aug 2024 10:11  Mg     1.9     08-09    TPro  5.4<L>  /  Alb  2.9<L>  /  TBili  0.9  /  DBili  x   /  AST  30  /  ALT  25  /  AlkPhos  75  08-09    Creatinine Trend: 2.25<--, 2.26<--, 2.38<--, 2.49<--, 2.81<--, 2.92<--    COAGS:           T(C): 36.4 (08-11-24 @ 04:48), Max: 36.4 (08-10-24 @ 20:34)  HR: 120 (08-11-24 @ 04:48) (77 - 144)  BP: 102/85 (08-11-24 @ 04:48) (84/50 - 116/84)  RR: 18 (08-11-24 @ 04:48) (18 - 18)  SpO2: 100% (08-11-24 @ 04:48) (95% - 100%)  Wt(kg): --    I&O's Summary    09 Aug 2024 07:01  -  10 Aug 2024 07:00  --------------------------------------------------------  IN: 220 mL / OUT: 350 mL / NET: -130 mL    10 Aug 2024 07:01  -  11 Aug 2024 05:09  --------------------------------------------------------  IN: 420 mL / OUT: 300 mL / NET: 120 mL      Appearance: frail elderly woman in no acute distress, somnolent	  HEENT:   Normal oral mucosa, PERRL, EOMI	  Lymphatic: No lymphadenopathy , no edema  Cardiovascular: rapid irreuglar S1 S2, No JVD, No murmurs , Peripheral pulses palpable 2+ bilaterally  Respiratory: Lungs clear to auscultation, normal effort 	  Gastrointestinal:  Soft, Non-tender, + BS	  Skin: No rashes, No ecchymoses, No cyanosis, warm to touch  Musculoskeletal: Normal range of motion, normal strength  Psychiatry:  Mood & affect appropriate      TELEMETRY: -150s    ECG: AFib RVR 	  	  ASSESSMENT/PLAN: Ms Gooden is a pleasant 78y Female here with CDiff +/- GI bleeding.  EP called re: management of rapid AFib.  Has Paroxysmal AFib.  Had brief episodes of sinus rhythm on last admission, and known short pauses that are not long enough to justify permanent pacemaker insertion.  Has an ILR for long-pause surveillance, data managed by Dr Mendiola.  [id number] is at least 5. Restart apixaban 5mg BID for stroke prevention when ok with GI (held for GIB)  Re: rate control of her AFib, she has an acute kidney injury and is not a good candidate for full-dose Digoxin at this time.  Amiodarone not ideal, as this could chemically cardiovert her while we are holding anticoagulation.    Metoprolol remains on hold given hypotension  OK with modified-load of Digoxin. Received 0.125mg overnight, will start IV Digoxin 0.125mg QOD, can transition to oral once tolerating PO. Plan to stop digoxin prior to discharge. Will hopefully be able to tolerate metoprolol by then once BP improved.  She's had multiple infections in the last few hospital stays, and has looked weaker and more frail each time.  I suspect her long-term prognosis is becoming worse.  She is not a good candidate for invasive management of AFib (ablation).

## 2024-08-11 NOTE — PROGRESS NOTE ADULT - ASSESSMENT
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 Admitted due to black output in the ileostomy bag c/f UGIB, afib with RVR, and c diff positive.    1. GIB  resolved GIB; stools brown/yellow  still w tachycardia      2. Nausea/Vomiting   improved        3. C. Diff   ?significance given ileostomy, may be a colonizer, defer to ID      4. Afib   a/c on hold for GIB   cardiology on board

## 2024-08-12 LAB
ANION GAP SERPL CALC-SCNC: 17 MMOL/L — SIGNIFICANT CHANGE UP (ref 5–17)
APTT BLD: 33 SEC — SIGNIFICANT CHANGE UP (ref 24.5–35.6)
BUN SERPL-MCNC: 37 MG/DL — HIGH (ref 7–23)
CALCIUM SERPL-MCNC: 8 MG/DL — LOW (ref 8.4–10.5)
CHLORIDE SERPL-SCNC: 94 MMOL/L — LOW (ref 96–108)
CO2 SERPL-SCNC: 23 MMOL/L — SIGNIFICANT CHANGE UP (ref 22–31)
CREAT SERPL-MCNC: 2.55 MG/DL — HIGH (ref 0.5–1.3)
EGFR: 19 ML/MIN/1.73M2 — LOW
GLUCOSE BLDC GLUCOMTR-MCNC: 104 MG/DL — HIGH (ref 70–99)
GLUCOSE BLDC GLUCOMTR-MCNC: 92 MG/DL — SIGNIFICANT CHANGE UP (ref 70–99)
GLUCOSE SERPL-MCNC: 92 MG/DL — SIGNIFICANT CHANGE UP (ref 70–99)
HCT VFR BLD CALC: 42.4 % — SIGNIFICANT CHANGE UP (ref 34.5–45)
HGB BLD-MCNC: 13.7 G/DL — SIGNIFICANT CHANGE UP (ref 11.5–15.5)
MAGNESIUM SERPL-MCNC: 1.6 MG/DL — SIGNIFICANT CHANGE UP (ref 1.6–2.6)
MCHC RBC-ENTMCNC: 30 PG — SIGNIFICANT CHANGE UP (ref 27–34)
MCHC RBC-ENTMCNC: 32.3 GM/DL — SIGNIFICANT CHANGE UP (ref 32–36)
MCV RBC AUTO: 93 FL — SIGNIFICANT CHANGE UP (ref 80–100)
NRBC # BLD: 0 /100 WBCS — SIGNIFICANT CHANGE UP (ref 0–0)
PHOSPHATE SERPL-MCNC: 4 MG/DL — SIGNIFICANT CHANGE UP (ref 2.5–4.5)
PLATELET # BLD AUTO: 91 K/UL — LOW (ref 150–400)
POTASSIUM SERPL-MCNC: 4.4 MMOL/L — SIGNIFICANT CHANGE UP (ref 3.5–5.3)
POTASSIUM SERPL-SCNC: 4.4 MMOL/L — SIGNIFICANT CHANGE UP (ref 3.5–5.3)
RBC # BLD: 4.56 M/UL — SIGNIFICANT CHANGE UP (ref 3.8–5.2)
RBC # FLD: 17.9 % — HIGH (ref 10.3–14.5)
SODIUM SERPL-SCNC: 134 MMOL/L — LOW (ref 135–145)
WBC # BLD: 10.09 K/UL — SIGNIFICANT CHANGE UP (ref 3.8–10.5)
WBC # FLD AUTO: 10.09 K/UL — SIGNIFICANT CHANGE UP (ref 3.8–10.5)

## 2024-08-12 RX ORDER — HEPARIN SODIUM,BOVINE 1000/ML
VIAL (ML) INJECTION
Qty: 25000 | Refills: 0 | Status: DISCONTINUED | OUTPATIENT
Start: 2024-08-12 | End: 2024-08-13

## 2024-08-12 RX ORDER — METOPROLOL TARTRATE 100 MG/1
2.5 TABLET ORAL ONCE
Refills: 0 | Status: COMPLETED | OUTPATIENT
Start: 2024-08-12 | End: 2024-08-12

## 2024-08-12 RX ORDER — DIGOXIN 0.12 MG/1
250 TABLET ORAL ONCE
Refills: 0 | Status: COMPLETED | OUTPATIENT
Start: 2024-08-12 | End: 2024-08-12

## 2024-08-12 RX ADMIN — MIDODRINE HYDROCHLORIDE 30 MILLIGRAM(S): 5 TABLET ORAL at 06:28

## 2024-08-12 RX ADMIN — Medication 500 MILLIGRAM(S): at 17:17

## 2024-08-12 RX ADMIN — METOPROLOL TARTRATE 2.5 MILLIGRAM(S): 100 TABLET ORAL at 08:07

## 2024-08-12 RX ADMIN — DIGOXIN 250 MICROGRAM(S): 0.12 TABLET ORAL at 09:48

## 2024-08-12 RX ADMIN — Medication 150 MICROGRAM(S): at 06:27

## 2024-08-12 RX ADMIN — Medication 500 MILLIGRAM(S): at 13:02

## 2024-08-12 RX ADMIN — Medication 100 MILLILITER(S): at 13:02

## 2024-08-12 RX ADMIN — Medication 500 MILLIGRAM(S): at 00:27

## 2024-08-12 RX ADMIN — METOPROLOL TARTRATE 2.5 MILLIGRAM(S): 100 TABLET ORAL at 04:43

## 2024-08-12 RX ADMIN — Medication 25 GRAM(S): at 09:47

## 2024-08-12 RX ADMIN — Medication 80 MILLIGRAM(S): at 21:17

## 2024-08-12 RX ADMIN — Medication 1300 UNIT(S)/HR: at 20:23

## 2024-08-12 RX ADMIN — Medication 40 MILLIGRAM(S): at 17:17

## 2024-08-12 RX ADMIN — Medication 500 MILLIGRAM(S): at 06:27

## 2024-08-12 RX ADMIN — MIDODRINE HYDROCHLORIDE 30 MILLIGRAM(S): 5 TABLET ORAL at 13:02

## 2024-08-12 RX ADMIN — MIDODRINE HYDROCHLORIDE 30 MILLIGRAM(S): 5 TABLET ORAL at 21:18

## 2024-08-12 NOTE — PROGRESS NOTE ADULT - ASSESSMENT
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 Found to have C.diff, CHANELL, and possible anemia of acute blood loss 2/2 GIB.      Acute blood loss anemia secondary to acute GI bleed: Status post transfusion with good response.  Discussed with Dr. Sprague: Stool is yellow, H&H seems stable with good response to transfusion.  will hold off on endoscopy at this time given hypotension and continue to optimize...  Will plan EGD early next week    ·  Problem: Clostridium difficile infection.   ·  Plan: Found to have C.diff infection. Likely i/s/o recent abx use (cipro) to treat UTI.  - CT A/P showed no acute intra-abdominal pathology  Continue vancomycin      ·  Problem: Metabolic encephalopathy.   ·  Plan: Patient Was lethargic but arousable...Now improved  and more alert    Likely multifactorial including acute infection,CHANELL and Dehydration  Mental status during the recent hospitalization showed decline and mentation however workup was negative for acute neurological pathology.      ·  Problem: Atrial fibrillation with RVR.   ·  Plan: Presented in Afib w/ RVR to HR 170s. Has hx of Afib (on eliquis at home). Suspect RVR i/s/o infection, dehydration, electrolyte abnormalities    Patient still with A. fib and RVR.....  Discussed with electrophysiology Dr. Frias: patient might need AV ablation  Hold anticoagulation in setting of melanotic stool:  We will consider restarting and monitoring H&H    Thrombocytopenia: likely secondary to  infection  However consulted hematology      ·  Problem: CHANELL (acute kidney injury).   Likely prerenal secondary to dehydration continue IV fluids and bicarb as per renal      ·  Problem: Hyponatremia.   Likely secondary to dehydration.  Continue IV fluid      ·  Problem: Hypotension.   Likely multifactorial secondary to acute GI bleed, dehydration, possible infection  Continue to monitor H&H  Continue fluid resuscitation and midodrin  Discussed with infectious disease: We will continue with Empiric antibiotics and if culture is negative we will DC: Now discontinued  Patient still hypotensive With unclear etiology...Decreased urine output however mentating Better than yesterday..  MICU was reconsulted.  Patient is notDeemed to be a candidate        ·  Problem: CAD (coronary artery disease).   - holding home plavix  - c/w home statin        ·  Problem: Hypothyroidism.   Recently decreased dose      Appreciate wound care consult.  Follow-up official input    Also of note patient now is full code since daughter rescinded DNR

## 2024-08-12 NOTE — PROGRESS NOTE ADULT - SUBJECTIVE AND OBJECTIVE BOX
Chief complaint  Patient is a 78y old  Female who presents with a chief complaint of AMS/lethargy, Afib w/ RVR, Hyponatremia, C.diff infection (12 Aug 2024 13:11)         Labs and Fingersticks  CAPILLARY BLOOD GLUCOSE      POCT Blood Glucose.: 92 mg/dL (12 Aug 2024 11:46)  POCT Blood Glucose.: 96 mg/dL (11 Aug 2024 17:05)      Anion Gap: 17 (08-12 @ 06:42)  Anion Gap: 16 (08-11 @ 09:29)  Anion Gap: 17 (08-11 @ 07:22)      Calcium: 8.0 *L* (08-12 @ 06:42)  Calcium: 7.9 *L* (08-11 @ 09:29)  Calcium: 7.9 *L* (08-11 @ 07:22)          08-12    134<L>  |  94<L>  |  37<H>  ----------------------------<  92  4.4   |  23  |  2.55<H>    Ca    8.0<L>      12 Aug 2024 06:42  Phos  4.0     08-12  Mg     1.6     08-12                          13.7   10.09 )-----------( 91       ( 12 Aug 2024 06:44 )             42.4     Medications  MEDICATIONS  (STANDING):  ascorbic acid 500 milliGRAM(s) Oral daily  atorvastatin 80 milliGRAM(s) Oral at bedtime  digoxin  Injectable 125 MICROGram(s) IV Push <User Schedule>  levothyroxine 150 MICROGram(s) Oral daily  midodrine. 30 milliGRAM(s) Oral every 8 hours  multivitamin 1 Tablet(s) Oral daily  pantoprazole  Injectable 40 milliGRAM(s) IV Push two times a day  sodium chloride 0.45% 1000 milliLiter(s) (100 mL/Hr) IV Continuous <Continuous>  vancomycin    Solution 500 milliGRAM(s) Oral every 6 hours      Physical Exam  General: Patient comfortable in bed  Vital Signs Last 12 Hrs  T(F): 97.9 (08-12-24 @ 11:52), Max: 97.9 (08-12-24 @ 11:52)  HR: 107 (08-12-24 @ 11:52) (107 - 155)  BP: 97/68 (08-12-24 @ 11:52) (97/68 - 119/66)  BP(mean): --  RR: 18 (08-12-24 @ 11:52) (18 - 18)  SpO2: 98% (08-12-24 @ 11:52) (98% - 99%)    CVS: S1S2   Respiratory: No wheezing, no crepitations  GI: Abdomen soft, bowel sounds positive  Musculoskeletal:  moves all extremities  : Voiding

## 2024-08-12 NOTE — PROGRESS NOTE ADULT - SUBJECTIVE AND OBJECTIVE BOX
Chief Complaint:  Patient is a 78y old  Female who presents with a chief complaint of AMS/lethargy, Afib w/ RVR, Hyponatremia, C.diff infection (12 Aug 2024 18:59)      Date of service 24 @ 20:01      Interval Events:   rapid afib    Hospital Medications:  acetaminophen     Tablet .. 650 milliGRAM(s) Oral every 6 hours PRN  ascorbic acid 500 milliGRAM(s) Oral daily  atorvastatin 80 milliGRAM(s) Oral at bedtime  digoxin  Injectable 125 MICROGram(s) IV Push <User Schedule>  heparin  Infusion.  Unit(s)/Hr IV Continuous <Continuous>  levothyroxine 150 MICROGram(s) Oral daily  midodrine. 30 milliGRAM(s) Oral every 8 hours  multivitamin 1 Tablet(s) Oral daily  pantoprazole  Injectable 40 milliGRAM(s) IV Push two times a day  sodium chloride 0.45% 1000 milliLiter(s) IV Continuous <Continuous>  vancomycin    Solution 500 milliGRAM(s) Oral every 6 hours        Review of Systems:  General:  No wt loss, fevers, chills, night sweats, fatigue,   Eyes:  Good vision, no reported pain  ENT:  No sore throat, pain, runny nose, dysphagia  CV:  No pain, palpitations, hypo/hypertension  Resp:  No dyspnea, cough, tachypnea, wheezing  GI:  See HPI  :  No pain, bleeding, incontinence, nocturia  Muscle:  No pain, weakness  Neuro:  No weakness, tingling, memory problems  Psych:  No fatigue, insomnia, mood problems, depression  Endocrine:  No polyuria, polydipsia, cold/heat intolerance  Heme:  No petechiae, ecchymosis, easy bruisability  Integumentary:  No rash, edema    PHYSICAL EXAM:   Vital Signs:  Vital Signs Last 24 Hrs  T(C): 36.6 (12 Aug 2024 11:52), Max: 36.6 (12 Aug 2024 04:53)  T(F): 97.9 (12 Aug 2024 11:52), Max: 97.9 (12 Aug 2024 11:52)  HR: 68 (12 Aug 2024 17:22) (68 - 155)  BP: 99/70 (12 Aug 2024 17:22) (97/68 - 119/66)  BP(mean): --  RR: 18 (12 Aug 2024 11:52) (18 - 20)  SpO2: 98% (12 Aug 2024 11:52) (98% - 99%)    Parameters below as of 12 Aug 2024 11:52  Patient On (Oxygen Delivery Method): nasal cannula      Daily     Daily Weight in k.4 (12 Aug 2024 07:58)      PHYSICAL EXAM:     GENERAL:  Appears stated age, well-groomed, well-nourished, no distress  HEENT:  NC/AT,  conjunctivae anicteric, clear and pink,   NECK: supple, trachea midline  CHEST:  Full & symmetric excursion, no increased effort, breath sounds clear  HEART:  Regular rhythm, no JVD  ABDOMEN:  Soft, non-tender, non-distended, normoactive bowel sounds,  no masses , no hepatosplenomegaly  EXTREMITIES:  no cyanosis,clubbing or edema  SKIN:  No rash, erythema, or, ecchymoses, no jaundice  NEURO:  Alert, non-focal, no asterixis  PSYCH: Appropriate affect, oriented to place and time  RECTAL: Deferred      LABS Personally reviewed by me:                        13.7   10.09 )-----------( 91       ( 12 Aug 2024 06:44 )             42.4     Mean Cell Volume: 93.0 fl (08-24 @ 06:44)    08-12    134<L>  |  94<L>  |  37<H>  ----------------------------<  92  4.4   |  23  |  2.55<H>    Ca    8.0<L>      12 Aug 2024 06:42  Phos  4.0     08-12  Mg     1.6     08-12        PTT - ( 12 Aug 2024 13:16 )  PTT:33.0 sec  Urinalysis Basic - ( 12 Aug 2024 06:42 )    Color: x / Appearance: x / SG: x / pH: x  Gluc: 92 mg/dL / Ketone: x  / Bili: x / Urobili: x   Blood: x / Protein: x / Nitrite: x   Leuk Esterase: x / RBC: x / WBC x   Sq Epi: x / Non Sq Epi: x / Bacteria: x                              13.7   10.09 )-----------( 91       ( 12 Aug 2024 06:44 )             42.4                         13.5   10.00 )-----------( 110      ( 11 Aug 2024 07:21 )             40.7       Imaging personally reviewed by me:

## 2024-08-12 NOTE — PROGRESS NOTE ADULT - SUBJECTIVE AND OBJECTIVE BOX
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 Limited hx obtainable from pt, was AAOx~1 on encounter and very lethargic/somnolent, but arousable and seemed to deny pain anywhere. Collateral hx obtained from chart review. During recent admission (7/21/24-7/29/24), she was treated for UTI/sepsis and ultimately discharged to rehab to completed a 5-day course of ciprofloxacin (last dose 7/30/24). At rehab, she was reportedly found to have oliguria for a few days over the weekend for which she was started on IV fluids, however she was noncompliant with the IV access and she pulled it out many times. She has had very poor appetite and became lethargic and hypotensive. She was subsequently transferred to hospital where she was found to have black-colored output in ileostomy bag.   In ED: Afebrile, HR 120s-170s (Afib), SBP 90s-130s, RR 15-22, sating % on RA.  Labs notable for Hgb 11.3->10.3, Na 133->122, SCr 3.04->2.58; lactated 4.7->2.8, blood glucose to 400s but FS 100s. Found to be C.diff positive. UA not best sample with 9 epithelial cells, but noted +LE, +bacteria, +WBC, neg nitrite. CT A/P showed no acute intra-abdominal pathology and unchanged indeterminate left adrenal lesion. Received Vanc 500mg PO, Flagyl 500mg IVPB, amio 150mg IVPB x3, ofirmev 1g, 1.5L IVF, and 1u pRBC. Also started on amio gtt and protonix gtt. Nephrology and MICU were consulted. Admitted to Medicine for further management. (05 Aug 2024 23:46)    Known to me from prior admissions. Has paroxysmal AFib, and syncope, has an ILR for surveillance for long pauses.  She has known short pauses overnight, but nothing long enough or with symptoms to warrant pacemaker insertion.  During subsequent admissions, the usual issue has been rapidly conducted  AFib.  Unable to answer 10pt ROS due to somnolence.  Date of service 8/12- resting in bed, remains lethargic, a/o x 1 at best. rapid AFib 140s+ despite multiple drugs.    PAST MEDICAL & SURGICAL HISTORY:  Hypertension  Hypothyroid  Osteoarthritis  knees, back  CAD (coronary artery disease)  CROW (obstructive sleep apnea)  non complaint on CPAP  History of MI (myocardial infarction)  h/o previous MI in 2004 prompted PTCA  with stenting x 2 vessels   last stress/ echo 2019  Heart murmur  dx in childhood  Bilateral hearing loss, unspecified hearing loss type  bilateral aids  Obesity  Mixed stress and urge urinary incontinence  Overactive bladder  S/P ORIF (open reduction internal fixation) fracture  left hip 1962  S/P appendectomy  30 plus years  S/P knee replacement  left 2000  Stented coronary artery  2004 X 2 STENTS  S/P laparotomy  due to adhesions, 30 years ago  H/O dilation and curettage  2/2019 Benign polyp    acetaminophen     Tablet .. 650 milliGRAM(s) Oral every 6 hours PRN  ascorbic acid 500 milliGRAM(s) Oral daily  atorvastatin 80 milliGRAM(s) Oral at bedtime  digoxin  Injectable 125 MICROGram(s) IV Push <User Schedule>  levothyroxine 150 MICROGram(s) Oral daily  midodrine. 30 milliGRAM(s) Oral every 8 hours  multivitamin 1 Tablet(s) Oral daily  pantoprazole  Injectable 40 milliGRAM(s) IV Push two times a day  sodium chloride 0.45% 1000 milliLiter(s) IV Continuous <Continuous>  vancomycin    Solution 500 milliGRAM(s) Oral every 6 hours                            13.7   10.09 )-----------( 91       ( 12 Aug 2024 06:44 )             42.4       08-12    134<L>  |  94<L>  |  37<H>  ----------------------------<  92  4.4   |  23  |  2.55<H>    Ca    8.0<L>      12 Aug 2024 06:42  Phos  4.0     08-12  Mg     1.6     08-12      T(C): 36.6 (08-12-24 @ 11:52), Max: 36.6 (08-12-24 @ 04:53)  HR: 107 (08-12-24 @ 11:52) (107 - 155)  BP: 97/68 (08-12-24 @ 11:52) (97/68 - 120/84)  RR: 18 (08-12-24 @ 11:52) (18 - 20)  SpO2: 98% (08-12-24 @ 11:52) (97% - 99%)  Wt(kg): --    I&O's Summary    11 Aug 2024 07:01  -  12 Aug 2024 07:00  --------------------------------------------------------  IN: 270 mL / OUT: 400 mL / NET: -130 mL    Appearance: frail elderly woman in no acute distress, somnolent	  HEENT:   Normal oral mucosa, PERRL, EOMI	  Lymphatic: No lymphadenopathy , no edema  Cardiovascular: rapid irreuglar S1 S2, No JVD, No murmurs , Peripheral pulses palpable 2+ bilaterally  Respiratory: Lungs clear to auscultation, normal effort 	  Gastrointestinal:  Soft, Non-tender, + BS	  Skin: No rashes, No ecchymoses, No cyanosis, warm to touch  Musculoskeletal: Normal range of motion, normal strength  Psychiatry:  Mood & affect appropriate      TELEMETRY: AF 140s.	    ECG: AFib RVR 	        ASSESSMENT/PLAN: Ms Gooden is a pleasant 78y Female here with CDiff +/- GI bleeding.  EP called re: management of rapid AFib.  Has Paroxysmal AFib.  Had brief episodes of sinus rhythm on last admission, and known short pauses that are not long enough to justify permanent pacemaker insertion.  Has an ILR for long-pause surveillance, data managed by Dr Mendiola.  [id number] is at least 5.  Continue apixaban 5mg BID for stroke prevention unless this needs to be held for GI workup.    Continue metoprolol alongside midodrine. OK for holding parameters for HEARTRATE, but metoprolol is in general BP-neutral.  She had a higher digoxin level last week but is not currently on any active AVN blockade.  Ultimately may need a leadless pacemaker + AV junction ablation, but currently not in any condition for the invasive EP lab.  (Recent CDiff diagnosis, platelets <100, and oriented only x 1).  Consider palliative eval, as shes been hospitalized 6x in 6 months.    Will follow with you.      Shane Frias M.D.  Cardiac Electrophysiology    office [phone]  pager [phone]

## 2024-08-12 NOTE — PROGRESS NOTE ADULT - SUBJECTIVE AND OBJECTIVE BOX
Subjective: Patient seen and examined. No new events except as noted.     REVIEW OF SYSTEMS:    CONSTITUTIONAL:+ weakness, fevers or chills  EYES/ENT: No visual changes;  No vertigo or throat pain   NECK: No pain or stiffness  RESPIRATORY: No cough, wheezing, hemoptysis; No shortness of breath  CARDIOVASCULAR: No chest pain or palpitations  GASTROINTESTINAL: No abdominal or epigastric pain. No nausea, vomiting, or hematemesis; No diarrhea or constipation. No melena or hematochezia.  GENITOURINARY: No dysuria, frequency or hematuria  NEUROLOGICAL: No numbness or weakness  SKIN: No itching, burning, rashes, or lesions   All other review of systems is negative unless indicated above.    MEDICATIONS:  MEDICATIONS  (STANDING):  ascorbic acid 500 milliGRAM(s) Oral daily  atorvastatin 80 milliGRAM(s) Oral at bedtime  digoxin  Injectable 125 MICROGram(s) IV Push <User Schedule>  lactated ringers. 500 milliLiter(s) (125 mL/Hr) IV Continuous <Continuous>  levothyroxine 150 MICROGram(s) Oral daily  magnesium sulfate  IVPB 2 Gram(s) IV Intermittent once  midodrine. 30 milliGRAM(s) Oral every 8 hours  multivitamin 1 Tablet(s) Oral daily  pantoprazole  Injectable 40 milliGRAM(s) IV Push two times a day  sodium bicarbonate 1300 milliGRAM(s) Oral three times a day  sodium chloride 0.45% 1000 milliLiter(s) (100 mL/Hr) IV Continuous <Continuous>  vancomycin    Solution 500 milliGRAM(s) Oral every 6 hours      PHYSICAL EXAM:  T(C): 36.6 (08-12-24 @ 04:53), Max: 36.6 (08-12-24 @ 04:53)  HR: 153 (08-12-24 @ 04:53) (140 - 153)  BP: 99/66 (08-12-24 @ 04:53) (93/65 - 120/84)  RR: 18 (08-12-24 @ 04:53) (18 - 20)  SpO2: 99% (08-12-24 @ 04:53) (94% - 99%)  Wt(kg): --  I&O's Summary    11 Aug 2024 07:01  -  12 Aug 2024 07:00  --------------------------------------------------------  IN: 270 mL / OUT: 400 mL / NET: -130 mL          Appearance: NAD  HEENT:   Dry  oral mucosa, PERRL, EOMI	  Lymphatic: No lymphadenopathy , no edema  Cardiovascular: Irregular  S1 S2, No JVD, No murmurs , Peripheral pulses palpable 2+ bilaterally  Respiratory: decreased bs   Gastrointestinal:  Soft, Non-tender, + BS	  Skin: No rashes, No ecchymoses, No cyanosis, warm to touch  Musculoskeletal: decreased range of motion and strength  Psychiatry: sleepy    Ext: No edema    LABS:    CARDIAC MARKERS:                                13.7   10.09 )-----------( 91       ( 12 Aug 2024 06:44 )             42.4     08-12    134<L>  |  94<L>  |  37<H>  ----------------------------<  92  4.4   |  23  |  2.55<H>    Ca    8.0<L>      12 Aug 2024 06:42  Phos  4.0     08-12  Mg     1.6     08-12      proBNP:   Lipid Profile:   HgA1c:   TSH:             TELEMETRY: 	    ECG:  	  RADIOLOGY:   DIAGNOSTIC TESTING:  [ ] Echocardiogram:  [ ]  Catheterization:  [ ] Stress Test:    OTHER:

## 2024-08-12 NOTE — PROGRESS NOTE ADULT - ASSESSMENT
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 Rpt TFTs  FT4 level repeat up to 1.1, improving.  Hashimoto's hypothyroid, +TPO antibodies.    Afib: Cortisol not suggestive of AI, on medications, monitored.  CAD:  stable monitored    Discussed plan and management with Dr Mirza Zelaya NP - TEAMS

## 2024-08-12 NOTE — PROGRESS NOTE ADULT - SUBJECTIVE AND OBJECTIVE BOX
Date of service: 24 @ 19:00      Patient is a 78y old  Female who presents with a chief complaint of AMS/lethargy, Afib w/ RVR, Hyponatremia, C.diff infection (12 Aug 2024 13:18)                                                               INTERVAL HPI/OVERNIGHT EVENTS:    REVIEW OF SYSTEMS:    No chest pain or shortness of breath                                                                                                                                                                                                                                                                          Medications:  MEDICATIONS  (STANDING):  ascorbic acid 500 milliGRAM(s) Oral daily  atorvastatin 80 milliGRAM(s) Oral at bedtime  digoxin  Injectable 125 MICROGram(s) IV Push <User Schedule>  levothyroxine 150 MICROGram(s) Oral daily  midodrine. 30 milliGRAM(s) Oral every 8 hours  multivitamin 1 Tablet(s) Oral daily  pantoprazole  Injectable 40 milliGRAM(s) IV Push two times a day  sodium chloride 0.45% 1000 milliLiter(s) (100 mL/Hr) IV Continuous <Continuous>  vancomycin    Solution 500 milliGRAM(s) Oral every 6 hours    MEDICATIONS  (PRN):  acetaminophen     Tablet .. 650 milliGRAM(s) Oral every 6 hours PRN Temp greater or equal to 38C (100.4F), Mild Pain (1 - 3)       Allergies    penicillin (Hives)    Intolerances      Vital Signs Last 24 Hrs  T(C): 36.6 (12 Aug 2024 11:52), Max: 36.6 (12 Aug 2024 04:53)  T(F): 97.9 (12 Aug 2024 11:52), Max: 97.9 (12 Aug 2024 11:52)  HR: 68 (12 Aug 2024 17:22) (68 - 155)  BP: 99/70 (12 Aug 2024 17:22) (97/68 - 119/66)  BP(mean): --  RR: 18 (12 Aug 2024 11:52) (18 - 20)  SpO2: 98% (12 Aug 2024 11:52) (98% - 99%)    Parameters below as of 12 Aug 2024 11:52  Patient On (Oxygen Delivery Method): nasal cannula      CAPILLARY BLOOD GLUCOSE      POCT Blood Glucose.: 104 mg/dL (12 Aug 2024 17:53)  POCT Blood Glucose.: 92 mg/dL (12 Aug 2024 11:46)       @ 07:01  -   @ 07:00  --------------------------------------------------------  IN: 270 mL / OUT: 400 mL / NET: -130 mL     @ 07:01  -  12 @ 19:00  --------------------------------------------------------  IN: 120 mL / OUT: 0 mL / NET: 120 mL      Physical Exam:    Daily     Daily Weight in k.4 (12 Aug 2024 07:58)  General:  Cachectic in no acute distress  HEENT:  Nonicteric, PERRLA  CV:  RRR, S1S2   Lungs:  CTA B/L, no wheezes, rales, rhonchi  Abdomen:  Soft, Ostomy in place with no melena  Extremities:edema  Nonfocal                                                                                                                                                                                                                                                                                              LABS:                               13.7   10.09 )-----------( 91       ( 12 Aug 2024 06:44 )             42.4                      08-12    134<L>  |  94<L>  |  37<H>  ----------------------------<  92  4.4   |  23  |  2.55<H>    Ca    8.0<L>      12 Aug 2024 06:42  Phos  4.0       Mg     1.6                              RADIOLOGY & ADDITIONAL TESTS         I personally reviewed: [  ]EKG   [  ]CXR    [  ] CT      A/P:         Discussed with :     Simon consultants' Notes   Time spent :

## 2024-08-12 NOTE — PROGRESS NOTE ADULT - SUBJECTIVE AND OBJECTIVE BOX
[facility] NEPHROLOGY- PROGRESS NOTE    78y Female with history of dementia, ischemic bowel s/p resection with end ostomy presents with AMS. Nephrology consulted for elevated Scr and metabolic acidosis.    REVIEW OF SYSTEMS: Unable to obtain due to mental status. Patient however with vomiting this morning as per RN.    penicillin (Hives)      Hospital Medications: Medications reviewed        VITALS:  T(F): 97.9 (08-12-24 @ 11:52), Max: 97.9 (08-12-24 @ 11:52)  HR: 107 (08-12-24 @ 11:52)  BP: 97/68 (08-12-24 @ 11:52)  RR: 18 (08-12-24 @ 11:52)  SpO2: 98% (08-12-24 @ 11:52)  Wt(kg): --    08-11 @ 07:01  -  08-12 @ 07:00  --------------------------------------------------------  IN: 270 mL / OUT: 400 mL / NET: -130 mL        PHYSICAL EXAM:    Gen: NAD, calm  Cards: Irregularly irregular with tachycardia, +S1/S2, no M/G/R  Resp: CTA B/L  GI: soft, NT/ND, NABS, + ostomy   : + amaya  Vascular: no LE edema B/L        LABS:  08-12    134<L>  |  94<L>  |  37<H>  ----------------------------<  92  4.4   |  23  |  2.55<H>    Ca    8.0<L>      12 Aug 2024 06:42  Phos  4.0     08-12  Mg     1.6     08-12      Creatinine Trend: 2.55 <--, 2.57 <--, 2.56 <--, 2.25 <--, 2.26 <--, 2.38 <--, 2.49 <--, 2.81 <--, 2.92 <--, 2.68 <--, 2.58 <--, 3.04 <--                        13.7   10.09 )-----------( 91       ( 12 Aug 2024 06:44 )             42.4     Urine Studies:  Urinalysis Basic - ( 12 Aug 2024 06:42 )    Color:  / Appearance:  / SG:  / pH:   Gluc: 92 mg/dL / Ketone:   / Bili:  / Urobili:    Blood:  / Protein:  / Nitrite:    Leuk Esterase:  / RBC:  / WBC    Sq Epi:  / Non Sq Epi:  / Bacteria:       Sodium, Random Urine: <5 mmol/L (08-06 @ 08:00)  Chloride, Random Urine: <20 mmol/L (08-06 @ 08:00)  Creatinine, Random Urine: 111 mg/dL (08-06 @ 08:00)

## 2024-08-12 NOTE — PROGRESS NOTE ADULT - ASSESSMENT
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 Nephrology consulted for elevated Scr and metabolic acidosis.    1) CHANELL: likely due to hypovolemia from diarrhea and ostomy output. Scr improved with IVF but remains above baseline likely due to hypotension/tachycardia. Continue with IVF as ordered. UA active likely due to infection. FeNa low. CT without obstruction. TMA work up negative. If urgent CT with IV contrast needed, can proceed despite risks of NAINA. Avoid nephrotoxins.    2) Hypotension: BP low. Continue with midodrine 30 mg PO TID and IVF. Rate control as per cardiology. Monitor BP.    3) Metabolic acidosis: Resolved on bicarbonate gtt. D/C oral sodium bicarbonate given resolution of metabolic acidosis (patient also refusing medication). Monitor pH.    4) Hyponatremia: likely due to hypovolemia now resolved with IVF. Monitor serum Na.    5) Urinary retention: Continue with amaya. Holding flomax given hypotension.       [facility] NEPHROLOGY  Paulie Storm M.D.  Mack Clancy D.O.  Maddi Steve M.D.  MD Olivia Caldera, MSN, ANP-C    Telephone: [phone]  Facsimile: [phone], [address]  [place] [address]

## 2024-08-12 NOTE — PROGRESS NOTE ADULT - ASSESSMENT
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 During recent admission (7/21/24-7/29/24), she was treated for UTI/sepsis and ultimately discharged to rehab to completed a 5-day course of ciprofloxacin (last dose 7/30/24). At rehab, she was reportedly found to have oliguria for a few days over the weekend for which she was started on IV fluids, however she was noncompliant with the IV access and she pulled it out many times. She has had very poor appetite and became lethargic and hypotensive. She was subsequently transferred to hospital where she was found to have black-colored output in ileostomy bag.     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 albicans some, has amaya in place, suspect contaminant/colonization  8/9 CXR with clear lungs   8/9 Bcx NGTD x2   mental status remains at baseline, answers/follows   afebrile, WBC wnl, no new findings on exam, nontoxic     Recommendations:   GI following - plan for possible EGD today   Continue vancomycin 500mg PO Q6h for 10d   Contact isolation per IC protocol   Monitor temps/CBC, Cr   Aspiration precautions       Marisa Davidson M.D.  [facility], Division of Infectious Diseases  [phone]  After 5pm on weekdays and all day on weekends - please call [phone]  Available on [employer] TEAMS

## 2024-08-12 NOTE — CONSULT NOTE ADULT - ASSESSMENT
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), COPD, CROW, HTN, HLD, urinary retention, recurrent UTIs, hypothyroidism, recent admission for UTI (c/b sepsis, encephalopathy, and bradycardia), now presenting with AMS/lethargy, found to have melena, Afib/hypotension, thrombocytopenia    #Thrombocytopenia  - no prior hx of thrombocytopenia, plt were normal on admission and have declined since then though fluctuating  - no heparin or AC use  - no LAD and liver/spleen normal on CT  - likely secondary to consumption with GIB + acute illness  - bilirubin was normal prior, LDH elevated but hemolyzed- will repeat  - last iron studies/b12/folate 4/2024- will repeat  - monitor plt    #anemia, blood loss  - melena/GIB  - s/p 6 unit PRBC- last 8/7  - will check iron studies/b12/folate  - Hg now stable  - GI following, for possible EGD    d/w Dr. Duran  d/w daughter bedside

## 2024-08-12 NOTE — PROGRESS NOTE ADULT - ASSESSMENT
EGD continues to be deferred in the setting of rapid afib. Agree at this point patient appears very depleted. Palliative approach seems reasonable.  PPI BID  usual diet

## 2024-08-12 NOTE — PROVIDER CONTACT NOTE (OTHER) - ACTION/TREATMENT ORDERED:
EGD 8/12 EGD 8/12. continue to monitor patient at this time. Digoxin dose given 8/12. EGD 8/12. continue to monitor patient at this time. Digoxin dose given 8/11.

## 2024-08-12 NOTE — PROGRESS NOTE ADULT - SUBJECTIVE AND OBJECTIVE BOX
[facility] DIV[facility] OF INFECTIOUS DISEASES  GERALD Leahy Y. Patel, S. Shah, G. Casimir  [phone]  ([phone] - weekdays after 5pm and weekends)    Name: LEFTY AKBAR  Age/Gender: 78y Female  MRN: [id number]    Interval History:  Patient seen and examined this morning.   Denies pain or any new complaints.   Notes reviewed  No concerning overnight events  Afebrile   Allergies: penicillin (Hives)      Objective:  Vitals:   T(F): 97.8 (08-12-24 @ 04:53), Max: 97.8 (08-12-24 @ 04:53)  HR: 150 (08-12-24 @ 07:40) (140 - 153)  BP: 100/75 (08-12-24 @ 07:40) (93/65 - 120/84)  RR: 18 (08-12-24 @ 04:53) (18 - 20)  SpO2: 99% (08-12-24 @ 04:53) (94% - 99%)  Physical Examination:  General: no acute distress, nontoxic appearing   HEENT: NC/AT, anicteric, EOMI  Respiratory: no acc muscle use, breathing comfortably  Cardiovascular: S1 and S2 present  Gastrointestinal: soft, NT, ND, ostomy brown stool  Extremities: no edema, no cyanosis  Skin: no visible rash    Laboratory Studies:  CBC:                       13.7   10.09 )-----------( 91       ( 12 Aug 2024 06:44 )             42.4     WBC Trend:  10.09 08-12-24 @ 06:44  10.00 08-11-24 @ 07:21  10.34 08-09-24 @ 10:11  7.60 08-08-24 @ 07:38  7.58 08-08-24 @ 00:39  7.90 08-07-24 @ 18:55  7.93 08-07-24 @ 07:36  7.90 08-06-24 @ 18:05  10.38 08-06-24 @ 06:05  9.03 08-05-24 @ 21:01  9.96 08-05-24 @ 15:05    CMP: 08-12    134<L>  |  94<L>  |  37<H>  ----------------------------<  92  4.4   |  23  |  2.55<H>    Ca    8.0<L>      12 Aug 2024 06:42  Phos  4.0     08-12  Mg     1.6     08-12      Creatinine: 2.55 mg/dL (08-12-24 @ 06:42)  Creatinine: 2.57 mg/dL (08-11-24 @ 09:29)  Creatinine: 2.56 mg/dL (08-11-24 @ 07:22)  Creatinine: 2.25 mg/dL (08-09-24 @ 10:11)  Creatinine: 2.26 mg/dL (08-09-24 @ 10:11)  Creatinine: 2.26 mg/dL (08-08-24 @ 06:37)  Creatinine: 2.38 mg/dL (08-07-24 @ 07:36)  Creatinine: 2.49 mg/dL (08-06-24 @ 18:05)  Creatinine: 2.81 mg/dL (08-06-24 @ 15:29)  Creatinine: 2.92 mg/dL (08-06-24 @ 06:05)  Creatinine: 2.68 mg/dL (08-05-24 @ 23:08)  Creatinine: 2.58 mg/dL (08-05-24 @ 21:01)  Creatinine: 3.04 mg/dL (08-05-24 @ 15:05)    Microbiology: reviewed   Culture - Blood (collected 08-09-24 @ 18:50)  Source: .Blood Blood-Peripheral  Preliminary Report (08-12-24 @ 01:01):    No growth at 48 Hours    Culture - Urine (collected 08-05-24 @ 21:00)  Source: Clean Catch Clean Catch (Midstream)  Final Report (08-07-24 @ 14:01):    50,000 - 99,000 CFU/mL Candida albicans "Susceptibilities not performed"    Culture - Blood (collected 08-05-24 @ 20:54)  Source: .Blood Blood-Peripheral  Final Report (08-11-24 @ 01:00):    No growth at 5 days    Culture - Blood (collected 08-05-24 @ 18:00)  Source: .Blood Blood-Peripheral  Final Report (08-11-24 @ 01:00):    No growth at 5 days    Radiology: reviewed     Medications:  acetaminophen     Tablet .. 650 milliGRAM(s) Oral every 6 hours PRN  ascorbic acid 500 milliGRAM(s) Oral daily  atorvastatin 80 milliGRAM(s) Oral at bedtime  digoxin  Injectable 125 MICROGram(s) IV Push <User Schedule>  digoxin  Injectable 250 MICROGram(s) IV Push once  lactated ringers. 500 milliLiter(s) IV Continuous <Continuous>  levothyroxine 150 MICROGram(s) Oral daily  magnesium sulfate  IVPB 2 Gram(s) IV Intermittent once  midodrine. 30 milliGRAM(s) Oral every 8 hours  multivitamin 1 Tablet(s) Oral daily  pantoprazole  Injectable 40 milliGRAM(s) IV Push two times a day  sodium bicarbonate 1300 milliGRAM(s) Oral three times a day  sodium chloride 0.45% 1000 milliLiter(s) IV Continuous <Continuous>  vancomycin    Solution 500 milliGRAM(s) Oral every 6 hours    Current Antimicrobials:  vancomycin    Solution 500 milliGRAM(s) Oral every 6 hours    Prior/Completed Antimicrobials:  metroNIDAZOLE  IVPB  piperacillin/tazobactam IVPB.  piperacillin/tazobactam IVPB.-  vancomycin    Solution

## 2024-08-12 NOTE — CONSULT NOTE ADULT - SUBJECTIVE AND OBJECTIVE BOX
Patient is a 78y old  Female who presents with a chief complaint of AMS/lethargy, Afib w/ RVR, Hyponatremia, C.diff infection (12 Aug 2024 09:32)      HPI:  78F w/ hx of Afib (on Eliquis), CAD (on plavix), MCI/dementia, 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 Limited hx obtainable from pt, was AAOx~1 on encounter and very lethargic/somnolent, but arousable and seemed to deny pain anywhere. Collateral hx obtained from chart review. During recent admission (24-24), she was treated for UTI/sepsis and ultimately discharged to rehab to completed a 5-day course of ciprofloxacin (last dose 24). At rehab, she was reportedly found to have oliguria for a few days over the weekend for which she was started on IV fluids, however she was noncompliant with the IV access and she pulled it out many times. She has had very poor appetite and became lethargic and hypotensive. She was subsequently transferred to hospital where she was found to have black-colored output in ileostomy bag.     In ED: Afebrile, HR 120s-170s (Afib), SBP 90s-130s, RR 15-22, sating % on RA.  Labs notable for Hgb 11.3->10.3, Na 133->122, SCr 3.04->2.58; lactated 4.7->2.8, blood glucose to 400s but FS 100s. Found to be C.diff positive. UA not best sample with 9 epithelial cells, but noted +LE, +bacteria, +WBC, neg nitrite. CT A/P showed no acute intra-abdominal pathology and unchanged indeterminate left adrenal lesion. Received Vanc 500mg PO, Flagyl 500mg IVPB, amio 150mg IVPB x3, ofirmev 1g, 1.5L IVF, and 1u pRBC. Also started on amio gtt and protonix gtt. Nephrology and MICU were consulted. Admitted to Medicine for further management. (05 Aug 2024 23:46)    Daughter at bedside, pt poor historian- awakens but unable to give history. Daughter reports lethargy at Rehab. No prior hx of GIB, thrombocytopenia. No prior heme eval. No recent NSAID use; used to take Advil for knee pain in past. Has been less mobile since admission in February.      PAST MEDICAL & SURGICAL HISTORY:  Hypertension      Hypothyroid      Osteoarthritis  knees, back      CAD (coronary artery disease)      CROW (obstructive sleep apnea)  non complaint on CPAP      History of MI (myocardial infarction)  h/o previous MI in  prompted PTCA  with stenting x 2 vessels   last stress/ echo       Heart murmur  dx in childhood      Bilateral hearing loss, unspecified hearing loss type  bilateral aids      Obesity      Mixed stress and urge urinary incontinence      Overactive bladder      S/P ORIF (open reduction internal fixation) fracture  left hip       S/P appendectomy  30 plus years      S/P knee replacement  left       Stented coronary artery  2004 X 2 STENTS      S/P laparotomy  due to adhesions, 30 years ago      H/O dilation and curettage  2019 Benign polyp          SOCIAL HISTORY:  Smoking - +, quit 2024  Alcohol - occ prior  Drugs - No drug use    FAMILY HISTORY:  sister- dementia  F- cardiac  M-  in 80s    MEDICATIONS  (STANDING):  ascorbic acid 500 milliGRAM(s) Oral daily  atorvastatin 80 milliGRAM(s) Oral at bedtime  digoxin  Injectable 125 MICROGram(s) IV Push <User Schedule>  lactated ringers. 500 milliLiter(s) (125 mL/Hr) IV Continuous <Continuous>  levothyroxine 150 MICROGram(s) Oral daily  midodrine. 30 milliGRAM(s) Oral every 8 hours  multivitamin 1 Tablet(s) Oral daily  pantoprazole  Injectable 40 milliGRAM(s) IV Push two times a day  sodium bicarbonate 1300 milliGRAM(s) Oral three times a day  sodium chloride 0.45% 1000 milliLiter(s) (100 mL/Hr) IV Continuous <Continuous>  vancomycin    Solution 500 milliGRAM(s) Oral every 6 hours    MEDICATIONS  (PRN):  acetaminophen     Tablet .. 650 milliGRAM(s) Oral every 6 hours PRN Temp greater or equal to 38C (100.4F), Mild Pain (1 - 3)      Allergies    penicillin (Hives)    Intolerances    ROS  Gen- denies pain  Pt not able to provide further ROS    Vital Signs Last 24 Hrs  T(C): 36.6 (12 Aug 2024 04:53), Max: 36.6 (12 Aug 2024 04:53)  T(F): 97.8 (12 Aug 2024 04:53), Max: 97.8 (12 Aug 2024 04:53)  HR: 155 (12 Aug 2024 09:42) (140 - 155)  BP: 102/75 (12 Aug 2024 09:42) (99/66 - 120/84)  BP(mean): --  RR: 18 (12 Aug 2024 04:53) (18 - 20)  SpO2: 99% (12 Aug 2024 04:53) (97% - 99%)    Parameters below as of 12 Aug 2024 04:53  Patient On (Oxygen Delivery Method): nasal cannula        PHYSICAL EXAM  General: adult in NAD  HEENT: clear oropharynx, anicteric sclera, pink conjunctiva  Neck: supple  CV: normal S1/S2   Lungs: clear to auscultation, no wheezes, no rales  Abdomen: soft non-tender non-distended, ostomy with brown stool, positive bowel sounds  Ext: no calf tenderness, mild edema  Skin: no rashes and no petechiae  Lymph Nodes: No LAD in axillae, neck  Neuro: lethargic, oriented to person    LABS:                          13.7   10. )-----------( 91       ( 12 Aug 2024 06:44 )             42.4         Mean Cell Volume : 93.0 fl  Mean Cell Hemoglobin : 30.0 pg  Mean Cell Hemoglobin Concentration : 32.3 gm/dL  Auto Neutrophil # : x  Auto Lymphocyte # : x  Auto Monocyte # : x  Auto Eosinophil # : x  Auto Basophil # : x  Auto Neutrophil % : x  Auto Lymphocyte % : x  Auto Monocyte % : x  Auto Eosinophil % : x  Auto Basophil % : x      Serial CBC's   @ 06:44  Hct-42.4 / Hgb-13.7 / Plat-91 / RBC-4.56 / WBC-10.09  Serial CBC's   @ 07:21  Hct-40.7 / Hgb-13.5 / Plat-110 / RBC-4.42 / WBC-10.00  Serial CBC's   @ 10:11  Hct-41.0 / Hgb-13.7 / Plat-133 / RBC-4.52 / WBC-10.34          134<L>  |  94<L>  |  37<H>  ----------------------------<  92  4.4   |  23  |  2.55<H>    Ca    8.0<L>      12 Aug 2024 06:42  Phos  4.0       Mg     1.6      @ 10:11    ldh1 --  haptoglobin 119  SIDNEY--  uric acid--        Radiology:  < from: CT Abdomen and Pelvis No Cont (24 @ 19:52) >  IMPRESSION:  No acute intra-abdominal pathology. Limited evaluation for mesenteric   ischemia in the absence of intravenous contrast.    Unchanged indeterminate left adrenal lesion.      < from: CT Head No Cont (24 @ 18:26) >    IMPRESSION: No acute intracranial hemorrhage, mass effect, or shift of   the midline structures.

## 2024-08-13 LAB
ALBUMIN SERPL ELPH-MCNC: 2.7 G/DL — LOW (ref 3.3–5)
ALP SERPL-CCNC: 122 U/L — HIGH (ref 40–120)
ALT FLD-CCNC: 87 U/L — HIGH (ref 10–45)
ANION GAP SERPL CALC-SCNC: 14 MMOL/L — SIGNIFICANT CHANGE UP (ref 5–17)
APTT BLD: >200 SEC — CRITICAL HIGH (ref 24.5–35.6)
APTT BLD: >200 SEC — CRITICAL HIGH (ref 24.5–35.6)
AST SERPL-CCNC: 69 U/L — HIGH (ref 10–40)
BILIRUB SERPL-MCNC: 0.9 MG/DL — SIGNIFICANT CHANGE UP (ref 0.2–1.2)
BUN SERPL-MCNC: 37 MG/DL — HIGH (ref 7–23)
CALCIUM SERPL-MCNC: 8.2 MG/DL — LOW (ref 8.4–10.5)
CHLORIDE SERPL-SCNC: 93 MMOL/L — LOW (ref 96–108)
CO2 SERPL-SCNC: 28 MMOL/L — SIGNIFICANT CHANGE UP (ref 22–31)
CREAT SERPL-MCNC: 2.67 MG/DL — HIGH (ref 0.5–1.3)
EGFR: 18 ML/MIN/1.73M2 — LOW
FERRITIN SERPL-MCNC: 899 NG/ML — HIGH (ref 13–330)
FOLATE SERPL-MCNC: 5.8 NG/ML — SIGNIFICANT CHANGE UP
GLUCOSE BLDC GLUCOMTR-MCNC: 102 MG/DL — HIGH (ref 70–99)
GLUCOSE BLDC GLUCOMTR-MCNC: 106 MG/DL — HIGH (ref 70–99)
GLUCOSE BLDC GLUCOMTR-MCNC: 114 MG/DL — HIGH (ref 70–99)
GLUCOSE SERPL-MCNC: 100 MG/DL — HIGH (ref 70–99)
HAPTOGLOB SERPL-MCNC: 132 MG/DL — SIGNIFICANT CHANGE UP (ref 34–200)
HCT VFR BLD CALC: 39.9 % — SIGNIFICANT CHANGE UP (ref 34.5–45)
HCT VFR BLD CALC: 40.9 % — SIGNIFICANT CHANGE UP (ref 34.5–45)
HEPARIN-PF4 AB RESULT: 1.6 U/ML — HIGH (ref 0–0.9)
HGB BLD-MCNC: 13.3 G/DL — SIGNIFICANT CHANGE UP (ref 11.5–15.5)
HGB BLD-MCNC: 13.3 G/DL — SIGNIFICANT CHANGE UP (ref 11.5–15.5)
IRON SATN MFR SERPL: 52 % — HIGH (ref 14–50)
IRON SATN MFR SERPL: 90 UG/DL — SIGNIFICANT CHANGE UP (ref 30–160)
LDH SERPL L TO P-CCNC: 432 U/L — HIGH (ref 50–242)
MAGNESIUM SERPL-MCNC: 1.9 MG/DL — SIGNIFICANT CHANGE UP (ref 1.6–2.6)
MCHC RBC-ENTMCNC: 30.6 PG — SIGNIFICANT CHANGE UP (ref 27–34)
MCHC RBC-ENTMCNC: 30.9 PG — SIGNIFICANT CHANGE UP (ref 27–34)
MCHC RBC-ENTMCNC: 32.5 GM/DL — SIGNIFICANT CHANGE UP (ref 32–36)
MCHC RBC-ENTMCNC: 33.3 GM/DL — SIGNIFICANT CHANGE UP (ref 32–36)
MCV RBC AUTO: 92.8 FL — SIGNIFICANT CHANGE UP (ref 80–100)
MCV RBC AUTO: 94 FL — SIGNIFICANT CHANGE UP (ref 80–100)
NRBC # BLD: 0 /100 WBCS — SIGNIFICANT CHANGE UP (ref 0–0)
NRBC # BLD: 0 /100 WBCS — SIGNIFICANT CHANGE UP (ref 0–0)
PF4 HEPARIN CMPLX AB SER-ACNC: POSITIVE — SIGNIFICANT CHANGE UP
PHOSPHATE SERPL-MCNC: 3.7 MG/DL — SIGNIFICANT CHANGE UP (ref 2.5–4.5)
PLATELET # BLD AUTO: 101 K/UL — LOW (ref 150–400)
PLATELET # BLD AUTO: 104 K/UL — LOW (ref 150–400)
POTASSIUM SERPL-MCNC: 4.8 MMOL/L — SIGNIFICANT CHANGE UP (ref 3.5–5.3)
POTASSIUM SERPL-SCNC: 4.8 MMOL/L — SIGNIFICANT CHANGE UP (ref 3.5–5.3)
PROT SERPL-MCNC: 5 G/DL — LOW (ref 6–8.3)
RBC # BLD: 4.3 M/UL — SIGNIFICANT CHANGE UP (ref 3.8–5.2)
RBC # BLD: 4.35 M/UL — SIGNIFICANT CHANGE UP (ref 3.8–5.2)
RBC # FLD: 17.7 % — HIGH (ref 10.3–14.5)
RBC # FLD: 17.7 % — HIGH (ref 10.3–14.5)
SODIUM SERPL-SCNC: 135 MMOL/L — SIGNIFICANT CHANGE UP (ref 135–145)
TIBC SERPL-MCNC: 175 UG/DL — LOW (ref 220–430)
TSH SERPL-MCNC: 6.67 UIU/ML — HIGH (ref 0.27–4.2)
UIBC SERPL-MCNC: 85 UG/DL — LOW (ref 110–370)
VIT B12 SERPL-MCNC: 1732 PG/ML — HIGH (ref 232–1245)
WBC # BLD: 11.41 K/UL — HIGH (ref 3.8–10.5)
WBC # BLD: 12.47 K/UL — HIGH (ref 3.8–10.5)
WBC # FLD AUTO: 11.41 K/UL — HIGH (ref 3.8–10.5)
WBC # FLD AUTO: 12.47 K/UL — HIGH (ref 3.8–10.5)

## 2024-08-13 RX ORDER — ALBUMIN (HUMAN) 5 G/20ML
50 SOLUTION INTRAVENOUS EVERY 6 HOURS
Refills: 0 | Status: COMPLETED | OUTPATIENT
Start: 2024-08-13 | End: 2024-08-15

## 2024-08-13 RX ORDER — METOPROLOL TARTRATE 100 MG/1
25 TABLET ORAL
Refills: 0 | Status: DISCONTINUED | OUTPATIENT
Start: 2024-08-13 | End: 2024-08-17

## 2024-08-13 RX ADMIN — Medication 0 UNIT(S)/HR: at 04:10

## 2024-08-13 RX ADMIN — METOPROLOL TARTRATE 25 MILLIGRAM(S): 100 TABLET ORAL at 11:13

## 2024-08-13 RX ADMIN — Medication 1100 UNIT(S)/HR: at 05:12

## 2024-08-13 RX ADMIN — Medication 150 MICROGRAM(S): at 05:05

## 2024-08-13 RX ADMIN — DIGOXIN 125 MICROGRAM(S): 0.12 TABLET ORAL at 16:41

## 2024-08-13 RX ADMIN — ALBUMIN (HUMAN) 50 MILLILITER(S): 5 SOLUTION INTRAVENOUS at 19:58

## 2024-08-13 RX ADMIN — Medication 1 TABLET(S): at 11:13

## 2024-08-13 RX ADMIN — Medication 40 MILLIGRAM(S): at 05:05

## 2024-08-13 RX ADMIN — MIDODRINE HYDROCHLORIDE 30 MILLIGRAM(S): 5 TABLET ORAL at 05:05

## 2024-08-13 RX ADMIN — Medication 80 MILLIGRAM(S): at 22:28

## 2024-08-13 RX ADMIN — Medication 500 MILLIGRAM(S): at 00:09

## 2024-08-13 RX ADMIN — Medication 500 MILLIGRAM(S): at 11:13

## 2024-08-13 RX ADMIN — Medication 500 MILLIGRAM(S): at 17:24

## 2024-08-13 RX ADMIN — MIDODRINE HYDROCHLORIDE 30 MILLIGRAM(S): 5 TABLET ORAL at 15:17

## 2024-08-13 RX ADMIN — METOPROLOL TARTRATE 25 MILLIGRAM(S): 100 TABLET ORAL at 18:53

## 2024-08-13 RX ADMIN — Medication 40 MILLIGRAM(S): at 17:24

## 2024-08-13 RX ADMIN — MIDODRINE HYDROCHLORIDE 30 MILLIGRAM(S): 5 TABLET ORAL at 22:28

## 2024-08-13 NOTE — PROGRESS NOTE ADULT - SUBJECTIVE AND OBJECTIVE BOX
INTERVAL HPI/OVERNIGHT EVENTS:    light brown stool in ostomy bag    MEDICATIONS  (STANDING):  ascorbic acid 500 milliGRAM(s) Oral daily  atorvastatin 80 milliGRAM(s) Oral at bedtime  digoxin  Injectable 125 MICROGram(s) IV Push <User Schedule>  heparin  Infusion.  Unit(s)/Hr (13 mL/Hr) IV Continuous <Continuous>  levothyroxine 150 MICROGram(s) Oral daily  metoprolol tartrate 25 milliGRAM(s) Oral two times a day  midodrine. 30 milliGRAM(s) Oral every 8 hours  multivitamin 1 Tablet(s) Oral daily  pantoprazole  Injectable 40 milliGRAM(s) IV Push two times a day  sodium chloride 0.45% 1000 milliLiter(s) (100 mL/Hr) IV Continuous <Continuous>  vancomycin    Solution 500 milliGRAM(s) Oral every 6 hours    MEDICATIONS  (PRN):  acetaminophen     Tablet .. 650 milliGRAM(s) Oral every 6 hours PRN Temp greater or equal to 38C (100.4F), Mild Pain (1 - 3)      Allergies    penicillin (Hives)    Intolerances        Review of Systems:    General:  No wt loss, fevers, chills, night sweats, fatigue   Eyes:  Good vision, no reported pain  ENT:  No sore throat, pain, runny nose, dysphagia  CV:  No pain, palpitations, hypo/hypertension  Resp:  No dyspnea, cough, tachypnea, wheezing  GI:  No pain, No nausea, No vomiting, No diarrhea, No constipation, No weight loss, No fever, No pruritis, No rectal bleeding, No melena, No dysphagia  :  No pain, bleeding, incontinence, nocturia  Muscle:  No pain, weakness  Neuro:  No weakness, tingling, memory problems  Psych:  No fatigue, insomnia, mood problems, depression  Endocrine:  No polyuria, polydypsia, cold/heat intolerance  Heme:  No petechiae, ecchymosis, easy bruisability  Skin:  No rash, tattoos, scars, edema      Vital Signs Last 24 Hrs  T(C): 36.4 (13 Aug 2024 11:56), Max: 36.4 (12 Aug 2024 20:57)  T(F): 97.5 (13 Aug 2024 11:56), Max: 97.5 (12 Aug 2024 20:57)  HR: 123 (13 Aug 2024 11:56) (68 - 123)  BP: 99/61 (13 Aug 2024 11:56) (90/67 - 110/78)  BP(mean): --  RR: 18 (13 Aug 2024 11:56) (17 - 18)  SpO2: 97% (13 Aug 2024 11:56) (95% - 99%)    Parameters below as of 13 Aug 2024 11:56  Patient On (Oxygen Delivery Method): nasal cannula        PHYSICAL EXAM:    Constitutional: NAD  HEENT: EOMI, throat clear  Neck: No LAD, supple  Respiratory: CTA and P  Cardiovascular: S1 and S2, RRR, no M  Gastrointestinal: BS+, soft, NT/ND, neg HSM,  Extremities: No peripheral edema, neg clubbing, cyanosis  Vascular: 2+ peripheral pulses  Neurological: A/O   Psychiatric: Normal mood, normal affect  Skin: No rashes      LABS:                        13.3   12.47 )-----------( 104      ( 13 Aug 2024 02:46 )             39.9     08-12    134<L>  |  94<L>  |  37<H>  ----------------------------<  92  4.4   |  23  |  2.55<H>    Ca    8.0<L>      12 Aug 2024 06:42  Phos  4.0     08-12  Mg     1.6     08-12      PTT - ( 13 Aug 2024 02:46 )  PTT:>200.0 sec  Urinalysis Basic - ( 12 Aug 2024 06:42 )    Color: x / Appearance: x / SG: x / pH: x  Gluc: 92 mg/dL / Ketone: x  / Bili: x / Urobili: x   Blood: x / Protein: x / Nitrite: x   Leuk Esterase: x / RBC: x / WBC x   Sq Epi: x / Non Sq Epi: x / Bacteria: x        RADIOLOGY & ADDITIONAL TESTS:

## 2024-08-13 NOTE — PROGRESS NOTE ADULT - ASSESSMENT
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 Found to have C.diff, CHANELL, and possible anemia of acute blood loss 2/2 GIB.      Acute blood loss anemia secondary to acute GI bleed: Status post transfusion with good response.  Discussed with Dr. Sprague: Stool is yellow, H&H seems stable with good response to transfusion.  will hold off on endoscopy at this time given hypotension and continue to optimize...  Will plan EGD early next week    ·  Problem: Clostridium difficile infection.   ·  Plan: Found to have C.diff infection. Likely i/s/o recent abx use (cipro) to treat UTI.  - CT A/P showed no acute intra-abdominal pathology  Continue vancomycin      ·  Problem: Metabolic encephalopathy.   ·  Plan: Patient Was lethargic but arousable..      Likely multifactorial including acute infection,CHANELL and Dehydration  Mental status during the recent hospitalization showed decline and mentation however workup was negative for acute neurological pathology.      ·  Problem: Atrial fibrillation with RVR.   ·  Plan: Presented in Afib w/ RVR to HR 170s. Has hx of Afib (on eliquis at home). Suspect RVR i/s/o infection, dehydration, electrolyte abnormalities    Patient still with A. fib and RVR.....    Held  anticoagulation in setting of melanotic stool And now with positive HIT Antibody and supra therapeutic PTT      Thrombocytopenia: likely secondary to  infection  However consulted hematology      ·  Problem: CHANELL (acute kidney injury).   Likely prerenal secondary to dehydration continue IV fluids and bicarb as per renal      ·  Problem: Hyponatremia.   Likely secondary to dehydration.  Continue IV fluid      ·  Problem: Hypotension.   Likely multifactorial secondary to acute GI bleed, dehydration, possible infection  Continue to monitor H&H  Continue fluid resuscitation and midodrin  Discussed with infectious disease: We will continue with Empiric antibiotics and if culture is negative we will DC: Now discontinued  Patient still hypotensive With unclear etiology...Decreased urine output however mentating Better than yesterday..  MICU was reconsulted.  Patient is notDeemed to be a candidate        ·  Problem: CAD (coronary artery disease).   - holding home plavix  - c/w home statin        ·  Problem: Hypothyroidism.   Recently decreased dose      Appreciate wound care consult.  Follow-up official input    Also of note patient now is full code since daughter rescinded DNR..    Again had a long discussion with the daughter at bedside regarding prognosis.  She might consider comfort care like to reconsult palliative care.

## 2024-08-13 NOTE — PROGRESS NOTE ADULT - SUBJECTIVE AND OBJECTIVE BOX
Subjective: Patient seen and examined. No new events except as noted.     REVIEW OF SYSTEMS:    CONSTITUTIONAL+ weakness, fevers or chills  EYES/ENT: No visual changes;  No vertigo or throat pain   NECK: No pain or stiffness  RESPIRATORY: No cough, wheezing, hemoptysis; No shortness of breath  CARDIOVASCULAR: No chest pain or palpitations  GASTROINTESTINAL: No abdominal or epigastric pain. No nausea, vomiting, or hematemesis; No diarrhea or constipation. No melena or hematochezia.  GENITOURINARY: No dysuria, frequency or hematuria  NEUROLOGICAL: No numbness or weakness  SKIN: No itching, burning, rashes, or lesions   All other review of systems is negative unless indicated above.    MEDICATIONS:  MEDICATIONS  (STANDING):  ascorbic acid 500 milliGRAM(s) Oral daily  atorvastatin 80 milliGRAM(s) Oral at bedtime  digoxin  Injectable 125 MICROGram(s) IV Push <User Schedule>  heparin  Infusion.  Unit(s)/Hr (13 mL/Hr) IV Continuous <Continuous>  levothyroxine 150 MICROGram(s) Oral daily  metoprolol tartrate 25 milliGRAM(s) Oral two times a day  midodrine. 30 milliGRAM(s) Oral every 8 hours  multivitamin 1 Tablet(s) Oral daily  pantoprazole  Injectable 40 milliGRAM(s) IV Push two times a day  sodium chloride 0.45% 1000 milliLiter(s) (100 mL/Hr) IV Continuous <Continuous>  vancomycin    Solution 500 milliGRAM(s) Oral every 6 hours      PHYSICAL EXAM:  T(C): 36.3 (08-13-24 @ 05:10), Max: 36.6 (08-12-24 @ 11:52)  HR: 105 (08-13-24 @ 05:10) (68 - 107)  BP: 110/78 (08-13-24 @ 05:10) (90/67 - 110/78)  RR: 18 (08-13-24 @ 05:10) (17 - 18)  SpO2: 99% (08-13-24 @ 05:10) (95% - 99%)  Wt(kg): --  I&O's Summary    12 Aug 2024 07:01  -  13 Aug 2024 07:00  --------------------------------------------------------  IN: 120 mL / OUT: 300 mL / NET: -180 mL              Appearance: NAD  HEENT:   Dry  oral mucosa, PERRL, EOMI	  Lymphatic: No lymphadenopathy , no edema  Cardiovascular: Irregular  S1 S2, No JVD, No murmurs , Peripheral pulses palpable 2+ bilaterally  Respiratory: decreased bs   Gastrointestinal:  Soft, Non-tender, + BS	  Skin: No rashes, No ecchymoses, No cyanosis, warm to touch  Musculoskeletal: decreased range of motion and strength  Psychiatry: sleepy    Ext: No edema          LABS:    CARDIAC MARKERS:                                13.3   12.47 )-----------( 104      ( 13 Aug 2024 02:46 )             39.9     08-12    134<L>  |  94<L>  |  37<H>  ----------------------------<  92  4.4   |  23  |  2.55<H>    Ca    8.0<L>      12 Aug 2024 06:42  Phos  4.0     08-12  Mg     1.6     08-12      TELEMETRY: 	  AF  ECG:  	  RADIOLOGY:   DIAGNOSTIC TESTING:  [ ] Echocardiogram:  [ ]  Catheterization:  [ ] Stress Test:    OTHER:

## 2024-08-13 NOTE — PROGRESS NOTE ADULT - SUBJECTIVE AND OBJECTIVE BOX
Chief complaint  Patient is a 78y old  Female who presents with a chief complaint of AMS/lethargy, Afib w/ RVR, Hyponatremia, C.diff infection (13 Aug 2024 12:15)         Labs and Fingersticks  CAPILLARY BLOOD GLUCOSE      POCT Blood Glucose.: 102 mg/dL (13 Aug 2024 11:54)  POCT Blood Glucose.: 106 mg/dL (13 Aug 2024 07:58)  POCT Blood Glucose.: 106 mg/dL (13 Aug 2024 06:21)  POCT Blood Glucose.: 106 mg/dL (13 Aug 2024 00:19)  POCT Blood Glucose.: 104 mg/dL (12 Aug 2024 17:53)      Anion Gap: 14 (08-13 @ 11:40)  Anion Gap: 17 (08-12 @ 06:42)      Calcium: 8.2 *L* (08-13 @ 11:40)  Calcium: 8.0 *L* (08-12 @ 06:42)  Albumin: 2.7 *L* (08-13 @ 11:40)    Alanine Aminotransferase (ALT/SGPT): 87 *H* (08-13 @ 11:40)  Alkaline Phosphatase: 122 *H* (08-13 @ 11:40)  Aspartate Aminotransferase (AST/SGOT): 69 *H* (08-13 @ 11:40)        08-13    135  |  93<L>  |  37<H>  ----------------------------<  100<H>  4.8   |  28  |  2.67<H>    Ca    8.2<L>      13 Aug 2024 11:40  Phos  3.7     08-13  Mg     1.9     08-13    TPro  5.0<L>  /  Alb  2.7<L>  /  TBili  0.9  /  DBili  x   /  AST  69<H>  /  ALT  87<H>  /  AlkPhos  122<H>  08-13                        13.3   11.41 )-----------( 101      ( 13 Aug 2024 11:40 )             40.9     Medications  MEDICATIONS  (STANDING):  ascorbic acid 500 milliGRAM(s) Oral daily  atorvastatin 80 milliGRAM(s) Oral at bedtime  digoxin  Injectable 125 MICROGram(s) IV Push <User Schedule>  levothyroxine 150 MICROGram(s) Oral daily  metoprolol tartrate 25 milliGRAM(s) Oral two times a day  midodrine. 30 milliGRAM(s) Oral every 8 hours  multivitamin 1 Tablet(s) Oral daily  pantoprazole  Injectable 40 milliGRAM(s) IV Push two times a day  sodium chloride 0.45% 1000 milliLiter(s) (100 mL/Hr) IV Continuous <Continuous>  vancomycin    Solution 500 milliGRAM(s) Oral every 6 hours      Physical Exam  General: Patient comfortable in bed  Vital Signs Last 12 Hrs  T(F): 97.5 (08-13-24 @ 11:56), Max: 97.5 (08-13-24 @ 11:56)  HR: 123 (08-13-24 @ 11:56) (105 - 123)  BP: 99/61 (08-13-24 @ 11:56) (99/61 - 110/78)  BP(mean): --  RR: 18 (08-13-24 @ 11:56) (18 - 18)  SpO2: 97% (08-13-24 @ 11:56) (97% - 99%)    CVS: S1S2   Respiratory: No wheezing, no crepitations  GI: Abdomen soft, bowel sounds positive  Musculoskeletal:  moves all extremities  : Voiding

## 2024-08-13 NOTE — PROGRESS NOTE ADULT - ASSESSMENT
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 During recent admission (7/21/24-7/29/24), she was treated for UTI/sepsis and ultimately discharged to rehab to completed a 5-day course of ciprofloxacin (last dose 7/30/24). At rehab, she was reportedly found to have oliguria for a few days over the weekend for which she was started on IV fluids, however she was noncompliant with the IV access and she pulled it out many times. She has had very poor appetite and became lethargic and hypotensive. She was subsequently transferred to hospital where she was found to have black-colored output in ileostomy bag.     C.diff likely iso recent antibiotic use for UTI tx  Acute blood loss anemia due to UGIB  Hypotension likely multifactorial due to above  Positive UA in setting of amaya, Ucx with C.albicans likely colonization  CHANELL likely due to hypovolemia iso diarrhea  Afib with RVR, EP following   Hypothermia noted, unclear etiology-improved      8/5 CTAP wo contrast with no acute intra-abd pathology   8/6 am s/p RRT for hypotension, tachycardia  ostomy had loose/watery melanotic stools -now green  Surgery following - no acute intervention at this time  GI following -- deferred EGD for now   Consider CTA/IR eval if concern for active bleed and if stable for scan  UA with pyuria, large LE, occasional bacteria with 9 sq epi cells,   Ucx with C. albicans some, has amaya in place, suspect contaminant/colonization  8/9 CXR with clear lungs   8/9 Bcx NGTD x2   GI following, EGD deferred iso rapid afib  mental status remains at baseline, answers/follows   afebrile, WBC noted, no new findings on exam, nontoxic     Recommendations:  Continue vancomycin 500mg PO Q6h for 10d until 8/16/24  Contact isolation per IC protocol   Monitor temps/CBC, Cr   Aspiration precautions       Marisa Davidson M.D.  [facility], Division of Infectious Diseases  [phone]  After 5pm on weekdays and all day on weekends - please call [phone]  Available on [employer] TEAMS

## 2024-08-13 NOTE — PROGRESS NOTE ADULT - SUBJECTIVE AND OBJECTIVE BOX
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 Limited hx obtainable from pt, was AAOx~1 on encounter and very lethargic/somnolent, but arousable and seemed to deny pain anywhere. Collateral hx obtained from chart review. During recent admission (7/21/24-7/29/24), she was treated for UTI/sepsis and ultimately discharged to rehab to completed a 5-day course of ciprofloxacin (last dose 7/30/24). At rehab, she was reportedly found to have oliguria for a few days over the weekend for which she was started on IV fluids, however she was noncompliant with the IV access and she pulled it out many times. She has had very poor appetite and became lethargic and hypotensive. She was subsequently transferred to hospital where she was found to have black-colored output in ileostomy bag.   In ED: Afebrile, HR 120s-170s (Afib), SBP 90s-130s, RR 15-22, sating % on RA.  Labs notable for Hgb 11.3->10.3, Na 133->122, SCr 3.04->2.58; lactated 4.7->2.8, blood glucose to 400s but FS 100s. Found to be C.diff positive. UA not best sample with 9 epithelial cells, but noted +LE, +bacteria, +WBC, neg nitrite. CT A/P showed no acute intra-abdominal pathology and unchanged indeterminate left adrenal lesion. Received Vanc 500mg PO, Flagyl 500mg IVPB, amio 150mg IVPB x3, ofirmev 1g, 1.5L IVF, and 1u pRBC. Also started on amio gtt and protonix gtt. Nephrology and MICU were consulted. Admitted to Medicine for further management. (05 Aug 2024 23:46)    Known to me from prior admissions. Has paroxysmal AFib, and syncope, has an ILR for surveillance for long pauses.  She has known short pauses overnight, but nothing long enough or with symptoms to warrant pacemaker insertion.  During subsequent admissions, the usual issue has been rapidly conducted  AFib.  Unable to answer 10pt ROS due to somnolence.  8/12- resting in bed, remains lethargic, a/o x 1 at best. rapid AFib 140s+ despite multiple drugs.  Date of service 8/13- extensive discussion by telephone w/ patient's daughter re: risks outweighing benefits for SUYS/Cardioversion or VVI pacemaker/AVJ ablation.  remains somnolent and unable to advocate for herself.    PAST MEDICAL & SURGICAL HISTORY:  Hypertension  Hypothyroid  Osteoarthritis  knees, back  CAD (coronary artery disease)  CROW (obstructive sleep apnea)  non complaint on CPAP  History of MI (myocardial infarction)  h/o previous MI in 2004 prompted PTCA  with stenting x 2 vessels   last stress/ echo 2019  Heart murmur  dx in childhood  Bilateral hearing loss, unspecified hearing loss type  bilateral aids  Obesity  Mixed stress and urge urinary incontinence  Overactive bladder  S/P ORIF (open reduction internal fixation) fracture  left hip 1962  S/P appendectomy  30 plus years  S/P knee replacement  left 2000  Stented coronary artery  2004 X 2 STENTS  S/P laparotomy  due to adhesions, 30 years ago  H/O dilation and curettage  2/2019 Benign polyp    acetaminophen     Tablet .. 650 milliGRAM(s) Oral every 6 hours PRN  ascorbic acid 500 milliGRAM(s) Oral daily  atorvastatin 80 milliGRAM(s) Oral at bedtime  digoxin  Injectable 125 MICROGram(s) IV Push <User Schedule>  levothyroxine 150 MICROGram(s) Oral daily  metoprolol tartrate 25 milliGRAM(s) Oral two times a day  midodrine. 30 milliGRAM(s) Oral every 8 hours  multivitamin 1 Tablet(s) Oral daily  pantoprazole  Injectable 40 milliGRAM(s) IV Push two times a day  vancomycin    Solution 500 milliGRAM(s) Oral every 6 hours                            13.3   11.41 )-----------( 101      ( 13 Aug 2024 11:40 )             40.9       08-13    135  |  93<L>  |  37<H>  ----------------------------<  100<H>  4.8   |  28  |  2.67<H>    Ca    8.2<L>      13 Aug 2024 11:40  Phos  3.7     08-13  Mg     1.9     08-13    TPro  5.0<L>  /  Alb  2.7<L>  /  TBili  0.9  /  DBili  x   /  AST  69<H>  /  ALT  87<H>  /  AlkPhos  122<H>  08-13    T(C): 36.4 (08-13-24 @ 11:56), Max: 36.4 (08-12-24 @ 20:57)  HR: 123 (08-13-24 @ 11:56) (68 - 123)  BP: 99/61 (08-13-24 @ 11:56) (90/67 - 110/78)  RR: 18 (08-13-24 @ 11:56) (17 - 18)  SpO2: 97% (08-13-24 @ 11:56) (95% - 99%)  Wt(kg): --    I&O's Summary    12 Aug 2024 07:01  -  13 Aug 2024 07:00  --------------------------------------------------------  IN: 120 mL / OUT: 300 mL / NET: -180 mL    Appearance: frail elderly woman in no acute distress, somnolent	  HEENT:   Normal oral mucosa, PERRL, EOMI	  Lymphatic: No lymphadenopathy , no edema  Cardiovascular: rapid irreuglar S1 S2, No JVD, No murmurs , Peripheral pulses palpable 2+ bilaterally  Respiratory: Lungs clear to auscultation, normal effort 	  Gastrointestinal:  Soft, Non-tender, + BS	  Skin: No rashes, No ecchymoses, No cyanosis, warm to touch  Musculoskeletal: Normal range of motion, normal strength  Psychiatry:  Mood & affect appropriate    TELEMETRY: AF 140s.	    ECG: AFib RVR 	      ASSESSMENT/PLAN: Ms Gooden is a pleasant 78y Female here with CDiff +/- GI bleeding.  EP called re: management of rapid AFib.  Has Paroxysmal AFib.  Had brief episodes of sinus rhythm on last admission, and known short pauses that are not long enough to justify permanent pacemaker insertion.  Has an ILR for long-pause surveillance, data managed by Dr Mendiola.    has been on heparin infusion. 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  pending alternative IV anticoag.  Continue metoprolol alongside midodrine. OK for holding parameters for HEARTRATE, but metoprolol is in general BP-neutral.  She had a higher digoxin level last week but is not currently on any active AVN blockade.  Unable to escalate digoxin dose due to advanced CHANELL on CKD.  Not a good candidate for SUSY/Cardioversion - unlikely to be successful long-term.  Not a good candidate for VVI pacemaker / AVJ ablation.  Also, not likely that restoration of sinus or paced rhythm would improve her overall well-being.  She would still likely be somnolent and frail.  Awaiting resolution of diarrhea via ostomy.  Prognosis is guarded, as she's declinded considerably over the last 6 months (6 hospitalizations).  Daughter is ready to request palliative care re-evaluation.      Shane Frias M.D.  Cardiac Electrophysiology    office [phone]  pager [phone]

## 2024-08-13 NOTE — PROGRESS NOTE ADULT - ASSESSMENT
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), COPD, CROW, HTN, HLD, urinary retention, recurrent UTIs, hypothyroidism, recent admission for UTI (c/b sepsis, encephalopathy, and bradycardia), now presented with AMS/lethargy, found to have melena, Afib/hypotension, thrombocytopenia    #Thrombocytopenia  - no prior hx of thrombocytopenia, plt were normal on admission and have declined since then though fluctuating  - no heparin or AC use  - no LAD and liver/spleen normal on CT  - likely secondary to consumption with GIB + acute illness  - bilirubin was normal prior, LDH elevated but hemolyzed- will repeat  - last iron studies/b12/folate 4/2024- will repeat  - monitor plt  Platelet Count - Automated: 101 K/uL (08.13.24 @ 11:40)   Platelet Count - Automated: 104 K/uL (08.13.24 @ 02:46)   Platelet Count - Automated: 91 K/uL (08.12.24 @ 06:44)     #anemia, blood loss  - melena/GIB  - s/p 6 unit PRBC- last 8/7  - will check iron studies/b12/folate  - Hg now stable  - GI following, EGD continues to be deferred in the setting of rapid afib   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), COPD, CROW, HTN, HLD, urinary retention, recurrent UTIs, hypothyroidism, recent admission for UTI (c/b sepsis, encephalopathy, and bradycardia), now presented with AMS/lethargy, found to have melena, Afib/hypotension, thrombocytopenia    Thrombocytopenia  No prior hx of thrombocytopenia, plt were normal on admission and have declined since then though fluctuating  Platelet Count - Automated: 101 K/uL (08.13.24 @ 11:40)   Platelet Count - Automated: 104 K/uL (08.13.24 @ 02:46)   Platelet Count - Automated: 91 K/uL (08.12.24 @ 06:44)   No LAD and liver/spleen normal on CTThrombocytopenialikely likely secondary to consumption with GIB + acute illnes.s No heparin or AC use till heparin 8/12/24 briefly  Heparin Induced Platelet Antibody (08.13.24 @ 11:40)   Heparin-PF4 AB Result: 1.6 u/mL  Heparin-PF4 AB Interp: Positive  Thus heparin dc, send TAY and use alt AC like NOAC or coumadin as needed    Anemia, blood loss, melena/GIB  S/p 6 unit PRBC- last 8/7  Follow def labs and replete as needed  GI following, EGD continues to be deferred in the setting of rapid afib   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 Thrombocytopenia likely likely secondary to consumption with GIB + acute illness.  No heparin or AC use till heparin 8/12/24 briefly  Heparin Induced Platelet Antibody (08.13.24 @ 11:40)   Heparin-PF4 AB Result: 1.6 u/mL  Heparin-PF4 AB Interp: Positive  Thus heparin dc, send TAY and use alt AC like NOAC or coumadin as needed  If TAY negative can use heparin    Anemia, blood loss, melena/GIB  S/p 6 unit PRBC- last 8/7  Follow def labs and replete as needed  GI following, EGD continues to be deferred in the setting of rapid afib  Spoke to NP

## 2024-08-13 NOTE — PROGRESS NOTE ADULT - SUBJECTIVE AND OBJECTIVE BOX
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 Nephrology consulted for elevated Scr and metabolic acidosis.    REVIEW OF SYSTEMS: Unable to obtain due to mental status. Patient however with vomiting this morning as per RN.    penicillin (Hives)      Hospital Medications: Medications reviewed        VITALS:  T(F): 97.5 (08-13-24 @ 11:56), Max: 97.5 (08-12-24 @ 20:57)  HR: 123 (08-13-24 @ 11:56)  BP: 99/61 (08-13-24 @ 11:56)  RR: 18 (08-13-24 @ 11:56)  SpO2: 97% (08-13-24 @ 11:56)  Wt(kg): --    08-12 @ 07:01  -  08-13 @ 07:00  --------------------------------------------------------  IN: 120 mL / OUT: 300 mL / NET: -180 mL    08-13 @ 07:01  -  08-13 @ 15:21  --------------------------------------------------------  IN: 0 mL / OUT: 0 mL / NET: 0 mL        PHYSICAL EXAM:    Gen: NAD, calm  Cards: Irregularly irregular with tachycardia, +S1/S2, no M/G/R  Resp: CTA B/L  GI: soft, NT/ND, NABS, + ostomy   : + amaya  Vascular: + UE B/L        LABS:  08-13    135  |  93<L>  |  37<H>  ----------------------------<  100<H>  4.8   |  28  |  2.67<H>    Ca    8.2<L>      13 Aug 2024 11:40  Phos  3.7     08-13  Mg     1.9     08-13    TPro  5.0<L>  /  Alb  2.7<L>  /  TBili  0.9  /  DBili      /  AST  69<H>  /  ALT  87<H>  /  AlkPhos  122<H>  08-13    Creatinine Trend: 2.67 <--, 2.55 <--, 2.57 <--, 2.56 <--, 2.25 <--, 2.26 <--, 2.38 <--, 2.49 <--, 2.81 <--                        13.3   11.41 )-----------( 101      ( 13 Aug 2024 11:40 )             40.9     Urine Studies:  Urinalysis Basic - ( 13 Aug 2024 11:40 )    Color:  / Appearance:  / SG:  / pH:   Gluc: 100 mg/dL / Ketone:   / Bili:  / Urobili:    Blood:  / Protein:  / Nitrite:    Leuk Esterase:  / RBC:  / WBC    Sq Epi:  / Non Sq Epi:  / Bacteria:

## 2024-08-13 NOTE — PROGRESS NOTE ADULT - ASSESSMENT
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 Nephrology consulted for elevated Scr and metabolic acidosis.    1) CHANELL: initially due to hypovolemia and now likely with ATN in setting of hypotension. Scr increasing. Change IVF to IV albumin. UA active likely due to infection. FeNa low. CT without obstruction. TMA work up negative. Avoid nephrotoxins.    2) Hypotension: BP low. Continue with midodrine 30 mg PO TID. Rate control as per cardiology. Monitor BP.    3) Metabolic acidosis: Resolved. D/C sodium bicarbonate gtt. Monitor pH.    4) Hyponatremia: Resolved. Monitor serum Na.    5) Urinary retention: Continue with amaya. Holding flomax given hypotension.       [facility] NEPHROLOGY  Paulie Storm M.D.  Mack Clancy D.O.  Maddi Steve M.D.  MD Olivia Caldera, MSN, ANP-C    Telephone: [phone]  Facsimile: [phone] [place] [address], [address]  [place] [phone]

## 2024-08-13 NOTE — PROGRESS NOTE ADULT - SUBJECTIVE AND OBJECTIVE BOX
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 Limited hx obtainable from pt, was AAOx~1 on encounter and very lethargic/somnolent, but arousable and seemed to deny pain anywhere. Collateral hx obtained from chart review. During recent admission (7/21/24-7/29/24), she was treated for UTI/sepsis and ultimately discharged to rehab to completed a 5-day course of ciprofloxacin (last dose 7/30/24). At rehab, she was reportedly found to have oliguria for a few days over the weekend for which she was started on IV fluids, however she was noncompliant with the IV access and she pulled it out many times. She has had very poor appetite and became lethargic and hypotensive. She was subsequently transferred to hospital where she was found to have black-colored output in ileostomy bag.     In ED: Afebrile, HR 120s-170s (Afib), SBP 90s-130s, RR 15-22, sating % on RA.  Labs notable for Hgb 11.3->10.3, Na 133->122, SCr 3.04->2.58; lactated 4.7->2.8, blood glucose to 400s but FS 100s. Found to be C.diff positive. UA not best sample with 9 epithelial cells, but noted +LE, +bacteria, +WBC, neg nitrite. CT A/P showed no acute intra-abdominal pathology and unchanged indeterminate left adrenal lesion. Received Vanc 500mg PO, Flagyl 500mg IVPB, amio 150mg IVPB x3, ofirmev 1g, 1.5L IVF, and 1u pRBC. Also started on amio gtt and protonix gtt. Nephrology and MICU were consulted. Admitted to Medicine for further management. (05 Aug 2024 23:46)    PAST MEDICAL & SURGICAL HISTORY:  Hypertension      Hypothyroid      Osteoarthritis  knees, back      CAD (coronary artery disease)      CROW (obstructive sleep apnea)  non complaint on CPAP      History of MI (myocardial infarction)  h/o previous MI in 2004 prompted PTCA  with stenting x 2 vessels   last stress/ echo 2019      Heart murmur  dx in childhood      Bilateral hearing loss, unspecified hearing loss type  bilateral aids      Obesity      Mixed stress and urge urinary incontinence      Overactive bladder      S/P ORIF (open reduction internal fixation) fracture  left hip 1962      S/P appendectomy  30 plus years      S/P knee replacement  left 2000      Stented coronary artery  2004 X 2 STENTS      S/P laparotomy  due to adhesions, 30 years ago      H/O dilation and curettage  2/2019 Benign polyp        Allergies    penicillin (Hives)    Intolerances      [facility] History:  [facility] (05 Aug 2024 23:46)    Medications:  acetaminophen     Tablet .. 650 milliGRAM(s) Oral every 6 hours PRN Temp greater or equal to 38C (100.4F), Mild Pain (1 - 3)  ascorbic acid 500 milliGRAM(s) Oral daily  atorvastatin 80 milliGRAM(s) Oral at bedtime  digoxin  Injectable 125 MICROGram(s) IV Push <User Schedule>  heparin  Infusion.  Unit(s)/Hr IV Continuous <Continuous>  levothyroxine 150 MICROGram(s) Oral daily  metoprolol tartrate 25 milliGRAM(s) Oral two times a day  midodrine. 30 milliGRAM(s) Oral every 8 hours  multivitamin 1 Tablet(s) Oral daily  pantoprazole  Injectable 40 milliGRAM(s) IV Push two times a day  sodium chloride 0.45% 1000 milliLiter(s) IV Continuous <Continuous>  vancomycin    Solution 500 milliGRAM(s) Oral every 6 hours    Labs:  CBC Full  -  ( 13 Aug 2024 11:40 )  WBC Count : 11.41 K/uL  RBC Count : 4.35 M/uL  Hemoglobin : 13.3 g/dL  Hematocrit : 40.9 %  Platelet Count - Automated : 101 K/uL  Mean Cell Volume : 94.0 fl  Mean Cell Hemoglobin : 30.6 pg  Mean Cell Hemoglobin Concentration : 32.5 gm/dL  Auto Neutrophil # : x  Auto Lymphocyte # : x  Auto Monocyte # : x  Auto Eosinophil # : x  Auto Basophil # : x  Auto Neutrophil % : x  Auto Lymphocyte % : x  Auto Monocyte % : x  Auto Eosinophil % : x  Auto Basophil % : x    08-12    134<L>  |  94<L>  |  37<H>  ----------------------------<  92  4.4   |  23  |  2.55<H>    Ca    8.0<L>      12 Aug 2024 06:42  Phos  4.0     08-12  Mg     1.6     08-12        Radiology:             ROS:  Patient comfortable without distress  No SOB or chest pain  No palpitation  No abdominal pain, diarrhaea or constipation  No weakness of extremities  No skin changes or swelling of legs  Rest of the comprehensive ROS was negative  Vital Signs Last 24 Hrs  T(C): 36.4 (13 Aug 2024 11:56), Max: 36.4 (12 Aug 2024 20:57)  T(F): 97.5 (13 Aug 2024 11:56), Max: 97.5 (12 Aug 2024 20:57)  HR: 123 (13 Aug 2024 11:56) (68 - 123)  BP: 99/61 (13 Aug 2024 11:56) (90/67 - 110/78)  BP(mean): --  RR: 18 (13 Aug 2024 11:56) (17 - 18)  SpO2: 97% (13 Aug 2024 11:56) (95% - 99%)    Parameters below as of 13 Aug 2024 11:56  Patient On (Oxygen Delivery Method): nasal cannula        Physical exam:  Patient alert and oriented  No distress  CVS: S1, S2 regular or murmur  Chest: bilateral breath sound without rales  Abdomen: soft, not tender, no organomegaly or masses  CNS: No focal neuro deficit  Musculoskeletal:  Normal range of motion  Skin: No rash    Assessment and Plan: HPI:  78F w/ hx of Afib (on Eliquis), CAD (on plavix), MCI/dementia, 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 She was reportedly found to have oliguria for a few days over the weekend for which she was started on IV fluids, however she was noncompliant with the IV access and she pulled it out many times. She has had very poor appetite and became lethargic and hypotensive. She was subsequently transferred to hospital where she was found to have black-colored output in ileostomy bag.     In ED: Afebrile, HR 120s-170s (Afib), SBP 90s-130s, RR 15-22, sating % on RA.  Labs notable for Hgb 11.3->10.3, Na 133->122, SCr 3.04->2.58; lactated 4.7->2.8, blood glucose to 400s but FS 100s. Found to be C.diff positive. UA not best sample with 9 epithelial cells, but noted +LE, +bacteria, +WBC, neg nitrite. CT A/P showed no acute intra-abdominal pathology and unchanged indeterminate left adrenal lesion. Received Vanc 500mg PO, Flagyl 500mg IVPB, amio 150mg IVPB x3, ofirmev 1g, 1.5L IVF, and 1u pRBC. Also started on amio gtt and protonix gtt. Nephrology and MICU were consulted.   Seen for low platelets  PAST MEDICAL & SURGICAL HISTORY:  Hypertension      Hypothyroid      Osteoarthritis  knees, back      CAD (coronary artery disease)      CROW (obstructive sleep apnea)  non complaint on CPAP      History of MI (myocardial infarction)  h/o previous MI in 2004 prompted PTCA  with stenting x 2 vessels   last stress/ echo 2019      Heart murmur  dx in childhood      Bilateral hearing loss, unspecified hearing loss type  bilateral aids      Obesity      Mixed stress and urge urinary incontinence      Overactive bladder      S/P ORIF (open reduction internal fixation) fracture  left hip 1962      S/P appendectomy  30 plus years      S/P knee replacement  left 2000      Stented coronary artery  2004 X 2 STENTS      S/P laparotomy  due to adhesions, 30 years ago      H/O dilation and curettage  2/2019 Benign polyp        Allergies    penicillin (Hives)    Intolerances      Social History:  Presenting from [facility]. (05 Aug 2024 23:46)    Medications:  acetaminophen     Tablet .. 650 milliGRAM(s) Oral every 6 hours PRN Temp greater or equal to 38C (100.4F), Mild Pain (1 - 3)  ascorbic acid 500 milliGRAM(s) Oral daily  atorvastatin 80 milliGRAM(s) Oral at bedtime  digoxin  Injectable 125 MICROGram(s) IV Push <User Schedule>  heparin  Infusion.  Unit(s)/Hr IV Continuous <Continuous>  levothyroxine 150 MICROGram(s) Oral daily  metoprolol tartrate 25 milliGRAM(s) Oral two times a day  midodrine. 30 milliGRAM(s) Oral every 8 hours  multivitamin 1 Tablet(s) Oral daily  pantoprazole  Injectable 40 milliGRAM(s) IV Push two times a day  sodium chloride 0.45% 1000 milliLiter(s) IV Continuous <Continuous>  vancomycin    Solution 500 milliGRAM(s) Oral every 6 hours    Labs:  CBC Full  -  ( 13 Aug 2024 11:40 )  WBC Count : 11.41 K/uL  RBC Count : 4.35 M/uL  Hemoglobin : 13.3 g/dL  Hematocrit : 40.9 %  Platelet Count - Automated : 101 K/uL  Mean Cell Volume : 94.0 fl  Mean Cell Hemoglobin : 30.6 pg  Mean Cell Hemoglobin Concentration : 32.5 gm/dL  Auto Neutrophil # : x  Auto Lymphocyte # : x  Auto Monocyte # : x  Auto Eosinophil # : x  Auto Basophil # : x  Auto Neutrophil % : x  Auto Lymphocyte % : x  Auto Monocyte % : x  Auto Eosinophil % : x  Auto Basophil % : x    08-12    134<L>  |  94<L>  |  37<H>  ----------------------------<  92  4.4   |  23  |  2.55<H>    Ca    8.0<L>      12 Aug 2024 06:42  Phos  4.0     08-12  Mg     1.6     08-12        Radiology:             ROS:  Patient comfortable without distress  No SOB or chest pain  No palpitation  No abdominal pain, ostomy bag  No weakness of extremities  No skin changes or swelling of legs  Rest of the comprehensive ROS was negative  Vital Signs Last 24 Hrs  T(C): 36.4 (13 Aug 2024 11:56), Max: 36.4 (12 Aug 2024 20:57)  T(F): 97.5 (13 Aug 2024 11:56), Max: 97.5 (12 Aug 2024 20:57)  HR: 123 (13 Aug 2024 11:56) (68 - 123)  BP: 99/61 (13 Aug 2024 11:56) (90/67 - 110/78)  BP(mean): --  RR: 18 (13 Aug 2024 11:56) (17 - 18)  SpO2: 97% (13 Aug 2024 11:56) (95% - 99%)    Parameters below as of 13 Aug 2024 11:56  Patient On (Oxygen Delivery Method): nasal cannula        Physical exam:  Patient lethargic  No distress  CVS: S1, S2   Chest: bilateral breath sound without rales  Abdomen: soft, not tender, no organomegaly or masses  CNS: No focal neuro deficit  Musculoskeletal:  Normal range of motion  Skin: No rash    Assessment and Plan:

## 2024-08-13 NOTE — PROGRESS NOTE ADULT - SUBJECTIVE AND OBJECTIVE BOX
[facility]  GERALD Leahy Y. Patel, S. Shah, G. Casimir  [phone]  ([phone] - weekdays after 5pm and weekends)    Name: LEFTY AKBAR  Age/Gender: 78y Female  MRN: [id number]    Interval History:  Patient seen and examined this morning.   No new complaints noted.  Notes reviewed  No concerning overnight events  Afebrile   Allergies: penicillin (Hives)      Objective:  Vitals:   T(F): 97.3 (08-13-24 @ 05:10), Max: 97.9 (08-12-24 @ 11:52)  HR: 105 (08-13-24 @ 05:10) (68 - 107)  BP: 110/78 (08-13-24 @ 05:10) (90/67 - 110/78)  RR: 18 (08-13-24 @ 05:10) (17 - 18)  SpO2: 99% (08-13-24 @ 05:10) (95% - 99%)  Physical Examination:  General: no acute distress, nontoxic appearing   HEENT: NC/AT, anicteric, EOMI  Respiratory: no acc muscle use, breathing comfortably  Cardiovascular: S1 and S2 present  Gastrointestinal: soft, NT, ND, ostomy soft stool  Extremities: no edema, no cyanosis  Skin: no visible rash    Laboratory Studies:  CBC:                       13.3   12.47 )-----------( 104      ( 13 Aug 2024 02:46 )             39.9     WBC Trend:  12.47 08-13-24 @ 02:46  10.09 08-12-24 @ 06:44  10.00 08-11-24 @ 07:21  10.34 08-09-24 @ 10:11  7.60 08-08-24 @ 07:38  7.58 08-08-24 @ 00:39  7.90 08-07-24 @ 18:55  7.93 08-07-24 @ 07:36  7.90 08-06-24 @ 18:05    CMP: 08-12    134<L>  |  94<L>  |  37<H>  ----------------------------<  92  4.4   |  23  |  2.55<H>    Ca    8.0<L>      12 Aug 2024 06:42  Phos  4.0     08-12  Mg     1.6     08-12      Creatinine: 2.55 mg/dL (08-12-24 @ 06:42)  Creatinine: 2.57 mg/dL (08-11-24 @ 09:29)  Creatinine: 2.56 mg/dL (08-11-24 @ 07:22)  Creatinine: 2.25 mg/dL (08-09-24 @ 10:11)  Creatinine: 2.26 mg/dL (08-09-24 @ 10:11)  Creatinine: 2.26 mg/dL (08-08-24 @ 06:37)  Creatinine: 2.38 mg/dL (08-07-24 @ 07:36)  Creatinine: 2.49 mg/dL (08-06-24 @ 18:05)  Creatinine: 2.81 mg/dL (08-06-24 @ 15:29)    Microbiology: reviewed   Culture - Blood (collected 08-09-24 @ 18:50)  Source: .Blood Blood-Peripheral  Preliminary Report (08-13-24 @ 01:01):    No growth at 72 Hours    Culture - Urine (collected 08-05-24 @ 21:00)  Source: Clean Catch Clean Catch (Midstream)  Final Report (08-07-24 @ 14:01):    50,000 - 99,000 CFU/mL Candida albicans "Susceptibilities not performed"    Culture - Blood (collected 08-05-24 @ 20:54)  Source: .Blood Blood-Peripheral  Final Report (08-11-24 @ 01:00):    No growth at 5 days    Culture - Blood (collected 08-05-24 @ 18:00)  Source: .Blood Blood-Peripheral  Final Report (08-11-24 @ 01:00):    No growth at 5 days    Radiology: reviewed     Medications:  acetaminophen     Tablet .. 650 milliGRAM(s) Oral every 6 hours PRN  ascorbic acid 500 milliGRAM(s) Oral daily  atorvastatin 80 milliGRAM(s) Oral at bedtime  digoxin  Injectable 125 MICROGram(s) IV Push <User Schedule>  heparin  Infusion.  Unit(s)/Hr IV Continuous <Continuous>  levothyroxine 150 MICROGram(s) Oral daily  metoprolol tartrate 25 milliGRAM(s) Oral two times a day  midodrine. 30 milliGRAM(s) Oral every 8 hours  multivitamin 1 Tablet(s) Oral daily  pantoprazole  Injectable 40 milliGRAM(s) IV Push two times a day  sodium chloride 0.45% 1000 milliLiter(s) IV Continuous <Continuous>  vancomycin    Solution 500 milliGRAM(s) Oral every 6 hours    Current Antimicrobials:  vancomycin    Solution 500 milliGRAM(s) Oral every 6 hours    Prior/Completed Antimicrobials:  metroNIDAZOLE  IVPB  piperacillin/tazobactam IVPB.  piperacillin/tazobactam IVPB.-  vancomycin    Solution

## 2024-08-13 NOTE — PROGRESS NOTE ADULT - SUBJECTIVE AND OBJECTIVE BOX
Date of service: 24 @ 17:15      Patient is a 78y old  Female who presents with a chief complaint of AMS/lethargy, Afib w/ RVR, Hyponatremia, C.diff infection (13 Aug 2024 15:20)                                                               INTERVAL HPI/OVERNIGHT EVENTS:    REVIEW OF SYSTEMS: Lethargic in no acute distress  When awakened denies any complaints                                                                                                                                                                                                                                                Medications:  MEDICATIONS  (STANDING):  albumin human 25% IVPB 50 milliLiter(s) IV Intermittent every 6 hours  ascorbic acid 500 milliGRAM(s) Oral daily  atorvastatin 80 milliGRAM(s) Oral at bedtime  digoxin  Injectable 125 MICROGram(s) IV Push <User Schedule>  levothyroxine 150 MICROGram(s) Oral daily  metoprolol tartrate 25 milliGRAM(s) Oral two times a day  midodrine. 30 milliGRAM(s) Oral every 8 hours  multivitamin 1 Tablet(s) Oral daily  pantoprazole  Injectable 40 milliGRAM(s) IV Push two times a day  vancomycin    Solution 500 milliGRAM(s) Oral every 6 hours    MEDICATIONS  (PRN):  acetaminophen     Tablet .. 650 milliGRAM(s) Oral every 6 hours PRN Temp greater or equal to 38C (100.4F), Mild Pain (1 - 3)       Allergies    penicillin (Hives)    Intolerances      Vital Signs Last 24 Hrs  T(C): 36.4 (13 Aug 2024 11:56), Max: 36.4 (12 Aug 2024 20:57)  T(F): 97.5 (13 Aug 2024 11:56), Max: 97.5 (12 Aug 2024 20:57)  HR: 123 (13 Aug 2024 11:56) (68 - 123)  BP: 99/61 (13 Aug 2024 11:56) (90/67 - 110/78)  BP(mean): --  RR: 18 (13 Aug 2024 11:56) (17 - 18)  SpO2: 97% (13 Aug 2024 11:56) (95% - 99%)    Parameters below as of 13 Aug 2024 11:56  Patient On (Oxygen Delivery Method): nasal cannula      CAPILLARY BLOOD GLUCOSE      POCT Blood Glucose.: 102 mg/dL (13 Aug 2024 11:54)  POCT Blood Glucose.: 106 mg/dL (13 Aug 2024 07:58)  POCT Blood Glucose.: 106 mg/dL (13 Aug 2024 06:21)  POCT Blood Glucose.: 106 mg/dL (13 Aug 2024 00:19)  POCT Blood Glucose.: 104 mg/dL (12 Aug 2024 17:53)       @ 07: @ 07:00  --------------------------------------------------------  IN: 120 mL / OUT: 300 mL / NET: -180 mL     @ 07: @ 17:15  --------------------------------------------------------  IN: 0 mL / OUT: 0 mL / NET: 0 mL      Physical Exam:    Daily     Daily Weight in k.3 (13 Aug 2024 07:28)  General:  Cachectic  HEENT:  Nonicteric, PERRLA  CV:  RRR, S1S2   Lungs: Poor effort otherwiseClear  Abdomen:  Soft, non-tender, no distended, positive BS  Extremities:Edema/anasarca                                                                                                                                                                                                                                                                                       LABS:                               13.3   11.41 )-----------( 101      ( 13 Aug 2024 11:40 )             40.9                          135  |  93<L>  |  37<H>  ----------------------------<  100<H>  4.8   |  28  |  2.67<H>    Ca    8.2<L>      13 Aug 2024 11:40  Phos  3.7       Mg     1.9         TPro  5.0<L>  /  Alb  2.7<L>  /  TBili  0.9  /  DBili  x   /  AST  69<H>  /  ALT  87<H>  /  AlkPhos  122<H>                         RADIOLOGY & ADDITIONAL TESTS         I personally reviewed: [  ]EKG   [  ]CXR    [  ] CT      A/P:         Discussed with :     Simon consultants' Notes   Time spent :

## 2024-08-13 NOTE — PROGRESS NOTE ADULT - ASSESSMENT
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 Admitted due to black output in the ileostomy bag c/f UGIB, afib with RVR, and c diff positive.    1. GIB  resolved GIB; stools brown  EGD continues to be deferred in the setting of rapid afib  Agree at this point patient appears very depleted. Palliative approach seems reasonable.  PPI BID  diet as usual    2. Nausea/Vomiting   resolved        3. C. Diff   ?significance given ileostomy, may be a colonizer, defer to ID      4. Afib   a/c on hold for GIB   cardiology on board

## 2024-08-14 LAB
ALBUMIN SERPL ELPH-MCNC: 3 G/DL — LOW (ref 3.3–5)
ALP SERPL-CCNC: 120 U/L — SIGNIFICANT CHANGE UP (ref 40–120)
ALT FLD-CCNC: 69 U/L — HIGH (ref 10–45)
ANION GAP SERPL CALC-SCNC: 13 MMOL/L — SIGNIFICANT CHANGE UP (ref 5–17)
AST SERPL-CCNC: 46 U/L — HIGH (ref 10–40)
BILIRUB SERPL-MCNC: 1.2 MG/DL — SIGNIFICANT CHANGE UP (ref 0.2–1.2)
BUN SERPL-MCNC: 39 MG/DL — HIGH (ref 7–23)
CALCIUM SERPL-MCNC: 8.5 MG/DL — SIGNIFICANT CHANGE UP (ref 8.4–10.5)
CHLORIDE SERPL-SCNC: 94 MMOL/L — LOW (ref 96–108)
CO2 SERPL-SCNC: 26 MMOL/L — SIGNIFICANT CHANGE UP (ref 22–31)
CREAT SERPL-MCNC: 2.55 MG/DL — HIGH (ref 0.5–1.3)
EGFR: 19 ML/MIN/1.73M2 — LOW
GLUCOSE BLDC GLUCOMTR-MCNC: 88 MG/DL — SIGNIFICANT CHANGE UP (ref 70–99)
GLUCOSE BLDC GLUCOMTR-MCNC: 90 MG/DL — SIGNIFICANT CHANGE UP (ref 70–99)
GLUCOSE BLDC GLUCOMTR-MCNC: 93 MG/DL — SIGNIFICANT CHANGE UP (ref 70–99)
GLUCOSE BLDC GLUCOMTR-MCNC: 93 MG/DL — SIGNIFICANT CHANGE UP (ref 70–99)
GLUCOSE SERPL-MCNC: 98 MG/DL — SIGNIFICANT CHANGE UP (ref 70–99)
HCT VFR BLD CALC: 39.2 % — SIGNIFICANT CHANGE UP (ref 34.5–45)
HGB BLD-MCNC: 13.1 G/DL — SIGNIFICANT CHANGE UP (ref 11.5–15.5)
MCHC RBC-ENTMCNC: 31 PG — SIGNIFICANT CHANGE UP (ref 27–34)
MCHC RBC-ENTMCNC: 33.4 GM/DL — SIGNIFICANT CHANGE UP (ref 32–36)
MCV RBC AUTO: 92.9 FL — SIGNIFICANT CHANGE UP (ref 80–100)
NRBC # BLD: 0 /100 WBCS — SIGNIFICANT CHANGE UP (ref 0–0)
PLATELET # BLD AUTO: 98 K/UL — LOW (ref 150–400)
POTASSIUM SERPL-MCNC: 4.4 MMOL/L — SIGNIFICANT CHANGE UP (ref 3.5–5.3)
POTASSIUM SERPL-SCNC: 4.4 MMOL/L — SIGNIFICANT CHANGE UP (ref 3.5–5.3)
PROT SERPL-MCNC: 5.4 G/DL — LOW (ref 6–8.3)
RBC # BLD: 4.22 M/UL — SIGNIFICANT CHANGE UP (ref 3.8–5.2)
RBC # FLD: 17.2 % — HIGH (ref 10.3–14.5)
SODIUM SERPL-SCNC: 133 MMOL/L — LOW (ref 135–145)
WBC # BLD: 10.64 K/UL — HIGH (ref 3.8–10.5)
WBC # FLD AUTO: 10.64 K/UL — HIGH (ref 3.8–10.5)

## 2024-08-14 RX ADMIN — METOPROLOL TARTRATE 25 MILLIGRAM(S): 100 TABLET ORAL at 05:08

## 2024-08-14 RX ADMIN — Medication 500 MILLIGRAM(S): at 05:08

## 2024-08-14 RX ADMIN — Medication 500 MILLIGRAM(S): at 00:23

## 2024-08-14 RX ADMIN — ALBUMIN (HUMAN) 50 MILLILITER(S): 5 SOLUTION INTRAVENOUS at 01:26

## 2024-08-14 RX ADMIN — MIDODRINE HYDROCHLORIDE 30 MILLIGRAM(S): 5 TABLET ORAL at 22:01

## 2024-08-14 RX ADMIN — METOPROLOL TARTRATE 25 MILLIGRAM(S): 100 TABLET ORAL at 17:10

## 2024-08-14 RX ADMIN — MIDODRINE HYDROCHLORIDE 30 MILLIGRAM(S): 5 TABLET ORAL at 12:02

## 2024-08-14 RX ADMIN — ALBUMIN (HUMAN) 50 MILLILITER(S): 5 SOLUTION INTRAVENOUS at 13:22

## 2024-08-14 RX ADMIN — ALBUMIN (HUMAN) 50 MILLILITER(S): 5 SOLUTION INTRAVENOUS at 20:24

## 2024-08-14 RX ADMIN — Medication 40 MILLIGRAM(S): at 17:10

## 2024-08-14 RX ADMIN — Medication 500 MILLIGRAM(S): at 12:01

## 2024-08-14 RX ADMIN — Medication 500 MILLIGRAM(S): at 17:10

## 2024-08-14 RX ADMIN — MIDODRINE HYDROCHLORIDE 30 MILLIGRAM(S): 5 TABLET ORAL at 05:08

## 2024-08-14 RX ADMIN — ALBUMIN (HUMAN) 50 MILLILITER(S): 5 SOLUTION INTRAVENOUS at 07:53

## 2024-08-14 RX ADMIN — Medication 80 MILLIGRAM(S): at 22:01

## 2024-08-14 RX ADMIN — Medication 150 MICROGRAM(S): at 05:08

## 2024-08-14 RX ADMIN — Medication 500 MILLIGRAM(S): at 23:31

## 2024-08-14 RX ADMIN — Medication 1 TABLET(S): at 12:02

## 2024-08-14 RX ADMIN — Medication 40 MILLIGRAM(S): at 05:08

## 2024-08-14 NOTE — PROGRESS NOTE ADULT - SUBJECTIVE AND OBJECTIVE BOX
[facility]-- WOUND TEAM -- FOLLOW UP NOTE  --------------------------------------------------------------------------------    24 hour events/subjective:    Daughter at bedside   Afebrile  Tolerating po w/o n/v  ostomy functioning  Chart reviewed  Hx of   Patient and spouse re-educated when cellulitis resolved importance to continue lifelong compression  All questions answered to the expressed satisfaction of patient and family member(s)   dc planning ongoing        Diet:  Diet, Clear Liquid (08-12-24 @ 11:30)      ROS: General/ Skin/ Msk/ Neuro/ GI see HPI  all other systems negative  pt unable to participate    ALLERGIES & MEDICATIONS  --------------------------------------------------------------------------------  Allergies    penicillin (Hives)    Intolerances          STANDING INPATIENT MEDICATIONS    albumin human 25% IVPB 50 milliLiter(s) IV Intermittent every 6 hours  ascorbic acid 500 milliGRAM(s) Oral daily  atorvastatin 80 milliGRAM(s) Oral at bedtime  digoxin  Injectable 125 MICROGram(s) IV Push <User Schedule>  levothyroxine 150 MICROGram(s) Oral daily  metoprolol tartrate 25 milliGRAM(s) Oral two times a day  midodrine. 30 milliGRAM(s) Oral every 8 hours  multivitamin 1 Tablet(s) Oral daily  pantoprazole  Injectable 40 milliGRAM(s) IV Push two times a day  vancomycin    Solution 500 milliGRAM(s) Oral every 6 hours      PRN INPATIENT MEDICATION  acetaminophen     Tablet .. 650 milliGRAM(s) Oral every 6 hours PRN        VITALS/PHYSICAL EXAM  --------------------------------------------------------------------------------  T(C): 36.3 (08-14-24 @ 11:13), Max: 36.6 (08-14-24 @ 00:32)  HR: 90 (08-14-24 @ 12:30) (61 - 123)  BP: 111/76 (08-14-24 @ 12:30) (84/75 - 138/95)  RR: 18 (08-14-24 @ 11:13) (16 - 18)  SpO2: 100% (08-14-24 @ 11:13) (93% - 100%)  Wt(kg): --        08-13-24 @ 07:01  -  08-14-24 @ 07:00  --------------------------------------------------------  IN: 0 mL / OUT: 125 mL / NET: -125 mL    08-14-24 @ 07:01  -  08-14-24 @ 15:30  --------------------------------------------------------  IN: 50 mL / OUT: 200 mL / NET: -150 mL        HEENT: sclera clear, mucosa moist, throat clear, trachea midline, neck supple  Respiratory: nonlabored w/ equal chest rise  Gastrointestinal: soft NT/ND  (+)ostomy   Neurology:  weakened strength & sensation grossly intact, no follow commands  Psych: calm/ appropriate  Musculoskeletal:  FROM, no deformities/ contractures  Vascular: BLE equally warm,  no cyanosis, clubbing, edema nor acute ischemia        (+) BLE DP pulses palpable        BLE hemosiderin staining w/ varicose veins       Right heel w/ purple skin changes DTI         1.5cm x 3cm w/o blistering or drainage  No odor, erythema, increased warmth, tenderness, induration, fluctuance, nor crepitus  Skin: thin, dry, pale, frail,  ecchymosis w/o hematoma  Bilateral buttocks into gluteal cleft mild blanchable erythema  w/o blistering  or serosanguinous drainage  No odor, erythema, increased warmth, tenderness, induration, fluctuance, nor crepitus      LABS/ CULTURES/ RADIOLOGY:                      13.1   10.64 >-----------<  98       [08-14-24 @ 05:51]              39.2     133  |  94  |  39  ----------------------------<  98      [08-14-24 @ 05:51]  4.4   |  26  |  2.55        Ca     8.5     [08-14-24 @ 05:51]      Mg     1.9     [08-13-24 @ 11:40]      Phos  3.7     [08-13-24 @ 11:40]    TPro  5.4  /  Alb  3.0  /  TBili  1.2  /  DBili  x   /  AST  46  /  ALT  69  /  AlkPhos  120  [08-14-24 @ 05:51]      PTT: >200.0      [08-13-24 @ 11:40]          [08-13-24 @ 11:40]        CAPILLARY BLOOD GLUCOSE      POCT Blood Glucose.: 88 mg/dL (14 Aug 2024 12:08)  POCT Blood Glucose.: 93 mg/dL (14 Aug 2024 08:09)  POCT Blood Glucose.: 90 mg/dL (14 Aug 2024 00:22)  POCT Blood Glucose.: 114 mg/dL (13 Aug 2024 19:14)                  Culture - Blood (collected 08-09-24 @ 18:50)  Source: .Blood Blood-Peripheral  Preliminary Report (08-14-24 @ 01:01):    No growth at 4 days          A1C with Estimated Average Glucose Result: 5.3 % (05-16-24 @ 07:35)  A1C with Estimated Average Glucose Result: 5.3 % (04-29-24 @ 06:51)      >>> <<<         [facility]-- WOUND TEAM -- FOLLOW UP NOTE  --------------------------------------------------------------------------------    24 hour events/subjective:    Daughter at bedside   Afebrile  Tolerating po w/o n/v  ostomy functioning  Chart reviewed  Hx of   Patient and spouse re-educated when cellulitis resolved importance to continue lifelong compression  All questions answered to the expressed satisfaction of patient and family member(s)   dc planning ongoing        Diet:  Diet, Clear Liquid (08-12-24 @ 11:30)      ROS:   pt unable to participate    ALLERGIES & MEDICATIONS  --------------------------------------------------------------------------------  Allergies    penicillin (Hives)    Intolerances          STANDING INPATIENT MEDICATIONS    albumin human 25% IVPB 50 milliLiter(s) IV Intermittent every 6 hours  ascorbic acid 500 milliGRAM(s) Oral daily  atorvastatin 80 milliGRAM(s) Oral at bedtime  digoxin  Injectable 125 MICROGram(s) IV Push <User Schedule>  levothyroxine 150 MICROGram(s) Oral daily  metoprolol tartrate 25 milliGRAM(s) Oral two times a day  midodrine. 30 milliGRAM(s) Oral every 8 hours  multivitamin 1 Tablet(s) Oral daily  pantoprazole  Injectable 40 milliGRAM(s) IV Push two times a day  vancomycin    Solution 500 milliGRAM(s) Oral every 6 hours      PRN INPATIENT MEDICATION  acetaminophen     Tablet .. 650 milliGRAM(s) Oral every 6 hours PRN        VITALS/PHYSICAL EXAM  --------------------------------------------------------------------------------  T(C): 36.3 (08-14-24 @ 11:13), Max: 36.6 (08-14-24 @ 00:32)  HR: 90 (08-14-24 @ 12:30) (61 - 123)  BP: 111/76 (08-14-24 @ 12:30) (84/75 - 138/95)  RR: 18 (08-14-24 @ 11:13) (16 - 18)  SpO2: 100% (08-14-24 @ 11:13) (93% - 100%)  Wt(kg): --        08-13-24 @ 07:01  -  08-14-24 @ 07:00  --------------------------------------------------------  IN: 0 mL / OUT: 125 mL / NET: -125 mL    08-14-24 @ 07:01  -  08-14-24 @ 15:30  --------------------------------------------------------  IN: 50 mL / OUT: 200 mL / NET: -150 mL        HEENT: sclera clear, mucosa moist, throat clear, trachea midline, neck supple  Respiratory: nonlabored w/ equal chest rise  Gastrointestinal: soft NT/ND  (+)ostomy   Neurology:  weakened strength & sensation grossly intact, no follow commands  Psych: calm/ appropriate  Musculoskeletal:  FROM, no deformities/ contractures  Vascular: BLE equally warm,  no cyanosis, clubbing, edema nor acute ischemia        (+) BLE DP pulses palpable        BLE hemosiderin staining w/ varicose veins       Right heel w/ purple skin changes DTI         1.5cm x 3cm w/o blistering or drainage  No odor, erythema, increased warmth, tenderness, induration, fluctuance, nor crepitus  Skin: thin, dry, pale, frail,  ecchymosis w/o hematoma  Bilateral buttocks into gluteal cleft mild blanchable erythema  w/o blistering  or serosanguinous drainage  No odor, erythema, increased warmth, tenderness, induration, fluctuance, nor crepitus      LABS/ CULTURES/ RADIOLOGY:                      13.1   10.64 >-----------<  98       [08-14-24 @ 05:51]              39.2     133  |  94  |  39  ----------------------------<  98      [08-14-24 @ 05:51]  4.4   |  26  |  2.55        Ca     8.5     [08-14-24 @ 05:51]      Mg     1.9     [08-13-24 @ 11:40]      Phos  3.7     [08-13-24 @ 11:40]    TPro  5.4  /  Alb  3.0  /  TBili  1.2  /  DBili  x   /  AST  46  /  ALT  69  /  AlkPhos  120  [08-14-24 @ 05:51]      PTT: >200.0      [08-13-24 @ 11:40]          [08-13-24 @ 11:40]        CAPILLARY BLOOD GLUCOSE      POCT Blood Glucose.: 88 mg/dL (14 Aug 2024 12:08)  POCT Blood Glucose.: 93 mg/dL (14 Aug 2024 08:09)  POCT Blood Glucose.: 90 mg/dL (14 Aug 2024 00:22)  POCT Blood Glucose.: 114 mg/dL (13 Aug 2024 19:14)                  Culture - Blood (collected 08-09-24 @ 18:50)  Source: .Blood Blood-Peripheral  Preliminary Report (08-14-24 @ 01:01):    No growth at 4 days          A1C with Estimated Average Glucose Result: 5.3 % (05-16-24 @ 07:35)  A1C with Estimated Average Glucose Result: 5.3 % (04-29-24 @ 06:51)      >>> <<<

## 2024-08-14 NOTE — PROGRESS NOTE ADULT - SUBJECTIVE AND OBJECTIVE BOX
[facility]  GERALD Leahy Y. Patel, S. Shah, G. Casimir  [phone]  ([phone] - weekdays after 5pm and weekends)    Name: LEFTY AKBAR  Age/Gender: 78y Female  MRN: [id number]    Interval History:  Patient seen and examined this morning.   No new complaints noted.  Notes reviewed  No concerning overnight events  Afebrile   Allergies: penicillin (Hives)      Objective:  Vitals:   T(F): 97.4 (08-14-24 @ 08:00), Max: 97.9 (08-14-24 @ 00:32)  HR: 98 (08-14-24 @ 08:00) (61 - 123)  BP: 118/94 (08-14-24 @ 08:00) (84/75 - 138/95)  RR: 18 (08-14-24 @ 08:00) (16 - 18)  SpO2: 95% (08-14-24 @ 08:00) (93% - 98%)  Physical Examination:  General: no acute distress, nontoxic appearing   HEENT: NC/AT, anicteric, EOMI  Respiratory: no acc muscle use, breathing comfortably  Cardiovascular: S1 and S2 present  Gastrointestinal: soft, NT, ND, ostomy soft stool  Extremities: no edema, no cyanosis  Skin: no visible rash    Laboratory Studies:  CBC:                       13.1   10.64 )-----------( 98       ( 14 Aug 2024 05:51 )             39.2     WBC Trend:  10.64 08-14-24 @ 05:51  11.41 08-13-24 @ 11:40  12.47 08-13-24 @ 02:46  10.09 08-12-24 @ 06:44  10.00 08-11-24 @ 07:21  10.34 08-09-24 @ 10:11  7.60 08-08-24 @ 07:38  7.58 08-08-24 @ 00:39  7.90 08-07-24 @ 18:55    CMP: 08-14    133<L>  |  94<L>  |  39<H>  ----------------------------<  98  4.4   |  26  |  2.55<H>    Ca    8.5      14 Aug 2024 05:51  Phos  3.7     08-13  Mg     1.9     08-13    TPro  5.4<L>  /  Alb  3.0<L>  /  TBili  1.2  /  DBili  x   /  AST  46<H>  /  ALT  69<H>  /  AlkPhos  120  08-14    Creatinine: 2.55 mg/dL (08-14-24 @ 05:51)  Creatinine: 2.67 mg/dL (08-13-24 @ 11:40)  Creatinine: 2.55 mg/dL (08-12-24 @ 06:42)  Creatinine: 2.57 mg/dL (08-11-24 @ 09:29)  Creatinine: 2.56 mg/dL (08-11-24 @ 07:22)  Creatinine: 2.25 mg/dL (08-09-24 @ 10:11)  Creatinine: 2.26 mg/dL (08-09-24 @ 10:11)  Creatinine: 2.26 mg/dL (08-08-24 @ 06:37)      LIVER FUNCTIONS - ( 14 Aug 2024 05:51 )  Alb: 3.0 g/dL / Pro: 5.4 g/dL / ALK PHOS: 120 U/L / ALT: 69 U/L / AST: 46 U/L / GGT: x           Microbiology: reviewed   Culture - Blood (collected 08-09-24 @ 18:50)  Source: .Blood Blood-Peripheral  Preliminary Report (08-14-24 @ 01:01):    No growth at 4 days    Culture - Urine (collected 08-05-24 @ 21:00)  Source: Clean Catch Clean Catch (Midstream)  Final Report (08-07-24 @ 14:01):    50,000 - 99,000 CFU/mL Candida albicans "Susceptibilities not performed"    Culture - Blood (collected 08-05-24 @ 20:54)  Source: .Blood Blood-Peripheral  Final Report (08-11-24 @ 01:00):    No growth at 5 days    Culture - Blood (collected 08-05-24 @ 18:00)  Source: .Blood Blood-Peripheral  Final Report (08-11-24 @ 01:00):    No growth at 5 days    Radiology: reviewed     Medications:  acetaminophen     Tablet .. 650 milliGRAM(s) Oral every 6 hours PRN  albumin human 25% IVPB 50 milliLiter(s) IV Intermittent every 6 hours  ascorbic acid 500 milliGRAM(s) Oral daily  atorvastatin 80 milliGRAM(s) Oral at bedtime  digoxin  Injectable 125 MICROGram(s) IV Push <User Schedule>  levothyroxine 150 MICROGram(s) Oral daily  metoprolol tartrate 25 milliGRAM(s) Oral two times a day  midodrine. 30 milliGRAM(s) Oral every 8 hours  multivitamin 1 Tablet(s) Oral daily  pantoprazole  Injectable 40 milliGRAM(s) IV Push two times a day  vancomycin    Solution 500 milliGRAM(s) Oral every 6 hours    Current Antimicrobials:  vancomycin    Solution 500 milliGRAM(s) Oral every 6 hours    Prior/Completed Antimicrobials:  metroNIDAZOLE  IVPB  piperacillin/tazobactam IVPB.  piperacillin/tazobactam IVPB.-  vancomycin    Solution

## 2024-08-14 NOTE — PROGRESS NOTE ADULT - SUBJECTIVE AND OBJECTIVE BOX
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 Limited hx obtainable from pt, was AAOx~1 on encounter and very lethargic/somnolent, but arousable and seemed to deny pain anywhere. Collateral hx obtained from chart review. During recent admission (7/21/24-7/29/24), she was treated for UTI/sepsis and ultimately discharged to rehab to completed a 5-day course of ciprofloxacin (last dose 7/30/24). At rehab, she was reportedly found to have oliguria for a few days over the weekend for which she was started on IV fluids, however she was noncompliant with the IV access and she pulled it out many times. She has had very poor appetite and became lethargic and hypotensive. She was subsequently transferred to hospital where she was found to have black-colored output in ileostomy bag.   In ED: Afebrile, HR 120s-170s (Afib), SBP 90s-130s, RR 15-22, sating % on RA.  Labs notable for Hgb 11.3->10.3, Na 133->122, SCr 3.04->2.58; lactated 4.7->2.8, blood glucose to 400s but FS 100s. Found to be C.diff positive. UA not best sample with 9 epithelial cells, but noted +LE, +bacteria, +WBC, neg nitrite. CT A/P showed no acute intra-abdominal pathology and unchanged indeterminate left adrenal lesion. Received Vanc 500mg PO, Flagyl 500mg IVPB, amio 150mg IVPB x3, ofirmev 1g, 1.5L IVF, and 1u pRBC. Also started on amio gtt and protonix gtt. Nephrology and MICU were consulted. Admitted to Medicine for further management. (05 Aug 2024 23:46)    Known to me from prior admissions. Has paroxysmal AFib, and syncope, has an ILR for surveillance for long pauses.  She has known short pauses overnight, but nothing long enough or with symptoms to warrant pacemaker insertion.  During subsequent admissions, the usual issue has been rapidly conducted  AFib.  Unable to answer 10pt ROS due to somnolence.  8/12- resting in bed, remains lethargic, a/o x 1 at best. rapid AFib 140s+ despite multiple drugs.  8/13- extensive discussion by telephone w/ patient's daughter re: risks outweighing benefits for SUSY/Cardioversion or VVI pacemaker/AVJ ablation.  remains somnolent and unable to advocate for herself.  Date of service 8/14- resting sleepy in bed, no overnight changes.    PAST MEDICAL & SURGICAL HISTORY:  Hypertension  Hypothyroid  Osteoarthritis  knees, back  CAD (coronary artery disease)  CROW (obstructive sleep apnea)  non complaint on CPAP  History of MI (myocardial infarction)  h/o previous MI in 2004 prompted PTCA  with stenting x 2 vessels   last stress/ echo 2019  Heart murmur  dx in childhood  Bilateral hearing loss, unspecified hearing loss type  bilateral aids  Obesity  Mixed stress and urge urinary incontinence  Overactive bladder  S/P ORIF (open reduction internal fixation) fracture  left hip 1962  S/P appendectomy  30 plus years  S/P knee replacement  left 2000  Stented coronary artery  2004 X 2 STENTS  S/P laparotomy  due to adhesions, 30 years ago  H/O dilation and curettage  2/2019 Benign polyp    acetaminophen     Tablet .. 650 milliGRAM(s) Oral every 6 hours PRN  albumin human 25% IVPB 50 milliLiter(s) IV Intermittent every 6 hours  ascorbic acid 500 milliGRAM(s) Oral daily  atorvastatin 80 milliGRAM(s) Oral at bedtime  digoxin  Injectable 125 MICROGram(s) IV Push <User Schedule>  levothyroxine 150 MICROGram(s) Oral daily  metoprolol tartrate 25 milliGRAM(s) Oral two times a day  midodrine. 30 milliGRAM(s) Oral every 8 hours  multivitamin 1 Tablet(s) Oral daily  pantoprazole  Injectable 40 milliGRAM(s) IV Push two times a day  vancomycin    Solution 500 milliGRAM(s) Oral every 6 hours                            13.1   10.64 )-----------( 98       ( 14 Aug 2024 05:51 )             39.2       08-14    133<L>  |  94<L>  |  39<H>  ----------------------------<  98  4.4   |  26  |  2.55<H>    Ca    8.5      14 Aug 2024 05:51  Phos  3.7     08-13  Mg     1.9     08-13    TPro  5.4<L>  /  Alb  3.0<L>  /  TBili  1.2  /  DBili  x   /  AST  46<H>  /  ALT  69<H>  /  AlkPhos  120  08-14    T(C): 36.3 (08-14-24 @ 08:00), Max: 36.6 (08-14-24 @ 00:32)  HR: 98 (08-14-24 @ 08:00) (61 - 123)  BP: 118/94 (08-14-24 @ 08:00) (84/75 - 138/95)  RR: 18 (08-14-24 @ 08:00) (16 - 18)  SpO2: 95% (08-14-24 @ 08:00) (93% - 98%)  Wt(kg): --    I&O's Summary    13 Aug 2024 07:01  -  14 Aug 2024 07:00  --------------------------------------------------------  IN: 0 mL / OUT: 125 mL / NET: -125 mL      Appearance: frail elderly woman in no acute distress, somnolent	  HEENT:   Normal oral mucosa, PERRL, EOMI	  Lymphatic: No lymphadenopathy , no edema  Cardiovascular: rapid irregular S1 S2, No JVD, No murmurs , Peripheral pulses palpable 2+ bilaterally  Respiratory: Lungs clear to auscultation, normal effort 	  Gastrointestinal:  Soft, Non-tender, + BS	  Skin: No rashes, No ecchymoses, No cyanosis, warm to touch  Musculoskeletal: Normal range of motion, normal strength  Psychiatry:  Mood & affect appropriate    TELEMETRY: AF 110s.	    ECG: AFib RVR 	    ASSESSMENT/PLAN: Ms Gooden is a pleasant 78y Female here with CDiff +/- GI bleeding.  EP called re: management of rapid AFib.  Has Paroxysmal AFib.  Had brief episodes of sinus rhythm on last admission, and known short pauses that are not long enough to justify permanent pacemaker insertion.  Has an ILR for long-pause surveillance, data managed by Dr Mendiola.    has been on heparin infusion. 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  pending alternative IV anticoag.  Continue metoprolol alongside midodrine. OK for holding parameters for HEARTRATE, but metoprolol is in general BP-neutral.  Recommend increasing metoprolol to 25mg PO Q6hrs.   She had a higher digoxin level last week but is not currently on any active AVN blockade.  Unable to escalate digoxin dose due to advanced CHANELL on CKD.  Not a good candidate for SUSY/Cardioversion - unlikely to be successful long-term.  Not a good candidate for VVI pacemaker / AVJ ablation.  Also, not likely that restoration of sinus or paced rhythm would improve her overall well-being.  She would still likely be somnolent and frail.  Awaiting resolution of diarrhea via ostomy.  Prognosis is guarded, as she's declinded considerably over the last 6 months (6 hospitalizations).  Daughter is ready to request palliative care re-evaluation.      Shane Frias M.D.  Cardiac Electrophysiology    office [phone]  pager [phone]

## 2024-08-14 NOTE — ADVANCED PRACTICE NURSE CONSULT - REASON FOR CONSULT
Ostomy education
Vascular Access Team    Evaluation for: Bedside MIDLINE placement  Requested by name: Harleen Mendoza  Date/Time: 8/11/2024    Indication for MIDLINE: Access  Allergy to CHG or Heparin or Lidocaine: No    Platelets(>20): 110  INR(<3): 1.75  eGFR(>40): 19  Pending blood cultures: N/A (blood culture 2 sets sent on 8/9/2024)  Anticoagulants: No  Arms DVT: No  Mastectomy: No  Fistula: No  IR or Nephrology or ID clearance needed: IR consult for low GFR (S/W NP Harleen Mendoza #[id number])    Consent obtained: N/A    Pending: IR consult     Plan: Bedside midline order evaluated. Please put IR consult for low GFR and then call [phone] for the VAT RN to place the bedside midline.  
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 Limited hx obtainable from pt, was AAOx~1 on encounter and very lethargic/somnolent, but arousable and seemed to deny pain anywhere. Collateral hx obtained from chart review. During recent admission (7/21/24-7/29/24), she was treated for UTI/sepsis and ultimately discharged to rehab to completed a 5-day course of ciprofloxacin (last dose 7/30/24). At rehab, she was reportedly found to have oliguria for a few days over the weekend for which she was started on IV fluids, however she was noncompliant with the IV access and she pulled it out many times. She has had very poor appetite and became lethargic and hypotensive. She was subsequently transferred to hospital where she was found to have black-colored output in ileostomy bag.     In ED: Afebrile, HR 120s-170s (Afib), SBP 90s-130s, RR 15-22, sating % on RA.  Labs notable for Hgb 11.3->10.3, Na 133->122, SCr 3.04->2.58; lactated 4.7->2.8, blood glucose to 400s but FS 100s. Found to be C.diff positive. Received Vanc 500mg PO, Flagyl 500mg IVPB, amio 150mg IVPB x3, ofirmev 1g, 1.5L IVF, and 1u pRBC. Also started on amio gtt and protonix gtt. Nephrology and MICU were consulted. Admitted to Medicine for further management. (05 Aug 2024 23:46)    Patient seen for attention to Ileostomy. Pt is well known to Ostomy RNs from previous surgical admission & readmissions. Pt was last seen by my Ostomy RN colleague during recent admission on 7/25/24. Reintroduced self & role of CWOCN to pt and her daughter who was at the bedside. Daughter does ostomy care at home.    Appliance intact, without leakage but urostomy pouch was in place. Peristomal ecchymosis and superficial denuded skin noted. Appliance removed and replaced. 2 piece drainable convex appliance with stoma paste applied after dusting peristomal ski with stoma powder and sealing with cavilon skin protectant.    PAST MEDICAL & SURGICAL HISTORY:  Hypertension  Hypothyroid  Osteoarthritis, knees, back  CAD (coronary artery disease)  CROW (obstructive sleep apnea), non complaint on CPAP  History of MI (myocardial infarction), h/o previous MI in 2004 prompted PTCA  with stenting x 2 vessels   last stress/ echo 2019  Heart murmur, dx in childhood  Bilateral hearing loss, unspecified hearing loss type, bilateral aids  Obesity  Mixed stress and urge urinary incontinence  Overactive bladder  S/P ORIF (open reduction internal fixation) fracture, left hip 1962  S/P appendectomy, 30 plus years  S/P knee replacement, left 2000  Stented coronary artery, 2004 X 2 STENTS  S/P laparotomy, due to adhesions, 30 years ago  H/O dilation and curettage, 2/2019 Benign polyp    MEDICATIONS  (STANDING):  aMIOdarone Infusion 1 mG/Min (33.3 mL/Hr) IV Continuous <Continuous>  ascorbic acid 500 milliGRAM(s) Oral daily  atorvastatin 80 milliGRAM(s) Oral at bedtime  levothyroxine 125 MICROGram(s) Oral daily  metoprolol tartrate 25 milliGRAM(s) Oral two times a day  midodrine. 5 milliGRAM(s) Oral three times a day  multivitamin 1 Tablet(s) Oral daily  nystatin Powder 1 Application(s) Topical two times a day  pantoprazole Infusion 8 mG/Hr (10 mL/Hr) IV Continuous <Continuous>  sodium bicarbonate 650 milliGRAM(s) Oral three times a day  sodium chloride 0.45% 1000 milliLiter(s) (100 mL/Hr) IV Continuous <Continuous>  tamsulosin 0.4 milliGRAM(s) Oral at bedtime  vancomycin    Solution 125 milliGRAM(s) Oral every 6 hours    MEDICATIONS  (PRN):  acetaminophen     Tablet .. 650 milliGRAM(s) Oral every 6 hours PRN Temp greater or equal to 38C (100.4F), Mild Pain (1 - 3)    Allergies    penicillin (Hives)    Intolerances

## 2024-08-14 NOTE — PROGRESS NOTE ADULT - SUBJECTIVE AND OBJECTIVE BOX
INTERVAL HPI/OVERNIGHT EVENTS:    without new gi complaints   brown stools     MEDICATIONS  (STANDING):  albumin human 25% IVPB 50 milliLiter(s) IV Intermittent every 6 hours  ascorbic acid 500 milliGRAM(s) Oral daily  atorvastatin 80 milliGRAM(s) Oral at bedtime  digoxin  Injectable 125 MICROGram(s) IV Push <User Schedule>  levothyroxine 150 MICROGram(s) Oral daily  metoprolol tartrate 25 milliGRAM(s) Oral two times a day  midodrine. 30 milliGRAM(s) Oral every 8 hours  multivitamin 1 Tablet(s) Oral daily  pantoprazole  Injectable 40 milliGRAM(s) IV Push two times a day  vancomycin    Solution 500 milliGRAM(s) Oral every 6 hours    MEDICATIONS  (PRN):  acetaminophen     Tablet .. 650 milliGRAM(s) Oral every 6 hours PRN Temp greater or equal to 38C (100.4F), Mild Pain (1 - 3)      Allergies    penicillin (Hives)    Intolerances        Review of Systems:    General:  No wt loss, fevers, chills, night sweats, fatigue   Eyes:  Good vision, no reported pain  ENT:  No sore throat, pain, runny nose, dysphagia  CV:  No pain, palpitations, hypo/hypertension  Resp:  No dyspnea, cough, tachypnea, wheezing  GI:  No pain, No nausea, No vomiting, No diarrhea, No constipation, No weight loss, No fever, No pruritis, No rectal bleeding, No melena, No dysphagia  :  No pain, bleeding, incontinence, nocturia  Muscle:  No pain, weakness  Neuro:  No weakness, tingling, memory problems  Psych:  No fatigue, insomnia, mood problems, depression  Endocrine:  No polyuria, polydypsia, cold/heat intolerance  Heme:  No petechiae, ecchymosis, easy bruisability  Skin:  No rash, tattoos, scars, edema      Vital Signs Last 24 Hrs  T(C): 36.3 (14 Aug 2024 08:00), Max: 36.6 (14 Aug 2024 00:32)  T(F): 97.4 (14 Aug 2024 08:00), Max: 97.9 (14 Aug 2024 00:32)  HR: 98 (14 Aug 2024 08:00) (61 - 123)  BP: 118/94 (14 Aug 2024 08:00) (84/75 - 138/95)  BP(mean): --  RR: 18 (14 Aug 2024 08:00) (16 - 18)  SpO2: 95% (14 Aug 2024 08:00) (93% - 98%)    Parameters below as of 14 Aug 2024 08:00  Patient On (Oxygen Delivery Method): nasal cannula  O2 Flow (L/min): 2      PHYSICAL EXAM:    Constitutional: NAD  HEENT: EOMI, throat clear  Neck: No LAD, supple  Respiratory: CTA and P  Cardiovascular: S1 and S2, RRR, no M  Gastrointestinal: BS+, soft, NT/ND, neg HSM,  Extremities: No peripheral edema, neg clubbing, cyanosis  Vascular: 2+ peripheral pulses  Neurological: A/O   Psychiatric: Normal mood, normal affect  Skin: No rashes      LABS:                        13.1   10.64 )-----------( 98       ( 14 Aug 2024 05:51 )             39.2     08-14    133<L>  |  94<L>  |  39<H>  ----------------------------<  98  4.4   |  26  |  2.55<H>    Ca    8.5      14 Aug 2024 05:51  Phos  3.7     08-13  Mg     1.9     08-13    TPro  5.4<L>  /  Alb  3.0<L>  /  TBili  1.2  /  DBili  x   /  AST  46<H>  /  ALT  69<H>  /  AlkPhos  120  08-14    PTT - ( 13 Aug 2024 11:40 )  PTT:>200.0 sec  Urinalysis Basic - ( 14 Aug 2024 05:51 )    Color: x / Appearance: x / SG: x / pH: x  Gluc: 98 mg/dL / Ketone: x  / Bili: x / Urobili: x   Blood: x / Protein: x / Nitrite: x   Leuk Esterase: x / RBC: x / WBC x   Sq Epi: x / Non Sq Epi: x / Bacteria: x        RADIOLOGY & ADDITIONAL TESTS:

## 2024-08-14 NOTE — PROGRESS NOTE ADULT - SUBJECTIVE AND OBJECTIVE BOX
HPI  77 yo woman was on Eliquis for afib switched to heparin found HIT ab pos when plt decreased to 37742 range. heparin d/c await TAY. pt s/p bowel resection ileostomy urosepsis, long hospital stay  [facility] sh [facility]h unchanged 7pt ros unable  physical  ostomy being changed  vs  t97.4 118/94 100 sat    data     TAY pending  wbc 29700 hgb 13 plt 38437 cr 2.55

## 2024-08-14 NOTE — PROGRESS NOTE ADULT - ASSESSMENT
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 During recent admission (7/21/24-7/29/24), she was treated for UTI/sepsis and ultimately discharged to rehab to completed a 5-day course of ciprofloxacin (last dose 7/30/24). At rehab, she was reportedly found to have oliguria for a few days over the weekend for which she was started on IV fluids, however she was noncompliant with the IV access and she pulled it out many times. She has had very poor appetite and became lethargic and hypotensive. She was subsequently transferred to hospital where she was found to have black-colored output in ileostomy bag.     C.diff likely iso recent antibiotic use for UTI tx  Acute blood loss anemia due to UGIB  Hypotension likely multifactorial due to above  Positive UA in setting of amaya, Ucx with C. albicans likely colonization  CHANELL likely due to hypovolemia iso diarrhea  Hypothermia noted, unclear etiology, resolved   Afib with RVR, EP following     8/5 CTAP wo contrast with no acute intra-abd pathology   8/6 am s/p RRT for hypotension, tachycardia  ostomy had loose/watery melanotic stools -now resolved   Surgery following - no acute intervention at this time  GI following -- deferred EGD for now   Consider CTA/IR eval if concern for active bleed and if stable for scan  UA with pyuria, large LE, occasional bacteria with 9 sq epi cells,   Ucx with C. albicans some, has amaya in place, suspect contaminant/colonization  8/9 CXR with clear lungs   8/9 Bcx NGTD x2   GI following, EGD deferred iso rapid afib  mental status remains at baseline, answers/follows   afebrile, WBC noted, no new findings on exam, nontoxic     Recommendations:  Continue vancomycin 500mg PO Q6h for 10d until 8/16/24  Contact isolation per IC protocol   Monitor temps/CBC, Cr   Aspiration precautions   Stable from ID standpoint at this time.     Marisa Davidson M.D.  [facility], Division of Infectious Diseases  [phone]  After 5pm on weekdays and all day on weekends - please call [phone]  Available on [employer] TEAMS

## 2024-08-14 NOTE — ADVANCED PRACTICE NURSE CONSULT - RECOMMEDATIONS
Recommendation:        1.) Change wafer 2x's weekly and PRN for leakage  2.) Monitor output  3.) Change pouch when 1/4 - 2/3s full  Appliance equipment:   2 3/4"soft convex flange Manuf# [id number] People Soft Number [id number]  2 3/4" drainable pouch Manuf# 07473 People Soft Number [id number]  Stomahesive paste Manuf# [id number]  Stomahesive powder Manuf# 7906 People SOft Number [id number]  OSTOMY Adapt LUB/DEODRNT 8ML BX/50	48451	BX/50  people soft number [id number]  Cavilon Skin protectant Manuf# [id number]    STEPS FOR POUCHING   1. Gather supplies &  template/custom pattern  2. Remove current pouch  3. Wash &dry area with warm water  4. Apply dusting of stoma powder to periwound denuded skin to absorb moisture  5. Dab skin w/3M Cavilon to seal & protect. (May repeat steps 2&3)    6. Cut Flange at 1 3/4"  7.  Remove Plastic backing of flange, apply bead of stoma paste to bottom half edge of flange near opening to caulk.   8.  Apply pouch around stoma & adhere to skin.  9.  Snap pouch onto flange  10. Roll up drainable end and press closed.
Will recommend:  1. Encourage participation w/ ostomy care re: emptying.  2. Empty pouch when 1/3-1/2 full   3. Change pouching system every 3-4 days & prn leakage  4. Contact ostomy specialists if questions, concerns/issues .  5. Supplies: Patient may continue to use own supplies as preferred but none at the bedside at this time. Otherwise hospital  formulary as follows. Pouching system provided & left at bedside.       People soft numbers provided to clinical nurse to order and place at bedside.  [place] 1 3/4" Ceraplus convex skin barrier (#[id number]), [place] 1 3/4" drainable pouch (#04455); Accessory products:  stoma paste #[id number], stoma powder (#7906) & Cavilon No sting barrier film wipe (#3344)    People Soft # [id number] 	OSTOMY WAFER CONVEX 1.75IN CERAPLUS CTF BX/5	78151     People Soft #[id number] 	OSTOMY POUCH LCK-N-RL 1.75IN 10/BX	16130	BX/10     People Soft # [id number]	OSTOMY BELT LARGE 34-65IN	7299	2 each    Elena Middleton, RUFINAC, CWOCN via TEAMS

## 2024-08-14 NOTE — PROGRESS NOTE ADULT - SUBJECTIVE AND OBJECTIVE BOX
C3 nurse spoke with Mil Sood for a TCC post hospital discharge follow up call. The patient has a scheduled [facility] appointment with ROZ Decker NP on 3/21/2022 @ 8:00 am.       [facility] NEPHROLOGY- PROGRESS NOTE    78y Female with history of dementia, ischemic bowel s/p resection with end ostomy presents with AMS. Nephrology consulted for elevated Scr and metabolic acidosis.    REVIEW OF SYSTEMS: Unable to obtain due to mental status.    [facility] ([facility])      Hospital Medications: Medications reviewed        VITALS:  T(F): 97.4 (08-14-24 @ 11:13), Max: 97.9 (08-14-24 @ 00:32)  HR: 90 (08-14-24 @ 12:30)  BP: 111/76 (08-14-24 @ 12:30)  RR: 18 (08-14-24 @ 11:13)  SpO2: 100% (08-14-24 @ 11:13)  Wt(kg): --    08-13 @ 07:01  -  08-14 @ 07:00  --------------------------------------------------------  IN: 0 mL / OUT: 125 mL / NET: -125 mL    08-14 @ 07:01  -  08-14 @ 15:59  --------------------------------------------------------  IN: 50 mL / OUT: 200 mL / NET: -150 mL        PHYSICAL EXAM:    Gen: NAD, calm  Cards: Irregularly irregular with tachycardia, +S1/S2, no M/G/R  Resp: CTA B/L  GI: soft, NT/ND, NABS, + ostomy   : + amaya  Vascular: + UE B/L        LABS:  08-14    133<L>  |  94<L>  |  39<H>  ----------------------------<  98  4.4   |  26  |  2.55<H>    Ca    8.5      14 Aug 2024 05:51  Phos  3.7     08-13  Mg     1.9     08-13    TPro  5.4<L>  /  Alb  3.0<L>  /  TBili  1.2  /  DBili      /  AST  46<H>  /  ALT  69<H>  /  AlkPhos  120  08-14    Creatinine Trend: 2.55 <--, 2.67 <--, 2.55 <--, 2.57 <--, 2.56 <--, 2.25 <--, 2.26 <--                        13.1   10.64 )-----------( 98       ( 14 Aug 2024 05:51 )             39.2     Urine Studies:  Urinalysis Basic - ( 14 Aug 2024 05:51 )    Color:  / Appearance:  / SG:  / pH:   Gluc: 98 mg/dL / Ketone:   / Bili:  / Urobili:    Blood:  / Protein:  / Nitrite:    Leuk Esterase:  / RBC:  / WBC    Sq Epi:  / Non Sq Epi:  / Bacteria:

## 2024-08-14 NOTE — PROGRESS NOTE ADULT - SUBJECTIVE AND OBJECTIVE BOX
Subjective: Patient seen and examined. No new events except as noted.     REVIEW OF SYSTEMS:    CONSTITUTIONAL: ++ weakness, fevers or chills  EYES/ENT: No visual changes;  No vertigo or throat pain   NECK: No pain or stiffness  RESPIRATORY: No cough, wheezing, hemoptysis; No shortness of breath  CARDIOVASCULAR: No chest pain or palpitations  GASTROINTESTINAL: No abdominal or epigastric pain. No nausea, vomiting, or hematemesis; No diarrhea or constipation. No melena or hematochezia.  GENITOURINARY: No dysuria, frequency or hematuria  NEUROLOGICAL: No numbness or weakness  SKIN: No itching, burning, rashes, or lesions   All other review of systems is negative unless indicated above.    MEDICATIONS:  MEDICATIONS  (STANDING):  albumin human 25% IVPB 50 milliLiter(s) IV Intermittent every 6 hours  ascorbic acid 500 milliGRAM(s) Oral daily  atorvastatin 80 milliGRAM(s) Oral at bedtime  digoxin  Injectable 125 MICROGram(s) IV Push <User Schedule>  levothyroxine 150 MICROGram(s) Oral daily  metoprolol tartrate 25 milliGRAM(s) Oral two times a day  midodrine. 30 milliGRAM(s) Oral every 8 hours  multivitamin 1 Tablet(s) Oral daily  pantoprazole  Injectable 40 milliGRAM(s) IV Push two times a day  vancomycin    Solution 500 milliGRAM(s) Oral every 6 hours      PHYSICAL EXAM:  T(C): 36.3 (08-14-24 @ 08:00), Max: 36.6 (08-14-24 @ 00:32)  HR: 98 (08-14-24 @ 08:00) (61 - 123)  BP: 118/94 (08-14-24 @ 08:00) (84/75 - 138/95)  RR: 18 (08-14-24 @ 08:00) (16 - 18)  SpO2: 95% (08-14-24 @ 08:00) (93% - 98%)  Wt(kg): --  I&O's Summary    13 Aug 2024 07:01  -  14 Aug 2024 07:00  --------------------------------------------------------  IN: 0 mL / OUT: 125 mL / NET: -125 mL            Appearance: NAD  HEENT:   Dry  oral mucosa, PERRL, EOMI	  Lymphatic: No lymphadenopathy , no edema  Cardiovascular: Irregular  S1 S2, No JVD, No murmurs , Peripheral pulses palpable 2+ bilaterally  Respiratory: decreased bs   Gastrointestinal:  Soft, Non-tender, + BS	+ostomy  Skin: No rashes, No ecchymoses, No cyanosis, warm to touch  Musculoskeletal: decreased range of motion and strength  Psychiatry: sleepy    Ext: No edema        LABS:    CARDIAC MARKERS:                                13.1   10.64 )-----------( 98       ( 14 Aug 2024 05:51 )             39.2     08-14    133<L>  |  94<L>  |  39<H>  ----------------------------<  98  4.4   |  26  |  2.55<H>    Ca    8.5      14 Aug 2024 05:51  Phos  3.7     08-13  Mg     1.9     08-13    TPro  5.4<L>  /  Alb  3.0<L>  /  TBili  1.2  /  DBili  x   /  AST  46<H>  /  ALT  69<H>  /  AlkPhos  120  08-14    TSH: Thyroid Stimulating Hormone, Serum: 6.67 uIU/mL (08-13 @ 11:40)              TELEMETRY: 	AF  60-120s  ECG:  	  RADIOLOGY:   DIAGNOSTIC TESTING:  [ ] Echocardiogram:  [ ]  Catheterization:  [ ] Stress Test:    OTHER:

## 2024-08-14 NOTE — PROGRESS NOTE ADULT - ASSESSMENT
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 Found to have C.diff, CHANELL, and possible anemia of acute blood loss 2/2 GIB.      Acute blood loss anemia secondary to acute GI bleed: Status post transfusion with good response.  Discussed with Dr. Sprague: Stool is yellow, H&H seems stable with good response to transfusion.  will hold off on endoscopy at this time given hypotension and continue to optimize...  Will plan EGD early next week    ·  Problem: Clostridium difficile infection.   ·  Plan: Found to have C.diff infection. Likely i/s/o recent abx use (cipro) to treat UTI.  - CT A/P showed no acute intra-abdominal pathology  Continue vancomycin      ·  Problem: Metabolic encephalopathy.   Fluctuating mental status      Likely multifactorial including acute infection,CHANELL and Dehydration  Mental status during the recent hospitalization showed decline and mentation however workup was negative for acute neurological pathology.      ·  Problem: Atrial fibrillation with RVR.   ·  Plan: Presented in Afib w/ RVR to HR 170s. Has hx of Afib (on eliquis at home). Suspect RVR i/s/o infection, dehydration, electrolyte abnormalities    Patient still with A. fib and RVR.....    Held  anticoagulation in setting of melanotic stool And now with positive HIT Antibody and supra therapeutic PTT  Given poor prognosis no plan for ablation.  Discussed with Dr. Shay .      Thrombocytopenia: likely secondary to  infection  However consulted hematology: Input appreciated      ·  Problem: CHANELL (acute kidney injury).   Continue management per renal      ·  Problem: Hyponatremia.   Likely secondary to dehydration.  Continue IV fluid      ·  Problem: Hypotension.   Likely multifactorial secondary to acute GI bleed, dehydration, possible infection  Continue to monitor H&H  Continue fluid resuscitation and midodrin  Discussed with infectious disease: We will continue with Empiric antibiotics and if culture is negative we will DC: Now discontinued  Blood pressure Improved        ·  Problem: CAD (coronary artery disease).   - holding home plavix  - c/w home statin        ·  Problem: Hypothyroidism.   Recently decreased dose      Appreciate wound care consult.  Follow-up official input    Discussed with Daughter bedside: her mother's poor prognosis and would like to Reinstate the DNR

## 2024-08-14 NOTE — PROGRESS NOTE ADULT - ASSESSMENT
Assessment and Recommendation:   · Assessment    78F w/ hx of Afib (on Eliquis), CAD (on plavix), MCI/dementia, 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), COPD, CROW, HTN, HLD, urinary retention, recurrent UTIs, hypothyroidism, recent admission for UTI (c/b sepsis, encephalopathy, and bradycardia), now presenting with AMS/lethargy and melanotic ileostomy output. Found to have C.diff, CHANELL, and possible anemia of acute blood loss 2/2 GIB.      Wound Consult requested to assist w/ management of:  Rt Heel DTI  BLE PVD w/ stasis dermatitis and varicose veins  Mild buttocks moisture dermatitis    BLE heels CAVILON QD, offload  BLE elevation & Compression  Abx per Medicine/ ID  Moisturize intact skin w/ SWEEN cream BID  Nutrition Consult for optimization          encourage high quality protein, mary carmen/ prosource, MVI & Vit C to promote wound healing  Continue turning and positioning w/ offloading assistive devices as per protocol  Buttocks/ Sacrum Feliberto BID and prn soiling        Continue w/ attends under pads and Pericare as per protocol       Appreciate Stoma Team input  Waffle Cushion to chair when oob to chair  Continue w/ low air loss pressure redistribution bed surface   Care as per medicine, remain available as requested  Upon discharge f/u as outpatient at Wound [facility] [address] [facility] [phone]  Seen w/ attng & RN and D/w team  Thank you for this consult  [facility], [facility]  [phone]  Nights/ Weekends/ Holidays please call:  General Surgery Consult pager ([phone]) for emergencies  Wound PT for multilayer leg wrapping or VAC issues (x [phone])      Assessment and Recommendation:   · Assessment    78F w/ hx of Afib (on Eliquis), CAD (on plavix), MCI/dementia, 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), COPD, CROW, HTN, HLD, urinary retention, recurrent UTIs, hypothyroidism, recent admission for UTI (c/b sepsis, encephalopathy, and bradycardia), now presenting with AMS/lethargy and melanotic ileostomy output. Found to have C.diff, CHANELL, and possible anemia of acute blood loss 2/2 GIB.  Ostomy Education provided:   Recommendation:        1.) Change wafer 2x's weekly and PRN for leakage  2.) Monitor output  3.) Change pouch when 1/4 - 2/3s full  Appliance equipment:   2 3/4"soft convex flange Manuf# [id number]   2 3/4" drainable pouch Manuf# [id number]  Stomahesive paste Manuf# [id number]  Stomahesive powder Manuf# [id number] People SOft Number [id number]  OSTOMY Adapt LUB/DEODRNT 8ML BX/50	[id number]	BX/50  people soft number [id number]  Cavilon Skin protectant Manuf# [id number]    STEPS FOR POUCHING   1. Gather supplies &  template/custom pattern  2. Remove current pouch  3. Wash &dry area with warm water  4. Apply dusting of stoma powder to periwound denuded skin to absorb moisture  5. Dab skin w/3M Cavilon to seal & protect. (May repeat steps 2&3)    6. Cut Flange at 1 3/4"  7.  Remove Plastic backing of flange, apply bead of stoma paste to bottom half edge of flange near opening to caulk.   8.  Apply pouch around stoma & adhere to skin.  9.  Snap pouch onto flange  10. Roll up drainable end and press closed.    Wound Consult requested to assist w/ management of:  Rt Heel DTI  BLE PVD w/ stasis dermatitis and varicose veins  Mild buttocks moisture dermatitis    BLE heels CAVILON QD, offload  BLE elevation & Compression  Abx per Medicine/ ID  Moisturize intact skin w/ SWEEN cream BID  Nutrition Consult for optimization          encourage high quality protein, mary carmen/ prosource, MVI & Vit C to promote wound healing  Continue turning and positioning w/ offloading assistive devices as per protocol  Buttocks/ Sacrum Feliberto BID and prn soiling        Continue w/ attends under pads and Pericare as per protocol       Appreciate Stoma Team input  Waffle Cushion to chair when oob to chair  Continue w/ low air loss pressure redistribution bed surface   Care as per medicine, remain available as requested  Upon discharge f/u as outpatient at Wound Center [address] [facility] [phone]  Seen w/ attng & RN and D/w team  Thank you for this consult  Yamileth Nunez [facility], [facility]  [phone]  Nights/ Weekends/ Holidays please call:  General Surgery Consult pager ([phone]) for emergencies  Wound PT for multilayer leg wrapping or VAC issues (x [phone])

## 2024-08-14 NOTE — PROGRESS NOTE ADULT - ASSESSMENT
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 Admitted due to black output in the ileostomy bag c/f UGIB, afib with RVR, and c diff positive.    1. GIB  resolved GIB; stools brown  EGD continues to be deferred in the setting of rapid afib  Agree at this point patient appears very depleted. Palliative approach seems reasonable.  PPI BID  diet as usual    2. Nausea/Vomiting   resolved        3. C. Diff   complete abx thru 8/16  ID input appreciated       4. Afib   a/c on hold for GIB   cardiology on board

## 2024-08-14 NOTE — PROGRESS NOTE ADULT - SUBJECTIVE AND OBJECTIVE BOX
Chief complaint  Patient is a 78y old  Female who presents with a chief complaint of AMS/lethargy, Afib w/ RVR, Hyponatremia, C.diff infection (14 Aug 2024 12:50)         Labs and Fingersticks  CAPILLARY BLOOD GLUCOSE      POCT Blood Glucose.: 88 mg/dL (14 Aug 2024 12:08)  POCT Blood Glucose.: 93 mg/dL (14 Aug 2024 08:09)  POCT Blood Glucose.: 90 mg/dL (14 Aug 2024 00:22)  POCT Blood Glucose.: 114 mg/dL (13 Aug 2024 19:14)      Anion Gap: 13 (08-14 @ 05:51)  Anion Gap: 14 (08-13 @ 11:40)      Calcium: 8.5 (08-14 @ 05:51)  Calcium: 8.2 *L* (08-13 @ 11:40)  Albumin: 3.0 *L* (08-14 @ 05:51)  Albumin: 2.7 *L* (08-13 @ 11:40)    Alanine Aminotransferase (ALT/SGPT): 69 *H* (08-14 @ 05:51)  Alanine Aminotransferase (ALT/SGPT): 87 *H* (08-13 @ 11:40)  Alkaline Phosphatase: 120 (08-14 @ 05:51)  Alkaline Phosphatase: 122 *H* (08-13 @ 11:40)  Aspartate Aminotransferase (AST/SGOT): 46 *H* (08-14 @ 05:51)  Aspartate Aminotransferase (AST/SGOT): 69 *H* (08-13 @ 11:40)        08-14    133<L>  |  94<L>  |  39<H>  ----------------------------<  98  4.4   |  26  |  2.55<H>    Ca    8.5      14 Aug 2024 05:51  Phos  3.7     08-13  Mg     1.9     08-13    TPro  5.4<L>  /  Alb  3.0<L>  /  TBili  1.2  /  DBili  x   /  AST  46<H>  /  ALT  69<H>  /  AlkPhos  120  08-14                        13.1   10.64 )-----------( 98       ( 14 Aug 2024 05:51 )             39.2     Medications  MEDICATIONS  (STANDING):  albumin human 25% IVPB 50 milliLiter(s) IV Intermittent every 6 hours  ascorbic acid 500 milliGRAM(s) Oral daily  atorvastatin 80 milliGRAM(s) Oral at bedtime  digoxin  Injectable 125 MICROGram(s) IV Push <User Schedule>  levothyroxine 150 MICROGram(s) Oral daily  metoprolol tartrate 25 milliGRAM(s) Oral two times a day  midodrine. 30 milliGRAM(s) Oral every 8 hours  multivitamin 1 Tablet(s) Oral daily  pantoprazole  Injectable 40 milliGRAM(s) IV Push two times a day  vancomycin    Solution 500 milliGRAM(s) Oral every 6 hours      Physical Exam  General: Patient comfortable in bed   Vital Signs Last 12 Hrs  T(F): 97.4 (08-14-24 @ 11:13), Max: 97.6 (08-14-24 @ 04:39)  HR: 91 (08-14-24 @ 11:13) (91 - 123)  BP: 118/94 (08-14-24 @ 08:00) (104/74 - 118/94)  BP(mean): --  RR: 18 (08-14-24 @ 11:13) (18 - 18)  SpO2: 100% (08-14-24 @ 11:13) (93% - 100%)    CVS: S1S2   Respiratory: No wheezing, no crepitations  GI: Abdomen soft, bowel sounds positive  Musculoskeletal:  moves all extremities  : Voiding

## 2024-08-14 NOTE — PROGRESS NOTE ADULT - SUBJECTIVE AND OBJECTIVE BOX
Date of service: 24 @ 18:20      Patient is a 78y old  Female who presents with a chief complaint of AMS/lethargy, Afib w/ RVR, Hyponatremia, C.diff infection (14 Aug 2024 15:58)                                                               INTERVAL HPI/OVERNIGHT EVENTS:    REVIEW OF SYSTEMS:     Awake but confused in no acute distress and denies any complaints at this time                                                                                                                                                                                                                                                                           Medications:  MEDICATIONS  (STANDING):  albumin human 25% IVPB 50 milliLiter(s) IV Intermittent every 6 hours  ascorbic acid 500 milliGRAM(s) Oral daily  atorvastatin 80 milliGRAM(s) Oral at bedtime  digoxin  Injectable 125 MICROGram(s) IV Push <User Schedule>  levothyroxine 150 MICROGram(s) Oral daily  metoprolol tartrate 25 milliGRAM(s) Oral two times a day  midodrine. 30 milliGRAM(s) Oral every 8 hours  multivitamin 1 Tablet(s) Oral daily  pantoprazole  Injectable 40 milliGRAM(s) IV Push two times a day  vancomycin    Solution 500 milliGRAM(s) Oral every 6 hours    MEDICATIONS  (PRN):  acetaminophen     Tablet .. 650 milliGRAM(s) Oral every 6 hours PRN Temp greater or equal to 38C (100.4F), Mild Pain (1 - 3)       Allergies    penicillin (Hives)    Intolerances      Vital Signs Last 24 Hrs  T(C): 36.4 (14 Aug 2024 17:00), Max: 36.6 (14 Aug 2024 00:32)  T(F): 97.5 (14 Aug 2024 17:00), Max: 97.9 (14 Aug 2024 00:32)  HR: 86 (14 Aug 2024 17:00) (61 - 123)  BP: 110/76 (14 Aug 2024 17:00) (84/75 - 138/95)  BP(mean): --  RR: 18 (14 Aug 2024 17:00) (16 - 18)  SpO2: 97% (14 Aug 2024 17:00) (93% - 100%)    Parameters below as of 14 Aug 2024 17:00  Patient On (Oxygen Delivery Method): nasal cannula  O2 Flow (L/min): 2    CAPILLARY BLOOD GLUCOSE      POCT Blood Glucose.: 93 mg/dL (14 Aug 2024 17:00)  POCT Blood Glucose.: 88 mg/dL (14 Aug 2024 12:08)  POCT Blood Glucose.: 93 mg/dL (14 Aug 2024 08:09)  POCT Blood Glucose.: 90 mg/dL (14 Aug 2024 00:22)  POCT Blood Glucose.: 114 mg/dL (13 Aug 2024 19:14)       @ 07: @ 07:00  --------------------------------------------------------  IN: 0 mL / OUT: 125 mL / NET: -125 mL     @ 07: @ 18:20  --------------------------------------------------------  IN: 50 mL / OUT: 200 mL / NET: -150 mL      Physical Exam:    Daily     Daily Weight in k.4 (14 Aug 2024 08:57)  General: cachetic  HEENT:  Nonicteric, PERRLA  CV:  RRR, S1S2   Lungs:  Poor effort  Abdomen:  Soft, non-tender, no distended, positive BS  Extremities: Anasarca/edema                                                                                                                                                                                                                                                                                      LABS:                               13.1   10.64 )-----------( 98       ( 14 Aug 2024 05:51 )             39.2                      08-14    133<L>  |  94<L>  |  39<H>  ----------------------------<  98  4.4   |  26  |  2.55<H>    Ca    8.5      14 Aug 2024 05:51  Phos  3.7     08-13  Mg     1.9     08-13    TPro  5.4<L>  /  Alb  3.0<L>  /  TBili  1.2  /  DBili  x   /  AST  46<H>  /  ALT  69<H>  /  AlkPhos  120  14                       RADIOLOGY & ADDITIONAL TESTS         I personally reviewed: [  ]EKG   [  ]CXR    [  ] CT      A/P:         Discussed with :     Simon consultants' Notes   Time spent :

## 2024-08-14 NOTE — PROGRESS NOTE ADULT - ASSESSMENT
H/o Afib was on eliquis then heparin d/c when developed bleeding from ostomy and then HIT ab positive  a/c on hold but when restarts can use DOAC if TAY positive, can use heparin IV if TAY negative

## 2024-08-14 NOTE — PROGRESS NOTE ADULT - ASSESSMENT
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 Nephrology consulted for elevated Scr and metabolic acidosis.    1) CHANELL: likely due to ATN. Scr stable. Continue with IV albumin. UA active likely due to infection. FeNa low. CT without obstruction. TMA work up negative. Avoid nephrotoxins.    2) Hypotension: BP low. Continue with midodrine 30 mg PO TID. Rate control as per cardiology. Monitor BP.    3) Metabolic acidosis: Resolved s/p sodium bicarbonate gtt. Monitor pH.    4) Hyponatremia: Mild and likely due to hypervolemia. Will give IV lasix if serum Na decreases. Monitor serum Na.    5) Urinary retention: Continue with amaya. Holding flomax given hypotension.       [facility]  Paulie Storm M.D.  Mack Clancy D.O.  Maddi Steve M.D.  MD Olivia Caldera, MSN, ANP-C    Telephone: [phone]  Facsimile: [phone] [place] [address], [address]  [place] [address]

## 2024-08-14 NOTE — ADVANCED PRACTICE NURSE CONSULT - ASSESSMENT
SOCIAL HISTORY:  ; Former smoker    FAMILY HISTORY: no pertinent family history among first degree relatives    Vital Signs Last 24 Hrs  T(C): 36.4 (06 Aug 2024 11:39), Max: 36.7 (05 Aug 2024 19:15)  T(F): 97.6 (06 Aug 2024 11:39), Max: 98 (05 Aug 2024 19:15)  HR: 112 (06 Aug 2024 11:39) (112 - 172)  BP: 88/60 (06 Aug 2024 11:39) (88/60 - 132/76)  BP(mean): 93 (06 Aug 2024 01:20) (78 - 93)  RR: 18 (06 Aug 2024 11:39) (15 - 22)  SpO2: 97% (06 Aug 2024 11:39) (95% - 100%)    Parameters below as of 06 Aug 2024 11:39  Patient On (Oxygen Delivery Method): room air    Physical Exam:  General: somnolent, weak, obese  Ophthamology: sclera clear  ENMT: moist mucous membranes, trachea midline  Respiratory: equal chest rise with respirations  Gastrointestinal: soft NT/ND, large amount of coagulated blood mixed with stool in the pouch, ileostomy stoma 7/8", moist, red, slightly oval, minimally budded, central upward pointing os, intact mucocutaneous junction  Neurology: verbal, following commands  Psych: calm  Musculoskeletal: no contractures  Vascular: BLE edema equal  Skin:  peristomal skin with scattered ecchymosis, superficially denuded skin in the area corresponding to the where the appliance adhesive has been afixed, scant serosanguinous drainage  No odor, erythema, increased warmth, tenderness, induration, fluctuance            LABS:  08-06    130<L>  |  101  |  55<H>  ----------------------------<  74  4.4   |  12<L>  |  2.92<H>    Ca    8.0<L>      06 Aug 2024 06:05  Phos  2.6     08-06  Mg     1.3     08-06    TPro  5.4<L>  /  Alb  2.9<L>  /  TBili  0.6  /  DBili  x   /  AST  30  /  ALT  24  /  AlkPhos  72  08-06                          11.0   10.38 )-----------( 143      ( 06 Aug 2024 06:05 )             32.7     PT/INR - ( 05 Aug 2024 15:05 )   PT: 31.7 sec;   INR: 2.97 ratio         PTT - ( 05 Aug 2024 15:05 )  PTT:29.4 sec  Urinalysis Basic - ( 06 Aug 2024 06:05 )    Color: x / Appearance: x / SG: x / pH: x  Gluc: 74 mg/dL / Ketone: x  / Bili: x / Urobili: x   Blood: x / Protein: x / Nitrite: x   Leuk Esterase: x / RBC: x / WBC x   Sq Epi: x / Non Sq Epi: x / Bacteria: x  
Chart reviewed events noted. Pt known to ostomy RNs Dc'd home last seen Monday 8/12.  Please see Wound consult note.

## 2024-08-15 DIAGNOSIS — R53.2 FUNCTIONAL QUADRIPLEGIA: ICD-10-CM

## 2024-08-15 DIAGNOSIS — Z51.5 ENCOUNTER FOR PALLIATIVE CARE: ICD-10-CM

## 2024-08-15 DIAGNOSIS — Z71.89 OTHER SPECIFIED COUNSELING: ICD-10-CM

## 2024-08-15 LAB
ANION GAP SERPL CALC-SCNC: 15 MMOL/L — SIGNIFICANT CHANGE UP (ref 5–17)
BUN SERPL-MCNC: 40 MG/DL — HIGH (ref 7–23)
CALCIUM SERPL-MCNC: 8.8 MG/DL — SIGNIFICANT CHANGE UP (ref 8.4–10.5)
CHLORIDE SERPL-SCNC: 93 MMOL/L — LOW (ref 96–108)
CO2 SERPL-SCNC: 25 MMOL/L — SIGNIFICANT CHANGE UP (ref 22–31)
CREAT SERPL-MCNC: 2.52 MG/DL — HIGH (ref 0.5–1.3)
CULTURE RESULTS: SIGNIFICANT CHANGE UP
EGFR: 19 ML/MIN/1.73M2 — LOW
GLUCOSE BLDC GLUCOMTR-MCNC: 108 MG/DL — HIGH (ref 70–99)
GLUCOSE BLDC GLUCOMTR-MCNC: 68 MG/DL — LOW (ref 70–99)
GLUCOSE BLDC GLUCOMTR-MCNC: 89 MG/DL — SIGNIFICANT CHANGE UP (ref 70–99)
GLUCOSE BLDC GLUCOMTR-MCNC: 98 MG/DL — SIGNIFICANT CHANGE UP (ref 70–99)
GLUCOSE SERPL-MCNC: 112 MG/DL — HIGH (ref 70–99)
HCT VFR BLD CALC: 40.8 % — SIGNIFICANT CHANGE UP (ref 34.5–45)
HGB BLD-MCNC: 13.1 G/DL — SIGNIFICANT CHANGE UP (ref 11.5–15.5)
MAGNESIUM SERPL-MCNC: 2 MG/DL — SIGNIFICANT CHANGE UP (ref 1.6–2.6)
MCHC RBC-ENTMCNC: 31 PG — SIGNIFICANT CHANGE UP (ref 27–34)
MCHC RBC-ENTMCNC: 32.1 GM/DL — SIGNIFICANT CHANGE UP (ref 32–36)
MCV RBC AUTO: 96.5 FL — SIGNIFICANT CHANGE UP (ref 80–100)
NRBC # BLD: 0 /100 WBCS — SIGNIFICANT CHANGE UP (ref 0–0)
PLATELET # BLD AUTO: 86 K/UL — LOW (ref 150–400)
POTASSIUM SERPL-MCNC: 4.8 MMOL/L — SIGNIFICANT CHANGE UP (ref 3.5–5.3)
POTASSIUM SERPL-SCNC: 4.8 MMOL/L — SIGNIFICANT CHANGE UP (ref 3.5–5.3)
RBC # BLD: 4.23 M/UL — SIGNIFICANT CHANGE UP (ref 3.8–5.2)
RBC # FLD: 17.4 % — HIGH (ref 10.3–14.5)
SODIUM SERPL-SCNC: 133 MMOL/L — LOW (ref 135–145)
SPECIMEN SOURCE: SIGNIFICANT CHANGE UP
T4 FREE SERPL-MCNC: 1 NG/DL — SIGNIFICANT CHANGE UP (ref 0.9–1.8)
WBC # BLD: 10.91 K/UL — HIGH (ref 3.8–10.5)
WBC # FLD AUTO: 10.91 K/UL — HIGH (ref 3.8–10.5)

## 2024-08-15 PROCEDURE — 99497 ADVNCD CARE PLAN 30 MIN: CPT | Mod: 25

## 2024-08-15 PROCEDURE — 99223 1ST HOSP IP/OBS HIGH 75: CPT

## 2024-08-15 RX ADMIN — ALBUMIN (HUMAN) 50 MILLILITER(S): 5 SOLUTION INTRAVENOUS at 13:53

## 2024-08-15 RX ADMIN — ALBUMIN (HUMAN) 50 MILLILITER(S): 5 SOLUTION INTRAVENOUS at 01:58

## 2024-08-15 RX ADMIN — MIDODRINE HYDROCHLORIDE 30 MILLIGRAM(S): 5 TABLET ORAL at 05:05

## 2024-08-15 RX ADMIN — Medication 500 MILLIGRAM(S): at 17:21

## 2024-08-15 RX ADMIN — DIGOXIN 125 MICROGRAM(S): 0.12 TABLET ORAL at 14:11

## 2024-08-15 RX ADMIN — MIDODRINE HYDROCHLORIDE 30 MILLIGRAM(S): 5 TABLET ORAL at 21:12

## 2024-08-15 RX ADMIN — Medication 500 MILLIGRAM(S): at 13:52

## 2024-08-15 RX ADMIN — Medication 500 MILLIGRAM(S): at 05:05

## 2024-08-15 RX ADMIN — Medication 40 MILLIGRAM(S): at 17:21

## 2024-08-15 RX ADMIN — Medication 150 MICROGRAM(S): at 05:06

## 2024-08-15 RX ADMIN — MIDODRINE HYDROCHLORIDE 30 MILLIGRAM(S): 5 TABLET ORAL at 13:52

## 2024-08-15 RX ADMIN — Medication 80 MILLIGRAM(S): at 21:12

## 2024-08-15 RX ADMIN — Medication 40 MILLIGRAM(S): at 05:06

## 2024-08-15 RX ADMIN — METOPROLOL TARTRATE 25 MILLIGRAM(S): 100 TABLET ORAL at 17:21

## 2024-08-15 RX ADMIN — Medication 1 TABLET(S): at 13:52

## 2024-08-15 RX ADMIN — ALBUMIN (HUMAN) 50 MILLILITER(S): 5 SOLUTION INTRAVENOUS at 09:20

## 2024-08-15 NOTE — PROGRESS NOTE ADULT - SUBJECTIVE AND OBJECTIVE BOX
Chief Complaint:    History of Present Illness:      MEDICATIONS  (STANDING):  ascorbic acid 500 milliGRAM(s) Oral daily  atorvastatin 80 milliGRAM(s) Oral at bedtime  digoxin  Injectable 125 MICROGram(s) IV Push <User Schedule>  levothyroxine 150 MICROGram(s) Oral daily  metoprolol tartrate 25 milliGRAM(s) Oral two times a day  midodrine. 30 milliGRAM(s) Oral every 8 hours  multivitamin 1 Tablet(s) Oral daily  pantoprazole  Injectable 40 milliGRAM(s) IV Push two times a day  vancomycin    Solution 500 milliGRAM(s) Oral every 6 hours    MEDICATIONS  (PRN):  acetaminophen     Tablet .. 650 milliGRAM(s) Oral every 6 hours PRN Temp greater or equal to 38C (100.4F), Mild Pain (1 - 3)      Allergies    penicillin (Hives)    Intolerances        Vital Signs Last 24 Hrs  T(C): 36.3 (15 Aug 2024 12:14), Max: 36.4 (14 Aug 2024 21:45)  T(F): 97.4 (15 Aug 2024 12:14), Max: 97.5 (14 Aug 2024 21:45)  HR: 101 (15 Aug 2024 13:50) (83 - 105)  BP: 125/86 (15 Aug 2024 13:50) (90/52 - 125/86)  BP(mean): --  RR: 18 (15 Aug 2024 12:14) (18 - 19)  SpO2: 97% (15 Aug 2024 12:14) (91% - 97%)    Parameters below as of 15 Aug 2024 12:14  Patient On (Oxygen Delivery Method): nasal cannula        PHYSICAL EXAM  General: NAD  HEENT: clear oropharynx, anicteric sclera, pink conjunctiva  Neck: supple  CV: normal S1/S2 with no murmur rubs or gallops  Lungs: clear to auscultation, no wheezes, no rales  Abdomen: soft non-tender non-distended, no hepatosplenomegaly, positive bowel sounds  Ext: no clubbing cyanosis or edema  Skin: no rashes and no petechiae  Lymph Nodes: No LAD in axillae, groin, neck  Neuro: alert and oriented X 3, no focal deficits    LABS:                          13.1   10.91 )-----------( 86       ( 15 Aug 2024 06:16 )             40.8         Mean Cell Volume : 96.5 fl  Mean Cell Hemoglobin : 31.0 pg  Mean Cell Hemoglobin Concentration : 32.1 gm/dL  Auto Neutrophil # : x  Auto Lymphocyte # : x  Auto Monocyte # : x  Auto Eosinophil # : x  Auto Basophil # : x  Auto Neutrophil % : x  Auto Lymphocyte % : x  Auto Monocyte % : x  Auto Eosinophil % : x  Auto Basophil % : x      Serial CBC's  08-15 @ 06:16  Hct-40.8 / Hgb-13.1 / Plat-86 / RBC-4.23 / WBC-10.91  Serial CBC's  08-14 @ 05:51  Hct-39.2 / Hgb-13.1 / Plat-98 / RBC-4.22 / WBC-10.64  Serial CBC's  08-13 @ 11:40  Hct-40.9 / Hgb-13.3 / Plat-101 / RBC-4.35 / WBC-11.41  Serial CBC's  08-13 @ 02:46  Hct-39.9 / Hgb-13.3 / Plat-104 / RBC-4.30 / WBC-12.47  Serial CBC's  08-12 @ 06:44  Hct-42.4 / Hgb-13.7 / Plat-91 / RBC-4.56 / WBC-10.09      08-15    133<L>  |  93<L>  |  40<H>  ----------------------------<  112<H>  4.8   |  25  |  2.52<H>    Ca    8.8      15 Aug 2024 06:16  Mg     2.0     08-15    TPro  5.4<L>  /  Alb  3.0<L>  /  TBili  1.2  /  DBili  x   /  AST  46<H>  /  ALT  69<H>  /  AlkPhos  120  08-14          Iron - Total Binding Capacity.: 175 ug/dL (08-13 @ 11:40)  Ferritin: 899 ng/mL (08-13 @ 11:40)  Vitamin B12, Serum: 1732 pg/mL (08-13 @ 11:40)  Folate, Serum: 5.8 ng/mL (08-13 @ 11:40)          08-13 @ 11:40    ldh1 --  haptoglobin 132  SIDNEY--  uric acid--         Chief Complaint:  FU    History of Present Illness:  denies pain, reports abdomen less distended, no N/V, good BM reported, appears weak    MEDICATIONS  (STANDING):  ascorbic acid 500 milliGRAM(s) Oral daily  atorvastatin 80 milliGRAM(s) Oral at bedtime  digoxin  Injectable 125 MICROGram(s) IV Push <User Schedule>  levothyroxine 150 MICROGram(s) Oral daily  metoprolol tartrate 25 milliGRAM(s) Oral two times a day  midodrine. 30 milliGRAM(s) Oral every 8 hours  multivitamin 1 Tablet(s) Oral daily  pantoprazole  Injectable 40 milliGRAM(s) IV Push two times a day  vancomycin    Solution 500 milliGRAM(s) Oral every 6 hours    MEDICATIONS  (PRN):  acetaminophen     Tablet .. 650 milliGRAM(s) Oral every 6 hours PRN Temp greater or equal to 38C (100.4F), Mild Pain (1 - 3)      Allergies    penicillin (Hives)    Intolerances        Vital Signs Last 24 Hrs  T(C): 36.3 (15 Aug 2024 12:14), Max: 36.4 (14 Aug 2024 21:45)  T(F): 97.4 (15 Aug 2024 12:14), Max: 97.5 (14 Aug 2024 21:45)  HR: 101 (15 Aug 2024 13:50) (83 - 105)  BP: 125/86 (15 Aug 2024 13:50) (90/52 - 125/86)  BP(mean): --  RR: 18 (15 Aug 2024 12:14) (18 - 19)  SpO2: 97% (15 Aug 2024 12:14) (91% - 97%)    Parameters below as of 15 Aug 2024 12:14  Patient On (Oxygen Delivery Method): nasal cannula        PHYSICAL EXAM  General: adult in NAD  HEENT: clear oropharynx, anicteric sclera, pink conjunctiva  Neck: supple  CV: normal S1/S2   Lungs: clear to auscultation, no wheezes, no rales  Abdomen: soft non-tender non-distended, ostomy with brown stool, positive bowel sounds  Ext: no calf tenderness, mild edema  Skin: no rashes and no petechiae  Lymph Nodes: No LAD in neck  Neuro: lethargic, oriented to person    LABS:                          13.1   10.91 )-----------( 86       ( 15 Aug 2024 06:16 )             40.8         Mean Cell Volume : 96.5 fl  Mean Cell Hemoglobin : 31.0 pg  Mean Cell Hemoglobin Concentration : 32.1 gm/dL  Auto Neutrophil # : x  Auto Lymphocyte # : x  Auto Monocyte # : x  Auto Eosinophil # : x  Auto Basophil # : x  Auto Neutrophil % : x  Auto Lymphocyte % : x  Auto Monocyte % : x  Auto Eosinophil % : x  Auto Basophil % : x      Serial CBC's  08-15 @ 06:16  Hct-40.8 / Hgb-13.1 / Plat-86 / RBC-4.23 / WBC-10.91  Serial CBC's  08-14 @ 05:51  Hct-39.2 / Hgb-13.1 / Plat-98 / RBC-4.22 / WBC-10.64  Serial CBC's  08-13 @ 11:40  Hct-40.9 / Hgb-13.3 / Plat-101 / RBC-4.35 / WBC-11.41  Serial CBC's  08-13 @ 02:46  Hct-39.9 / Hgb-13.3 / Plat-104 / RBC-4.30 / WBC-12.47  Serial CBC's  08-12 @ 06:44  Hct-42.4 / Hgb-13.7 / Plat-91 / RBC-4.56 / WBC-10.09      08-15    133<L>  |  93<L>  |  40<H>  ----------------------------<  112<H>  4.8   |  25  |  2.52<H>    Ca    8.8      15 Aug 2024 06:16  Mg     2.0     08-15    TPro  5.4<L>  /  Alb  3.0<L>  /  TBili  1.2  /  DBili  x   /  AST  46<H>  /  ALT  69<H>  /  AlkPhos  120  08-14          Iron - Total Binding Capacity.: 175 ug/dL (08-13 @ 11:40)  Ferritin: 899 ng/mL (08-13 @ 11:40)  Vitamin B12, Serum: 1732 pg/mL (08-13 @ 11:40)  Folate, Serum: 5.8 ng/mL (08-13 @ 11:40)          08-13 @ 11:40    ldh1 --  haptoglobin 132  SIDNEY--  uric acid--

## 2024-08-15 NOTE — CONSULT NOTE ADULT - REASON FOR ADMISSION
AMS/lethargy, Afib w/ RVR, Hyponatremia, C.diff infection

## 2024-08-15 NOTE — PROGRESS NOTE ADULT - SUBJECTIVE AND OBJECTIVE BOX
Chief complaint  Patient is a 78y old  Female who presents with a chief complaint of AMS/lethargy, Afib w/ RVR, Hyponatremia, C.diff infection (15 Aug 2024 13:22)         Labs and Fingersticks  CAPILLARY BLOOD GLUCOSE      POCT Blood Glucose.: 98 mg/dL (15 Aug 2024 12:56)  POCT Blood Glucose.: 68 mg/dL (15 Aug 2024 12:55)  POCT Blood Glucose.: 89 mg/dL (15 Aug 2024 08:24)  POCT Blood Glucose.: 93 mg/dL (14 Aug 2024 17:00)      Anion Gap: 15 (08-15 @ 06:16)  Anion Gap: 13 (08-14 @ 05:51)      Calcium: 8.8 (08-15 @ 06:16)  Calcium: 8.5 (08-14 @ 05:51)  Albumin: 3.0 *L* (08-14 @ 05:51)    Alanine Aminotransferase (ALT/SGPT): 69 *H* (08-14 @ 05:51)  Alkaline Phosphatase: 120 (08-14 @ 05:51)  Aspartate Aminotransferase (AST/SGOT): 46 *H* (08-14 @ 05:51)        08-15    133<L>  |  93<L>  |  40<H>  ----------------------------<  112<H>  4.8   |  25  |  2.52<H>    Ca    8.8      15 Aug 2024 06:16  Mg     2.0     08-15    TPro  5.4<L>  /  Alb  3.0<L>  /  TBili  1.2  /  DBili  x   /  AST  46<H>  /  ALT  69<H>  /  AlkPhos  120  08-14                        13.1   10.91 )-----------( 86       ( 15 Aug 2024 06:16 )             40.8     Medications  MEDICATIONS  (STANDING):  ascorbic acid 500 milliGRAM(s) Oral daily  atorvastatin 80 milliGRAM(s) Oral at bedtime  digoxin  Injectable 125 MICROGram(s) IV Push <User Schedule>  levothyroxine 150 MICROGram(s) Oral daily  metoprolol tartrate 25 milliGRAM(s) Oral two times a day  midodrine. 30 milliGRAM(s) Oral every 8 hours  multivitamin 1 Tablet(s) Oral daily  pantoprazole  Injectable 40 milliGRAM(s) IV Push two times a day  vancomycin    Solution 500 milliGRAM(s) Oral every 6 hours      Physical Exam  General: Patient comfortable in bed   Vital Signs Last 12 Hrs  T(F): 97.4 (08-15-24 @ 12:14), Max: 97.5 (08-15-24 @ 04:37)  HR: 101 (08-15-24 @ 13:50) (93 - 101)  BP: 125/86 (08-15-24 @ 13:50) (90/52 - 125/86)  BP(mean): --  RR: 18 (08-15-24 @ 12:14) (18 - 18)  SpO2: 97% (08-15-24 @ 12:14) (91% - 97%)    CVS: S1S2   Respiratory: No wheezing, no crepitations  GI: Abdomen soft, bowel sounds positive  Musculoskeletal:  moves all extremities  : Voiding

## 2024-08-15 NOTE — CONSULT NOTE ADULT - PROVIDER SPECIALTY LIST ADULT
Cardiology
MICU
Gastroenterology
Surgery
Electrophysiology
Endocrinology
Infectious Disease
Wound Care
MICU
Heme/Onc
MICU
Nephrology
Palliative Care

## 2024-08-15 NOTE — CONSULT NOTE ADULT - SUBJECTIVE AND OBJECTIVE BOX
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 Found to have C.diff, CHANELL, and possible anemia of acute blood loss 2/2 GIB. (taken from internal medicine note). Palliative consulted for assistance with GOC.     PERTINENT PM/SXH:   Hypertension    Hypothyroid    Osteoarthritis    CAD (coronary artery disease)    CROW (obstructive sleep apnea)    History of MI (myocardial infarction)    Polyp of corpus uteri    Heart murmur    Bilateral hearing loss, unspecified hearing loss type    Obesity (BMI 35.0-39.9 without comorbidity)    Obesity    Mixed stress and urge urinary incontinence    Overactive bladder      No significant past surgical history    S/P ORIF (open reduction internal fixation) fracture    S/P appendectomy    S/P knee replacement    Stented coronary artery    S/P laparotomy    H/O dilation and curettage      FAMILY HISTORY:      ITEMS NOT CHECKED ARE NOT PRESENT    SOCIAL HISTORY:   Significant other/partner[ ]  Children[x ]  Sikhism/Spirituality:  Substance hx:  [ ]   Tobacco hx:  [ ]   Alcohol hx: [ ]   Home Opioid hx:  [ ] I-Stop Reference No:  Living Situation: [ ]Home  [ x]Long term care  [ ]Rehab [ ]Other    ADVANCE DIRECTIVES:    DNR/MOLST  [ ]  Living Will  [ ]   DECISION MAKER(s):  [ ] Health Care Proxy(s)  [ x] Surrogate(s)  [ ] Guardian           Name(s): Phone Number(s): Caity Toneykaushal    BASELINE (I)ADL(s) (prior to admission):  [place]: [ ]Total  [x ] Moderate [ ]Dependent    Allergies    penicillin (Hives)    Intolerances    MEDICATIONS  (STANDING):  albumin human 25% IVPB 50 milliLiter(s) IV Intermittent every 6 hours  ascorbic acid 500 milliGRAM(s) Oral daily  atorvastatin 80 milliGRAM(s) Oral at bedtime  digoxin  Injectable 125 MICROGram(s) IV Push <User Schedule>  levothyroxine 150 MICROGram(s) Oral daily  metoprolol tartrate 25 milliGRAM(s) Oral two times a day  midodrine. 30 milliGRAM(s) Oral every 8 hours  multivitamin 1 Tablet(s) Oral daily  pantoprazole  Injectable 40 milliGRAM(s) IV Push two times a day  vancomycin    Solution 500 milliGRAM(s) Oral every 6 hours    MEDICATIONS  (PRN):  acetaminophen     Tablet .. 650 milliGRAM(s) Oral every 6 hours PRN Temp greater or equal to 38C (100.4F), Mild Pain (1 - 3)    PRESENT SYMPTOMS: [ ]Unable to self-report see CPOT, PAINADs, RDOS  Source if other than patient:  [ ]Family   [ ]Team     Pain: [ ]yes [x ]no  QOL impact -   Location -                    Aggravating factors -  Quality -  Radiation -  Timing-  Severity (0-10 scale):  Minimal acceptable level (0-10 scale):       Dyspnea:                           [ ]Mild [ ]Moderate [ ]Severe  Anxiety:                             [ ]Mild [ ]Moderate [ ]Severe  Fatigue:                             [ ]Mild [ ]Moderate [ ]Severe  Nausea:                             [ ]Mild [ ]Moderate [ ]Severe  Loss of appetite:              [ ]Mild [ ]Moderate [ ]Severe  Constipation:                    [ ]Mild [ ]Moderate [ ]Severe    PCSSQ [Palliative Care Spiritual Screening Question]   Severity (0-10):  Score of 4 or > indicate consideration of Chaplaincy referral.  Chaplaincy Referral: [x ] yes [ ] refused [ x] following    Caregiver [place]? : [ ] yes [ ] no [ ] deferred:  Social work referral [ ] Patient & Family Centered Care Referral [ ]     Anticipatory Grief Present?: [ ] yes [ ] no  [ ] deferred: Palliative Social work referral [ ]  Patient & Family Centered Care Referral [ ]       Other Symptoms:  [x ]All other review of systems negative   [ ] Unable to obtain due to poor mentation    PHYSICAL EXAM:  Vital Signs Last 24 Hrs  T(C): 36.3 (15 Aug 2024 12:14), Max: 36.4 (14 Aug 2024 17:00)  T(F): 97.4 (15 Aug 2024 12:14), Max: 97.5 (14 Aug 2024 17:00)  HR: 93 (15 Aug 2024 12:14) (83 - 105)  BP: 101/64 (15 Aug 2024 12:14) (90/52 - 110/76)  BP(mean): --  RR: 18 (15 Aug 2024 12:14) (18 - 19)  SpO2: 97% (15 Aug 2024 12:14) (91% - 97%)    Parameters below as of 15 Aug 2024 12:14  Patient On (Oxygen Delivery Method): nasal cannula     I&O's Summary    14 Aug 2024 07:01  -  15 Aug 2024 07:00  --------------------------------------------------------  IN: 110 mL / OUT: 275 mL / NET: -165 mL        GENERAL:  [x]Alert  [x]Oriented x   [ ]Lethargic  [ ]Cachexia  [ ]Unarousable  [x]Verbal  [ ]Non-Verbal  Behavioral:   [ ]Anxiety  [ ]Delirium [ ]Agitation [ ]Other  HEENT:  [x]Normal   [ ]Dry mouth   [ ]ET Tube/Trach  [ ]Oral lesions  PULMONARY:   [ ]Clear [ ]Tachypnea  [ ]Audible excessive secretions   [ ]Rhonchi        [ ]Right [ ]Left [ ]Bilateral  [ ]Crackles        [ ]Right [ ]Left [ ]Bilateral  [ ]Wheezing     [ ]Right [ ]Left [ ]Bilateral  [x ]Diminished BS [ ] Right [ ]Left [x ]Bilateral  CARDIOVASCULAR:    [ ]Regular [ x]Irregular [ ]Tachy  [ ]Gerson [ ]Murmur [ ]Other  GASTROINTESTINAL:  [x]Soft  [ ]Distended   [x]+BS  [x]Non tender [ ]Tender  [ ]PEG [ ]OGT/ NGT   Last BM:    GENITOURINARY:  [ ]Normal [ xIncontinent   [ ]Oliguria/Anuria   [ ]Melvin  MUSCULOSKELETAL:   [ ]Normal   [x]Weakness  [x ]Bed/Wheelchair bound [ ]Edema  NEUROLOGIC:   [ ]No focal deficits  [ x] Cognitive impairment  [ ] Dysphagia [ ]Dysarthria [ ] Paresis [ ]Other   SKIN:   [x]Normal  [ ]Rash   [ ]Pressure ulcer(s) [ ]y [ ]n present on admission    CRITICAL CARE:  [ ] Shock Present  [ ]Septic [ ]Cardiogenic [ ]Neurologic [ ]Hypovolemic  [ ]  Vasopressors [ ]  Inotropes   [ ]Respiratory failure present [ ]Mechanical ventilation [ ]Non-invasive ventilatory support [ ]High flow    [ ]Acute  [ ]Chronic [ ]Hypoxic  [ ]Hypercarbic [ ]Other  [ ]Other organ failure     LABS:                        13.1   10.91 )-----------( 86       ( 15 Aug 2024 06:16 )             40.8   08-15    133<L>  |  93<L>  |  40<H>  ----------------------------<  112<H>  4.8   |  25  |  2.52<H>    Ca    8.8      15 Aug 2024 06:16  Mg     2.0     08-15    TPro  5.4<L>  /  Alb  3.0<L>  /  TBili  1.2  /  DBili  x   /  AST  46<H>  /  ALT  69<H>  /  AlkPhos  120  08-14      Urinalysis Basic - ( 15 Aug 2024 06:16 )    Color: x / Appearance: x / SG: x / pH: x  Gluc: 112 mg/dL / Ketone: x  / Bili: x / Urobili: x   Blood: x / Protein: x / Nitrite: x   Leuk Esterase: x / RBC: x / WBC x   Sq Epi: x / Non Sq Epi: x / Bacteria: x      RADIOLOGY & ADDITIONAL STUDIES:  < from: CT Abdomen and Pelvis No Cont (08.05.24 @ 19:52) >    ACC: [id number] EXAM:  CT ABDOMEN AND PELVIS   ORDERED BY:  LEIGH MALIK     PROCEDURE DATE:  08/05/2024          INTERPRETATION:  CLINICAL INFORMATION: Evaluate for ischemic bowel,   abdominal mass. Status post exploratory laparotomy with bowel resection    and end ileostomy on 2024-02-24. Presents with 2 days of lethargy and   abdominal discomfort.    COMPARISON: CT chest, abdomen and pelvis 7/21/2024.    CONTRAST/COMPLICATIONS:  IV Contrast: NONE  Oral Contrast: NONE  Complications: None reported at time of study completion    PROCEDURE:  CT of the Abdomen and Pelvis was performed.  Sagittal and coronal reformats were performed.    FINDINGS:  LOWER CHEST: Partially imaged left chest wall loop recorder, coronary   artery calcifications and mitral annular calcifications. Left basilar   confluent opacities, likely atelectasis.    LIVER: Within normal limits.  BILE DUCTS: Normal caliber.  GALLBLADDER: Within normal limits.  SPLEEN: Within normal limits.  PANCREAS: Within normal limits.  ADRENALS: Left adrenal mass with central hyperattenuation measures 4.2 x   3.7 cm, and is unchanged from 4/20/2024.  KIDNEYS/URETERS: No hydronephrosis. Left renal cyst.    BLADDER: Melvin catheter.  REPRODUCTIVE ORGANS: Uterus and adnexa within normallimits.    BOWEL: No bowel obstruction. Status post right lower quadrant ileostomy.  PERITONEUM/RETROPERITONEUM: Within normal limits.  VESSELS: Atherosclerotic changes. Limited evaluation for mesenteric   ischemia in the absence of intravenous contrast.  LYMPH NODES: No lymphadenopathy.  ABDOMINAL WALL: Postsurgical changes. Subcutaneous edema.  BONES: Degenerative changes.    IMPRESSION:  No acute intra-abdominal pathology. Limited evaluation for mesenteric   ischemia in the absence of intravenous contrast.    Unchanged indeterminate left adrenal lesion.    --- End of Report ---           CHRISTINA CABELLO MD; Resident Radiologist  This document has been electronically signed.  CORINNE SUN MD; Attending Radiologist  This document has been electronically signed. Aug  5 2024  8:15PM    < end of copied text >    PROTEIN CALORIE MALNUTRITION PRESENT: [ ]mild [ ]moderate [ ]severe [ ]underweight [ ]morbid obesity  https://www.andeal.org/vault/2440/web/files/ONC/Table_Clinical%20Characteristics%20to%20Document%20Malnutrition-White%20JV%20et%20al%378403.pdf    Height (cm): 162.6 (08-06-24 @ 17:00), 162.6 (07-21-24 @ 11:32), 162.6 (06-03-24 @ 06:03)  Weight (kg): 75.1 (08-08-24 @ 08:34), 69.5 (07-25-24 @ 14:35), 72.6 (05-15-24 @ 16:50)  BMI (kg/m2): 28.4 (08-08-24 @ 08:34), 26.3 (08-06-24 @ 17:00), 26.3 (07-25-24 @ 14:35)    [ x]PPSV2 < or = to 30% [ ]significant weight loss  [ ]poor nutritional intake  [ ]anasarca[ ]Artificial Nutrition      Other REFERRALS:  [ ]Hospice  [ ]Child Life  [ ]Social Work  [ ]Case management [ ]Holistic Therapy     Care Coordination Assessment 201 [C. Provider] (08-07-24 @ 16:33)      Palliative Performance Scale:  http://npcrc.org/files/news/palliative_performance_scale_ppsv2.pdf  (Ctrl +  left click to view)  Respiratory Distress Observation Tool:  https://homecareinformation.net/handouts/hen/Respiratory_Distress_Observation_Scale.pdf (Ctrl +  left click to view)  PAINAD Score:  http://geriatrictoolkit.[facility]/cog/painad.pdf (Ctrl +  left click to view)

## 2024-08-15 NOTE — PROGRESS NOTE ADULT - ASSESSMENT
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), COPD, CROW, HTN, HLD, urinary retention, recurrent UTIs, hypothyroidism, recent admission for UTI (c/b sepsis, encephalopathy, and bradycardia), now presenting with AMS/lethargy, found to have melena, Afib/hypotension, thrombocytopenia    #Thrombocytopenia  - no prior hx of thrombocytopenia, plt were normal on admission and have declined since then though fluctuating  - no heparin or AC use  - no LAD and liver/spleen normal on CT  - likely secondary to consumption with GIB + acute illness  - labs negative for hemolysis  - 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 Plts still decreasing at 86 more likely due to acute illness / meds / infection  - monitor plts    #anemia, blood loss  - melena/GIB  - s/p 6 unit PRBC- last 8/7  - iron studies/b12/folate normal  - Hg normal and stable  - GI following

## 2024-08-15 NOTE — PROGRESS NOTE ADULT - ASSESSMENT
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 Found to have C.diff, CHANELL, and possible anemia of acute blood loss 2/2 GIB.      Acute blood loss anemia secondary to acute GI bleed: Status post transfusion with good response.  Discussed with Dr. Sprague: Stool is yellow, H&H seems stable with good response to transfusion.  will hold off on endoscopy at this time given hypotension and continue to optimize...  Will plan EGD early next week    ·  Problem: Clostridium difficile infection.   ·  Plan: Found to have C.diff infection. Likely i/s/o recent abx use (cipro) to treat UTI.  - CT A/P showed no acute intra-abdominal pathology  Completed vancomycin      ·  Problem: Metabolic encephalopathy.   Fluctuating mental status    Likely multifactorial including acute infection,CHANELL and Dehydration  Mental status during the recent hospitalization showed decline and mentation however workup was negative for acute neurological pathology.      ·  Problem: Atrial fibrillation with RVR.   ·  Plan: Presented in Afib w/ RVR to HR 170s. Has hx of Afib (on eliquis at home). Suspect RVR i/s/o infection, dehydration, electrolyte abnormalities    Patient still with A. fib and RVR.....    Held  anticoagulation in setting of melanotic stool And now with positive HIT Antibody and supra therapeutic PTT:   Follow-up TAY and will restart Eliquis given plans to move forward with long-term facility discharge and and transition to hospice care  Given poor prognosis no plan for ablation.  Discussed with Dr. Shay .      Thrombocytopenia: likely secondary to  infection  However consulted hematology: Input appreciated... Follow-up TAY       ·  Problem: CHANELL (acute kidney injury).   Continue management per renal      ·  Problem: Hyponatremia. Improved        ·  Problem: Hypotension.   Likely multifactorial secondary to acute GI bleed, dehydration, possible infection  Continue to monitor H&H  Continue fluid resuscitation and midodrin  Discussed with infectious disease: We will continue with Empiric antibiotics and if culture is negative we will DC: Now discontinued  Blood pressure Improved        ·  Problem: CAD (coronary artery disease).   - holding home plavix  - c/w home statin        ·  Problem: Hypothyroidism.   Recently decreased dose      Appreciate wound care consult.  Follow-up official input    Discussed with Daughter bedside: her mother's poor prognosis and would like to Reinstate the DNR  Caity is amenable to hospice at LTC facility.Prepare for discharge

## 2024-08-15 NOTE — CONSULT NOTE ADULT - PROBLEM SELECTOR RECOMMENDATION 2
c/w PO vanc and flagyl  Likely secondary to recent Cipro use
Suggest to continue medications, monitoring, FU primary team recommendations. .
on plavix   defer to primary team

## 2024-08-15 NOTE — PROGRESS NOTE ADULT - SUBJECTIVE AND OBJECTIVE BOX
Date of service: 08-15-24 @ 20:17      Patient is a 78y old  Female who presents with a chief complaint of AMS/lethargy, Afib w/ RVR, Hyponatremia, C.diff infection (15 Aug 2024 17:11)                                                               INTERVAL HPI/OVERNIGHT EVENTS:    REVIEW OF SYSTEMS:   Confused otherwise without complaints                                                                                                                                                                                                                                                                       Medications:  MEDICATIONS  (STANDING):  ascorbic acid 500 milliGRAM(s) Oral daily  atorvastatin 80 milliGRAM(s) Oral at bedtime  digoxin  Injectable 125 MICROGram(s) IV Push <User Schedule>  levothyroxine 150 MICROGram(s) Oral daily  metoprolol tartrate 25 milliGRAM(s) Oral two times a day  midodrine. 30 milliGRAM(s) Oral every 8 hours  multivitamin 1 Tablet(s) Oral daily  pantoprazole  Injectable 40 milliGRAM(s) IV Push two times a day  vancomycin    Solution 500 milliGRAM(s) Oral every 6 hours    MEDICATIONS  (PRN):  acetaminophen     Tablet .. 650 milliGRAM(s) Oral every 6 hours PRN Temp greater or equal to 38C (100.4F), Mild Pain (1 - 3)       Allergies    penicillin (Hives)    Intolerances      Vital Signs Last 24 Hrs  T(C): 36.3 (15 Aug 2024 12:14), Max: 36.4 (14 Aug 2024 21:45)  T(F): 97.4 (15 Aug 2024 12:14), Max: 97.5 (14 Aug 2024 21:45)  HR: 101 (15 Aug 2024 13:50) (83 - 105)  BP: 125/86 (15 Aug 2024 13:50) (90/52 - 125/86)  BP(mean): --  RR: 18 (15 Aug 2024 12:14) (18 - 19)  SpO2: 97% (15 Aug 2024 12:14) (91% - 97%)    Parameters below as of 15 Aug 2024 12:14  Patient On (Oxygen Delivery Method): nasal cannula      CAPILLARY BLOOD GLUCOSE      POCT Blood Glucose.: 108 mg/dL (15 Aug 2024 16:49)  POCT Blood Glucose.: 98 mg/dL (15 Aug 2024 12:56)  POCT Blood Glucose.: 68 mg/dL (15 Aug 2024 12:55)  POCT Blood Glucose.: 89 mg/dL (15 Aug 2024 08:24)       @ 07:01  -  08-15 @ 07:00  --------------------------------------------------------  IN: 110 mL / OUT: 275 mL / NET: -165 mL    08-15 @ 07:01  -  08-15 @ 20:17  --------------------------------------------------------  IN: 120 mL / OUT: 0 mL / NET: 120 mL      Physical Exam:    Daily     Daily Weight in k.4 (15 Aug 2024 08:21)  General:  No acute distresscachetic  HEENT:  Nonicteric, PERRLA  CV:  RRR, S1S2   Lungs:  CTA B/L, no wheezes, rales, rhonchi  Abdomen:  Soft, non-tender, no distended, positive BS  Extremities: Edema/anasarca  Neuro: Nonfocal                                                                                                                                                                                                                                                                                           LABS:                               13.1   10.91 )-----------( 86       ( 15 Aug 2024 06:16 )             40.8                      08-15    133<L>  |  93<L>  |  40<H>  ----------------------------<  112<H>  4.8   |  25  |  2.52<H>    Ca    8.8      15 Aug 2024 06:16  Mg     2.0     08-15    TPro  5.4<L>  /  Alb  3.0<L>  /  TBili  1.2  /  DBili  x   /  AST  46<H>  /  ALT  69<H>  /  AlkPhos  120                         RADIOLOGY & ADDITIONAL TESTS         I personally reviewed: [  ]EKG   [  ]CXR    [  ] CT      A/P:         Discussed with :     Simon consultants' Notes   Time spent :

## 2024-08-15 NOTE — PROGRESS NOTE ADULT - SUBJECTIVE AND OBJECTIVE BOX
[facility] NEPHROLOGY- PROGRESS NOTE    78y Female with history of dementia, ischemic bowel s/p resection with end ostomy presents with AMS. Nephrology consulted for elevated Scr and metabolic acidosis.    REVIEW OF SYSTEMS: Unable to obtain due to mental status.    penicillin (Hives)      Hospital Medications: Medications reviewed        VITALS:  T(F): 97.4 (08-15-24 @ 12:14), Max: 97.5 (08-14-24 @ 17:00)  HR: 101 (08-15-24 @ 13:50)  BP: 125/86 (08-15-24 @ 13:50)  RR: 18 (08-15-24 @ 12:14)  SpO2: 97% (08-15-24 @ 12:14)  Wt(kg): --    08-14 @ 07:01  -  08-15 @ 07:00  --------------------------------------------------------  IN: 110 mL / OUT: 275 mL / NET: -165 mL        PHYSICAL EXAM:    Gen: NAD, calm  Cards: Irregularly irregular with tachycardia, +S1/S2, no M/G/R  Resp: CTA B/L  GI: soft, NT/ND, NABS, + ostomy   : + amaya  Vascular: + UE B/L        LABS:  08-15    133<L>  |  93<L>  |  40<H>  ----------------------------<  112<H>  4.8   |  25  |  2.52<H>    Ca    8.8      15 Aug 2024 06:16  Mg     2.0     08-15    TPro  5.4<L>  /  Alb  3.0<L>  /  TBili  1.2  /  DBili      /  AST  46<H>  /  ALT  69<H>  /  AlkPhos  120  08-14    Creatinine Trend: 2.52 <--, 2.55 <--, 2.67 <--, 2.55 <--, 2.57 <--, 2.56 <--, 2.25 <--                        13.1   10.91 )-----------( 86       ( 15 Aug 2024 06:16 )             40.8     Urine Studies:  Urinalysis Basic - ( 15 Aug 2024 06:16 )    Color:  / Appearance:  / SG:  / pH:   Gluc: 112 mg/dL / Ketone:   / Bili:  / Urobili:    Blood:  / Protein:  / Nitrite:    Leuk Esterase:  / RBC:  / WBC    Sq Epi:  / Non Sq Epi:  / Bacteria:

## 2024-08-15 NOTE — CONSULT NOTE ADULT - CONSULT REQUESTED DATE/TIME
06-Aug-2024 11:41
07-Aug-2024 12:39
05-Aug-2024 19:54
07-Aug-2024 12:14
07-Aug-2024 16:11
06-Aug-2024 22:51
15-Aug-2024
12-Aug-2024
06-Aug-2024 11:35
06-Aug-2024 13:58
06-Aug-2024 19:51
09-Aug-2024 12:29
06-Aug-2024 10:00

## 2024-08-15 NOTE — CONSULT NOTE ADULT - CONSULT REQUESTED BY NAME
Dr Duran
Dr. Duran
Dr. Duran
Lv Argueta
Zechariah Duran
Dr Duran
Dr. Duran
medicine
Dr. Duran
Medicine
Primary Team
Dr Duran
Dr. Duran

## 2024-08-15 NOTE — CONSULT NOTE ADULT - PROBLEM SELECTOR RECOMMENDATION 9
Increased synthroid to 150 mcg daily  Suggest to repeat thyroid function test in 4-6 weeks. Outpatient follow up.
s/p 1u pRBC with appropriate compensation   keep off Eliquis for now   GI eval    PPI
H&H stable   defer to GI for management

## 2024-08-15 NOTE — CONSULT NOTE ADULT - CONVERSATION DETAILS
Met with pt's  daughter and pt at bedside. Pt had limited capacity to participate in conversation but stated "I am so tired, and ready to go", when questioned what she meant she stated "end of life". Shared with Caity guarded prognosis due to multiple complications and comorbidities, with limited options for intervention. Discussed the importance of considering mom's experience going between rehab and the hospital with limited time at home. Shared that at this stage of her illness [facility] is appropriate for hospice care. Discussed the difference between inpt and home hospice and the resources provided. Catiy is amenable to hospice in a LTC facility. She does not want to return to [facility] but determine a new LTC facility with hospice capabilities. Discussed ongoing need to communicate with the CM to determine the appropriate disposition plan. Emotional support provided.

## 2024-08-15 NOTE — PROGRESS NOTE ADULT - SUBJECTIVE AND OBJECTIVE BOX
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 Limited hx obtainable from pt, was AAOx~1 on encounter and very lethargic/somnolent, but arousable and seemed to deny pain anywhere. Collateral hx obtained from chart review. During recent admission (7/21/24-7/29/24), she was treated for UTI/sepsis and ultimately discharged to rehab to completed a 5-day course of ciprofloxacin (last dose 7/30/24). At rehab, she was reportedly found to have oliguria for a few days over the weekend for which she was started on IV fluids, however she was noncompliant with the IV access and she pulled it out many times. She has had very poor appetite and became lethargic and hypotensive. She was subsequently transferred to hospital where she was found to have black-colored output in ileostomy bag.   In ED: Afebrile, HR 120s-170s (Afib), SBP 90s-130s, RR 15-22, sating % on RA.  Labs notable for Hgb 11.3->10.3, Na 133->122, SCr 3.04->2.58; lactated 4.7->2.8, blood glucose to 400s but FS 100s. Found to be C.diff positive. UA not best sample with 9 epithelial cells, but noted +LE, +bacteria, +WBC, neg nitrite. CT A/P showed no acute intra-abdominal pathology and unchanged indeterminate left adrenal lesion. Received Vanc 500mg PO, Flagyl 500mg IVPB, amio 150mg IVPB x3, ofirmev 1g, 1.5L IVF, and 1u pRBC. Also started on amio gtt and protonix gtt. Nephrology and MICU were consulted. Admitted to Medicine for further management. (05 Aug 2024 23:46)    Known to me from prior admissions. Has paroxysmal AFib, and syncope, has an ILR for surveillance for long pauses.  She has known short pauses overnight, but nothing long enough or with symptoms to warrant pacemaker insertion.  During subsequent admissions, the usual issue has been rapidly conducted  AFib.  Unable to answer 10pt ROS due to somnolence.  Date of service 8/15- resting sleepy in bed, no overnight changes.    PAST MEDICAL & SURGICAL HISTORY:  Hypertension  Hypothyroid  Osteoarthritis  knees, back  CAD (coronary artery disease)  CROW (obstructive sleep apnea)  non complaint on CPAP  History of MI (myocardial infarction)  h/o previous MI in 2004 prompted PTCA  with stenting x 2 vessels   last stress/ echo 2019  Heart murmur  dx in childhood  Bilateral hearing loss, unspecified hearing loss type  bilateral aids  Obesity  Mixed stress and urge urinary incontinence  Overactive bladder  S/P ORIF (open reduction internal fixation) fracture  left hip 1962  S/P appendectomy  30 plus years  S/P knee replacement  left 2000  Stented coronary artery  2004 X 2 STENTS  S/P laparotomy  due to adhesions, 30 years ago  H/O dilation and curettage  2/2019 Benign polyp    acetaminophen     Tablet .. 650 milliGRAM(s) Oral every 6 hours PRN  albumin human 25% IVPB 50 milliLiter(s) IV Intermittent every 6 hours  ascorbic acid 500 milliGRAM(s) Oral daily  atorvastatin 80 milliGRAM(s) Oral at bedtime  digoxin  Injectable 125 MICROGram(s) IV Push <User Schedule>  levothyroxine 150 MICROGram(s) Oral daily  metoprolol tartrate 25 milliGRAM(s) Oral two times a day  midodrine. 30 milliGRAM(s) Oral every 8 hours  multivitamin 1 Tablet(s) Oral daily  pantoprazole  Injectable 40 milliGRAM(s) IV Push two times a day  vancomycin    Solution 500 milliGRAM(s) Oral every 6 hours                            13.1   10.91 )-----------( 86       ( 15 Aug 2024 06:16 )             40.8       08-15    133<L>  |  93<L>  |  40<H>  ----------------------------<  112<H>  4.8   |  25  |  2.52<H>    Ca    8.8      15 Aug 2024 06:16  Phos  3.7     08-13  Mg     2.0     08-15    TPro  5.4<L>  /  Alb  3.0<L>  /  TBili  1.2  /  DBili  x   /  AST  46<H>  /  ALT  69<H>  /  AlkPhos  120  08-14    T(C): 36.4 (08-15-24 @ 04:37), Max: 36.4 (08-14-24 @ 17:00)  HR: 101 (08-15-24 @ 04:37) (83 - 105)  BP: 90/52 (08-15-24 @ 04:37) (90/52 - 111/76)  RR: 18 (08-15-24 @ 04:37) (18 - 19)  SpO2: 91% (08-15-24 @ 04:37) (91% - 100%)  Wt(kg): --    I&O's Summary    14 Aug 2024 07:01  -  15 Aug 2024 07:00  --------------------------------------------------------  IN: 110 mL / OUT: 275 mL / NET: -165 mL    Appearance: frail elderly woman in no acute distress, somnolent	  HEENT:   Normal oral mucosa, PERRL, EOMI	  Lymphatic: No lymphadenopathy , no edema  Cardiovascular: rapid irregular S1 S2, No JVD, No murmurs , Peripheral pulses palpable 2+ bilaterally  Respiratory: Lungs clear to auscultation, normal effort 	  Gastrointestinal:  Soft, Non-tender, + BS	  Skin: No rashes, No ecchymoses, No cyanosis, warm to touch  Musculoskeletal: Normal range of motion, normal strength  Psychiatry:  Mood & affect appropriate    TELEMETRY: AF 110s.	    ECG: AFib RVR 	    ASSESSMENT/PLAN: Ms Gooden is a pleasant 78y Female here with CDiff +/- GI bleeding.  EP called re: management of rapid AFib.  Has Paroxysmal AFib.  Had brief episodes of sinus rhythm on last admission, and known short pauses that are not long enough to justify permanent pacemaker insertion.  Has an ILR for long-pause surveillance, data managed by Dr Mendiola.    has been on heparin infusion. 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  pending alternative IV anticoag.  Continue metoprolol alongside midodrine. OK for holding parameters for HEARTRATE, but metoprolol is in general BP-neutral.  Recommend increasing metoprolol to 25mg PO Q6hrs.   She had a higher digoxin level last week but is not currently on any active AVN blockade.  Unable to escalate digoxin dose due to advanced CHANELL on CKD.  Not a good candidate for SUSY/Cardioversion - unlikely to be successful long-term.  Not a good candidate for VVI pacemaker / AVJ ablation.  Also, not likely that restoration of sinus or paced rhythm would improve her overall well-being.  She would still likely be somnolent and frail.  Awaiting resolution of diarrhea via ostomy.  Prognosis is guarded, as she's declinded considerably over the last 6 months (6 hospitalizations).  Daughter is ready to request palliative care re-evaluation.      Shane Frias M.D.  Cardiac Electrophysiology    office [phone]  pager [phone]

## 2024-08-15 NOTE — PROGRESS NOTE ADULT - ASSESSMENT
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 Palliative was consulted.   FT4 level repeat up to 1.1, improving.  Hashimoto's hypothyroid, +TPO antibodies.    Afib: Cortisol not suggestive of AI, on medications, monitored.  CAD:  stable monitored    Discussed plan and management with Dr Mirza Zelaya NP - TEAMS

## 2024-08-15 NOTE — PROGRESS NOTE ADULT - SUBJECTIVE AND OBJECTIVE BOX
Subjective: Patient seen and examined. No new events except as noted.     REVIEW OF SYSTEMS:    CONSTITUTIONAL: +weakness, fevers or chills  EYES/ENT: No visual changes;  No vertigo or throat pain   NECK: No pain or stiffness  RESPIRATORY: No cough, wheezing, hemoptysis; No shortness of breath  CARDIOVASCULAR: No chest pain or palpitations  GASTROINTESTINAL: No abdominal or epigastric pain. No nausea, vomiting, or hematemesis; No diarrhea or constipation. No melena or hematochezia.  GENITOURINARY: No dysuria, frequency or hematuria  NEUROLOGICAL: No numbness or weakness  SKIN: No itching, burning, rashes, or lesions   All other review of systems is negative unless indicated above.    MEDICATIONS:  MEDICATIONS  (STANDING):  ascorbic acid 500 milliGRAM(s) Oral daily  atorvastatin 80 milliGRAM(s) Oral at bedtime  digoxin  Injectable 125 MICROGram(s) IV Push <User Schedule>  levothyroxine 150 MICROGram(s) Oral daily  metoprolol tartrate 25 milliGRAM(s) Oral two times a day  midodrine. 30 milliGRAM(s) Oral every 8 hours  multivitamin 1 Tablet(s) Oral daily  pantoprazole  Injectable 40 milliGRAM(s) IV Push two times a day  vancomycin    Solution 500 milliGRAM(s) Oral every 6 hours      PHYSICAL EXAM:  T(C): 36.3 (08-15-24 @ 12:14), Max: 36.4 (08-14-24 @ 17:00)  HR: 101 (08-15-24 @ 13:50) (83 - 105)  BP: 125/86 (08-15-24 @ 13:50) (90/52 - 125/86)  RR: 18 (08-15-24 @ 12:14) (18 - 19)  SpO2: 97% (08-15-24 @ 12:14) (91% - 97%)  Wt(kg): --  I&O's Summary    14 Aug 2024 07:01  -  15 Aug 2024 07:00  --------------------------------------------------------  IN: 110 mL / OUT: 275 mL / NET: -165 mL              Appearance: NAD  HEENT:   Dry  oral mucosa, PERRL, EOMI	  Lymphatic: No lymphadenopathy , no edema  Cardiovascular: Irregular  S1 S2, No JVD, No murmurs , Peripheral pulses palpable 2+ bilaterally  Respiratory: decreased bs   Gastrointestinal:  Soft, Non-tender, + BS	+ostomy  Skin: No rashes, No ecchymoses, No cyanosis, warm to touch  Musculoskeletal: decreased range of motion and strength  Psychiatry: sleepy    Ext: No edema          LABS:    CARDIAC MARKERS:                                13.1   10.91 )-----------( 86       ( 15 Aug 2024 06:16 )             40.8     08-15    133<L>  |  93<L>  |  40<H>  ----------------------------<  112<H>  4.8   |  25  |  2.52<H>    Ca    8.8      15 Aug 2024 06:16  Mg     2.0     08-15    TPro  5.4<L>  /  Alb  3.0<L>  /  TBili  1.2  /  DBili  x   /  AST  46<H>  /  ALT  69<H>  /  AlkPhos  120  08-14    proBNP:   Lipid Profile:   HgA1c:   TSH:             TELEMETRY: 	AF    ECG:  	  RADIOLOGY:   DIAGNOSTIC TESTING:  [ ] Echocardiogram:  [ ]  Catheterization:  [ ] Stress Test:    OTHER:

## 2024-08-15 NOTE — CONSULT NOTE ADULT - PROBLEM SELECTOR RECOMMENDATION 5
see goc note above  DNR/I  Caity is surrogate decision maker  Caity is amenable to hospice at [facility] facility

## 2024-08-15 NOTE — PROGRESS NOTE ADULT - ASSESSMENT
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 Nephrology consulted for elevated Scr and metabolic acidosis.    1) CHANELL: likely due to ATN. Scr stable s/p IV albumin but remains above baseline. Continue with supportive care. UA active likely due to infection. FeNa low. CT without obstruction. TMA work up negative. Avoid nephrotoxins.    2) Hypotension: BP low. Continue with midodrine 30 mg PO TID. Rate control as per cardiology. Monitor BP.    3) Metabolic acidosis: Resolved s/p sodium bicarbonate gtt. Monitor pH.    4) Hyponatremia: Mild and likely due to hypervolemia. Will give IV lasix if serum Na decreases. Monitor serum Na.    5) Urinary retention: Continue with amaya. Holding flomax given hypotension.       [facility] NEPHROLOGY  Paulie Storm M.D.  Mack Clancy D.O.  Maddi Steve M.D.  MD Olivia Caldera, MSN, ANP-C    Telephone: [phone]  Facsimile: [phone] [place] [address], [address]  [place] [address]

## 2024-08-15 NOTE — CONSULT NOTE ADULT - CONSULT REASON
GOC
Hypotension
abnormal ekg
thrombocytopenia
c. diff   GIB
C.diff infection
Elevated Scr, Metabolic acidosis
Hypotension
GI bleed
wound
c/f hemorrhagic shock
Afib   Cardiac Management
hypothyroid

## 2024-08-15 NOTE — CONSULT NOTE ADULT - PROBLEM SELECTOR RECOMMENDATION 6
will sign off as goals are established  case discussed with CM team   referral placed for chaplaincy and   Can be reached by TEAMS M-F 9-5 Joanie Barrett Any other time please page [phone] if needed

## 2024-08-15 NOTE — PROGRESS NOTE ADULT - SUBJECTIVE AND OBJECTIVE BOX
INTERVAL HPI/OVERNIGHT EVENTS:    without new gi events   now  DNR/DNI    MEDICATIONS  (STANDING):  albumin human 25% IVPB 50 milliLiter(s) IV Intermittent every 6 hours  ascorbic acid 500 milliGRAM(s) Oral daily  atorvastatin 80 milliGRAM(s) Oral at bedtime  digoxin  Injectable 125 MICROGram(s) IV Push <User Schedule>  levothyroxine 150 MICROGram(s) Oral daily  metoprolol tartrate 25 milliGRAM(s) Oral two times a day  midodrine. 30 milliGRAM(s) Oral every 8 hours  multivitamin 1 Tablet(s) Oral daily  pantoprazole  Injectable 40 milliGRAM(s) IV Push two times a day  vancomycin    Solution 500 milliGRAM(s) Oral every 6 hours    MEDICATIONS  (PRN):  acetaminophen     Tablet .. 650 milliGRAM(s) Oral every 6 hours PRN Temp greater or equal to 38C (100.4F), Mild Pain (1 - 3)      Allergies    penicillin (Hives)    Intolerances        Review of Systems:    General:  No wt loss, fevers, chills, night sweats, fatigue   Eyes:  Good vision, no reported pain  ENT:  No sore throat, pain, runny nose, dysphagia  CV:  No pain, palpitations, hypo/hypertension  Resp:  No dyspnea, cough, tachypnea, wheezing  GI:  No pain, No nausea, No vomiting, No diarrhea, No constipation, No weight loss, No fever, No pruritis, No rectal bleeding, No melena, No dysphagia  :  No pain, bleeding, incontinence, nocturia  Muscle:  No pain, weakness  Neuro:  No weakness, tingling, memory problems  Psych:  No fatigue, insomnia, mood problems, depression  Endocrine:  No polyuria, polydypsia, cold/heat intolerance  Heme:  No petechiae, ecchymosis, easy bruisability  Skin:  No rash, tattoos, scars, edema      Vital Signs Last 24 Hrs  T(C): 36.4 (15 Aug 2024 04:37), Max: 36.4 (14 Aug 2024 17:00)  T(F): 97.5 (15 Aug 2024 04:37), Max: 97.5 (14 Aug 2024 17:00)  HR: 101 (15 Aug 2024 04:37) (83 - 105)  BP: 90/52 (15 Aug 2024 04:37) (90/52 - 111/76)  BP(mean): --  RR: 18 (15 Aug 2024 04:37) (18 - 19)  SpO2: 91% (15 Aug 2024 04:37) (91% - 100%)    Parameters below as of 15 Aug 2024 04:37  Patient On (Oxygen Delivery Method): nasal cannula        PHYSICAL EXAM:    Constitutional: NAD  HEENT: EOMI, throat clear  Neck: No LAD, supple  Respiratory: CTA and P  Cardiovascular: S1 and S2, RRR, no M  Gastrointestinal: BS+, soft, NT/ND, neg HSM,  Extremities: No peripheral edema, neg clubbing, cyanosis  Vascular: 2+ peripheral pulses  Neurological: A/O x1  Psychiatric: Normal mood, normal affect  Skin: No rashes      LABS:                        13.1   10.91 )-----------( 86       ( 15 Aug 2024 06:16 )             40.8     08-15    133<L>  |  93<L>  |  40<H>  ----------------------------<  112<H>  4.8   |  25  |  2.52<H>    Ca    8.8      15 Aug 2024 06:16  Phos  3.7     08-13  Mg     2.0     08-15    TPro  5.4<L>  /  Alb  3.0<L>  /  TBili  1.2  /  DBili  x   /  AST  46<H>  /  ALT  69<H>  /  AlkPhos  120  08-14    PTT - ( 13 Aug 2024 11:40 )  PTT:>200.0 sec  Urinalysis Basic - ( 15 Aug 2024 06:16 )    Color: x / Appearance: x / SG: x / pH: x  Gluc: 112 mg/dL / Ketone: x  / Bili: x / Urobili: x   Blood: x / Protein: x / Nitrite: x   Leuk Esterase: x / RBC: x / WBC x   Sq Epi: x / Non Sq Epi: x / Bacteria: x        RADIOLOGY & ADDITIONAL TESTS:

## 2024-08-15 NOTE — CONSULT NOTE ADULT - PROBLEM SELECTOR RECOMMENDATION 3
Cont with amiodarone gtt for now   Start lopressor 12.5 tid as well
pt not a candidate for ablation/intervention at this time due to acuity and overall poor prognosis  defer to cardiology

## 2024-08-15 NOTE — PROGRESS NOTE ADULT - ASSESSMENT
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 Admitted due to black output in the ileostomy bag c/f UGIB, afib with RVR, and c diff positive.    1. GIB  resolved GIB; stools brown  EGD continues to be deferred in the setting of rapid afib  Agree at this point patient appears very depleted. Palliative approach seems reasonable.  PPI BID  diet as usual    2. Nausea/Vomiting   resolved      3. C. Diff   abx thru 8/16  ID input appreciated       4. Afib   a/c on hold for GIB   cardiology on board

## 2024-08-15 NOTE — CONSULT NOTE ADULT - ASSESSMENT
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 Found to have C.diff, CHANELL, and possible anemia of acute blood loss 2/2 GIB. (taken from internal medicine note). Palliative consulted for assistance with GOC.

## 2024-08-15 NOTE — CONSULT NOTE ADULT - TIME BILLING
Time spent on reviewing medical charts, relevant imaging, labs, complex medical decision making and discussing plan with patient and medical team
as above
Symptom assessment and management, supportive counseling, coordination of care

## 2024-08-15 NOTE — PROGRESS NOTE ADULT - ASSESSMENT
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 During recent admission (7/21/24-7/29/24), she was treated for UTI/sepsis and ultimately discharged to rehab to completed a 5-day course of ciprofloxacin (last dose 7/30/24). At rehab, she was reportedly found to have oliguria for a few days over the weekend for which she was started on IV fluids, however she was noncompliant with the IV access and she pulled it out many times. She has had very poor appetite and became lethargic and hypotensive. She was subsequently transferred to hospital where she was found to have black-colored output in ileostomy bag.     C.diff likely iso recent antibiotic use for UTI tx  Acute blood loss anemia due to UGIB  Hypotension likely multifactorial due to above  Positive UA in setting of amaya, Ucx with C. albicans likely colonization  CHANELL likely due to hypovolemia iso diarrhea  Hypothermia noted, unclear etiology, resolved   Afib with RVR, EP following     8/5 CTAP wo contrast with no acute intra-abd pathology   8/6 am s/p RRT for hypotension, tachycardia  ostomy had loose/watery melanotic stools -now resolved   Surgery following - no acute intervention at this time  GI following -- deferred EGD for now   Consider CTA/IR eval if concern for active bleed and if stable for scan  UA with pyuria, large LE, occasional bacteria with 9 sq epi cells,   Ucx with C. albicans some, has amaya in place, suspect contaminant/colonization  8/9 CXR with clear lungs   8/9 Bcx NGTD x2   GI following, EGD deferred iso rapid afib  mental status remains at baseline, answers/follows   afebrile, WBC noted, no new findings on exam, nontoxic     Recommendations:  Continue vancomycin 500mg PO Q6h for 10d until 8/16/24  Contact isolation per IC protocol   Monitor temps/CBC, Cr   Aspiration precautions   Stable from ID standpoint at this time.     Marisa Davidson M.D.  [facility], Division of Infectious Diseases  [phone]  After 5pm on weekdays and all day on weekends - please call [phone]  Available on [employer] TEAMS

## 2024-08-15 NOTE — PROGRESS NOTE ADULT - SUBJECTIVE AND OBJECTIVE BOX
[facility] DIVISION OF INFECTIOUS DISEASES  GERALD Leahy Y. Patel, S. Shah, G. Casimir  [phone]  ([phone] - weekdays after 5pm and weekends)    Name: LEFTY AKBAR  Age/Gender: 78y Female  MRN: [id number]    Interval History:  Patient seen and examined this morning.   No new complaints noted.  Notes reviewed  No concerning overnight events  Afebrile   Allergies: penicillin (Hives)      Objective:  Vitals:   T(F): 97.5 (08-15-24 @ 04:37), Max: 97.5 (08-14-24 @ 17:00)  HR: 101 (08-15-24 @ 04:37) (83 - 105)  BP: 90/52 (08-15-24 @ 04:37) (90/52 - 111/76)  RR: 18 (08-15-24 @ 04:37) (18 - 19)  SpO2: 91% (08-15-24 @ 04:37) (91% - 100%)  Physical Examination:  General: no acute distress, nontoxic appearing   HEENT: NC/AT, anicteric, EOMI  Respiratory: no acc muscle use, breathing comfortably  Cardiovascular: S1 and S2 present  Gastrointestinal: soft, NT, ND, ostomy  Extremities: no edema, no cyanosis  Skin: no visible rash    Laboratory Studies:  CBC:                       13.1   10.91 )-----------( 86       ( 15 Aug 2024 06:16 )             40.8     WBC Trend:  10.91 08-15-24 @ 06:16  10.64 08-14-24 @ 05:51  11.41 08-13-24 @ 11:40  12.47 08-13-24 @ 02:46  10.09 08-12-24 @ 06:44  10.00 08-11-24 @ 07:21  10.34 08-09-24 @ 10:11    CMP: 08-15    133<L>  |  93<L>  |  40<H>  ----------------------------<  112<H>  4.8   |  25  |  2.52<H>    Ca    8.8      15 Aug 2024 06:16  Phos  3.7     08-13  Mg     2.0     08-15    TPro  5.4<L>  /  Alb  3.0<L>  /  TBili  1.2  /  DBili  x   /  AST  46<H>  /  ALT  69<H>  /  AlkPhos  120  08-14    Creatinine: 2.52 mg/dL (08-15-24 @ 06:16)  Creatinine: 2.55 mg/dL (08-14-24 @ 05:51)  Creatinine: 2.67 mg/dL (08-13-24 @ 11:40)  Creatinine: 2.55 mg/dL (08-12-24 @ 06:42)  Creatinine: 2.57 mg/dL (08-11-24 @ 09:29)  Creatinine: 2.56 mg/dL (08-11-24 @ 07:22)  Creatinine: 2.25 mg/dL (08-09-24 @ 10:11)  Creatinine: 2.26 mg/dL (08-09-24 @ 10:11)    LIVER FUNCTIONS - ( 14 Aug 2024 05:51 )  Alb: 3.0 g/dL / Pro: 5.4 g/dL / ALK PHOS: 120 U/L / ALT: 69 U/L / AST: 46 U/L / GGT: x           Microbiology: reviewed   Culture - Blood (collected 08-09-24 @ 18:50)  Source: .Blood Blood-Peripheral  Final Report (08-15-24 @ 01:01):    No growth at 5 days    Culture - Urine (collected 08-05-24 @ 21:00)  Source: Clean Catch Clean Catch (Midstream)  Final Report (08-07-24 @ 14:01):    50,000 - 99,000 CFU/mL Candida albicans "Susceptibilities not performed"    Culture - Blood (collected 08-05-24 @ 20:54)  Source: .Blood Blood-Peripheral  Final Report (08-11-24 @ 01:00):    No growth at 5 days    Culture - Blood (collected 08-05-24 @ 18:00)  Source: .Blood Blood-Peripheral  Final Report (08-11-24 @ 01:00):    No growth at 5 days    Radiology: reviewed     Medications:  acetaminophen     Tablet .. 650 milliGRAM(s) Oral every 6 hours PRN  albumin human 25% IVPB 50 milliLiter(s) IV Intermittent every 6 hours  ascorbic acid 500 milliGRAM(s) Oral daily  atorvastatin 80 milliGRAM(s) Oral at bedtime  digoxin  Injectable 125 MICROGram(s) IV Push <User Schedule>  levothyroxine 150 MICROGram(s) Oral daily  metoprolol tartrate 25 milliGRAM(s) Oral two times a day  midodrine. 30 milliGRAM(s) Oral every 8 hours  multivitamin 1 Tablet(s) Oral daily  pantoprazole  Injectable 40 milliGRAM(s) IV Push two times a day  vancomycin    Solution 500 milliGRAM(s) Oral every 6 hours    Current Antimicrobials:  vancomycin    Solution 500 milliGRAM(s) Oral every 6 hours    Prior/Completed Antimicrobials:  metroNIDAZOLE  IVPB  piperacillin/tazobactam IVPB.  piperacillin/tazobactam IVPB.-  vancomycin    Solution

## 2024-08-16 LAB
GLUCOSE BLDC GLUCOMTR-MCNC: 85 MG/DL — SIGNIFICANT CHANGE UP (ref 70–99)
GLUCOSE BLDC GLUCOMTR-MCNC: 88 MG/DL — SIGNIFICANT CHANGE UP (ref 70–99)
HCT VFR BLD CALC: 40.2 % — SIGNIFICANT CHANGE UP (ref 34.5–45)
HGB BLD-MCNC: 13 G/DL — SIGNIFICANT CHANGE UP (ref 11.5–15.5)
MCHC RBC-ENTMCNC: 30.8 PG — SIGNIFICANT CHANGE UP (ref 27–34)
MCHC RBC-ENTMCNC: 32.3 GM/DL — SIGNIFICANT CHANGE UP (ref 32–36)
MCV RBC AUTO: 95.3 FL — SIGNIFICANT CHANGE UP (ref 80–100)
NRBC # BLD: 0 /100 WBCS — SIGNIFICANT CHANGE UP (ref 0–0)
PLATELET # BLD AUTO: 76 K/UL — LOW (ref 150–400)
RBC # BLD: 4.22 M/UL — SIGNIFICANT CHANGE UP (ref 3.8–5.2)
RBC # FLD: 17.6 % — HIGH (ref 10.3–14.5)
UNFRACTIONATED HEPARIN INTERPRETATION: SIGNIFICANT CHANGE UP
UNFRACTIONATED HEPARIN RESULT: NEGATIVE — SIGNIFICANT CHANGE UP
UNFRACTIONATED HEPARIN-HIGH DOSE: 0 % — SIGNIFICANT CHANGE UP
UNFRACTIONATED HEPARIN-LOW DOSE: 0 % — SIGNIFICANT CHANGE UP
WBC # BLD: 11.65 K/UL — HIGH (ref 3.8–10.5)
WBC # FLD AUTO: 11.65 K/UL — HIGH (ref 3.8–10.5)

## 2024-08-16 RX ORDER — ALBUMIN (HUMAN) 5 G/20ML
100 SOLUTION INTRAVENOUS ONCE
Refills: 0 | Status: COMPLETED | OUTPATIENT
Start: 2024-08-16 | End: 2024-08-16

## 2024-08-16 RX ORDER — FUROSEMIDE 40 MG
40 TABLET ORAL ONCE
Refills: 0 | Status: COMPLETED | OUTPATIENT
Start: 2024-08-16 | End: 2024-08-16

## 2024-08-16 RX ORDER — APIXABAN 5 MG/1
5 TABLET, FILM COATED ORAL
Refills: 0 | Status: DISCONTINUED | OUTPATIENT
Start: 2024-08-16 | End: 2024-08-17

## 2024-08-16 RX ADMIN — METOPROLOL TARTRATE 25 MILLIGRAM(S): 100 TABLET ORAL at 18:19

## 2024-08-16 RX ADMIN — Medication 500 MILLIGRAM(S): at 06:21

## 2024-08-16 RX ADMIN — MIDODRINE HYDROCHLORIDE 30 MILLIGRAM(S): 5 TABLET ORAL at 05:50

## 2024-08-16 RX ADMIN — APIXABAN 5 MILLIGRAM(S): 5 TABLET, FILM COATED ORAL at 18:19

## 2024-08-16 RX ADMIN — ALBUMIN (HUMAN) 50 MILLILITER(S): 5 SOLUTION INTRAVENOUS at 11:15

## 2024-08-16 RX ADMIN — Medication 150 MICROGRAM(S): at 05:42

## 2024-08-16 RX ADMIN — Medication 40 MILLIGRAM(S): at 18:19

## 2024-08-16 RX ADMIN — Medication 500 MILLIGRAM(S): at 18:19

## 2024-08-16 RX ADMIN — Medication 40 MILLIGRAM(S): at 05:42

## 2024-08-16 RX ADMIN — Medication 40 MILLIGRAM(S): at 11:15

## 2024-08-16 RX ADMIN — Medication 500 MILLIGRAM(S): at 00:34

## 2024-08-16 RX ADMIN — METOPROLOL TARTRATE 25 MILLIGRAM(S): 100 TABLET ORAL at 05:42

## 2024-08-16 RX ADMIN — Medication 80 MILLIGRAM(S): at 22:01

## 2024-08-16 RX ADMIN — Medication 500 MILLIGRAM(S): at 12:25

## 2024-08-16 NOTE — PROGRESS NOTE ADULT - SUBJECTIVE AND OBJECTIVE BOX
Chief complaint  Patient is a 78y old  Female who presents with a chief complaint of AMS/lethargy, Afib w/ RVR, Hyponatremia, C.diff infection (16 Aug 2024 11:33)         Labs and Fingersticks  CAPILLARY BLOOD GLUCOSE      POCT Blood Glucose.: 88 mg/dL (16 Aug 2024 13:59)  POCT Blood Glucose.: 108 mg/dL (15 Aug 2024 16:49)      Anion Gap: 15 (08-15 @ 06:16)      Calcium: 8.8 (08-15 @ 06:16)          08-15    133<L>  |  93<L>  |  40<H>  ----------------------------<  112<H>  4.8   |  25  |  2.52<H>    Ca    8.8      15 Aug 2024 06:16  Mg     2.0     08-15                          13.0   11.65 )-----------( 76       ( 16 Aug 2024 09:09 )             40.2     Medications  MEDICATIONS  (STANDING):  apixaban 5 milliGRAM(s) Oral two times a day  ascorbic acid 500 milliGRAM(s) Oral daily  atorvastatin 80 milliGRAM(s) Oral at bedtime  digoxin  Injectable 125 MICROGram(s) IV Push <User Schedule>  levothyroxine 150 MICROGram(s) Oral daily  metoprolol tartrate 25 milliGRAM(s) Oral two times a day  midodrine. 30 milliGRAM(s) Oral every 8 hours  multivitamin 1 Tablet(s) Oral daily  pantoprazole  Injectable 40 milliGRAM(s) IV Push two times a day  vancomycin    Solution 500 milliGRAM(s) Oral every 6 hours      Physical Exam  General: Patient comfortable in bed  Vital Signs Last 12 Hrs  T(F): 98.3 (08-16-24 @ 12:10), Max: 98.3 (08-16-24 @ 12:10)  HR: 84 (08-16-24 @ 12:10) (84 - 110)  BP: 128/87 (08-16-24 @ 12:10) (106/64 - 128/87)  BP(mean): --  RR: 18 (08-16-24 @ 12:10) (18 - 18)  SpO2: 96% (08-16-24 @ 12:10) (90% - 96%)    CVS: S1S2   Respiratory: No wheezing, no crepitations  GI: Abdomen soft, bowel sounds positive  Musculoskeletal:  moves all extremities  : Voiding

## 2024-08-16 NOTE — PROGRESS NOTE ADULT - ASSESSMENT
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 Found to have C.diff, CHANELL, and possible anemia of acute blood loss 2/2 GIB.      Acute blood loss anemia secondary to acute GI bleed: Status post transfusion with good response.  Discussed with Dr. Sprague: Stool is yellow, H&H seems stable with good response to transfusion.  will hold off on endoscopy at this time given hypotension and continue to optimize...  Will plan EGD early next week    ·  Problem: Clostridium difficile infection.   ·  Plan: Found to have C.diff infection. Likely i/s/o recent abx use (cipro) to treat UTI.  - CT A/P showed no acute intra-abdominal pathology  Complete  vancomycin      ·  Problem: Metabolic encephalopathy.   Fluctuating mental status    Likely multifactorial including acute infection,CHANELL and Dehydration  Mental status during the recent hospitalization showed decline and mentation however workup was negative for acute neurological pathology.      ·  Problem: Atrial fibrillation with RVR.   ·  Plan: Presented in Afib w/ RVR to HR 170s. Has hx of Afib (on eliquis at home). Suspect RVR i/s/o infection, dehydration, electrolyte abnormalities    Patient still with A. fib and RVR.....    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  Discussed with Dr. Shay .  We will restartAnticoagulation with Eliquis and monitor H&H... If no Significant drop in hemoglobin, Then continue anticoagulation upon discharge.  The risks and benefits explained to daughter at length      Thrombocytopenia: likely secondary to  infection  However consulted hematology: Input appreciated... Follow-up TAY       ·  Problem: CHANELL (acute kidney injury).   Continue management per renal      ·  Problem: Hyponatremia. Improved    Anasarca: discussed with the renal, will give albumin and Lasix today to optimize... As long as blood pressure tolerates and creatinine stable..  Otherwise no aggressive measures    ·  Problem: Hypotension.   Likely multifactorial secondary to acute GI bleed, dehydration, possible infection  Continue to monitor H&H  Continue fluid resuscitation and midodrin  Discussed with infectious disease: We will continue with Empiric antibiotics and if culture is negative we will DC: Now discontinued  Blood pressure Improved        ·  Problem: CAD (coronary artery disease).   - holding home plavix  - c/w home statin        ·  Problem: Hypothyroidism.   Recently decreased dose      Appreciate wound care consult.  Follow-up official input    Discussed with daughter on the phone, Ideally she would like to take patient home with home hospice.  Arrangements are underway  Patient is DNR/DNI

## 2024-08-16 NOTE — PROGRESS NOTE ADULT - SUBJECTIVE AND OBJECTIVE BOX
[facility] NEPHROLOGY- PROGRESS NOTE    78y Female with history of dementia, ischemic bowel s/p resection with end ostomy presents with AMS. Nephrology consulted for elevated Scr and metabolic acidosis.    REVIEW OF SYSTEMS: Unable to obtain due to mental status.    penicillin (Hives)      Hospital Medications: Medications reviewed      VITALS:  T(F): 97.7 (08-16-24 @ 04:52), Max: 97.7 (08-16-24 @ 04:52)  HR: 110 (08-16-24 @ 04:52)  BP: 106/64 (08-16-24 @ 04:52)  RR: 18 (08-16-24 @ 04:52)  SpO2: 90% (08-16-24 @ 04:52)  Wt(kg): --    08-15 @ 07:01  -  08-16 @ 07:00  --------------------------------------------------------  IN: 120 mL / OUT: 475 mL / NET: -355 mL        Weight (kg): 87.2 (08-16 @ 09:07)        PHYSICAL EXAM:    Gen: NAD, calm  Cards: Irregularly irregular with tachycardia, +S1/S2, no M/G/R  Resp: CTA B/L  GI: soft, NT/ND, NABS, + ostomy   : + amaya  Vascular: + UE B/L, 2+ LE edema B/L        LABS:  08-15    133<L>  |  93<L>  |  40<H>  ----------------------------<  112<H>  4.8   |  25  |  2.52<H>    Ca    8.8      15 Aug 2024 06:16  Mg     2.0     08-15      Creatinine Trend: 2.52 <--, 2.55 <--, 2.67 <--, 2.55 <--, 2.57 <--, 2.56 <--                        13.0   11.65 )-----------( 76       ( 16 Aug 2024 09:09 )             40.2     Urine Studies:  Urinalysis Basic - ( 15 Aug 2024 06:16 )    Color:  / Appearance:  / SG:  / pH:   Gluc: 112 mg/dL / Ketone:   / Bili:  / Urobili:    Blood:  / Protein:  / Nitrite:    Leuk Esterase:  / RBC:  / WBC    Sq Epi:  / Non Sq Epi:  / Bacteria:

## 2024-08-16 NOTE — PROGRESS NOTE ADULT - ASSESSMENT
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 During recent admission (7/21/24-7/29/24), she was treated for UTI/sepsis and ultimately discharged to rehab to completed a 5-day course of ciprofloxacin (last dose 7/30/24). At rehab, she was reportedly found to have oliguria for a few days over the weekend for which she was started on IV fluids, however she was noncompliant with the IV access and she pulled it out many times. She has had very poor appetite and became lethargic and hypotensive. She was subsequently transferred to hospital where she was found to have black-colored output in ileostomy bag.     C.diff likely iso recent antibiotic use for UTI tx  Acute blood loss anemia due to UGIB  Hypotension likely multifactorial due to above  Positive UA in setting of amaya, Ucx with C. albicans likely colonization  CHANELL likely due to hypovolemia iso diarrhea  Hypothermia noted, unclear etiology, resolved   Afib with RVR, EP following     8/5 CTAP wo contrast with no acute intra-abd pathology   8/6 am s/p RRT for hypotension, tachycardia  ostomy had loose/watery melanotic stools -now resolved   Surgery following - no acute intervention at this time  GI following -- deferred EGD for now   Consider CTA/IR eval if concern for active bleed and if stable for scan  UA with pyuria, large LE, occasional bacteria with 9 sq epi cells,   Ucx with C. albicans some, has amaya in place, suspect contaminant/colonization  8/9 CXR with clear lungs   8/9 Bcx NGTD x2   GI following, EGD deferred iso rapid afib  mental status remains at baseline, answers/follows   afebrile, WBC noted, no new findings on exam, nontoxic     Recommendations:  Continue vancomycin 500mg PO Q6h for 10d -end today 8/16/24  Contact isolation per IC protocol   Aspiration precautions     Stable from ID standpoint at this time.   Please call if any questions, thank you     Marisa Davidson M.D.  [facility], Division of Infectious Diseases  [phone]  After 5pm on weekdays and all day on weekends - please call [phone]  Available on [employer] TEAMS

## 2024-08-16 NOTE — PROGRESS NOTE ADULT - SUBJECTIVE AND OBJECTIVE BOX
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 Limited hx obtainable from pt, was AAOx~1 on encounter and very lethargic/somnolent, but arousable and seemed to deny pain anywhere. Collateral hx obtained from chart review. During recent admission (7/21/24-7/29/24), she was treated for UTI/sepsis and ultimately discharged to rehab to completed a 5-day course of ciprofloxacin (last dose 7/30/24). At rehab, she was reportedly found to have oliguria for a few days over the weekend for which she was started on IV fluids, however she was noncompliant with the IV access and she pulled it out many times. She has had very poor appetite and became lethargic and hypotensive. She was subsequently transferred to hospital where she was found to have black-colored output in ileostomy bag.   In ED: Afebrile, HR 120s-170s (Afib), SBP 90s-130s, RR 15-22, sating % on RA.  Labs notable for Hgb 11.3->10.3, Na 133->122, SCr 3.04->2.58; lactated 4.7->2.8, blood glucose to 400s but FS 100s. Found to be C.diff positive. UA not best sample with 9 epithelial cells, but noted +LE, +bacteria, +WBC, neg nitrite. CT A/P showed no acute intra-abdominal pathology and unchanged indeterminate left adrenal lesion. Received Vanc 500mg PO, Flagyl 500mg IVPB, amio 150mg IVPB x3, ofirmev 1g, 1.5L IVF, and 1u pRBC. Also started on amio gtt and protonix gtt. Nephrology and MICU were consulted. Admitted to Medicine for further management. (05 Aug 2024 23:46)    Known to me from prior admissions. Has paroxysmal AFib, and syncope, has an ILR for surveillance for long pauses.  She has known short pauses overnight, but nothing long enough or with symptoms to warrant pacemaker insertion.  During subsequent admissions, the usual issue has been rapidly conducted  AFib.  Unable to answer 10pt ROS due to somnolence.  Date of service 8/16- resting sleepy in bed, no overnight changes.    PAST MEDICAL & SURGICAL HISTORY:  Hypertension  Hypothyroid  Osteoarthritis  knees, back  CAD (coronary artery disease)  CROW (obstructive sleep apnea)  non complaint on CPAP  History of MI (myocardial infarction)  h/o previous MI in 2004 prompted PTCA  with stenting x 2 vessels   last stress/ echo 2019  Heart murmur  dx in childhood  Bilateral hearing loss, unspecified hearing loss type  bilateral aids  Obesity  Mixed stress and urge urinary incontinence  Overactive bladder  S/P ORIF (open reduction internal fixation) fracture  left hip 1962  S/P appendectomy  30 plus years  S/P knee replacement  left 2000  Stented coronary artery  2004 X 2 STENTS  S/P laparotomy  due to adhesions, 30 years ago  H/O dilation and curettage  2/2019 Benign polyp    acetaminophen     Tablet .. 650 milliGRAM(s) Oral every 6 hours PRN  apixaban 5 milliGRAM(s) Oral two times a day  ascorbic acid 500 milliGRAM(s) Oral daily  atorvastatin 80 milliGRAM(s) Oral at bedtime  digoxin  Injectable 125 MICROGram(s) IV Push <User Schedule>  levothyroxine 150 MICROGram(s) Oral daily  metoprolol tartrate 25 milliGRAM(s) Oral two times a day  midodrine. 30 milliGRAM(s) Oral every 8 hours  multivitamin 1 Tablet(s) Oral daily  pantoprazole  Injectable 40 milliGRAM(s) IV Push two times a day  vancomycin    Solution 500 milliGRAM(s) Oral every 6 hours                            13.0   11.65 )-----------( 76       ( 16 Aug 2024 09:09 )             40.2       08-15    133<L>  |  93<L>  |  40<H>  ----------------------------<  112<H>  4.8   |  25  |  2.52<H>    Ca    8.8      15 Aug 2024 06:16  Mg     2.0     08-15    T(C): 36.5 (08-16-24 @ 04:52), Max: 36.5 (08-16-24 @ 04:52)  HR: 110 (08-16-24 @ 04:52) (91 - 110)  BP: 106/64 (08-16-24 @ 04:52) (101/64 - 125/86)  RR: 18 (08-16-24 @ 04:52) (18 - 18)  SpO2: 90% (08-16-24 @ 04:52) (90% - 97%)  Wt(kg): --    I&O's Summary    15 Aug 2024 07:01  -  16 Aug 2024 07:00  --------------------------------------------------------  IN: 120 mL / OUT: 475 mL / NET: -355 mL    Appearance: frail elderly woman in no acute distress, somnolent	  HEENT:   Normal oral mucosa, PERRL, EOMI	  Lymphatic: No lymphadenopathy , no edema  Cardiovascular: rapid irregular S1 S2, No JVD, No murmurs , Peripheral pulses palpable 2+ bilaterally  Respiratory: Lungs clear to auscultation, normal effort 	  Gastrointestinal:  Soft, Non-tender, + BS	  Skin: No rashes, No ecchymoses, No cyanosis, warm to touch  Musculoskeletal: Normal range of motion, normal strength  Psychiatry:  Mood & affect appropriate    TELEMETRY: AF 100s.	    ECG: AFib RVR 	    ASSESSMENT/PLAN: Ms Gooden is a pleasant 78y Female here with CDiff +/- GI bleeding.  EP called re: management of rapid AFib.  Has Paroxysmal AFib.  Had brief episodes of sinus rhythm on last admission, and known short pauses that are not long enough to justify permanent pacemaker insertion.  Has an ILR for long-pause surveillance, data managed by Dr Mendiola.    has been on heparin infusion. 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  pending alternative IV anticoag.  Continue metoprolol alongside midodrine. OK for holding parameters for HEARTRATE, but metoprolol is in general BP-neutral.  Recommend increasing metoprolol to 25mg PO Q6hrs.   Unable to escalate digoxin dose due to advanced CHANELL on CKD.  Not a good candidate for SUSY/Cardioversion - unlikely to be successful long-term.  Not a good candidate for VVI pacemaker / AVJ ablation.  Also, not likely that restoration of sinus or paced rhythm would improve her overall well-being.  She would still likely be somnolent and frail.  Awaiting resolution of diarrhea via ostomy.  Prognosis is guarded, as she's declinded considerably over the last 6 months (6 hospitalizations).  Palliative care has seen at daughter's request.  Working on DC to hospice-capable nursing facility.    Shane Frias M.D.  Cardiac Electrophysiology    office [phone]  pager [phone]

## 2024-08-16 NOTE — PROGRESS NOTE ADULT - SUBJECTIVE AND OBJECTIVE BOX
Subjective: Patient seen and examined. No new events except as noted.     REVIEW OF SYSTEMS:    CONSTITUTIONAL: + weakness, fevers or chills  EYES/ENT: No visual changes;  No vertigo or throat pain   NECK: No pain or stiffness  RESPIRATORY: No cough, wheezing, hemoptysis; No shortness of breath  CARDIOVASCULAR: No chest pain or palpitations  GASTROINTESTINAL: No abdominal or epigastric pain. No nausea, vomiting, or hematemesis; No diarrhea or constipation. No melena or hematochezia.  GENITOURINARY: No dysuria, frequency or hematuria  NEUROLOGICAL: No numbness or weakness  SKIN: No itching, burning, rashes, or lesions   All other review of systems is negative unless indicated above.    MEDICATIONS:  MEDICATIONS  (STANDING):  ascorbic acid 500 milliGRAM(s) Oral daily  atorvastatin 80 milliGRAM(s) Oral at bedtime  digoxin  Injectable 125 MICROGram(s) IV Push <User Schedule>  levothyroxine 150 MICROGram(s) Oral daily  metoprolol tartrate 25 milliGRAM(s) Oral two times a day  midodrine. 30 milliGRAM(s) Oral every 8 hours  multivitamin 1 Tablet(s) Oral daily  pantoprazole  Injectable 40 milliGRAM(s) IV Push two times a day  vancomycin    Solution 500 milliGRAM(s) Oral every 6 hours      PHYSICAL EXAM:  T(C): 36.5 (08-16-24 @ 04:52), Max: 36.5 (08-16-24 @ 04:52)  HR: 110 (08-16-24 @ 04:52) (91 - 110)  BP: 106/64 (08-16-24 @ 04:52) (101/64 - 125/86)  RR: 18 (08-16-24 @ 04:52) (18 - 18)  SpO2: 90% (08-16-24 @ 04:52) (90% - 97%)  Wt(kg): --  I&O's Summary    15 Aug 2024 07:01  -  16 Aug 2024 07:00  --------------------------------------------------------  IN: 120 mL / OUT: 475 mL / NET: -355 mL        Weight (kg): 87.2 (08-16 @ 09:07)      Appearance: NAD  HEENT:   Dry  oral mucosa, PERRL, EOMI	  Lymphatic: No lymphadenopathy , no edema  Cardiovascular: Irregular  S1 S2, No JVD, No murmurs , Peripheral pulses palpable 2+ bilaterally  Respiratory: decreased bs   Gastrointestinal:  Soft, Non-tender, + BS	+ostomy  Skin: No rashes, No ecchymoses, No cyanosis, warm to touch  Musculoskeletal: decreased range of motion and strength  Psychiatry: sleepy    Ext: No edema      LABS:    CARDIAC MARKERS:                                13.1   10.91 )-----------( 86       ( 15 Aug 2024 06:16 )             40.8     08-15    133<L>  |  93<L>  |  40<H>  ----------------------------<  112<H>  4.8   |  25  |  2.52<H>    Ca    8.8      15 Aug 2024 06:16  Mg     2.0     08-15        TELEMETRY: 	AF    ECG:  	  RADIOLOGY:   DIAGNOSTIC TESTING:  [ ] Echocardiogram:  [ ]  Catheterization:  [ ] Stress Test:    OTHER:

## 2024-08-16 NOTE — PROGRESS NOTE ADULT - ASSESSMENT
78-year-old female. Had prior melena and was transfused. Eliquis was stopped. She was then getting heparin. Developed thrombocytopenia and HIT was positive and it was stopped. She is not currently getting AC (due to prior bleed?) CBC pending from today but as of yesterday, platelets still trending down. She is not anemic. Has CKD. Haptoglobin was normal. Bilirubin was normal. LDH was high.     Unclear if HIT positive is real and needed to await TAY for confirmation, but in the meantime, would consider AC with Argatroban if the pros outweigh the risks (has had bleeding.) Either way, avoid heparin. Spoke with attending and he says that comfort measures are being considered by the family.    Patient seen in coverage and further hematology plan and management per primary team.

## 2024-08-16 NOTE — PROGRESS NOTE ADULT - ASSESSMENT
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 Nephrology consulted for elevated Scr and metabolic acidosis.    1) CHANELL: likely due to ATN. Scr stable s/p IV albumin but remains above baseline. Continue with supportive care. UA active likely due to infection. FeNa low. CT without obstruction. TMA work up negative. Avoid nephrotoxins.    2) Hypotension: BP low. Continue with midodrine 30 mg PO TID. Rate control as per cardiology. Monitor BP.    3) LE edema: likely due to hypoalbuminemia. Will give IV albumin with concurrent lasix 40 mg IV X 1 dose. TTE with normal LVSF. Monitor UO.    4) Metabolic acidosis: Resolved s/p sodium bicarbonate gtt. Monitor pH.    5) Hyponatremia: Mild and likely due to hypervolemia. IV lasix as above. Monitor serum Na.    6) Urinary retention: Continue with amaya. Holding flomax given hypotension.       [facility] NEPHROLOGY  Paulie Storm M.D.  Mack Clancy D.O.  Maddi Steve M.D.  MD Olivia Caldera, MSN, ANP-C    Telephone: [phone]  Facsimile: [phone] [facility] [address]  [place] [address]

## 2024-08-16 NOTE — PROGRESS NOTE ADULT - SUBJECTIVE AND OBJECTIVE BOX
Order in epic   Date of service: 24 @ 16:14      Patient is a 78y old  Female who presents with a chief complaint of AMS/lethargy, Afib w/ RVR, Hyponatremia, C.diff infection (16 Aug 2024 15:23)                                                               INTERVAL HPI/OVERNIGHT EVENTS:    REVIEW OF SYSTEMS:    Minimally verbal, Lethargic but arousable  Denies any complaints at this time                                                                                                                                                                                                                                                                           Medications:  MEDICATIONS  (STANDING):  apixaban 5 milliGRAM(s) Oral two times a day  ascorbic acid 500 milliGRAM(s) Oral daily  atorvastatin 80 milliGRAM(s) Oral at bedtime  digoxin  Injectable 125 MICROGram(s) IV Push <User Schedule>  levothyroxine 150 MICROGram(s) Oral daily  metoprolol tartrate 25 milliGRAM(s) Oral two times a day  midodrine. 30 milliGRAM(s) Oral every 8 hours  multivitamin 1 Tablet(s) Oral daily  pantoprazole  Injectable 40 milliGRAM(s) IV Push two times a day  vancomycin    Solution 500 milliGRAM(s) Oral every 6 hours    MEDICATIONS  (PRN):  acetaminophen     Tablet .. 650 milliGRAM(s) Oral every 6 hours PRN Temp greater or equal to 38C (100.4F), Mild Pain (1 - 3)       Allergies    penicillin (Hives)    Intolerances      Vital Signs Last 24 Hrs  T(C): 36.8 (16 Aug 2024 12:10), Max: 36.8 (16 Aug 2024 12:10)  T(F): 98.3 (16 Aug 2024 12:10), Max: 98.3 (16 Aug 2024 12:10)  HR: 84 (16 Aug 2024 12:10) (84 - 110)  BP: 128/87 (16 Aug 2024 12:10) (106/64 - 128/87)  BP(mean): --  RR: 18 (16 Aug 2024 12:10) (18 - 18)  SpO2: 96% (16 Aug 2024 12:10) (90% - 96%)    Parameters below as of 16 Aug 2024 12:10  Patient On (Oxygen Delivery Method): nasal cannula      CAPILLARY BLOOD GLUCOSE      POCT Blood Glucose.: 88 mg/dL (16 Aug 2024 13:59)  POCT Blood Glucose.: 108 mg/dL (15 Aug 2024 16:49)      08-15 @ 07:01  -  -16 @ 07:00  --------------------------------------------------------  IN: 120 mL / OUT: 475 mL / NET: -355 mL     @ 07:01  -  16 @ 16:14  --------------------------------------------------------  IN: 50 mL / OUT: 0 mL / NET: 50 mL      Physical Exam:    Daily     Daily Weight in k.2 (16 Aug 2024 09:07)  General:  No acute distress, cachectic  HEENT:  Nonicteric, PERRLA  CV:  RRR, S1S2   Lungs:  CTA B/L, no wheezes, rales, rhonchi  Abdomen:  Soft, non-tender, no distended, positive BS  Extremities: Anasarca                                                                                                                                                                                                                                                                                               LABS:                               13.0   11.65 )-----------( 76       ( 16 Aug 2024 09:09 )             40.2                      08-15    133<L>  |  93<L>  |  40<H>  ----------------------------<  112<H>  4.8   |  25  |  2.52<H>    Ca    8.8      15 Aug 2024 06:16  Mg     2.0     08-15                         RADIOLOGY & ADDITIONAL TESTS         I personally reviewed: [  ]EKG   [  ]CXR    [  ] CT      A/P:         Discussed with :     Simon consultants' Notes   Time spent :

## 2024-08-16 NOTE — PROGRESS NOTE ADULT - ASSESSMENT
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 Admitted due to black output in the ileostomy bag c/f UGIB, afib with RVR, and c diff positive.    1. GIB  resolved GIB; stools brown  EGD continues to be deferred in the setting of rapid afib  Agree at this point patient appears very depleted. Palliative approach seems reasonable.  PPI can change to day  diet as usual    2. Nausea/Vomiting   resolved      3. C. Diff   abx thru 8/16  ID input appreciated       4. Afib   a/c on hold for GIB   cardiology on board

## 2024-08-16 NOTE — PROGRESS NOTE ADULT - SUBJECTIVE AND OBJECTIVE BOX
INTERVAL HPI/OVERNIGHT EVENTS:    without new gi events       MEDICATIONS  (STANDING):  albumin human 25% IVPB 50 milliLiter(s) IV Intermittent every 6 hours  ascorbic acid 500 milliGRAM(s) Oral daily  atorvastatin 80 milliGRAM(s) Oral at bedtime  digoxin  Injectable 125 MICROGram(s) IV Push <User Schedule>  levothyroxine 150 MICROGram(s) Oral daily  metoprolol tartrate 25 milliGRAM(s) Oral two times a day  midodrine. 30 milliGRAM(s) Oral every 8 hours  multivitamin 1 Tablet(s) Oral daily  pantoprazole  Injectable 40 milliGRAM(s) IV Push two times a day  vancomycin    Solution 500 milliGRAM(s) Oral every 6 hours    MEDICATIONS  (PRN):  acetaminophen     Tablet .. 650 milliGRAM(s) Oral every 6 hours PRN Temp greater or equal to 38C (100.4F), Mild Pain (1 - 3)      Allergies    penicillin (Hives)    Intolerances        Review of Systems:    General:  No wt loss, fevers, chills, night sweats, fatigue   Eyes:  Good vision, no reported pain  ENT:  No sore throat, pain, runny nose, dysphagia  CV:  No pain, palpitations, hypo/hypertension  Resp:  No dyspnea, cough, tachypnea, wheezing  GI:  No pain, No nausea, No vomiting, No diarrhea, No constipation, No weight loss, No fever, No pruritis, No rectal bleeding, No melena, No dysphagia  :  No pain, bleeding, incontinence, nocturia  Muscle:  No pain, weakness  Neuro:  No weakness, tingling, memory problems  Psych:  No fatigue, insomnia, mood problems, depression  Endocrine:  No polyuria, polydypsia, cold/heat intolerance  Heme:  No petechiae, ecchymosis, easy bruisability  Skin:  No rash, tattoos, scars, edema      Vital Signs Last 24 Hrs  T(C): 36.4 (15 Aug 2024 04:37), Max: 36.4 (14 Aug 2024 17:00)  T(F): 97.5 (15 Aug 2024 04:37), Max: 97.5 (14 Aug 2024 17:00)  HR: 101 (15 Aug 2024 04:37) (83 - 105)  BP: 90/52 (15 Aug 2024 04:37) (90/52 - 111/76)  BP(mean): --  RR: 18 (15 Aug 2024 04:37) (18 - 19)  SpO2: 91% (15 Aug 2024 04:37) (91% - 100%)    Parameters below as of 15 Aug 2024 04:37  Patient On (Oxygen Delivery Method): nasal cannula        PHYSICAL EXAM:    Constitutional: NAD  HEENT: EOMI, throat clear  Neck: No LAD, supple  Respiratory: CTA and P  Cardiovascular: S1 and S2, RRR, no M  Gastrointestinal: BS+, soft, NT/ND, neg HSM,  Extremities: No peripheral edema, neg clubbing, cyanosis  Vascular: 2+ peripheral pulses  Neurological: A/O x1  Psychiatric: Normal mood, normal affect  Skin: No rashes      LABS:                        13.1   10.91 )-----------( 86       ( 15 Aug 2024 06:16 )             40.8     08-15    133<L>  |  93<L>  |  40<H>  ----------------------------<  112<H>  4.8   |  25  |  2.52<H>    Ca    8.8      15 Aug 2024 06:16  Phos  3.7     08-13  Mg     2.0     08-15    TPro  5.4<L>  /  Alb  3.0<L>  /  TBili  1.2  /  DBili  x   /  AST  46<H>  /  ALT  69<H>  /  AlkPhos  120  08-14    PTT - ( 13 Aug 2024 11:40 )  PTT:>200.0 sec  Urinalysis Basic - ( 15 Aug 2024 06:16 )    Color: x / Appearance: x / SG: x / pH: x  Gluc: 112 mg/dL / Ketone: x  / Bili: x / Urobili: x   Blood: x / Protein: x / Nitrite: x   Leuk Esterase: x / RBC: x / WBC x   Sq Epi: x / Non Sq Epi: x / Bacteria: x        RADIOLOGY & ADDITIONAL TESTS:

## 2024-08-16 NOTE — PROGRESS NOTE ADULT - NS ATTEND AMEND GEN_ALL_CORE FT
I have made amendments to the documentation where necessary. Additional comments: Chart, labs, vitals, radiology reviewed. Above H&P reviewed and edited where appropriate. Agree with history and physical exam. Agree with assessment and plan. I reviewed the overnight course of events and discussed the care with the patient/ family.  All the decisions in assessment and plan are solely made by me.
Chart and lab reviewed, plan of care discussed with the NP
I have made amendments to the documentation where necessary. Additional comments: Chart, labs, vitals, radiology reviewed. Above H&P reviewed and edited where appropriate. Agree with history and physical exam. Agree with assessment and plan. I reviewed the overnight course of events and discussed the care with the patient/ family.  All the decisions in assessment and plan are solely made by me.
I have made amendments to the documentation where necessary. Additional comments: Chart, labs, vitals, radiology reviewed. Above H&P reviewed and edited where appropriate. Agree with history and physical exam. Agree with assessment and plan. I reviewed the overnight course of events and discussed the care with the patient/ family.  All the decisions in assessment and plan are solely made by me.

## 2024-08-16 NOTE — PROGRESS NOTE ADULT - SUBJECTIVE AND OBJECTIVE BOX
Patient is a 78y old  Female who presents with a chief complaint of AMS/lethargy, Afib w/ RVR, Hyponatremia, C.diff infection (15 Aug 2024 20:16)    says that she feels okay, but she is confused.     Medication:   acetaminophen     Tablet .. 650 milliGRAM(s) Oral every 6 hours PRN  ascorbic acid 500 milliGRAM(s) Oral daily  atorvastatin 80 milliGRAM(s) Oral at bedtime  digoxin  Injectable 125 MICROGram(s) IV Push <User Schedule>  levothyroxine 150 MICROGram(s) Oral daily  metoprolol tartrate 25 milliGRAM(s) Oral two times a day  midodrine. 30 milliGRAM(s) Oral every 8 hours  multivitamin 1 Tablet(s) Oral daily  pantoprazole  Injectable 40 milliGRAM(s) IV Push two times a day  vancomycin    Solution 500 milliGRAM(s) Oral every 6 hours      Physical exam    T(C): 36.5 (08-16-24 @ 04:52), Max: 36.5 (08-16-24 @ 04:52)  HR: 110 (08-16-24 @ 04:52) (91 - 110)  BP: 106/64 (08-16-24 @ 04:52) (101/64 - 125/86)  RR: 18 (08-16-24 @ 04:52) (18 - 18)  SpO2: 90% (08-16-24 @ 04:52) (90% - 97%)  Wt(kg): --    alert NAD  EOMI anicteric sclera  Cv s1 S2 RRR  Lungs clear B/L  abd soft NT ND +BS  B/L LE edema no tenderness    Labs                        13.1   10.91 )-----------( 86       ( 15 Aug 2024 06:16 )             40.8       08-15    133<L>  |  93<L>  |  40<H>  ----------------------------<  112<H>  4.8   |  25  |  2.52<H>    Ca    8.8      15 Aug 2024 06:16  Mg     2.0     08-15            [phone]

## 2024-08-16 NOTE — PROGRESS NOTE ADULT - SUBJECTIVE AND OBJECTIVE BOX
[facility] DIVISION OF INFECTIOUS DISEASES  GERALD Leahy Y. Patel, S. Shah, G. Casimir  [phone]  ([phone] - weekdays after 5pm and weekends)    Name: LEFTY AKBAR  Age/Gender: 78y Female  MRN: [id number]    Interval History:  Patient seen and examined this morning.   No new complaints noted.  Notes reviewed  No concerning overnight events  Afebrile   Allergies: penicillin (Hives)      Objective:  Vitals:   T(F): 98.3 (08-16-24 @ 12:10), Max: 98.3 (08-16-24 @ 12:10)  HR: 84 (08-16-24 @ 12:10) (84 - 110)  BP: 128/87 (08-16-24 @ 12:10) (106/64 - 128/87)  RR: 18 (08-16-24 @ 12:10) (18 - 18)  SpO2: 96% (08-16-24 @ 12:10) (90% - 96%)  Physical Examination:  General: no acute distress, nontoxic appearing   HEENT: NC/AT, anicteric, EOMI  Respiratory: no acc muscle use, breathing comfortably  Cardiovascular: S1 and S2 present  Gastrointestinal: soft, ND, NT, ostomy   Extremities: no edema, no cyanosis  Skin: no visible rash    Laboratory Studies:  CBC:                       13.0   11.65 )-----------( 76       ( 16 Aug 2024 09:09 )             40.2     WBC Trend:  11.65 08-16-24 @ 09:09  10.91 08-15-24 @ 06:16  10.64 08-14-24 @ 05:51  11.41 08-13-24 @ 11:40  12.47 08-13-24 @ 02:46  10.09 08-12-24 @ 06:44  10.00 08-11-24 @ 07:21    CMP: 08-15    133<L>  |  93<L>  |  40<H>  ----------------------------<  112<H>  4.8   |  25  |  2.52<H>    Ca    8.8      15 Aug 2024 06:16  Mg     2.0     08-15      Creatinine: 2.52 mg/dL (08-15-24 @ 06:16)  Creatinine: 2.55 mg/dL (08-14-24 @ 05:51)  Creatinine: 2.67 mg/dL (08-13-24 @ 11:40)  Creatinine: 2.55 mg/dL (08-12-24 @ 06:42)  Creatinine: 2.57 mg/dL (08-11-24 @ 09:29)  Creatinine: 2.56 mg/dL (08-11-24 @ 07:22)    Microbiology: reviewed   Culture - Blood (collected 08-09-24 @ 18:50)  Source: .Blood Blood-Peripheral  Final Report (08-15-24 @ 01:01):    No growth at 5 days    Culture - Urine (collected 08-05-24 @ 21:00)  Source: Clean Catch Clean Catch (Midstream)  Final Report (08-07-24 @ 14:01):    50,000 - 99,000 CFU/mL Candida albicans "Susceptibilities not performed"    Culture - Blood (collected 08-05-24 @ 20:54)  Source: .Blood Blood-Peripheral  Final Report (08-11-24 @ 01:00):    No growth at 5 days    Culture - Blood (collected 08-05-24 @ 18:00)  Source: .Blood Blood-Peripheral  Final Report (08-11-24 @ 01:00):    No growth at 5 days    Radiology: reviewed     Medications:  acetaminophen     Tablet .. 650 milliGRAM(s) Oral every 6 hours PRN  apixaban 5 milliGRAM(s) Oral two times a day  ascorbic acid 500 milliGRAM(s) Oral daily  atorvastatin 80 milliGRAM(s) Oral at bedtime  digoxin  Injectable 125 MICROGram(s) IV Push <User Schedule>  levothyroxine 150 MICROGram(s) Oral daily  metoprolol tartrate 25 milliGRAM(s) Oral two times a day  midodrine. 30 milliGRAM(s) Oral every 8 hours  multivitamin 1 Tablet(s) Oral daily  pantoprazole  Injectable 40 milliGRAM(s) IV Push two times a day  vancomycin    Solution 500 milliGRAM(s) Oral every 6 hours    Current Antimicrobials:  vancomycin    Solution 500 milliGRAM(s) Oral every 6 hours    Prior/Completed Antimicrobials:  metroNIDAZOLE  IVPB  piperacillin/tazobactam IVPB.  piperacillin/tazobactam IVPB.-  vancomycin    Solution

## 2024-08-17 LAB
ALBUMIN SERPL ELPH-MCNC: 3.4 G/DL — SIGNIFICANT CHANGE UP (ref 3.3–5)
ALP SERPL-CCNC: 110 U/L — SIGNIFICANT CHANGE UP (ref 40–120)
ALT FLD-CCNC: 37 U/L — SIGNIFICANT CHANGE UP (ref 10–45)
ANION GAP SERPL CALC-SCNC: 13 MMOL/L — SIGNIFICANT CHANGE UP (ref 5–17)
AST SERPL-CCNC: 30 U/L — SIGNIFICANT CHANGE UP (ref 10–40)
BILIRUB SERPL-MCNC: 1.7 MG/DL — HIGH (ref 0.2–1.2)
BUN SERPL-MCNC: 40 MG/DL — HIGH (ref 7–23)
CALCIUM SERPL-MCNC: 8.7 MG/DL — SIGNIFICANT CHANGE UP (ref 8.4–10.5)
CHLORIDE SERPL-SCNC: 94 MMOL/L — LOW (ref 96–108)
CO2 SERPL-SCNC: 27 MMOL/L — SIGNIFICANT CHANGE UP (ref 22–31)
CREAT SERPL-MCNC: 2.39 MG/DL — HIGH (ref 0.5–1.3)
EGFR: 20 ML/MIN/1.73M2 — LOW
GLUCOSE BLDC GLUCOMTR-MCNC: 78 MG/DL — SIGNIFICANT CHANGE UP (ref 70–99)
GLUCOSE BLDC GLUCOMTR-MCNC: 81 MG/DL — SIGNIFICANT CHANGE UP (ref 70–99)
GLUCOSE BLDC GLUCOMTR-MCNC: 83 MG/DL — SIGNIFICANT CHANGE UP (ref 70–99)
GLUCOSE BLDC GLUCOMTR-MCNC: 86 MG/DL — SIGNIFICANT CHANGE UP (ref 70–99)
GLUCOSE BLDC GLUCOMTR-MCNC: 90 MG/DL — SIGNIFICANT CHANGE UP (ref 70–99)
GLUCOSE SERPL-MCNC: 74 MG/DL — SIGNIFICANT CHANGE UP (ref 70–99)
HCT VFR BLD CALC: 38.7 % — SIGNIFICANT CHANGE UP (ref 34.5–45)
HGB BLD-MCNC: 12.3 G/DL — SIGNIFICANT CHANGE UP (ref 11.5–15.5)
MCHC RBC-ENTMCNC: 30.3 PG — SIGNIFICANT CHANGE UP (ref 27–34)
MCHC RBC-ENTMCNC: 31.8 GM/DL — LOW (ref 32–36)
MCV RBC AUTO: 95.3 FL — SIGNIFICANT CHANGE UP (ref 80–100)
NRBC # BLD: 0 /100 WBCS — SIGNIFICANT CHANGE UP (ref 0–0)
PLATELET # BLD AUTO: 76 K/UL — LOW (ref 150–400)
POTASSIUM SERPL-MCNC: 4 MMOL/L — SIGNIFICANT CHANGE UP (ref 3.5–5.3)
POTASSIUM SERPL-SCNC: 4 MMOL/L — SIGNIFICANT CHANGE UP (ref 3.5–5.3)
PROT SERPL-MCNC: 5.6 G/DL — LOW (ref 6–8.3)
RBC # BLD: 4.06 M/UL — SIGNIFICANT CHANGE UP (ref 3.8–5.2)
RBC # FLD: 17.2 % — HIGH (ref 10.3–14.5)
SODIUM SERPL-SCNC: 134 MMOL/L — LOW (ref 135–145)
WBC # BLD: 10.11 K/UL — SIGNIFICANT CHANGE UP (ref 3.8–10.5)
WBC # FLD AUTO: 10.11 K/UL — SIGNIFICANT CHANGE UP (ref 3.8–10.5)

## 2024-08-17 RX ORDER — METOPROLOL TARTRATE 100 MG/1
25 TABLET ORAL EVERY 6 HOURS
Refills: 0 | Status: DISCONTINUED | OUTPATIENT
Start: 2024-08-17 | End: 2024-08-21

## 2024-08-17 RX ADMIN — Medication 500 MILLIGRAM(S): at 18:05

## 2024-08-17 RX ADMIN — METOPROLOL TARTRATE 25 MILLIGRAM(S): 100 TABLET ORAL at 05:33

## 2024-08-17 RX ADMIN — Medication 1 TABLET(S): at 10:20

## 2024-08-17 RX ADMIN — MIDODRINE HYDROCHLORIDE 30 MILLIGRAM(S): 5 TABLET ORAL at 14:18

## 2024-08-17 RX ADMIN — Medication 80 MILLIGRAM(S): at 22:22

## 2024-08-17 RX ADMIN — Medication 150 MICROGRAM(S): at 05:33

## 2024-08-17 RX ADMIN — MIDODRINE HYDROCHLORIDE 30 MILLIGRAM(S): 5 TABLET ORAL at 05:32

## 2024-08-17 RX ADMIN — Medication 40 MILLIGRAM(S): at 18:06

## 2024-08-17 RX ADMIN — Medication 40 MILLIGRAM(S): at 05:34

## 2024-08-17 RX ADMIN — APIXABAN 5 MILLIGRAM(S): 5 TABLET, FILM COATED ORAL at 05:34

## 2024-08-17 RX ADMIN — Medication 500 MILLIGRAM(S): at 10:20

## 2024-08-17 RX ADMIN — METOPROLOL TARTRATE 25 MILLIGRAM(S): 100 TABLET ORAL at 10:20

## 2024-08-17 RX ADMIN — METOPROLOL TARTRATE 25 MILLIGRAM(S): 100 TABLET ORAL at 18:06

## 2024-08-17 RX ADMIN — MIDODRINE HYDROCHLORIDE 30 MILLIGRAM(S): 5 TABLET ORAL at 22:22

## 2024-08-17 RX ADMIN — Medication 500 MILLIGRAM(S): at 00:26

## 2024-08-17 RX ADMIN — Medication 500 MILLIGRAM(S): at 05:33

## 2024-08-17 RX ADMIN — DIGOXIN 125 MICROGRAM(S): 0.12 TABLET ORAL at 14:16

## 2024-08-17 NOTE — PROGRESS NOTE ADULT - SUBJECTIVE AND OBJECTIVE BOX
date of service: 08-17-24 @ 11:19  afberile  REVIEW OF SYSTEMS:  CONSTITUTIONAL: No fever,  no  weight loss  ENT:  No  tinnitus,   no   vertigo  NECK: No pain or stiffness  RESPIRATORY: No cough, wheezing, chills or hemoptysis;    No Shortness of Breath  CARDIOVASCULAR: No chest pain, palpitations, dizziness  GASTROINTESTINAL: No abdominal or epigastric pain. No nausea, vomiting, or hematemesis; No diarrhea  No melena or hematochezia.  GENITOURINARY: No dysuria, frequency, hematuria, or incontinence  NEUROLOGICAL: No headaches  SKIN: No itching,  no   rash  LYMPH Nodes: No enlarged glands  ENDOCRINE: No heat or cold intolerance  MUSCULOSKELETAL: No joint pain or swelling  PSYCHIATRIC: No depression, anxiety  HEME/LYMPH: No easy bruising, or bleeding gums  ALLERGY AND IMMUNOLOGIC: No hives or eczema	    MEDICATIONS  (STANDING):  apixaban 5 milliGRAM(s) Oral two times a day  ascorbic acid 500 milliGRAM(s) Oral daily  atorvastatin 80 milliGRAM(s) Oral at bedtime  digoxin  Injectable 125 MICROGram(s) IV Push <User Schedule>  levothyroxine 150 MICROGram(s) Oral daily  metoprolol tartrate 25 milliGRAM(s) Oral every 6 hours  midodrine. 30 milliGRAM(s) Oral every 8 hours  multivitamin 1 Tablet(s) Oral daily  pantoprazole  Injectable 40 milliGRAM(s) IV Push two times a day  vancomycin    Solution 500 milliGRAM(s) Oral every 6 hours    MEDICATIONS  (PRN):  acetaminophen     Tablet .. 650 milliGRAM(s) Oral every 6 hours PRN Temp greater or equal to 38C (100.4F), Mild Pain (1 - 3)      Vital Signs Last 24 Hrs  T(C): 36.3 (17 Aug 2024 05:00), Max: 36.8 (16 Aug 2024 12:10)  T(F): 97.3 (17 Aug 2024 05:00), Max: 98.3 (16 Aug 2024 12:10)  HR: 100 (17 Aug 2024 05:00) (84 - 100)  BP: 108/64 (17 Aug 2024 05:00) (108/64 - 136/89)  BP(mean): --  RR: 18 (17 Aug 2024 05:00) (18 - 18)  SpO2: 98% (17 Aug 2024 05:00) (96% - 98%)    Parameters below as of 17 Aug 2024 05:00  Patient On (Oxygen Delivery Method): room air      CAPILLARY BLOOD GLUCOSE      POCT Blood Glucose.: 86 mg/dL (17 Aug 2024 07:52)  POCT Blood Glucose.: 85 mg/dL (16 Aug 2024 17:02)  POCT Blood Glucose.: 88 mg/dL (16 Aug 2024 13:59)    I&O's Summary    16 Aug 2024 07:01  -  17 Aug 2024 07:00  --------------------------------------------------------  IN: 230 mL / OUT: 1100 mL / NET: -870 mL          Appearance: Normal	  HEENT:   Normal oral mucosa, PERRL, EOMI	  Lymphatic: No lymphadenopathy  Cardiovascular: Normal S1 S2, No JVD  Respiratory: Lungs clear to auscultation	  Gastrointestinal:  Soft, Non-tender, + BS	  Skin: No rash, No ecchymoses	  Extremities:     LABS:                        12.3   10.11 )-----------( 76       ( 17 Aug 2024 06:48 )             38.7     08-17    134<L>  |  94<L>  |  40<H>  ----------------------------<  74  4.0   |  27  |  2.39<H>    Ca    8.7      17 Aug 2024 06:47    TPro  5.6<L>  /  Alb  3.4  /  TBili  1.7<H>  /  DBili  x   /  AST  30  /  ALT  37  /  AlkPhos  110  08-17          Urinalysis Basic - ( 17 Aug 2024 06:47 )    Color: x / Appearance: x / SG: x / pH: x  Gluc: 74 mg/dL / Ketone: x  / Bili: x / Urobili: x   Blood: x / Protein: x / Nitrite: x   Leuk Esterase: x / RBC: x / WBC x   Sq Epi: x / Non Sq Epi: x / Bacteria: x              Thyroid Stimulating Hormone, Serum: 6.67 uIU/mL (08-13 @ 11:40)          Consultant(s) Notes Reviewed:      Care Discussed with Consultants/Other Providers:

## 2024-08-17 NOTE — PROGRESS NOTE ADULT - SUBJECTIVE AND OBJECTIVE BOX
AUTHORIZATION FOR SURGICAL OPERATION OR OTHER PROCEDURE    1. I hereby authorize Vinay Mccrary PA-C, and [facility] [facility] staff assigned to my case to perform the following operation and/or procedure at the [place]:    _______________________________________________________________________________________________    Cortisone injection, bilateral knees  _______________________________________________________________________________________________    2. My physician has explained the nature and purpose of the operation or other procedure, possible alternative methods of treatment, the risks involved, and the possibility of complication to me. I acknowledge that no guarantee has been made as to the result that may be obtained. 3.  I recognize that, during the course of this operation, or other procedure, unforseen conditions may necessitate additional or different procedure than those listed above. I, therefore, further authorize and request that the above named physician, his/her physician assistants or designees perform such procedures as are, in his/her professional opinion, necessary and desirable. 4.  Any tissue or organs removed in the operation or other procedure may be disposed of by and at the discretion of the [facility] and [facility] AT [facility]. 5.  I understand that in the event of a medical emergency, I will be transported by local paramedics to [facility] or other hospital emergency department. 6.  I certify that I have read and fully understand the above consent to operation and/or other procedure. 7.  I acknowledge that my physician has explained sedation/analgesia administration to me including the risks and benefits. I consent to the administration of sedation/analgesia as may be necessary or desirable in the judgement of my physician.     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 Nephrology consulted for elevated Scr and metabolic acidosis.    REVIEW OF SYSTEMS: Unable to obtain due to mental status.    All:  penicillin (Hives)      Hospital Medications: Medications reviewed    VITALS:  T(F): 97.7 (08-17-24 @ 12:31), Max: 97.7 (08-17-24 @ 12:31)  HR: 66 (08-17-24 @ 12:31)  BP: 117/83 (08-17-24 @ 12:31)  RR: 18 (08-17-24 @ 12:31)  SpO2: 97% (08-17-24 @ 12:31)  Wt(kg): --    08-16 @ 07:01  -  08-17 @ 07:00  --------------------------------------------------------  IN: 230 mL / OUT: 1100 mL / NET: -870 mL      PHYSICAL EXAM:    Gen: NAD, calm  Cards: Irregularly irregular with tachycardia, +S1/S2, no M/G/R  Resp: CTA B/L  GI: soft, NT/ND, NABS, + ostomy   : + amaya  Vascular: + UE B/L, 2+ LE edema B/L      LABS:  08-17    134<L>  |  94<L>  |  40<H>  ----------------------------<  74  4.0   |  27  |  2.39<H>    Ca    8.7      17 Aug 2024 06:47    TPro  5.6<L>  /  Alb  3.4  /  TBili  1.7<H>  /  DBili      /  AST  30  /  ALT  37  /  AlkPhos  110  08-17    Creatinine Trend: 2.39 <--, 2.52 <--, 2.55 <--, 2.67 <--, 2.55 <--, 2.57 <--, 2.56 <--                        12.3   10.11 )-----------( 76       ( 17 Aug 2024 06:48 )             38.7     Urine Studies:  Urinalysis Basic - ( 17 Aug 2024 06:47 )    Color:  / Appearance:  / SG:  / pH:   Gluc: 74 mg/dL / Ketone:   / Bili:  / Urobili:    Blood:  / Protein:  / Nitrite:    Leuk Esterase:  / RBC:  / WBC    Sq Epi:  / Non Sq Epi:  / Bacteria:    Time:  ________ A. M.  P.M. Patient Name:  ______________________________________________________  (please print)      Patient signature:  ___________________________________________________             Relationship to Patient:           []  Parent    Responsible person                          []  Spouse  In case of minor or                    [] Other  _____________   Incompetent name:  __________________________________________________                               (please print)      _____________      Responsible person  In case of minor or  Incompetent signature:  _______________________________________________    Statement of Physician  My signature below affirms that prior to the time of the procedure, I have explained to the patient and/or his/her guardian, the risks and benefits involved in the proposed treatment and any reasonable alternative to the proposed treatment. I have also explained the risks and benefits involved in the refusal of the proposed treatment and have answered the patient's questions.                         Date:  ______/______/_______  Provider                      Signature:  __________________________________________________________       Time:  ___________ A.M    P.M.

## 2024-08-17 NOTE — PROGRESS NOTE ADULT - SUBJECTIVE AND OBJECTIVE BOX
Chief complaint  Patient is a 78y old  Female who presents with a chief complaint of AMS/lethargy, Afib w/ RVR, Hyponatremia, C.diff infection (17 Aug 2024 11:39)         Labs and Fingersticks  CAPILLARY BLOOD GLUCOSE      POCT Blood Glucose.: 78 mg/dL (17 Aug 2024 16:58)  POCT Blood Glucose.: 81 mg/dL (17 Aug 2024 12:02)  POCT Blood Glucose.: 86 mg/dL (17 Aug 2024 07:52)      Anion Gap: 13 (08-17 @ 06:47)      Calcium: 8.7 (08-17 @ 06:47)  Albumin: 3.4 (08-17 @ 06:47)    Alanine Aminotransferase (ALT/SGPT): 37 (08-17 @ 06:47)  Alkaline Phosphatase: 110 (08-17 @ 06:47)  Aspartate Aminotransferase (AST/SGOT): 30 (08-17 @ 06:47)        08-17    134<L>  |  94<L>  |  40<H>  ----------------------------<  74  4.0   |  27  |  2.39<H>    Ca    8.7      17 Aug 2024 06:47    TPro  5.6<L>  /  Alb  3.4  /  TBili  1.7<H>  /  DBili  x   /  AST  30  /  ALT  37  /  AlkPhos  110  08-17                        12.3   10.11 )-----------( 76       ( 17 Aug 2024 06:48 )             38.7     Medications  MEDICATIONS  (STANDING):  atorvastatin 80 milliGRAM(s) Oral at bedtime  dextrose 5%. 1000 milliLiter(s) (50 mL/Hr) IV Continuous <Continuous>  digoxin  Injectable 125 MICROGram(s) IV Push <User Schedule>  levothyroxine 150 MICROGram(s) Oral daily  metoprolol tartrate 25 milliGRAM(s) Oral every 6 hours  midodrine. 30 milliGRAM(s) Oral every 8 hours  pantoprazole  Injectable 40 milliGRAM(s) IV Push two times a day  vancomycin    Solution 500 milliGRAM(s) Oral every 6 hours      Physical Exam  General: Patient comfortable in bed  Vital Signs Last 12 Hrs  T(F): 97.7 (08-17-24 @ 12:31), Max: 97.7 (08-17-24 @ 12:31)  HR: 66 (08-17-24 @ 12:31) (66 - 66)  BP: 117/83 (08-17-24 @ 12:31) (117/83 - 117/83)  BP(mean): --  RR: 18 (08-17-24 @ 12:31) (18 - 18)  SpO2: 97% (08-17-24 @ 12:31) (97% - 97%)    CVS: S1S2   Respiratory: No wheezing, no crepitations  GI: Abdomen soft, bowel sounds positive  Musculoskeletal:  moves all extremities  : Voiding

## 2024-08-17 NOTE — PROGRESS NOTE ADULT - SUBJECTIVE AND OBJECTIVE BOX
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 She was reportedly found to have oliguria for a few days over the weekend for which she was started on IV fluids, however she was noncompliant with the IV access and she pulled it out many times. She has had very poor appetite and became lethargic and hypotensive. She was subsequently transferred to hospital where she was found to have black-colored output in ileostomy bag.     In ED: Afebrile, HR 120s-170s (Afib), SBP 90s-130s, RR 15-22, sating % on RA.  Labs notable for Hgb 11.3->10.3, Na 133->122, SCr 3.04->2.58; lactated 4.7->2.8, blood glucose to 400s but FS 100s. Found to be C.diff positive. UA not best sample with 9 epithelial cells, but noted +LE, +bacteria, +WBC, neg nitrite. CT A/P showed no acute intra-abdominal pathology and unchanged indeterminate left adrenal lesion. Received Vanc 500mg PO, Flagyl 500mg IVPB, amio 150mg IVPB x3, ofirmev 1g, 1.5L IVF, and 1u pRBC. Also started on amio gtt and protonix gtt. Nephrology and MICU were consulted.   Seen for low platelets, HIT positive        PAST MEDICAL & SURGICAL HISTORY:  Hypertension      Hypothyroid      Osteoarthritis  knees, back      CAD (coronary artery disease)      CROW (obstructive sleep apnea)  non complaint on CPAP      History of MI (myocardial infarction)  h/o previous MI in 2004 prompted PTCA  with stenting x 2 vessels   last stress/ echo 2019      Heart murmur  dx in childhood      Bilateral hearing loss, unspecified hearing loss type  bilateral aids      Obesity      Mixed stress and urge urinary incontinence      Overactive bladder      S/P ORIF (open reduction internal fixation) fracture  left hip 1962      S/P appendectomy  30 plus years      S/P knee replacement  left 2000      Stented coronary artery  2004 X 2 STENTS      S/P laparotomy  due to adhesions, 30 years ago      H/O dilation and curettage  2/2019 Benign polyp        Allergies    penicillin (Hives)    Intolerances      Social History:  Presenting from [facility]. (05 Aug 2024 23:46)    Medications:  acetaminophen     Tablet .. 650 milliGRAM(s) Oral every 6 hours PRN Temp greater or equal to 38C (100.4F), Mild Pain (1 - 3)  apixaban 5 milliGRAM(s) Oral two times a day  ascorbic acid 500 milliGRAM(s) Oral daily  atorvastatin 80 milliGRAM(s) Oral at bedtime  digoxin  Injectable 125 MICROGram(s) IV Push <User Schedule>  levothyroxine 150 MICROGram(s) Oral daily  metoprolol tartrate 25 milliGRAM(s) Oral every 6 hours  midodrine. 30 milliGRAM(s) Oral every 8 hours  multivitamin 1 Tablet(s) Oral daily  pantoprazole  Injectable 40 milliGRAM(s) IV Push two times a day  vancomycin    Solution 500 milliGRAM(s) Oral every 6 hours    Labs:  CBC Full  -  ( 17 Aug 2024 06:48 )  WBC Count : 10.11 K/uL  RBC Count : 4.06 M/uL  Hemoglobin : 12.3 g/dL  Hematocrit : 38.7 %  Platelet Count - Automated : 76 K/uL  Mean Cell Volume : 95.3 fl  Mean Cell Hemoglobin : 30.3 pg  Mean Cell Hemoglobin Concentration : 31.8 gm/dL  Auto Neutrophil # : x  Auto Lymphocyte # : x  Auto Monocyte # : x  Auto Eosinophil # : x  Auto Basophil # : x  Auto Neutrophil % : x  Auto Lymphocyte % : x  Auto Monocyte % : x  Auto Eosinophil % : x  Auto Basophil % : x    08-17    134<L>  |  94<L>  |  40<H>  ----------------------------<  74  4.0   |  27  |  2.39<H>    Ca    8.7      17 Aug 2024 06:47    TPro  5.6<L>  /  Alb  3.4  /  TBili  1.7<H>  /  DBili  x   /  AST  30  /  ALT  37  /  AlkPhos  110  08-17      Radiology:             ROS:  Patient comfortable without distress  No SOB or chest pain  No palpitation  No abdominal pain, diarrhaea or constipation  No weakness of extremities  No skin changes or swelling of legs  Rest of the comprehensive ROS was negative  Vital Signs Last 24 Hrs  T(C): 36.3 (17 Aug 2024 05:00), Max: 36.8 (16 Aug 2024 12:10)  T(F): 97.3 (17 Aug 2024 05:00), Max: 98.3 (16 Aug 2024 12:10)  HR: 100 (17 Aug 2024 05:00) (84 - 100)  BP: 108/64 (17 Aug 2024 05:00) (108/64 - 136/89)  BP(mean): --  RR: 18 (17 Aug 2024 05:00) (18 - 18)  SpO2: 98% (17 Aug 2024 05:00) (96% - 98%)    Parameters below as of 17 Aug 2024 05:00  Patient On (Oxygen Delivery Method): room air        Physical exam:  Patient alert and oriented  No distress  CVS: S1, S2  Chest: bilateral breath sound without rales  Abdomen: soft, not tender, no organomegaly or masses  CNS: No focal neuro deficit  Musculoskeletal:  Normal range of motion  Skin: No rash    Assessment and Plan:

## 2024-08-17 NOTE — PROGRESS NOTE ADULT - SUBJECTIVE AND OBJECTIVE BOX
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 Limited hx obtainable from pt, was AAOx~1 on encounter and very lethargic/somnolent, but arousable and seemed to deny pain anywhere. Collateral hx obtained from chart review. During recent admission (7/21/24-7/29/24), she was treated for UTI/sepsis and ultimately discharged to rehab to completed a 5-day course of ciprofloxacin (last dose 7/30/24). At rehab, she was reportedly found to have oliguria for a few days over the weekend for which she was started on IV fluids, however she was noncompliant with the IV access and she pulled it out many times. She has had very poor appetite and became lethargic and hypotensive. She was subsequently transferred to hospital where she was found to have black-colored output in ileostomy bag.   In ED: Afebrile, HR 120s-170s (Afib), SBP 90s-130s, RR 15-22, sating % on RA.  Labs notable for Hgb 11.3->10.3, Na 133->122, SCr 3.04->2.58; lactated 4.7->2.8, blood glucose to 400s but FS 100s. Found to be C.diff positive. UA not best sample with 9 epithelial cells, but noted +LE, +bacteria, +WBC, neg nitrite. CT A/P showed no acute intra-abdominal pathology and unchanged indeterminate left adrenal lesion. Received Vanc 500mg PO, Flagyl 500mg IVPB, amio 150mg IVPB x3, ofirmev 1g, 1.5L IVF, and 1u pRBC. Also started on amio gtt and protonix gtt. Nephrology and MICU were consulted. Admitted to Medicine for further management. (05 Aug 2024 23:46)    Known to me from prior admissions. Has paroxysmal AFib, and syncope, has an ILR for surveillance for long pauses.  She has known short pauses overnight, but nothing long enough or with symptoms to warrant pacemaker insertion.  During subsequent admissions, the usual issue has been rapidly conducted  AFib.  Unable to answer 10pt ROS due to somnolence.   8/16- resting sleepy in bed, no overnight changes.  Date of service 8/17 no events overnight       PAST MEDICAL & SURGICAL HISTORY:  Hypertension  Hypothyroid  Osteoarthritis  knees, back  CAD (coronary artery disease)  CROW (obstructive sleep apnea)  non complaint on CPAP  History of MI (myocardial infarction)  h/o previous MI in 2004 prompted PTCA  with stenting x 2 vessels   last stress/ echo 2019  Heart murmur  dx in childhood  Bilateral hearing loss, unspecified hearing loss type  bilateral aids  Obesity  Mixed stress and urge urinary incontinence  Overactive bladder  S/P ORIF (open reduction internal fixation) fracture  left hip 1962  S/P appendectomy  30 plus years  S/P knee replacement  left 2000  Stented coronary artery  2004 X 2 STENTS  S/P laparotomy  due to adhesions, 30 years ago  H/O dilation and curettage  2/2019 Benign polyp            acetaminophen     Tablet .. 650 milliGRAM(s) Oral every 6 hours PRN  apixaban 5 milliGRAM(s) Oral two times a day  ascorbic acid 500 milliGRAM(s) Oral daily  atorvastatin 80 milliGRAM(s) Oral at bedtime  digoxin  Injectable 125 MICROGram(s) IV Push <User Schedule>  levothyroxine 150 MICROGram(s) Oral daily  metoprolol tartrate 25 milliGRAM(s) Oral two times a day  midodrine. 30 milliGRAM(s) Oral every 8 hours  multivitamin 1 Tablet(s) Oral daily  pantoprazole  Injectable 40 milliGRAM(s) IV Push two times a day  vancomycin    Solution 500 milliGRAM(s) Oral every 6 hours                            12.3   10.11 )-----------( 77       ( 17 Aug 2024 06:48 )             38.7       Hemoglobin: 12.3 g/dL (08-17 @ 06:48)  Hemoglobin: 13.0 g/dL (08-16 @ 09:09)  Hemoglobin: 13.1 g/dL (08-15 @ 06:16)  Hemoglobin: 13.1 g/dL (08-14 @ 05:51)  Hemoglobin: 13.3 g/dL (08-13 @ 11:40)      08-17    134<L>  |  94<L>  |  40<H>  ----------------------------<  74  4.0   |  27  |  2.39<H>    Ca    8.7      17 Aug 2024 06:47    TPro  5.6<L>  /  Alb  3.4  /  TBili  1.7<H>  /  DBili  x   /  AST  30  /  ALT  37  /  AlkPhos  110  08-17    Creatinine Trend: 2.39<--, 2.52<--, 2.55<--, 2.67<--, 2.55<--, 2.57<--    COAGS:           T(C): 36.3 (08-17-24 @ 05:00), Max: 36.8 (08-16-24 @ 12:10)  HR: 100 (08-17-24 @ 05:00) (84 - 100)  BP: 108/64 (08-17-24 @ 05:00) (108/64 - 136/89)  RR: 18 (08-17-24 @ 05:00) (18 - 18)  SpO2: 98% (08-17-24 @ 05:00) (96% - 98%)  Wt(kg): --    I&O's Summary    16 Aug 2024 07:01  -  17 Aug 2024 07:00  --------------------------------------------------------  IN: 230 mL / OUT: 1100 mL / NET: -870 mL            Appearance: frail elderly woman in no acute distress, somnolent	  HEENT:   Normal oral mucosa, PERRL, EOMI	  Lymphatic: No lymphadenopathy , no edema  Cardiovascular: rapid irregular S1 S2, No JVD, No murmurs , Peripheral pulses palpable 2+ bilaterally  Respiratory: Lungs clear to auscultation, normal effort 	  Gastrointestinal:  Soft, Non-tender, + BS	  Skin: No rashes, No ecchymoses, No cyanosis, warm to touch  Musculoskeletal: Normal range of motion, normal strength  Psychiatry:  Mood & affect appropriate    TELEMETRY: AF 100s.	    ECG: AFib RVR 	    ASSESSMENT/PLAN: Ms Gooden is a pleasant 78y Female here with CDiff +/- GI bleeding.  EP called re: management of rapid AFib.  Has Paroxysmal AFib.  Had brief episodes of sinus rhythm on last admission, and known short pauses that are not long enough to justify permanent pacemaker insertion.  Has an ILR for long-pause surveillance, data managed by Dr Mendiola.    has been on heparin infusion. 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  pending alternative IV anticoag.  Continue metoprolol alongside midodrine. OK for holding parameters for HEARTRATE, but metoprolol is in general BP-neutral.  Recommend increasing metoprolol to 25mg PO Q6hrs.   Unable to escalate digoxin dose due to advanced CHANELL on CKD.  Not a good candidate for SUSY/Cardioversion - unlikely to be successful long-term.  Not a good candidate for VVI pacemaker / AVJ ablation.  Also, not likely that restoration of sinus or paced rhythm would improve her overall well-being.  She would still likely be somnolent and frail.  Awaiting resolution of diarrhea via ostomy.  Prognosis is guarded, as she's declinded considerably over the last 6 months (6 hospitalizations).  Palliative care has seen at daughter's request.  Working on DC to hospice-capable nursing facility.

## 2024-08-17 NOTE — PROGRESS NOTE ADULT - ASSESSMENT
78 year old female           with PMH of Afib (on Eliquis), CAD (on plavix),    dementia, ischemic bowel    s/p ex-lap w/ bowel resection + end ileostomy),         COPD, CROW, HTN, HLD, urinary retention, recurrent UTIs, hypothyroidism, recent admission for UTI  c/b sepsis,  bradycardia     now presenting with AMS/lethargy  . During recent admission (7/21/24-7/29/24), she was treated for UTI/sepsis and ultimately discharged to rehab to completed a 5-day course of ciprofloxacin (last dose 7/30/24,   has had very poor appetite and became lethargic and hypotensive.   transferred to hospital where she was found to have black-colored output in ileostomy bag.         C.diff likely iso recent antibiotic      Acute blood loss anemia due to UGIB       c/c  Afib with RVR, EP following            on eliquis         CKD    c/c  anmeia,  and   c/c  low  plts        8/5 CTAP,   no acute intra-abd pathology        8/6 am s/p RRT for hypotension, tachycardia           pe r gi,  ostomy had loose/watery melanotic stools -now resolved,  and  per  Surg, - no acute intervention          and  gi  has deferred EGD for now       Ucx , C. albicans some, has amaya in place, suspect contaminant/colonization          on  vancomycin 500mg PO Q6h for 10d -end  8/16/24      eliquis. lipitor, dig.  torpol,  midodrine           ra       78 year old female           with PMH of Afib (on Eliquis), CAD (on plavix),    dementia, ischemic bowel    s/p ex-lap w/ bowel resection + end ileostomy),         COPD, CROW, HTN, HLD, urinary retention, recurrent UTIs, hypothyroidism, recent admission for UTI  c/b sepsis,  bradycardia     now presenting with AMS/lethargy  . During recent admission (7/21/24-7/29/24), she was treated for UTI/sepsis and ultimately discharged to rehab to completed a 5-day course of ciprofloxacin (last dose 7/30/24,   has had very poor appetite and became lethargic and hypotensive.   transferred to hospital where she was found to have black-colored output in ileostomy bag.         C.diff likely iso recent antibiotic      Acute blood loss anemia due to UGIB       c/c  Afib with RVR, EP following            eliquis   on hold        CKD    c/c  anmeia,  and   c/c  low  plts        8/5 CTAP,   no acute intra-abd pathology        8/6 am s/p RRT for hypotension, tachycardia           pe r gi,  ostomy had loose/watery melanotic stools -now resolved,  and  per  Surg, - no acute intervention          and  gi  has deferred EGD for now       Ucx , C. albicans some, has amaya in place, suspect contaminant/colonization          on  vancomycin 500mg PO Q6h for 10d -end  8/16/24    prt gi oneyda sosa  on  hold    plan,  home  hospice  monday      eliquis. lipitor, dig.  tordottie,  midodrine           ra       78 year old female        h/o  Afib (on Eliquis), CAD (on plavix),    dementia, ischemic bowel    s/p ex-lap w/ bowel resection + end ileostomy),         COPD, CROW, HTN, HLD, urinary retention, recurrent UTIs, hypothyroidism, recent admission for UTI  c/b sepsis,  bradycardia          presenting with AMS/lethargy  . During recent admission (7/21/24-7/29/24), she was treated for UTI/sepsis and ultimately discharged to rehab to completed a 5-day course of ciprofloxacin (last dose 7/30/24,   has had very poor appetite and became lethargic and hypotensive.   transferred to hospital where she was found to have black-colored output in ileostomy bag.         C.diff likely iso recent antibiotic      Acute blood loss anemia due to UGIB       c/c  Afib with RVR, EP following            eliquis   on hold        CKD        c/c  anemia,   and   c/c  low  plts        8/5 CTAP,   no acute intra-abd pathology         8/6 am s/p RRT for hypotension, tachycardia           per  gi,  ostomy had loose/watery melanotic stools -now resolved,  and  per  Surg, - no acute intervention          and  gi  has deferred EGD for now       Ucx , C. albicans some, has amaya in place, suspect contaminant/colonization          on  vancomycin 500mg PO Q6h for 10d -end  8/16/24    per  GI,  sarah sosa  on  hold     afib. rvr.  rx  pe r card dr emilia yen     bed  bound/  anasarca.  poor  iv  acesss/  daughter    wanst  iv  placed       plan,  home  hospice   monday/  will  reduce  poly pharmacy      lipitor, dig.  torpol,  midodrine    pt  is  dnr/ dni

## 2024-08-17 NOTE — PROGRESS NOTE ADULT - ASSESSMENT
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), COPD, CROW, HTN, HLD, urinary retention, recurrent UTIs, hypothyroidism, recent admission for UTI (c/b sepsis, encephalopathy, and bradycardia),  presented with AMS/lethargy, found to have melena, Afib/hypotension, thrombocytopenia    Thrombocytopenia  No prior hx of thrombocytopenia, plt were normal on admission and have declined since then though fluctuating  Platelet Count - Automated: 76 K/uL (08.17.24 @ 06:48)   Platelet Count - Automated: 76 K/uL (08.16.24 @ 09:09)   Platelet Count - Automated: 86 K/uL (08.15.24 @ 06:16)   Platelet Count - Automated: 98 K/uL (08.14.24 @ 05:51)    Platelet Count - Automated: 91 K/uL (08.12.24 @ 06:44)   No LAD and liver/spleen normal on CT. Thrombocytopenia likely likely secondary to consumption with GIB + acute illness.  No heparin or AC use till heparin 8/12/24 briefly  Heparin Induced Platelet Antibody (08.13.24 @ 11:40)   Heparin-PF4 AB Result: 1.6 u/mL  Heparin-PF4 AB Interp: Positive  Serotonin Releasing Assay (08.14.24 @ 05:51)   Unfractionated Heparin-Low Dose: 0 %  Unfractionated Heparin-High Dose: 0 %  Unfractionated Heparin Result: Negative  A positive result requires release of serotonin from target platelets in   the presence of patient   serum and low dose (0.1 U/ml) heparin of > 20%, together with inhibition   of release (< 20%) with heparin  Since TAY is negative, no contraindication for heparin if needed.   Decreased platelets from acute illness and consumption  S/p 6 unit PRBC- last 8/7  No evidence of iron or B12 def  GI following

## 2024-08-17 NOTE — PROGRESS NOTE ADULT - ASSESSMENT
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 Nephrology consulted for elevated Scr and metabolic acidosis.    1) CHANELL: likely due to ATN. Scr stable s/p IV albumin but remains above baseline. Continue with supportive care. UA active likely due to infection. FeNa low. CT without obstruction. TMA work up negative. Avoid nephrotoxins.    2) Hypotension: BP low. Continue with midodrine 30 mg PO TID. Rate control as per cardiology. Monitor BP.    3) LE edema: likely due to hypoalbuminemia. Will give IV albumin with concurrent lasix 40 mg IV X 1 dose. TTE with normal LVSF. Monitor UO.    4) Metabolic acidosis: Resolved s/p sodium bicarbonate gtt. Monitor pH.    5) Hyponatremia: Mild and likely due to hypervolemia. IV lasix as above. Monitor serum Na.    6) Urinary retention: Continue with amaya. Holding flomax given hypotension.       [facility] NEPHROLOG[facility]  Paulie Storm M.D.  Mack Clancy D.O.  Maddi Steve M.D.  MD Olivia Caldera, MSN, ANP-C    Telephone: [phone]  Facsimile: [phone] [id number], [address]  [place] [address]

## 2024-08-17 NOTE — PROGRESS NOTE ADULT - ASSESSMENT
EP ATTENDING    Agree with above. Continue lifelong anticoagulation for AF. F/U palliative care. No invasive EP procedures expected.

## 2024-08-18 LAB
ANION GAP SERPL CALC-SCNC: 12 MMOL/L — SIGNIFICANT CHANGE UP (ref 5–17)
BUN SERPL-MCNC: 41 MG/DL — HIGH (ref 7–23)
CALCIUM SERPL-MCNC: 8.5 MG/DL — SIGNIFICANT CHANGE UP (ref 8.4–10.5)
CHLORIDE SERPL-SCNC: 94 MMOL/L — LOW (ref 96–108)
CO2 SERPL-SCNC: 26 MMOL/L — SIGNIFICANT CHANGE UP (ref 22–31)
CREAT SERPL-MCNC: 2.13 MG/DL — HIGH (ref 0.5–1.3)
EGFR: 23 ML/MIN/1.73M2 — LOW
GLUCOSE BLDC GLUCOMTR-MCNC: 89 MG/DL — SIGNIFICANT CHANGE UP (ref 70–99)
GLUCOSE BLDC GLUCOMTR-MCNC: 96 MG/DL — SIGNIFICANT CHANGE UP (ref 70–99)
GLUCOSE BLDC GLUCOMTR-MCNC: 98 MG/DL — SIGNIFICANT CHANGE UP (ref 70–99)
GLUCOSE BLDC GLUCOMTR-MCNC: 99 MG/DL — SIGNIFICANT CHANGE UP (ref 70–99)
GLUCOSE SERPL-MCNC: 95 MG/DL — SIGNIFICANT CHANGE UP (ref 70–99)
HCT VFR BLD CALC: 41.1 % — SIGNIFICANT CHANGE UP (ref 34.5–45)
HGB BLD-MCNC: 13.1 G/DL — SIGNIFICANT CHANGE UP (ref 11.5–15.5)
MCHC RBC-ENTMCNC: 30.4 PG — SIGNIFICANT CHANGE UP (ref 27–34)
MCHC RBC-ENTMCNC: 31.9 GM/DL — LOW (ref 32–36)
MCV RBC AUTO: 95.4 FL — SIGNIFICANT CHANGE UP (ref 80–100)
NRBC # BLD: 0 /100 WBCS — SIGNIFICANT CHANGE UP (ref 0–0)
PLATELET # BLD AUTO: 100 K/UL — LOW (ref 150–400)
POTASSIUM SERPL-MCNC: 4.1 MMOL/L — SIGNIFICANT CHANGE UP (ref 3.5–5.3)
POTASSIUM SERPL-SCNC: 4.1 MMOL/L — SIGNIFICANT CHANGE UP (ref 3.5–5.3)
RBC # BLD: 4.31 M/UL — SIGNIFICANT CHANGE UP (ref 3.8–5.2)
RBC # FLD: 17.5 % — HIGH (ref 10.3–14.5)
SODIUM SERPL-SCNC: 132 MMOL/L — LOW (ref 135–145)
WBC # BLD: 12.15 K/UL — HIGH (ref 3.8–10.5)
WBC # FLD AUTO: 12.15 K/UL — HIGH (ref 3.8–10.5)

## 2024-08-18 RX ADMIN — Medication 500 MILLIGRAM(S): at 00:37

## 2024-08-18 RX ADMIN — MIDODRINE HYDROCHLORIDE 30 MILLIGRAM(S): 5 TABLET ORAL at 04:45

## 2024-08-18 RX ADMIN — METOPROLOL TARTRATE 25 MILLIGRAM(S): 100 TABLET ORAL at 11:07

## 2024-08-18 RX ADMIN — MIDODRINE HYDROCHLORIDE 30 MILLIGRAM(S): 5 TABLET ORAL at 21:39

## 2024-08-18 RX ADMIN — Medication 40 MILLIGRAM(S): at 04:47

## 2024-08-18 RX ADMIN — MIDODRINE HYDROCHLORIDE 30 MILLIGRAM(S): 5 TABLET ORAL at 11:07

## 2024-08-18 RX ADMIN — Medication 150 MICROGRAM(S): at 04:47

## 2024-08-18 RX ADMIN — Medication 500 MILLIGRAM(S): at 11:05

## 2024-08-18 RX ADMIN — METOPROLOL TARTRATE 25 MILLIGRAM(S): 100 TABLET ORAL at 18:17

## 2024-08-18 RX ADMIN — Medication 40 MILLIGRAM(S): at 18:18

## 2024-08-18 RX ADMIN — Medication 500 MILLIGRAM(S): at 18:17

## 2024-08-18 RX ADMIN — Medication 80 MILLIGRAM(S): at 21:40

## 2024-08-18 RX ADMIN — Medication 500 MILLIGRAM(S): at 05:00

## 2024-08-18 NOTE — PROGRESS NOTE ADULT - ASSESSMENT
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 Palliative was consulted.   FT4 level repeat up to 1.1, improved   Hashimoto's hypothyroid, +TPO antibodies.    Afib: Cortisol not suggestive of AI, on medications, monitored.  CAD:  stable monitored    Discussed plan and management with Dr Mirza Zelaya NP - TEAMS

## 2024-08-18 NOTE — PROGRESS NOTE ADULT - ASSESSMENT
78 year old female        h/o  Afib (on Eliquis), CAD (on plavix),    dementia, ischemic bowel    s/p ex-lap w/ bowel resection + end ileostomy),         COPD, CROW, HTN, HLD, urinary retention, recurrent UTIs, hypothyroidism, recent admission for UTI  c/b sepsis,  bradycardia          presenting with AMS/lethargy  . During recent admission (7/21/24-7/29/24), she was treated for UTI/sepsis and ultimately discharged to rehab to completed a 5-day course of ciprofloxacin (last dose 7/30/24,   has had very poor appetite and became lethargic and hypotensive.   transferred to hospital where she was found to have black-colored output in ileostomy bag.         C.diff likely iso recent antibiotic      Acute blood loss anemia due to UGIB       c/c  Afib with RVR, EP following            eliquis   on hold        CKD        c/c  anemia,   and   c/c  low  plts        8/5 CTAP,   no acute intra-abd pathology         8/6 am s/p RRT for hypotension, tachycardia           per  gi,  ostomy had loose/watery melanotic stools -now resolved,  and  per  Surg, - no acute intervention          and  gi  has deferred EGD for now      Ucx , C. albicans some, has amaya in place, suspect contaminant/colonization       on  vancomycin 500mg PO Q6h for 10d -end  8/16/24     per  GI,  dr hood,  misael./c  on  hold       afib. rvr.  rx  pe r card dr emilia yen       bed  bound/  anasarca.  por  po intake / lipitor, dig.  torpol,  midodrine      plan,  home  hospice   monday/  will  reduce  poly pharmacy    avoid  mid  line  if  possible, as  dispo  is hospice      pt  is  dnr/ dni

## 2024-08-18 NOTE — PROGRESS NOTE ADULT - ASSESSMENT
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), COPD, CROW, HTN, HLD, urinary retention, recurrent UTIs, hypothyroidism, recent admission for UTI (c/b sepsis, encephalopathy, and bradycardia),  presented with AMS/lethargy, found to have melena, Afib/hypotension, thrombocytopenia    Thrombocytopenia  No prior hx of thrombocytopenia, plt were normal on admission and declined  though fluctuating  No LAD and liver/spleen normal on CT. Thrombocytopenia likely likely secondary to consumption with GIB + acute illness.  No heparin or AC use till heparin 8/12/24 briefly  Heparin Induced Platelet Antibody (08.13.24 @ 11:40)   Heparin-PF4 AB Result: 1.6 u/mL  Heparin-PF4 AB Interp: Positive  Serotonin Releasing Assay (08.14.24 @ 05:51) negative  Since TAY is negative, no contraindication for heparin if needed.   Decreased platelets from acute illness and consumption  No evidence of iron or B12 def  Management as per medicine platelets actually better today

## 2024-08-18 NOTE — PROGRESS NOTE ADULT - SUBJECTIVE AND OBJECTIVE BOX
date of service: 08-18-24 @ 08:47  [facility]  REVIEW OF SYSTEMS:  CONSTITUTIONAL: No fever,  no  weight loss  ENT:  No  tinnitus,   no   vertigo  NECK: No pain or stiffness  RESPIRATORY: No cough, wheezing, chills or hemoptysis;    No Shortness of Breath  CARDIOVASCULAR: No chest pain, palpitations, dizziness  GASTROINTESTINAL: No abdominal or epigastric pain. No nausea, vomiting, or hematemesis; No diarrhea  No melena or hematochezia.  GENITOURINARY: No dysuria, frequency, hematuria, or incontinence  NEUROLOGICAL: No headaches  SKIN: No itching,  no   rash  LYMPH Nodes: No enlarged glands  ENDOCRINE: No heat or cold intolerance  MUSCULOSKELETAL: No joint pain or swelling  PSYCHIATRIC: No depression, anxiety  HEME/LYMPH: No easy bruising, or bleeding gums  ALLERGY AND IMMUNOLOGIC: No hives or eczema	    MEDICATIONS  (STANDING):  atorvastatin 80 milliGRAM(s) Oral at bedtime  dextrose 5%. 1000 milliLiter(s) (50 mL/Hr) IV Continuous <Continuous>  digoxin  Injectable 125 MICROGram(s) IV Push <User Schedule>  levothyroxine 150 MICROGram(s) Oral daily  metoprolol tartrate 25 milliGRAM(s) Oral every 6 hours  midodrine. 30 milliGRAM(s) Oral every 8 hours  pantoprazole  Injectable 40 milliGRAM(s) IV Push two times a day  vancomycin    Solution 500 milliGRAM(s) Oral every 6 hours    MEDICATIONS  (PRN):  acetaminophen     Tablet .. 650 milliGRAM(s) Oral every 6 hours PRN Temp greater or equal to 38C (100.4F), Mild Pain (1 - 3)      Vital Signs Last 24 Hrs  T(C): 36.6 (18 Aug 2024 04:29), Max: 36.6 (18 Aug 2024 04:29)  T(F): 97.8 (18 Aug 2024 04:29), Max: 97.8 (18 Aug 2024 04:29)  HR: 102 (18 Aug 2024 07:47) (63 - 102)  BP: 98/81 (18 Aug 2024 05:17) (90/59 - 117/83)  BP(mean): --  RR: 18 (18 Aug 2024 00:44) (18 - 18)  SpO2: 99% (18 Aug 2024 00:44) (97% - 99%)    Parameters below as of 18 Aug 2024 00:44  Patient On (Oxygen Delivery Method): nasal cannula      CAPILLARY BLOOD GLUCOSE      POCT Blood Glucose.: 98 mg/dL (18 Aug 2024 08:00)  POCT Blood Glucose.: 83 mg/dL (17 Aug 2024 21:14)  POCT Blood Glucose.: 90 mg/dL (17 Aug 2024 17:32)  POCT Blood Glucose.: 78 mg/dL (17 Aug 2024 16:58)  POCT Blood Glucose.: 81 mg/dL (17 Aug 2024 12:02)    I&O's Summary    17 Aug 2024 07:01  -  18 Aug 2024 07:00  --------------------------------------------------------  IN: 120 mL / OUT: 775 mL / NET: -655 mL          Appearance: Normal	  HEENT:   Normal oral mucosa, PERRL, EOMI	  Lymphatic: No lymphadenopathy  Cardiovascular: Normal S1 S2, No JVD  Respiratory: Lungs clear to auscultation	  Gastrointestinal:  Soft, Non-tender, + BS	  Skin: No rash, No ecchymoses	  Extremities:     LABS:                        12.3   10.11 )-----------( 76       ( 17 Aug 2024 06:48 )             38.7     08-17    134<L>  |  94<L>  |  40<H>  ----------------------------<  74  4.0   |  27  |  2.39<H>    Ca    8.7      17 Aug 2024 06:47    TPro  5.6<L>  /  Alb  3.4  /  TBili  1.7<H>  /  DBili  x   /  AST  30  /  ALT  37  /  AlkPhos  110  08-17          Urinalysis Basic - ( 17 Aug 2024 06:47 )    Color: x / Appearance: x / SG: x / pH: x  Gluc: 74 mg/dL / Ketone: x  / Bili: x / Urobili: x   Blood: x / Protein: x / Nitrite: x   Leuk Esterase: x / RBC: x / WBC x   Sq Epi: x / Non Sq Epi: x / Bacteria: x              Thyroid Stimulating Hormone, Serum: 6.67 uIU/mL (08-13 @ 11:40)          Consultant(s) Notes Reviewed:      Care Discussed with Consultants/Other Providers:

## 2024-08-18 NOTE — PROGRESS NOTE ADULT - SUBJECTIVE AND OBJECTIVE BOX
Chief complaint  Patient is a 78y old  Female who presents with a chief complaint of AMS/lethargy, Afib w/ RVR, Hyponatremia, C.diff infection (18 Aug 2024 14:47)         Labs and Fingersticks  CAPILLARY BLOOD GLUCOSE      POCT Blood Glucose.: 99 mg/dL (18 Aug 2024 11:18)  POCT Blood Glucose.: 98 mg/dL (18 Aug 2024 08:00)  POCT Blood Glucose.: 83 mg/dL (17 Aug 2024 21:14)  POCT Blood Glucose.: 90 mg/dL (17 Aug 2024 17:32)  POCT Blood Glucose.: 78 mg/dL (17 Aug 2024 16:58)      Anion Gap: 12 (08-18 @ 08:52)  Anion Gap: 13 (08-17 @ 06:47)      Calcium: 8.5 (08-18 @ 08:52)  Calcium: 8.7 (08-17 @ 06:47)  Albumin: 3.4 (08-17 @ 06:47)    Alanine Aminotransferase (ALT/SGPT): 37 (08-17 @ 06:47)  Alkaline Phosphatase: 110 (08-17 @ 06:47)  Aspartate Aminotransferase (AST/SGOT): 30 (08-17 @ 06:47)        08-18    132<L>  |  94<L>  |  41<H>  ----------------------------<  95  4.1   |  26  |  2.13<H>    Ca    8.5      18 Aug 2024 08:52    TPro  5.6<L>  /  Alb  3.4  /  TBili  1.7<H>  /  DBili  x   /  AST  30  /  ALT  37  /  AlkPhos  110  08-17                        13.1   12.15 )-----------( 100      ( 18 Aug 2024 08:52 )             41.1     Medications  MEDICATIONS  (STANDING):  atorvastatin 80 milliGRAM(s) Oral at bedtime  dextrose 5%. 1000 milliLiter(s) (50 mL/Hr) IV Continuous <Continuous>  digoxin  Injectable 125 MICROGram(s) IV Push <User Schedule>  levothyroxine 150 MICROGram(s) Oral daily  metoprolol tartrate 25 milliGRAM(s) Oral every 6 hours  midodrine. 30 milliGRAM(s) Oral every 8 hours  pantoprazole  Injectable 40 milliGRAM(s) IV Push two times a day  vancomycin    Solution 500 milliGRAM(s) Oral every 6 hours      Physical Exam  General: Patient comfortable in bed   Vital Signs Last 12 Hrs  T(F): 97.4 (08-18-24 @ 11:17), Max: 97.8 (08-18-24 @ 04:29)  HR: 78 (08-18-24 @ 11:17) (76 - 102)  BP: 116/78 (08-18-24 @ 11:17) (90/59 - 116/78)  BP(mean): --  RR: 17 (08-18-24 @ 11:17) (17 - 17)  SpO2: 99% (08-18-24 @ 11:17) (99% - 99%)    CVS: S1S2   Respiratory: No wheezing, no crepitations  GI: Abdomen soft, bowel sounds positive  Musculoskeletal:  moves all extremities  : Voiding

## 2024-08-18 NOTE — DISCHARGE NOTE PROVIDER - CARE PROVIDER_API CALL
Curly Butler.  Internal Medicine  [address] [place] [address]  Phone: [phone]  Fax: [phone]  Follow Up Time: Routine

## 2024-08-18 NOTE — DISCHARGE NOTE PROVIDER - NSDCCONDITION_GEN_ALL_CORE
Assessment/Plan:    Crohn's disease of small intestine with complication (HCC)  Stable, on monthly Entyvio  Per GI  Chronic kidney disease, stage III (moderate) ([facility])  Lab Results   Component Value Date    EGFR 69 07/12/2022    EGFR 56 03/28/2022    EGFR 54 01/18/2022    CREATININE 1 07 07/12/2022    CREATININE 1 27 03/28/2022    CREATININE 1 32 (H) 01/18/2022     Stable  BP stable, stopped amlodipine a few days ago, per nephrology  PAD (peripheral artery disease) ([facility])  Ultrasound scheduled  On ASA, statin  Paroxysmal atrial fibrillation (HCC)  Denies any symptoms  On metoprolol, Xarelto  Type 2 diabetes mellitus with diabetic peripheral angiopathy without gangrene ([facility] [address] )    Lab Results   Component Value Date    HGBA1C 7 0 (H) 07/12/2022     A1c worse  Discussed diet  Taking glimepiride daily  Discussed adjusting glimepiride or starting GLP-1 if A1c does not improve by next visit  Dyslipidemia  Lipids due, on statin  Harris's esophagus without dysplasia  On daily PPI  Obesity, morbid ([facility] [address] )  No weight change  Diagnoses and all orders for this visit:    Crohn's disease of small intestine with complication ([facility] [address] )    Stage 3a chronic kidney disease ([facility] [address] )  -     Basic metabolic panel; Future    Harris's esophagus without dysplasia    Obesity, morbid ([facility] [address] )    Type 2 diabetes mellitus with diabetic peripheral angiopathy without gangrene, without long-term current use of insulin ([facility])  -     Hemoglobin A1C; Future    Vitamin B12 deficiency  -     Vitamin B12; Future    Vitamin D deficiency  -     Vitamin D 25 hydroxy; Future    PAD (peripheral artery disease) ([facility])    Paroxysmal atrial fibrillation (HCC)  -     TSH, 3rd generation with Free T4 reflex; Future    Dyslipidemia    Follow up in 5 months or as needed  Subjective:      Patient ID: Shani Cole III is a 80 y o  male here for a follow up  He is here today with his wife  He feels well, has no complaints      He has not significantly changed his eating habits  He continues to walk daily  He denies any chest pain, shortness breath, palpitations, headaches or dizziness  He has been monitoring his blood pressure at home  He was instructed by his kidney doctor to stop amlodipine a few days ago  The following portions of the patient's history were reviewed and updated as appropriate: allergies, current medications, past medical history, past social history and problem list     Review of Systems   Constitutional: Negative for activity change, appetite change and fatigue  HENT: Negative for congestion, hearing loss and postnasal drip  Eyes: Negative  Respiratory: Negative for cough, chest tightness and shortness of breath  Cardiovascular: Negative for chest pain, palpitations and leg swelling  Gastrointestinal: Negative for abdominal pain and constipation  Genitourinary: Negative for dysuria, frequency and urgency  Musculoskeletal: Negative for arthralgias  Skin: Negative for rash and wound  Neurological: Negative for dizziness, numbness and headaches  Hematological: Does not bruise/bleed easily  Psychiatric/Behavioral: Negative for sleep disturbance  The patient is not nervous/anxious  Objective:      /80   Pulse 70   Temp 97 5 °F (36 4 °C)   Ht 5' 9" (1 753 m)   Wt 108 kg (237 lb)   SpO2 92%   BMI 35 00 kg/m²          Physical Exam  Vitals and nursing note reviewed  Constitutional:       Appearance: He is well-developed  HENT:      Head: Normocephalic and atraumatic  Eyes:      Conjunctiva/sclera: Conjunctivae normal       Pupils: Pupils are equal, round, and reactive to light  Cardiovascular:      Rate and Rhythm: Normal rate  Rhythm irregularly irregular  Heart sounds: Normal heart sounds  Pulmonary:      Effort: Pulmonary effort is normal       Breath sounds: No wheezing or rhonchi     Abdominal:      General: Bowel sounds are normal       Palpations: Abdomen is soft    Musculoskeletal:         General: No swelling  Cervical back: Neck supple  Right lower leg: No edema  Left lower leg: No edema  Skin:     General: Skin is warm  Findings: No rash  Neurological:      General: No focal deficit present  Mental Status: He is alert and oriented to person, place, and time  Psychiatric:         Mood and Affect: Mood and affect normal          Behavior: Behavior normal            Labs & imaging results reviewed with patient  Stable

## 2024-08-18 NOTE — DISCHARGE NOTE PROVIDER - NSDCCPCAREPLAN_GEN_ALL_CORE_FT
PRINCIPAL DISCHARGE DIAGNOSIS  Diagnosis: Melena  Assessment and Plan of Treatment: Presents with bloody bowel in iliostomy. Lost large amount of blood. Received several units of blood during the admission. Unfortunately Endoscopy/colonoscopy is not feasable given unstable heart rate and rhythm.      SECONDARY DISCHARGE DIAGNOSES  Diagnosis: Atrial fibrillation with RVR  Assessment and Plan of Treatment: Irregular heart rate and rhythm likeley worse in setting of c. dif infection and GI bleed. However given the need for continuous anticoagulation with synchronized cardioversion or chemical cardioversion with amiodarone, Ms. bradley is unable to have these interventions done. Risk of rebleed is high.    Diagnosis: Clostridium difficile colitis  Assessment and Plan of Treatment: treated while in hospital    Diagnosis: Hospice care  Assessment and Plan of Treatment: Given decompensation of clinical status and dementia, decision was made to make Ms. Bradley comfortable on hospice care. The hospice care team will assist in any further management.

## 2024-08-18 NOTE — DISCHARGE NOTE PROVIDER - DETAILS OF MALNUTRITION DIAGNOSIS/DIAGNOSES
This patient has been assessed with a concern for Malnutrition and was treated during this hospitalization for the following Nutrition diagnosis/diagnoses:     -  08/08/2024: Severe protein-calorie malnutrition

## 2024-08-18 NOTE — DISCHARGE NOTE PROVIDER - NSDCMRMEDTOKEN_GEN_ALL_CORE_FT
apixaban 5 mg oral tablet: 1 tab(s) orally every 12 hours  ascorbic acid 500 mg oral tablet: 1 tab(s) orally once a day  atorvastatin 80 mg oral tablet: 1 tab(s) orally once a day (at bedtime)  clopidogrel 75 mg oral tablet: 1 tab(s) orally once a day  levothyroxine 125 mcg (0.125 mg) oral tablet: 1 tab(s) orally once a day  metoprolol tartrate 25 mg oral tablet: 1 tab(s) orally 2 times a day  midodrine 5 mg oral tablet: 1 tab(s) orally 3 times a day  mirtazapine 15 mg oral tablet: 0.5 tab(s) orally once a day (at bedtime)  Multiple Vitamins oral tablet: 1 tab(s) orally once a day  sodium bicarbonate 650 mg oral tablet: 1 tab(s) orally 3 times a day  tamsulosin 0.4 mg oral capsule: 1 cap(s) orally once a day (at bedtime)   atorvastatin 80 mg oral tablet: 1 tab(s) orally once a day (at bedtime)  digoxin 125 mcg (0.125 mg) oral tablet: 1 tab(s) orally every other day  levothyroxine 125 mcg (0.125 mg) oral tablet: 1 tab(s) orally once a day  metoprolol tartrate 25 mg oral tablet: 1 tab(s) orally every 6 hours  midodrine 5 mg oral tablet: 1 tab(s) orally 3 times a day  pantoprazole 40 mg oral delayed release tablet: 1 tab(s) orally once a day (before a meal)

## 2024-08-18 NOTE — DISCHARGE NOTE PROVIDER - HOSPITAL COURSE
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 Limited hx obtainable from pt, was AAOx~1 on encounter and very lethargic/somnolent, but arousable and seemed to deny pain anywhere. Collateral hx obtained from chart review. During recent admission (7/21/24-7/29/24), she was treated for UTI/sepsis and ultimately discharged to rehab to completed a 5-day course of ciprofloxacin (last dose 7/30/24). At rehab, she was reportedly found to have oliguria for a few days over the weekend for which she was started on IV fluids, however she was noncompliant with the IV access and she pulled it out many times. She has had very poor appetite and became lethargic and hypotensive. She was subsequently transferred to hospital where she was found to have black-colored output in ileostomy bag.     In ED: Afebrile, HR 120s-170s (Afib), SBP 90s-130s, RR 15-22, sating % on RA.  Labs notable for Hgb 11.3->10.3, Na 133->122, SCr 3.04->2.58; lactated 4.7->2.8, blood glucose to 400s but FS 100s. Found to be C.diff positive. UA not best sample with 9 epithelial cells, but noted +LE, +bacteria, +WBC, neg nitrite. CT A/P showed no acute intra-abdominal pathology and unchanged indeterminate left adrenal lesion. Received Vanc 500mg PO, Flagyl 500mg IVPB, amio 150mg IVPB x3, ofirmev 1g, 1.5L IVF, and 1u pRBC. Also started on amio gtt and protonix gtt. Nephrology and MICU were consulted. Admitted to Medicine for further management. 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 Limited hx obtainable from pt, was AAOx~1 on encounter and very lethargic/somnolent, but arousable and seemed to deny pain anywhere. Collateral hx obtained from chart review. During recent admission (7/21/24-7/29/24), she was treated for UTI/sepsis and ultimately discharged to rehab to completed a 5-day course of ciprofloxacin (last dose 7/30/24). At rehab, she was reportedly found to have oliguria for a few days over the weekend for which she was started on IV fluids, however she was noncompliant with the IV access and she pulled it out many times. She has had very poor appetite and became lethargic and hypotensive. She was subsequently transferred to hospital where she was found to have black-colored output in ileostomy bag.     In ED: Afebrile, HR 120s-170s (Afib), SBP 90s-130s, RR 15-22, sating % on RA.  Labs notable for Hgb 11.3->10.3, Na 133->122, SCr 3.04->2.58; lactated 4.7->2.8, blood glucose to 400s but FS 100s. Found to be C.diff positive. UA not best sample with 9 epithelial cells, but noted +LE, +bacteria, +WBC, neg nitrite. CT A/P showed no acute intra-abdominal pathology and unchanged indeterminate left adrenal lesion. Received Vanc 500mg PO, Flagyl 500mg IVPB, amio 150mg IVPB x3, ofirmev 1g, 1.5L IVF, and 1u pRBC. Also started on amio gtt and protonix gtt. Nephrology and MICU were consulted. Admitted to Medicine for further management. (05 Aug 2024 23:46)    Hospital Course:    Acute blood loss anemia secondary to acute GI bleed: Status post transfusion with good response.  Discussed with Dr. Sprague: Stool is yellow, H&H seems stable with good response to transfusion.  will hold off on endoscopy at this time given hypotension and continue to optimize...  Will plan EGD early next week    ·  Problem: Clostridium difficile infection.   ·  Plan: Found to have C.diff infection. Likely i/s/o recent abx use (cipro) to treat UTI.  - CT A/P showed no acute intra-abdominal pathology  Complete  vancomycin      ·  Problem: Metabolic encephalopathy.   Fluctuating mental status    Likely multifactorial including acute infection,CHANELL and Dehydration  Mental status during the recent hospitalization showed decline and mentation however workup was negative for acute neurological pathology.      ·  Problem: Atrial fibrillation with RVR.   ·  Plan: Presented in Afib w/ RVR to HR 170s. Has hx of Afib (on eliquis at home). Suspect RVR i/s/o infection, dehydration, electrolyte abnormalities    Patient still with A. fib and RVR.....    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  Discussed with Dr. Shay .  will hold eliquis       Thrombocytopenia: likely secondary to  infection  However consulted hematology: Input appreciated.       ·  Problem: CHANELL (acute kidney injury).   Continue management per renal      ·  Problem: Hypotension.   Likely multifactorial secondary to acute GI bleed, dehydration, possible infection  Continue to monitor H&H  Continue fluid resuscitation and midodrin  Discussed with infectious disease: We will continue with Empiric antibiotics and if culture is negative we will DC: Now discontinued  Blood pressure Improved    ·  Problem: CAD (coronary artery disease).   - holding home plavix  - c/w home statin    ·  Problem: Hypothyroidism.   Recently decreased dose    patient id [id number], and decision has been made by daughter to transition to hospice care at home     Important Medication Changes and Reason:     Active or Pending Issues Requiring Follow-up: hospice    Advanced Directives:   [ ] Full code  [ x] DNR  [ x] Hospice    Discharge Diagnoses:  anemia  afib  FTT  dementia  hospice

## 2024-08-18 NOTE — PROGRESS NOTE ADULT - ASSESSMENT
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 Nephrology consulted for elevated Scr and metabolic acidosis.    1) CHANELL: likely due to ATN. Scr stable s/p IV albumin but remains above baseline. Continue with supportive care. UA active likely due to infection. FeNa low. CT without obstruction. TMA work up negative. Avoid nephrotoxins.    2) Hypotension: BP low. Continue with midodrine 30 mg PO TID. Rate control as per cardiology. Monitor BP.    3) LE edema: likely due to hypoalbuminemia. Will give IV albumin with concurrent lasix 40 mg IV X 1 dose. TTE with normal LVSF. Monitor UO.    4) Metabolic acidosis: Resolved s/p sodium bicarbonate gtt. Monitor pH.    5) Hyponatremia: Mild and likely due to hypervolemia. IV lasix as above. Monitor serum Na.    6) Urinary retention: Continue with amaya. Holding flomax given hypotension.       [facility] NEPHROLOG[facility]  Paulie Storm M.D.  Mack Clancy D.O.  Maddi Steve M.D.  MD Olivia Caldera, MSN, ANP-C    Telephone: [phone]  Facsimile: [phone] [id number], [address]  [place] [address]

## 2024-08-18 NOTE — PROGRESS NOTE ADULT - SUBJECTIVE AND OBJECTIVE BOX
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 Limited hx obtainable from pt, was AAOx~1 on encounter and very lethargic/somnolent, but arousable and seemed to deny pain anywhere. Collateral hx obtained from chart review. During recent admission (7/21/24-7/29/24), she was treated for UTI/sepsis and ultimately discharged to rehab to completed a 5-day course of ciprofloxacin (last dose 7/30/24). At rehab, she was reportedly found to have oliguria for a few days over the weekend for which she was started on IV fluids, however she was noncompliant with the IV access and she pulled it out many times. She has had very poor appetite and became lethargic and hypotensive. She was subsequently transferred to hospital where she was found to have black-colored output in ileostomy bag.   In ED: Afebrile, HR 120s-170s (Afib), SBP 90s-130s, RR 15-22, sating % on RA.  Labs notable for Hgb 11.3->10.3, Na 133->122, SCr 3.04->2.58; lactated 4.7->2.8, blood glucose to 400s but FS 100s. Found to be C.diff positive. UA not best sample with 9 epithelial cells, but noted +LE, +bacteria, +WBC, neg nitrite. CT A/P showed no acute intra-abdominal pathology and unchanged indeterminate left adrenal lesion. Received Vanc 500mg PO, Flagyl 500mg IVPB, amio 150mg IVPB x3, ofirmev 1g, 1.5L IVF, and 1u pRBC. Also started on amio gtt and protonix gtt. Nephrology and MICU were consulted. Admitted to Medicine for further management. (05 Aug 2024 23:46)    Known to me from prior admissions. Has paroxysmal AFib, and syncope, has an ILR for surveillance for long pauses.  She has known short pauses overnight, but nothing long enough or with symptoms to warrant pacemaker insertion.  During subsequent admissions, the usual issue has been rapidly conducted  AFib.  Unable to answer 10pt ROS due to somnolence.   8/16- resting sleepy in bed, no overnight changes.  8/17 no events overnight   Date of service   8/18  no chest pain       PAST MEDICAL & SURGICAL HISTORY:  Hypertension  Hypothyroid  Osteoarthritis  knees, back  CAD (coronary artery disease)  CROW (obstructive sleep apnea)  non complaint on CPAP  History of MI (myocardial infarction)  h/o previous MI in 2004 prompted PTCA  with stenting x 2 vessels   last stress/ echo 2019  Heart murmur  dx in childhood  Bilateral hearing loss, unspecified hearing loss type  bilateral aids  Obesity  Mixed stress and urge urinary incontinence  Overactive bladder  S/P ORIF (open reduction internal fixation) fracture  left hip 1962  S/P appendectomy  30 plus years  S/P knee replacement  left 2000  Stented coronary artery  2004 X 2 STENTS  S/P laparotomy  due to adhesions, 30 years ago  H/O dilation and curettage  2/2019 Benign polyp            acetaminophen     Tablet .. 650 milliGRAM(s) Oral every 6 hours PRN  atorvastatin 80 milliGRAM(s) Oral at bedtime  dextrose 5%. 1000 milliLiter(s) IV Continuous <Continuous>  digoxin  Injectable 125 MICROGram(s) IV Push <User Schedule>  levothyroxine 150 MICROGram(s) Oral daily  metoprolol tartrate 25 milliGRAM(s) Oral every 6 hours  midodrine. 30 milliGRAM(s) Oral every 8 hours  pantoprazole  Injectable 40 milliGRAM(s) IV Push two times a day  vancomycin    Solution 500 milliGRAM(s) Oral every 6 hours                            13.1   12.15 )-----------( 100      ( 18 Aug 2024 08:52 )             41.1       Hemoglobin: 13.1 g/dL (08-18 @ 08:52)  Hemoglobin: 12.3 g/dL (08-17 @ 06:48)  Hemoglobin: 13.0 g/dL (08-16 @ 09:09)  Hemoglobin: 13.1 g/dL (08-15 @ 06:16)  Hemoglobin: 13.1 g/dL (08-14 @ 05:51)      08-18    132<L>  |  94<L>  |  41<H>  ----------------------------<  95  4.1   |  26  |  2.13<H>    Ca    8.5      18 Aug 2024 08:52    TPro  5.6<L>  /  Alb  3.4  /  TBili  1.7<H>  /  DBili  x   /  AST  30  /  ALT  37  /  AlkPhos  110  08-17    Creatinine Trend: 2.13<--, 2.39<--, 2.52<--, 2.55<--, 2.67<--, 2.55<--    COAGS:           T(C): 36.6 (08-18-24 @ 04:29), Max: 36.6 (08-18-24 @ 04:29)  HR: 102 (08-18-24 @ 07:47) (63 - 102)  BP: 98/81 (08-18-24 @ 05:17) (90/59 - 117/83)  RR: 18 (08-18-24 @ 00:44) (18 - 18)  SpO2: 99% (08-18-24 @ 00:44) (97% - 99%)  Wt(kg): --    I&O's Summary    17 Aug 2024 07:01  -  18 Aug 2024 07:00  --------------------------------------------------------  IN: 120 mL / OUT: 775 mL / NET: -655 mL              Appearance: frail elderly woman in no acute distress, somnolent	  HEENT:   Normal oral mucosa, PERRL, EOMI	  Lymphatic: No lymphadenopathy , no edema  Cardiovascular: rapid irregular S1 S2, No JVD, No murmurs , Peripheral pulses palpable 2+ bilaterally  Respiratory: Lungs clear to auscultation, normal effort 	  Gastrointestinal:  Soft, Non-tender, + BS	  Skin: No rashes, No ecchymoses, No cyanosis, warm to touch  Musculoskeletal: Normal range of motion, normal strength  Psychiatry:  Mood & affect appropriate    TELEMETRY: AF 100s.	    ECG: AFib RVR 	    ASSESSMENT/PLAN: Ms Gooden is a pleasant 78y Female here with CDiff +/- GI bleeding.  EP called re: management of rapid AFib.  Has Paroxysmal AFib.  Had brief episodes of sinus rhythm on last admission, and known short pauses that are not long enough to justify permanent pacemaker insertion.  Has an ILR for long-pause surveillance, data managed by Dr Mendiola.    has been on heparin infusion. 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  pending alternative IV anticoag.  Continue metoprolol alongside midodrine. OK for holding parameters for HEARTRATE, but metoprolol is in general BP-neutral.  cont metoprolol to 25mg PO Q6hrs.   Unable to escalate digoxin dose due to advanced CHANELL on CKD.  Not a good candidate for SUSY/Cardioversion - unlikely to be successful long-term.  Not a good candidate for VVI pacemaker / AVJ ablation.  Also, not likely that restoration of sinus or paced rhythm would improve her overall well-being.  She would still likely be somnolent and frail.  Awaiting resolution of diarrhea via ostomy.  Prognosis is guarded, as she's declinded considerably over the last 6 months (6 hospitalizations).  Palliative care has seen at daughter's request.  Working on DC to hospice-capable nursing facility.

## 2024-08-18 NOTE — PROGRESS NOTE ADULT - SUBJECTIVE AND OBJECTIVE BOX
Full note to follow. [facility] NEPHROLOGY- PROGRESS NOTE    78y Female with history of dementia, ischemic bowel s/p resection with end ostomy presents with AMS. Nephrology consulted for elevated Scr and metabolic acidosis.    REVIEW OF SYSTEMS: Unable to obtain due to mental status.    All:  penicillin (Hives)      Hospital Medications: Medications reviewed    VITALS:  T(F): 97.4 (08-18-24 @ 11:17), Max: 97.8 (08-18-24 @ 04:29)  HR: 78 (08-18-24 @ 11:17)  BP: 116/78 (08-18-24 @ 11:17)  RR: 17 (08-18-24 @ 11:17)  SpO2: 99% (08-18-24 @ 11:17)  Wt(kg): --    08-17 @ 07:01  -  08-18 @ 07:00  --------------------------------------------------------  IN: 120 mL / OUT: 775 mL / NET: -655 mL    08-18 @ 07:01  -  08-18 @ 19:04  --------------------------------------------------------  IN: 525 mL / OUT: 0 mL / NET: 525 mL        PHYSICAL EXAM:    Gen: NAD, calm  Cards: Irregularly irregular with tachycardia, +S1/S2, no M/G/R  Resp: CTA B/L  GI: soft, NT/ND, NABS, + ostomy   : + amaya  Vascular: + UE B/L, 2+ LE edema B/L      LABS:  08-18    132<L>  |  94<L>  |  41<H>  ----------------------------<  95  4.1   |  26  |  2.13<H>    Ca    8.5      18 Aug 2024 08:52    TPro  5.6<L>  /  Alb  3.4  /  TBili  1.7<H>  /  DBili      /  AST  30  /  ALT  37  /  AlkPhos  110  08-17    Creatinine Trend: 2.13 <--, 2.39 <--, 2.52 <--, 2.55 <--, 2.67 <--, 2.55 <--                        13.1   12.15 )-----------( 100      ( 18 Aug 2024 08:52 )             41.1     Urine Studies:  Urinalysis Basic - ( 18 Aug 2024 08:52 )    Color:  / Appearance:  / SG:  / pH:   Gluc: 95 mg/dL / Ketone:   / Bili:  / Urobili:    Blood:  / Protein:  / Nitrite:    Leuk Esterase:  / RBC:  / WBC    Sq Epi:  / Non Sq Epi:  / Bacteria:

## 2024-08-18 NOTE — PROGRESS NOTE ADULT - SUBJECTIVE AND OBJECTIVE BOX
Subjective: Patient seen and examined. No new events except as noted.     REVIEW OF SYSTEMS:    CONSTITUTIONAL: + weakness, fevers or chills  EYES/ENT: No visual changes;  No vertigo or throat pain   NECK: No pain or stiffness  RESPIRATORY: No cough, wheezing, hemoptysis; No shortness of breath  CARDIOVASCULAR: No chest pain or palpitations  GASTROINTESTINAL: No abdominal or epigastric pain. No nausea, vomiting, or hematemesis; No diarrhea or constipation. No melena or hematochezia.  GENITOURINARY: No dysuria, frequency or hematuria  NEUROLOGICAL: No numbness or weakness  SKIN: No itching, burning, rashes, or lesions   All other review of systems is negative unless indicated above.    MEDICATIONS:  MEDICATIONS  (STANDING):  atorvastatin 80 milliGRAM(s) Oral at bedtime  dextrose 5%. 1000 milliLiter(s) (50 mL/Hr) IV Continuous <Continuous>  digoxin  Injectable 125 MICROGram(s) IV Push <User Schedule>  levothyroxine 150 MICROGram(s) Oral daily  metoprolol tartrate 25 milliGRAM(s) Oral every 6 hours  midodrine. 30 milliGRAM(s) Oral every 8 hours  pantoprazole  Injectable 40 milliGRAM(s) IV Push two times a day  vancomycin    Solution 500 milliGRAM(s) Oral every 6 hours      PHYSICAL EXAM:  T(C): 36.6 (08-18-24 @ 04:29), Max: 36.6 (08-18-24 @ 04:29)  HR: 102 (08-18-24 @ 07:47) (63 - 102)  BP: 98/81 (08-18-24 @ 05:17) (90/59 - 117/83)  RR: 18 (08-18-24 @ 00:44) (18 - 18)  SpO2: 99% (08-18-24 @ 00:44) (97% - 99%)  Wt(kg): --  I&O's Summary    17 Aug 2024 07:01  -  18 Aug 2024 07:00  --------------------------------------------------------  IN: 120 mL / OUT: 775 mL / NET: -655 mL          Appearance: NAD  HEENT:   Dry  oral mucosa, PERRL, EOMI	  Lymphatic: No lymphadenopathy , no edema  Cardiovascular: Irregular  S1 S2, No JVD, No murmurs , Peripheral pulses palpable 2+ bilaterally  Respiratory: decreased bs   Gastrointestinal:  Soft, Non-tender, + BS	+ostomy  Skin: No rashes, No ecchymoses, No cyanosis, warm to touch  Musculoskeletal: decreased range of motion and strength  Psychiatry: sleepy    Ext: No edema        LABS:    CARDIAC MARKERS:                                12.3   10.11 )-----------( 76       ( 17 Aug 2024 06:48 )             38.7     08-17    134<L>  |  94<L>  |  40<H>  ----------------------------<  74  4.0   |  27  |  2.39<H>    Ca    8.7      17 Aug 2024 06:47    TPro  5.6<L>  /  Alb  3.4  /  TBili  1.7<H>  /  DBili  x   /  AST  30  /  ALT  37  /  AlkPhos  110  08-17        TELEMETRY: 	AF    ECG:  	  RADIOLOGY:   DIAGNOSTIC TESTING:  [ ] Echocardiogram:  [ ]  Catheterization:  [ ] Stress Test:    OTHER:

## 2024-08-18 NOTE — PROGRESS NOTE ADULT - SUBJECTIVE AND OBJECTIVE BOX
78F w/ hx of Afib (on Eliquis), CAD (on plavix), MCI/dementia, 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 She was reportedly found to have oliguria for a few days over the weekend for which she was started on IV fluids, however she was noncompliant with the IV access and she pulled it out many times. She has had very poor appetite and became lethargic and hypotensive. She was subsequently transferred to hospital where she was found to have black-colored output in ileostomy bag.     In ED: Afebrile, HR 120s-170s (Afib), SBP 90s-130s, RR 15-22, sating % on RA.  Labs notable for Hgb 11.3->10.3, Na 133->122, SCr 3.04->2.58; lactated 4.7->2.8, blood glucose to 400s but FS 100s. Found to be C.diff positive. UA not best sample with 9 epithelial cells, but noted +LE, +bacteria, +WBC, neg nitrite. CT A/P showed no acute intra-abdominal pathology and unchanged indeterminate left adrenal lesion. Received Vanc 500mg PO, Flagyl 500mg IVPB, amio 150mg IVPB x3, ofirmev 1g, 1.5L IVF, and 1u pRBC. Also started on amio gtt and protonix gtt. Nephrology and MICU were consulted.   Seen for low platelets  PAST MEDICAL & SURGICAL HISTORY:  Hypertension      Hypothyroid      Osteoarthritis  knees, back      CAD (coronary artery disease)      CROW (obstructive sleep apnea)  non complaint on CPAP      History of MI (myocardial infarction)  h/o previous MI in 2004 prompted PTCA  with stenting x 2 vessels   last stress/ echo 2019      Heart murmur  dx in childhood      Bilateral hearing loss, unspecified hearing loss type  bilateral aids      Obesity      Mixed stress and urge urinary incontinence      Overactive bladder      S/P ORIF (open reduction internal fixation) fracture  left hip 1962      S/P appendectomy  30 plus years      S/P knee replacement  left 2000      Stented coronary artery  2004 X 2 STENTS      S/P laparotomy  due to adhesions, 30 years ago      H/O dilation and curettage  2/2019 Benign polyp        Allergies    penicillin (Hives)    Intolerances      Social History:  Presenting from [facility]. (05 Aug 2024 23:46)    Medications:  acetaminophen     Tablet .. 650 milliGRAM(s) Oral every 6 hours PRN Temp greater or equal to 38C (100.4F), Mild Pain (1 - 3)  atorvastatin 80 milliGRAM(s) Oral at bedtime  dextrose 5%. 1000 milliLiter(s) IV Continuous <Continuous>  digoxin  Injectable 125 MICROGram(s) IV Push <User Schedule>  levothyroxine 150 MICROGram(s) Oral daily  metoprolol tartrate 25 milliGRAM(s) Oral every 6 hours  midodrine. 30 milliGRAM(s) Oral every 8 hours  pantoprazole  Injectable 40 milliGRAM(s) IV Push two times a day  vancomycin    Solution 500 milliGRAM(s) Oral every 6 hours    Labs:  CBC Full  -  ( 18 Aug 2024 08:52 )  WBC Count : 12.15 K/uL  RBC Count : 4.31 M/uL  Hemoglobin : 13.1 g/dL  Hematocrit : 41.1 %  Platelet Count - Automated : 100 K/uL  Mean Cell Volume : 95.4 fl  Mean Cell Hemoglobin : 30.4 pg  Mean Cell Hemoglobin Concentration : 31.9 gm/dL  Auto Neutrophil # : x  Auto Lymphocyte # : x  Auto Monocyte # : x  Auto Eosinophil # : x  Auto Basophil # : x  Auto Neutrophil % : x  Auto Lymphocyte % : x  Auto Monocyte % : x  Auto Eosinophil % : x  Auto Basophil % : x    08-18    132<L>  |  94<L>  |  41<H>  ----------------------------<  95  4.1   |  26  |  2.13<H>    Ca    8.5      18 Aug 2024 08:52    TPro  5.6<L>  /  Alb  3.4  /  TBili  1.7<H>  /  DBili  x   /  AST  30  /  ALT  37  /  AlkPhos  110  08-17      Radiology:             ROS:  Patient comfortable without distress  No SOB or chest pain  No palpitation  No abdominal pain, diarrhaea or constipation  No weakness of extremities  No skin changes or swelling of legs  Rest of the comprehensive ROS was negative  Vital Signs Last 24 Hrs  T(C): 36.3 (18 Aug 2024 11:17), Max: 36.6 (18 Aug 2024 04:29)  T(F): 97.4 (18 Aug 2024 11:17), Max: 97.8 (18 Aug 2024 04:29)  HR: 78 (18 Aug 2024 11:17) (63 - 102)  BP: 116/78 (18 Aug 2024 11:17) (90/59 - 117/83)  BP(mean): --  RR: 17 (18 Aug 2024 11:17) (17 - 18)  SpO2: 99% (18 Aug 2024 11:17) (97% - 99%)    Parameters below as of 18 Aug 2024 11:17  Patient On (Oxygen Delivery Method): nasal cannula  O2 Flow (L/min): 2      Physical exam:  Patient alert and oriented  No distress  CVS: S1, S2   Chest: bilateral breath sound without rales  Abdomen: soft, not tender, no organomegaly or masses  CNS: No focal neuro deficit  Musculoskeletal:  Normal range of motion  Skin: No rash    Assessment and Plan:

## 2024-08-19 LAB
GLUCOSE BLDC GLUCOMTR-MCNC: 82 MG/DL — SIGNIFICANT CHANGE UP (ref 70–99)
GLUCOSE BLDC GLUCOMTR-MCNC: 83 MG/DL — SIGNIFICANT CHANGE UP (ref 70–99)
GLUCOSE BLDC GLUCOMTR-MCNC: 87 MG/DL — SIGNIFICANT CHANGE UP (ref 70–99)
GLUCOSE BLDC GLUCOMTR-MCNC: 93 MG/DL — SIGNIFICANT CHANGE UP (ref 70–99)
HCT VFR BLD CALC: 41.1 % — SIGNIFICANT CHANGE UP (ref 34.5–45)
HGB BLD-MCNC: 13.4 G/DL — SIGNIFICANT CHANGE UP (ref 11.5–15.5)
MCHC RBC-ENTMCNC: 30.6 PG — SIGNIFICANT CHANGE UP (ref 27–34)
MCHC RBC-ENTMCNC: 32.6 GM/DL — SIGNIFICANT CHANGE UP (ref 32–36)
MCV RBC AUTO: 93.8 FL — SIGNIFICANT CHANGE UP (ref 80–100)
NRBC # BLD: 0 /100 WBCS — SIGNIFICANT CHANGE UP (ref 0–0)
PLATELET # BLD AUTO: 108 K/UL — LOW (ref 150–400)
RBC # BLD: 4.38 M/UL — SIGNIFICANT CHANGE UP (ref 3.8–5.2)
RBC # FLD: 17.9 % — HIGH (ref 10.3–14.5)
WBC # BLD: 12.55 K/UL — HIGH (ref 3.8–10.5)
WBC # FLD AUTO: 12.55 K/UL — HIGH (ref 3.8–10.5)

## 2024-08-19 RX ADMIN — Medication 40 MILLIGRAM(S): at 17:30

## 2024-08-19 RX ADMIN — METOPROLOL TARTRATE 25 MILLIGRAM(S): 100 TABLET ORAL at 17:30

## 2024-08-19 RX ADMIN — MIDODRINE HYDROCHLORIDE 30 MILLIGRAM(S): 5 TABLET ORAL at 12:23

## 2024-08-19 RX ADMIN — Medication 80 MILLIGRAM(S): at 22:02

## 2024-08-19 RX ADMIN — Medication 40 MILLIGRAM(S): at 05:38

## 2024-08-19 RX ADMIN — Medication 500 MILLIGRAM(S): at 05:32

## 2024-08-19 RX ADMIN — MIDODRINE HYDROCHLORIDE 30 MILLIGRAM(S): 5 TABLET ORAL at 22:01

## 2024-08-19 RX ADMIN — METOPROLOL TARTRATE 25 MILLIGRAM(S): 100 TABLET ORAL at 12:24

## 2024-08-19 RX ADMIN — Medication 150 MICROGRAM(S): at 05:34

## 2024-08-19 RX ADMIN — DIGOXIN 125 MICROGRAM(S): 0.12 TABLET ORAL at 12:24

## 2024-08-19 RX ADMIN — MIDODRINE HYDROCHLORIDE 30 MILLIGRAM(S): 5 TABLET ORAL at 05:34

## 2024-08-19 NOTE — PROGRESS NOTE ADULT - SUBJECTIVE AND OBJECTIVE BOX
Chief complaint  Patient is a 78y old  Female who presents with a chief complaint of AMS/lethargy, Afib w/ RVR, Hyponatremia, C.diff infection (19 Aug 2024 16:21)         Labs and Fingersticks  CAPILLARY BLOOD GLUCOSE      POCT Blood Glucose.: 83 mg/dL (19 Aug 2024 11:11)  POCT Blood Glucose.: 93 mg/dL (19 Aug 2024 07:48)  POCT Blood Glucose.: 89 mg/dL (18 Aug 2024 21:09)      Anion Gap: 12 (08-18 @ 08:52)      Calcium: 8.5 (08-18 @ 08:52)          08-18    132<L>  |  94<L>  |  41<H>  ----------------------------<  95  4.1   |  26  |  2.13<H>    Ca    8.5      18 Aug 2024 08:52                          13.4   12.55 )-----------( 108      ( 19 Aug 2024 09:12 )             41.1     Medications  MEDICATIONS  (STANDING):  atorvastatin 80 milliGRAM(s) Oral at bedtime  digoxin  Injectable 125 MICROGram(s) IV Push <User Schedule>  levothyroxine 150 MICROGram(s) Oral daily  metoprolol tartrate 25 milliGRAM(s) Oral every 6 hours  midodrine. 30 milliGRAM(s) Oral every 8 hours  pantoprazole  Injectable 40 milliGRAM(s) IV Push two times a day      Physical Exam  General: Patient comfortable in bed   Vital Signs Last 12 Hrs  T(F): 97.4 (08-19-24 @ 11:23), Max: 97.4 (08-19-24 @ 11:23)  HR: 66 (08-19-24 @ 11:23) (66 - 107)  BP: 104/70 (08-19-24 @ 11:23) (104/70 - 104/70)  BP(mean): --  RR: 18 (08-19-24 @ 11:23) (18 - 18)  SpO2: 97% (08-19-24 @ 11:23) (97% - 97%)    CVS: S1S2   Respiratory: No wheezing, no crepitations  GI: Abdomen soft, bowel sounds positive  Musculoskeletal:  moves all extremities  : Voiding

## 2024-08-19 NOTE — PROVIDER CONTACT NOTE (OTHER) - SITUATION
pt tachycardic 150'2-170's
Q6 fs 70
Refusing 12 AM meds
Pt fs 69, BP 82/58
BP 90/59
rectal temp 95.7
Hypotensive 75/56
BP 90/58

## 2024-08-19 NOTE — PROGRESS NOTE ADULT - SUBJECTIVE AND OBJECTIVE BOX
[facility] NEPHROLOGY- PROGRESS NOTE    78y Female with history of dementia, ischemic bowel s/p resection with end ostomy presents with AMS. Nephrology consulted for elevated Scr and metabolic acidosis.    REVIEW OF SYSTEMS: Unable to obtain due to mental status.    penicillin (Hives)      Hospital Medications: Medications reviewed        VITALS:  T(F): 98.2 (08-19-24 @ 04:29), Max: 98.2 (08-19-24 @ 04:29)  HR: 107 (08-19-24 @ 07:09)  BP: 96/74 (08-19-24 @ 04:29)  RR: 18 (08-19-24 @ 04:29)  SpO2: 94% (08-19-24 @ 04:29)  Wt(kg): --    08-18 @ 07:01  -  08-19 @ 07:00  --------------------------------------------------------  IN: 525 mL / OUT: 300 mL / NET: 225 mL        PHYSICAL EXAM:    Gen: NAD, calm  Cards: Irregularly irregular with tachycardia, +S1/S2, no M/G/R  Resp: CTA B/L  GI: soft, NT/ND, NABS, + ostomy   : + amaya  Vascular: + UE B/L, 1+ LE edema B/L        LABS:  08-18    132<L>  |  94<L>  |  41<H>  ----------------------------<  95  4.1   |  26  |  2.13<H>    Ca    8.5      18 Aug 2024 08:52      Creatinine Trend: 2.13 <--, 2.39 <--, 2.52 <--, 2.55 <--, 2.67 <--                        13.4   12.55 )-----------( 108      ( 19 Aug 2024 09:12 )             41.1     Urine Studies:  Urinalysis Basic - ( 18 Aug 2024 08:52 )    Color:  / Appearance:  / SG:  / pH:   Gluc: 95 mg/dL / Ketone:   / Bili:  / Urobili:    Blood:  / Protein:  / Nitrite:    Leuk Esterase:  / RBC:  / WBC    Sq Epi:  / Non Sq Epi:  / Bacteria:

## 2024-08-19 NOTE — PROGRESS NOTE ADULT - ASSESSMENT
78 year old female        h/o  Afib (on Eliquis), CAD (on plavix),    dementia, ischemic bowel    s/p ex-lap w/ bowel resection + end ileostomy),         COPD, CROW, HTN, HLD, urinary retention, recurrent UTIs, hypothyroidism, recent admission for UTI  c/b sepsis,  bradycardia         presenting with AMS/lethargy  . last  visit (7/21/24-7/29/24) UTI/sepsis,  dc  to rehab    has had very poor appetite  lethargic and hypotensive.   sent  er,  black-color  in ileostomy bag.           C.diff   from  recent antibiotic/  vanco  compleyed  8/16        Acute blood loss anemia due to UGIB       c/c  Afib with RVR,/  EP  abd  card  following             eliquis   on hold        CKD        c/c  anemia,   and   c/c  low  plts         8/5 CTAP,   no acute intra-abd pathology         8/6 am s/p RRT for hypotension, tachycardia, ostomy m melanotic stools -now resolved,  and  per  Surg,/  gi , no acute intervention      Ucx , C. albicans some, has amaya in place, suspect contaminant/colonization       per  GI,  dr hood,  a./c  on  hold       afib. rvr.  rx  per card/  ep  dr emilia yen      bed  bound/  anasarca.  por  po intake / lipitor, dig.  torpol,  midodrine      plan,  home  hospice  /  poor  po  intake.  also  was  refusing   meds       d/c  plans  to hospice         pt  is  dnr/ dni                  78 year old female        h/o  Afib (on Eliquis), CAD (on plavix),    dementia, ischemic bowel    s/p ex-lap w/ bowel resection + end ileostomy),         COPD, CROW, HTN, HLD, urinary retention, recurrent UTIs, hypothyroidism, recent admission for UTI  c/b sepsis,  bradycardia         presenting with AMS/lethargy  . last  visit (7/21/24-7/29/24) UTI/sepsis,  dc  to rehab    has had very poor appetite  lethargic and hypotensive.   sent  er,  black-color  in ileostomy bag.           C.diff   from  recent antibiotic/  vanco  compleyed  8/16        Acute blood loss anemia due to UGIB       c/c  Afib with RVR,/  EP  abd  card  following             eliquis   on hold        CKD        c/c  anemia,   and   c/c  low  plts         8/5 CTAP,   no acute intra-abd pathology         8/6 am s/p RRT for hypotension, tachycardia, ostomy m melanotic stools -now resolved,  and  per  Surg,/  gi , no acute intervention      Ucx , C. albicans some, has amaya in place, suspect contaminant/colonization       per  GI,  dr hood,  a./c  on  hold       Afib  rvr.  rx  per card/  ep  dr emilia yen /  and  no  a/c  pe r team       bed  bound/  anasarca.  por  po intake / lipitor, dig.  torpol,  midodrine      plan,  home  hospice  /  poor  po  intake.  also  was  refusing   meds       d/c  plans  to hospice/  discussed  with    daughter  natalia.  awaiting   care  cramen  f/p.  daughter   revesting   every  day  help  for  about  4  hours  ar  so        states,  she si  still  waiting  to  hear  from agencies/ pt  ah s been  cleraed   for   d/c          pt  is  dnr/ dni                  78 year old female        h/o  Afib (on Eliquis), CAD (on plavix),    dementia, ischemic bowel    s/p ex-lap w/ bowel resection + end ileostomy),         COPD, CROW, HTN, HLD, urinary retention, recurrent UTIs, hypothyroidism, recent admission for UTI  c/b sepsis,  bradycardia         presenting with AMS/lethargy  . last  visit (7/21/24-7/29/24) UTI/sepsis,  dc  to rehab    has had very poor appetite  lethargic and hypotensive.   sent  er,  black-color  in ileostomy bag.           C.diff   from  recent antibiotic/  vanco  compleyed  8/16        Acute blood loss anemia due to UGIB       c/c  Afib with RVR,/  EP  abd  card  following             eliquis   on hold        CKD        c/c  anemia,   and   c/c  low  plts         8/5 CTAP,   no acute intra-abd pathology         8/6 am s/p RRT for hypotension, tachycardia, ostomy m melanotic stools -now resolved,  and  per  Surg,/  gi , no acute intervention      Ucx , C. albicans some, has amaya in place, suspect contaminant/colonization       per  GI,  dr hood,  a./c  on  hold       Afib  rvr.  rx  per card/  ep  dr emilia yen /  and  no  a/c  pe r team       bed  bound/  anasarca.  por  po intake / lipitor, dig.  torpol,  midodrine       plan,  home  hospice  /  poor  po  intake.  also  was  refusing   meds        d/c  plans  is    to hospice          discussed  with    daughter  natalia.            care  carmen  f/p.  daughter   revesting   every  day  help  for  about  4  hours  or   so/  also  asking  what  Medicare  covers.  etc         states,  she is   still  waiting  to  hear  from agencies/ pt  ah s been  cleared   for   d/c         discussed  with  team,  abhi          pt  is  dnr/ dni

## 2024-08-19 NOTE — PROGRESS NOTE ADULT - SUBJECTIVE AND OBJECTIVE BOX
date of service: 08-19-24 @ 06:57  afberile  REVIEW OF SYSTEMS:  CONSTITUTIONAL: No fever,  no  weight loss  ENT:  No  tinnitus,   no   vertigo  NECK: No pain or stiffness  RESPIRATORY: No cough, wheezing, chills or hemoptysis;    No Shortness of Breath  CARDIOVASCULAR: No chest pain, palpitations, dizziness  GASTROINTESTINAL: No abdominal or epigastric pain. No nausea, vomiting, or hematemesis; No diarrhea  No melena or hematochezia.  GENITOURINARY: No dysuria, frequency, hematuria, or incontinence  NEUROLOGICAL: No headaches  SKIN: No itching,  no   rash  LYMPH Nodes: No enlarged glands  ENDOCRINE: No heat or cold intolerance  MUSCULOSKELETAL: No joint pain or swelling  PSYCHIATRIC: No depression, anxiety  HEME/LYMPH: No easy bruising, or bleeding gums  ALLERGY AND IMMUNOLOGIC: No hives or eczema	    MEDICATIONS  (STANDING):  atorvastatin 80 milliGRAM(s) Oral at bedtime  digoxin  Injectable 125 MICROGram(s) IV Push <User Schedule>  levothyroxine 150 MICROGram(s) Oral daily  metoprolol tartrate 25 milliGRAM(s) Oral every 6 hours  midodrine. 30 milliGRAM(s) Oral every 8 hours  pantoprazole  Injectable 40 milliGRAM(s) IV Push two times a day    MEDICATIONS  (PRN):  acetaminophen     Tablet .. 650 milliGRAM(s) Oral every 6 hours PRN Temp greater or equal to 38C (100.4F), Mild Pain (1 - 3)      Vital Signs Last 24 Hrs  T(C): 36.8 (19 Aug 2024 04:29), Max: 36.8 (19 Aug 2024 04:29)  T(F): 98.2 (19 Aug 2024 04:29), Max: 98.2 (19 Aug 2024 04:29)  HR: 86 (19 Aug 2024 04:29) (78 - 102)  BP: 96/74 (19 Aug 2024 04:29) (96/74 - 116/78)  BP(mean): --  RR: 18 (19 Aug 2024 04:29) (17 - 18)  SpO2: 94% (19 Aug 2024 04:29) (94% - 99%)    Parameters below as of 19 Aug 2024 04:29  Patient On (Oxygen Delivery Method): nasal cannula  O2 Flow (L/min): 2    CAPILLARY BLOOD GLUCOSE      POCT Blood Glucose.: 89 mg/dL (18 Aug 2024 21:09)  POCT Blood Glucose.: 96 mg/dL (18 Aug 2024 16:57)  POCT Blood Glucose.: 99 mg/dL (18 Aug 2024 11:18)  POCT Blood Glucose.: 98 mg/dL (18 Aug 2024 08:00)    I&O's Summary    17 Aug 2024 07:01  -  18 Aug 2024 07:00  --------------------------------------------------------  IN: 120 mL / OUT: 775 mL / NET: -655 mL    18 Aug 2024 07:01  -  19 Aug 2024 06:57  --------------------------------------------------------  IN: 525 mL / OUT: 300 mL / NET: 225 mL          Appearance: Normal	  HEENT:   Normal oral mucosa, PERRL, EOMI	  Lymphatic: No lymphadenopathy  Cardiovascular: Normal S1 S2, No JVD  Respiratory: Lungs clear to auscultation	  Gastrointestinal:  Soft, Non-tender, + BS	  Skin: No rash, No ecchymoses	  Extremities:     LABS:                        13.1   12.15 )-----------( 100      ( 18 Aug 2024 08:52 )             41.1     08-18    132<L>  |  94<L>  |  41<H>  ----------------------------<  95  4.1   |  26  |  2.13<H>    Ca    8.5      18 Aug 2024 08:52            Urinalysis Basic - ( 18 Aug 2024 08:52 )    Color: x / Appearance: x / SG: x / pH: x  Gluc: 95 mg/dL / Ketone: x  / Bili: x / Urobili: x   Blood: x / Protein: x / Nitrite: x   Leuk Esterase: x / RBC: x / WBC x   Sq Epi: x / Non Sq Epi: x / Bacteria: x              Thyroid Stimulating Hormone, Serum: 6.67 uIU/mL (08-13 @ 11:40)          Consultant(s) Notes Reviewed:      Care Discussed with Consultants/Other Providers:

## 2024-08-19 NOTE — PROGRESS NOTE ADULT - SUBJECTIVE AND OBJECTIVE BOX
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 Limited hx obtainable from pt, was AAOx~1 on encounter and very lethargic/somnolent, but arousable and seemed to deny pain anywhere. Collateral hx obtained from chart review. During recent admission (7/21/24-7/29/24), she was treated for UTI/sepsis and ultimately discharged to rehab to completed a 5-day course of ciprofloxacin (last dose 7/30/24). At rehab, she was reportedly found to have oliguria for a few days over the weekend for which she was started on IV fluids, however she was noncompliant with the IV access and she pulled it out many times. She has had very poor appetite and became lethargic and hypotensive. She was subsequently transferred to hospital where she was found to have black-colored output in ileostomy bag.   In ED: Afebrile, HR 120s-170s (Afib), SBP 90s-130s, RR 15-22, sating % on RA.  Labs notable for Hgb 11.3->10.3, Na 133->122, SCr 3.04->2.58; lactated 4.7->2.8, blood glucose to 400s but FS 100s. Found to be C.diff positive. UA not best sample with 9 epithelial cells, but noted +LE, +bacteria, +WBC, neg nitrite. CT A/P showed no acute intra-abdominal pathology and unchanged indeterminate left adrenal lesion. Received Vanc 500mg PO, Flagyl 500mg IVPB, amio 150mg IVPB x3, ofirmev 1g, 1.5L IVF, and 1u pRBC. Also started on amio gtt and protonix gtt. Nephrology and MICU were consulted. Admitted to Medicine for further management. (05 Aug 2024 23:46)    Known to me from prior admissions. Has paroxysmal AFib, and syncope, has an ILR for surveillance for long pauses.  She has known short pauses overnight, but nothing long enough or with symptoms to warrant pacemaker insertion.  During subsequent admissions, the usual issue has been rapidly conducted  AFib.  Unable to answer 10pt ROS due to somnolence.  Date of service   8/19  no chest pain , remains lethargic.      PAST MEDICAL & SURGICAL HISTORY:  Hypertension  Hypothyroid  Osteoarthritis  knees, back  CAD (coronary artery disease)  CROW (obstructive sleep apnea)  non complaint on CPAP  History of MI (myocardial infarction)  h/o previous MI in 2004 prompted PTCA  with stenting x 2 vessels   last stress/ echo 2019  Heart murmur  dx in childhood  Bilateral hearing loss, unspecified hearing loss type  bilateral aids  Obesity  Mixed stress and urge urinary incontinence  Overactive bladder  S/P ORIF (open reduction internal fixation) fracture  left hip 1962  S/P appendectomy  30 plus years  S/P knee replacement  left 2000  Stented coronary artery  2004 X 2 STENTS  S/P laparotomy  due to adhesions, 30 years ago  H/O dilation and curettage  2/2019 Benign polyp     acetaminophen     Tablet .. 650 milliGRAM(s) Oral every 6 hours PRN  atorvastatin 80 milliGRAM(s) Oral at bedtime  digoxin  Injectable 125 MICROGram(s) IV Push <User Schedule>  levothyroxine 150 MICROGram(s) Oral daily  metoprolol tartrate 25 milliGRAM(s) Oral every 6 hours  midodrine. 30 milliGRAM(s) Oral every 8 hours  pantoprazole  Injectable 40 milliGRAM(s) IV Push two times a day                            13.4   12.55 )-----------( 108      ( 19 Aug 2024 09:12 )             41.1       08-18    132<L>  |  94<L>  |  41<H>  ----------------------------<  95  4.1   |  26  |  2.13<H>    Ca    8.5      18 Aug 2024 08:52      T(C): 36.3 (08-19-24 @ 11:23), Max: 36.8 (08-19-24 @ 04:29)  HR: 66 (08-19-24 @ 11:23) (66 - 107)  BP: 104/70 (08-19-24 @ 11:23) (96/74 - 108/78)  RR: 18 (08-19-24 @ 11:23) (18 - 18)  SpO2: 97% (08-19-24 @ 11:23) (94% - 99%)  Wt(kg): --    I&O's Summary    18 Aug 2024 07:01  -  19 Aug 2024 07:00  --------------------------------------------------------  IN: 525 mL / OUT: 300 mL / NET: 225 mL    19 Aug 2024 07:01  -  19 Aug 2024 16:22  --------------------------------------------------------  IN: 50 mL / OUT: 0 mL / NET: 50 mL           Appearance: frail elderly woman in no acute distress, somnolent	  HEENT:   Normal oral mucosa, PERRL, EOMI	  Lymphatic: No lymphadenopathy , no edema  Cardiovascular: rapid irregular S1 S2, No JVD, No murmurs , Peripheral pulses palpable 2+ bilaterally  Respiratory: Lungs clear to auscultation, normal effort 	  Gastrointestinal:  Soft, Non-tender, + BS	  Skin: No rashes, No ecchymoses, No cyanosis, warm to touch  Musculoskeletal: Normal range of motion, normal strength  Psychiatry:  Mood & affect appropriate    TELEMETRY: AF 100s.	    ECG: AFib RVR 	    ASSESSMENT/PLAN: Ms Gooden is a pleasant 78y Female here with CDiff +/- GI bleeding.  EP called re: management of rapid AFib.  Has Paroxysmal AFib.  Had brief episodes of sinus rhythm on last admission, and known short pauses that are not long enough to justify permanent pacemaker insertion.  Has an ILR for long-pause surveillance, data managed by Dr Mendiola.    has been on heparin infusion. 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  pending alternative IV anticoag.  Continue metoprolol alongside midodrine. OK for holding parameters for HEARTRATE, but metoprolol is in general BP-neutral.  cont metoprolol to 25mg PO Q6hrs.   Unable to escalate digoxin dose due to advanced CHANELL on CKD.  Not a good candidate for SUSY/Cardioversion - unlikely to be successful long-term.  Not a good candidate for VVI pacemaker / AVJ ablation.  Also, not likely that restoration of sinus or paced rhythm would improve her overall well-being.  She would still likely be somnolent and frail.  Awaiting resolution of diarrhea via ostomy.  Prognosis is guarded, as she's declinded considerably over the last 6 months (6 hospitalizations).  Palliative care has seen at daughter's request.  Working on DC to hospice-capable nursing facility.    Shane Frias M.D.  Cardiac Electrophysiology  [phone]

## 2024-08-19 NOTE — PROVIDER CONTACT NOTE (OTHER) - BACKGROUND
Admitted for acute renal failure.
Admitted for acute renal failure.
Admitted for acute renal failure
pt admitting dx AMS
pt admitting dx AMS
Admitted for acute renal failure
Pt admitted for acute renal failure
pt A.fib RVR

## 2024-08-19 NOTE — CHART NOTE - NSCHARTNOTESELECT_GEN_ALL_CORE
Event Note
Midline clearance/Event Note
Event Note
Event Note
Nutrition Services
RRT/Event Note

## 2024-08-19 NOTE — CHART NOTE - NSCHARTNOTEFT_GEN_A_CORE
NUTRITION FOLLOW UP NOTE    PATIENT SEEN FOR: follow-up for malnutrition     SOURCE: [] Patient  [x] Current Medical Record  [x] RN  [] Family/support person at bedside  [] Patient unavailable/inappropriate  [] Other:    CHART REVIEWED/EVENTS NOTED.  [] No changes to nutrition care plan to note  [x] Nutrition Status:  -- Encounter for palliative care for GOC discussion, plan for home hospice per chart     DIET ORDER:   Diet, Soft and Bite Sized (24 @ 09:04) [Active]    CURRENT DIET ORDER IS:  [] Appropriate:  [] Inadequate:  [x] Other: see below for recommendation     NUTRITION INTAKE/PROVISION:  [x] PO: pt was on prolonged Clear Liquid diet (from  till ). Poor PO intake <50% per flowsheet.   [] Enteral Nutrition:  [] Parenteral Nutrition:    ANTHROPOMETRICS:  Drug Dosing Weight  Height (cm): 162.6 (06 Aug 2024 17:00)  Weight (kg): 87.2 (16 Aug 2024 09:07)  BMI (kg/m2): 33 (16 Aug 2024 09:07)  Weights:   Daily Weight in k.4 (-), Weight in k.2 (-16), Weight in k.4 (-15), Weight in k.4 (-), Weight in k.3 (-)   -- Question accuracy of bed scale wt as last weight from RD assessment was 75kg (). Pt also with edema might mask true dry weight. Will continue to monitor weight trends as available/able.     MEDICATIONS:  MEDICATIONS  (STANDING):  atorvastatin 80 milliGRAM(s) Oral at bedtime  digoxin  Injectable 125 MICROGram(s) IV Push <User Schedule>  levothyroxine 150 MICROGram(s) Oral daily  metoprolol tartrate 25 milliGRAM(s) Oral every 6 hours  midodrine. 30 milliGRAM(s) Oral every 8 hours  pantoprazole  Injectable 40 milliGRAM(s) IV Push two times a day    MEDICATIONS  (PRN):  acetaminophen     Tablet .. 650 milliGRAM(s) Oral every 6 hours PRN Temp greater or equal to 38C (100.4F), Mild Pain (1 - 3)      NUTRITIONALLY PERTINENT LABS:   Na132 mmol/L<L> Glu 95 mg/dL K+ 4.1 mmol/L Cr  2.13 mg/dL<H> BUN 41 mg/dL<H>  Phos 3.7 mg/dL  Alb 3.4 g/dL ALT 37 U/L AST 30 U/L Alkaline Phosphatase 110 U/L    A1C with Estimated Average Glucose Result: 5.3 % (24 @ 07:35)  A1C with Estimated Average Glucose Result: 5.3 % (24 @ 06:51)    Finger Sticks:  POCT Blood Glucose.: 83 mg/dL ( @ 11:11)  POCT Blood Glucose.: 93 mg/dL ( @ 07:48)  POCT Blood Glucose.: 89 mg/dL ( @ 21:09)  POCT Blood Glucose.: 96 mg/dL ( @ 16:57)      NUTRITIONALLY PERTINENT MEDICATIONS/LABS:  [x] Reviewed  [x] Relevant notes on medications/labs:  -- CV: on Midodrine   -- Hypothyroidism on Synthroid  -- CHANELL: noted hyponatremia and abnormal renal indices, likely due to ATN per chart    EDEMA:  [x] Reviewed  [] Relevant notes:    GI/ I&O:  [x] Reviewed  [x] Relevant notes: Pt with ileostomy in place, admitted due to black output in the ileostomy bag c/f UGIB, c diff positive. GIB resolved per chart, on Protonix.  [] Other:    SKIN:   [] No pressure injuries documented, per nursing flowsheet  [x] Pressure injury previously noted: right and left heels suspected deep tissue injuries  [] Change in pressure injury documentation:  [] Other:    ESTIMATED NEEDS:  [x] No change:  [] Updated:  Energy: 2051-4443 kcal/day (30-35 kcal/kg)  Protein: 65-82  g/day (1.2-1.5 g/kg)  Fluid:   ml/day or [x] defer to team  Based on: IBW of 54.4kg:    NUTRITION DIAGNOSIS:  [x] Prior Dx: chronic severe malnutrition, Increased nutrient needs   [] New Dx:    EDUCATION:  [] Yes:  [] Not appropriate/warranted    NUTRITION CARE PLAN:  1. Diet: Continue diet free of therapeutic restriction, on soft/bite size diet texture per SLP/team. RD remains available to adjust diet as needed.   2. Supplements: Recommend Ensure plus high protein 2x daily (700kcal, 40g proteins)   3. Multivitamin/mineral supplementation: Recommend MVI, pending no medical contraindication, for micronutrient support/aid wound healing.     [x] Achieved - Continue current nutrition intervention(s)  [] Current medical condition precludes nutrition intervention at this time.    MONITORING AND EVALUATION:   RD remains available upon request and will follow up per protocol:   Mackenzie Moore MS, RDN, CDN (Teams) NUTRITION FOLLOW UP NOTE    PATIENT SEEN FOR: follow-up for malnutrition     SOURCE: [] Patient  [x] Current Medical Record  [x] RN  [] Family/support person at bedside  [] Patient unavailable/inappropriate  [] Other:    CHART REVIEWED/EVENTS NOTED.  [] No changes to nutrition care plan to note  [x] Nutrition Status:  -- Encounter for palliative care for GOC discussion, plan for home hospice per chart     DIET ORDER:   Diet, Soft and Bite Sized (24 @ 09:04) [Active]    CURRENT DIET ORDER IS:  [] Appropriate:  [] Inadequate:  [x] Other: see below for recommendation     NUTRITION INTAKE/PROVISION:  [x] PO: pt was on prolonged Clear Liquid diet (from  till ). Poor PO intake <50% per flowsheet.   [] Enteral Nutrition:  [] Parenteral Nutrition:    ANTHROPOMETRICS:  Drug Dosing Weight  Height (cm): 162.6 (06 Aug 2024 17:00)  Weight (kg): 87.2 (16 Aug 2024 09:07)  BMI (kg/m2): 33 (16 Aug 2024 09:07)  Weights:   Daily Weight in k.4 (-), Weight in k.2 (-16), Weight in k.4 (-15), Weight in k.4 (-), Weight in k.3 (-)   -- Question accuracy of bed scale wt as last weight from RD assessment was 75kg (). Pt also with edema might mask true dry weight. Will continue to monitor weight trends as available/able.     MEDICATIONS:  MEDICATIONS  (STANDING):  atorvastatin 80 milliGRAM(s) Oral at bedtime  digoxin  Injectable 125 MICROGram(s) IV Push <User Schedule>  levothyroxine 150 MICROGram(s) Oral daily  metoprolol tartrate 25 milliGRAM(s) Oral every 6 hours  midodrine. 30 milliGRAM(s) Oral every 8 hours  pantoprazole  Injectable 40 milliGRAM(s) IV Push two times a day    MEDICATIONS  (PRN):  acetaminophen     Tablet .. 650 milliGRAM(s) Oral every 6 hours PRN Temp greater or equal to 38C (100.4F), Mild Pain (1 - 3)      NUTRITIONALLY PERTINENT LABS:   Na132 mmol/L<L> Glu 95 mg/dL K+ 4.1 mmol/L Cr  2.13 mg/dL<H> BUN 41 mg/dL<H>  Phos 3.7 mg/dL  Alb 3.4 g/dL ALT 37 U/L AST 30 U/L Alkaline Phosphatase 110 U/L    A1C with Estimated Average Glucose Result: 5.3 % (24 @ 07:35)  A1C with Estimated Average Glucose Result: 5.3 % (24 @ 06:51)    Finger Sticks:  POCT Blood Glucose.: 83 mg/dL ( @ 11:11)  POCT Blood Glucose.: 93 mg/dL ( @ 07:48)  POCT Blood Glucose.: 89 mg/dL ( @ 21:09)  POCT Blood Glucose.: 96 mg/dL ( @ 16:57)      NUTRITIONALLY PERTINENT MEDICATIONS/LABS:  [x] Reviewed  [x] Relevant notes on medications/labs:  -- CV: on Midodrine   -- Hypothyroidism on Synthroid  -- CHANELL: noted hyponatremia and abnormal renal indices, likely due to ATN per chart    EDEMA:  [x] Reviewed  [] Relevant notes:    GI/ I&O:  [x] Reviewed  [x] Relevant notes: Pt with ileostomy in place with output: 200ml (), 250ml (), admitted due to black output in the ileostomy bag c/f UGIB, c diff positive. GIB resolved per chart, on Protonix.  [] Other:    SKIN:   [] No pressure injuries documented, per nursing flowsheet  [x] Pressure injury previously noted: right and left heels suspected deep tissue injuries  [] Change in pressure injury documentation:  [] Other:    ESTIMATED NEEDS:  [x] No change:  [] Updated:  Energy: 4222-6010 kcal/day (30-35 kcal/kg)  Protein: 65-82  g/day (1.2-1.5 g/kg)  Fluid:   ml/day or [x] defer to team  Based on: IBW of 54.4kg:    NUTRITION DIAGNOSIS:  [x] Prior Dx: chronic severe malnutrition, Increased nutrient needs   [] New Dx:    EDUCATION:  [] Yes:  [] Not appropriate/warranted    NUTRITION CARE PLAN:  1. Diet: Continue diet free of therapeutic restriction, on soft/bite size diet texture per SLP/team. RD remains available to adjust diet as needed.   2. Supplements: Recommend Ensure plus high protein 2x daily (700kcal, 40g proteins)   3. Multivitamin/mineral supplementation: Recommend MVI, pending no medical contraindication, for micronutrient support/aid wound healing.     [x] Achieved - Continue current nutrition intervention(s)  [] Current medical condition precludes nutrition intervention at this time.    MONITORING AND EVALUATION:   RD remains available upon request and will follow up per protocol:   Mackenzie Moore MS, RDN, CDN (Teams) NUTRITION FOLLOW UP NOTE    PATIENT SEEN FOR: follow-up for malnutrition     SOURCE: [] Patient  [x] Current Medical Record  [x] RN  [] Family/support person at bedside  [x] Patient unavailable/inappropriate- very lethargic and sleeping upon visit  [] Other:    CHART REVIEWED/EVENTS NOTED.  [] No changes to nutrition care plan to note  [x] Nutrition Status:  -- Encounter for palliative care for GOC discussion, plan for home hospice per chart     DIET ORDER:   Diet, Soft and Bite Sized (24 @ 09:04) [Active]    CURRENT DIET ORDER IS:  [] Appropriate:  [] Inadequate:  [x] Other: see below for recommendation     NUTRITION INTAKE/PROVISION:  [x] PO: pt was on prolonged Clear Liquid diet (from  till ). Poor PO intake <50% per flowsheet and RN despite encouragement and assistance with meals. Food preferences updated with RN, will honor as able.   [] Enteral Nutrition:  [] Parenteral Nutrition:    ANTHROPOMETRICS:  Drug Dosing Weight  Height (cm): 162.6 (06 Aug 2024 17:00)  Weight (kg): 87.2 (16 Aug 2024 09:07)  BMI (kg/m2): 33 (16 Aug 2024 09:07)  Weights:   Daily Weight in k.4 (-19), Weight in k.2 (-16), Weight in k.4 (-15), Weight in k.4 (-14), Weight in k.3 (-13)   -- Question accuracy of bed scale wt as last weight from RD assessment was 75kg (). Pt also with edema might mask true dry weight. Will continue to monitor weight trends as available/able.     MEDICATIONS:  MEDICATIONS  (STANDING):  atorvastatin 80 milliGRAM(s) Oral at bedtime  digoxin  Injectable 125 MICROGram(s) IV Push <User Schedule>  levothyroxine 150 MICROGram(s) Oral daily  metoprolol tartrate 25 milliGRAM(s) Oral every 6 hours  midodrine. 30 milliGRAM(s) Oral every 8 hours  pantoprazole  Injectable 40 milliGRAM(s) IV Push two times a day    MEDICATIONS  (PRN):  acetaminophen     Tablet .. 650 milliGRAM(s) Oral every 6 hours PRN Temp greater or equal to 38C (100.4F), Mild Pain (1 - 3)      NUTRITIONALLY PERTINENT LABS:   Na132 mmol/L<L> Glu 95 mg/dL K+ 4.1 mmol/L Cr  2.13 mg/dL<H> BUN 41 mg/dL<H>  Phos 3.7 mg/dL  Alb 3.4 g/dL ALT 37 U/L AST 30 U/L Alkaline Phosphatase 110 U/L    A1C with Estimated Average Glucose Result: 5.3 % (24 @ 07:35)  A1C with Estimated Average Glucose Result: 5.3 % (24 @ 06:51)    Finger Sticks:  POCT Blood Glucose.: 83 mg/dL ( @ 11:11)  POCT Blood Glucose.: 93 mg/dL ( @ 07:48)  POCT Blood Glucose.: 89 mg/dL ( @ 21:09)  POCT Blood Glucose.: 96 mg/dL ( @ 16:57)      NUTRITIONALLY PERTINENT MEDICATIONS/LABS:  [x] Reviewed  [x] Relevant notes on medications/labs:  -- CV: on Midodrine   -- Hypothyroidism on Synthroid  -- CHANELL: noted hyponatremia and abnormal renal indices, likely due to ATN per chart    EDEMA:  [x] Reviewed  [] Relevant notes:    GI/ I&O:  [x] Reviewed  [x] Relevant notes: Pt with ileostomy in place with output: 200ml (), 250ml (), admitted due to black output in the ileostomy bag c/f UGIB, c diff positive. GIB resolved per chart, on Protonix.  [] Other:    SKIN:   [] No pressure injuries documented, per nursing flowsheet  [x] Pressure injury previously noted: right and left heels suspected deep tissue injuries  [] Change in pressure injury documentation:  [] Other:    ESTIMATED NEEDS:  [x] No change:  [] Updated:  Energy: 3324-4242 kcal/day (30-35 kcal/kg)  Protein: 65-82  g/day (1.2-1.5 g/kg)  Fluid:   ml/day or [x] defer to team  Based on: IBW of 54.4kg:    NUTRITION DIAGNOSIS:  [x] Prior Dx: chronic severe malnutrition, Increased nutrient needs   [] New Dx:    EDUCATION:  [] Yes:  [x] Not appropriate/warranted    NUTRITION CARE PLAN:  1. Diet: Continue diet free of therapeutic restriction, on soft/bite size diet texture per SLP/team. RD remains available to adjust diet as needed.   2. Supplements: Recommend Ensure plus high protein 2x daily (700kcal, 40g proteins)   3. Multivitamin/mineral supplementation: Recommend MVI, pending no medical contraindication, for micronutrient support/aid wound healing.   4. Will add chocolate Magic cup 2x daily (580kcal, 18g proteins) to provide additional calories and nutrients to encourage PO intake while in house/aid wound healing.     [x] Achieved - Continue current nutrition intervention(s)  [] Current medical condition precludes nutrition intervention at this time.    MONITORING AND EVALUATION:   RD remains available upon request and will follow up per protocol:   Mackenzie Moore MS, RDN, CDN (Teams)

## 2024-08-19 NOTE — PROGRESS NOTE ADULT - ASSESSMENT
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 Admitted due to black output in the ileostomy bag c/f UGIB, afib with RVR, and c diff positive.    1. GIB  resolved GIB; stools brown  EGD deferred   at this point patient appears very depleted. Palliative approach seems reasonable.  PPI daily  diet as tolerated     2. Nausea/Vomiting   resolved      3. C. Diff   s/p abx  ID input appreciated       4. Afib   cardiology on board

## 2024-08-19 NOTE — PROGRESS NOTE ADULT - SUBJECTIVE AND OBJECTIVE BOX
INTERVAL HPI/OVERNIGHT EVENTS:    without new gi events   no gi bleeding    MEDICATIONS  (STANDING):  atorvastatin 80 milliGRAM(s) Oral at bedtime  digoxin  Injectable 125 MICROGram(s) IV Push <User Schedule>  levothyroxine 150 MICROGram(s) Oral daily  metoprolol tartrate 25 milliGRAM(s) Oral every 6 hours  midodrine. 30 milliGRAM(s) Oral every 8 hours  pantoprazole  Injectable 40 milliGRAM(s) IV Push two times a day    MEDICATIONS  (PRN):  acetaminophen     Tablet .. 650 milliGRAM(s) Oral every 6 hours PRN Temp greater or equal to 38C (100.4F), Mild Pain (1 - 3)      Allergies    penicillin (Hives)    Intolerances        Review of Systems:    General:  No wt loss, fevers, chills, night sweats, fatigue   Eyes:  Good vision, no reported pain  ENT:  No sore throat, pain, runny nose, dysphagia  CV:  No pain, palpitations, hypo/hypertension  Resp:  No dyspnea, cough, tachypnea, wheezing  GI:  No pain, No nausea, No vomiting, No diarrhea, No constipation, No weight loss, No fever, No pruritis, No rectal bleeding, No melena, No dysphagia  :  No pain, bleeding, incontinence, nocturia  Muscle:  No pain, weakness  Neuro:  No weakness, tingling, memory problems  Psych:  No fatigue, insomnia, mood problems, depression  Endocrine:  No polyuria, polydypsia, cold/heat intolerance  Heme:  No petechiae, ecchymosis, easy bruisability  Skin:  No rash, tattoos, scars, edema      Vital Signs Last 24 Hrs  T(C): 36.8 (19 Aug 2024 04:29), Max: 36.8 (19 Aug 2024 04:29)  T(F): 98.2 (19 Aug 2024 04:29), Max: 98.2 (19 Aug 2024 04:29)  HR: 107 (19 Aug 2024 07:09) (78 - 107)  BP: 96/74 (19 Aug 2024 04:29) (96/74 - 116/78)  BP(mean): --  RR: 18 (19 Aug 2024 04:29) (17 - 18)  SpO2: 94% (19 Aug 2024 04:29) (94% - 99%)    Parameters below as of 19 Aug 2024 04:29  Patient On (Oxygen Delivery Method): nasal cannula  O2 Flow (L/min): 2      PHYSICAL EXAM:    Constitutional: NAD  HEENT: EOMI, throat clear  Neck: No LAD, supple  Respiratory: CTA and P  Cardiovascular: S1 and S2, RRR, no M  Gastrointestinal: BS+, soft, NT/ND, neg HSM,  Extremities: No peripheral edema, neg clubbing, cyanosis  Vascular: 2+ peripheral pulses  Neurological: A/O   Psychiatric: Normal mood, normal affect  Skin: No rashes      LABS:                        13.4   12.55 )-----------( 108      ( 19 Aug 2024 09:12 )             41.1     08-18    132<L>  |  94<L>  |  41<H>  ----------------------------<  95  4.1   |  26  |  2.13<H>    Ca    8.5      18 Aug 2024 08:52        Urinalysis Basic - ( 18 Aug 2024 08:52 )    Color: x / Appearance: x / SG: x / pH: x  Gluc: 95 mg/dL / Ketone: x  / Bili: x / Urobili: x   Blood: x / Protein: x / Nitrite: x   Leuk Esterase: x / RBC: x / WBC x   Sq Epi: x / Non Sq Epi: x / Bacteria: x        RADIOLOGY & ADDITIONAL TESTS:

## 2024-08-19 NOTE — PROVIDER CONTACT NOTE (OTHER) - ASSESSMENT
Patient is A&Ox2, no changes from baseline, refusing medication, VSS, see in flow sheets. Attempted to coax patient into taking medication w/ assistance of 2 other floor nurses, patient still refused.

## 2024-08-19 NOTE — PROGRESS NOTE ADULT - SUBJECTIVE AND OBJECTIVE BOX
Subjective: Patient seen and examined. No new events except as noted.     REVIEW OF SYSTEMS:    CONSTITUTIONAL:+ weakness, fevers or chills  EYES/ENT: No visual changes;  No vertigo or throat pain   NECK: No pain or stiffness  RESPIRATORY: No cough, wheezing, hemoptysis; No shortness of breath  CARDIOVASCULAR: No chest pain or palpitations  GASTROINTESTINAL: No abdominal or epigastric pain. No nausea, vomiting, or hematemesis; No diarrhea or constipation. No melena or hematochezia.  GENITOURINARY: No dysuria, frequency or hematuria  NEUROLOGICAL: No numbness or weakness  SKIN: No itching, burning, rashes, or lesions   All other review of systems is negative unless indicated above.    MEDICATIONS:  MEDICATIONS  (STANDING):  atorvastatin 80 milliGRAM(s) Oral at bedtime  digoxin  Injectable 125 MICROGram(s) IV Push <User Schedule>  levothyroxine 150 MICROGram(s) Oral daily  metoprolol tartrate 25 milliGRAM(s) Oral every 6 hours  midodrine. 30 milliGRAM(s) Oral every 8 hours  pantoprazole  Injectable 40 milliGRAM(s) IV Push two times a day      PHYSICAL EXAM:  T(C): 36.8 (08-19-24 @ 04:29), Max: 36.8 (08-19-24 @ 04:29)  HR: 107 (08-19-24 @ 07:09) (78 - 107)  BP: 96/74 (08-19-24 @ 04:29) (96/74 - 116/78)  RR: 18 (08-19-24 @ 04:29) (17 - 18)  SpO2: 94% (08-19-24 @ 04:29) (94% - 99%)  Wt(kg): --  I&O's Summary    18 Aug 2024 07:01  -  19 Aug 2024 07:00  --------------------------------------------------------  IN: 525 mL / OUT: 300 mL / NET: 225 mL          Appearance: NAD  HEENT:   Dry  oral mucosa, PERRL, EOMI	  Lymphatic: No lymphadenopathy , no edema  Cardiovascular: Irregular  S1 S2, No JVD, No murmurs , Peripheral pulses palpable 2+ bilaterally  Respiratory: decreased bs   Gastrointestinal:  Soft, Non-tender, + BS	+ostomy  Skin: No rashes, No ecchymoses, No cyanosis, warm to touch  Musculoskeletal: decreased range of motion and strength  Psychiatry: sleepy    Ext: No edema    LABS:    CARDIAC MARKERS:                                13.4   12.55 )-----------( 108      ( 19 Aug 2024 09:12 )             41.1     08-18    132<L>  |  94<L>  |  41<H>  ----------------------------<  95  4.1   |  26  |  2.13<H>    Ca    8.5      18 Aug 2024 08:52        TELEMETRY: 	    ECG:  	  RADIOLOGY:   DIAGNOSTIC TESTING:  [ ] Echocardiogram:  [ ]  Catheterization:  [ ] Stress Test:    OTHER:

## 2024-08-19 NOTE — PROGRESS NOTE ADULT - ASSESSMENT
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 Nephrology consulted for elevated Scr and metabolic acidosis.    1) CHANELL: likely due to ATN. Scr improving. Continue with supportive care and can monitor labs if within GOC. UA active likely due to infection. FeNa low. CT without obstruction. TMA work up negative. Avoid nephrotoxins.    2) Hypotension: BP low. Continue with midodrine 30 mg PO TID. Rate control as per cardiology. Monitor BP.    3) LE edema: likely due to hypoalbuminemia improving s/p IV albumin with lasix on 8/16. TTE with normal LVSF. Monitor UO.    4) Metabolic acidosis: Resolved s/p sodium bicarbonate gtt. Monitor pH.    5) Hyponatremia: Mild and likely due to hypervolemia. No intervention needed at this time. Monitor serum Na.    6) Urinary retention: Continue with amaya. Holding flomax given hypotension.       [facility]  Paulie Storm M.D.  Mack Clancy D.O.  Maddi Steve M.D.  MD Olivia Caldera, MSN, ANP-C    Telephone: [phone]  Facsimile: [phone] [place] [address], [address]  [place] [address]

## 2024-08-20 LAB
GLUCOSE BLDC GLUCOMTR-MCNC: 67 MG/DL — LOW (ref 70–99)
GLUCOSE BLDC GLUCOMTR-MCNC: 72 MG/DL — SIGNIFICANT CHANGE UP (ref 70–99)
GLUCOSE BLDC GLUCOMTR-MCNC: 79 MG/DL — SIGNIFICANT CHANGE UP (ref 70–99)
GLUCOSE BLDC GLUCOMTR-MCNC: 88 MG/DL — SIGNIFICANT CHANGE UP (ref 70–99)
GLUCOSE BLDC GLUCOMTR-MCNC: 99 MG/DL — SIGNIFICANT CHANGE UP (ref 70–99)

## 2024-08-20 RX ADMIN — METOPROLOL TARTRATE 25 MILLIGRAM(S): 100 TABLET ORAL at 17:05

## 2024-08-20 RX ADMIN — MIDODRINE HYDROCHLORIDE 30 MILLIGRAM(S): 5 TABLET ORAL at 21:24

## 2024-08-20 RX ADMIN — Medication 150 MICROGRAM(S): at 05:35

## 2024-08-20 RX ADMIN — METOPROLOL TARTRATE 25 MILLIGRAM(S): 100 TABLET ORAL at 13:13

## 2024-08-20 RX ADMIN — METOPROLOL TARTRATE 25 MILLIGRAM(S): 100 TABLET ORAL at 05:37

## 2024-08-20 RX ADMIN — Medication 80 MILLIGRAM(S): at 21:24

## 2024-08-20 RX ADMIN — METOPROLOL TARTRATE 25 MILLIGRAM(S): 100 TABLET ORAL at 00:32

## 2024-08-20 RX ADMIN — MIDODRINE HYDROCHLORIDE 30 MILLIGRAM(S): 5 TABLET ORAL at 13:10

## 2024-08-20 RX ADMIN — Medication 40 MILLIGRAM(S): at 17:04

## 2024-08-20 RX ADMIN — Medication 40 MILLIGRAM(S): at 05:35

## 2024-08-20 RX ADMIN — MIDODRINE HYDROCHLORIDE 30 MILLIGRAM(S): 5 TABLET ORAL at 05:36

## 2024-08-20 NOTE — PROVIDER CONTACT NOTE (OTHER) - RECOMMENDATIONS
Administer midodrine now. Retake BP.
Provider notified.
Cont to monitor. Provider aware.
provider notified.
Notify provider
Notify provider
Provider aware. Cont to monitor.

## 2024-08-20 NOTE — PROGRESS NOTE ADULT - SUBJECTIVE AND OBJECTIVE BOX
ENDOCRINE DISCHARGE INSTRUCTIONS      Lab Result Notifications:      • LABS COMPLETED TODAY:  Dr. Escamilla may request that you schedule a virtual, telephone, or in person appointment to discuss your child's lab results.     • LABS to be completed at later date:  Please notify your nurse when your child will have labs completed.  Dr. Escamilla may request to schedule a virtual, telephone, or in person appointment to discuss lab results.      • Other Labs: we will contact you with results once ALL your labs have resulted.  If there is an abnormal result that needs to be addressed sooner, we will contact you.  Please note certain hormone labs can take up to 10 business days to result.     _____________________________________________________________________________________________________________________________    Please note:  we will send [email or web address] message with your lab and imaging results.  If you prefer to be notified via telephone, please let your provider know. Thank you    _____________________________________________________________________________________________________________________________    Medication Refill Requests  • Please look over your medications before coming to your appointment, to see if you need any refills.  • Pharmacy (medication) refills   o Call your pharmacy for a refill   o If a new prescription is needed, the pharmacy will contact the clinic for an updated prescription  o It can take up to 3 business days to send the updated prescription, so call the pharmacy before running out of your medication  o Call your pharmacy after 2-3 days to see if your prescription is ready for      Santi (online medical record):   We highly recommend that you sign up for [facility], if you don't already have this.  You will be able to review scheduled appointments, handle scheduling and canceling Sandhya appointments, review your lab work, view your medication list, and ask your provider and educator    date of service: 08-20-24 @ 10:02  [email or web address]  REVIEW OF SYSTEMS:  CONSTITUTIONAL: No fever,  no  weight loss  ENT:  No  tinnitus,   no   vertigo  NECK: No pain or stiffness  RESPIRATORY: No cough, wheezing, chills or hemoptysis;    No Shortness of Breath  CARDIOVASCULAR: No chest pain, palpitations, dizziness  GASTROINTESTINAL: No abdominal or epigastric pain. No nausea, vomiting, or hematemesis; No diarrhea  No melena or hematochezia.  GENITOURINARY: No dysuria, frequency, hematuria, or incontinence  NEUROLOGICAL: No headaches  SKIN: No itching,  no   rash  LYMPH Nodes: No enlarged glands  ENDOCRINE: No heat or cold intolerance  MUSCULOSKELETAL: No joint pain or swelling  PSYCHIATRIC: No depression, anxiety  HEME/LYMPH: No easy bruising, or bleeding gums  ALLERGY AND IMMUNOLOGIC: No hives or eczema	    MEDICATIONS  (STANDING):  atorvastatin 80 milliGRAM(s) Oral at bedtime  digoxin  Injectable 125 MICROGram(s) IV Push <User Schedule>  levothyroxine 150 MICROGram(s) Oral daily  metoprolol tartrate 25 milliGRAM(s) Oral every 6 hours  midodrine. 30 milliGRAM(s) Oral every 8 hours  pantoprazole  Injectable 40 milliGRAM(s) IV Push two times a day    MEDICATIONS  (PRN):  acetaminophen     Tablet .. 650 milliGRAM(s) Oral every 6 hours PRN Temp greater or equal to 38C (100.4F), Mild Pain (1 - 3)      Vital Signs Last 24 Hrs  T(C): 36.6 (20 Aug 2024 05:17), Max: 36.6 (20 Aug 2024 05:17)  T(F): 97.8 (20 Aug 2024 05:17), Max: 97.8 (20 Aug 2024 05:17)  HR: 90 (20 Aug 2024 05:17) (66 - 90)  BP: 108/64 (20 Aug 2024 05:17) (104/70 - 114/80)  BP(mean): --  RR: 18 (20 Aug 2024 05:17) (18 - 18)  SpO2: 97% (20 Aug 2024 05:17) (97% - 97%)    Parameters below as of 20 Aug 2024 07:15  Patient On (Oxygen Delivery Method): nasal cannula  O2 Flow (L/min): 2    CAPILLARY BLOOD GLUCOSE      POCT Blood Glucose.: 72 mg/dL (20 Aug 2024 08:43)  POCT Blood Glucose.: 87 mg/dL (19 Aug 2024 21:43)  POCT Blood Glucose.: 82 mg/dL (19 Aug 2024 17:16)  POCT Blood Glucose.: 83 mg/dL (19 Aug 2024 11:11)    I&O's Summary    19 Aug 2024 07:01  -  20 Aug 2024 07:00  --------------------------------------------------------  IN: 290 mL / OUT: 75 mL / NET: 215 mL          Appearance: Normal	  HEENT:   Normal oral mucosa, PERRL, EOMI	  Lymphatic: No lymphadenopathy  Cardiovascular: Normal S1 S2, No JVD  Respiratory: Lungs clear to auscultation	  Gastrointestinal:  Soft, Non-tender, + BS	  Skin: No rash, No ecchymoses	  Extremities:     LABS:                        13.4   12.55 )-----------( 108      ( 19 Aug 2024 09:12 )             41.1                           Thyroid Stimulating Hormone, Serum: 6.67 uIU/mL (08-13 @ 11:40)          Consultant(s) Notes Reviewed:      Care Discussed with Consultants/Other Providers:     non-urgent questions, without waiting on the phone.      Phone: [phone]   Email: [email or web address]   Fax: [phone]   To create account visit [email or web address]     Or- our  or I can help you do this.  [facility] should not be used for urgent concerns.     ____________________________________________________________________________________________________________________________    Please note:  we will send IID message with your lab and imaging results.  If you prefer to be notified via telephone, please let your provider know. Thank you    _____________________________________________________________________________________________________________________________      Our Patients are Important  • We want to provide the best care for your child, and you can help us improve.  You may receive a survey asking you about this visit. Please complete this survey, and we will use your feedback to make improvements.     If you have further questions or concerns after your appointment, please send us a message through [employer] [facility] or call us at [phone].      Thank you,   Dr. Francine Schwarz, nurse  Doris Blanchard, nurse

## 2024-08-20 NOTE — PROGRESS NOTE ADULT - ASSESSMENT
Platelets stable, palliative approach planned 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), COPD, CROW, HTN, HLD, urinary retention, recurrent UTIs, hypothyroidism, recent admission for UTI (c/b sepsis, encephalopathy, and bradycardia),  presented with AMS/lethargy, found to have melena, Afib/hypotension, thrombocytopenia    Thrombocytopenia  No prior hx of thrombocytopenia, plt were normal on admission and have declined since then though fluctuating    No LAD and liver/spleen normal on CT. Thrombocytopenia likely likely secondary to consumption with GIB + acute illness.  No heparin or AC use till heparin 8/12/24 briefly  Heparin Induced Platelet Antibody (08.13.24 @ 11:40)   Heparin-PF4 AB Result: 1.6 u/mL  Heparin-PF4 AB Interp: Positive  Serotonin Releasing Assay (08.14.24 @ 05:51)   Unfractionated Heparin-Low Dose: 0 %  Unfractionated Heparin-High Dose: 0 %  Unfractionated Heparin Result: Negative  A positive result requires release of serotonin from target platelets in   the presence of patient   serum and low dose (0.1 U/ml) heparin of > 20%, together with inhibition   of release (< 20%) with heparin  Since TAY is negative, no contraindication for heparin if needed.   Decreased platelets from acute illness and consumption  S/p 6 unit PRBC- last 8/7  No evidence of iron or B12 def  GI following  Platelets have shown improvement, patient for palliative approach

## 2024-08-20 NOTE — PROVIDER CONTACT NOTE (OTHER) - NAME OF MD/NP/PA/DO NOTIFIED:
FILI Sherwood
Ni-Laura Saratoga
NP Xenia Shallow
Day ACP Bartolome Lopez
FILI Abreu
FILI Abreu
Zen REY
FILI Abreu
NP Xenia Shallow
Ni-Laura Redwood
Zen KELLOGG
Zen Sherwood

## 2024-08-20 NOTE — PROGRESS NOTE ADULT - SUBJECTIVE AND OBJECTIVE BOX
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 Limited hx obtainable from pt, was AAOx~1 on encounter and very lethargic/somnolent, but arousable and seemed to deny pain anywhere. Collateral hx obtained from chart review. During recent admission (7/21/24-7/29/24), she was treated for UTI/sepsis and ultimately discharged to rehab to completed a 5-day course of ciprofloxacin (last dose 7/30/24). At rehab, she was reportedly found to have oliguria for a few days over the weekend for which she was started on IV fluids, however she was noncompliant with the IV access and she pulled it out many times. She has had very poor appetite and became lethargic and hypotensive. She was subsequently transferred to hospital where she was found to have black-colored output in ileostomy bag.     In ED: Afebrile, HR 120s-170s (Afib), SBP 90s-130s, RR 15-22, sating % on RA.  Labs notable for Hgb 11.3->10.3, Na 133->122, SCr 3.04->2.58; lactated 4.7->2.8, blood glucose to 400s but FS 100s. Found to be C.diff positive. UA not best sample with 9 epithelial cells, but noted +LE, +bacteria, +WBC, neg nitrite. CT A/P showed no acute intra-abdominal pathology and unchanged indeterminate left adrenal lesion. Received Vanc 500mg PO, Flagyl 500mg IVPB, amio 150mg IVPB x3, ofirmev 1g, 1.5L IVF, and 1u pRBC. Also started on amio gtt and protonix gtt. Nephrology and MICU were consulted. Admitted to Medicine for further management. (05 Aug 2024 23:46)    PAST MEDICAL & SURGICAL HISTORY:  Hypertension      Hypothyroid      Osteoarthritis  knees, back      CAD (coronary artery disease)      CROW (obstructive sleep apnea)  non complaint on CPAP      History of MI (myocardial infarction)  h/o previous MI in 2004 prompted PTCA  with stenting x 2 vessels   last stress/ echo 2019      Heart murmur  dx in childhood      Bilateral hearing loss, unspecified hearing loss type  bilateral aids      Obesity      Mixed stress and urge urinary incontinence      Overactive bladder      S/P ORIF (open reduction internal fixation) fracture  left hip 1962      S/P appendectomy  30 plus years      S/P knee replacement  left 2000      Stented coronary artery  2004 X 2 STENTS      S/P laparotomy  due to adhesions, 30 years ago      H/O dilation and curettage  2/2019 Benign polyp        Allergies    penicillin (Hives)    Intolerances      [facility] History:  [facility] (05 Aug 2024 23:46)    Medications:  acetaminophen     Tablet .. 650 milliGRAM(s) Oral every 6 hours PRN Temp greater or equal to 38C (100.4F), Mild Pain (1 - 3)  atorvastatin 80 milliGRAM(s) Oral at bedtime  digoxin  Injectable 125 MICROGram(s) IV Push <User Schedule>  levothyroxine 150 MICROGram(s) Oral daily  metoprolol tartrate 25 milliGRAM(s) Oral every 6 hours  midodrine. 30 milliGRAM(s) Oral every 8 hours  pantoprazole  Injectable 40 milliGRAM(s) IV Push two times a day    Labs:  CBC Full  -  ( 19 Aug 2024 09:12 )  WBC Count : 12.55 K/uL  RBC Count : 4.38 M/uL  Hemoglobin : 13.4 g/dL  Hematocrit : 41.1 %  Platelet Count - Automated : 108 K/uL  Mean Cell Volume : 93.8 fl  Mean Cell Hemoglobin : 30.6 pg  Mean Cell Hemoglobin Concentration : 32.6 gm/dL  Auto Neutrophil # : x  Auto Lymphocyte # : x  Auto Monocyte # : x  Auto Eosinophil # : x  Auto Basophil # : x  Auto Neutrophil % : x  Auto Lymphocyte % : x  Auto Monocyte % : x  Auto Eosinophil % : x  Auto Basophil % : x            Radiology:             ROS:  Patient comfortable without distress  No SOB or chest pain  No palpitation  No abdominal pain, diarrhaea or constipation  No weakness of extremities  No skin changes or swelling of legs  Rest of the comprehensive ROS was negative  Vital Signs Last 24 Hrs  T(C): 36.6 (20 Aug 2024 05:17), Max: 36.6 (20 Aug 2024 05:17)  T(F): 97.8 (20 Aug 2024 05:17), Max: 97.8 (20 Aug 2024 05:17)  HR: 90 (20 Aug 2024 05:17) (86 - 90)  BP: 108/64 (20 Aug 2024 05:17) (105/87 - 114/80)  BP(mean): --  RR: 18 (20 Aug 2024 05:17) (18 - 18)  SpO2: 97% (20 Aug 2024 05:17) (97% - 97%)    Parameters below as of 20 Aug 2024 07:15  Patient On (Oxygen Delivery Method): nasal cannula  O2 Flow (L/min): 2      Physical exam:  Patient alert and oriented  No distress  CVS: S1, S2 regular or murmur  Chest: bilateral breath sound without rales  Abdomen: soft, not tender, no organomegaly or masses  CNS: No focal neuro deficit  Musculoskeletal:  Normal range of motion  Skin: No rash    Assessment and Plan: HPI:  78F w/ hx of Afib (on Eliquis), CAD (on plavix), MCI/dementia, 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 She was reportedly found to have oliguria for a few days over the weekend for which she was started on IV fluids, however she was noncompliant with the IV access and she pulled it out many times. She has had very poor appetite and became lethargic and hypotensive. She was subsequently transferred to hospital where she was found to have black-colored output in ileostomy bag.     In ED: Afebrile, HR 120s-170s (Afib), SBP 90s-130s, RR 15-22, sating % on RA.  Labs notable for Hgb 11.3->10.3, Na 133->122, SCr 3.04->2.58; lactated 4.7->2.8, blood glucose to 400s but FS 100s. Found to be C.diff positive. UA not best sample with 9 epithelial cells, but noted +LE, +bacteria, +WBC, neg nitrite. CT A/P showed no acute intra-abdominal pathology and unchanged indeterminate left adrenal lesion. Received Vanc 500mg PO, Flagyl 500mg IVPB, amio 150mg IVPB x3, ofirmev 1g, 1.5L IVF, and 1u pRBC. Also started on amio gtt and protonix gtt. Nephrology and MICU were consulted.   Seen for low platelets  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), COPD, CROW, HTN, HLD, urinary retention, recurrent UTIs, hypothyroidism, recent admission for UTI (c/b sepsis, encephalopathy, and bradycardia),  presented with AMS/lethargy, found to have melena, Afib/hypotension, thrombocytopenia    Thrombocytopenia  No prior hx of thrombocytopenia, plt were normal on admission and have declined since then though fluctuating  Platelet Count - Automated: 76 K/uL (08.17.24 @ 06:48)   Platelet Count - Automated: 76 K/uL (08.16.24 @ 09:09)   Platelet Count - Automated: 86 K/uL (08.15.24 @ 06:16)   Platelet Count - Automated: 98 K/uL (08.14.24 @ 05:51)    Platelet Count - Automated: 91 K/uL (08.12.24 @ 06:44)   No LAD and liver/spleen normal on CT. Thrombocytopenia likely likely secondary to consumption with GIB + acute illness.  No heparin or AC use till heparin 8/12/24 briefly  Heparin Induced Platelet Antibody (08.13.24 @ 11:40)   Heparin-PF4 AB Result: 1.6 u/mL  Heparin-PF4 AB Interp: Positive  Serotonin Releasing Assay (08.14.24 @ 05:51)   Unfractionated Heparin-Low Dose: 0 %  Unfractionated Heparin-High Dose: 0 %  Unfractionated Heparin Result: Negative  A positive result requires release of serotonin from target platelets in   the presence of patient   serum and low dose (0.1 U/ml) heparin of > 20%, together with inhibition   of release (< 20%) with heparin  Since TAY is negative, no contraindication for heparin if needed.   Decreased platelets from acute illness and consumption  S/p 6 unit PRBC- last 8/7  No evidence of iron or B12 def  GI following  PAST MEDICAL & SURGICAL HISTORY:  Hypertension      Hypothyroid      Osteoarthritis  knees, back      CAD (coronary artery disease)      CROW (obstructive sleep apnea)  non complaint on CPAP      History of MI (myocardial infarction)  h/o previous MI in 2004 prompted PTCA  with stenting x 2 vessels   last stress/ echo 2019      Heart murmur  dx in childhood      Bilateral hearing loss, unspecified hearing loss type  bilateral aids      Obesity      Mixed stress and urge urinary incontinence      Overactive bladder      S/P ORIF (open reduction internal fixation) fracture  left hip 1962      S/P appendectomy  30 plus years      S/P knee replacement  left 2000      Stented coronary artery  2004 X 2 STENTS      S/P laparotomy  due to adhesions, 30 years ago      H/O dilation and curettage  2/2019 Benign polyp        Allergies    penicillin (Hives)    Intolerances      [facility]:  [facility] (05 Aug 2024 23:46)    Medications:  acetaminophen     Tablet .. 650 milliGRAM(s) Oral every 6 hours PRN Temp greater or equal to 38C (100.4F), Mild Pain (1 - 3)  atorvastatin 80 milliGRAM(s) Oral at bedtime  digoxin  Injectable 125 MICROGram(s) IV Push <User Schedule>  levothyroxine 150 MICROGram(s) Oral daily  metoprolol tartrate 25 milliGRAM(s) Oral every 6 hours  midodrine. 30 milliGRAM(s) Oral every 8 hours  pantoprazole  Injectable 40 milliGRAM(s) IV Push two times a day    Labs:  CBC Full  -  ( 19 Aug 2024 09:12 )  WBC Count : 12.55 K/uL  RBC Count : 4.38 M/uL  Hemoglobin : 13.4 g/dL  Hematocrit : 41.1 %  Platelet Count - Automated : 108 K/uL  Mean Cell Volume : 93.8 fl  Mean Cell Hemoglobin : 30.6 pg  Mean Cell Hemoglobin Concentration : 32.6 gm/dL  Auto Neutrophil # : x  Auto Lymphocyte # : x  Auto Monocyte # : x  Auto Eosinophil # : x  Auto Basophil # : x  Auto Neutrophil % : x  Auto Lymphocyte % : x  Auto Monocyte % : x  Auto Eosinophil % : x  Auto Basophil % : x            Radiology:             ROS:  Patient comfortable without distress  No SOB or chest pain  No palpitation  No abdominal pain, diarrhaea or constipation  No weakness of extremities  No skin changes or swelling of legs  Rest of the comprehensive ROS was negative  Vital Signs Last 24 Hrs  T(C): 36.6 (20 Aug 2024 05:17), Max: 36.6 (20 Aug 2024 05:17)  T(F): 97.8 (20 Aug 2024 05:17), Max: 97.8 (20 Aug 2024 05:17)  HR: 90 (20 Aug 2024 05:17) (86 - 90)  BP: 108/64 (20 Aug 2024 05:17) (105/87 - 114/80)  BP(mean): --  RR: 18 (20 Aug 2024 05:17) (18 - 18)  SpO2: 97% (20 Aug 2024 05:17) (97% - 97%)    Parameters below as of 20 Aug 2024 07:15  Patient On (Oxygen Delivery Method): nasal cannula  O2 Flow (L/min): 2      Physical exam:  Patient alert lethargicNo distress  CVS: S1, S2   Chest: bilateral breath sound without rales  Abdomen: soft, not tender, no organomegaly or masses  CNS: No focal neuro deficit  Musculoskeletal:  Normal range of motion  Skin: No rash    Assessment and Plan:

## 2024-08-20 NOTE — PROGRESS NOTE ADULT - SUBJECTIVE AND OBJECTIVE BOX
[facility] NEPHROLOGY- PROGRESS NOTE    78y Female with history of dementia, ischemic bowel s/p resection with end ostomy presents with AMS. Nephrology consulted for elevated Scr and metabolic acidosis.    REVIEW OF SYSTEMS: Unable to obtain due to mental status.    penicillin (Hives)      Hospital Medications: Medications reviewed        VITALS:  T(F): 96.4 (08-20-24 @ 12:19), Max: 97.8 (08-20-24 @ 05:17)  HR: 104 (08-20-24 @ 12:19)  BP: 98/67 (08-20-24 @ 12:19)  RR: 18 (08-20-24 @ 12:19)  SpO2: 97% (08-20-24 @ 05:17)  Wt(kg): --    08-19 @ 07:01  -  08-20 @ 07:00  --------------------------------------------------------  IN: 290 mL / OUT: 75 mL / NET: 215 mL      PHYSICAL EXAM:    Gen: NAD, calm  Cards: Irregularly irregular with tachycardia, +S1/S2, no M/G/R  Resp: CTA B/L  GI: soft, NT/ND, NABS, + ostomy   : + amaya  Vascular: + UE B/L, 1+ LE edema B/L        LABS:  08-18    132<L>  |  94<L>  |  41<H>  ----------------------------<  95  4.1   |  26  |  2.13<H>    Ca    8.5      18 Aug 2024 08:52      Creatinine Trend: 2.13 <--, 2.39 <--, 2.52 <--, 2.55 <--, 2.67 <--                        13.4   12.55 )-----------( 108      ( 19 Aug 2024 09:12 )             41.1     Urine Studies:  Urinalysis Basic - ( 18 Aug 2024 08:52 )    Color:  / Appearance:  / SG:  / pH:   Gluc: 95 mg/dL / Ketone:   / Bili:  / Urobili:    Blood:  / Protein:  / Nitrite:    Leuk Esterase:  / RBC:  / WBC    Sq Epi:  / Non Sq Epi:  / Bacteria:

## 2024-08-20 NOTE — PROGRESS NOTE ADULT - SUBJECTIVE AND OBJECTIVE BOX
Subjective: Patient seen and examined. No new events except as noted.     REVIEW OF SYSTEMS:    CONSTITUTIONAL: +weakness, fevers or chills  EYES/ENT: No visual changes;  No vertigo or throat pain   NECK: No pain or stiffness  RESPIRATORY: No cough, wheezing, hemoptysis; No shortness of breath  CARDIOVASCULAR: No chest pain or palpitations  GASTROINTESTINAL: No abdominal or epigastric pain. No nausea, vomiting, or hematemesis; No diarrhea or constipation. No melena or hematochezia.  GENITOURINARY: No dysuria, frequency or hematuria  NEUROLOGICAL: No numbness or weakness  SKIN: No itching, burning, rashes, or lesions   All other review of systems is negative unless indicated above.    MEDICATIONS:  MEDICATIONS  (STANDING):  atorvastatin 80 milliGRAM(s) Oral at bedtime  digoxin  Injectable 125 MICROGram(s) IV Push <User Schedule>  levothyroxine 150 MICROGram(s) Oral daily  metoprolol tartrate 25 milliGRAM(s) Oral every 6 hours  midodrine. 30 milliGRAM(s) Oral every 8 hours  pantoprazole  Injectable 40 milliGRAM(s) IV Push two times a day      PHYSICAL EXAM:  T(C): 36.6 (08-20-24 @ 05:17), Max: 36.6 (08-20-24 @ 05:17)  HR: 90 (08-20-24 @ 05:17) (66 - 90)  BP: 108/64 (08-20-24 @ 05:17) (104/70 - 114/80)  RR: 18 (08-20-24 @ 05:17) (18 - 18)  SpO2: 97% (08-20-24 @ 05:17) (97% - 97%)  Wt(kg): --  I&O's Summary    19 Aug 2024 07:01  -  20 Aug 2024 07:00  --------------------------------------------------------  IN: 290 mL / OUT: 75 mL / NET: 215 mL            Appearance: NAD  HEENT:   Dry  oral mucosa, PERRL, EOMI	  Lymphatic: No lymphadenopathy , no edema  Cardiovascular: Irregular  S1 S2, No JVD, No murmurs , Peripheral pulses palpable 2+ bilaterally  Respiratory: decreased bs   Gastrointestinal:  Soft, Non-tender, + BS	+ostomy  Skin: No rashes, No ecchymoses, No cyanosis, warm to touch  Musculoskeletal: decreased range of motion and strength  Psychiatry: sleepy    Ext: No edema        LABS:    CARDIAC MARKERS:                                13.4   12.55 )-----------( 108      ( 19 Aug 2024 09:12 )             41.1           proBNP:   Lipid Profile:   HgA1c:   TSH:             TELEMETRY: 	  AF  ECG:  	  RADIOLOGY:   DIAGNOSTIC TESTING:  [ ] Echocardiogram:  [ ]  Catheterization:  [ ] Stress Test:    OTHER:

## 2024-08-20 NOTE — PROGRESS NOTE ADULT - ASSESSMENT
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 Nephrology consulted for elevated Scr and metabolic acidosis.    1) CHANELL: likely due to ATN. Scr improving as per most recent labs. Continue with supportive care and can monitor labs if within GOC. UA active likely due to infection. FeNa low. CT without obstruction. TMA work up negative. Avoid nephrotoxins.    2) Hypotension: BP low. Continue with midodrine 30 mg PO TID. Rate control as per cardiology. Monitor BP.    3) LE edema: likely due to hypoalbuminemia improving s/p IV albumin with lasix on 8/16. TTE with normal LVSF. Monitor UO.    4) Metabolic acidosis: Resolved s/p sodium bicarbonate gtt. Monitor pH.    5) Hyponatremia: Mild and likely due to hypervolemia. No intervention needed at this time. Monitor serum Na.    6) Urinary retention: Continue with amaya. Holding flomax given hypotension.       [facility] NEPHROLOGY  Paulie Storm M.D.  DEREK WhittingtonO.  Maddi Steve M.D.  MD Olivia Caldera, MSN, ANP-C    Telephone: [phone]  Facsimile: [phone], [address]  [place] [address]

## 2024-08-20 NOTE — PROGRESS NOTE ADULT - SUBJECTIVE AND OBJECTIVE BOX
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 Limited hx obtainable from pt, was AAOx~1 on encounter and very lethargic/somnolent, but arousable and seemed to deny pain anywhere. Collateral hx obtained from chart review. During recent admission (7/21/24-7/29/24), she was treated for UTI/sepsis and ultimately discharged to rehab to completed a 5-day course of ciprofloxacin (last dose 7/30/24). At rehab, she was reportedly found to have oliguria for a few days over the weekend for which she was started on IV fluids, however she was noncompliant with the IV access and she pulled it out many times. She has had very poor appetite and became lethargic and hypotensive. She was subsequently transferred to hospital where she was found to have black-colored output in ileostomy bag.   In ED: Afebrile, HR 120s-170s (Afib), SBP 90s-130s, RR 15-22, sating % on RA.  Labs notable for Hgb 11.3->10.3, Na 133->122, SCr 3.04->2.58; lactated 4.7->2.8, blood glucose to 400s but FS 100s. Found to be C.diff positive. UA not best sample with 9 epithelial cells, but noted +LE, +bacteria, +WBC, neg nitrite. CT A/P showed no acute intra-abdominal pathology and unchanged indeterminate left adrenal lesion. Received Vanc 500mg PO, Flagyl 500mg IVPB, amio 150mg IVPB x3, ofirmev 1g, 1.5L IVF, and 1u pRBC. Also started on amio gtt and protonix gtt. Nephrology and MICU were consulted. Admitted to Medicine for further management. (05 Aug 2024 23:46)    Known to me from prior admissions. Has paroxysmal AFib, and syncope, has an ILR for surveillance for long pauses.  She has known short pauses overnight, but nothing long enough or with symptoms to warrant pacemaker insertion.  During subsequent admissions, the usual issue has been rapidly conducted  AFib.  Unable to answer 10pt ROS due to somnolence.  Date of service   8/20  no chest pain , remains lethargic.      PAST MEDICAL & SURGICAL HISTORY:  Hypertension  Hypothyroid  Osteoarthritis  knees, back  CAD (coronary artery disease)  CROW (obstructive sleep apnea)  non complaint on CPAP  History of MI (myocardial infarction)  h/o previous MI in 2004 prompted PTCA  with stenting x 2 vessels   last stress/ echo 2019  Heart murmur  dx in childhood  Bilateral hearing loss, unspecified hearing loss type  bilateral aids  Obesity  Mixed stress and urge urinary incontinence  Overactive bladder  S/P ORIF (open reduction internal fixation) fracture  left hip 1962  S/P appendectomy  30 plus years  S/P knee replacement  left 2000  Stented coronary artery  2004 X 2 STENTS  S/P laparotomy  due to adhesions, 30 years ago  H/O dilation and curettage  2/2019 Benign polyp     acetaminophen     Tablet .. 650 milliGRAM(s) Oral every 6 hours PRN  atorvastatin 80 milliGRAM(s) Oral at bedtime  digoxin  Injectable 125 MICROGram(s) IV Push <User Schedule>  levothyroxine 150 MICROGram(s) Oral daily  metoprolol tartrate 25 milliGRAM(s) Oral every 6 hours  midodrine. 30 milliGRAM(s) Oral every 8 hours  pantoprazole  Injectable 40 milliGRAM(s) IV Push two times a day                            13.4   12.55 )-----------( 108      ( 19 Aug 2024 09:12 )             41.1         T(C): 35.8 (08-20-24 @ 12:19), Max: 36.6 (08-20-24 @ 05:17)  HR: 104 (08-20-24 @ 12:19) (86 - 104)  BP: 98/67 (08-20-24 @ 12:19) (98/67 - 114/80)  RR: 18 (08-20-24 @ 12:19) (18 - 18)  SpO2: 97% (08-20-24 @ 05:17) (97% - 97%)  Wt(kg): --    I&O's Summary    19 Aug 2024 07:01  -  20 Aug 2024 07:00  --------------------------------------------------------  IN: 290 mL / OUT: 75 mL / NET: 215 mL    20 Aug 2024 07:01  -  20 Aug 2024 14:43  --------------------------------------------------------  IN: 300 mL / OUT: 250 mL / NET: 50 mL         Appearance: frail elderly woman in no acute distress, somnolent	  HEENT:   Normal oral mucosa, PERRL, EOMI	  Lymphatic: No lymphadenopathy , no edema  Cardiovascular: rapid irregular S1 S2, No JVD, No murmurs , Peripheral pulses palpable 2+ bilaterally  Respiratory: Lungs clear to auscultation, normal effort 	  Gastrointestinal:  Soft, Non-tender, + BS	  Skin: No rashes, No ecchymoses, No cyanosis, warm to touch  Musculoskeletal: Normal range of motion, normal strength  Psychiatry:  Mood & affect appropriate    TELEMETRY: AF 100s.	    ECG: AFib RVR 	    ASSESSMENT/PLAN: Ms Gooden is a pleasant 78y Female here with CDiff +/- GI bleeding.  EP called re: management of rapid AFib.  Has Paroxysmal AFib.  Had brief episodes of sinus rhythm on last admission, and known short pauses that are not long enough to justify permanent pacemaker insertion.  Has an ILR for long-pause surveillance, data managed by Dr Mendiola.    has been on heparin infusion. 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  pending alternative IV anticoag.  Continue metoprolol alongside midodrine. OK for holding parameters for HEARTRATE, but metoprolol is in general BP-neutral.  cont metoprolol to 25mg PO Q6hrs.   Unable to escalate digoxin dose due to advanced CHANELL on CKD.  Not a good candidate for SUSY/Cardioversion - unlikely to be successful long-term.  Not a good candidate for VVI pacemaker / AVJ ablation.  Also, not likely that restoration of sinus or paced rhythm would improve her overall well-being.  She would still likely be somnolent and frail.  Awaiting resolution of diarrhea via ostomy.  Prognosis is guarded, as she's declinded considerably over the last 6 months (6 hospitalizations).  Palliative care has seen at daughter's request.  Working on DC to hospice-capable nursing facility.    Shane Frias M.D.  Cardiac Electrophysiology  [phone]

## 2024-08-20 NOTE — PROGRESS NOTE ADULT - ASSESSMENT
78 year old female        h/o  Afib (on Eliquis), CAD (on plavix),    dementia, ischemic bowel    s/p ex-lap w/ bowel resection + end ileostomy),         COPD, CROW, HTN, HLD, urinary retention, recurrent UTIs, hypothyroidism, recent admission for UTI  c/b sepsis,  bradycardia         presenting with AMS/lethargy  . last  visit (7/21/24-7/29/24) UTI/sepsis,  dc  to rehab    has had very poor appetite  lethargic and hypotensive.   sent  er,  black-color  in ileostomy bag.           C.diff   from  recent antibiotic/  vanco  compleyed  8/16        Acute blood loss anemia due to UGIB       c/c  Afib with RVR,/  EP  abd  card  following             eliquis   on hold        CKD        c/c  anemia,   and   c/c  low  plts         8/5 CTAP,   no acute intra-abd pathology         8/6 am s/p RRT for hypotension, tachycardia, ostomy m melanotic stools -now resolved,  and  per  Surg,/  gi , no acute intervention      Ucx , C. albicans some, has amaya in place, suspect contaminant/colonization       per  GI,  dr hood,  a./c  on  hold       Afib  rvr.  rx  per card/  ep  dr emilia yen /  and  no  a/c  pe r team/  tele,  wct.  meds  pe r card/  nikki august pt  huerta s been  refusing  meds       bed  bound/  anasarca.  por  po intake / lipitor, dig.  torpol,  midodrine       plan,  home  hospice  /  poor  po  intake.  also  was  refusing   meds        d/c  plans  is    to hospice          discussed  with    daughter  natalia.           daughter   revesting    home  help  for  about  4  hours  or   so/  also  asking  what  Medicare  covers.  etc         states,  she is   still  waiting  to  hear  from agencies/ pt  ha s been  cleraed  for   d/c            pt  is  dnr/ dni

## 2024-08-20 NOTE — PROVIDER CONTACT NOTE (OTHER) - DATE AND TIME:
08-Aug-2024 00:45
09-Aug-2024 18:36
09-Aug-2024 18:39
12-Aug-2024 05:15
18-Aug-2024 00:40
20-Aug-2024 07:18
11-Aug-2024 02:12
12-Aug-2024 02:28
19-Aug-2024 01:10
12-Aug-2024 07:30
18-Aug-2024 05:05
08-Aug-2024 06:21

## 2024-08-20 NOTE — PROGRESS NOTE ADULT - SUBJECTIVE AND OBJECTIVE BOX
INTERVAL HPI/OVERNIGHT EVENTS:    sleeping, easily awoken   brown stools    MEDICATIONS  (STANDING):  atorvastatin 80 milliGRAM(s) Oral at bedtime  digoxin  Injectable 125 MICROGram(s) IV Push <User Schedule>  levothyroxine 150 MICROGram(s) Oral daily  metoprolol tartrate 25 milliGRAM(s) Oral every 6 hours  midodrine. 30 milliGRAM(s) Oral every 8 hours  pantoprazole  Injectable 40 milliGRAM(s) IV Push two times a day    MEDICATIONS  (PRN):  acetaminophen     Tablet .. 650 milliGRAM(s) Oral every 6 hours PRN Temp greater or equal to 38C (100.4F), Mild Pain (1 - 3)      Allergies    penicillin (Hives)    Intolerances        Review of Systems: *limited 2/2 confusion    General:  No wt loss, fevers, chills, night sweats, fatigue   Eyes:  Good vision, no reported pain  ENT:  No sore throat, pain, runny nose, dysphagia  CV:  No pain, palpitations, hypo/hypertension  Resp:  No dyspnea, cough, tachypnea, wheezing  GI:  No pain, No nausea, No vomiting, No diarrhea, No constipation, No weight loss, No fever, No pruritis, No rectal bleeding, No melena, No dysphagia  :  No pain, bleeding, incontinence, nocturia  Muscle:  No pain, weakness  Neuro:  No weakness, tingling, memory problems  Psych:  No fatigue, insomnia, mood problems, depression  Endocrine:  No polyuria, polydypsia, cold/heat intolerance  Heme:  No petechiae, ecchymosis, easy bruisability  Skin:  No rash, tattoos, scars, edema      Vital Signs Last 24 Hrs  T(C): 36.6 (20 Aug 2024 05:17), Max: 36.6 (20 Aug 2024 05:17)  T(F): 97.8 (20 Aug 2024 05:17), Max: 97.8 (20 Aug 2024 05:17)  HR: 90 (20 Aug 2024 05:17) (66 - 90)  BP: 108/64 (20 Aug 2024 05:17) (104/70 - 114/80)  BP(mean): --  RR: 18 (20 Aug 2024 05:17) (18 - 18)  SpO2: 97% (20 Aug 2024 05:17) (97% - 97%)    Parameters below as of 20 Aug 2024 07:15  Patient On (Oxygen Delivery Method): nasal cannula  O2 Flow (L/min): 2      PHYSICAL EXAM:    Constitutional: NAD  HEENT: EOMI, throat clear  Neck: No LAD, supple  Respiratory: CTA and P  Cardiovascular: S1 and S2, RRR, no M  Gastrointestinal: BS+, soft, NT/ND, neg HSM,  Extremities: No peripheral edema, neg clubbing, cyanosis  Vascular: 2+ peripheral pulses  Neurological: A/O   Psychiatric: Normal mood, normal affect  Skin: No rashes      LABS:                        13.4   12.55 )-----------( 108      ( 19 Aug 2024 09:12 )             41.1                 RADIOLOGY & ADDITIONAL TESTS:

## 2024-08-20 NOTE — PROGRESS NOTE ADULT - ASSESSMENT
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 Admitted due to black output in the ileostomy bag c/f UGIB, afib with RVR, and c diff positive.    1. GIB - Resolved   stools brown in ostomy bag   EGD deferred   patient appears very depleted. Palliative approach seems reasonable.  PPI daily  diet as tolerated     2. Nausea/Vomiting   resolved      3. C. Diff   s/p abx  ID input appreciated       4. Afib   cardiology on board

## 2024-08-20 NOTE — PROVIDER CONTACT NOTE (OTHER) - REASON
14 bts WCT on tele.
HR 140s-160s, Non-sustaining, Afib
Pt fs 69, BP 82/58
Refusing 12 AM meds
BP 90/59
Q6 fs 70
pt hypotensive
Pt refusing PO meds AM and PM.
BP 90/58
HR 150s-170s, Afib non-sustained
pt tachycardic 150'2-170's
rectal temp 95.7

## 2024-08-21 ENCOUNTER — NON-APPOINTMENT (OUTPATIENT)
Age: 79
End: 2024-08-21

## 2024-08-21 ENCOUNTER — TRANSCRIPTION ENCOUNTER (OUTPATIENT)
Age: 79
End: 2024-08-21

## 2024-08-21 VITALS — HEART RATE: 113 BPM | TEMPERATURE: 98 F | SYSTOLIC BLOOD PRESSURE: 110 MMHG | DIASTOLIC BLOOD PRESSURE: 82 MMHG

## 2024-08-21 LAB — GLUCOSE BLDC GLUCOMTR-MCNC: 80 MG/DL — SIGNIFICANT CHANGE UP (ref 70–99)

## 2024-08-21 RX ORDER — DIGOXIN 0.12 MG/1
1 TABLET ORAL
Qty: 15 | Refills: 0
Start: 2024-08-21 | End: 2024-09-19

## 2024-08-21 RX ORDER — METOPROLOL TARTRATE 100 MG/1
1 TABLET ORAL
Qty: 120 | Refills: 0
Start: 2024-08-21 | End: 2024-09-19

## 2024-08-21 RX ORDER — CYANOCOBALAMIN/FOLIC AC/VIT B6 2-2.5-25MG
1 TABLET ORAL
Qty: 0 | Refills: 0 | DISCHARGE

## 2024-08-21 RX ORDER — PANTOPRAZOLE SODIUM 40 MG
40 TABLET, DELAYED RELEASE (ENTERIC COATED) ORAL
Refills: 0 | Status: DISCONTINUED | OUTPATIENT
Start: 2024-08-21 | End: 2024-08-21

## 2024-08-21 RX ORDER — PANTOPRAZOLE SODIUM 40 MG
1 TABLET, DELAYED RELEASE (ENTERIC COATED) ORAL
Qty: 30 | Refills: 0
Start: 2024-08-21 | End: 2024-09-19

## 2024-08-21 RX ORDER — DIGOXIN 0.12 MG/1
125 TABLET ORAL EVERY OTHER DAY
Refills: 0 | Status: DISCONTINUED | OUTPATIENT
Start: 2024-08-21 | End: 2024-08-21

## 2024-08-21 RX ORDER — METOPROLOL TARTRATE 100 MG
1 TABLET ORAL
Qty: 0 | Refills: 0 | DISCHARGE

## 2024-08-21 RX ORDER — LYSINE HCL 500 MG
1 TABLET ORAL
Qty: 0 | Refills: 0 | DISCHARGE

## 2024-08-21 RX ORDER — MIRTAZAPINE 15 MG
0.5 TABLET ORAL
Qty: 0 | Refills: 0 | DISCHARGE

## 2024-08-21 RX ADMIN — Medication 40 MILLIGRAM(S): at 05:12

## 2024-08-21 RX ADMIN — METOPROLOL TARTRATE 25 MILLIGRAM(S): 100 TABLET ORAL at 00:59

## 2024-08-21 RX ADMIN — MIDODRINE HYDROCHLORIDE 30 MILLIGRAM(S): 5 TABLET ORAL at 05:12

## 2024-08-21 RX ADMIN — DIGOXIN 125 MICROGRAM(S): 0.12 TABLET ORAL at 12:15

## 2024-08-21 RX ADMIN — Medication 150 MICROGRAM(S): at 05:12

## 2024-08-21 RX ADMIN — METOPROLOL TARTRATE 25 MILLIGRAM(S): 100 TABLET ORAL at 12:13

## 2024-08-21 RX ADMIN — MIDODRINE HYDROCHLORIDE 30 MILLIGRAM(S): 5 TABLET ORAL at 12:13

## 2024-08-21 RX ADMIN — METOPROLOL TARTRATE 25 MILLIGRAM(S): 100 TABLET ORAL at 05:12

## 2024-08-21 NOTE — PROGRESS NOTE ADULT - PROBLEM SELECTOR PROBLEM 1
Hypothyroidism
Gastrointestinal hemorrhage
Hypothyroidism
Gastrointestinal hemorrhage
Hypothyroidism
Hypothyroidism
Gastrointestinal hemorrhage
Gastrointestinal hemorrhage
Hypothyroidism
Gastrointestinal hemorrhage
Hypothyroidism
Gastrointestinal hemorrhage

## 2024-08-21 NOTE — PROGRESS NOTE ADULT - SUBJECTIVE AND OBJECTIVE BOX
[facility] NEPHROLOGY- PROGRESS NOTE    78y Female with history of dementia, ischemic bowel s/p resection with end ostomy presents with AMS. Nephrology consulted for elevated Scr and metabolic acidosis.    REVIEW OF SYSTEMS: Unable to obtain due to mental status.    penicillin (Hives)      Hospital Medications: Medications reviewed        VITALS:  T(F): 98.1 (08-21-24 @ 05:00), Max: 98.1 (08-21-24 @ 05:00)  HR: 94 (08-21-24 @ 05:00)  BP: 110/88 (08-21-24 @ 05:00)  RR: 18 (08-21-24 @ 05:00)  SpO2: 97% (08-21-24 @ 05:00)  Wt(kg): --    08-20 @ 07:01  -  08-21 @ 07:00  --------------------------------------------------------  IN: 480 mL / OUT: 700 mL / NET: -220 mL        PHYSICAL EXAM:    Gen: NAD, calm  Cards: Irregularly irregular, +S1/S2, no M/G/R  Resp: CTA B/L  GI: soft, NT/ND, NABS, + ostomy   : + amaya  Vascular: + UE B/L, 1+ LE edema B/L        LABS:        Creatinine Trend: 2.13 <--, 2.39 <--, 2.52 <--                        13.4   12.55 )-----------( 108      ( 19 Aug 2024 09:12 )             41.1     Urine Studies:  Urinalysis Basic - ( 18 Aug 2024 08:52 )    Color:  / Appearance:  / SG:  / pH:   Gluc: 95 mg/dL / Ketone:   / Bili:  / Urobili:    Blood:  / Protein:  / Nitrite:    Leuk Esterase:  / RBC:  / WBC    Sq Epi:  / Non Sq Epi:  / Bacteria:

## 2024-08-21 NOTE — PROGRESS NOTE ADULT - PROBLEM SELECTOR PROBLEM 3
Atrial fibrillation with RVR

## 2024-08-21 NOTE — PROGRESS NOTE ADULT - PROBLEM SELECTOR PROBLEM 4
CHANELL (acute kidney injury)

## 2024-08-21 NOTE — PROGRESS NOTE ADULT - PROBLEM SELECTOR PLAN 1
Increased synthroid to 150 mcg daily  Suggest to repeat thyroid function test in 4-6 weeks. Outpatient follow up
s/p 1u prbc with appropriate compensation   keep off Eliquis for now   GI eval    PPI. Possible EGD
s/p 1u prbc with appropriate compensation   keep off Eliquis for now   GI eval    PPI. Possible EGD
Increased synthroid to 150 mcg daily  Suggest to repeat thyroid function test in 4-6 weeks. Outpatient follow up.
Increased synthroid to 150 mcg daily  Suggest to repeat thyroid function test in 4-6 weeks. Outpatient follow up.
Increased synthroid to 150 mcg daily  Suggest to repeat thyroid function test in 4-6 weeks. Outpatient follow up
s/p 1u prbc with appropriate compensation   keep off Eliquis for now   GI eval    PPI.
Increased synthroid to 150 mcg daily  Suggest to repeat thyroid function test in 4-6 weeks. Outpatient follow up
Increased synthroid to 150 mcg daily  Suggest to repeat thyroid function test in 4-6 weeks. Outpatient follow up.
Increased synthroid to 150 mcg daily  Suggest to repeat thyroid function test in 4-6 weeks. Outpatient follow up.
Increased synthroid to 150 mcg daily  Suggest to repeat thyroid function test in 4-6 weeks. Outpatient follow up
s/p 1u pRBC with appropriate compensation   keep off Eliquis for now   GI eval    PPI.
Increased synthroid to 150 mcg daily  Suggest to repeat thyroid function test in 4-6 weeks. Outpatient follow up
Increased synthroid to 150 mcg daily  Suggest to repeat thyroid function test in 4-6 weeks. Outpatient follow up
s/p 1u prbc with appropriate compensation   keep off Eliquis for now   GI eval    PPI. Possible EGD
Increased synthroid to 150 mcg daily  Suggest to repeat thyroid function test in 4-6 weeks. Outpatient follow up
s/p 1u prbc with appropriate compensation   keep off Eliquis for now   GI eval    PPI. Possible EGD
Increased synthroid to 150 mcg daily  Suggest to repeat thyroid function test in 4-6 weeks. Outpatient follow up.
s/p 1u pRBC with appropriate compensation   keep off Eliquis for now   GI eval    PPI.
s/p 1u prbc with appropriate compensation   keep off Eliquis for now   GI eval    PPI. Possible EGD
s/p 1u prbc with appropriate compensation   keep off Eliquis for now   GI eval    PPI. Possible EGD
s/p 1u prbc with appropriate compensation   keep off Eliquis for now   GI eval    PPI.
s/p 1u prbc with appropriate compensation   keep off Eliquis for now   GI eval    PPI. Possible EGD
s/p 1u prbc with appropriate compensation   keep off Eliquis for now   GI eval    PPI.
s/p 1u prbc with appropriate compensation   keep off Eliquis for now   GI eval    PPI. Possible EGD

## 2024-08-21 NOTE — PROGRESS NOTE ADULT - PROBLEM SELECTOR PLAN 3
reactive secondary to sepsis   Off BB due to hypotension   Digoxin .125 IV as ordered
reactive secondary to sepsis   Off BB due to hypotension   s/p digoxin , overall better HR controlled
reactive secondary to sepsis   BP slightly improved, started Metoprolol  Digoxin .125 IV as ordered
reactive secondary to sepsis   Off BB due to hypotension   digoxin as ordered
reactive secondary to sepsis   BP slightly improved, started Metoprolol  Digoxin .125 IV as ordered
reactive secondary to sepsis   Off BB due to hypotension   Digoxin .125 IV as ordered. Give 0.25 x 1 now
reactive secondary to sepsis   Off BB due to hypotension   Digoxin .25 IV x 1 now and in 6 hours
reactive secondary to sepsis   Off BB due to hypotension   Digoxin .125 IV as ordered
reactive secondary to sepsis   BP slightly improved, started Metoprolol  Digoxin .125 IV as ordered
reactive secondary to sepsis   BP slightly improved, started Metoprolol  Digoxin .125 IV as ordered
reactive secondary to sepsis   Off BB due to hypotension   Digoxin .125 IV as ordered. Give 0.25 x 1 now
reactive secondary to sepsis   BP slightly improved, started Metoprolol  Digoxin .125 IV as ordered

## 2024-08-21 NOTE — PROGRESS NOTE ADULT - PROBLEM SELECTOR PLAN 2
Suggest to continue medications, monitoring, FU primary team recommendations
c/w PO vanc and flagyl  Likely secondary to recent Cipro use.
Suggest to continue medications, monitoring, FU primary team recommendations.
c/w PO vanc and flagyl  Likely secondary to recent Cipro use.
Suggest to continue medications, monitoring, FU primary team recommendations.
Suggest to continue medications, monitoring, FU primary team recommendations.
Suggest to continue medications, monitoring, FU primary team recommendations
c/w PO vanc and flagyl  Likely secondary to recent Cipro use.
Suggest to continue medications, monitoring, FU primary team recommendations
c/w PO vanc and flagyl  Likely secondary to recent Cipro use.
Suggest to continue medications, monitoring, FU primary team recommendations.
c/w PO vanc and flagyl  Likely secondary to recent Cipro use.
completed PO vanc and flagyl  Likely secondary to recent Cipro use.
c/w PO vanc and flagyl  Likely secondary to recent Cipro use.

## 2024-08-21 NOTE — PROGRESS NOTE ADULT - SUBJECTIVE AND OBJECTIVE BOX
INTERVAL HPI/OVERNIGHT EVENTS:    low appetite   endorses she is feeling better this morning    MEDICATIONS  (STANDING):  atorvastatin 80 milliGRAM(s) Oral at bedtime  digoxin     Tablet 125 MICROGram(s) Oral every other day  levothyroxine 150 MICROGram(s) Oral daily  metoprolol tartrate 25 milliGRAM(s) Oral every 6 hours  midodrine. 30 milliGRAM(s) Oral every 8 hours  pantoprazole    Tablet 40 milliGRAM(s) Oral before breakfast    MEDICATIONS  (PRN):      Allergies    penicillin (Hives)    Intolerances        Review of Systems:    General:  No wt loss, fevers, chills, night sweats, fatigue   Eyes:  Good vision, no reported pain  ENT:  No sore throat, pain, runny nose, dysphagia  CV:  No pain, palpitations, hypo/hypertension  Resp:  No dyspnea, cough, tachypnea, wheezing  GI:  No pain, No nausea, No vomiting, No diarrhea, No constipation, No weight loss, No fever, No pruritis, No rectal bleeding, No melena, No dysphagia  :  No pain, bleeding, incontinence, nocturia  Muscle:  No pain, weakness  Neuro:  No weakness, tingling, memory problems  Psych:  No fatigue, insomnia, mood problems, depression  Endocrine:  No polyuria, polydypsia, cold/heat intolerance  Heme:  No petechiae, ecchymosis, easy bruisability  Skin:  No rash, tattoos, scars, edema      Vital Signs Last 24 Hrs  T(C): 36.7 (21 Aug 2024 05:00), Max: 36.7 (21 Aug 2024 05:00)  T(F): 98.1 (21 Aug 2024 05:00), Max: 98.1 (21 Aug 2024 05:00)  HR: 94 (21 Aug 2024 05:00) (76 - 104)  BP: 110/88 (21 Aug 2024 05:00) (91/64 - 123/87)  BP(mean): --  RR: 18 (21 Aug 2024 05:00) (18 - 18)  SpO2: 97% (21 Aug 2024 05:00) (97% - 98%)    Parameters below as of 21 Aug 2024 05:00  Patient On (Oxygen Delivery Method): nasal cannula  O2 Flow (L/min): 2      PHYSICAL EXAM:    Constitutional: NAD  HEENT: EOMI, throat clear  Neck: No LAD, supple  Respiratory: CTA and P  Cardiovascular: S1 and S2, RRR, no M  Gastrointestinal: BS+, soft, NT/ND, neg HSM,  Extremities: No peripheral edema, neg clubbing, cyanosis  Vascular: 2+ peripheral pulses  Neurological: A/O   Psychiatric: Normal mood, normal affect  Skin: No rashes      LABS:                RADIOLOGY & ADDITIONAL TESTS:

## 2024-08-21 NOTE — PROGRESS NOTE ADULT - PROBLEM SELECTOR PLAN 4
Monitor closely   Nephro follow up

## 2024-08-21 NOTE — PROGRESS NOTE ADULT - ASSESSMENT
78 year old female        h/o  Afib (on Eliquis), CAD (on plavix),    dementia, ischemic bowel    s/p ex-lap w/ bowel resection + end ileostomy),         COPD, CROW, HTN, HLD, urinary retention, recurrent UTIs, hypothyroidism, recent admission for UTI  c/b sepsis,  bradycardia         presenting with AMS/lethargy  . last  visit (7/21/24-7/29/24) UTI/sepsis,  dc  to rehab    has had very poor appetite  lethargic and hypotensive.   sent  er,  black-color  in ileostomy bag.           C.diff   from  recent antibiotic/  vanco  compleyed  8/16        Acute blood loss anemia due to UGIB       c/c  Afib with RVR,/  EP  abd  card  following             eliquis   on hold        CKD        c/c  anemia,   and   c/c  low  plts         8/5 CTAP,   no acute intra-abd pathology         8/6 am s/p RRT for hypotension, tachycardia, ostomy m melanotic stools -now resolved,  and  per  Surg,/  gi , no acute intervention      Ucx , C. albicans some, has amaya in place, suspect contaminant/colonization       per  GI,  dr hood,  a./c  on  hold       Afib  rvr.  rx  per card/  ep  dr emilia yen /  and  no  a/c  pe r team/  tele,  wct.  meds  pe r card/  nikki august pt  huerta s been  refusing  meds       bed  bound/  anasarca.  por  po intake / lipitor, dig.  torpol,  midodrine       plan,  home  hospice  /  poor  po  intake.  also  was  refusing   meds.  bed bound/  ftt/  refusing  to  eat/  take  pills        d/c  plans  is    to hospice          discussed  with    daughter  natalia.   /   cleraed  for   d/c         pe r care  carmen  note,  d/c  set  up  for  today/  to  hospice       pt  is  dnr/ dni

## 2024-08-21 NOTE — PROGRESS NOTE ADULT - ASSESSMENT
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 Admitted due to black output in the ileostomy bag c/f UGIB, afib with RVR, and c diff positive.    1. GIB - Resolved   stools brown in ostomy bag   patient appears very depleted. Palliative approach seems reasonable.  PPI daily  diet as tolerated     2. Nausea/Vomiting   resolved      3. C. Diff   s/p abx  ID input appreciated       4. Afib   per cardio     hospice planning

## 2024-08-21 NOTE — PROGRESS NOTE ADULT - ASSESSMENT
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 Nephrology consulted for elevated Scr and metabolic acidosis.    1) CHANELL: likely due to ATN. Scr improving as per most recent labs. Continue with supportive care and can monitor labs if within GOC. UA active likely due to infection. FeNa low. CT without obstruction. TMA work up negative. Avoid nephrotoxins.    2) Hypotension: BP low. Continue with midodrine 30 mg PO TID. Rate control as per cardiology. Monitor BP.    3) LE edema: likely due to hypoalbuminemia improving s/p IV albumin with lasix on 8/16. TTE with normal LVSF. Monitor UO.    4) Metabolic acidosis: Resolved s/p sodium bicarbonate gtt. Monitor pH.    5) Hyponatremia: Mild and likely due to hypervolemia. No intervention needed at this time. Monitor serum Na.    6) Urinary retention: Continue with amaya. Holding flomax given hypotension.       [facility] NEPHROLOGY  Paulie Storm M.D.  DEREK WhittingtonO.  Maddi Steve M.D.  MD Olivia Caldera, MSN, ANP-C    Telephone: [phone]  Facsimile: [phone]  [place] [address]

## 2024-08-21 NOTE — PROGRESS NOTE ADULT - PROVIDER SPECIALTY LIST ADULT
Electrophysiology
Endocrinology
Endocrinology
Gastroenterology
Heme/Onc
Infectious Disease
Infectious Disease
Internal Medicine
Nephrology
Cardiology
Electrophysiology
Gastroenterology
Heme/Onc
Heme/Onc
Infectious Disease
Internal Medicine
Internal Medicine
Nephrology
Cardiology
Electrophysiology
Endocrinology
Gastroenterology
Gastroenterology
Infectious Disease
Internal Medicine
Nephrology
Electrophysiology
Endocrinology
Gastroenterology
Gastroenterology
Heme/Onc
Heme/Onc
Infectious Disease
Internal Medicine
Nephrology
Surgery
Surgery
Wound Care
Internal Medicine
Internal Medicine
Endocrinology
Internal Medicine
Endocrinology
Internal Medicine
Internal Medicine
Cardiology
Endocrinology
Internal Medicine
Cardiology
Endocrinology
Cardiology
Cardiology
Endocrinology
Endocrinology
Cardiology

## 2024-08-21 NOTE — PROGRESS NOTE ADULT - SUBJECTIVE AND OBJECTIVE BOX
Subjective: Patient seen and examined. No new events except as noted.     REVIEW OF SYSTEMS:    CONSTITUTIONAL: +eakness, fevers or chills  EYES/ENT: No visual changes;  No vertigo or throat pain   NECK: No pain or stiffness  RESPIRATORY: No cough, wheezing, hemoptysis; No shortness of breath  CARDIOVASCULAR: No chest pain or palpitations  GASTROINTESTINAL: No abdominal or epigastric pain. No nausea, vomiting, or hematemesis; No diarrhea or constipation. No melena or hematochezia.  GENITOURINARY: No dysuria, frequency or hematuria  NEUROLOGICAL: No numbness or weakness  SKIN: No itching, burning, rashes, or lesions   All other review of systems is negative unless indicated above.    MEDICATIONS:  MEDICATIONS  (STANDING):  atorvastatin 80 milliGRAM(s) Oral at bedtime  digoxin     Tablet 125 MICROGram(s) Oral every other day  levothyroxine 150 MICROGram(s) Oral daily  metoprolol tartrate 25 milliGRAM(s) Oral every 6 hours  midodrine. 30 milliGRAM(s) Oral every 8 hours  pantoprazole    Tablet 40 milliGRAM(s) Oral before breakfast      PHYSICAL EXAM:  T(C): 36.7 (08-21-24 @ 05:00), Max: 36.7 (08-21-24 @ 05:00)  HR: 94 (08-21-24 @ 05:00) (76 - 104)  BP: 110/88 (08-21-24 @ 05:00) (91/64 - 123/87)  RR: 18 (08-21-24 @ 05:00) (18 - 18)  SpO2: 97% (08-21-24 @ 05:00) (97% - 98%)  Wt(kg): --  I&O's Summary    20 Aug 2024 07:01  -  21 Aug 2024 07:00  --------------------------------------------------------  IN: 480 mL / OUT: 700 mL / NET: -220 mL            Appearance: NAD  HEENT:   Dry  oral mucosa, PERRL, EOMI	  Lymphatic: No lymphadenopathy , no edema  Cardiovascular: Irregular  S1 S2, No JVD, No murmurs , Peripheral pulses palpable 2+ bilaterally  Respiratory: decreased bs   Gastrointestinal:  Soft, Non-tender, + BS	+ostomy  Skin: No rashes, No ecchymoses, No cyanosis, warm to touch  Musculoskeletal: decreased range of motion and strength  Psychiatry: sleepy    Ext: No edema        LABS:    CARDIAC MARKERS:                  proBNP:   Lipid Profile:   HgA1c:   TSH:             TELEMETRY: 	  AF  ECG:  	  RADIOLOGY:   DIAGNOSTIC TESTING:  [ ] Echocardiogram:  [ ]  Catheterization:  [ ] Stress Test:    OTHER:

## 2024-08-21 NOTE — PROGRESS NOTE ADULT - PROBLEM/PLAN-1
North and Tori for exam and discussion    Amada Valle MD  Gynecologic Oncology  [facility]    
DISPLAY PLAN FREE TEXT

## 2024-08-21 NOTE — DISCHARGE NOTE NURSING/CASE MANAGEMENT/SOCIAL WORK - NSDCVIVACCINE_GEN_ALL_CORE_FT
Tdap; 14-Jun-2015 22:07; Rosana Loaiza (RN); [employer]; [id number]; IntraMuscular; Deltoid Left.; 0.5 milliLiter(s); VIS (VIS Published: 09-May-2013, VIS Presented: 14-Jun-2015);

## 2024-08-21 NOTE — PROGRESS NOTE ADULT - REASON FOR ADMISSION
AMS/lethargy, Afib w/ RVR, Hyponatremia, C.diff infection

## 2024-08-21 NOTE — PROGRESS NOTE ADULT - NUTRITIONAL ASSESSMENT
This patient has been assessed with a concern for Malnutrition and has been determined to have a diagnosis/diagnoses of Severe protein-calorie malnutrition.    This patient is being managed with:   Diet NPO after Midnight-     NPO Start Date: 11-Aug-2024   NPO Start Time: 23:59  Except Medications  Entered: Aug 11 2024  6:55PM    Diet Soft and Bite Sized-  Entered: Aug  8 2024  9:59PM  
This patient has been assessed with a concern for Malnutrition and has been determined to have a diagnosis/diagnoses of Severe protein-calorie malnutrition.    This patient is being managed with:   Diet Soft and Bite Sized-  Entered: Aug  8 2024  9:59PM  
This patient has been assessed with a concern for Malnutrition and has been determined to have a diagnosis/diagnoses of Severe protein-calorie malnutrition.    This patient is being managed with:   Diet Clear Liquid-  Entered: Aug  7 2024 10:04AM  
This patient has been assessed with a concern for Malnutrition and has been determined to have a diagnosis/diagnoses of Severe protein-calorie malnutrition.    This patient is being managed with:   Diet Clear Liquid-  Entered: Aug 12 2024 11:30AM  
This patient has been assessed with a concern for Malnutrition and has been determined to have a diagnosis/diagnoses of Severe protein-calorie malnutrition.    This patient is being managed with:   Diet Soft and Bite Sized-  Entered: Aug  8 2024  9:59PM  
This patient has been assessed with a concern for Malnutrition and has been determined to have a diagnosis/diagnoses of Severe protein-calorie malnutrition.    This patient is being managed with:   Diet Clear Liquid-  Entered: Aug 12 2024 11:30AM  
This patient has been assessed with a concern for Malnutrition and has been determined to have a diagnosis/diagnoses of Severe protein-calorie malnutrition.    This patient is being managed with:   Diet Soft and Bite Sized-  Entered: Aug  8 2024  9:59PM  
This patient has been assessed with a concern for Malnutrition and has been determined to have a diagnosis/diagnoses of Severe protein-calorie malnutrition.    This patient is being managed with:   Diet Soft and Bite Sized-  Supplement Feeding Modality:  Oral  Ensure Plus High Protein Cans or Servings Per Day:  2       Frequency:  Daily  Entered: Aug 19 2024  4:41PM  
This patient has been assessed with a concern for Malnutrition and has been determined to have a diagnosis/diagnoses of Severe protein-calorie malnutrition.    This patient is being managed with:   Diet Soft and Bite Sized-  Supplement Feeding Modality:  Oral  Ensure Plus High Protein Cans or Servings Per Day:  2       Frequency:  Daily  Entered: Aug 19 2024  4:41PM  
This patient has been assessed with a concern for Malnutrition and has been determined to have a diagnosis/diagnoses of Severe protein-calorie malnutrition.    This patient is being managed with:   Diet Clear Liquid-  Entered: Aug 12 2024 11:30AM  
This patient has been assessed with a concern for Malnutrition and has been determined to have a diagnosis/diagnoses of Severe protein-calorie malnutrition.    This patient is being managed with:   Diet Clear Liquid-  Entered: Aug 12 2024 11:30AM  
This patient has been assessed with a concern for Malnutrition and has been determined to have a diagnosis/diagnoses of Severe protein-calorie malnutrition.    This patient is being managed with:   Diet Soft and Bite Sized-  Entered: Aug  8 2024  9:59PM  
This patient has been assessed with a concern for Malnutrition and has been determined to have a diagnosis/diagnoses of Severe protein-calorie malnutrition.    This patient is being managed with:   Diet Clear Liquid-  Entered: Aug 12 2024 11:30AM  
This patient has been assessed with a concern for Malnutrition and has been determined to have a diagnosis/diagnoses of Severe protein-calorie malnutrition.    This patient is being managed with:   Diet Clear Liquid-  Entered: Aug 12 2024 11:30AM

## 2024-08-21 NOTE — PROGRESS NOTE ADULT - TIME BILLING
Patient, Kyle, ACP, GI, renal's
as above
Daughter, ACP, EP
Daughter, ACP, hospice, CM, nurse
as above
as above
daughter , nruse , EP
as above
Patient, ACP, wound care
as above
Patient, hematology, ACP and electrophysiology
as above

## 2024-08-21 NOTE — PROGRESS NOTE ADULT - PROBLEM SELECTOR PROBLEM 2
CAD (coronary artery disease)
Clostridium difficile colitis
CAD (coronary artery disease)
CAD (coronary artery disease)
Clostridium difficile colitis
CAD (coronary artery disease)
Clostridium difficile colitis
CAD (coronary artery disease)
CAD (coronary artery disease)
Clostridium difficile colitis
CAD (coronary artery disease)
Clostridium difficile colitis

## 2024-08-21 NOTE — DISCHARGE NOTE NURSING/CASE MANAGEMENT/SOCIAL WORK - PATIENT PORTAL LINK FT
You can access the FollowMyHealth Patient Portal offered by [facility] by registering at the following website: [email or web address]. By joining [employer]’s FollowMyHealth portal, you will also be able to view your health information using other applications (apps) compatible with our system.

## 2024-08-21 NOTE — PROGRESS NOTE ADULT - SUBJECTIVE AND OBJECTIVE BOX
date of service: 08-21-24 @ 07:06  afberile  REVIEW OF SYSTEMS:  CONSTITUTIONAL: No fever,  no  weight loss  ENT:  No  tinnitus,   no   vertigo  NECK: No pain or stiffness  RESPIRATORY: No cough, wheezing, chills or hemoptysis;    No Shortness of Breath  CARDIOVASCULAR: No chest pain, palpitations, dizziness  GASTROINTESTINAL: No abdominal or epigastric pain. No nausea, vomiting, or hematemesis; No diarrhea  No melena or hematochezia.  GENITOURINARY: No dysuria, frequency, hematuria, or incontinence  NEUROLOGICAL: No headaches  SKIN: No itching,  no   rash  LYMPH Nodes: No enlarged glands  ENDOCRINE: No heat or cold intolerance  MUSCULOSKELETAL: No joint pain or swelling  PSYCHIATRIC: No depression, anxiety  HEME/LYMPH: No easy bruising, or bleeding gums  ALLERGY AND IMMUNOLOGIC: No hives or eczema	    MEDICATIONS  (STANDING):  atorvastatin 80 milliGRAM(s) Oral at bedtime  digoxin  Injectable 125 MICROGram(s) IV Push <User Schedule>  levothyroxine 150 MICROGram(s) Oral daily  metoprolol tartrate 25 milliGRAM(s) Oral every 6 hours  midodrine. 30 milliGRAM(s) Oral every 8 hours  pantoprazole  Injectable 40 milliGRAM(s) IV Push two times a day    MEDICATIONS  (PRN):  acetaminophen     Tablet .. 650 milliGRAM(s) Oral every 6 hours PRN Temp greater or equal to 38C (100.4F), Mild Pain (1 - 3)      Vital Signs Last 24 Hrs  T(C): 36.7 (21 Aug 2024 05:00), Max: 36.7 (21 Aug 2024 05:00)  T(F): 98.1 (21 Aug 2024 05:00), Max: 98.1 (21 Aug 2024 05:00)  HR: 94 (21 Aug 2024 05:00) (76 - 104)  BP: 110/88 (21 Aug 2024 05:00) (91/64 - 123/87)  BP(mean): --  RR: 18 (21 Aug 2024 05:00) (18 - 18)  SpO2: 97% (21 Aug 2024 05:00) (97% - 98%)    Parameters below as of 21 Aug 2024 05:00  Patient On (Oxygen Delivery Method): nasal cannula  O2 Flow (L/min): 2    CAPILLARY BLOOD GLUCOSE      POCT Blood Glucose.: 79 mg/dL (20 Aug 2024 21:07)  POCT Blood Glucose.: 88 mg/dL (20 Aug 2024 16:57)  POCT Blood Glucose.: 99 mg/dL (20 Aug 2024 13:14)  POCT Blood Glucose.: 67 mg/dL (20 Aug 2024 12:13)  POCT Blood Glucose.: 72 mg/dL (20 Aug 2024 08:43)    I&O's Summary    20 Aug 2024 07:01  -  21 Aug 2024 07:00  --------------------------------------------------------  IN: 480 mL / OUT: 700 mL / NET: -220 mL          Appearance: Normal	  HEENT:   Normal oral mucosa, PERRL, EOMI	  Lymphatic: No lymphadenopathy  Cardiovascular: Normal S1 S2, No JVD  Respiratory: Lungs clear to auscultation	  Gastrointestinal:  Soft, Non-tender, + BS	  Skin: No rash, No ecchymoses	  Extremities:     LABS:                        13.4   12.55 )-----------( 108      ( 19 Aug 2024 09:12 )             41.1                           Thyroid Stimulating Hormone, Serum: 6.67 uIU/mL (08-13 @ 11:40)          Consultant(s) Notes Reviewed:      Care Discussed with Consultants/Other Providers:

## 2024-08-22 ENCOUNTER — APPOINTMENT (OUTPATIENT)
Dept: HOME HEALTH SERVICES | Facility: HOME HEALTH | Age: 79
End: 2024-08-22

## 2024-08-22 ENCOUNTER — NON-APPOINTMENT (OUTPATIENT)
Age: 79
End: 2024-08-22

## 2024-08-22 RX ORDER — LEVOTHYROXINE SODIUM 100 MCG
1 TABLET ORAL
Qty: 30 | Refills: 0
Start: 2024-08-22 | End: 2024-09-20

## 2024-09-03 NOTE — RAPID RESPONSE TEAM SUMMARY - NSSITUATIONBACKGROUNDRRT_GEN_ALL_CORE
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 RRT called for bradycardia w/ frequent sinus pauses.     Upon arrival, HR in 30s, BP 120s/70s, O2 100%, BG of ~100, RR <20 - pt without complaints. Exam significant for slow pulse rate. Per primary team at bedside, pt likely to have received supratherapeutic dose of lopressor ~5AM. EKG completed w/ finding of afib w/ slow response w/ some non-conducted p waves noted on monitor. 8mg total glucagon administered for beta blocker toxicity w/ increasing HR to 60s-80s. BP maintained w/ MAP >65 and pt maintained baseline mentation during duration of RRT.    Pads maintained on patient and atropine at bedside for further events. Recommend holding lopressor for time being and continuing to monitor on tele.    [All Other ROS] : all other reviewed systems are negative

## 2024-09-10 ENCOUNTER — APPOINTMENT (OUTPATIENT)
Dept: HOME HEALTH SERVICES | Facility: HOME HEALTH | Age: 79
End: 2024-09-10

## 2024-09-29 PROCEDURE — 85014 HEMATOCRIT: CPT

## 2024-09-29 PROCEDURE — 82962 GLUCOSE BLOOD TEST: CPT

## 2024-09-29 PROCEDURE — 82570 ASSAY OF URINE CREATININE: CPT

## 2024-09-29 PROCEDURE — 99291 CRITICAL CARE FIRST HOUR: CPT | Mod: 25

## 2024-09-29 PROCEDURE — 85610 PROTHROMBIN TIME: CPT

## 2024-09-29 PROCEDURE — 83615 LACTATE (LD) (LDH) ENZYME: CPT

## 2024-09-29 PROCEDURE — 83540 ASSAY OF IRON: CPT

## 2024-09-29 PROCEDURE — 82803 BLOOD GASES ANY COMBINATION: CPT

## 2024-09-29 PROCEDURE — 87086 URINE CULTURE/COLONY COUNT: CPT

## 2024-09-29 PROCEDURE — 82436 ASSAY OF URINE CHLORIDE: CPT

## 2024-09-29 PROCEDURE — 96376 TX/PRO/DX INJ SAME DRUG ADON: CPT

## 2024-09-29 PROCEDURE — 96374 THER/PROPH/DIAG INJ IV PUSH: CPT

## 2024-09-29 PROCEDURE — P9016: CPT

## 2024-09-29 PROCEDURE — P9047: CPT

## 2024-09-29 PROCEDURE — 82947 ASSAY GLUCOSE BLOOD QUANT: CPT

## 2024-09-29 PROCEDURE — 82533 TOTAL CORTISOL: CPT

## 2024-09-29 PROCEDURE — 87077 CULTURE AEROBIC IDENTIFY: CPT

## 2024-09-29 PROCEDURE — 80162 ASSAY OF DIGOXIN TOTAL: CPT

## 2024-09-29 PROCEDURE — 84439 ASSAY OF FREE THYROXINE: CPT

## 2024-09-29 PROCEDURE — 93005 ELECTROCARDIOGRAM TRACING: CPT

## 2024-09-29 PROCEDURE — 86850 RBC ANTIBODY SCREEN: CPT

## 2024-09-29 PROCEDURE — 83010 ASSAY OF HAPTOGLOBIN QUANT: CPT

## 2024-09-29 PROCEDURE — 86900 BLOOD TYPING SEROLOGIC ABO: CPT

## 2024-09-29 PROCEDURE — 80053 COMPREHEN METABOLIC PANEL: CPT

## 2024-09-29 PROCEDURE — 82435 ASSAY OF BLOOD CHLORIDE: CPT

## 2024-09-29 PROCEDURE — 96375 TX/PRO/DX INJ NEW DRUG ADDON: CPT

## 2024-09-29 PROCEDURE — 81001 URINALYSIS AUTO W/SCOPE: CPT

## 2024-09-29 PROCEDURE — 84443 ASSAY THYROID STIM HORMONE: CPT

## 2024-09-29 PROCEDURE — 86901 BLOOD TYPING SEROLOGIC RH(D): CPT

## 2024-09-29 PROCEDURE — 36430 TRANSFUSION BLD/BLD COMPNT: CPT

## 2024-09-29 PROCEDURE — 80048 BASIC METABOLIC PNL TOTAL CA: CPT

## 2024-09-29 PROCEDURE — 82746 ASSAY OF FOLIC ACID SERUM: CPT

## 2024-09-29 PROCEDURE — 82010 KETONE BODYS QUAN: CPT

## 2024-09-29 PROCEDURE — 74176 CT ABD & PELVIS W/O CONTRAST: CPT | Mod: MC

## 2024-09-29 PROCEDURE — 87324 CLOSTRIDIUM AG IA: CPT

## 2024-09-29 PROCEDURE — 86985 SPLIT BLOOD OR PRODUCTS: CPT

## 2024-09-29 PROCEDURE — 85027 COMPLETE CBC AUTOMATED: CPT

## 2024-09-29 PROCEDURE — 87449 NOS EACH ORGANISM AG IA: CPT

## 2024-09-29 PROCEDURE — 84300 ASSAY OF URINE SODIUM: CPT

## 2024-09-29 PROCEDURE — 86923 COMPATIBILITY TEST ELECTRIC: CPT

## 2024-09-29 PROCEDURE — P9011: CPT

## 2024-09-29 PROCEDURE — 84295 ASSAY OF SERUM SODIUM: CPT

## 2024-09-29 PROCEDURE — 86022 PLATELET ANTIBODIES: CPT

## 2024-09-29 PROCEDURE — 83550 IRON BINDING TEST: CPT

## 2024-09-29 PROCEDURE — 84540 ASSAY OF URINE/UREA-N: CPT

## 2024-09-29 PROCEDURE — 83735 ASSAY OF MAGNESIUM: CPT

## 2024-09-29 PROCEDURE — 84132 ASSAY OF SERUM POTASSIUM: CPT

## 2024-09-29 PROCEDURE — 36415 COLL VENOUS BLD VENIPUNCTURE: CPT

## 2024-09-29 PROCEDURE — 71045 X-RAY EXAM CHEST 1 VIEW: CPT

## 2024-09-29 PROCEDURE — 85730 THROMBOPLASTIN TIME PARTIAL: CPT

## 2024-09-29 PROCEDURE — 84484 ASSAY OF TROPONIN QUANT: CPT

## 2024-09-29 PROCEDURE — 84100 ASSAY OF PHOSPHORUS: CPT

## 2024-09-29 PROCEDURE — 85018 HEMOGLOBIN: CPT

## 2024-09-29 PROCEDURE — 87507 IADNA-DNA/RNA PROBE TQ 12-25: CPT

## 2024-09-29 PROCEDURE — 83880 ASSAY OF NATRIURETIC PEPTIDE: CPT

## 2024-09-29 PROCEDURE — 87040 BLOOD CULTURE FOR BACTERIA: CPT

## 2024-09-29 PROCEDURE — 70450 CT HEAD/BRAIN W/O DYE: CPT | Mod: MC

## 2024-09-29 PROCEDURE — 83605 ASSAY OF LACTIC ACID: CPT

## 2024-09-29 PROCEDURE — 85025 COMPLETE CBC W/AUTO DIFF WBC: CPT

## 2024-09-29 PROCEDURE — 82607 VITAMIN B-12: CPT

## 2024-09-29 PROCEDURE — 82330 ASSAY OF CALCIUM: CPT

## 2024-09-29 PROCEDURE — 82728 ASSAY OF FERRITIN: CPT

## 2024-10-08 NOTE — HISTORY OF PRESENT ILLNESS
Pt given discharge instructions by treating provider; ambulated out of the ED with spouse without assistance; uneventful ED visit     Tania Kim RN  10/08/24 0697     [FreeTextEntry1] : She notes worsening GUTHRIE and wheezinen\par Some left arm pain at rest (2 episodes). No associated sympoms., Sharp.\par Gaining weight too. Good appetite.\par \par Tolerating Eliquis. No bleeding.\par  smokes, but less.\par

## 2024-10-10 ENCOUNTER — APPOINTMENT (OUTPATIENT)
Dept: HOME HEALTH SERVICES | Facility: HOME HEALTH | Age: 79
End: 2024-10-10

## 2024-10-29 NOTE — ED ADULT TRIAGE NOTE - BSA (M2)
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  She is a patient well-known to me from prior abdominal aortic aneurysm status post EVAR back in  at outside facility in [place].  She subsequently developed aneurysmal degeneration of her thoracic/abdominal aorta.  She was subsequently referred to [facility] where she was undergoing surveillance of her thoracoabdominal aneurysm by Dr. Todd.  She has since elected to discontinue ongoing surveillance due to her progression of cardiopulmonary disease.  Of note patient is on chronic oxygen currently at 6 L via nasal cannula.    Unfortunately her chronic kidney disease has progressed to end-stage renal disease and has been initiated on dialysis since July.  At one point she was being considered for peritoneal dialysis however decision was made that due to the underlying cardiopulmonary disease would not be a good candidate as it may further compromise her pulmonary function.  She was seen and evaluated by Dr. Kenan Barragan at [facility] in May for fistula creation.  Overall impression was that while fistula is technically feasible that  given her cardiopulmonary comorbidities that catheter may be her best option at this point.  She is now here in the office to further discuss  dialysis access creation.  I did go over the potential risk of remaining with a catheter to include life-threatening infection versus proceeding with left upper extremity AV graft creation and the inherent potential intraoperative/perioperative risks including bleeding, need for unexpected endotracheal intubation with subsequent inability to free from the ventilator, and intraoperative death.  After weighing the risk and benefits of remaining with the current tunneled catheter versus undergoing additional surgical procedure patient has at this time elected to forego any additional surgeries and prefers to continue dialysis with existing catheter.           History of Present Illness     Patient is here today to review results of Vein Mapping done 10/24/2024. Patient is right hand dominant. She gets dialysis M-W-F at [facility]. Patient is taking ASA 81 mg and Rosuvastatin. Patient is a former smoker.           Vicki Barber is a 83 y.o. female who presents to the office accompanied by her  to discuss potential dialysis access creation.  She dialyzes Monday, Wednesday, and Fridays at [facility] in [place].    History obtained from : patient and patient's Significant Other  Review of Systems   Constitutional: Negative.    HENT: Negative.     Eyes: Negative.    Respiratory:  Positive for cough and shortness of breath.    Cardiovascular: Negative.    Gastrointestinal: Negative.    Endocrine: Negative.    Genitourinary: Negative.    Musculoskeletal: Negative.    Skin: Negative.    Allergic/Immunologic: Positive for food allergies (crab, eggs).   Neurological: Negative.    Hematological: Negative.    Psychiatric/Behavioral: Negative.       Past Medical History   Past Medical History:   Diagnosis Date    AAA (abdominal aortic aneurysm) (HCC)     s/p EVAR    Anxiety     Aortic aneurysm (HCC)     mid descending aorta    Asthma     Carotid stenosis, bilateral     Chronic back pain     CKD (chronic kidney disease)  stage 4, GFR 15-29 ml/min (HCC)     COPD (chronic obstructive pulmonary disease) (HCC)     Depression     Emphysema of lung (HCC)     chronic home O2    Fatty liver     Fibromyalgia     Gastroparesis     HLD (hyperlipidemia)     Hyperparathyroidism (HCC)     Hypertension     Mesenteric artery stenosis (HCC)     Nephrolithiasis     Non Hodgkin's lymphoma (HCC)     s/p Chemo    On home O2     Osteoarthritis     PAD (peripheral artery disease) (HCC)     PUD (peptic ulcer disease)     Shortness of breath     Thoracoabdominal aortic aneurysm (TAAA) (HCC)      Past Surgical History:   Procedure Laterality Date    ABDOMINAL AORTIC ANEURYSM REPAIR      [place] - [place]    CARDIAC CATHETERIZATION      CATARACT EXTRACTION BILATERAL W/ ANTERIOR VITRECTOMY      COLONOSCOPY N/A 5/20/2016    Procedure: COLONOSCOPY;  Surgeon: Michael Martin IV, MD;  Location: [facility];  Service:     ESOPHAGOGASTRODUODENOSCOPY N/A 5/20/2016    Procedure: ESOPHAGOGASTRODUODENOSCOPY (EGD);  Surgeon: Michael Martin IV, MD;  Location: [facility];  Service:     ESOPHAGOGASTRODUODENOSCOPY N/A 4/7/2016    Procedure: ESOPHAGOGASTRODUODENOSCOPY (EGD);  Surgeon: Michael Martin IV, MD;  Location: [facility];  Service:     EXPLORATORY LAPAROTOMY      removal lymph nodes    [place] LUMBAR PUNCTURE DIAGNOSTIC  1/20/2020    HYSTERECTOMY      IR VISCERAL ANGIOGRAPHY / INTERVENTION  2/14/2019    NECK DISSECTION Left     [place] SLCTV CATHJ 3RD+ ORD SLCTV ABDL PEL/LXTR BRNCH N/A 2/14/2019    Procedure: ARTERIOGRAM; left u/s guided percutaneous brachial access; sma angiography and celiac;  Surgeon: Shaw West MD;  Location: [facility];  Service: Vascular    RADIOFREQUENCY ABLATION NERVES      lower back     History reviewed. No pertinent family history.  Current Outpatient Medications on File Prior to Visit   Medication Sig Dispense Refill    acyclovir (ZOVIRAX) 5 % ointment Apply 1 application. topically as needed      albuterol (PROVENTIL HFA,VENTOLIN HFA) 90 mcg/act inhaler Inhale  2 puffs      amLODIPine (NORVASC) 5 mg tablet Take 1 tablet (5 mg total) by mouth 2 (two) times a day 180 tablet 3    aspirin (ECOTRIN LOW STRENGTH) 81 mg EC tablet Take 81 mg by mouth daily      B Complex-C-Folic Acid (NEPHROCAPS PO) Take 1 capsule by mouth daily with dinner      Biotin 1 MG CAPS Take 2 tablets by mouth daily      Cholecalciferol 50 MCG (2000 UT) CAPS Take 1 capsule by mouth daily      ferrous sulfate 325 (65 Fe) mg tablet Take 325 mg by mouth daily with breakfast      fluticasone (FLONASE) 50 mcg/act nasal spray 2 sprays into each nostril daily      L-Lysine 1000 MG TABS Take 1,000 mg by mouth daily as needed       metoprolol succinate (TOPROL-XL) 50 mg 24 hr tablet Take 1 tablet (50 mg total) by mouth daily 90 tablet 3    oxyCODONE (ROXICODONE) 5 immediate release tablet Take 2.5 mg by mouth every 6 (six) hours as needed Take 1/2 tablet      OXYGEN-HELIUM IN Inhale 4 L/min continuous       pantoprazole (PROTONIX) 40 mg tablet Take 40 mg by mouth 2 (two) times a day before meals      pregabalin (LYRICA) 75 mg capsule take 1 capsule by mouth nightly      psyllium (METAMUCIL) 58.6 % powder Take 1 packet by mouth 2 (two) times a day      rosuvastatin (CRESTOR) 10 MG tablet TAKE 1 TABLET BY MOUTH EVERY DAY AT NIGHT      sertraline (ZOLOFT) 100 mg tablet Take 100 mg by mouth daily      torsemide (DEMADEX) 20 mg tablet Take 80 mg on non dialysis days (Tuesday, Thursday, Saturday, Sunday) 225 tablet 3    Trelegy Ellipta 200-62.5-25 MCG/ACT AEPB inhaler Inhale 1 puff daily      Trolamine Salicylate (Aspercreme) 10 % LOTN Place 1 patch on the skin daily      calcitriol (ROCALTROL) 0.25 mcg capsule Take 0.25 mcg by mouth daily MON., WED, FRI      Cholecalciferol (VITAMIN D-3 PO) Take 1,000 mcg by mouth daily  (Patient not taking: Reported on 10/29/2024)      estradiol (ESTRACE) 0.1 mg/g vaginal cream PLEASE SEE ATTACHED FOR DETAILED DIRECTIONS      famotidine (PEPCID) 20 mg tablet Take 20 mg by mouth daily       famotidine (PEPCID) 40 MG tablet Take 40 mg by mouth 2 (two) times a day      famotidine-calcium carbonate-magnesium hydroxide (PEPCID COMPLETE) -165 MG CHEW Chew 1 tablet daily as needed for heartburn      Multiple Vitamins-Minerals (HAIR SKIN AND NAILS FORMULA PO) Take 2 tablets by mouth daily (Patient not taking: Reported on 7/5/2023)      ondansetron (ZOFRAN) 4 mg tablet Take 4 mg by mouth every 6 (six) hours as needed for nausea or vomiting       oxyCODONE-acetaminophen (PERCOCET) 5-325 mg per tablet Take 1 tablet by mouth every 4 (four) hours as needed for moderate pain.       No current facility-administered medications on file prior to visit.     Allergies   Allergen Reactions    Iodinated Contrast Media Anaphylaxis    Iodine - Food Allergy Anaphylaxis     aslo ivp dye  shock    Ciprofloxacin      Achilles tendonitis  Quinolones also    Eggs Or Egg-Derived Products - Food Allergy      Stomach pain    Esomeprazole Diarrhea     Headache,diarrhea,stomach pain    Headache,diarrhea,stomach pain   Patient is able to tolerate omeprazole    Fish-Derived Products - Food Allergy      Stomach pain WITH JUST CRAB    Influenza Vaccines Abdominal Pain     DUE TO EGG ALLERGY    Latex Rash     Rash itching SKIN TEARS    Medical Tape Itching    Other      Adhesive tears skin    Barium Iodide GI Intolerance    Cephalexin Other (See Comments)     Achilles tendonitis   Itchiness    Docosahexaenoic Acid (Dha) Other (See Comments)    Eicosapentaenoic Acid Other (See Comments)    Famotidine Other (See Comments) and Vomiting     Can't swallow    Propofol Other (See Comments)    Rituxan [Rituximab]     Sulfa Antibiotics      Severe headache      Current Outpatient Medications on File Prior to Visit   Medication Sig Dispense Refill    acyclovir (ZOVIRAX) 5 % ointment Apply 1 application. topically as needed      albuterol (PROVENTIL HFA,VENTOLIN HFA) 90 mcg/act inhaler Inhale 2 puffs      amLODIPine (NORVASC) 5 mg tablet  Take 1 tablet (5 mg total) by mouth 2 (two) times a day 180 tablet 3    aspirin (ECOTRIN LOW STRENGTH) 81 mg EC tablet Take 81 mg by mouth daily      B Complex-C-Folic Acid (NEPHROCAPS PO) Take 1 capsule by mouth daily with dinner      Biotin 1 MG CAPS Take 2 tablets by mouth daily      Cholecalciferol 50 MCG (2000 UT) CAPS Take 1 capsule by mouth daily      ferrous sulfate 325 (65 Fe) mg tablet Take 325 mg by mouth daily with breakfast      fluticasone (FLONASE) 50 mcg/act nasal spray 2 sprays into each nostril daily      L-Lysine 1000 MG TABS Take 1,000 mg by mouth daily as needed       metoprolol succinate (TOPROL-XL) 50 mg 24 hr tablet Take 1 tablet (50 mg total) by mouth daily 90 tablet 3    oxyCODONE (ROXICODONE) 5 immediate release tablet Take 2.5 mg by mouth every 6 (six) hours as needed Take 1/2 tablet      OXYGEN-HELIUM IN Inhale 4 L/min continuous       pantoprazole (PROTONIX) 40 mg tablet Take 40 mg by mouth 2 (two) times a day before meals      pregabalin (LYRICA) 75 mg capsule take 1 capsule by mouth nightly      psyllium (METAMUCIL) 58.6 % powder Take 1 packet by mouth 2 (two) times a day      rosuvastatin (CRESTOR) 10 MG tablet TAKE 1 TABLET BY MOUTH EVERY DAY AT NIGHT      sertraline (ZOLOFT) 100 mg tablet Take 100 mg by mouth daily      torsemide (DEMADEX) 20 mg tablet Take 80 mg on non dialysis days (Tuesday, Thursday, Saturday, Sunday) 225 tablet 3    Trelegy Ellipta 200-62.5-25 MCG/ACT AEPB inhaler Inhale 1 puff daily      Trolamine Salicylate (Aspercreme) 10 % LOTN Place 1 patch on the skin daily      calcitriol (ROCALTROL) 0.25 mcg capsule Take 0.25 mcg by mouth daily MON., WED, FRI      Cholecalciferol (VITAMIN D-3 PO) Take 1,000 mcg by mouth daily  (Patient not taking: Reported on 10/29/2024)      estradiol (ESTRACE) 0.1 mg/g vaginal cream PLEASE SEE ATTACHED FOR DETAILED DIRECTIONS      famotidine (PEPCID) 20 mg tablet Take 20 mg by mouth daily      famotidine (PEPCID) 40 MG tablet Take 40  "mg by mouth 2 (two) times a day      famotidine-calcium carbonate-magnesium hydroxide (PEPCID COMPLETE) -165 MG CHEW Chew 1 tablet daily as needed for heartburn      Multiple Vitamins-Minerals (HAIR SKIN AND NAILS FORMULA PO) Take 2 tablets by mouth daily (Patient not taking: Reported on 7/5/2023)      ondansetron (ZOFRAN) 4 mg tablet Take 4 mg by mouth every 6 (six) hours as needed for nausea or vomiting       oxyCODONE-acetaminophen (PERCOCET) 5-325 mg per tablet Take 1 tablet by mouth every 4 (four) hours as needed for moderate pain.       No current facility-administered medications on file prior to visit.      Social History     Tobacco Use    Smoking status: Former    Smokeless tobacco: Former     Quit date: 2/13/2012    Tobacco comments:     pt states she quit 2012   Vaping Use    Vaping status: Never Used   Substance and Sexual Activity    Alcohol use: No    Drug use: No    Sexual activity: Not on file         Objective     /82 (BP Location: Right arm, Patient Position: Sitting, Cuff Size: Standard)   Pulse 70   Ht 5' 3.5\" (1.613 m)   SpO2 98% Comment: patient on 6 L O2  BMI 31.84 kg/m²     Physical Exam  Constitutional:       General: She is not in acute distress.     Appearance: She is well-developed. She is ill-appearing. She is not toxic-appearing or diaphoretic.   HENT:      Head: Normocephalic and atraumatic.   Eyes:      General: No scleral icterus.     Conjunctiva/sclera: Conjunctivae normal.   Neck:      Trachea: No tracheal deviation.   Cardiovascular:      Rate and Rhythm: Normal rate and regular rhythm.      Pulses:           Radial pulses are 2+ on the left side.      Heart sounds: Normal heart sounds.      Comments: L brachial 2+  Pulmonary:      Effort: Pulmonary effort is normal.      Breath sounds: Normal breath sounds.   Abdominal:      General: There is no distension.      Palpations: Abdomen is soft. Mass: no appreciable aortic pulsation/aneurysm.      Tenderness: There is " no abdominal tenderness. There is no guarding.   Musculoskeletal:         General: Normal range of motion.      Cervical back: Normal range of motion and neck supple.   Skin:     General: Skin is warm and dry.   Neurological:      Mental Status: She is alert and oriented to person, place, and time.   Psychiatric:         Mood and Affect: Mood normal.         Behavior: Behavior normal.         Thought Content: Thought content normal.         Judgment: Judgment normal.     Vein mapping:  CONCLUSION:     Impression:  RIGHT ARM:  The cephalic vein is patent and does not measures >2mm in diameter throughout  the arm  The basilic vein is patent and measures >2mm in diameter in the upper arm.  The brachial artery measures 4.5 mm and bifurcates at the antecubital fossa  The cephalic and basilic veins communicate with the median cubital vein.  The subclavian, axillary and brachial arteries are widely patent.  The IJV, subclavian, axillary and brachial veins are patent.     LEFT ARM:  The cephalic vein is patent and measures >2mm in diameter in the upper arm.  The basilic vein is patent and measures >2mm in diameter in the upper arm.  The brachial artery measures 4 mm and bifurcates at the antecubital fossa.  The cephalic and basilic veins communicate with the median cubital vein.  The subclavian, axillary and brachial arteries are widely patent.  The IJV, subclavian, axillary and brachial veins are patent.        Administrative Statements   I have spent a total time of 40 minutes in caring for this patient on the day of the visit/encounter including Diagnostic results, Prognosis, Risks and benefits of tx options, Instructions for management, Patient and family education, Importance of tx compliance, Risk factor reductions, Impressions, Counseling / Coordination of care, Documenting in the medical record, Reviewing / ordering tests, medicine, procedures  , Obtaining or reviewing history  , and Communicating with other  healthcare professionals .   1.78

## 2024-11-04 NOTE — H&P PST ADULT - SKIN
[facility]    Name:  Martha Murray   Age:  69 y.o.  Sex:  female  :  1955  MRN:  [id number]   Visit Number:  [id number]  Admission Date:  10/31/2024  Date Of Service:  24  [facility] Physician:  Jenny Carvajal APRN     LOS: 2 days :    Chief Complaint:      Dizziness    Subjective:    Patient seen and examined.  States dizziness is persistent.  States wanting to lean toward towards her right side.  Worried about her animals at home her son is taking care of him for now.  Advised that she may need short-term rehab.  Did have some diarrhea over the weekend.  Was noted to have C. difficile.  Not drinking water well.    Hospital Course:    Patient is an obese 69-year-old female with history significant for CKD, type 2 diabetes, hypertension, hyperlipidemia, essential thrombocythemia who presents to the emergency room with complaints of dizziness.  Patient's symptoms occurred abruptly approximately 2 hours prior to presentation.  States that she is having difficulty ambulating due to feeling of the room spinning around her.  No extremity weakness, facial slurring, difficulty speaking.  Also denied headache, fever, vision changes, chest pain, shortness of breath.  En route, EMS reported patient blood pressure 210/120.  They provided sublingual nitroglycerin with slight improvement.  The patient admits that she stopped taking her blood pressure medicine 2 to 3 days ago.  Denies smoking, alcohol use, illicit drug use.     ED summary: Patient afebrile, hypertensive 198/79 and nonhypoxic on room air.  Troponins negative x 2.  Potassium 3.2 and creatinine 1.32 which appears baseline.  Glucose 206.  TSH appropriate.  WBC 21 and platelets 817.  UA negative for infection, protein positive.  CT head without contrast showed no acute abnormality.  CTA head showed no LVO.  CTA neck showed no evidence of significant cervical carotid stenosis. Lobular filling  defect proximal brachiocephalic artery could represent nonocclusive mural thrombus or lobulated soft plaque. Follow-up CTA recommended in 6-8 weeks for continued surveillance.  CT cerebral perfusion showed no evidence of large vessel occlusion.  Teleneurology consulted, recommended admission for further stroke workup.  MRI noted small acute/subacute right periventricular CVA.  Patient initiated on heparin infusion x 24 hours due to filling defect in proximal brachiocephalic artery.  Continued on aspirin.  Norvasc added for blood pressure control.  Echo noted EF 61% with indeterminate left ventricular diastolic function negative saline test mild dilation of aorta at 3.8.  Neurology continued to follow recommended to antiplatelet therapy x 21 days followed by Plavix 75 mg daily.  Continued on high intensity statin therapy with Lipitor 80 mg.    Review of Systems:     All systems were reviewed and negative except as mentioned in subjective, assessment and plan.    Vital Signs:    Temp:  [97.2 °F (36.2 °C)-99 °F (37.2 °C)] 97.2 °F (36.2 °C)  Heart Rate:  [75] 75  Resp:  [16-18] 18  BP: (112-143)/(49-72) 112/50    Intake and output:    I/O last 3 completed shifts:  In: 600 [P.O.:600]  Out: 450 [Urine:450]  I/O this shift:  In: 240 [P.O.:240]  Out: -     Physical Examination:    General Appearance:  Alert and cooperative.  Chronically ill middle-aged female.   Head:  Atraumatic and normocephalic.   Eyes: Conjunctivae and sclerae normal, no icterus. No pallor.   Throat: No oral lesions, no thrush, oral mucosa moist.   Neck: Supple, trachea midline, no thyromegaly.   Lungs:   Breath sounds heard bilaterally equally.  No wheezing or crackles. No Pleural rub or bronchial breathing.  On room air unlabored.   Heart:  Normal S1 and S2, no murmur, no gallop, no rub. No JVD.   Abdomen:   Normal bowel sounds, no masses, no organomegaly. Soft, nontender, nondistended, no rebound tenderness.   Extremities: Supple, no edema, no  "cyanosis, no clubbing.   Skin: No bleeding or rash.   Neurologic: Alert and oriented x 3. No facial asymmetry. Moves all four limbs. No tremors.  No nystagmus noted.     Laboratory results:    Results from last 7 days   Lab Units 11/04/24  0800 11/02/24  0536 11/01/24  0344 11/01/24  0142   SODIUM mmol/L 140 142 139 139   POTASSIUM mmol/L 3.1* 4.2 3.5 3.2*   CHLORIDE mmol/L 104 104 105 103   CO2 mmol/L 22.7 24.6 19.3* 22.4   BUN mg/dL 23 20 15 15   CREATININE mg/dL 1.28* 1.40* 1.23* 1.32*   CALCIUM mg/dL 8.9 9.2 8.7 8.9   BILIRUBIN mg/dL  --   --   --  0.5   ALK PHOS U/L  --   --   --  100   ALT (SGPT) U/L  --   --   --  15   AST (SGOT) U/L  --   --   --  21   GLUCOSE mg/dL 110* 129* 185* 206*     Results from last 7 days   Lab Units 11/04/24  0800 11/02/24  0535 11/01/24  0836   WBC 10*3/mm3 13.54* 15.79* 18.97*   HEMOGLOBIN g/dL 15.2 14.5 15.8   HEMATOCRIT % 46.3 44.9 47.0*   PLATELETS 10*3/mm3 567* 506* 662*     Results from last 7 days   Lab Units 11/01/24  1104   INR  1.14*     Results from last 7 days   Lab Units 11/01/24  0344 11/01/24  0142   HSTROP T ng/L 11 11         No results for input(s): \"PHART\", \"ITD7TZZ\", \"PO2ART\", \"FAB6NSO\", \"BASEEXCESS\" in the last 8760 hours.   I have reviewed the patient's laboratory results.    Radiology results:    CT Angiogram Chest    Result Date: 11/3/2024  PROCEDURE: CT ANGIOGRAM CHEST-  HISTORY: echo show abnorma aortic aneurysm and to rule out thrombus inaorta also; R42-Dizziness and giddiness; I10-Essential (primary) hypertension; Z91.148-Patient's other noncompliance with medication regimen for other reason; D75.839-Thrombocytosis, unspecified  COMPARISON: July 29, 2023.  TECHNIQUE: Multiple axial CT images were obtained from the thoracic inlet through the upper abdomen per the CT PE protocol. Coronal and oblique MIP images were reconstructed from the original axial data set.  FINDINGS: There are no filling defects to suggest pulmonary embolism. Significant " atherosclerotic plaque involving the descending and abdominal aorta. No evidence of dissection. Aneurysmal dilatation of the ascending aorta up to 4.1 cm. The descending thoracic aorta measures up to 2.3 cm. There is prominence of the pulmonary trunk measuring up to 2.2 cm, correlate for possible pulmonary hypertension. There is evidence of calcified granulomatous disease. No mediastinal or hilar adenopathy.  There are mild emphysematous changes. Somewhat spiculated nodular density in the left upper lobe measuring up to 1.6 cm on image 49. Of there is no pleural or there is evidence of calcified. Apical pleural thickening is noted. No acute osseous changes.        Impression: Aneurysmal dilatation of the ascending aorta up to 4.1 cm.   There is prominence of the pulmonary trunk measuring up to 2.2 cm, correlate for possible pulmonary hypertension.  Spiculated nodular density in the left upper lobe measuring up to 1.6 cm. This is slightly more conspicuous compared to the July 29, 2023 exam. Consider follow-up PET/CT or chest CT in 3 months.   This study was performed with techniques to keep radiation doses as low as reasonably achievable (ALARA). Individualized dose reduction techniques using automated exposure control or adjustment of mA and/or kV according to the patient size were employed.   CTDI: 2.67 mGy DLP:100.27 mGy.cm      This report was signed and finalized on 11/3/2024 1:30 PM by Juan A Andrews DO.     I have reviewed the patient's radiology reports.    Medication Review:     I have reviewed the patient's active and prn medications.     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 difficile  Hypokalemia  Type 2 diabetes  CAD  Essential thrombocytosis  CKD stage  III  Hypertension  Hyperlipidemia  Depression  Obesity    Plan:    Dizziness  Stroke  Hypertensive urgency  Teleneurology following-recommended continuing heparin drip x 24 hours with aspirin and high-dose statin.  Bubble study negative.  A1c 5.5/lipid panel mildly elevated.  Plavix/aspirin/high-dose statin.  Continue DAPT x 21 days and then Plavix only thereafter.  Blood pressure control.  Restart home lisinopril-Norvasc and Coreg added.  Improved.  Neurochecks per protocol  PT/OT/SLP  Continue scopolamine patch and meclizine.  Encouraged increased water intake.  Holter monitor upon discharge per neurology recommendations.  Leukocytosis/C. difficile  Continue p.o. vancomycin.  Hypokalemia  Replace per protocol  Type 2 diabetes  A1c 5.5  Sliding scale for correction  Diabetic education    I have reviewed the copied text and it is accurate as of 11/4/2024      DVT Prophylaxis: SCDs  Code Status: Full  Diet: As tolerated  Discharge Plan: Home versus short-term rehab.    Jen Batres, APRN  11/04/24  13:09 EST    Dictated utilizing Dragon dictation.     No lesions; no rash

## 2024-12-19 ENCOUNTER — APPOINTMENT (OUTPATIENT)
Dept: HOME HEALTH SERVICES | Facility: HOME HEALTH | Age: 79
End: 2024-12-19

## 2024-12-30 NOTE — PATIENT PROFILE ADULT - FUNCTIONAL ASSESSMENT - BASIC MOBILITY 6.
2-calculated by average/Not able to assess (calculate score using [facility] averaging method) Him/He  2-calculated by average/Not able to assess (calculate score using [facility] averaging method)

## 2025-02-05 ENCOUNTER — TRANSCRIPTION ENCOUNTER (OUTPATIENT)
Age: 80
End: 2025-02-05

## 2025-02-05 ENCOUNTER — INPATIENT (INPATIENT)
Facility: HOSPITAL | Age: 80
LOS: 6 days | Discharge: HOSPICE | DRG: 379 | End: 2025-02-12
Attending: STUDENT IN AN ORGANIZED HEALTH CARE EDUCATION/TRAINING PROGRAM | Admitting: GENERAL ACUTE CARE HOSPITAL
Payer: MEDICARE

## 2025-02-05 VITALS
SYSTOLIC BLOOD PRESSURE: 112 MMHG | DIASTOLIC BLOOD PRESSURE: 73 MMHG | RESPIRATION RATE: 18 BRPM | OXYGEN SATURATION: 95 % | TEMPERATURE: 97 F | HEART RATE: 80 BPM

## 2025-02-05 DIAGNOSIS — Z96.659 PRESENCE OF UNSPECIFIED ARTIFICIAL KNEE JOINT: Chronic | ICD-10-CM

## 2025-02-05 DIAGNOSIS — Z98.890 OTHER SPECIFIED POSTPROCEDURAL STATES: Chronic | ICD-10-CM

## 2025-02-05 DIAGNOSIS — Z90.49 ACQUIRED ABSENCE OF OTHER SPECIFIED PARTS OF DIGESTIVE TRACT: Chronic | ICD-10-CM

## 2025-02-05 DIAGNOSIS — Z96.7 PRESENCE OF OTHER BONE AND TENDON IMPLANTS: Chronic | ICD-10-CM

## 2025-02-05 DIAGNOSIS — Z95.5 PRESENCE OF CORONARY ANGIOPLASTY IMPLANT AND GRAFT: Chronic | ICD-10-CM

## 2025-02-05 LAB
ALBUMIN SERPL ELPH-MCNC: 3.3 G/DL — SIGNIFICANT CHANGE UP (ref 3.3–5)
ALP SERPL-CCNC: 95 U/L — SIGNIFICANT CHANGE UP (ref 40–120)
ALT FLD-CCNC: 34 U/L — SIGNIFICANT CHANGE UP (ref 10–45)
ANION GAP SERPL CALC-SCNC: 12 MMOL/L — SIGNIFICANT CHANGE UP (ref 5–17)
APTT BLD: 38.5 SEC — HIGH (ref 24.5–35.6)
AST SERPL-CCNC: 51 U/L — HIGH (ref 10–40)
BASOPHILS # BLD AUTO: 0.04 K/UL — SIGNIFICANT CHANGE UP (ref 0–0.2)
BASOPHILS NFR BLD AUTO: 0.7 % — SIGNIFICANT CHANGE UP (ref 0–2)
BILIRUB SERPL-MCNC: 0.4 MG/DL — SIGNIFICANT CHANGE UP (ref 0.2–1.2)
BLD GP AB SCN SERPL QL: NEGATIVE — SIGNIFICANT CHANGE UP
BUN SERPL-MCNC: 36 MG/DL — HIGH (ref 7–23)
CALCIUM SERPL-MCNC: 9.1 MG/DL — SIGNIFICANT CHANGE UP (ref 8.4–10.5)
CHLORIDE SERPL-SCNC: 95 MMOL/L — LOW (ref 96–108)
CO2 SERPL-SCNC: 21 MMOL/L — LOW (ref 22–31)
CREAT SERPL-MCNC: 1.07 MG/DL — SIGNIFICANT CHANGE UP (ref 0.5–1.3)
EGFR: 53 ML/MIN/1.73M2 — LOW
EOSINOPHIL # BLD AUTO: 0.13 K/UL — SIGNIFICANT CHANGE UP (ref 0–0.5)
EOSINOPHIL NFR BLD AUTO: 2.2 % — SIGNIFICANT CHANGE UP (ref 0–6)
GLUCOSE SERPL-MCNC: 94 MG/DL — SIGNIFICANT CHANGE UP (ref 70–99)
HCT VFR BLD CALC: 36.2 % — SIGNIFICANT CHANGE UP (ref 34.5–45)
HGB BLD-MCNC: 13 G/DL — SIGNIFICANT CHANGE UP (ref 11.5–15.5)
IMM GRANULOCYTES NFR BLD AUTO: 0.5 % — SIGNIFICANT CHANGE UP (ref 0–0.9)
INR BLD: 1.28 RATIO — HIGH (ref 0.85–1.16)
LYMPHOCYTES # BLD AUTO: 0.93 K/UL — LOW (ref 1–3.3)
LYMPHOCYTES # BLD AUTO: 15.7 % — SIGNIFICANT CHANGE UP (ref 13–44)
MCHC RBC-ENTMCNC: 35.1 PG — HIGH (ref 27–34)
MCHC RBC-ENTMCNC: 35.9 G/DL — SIGNIFICANT CHANGE UP (ref 32–36)
MCV RBC AUTO: 97.8 FL — SIGNIFICANT CHANGE UP (ref 80–100)
MONOCYTES # BLD AUTO: 0.64 K/UL — SIGNIFICANT CHANGE UP (ref 0–0.9)
MONOCYTES NFR BLD AUTO: 10.8 % — SIGNIFICANT CHANGE UP (ref 2–14)
NEUTROPHILS # BLD AUTO: 4.15 K/UL — SIGNIFICANT CHANGE UP (ref 1.8–7.4)
NEUTROPHILS NFR BLD AUTO: 70.1 % — SIGNIFICANT CHANGE UP (ref 43–77)
NRBC # BLD: 0 /100 WBCS — SIGNIFICANT CHANGE UP (ref 0–0)
NRBC BLD-RTO: 0 /100 WBCS — SIGNIFICANT CHANGE UP (ref 0–0)
PLATELET # BLD AUTO: 226 K/UL — SIGNIFICANT CHANGE UP (ref 150–400)
POTASSIUM SERPL-MCNC: 5.1 MMOL/L — SIGNIFICANT CHANGE UP (ref 3.5–5.3)
POTASSIUM SERPL-SCNC: 5.1 MMOL/L — SIGNIFICANT CHANGE UP (ref 3.5–5.3)
PROT SERPL-MCNC: 6.6 G/DL — SIGNIFICANT CHANGE UP (ref 6–8.3)
PROTHROM AB SERPL-ACNC: 14.7 SEC — HIGH (ref 9.9–13.4)
RBC # BLD: 3.7 M/UL — LOW (ref 3.8–5.2)
RBC # FLD: 14.9 % — HIGH (ref 10.3–14.5)
RH IG SCN BLD-IMP: POSITIVE — SIGNIFICANT CHANGE UP
SODIUM SERPL-SCNC: 128 MMOL/L — LOW (ref 135–145)
WBC # BLD: 5.92 K/UL — SIGNIFICANT CHANGE UP (ref 3.8–10.5)
WBC # FLD AUTO: 5.92 K/UL — SIGNIFICANT CHANGE UP (ref 3.8–10.5)

## 2025-02-05 PROCEDURE — 99285 EMERGENCY DEPT VISIT HI MDM: CPT | Mod: GC

## 2025-02-05 NOTE — ED ADULT NURSE NOTE - NSFALLRISKINTERV_ED_ALL_ED
Communicate fall risk and risk factors to all staff, patient, and family/Provide patient with walking aids/Provide visual cue: yellow wristband, yellow gown, etc/Reinforce activity limits and safety measures with patient and family/Call bell, personal items and telephone in reach/Instruct patient to call for assistance before getting out of bed/chair/stretcher/Non-slip footwear applied when patient is off stretcher/[place] to call system/Physically safe environment - no spills, clutter or unnecessary equipment/Purposeful Proactive Rounding/Room/bathroom lighting operational, light cord in reach

## 2025-02-05 NOTE — ED ADULT TRIAGE NOTE - NS ED NURSE DIRECT TO ROOM YN
Pt called the office to say that she had not received her link for her 1:30 virtual appt with Nguyenomid MurphyAriadne. Writer messaged the provider who stated she is running a little behind. Writer informed the pt. No

## 2025-02-05 NOTE — ED PROVIDER NOTE - ATTENDING CONTRIBUTION TO CARE
This is a 79-year-old female who is currently on hospice with [facility] for ischemic bowel status post colostomy now coming in with blood in the ostomy.  She has never had this before according to her daughter at bedside.  No fevers chills vomiting.  Awake slightly uncomfortable appearing.  Chronically ill.  No acute distress.  Regular rate and rhythm clear lungs nontender abdomen.  No leg edema.  Warm and well-perfused.  Ostomy in place with some blood mixed in with the stool.  Clinically I suspect that the patient is having bleeding from mesenteric ischemia.  I will watch the patient in the ER CBC CMP and reassess.  I endorsed this patient to the overnight doctor.  The resident was able to reach out to the hospice provider to let them know and on discussion with hospice the patient and family if necessary the patient will be amenable for blood despite being on hospice.

## 2025-02-05 NOTE — ED PROVIDER NOTE - OBJECTIVE STATEMENT
79-year-old female history of hypertension hypothyroidism CAD A-fib not on anticoagulation  ischemic colitis  status postresection and ostomy presenting with bloody output from ostomy that began today.  Patient is on home hospice, receiving comfort care.  Patient accompanied by her daughter states that she noticed bloody output from the ostomy this morning.  Patient is usually able to express that she is uncomfortable and the patient has not indicated any discomfort.  While the patient is on hospice, discussion with the daughter revealed that they would transfusion and admission if indicated.  Patient confirmed DNR/DNI however.

## 2025-02-05 NOTE — ED PROVIDER NOTE - PHYSICAL EXAMINATION
PHYSICAL EXAM:  CONSTITUTIONAL:No acute distress  HEAD: Atraumatic  CARDIAC: Normal rate, regular rhythm. +S1/S2. No murmurs, rubs or gallops.  RESPIRATORY: Breathing unlabored. Breath sounds clear and equal bilaterally.  ABDOMEN: Soft, nontender, nondistended.  grossly bloody output from ostomy appreciated  SKIN: Skin warm and dry. No evidence of rashes or lesions.

## 2025-02-05 NOTE — ED CLERICAL - NS ED CARE COORDINATION INFORMATION

## 2025-02-05 NOTE — ED PROVIDER NOTE - PATIENT'S PREFERRED PRONOUN
What Type Of Note Output Would You Prefer (Optional)?: Standard Output What Is The Reason For Today's Visit?: Full Body Skin Examination Her/She

## 2025-02-05 NOTE — ED ADULT NURSE NOTE - OBJECTIVE STATEMENT
79 y.o. female coming in from home via EMS for bloody stool from ostomy. pt daughter at bedside states that pt had normal BM this afternoon through the ostomy and stated when she went to empty around 18:00 noted blood in stool, called hospice emergency number and was told to come to the ER for farther evaluation. pt denies pain at this time. PMH HTN, bowel resection x 1 year ago with colostomy placed, pt now currently on hospice as per daughter at bedside. A&Ox2 to person and place, blood noted to ostomy in initial assessment, pt has amaya that is clean and intact with leg securement device, pt noted to have stage 2 pressure wound on sacral area that daughter states is being treated by wound care at home via home health nurse , pt denies CP/SOB/weakness/dizziness/fevers/chills/N/V/D/urinary symptoms, bed in lowest position, call bell in reach and teaching on use completed, daughter at bedside verbalized understanding of call bell use.

## 2025-02-05 NOTE — ED PROVIDER NOTE - CLINICAL SUMMARY MEDICAL DECISION MAKING FREE TEXT BOX
79-year-old female history detailed above presenting with bloody output from ostomy. vs not actionable, bloody outpt appreciated. will obtain cbc cmp coags t/s

## 2025-02-05 NOTE — ED ADULT TRIAGE NOTE - TEMP AT ED ARRIVAL (C)
Consultation - Urology   Fredrick Zamudio 56 y.o. male MRN: [id number]  Unit/Bed#: OR POOL Encounter:     Assessment/Plan      Assessment:  Obstructing proximal right elongated ureteral calculus with right renal colic and worsening JENAE.  Possible UTI.  Atrophic left kidney with renal pelvic calculus without obstruction.  Plan:  Options, procedures, benefits and risks were discussed and he will undergo cystoscopy with right retrograde urogram and insertion of right ureteral stent.  He understands that the stone will not be removed at this time and will require additional procedures.  He also is on cefepime pending culture results for possible UTI.  He also will require outpatient evaluation of the left kidney to rule out nonfunctioning kidney.  Further management depends upon clinical course.    History of Present Illness   Attending: Rebeka Jean, *  Reason for Consult / Principal Problem: Obstructing right ureteral calculus   Inpatient consult to Urology  Consult performed by: Gualberto Mosley MD  Consult ordered by: Fiona Joshua PA-C      This is a 56-year-old male presenting with right renal colic, nausea and vomiting.  CT scan shows a thin elongated right proximal ureteral stone with hydronephrosis.  The calcification measures greater than 2 cm.  He also has a small left kidney with a 1.5 cm renal pelvic stone which was present on prior imaging in the past.  There is no left hydronephrosis.  He admits to gross hematuria but denies frequency or dysuria.  Admission UTI was significant for possible UTI.  He was started on cefepime.  Chemistries are showing worsening JENAE.  KUB x-ray shows no obvious calcifications bilaterally.  He does state he has passed left-sided kidney stones in the past but denies any urology consult or intervention in the past.    Review of Systems   Genitourinary:  Positive for flank pain and hematuria. Negative for difficulty urinating, dysuria and frequency.  "      Historical Information   Past Medical History:   Diagnosis Date    Ileus (HCC) 04/01/2022    Ulcerative colitis (HCC)      Past Surgical History:   Procedure Laterality Date    COLOSTOMY  2005    ILEOSTOMY Right 2007     Social History   Social History     Substance and Sexual Activity   Alcohol Use Not Currently    Comment: social     @[email or web address]  E-Cigarette/Vaping     E-Cigarette/Vaping Substances    Nicotine No     THC No     CBD No     Flavoring No     Other No     Unknown No      Social History     Tobacco Use   Smoking Status Never   Smokeless Tobacco Never     Family History: Longest being    Meds/Allergies   current meds:   Current Facility-Administered Medications   Medication Dose Route Frequency    acetaminophen (TYLENOL) tablet 650 mg  650 mg Oral Q4H PRN    cefepime (MAXIPIME) IVPB (premix in dextrose) 2,000 mg 50 mL  2,000 mg Intravenous Q12H    HYDROmorphone (DILAUDID) injection 0.5 mg  0.5 mg Intravenous Q4H PRN    HYDROmorphone (DILAUDID) injection 1 mg  1 mg Intravenous Q4H PRN    ondansetron (ZOFRAN) injection 4 mg  4 mg Intravenous Q6H PRN    sodium chloride 0.9 % infusion  150 mL/hr Intravenous Continuous    tamsulosin (FLOMAX) capsule 0.4 mg  0.4 mg Oral Daily With Dinner     No Known Allergies    Objective   Vitals: Blood pressure (!) 177/82, pulse 63, temperature 98.1 °F (36.7 °C), temperature source Temporal, resp. rate 16, height 5' 10\" (1.778 m), weight (!) 145 kg (320 lb), SpO2 97%.    I/O last 24 hours:  In: 1050 [IV Piggyback:1050]  Out: -     Invasive Devices       Peripheral Intravenous Line  Duration             Peripheral IV 04/21/24 Right Antecubital <1 day              Drain  Duration             Colostomy RLQ -- days                    Physical Exam  Abdominal:      General: There is no distension.      Tenderness: There is no abdominal tenderness. There is right CVA tenderness. There is no left CVA tenderness.         Lab Results: CBC:   Lab Results   Component Value Date    " "WBC 11.48 (H) 04/22/2024    HGB 13.8 04/22/2024    HCT 42.1 04/22/2024    MCV 89 04/22/2024     04/22/2024    RBC 4.73 04/22/2024    MCH 29.2 04/22/2024    MCHC 32.8 04/22/2024    RDW 13.2 04/22/2024    MPV 9.6 04/22/2024     CMP:   Lab Results   Component Value Date    SODIUM 136 04/22/2024     04/22/2024    CO2 21 04/22/2024    BUN 28 (H) 04/22/2024    CREATININE 3.06 (H) 04/22/2024    CALCIUM 8.6 04/22/2024    AST 17 04/21/2024    ALT 12 04/21/2024    ALKPHOS 63 04/21/2024    EGFR 21 04/22/2024     Urinalysis:   Lab Results   Component Value Date    COLORU Brown (A) 04/21/2024    CLARITYU Turbid (A) 04/21/2024    SPECGRAV 1.020 04/21/2024    PHUR 6.5 04/21/2024    LEUKOCYTESUR Trace (A) 04/21/2024    NITRITE Positive (A) 04/21/2024    GLUCOSEU Negative 04/21/2024    KETONESU Trace (A) 04/21/2024    BILIRUBINUR 2+ (A) 04/21/2024    BLOODU 3+ (A) 04/21/2024     Urine Culture: No results found for: \"URINECX\"  Imaging Studies: I have personally reviewed pertinent reports.    EKG, Pathology, and Other Studies: I have personally reviewed pertinent reports.    VTE Prophylaxis: Sequential compression device (Venodyne)     Code Status: Level 1 - Full Code  Advance Directive and Living Will:      Power of :    POLST:      Counseling / Coordination of Care  Total floor / unit time spent today 55 minutes. Greater than 50% of total time was spent with the patient and / or family counseling and / or coordination of care. A description of the counseling / coordination of care: I have discussed the case with the hospitalist.  " 36.3

## 2025-02-05 NOTE — ED ADULT NURSE REASSESSMENT NOTE - NS ED NURSE REASSESS COMMENT FT1
Report received from Roselia CABRERA. Pt resting in stretcher in red room 43. Daughter at bedside. A&Ox2-3, hard of hearing. VSS. L forearm 20G IV patent and flushes without difficulty. Pt denies any pain at this time. Plan of care discussed with pt, pt verbalized understanding. Stretcher locked and in lowest position, appropriate side rails up. Pt instructed to notify RN if assistance is needed. Pending dispo.

## 2025-02-05 NOTE — ED PROVIDER NOTE - DISPOSITION TYPE
ADMIT Detail Level: Zone General Sunscreen Counseling: I recommended a broad spectrum sunscreen with a SPF of 30 or higher. I explained that SPF 30 sunscreens block approximately 97 percent of the sun's harmful rays. Sunscreens should be applied at least 15 minutes prior to expected sun exposure and then every 2 hours after that as long as sun exposure continues. If swimming or exercising sunscreen should be reapplied every 45 minutes to an hour after getting wet or sweating. One ounce, or the equivalent of a shot glass full of sunscreen, is adequate to protect the skin not covered by a bathing suit. I also recommended a lip balm with a sunscreen as well. Sun protective clothing can be used in lieu of sunscreen but must be worn the entire time you are exposed to the sun's rays. Products Recommended: SPF [address] [place] or higher with zinc

## 2025-02-06 ENCOUNTER — NON-APPOINTMENT (OUTPATIENT)
Age: 80
End: 2025-02-06

## 2025-02-06 DIAGNOSIS — R06.00 DYSPNEA, UNSPECIFIED: ICD-10-CM

## 2025-02-06 DIAGNOSIS — I48.91 UNSPECIFIED ATRIAL FIBRILLATION: ICD-10-CM

## 2025-02-06 DIAGNOSIS — Z51.5 ENCOUNTER FOR PALLIATIVE CARE: ICD-10-CM

## 2025-02-06 DIAGNOSIS — K92.1 MELENA: ICD-10-CM

## 2025-02-06 DIAGNOSIS — Z71.89 OTHER SPECIFIED COUNSELING: ICD-10-CM

## 2025-02-06 DIAGNOSIS — I10 ESSENTIAL (PRIMARY) HYPERTENSION: ICD-10-CM

## 2025-02-06 DIAGNOSIS — K94.01 COLOSTOMY HEMORRHAGE: ICD-10-CM

## 2025-02-06 LAB
HCT VFR BLD CALC: 33.6 % — LOW (ref 34.5–45)
HCT VFR BLD CALC: 35.1 % — SIGNIFICANT CHANGE UP (ref 34.5–45)
HGB BLD-MCNC: 11.5 G/DL — SIGNIFICANT CHANGE UP (ref 11.5–15.5)
HGB BLD-MCNC: 12.1 G/DL — SIGNIFICANT CHANGE UP (ref 11.5–15.5)
MCHC RBC-ENTMCNC: 33.8 PG — SIGNIFICANT CHANGE UP (ref 27–34)
MCHC RBC-ENTMCNC: 34 PG — SIGNIFICANT CHANGE UP (ref 27–34)
MCHC RBC-ENTMCNC: 34.2 G/DL — SIGNIFICANT CHANGE UP (ref 32–36)
MCHC RBC-ENTMCNC: 34.5 G/DL — SIGNIFICANT CHANGE UP (ref 32–36)
MCV RBC AUTO: 98 FL — SIGNIFICANT CHANGE UP (ref 80–100)
MCV RBC AUTO: 99.4 FL — SIGNIFICANT CHANGE UP (ref 80–100)
NRBC # BLD: 0 /100 WBCS — SIGNIFICANT CHANGE UP (ref 0–0)
NRBC # BLD: 0 /100 WBCS — SIGNIFICANT CHANGE UP (ref 0–0)
NRBC BLD-RTO: 0 /100 WBCS — SIGNIFICANT CHANGE UP (ref 0–0)
NRBC BLD-RTO: 0 /100 WBCS — SIGNIFICANT CHANGE UP (ref 0–0)
PLATELET # BLD AUTO: 194 K/UL — SIGNIFICANT CHANGE UP (ref 150–400)
PLATELET # BLD AUTO: 243 K/UL — SIGNIFICANT CHANGE UP (ref 150–400)
RBC # BLD: 3.38 M/UL — LOW (ref 3.8–5.2)
RBC # BLD: 3.58 M/UL — LOW (ref 3.8–5.2)
RBC # FLD: 15.1 % — HIGH (ref 10.3–14.5)
RBC # FLD: 15.4 % — HIGH (ref 10.3–14.5)
WBC # BLD: 5.79 K/UL — SIGNIFICANT CHANGE UP (ref 3.8–10.5)
WBC # BLD: 7.96 K/UL — SIGNIFICANT CHANGE UP (ref 3.8–10.5)
WBC # FLD AUTO: 5.79 K/UL — SIGNIFICANT CHANGE UP (ref 3.8–10.5)
WBC # FLD AUTO: 7.96 K/UL — SIGNIFICANT CHANGE UP (ref 3.8–10.5)

## 2025-02-06 PROCEDURE — 99223 1ST HOSP IP/OBS HIGH 75: CPT

## 2025-02-06 PROCEDURE — 93010 ELECTROCARDIOGRAM REPORT: CPT

## 2025-02-06 PROCEDURE — 99497 ADVNCD CARE PLAN 30 MIN: CPT | Mod: GC,25

## 2025-02-06 RX ORDER — METOPROLOL SUCCINATE 25 MG
25 TABLET, EXTENDED RELEASE 24 HR ORAL EVERY 6 HOURS
Refills: 0 | Status: DISCONTINUED | OUTPATIENT
Start: 2025-02-06 | End: 2025-02-06

## 2025-02-06 RX ORDER — PIPERACILLIN SODIUM AND TAZOBACTAM SODIUM 2; 250 G/50ML; MG/50ML
3.38 INJECTION, POWDER, FOR SOLUTION INTRAVENOUS ONCE
Refills: 0 | Status: COMPLETED | OUTPATIENT
Start: 2025-02-06 | End: 2025-02-06

## 2025-02-06 RX ORDER — MORPHINE SULFATE 60 MG/1
2 TABLET, FILM COATED, EXTENDED RELEASE ORAL ONCE
Refills: 0 | Status: DISCONTINUED | OUTPATIENT
Start: 2025-02-06 | End: 2025-02-06

## 2025-02-06 RX ORDER — BACTERIOSTATIC SODIUM CHLORIDE 0.9 %
500 VIAL (ML) INJECTION ONCE
Refills: 0 | Status: COMPLETED | OUTPATIENT
Start: 2025-02-06 | End: 2025-02-06

## 2025-02-06 RX ORDER — VANCOMYCIN HYDROCHLORIDE 50 MG/ML
1000 KIT ORAL ONCE
Refills: 0 | Status: COMPLETED | OUTPATIENT
Start: 2025-02-06 | End: 2025-02-06

## 2025-02-06 RX ORDER — PIPERACILLIN SODIUM AND TAZOBACTAM SODIUM 2; 250 G/50ML; MG/50ML
3.38 INJECTION, POWDER, FOR SOLUTION INTRAVENOUS EVERY 8 HOURS
Refills: 0 | Status: DISCONTINUED | OUTPATIENT
Start: 2025-02-06 | End: 2025-02-09

## 2025-02-06 RX ORDER — ACETAMINOPHEN 160 MG/5ML
1000 SUSPENSION ORAL ONCE
Refills: 0 | Status: COMPLETED | OUTPATIENT
Start: 2025-02-06 | End: 2025-02-06

## 2025-02-06 RX ORDER — MIDODRINE HYDROCHLORIDE 5 MG/1
5 TABLET ORAL THREE TIMES A DAY
Refills: 0 | Status: DISCONTINUED | OUTPATIENT
Start: 2025-02-06 | End: 2025-02-09

## 2025-02-06 RX ORDER — PANTOPRAZOLE 20 MG/1
8 TABLET, DELAYED RELEASE ORAL
Qty: 80 | Refills: 0 | Status: DISCONTINUED | OUTPATIENT
Start: 2025-02-06 | End: 2025-02-09

## 2025-02-06 RX ADMIN — Medication 125 MICROGRAM(S): at 12:33

## 2025-02-06 RX ADMIN — Medication 500 MILLILITER(S): at 00:33

## 2025-02-06 RX ADMIN — ACETAMINOPHEN 400 MILLIGRAM(S): 160 SUSPENSION ORAL at 09:43

## 2025-02-06 RX ADMIN — PANTOPRAZOLE 10 MG/HR: 20 TABLET, DELAYED RELEASE ORAL at 21:05

## 2025-02-06 RX ADMIN — Medication 500 MILLILITER(S): at 09:48

## 2025-02-06 RX ADMIN — MORPHINE SULFATE 2 MILLIGRAM(S): 60 TABLET, FILM COATED, EXTENDED RELEASE ORAL at 01:03

## 2025-02-06 RX ADMIN — ACETAMINOPHEN 1000 MILLIGRAM(S): 160 SUSPENSION ORAL at 10:46

## 2025-02-06 RX ADMIN — PIPERACILLIN SODIUM AND TAZOBACTAM SODIUM 25 GRAM(S): 2; 250 INJECTION, POWDER, FOR SOLUTION INTRAVENOUS at 14:00

## 2025-02-06 RX ADMIN — PIPERACILLIN SODIUM AND TAZOBACTAM SODIUM 200 GRAM(S): 2; 250 INJECTION, POWDER, FOR SOLUTION INTRAVENOUS at 10:21

## 2025-02-06 RX ADMIN — MIDODRINE HYDROCHLORIDE 5 MILLIGRAM(S): 5 TABLET ORAL at 12:08

## 2025-02-06 RX ADMIN — VANCOMYCIN HYDROCHLORIDE 250 MILLIGRAM(S): KIT at 10:44

## 2025-02-06 RX ADMIN — PIPERACILLIN SODIUM AND TAZOBACTAM SODIUM 25 GRAM(S): 2; 250 INJECTION, POWDER, FOR SOLUTION INTRAVENOUS at 21:04

## 2025-02-06 RX ADMIN — MORPHINE SULFATE 2 MILLIGRAM(S): 60 TABLET, FILM COATED, EXTENDED RELEASE ORAL at 00:33

## 2025-02-06 RX ADMIN — PANTOPRAZOLE 10 MG/HR: 20 TABLET, DELAYED RELEASE ORAL at 12:57

## 2025-02-06 NOTE — CONSULT NOTE ADULT - SUBJECTIVE AND OBJECTIVE BOX
Date of Service: 02-06-25 @ 13:40    HPI:  79-year-old female history of hypertension hypothyroidism CAD A-fib not on anticoagulation  ischemic colitis  status postresection and ostomy presenting with bloody output from ostomy that began today.  Patient is on home hospice, receiving comfort care.  Patient accompanied by her daughter states that she noticed bloody output from the ostomy this morning.  Patient is usually able to express that she is uncomfortable and the patient has not indicated any discomfort.  While the patient is on hospice, discussion with the daughter revealed that they would transfusion and admission if indicated.  Patient confirmed DNR/DNI however. (06 Feb 2025 06:41)    PERTINENT PM/SXH:   Hypertension    Hypothyroid    Osteoarthritis    CAD (coronary artery disease)    CROW (obstructive sleep apnea)    History of MI (myocardial infarction)    Polyp of corpus uteri    Heart murmur    Bilateral hearing loss, unspecified hearing loss type    Obesity (BMI 35.0-39.9 without comorbidity)    Obesity    Mixed stress and urge urinary incontinence    Overactive bladder      No significant past surgical history    S/P ORIF (open reduction internal fixation) fracture    S/P appendectomy    S/P knee replacement    Stented coronary artery    S/P laparotomy    H/O dilation and curettage      FAMILY HISTORY:      SOCIAL HISTORY:   Significant other/partner[ ]  Children[ ]  Temple/Spirituality:  Substance hx:  [ ]   Tobacco hx:  [ ]   Alcohol hx: [ ]   Home Opioid hx:  [ ] I-Stop Reference No:  Living Situation: [ ]Home  [ ]Long term care  [ ]Rehab [ ]Other    ADVANCE DIRECTIVES:    DNR/MOLST  [ ]  Living Will  [ ]   DECISION MAKER(s):  [ ] Health Care Proxy(s)  [ ] Surrogate(s)  [ ] Guardian           Name(s): Phone Number(s):    BASELINE (I)ADL(s) (prior to admission):  [place]: [ ]Total  [ ] Moderate [ ]Dependent    Allergies    penicillin (Hives)    Intolerances    MEDICATIONS  (STANDING):  digoxin  Injectable 125 MICROGram(s) IV Push daily  midodrine. 5 milliGRAM(s) Oral three times a day  pantoprazole Infusion 8 mG/Hr (10 mL/Hr) IV Continuous <Continuous>  piperacillin/tazobactam IVPB.- 3.375 Gram(s) IV Intermittent once  piperacillin/tazobactam IVPB.. 3.375 Gram(s) IV Intermittent every 8 hours    MEDICATIONS  (PRN):      ITEMS NOT CHECKED ARE NOT PRESENT  PRESENT SYMPTOMS: [ ]Unable to self-report see CPOT, PAINADs, RDOS  Source if other than patient:  [ ]Family   [ ]Team     Pain: [ ]yes [ ]no  QOL impact -   Location -                    Aggravating factors -  Quality -  Radiation -  Timing-  Severity (0-10 scale):  Minimal acceptable level (0-10 scale):       Dyspnea:                           [ ]Mild [ ]Moderate [ ]Severe  Anxiety:                             [ ]Mild [ ]Moderate [ ]Severe  Fatigue:                             [ ]Mild [ ]Moderate [ ]Severe  Nausea:                             [ ]Mild [ ]Moderate [ ]Severe  Loss of appetite:              [ ]Mild [ ]Moderate [ ]Severe  Constipation:                    [ ]Mild [ ]Moderate [ ]Severe    PCSSQ [Palliative Care Spiritual Screening Question]   Severity (0-10):  Score of 4 or > indicate consideration of Chaplaincy referral.  Chaplaincy Referral: [ ] yes [ ] refused [ ] following [ ] deferred    Caregiver [place]? : [ ] yes [ ] no [ ] deferred:  Social work referral [ ] Patient & Family Centered Care Referral [ ]     Anticipatory Grief Present?: [ ] yes [ ] no  [ ] deferred: Palliative Social work referral [ ]  Patient & Family Centered Care Referral [ ]       Other Symptoms:  [ ]All other review of systems negative   [ ] Unable to obtain due to poor mentation    PHYSICAL EXAM:  Vital Signs Last 24 Hrs  T(C): 36.5 (06 Feb 2025 12:10), Max: 36.5 (05 Feb 2025 23:37)  T(F): 97.7 (06 Feb 2025 12:10), Max: 97.7 (05 Feb 2025 23:37)  HR: 122 (06 Feb 2025 13:11) (80 - 157)  BP: 103/59 (06 Feb 2025 13:11) (74/51 - 114/61)  BP(mean): 78 (06 Feb 2025 13:11) (59 - 81)  RR: 18 (06 Feb 2025 13:11) (16 - 18)  SpO2: 99% (06 Feb 2025 13:11) (94% - 99%)    Parameters below as of 06 Feb 2025 13:11  Patient On (Oxygen Delivery Method): room air     I&O's Summary      GENERAL:  [x]Alert  [x]Oriented x 3  [ ]Lethargic  [ ]Cachexia  [ ]Unarousable  [x]Verbal  [ ]Non-Verbal  Behavioral:   [ ]Anxiety  [ ]Delirium [ ]Agitation [ ]Other  HEENT:  [x]Normal   [ ]Dry mouth   [ ]ET Tube/Trach  [ ]Oral lesions  PULMONARY:   [x]Clear [ ]Tachypnea  [ ]Audible excessive secretions   [ ]Rhonchi        [ ]Right [ ]Left [ ]Bilateral  [ ]Crackles        [ ]Right [ ]Left [ ]Bilateral  [ ]Wheezing     [ ]Right [ ]Left [ ]Bilateral  [ ]Diminished BS [ ] Right [ ]Left [ ]Bilateral  CARDIOVASCULAR:    [x]Regular [ ]Irregular [ ]Tachy  [ ]Gerson [ ]Murmur [ ]Other  GASTROINTESTINAL:  [x]Soft  [ ]Distended   [x]+BS  [x]Non tender [ ]Tender  [ ]PEG [ ]OGT/ NGT   Last BM:    GENITOURINARY:  [x]Normal [ ]Incontinent   [ ]Oliguria/Anuria   [ ]Melvin  MUSCULOSKELETAL:   [ ]Normal   [x]Weakness  [ ]Bed/Wheelchair bound [ ]Edema  NEUROLOGIC:   [x]No focal deficits  [ ] Cognitive impairment  [ ] Dysphagia [ ]Dysarthria [ ] Paresis [ ]Other   SKIN:   [x]Normal  [ ]Rash   [ ]Pressure ulcer(s) [ ]y [ ]n present on admission    CRITICAL CARE:  [ ] Shock Present  [ ]Septic [ ]Cardiogenic [ ]Neurologic [ ]Hypovolemic  [ ]  Vasopressors [ ]  Inotropes   [ ]Respiratory failure present [ ]Mechanical ventilation [ ]Non-invasive ventilatory support [ ]High flow    [ ]Acute  [ ]Chronic [ ]Hypoxic  [ ]Hypercarbic [ ]Other  [ ]Other organ failure     LABS:                        12.1   7.96  )-----------( 243      ( 06 Feb 2025 08:58 )             35.1   02-05    128[L]  |  95[L]  |  36[H]  ----------------------------<  94  5.1   |  21[L]  |  1.07    Ca    9.1      05 Feb 2025 22:10    TPro  6.6  /  Alb  3.3  /  TBili  0.4  /  DBili  x   /  AST  51[H]  /  ALT  34  /  AlkPhos  95  02-05  PT/INR - ( 05 Feb 2025 22:10 )   PT: 14.7 sec;   INR: 1.28 ratio         PTT - ( 05 Feb 2025 22:10 )  PTT:38.5 sec    Urinalysis Basic - ( 05 Feb 2025 22:10 )    Color: x / Appearance: x / SG: x / pH: x  Gluc: 94 mg/dL / Ketone: x  / Bili: x / Urobili: x   Blood: x / Protein: x / Nitrite: x   Leuk Esterase: x / RBC: x / WBC x   Sq Epi: x / Non Sq Epi: x / Bacteria: x      RADIOLOGY & ADDITIONAL STUDIES:    PROTEIN CALORIE MALNUTRITION PRESENT: [ ]mild [ ]moderate [ ]severe [ ]underweight [ ]morbid obesity  https://www.andeal.org/vault/2440/web/files/ONC/Table_Clinical%20Characteristics%20to%20Document%20Malnutrition-White%20JV%20et%20al%202012.pdf    Height (cm): 162.6 (08-06-24 @ 17:00), 162.6 (07-21-24 @ 11:32), 162.6 (06-03-24 @ 06:03)  Weight (kg): 68.039 (02-06-25 @ 09:33), 87.2 (08-16-24 @ 09:07), 69.5 (07-25-24 @ 14:35)  BMI (kg/m2): 25.7 (02-06-25 @ 09:33), 33 (08-16-24 @ 09:07), 26.3 (08-06-24 @ 17:00)    [ ]PPSV2 < or = to 30% [ ]significant weight loss  [ ]poor nutritional intake  [ ]anasarca[ ]Artificial Nutrition      Other REFERRALS:  [ ]Hospice  [ ]Child Life  [ ]Social Work  [ ]Case management [ ]Holistic Therapy

## 2025-02-06 NOTE — PATIENT PROFILE ADULT - DIETITIAN.
I will STOP taking the medications listed below when I get home from the hospital:  None
dietitian/nutrition services

## 2025-02-06 NOTE — ED ADULT NURSE REASSESSMENT NOTE - NS ED NURSE REASSESS COMMENT FT1
RN contacted FILI CHANDRA to made aware of HR ( 150s), and temperature, EKG is ordered, awaiting for further orders

## 2025-02-06 NOTE — CONSULT NOTE ADULT - TIME BILLING
as above
This includes chart review, patient assessment, discussion and collaboration with interdisciplinary team members, excluding ACP.    COUNSELING:  Face to face meeting to discuss Advanced Care Planning- Time Spent 20 minutes

## 2025-02-06 NOTE — ED ADULT NURSE REASSESSMENT NOTE - NS ED NURSE REASSESS COMMENT FT1
0700 Report received from CAITLIN CANCHOLA , NAD, resp nonlabored, skin warm/dry, resting comfortably in bed  Pt denies headache, dizziness, chest pain, palpitations, SOB, abd pain, n/v/d, urinary symptoms, fevers, chills, weakness at this time. Pt awaiting for bed . Safety maintained with call bell within reach.

## 2025-02-06 NOTE — CONSULT NOTE ADULT - PROBLEM SELECTOR RECOMMENDATION 2
Pt on home hospice  palliative following w family if actionable
continue supportive care  hgb stable  no transfusions or further work up per GOC discussion

## 2025-02-06 NOTE — CONSULT NOTE ADULT - SUBJECTIVE AND OBJECTIVE BOX
CHIEF COMPLAINT:    HISTORY OF PRESENT ILLNESS:  79-year-old female history of hypertension hypothyroidism CAD A-fib not on anticoagulation  ischemic colitis  status postresection and ostomy presenting with bloody output from ostomy that began today.  Patient is on home hospice, receiving comfort care.  Patient accompanied by her daughter states that she noticed bloody output from the ostomy this morning.  Patient is usually able to express that she is uncomfortable and the patient has not indicated any discomfort.  While the patient is on hospice, discussion with the daughter revealed that they would transfusion and admission if indicated.  Patient confirmed DNR/DNI however.    PAST MEDICAL & SURGICAL HISTORY:  Hypertension      Hypothyroid      Osteoarthritis  knees, back      CAD (coronary artery disease)      CROW (obstructive sleep apnea)  non complaint on CPAP      History of MI (myocardial infarction)  h/o previous MI in 2004 prompted PTCA  with stenting x 2 vessels   last stress/ echo 2019      Heart murmur  dx in childhood      Bilateral hearing loss, unspecified hearing loss type  bilateral aids      Obesity      Mixed stress and urge urinary incontinence      Overactive bladder      S/P ORIF (open reduction internal fixation) fracture  left hip 1962      S/P appendectomy  30 plus years      S/P knee replacement  left 2000      Stented coronary artery  2004 X 2 STENTS      S/P laparotomy  due to adhesions, 30 years ago      H/O dilation and curettage  2/2019 Benign polyp              MEDICATIONS:  digoxin  Injectable 125 MICROGram(s) IV Push daily  midodrine. 5 milliGRAM(s) Oral three times a day    piperacillin/tazobactam IVPB.- 3.375 Gram(s) IV Intermittent once  piperacillin/tazobactam IVPB.. 3.375 Gram(s) IV Intermittent every 8 hours        pantoprazole Infusion 8 mG/Hr IV Continuous <Continuous>          FAMILY HISTORY:      SOCIAL HISTORY:    [ ] Non-smoker  [ ] Smoker  [ ] Alcohol    Allergies    penicillin (Hives)    Intolerances    	    REVIEW OF SYSTEMS:  [XX ] Unable to obtain    PHYSICAL EXAM:  T(C): 36.5 (02-06-25 @ 12:10), Max: 36.5 (02-05-25 @ 23:37)  HR: 122 (02-06-25 @ 13:11) (80 - 157)  BP: 103/59 (02-06-25 @ 13:11) (74/51 - 114/61)  RR: 18 (02-06-25 @ 13:11) (16 - 18)  SpO2: 99% (02-06-25 @ 13:11) (94% - 99%)  Wt(kg): --  I&O's Summary      Appearance: NAAD	  HEENT:   Normal oral mucosa, PERRL, EOMI	  Lymphatic: No lymphadenopathy  Cardiovascular: Normal S1 S2, No JVD, No murmurs, No edema  Respiratory: Lungs clear to auscultation	  Psychiatry: A & O x 3, Mood & affect appropriate  Gastrointestinal:  Soft, Non-tender, + BS, grossly bloody output from ostomy appreciated	  Skin: No rashes, No ecchymoses, No cyanosis	  Neurologic: Non-focal  Extremities: Normal range of motion, No clubbing, cyanosis or edema  Vascular: Peripheral pulses palpable 2+ bilaterally    TELEMETRY: 	    ECG:  	  RADIOLOGY:  OTHER: 	  	  LABS:	 	    CARDIAC MARKERS:                                  12.1   7.96  )-----------( 243      ( 06 Feb 2025 08:58 )             35.1     02-05    128[L]  |  95[L]  |  36[H]  ----------------------------<  94  5.1   |  21[L]  |  1.07    Ca    9.1      05 Feb 2025 22:10    TPro  6.6  /  Alb  3.3  /  TBili  0.4  /  DBili  x   /  AST  51[H]  /  ALT  34  /  AlkPhos  95  02-05    proBNP:   Lipid Profile:   HgA1c:   TSH:            CHIEF COMPLAINT:    HISTORY OF PRESENT ILLNESS:  79-year-old female history of hypertension hypothyroidism CAD A-fib not on anticoagulation  ischemic colitis  status postresection and ostomy presenting with bloody output from ostomy that began today.  Patient is on home hospice, receiving comfort care.  Patient accompanied by her daughter states that she noticed bloody output from the ostomy this morning.  Patient is usually able to express that she is uncomfortable and the patient has not indicated any discomfort.  While the patient is on hospice, discussion with the daughter revealed that they would transfusion and admission if indicated.  Patient confirmed DNR/DNI however.    PAST MEDICAL & SURGICAL HISTORY:  Hypertension      Hypothyroid      Osteoarthritis  knees, back      CAD (coronary artery disease)      CROW (obstructive sleep apnea)  non complaint on CPAP      History of MI (myocardial infarction)  h/o previous MI in 2004 prompted PTCA  with stenting x 2 vessels   last stress/ echo 2019      Heart murmur  dx in childhood      Bilateral hearing loss, unspecified hearing loss type  bilateral aids      Obesity      Mixed stress and urge urinary incontinence      Overactive bladder      S/P ORIF (open reduction internal fixation) fracture  left hip 1962      S/P appendectomy  30 plus years      S/P knee replacement  left 2000      Stented coronary artery  2004 X 2 STENTS      S/P laparotomy  due to adhesions, 30 years ago      H/O dilation and curettage  2/2019 Benign polyp              MEDICATIONS:  digoxin  Injectable 125 MICROGram(s) IV Push daily  midodrine. 5 milliGRAM(s) Oral three times a day    piperacillin/tazobactam IVPB.- 3.375 Gram(s) IV Intermittent once  piperacillin/tazobactam IVPB.. 3.375 Gram(s) IV Intermittent every 8 hours        pantoprazole Infusion 8 mG/Hr IV Continuous <Continuous>          FAMILY HISTORY:      SOCIAL HISTORY:    [ ] Non-smoker  [ ] Smoker  [ ] Alcohol    Allergies    penicillin (Hives)    Intolerances    	    REVIEW OF SYSTEMS:  [XX ] Unable to obtain    PHYSICAL EXAM:  T(C): 36.5 (02-06-25 @ 12:10), Max: 36.5 (02-05-25 @ 23:37)  HR: 122 (02-06-25 @ 13:11) (80 - 157)  BP: 103/59 (02-06-25 @ 13:11) (74/51 - 114/61)  RR: 18 (02-06-25 @ 13:11) (16 - 18)  SpO2: 99% (02-06-25 @ 13:11) (94% - 99%)  Wt(kg): --  I&O's Summary      Appearance: NAAD	  HEENT:   Normal oral mucosa, PERRL, EOMI	  Lymphatic: No lymphadenopathy  Cardiovascular: irreg S1 S2, No JVD, No murmurs, No edema  Respiratory: Lungs clear to auscultation	  Psychiatry: A & O x 3, Mood & affect appropriate  Gastrointestinal:  Soft, Non-tender, + BS, grossly bloody output from ostomy appreciated	  Skin: No rashes, No ecchymoses, No cyanosis	  Neurologic: Non-focal  Extremities: Normal range of motion, No clubbing, cyanosis or edema  Vascular: Peripheral pulses palpable 2+ bilaterally    TELEMETRY: AF	    ECG:  	  RADIOLOGY:  OTHER: 	  	  LABS:	 	    CARDIAC MARKERS:                                  12.1   7.96  )-----------( 243      ( 06 Feb 2025 08:58 )             35.1     02-05    128[L]  |  95[L]  |  36[H]  ----------------------------<  94  5.1   |  21[L]  |  1.07    Ca    9.1      05 Feb 2025 22:10    TPro  6.6  /  Alb  3.3  /  TBili  0.4  /  DBili  x   /  AST  51[H]  /  ALT  34  /  AlkPhos  95  02-05    proBNP:   Lipid Profile:   HgA1c:   TSH:

## 2025-02-06 NOTE — CONSULT NOTE ADULT - CONVERSATION DETAILS
Patient arousable but too lethargic to meaningfully participate in GOC discussion. Daughter Caity present at bedside who she defers to. Caity has good understanding of patient's condition and shared that pt was on hospice services through [facility] prior to admission. She shared that she spoke to the medicine attending and knows her mom is in afib and there is a concern for GI bleed. We discussed options for comfort directed care vs continued medical management and work up of symptoms. Ciaty expressed that maintaining patient's comfort is most important. She is agreeable to comfort directed care and transfer to PCU for symptom management when bed is available. We discussed disposition options including returning home with hospice services vs inpatient hospice care pending clinical course. Jose Maria confirmed code status as DNR/DNI. MOLST completed as prior MOLST in patient window was incomplete. All questions answered and support provided.

## 2025-02-06 NOTE — PATIENT PROFILE ADULT - NS PRO AD NO ADVANCE DIRECTIVE
----- Message from Raine Mccracken PA-C sent at 12/15/2020  1:07 PM CST -----  Please notify the patient and her daughter that the urine results returned and showed some increased protein in her urine. There can be significant variation in day to day results. So we will repeat again in 3 to 6 months. No

## 2025-02-06 NOTE — CONSULT NOTE ADULT - PROBLEM SELECTOR RECOMMENDATION 5
For other symptoms recommend:  Morphine 2 mg IV q2h prn severe pain and Morphine 1 mg IV q2h prn moderate pain  Ativan 0.2 mg IV q2h prn agitation   Glycopyrrolate 0.4 mg IV q6h prn copious oral secretions   Dulcolax suppository daily prn constipation   Acetaminophen suppository 650 mg q6h prn fever  Zofran 4 mg IV q8h prn nausea or vomiting    Recommend discontinuation of all medications and interventions that do not serve goal of promoting patient's comfort and dignity at end-of-life.    Case discussed with primary team ACP. Pt to be transferred to PCU for symptom management when bed available.  For acute issues or uncontrolled symptoms please page palliative team.    Мария Lan MD  Geriatrics and Palliative Medicine Attending  [facility] pager: [phone]     The Geriatrics and Palliative Medicine consult service has 24/7 coverage for medical recommendations, including symptom management needs.

## 2025-02-06 NOTE — ED ADULT NURSE REASSESSMENT NOTE - NS ED NURSE REASSESS COMMENT FT1
RN tried to reach night ACP Randee Lea ([id number]) no answered to made aware that pt is tachycardic, hypothermic , will call later

## 2025-02-06 NOTE — H&P ADULT - ASSESSMENT
79-year-old female history of hypertension hypothyroidism CAD A-fib not on anticoagulation due to GI bleed , ischemic colitis  status postresection and ostomy presenting with bloody output from ostomy.  pt is lethargic and unable to give hx   called daughter and discuss   Patient is on home hospice, receiving comfort care.   she noticed bloody output from the ostomy.    no N/V/D  no fever reported   pt was found tohave acute gI bleed   with hypotension/hypothermia and afib with rvr      While the patient is on hospice, discussion with the daughter revealed that they would transfusion and admission if indicated.  Patient confirmed DNR/DNI however.    acute GI bleed: off AC   monitor H/.H    hypotension /hpothermia: r/ infection   start emopiric abx   fu cultures    afib with RVR : dig iv   cards input   ivf     hyponatremia : sec to hypovlemia   monitor     HTNL: nam bp meds    d./w daughter at length   palliative input for possible PCU bed

## 2025-02-06 NOTE — H&P ADULT - HISTORY OF PRESENT ILLNESS
79-year-old female history of hypertension hypothyroidism CAD A-fib not on anticoagulation  ischemic colitis  status postresection and ostomy presenting with bloody output from ostomy that began today.  Patient is on home hospice, receiving comfort care.  Patient accompanied by her daughter states that she noticed bloody output from the ostomy this morning.  Patient is usually able to express that she is uncomfortable and the patient has not indicated any discomfort.  While the patient is on hospice, discussion with the daughter revealed that they would transfusion and admission if indicated.  Patient confirmed DNR/DNI however. 79-year-old female history of hypertension hypothyroidism CAD A-fib not on anticoagulation due to GI bleed , ischemic colitis  status postresection and ostomy presenting with bloody output from ostomy.  pt is lethargic and unable to give hx   called daughter and discuss   Patient is on home hospice, receiving comfort care.   she noticed bloody output from the ostomy.    no N/V/D  no fever reported   pt was found tohave acute gI bleed   with hypotension/hypothermia and afib with rvr      While the patient is on hospice, discussion with the daughter revealed that they would transfusion and admission if indicated.  Patient confirmed DNR/DNI however.

## 2025-02-06 NOTE — CHART NOTE - NSCHARTNOTEFT_GEN_A_CORE
Called by the nurse to report that resident noted elevated  and hypotension SBP 70. Patient seen and examine at bedside.    Vital Signs Last 24 Hrs  T(C): 36.6 (06 Feb 2025 16:45), Max: 36.6 (06 Feb 2025 16:45)  T(F): 97.9 (06 Feb 2025 16:45), Max: 97.9 (06 Feb 2025 16:45)  HR: 82 (06 Feb 2025 16:45) (78 - 157)  BP: 118/82 (06 Feb 2025 16:45) (74/51 - 127/58)  BP(mean): 94 (06 Feb 2025 16:45) (59 - 94)  RR: 18 (06 Feb 2025 16:45) (16 - 18)  SpO2: 97% (06 Feb 2025 16:45) (94% - 100%)    Parameters below as of 06 Feb 2025 16:45  Patient On (Oxygen Delivery Method): room air    Labs:                          12.1   7.96  )-----------( 243      ( 06 Feb 2025 08:58 )             35.1     02-05    128[L]  |  95[L]  |  36[H]  ----------------------------<  94  5.1   |  21[L]  |  1.07    Ca    9.1      05 Feb 2025 22:10    TPro  6.6  /  Alb  3.3  /  TBili  0.4  /  DBili  x   /  AST  51[H]  /  ALT  34  /  AlkPhos  95  02-05        Radiology:    MEDICATIONS  (STANDING):  digoxin  Injectable 125 MICROGram(s) IV Push daily  influenza  Vaccine (HIGH DOSE) 0.5 milliLiter(s) IntraMuscular once  midodrine. 5 milliGRAM(s) Oral three times a day  pantoprazole Infusion 8 mG/Hr (10 mL/Hr) IV Continuous <Continuous>  piperacillin/tazobactam IVPB.. 3.375 Gram(s) IV Intermittent every 8 hours    MEDICATIONS  (PRN):      Physical Exam:  General: WN/WD NAD  Neurology: A&Ox3, nonfocal, COX x 4  Head:  Normocephalic, atraumatic  Respiratory: CTA B/L  CV: RRR, S1S2, no murmur  Abdominal: Soft, NT, ND no palpable mass  MSK: No edema, + peripheral pulses, FROM all 4 extremity    Assessment & Plan:  79-year-old female history of hypertension hypothyroidism CAD A-fib not on anticoagulation due to GI bleed , ischemic colitis  status postresection and ostomy presenting with bloody output from ostomy. Patient hypothermic, afib 150s and hypotensive r/o sepsis    > Stat blood cultures x 2  > Vancomycin and Zosyn x 1  > Normal Salin 500ml bolus - improved bp and HR   > D/W Dr Duran  > D/W Palliative attending in rounds Called by the nurse to report that resident noted elevated  and hypotension SBP 70. Patient seen and examine at bedside.    Vital Signs Last 24 Hrs  T(C): 36.6 (06 Feb 2025 16:45), Max: 36.6 (06 Feb 2025 16:45)  T(F): 97.9 (06 Feb 2025 16:45), Max: 97.9 (06 Feb 2025 16:45)  HR: 82 (06 Feb 2025 16:45) (78 - 157)  BP: 118/82 (06 Feb 2025 16:45) (74/51 - 127/58)  BP(mean): 94 (06 Feb 2025 16:45) (59 - 94)  RR: 18 (06 Feb 2025 16:45) (16 - 18)  SpO2: 97% (06 Feb 2025 16:45) (94% - 100%)    Parameters below as of 06 Feb 2025 16:45  Patient On (Oxygen Delivery Method): room air    Labs:                          12.1   7.96  )-----------( 243      ( 06 Feb 2025 08:58 )             35.1     02-05    128[L]  |  95[L]  |  36[H]  ----------------------------<  94  5.1   |  21[L]  |  1.07    Ca    9.1      05 Feb 2025 22:10    TPro  6.6  /  Alb  3.3  /  TBili  0.4  /  DBili  x   /  AST  51[H]  /  ALT  34  /  AlkPhos  95  02-05        Radiology:    MEDICATIONS  (STANDING):  digoxin  Injectable 125 MICROGram(s) IV Push daily  influenza  Vaccine (HIGH DOSE) 0.5 milliLiter(s) IntraMuscular once  midodrine. 5 milliGRAM(s) Oral three times a day  pantoprazole Infusion 8 mG/Hr (10 mL/Hr) IV Continuous <Continuous>  piperacillin/tazobactam IVPB.. 3.375 Gram(s) IV Intermittent every 8 hours    MEDICATIONS  (PRN):      Physical Exam:  General: WN/WD NAD  Neurology: A&Ox1, nonfocal, COX x 4  Head:  Normocephalic, atraumatic  Respiratory: CTA B/L  CV: RRR, S1S2, no murmur  Abdominal: Soft, NT, ND no palpable mass  MSK: No edema, + peripheral pulses, FROM all 4 extremity    Assessment & Plan:  79-year-old female history of hypertension hypothyroidism CAD A-fib not on anticoagulation due to GI bleed , ischemic colitis  status postresection and ostomy presenting with bloody output from ostomy. Patient hypothermic, afib 150s and hypotensive r/o sepsis    > Stat blood cultures x 2  > Vancomycin and Zosyn x 1  > Normal Salin 500ml bolus - improved bp and HR   > D/W Dr Duran  > D/W Palliative attending in rounds

## 2025-02-06 NOTE — ED ADULT NURSE REASSESSMENT NOTE - NS ED NURSE REASSESS COMMENT FT1
changed a new Indwelling urinary "amaya" catheter inserted using sterile technique. Procedure, risks, and benefits of catheter explained to patient, patient verbalized understanding. Second RN present to confirm sterility. Pt tolerated well. Urinary catheter drained clear myles urine, no clots visualized. Bedside drainage to gravity. Stat lock in place.

## 2025-02-06 NOTE — CONSULT NOTE ADULT - ASSESSMENT
79-year-old female history of hypertension hypothyroidism CAD A-fib not on anticoagulation  ischemic colitis  status postresection and ostomy presenting with bloody output from ostomy that began today.  Patient is on home hospice, receiving comfort care.  Patient accompanied by her daughter states that she noticed bloody output from the ostomy this morning.  Patient is usually able to express that she is uncomfortable and the patient has not indicated any discomfort.  While the patient is on hospice, discussion with the daughter revealed that they would transfusion and admission if indicated.  Patient confirmed DNR/DNI however.  
78 y/o F hx of  hypertension hypothyroidism CAD A-fib not on anticoagulation, ischemic colitis  status postresection and ostomy presenting with bloody output from ostomy that began today.  Patient is on home hospice, receiving comfort care.  Patient accompanied by her daughter states that she noticed bloody output from the ostomy this morning.  Patient is usually able to express that she is uncomfortable and the patient has not indicated any discomfort.

## 2025-02-06 NOTE — ED ADULT NURSE REASSESSMENT NOTE - NS ED NURSE REASSESS COMMENT FT1
[facility] Mariia verbal  orders

## 2025-02-06 NOTE — CONSULT NOTE ADULT - PROBLEM SELECTOR RECOMMENDATION 9
palliative following w family if transfusion and admission actionable   hg normal
Recommend:  Morphine 2 mg IV q2h prn dyspnea or tachypnea

## 2025-02-07 PROCEDURE — 99233 SBSQ HOSP IP/OBS HIGH 50: CPT

## 2025-02-07 RX ADMIN — MIDODRINE HYDROCHLORIDE 5 MILLIGRAM(S): 5 TABLET ORAL at 05:10

## 2025-02-07 RX ADMIN — MIDODRINE HYDROCHLORIDE 5 MILLIGRAM(S): 5 TABLET ORAL at 13:55

## 2025-02-07 RX ADMIN — PIPERACILLIN SODIUM AND TAZOBACTAM SODIUM 25 GRAM(S): 2; 250 INJECTION, POWDER, FOR SOLUTION INTRAVENOUS at 13:54

## 2025-02-07 RX ADMIN — Medication 125 MICROGRAM(S): at 13:54

## 2025-02-07 RX ADMIN — MIDODRINE HYDROCHLORIDE 5 MILLIGRAM(S): 5 TABLET ORAL at 18:15

## 2025-02-07 RX ADMIN — PIPERACILLIN SODIUM AND TAZOBACTAM SODIUM 25 GRAM(S): 2; 250 INJECTION, POWDER, FOR SOLUTION INTRAVENOUS at 21:23

## 2025-02-07 RX ADMIN — PIPERACILLIN SODIUM AND TAZOBACTAM SODIUM 25 GRAM(S): 2; 250 INJECTION, POWDER, FOR SOLUTION INTRAVENOUS at 05:10

## 2025-02-07 NOTE — PROGRESS NOTE ADULT - ASSESSMENT
80 y/o F hx of  hypertension hypothyroidism CAD A-fib not on anticoagulation, ischemic colitis  status postresection and ostomy presenting with bloody output from ostomy that began today.  Patient is on home hospice, receiving comfort care.  Patient accompanied by her daughter states that she noticed bloody output from the ostomy this morning.  Patient is usually able to express that she is uncomfortable and the patient has not indicated any discomfort.

## 2025-02-07 NOTE — PROGRESS NOTE ADULT - SUBJECTIVE AND OBJECTIVE BOX
Subjective: Patient seen and examined. No new events except as noted.   pt resting comfortably on bed    REVIEW OF SYSTEMS:  unable to obtain    MEDICATIONS:  MEDICATIONS  (STANDING):  digoxin  Injectable 125 MICROGram(s) IV Push daily  influenza  Vaccine (HIGH DOSE) 0.5 milliLiter(s) IntraMuscular once  midodrine. 5 milliGRAM(s) Oral three times a day  pantoprazole Infusion 8 mG/Hr (10 mL/Hr) IV Continuous <Continuous>  piperacillin/tazobactam IVPB.. 3.375 Gram(s) IV Intermittent every 8 hours      PHYSICAL EXAM:  T(C): 36.3 (02-07-25 @ 04:42), Max: 36.6 (02-06-25 @ 16:45)  HR: 73 (02-07-25 @ 04:42) (73 - 157)  BP: 105/72 (02-07-25 @ 04:42) (74/51 - 131/84)  RR: 18 (02-07-25 @ 04:42) (18 - 18)  SpO2: 98% (02-07-25 @ 04:42) (96% - 100%)  Wt(kg): --  I&O's Summary      Weight (kg): 68.039 (02-06 @ 09:33)    Appearance: NAAD	  HEENT:   Normal oral mucosa, PERRL, EOMI	  Lymphatic: No lymphadenopathy  Cardiovascular: irreg S1 S2, No JVD, No murmurs, No edema  Respiratory: Lungs clear to auscultation	  Psychiatry: A & O x 3, Mood & affect appropriate  Gastrointestinal:  Soft, Non-tender, + BS, grossly bloody output from ostomy appreciated	  Skin: No rashes, No ecchymoses, No cyanosis	  Neurologic: Non-focal  Extremities: Normal range of motion, No clubbing, cyanosis or edema  Vascular: Peripheral pulses palpable 2+ bilaterally        LABS:    CARDIAC MARKERS:                                12.1   7.96  )-----------( 243      ( 06 Feb 2025 08:58 )             35.1     02-05    128[L]  |  95[L]  |  36[H]  ----------------------------<  94  5.1   |  21[L]  |  1.07    Ca    9.1      05 Feb 2025 22:10    TPro  6.6  /  Alb  3.3  /  TBili  0.4  /  DBili  x   /  AST  51[H]  /  ALT  34  /  AlkPhos  95  02-05    proBNP:   Lipid Profile:   HgA1c:   TSH:             TELEMETRY: 	    ECG:  	  RADIOLOGY:   DIAGNOSTIC TESTING:  [ ] Echocardiogram:  [ ]  Catheterization:  [ ] Stress Test:    OTHER:

## 2025-02-07 NOTE — PROGRESS NOTE ADULT - PROBLEM SELECTOR PLAN 2
Pt on home hospice  DNR/DNI  comfort directed care and transfer to PCU for symptom management when bed is available.   palliative following

## 2025-02-07 NOTE — PROGRESS NOTE ADULT - SUBJECTIVE AND OBJECTIVE BOX
Date of service: 02-07-25 @ 16:05      Patient is a 79y old  Female who presents with a chief complaint of                                                              INTERVAL HPI/OVERNIGHT EVENTS:    REVIEW OF SYSTEMS:    More alert  Still confused  Seems comfortable overall                                                                                                                                                                                                                                                                         Medications:  MEDICATIONS  (STANDING):  digoxin  Injectable 125 MICROGram(s) IV Push daily  influenza  Vaccine (HIGH DOSE) 0.5 milliLiter(s) IntraMuscular once  midodrine. 5 milliGRAM(s) Oral three times a day  pantoprazole Infusion 8 mG/Hr (10 mL/Hr) IV Continuous <Continuous>  piperacillin/tazobactam IVPB.. 3.375 Gram(s) IV Intermittent every 8 hours    MEDICATIONS  (PRN):       Allergies    penicillin (Hives)    Intolerances      Vital Signs Last 24 Hrs  T(C): 36.3 (07 Feb 2025 12:06), Max: 36.6 (06 Feb 2025 16:45)  T(F): 97.3 (07 Feb 2025 12:06), Max: 97.9 (06 Feb 2025 16:45)  HR: 65 (07 Feb 2025 12:06) (65 - 83)  BP: 113/83 (07 Feb 2025 12:06) (105/72 - 131/84)  BP(mean): 94 (06 Feb 2025 16:45) (94 - 94)  RR: 18 (07 Feb 2025 12:06) (18 - 18)  SpO2: 99% (07 Feb 2025 12:06) (97% - 99%)    Parameters below as of 07 Feb 2025 12:06  Patient On (Oxygen Delivery Method): room air      CAPILLARY BLOOD GLUCOSE          Physical Exam:    Daily     Daily   General: NAD cachetic  HEENT:  Nonicteric, PERRLA  CV:  RRR, S1S2   Lungs:  Poor effort otherwise clear  Abdomen:  Ostomy in place  Extremities:  No edema                                                                                                                                                                                                                                                                                           LABS:                               12.1   7.96  )-----------( 243      ( 06 Feb 2025 08:58 )             35.1                      02-05    128[L]  |  95[L]  |  36[H]  ----------------------------<  94  5.1   |  21[L]  |  1.07    Ca    9.1      05 Feb 2025 22:10    TPro  6.6  /  Alb  3.3  /  TBili  0.4  /  DBili  x   /  AST  51[H]  /  ALT  34  /  AlkPhos  95  02-05                       RADIOLOGY & ADDITIONAL TESTS         I personally reviewed: [  ]EKG   [  ]CXR    [  ] CT      A/P:         Discussed with :     Simon consultants' Notes   Time spent :

## 2025-02-07 NOTE — PROGRESS NOTE ADULT - PROBLEM SELECTOR PLAN 4
see goc from 2/6   pt previously on home hospice with [facility]   pt pending transfer to PCU   updates provided to daughter Caity see goc from 2/6   pt previously on home hospice with [facility]   pt pending transfer to PCU   spoke with Caity again today, explained if pt stable and asymptomatic can return home with [facility] services  discussed pleasure feeds to which she was amenable

## 2025-02-07 NOTE — PROGRESS NOTE ADULT - PROBLEM SELECTOR PLAN 5
will continue to follow for goc/symptoms  Chaplaincy consult placed  For other symptoms recommend:  Morphine 2 mg IV q2h prn severe pain and Morphine 1 mg IV q2h prn moderate pain  Ativan 0.2 mg IV q2h prn agitation   Glycopyrrolate 0.4 mg IV q6h prn copious oral secretions   Dulcolax suppository daily prn constipation   Acetaminophen suppository 650 mg q6h prn fever  Zofran 4 mg IV q8h prn nausea or vomiting  For acute issues or uncontrolled symptoms please page palliative team.    Can be reached by TEAMS M-F 9-5 Joanie Barrett Any other time please page [phone] if needed

## 2025-02-07 NOTE — PROGRESS NOTE ADULT - ASSESSMENT
79-year-old female history of hypertension hypothyroidism CAD A-fib not on anticoagulation due to GI bleed , ischemic colitis  status postresection and ostomy presenting with bloody output from ostomy.  pt is lethargic and unable to give hx   called daughter and discuss   Patient is on home hospice, receiving comfort care.   she noticed bloody output from the ostomy.    no N/V/D  no fever reported   pt was found tohave acute gI bleed   with hypotension/hypothermia and afib with rvr      While the patient is on hospice, discussion with the daughter revealed that they would transfusion and admission if indicated.  Patient confirmed DNR/DNI however.    acute GI bleed: off AC   Stable H&H    hypotension /hpothermia: r/ infection   start emopiric abx   fu cultures    afib with RVR : dig iv     hyponatremia : sec to hypovlemia   monitor     HTNL: nam bp meds      palliative input  Appreciated: continue concurrent care .. Awaiting PCU bed

## 2025-02-07 NOTE — PROGRESS NOTE ADULT - SUBJECTIVE AND OBJECTIVE BOX
SUBJECTIVE AND OBJECTIVE: pt seen and examined at bedside. pt awake, alert, able to participate in exam.   Indication for Geriatrics and Palliative Care Services/INTERVAL HPI: GOC/symptoms     OVERNIGHT EVENTS: no acute events o/n     DNR on chart:DNI  DNI      Allergies    penicillin (Hives)    Intolerances    MEDICATIONS  (STANDING):  digoxin  Injectable 125 MICROGram(s) IV Push daily  influenza  Vaccine (HIGH DOSE) 0.5 milliLiter(s) IntraMuscular once  midodrine. 5 milliGRAM(s) Oral three times a day  pantoprazole Infusion 8 mG/Hr (10 mL/Hr) IV Continuous <Continuous>  piperacillin/tazobactam IVPB.. 3.375 Gram(s) IV Intermittent every 8 hours    MEDICATIONS  (PRN):      ITEMS UNCHECKED ARE NOT PRESENT    PRESENT SYMPTOMS: [ ]Unable to self-report - see [ ] CPOT [ ] PAINADS [ ] RDOS  Source if other than patient:  [ ]Family   [ ]Team     Pain:  [ ]yes [x ]no  QOL impact -   Location -                    Aggravating factors -  Quality -  Radiation -  Timing-  Severity (0-10 scale):  Minimal acceptable level (0-10 scale):     CPOT:    https://www.[facility].org/getattachment/[email or web address]/Critical-Care-Pain-Observation-Tool-(CPOT)    PAINAD Score: See PAINAD tool and score below       Dyspnea:                           [ ]Mild [ ]Moderate [ ]Severe None     RDOS: See RDOS tool and score below   0 to 2  minimal or no respiratory distress   3  mild distress  4 to 6 moderate distress  >7 severe distress      Anxiety:                             [ ]Mild [ ]Moderate [ ]Severe  Fatigue:                             [ ]Mild [ ]Moderate [ ]Severe  Nausea:                             [ ]Mild [ ]Moderate [ ]Severe  Loss of appetite:              [ ]Mild [ ]Moderate [ ]Severe  Constipation:                    [ ]Mild [ ]Moderate [ ]Severe    PCSSQ[Palliative Care Spiritual Screening Question]   Severity (0-10):  Score of 4 or > indicate consideration of Chaplaincy referral.  Chaplaincy Referral: [x ] yes [ ] refused [x ] following [ ] Deferred     Caregiver [place]? : [ ] yes [ ] no [ ] Deferred [ ] Declined             Social work referral [ ] Patient & Family Centered Care Referral [ ]     Anticipatory Grief present?:  [ ] yes [ ] no  [ ] Deferred                  Social work referral [ ] Chaplaincy Referral [ ]    		  Other Symptoms:  [x ]All other review of systems negative     Palliative Performance Status Version 2:   See PPSv2 tool and score below         PHYSICAL EXAM:  Vital Signs Last 24 Hrs  T(C): 36.3 (07 Feb 2025 04:42), Max: 36.6 (06 Feb 2025 16:45)  T(F): 97.3 (07 Feb 2025 04:42), Max: 97.9 (06 Feb 2025 16:45)  HR: 73 (07 Feb 2025 04:42) (73 - 157)  BP: 105/72 (07 Feb 2025 04:42) (94/60 - 131/84)  BP(mean): 94 (06 Feb 2025 16:45) (72 - 94)  RR: 18 (07 Feb 2025 04:42) (18 - 18)  SpO2: 98% (07 Feb 2025 04:42) (96% - 100%)    Parameters below as of 07 Feb 2025 04:42  Patient On (Oxygen Delivery Method): room air     I&O's Summary     GENERAL: [ ]Cachexia    [ x]Alert  [x ]Oriented x2   [ ]Lethargic  [ ]Unarousable  [ ]Verbal  [ ]Non-Verbal  Behavioral:   [ ]Anxiety  [ ]Delirium [ ]Agitation [ ]Other  HEENT:  [ ]Normal   [ x]Dry mouth   [ ]ET Tube/Trach  [ ]Oral lesions  PULMONARY:   [ ]Clear [ ]Tachypnea  [ ]Audible excessive secretions   [ ]Rhonchi        [ ]Right [ ]Left [ ]Bilateral  [ ]Crackles        [ ]Right [ ]Left [ ]Bilateral  [ ]Wheezing     [ ]Right [ ]Left [ ]Bilateral  [ x]Diminished BS [ ] Right [ ]Left [x ]Bilateral  CARDIOVASCULAR:    [ ]Regular [x ]Irregular [ ]Tachy  [ ]Gerson [ ]Murmur [ ]Other  GASTROINTESTINAL:  [ x]Soft  [ ]Distended   [ x]+BS  [x ]Non tender [ ]Tender  [x ]Other [ ]PEG [ ]OGT/ NGT   Last BM:   GENITOURINARY:  [ ]Normal [ x]Incontinent   [ ]Oliguria/Anuria   [ ]Melvin  MUSCULOSKELETAL:   [ ]Normal   [x ]Weakness  [ x]Bed/Wheelchair bound [ ]Edema  NEUROLOGIC:   [ ]No focal deficits  [ x] Cognitive impairment  [ ] Dysphagia [ ]Dysarthria [ ] Paresis [ ]Other   SKIN:   [ ]Normal  [ ]Rash  [ ]Other  [ ]Pressure ulcer(s) [ ]y [ ]n present on admission    CRITICAL CARE:  [ ]Shock Present  [ ]Septic [ ]Cardiogenic [ ]Neurologic [ ]Hypovolemic  [ ]Vasopressors [ ]Inotropes  [ ]Respiratory failure present [ ]Mechanical Ventilation [ ]Non-invasive ventilatory support [ ]High-Flow   [ ]Acute  [ ]Chronic [ ]Hypoxic  [ ]Hypercarbic [ ]Other  [ ]Other organ failure     LABS:                        12.1   7.96  )-----------( 243      ( 06 Feb 2025 08:58 )             35.1   02-05    128[L]  |  95[L]  |  36[H]  ----------------------------<  94  5.1   |  21[L]  |  1.07    Ca    9.1      05 Feb 2025 22:10    TPro  6.6  /  Alb  3.3  /  TBili  0.4  /  DBili  x   /  AST  51[H]  /  ALT  34  /  AlkPhos  95  02-05  PT/INR - ( 05 Feb 2025 22:10 )   PT: 14.7 sec;   INR: 1.28 ratio         PTT - ( 05 Feb 2025 22:10 )  PTT:38.5 sec    Urinalysis Basic - ( 05 Feb 2025 22:10 )    Color: x / Appearance: x / SG: x / pH: x  Gluc: 94 mg/dL / Ketone: x  / Bili: x / Urobili: x   Blood: x / Protein: x / Nitrite: x   Leuk Esterase: x / RBC: x / WBC x   Sq Epi: x / Non Sq Epi: x / Bacteria: x      RADIOLOGY & ADDITIONAL STUDIES:    Protein Calorie Malnutrition Present: [ ]mild [ ]moderate [ ]severe [ ]underweight [ ]morbid obesity  https://www.andeal.org/vault/5720/web/files/ONC/Table_Clinical%20Characteristics%20to%20Document%20Malnutrition-White%20JV%20et%20al%202012.pdf    Height (cm): 162.6 (08-06-24 @ 17:00), 162.6 (07-21-24 @ 11:32), 162.6 (06-03-24 @ 06:03)  Weight (kg): 68.039 (02-06-25 @ 09:33), 87.2 (08-16-24 @ 09:07), 69.5 (07-25-24 @ 14:35)  BMI (kg/m2): 25.7 (02-06-25 @ 09:33), 33 (08-16-24 @ 09:07), 26.3 (08-06-24 @ 17:00)    [ x]PPSV2 < or = 30%  [ ]significant weight loss [ ]poor nutritional intake [ ]anasarca[ ]Artificial Nutrition    Other REFERRALS:  [ ]Hospice  [ ]Child Life  [ ]Social Work  [ ]Case management [ ]Holistic Therapy     Goals of Care Document:    Goals of Care:   GOALS OF CARE:  · Participants	Family  · Child(kianna)	Caity Gooden    Conversation Discussion:  · Conversation	Diagnosis; Prognosis; MOLST Discussed; Treatment Options  · Conversation Details	Patient arousable but too lethargic to meaningfully participate in GOC discussion. Daughter Caity present at bedside who she defers to. Caity has good understanding of patient's condition and shared that pt was on hospice services through [facility] prior to admission. She shared that she spoke to the medicine attending and knows her mom is in afib and there is a concern for GI bleed. We discussed options for comfort directed care vs continued medical management and work up of symptoms. Caity expressed that maintaining patient's comfort is most important. She is agreeable to comfort directed care and transfer to PCU for symptom management when bed is available. We discussed disposition options including returning home with hospice services vs inpatient hospice care pending clinical course. Jose Maria confirmed code status as DNR/DNI. MOLST completed as prior MOLST in patient window was incomplete. All questions answered and support provided.    Treatment Guidelines:  · Decision Maker	Surrogate  · Treatment Guidelines	DNR; DNI    MOLST:  · Completed	06-Feb-2025    Location of Discussion:  · Location of discussion	Face to face     Time Spent on Advance Care Planning:  Attending w/Resident/Fellow.     I reviewed and verified the documentation in the record by the Resident/Fellow and made amendments where necessary. I personally spent 20 minutes on advance care planning services with the patient. This time is separate and distinct from any other care management services provided on this date.

## 2025-02-07 NOTE — PROGRESS NOTE ADULT - PROBLEM SELECTOR PLAN 1
pt not dyspneic on exam if symptoms arise recommend:   Morphine 2 mg IV q2h prn dyspnea or tachypnea.

## 2025-02-08 RX ORDER — MORPHINE SULFATE 60 MG/1
2 TABLET, FILM COATED, EXTENDED RELEASE ORAL
Refills: 0 | Status: DISCONTINUED | OUTPATIENT
Start: 2025-02-08 | End: 2025-02-11

## 2025-02-08 RX ORDER — ACETAMINOPHEN 160 MG/5ML
1000 SUSPENSION ORAL ONCE
Refills: 0 | Status: COMPLETED | OUTPATIENT
Start: 2025-02-08 | End: 2025-02-08

## 2025-02-08 RX ORDER — BISACODYL 5 MG
10 TABLET, DELAYED RELEASE (ENTERIC COATED) ORAL DAILY
Refills: 0 | Status: DISCONTINUED | OUTPATIENT
Start: 2025-02-08 | End: 2025-02-12

## 2025-02-08 RX ORDER — MORPHINE SULFATE 60 MG/1
1 TABLET, FILM COATED, EXTENDED RELEASE ORAL
Refills: 0 | Status: DISCONTINUED | OUTPATIENT
Start: 2025-02-08 | End: 2025-02-11

## 2025-02-08 RX ORDER — MORPHINE SULFATE 60 MG/1
1 TABLET, FILM COATED, EXTENDED RELEASE ORAL
Refills: 0 | Status: DISCONTINUED | OUTPATIENT
Start: 2025-02-08 | End: 2025-02-08

## 2025-02-08 RX ORDER — ACETAMINOPHEN 160 MG/5ML
650 SUSPENSION ORAL EVERY 6 HOURS
Refills: 0 | Status: DISCONTINUED | OUTPATIENT
Start: 2025-02-08 | End: 2025-02-12

## 2025-02-08 RX ADMIN — ACETAMINOPHEN 400 MILLIGRAM(S): 160 SUSPENSION ORAL at 13:20

## 2025-02-08 RX ADMIN — PIPERACILLIN SODIUM AND TAZOBACTAM SODIUM 25 GRAM(S): 2; 250 INJECTION, POWDER, FOR SOLUTION INTRAVENOUS at 05:23

## 2025-02-08 RX ADMIN — PANTOPRAZOLE 10 MG/HR: 20 TABLET, DELAYED RELEASE ORAL at 17:15

## 2025-02-08 RX ADMIN — ACETAMINOPHEN 1000 MILLIGRAM(S): 160 SUSPENSION ORAL at 13:50

## 2025-02-08 RX ADMIN — MIDODRINE HYDROCHLORIDE 5 MILLIGRAM(S): 5 TABLET ORAL at 13:21

## 2025-02-08 RX ADMIN — PIPERACILLIN SODIUM AND TAZOBACTAM SODIUM 25 GRAM(S): 2; 250 INJECTION, POWDER, FOR SOLUTION INTRAVENOUS at 22:01

## 2025-02-08 RX ADMIN — PIPERACILLIN SODIUM AND TAZOBACTAM SODIUM 25 GRAM(S): 2; 250 INJECTION, POWDER, FOR SOLUTION INTRAVENOUS at 13:30

## 2025-02-08 RX ADMIN — MIDODRINE HYDROCHLORIDE 5 MILLIGRAM(S): 5 TABLET ORAL at 17:15

## 2025-02-08 RX ADMIN — PANTOPRAZOLE 10 MG/HR: 20 TABLET, DELAYED RELEASE ORAL at 21:59

## 2025-02-08 RX ADMIN — PANTOPRAZOLE 10 MG/HR: 20 TABLET, DELAYED RELEASE ORAL at 13:30

## 2025-02-08 RX ADMIN — MIDODRINE HYDROCHLORIDE 5 MILLIGRAM(S): 5 TABLET ORAL at 05:24

## 2025-02-08 NOTE — DIETITIAN INITIAL EVALUATION ADULT - REASON FOR ADMISSION
Melena    Per chart, patient is a 78 y/o female with PMH including HTN, hypothyroidism, CAD, AFib, h/o ischemic colitis (s/p resection and colostomy creation). Patient presents to [facility] w/ bloody output from ostomy; patient reported to be on home hospice receiving comfort care. Palliative care consulted; per recent events reviewed, plan for PCU bed.

## 2025-02-08 NOTE — ADVANCED PRACTICE NURSE CONSULT - REASON FOR CONSULT
Continued Ostomy management; Complete pouch change  PMH including HTN, hypothyroidism, CAD, AFib, h/o ischemic colitis (s/p resection and colostomy creation). Patient presents to [facility] w/ bloody output from ostomy; patient reported to be on home hospice receiving comfort care.

## 2025-02-08 NOTE — DIETITIAN INITIAL EVALUATION ADULT - REASON INDICATOR FOR ASSESSMENT
Consult for "pressure injury stage 2 or >"  Source: chart, floor staff (patient sleeping when seen)  Chart reviewed, events noted

## 2025-02-08 NOTE — PROGRESS NOTE ADULT - SUBJECTIVE AND OBJECTIVE BOX
Subjective: Patient seen and examined. No new events except as noted.   daughters at bedside     REVIEW OF SYSTEMS:    CONSTITUTIONAL: +weakness, fevers or chills  EYES/ENT: No visual changes;  No vertigo or throat pain   NECK: No pain or stiffness  RESPIRATORY: No cough, wheezing, hemoptysis; No shortness of breath  CARDIOVASCULAR: No chest pain or palpitations  GASTROINTESTINAL: No abdominal or epigastric pain. No nausea, vomiting, or hematemesis; No diarrhea or constipation. No melena or hematochezia.  GENITOURINARY: No dysuria, frequency or hematuria  NEUROLOGICAL: No numbness or weakness  SKIN: No itching, burning, rashes, or lesions   All other review of systems is negative unless indicated above.    MEDICATIONS:  MEDICATIONS  (STANDING):  digoxin  Injectable 125 MICROGram(s) IV Push daily  influenza  Vaccine (HIGH DOSE) 0.5 milliLiter(s) IntraMuscular once  midodrine. 5 milliGRAM(s) Oral three times a day  pantoprazole Infusion 8 mG/Hr (10 mL/Hr) IV Continuous <Continuous>  piperacillin/tazobactam IVPB.. 3.375 Gram(s) IV Intermittent every 8 hours      PHYSICAL EXAM:  T(C): 36.4 (02-08-25 @ 15:55), Max: 36.9 (02-08-25 @ 00:49)  HR: 60 (02-08-25 @ 15:55) (56 - 70)  BP: 93/59 (02-08-25 @ 15:55) (93/59 - 100/63)  RR: 18 (02-08-25 @ 15:55) (18 - 18)  SpO2: 98% (02-08-25 @ 15:55) (96% - 98%)  Wt(kg): --  I&O's Summary    07 Feb 2025 07:01  -  08 Feb 2025 07:00  --------------------------------------------------------  IN: 100 mL / OUT: 0 mL / NET: 100 mL    08 Feb 2025 07:01  -  08 Feb 2025 19:39  --------------------------------------------------------  IN: 660 mL / OUT: 250 mL / NET: 410 mL          Appearance: NAAD	  HEENT:   Normal oral mucosa, PERRL, EOMI	  Lymphatic: No lymphadenopathy  Cardiovascular: irreg S1 S2, No JVD, No murmurs, No edema  Respiratory: Lungs clear to auscultation	  Psychiatry: A & O x 3, Mood & affect appropriate  Gastrointestinal:  Soft, Non-tender, + BS, grossly bloody output from ostomy appreciated	  Skin: No rashes, No ecchymoses, No cyanosis	  Neurologic: Non-focal  Extremities: Normal range of motion, No clubbing, cyanosis or edema  Vascular: Peripheral pulses palpable 2+ bilaterally      LABS:    CARDIAC MARKERS:                  proBNP:   Lipid Profile:   HgA1c:   TSH:             TELEMETRY: 	    ECG:  	  RADIOLOGY:   DIAGNOSTIC TESTING:  [ ] Echocardiogram:  [ ]  Catheterization:  [ ] Stress Test:    OTHER:

## 2025-02-08 NOTE — ADVANCED PRACTICE NURSE CONSULT - ASSESSMENT
Chart reviewed and events noted to date. In to see patient in 3 [facility] on 2/8/25. Patient known to Ostomy team. Patient awake, but confused. Patient's daughter (Kim) in at bed side.   Complete pouching system change: End ileostomy, RLQ, 1" red, flushed to skin, peristomal skin intact. Mucocutaneous junction intact. Patient's abdomen soft and flabby with many skin creases. Noted creases from 3 - 9 o'clock.  Removed old appliance and cleaned peristomal skin with warm water, removal of pouching system using pull and push technique, applied stoma powder and skin barrier to protect skin. Stoma paste to creases. Fitted new ostomy appliance, ensuring proper seal and comfort for the patient and re-pouched. Re-pouched w/ Derick Soft Convex CeraPlus Skin Barrier #[id number]. Stoma paste to opening at back of skin barrier. Recommended to RN Estephania to apply Stoma Belt to aid in the adhesion of the stoma to which Patient's daughter 'lAber declined. Stating that the stoma belt was used in the past and has "failed us many times".   Patient's daughter 'Caity' with questions about "releasing the gas" from the pouch and questions use of high-output pouch due to volume of output. Demonstrated pouching system opening / closure, "burping" to release gas.  Patient's daughter able to practice with demo pouching system to understand the process of snapping/burping opening and closing of the pouch. Placed patient in high-output pouch # [id number] to combat high-output.   All questions answered to Patient's daughters satisfaction.  Supplies left at bed side.   Ostomy team will remain available if any new question(s) or problem(s) arise.

## 2025-02-08 NOTE — DIETITIAN INITIAL EVALUATION ADULT - OTHER INFO
NKFA per chart. No height on file noted. [facility] growth chart unable to load when attempting to review long term anthropometric data. Will follow weight trend.    Per palliative care 2/7, no transfusions or further w/u of bleeding from colostomy per GOC discussion, palliative had discussed pleasure feeds w/ family which they were amenable for patient to receive.

## 2025-02-08 NOTE — DIETITIAN INITIAL EVALUATION ADULT - PERTINENT MEDS FT
MEDICATIONS  (STANDING):  digoxin  Injectable 125 MICROGram(s) IV Push daily  influenza  Vaccine (HIGH DOSE) 0.5 milliLiter(s) IntraMuscular once  midodrine. 5 milliGRAM(s) Oral three times a day  pantoprazole Infusion 8 mG/Hr (10 mL/Hr) IV Continuous <Continuous>  piperacillin/tazobactam IVPB.. 3.375 Gram(s) IV Intermittent every 8 hours    MEDICATIONS  (PRN):

## 2025-02-08 NOTE — PROGRESS NOTE ADULT - SUBJECTIVE AND OBJECTIVE BOX
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 Patient presents to [facility] w/ bloody output from ostomy; patient reported to be on home hospice receiving comfort care. Palliative care consulted; per recent events reviewed, plan for PCU bed. (08 Feb 2025 14:12)                                                               INTERVAL HPI/OVERNIGHT EVENTS:    REVIEW OF SYSTEMS:     confused   not in distress   denmies any complaints                                                                                                                                                                                                                                                                        Medications:  MEDICATIONS  (STANDING):  digoxin  Injectable 125 MICROGram(s) IV Push daily  influenza  Vaccine (HIGH DOSE) 0.5 milliLiter(s) IntraMuscular once  midodrine. 5 milliGRAM(s) Oral three times a day  pantoprazole Infusion 8 mG/Hr (10 mL/Hr) IV Continuous <Continuous>  piperacillin/tazobactam IVPB.. 3.375 Gram(s) IV Intermittent every 8 hours    MEDICATIONS  (PRN):  acetaminophen  Suppository .. 650 milliGRAM(s) Rectal every 6 hours PRN Temp greater or equal to 38C (100.4F), Mild Pain (1 - 3)  bisacodyl Suppository 10 milliGRAM(s) Rectal daily PRN Constipation  glycopyrrolate Injectable 0.4 milliGRAM(s) IV Push every 6 hours PRN excessive secretions  LORazepam   Injectable 0.25 milliGRAM(s) IV Push every 1 hour PRN Agitation  morphine  - Injectable 2 milliGRAM(s) IV Push every 1 hour PRN Severe Pain (7 - 10)  morphine  - Injectable 2 milliGRAM(s) IV Push every 1 hour PRN dyspnea  morphine  - Injectable 1 milliGRAM(s) IV Push every 1 hour PRN Moderate Pain (4 - 6)       Allergies    penicillin (Hives)    Intolerances      Vital Signs Last 24 Hrs  T(C): 36.6 (08 Feb 2025 22:01), Max: 36.9 (08 Feb 2025 00:49)  T(F): 97.9 (08 Feb 2025 22:01), Max: 98.4 (08 Feb 2025 00:49)  HR: 68 (08 Feb 2025 22:01) (60 - 75)  BP: 107/73 (08 Feb 2025 22:01) (93/59 - 107/73)  BP(mean): --  RR: 18 (08 Feb 2025 22:01) (18 - 18)  SpO2: 94% (08 Feb 2025 22:01) (94% - 98%)    Parameters below as of 08 Feb 2025 22:01  Patient On (Oxygen Delivery Method): room air      CAPILLARY BLOOD GLUCOSE          02-07 @ 07:01  -  02-08 @ 07:00  --------------------------------------------------------  IN: 100 mL / OUT: 0 mL / NET: 100 mL    02-08 @ 07:01  -  02-08 @ 22:41  --------------------------------------------------------  IN: 660 mL / OUT: 250 mL / NET: 410 mL      Physical Exam:    Daily     Daily   General:cachetic    NAD   HEENT:  Nonicteric, PERRLA  CV:  RRR, S1S2   Lungs:  CTA B/L, no wheezes, rales, rhonchi  Abdomen:  Soft,ostomy   Extremities: shawna jose                                                                                                                                                                                                                                                                                        LABS:                                                     RADIOLOGY & ADDITIONAL TESTS         I personally reviewed: [  ]EKG   [  ]CXR    [  ] CT      A/P:         Discussed with :     Simon consultants' Notes   Time spent :

## 2025-02-08 NOTE — DIETITIAN INITIAL EVALUATION ADULT - PERTINENT LABORATORY DATA
A1C with Estimated Average Glucose Result: 5.3 % (05-16-24 @ 07:35)  A1C with Estimated Average Glucose Result: 5.3 % (04-29-24 @ 06:51)

## 2025-02-08 NOTE — ADVANCED PRACTICE NURSE CONSULT - RECOMMEDATIONS
Will recommend:     1. Monitor output     2. Empty pouch when 1/3-1/2 full      3. Change pouching system every 3-4 days & prn leakage     4. Contact CWOCNs if questions/issues arise.     5. Supplies: Derick 1 - 3/4"  Soft Convex Cera Plus Skin Barrier #[id number]., [place] 1 - 3/4" High-Output drainable pouch (#[id number]); Accessory products: stoma paste #[id number], stoma powder (#3450) & Cavilon No sting barrier film wipe (#2156).

## 2025-02-08 NOTE — DIETITIAN INITIAL EVALUATION ADULT - ADD RECOMMEND
1. recent events noted and GOC discussions noted; family amenable to pleasure feeds noted  2. pleasure feed management per team as medically appropriate  3. honor food preferences and provide assistance with meals when patient is awake/alert/willing to accept PO as able  4. RD/nutrition services will remain available to provide appropriate nutrition assistance

## 2025-02-08 NOTE — DIETITIAN INITIAL EVALUATION ADULT - NSFNSNUTRHOMESUPPLEMENTFT_GEN_A_CORE
JOHNNY Cimetidine Counseling:  I discussed with the patient the risks of Cimetidine including but not limited to gynecomastia, headache, diarrhea, nausea, drowsiness, arrhythmias, pancreatitis, skin rashes, psychosis, bone marrow suppression and kidney toxicity.

## 2025-02-08 NOTE — PROGRESS NOTE ADULT - ASSESSMENT
79-year-old female history of hypertension hypothyroidism CAD A-fib not on anticoagulation due to GI bleed , ischemic colitis  status postresection and ostomy presenting with bloody output from ostomy.  pt is lethargic and unable to give hx   called daughter and discuss   Patient is on home hospice, receiving comfort care.   she noticed bloody output from the ostomy.    no N/V/D  no fever reported   pt was found tohave acute gI bleed   with hypotension/hypothermia and afib with rvr      While the patient is on hospice, discussion with the daughter revealed that they would transfusion and admission if indicated.  Patient confirmed DNR/DNI however.    acute GI bleed: off AC   Stable H&H    hypotension /hpothermia: r/ infection   emopiric abx   fu cultures negatvie     afib with RVR : dig iv     hyponatremia : sec to hypovlemia   monitor     HTNL: nam bp meds      palliative input  Appreciated: continue concurrent care ..     Awaiting PCU bed  prognosis is poor !

## 2025-02-08 NOTE — DIETITIAN INITIAL EVALUATION ADULT - ENERGY INTAKE
Poor (<50%) Patient w/ poor PO intake of full liquid diet per d/w floor staff, patient not participating w/ PO intake currently.

## 2025-02-08 NOTE — DIETITIAN INITIAL EVALUATION ADULT - NSFNSGIASSESSMENTFT_GEN_A_CORE
Patient w/ colostomy; last reported output noted to be from today 2/8. Not currently ordered for a bowel regimen.

## 2025-02-09 DIAGNOSIS — Z51.5 ENCOUNTER FOR PALLIATIVE CARE: ICD-10-CM

## 2025-02-09 PROCEDURE — 99233 SBSQ HOSP IP/OBS HIGH 50: CPT

## 2025-02-09 RX ORDER — PANTOPRAZOLE 20 MG/1
40 TABLET, DELAYED RELEASE ORAL EVERY 12 HOURS
Refills: 0 | Status: DISCONTINUED | OUTPATIENT
Start: 2025-02-09 | End: 2025-02-09

## 2025-02-09 RX ORDER — PANTOPRAZOLE 20 MG/1
40 TABLET, DELAYED RELEASE ORAL DAILY
Refills: 0 | Status: DISCONTINUED | OUTPATIENT
Start: 2025-02-09 | End: 2025-02-11

## 2025-02-09 RX ADMIN — MORPHINE SULFATE 2 MILLIGRAM(S): 60 TABLET, FILM COATED, EXTENDED RELEASE ORAL at 01:06

## 2025-02-09 RX ADMIN — MORPHINE SULFATE 2 MILLIGRAM(S): 60 TABLET, FILM COATED, EXTENDED RELEASE ORAL at 01:21

## 2025-02-09 RX ADMIN — MIDODRINE HYDROCHLORIDE 5 MILLIGRAM(S): 5 TABLET ORAL at 12:58

## 2025-02-09 RX ADMIN — PANTOPRAZOLE 10 MG/HR: 20 TABLET, DELAYED RELEASE ORAL at 01:05

## 2025-02-09 RX ADMIN — PANTOPRAZOLE 10 MG/HR: 20 TABLET, DELAYED RELEASE ORAL at 06:42

## 2025-02-09 RX ADMIN — PIPERACILLIN SODIUM AND TAZOBACTAM SODIUM 25 GRAM(S): 2; 250 INJECTION, POWDER, FOR SOLUTION INTRAVENOUS at 05:21

## 2025-02-09 RX ADMIN — MIDODRINE HYDROCHLORIDE 5 MILLIGRAM(S): 5 TABLET ORAL at 05:21

## 2025-02-09 NOTE — PROGRESS NOTE ADULT - SUBJECTIVE AND OBJECTIVE BOX
GAP TEAM PALLIATIVE CARE UNIT PROGRESS NOTE:      [ x] Patient on hospice program.    INDICATION FOR PALLIATIVE CARE UNIT SERVICES/Interval HPI:  Comfort Care    OVERNIGHT EVENTS:  Patient is seen and examined at bedside.  Appears comfortable, no distress.  In the last 24 hour period 9a-9a patient utilized only 1 prn, morphine 2mg IV x1.  Per staff has not had any bloody output in ostomy.     DNR on chart:DNI  DNI      Allergies    penicillin (Hives)    Intolerances  MEDICATIONS  (STANDING):  digoxin  Injectable 125 MICROGram(s) IV Push daily  influenza  Vaccine (HIGH DOSE) 0.5 milliLiter(s) IntraMuscular once  midodrine. 5 milliGRAM(s) Oral three times a day  pantoprazole    Tablet 40 milliGRAM(s) Oral every 12 hours  piperacillin/tazobactam IVPB.. 3.375 Gram(s) IV Intermittent every 8 hours    MEDICATIONS  (PRN):  acetaminophen  Suppository .. 650 milliGRAM(s) Rectal every 6 hours PRN Temp greater or equal to 38C (100.4F), Mild Pain (1 - 3)  bisacodyl Suppository 10 milliGRAM(s) Rectal daily PRN Constipation  glycopyrrolate Injectable 0.4 milliGRAM(s) IV Push every 6 hours PRN excessive secretions  LORazepam   Injectable 0.25 milliGRAM(s) IV Push every 1 hour PRN Agitation  morphine  - Injectable 2 milliGRAM(s) IV Push every 1 hour PRN Severe Pain (7 - 10)  morphine  - Injectable 2 milliGRAM(s) IV Push every 1 hour PRN dyspnea  morphine  - Injectable 1 milliGRAM(s) IV Push every 1 hour PRN Moderate Pain (4 - 6)    ITEMS UNCHECKED ARE NOT PRESENT    PRESENT SYMPTOMS: [x]Unable to self-report - see [ ] CPOT [ ] PAINADS [ ] RDOS  Source if other than patient:  [ ]Family   [x]Team     Pain:  [ ]yes [x ]no  QOL impact -   Location -                    Aggravating factors -  Quality -  Radiation -  Timing-  Severity (0-10 scale):  Minimal acceptable level (0-10 scale):     CPOT:    https://www.sccm.org/getattachment/fit48s92-1i8o-4u2h-9c9d-8404k5475l2t/Critical-Care-Pain-Observation-Tool-(CPOT)    PAINAD Score: See PAINAD tool and score below       Dyspnea:                           [ ]Mild [ ]Moderate [ ]Severe None     RDOS: See RDOS tool and score below   0 to 2  minimal or no respiratory distress   3  mild distress  4 to 6 moderate distress  >7 severe distress      Anxiety:                             [ ]Mild [ ]Moderate [ ]Severe  Fatigue:                             [ ]Mild [ ]Moderate [ ]Severe  Nausea:                             [ ]Mild [ ]Moderate [ ]Severe  Loss of appetite:              [ ]Mild [ ]Moderate [ ]Severe  Constipation:                    [ ]Mild [ ]Moderate [ ]Severe    PCSSQ[Palliative Care Spiritual Screening Question]   Severity (0-10):  Score of 4 or > indicate consideration of Chaplaincy referral.  Chaplaincy Referral: [x ] yes [ ] refused [x ] following [ ] Deferred     Caregiver Leslie? : [ ] yes [x] no [ ] Deferred [ ] Declined    Social work referral [ ] Patient & Family Centered Care Referral [ ]     Anticipatory Grief present?:  [ ] yes [ ] no  [x] Deferred Social work referral [ ] Chaplaincy Referral [ ]    		  Other Symptoms:  PATIENT TOO SLEEPY TO ENGAGE IN ROS  [ ]All other review of systems negative     Palliative Performance Status Version 2:   See PPSv2 tool and score below         PHYSICAL EXAM:   Vital Signs Last 24 Hrs  T(C): 36.6 (08 Feb 2025 22:01), Max: 36.6 (08 Feb 2025 22:01)  T(F): 97.9 (08 Feb 2025 22:01), Max: 97.9 (08 Feb 2025 22:01)  HR: 79 (09 Feb 2025 08:06) (60 - 79)  BP: 80/57 (09 Feb 2025 08:06) (80/57 - 107/73)  BP(mean): --  RR: 18 (09 Feb 2025 08:06) (18 - 18)  SpO2: 97% (09 Feb 2025 08:06) (94% - 98%)    Parameters below as of 09 Feb 2025 08:06  Patient On (Oxygen Delivery Method): room air     I&O's Summary    08 Feb 2025 07:01  -  09 Feb 2025 07:00  --------------------------------------------------------  IN: 990 mL / OUT: 250 mL / NET: 740 mL    GENERAL: [ ]Cachexia    [ ]Alert  [ ]Oriented    [x ]Lethargic Sleeping  [ ]Unarousable  [ ]Verbal  [ ]Non-Verbal  Behavioral:   [ ]Anxiety  [ ]Delirium [ ]Agitation [ ]Other  HEENT:  [ ]Normal   [ x]Dry mouth   [ ]ET Tube/Trach  [ ]Oral lesions  PULMONARY:   [x ]Clear [ ]Tachypnea  [ ]Audible excessive secretions   [ ]Rhonchi        [ ]Right [ ]Left [ ]Bilateral  [ ]Crackles        [ ]Right [ ]Left [ ]Bilateral  [ ]Wheezing     [ ]Right [ ]Left [ ]Bilateral  [ ]Diminished BS [ ] Right [ ]Left [ ]Bilateral  CARDIOVASCULAR:    [ ]Regular [x ]Irregular [ ]Tachy  [ ]Gerson [ ]Murmur [ ]Other  GASTROINTESTINAL:  [ x]Soft  [ ]Distended   [ x]+BS  [x ]Non tender [ ]Tender  [x ]Other [ ]PEG [ ]OGT/ NGT   Last BM: Ostomy with stool output  GENITOURINARY:  [ ]Normal [ x]Incontinent   [ ]Oliguria/Anuria   [ ]Melvin  MUSCULOSKELETAL:   [ ]Normal   [x ]Weakness  [ x]Bed/Wheelchair bound [ ]Edema  NEUROLOGIC:   [ ]No focal deficits  [ x] Cognitive impairment  [ ] Dysphagia [ ]Dysarthria [ ] Paresis [ ]Other   SKIN:   [ ]Normal  [ ]Rash  [x]Other RN NOTE ON SKIN CARE REVIEWED  [ ]Pressure ulcer(s) [ ]y [ ]n present on admission    CRITICAL CARE:  [ ]Shock Present  [ ]Septic [ ]Cardiogenic [ ]Neurologic [ ]Hypovolemic  [ ]Vasopressors [ ]Inotropes  [ ]Respiratory failure present [ ]Mechanical Ventilation [ ]Non-invasive ventilatory support [ ]High-Flow   [ ]Acute  [ ]Chronic [ ]Hypoxic  [ ]Hypercarbic [ ]Other  [ ]Other organ failure     LABS:  No new labs, prior reviewed             RADIOLOGY & ADDITIONAL STUDIES: No imaging    Protein Calorie Malnutrition Present: [ ]mild [ ]moderate [ ]severe [ ]underweight [ ]morbid obesity  https://www.andeal.org/vault/9573/web/files/ONC/Table_Clinical%20Characteristics%20to%20Document%20Malnutrition-White%20JV%20et%20al%202012.pdf    Height (cm): 162.6 (08-06-24 @ 17:00), 162.6 (07-21-24 @ 11:32), 162.6 (06-03-24 @ 06:03)  Weight (kg): 68.039 (02-06-25 @ 09:33), 87.2 (08-16-24 @ 09:07), 69.5 (07-25-24 @ 14:35)  BMI (kg/m2): 25.7 (02-06-25 @ 09:33), 33 (08-16-24 @ 09:07), 26.3 (08-06-24 @ 17:00)    [ x]PPSV2 < or = 30%  [ ]significant weight loss [ ]poor nutritional intake [ ]anasarca[ ]Artificial Nutrition    Other REFERRALS:  [ ]Hospice  [ ]Child Life  [ ]Social Work  [ ]Case management [ ]Holistic Therapy     Goals of Care Document:    Goals of Care:   GOALS OF CARE:  · Participants	Family  · Child(kianna)	Caity Gooden    Conversation Discussion:  · Conversation	Diagnosis; Prognosis; MOLST Discussed; Treatment Options  · Conversation Details	Patient arousable but too lethargic to meaningfully participate in GOC discussion. Daughter Caity present at bedside who she defers to. Caity has good understanding of patient's condition and shared that pt was on hospice services through [facility] prior to admission. She shared that she spoke to the medicine attending and knows her mom is in afib and there is a concern for GI bleed. We discussed options for comfort directed care vs continued medical management and work up of symptoms. Caity expressed that maintaining patient's comfort is most important. She is agreeable to comfort directed care and transfer to PCU for symptom management when bed is available. We discussed disposition options including returning home with hospice services vs inpatient hospice care pending clinical course. Jose Maria confirmed code status as DNR/DNI. MOLST completed as prior MOLST in patient window was incomplete. All questions answered and support provided.    Treatment Guidelines:  · Decision Maker	Surrogate  · Treatment Guidelines	DNR; DNI    MOLST:  · Completed	06-Feb-2025    Location of Discussion:  · Location of discussion	Face to face     Time Spent on Advance Care Planning:  Attending w/Resident/Fellow.     I reviewed and verified the documentation in the record by the Resident/Fellow and made amendments where necessary. I personally spent 20 minutes on advance care planning services with the patient. This time is separate and distinct from any other care management services provided on this date.   GAP TEAM PALLIATIVE CARE UNIT PROGRESS NOTE:      [ x] Patient on hospice program.    INDICATION FOR PALLIATIVE CARE UNIT SERVICES/Interval HPI:  Comfort Care    OVERNIGHT EVENTS:  Patient is seen and examined at bedside.  Appears comfortable, no distress.  In the last 24 hour period 9a-9a patient utilized only 1 prn, morphine 2mg IV x1.  Per staff has not had any bloody output in ostomy.     DNR on chart:DNI  DNI      Allergies    penicillin (Hives)    Intolerances  MEDICATIONS  (STANDING):  digoxin  Injectable 125 MICROGram(s) IV Push daily  influenza  Vaccine (HIGH DOSE) 0.5 milliLiter(s) IntraMuscular once  midodrine. 5 milliGRAM(s) Oral three times a day  pantoprazole    Tablet 40 milliGRAM(s) Oral every 12 hours  piperacillin/tazobactam IVPB.. 3.375 Gram(s) IV Intermittent every 8 hours    MEDICATIONS  (PRN):  acetaminophen  Suppository .. 650 milliGRAM(s) Rectal every 6 hours PRN Temp greater or equal to 38C (100.4F), Mild Pain (1 - 3)  bisacodyl Suppository 10 milliGRAM(s) Rectal daily PRN Constipation  glycopyrrolate Injectable 0.4 milliGRAM(s) IV Push every 6 hours PRN excessive secretions  LORazepam   Injectable 0.25 milliGRAM(s) IV Push every 1 hour PRN Agitation  morphine  - Injectable 2 milliGRAM(s) IV Push every 1 hour PRN Severe Pain (7 - 10)  morphine  - Injectable 2 milliGRAM(s) IV Push every 1 hour PRN dyspnea  morphine  - Injectable 1 milliGRAM(s) IV Push every 1 hour PRN Moderate Pain (4 - 6)    ITEMS UNCHECKED ARE NOT PRESENT    PRESENT SYMPTOMS: [x]Unable to self-report - see [ ] CPOT [ ] PAINADS [ ] RDOS  Source if other than patient:  [ ]Family   [x]Team     Pain:  [ ]yes [x ]no  QOL impact -   Location -                    Aggravating factors -  Quality -  Radiation -  Timing-  Severity (0-10 scale):  Minimal acceptable level (0-10 scale):     CPOT:    https://www.sccm.org/getattachment/lxt45f55-9x4o-0t4u-1t2p-5261b4128l6w/Critical-Care-Pain-Observation-Tool-(CPOT)    PAINAD Score: See PAINAD tool and score below       Dyspnea:                           [ ]Mild [ ]Moderate [ ]Severe None     RDOS: See RDOS tool and score below   0 to 2  minimal or no respiratory distress   3  mild distress  4 to 6 moderate distress  >7 severe distress      Anxiety:                             [ ]Mild [ ]Moderate [ ]Severe  Fatigue:                             [ ]Mild [x ]Moderate [ ]Severe  Nausea:                             [ ]Mild [ ]Moderate [ ]Severe  Loss of appetite:              [ ]Mild [ ]Moderate [ ]Severe  Constipation:                    [ ]Mild [ ]Moderate [ ]Severe    PCSSQ[Palliative Care Spiritual Screening Question]   Severity (0-10):  Score of 4 or > indicate consideration of Chaplaincy referral.  Chaplaincy Referral: [x ] yes [ ] refused [x ] following [ ] Deferred     Caregiver [place]? : [ ] yes [x] no [ ] Deferred [ ] Declined    Social work referral [ ] Patient & Family Centered Care Referral [ ]     Anticipatory Grief present?:  [ ] yes [ ] no  [x] Deferred Social work referral [ ] Chaplaincy Referral [ ]    		  Other Symptoms:  PATIENT TOO SLEEPY TO ENGAGE IN ROS  [ ]All other review of systems negative     Palliative Performance Status Version 2:   See PPSv2 tool and score below         PHYSICAL EXAM:   Vital Signs Last 24 Hrs  T(C): 36.6 (08 Feb 2025 22:01), Max: 36.6 (08 Feb 2025 22:01)  T(F): 97.9 (08 Feb 2025 22:01), Max: 97.9 (08 Feb 2025 22:01)  HR: 79 (09 Feb 2025 08:06) (60 - 79)  BP: 80/57 (09 Feb 2025 08:06) (80/57 - 107/73)  BP(mean): --  RR: 18 (09 Feb 2025 08:06) (18 - 18)  SpO2: 97% (09 Feb 2025 08:06) (94% - 98%)    Parameters below as of 09 Feb 2025 08:06  Patient On (Oxygen Delivery Method): room air     I&O's Summary    08 Feb 2025 07:01  -  09 Feb 2025 07:00  --------------------------------------------------------  IN: 990 mL / OUT: 250 mL / NET: 740 mL    GENERAL: [ ]Cachexia    [ ]Alert  [ ]Oriented    [x ]Lethargic Sleeping  [ ]Unarousable  [x ] minimally Verbal  [ ]Non-Verbal  Behavioral:   [ ]Anxiety  [ ]Delirium [ ]Agitation [ ]Other  HEENT:  [ ]Normal   [ x]Dry mouth   [ ]ET Tube/Trach  [ ]Oral lesions  PULMONARY:   [x ]Clear [ ]Tachypnea  [ ]Audible excessive secretions   [ ]Rhonchi        [ ]Right [ ]Left [ ]Bilateral  [ ]Crackles        [ ]Right [ ]Left [ ]Bilateral  [ ]Wheezing     [ ]Right [ ]Left [ ]Bilateral  [ ]Diminished BS [ ] Right [ ]Left [ ]Bilateral  CARDIOVASCULAR:    [ ]Regular [x ]Irregular [ ]Tachy  [ ]Gerson [ ]Murmur [ ]Other  GASTROINTESTINAL:  [ x]Soft  [ ]Distended   [ x]+BS  [x ]Non tender [ ]Tender  [x ]Other [ ]PEG [ ]OGT/ NGT   Last BM: Ostomy with stool output  GENITOURINARY:  [ ]Normal [ x]Incontinent   [ ]Oliguria/Anuria   [ ]Melvin  MUSCULOSKELETAL:   [ ]Normal   [x ]Weakness  [ x]Bed/Wheelchair bound [ ]Edema  NEUROLOGIC:   [ ]No focal deficits  [ x] Cognitive impairment  [ ] Dysphagia [ ]Dysarthria [ ] Paresis [ ]Other   SKIN:   [ ]Normal  [ ]Rash  [x]Other RN NOTE ON SKIN CARE REVIEWED  [ ]Pressure ulcer(s) [ ]y [ ]n present on admission    CRITICAL CARE:  [ ]Shock Present  [ ]Septic [ ]Cardiogenic [ ]Neurologic [ ]Hypovolemic  [ ]Vasopressors [ ]Inotropes  [ ]Respiratory failure present [ ]Mechanical Ventilation [ ]Non-invasive ventilatory support [ ]High-Flow   [ ]Acute  [ ]Chronic [ ]Hypoxic  [ ]Hypercarbic [ ]Other  [ ]Other organ failure     LABS:  No new labs, prior reviewed             RADIOLOGY & ADDITIONAL STUDIES: No imaging    Protein Calorie Malnutrition Present: [ ]mild [ ]moderate [ ]severe [ ]underweight [ ]morbid obesity  https://www.andeal.org/vault/7052/web/files/ONC/Table_Clinical%20Characteristics%20to%20Document%20Malnutrition-White%20JV%20et%20al%202012.pdf    Height (cm): 162.6 (08-06-24 @ 17:00), 162.6 (07-21-24 @ 11:32), 162.6 (06-03-24 @ 06:03)  Weight (kg): 68.039 (02-06-25 @ 09:33), 87.2 (08-16-24 @ 09:07), 69.5 (07-25-24 @ 14:35)  BMI (kg/m2): 25.7 (02-06-25 @ 09:33), 33 (08-16-24 @ 09:07), 26.3 (08-06-24 @ 17:00)    [ x]PPSV2 < or = 30%  [ ]significant weight loss [ ]poor nutritional intake [ ]anasarca[ ]Artificial Nutrition    Other REFERRALS:  [ ]Hospice  [ ]Child Life  [ ]Social Work  [ ]Case management [ ]Holistic Therapy     Goals of Care Document:    Goals of Care:   GOALS OF CARE:  · Participants	Family  · Child(kianna)	Caity Gooden    Conversation Discussion:  · Conversation	Diagnosis; Prognosis; MOLST Discussed; Treatment Options  · Conversation Details	Patient arousable but too lethargic to meaningfully participate in GOC discussion. Daughter Caity present at bedside who she defers to. Caity has good understanding of patient's condition and shared that pt was on hospice services through [facility] prior to admission. She shared that she spoke to the medicine attending and knows her mom is in afib and there is a concern for GI bleed. We discussed options for comfort directed care vs continued medical management and work up of symptoms. Caity expressed that maintaining patient's comfort is most important. She is agreeable to comfort directed care and transfer to PCU for symptom management when bed is available. We discussed disposition options including returning home with hospice services vs inpatient hospice care pending clinical course. Jose Maria confirmed code status as DNR/DNI. MOLST completed as prior MOLST in patient window was incomplete. All questions answered and support provided.    Treatment Guidelines:  · Decision Maker	Surrogate  · Treatment Guidelines	DNR; DNI    MOLST:  · Completed	06-Feb-2025    Location of Discussion:  · Location of discussion	Face to face     Time Spent on Advance Care Planning:  Attending w/Resident/Fellow.     I reviewed and verified the documentation in the record by the Resident/Fellow and made amendments where necessary. I personally spent 20 minutes on advance care planning services with the patient. This time is separate and distinct from any other care management services provided on this date.

## 2025-02-09 NOTE — PROGRESS NOTE ADULT - PROBLEM SELECTOR PLAN 1
- No lab draws or further medical work up  - Minimize lines, tethers, monitors  - Acetaminophen 650 mg supp q6h prn for mild pain/fever  - Morphine 1 mg IV q1 hr prn mp   - Morphine 2 mg IV q1 hr prn sp   - Morphine 2 mg IV q1 hr prn dyspnea  - Lorazepam 0.25 mg IV q1 hr prn agitation  - Robinul 0.4 mg q6h prn secretions  - Bowel regimen on opioids - No active bleeding noted since patient has been in PCU  - No transfusions or further work up per C discussion  - Protonix infusion changed to protonix 40 mg IV daily, no bleeding  - On zosyn by primary team for presumed colitis/sepsis in setting of hypotension  - Will deescalate antibiotics pending discussions with family

## 2025-02-09 NOTE — PROGRESS NOTE ADULT - ASSESSMENT
80 y/o F hx of  hypertension hypothyroidism CAD A-fib not on anticoagulation, ischemic colitis  status postresection and ostomy presenting with bloody output from ostomy that began today.  Patient is on home hospice, receiving comfort care.  Patient accompanied by her daughter states that she noticed bloody output from the ostomy this morning.  Patient is usually able to express that she is uncomfortable and the patient has not indicated any discomfort.      80 y/o F hx of  hypertension hypothyroidism CAD A-fib not on anticoagulation, ischemic colitis  status postresection and ostomy presenting with bloody output from ostomy that began today.  Patient is on home hospice, receiving comfort care.  Patient accompanied by her daughter states that she noticed bloody output from the ostomy this morning.  Patient is usually able to express that she is uncomfortable and the patient has not indicated any discomfort. Transferred to the PCU for comfort measures at end of life.

## 2025-02-09 NOTE — PROGRESS NOTE ADULT - PROBLEM SELECTOR PLAN 3
- Continue digoxin 125 mcg IV daily as tolerated  - Will deescalate pending discussions with family - No lab draws or further medical work up  - Minimize lines, tethers, monitors  - Acetaminophen 650 mg supp q6h prn for mild pain/fever  - Morphine 1 mg IV q1 hr prn mp   - Morphine 2 mg IV q1 hr prn sp   - Morphine 2 mg IV q1 hr prn dyspnea  - Lorazepam 0.25 mg IV q1 hr prn agitation  - Robinul 0.4 mg q6h prn secretions  - Bowel regimen on opioids

## 2025-02-09 NOTE — CHART NOTE - NSCHARTNOTEFT_GEN_A_CORE
Met with daughter Caity Gooden at bedside.  Went over medications that patient came to PCU with.  Patient unable to tolerate any PO medications/tablets for months now per daughter and has had gradual decline in PO intake.  After discussing we stopped midodrine as ordered in chart.  Patient on day 4 of zosyn, empirically started by medicine team, 7 day course to stop 2/13.  Caity wants to continue for now and will discuss with family support as to whether to stop.  Flu vax was also dc'd, unclear if patient can tolerate or if it will benefit her given her limited prognosis.  Dig kept in chart with holding parameters.  Also discussed possible dispo planning to inpatient hospice however patient will likely be too unstable to transport given hypotension. Met with daughter Caity Gooden at bedside.  Went over medications that patient came to PCU with.  Patient unable to tolerate any PO medications/tablets for months now per daughter and has had gradual decline in PO intake.  After discussing we stopped midodrine as ordered in chart.  Patient on day 4 of zosyn, empirically started by medicine team, however no signs of sepsis, WBC normal so medically not indicated at this point.  Also antibiotics not in keeping with full comfort, Caity does not want to do anything that would prolong her mother's suffering as she feels she does not have QOL, antibiotics will be stopped.  Flu vax was also dc'd, unlikely to be of benefit her given her limited prognosis.  Dig kept in chart with holding parameters.  Also discussed possible dispo planning to inpatient hospice however patient will likely be too unstable to transport given hypotension.

## 2025-02-09 NOTE — PROGRESS NOTE ADULT - PROBLEM SELECTOR PLAN 2
- No active bleeding noted since patient has been in PCU  - No transfusions or further work up per C discussion  - Protonix infusion changed to protonix 40 mg PO BID as patient has been able to take oral meds at times  - On zosyn by primary team for presumed colitis/sepsis in setting of hypotension  - Will deescalate pending discussions with family - No active bleeding noted since patient has been in PCU  - No transfusions or further work up per C discussion  - Protonix infusion changed to protonix 40 mg IV daily, no bleeding  - On zosyn by primary team for presumed colitis/sepsis in setting of hypotension  - Will deescalate pending discussions with family - Continue digoxin 125 mcg IV daily as tolerated; will need to discuss risks/benefits if patient with limited PO which will likely alter renal function  - Will deescalate pending discussions with family

## 2025-02-09 NOTE — PROGRESS NOTE ADULT - PROBLEM SELECTOR PLAN 5
- Will continue symptom directed management in PCU  - Will meet with family today to discuss potential deescalation of meds: zosyn, digoxin, midodrine

## 2025-02-10 PROCEDURE — 99233 SBSQ HOSP IP/OBS HIGH 50: CPT

## 2025-02-10 RX ADMIN — MORPHINE SULFATE 2 MILLIGRAM(S): 60 TABLET, FILM COATED, EXTENDED RELEASE ORAL at 05:35

## 2025-02-10 RX ADMIN — MORPHINE SULFATE 2 MILLIGRAM(S): 60 TABLET, FILM COATED, EXTENDED RELEASE ORAL at 05:50

## 2025-02-10 RX ADMIN — MORPHINE SULFATE 2 MILLIGRAM(S): 60 TABLET, FILM COATED, EXTENDED RELEASE ORAL at 10:06

## 2025-02-10 RX ADMIN — PANTOPRAZOLE 40 MILLIGRAM(S): 20 TABLET, DELAYED RELEASE ORAL at 11:18

## 2025-02-10 NOTE — PROGRESS NOTE ADULT - PROBLEM SELECTOR PLAN 4
- Goc from 2/6, updated on 2/9  - DNR/DNI  - Deescalated multiple medications yesterday after discussion with daughter (antibiotics, PO meds as patient unable to take)  - Patient previously on home hospice with [facility]

## 2025-02-10 NOTE — PROGRESS NOTE ADULT - ASSESSMENT
78 y/o F hx of  hypertension hypothyroidism CAD A-fib not on anticoagulation, ischemic colitis  status postresection and ostomy presenting with bloody output from ostomy that began today.  Patient is on home hospice, receiving comfort care.  Patient accompanied by her daughter states that she noticed bloody output from the ostomy this morning.  Patient is usually able to express that she is uncomfortable and the patient has not indicated any discomfort. Transferred to the PCU for comfort measures at end of life.

## 2025-02-10 NOTE — PROGRESS NOTE ADULT - PROBLEM SELECTOR PLAN 3
- No lab draws or further medical work up  - Minimize lines, tethers, monitors  - Acetaminophen 650 mg supp q6h prn for mild pain/fever  - Morphine 1 mg IV q1 hr prn mp   - Morphine 2 mg IV q1 hr prn sp (1 dose required in 24 hr period 9a-9a)  - Morphine 2 mg IV q1 hr prn dyspnea  - Lorazepam 0.25 mg IV q1 hr prn agitation  - Robinul 0.4 mg q6h prn secretions  - Bowel regimen on opioids

## 2025-02-10 NOTE — PROGRESS NOTE ADULT - PROBLEM SELECTOR PLAN 1
- No active bleeding noted since patient has been in PCU  - No transfusions or further work up per C discussion  - Protonix infusion changed to protonix 40 mg IV daily, no bleeding  - On zosyn by primary team for presumed colitis/sepsis in setting of hypotension  - Will deescalate antibiotics pending discussions with family

## 2025-02-10 NOTE — PROGRESS NOTE ADULT - PROBLEM SELECTOR PLAN 5
- Will continue symptom directed management in PCU   - Dispo planning with SW back to home hospice - Will continue symptom directed management in PCU   - Dispo planning with SW back to home hospice  - Daughter provided update at bedside

## 2025-02-10 NOTE — PROGRESS NOTE ADULT - SUBJECTIVE AND OBJECTIVE BOX
GAP TEAM PALLIATIVE CARE UNIT PROGRESS NOTE:      [ x] Patient on hospice program.    INDICATION FOR PALLIATIVE CARE UNIT SERVICES/Interval HPI:  Comfort Care    OVERNIGHT EVENTS:  Patient is seen and examined at bedside.  Appears comfortable, no distress.  In the last 24 hour period 9a-9a patient utilized only 1 prn, morphine 2mg IV x1.  Per staff has not had any bloody output in ostomy.     DNR on chart:DNI  DNI      Allergies    penicillin (Hives)    Intolerances    MEDICATIONS  (STANDING):  digoxin  Injectable 125 MICROGram(s) IV Push daily  pantoprazole  Injectable 40 milliGRAM(s) IV Push daily    MEDICATIONS  (PRN):  acetaminophen  Suppository .. 650 milliGRAM(s) Rectal every 6 hours PRN Temp greater or equal to 38C (100.4F), Mild Pain (1 - 3)  bisacodyl Suppository 10 milliGRAM(s) Rectal daily PRN Constipation  glycopyrrolate Injectable 0.4 milliGRAM(s) IV Push every 6 hours PRN excessive secretions  LORazepam   Injectable 0.25 milliGRAM(s) IV Push every 1 hour PRN Agitation  morphine  - Injectable 2 milliGRAM(s) IV Push every 1 hour PRN Severe Pain (7 - 10)  morphine  - Injectable 2 milliGRAM(s) IV Push every 1 hour PRN dyspnea  morphine  - Injectable 1 milliGRAM(s) IV Push every 1 hour PRN Moderate Pain (4 - 6)  ITEMS UNCHECKED ARE NOT PRESENT    PRESENT SYMPTOMS: [x]Unable to self-report - see [ ] CPOT [ ] PAINADS [ ] RDOS  Source if other than patient:  [ ]Family   [x]Team     Pain:  [ ]yes [x ]no  QOL impact -   Location -                    Aggravating factors -  Quality -  Radiation -  Timing-  Severity (0-10 scale):  Minimal acceptable level (0-10 scale):     CPOT:    https://www.sccm.org/getattachment/[email or web address]/Critical-Care-Pain-Observation-Tool-(CPOT)    PAINAD Score: See PAINAD tool and score below       Dyspnea:                           [ ]Mild [ ]Moderate [ ]Severe None     RDOS: See RDOS tool and score below   0 to 2  minimal or no respiratory distress   3  mild distress  4 to 6 moderate distress  >7 severe distress      Anxiety:                             [ ]Mild [ ]Moderate [ ]Severe  Fatigue:                             [ ]Mild [x ]Moderate [ ]Severe  Nausea:                             [ ]Mild [ ]Moderate [ ]Severe  Loss of appetite:              [ ]Mild [ ]Moderate [ ]Severe  Constipation:                    [ ]Mild [ ]Moderate [ ]Severe    PCSSQ[Palliative Care Spiritual Screening Question]   Severity (0-10):  Score of 4 or > indicate consideration of Chaplaincy referral.  Chaplaincy Referral: [x ] yes [ ] refused [x ] following [ ] Deferred     Caregiver [place]? : [ ] yes [x] no [ ] Deferred [ ] Declined    Social work referral [ ] Patient & Family Centered Care Referral [ ]     Anticipatory Grief present?:  [x ] yes [ ] no  [ ] Deferred Social work referral [x ] Chaplaincy Referral [ ]    		  Other Symptoms:  PATIENT TOO SLEEPY TO ENGAGE IN ROS  [ ]All other review of systems negative     Palliative Performance Status Version 2:   See PPSv2 tool and score below         PHYSICAL EXAM:   Vital Signs Last 24 Hrs  T(C): 36.1 (10 Feb 2025 08:21), Max: 36.1 (10 Feb 2025 08:21)  T(F): 96.9 (10 Feb 2025 08:21), Max: 96.9 (10 Feb 2025 08:21)  HR: 65 (10 Feb 2025 08:21) (65 - 92)  BP: 99/69 (10 Feb 2025 08:21) (88/64 - 99/69)  BP(mean): --  RR: 18 (10 Feb 2025 08:21) (18 - 18)  SpO2: 96% (10 Feb 2025 08:21) (96% - 96%)    Parameters below as of 10 Feb 2025 08:21  Patient On (Oxygen Delivery Method): room air     I&O's Summary    GENERAL: [ ]Cachexia    [ ]Alert  [ ]Oriented    [x ]Lethargic Sleeping  [ ]Unarousable  [x ] minimally Verbal  [ ]Non-Verbal  Behavioral:   [ ]Anxiety  [ ]Delirium [ ]Agitation [ ]Other  HEENT:  [ ]Normal   [ x]Dry mouth   [ ]ET Tube/Trach  [ ]Oral lesions  PULMONARY:   [ ]Clear [ ]Tachypnea  [x ] Mildly Audible secretions   [ ]Rhonchi        [ ]Right [ ]Left [ ]Bilateral  [ ]Crackles        [ ]Right [ ]Left [ ]Bilateral  [ ]Wheezing     [ ]Right [ ]Left [ ]Bilateral  [ ]Diminished BS [ ] Right [ ]Left [ ]Bilateral  CARDIOVASCULAR:    [ ]Regular [x ]Irregular [ ]Tachy  [ ]Gerson [ ]Murmur [ ]Other  GASTROINTESTINAL:  [ x]Soft  [ ]Distended   [ x]+BS  [x ]Non tender [ ]Tender  [x ]Other [ ]PEG [ ]OGT/ NGT   Last BM: Ostomy with stool output  GENITOURINARY:  [ ]Normal [ x]Incontinent   [ ]Oliguria/Anuria   [ ]Melvin  MUSCULOSKELETAL:   [ ]Normal   [x ]Weakness  [ x]Bed/Wheelchair bound [ ]Edema  NEUROLOGIC:   [ ]No focal deficits  [ x] Cognitive impairment  [ ] Dysphagia [ ]Dysarthria [ ] Paresis [ ]Other   SKIN:   [ ]Normal  [ ]Rash  [x]Other RN NOTE ON SKIN CARE REVIEWED  [ ]Pressure ulcer(s) [ ]y [ ]n present on admission    CRITICAL CARE:  [ ]Shock Present  [ ]Septic [ ]Cardiogenic [ ]Neurologic [ ]Hypovolemic  [ ]Vasopressors [ ]Inotropes  [ ]Respiratory failure present [ ]Mechanical Ventilation [ ]Non-invasive ventilatory support [ ]High-Flow   [ ]Acute  [ ]Chronic [ ]Hypoxic  [ ]Hypercarbic [ ]Other  [ ]Other organ failure     LABS:  No new labs, prior reviewed             RADIOLOGY & ADDITIONAL STUDIES: No imaging    Protein Calorie Malnutrition Present: [ ]mild [ ]moderate [ ]severe [ ]underweight [ ]morbid obesity  https://www.andeal.org/vault/2440/web/files/ONC/Table_Clinical%20Characteristics%20to%20Document%20Malnutrition-White%20JV%20et%20al%200227.pdf    Height (cm): 162.6 (08-06-24 @ 17:00), 162.6 (07-21-24 @ 11:32), 162.6 (06-03-24 @ 06:03)  Weight (kg): 68.039 (02-06-25 @ 09:33), 87.2 (08-16-24 @ 09:07), 69.5 (07-25-24 @ 14:35)  BMI (kg/m2): 25.7 (02-06-25 @ 09:33), 33 (08-16-24 @ 09:07), 26.3 (08-06-24 @ 17:00)    [ x]PPSV2 < or = 30%  [ ]significant weight loss [ ]poor nutritional intake [ ]anasarca[ ]Artificial Nutrition    Other REFERRALS:  [ ]Hospice  [ ]Child Life  [ ]Social Work  [ ]Case management [ ]Holistic Therapy     Goals of Care Document:    Goals of Care:   GOALS OF CARE:  · Participants	Family  · Child(kianna)	Caity Gooden    Conversation Discussion:  · Conversation	Diagnosis; Prognosis; MOLST Discussed; Treatment Options  · Conversation Details	Patient arousable but too lethargic to meaningfully participate in GOC discussion. Daughter Caity present at bedside who she defers to. Caity has good understanding of patient's condition and shared that pt was on hospice services through [facility] prior to admission. She shared that she spoke to the medicine attending and knows her mom is in afib and there is a concern for GI bleed. We discussed options for comfort directed care vs continued medical management and work up of symptoms. Caity expressed that maintaining patient's comfort is most important. She is agreeable to comfort directed care and transfer to PCU for symptom management when bed is available. We discussed disposition options including returning home with hospice services vs inpatient hospice care pending clinical course. Jose Maria confirmed code status as DNR/DNI. MOLST completed as prior MOLST in patient window was incomplete. All questions answered and support provided.    Treatment Guidelines:  · Decision Maker	Surrogate  · Treatment Guidelines	DNR; DNI    MOLST:  · Completed	06-Feb-2025    Location of Discussion:  · Location of discussion	Face to face     Time Spent on Advance Care Planning:  Attending w/Resident/Fellow.     I reviewed and verified the documentation in the record by the Resident/Fellow and made amendments where necessary. I personally spent 20 minutes on advance care planning services with the patient. This time is separate and distinct from any other care management services provided on this date.

## 2025-02-10 NOTE — PROGRESS NOTE ADULT - PROBLEM SELECTOR PLAN 2
- Continue digoxin 125 mcg IV daily as tolerated; will need to discuss risks/benefits if patient with limited PO which will likely alter renal function  - Will deescalate pending discussions with family

## 2025-02-10 NOTE — PROGRESS NOTE ADULT - NS ATTEST RISK PROBLEM GEN_ALL_CORE FT
safety/toxicity monitoring of intravenous opiates and/or benzodiazepines in end stage disease process.
patient currently requiring IV opioids for symptom management at end of life

## 2025-02-11 ENCOUNTER — TRANSCRIPTION ENCOUNTER (OUTPATIENT)
Age: 80
End: 2025-02-11

## 2025-02-11 LAB
CULTURE RESULTS: SIGNIFICANT CHANGE UP
CULTURE RESULTS: SIGNIFICANT CHANGE UP
SPECIMEN SOURCE: SIGNIFICANT CHANGE UP
SPECIMEN SOURCE: SIGNIFICANT CHANGE UP

## 2025-02-11 PROCEDURE — 99233 SBSQ HOSP IP/OBS HIGH 50: CPT | Mod: GC

## 2025-02-11 RX ORDER — MORPHINE SULFATE 60 MG/1
6 TABLET, FILM COATED, EXTENDED RELEASE ORAL EVERY 4 HOURS
Refills: 0 | Status: DISCONTINUED | OUTPATIENT
Start: 2025-02-11 | End: 2025-02-12

## 2025-02-11 RX ORDER — MORPHINE SULFATE 60 MG/1
2 TABLET, FILM COATED, EXTENDED RELEASE ORAL
Refills: 0 | Status: DISCONTINUED | OUTPATIENT
Start: 2025-02-11 | End: 2025-02-12

## 2025-02-11 RX ORDER — MORPHINE SULFATE 60 MG/1
3 TABLET, FILM COATED, EXTENDED RELEASE ORAL EVERY 4 HOURS
Refills: 0 | Status: DISCONTINUED | OUTPATIENT
Start: 2025-02-11 | End: 2025-02-12

## 2025-02-11 RX ORDER — MORPHINE SULFATE 60 MG/1
6 TABLET, FILM COATED, EXTENDED RELEASE ORAL
Refills: 0 | Status: DISCONTINUED | OUTPATIENT
Start: 2025-02-11 | End: 2025-02-11

## 2025-02-11 RX ORDER — MORPHINE SULFATE 60 MG/1
3 TABLET, FILM COATED, EXTENDED RELEASE ORAL
Refills: 0 | Status: DISCONTINUED | OUTPATIENT
Start: 2025-02-11 | End: 2025-02-11

## 2025-02-11 RX ADMIN — MORPHINE SULFATE 6 MILLIGRAM(S): 60 TABLET, FILM COATED, EXTENDED RELEASE ORAL at 19:15

## 2025-02-11 RX ADMIN — MORPHINE SULFATE 6 MILLIGRAM(S): 60 TABLET, FILM COATED, EXTENDED RELEASE ORAL at 18:32

## 2025-02-11 RX ADMIN — MORPHINE SULFATE 2 MILLIGRAM(S): 60 TABLET, FILM COATED, EXTENDED RELEASE ORAL at 04:47

## 2025-02-11 RX ADMIN — MORPHINE SULFATE 2 MILLIGRAM(S): 60 TABLET, FILM COATED, EXTENDED RELEASE ORAL at 08:04

## 2025-02-11 RX ADMIN — MORPHINE SULFATE 2 MILLIGRAM(S): 60 TABLET, FILM COATED, EXTENDED RELEASE ORAL at 09:07

## 2025-02-11 RX ADMIN — MORPHINE SULFATE 2 MILLIGRAM(S): 60 TABLET, FILM COATED, EXTENDED RELEASE ORAL at 05:02

## 2025-02-11 NOTE — DISCHARGE NOTE NURSING/CASE MANAGEMENT/SOCIAL WORK - PATIENT PORTAL LINK FT
You can access the FollowMyHealth Patient Portal offered by [facility] by registering at the following website: [email or web address]. By joining [facility]’s FollowMyHealth portal, you will also be able to view your health information using other applications (apps) compatible with our system.

## 2025-02-11 NOTE — PROGRESS NOTE ADULT - PROBLEM SELECTOR PLAN 1
- No active bleeding noted since patient has been in PCU  - No transfusions or further work up per GOC discussion  - Stopped protonix as there is no bleeding and in interest of minimizing medication burden, plan is for dc

## 2025-02-11 NOTE — PROGRESS NOTE ADULT - PROBLEM SELECTOR PROBLEM 4
ACP (advance care planning)
Hypertension
ACP (advance care planning)
Hypertension
ACP (advance care planning)
ACP (advance care planning)

## 2025-02-11 NOTE — PROGRESS NOTE ADULT - PROBLEM SELECTOR PLAN 5
- Will continue symptom directed management in PCU   - Dispo planning with SW back to home hospice  - Daughter updated

## 2025-02-11 NOTE — PROGRESS NOTE ADULT - PROBLEM SELECTOR PLAN 3
- Plan for dc, will transition medications to PO, has been requiring minimal IV dosing  - No lab draws or further medical work up  - Minimize lines, tethers, monitors  - Acetaminophen 650 mg supp q6h prn for mild pain/fever  - Morphine 3 mg PO q1 hr prn mp   - Morphine 6 mg PO q1 hr prn sp   - Morphine 6 mg PO q1 hr prn dyspnea  - Morphine 2 mg IVP qh prn for breakthrough if unable to tolerate PO  - Lorazepam 0.2 mg IV q1 hr prn agitation  - Robinul 0.4 mg IV q6h prn secretions  - Bowel regimen on opioids

## 2025-02-11 NOTE — PROGRESS NOTE ADULT - PROBLEM SELECTOR PLAN 4
- Goc from 2/6, updated on 2/9  - DNR/DNI  - Deescalated multiple medications in PCU after discussion with daughter (antibiotics, PO meds as patient unable to take)  - Patient previously on home hospice with [facility]

## 2025-02-11 NOTE — PROGRESS NOTE ADULT - PROVIDER SPECIALTY LIST ADULT
Cardiology
Internal Medicine
Internal Medicine
Cardiology
Palliative Care

## 2025-02-11 NOTE — PROGRESS NOTE ADULT - ATTENDING COMMENTS
79W HTN hypothyroidism CAD A-fib not on anticoagulation, ischemic colitis s/p resection and ostomy presenting with bloody output from ostomy  on 2/6. Patient is on home hospice, receiving comfort care. Pt also in rapid afib. Transferred to PCU for comfort measures at end of life.    Pt with minimal PRN use for symptom management. Will start dispo planning for home with hospice services. Pt was receiving hospice services through [facility] prior to admission. SW to assist with dispo. Discussed by team with daughter, Caity. Case discussed during IDT rounds.     Мария Lan MD  Geriatrics and Palliative Medicine Attending  [facility] pager: [phone]
The patient is a 79-year-old female with a history of hypertension, hypothyroidism, coronary artery disease, atrial fibrillation (not on anticoagulation), and ischemic colitis status post-resection with an ostomy. She presented with bloody ostomy output noted by her daughter. The patient, on home hospice for comfort care, showed no signs of discomfort and was transferred to the Palliative Care Unit. Overnight, she remained comfortable with no distress, requiring minimal morphine for pain management.    The plan of care involves discontinuing protonix since there is no active bleeding, and stopping digoxin as the heart rate has been stable without it. Her care remains focused on comfort measures with plans to transition medications from intravenous to oral forms, noting minimal usage. Bowel regimens and medications for pain, dyspnea, and secretions are outlined, with an emphasis on symptom management and minimizing interventions. The patient is medically cleared for discharge back to home hospice, and the daughter has been updated on the plan, with ongoing support from social work and chaplaincy to address anticipatory grief and advance care planning.    Rest of the assessment and plan as per Dr. Farris's note   Will continue PCU care for dispo planning.     Salvador Mack MD  Associate Chief Geriatrics and Palliative Care ([facility]) [facility]   [facility] Consult Service   , [employer] at [facility]      Please contact me via Teams from Monday through Friday between 9am-5pm. If not answering, please call the palliative care pager [phone]    After 5pm and on weekends, please see the contact information below:    In the event of newly developing, evolving, or worsening symptoms, please contact the Palliative Medicine team via pager (if the patient is at [facility] #[phone] or if the patient is at [facility] #[phone]) The Geriatric and Palliative Medicine service has coverage 24 hours a day/ 7 days a week to provide medical recommendations regarding symptom management needs via telephone
79W HTN hypothyroidism CAD A-fib not on anticoagulation, ischemic colitis s/p resection and ostomy presenting with bloody output from ostomy  on 2/6. Patient is on home hospice, receiving comfort care. Pt also in rapid afib. Transferred to PCU for comfort measures at end of life.    Seen at bedside and case discussed on IDT rounds.    Patient appears comfortable on current regimen. Will continue to monitor for symptoms and assess stability for discharge in coming days. Very minimal PO today - dc midodrine. Given no concern for active GIB so will de-escalate PPI to BID from drip. Also no clear indication for continuation of empiric antibiotics (cultures negative, no leukocytosis). discussed with family and will discontinue and support patient with symptom management at end of life.

## 2025-02-11 NOTE — DISCHARGE NOTE NURSING/CASE MANAGEMENT/SOCIAL WORK - NSDCVIVACCINE_GEN_ALL_CORE_FT
Tdap; 14-Jun-2015 22:07; Rosana Loaiza (RN); [employer]; [id number]; IntraMuscular; Deltoid Left.; 0.5 milliLiter(s); VIS (VIS Published: 09-May-2013, VIS Presented: 14-Jun-2015);

## 2025-02-11 NOTE — PROGRESS NOTE ADULT - ASSESSMENT
78 y/o F hx of  hypertension hypothyroidism CAD A-fib not on anticoagulation, ischemic colitis  status postresection and ostomy presenting with bloody output from ostomy that began today.  Patient is on home hospice, receiving comfort care.  Patient accompanied by her daughter states that she noticed bloody output from the ostomy this morning.  Patient is usually able to express that she is uncomfortable and the patient has not indicated any discomfort. Transferred to the PCU for comfort measures at end of life.     78 y/o F hx of  hypertension hypothyroidism CAD A-fib not on anticoagulation, ischemic colitis  status postresection and ostomy presenting with bloody output from ostomy that began today.  Patient is on home hospice, receiving comfort care.  Patient accompanied by her daughter states that she noticed bloody output from the ostomy this morning.  Patient is usually able to express that she is uncomfortable and the patient has not indicated any discomfort. Transferred to the PCU for comfort measures at end of life.

## 2025-02-11 NOTE — DISCHARGE NOTE NURSING/CASE MANAGEMENT/SOCIAL WORK - FINANCIAL ASSISTANCE
[facility] provides services at a reduced cost to those who are determined to be eligible through [facility]’s financial assistance program. Information regarding [facility]’s financial assistance program can be found by going to [email or web address] or by calling [phone].

## 2025-02-11 NOTE — PROGRESS NOTE ADULT - PROBLEM SELECTOR PLAN 2
- HR has been stable, has not required digoxin in several days  - No meds on dc considering PO is minimal to sips/pleasure feeds - HR has been stable, has not required digoxin in several days  - No meds on dc considering PO is minimal to sips/pleasure feeds, and patient anticipated to have labile BP as she declines

## 2025-02-11 NOTE — PROGRESS NOTE ADULT - SUBJECTIVE AND OBJECTIVE BOX
GAP TEAM PALLIATIVE CARE UNIT PROGRESS NOTE:      [ x] Patient on hospice program.    INDICATION FOR PALLIATIVE CARE UNIT SERVICES/Interval HPI:  Comfort Care    OVERNIGHT EVENTS:  Patient is seen and examined at bedside.  Appears comfortable, no distress.  In the last 24 hour period 9a-9a patient utilized only morphine 2mg IV x2 for sp.  Per staff has not had any bloody output in ostomy.  Plan is for discharge to home hospice.    DNR on chart:DNI  DNI      Allergies    penicillin (Hives)    Intolerances    MEDICATIONS  (STANDING):  digoxin  Injectable 125 MICROGram(s) IV Push daily  pantoprazole  Injectable 40 milliGRAM(s) IV Push daily    MEDICATIONS  (PRN):  acetaminophen  Suppository .. 650 milliGRAM(s) Rectal every 6 hours PRN Temp greater or equal to 38C (100.4F), Mild Pain (1 - 3)  bisacodyl Suppository 10 milliGRAM(s) Rectal daily PRN Constipation  glycopyrrolate Injectable 0.4 milliGRAM(s) IV Push every 6 hours PRN excessive secretions  LORazepam   Injectable 0.25 milliGRAM(s) IV Push every 1 hour PRN Agitation  morphine  - Injectable 2 milliGRAM(s) IV Push every 1 hour PRN Severe Pain (7 - 10)  morphine  - Injectable 2 milliGRAM(s) IV Push every 1 hour PRN dyspnea  morphine  - Injectable 1 milliGRAM(s) IV Push every 1 hour PRN Moderate Pain (4 - 6)  ITEMS UNCHECKED ARE NOT PRESENT    PRESENT SYMPTOMS: [x]Unable to self-report - see [ ] CPOT [ ] PAINADS [ ] RDOS  Source if other than patient:  [ ]Family   [x]Team     Pain:  [ ]yes [x ]no  QOL impact -   Location -                    Aggravating factors -  Quality -  Radiation -  Timing-  Severity (0-10 scale):  Minimal acceptable level (0-10 scale):     CPOT:    https://www.sccm.org/getattachment/[email or web address]-[email or web address]/Critical-Care-Pain-Observation-Tool-(CPOT)    PAINAD Score: See PAINAD tool and score below       Dyspnea:                           [ ]Mild [ ]Moderate [ ]Severe None     RDOS: See RDOS tool and score below   0 to 2  minimal or no respiratory distress   3  mild distress  4 to 6 moderate distress  >7 severe distress      Anxiety:                             [ ]Mild [ ]Moderate [ ]Severe  Fatigue:                             [ ]Mild [x ]Moderate [ ]Severe  Nausea:                             [ ]Mild [ ]Moderate [ ]Severe  Loss of appetite:              [ ]Mild [ ]Moderate [ ]Severe  Constipation:                    [ ]Mild [ ]Moderate [ ]Severe    PCSSQ[Palliative Care Spiritual Screening Question]   Severity (0-10):  Score of 4 or > indicate consideration of Chaplaincy referral.  Chaplaincy Referral: [x ] yes [ ] refused [x ] following [ ] Deferred     Caregiver [place]? : [ ] yes [x] no [ ] Deferred [ ] Declined    Social work referral [ ] Patient & Family Centered Care Referral [ ]     Anticipatory Grief present?:  [x ] yes [ ] no  [ ] Deferred Social work referral [x ] Chaplaincy Referral [ ]    		  Other Symptoms:  PATIENT TOO SLEEPY TO ENGAGE IN ROS  [ ]All other review of systems negative     Palliative Performance Status Version 2:   See PPSv2 tool and score below         PHYSICAL EXAM:   Vital Signs Last 24 Hrs  T(C): 35.9 (11 Feb 2025 08:36), Max: 35.9 (11 Feb 2025 08:36)  T(F): 96.6 (11 Feb 2025 08:36), Max: 96.6 (11 Feb 2025 08:36)  HR: 77 (11 Feb 2025 08:36) (77 - 77)  BP: 129/76 (11 Feb 2025 08:36) (129/76 - 129/76)  BP(mean): --  RR: 18 (11 Feb 2025 08:36) (18 - 18)  SpO2: 96% (11 Feb 2025 08:36) (96% - 96%)    Parameters below as of 11 Feb 2025 08:36  Patient On (Oxygen Delivery Method): room air     I&O's Summary    10 Feb 2025 07:01  -  11 Feb 2025 07:00  --------------------------------------------------------  IN: 100 mL / OUT: 600 mL / NET: -500 mL  GENERAL: [ ]Cachexia    [ ]Alert  [ ]Oriented    [x ]Lethargic Sleeping  [ ]Unarousable  [x ] minimally Verbal  [ ]Non-Verbal  Behavioral:   [ ]Anxiety  [ ]Delirium [ ]Agitation [ ]Other  HEENT:  [ ]Normal   [ x]Dry mouth   [ ]ET Tube/Trach  [ ]Oral lesions  PULMONARY:   [x]Clear [ ]Tachypnea  [ ] Mildly Audible secretions   [ ]Rhonchi        [ ]Right [ ]Left [ ]Bilateral  [ ]Crackles        [ ]Right [ ]Left [ ]Bilateral  [ ]Wheezing     [ ]Right [ ]Left [ ]Bilateral  [ ]Diminished BS [ ] Right [ ]Left [ ]Bilateral  CARDIOVASCULAR:    [ ]Regular [x ]Irregular [ ]Tachy  [ ]Gerson [ ]Murmur [ ]Other  GASTROINTESTINAL:  [ x]Soft  [ ]Distended   [ x]+BS  [x ]Non tender [ ]Tender  [x ]Other [ ]PEG [ ]OGT/ NGT   Last BM: Ostomy with stool output  GENITOURINARY:  [ ]Normal [ x]Incontinent   [ ]Oliguria/Anuria   [ ]Melvin  MUSCULOSKELETAL:   [ ]Normal   [x ]Weakness  [ x]Bed/Wheelchair bound [ ]Edema  NEUROLOGIC:   [ ]No focal deficits  [ x] Cognitive impairment  [ ] Dysphagia [ ]Dysarthria [ ] Paresis [ ]Other   SKIN:   [ ]Normal  [ ]Rash  [x]Other RN NOTE ON SKIN CARE REVIEWED  [ ]Pressure ulcer(s) [ ]y [ ]n present on admission    CRITICAL CARE:  [ ]Shock Present  [ ]Septic [ ]Cardiogenic [ ]Neurologic [ ]Hypovolemic  [ ]Vasopressors [ ]Inotropes  [ ]Respiratory failure present [ ]Mechanical Ventilation [ ]Non-invasive ventilatory support [ ]High-Flow   [ ]Acute  [ ]Chronic [ ]Hypoxic  [ ]Hypercarbic [ ]Other  [ ]Other organ failure     LABS:  No new labs, prior reviewed             RADIOLOGY & ADDITIONAL STUDIES: No imaging    Protein Calorie Malnutrition Present: [ ]mild [ ]moderate [ ]severe [ ]underweight [ ]morbid obesity  https://www.andeal.org/vault/4250/web/files/ONC/Table_Clinical%20Characteristics%20to%20Document%20Malnutrition-White%20JV%20et%20al%202012.pdf    Height (cm): 162.6 (08-06-24 @ 17:00), 162.6 (07-21-24 @ 11:32), 162.6 (06-03-24 @ 06:03)  Weight (kg): 68.039 (02-06-25 @ 09:33), 87.2 (08-16-24 @ 09:07), 69.5 (07-25-24 @ 14:35)  BMI (kg/m2): 25.7 (02-06-25 @ 09:33), 33 (08-16-24 @ 09:07), 26.3 (08-06-24 @ 17:00)    [ x]PPSV2 < or = 30%  [ ]significant weight loss [ ]poor nutritional intake [ ]anasarca[ ]Artificial Nutrition    Other REFERRALS:  [ ]Hospice  [ ]Child Life  [ ]Social Work  [ ]Case management [ ]Holistic Therapy     PROGRESS NOTE  Date & Time of Note   2025-02-10 10:35    Notes    Notes: Covering Social Work Note:  Social Work continues to follow. Patient discussed during IDRs. As per Medical  Team, patient is medically cleared for discharge. Patient known to [facility].  Referral sent for review. Social Work to remain available.

## 2025-02-11 NOTE — PROGRESS NOTE ADULT - TIME BILLING
Nurse, palliative
as above
Symptom assessment and management, supportive counseling, coordination of care
as above
Total time spent including the following  [x] Physical chart review and documentation   An extensive review of the physical chart, electronic health record, and documentation was conducted to obtain collateral information including but not limited to:   - Current inpatient records    - Inpatient values/results    - Social work assessments   - Current or proposed treatment plans   - Pharmacotherapy review  [x]care coordination  [x]discussion with the primary team  [x]discussion with floor staff  [x]Physical Exam and/or review of systems   [x]Formulation of assessment and plan   [x]Evaluating for response to treatment and side effects of opioids or benzodiazepines

## 2025-02-12 ENCOUNTER — TRANSCRIPTION ENCOUNTER (OUTPATIENT)
Age: 80
End: 2025-02-12

## 2025-02-12 VITALS
SYSTOLIC BLOOD PRESSURE: 107 MMHG | HEART RATE: 102 BPM | DIASTOLIC BLOOD PRESSURE: 63 MMHG | TEMPERATURE: 97 F | OXYGEN SATURATION: 95 % | RESPIRATION RATE: 18 BRPM

## 2025-02-12 PROCEDURE — 96376 TX/PRO/DX INJ SAME DRUG ADON: CPT

## 2025-02-12 PROCEDURE — 85027 COMPLETE CBC AUTOMATED: CPT

## 2025-02-12 PROCEDURE — 86901 BLOOD TYPING SEROLOGIC RH(D): CPT

## 2025-02-12 PROCEDURE — 96374 THER/PROPH/DIAG INJ IV PUSH: CPT

## 2025-02-12 PROCEDURE — 87040 BLOOD CULTURE FOR BACTERIA: CPT

## 2025-02-12 PROCEDURE — 85025 COMPLETE CBC W/AUTO DIFF WBC: CPT

## 2025-02-12 PROCEDURE — 86850 RBC ANTIBODY SCREEN: CPT

## 2025-02-12 PROCEDURE — 85730 THROMBOPLASTIN TIME PARTIAL: CPT

## 2025-02-12 PROCEDURE — 80053 COMPREHEN METABOLIC PANEL: CPT

## 2025-02-12 PROCEDURE — 96375 TX/PRO/DX INJ NEW DRUG ADDON: CPT

## 2025-02-12 PROCEDURE — 99239 HOSP IP/OBS DSCHRG MGMT >30: CPT

## 2025-02-12 PROCEDURE — 86900 BLOOD TYPING SEROLOGIC ABO: CPT

## 2025-02-12 PROCEDURE — 99285 EMERGENCY DEPT VISIT HI MDM: CPT | Mod: 25

## 2025-02-12 PROCEDURE — 85610 PROTHROMBIN TIME: CPT

## 2025-02-12 RX ORDER — METOPROLOL SUCCINATE 25 MG
1 TABLET, EXTENDED RELEASE 24 HR ORAL
Refills: 0 | DISCHARGE

## 2025-02-12 RX ORDER — ACETAMINOPHEN 160 MG/5ML
1 SUSPENSION ORAL
Qty: 0 | Refills: 0 | DISCHARGE
Start: 2025-02-12

## 2025-02-12 RX ORDER — ATORVASTATIN CALCIUM 80 MG/1
1 TABLET, FILM COATED ORAL
Refills: 0 | DISCHARGE

## 2025-02-12 RX ORDER — MORPHINE SULFATE 60 MG/1
1.5 TABLET, FILM COATED, EXTENDED RELEASE ORAL
Qty: 0 | Refills: 0 | DISCHARGE
Start: 2025-02-12

## 2025-02-12 RX ORDER — MORPHINE SULFATE 60 MG/1
3 TABLET, FILM COATED, EXTENDED RELEASE ORAL
Qty: 0 | Refills: 0 | DISCHARGE
Start: 2025-02-12

## 2025-02-12 RX ORDER — PANTOPRAZOLE 20 MG/1
1 TABLET, DELAYED RELEASE ORAL
Refills: 0 | DISCHARGE

## 2025-02-12 RX ORDER — LEVOTHYROXINE SODIUM 25 UG/1
1 TABLET ORAL
Refills: 0 | DISCHARGE

## 2025-02-12 RX ORDER — MIDODRINE HYDROCHLORIDE 5 MG/1
1 TABLET ORAL
Refills: 0 | DISCHARGE

## 2025-02-12 RX ORDER — BISACODYL 5 MG
1 TABLET, DELAYED RELEASE (ENTERIC COATED) ORAL
Qty: 0 | Refills: 0 | DISCHARGE
Start: 2025-02-12

## 2025-02-12 RX ORDER — MECOBAL/LEVOMEFOLAT CA/B6 PHOS 2-3-35 MG
1 TABLET ORAL
Refills: 0 | DISCHARGE

## 2025-02-12 RX ADMIN — MORPHINE SULFATE 6 MILLIGRAM(S): 60 TABLET, FILM COATED, EXTENDED RELEASE ORAL at 11:34

## 2025-02-12 RX ADMIN — MORPHINE SULFATE 6 MILLIGRAM(S): 60 TABLET, FILM COATED, EXTENDED RELEASE ORAL at 01:51

## 2025-02-12 RX ADMIN — MORPHINE SULFATE 6 MILLIGRAM(S): 60 TABLET, FILM COATED, EXTENDED RELEASE ORAL at 02:30

## 2025-02-12 RX ADMIN — MORPHINE SULFATE 6 MILLIGRAM(S): 60 TABLET, FILM COATED, EXTENDED RELEASE ORAL at 08:11

## 2025-02-12 RX ADMIN — MORPHINE SULFATE 6 MILLIGRAM(S): 60 TABLET, FILM COATED, EXTENDED RELEASE ORAL at 07:56

## 2025-02-12 NOTE — DISCHARGE NOTE PROVIDER - NSDCMRMEDTOKEN_GEN_ALL_CORE_FT
acetaminophen 650 mg rectal suppository: 1 suppository(ies) rectal every 6 hours As needed Temp greater or equal to 38C (100.4F), Mild Pain (1 - 3)  bisacodyl 10 mg rectal suppository: 1 suppository(ies) rectal once a day As needed Constipation  LORazepam 2 mg/mL oral concentrate: 0.25 milliliter(s) orally every 4 hours (5 times/day) as needed for  agitation  morphine 10 mg/5 mL oral solution: 1.5 milliliter(s) orally every 4 hours As needed Moderate Pain (4 - 6)  morphine 10 mg/5 mL oral solution: 3 milliliter(s) orally every 4 hours As needed Severe Pain (7 - 10)  morphine 10 mg/5 mL oral solution: 3 milliliter(s) orally every 4 hours As needed dyspnea

## 2025-02-12 NOTE — DISCHARGE NOTE PROVIDER - NSDCCPCAREPLAN_GEN_ALL_CORE_FT
PRINCIPAL DISCHARGE DIAGNOSIS  Diagnosis: Bleeding from colostomy  Assessment and Plan of Treatment:

## 2025-02-12 NOTE — DISCHARGE NOTE PROVIDER - HOSPITAL COURSE
80 y/o F hx of  hypertension hypothyroidism CAD A-fib not on anticoagulation, ischemic colitis  status postresection and ostomy presenting with bloody output from ostomy that began today.  Patient is on home hospice, receiving comfort care.  Patient accompanied by her daughter states that she noticed bloody output from the ostomy this morning.  Patient is usually able to express that she is uncomfortable and the patient has not indicated any discomfort. Patient did not have any noted bleeding in ostomy in the coming days.  She was empirically treated with antibiotics until daughter decided to focus on comfort directed care.  Patient had also had an episode of atrial fib with RVR during admission and was seen by cardiology, started on digoxin however this was stopped in PCU.  Transferred to the PCU for comfort measures at end of life.  Chart reviewed over the last 24 hours shows that patient used 3 prn morphines 6mg PO for severe pain in the last 24 hour period.

## 2025-02-12 NOTE — DISCHARGE NOTE PROVIDER - NSDCCPGOAL_GEN_ALL_CORE_FT
Patient to be discharged on home hospice with focus on comfort.  Has comfort pack at home with [facility].  Only able to take limited oral medications with solution, has not required frequent prns in Palliative Care Unit

## 2025-02-13 ENCOUNTER — APPOINTMENT (OUTPATIENT)
Dept: HOME HEALTH SERVICES | Facility: HOME HEALTH | Age: 80
End: 2025-02-13
Payer: MEDICARE

## 2025-02-13 DIAGNOSIS — I48.91 UNSPECIFIED ATRIAL FIBRILLATION: ICD-10-CM

## 2025-02-13 DIAGNOSIS — F32.1 MAJOR DEPRESSIVE DISORDER, SINGLE EPISODE, MODERATE: ICD-10-CM

## 2025-02-13 DIAGNOSIS — R06.00 DYSPNEA, UNSPECIFIED: ICD-10-CM

## 2025-02-13 DIAGNOSIS — J44.9 CHRONIC OBSTRUCTIVE PULMONARY DISEASE, UNSPECIFIED: ICD-10-CM

## 2025-02-13 PROCEDURE — 99350 HOME/RES VST EST HIGH MDM 60: CPT

## 2025-02-13 PROCEDURE — 99344 HOME/RES VST NEW MOD MDM 60: CPT

## 2025-02-13 RX ORDER — MORPHINE SULFATE 10 MG/5ML
10 SOLUTION ORAL
Qty: 1 | Refills: 0 | Status: ACTIVE | COMMUNITY
Start: 2025-02-13

## 2025-03-26 ENCOUNTER — APPOINTMENT (OUTPATIENT)
Dept: HOME HEALTH SERVICES | Facility: HOME HEALTH | Age: 80
End: 2025-03-26

## 2025-05-09 ENCOUNTER — APPOINTMENT (OUTPATIENT)
Dept: HOME HEALTH SERVICES | Facility: HOME HEALTH | Age: 80
End: 2025-05-09

## 2025-06-04 NOTE — H&P ADULT - PROBLEM SELECTOR PROBLEM 4
Daily Call Note:     1350: Pt called back with BP reading; 114/53; HR; 61. Notified H@H nurse and provider Alphonse Simms.    Daily VS:  /53   Pulse 61     Last 3 Weights:  Wt Readings from Last 7 Encounters:   05/22/25 76.7 kg (169 lb 1.5 oz)   05/05/25 78.8 kg (173 lb 12.8 oz)   04/30/25 78.5 kg (173 lb 1 oz)   02/03/25 79.5 kg (175 lb 3.2 oz)   01/21/25 80.6 kg (177 lb 9.6 oz)   12/07/24 83 kg (183 lb)   08/13/24 86.4 kg (190 lb 6.4 oz)           Virtual Visits--Scheduled (Most Recent Date at Top)  Follow up Appointments  Recent Visits  No visits were found meeting these conditions.  Showing recent visits within past 30 days and meeting all other requirements  Future Appointments  No visits were found meeting these conditions.  Showing future appointments within next 90 days and meeting all other requirements               
HTN (hypertension)

## (undated) DEVICE — POSITIONER FOAM EGG CRATE ULNAR 2PCS (PINK)

## (undated) DEVICE — SUT ENDOSTITCH DEVICE 10MM

## (undated) DEVICE — Device

## (undated) DEVICE — BLADE SCALPEL SAFETYLOCK #10

## (undated) DEVICE — TUBING INSUFFLATION LAP FILTER 10FT

## (undated) DEVICE — DRSG VAC ABTHERS

## (undated) DEVICE — DRSG OPSITE 13.75 X 4"

## (undated) DEVICE — TRAP SPECIMEN SPUTUM 40CC

## (undated) DEVICE — GLV 7 PROTEXIS (WHITE)

## (undated) DEVICE — DRAIN CHANNEL 19FR ROUND FULL FLUTED

## (undated) DEVICE — LIGASURE IMPACT

## (undated) DEVICE — GOWN TRIMAX LG

## (undated) DEVICE — GLV 6.5 PROTEXIS (WHITE)

## (undated) DEVICE — VENODYNE/SCD SLEEVE CALF LARGE

## (undated) DEVICE — PREP CHLORAPREP HI-LITE ORANGE 26ML

## (undated) DEVICE — INSUFFLATION NDL COVIDIEN STEP 14G FOR STEP/VERSASTEP

## (undated) DEVICE — SCOPE WARMER SEAL DISP

## (undated) DEVICE — DRAPE TOWEL BLUE 17" X 24"

## (undated) DEVICE — DRAPE INSTRUMENT POUCH 6.75" X 11"

## (undated) DEVICE — MEDICATION LABELS W MARKER

## (undated) DEVICE — SOL IRR POUR H2O 250ML

## (undated) DEVICE — TROCAR APPLIED MEDICAL KII BALLOON BLUNT TIP 12MM X 100MM

## (undated) DEVICE — SHEARS HARMONIC ACE 5MM X 36CM CURVED TIP

## (undated) DEVICE — WARMING BLANKET LOWER ADULT

## (undated) DEVICE — SOL IRR POUR NS 0.9% 500ML

## (undated) DEVICE — TROCAR COVIDIEN VERSASTEP PLUS 11MM STANDARD

## (undated) DEVICE — LIGASURE BLUNT TIP 37CM

## (undated) DEVICE — GLV 7.5 PROTEXIS (WHITE)

## (undated) DEVICE — SUT SOFSILK 2-0 18" V-20 (POP-OFF)

## (undated) DEVICE — DRAIN RESERVOIR FOR JACKSON PRATT 100CC CARDINAL

## (undated) DEVICE — DRAPE 1/2 SHEET 40X57"

## (undated) DEVICE — DRSG TEGADERM 6"X8"

## (undated) DEVICE — SUT PDS II 0 18" ENDOLOOP LIGATURE

## (undated) DEVICE — SPECIMEN CONTAINER 100ML

## (undated) DEVICE — STAPLER COVIDIEN ENDO GIA STANDARD HANDLE

## (undated) DEVICE — FOLEY TRAY 16FR 5CC LTX UMETER CLOSED

## (undated) DEVICE — INSUFFLATION NDL COVIDIEN STEP 14G SHORT FOR STEP/VERSASTEP

## (undated) DEVICE — DRAPE MAYO STAND 30"

## (undated) DEVICE — DRAIN JACKSON PRATT 10MM FLAT FULL NO TROCAR

## (undated) DEVICE — PACKING GAUZE IODOFORM 0.5"

## (undated) DEVICE — SUT PDS II 1 48" TP-1

## (undated) DEVICE — STAPLER SKIN VISI-STAT 35 WIDE

## (undated) DEVICE — SUT SOFSILK 3-0 18" V-20 (POP-OFF)

## (undated) DEVICE — PACK MAJOR ABDOMINAL SUPINE

## (undated) DEVICE — PACK LAP CHOLE LAP APPENDECTOMY

## (undated) DEVICE — DRAIN PENROSE .5" X 18" LATEX

## (undated) DEVICE — DRAPE GENERAL ENDOSCOPY

## (undated) DEVICE — VALVE YELLOW PORT SEAL PLUS 5MM

## (undated) DEVICE — WARMING BLANKET UPPER ADULT

## (undated) DEVICE — VISITEC 4X4

## (undated) DEVICE — TROCAR COVIDIEN BLUNT TIP HASSAN 12MM

## (undated) DEVICE — TUBING STRYKEFLOW II SUCTION / IRRIGATOR

## (undated) DEVICE — GLV 8.5 PROTEXIS (WHITE)

## (undated) DEVICE — SHEARS COVIDIEN ENDO SHEAR 5MM X 31CM W UNIPOLAR CAUTERY

## (undated) DEVICE — TUBING SUCTION 20FT

## (undated) DEVICE — TUBING IRRIGATION DAVOL SYSTEM X STREAM

## (undated) DEVICE — TROCAR COVIDIEN VERSASTEP PLUS 15MM STANDARD

## (undated) DEVICE — LAP PAD 18 X 18"

## (undated) DEVICE — ENDOCATCH 10MM SPECIMEN POUCH

## (undated) DEVICE — TROCAR COVIDIEN VERSASTEP PLUS 12MM STANDARD

## (undated) DEVICE — LIGASURE ATLAS 10MM 37CM

## (undated) DEVICE — ELCTR DISSECTOR ULTRASONIC CORDLESS CVD JAW 5MM-39CM

## (undated) DEVICE — TROCAR COVIDIEN STEP 5MM SHORT 70MM

## (undated) DEVICE — TROCAR APPLIED MEDICAL KII BALLOON BLUNT TIP 12MM X 130MM

## (undated) DEVICE — DRSG TELFA 3 X 8

## (undated) DEVICE — DRSG STERISTRIPS 0.5 X 4"

## (undated) DEVICE — LIGASURE MARYLAND 37CM

## (undated) DEVICE — GLV 8 PROTEXIS (WHITE)

## (undated) DEVICE — PACK BASIN SPECIAL PROCEDURE

## (undated) DEVICE — BLADE SCALPEL SAFETYLOCK #15

## (undated) DEVICE — MARKING PEN W RULER

## (undated) DEVICE — D HELP - CLEARVIEW CLEARIFY SYSTEM

## (undated) DEVICE — TROCAR COVIDIEN BLUNT TIP HASSAN 10MM